# Patient Record
Sex: MALE | Race: WHITE | NOT HISPANIC OR LATINO | Employment: OTHER | URBAN - NONMETROPOLITAN AREA
[De-identification: names, ages, dates, MRNs, and addresses within clinical notes are randomized per-mention and may not be internally consistent; named-entity substitution may affect disease eponyms.]

---

## 2017-01-05 ENCOUNTER — TELEPHONE (OUTPATIENT)
Dept: UROLOGY | Facility: OTHER | Age: 72
End: 2017-01-05

## 2017-01-05 DIAGNOSIS — Z85.51 PERSONAL HISTORY OF MALIGNANT NEOPLASM OF BLADDER: Primary | ICD-10-CM

## 2017-01-05 NOTE — TELEPHONE ENCOUNTER
I spoke to patient and explained that he needs to have a UA and cytology before his appt on Tuesday with Dr Martinez. (The cytology won't be back yet, but we need to make sure that he doesn't have a bladder infection.)  Mil Alvarez LPN

## 2017-01-06 ENCOUNTER — TELEPHONE (OUTPATIENT)
Dept: UROLOGY | Facility: OTHER | Age: 72
End: 2017-01-06

## 2017-01-06 DIAGNOSIS — Z85.51 PERSONAL HISTORY OF MALIGNANT NEOPLASM OF BLADDER: ICD-10-CM

## 2017-01-06 DIAGNOSIS — Z85.51 PERSONAL HISTORY OF MALIGNANT NEOPLASM OF BLADDER: Primary | ICD-10-CM

## 2017-01-06 LAB
ALBUMIN UR-MCNC: NEGATIVE MG/DL
APPEARANCE UR: CLEAR
BILIRUB UR QL STRIP: NEGATIVE
COLOR UR AUTO: YELLOW
GLUCOSE UR STRIP-MCNC: NEGATIVE MG/DL
HGB UR QL STRIP: NEGATIVE
KETONES UR STRIP-MCNC: NEGATIVE MG/DL
LEUKOCYTE ESTERASE UR QL STRIP: NEGATIVE
NITRATE UR QL: NEGATIVE
PH UR STRIP: 6 PH (ref 4.7–8)
SP GR UR STRIP: 1.02 (ref 1–1.03)
URN SPEC COLLECT METH UR: NORMAL
UROBILINOGEN UR STRIP-MCNC: NORMAL MG/DL (ref 0–2)

## 2017-01-06 PROCEDURE — 88108 CYTOPATH CONCENTRATE TECH: CPT | Mod: TC | Performed by: UROLOGY

## 2017-01-06 PROCEDURE — 81003 URINALYSIS AUTO W/O SCOPE: CPT | Performed by: UROLOGY

## 2017-01-09 LAB — COPATH REPORT: NORMAL

## 2017-01-27 ENCOUNTER — TELEPHONE (OUTPATIENT)
Dept: UROLOGY | Facility: OTHER | Age: 72
End: 2017-01-27

## 2017-01-27 DIAGNOSIS — C67.0 MALIGNANT NEOPLASM OF TRIGONE OF URINARY BLADDER (H): Primary | ICD-10-CM

## 2017-01-27 NOTE — TELEPHONE ENCOUNTER
Patient notified that Dr Martinez wants to do a cysto with biopsy in the OR on Feb 14. I also explained that he will need a pre op appt with his PCP which he will schedule, and that I will send him a surgery packet.  Mil Alvarez LPN

## 2017-02-01 ENCOUNTER — OFFICE VISIT (OUTPATIENT)
Dept: FAMILY MEDICINE | Facility: OTHER | Age: 72
End: 2017-02-01
Attending: FAMILY MEDICINE
Payer: COMMERCIAL

## 2017-02-01 VITALS
HEIGHT: 67 IN | DIASTOLIC BLOOD PRESSURE: 74 MMHG | TEMPERATURE: 97.1 F | RESPIRATION RATE: 14 BRPM | HEART RATE: 63 BPM | OXYGEN SATURATION: 95 % | BODY MASS INDEX: 33.9 KG/M2 | WEIGHT: 216 LBS | SYSTOLIC BLOOD PRESSURE: 132 MMHG

## 2017-02-01 DIAGNOSIS — Z01.818 PREOP GENERAL PHYSICAL EXAM: Primary | ICD-10-CM

## 2017-02-01 LAB
BASOPHILS # BLD AUTO: 0.1 10E9/L (ref 0–0.2)
BASOPHILS NFR BLD AUTO: 0.7 %
DIFFERENTIAL METHOD BLD: NORMAL
EOSINOPHIL # BLD AUTO: 0.1 10E9/L (ref 0–0.7)
EOSINOPHIL NFR BLD AUTO: 2.1 %
ERYTHROCYTE [DISTWIDTH] IN BLOOD BY AUTOMATED COUNT: 13.7 % (ref 10–15)
HCT VFR BLD AUTO: 43.4 % (ref 40–53)
HGB BLD-MCNC: 15.2 G/DL (ref 13.3–17.7)
IMM GRANULOCYTES # BLD: 0 10E9/L (ref 0–0.4)
IMM GRANULOCYTES NFR BLD: 0.3 %
LYMPHOCYTES # BLD AUTO: 2.2 10E9/L (ref 0.8–5.3)
LYMPHOCYTES NFR BLD AUTO: 32.5 %
MCH RBC QN AUTO: 32.5 PG (ref 26.5–33)
MCHC RBC AUTO-ENTMCNC: 35 G/DL (ref 31.5–36.5)
MCV RBC AUTO: 93 FL (ref 78–100)
MONOCYTES # BLD AUTO: 0.8 10E9/L (ref 0–1.3)
MONOCYTES NFR BLD AUTO: 12 %
NEUTROPHILS # BLD AUTO: 3.6 10E9/L (ref 1.6–8.3)
NEUTROPHILS NFR BLD AUTO: 52.4 %
NRBC # BLD AUTO: 0 10*3/UL
NRBC BLD AUTO-RTO: 0 /100
PLATELET # BLD AUTO: 218 10E9/L (ref 150–450)
RBC # BLD AUTO: 4.68 10E12/L (ref 4.4–5.9)
WBC # BLD AUTO: 6.8 10E9/L (ref 4–11)

## 2017-02-01 PROCEDURE — 85025 COMPLETE CBC W/AUTO DIFF WBC: CPT | Performed by: FAMILY MEDICINE

## 2017-02-01 PROCEDURE — 99214 OFFICE O/P EST MOD 30 MIN: CPT | Performed by: FAMILY MEDICINE

## 2017-02-01 PROCEDURE — 99213 OFFICE O/P EST LOW 20 MIN: CPT

## 2017-02-01 PROCEDURE — 36415 COLL VENOUS BLD VENIPUNCTURE: CPT | Performed by: FAMILY MEDICINE

## 2017-02-01 ASSESSMENT — PAIN SCALES - GENERAL: PAINLEVEL: NO PAIN (0)

## 2017-02-01 NOTE — MR AVS SNAPSHOT
After Visit Summary   2/1/2017    Colin Baxter    MRN: [id number]           Patient Information     Date Of Birth          1945        Visit Information        Provider Department      2/1/2017 9:20 AM Luis Tran MD [facility]        Today's Diagnoses     Preop general physical exam    -  1       Care Instructions      Before Your Surgery      Call your surgeon if there is any change in your health. This includes signs of a cold or flu (such as a sore throat, runny nose, cough, rash or fever).    Do not smoke, drink alcohol or take over the counter medicine (unless your surgeon or primary care doctor tells you to) for the 24 hours before and after surgery.    If you take prescribed drugs: Follow your doctor s orders about which medicines to take and which to stop until after surgery.    Eating and drinking prior to surgery: follow the instructions from your surgeon    Take a shower or bath the night before surgery. Use the soap your surgeon gave you to gently clean your skin. If you do not have soap from your surgeon, use your regular soap. Do not shave or scrub the surgery site.  Wear clean pajamas and have clean sheets on your bed.         Follow-ups after your visit        Your next 10 appointments already scheduled     Feb 14, 2017   Procedure with Randy Martinez MD   [facility] ([address] [id number] [phone]              Who to contact     If you have questions or need follow up information about today's clinic visit or your schedule please contact [facility] directly at [phone].  Normal or non-critical lab and imaging results will be communicated to you by MyChart, letter or phone within 4 business days after the clinic has received the results. If you do not hear from us within 7 days, please contact the clinic through MyChart or phone. If you have a critical or abnormal lab  "result, we will notify you by phone as soon as possible.  Submit refill requests through [employer] or call your pharmacy and they will forward the refill request to us. Please allow 3 business days for your refill to be completed.          Additional Information About Your Visit        amiandohart Information     [employer] lets you send messages to your doctor, view your test results, renew your prescriptions, schedule appointments and more. To sign up, go to [email or web address] . Click on \"Log in\" on the left side of the screen, which will take you to the Welcome page. Then click on \"Sign up Now\" on the right side of the page.     You will be asked to enter the access code listed below, as well as some personal information. Please follow the directions to create your username and password.     Your access code is: [email or web address]  Expires: 5/3/2017 12:00 PM     Your access code will  in 90 days. If you need help or a new code, please call your [place] clinic or [phone].        Care EveryWhere ID     This is your Care EveryWhere ID. This could be used by other organizations to access your [place] medical records  [id number]        Your Vitals Were     Pulse Temperature Respirations Height BMI (Body Mass Index) Pulse Oximetry    63 97.1  F (36.2  C) 14 5' 6.5\" (1.689 m) 34.35 kg/m2 95%       Blood Pressure from Last 3 Encounters:   17 132/74   16 108/62   16 127/74    Weight from Last 3 Encounters:   17 216 lb (97.977 kg)   16 218 lb (98.884 kg)   16 218 lb 11.2 oz (99.202 kg)              We Performed the Following     CBC with platelets differential        Primary Care Provider Office Phone # Fax #    Luis Tran [phone]       [facility] [address] [place] [address]        Thank you!     Thank you for choosing [facility]  for your care. Our goal is always to provide you with excellent care. Hearing back from our patients " is one way we can continue to improve our services. Please take a few minutes to complete the written survey that you may receive in the mail after your visit with us. Thank you!             Your Updated Medication List - Protect others around you: Learn how to safely use, store and throw away your medicines at www.disposemymeds.org.          This list is accurate as of: 2/1/17 11:59 PM.  Always use your most recent med list.                   Brand Name Dispense Instructions for use    IBUPROFEN PO      Take 200 mg by mouth every 6 hours as needed for moderate pain       * UNABLE TO FIND      1,000 mg by INTRAVESICAL route MEDICATION NAME: Gemzar       * UNABLE TO FIND      40 mg by INTRAVESICAL route MEDICATION NAME: Mitomycin       XALATAN 0.005 % ophthalmic solution   Generic drug:  latanoprost      Place 1 drop into both eyes At Bedtime       * Notice:  This list has 2 medication(s) that are the same as other medications prescribed for you. Read the directions carefully, and ask your doctor or other care provider to review them with you.

## 2017-02-01 NOTE — NURSING NOTE
"Chief Complaint   Patient presents with     Pre-Op Exam       Initial /74 mmHg  Pulse 63  Temp(Src) 97.1  F (36.2  C)  Resp 14  Ht 5' 6.5\" (1.689 m)  Wt 216 lb (97.977 kg)  BMI 34.35 kg/m2  SpO2 95% Estimated body mass index is 34.35 kg/(m^2) as calculated from the following:    Height as of this encounter: 5' 6.5\" (1.689 m).    Weight as of this encounter: 216 lb (97.977 kg).  BP completed using cuff size: jaqui Mcnulty      "

## 2017-02-01 NOTE — PROGRESS NOTES
[facility] [place]  [address]  [place] [address]  [phone]  Dept: [phone]    PRE-OP EVALUATION:  Today's date: 2017    Colin Baxter (: 1945) presents for pre-operative evaluation assessment as requested by Dr. Martinez.  He requires evaluation and anesthesia risk assessment prior to undergoing surgery/procedure for treatment of bladder cancer .  Proposed procedure: bladder biopsy    Date of Surgery/ Procedure: 17  Time of Surgery/ Procedure: unknown  Hospital/Surgical Facility: [facility]  Primary Physician: Luis Tran  Type of Anesthesia Anticipated: to be determined    Patient has a Health Care Directive or Living Will:  NO    1. NO - Do you have a history of heart attack, stroke, stent, bypass or surgery on an artery in the head, neck, heart or legs?  2. NO - Do you ever have any pain or discomfort in your chest?  3. NO - Do you have a history of  Heart Failure?  4. NO - Are you troubled by shortness of breath when: walking on the level, up a slight hill or at night?  5. NO - Do you currently have a cold, bronchitis or other respiratory infection?  6. NO - Do you have a cough, shortness of breath or wheezing?  7. NO - Do you sometimes get pains in the calves of your legs when you walk?  8. NO - Do you or anyone in your family have previous history of blood clots?  9. NO - Do you or does anyone in your family have a serious bleeding problem such as prolonged bleeding following surgeries or cuts?  10. NO - Have you ever had problems with anemia or been told to take iron pills?  11. NO - Have you had any abnormal blood loss such as black, tarry or bloody stools, or abnormal vaginal bleeding?  12. NO - Have you ever had a blood transfusion?  13. NO - Have you or any of your relatives ever had problems with anesthesia?  14. YES - DO YOU HAVE SLEEP APNEA, EXCESSIVE SNORING OR DAYTIME DROWSINESS? snore  15. NO - Do you have any prosthetic heart valves?  16. NO - Do you have  prosthetic joints?  17. NO - Is there any chance that you may be pregnant?      HPI:                                                      Brief HPI related to upcoming procedure: Colin has a history of bladder cancer and has had previous resection.  He was seen by Urology where it was felt the patient would benefit from follow up cystoscopy and biopsy.  I was asked to see him for preoperative medical clearance.        See problem list for active medical problems.  Problems all longstanding and stable, except as noted/documented.  See ROS for pertinent symptoms related to these conditions.                                                                                                  .    MEDICAL HISTORY:                                                      Patient Active Problem List    Diagnosis Date Noted     ACP (advance care planning) 05/04/2016     Priority: Medium     Advance Care Planning 5/4/2016: ACP Review of Chart / Resources Provided:  Reviewed chart for advance care plan.  Colin Baxter has been provided information and resources to begin or update their advance care plan.  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 No pertinent past medical history.  Past Surgical History   Procedure Laterality Date     Appendectomy  1958     Cystoscopy, transurethral resection (tur) tumor bladder, combined N/A 9/8/2015     Procedure: COMBINED CYSTOSCOPY, TRANSURETHRAL RESECTION (TUR) TUMOR BLADDER;  Surgeon: Randy Martinez MD;  Location: [facility]     Cystoscopy, biopsy bladder, combined N/A 9/8/2015     Procedure: COMBINED CYSTOSCOPY, BIOPSY BLADDER;  Surgeon: Randy Martinez MD;  Location[facility]     Cystoscopy, retrogrades, insert stent  "ureter(s), combined Left 9/8/2015     Procedure: COMBINED CYSTOSCOPY, RETROGRADES, INSERT STENT URETER(S);  Surgeon: Randy Martinez MD;  Location[facility]     Cystoscopy, transurethral resection (tur) tumor bladder, combined N/A 1/12/2016     Procedure: COMBINED CYSTOSCOPY, TRANSURETHRAL RESECTION (TUR) TUMOR BLADDER;  Surgeon: Randy Martinez MD;  Location: [facility]     Colonoscopy  2-     Cystoscopy, transurethral resection (tur) tumor bladder, combined N/A 9/13/2016     Procedure: COMBINED CYSTOSCOPY, TRANSURETHRAL RESECTION (TUR) TUMOR BLADDER;  Surgeon: Randy Martinez MD;  Location: [facility]     Current Outpatient Prescriptions   Medication Sig Dispense Refill     IBUPROFEN PO Take 200 mg by mouth every 6 hours as needed for moderate pain       latanoprost (XALATAN) 0.005 % ophthalmic solution Place 1 drop into both eyes At Bedtime       UNABLE TO FIND 1,000 mg by INTRAVESICAL route MEDICATION NAME: Gemzar       UNABLE TO FIND 40 mg by INTRAVESICAL route MEDICATION NAME: Mitomycin       OTC products: None, except as noted above    No Known Allergies   Latex Allergy: NO    Social History   Substance Use Topics     Smoking status: Former Smoker     Quit date: 01/06/1992     Smokeless tobacco: Never Used     Alcohol Use: Yes     History   Drug Use No       REVIEW OF SYSTEMS:                                                    Constitutional, neuro, ENT, endocrine, pulmonary, cardiac, gastrointestinal, genitourinary, musculoskeletal, integument and psychiatric systems are negative, except as otherwise noted.    EXAM:                                                    /74 mmHg  Pulse 63  Temp(Src) 97.1  F (36.2  C)  Resp 14  Ht 5' 6.5\" (1.689 m)  Wt 216 lb (97.977 kg)  BMI 34.35 kg/m2  SpO2 95%    GENERAL APPEARANCE: healthy, alert and no distress     EYES: EOMI, - PERRL     HENT: ear canals and TM's normal and nose and mouth without ulcers or lesions     NECK: no " adenopathy, no asymmetry, masses, or scars and thyroid normal to palpation     RESP: lungs clear to auscultation - no rales, rhonchi or wheezes     CV: regular rates and rhythm, normal S1 S2, no S3 or S4 and no murmur, click or rub -     ABDOMEN:  soft, nontender, no HSM or masses and bowel sounds normal     MS: extremities normal- no gross deformities noted, no evidence of inflammation in joints, FROM in all extremities.     SKIN: no suspicious lesions or rashes     NEURO: Normal strength and tone, sensory exam grossly normal, mentation intact and speech normal     PSYCH: mentation appears normal. and affect normal/bright     LYMPHATICS: No axillary, cervical, inguinal, or supraclavicular nodes    DIAGNOSTICS:                                                      Labs Resulted Today:   Results for orders placed or performed in visit on 02/01/17   CBC with platelets differential   Result Value Ref Range    WBC 6.8 4.0 - 11.0 10e9/L    RBC Count 4.68 4.4 - 5.9 10e12/L    Hemoglobin 15.2 13.3 - 17.7 g/dL    Hematocrit 43.4 40.0 - 53.0 %    MCV 93 78 - 100 fl    MCH 32.5 26.5 - 33.0 pg    MCHC 35.0 31.5 - 36.5 g/dL    RDW 13.7 10.0 - 15.0 %    Platelet Count 218 150 - 450 10e9/L    Diff Method Automated Method     % Neutrophils 52.4 %    % Lymphocytes 32.5 %    % Monocytes 12.0 %    % Eosinophils 2.1 %    % Basophils 0.7 %    % Immature Granulocytes 0.3 %    Nucleated RBCs 0 0 /100    Absolute Neutrophil 3.6 1.6 - 8.3 10e9/L    Absolute Lymphocytes 2.2 0.8 - 5.3 10e9/L    Absolute Monocytes 0.8 0.0 - 1.3 10e9/L    Absolute Eosinophils 0.1 0.0 - 0.7 10e9/L    Absolute Basophils 0.1 0.0 - 0.2 10e9/L    Abs Immature Granulocytes 0.0 0 - 0.4 10e9/L    Absolute Nucleated RBC 0.0        Recent Labs   Lab Test  09/11/15   0846  09/02/15   1040  07/07/15   0848   HGB  15.0  15.3  15.8   PLT  227  222  236   NA   --    --   138   POTASSIUM   --    --   4.0   CR   --    --   0.97        IMPRESSION:                                                     Reason for surgery/procedure: Bladder cancer    The proposed surgical procedure is considered INTERMEDIATE risk.    REVISED CARDIAC RISK INDEX  The patient has the following serious cardiovascular risks for perioperative complications such as (MI, PE, VFib and 3  AV Block):  No serious cardiac risks  INTERPRETATION: 0 risks: Class I (very low risk - 0.4% complication rate)    The patient has the following additional risks for perioperative complications:  No identified additional risks    No diagnosis found.    RECOMMENDATIONS:                                                        APPROVAL GIVEN to proceed with proposed procedure, without further diagnostic evaluation       Signed Electronically by: Luis Tran MD    Copy of this evaluation report is provided to requesting physician.    [facility] Preop Guidelines

## 2017-02-08 ENCOUNTER — TELEPHONE (OUTPATIENT)
Dept: UROLOGY | Facility: OTHER | Age: 72
End: 2017-02-08

## 2017-02-08 NOTE — TELEPHONE ENCOUNTER
I explained to patient that he does not need to give a urine sample before his surgery next week.  Mli Alvarez LPN

## 2017-02-08 NOTE — TELEPHONE ENCOUNTER
Colin has surgery scheduled 02/14 with Dr. Martinez.  Colin is asking if he should come in this week and provide a urine sample.  He would like you to call him back.

## 2017-02-08 NOTE — H&P (VIEW-ONLY)
[facility] [place]  [address]  [place] [address]  [phone]  Dept: [phone]    PRE-OP EVALUATION:  Today's date: 2017    Colin Baxter (: 1945) presents for pre-operative evaluation assessment as requested by Dr. Martinez.  He requires evaluation and anesthesia risk assessment prior to undergoing surgery/procedure for treatment of bladder cancer .  Proposed procedure: bladder biopsy    Date of Surgery/ Procedure: 17  Time of Surgery/ Procedure: unknown  Hospital/Surgical Facility: [facility]  Primary Physician: Luis Tran  Type of Anesthesia Anticipated: to be determined    Patient has a Health Care Directive or Living Will:  NO    1. NO - Do you have a history of heart attack, stroke, stent, bypass or surgery on an artery in the head, neck, heart or legs?  2. NO - Do you ever have any pain or discomfort in your chest?  3. NO - Do you have a history of  Heart Failure?  4. NO - Are you troubled by shortness of breath when: walking on the level, up a slight hill or at night?  5. NO - Do you currently have a cold, bronchitis or other respiratory infection?  6. NO - Do you have a cough, shortness of breath or wheezing?  7. NO - Do you sometimes get pains in the calves of your legs when you walk?  8. NO - Do you or anyone in your family have previous history of blood clots?  9. NO - Do you or does anyone in your family have a serious bleeding problem such as prolonged bleeding following surgeries or cuts?  10. NO - Have you ever had problems with anemia or been told to take iron pills?  11. NO - Have you had any abnormal blood loss such as black, tarry or bloody stools, or abnormal vaginal bleeding?  12. NO - Have you ever had a blood transfusion?  13. NO - Have you or any of your relatives ever had problems with anesthesia?  14. YES - DO YOU HAVE SLEEP APNEA, EXCESSIVE SNORING OR DAYTIME DROWSINESS? snore  15. NO - Do you have any prosthetic heart valves?  16. NO - Do you have  prosthetic joints?  17. NO - Is there any chance that you may be pregnant?      HPI:                                                      Brief HPI related to upcoming procedure: Colin has a history of bladder cancer and has had previous resection.  He was seen by Urology where it was felt the patient would benefit from follow up cystoscopy and biopsy.  I was asked to see him for preoperative medical clearance.        See problem list for active medical problems.  Problems all longstanding and stable, except as noted/documented.  See ROS for pertinent symptoms related to these conditions.                                                                                                  .    MEDICAL HISTORY:                                                      Patient Active Problem List    Diagnosis Date Noted     ACP (advance care planning) 05/04/2016     Priority: Medium     Advance Care Planning 5/4/2016: ACP Review of Chart / Resources Provided:  Reviewed chart for advance care plan.  Colin Baxter has been provided information and resources to begin or update their advance care plan.  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 No pertinent past medical history.  Past Surgical History   Procedure Laterality Date     Appendectomy  1958     Cystoscopy, transurethral resection (tur) tumor bladder, combined N/A 9/8/2015     Procedure: COMBINED CYSTOSCOPY, TRANSURETHRAL RESECTION (TUR) TUMOR BLADDER;  Surgeon: Randy Martinez MD;  Location[facility]     Cystoscopy, biopsy bladder, combined N/A 9/8/2015     Procedure: COMBINED CYSTOSCOPY, BIOPSY BLADDER;  Surgeon: Randy Martinez MD;  [facility]     Cystoscopy, retrogrades, insert stent  "ureter(s), combined Left 9/8/2015     Procedure: COMBINED CYSTOSCOPY, RETROGRADES, INSERT STENT URETER(S);  Surgeon: Randy Martinez MD;  Location: [facility]     Cystoscopy, transurethral resection (tur) tumor bladder, combined N/A 1/12/2016     Procedure: COMBINED CYSTOSCOPY, TRANSURETHRAL RESECTION (TUR) TUMOR BLADDER;  Surgeon: Randy Martinez MD;  Location: [facility]     Colonoscopy  2-     Cystoscopy, transurethral resection (tur) tumor bladder, combined N/A 9/13/2016     Procedure: COMBINED CYSTOSCOPY, TRANSURETHRAL RESECTION (TUR) TUMOR BLADDER;  Surgeon: Randy Martinez MD;  Location: [facility]     Current Outpatient Prescriptions   Medication Sig Dispense Refill     IBUPROFEN PO Take 200 mg by mouth every 6 hours as needed for moderate pain       latanoprost (XALATAN) 0.005 % ophthalmic solution Place 1 drop into both eyes At Bedtime       UNABLE TO FIND 1,000 mg by INTRAVESICAL route MEDICATION NAME: Gemzar       UNABLE TO FIND 40 mg by INTRAVESICAL route MEDICATION NAME: Mitomycin       OTC products: None, except as noted above    No Known Allergies   Latex Allergy: NO    Social History   Substance Use Topics     Smoking status: Former Smoker     Quit date: 01/06/1992     Smokeless tobacco: Never Used     Alcohol Use: Yes     History   Drug Use No       REVIEW OF SYSTEMS:                                                    Constitutional, neuro, ENT, endocrine, pulmonary, cardiac, gastrointestinal, genitourinary, musculoskeletal, integument and psychiatric systems are negative, except as otherwise noted.    EXAM:                                                    /74 mmHg  Pulse 63  Temp(Src) 97.1  F (36.2  C)  Resp 14  Ht 5' 6.5\" (1.689 m)  Wt 216 lb (97.977 kg)  BMI 34.35 kg/m2  SpO2 95%    GENERAL APPEARANCE: healthy, alert and no distress     EYES: EOMI, - PERRL     HENT: ear canals and TM's normal and nose and mouth without ulcers or lesions     NECK: no " adenopathy, no asymmetry, masses, or scars and thyroid normal to palpation     RESP: lungs clear to auscultation - no rales, rhonchi or wheezes     CV: regular rates and rhythm, normal S1 S2, no S3 or S4 and no murmur, click or rub -     ABDOMEN:  soft, nontender, no HSM or masses and bowel sounds normal     MS: extremities normal- no gross deformities noted, no evidence of inflammation in joints, FROM in all extremities.     SKIN: no suspicious lesions or rashes     NEURO: Normal strength and tone, sensory exam grossly normal, mentation intact and speech normal     PSYCH: mentation appears normal. and affect normal/bright     LYMPHATICS: No axillary, cervical, inguinal, or supraclavicular nodes    DIAGNOSTICS:                                                      Labs Resulted Today:   Results for orders placed or performed in visit on 02/01/17   CBC with platelets differential   Result Value Ref Range    WBC 6.8 4.0 - 11.0 10e9/L    RBC Count 4.68 4.4 - 5.9 10e12/L    Hemoglobin 15.2 13.3 - 17.7 g/dL    Hematocrit 43.4 40.0 - 53.0 %    MCV 93 78 - 100 fl    MCH 32.5 26.5 - 33.0 pg    MCHC 35.0 31.5 - 36.5 g/dL    RDW 13.7 10.0 - 15.0 %    Platelet Count 218 150 - 450 10e9/L    Diff Method Automated Method     % Neutrophils 52.4 %    % Lymphocytes 32.5 %    % Monocytes 12.0 %    % Eosinophils 2.1 %    % Basophils 0.7 %    % Immature Granulocytes 0.3 %    Nucleated RBCs 0 0 /100    Absolute Neutrophil 3.6 1.6 - 8.3 10e9/L    Absolute Lymphocytes 2.2 0.8 - 5.3 10e9/L    Absolute Monocytes 0.8 0.0 - 1.3 10e9/L    Absolute Eosinophils 0.1 0.0 - 0.7 10e9/L    Absolute Basophils 0.1 0.0 - 0.2 10e9/L    Abs Immature Granulocytes 0.0 0 - 0.4 10e9/L    Absolute Nucleated RBC 0.0        Recent Labs   Lab Test  09/11/15   0846  09/02/15   1040  07/07/15   0848   HGB  15.0  15.3  15.8   PLT  227  222  236   NA   --    --   138   POTASSIUM   --    --   4.0   CR   --    --   0.97        IMPRESSION:                                                     Reason for surgery/procedure: Bladder cancer    The proposed surgical procedure is considered INTERMEDIATE risk.    REVISED CARDIAC RISK INDEX  The patient has the following serious cardiovascular risks for perioperative complications such as (MI, PE, VFib and 3  AV Block):  No serious cardiac risks  INTERPRETATION: 0 risks: Class I (very low risk - 0.4% complication rate)    The patient has the following additional risks for perioperative complications:  No identified additional risks    No diagnosis found.    RECOMMENDATIONS:                                                        APPROVAL GIVEN to proceed with proposed procedure, without further diagnostic evaluation       Signed Electronically by: Luis Tran MD    Copy of this evaluation report is provided to requesting physician.    [facility] Preop Guidelines

## 2017-02-14 ENCOUNTER — HOSPITAL ENCOUNTER (OUTPATIENT)
Facility: HOSPITAL | Age: 72
Discharge: HOME | End: 2017-02-14
Attending: UROLOGY | Admitting: UROLOGY
Payer: COMMERCIAL

## 2017-02-14 ENCOUNTER — ANESTHESIA (OUTPATIENT)
Dept: SURGERY | Facility: HOSPITAL | Age: 72
End: 2017-02-14
Payer: COMMERCIAL

## 2017-02-14 ENCOUNTER — ANESTHESIA EVENT (OUTPATIENT)
Dept: SURGERY | Facility: HOSPITAL | Age: 72
End: 2017-02-14
Payer: COMMERCIAL

## 2017-02-14 ENCOUNTER — SURGERY (OUTPATIENT)
Age: 72
End: 2017-02-14

## 2017-02-14 VITALS
DIASTOLIC BLOOD PRESSURE: 83 MMHG | OXYGEN SATURATION: 95 % | SYSTOLIC BLOOD PRESSURE: 131 MMHG | TEMPERATURE: 96.9 F | BODY MASS INDEX: 34.82 KG/M2 | RESPIRATION RATE: 16 BRPM | WEIGHT: 219 LBS

## 2017-02-14 PROCEDURE — 71000027 ZZH RECOVERY PHASE 2 EACH 15 MINS: Performed by: UROLOGY

## 2017-02-14 PROCEDURE — 25000125 ZZHC RX 250: Performed by: NURSE ANESTHETIST, CERTIFIED REGISTERED

## 2017-02-14 PROCEDURE — 25000128 H RX IP 250 OP 636: Performed by: UROLOGY

## 2017-02-14 PROCEDURE — 25800025 ZZH RX 258: Performed by: ANESTHESIOLOGY

## 2017-02-14 PROCEDURE — 36000050 ZZH SURGERY LEVEL 2 1ST 30 MIN: Performed by: UROLOGY

## 2017-02-14 PROCEDURE — 37000009 ZZH ANESTHESIA TECHNICAL FEE, EACH ADDTL 15 MIN: Performed by: UROLOGY

## 2017-02-14 PROCEDURE — 52234 CYSTOSCOPY AND TREATMENT: CPT | Performed by: ANESTHESIOLOGY

## 2017-02-14 PROCEDURE — 25000566 ZZH SEVOFLURANE, EA 15 MIN: Performed by: ANESTHESIOLOGY

## 2017-02-14 PROCEDURE — 25000128 H RX IP 250 OP 636: Performed by: NURSE ANESTHETIST, CERTIFIED REGISTERED

## 2017-02-14 PROCEDURE — 88305 TISSUE EXAM BY PATHOLOGIST: CPT | Mod: TC | Performed by: UROLOGY

## 2017-02-14 PROCEDURE — 37000008 ZZH ANESTHESIA TECHNICAL FEE, 1ST 30 MIN: Performed by: UROLOGY

## 2017-02-14 PROCEDURE — 52234 CYSTOSCOPY AND TREATMENT: CPT | Performed by: NURSE ANESTHETIST, CERTIFIED REGISTERED

## 2017-02-14 PROCEDURE — 71000014 ZZH RECOVERY PHASE 1 LEVEL 2 FIRST HR: Performed by: UROLOGY

## 2017-02-14 PROCEDURE — 36000052 ZZH SURGERY LEVEL 2 EA 15 ADDTL MIN: Performed by: UROLOGY

## 2017-02-14 PROCEDURE — 40000306 ZZH STATISTIC PRE PROC ASSESS II: Performed by: UROLOGY

## 2017-02-14 RX ORDER — NALOXONE HYDROCHLORIDE 0.4 MG/ML
.1-.4 INJECTION, SOLUTION INTRAMUSCULAR; INTRAVENOUS; SUBCUTANEOUS
Status: DISCONTINUED | OUTPATIENT
Start: 2017-02-14 | End: 2017-02-14 | Stop reason: HOSPADM

## 2017-02-14 RX ORDER — LABETALOL HYDROCHLORIDE 5 MG/ML
10 INJECTION, SOLUTION INTRAVENOUS
Status: DISCONTINUED | OUTPATIENT
Start: 2017-02-14 | End: 2017-02-14 | Stop reason: HOSPADM

## 2017-02-14 RX ORDER — CEFAZOLIN SODIUM 2 G/100ML
2 INJECTION, SOLUTION INTRAVENOUS
Status: COMPLETED | OUTPATIENT
Start: 2017-02-14 | End: 2017-02-14

## 2017-02-14 RX ORDER — PROPOFOL 10 MG/ML
INJECTION, EMULSION INTRAVENOUS PRN
Status: DISCONTINUED | OUTPATIENT
Start: 2017-02-14 | End: 2017-02-14

## 2017-02-14 RX ORDER — MEPERIDINE HYDROCHLORIDE 25 MG/ML
12.5 INJECTION INTRAMUSCULAR; INTRAVENOUS; SUBCUTANEOUS
Status: DISCONTINUED | OUTPATIENT
Start: 2017-02-14 | End: 2017-02-14 | Stop reason: HOSPADM

## 2017-02-14 RX ORDER — DEXAMETHASONE SODIUM PHOSPHATE 10 MG/ML
INJECTION, SOLUTION INTRAMUSCULAR; INTRAVENOUS PRN
Status: DISCONTINUED | OUTPATIENT
Start: 2017-02-14 | End: 2017-02-14

## 2017-02-14 RX ORDER — LIDOCAINE HYDROCHLORIDE 20 MG/ML
INJECTION, SOLUTION INFILTRATION; PERINEURAL PRN
Status: DISCONTINUED | OUTPATIENT
Start: 2017-02-14 | End: 2017-02-14

## 2017-02-14 RX ORDER — PROMETHAZINE HYDROCHLORIDE 25 MG/ML
12.5 INJECTION, SOLUTION INTRAMUSCULAR; INTRAVENOUS
Status: DISCONTINUED | OUTPATIENT
Start: 2017-02-14 | End: 2017-02-14 | Stop reason: HOSPADM

## 2017-02-14 RX ORDER — SODIUM CHLORIDE, SODIUM LACTATE, POTASSIUM CHLORIDE, CALCIUM CHLORIDE 600; 310; 30; 20 MG/100ML; MG/100ML; MG/100ML; MG/100ML
INJECTION, SOLUTION INTRAVENOUS CONTINUOUS
Status: DISCONTINUED | OUTPATIENT
Start: 2017-02-14 | End: 2017-02-14 | Stop reason: HOSPADM

## 2017-02-14 RX ORDER — DEXAMETHASONE SODIUM PHOSPHATE 4 MG/ML
4 INJECTION, SOLUTION INTRA-ARTICULAR; INTRALESIONAL; INTRAMUSCULAR; INTRAVENOUS; SOFT TISSUE EVERY 10 MIN PRN
Status: DISCONTINUED | OUTPATIENT
Start: 2017-02-14 | End: 2017-02-14 | Stop reason: HOSPADM

## 2017-02-14 RX ORDER — FENTANYL CITRATE 50 UG/ML
25-50 INJECTION, SOLUTION INTRAMUSCULAR; INTRAVENOUS
Status: DISCONTINUED | OUTPATIENT
Start: 2017-02-14 | End: 2017-02-14 | Stop reason: HOSPADM

## 2017-02-14 RX ORDER — HYDROMORPHONE HYDROCHLORIDE 1 MG/ML
.3-.5 INJECTION, SOLUTION INTRAMUSCULAR; INTRAVENOUS; SUBCUTANEOUS EVERY 10 MIN PRN
Status: DISCONTINUED | OUTPATIENT
Start: 2017-02-14 | End: 2017-02-14 | Stop reason: HOSPADM

## 2017-02-14 RX ORDER — HYDRALAZINE HYDROCHLORIDE 20 MG/ML
2.5-5 INJECTION INTRAMUSCULAR; INTRAVENOUS EVERY 10 MIN PRN
Status: DISCONTINUED | OUTPATIENT
Start: 2017-02-14 | End: 2017-02-14 | Stop reason: HOSPADM

## 2017-02-14 RX ORDER — ALBUTEROL SULFATE 0.83 MG/ML
2.5 SOLUTION RESPIRATORY (INHALATION) EVERY 4 HOURS PRN
Status: DISCONTINUED | OUTPATIENT
Start: 2017-02-14 | End: 2017-02-14 | Stop reason: HOSPADM

## 2017-02-14 RX ORDER — ONDANSETRON 2 MG/ML
INJECTION INTRAMUSCULAR; INTRAVENOUS PRN
Status: DISCONTINUED | OUTPATIENT
Start: 2017-02-14 | End: 2017-02-14

## 2017-02-14 RX ORDER — ONDANSETRON 4 MG/1
4 TABLET, ORALLY DISINTEGRATING ORAL EVERY 30 MIN PRN
Status: DISCONTINUED | OUTPATIENT
Start: 2017-02-14 | End: 2017-02-14 | Stop reason: HOSPADM

## 2017-02-14 RX ORDER — FENTANYL CITRATE 50 UG/ML
INJECTION, SOLUTION INTRAMUSCULAR; INTRAVENOUS PRN
Status: DISCONTINUED | OUTPATIENT
Start: 2017-02-14 | End: 2017-02-14

## 2017-02-14 RX ORDER — ONDANSETRON 2 MG/ML
4 INJECTION INTRAMUSCULAR; INTRAVENOUS EVERY 30 MIN PRN
Status: DISCONTINUED | OUTPATIENT
Start: 2017-02-14 | End: 2017-02-14 | Stop reason: HOSPADM

## 2017-02-14 RX ADMIN — LIDOCAINE HYDROCHLORIDE 40 MG: 20 INJECTION, SOLUTION INFILTRATION; PERINEURAL at 09:10

## 2017-02-14 RX ADMIN — DEXAMETHASONE SODIUM PHOSPHATE 6 MG: 10 INJECTION, SOLUTION INTRAMUSCULAR; INTRAVENOUS at 09:15

## 2017-02-14 RX ADMIN — MIDAZOLAM HYDROCHLORIDE 2 MG: 1 INJECTION, SOLUTION INTRAMUSCULAR; INTRAVENOUS at 09:02

## 2017-02-14 RX ADMIN — FENTANYL CITRATE 25 MCG: 50 INJECTION, SOLUTION INTRAMUSCULAR; INTRAVENOUS at 09:02

## 2017-02-14 RX ADMIN — CEFAZOLIN SODIUM 2 G: 2 INJECTION, SOLUTION INTRAVENOUS at 09:16

## 2017-02-14 RX ADMIN — SODIUM CHLORIDE, POTASSIUM CHLORIDE, SODIUM LACTATE AND CALCIUM CHLORIDE: 600; 310; 30; 20 INJECTION, SOLUTION INTRAVENOUS at 08:41

## 2017-02-14 RX ADMIN — PROPOFOL 200 MG: 10 INJECTION, EMULSION INTRAVENOUS at 09:10

## 2017-02-14 RX ADMIN — FENTANYL CITRATE 25 MCG: 50 INJECTION, SOLUTION INTRAMUSCULAR; INTRAVENOUS at 09:14

## 2017-02-14 RX ADMIN — ONDANSETRON 4 MG: 2 INJECTION INTRAMUSCULAR; INTRAVENOUS at 09:15

## 2017-02-14 ASSESSMENT — LIFESTYLE VARIABLES: TOBACCO_USE: 1

## 2017-02-14 NOTE — ANESTHESIA PREPROCEDURE EVALUATION
Anesthesia Evaluation     . Pt has had prior anesthetic.     No history of anesthetic complications     ROS/MED HX    ENT/Pulmonary:     (+)sleep apnea (Undiagnosed but suspected), tobacco use, Past use Quit 1992 packs/day  doesn't use CPAP , . .    Neurologic:     (+)other neuro Glaucoma    Cardiovascular:  - neg cardiovascular ROS   (+) ----. : . . . :. . Previous cardiac testing date:results:date: results:ECG reviewed date:9/22/2015 results:SB@51 w/ 1st degree AVB, ?Inferior Infarct date: results:          METS/Exercise Tolerance:     Hematologic:  - neg hematologic  ROS       Musculoskeletal:  - neg musculoskeletal ROS       GI/Hepatic:  - neg GI/hepatic ROS       Renal/Genitourinary:     (+) Other Renal/ Genitourinary, Bladder CA s/p Multiple Cystos      Endo:     (+) type II DM Normal glucose range: Diet Controlled  Obesity, .      Psychiatric:  - neg psychiatric ROS       Infectious Disease:  - neg infectious disease ROS       Malignancy:   (+) Malignancy History of Other  Other CA Bladder CA Active status post         Other:    - neg other ROS           Physical Exam  Normal systems: cardiovascular and pulmonary    Airway   Mallampati: III  TM distance: >3 FB  Neck ROM: full    Dental   (+) implants    Cardiovascular   Rhythm and rate: regular and normal      Pulmonary    breath sounds clear to auscultation                    Anesthesia Plan      History & Physical Review  History and physical reviewed and following examination; no interval change.    ASA Status:  3 .        Plan for General and LMA with Intravenous and Propofol induction. Maintenance will be Balanced.    PONV prophylaxis:  Ondansetron (or other 5HT-3) and Dexamethasone or Solumedrol  Patient states prefers GA/LMA technique over MAC; All 3 previous procedures have required conversion to GA/LMA secondary to stimulation        Postoperative Care  Postoperative pain management:  IV analgesics and Oral pain medications.       Consents  Anesthetic plan, risks, benefits and alternatives discussed with:  Patient..                          .

## 2017-02-14 NOTE — DISCHARGE INSTRUCTIONS
Post-Anesthesia Patient Instructions    IMMEDIATELY FOLLOWING SURGERY:  Do not drive or operate machinery for the first twenty four hours after surgery.  Do not make any important decisions for twenty four hours after surgery or while taking narcotic pain medications or sedatives.  If you develop intractable nausea and vomiting or a severe headache please notify your doctor immediately.    FOLLOW-UP:  Please make an appointment with your surgeon as instructed. You do not need to follow up with anesthesia unless specifically instructed to do so.    WOUND CARE INSTRUCTIONS (if applicable):  Keep a dry clean dressing on the anesthesia/puncture wound site if there is drainage.  Once the wound has quit draining you may leave it open to air.  Generally you should leave the bandage intact for twenty four hours unless there is drainage.  If the epidural site drains for more than 36-48 hours please call the anesthesia department.    QUESTIONS?:  Please feel free to call your physician or the hospital  if you have any questions, and they will be happy to assist you.

## 2017-02-14 NOTE — OP NOTE
PATIENT NAME:Colin Baxter  MEDICAL RECORD NUMBER: [id number]  SURGEON: Randy Martinez  ASSISTANT: none  PREOPERATIVE DIAGNOSIS: Bladder cancer, positive cytology  POSTOPERATIVE DIAGNOSIS: Same  PROCEDURE PERFORMED: Cystoscopy, biopsy and fulgaration  ANESTHESIA: General.   COMPLICATIONS: None.   OPERATIVE FINDINGS: no clear tumors, several flat reddened areas  SPECIMEN: bladder base, midline, left, right, midline dome and prostatic urethra  EBL (mL): min  INDICATIONS FOR PROCEDURE: Colin Baxter is a 72 year old male with a history of a bladder tumor.  The various alternatives were explained and patient agreed to a transurethral resection of bladder tumor  DETAILS OF PROCEDURE: The risks and benefits of the procedure were explained in detail to patient and informed consent was obtained. The patient was brought to the operating room and placed supine on the operating table where he underwent general anesthesia. he was then positioned in dorsal lithotomy position.  The patient was prepped and draped in standard sterile fashion.   After an appropriate timeout, I introduced the 22-Czech rigid cystoscope through the urethra and into the bladder and performed a complete cystoscopy. The no tumor was identified. There were some red spots on the posterior bladder midline and right side, but otherwise the left base and midline dome and prostatic urethra were random.  We then obtained vigorous hemostasis.  The bladder was drained and then transported to PACU in stable condition.    Randy Martinez MD  Department of Urology [facility]

## 2017-02-14 NOTE — ANESTHESIA POSTPROCEDURE EVALUATION
Patient: Colin Baxter    Procedure(s):  CYSTOSCOPY WITH BLADDER BIOPSY AND FULGERATION - Wound Class: II-Clean Contaminated    Diagnosis:ATYPICAL UROTHEILIAL CELLS  Diagnosis Additional Information: No value filed.    Anesthesia Type:  General, LMA    Note:  Anesthesia Post Evaluation    Patient location during evaluation: PACU, Phase 2 and Bedside  Patient participation: Able to fully participate in evaluation  Level of consciousness: awake and alert  Pain management: adequate  Airway patency: patent  Cardiovascular status: acceptable  Respiratory status: acceptable  Hydration status: stable  PONV: none     Anesthetic complications: None          Last vitals:  Vitals:    02/14/17 1030 02/14/17 1035 02/14/17 1045   BP: 127/80 (!) 170/42 131/83   Resp: 12 16 16   Temp:      SpO2: (!) 81% 94% 95%         Electronically Signed By: Bobo Ruiz MD  February 14, 2017  12:55 PM

## 2017-02-14 NOTE — ANESTHESIA CARE TRANSFER NOTE
Patient: Colin Baxter    Procedure(s):  CYSTOSCOPY WITH BLADDER BIOPSY AND FULGERATION - Wound Class: II-Clean Contaminated    Diagnosis: ATYPICAL UROTHEILIAL CELLS  Diagnosis Additional Information: No value filed.    Anesthesia Type:   General, LMA     Note:  Airway :Nasal Cannula  Patient transferred to:PACU        Vitals: (Last set prior to Anesthesia Care Transfer)    CRNA VITALS  2/14/2017 0923 - 2/14/2017 0957      2/14/2017             Resp Rate (set): 8                Electronically Signed By: DANILO Holguin CRNA  February 14, 2017  9:57 AM

## 2017-02-14 NOTE — IP AVS SNAPSHOT
HI Preop/Phase II    [address] [address]    Phone:  [phone]                                       After Visit Summary   2/14/2017    Colin Baxter    MRN: [id number]           After Visit Summary Signature Page     I have received my discharge instructions, and my questions have been answered. I have discussed any challenges I see with this plan with the nurse or doctor.    ..........................................................................................................................................  Patient/Patient Representative Signature      ..........................................................................................................................................  Patient Representative Print Name and Relationship to Patient    ..................................................               ................................................  Date                                            Time    ..........................................................................................................................................  Reviewed by Signature/Title    ...................................................              ..............................................  Date                                                            Time

## 2017-02-14 NOTE — OR NURSING
Patient and responsible adult given discharge instructions with no questions regarding instructions. David score 20. Pain level 0/10.  Discharged from unit via walking. Patient discharged to home.

## 2017-02-14 NOTE — IP AVS SNAPSHOT
MRN:[id number]                      After Visit Summary   2/14/2017    Colin Baxter    MRN: [id number]           Thank you!     Thank you for choosing [facility] for your care. Our goal is always to provide you with excellent care. Hearing back from our patients is one way we can continue to improve our services. Please take a few minutes to complete the written survey that you may receive in the mail after you visit with us. Thank you!        Patient Information     Date Of Birth          1945        About your hospital stay     You were admitted on:  February 14, 2017 You last received care in the:  [facility] Preop/Phase II    You were discharged on:  February 14, 2017       Who to Call     For medical emergencies, please call 911.  For non-urgent questions about your medical care, please call your primary care provider or clinic, [phone]  For questions related to your surgery, please call your surgery clinic        Attending Provider     Provider Specialty    Weight, Randy Floyd MD Urology       Primary Care Provider Office Phone # Fax #    Luis Tran [phone]       [facility] [address] [facility] [address]        After Care Instructions     Diet Instructions       Resume pre procedure diet            Encourage fluids       Encourage fluids at home to keep urine clear to light pink                  Further instructions from your care team           Post-Anesthesia Patient Instructions    IMMEDIATELY FOLLOWING SURGERY:  Do not drive or operate machinery for the first twenty four hours after surgery.  Do not make any important decisions for twenty four hours after surgery or while taking narcotic pain medications or sedatives.  If you develop intractable nausea and vomiting or a severe headache please notify your doctor immediately.    FOLLOW-UP:  Please make an appointment with your surgeon as instructed. You do not need to follow up with anesthesia  "unless specifically instructed to do so.    WOUND CARE INSTRUCTIONS (if applicable):  Keep a dry clean dressing on the anesthesia/puncture wound site if there is drainage.  Once the wound has quit draining you may leave it open to air.  Generally you should leave the bandage intact for twenty four hours unless there is drainage.  If the epidural site drains for more than 36-48 hours please call the anesthesia department.    QUESTIONS?:  Please feel free to call your physician or the hospital  if you have any questions, and they will be happy to assist you.       Pending Results     No orders found from 2017 to 2/15/2017.            Admission Information     Date & Time Provider Department Dept. Phone    2017 Weight, Randy Floyd MD [facility] Preop/Phase -[phone]      Your Vitals Were     Blood Pressure Temperature Respirations Weight Pulse Oximetry BMI (Body Mass Index)    127/80 96.9  F (36.1  C) (Oral) 12 99.3 kg (219 lb) 81% 34.82 kg/m2      [employer] Information     [employer] lets you send messages to your doctor, view your test results, renew your prescriptions, schedule appointments and more. To sign up, go to [email or web address] . Click on \"Log in\" on the left side of the screen, which will take you to the Welcome page. Then click on \"Sign up Now\" on the right side of the page.     You will be asked to enter the access code listed below, as well as some personal information. Please follow the directions to create your username and password.     Your access code is: [email or web address]  Expires: 5/3/2017 12:00 PM     Your access code will  in 90 days. If you need help or a new code, please call your [facility] clinic or [phone].        Care EveryWhere ID     This is your Care EveryWhere ID. This could be used by other organizations to access your [facility] medical records  [id number]           Review of your medicines      CONTINUE these medicines which have NOT CHANGED        Dose / " Directions    IBUPROFEN PO        Dose:  200 mg   Take 200 mg by mouth every 6 hours as needed for moderate pain   Refills:  0       * UNABLE TO FIND        Dose:  1000 mg   1,000 mg by INTRAVESICAL route MEDICATION NAME: Gemzar   Refills:  0       * UNABLE TO FIND        Dose:  40 mg   40 mg by INTRAVESICAL route MEDICATION NAME: Mitomycin   Refills:  0       XALATAN 0.005 % ophthalmic solution   Used for:  Routine general medical examination at a health care facility   Generic drug:  latanoprost        Dose:  1 drop   Place 1 drop into both eyes At Bedtime   Refills:  0       * Notice:  This list has 2 medication(s) that are the same as other medications prescribed for you. Read the directions carefully, and ask your doctor or other care provider to review them with you.             Protect others around you: Learn how to safely use, store and throw away your medicines at www.disposemymeds.org.             Medication List: This is a list of all your medications and when to take them. Check marks below indicate your daily home schedule. Keep this list as a reference.      Medications           Morning Afternoon Evening Bedtime As Needed    IBUPROFEN PO   Take 200 mg by mouth every 6 hours as needed for moderate pain                                * UNABLE TO FIND   1,000 mg by INTRAVESICAL route MEDICATION NAME: Gemzar                                * UNABLE TO FIND   40 mg by INTRAVESICAL route MEDICATION NAME: Mitomycin                                XALATAN 0.005 % ophthalmic solution   Place 1 drop into both eyes At Bedtime   Generic drug:  latanoprost                                * Notice:  This list has 2 medication(s) that are the same as other medications prescribed for you. Read the directions carefully, and ask your doctor or other care provider to review them with you.              More Information        Cystoscopy  Cystoscopy is a procedure that lets your doctor look directly inside your urethra and  bladder. It can be used to:    Help diagnose a problem with your urethra, bladder, or kidneys.    Take a sample (biopsy) of bladder or urethral tissue.    Treat certain problems (such as removing kidney stones).    Place a stent to bypass an obstruction.    Take special X-rays of the kidneys.  Based on the findings, your doctor may recommend other tests or treatments.  What is a cystoscope?  A cystoscope is a telescope-like instrument that contains lenses and fiberoptics (small glass wires that make bright light). The cystoscope may be straight and rigid, or flexible to bend around curves in the urethra. The doctor may look directly into the cystoscope, or project the image onto a monitor.  Getting ready    Ask your doctor if you should stop taking any medications prior to the procedure.    Ask whether you should avoid eating or drinking anything after midnight before the procedure.    Follow any other instructions your doctor gives you.  Tell your doctor before the exam if you:    Take any medications, such as aspirin or blood thinners    Have allergies to any medications    Are pregnant   The procedure  Cystoscopy is done in the doctor s office or hospital. The doctor and a nurse are present during the procedure. It takes only a few minutes, longer if a biopsy, X-ray, or treatment needs to be done.  During the procedure:    You lie on an exam table on your back, knees bent and legs apart. You are covered with a drape.    Your urethra and the area around it are washed. Anesthetic jelly may be applied to numb the urethra. Other pain medication is usually not needed. In some cases, you may be offered a mild sedative to help you relax. If a more extensive procedure is to be done, such as a biopsy or kidney stone removal, general anesthesia may be needed.    The cystoscope is inserted. A sterile fluid is put into the bladder to expand it. You may feel pressure from this fluid.    When the procedure is done, the  cystoscope is removed.  After the procedure  If you had a sedative, general anesthesia, or spinal anesthesia, you must have someone drive you home. Once you re home:    Drink plenty of fluids.    You may have burning or light bleeding when you urinate--this is normal.    Medications may be prescribed to ease any discomfort or prevent infection. Take these as directed.    Call your doctor if you have heavy bleeding or blood clots, burning that lasts more than a day, a fever over 100 F  (38  C), or trouble urinating.    8364-0816 [employer]. [address] [place] [id number]. All rights reserved. This information is not intended as a substitute for professional medical care. Always follow your healthcare professional's instructions.

## 2017-02-15 LAB — COPATH REPORT: NORMAL

## 2017-02-20 DIAGNOSIS — C67.0 MALIGNANT NEOPLASM OF TRIGONE OF URINARY BLADDER (H): Primary | ICD-10-CM

## 2017-02-21 NOTE — NURSING NOTE
I have placed (and pended) an order for UA with reflex to culture; as well as discontinued the previous treatment plan for mitomycin and gemcitabine.  These orders need to be reviewed for completeness and  signed by Dr. Martinez as they are chemotherapy orders. Lian Johnson

## 2017-02-27 ENCOUNTER — INFUSION THERAPY VISIT (OUTPATIENT)
Dept: INFUSION THERAPY | Facility: OTHER | Age: 72
End: 2017-02-27
Attending: UROLOGY
Payer: COMMERCIAL

## 2017-02-27 VITALS
HEIGHT: 67 IN | RESPIRATION RATE: 16 BRPM | HEART RATE: 52 BPM | BODY MASS INDEX: 34.5 KG/M2 | OXYGEN SATURATION: 93 % | WEIGHT: 219.8 LBS | SYSTOLIC BLOOD PRESSURE: 130 MMHG | TEMPERATURE: 97 F | DIASTOLIC BLOOD PRESSURE: 77 MMHG

## 2017-02-27 DIAGNOSIS — C67.0 MALIGNANT NEOPLASM OF TRIGONE OF URINARY BLADDER (H): Primary | ICD-10-CM

## 2017-02-27 LAB
ALBUMIN UR-MCNC: NEGATIVE MG/DL
APPEARANCE UR: CLEAR
BACTERIA #/AREA URNS HPF: ABNORMAL /HPF
BILIRUB UR QL STRIP: NEGATIVE
COLOR UR AUTO: YELLOW
GLUCOSE UR STRIP-MCNC: NEGATIVE MG/DL
HGB UR QL STRIP: ABNORMAL
KETONES UR STRIP-MCNC: NEGATIVE MG/DL
LEUKOCYTE ESTERASE UR QL STRIP: NEGATIVE
MUCOUS THREADS #/AREA URNS LPF: PRESENT /LPF
NITRATE UR QL: NEGATIVE
PH UR STRIP: 5 PH (ref 4.7–8)
RBC #/AREA URNS AUTO: 13 /HPF (ref 0–2)
SP GR UR STRIP: 1.02 (ref 1–1.03)
URN SPEC COLLECT METH UR: ABNORMAL
UROBILINOGEN UR STRIP-MCNC: NORMAL MG/DL (ref 0–2)
WBC #/AREA URNS AUTO: 5 /HPF (ref 0–2)

## 2017-02-27 PROCEDURE — 25000125 ZZHC RX 250: Performed by: UROLOGY

## 2017-02-27 PROCEDURE — 25000128 H RX IP 250 OP 636: Performed by: UROLOGY

## 2017-02-27 PROCEDURE — 27210995 ZZH RX 272: Performed by: UROLOGY

## 2017-02-27 PROCEDURE — 51720 TREATMENT OF BLADDER LESION: CPT

## 2017-02-27 PROCEDURE — 81001 URINALYSIS AUTO W/SCOPE: CPT | Performed by: UROLOGY

## 2017-02-27 RX ADMIN — MITOMYCIN 40 MG: 40 INJECTION, POWDER, LYOPHILIZED, FOR SOLUTION INTRAVENOUS at 11:29

## 2017-02-27 RX ADMIN — GEMCITABINE 1000 MG: 38 INJECTION, SOLUTION INTRAVENOUS at 10:03

## 2017-02-27 RX ADMIN — LIDOCAINE HYDROCHLORIDE 10 ML: 20 JELLY TOPICAL at 09:43

## 2017-02-27 NOTE — PROGRESS NOTES
72 year old male patient here for bladder instillation of Gemzar/Mitomycin.  UA negative for nitrates, negative for leukocytes.  Denies any fever or chills.  Denies any pain with urination.  Results reviewed, labs met treatment parameters.  Proceed with treatment.  12 Latvian catheter placed, sterile technique.  Allowed bladder to empty 150ccs of urine returned.  1003: Gemzar 1,000mg instillation via catheter, tolerated well, catheter clamped.  Patient turned every 15 minutes per protocol, tolerated well.  Patient did have some leakage around catheter site, immediately cleansed per protocol by this nurse.  1118: catheter unclamped and allowed to drain, 100ccs of return.  1125: Bladder irrigated with 60cc of sterile water, allowed to drain, 75ccs of return.  VS WNL.  Patient offers no complaints.  1129: Mitomycin 40mg instilled via catheter, catheter clamped.  Patient turned every 15 minutes per protocol, tolerated well.  Offers no complaints, denies any pain or discomfort.  1235: Catheter unclamped and allowed to drain, 75ccs of return.  1245: Bladder irrigated with 60ccs of sterile water, allowed to drain, 125ccs of return.  VS WNL.  Encouraged to drink plenty of fluids for next couple of days.  Discharged in care of self.  Patient will return in 7 days for next appointment.  Gela Loza RN

## 2017-02-27 NOTE — PATIENT INSTRUCTIONS
Discharge Instructions for Infusion Therapy/Chemotherapy Department:     Your doctor has prescribed the following medication(s) Gemzar and Mitomycin to treat your bladder cancer.     All treatments have potential side effects, but they don't all happen to everyone. If you have any questions, problems, or concerns, we want you to call us. You can reach the Infusion Nurses at [phone] Monday - Friday 8:00am to 4:30pm or the Health Unit Coordinator at [phone] Monday - Friday 8:00am to 5:00pm.      *For 6 hours after your treatment, sit when you urinate. Place 1-2 cups of bleach in the toilet after you have urinated. Let stand for 15-20 minutes. Repeat this process each time you urinate for six (6) ours after the procedure.  *Wash your hands thoroughly after going to the bathroom  *Drink 2-3 liters of non caffeinated, non alcoholic beverages following your instillation to flush the bladder out.      After hours, evenings, weekends, and holidays please call Urgent Care [phone] or toll free [phone] or go to your nearest Emergency Department.        Possible side effects include:  *Painful or difficult urination, urgency  *Urinary frequency  *Blood in the urine  *Flu like symptoms.     YOU NEED TO CALL US OR GO TO YOUR NEAREST URGENT CARE/EMERGENCY DEPARTMENT IF ANY OF THE FOLLOWING OCCUR:     *You have a fever of 103 F or higher within 24 hours or higher. If you have a fever of 101 or higher after 48 hours.  * Shortness of breath  *Confusion.  *Extreme fatigue (Unable to perform self care activities)  *Fever, chills, malaise, flu-like symptoms, increased fatigue or an increase in urinary symptoms such as burning or pain on urination are NOT uncommon. However, if these increase in severity or  Last more than 48 hours let your doctor know.      ANY questions or problems, PLEASE do not hesitate to call us!!

## 2017-02-27 NOTE — MR AVS SNAPSHOT
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     All treatments have potential side effects, but they don't all happen to everyone. If you have any questions, problems, or concerns, we want you to call us. You can reach the Infusion Nurses at [phone] Monday - Friday 8:00am to 4:30pm or the Health Unit Coordinator at [phone] Monday - Friday 8:00am to 5:00pm.      *For 6 hours after your treatment, sit when you urinate. Place 1-2 cups of bleach in the toilet after you have urinated. Let stand for 15-20 minutes. Repeat this process each time you urinate for six (6) ours after the procedure.  *Wash your hands thoroughly after going to the bathroom  *Drink 2-3 liters of non caffeinated, non alcoholic beverages following your instillation to flush the bladder out.      After hours, evenings, weekends, and holidays please call Urgent Care [phone] or toll free [phone] or go to your nearest Emergency Department.        Possible side effects include:  *Painful or difficult urination, urgency  *Urinary frequency  *Blood in the urine  *Flu like symptoms.     YOU NEED TO CALL US OR GO TO YOUR NEAREST URGENT CARE/EMERGENCY DEPARTMENT IF ANY OF THE FOLLOWING OCCUR:     *You have a fever of 103 F or higher within 24 hours or higher. If you have a fever of 101 or higher after 48 hours.  * Shortness of breath  *Confusion.  *Extreme fatigue (Unable to perform self care activities)  *Fever, chills,  "malaise, flu-like symptoms, increased fatigue or an increase in urinary symptoms such as burning or pain on urination are NOT uncommon. However, if these increase in severity or  Last more than 48 hours let your doctor know.      ANY questions or problems, PLEASE do not hesitate to call us!!        Follow-ups after your visit        Your next 10 appointments already scheduled     Mar 06, 2017  8:30 AM CST   Level 5 with HC INF RM 3312   [facility] [place] [facility]    [address] [place] [address]   [phone]            Mar 13, 2017  8:30 AM CDT   Level 5 with HC INF RM 3306   [facility] ([facility] [place] [facility])    [address]  [place] [address]   [phone]              Who to contact     If you have questions or need follow up information about today's clinic visit or your schedule please contact [facility] directly at [phone].  Normal or non-critical lab and imaging results will be communicated to you by [employer], letter or phone within 4 business days after the clinic has received the results. If you do not hear from us within 7 days, please contact the clinic through [employer] or phone. If you have a critical or abnormal lab result, we will notify you by phone as soon as possible.  Submit refill requests through [employer] or call your pharmacy and they will forward the refill request to us. Please allow 3 business days for your refill to be completed.          Additional Information About Your Visit        [employer] Information     [employer] lets you send messages to your doctor, view your test results, renew your prescriptions, schedule appointments and more. To sign up, go to [email or web address] [place] . Click on \"Log in\" on the left side of the screen, which will take you to the Welcome page. Then click on \"Sign up Now\" on the right side of the page.     You will be asked to enter the access code listed below, as well as some personal information. Please " "follow the directions to create your username and password.     Your access code is: [email or web address]  Expires: 5/3/2017 12:00 PM     Your access code will  in 90 days. If you need help or a new code, please call your [place] clinic or [phone].        Care EveryWhere ID     This is your Care EveryWhere ID. This could be used by other organizations to access your [place] medical records  [id number]        Your Vitals Were     Pulse Temperature Respirations Height Pulse Oximetry BMI (Body Mass Index)    63 97  F (36.1  C) (Oral) 16 1.702 m (5' 7\") 93% 34.43 kg/m2       Blood Pressure from Last 3 Encounters:   17 131/78   17 131/83   17 132/74    Weight from Last 3 Encounters:   17 99.7 kg (219 lb 12.8 oz)   17 99.3 kg (219 lb)   17 98 kg (216 lb)              We Performed the Following     UA with Microscopic reflex to Culture        Primary Care Provider Office Phone # Fax #    Luis Tran [phone]       [facility] [address] [place], [place] [address] [address]        Thank you!     Thank you for choosing [facility]  for your care. Our goal is always to provide you with excellent care. Hearing back from our patients is one way we can continue to improve our services. Please take a few minutes to complete the written survey that you may receive in the mail after your visit with us. Thank you!             Your Updated Medication List - Protect others around you: Learn how to safely use, store and throw away your medicines at www.disposemymeds.org.          This list is accurate as of: 17 12:35 PM.  Always use your most recent med list.                   Brand Name Dispense Instructions for use    IBUPROFEN PO      Take 200 mg by mouth every 6 hours as needed for moderate pain       * UNABLE TO FIND      1,000 mg by INTRAVESICAL route MEDICATION NAME: Gemzar       * UNABLE TO FIND      40 mg by INTRAVESICAL route MEDICATION NAME: " Mitomycin       XALATAN 0.005 % ophthalmic solution   Generic drug:  latanoprost      Place 1 drop into both eyes At Bedtime       * Notice:  This list has 2 medication(s) that are the same as other medications prescribed for you. Read the directions carefully, and ask your doctor or other care provider to review them with you.

## 2017-03-06 ENCOUNTER — INFUSION THERAPY VISIT (OUTPATIENT)
Dept: INFUSION THERAPY | Facility: OTHER | Age: 72
End: 2017-03-06
Attending: UROLOGY
Payer: COMMERCIAL

## 2017-03-06 VITALS
TEMPERATURE: 98.2 F | SYSTOLIC BLOOD PRESSURE: 129 MMHG | HEIGHT: 67 IN | BODY MASS INDEX: 34.91 KG/M2 | HEART RATE: 55 BPM | OXYGEN SATURATION: 93 % | WEIGHT: 222.4 LBS | RESPIRATION RATE: 16 BRPM | DIASTOLIC BLOOD PRESSURE: 76 MMHG

## 2017-03-06 DIAGNOSIS — C67.0 MALIGNANT NEOPLASM OF TRIGONE OF URINARY BLADDER (H): Primary | ICD-10-CM

## 2017-03-06 LAB
ALBUMIN UR-MCNC: 10 MG/DL
APPEARANCE UR: CLEAR
BACTERIA #/AREA URNS HPF: ABNORMAL /HPF
BILIRUB UR QL STRIP: NEGATIVE
COLOR UR AUTO: YELLOW
GLUCOSE UR STRIP-MCNC: NEGATIVE MG/DL
HGB UR QL STRIP: ABNORMAL
KETONES UR STRIP-MCNC: NEGATIVE MG/DL
LEUKOCYTE ESTERASE UR QL STRIP: NEGATIVE
MUCOUS THREADS #/AREA URNS LPF: PRESENT /LPF
NITRATE UR QL: NEGATIVE
PH UR STRIP: 5.5 PH (ref 4.7–8)
RBC #/AREA URNS AUTO: 13 /HPF (ref 0–2)
SP GR UR STRIP: 1.02 (ref 1–1.03)
URN SPEC COLLECT METH UR: ABNORMAL
UROBILINOGEN UR STRIP-MCNC: NORMAL MG/DL (ref 0–2)
WBC #/AREA URNS AUTO: 3 /HPF (ref 0–2)

## 2017-03-06 PROCEDURE — 51702 INSERT TEMP BLADDER CATH: CPT

## 2017-03-06 PROCEDURE — 25000125 ZZHC RX 250: Performed by: UROLOGY

## 2017-03-06 PROCEDURE — 51720 TREATMENT OF BLADDER LESION: CPT

## 2017-03-06 PROCEDURE — 27210995 ZZH RX 272: Performed by: UROLOGY

## 2017-03-06 PROCEDURE — 25000128 H RX IP 250 OP 636: Performed by: UROLOGY

## 2017-03-06 PROCEDURE — 81001 URINALYSIS AUTO W/SCOPE: CPT | Performed by: UROLOGY

## 2017-03-06 RX ADMIN — LIDOCAINE HYDROCHLORIDE 10 ML: 20 JELLY TOPICAL at 09:05

## 2017-03-06 RX ADMIN — MITOMYCIN 40 MG: 40 INJECTION, POWDER, LYOPHILIZED, FOR SOLUTION INTRAVENOUS at 10:58

## 2017-03-06 RX ADMIN — GEMCITABINE 1000 MG: 38 INJECTION, SOLUTION INTRAVENOUS at 09:18

## 2017-03-06 NOTE — PATIENT INSTRUCTIONS
Discharge Instructions for Infusion Therapy/Chemotherapy Department:    Your doctor has prescribed the following medication(s) Gemzar/Mitomycin to treat your bladder cancer.    All treatments have potential side effects, but they don't all happen to everyone.  If you have any questions, problems, or concerns, we want you to call us.  You can reach the Infusion Nurses at [phone] Monday - Friday 8:00am to 4:30pm or the Health Unit Coordinator at [phone] Monday - Friday 8:00am to 5:00pm.      After hours, evenings, weekends, and holidays please call Urgent Care [phone] or toll free [phone] or go to your nearest Emergency Department.    IV, PICC line or Port access site care or injection site care:   Please report signs of infection or infiltration;  *Redness     *Swelling/puffiness  *Pain/tenderness   *Fever 100.4 F or higher  *Drainage         Possible side effects include:  *Diarrhea     *Allergic Reaction  *Constipation    *Drop in blood counts  *Depression/Anxiey   *Nausea/Vomiting  *Weakness/Fatigue   *Cold intolerance  *Skin changes/Rash   *Stomatitis (mouth sores)  *Loss of appetite/Taste changes *Weight gain/Swelling (edema)  *Hand-Foot syndrome   *Hypertension (high blood pressure)  *Abdominal pain    *Peripheral Neuropathy (numbness/tingling in hands/feet)    YOU NEED TO CALL US OR GO TO YOUR NEAREST URGENT CARE/EMERGENCY DEPARTMENT IF ANY OF THE FOLLOWING OCCUR:     *You have a fever of 100.4 F or higher.      *You are experiencing uncontrolled vomiting while taking your  anti-nausea medications.      *You are having watery diarrhea (4-6 loose stools in a 24 hour  period).      *You are experiencing a severe rash or skin changes.      *You have mouth sores or a sore throat.      *You have severe constipation (no BM for 3 days).      ANY questions or problems, PLEASE do not hesitate to call us!!

## 2017-03-06 NOTE — MR AVS SNAPSHOT
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    All treatments have potential side effects, but they don't all happen to everyone.  If you have any questions, problems, or concerns, we want you to call us.  You can reach the Infusion Nurses at [phone] Monday - Friday 8:00am to 4:30pm or the Health Unit Coordinator at [phone] Monday - Friday 8:00am to 5:00pm.      After hours, evenings, weekends, and holidays please call Urgent Care [phone] or toll free [phone] or go to your nearest Emergency Department.    IV, PICC line or Port access site care or injection site care:   Please report signs of infection or infiltration;  *Redness     *Swelling/puffiness  *Pain/tenderness   *Fever 100.4 F or higher  *Drainage         Possible side effects include:  *Diarrhea     *Allergic Reaction  *Constipation    *Drop in blood counts  *Depression/Anxiey   *Nausea/Vomiting  *Weakness/Fatigue   *Cold intolerance  *Skin changes/Rash   *Stomatitis (mouth sores)  *Loss of appetite/Taste changes *Weight gain/Swelling (edema)  *Hand-Foot syndrome   *Hypertension (high blood pressure)  *Abdominal pain    *Peripheral Neuropathy (numbness/tingling in hands/feet)    YOU NEED TO CALL US OR GO TO YOUR NEAREST URGENT CARE/EMERGENCY DEPARTMENT IF ANY OF THE FOLLOWING OCCUR:     *You have a fever of 100.4 F or higher.      *You are experiencing uncontrolled vomiting while taking your  anti-nausea medications.      *You are  "having watery diarrhea (4-6 loose stools in a 24 hour  period).      *You are experiencing a severe rash or skin changes.      *You have mouth sores or a sore throat.      *You have severe constipation (no BM for 3 days).      ANY questions or problems, PLEASE do not hesitate to call us!!        Follow-ups after your visit        Your next 10 appointments already scheduled     Mar 13, 2017  8:30 AM CDT   Level 5 with HC INF RM 3306   [facility] [place] ([facility] [place] [facility])    [address]  [place] [address]   [phone]              Who to contact     If you have questions or need follow up information about today's clinic visit or your schedule please contact [facility] directly at [phone].  Normal or non-critical lab and imaging results will be communicated to you by MyChart, letter or phone within 4 business days after the clinic has received the results. If you do not hear from us within 7 days, please contact the clinic through MyChart or phone. If you have a critical or abnormal lab result, we will notify you by phone as soon as possible.  Submit refill requests through [employer] or call your pharmacy and they will forward the refill request to us. Please allow 3 business days for your refill to be completed.          Additional Information About Your Visit        [employer] Information     [employer] lets you send messages to your doctor, view your test results, renew your prescriptions, schedule appointments and more. To sign up, go to [email or web address] . Click on \"Log in\" on the left side of the screen, which will take you to the Welcome page. Then click on \"Sign up Now\" on the right side of the page.     You will be asked to enter the access code listed below, as well as some personal information. Please follow the directions to create your username and password.     Your access code is: [email or web address]  Expires: 5/3/2017 12:00 PM     Your access code will  in 90 days. If " "you need help or a new code, please call your [place] clinic or [phone].        Care EveryWhere ID     This is your Care EveryWhere ID. This could be used by other organizations to access your [place] medical records  [id number]        Your Vitals Were     Pulse Temperature Respirations Height Pulse Oximetry BMI (Body Mass Index)    61 98.2  F (36.8  C) (Oral) 16 1.702 m (5' 7.01\") 93% 34.82 kg/m2       Blood Pressure from Last 3 Encounters:   03/06/17 150/87   02/27/17 130/77   02/14/17 131/83    Weight from Last 3 Encounters:   03/06/17 100.9 kg (222 lb 6.4 oz)   02/27/17 99.7 kg (219 lb 12.8 oz)   02/14/17 99.3 kg (219 lb)              We Performed the Following     UA with Microscopic reflex to Culture        Primary Care Provider Office Phone # Fax #    Luis Tran [phone]       [facility] [address]  [facility] [address]        Thank you!     Thank you for choosing [facility]  for your care. Our goal is always to provide you with excellent care. Hearing back from our patients is one way we can continue to improve our services. Please take a few minutes to complete the written survey that you may receive in the mail after your visit with us. Thank you!             Your Updated Medication List - Protect others around you: Learn how to safely use, store and throw away your medicines at www.disposemymeds.org.          This list is accurate as of: 3/6/17 12:11 PM.  Always use your most recent med list.                   Brand Name Dispense Instructions for use    IBUPROFEN PO      Take 200 mg by mouth every 6 hours as needed for moderate pain       * UNABLE TO FIND      1,000 mg by INTRAVESICAL route MEDICATION NAME: Gemzar       * UNABLE TO FIND      40 mg by INTRAVESICAL route MEDICATION NAME: Mitomycin       XALATAN 0.005 % ophthalmic solution   Generic drug:  latanoprost      Place 1 drop into both eyes At Bedtime       * Notice:  This list has 2 " medication(s) that are the same as other medications prescribed for you. Read the directions carefully, and ask your doctor or other care provider to review them with you.

## 2017-03-10 DIAGNOSIS — Z85.51 PERSONAL HISTORY OF MALIGNANT NEOPLASM OF BLADDER: Primary | ICD-10-CM

## 2017-03-13 ENCOUNTER — INFUSION THERAPY VISIT (OUTPATIENT)
Dept: INFUSION THERAPY | Facility: OTHER | Age: 72
End: 2017-03-13
Attending: UROLOGY
Payer: COMMERCIAL

## 2017-03-13 VITALS
TEMPERATURE: 97.8 F | HEART RATE: 75 BPM | WEIGHT: 219.7 LBS | SYSTOLIC BLOOD PRESSURE: 138 MMHG | BODY MASS INDEX: 34.48 KG/M2 | DIASTOLIC BLOOD PRESSURE: 72 MMHG | HEIGHT: 67 IN

## 2017-03-13 DIAGNOSIS — C67.0 MALIGNANT NEOPLASM OF TRIGONE OF URINARY BLADDER (H): Primary | ICD-10-CM

## 2017-03-13 LAB
ALBUMIN UR-MCNC: NEGATIVE MG/DL
APPEARANCE UR: CLEAR
BACTERIA #/AREA URNS HPF: ABNORMAL /HPF
BILIRUB UR QL STRIP: NEGATIVE
COLOR UR AUTO: YELLOW
GLUCOSE UR STRIP-MCNC: NEGATIVE MG/DL
HGB UR QL STRIP: NEGATIVE
KETONES UR STRIP-MCNC: NEGATIVE MG/DL
LEUKOCYTE ESTERASE UR QL STRIP: NEGATIVE
MUCOUS THREADS #/AREA URNS LPF: PRESENT /LPF
NITRATE UR QL: NEGATIVE
PH UR STRIP: 5 PH (ref 4.7–8)
RBC #/AREA URNS AUTO: 1 /HPF (ref 0–2)
SP GR UR STRIP: 1.02 (ref 1–1.03)
URN SPEC COLLECT METH UR: ABNORMAL
UROBILINOGEN UR STRIP-MCNC: NORMAL MG/DL (ref 0–2)
WBC #/AREA URNS AUTO: 1 /HPF (ref 0–2)

## 2017-03-13 PROCEDURE — 25000125 ZZHC RX 250: Performed by: UROLOGY

## 2017-03-13 PROCEDURE — 25000128 H RX IP 250 OP 636: Performed by: UROLOGY

## 2017-03-13 PROCEDURE — 81001 URINALYSIS AUTO W/SCOPE: CPT | Performed by: UROLOGY

## 2017-03-13 PROCEDURE — 51720 TREATMENT OF BLADDER LESION: CPT

## 2017-03-13 PROCEDURE — 51702 INSERT TEMP BLADDER CATH: CPT

## 2017-03-13 PROCEDURE — 27210995 ZZH RX 272: Performed by: UROLOGY

## 2017-03-13 RX ADMIN — LIDOCAINE HYDROCHLORIDE 10 ML: 20 JELLY TOPICAL at 09:45

## 2017-03-13 RX ADMIN — GEMCITABINE 1000 MG: 38 INJECTION, SOLUTION INTRAVENOUS at 09:59

## 2017-03-13 RX ADMIN — MITOMYCIN 40 MG: 40 INJECTION, POWDER, LYOPHILIZED, FOR SOLUTION INTRAVENOUS at 11:10

## 2017-03-13 ASSESSMENT — PAIN SCALES - GENERAL: PAINLEVEL: NO PAIN (0)

## 2017-03-13 NOTE — PATIENT INSTRUCTIONS
Discharge Instructions for Infusion Therapy/Chemotherapy Department:    Your doctor has prescribed the following medication(s) gemzar and mitomycin to treat your bladder cancer.    All treatments have potential side effects, but they don't all happen to everyone.  If you have any questions, problems, or concerns, we want you to call us.  You can reach the Infusion Nurses at [phone] Monday - Friday 8:00am to 4:30pm or the Health Unit Coordinator at [phone] Monday - Friday 8:00am to 5:00pm.      *For 6 hours after your treatment, sit when you urinate. Flush the toilet twice after you urinate.  Wash your hands thoroughly after going to the bathroom  *Drink 2-3 liters of non caffeinated, non alcoholic beverages following your instillation to flush the bladder out.      After hours, evenings, weekends, and holidays please call Urgent Care [phone] or toll free [phone] or go to your nearest Emergency Department.      Possible side effects include:  *Painful or difficult urination, urgency  *Urinary frequency  *Blood in the urine  *Flu like symptoms.    YOU NEED TO CALL US OR GO TO YOUR NEAREST URGENT CARE/EMERGENCY DEPARTMENT IF ANY OF THE FOLLOWING OCCUR:     *You have a fever of 103 F or higher within 24 hours or higher.  If you have a fever of 101 or higher after 48 hours.   * Shortness of breath   *Confusion.   *Extreme fatigue (Unable to perform self care activities)   *Fever, chills, malaise, flu-like symptoms, increased fatigue or an increase in urinary symptoms such as burning or pain on urination are NOT uncommon.  However, if these increase in severity or      Last more than 48 hours let your doctor know.      ANY questions or problems, PLEASE do not hesitate to call us!!

## 2017-03-13 NOTE — MR AVS SNAPSHOT
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    All treatments have potential side effects, but they don't all happen to everyone.  If you have any questions, problems, or concerns, we want you to call us.  You can reach the Infusion Nurses at [phone] Monday - Friday 8:00am to 4:30pm or the Health Unit Coordinator at [phone] Monday - Friday 8:00am to 5:00pm.      *For 6 hours after your treatment, sit when you urinate. Flush the toilet twice after you urinate.  Wash your hands thoroughly after going to the bathroom  *Drink 2-3 liters of non caffeinated, non alcoholic beverages following your instillation to flush the bladder out.      After hours, evenings, weekends, and holidays please call Urgent Care [phone] or toll free [phone] or go to your nearest Emergency Department.      Possible side effects include:  *Painful or difficult urination, urgency  *Urinary frequency  *Blood in the urine  *Flu like symptoms.    YOU NEED TO CALL US OR GO TO YOUR NEAREST URGENT CARE/EMERGENCY DEPARTMENT IF ANY OF THE FOLLOWING OCCUR:     *You have a fever of 103 F or higher within 24 hours or higher.  If you have a fever of 101 or higher after 48 hours.   * Shortness of breath   *Confusion.   *Extreme fatigue (Unable to perform self care activities)   *Fever, chills, malaise, flu-like symptoms, increased fatigue or an increase in urinary symptoms such as burning or pain on urination are NOT uncommon.  " However, if these increase in severity or      Last more than 48 hours let your doctor know.      ANY questions or problems, PLEASE do not hesitate to call us!!          Follow-ups after your visit        Your next 10 appointments already scheduled     May 09, 2017  1:30 PM CDT   (Arrive by 1:15 PM)   CYSTO with Randy Martinez MD   [facility] ([facility]    [place] [address]   [phone]              Who to contact     If you have questions or need follow up information about today's clinic visit or your schedule please contact [facility] directly at [phone].  Normal or non-critical lab and imaging results will be communicated to you by MyChart, letter or phone within 4 business days after the clinic has received the results. If you do not hear from us within 7 days, please contact the clinic through MyChart or phone. If you have a critical or abnormal lab result, we will notify you by phone as soon as possible.  Submit refill requests through [employer] or call your pharmacy and they will forward the refill request to us. Please allow 3 business days for your refill to be completed.          Additional Information About Your Visit        [employer] Information     [employer] lets you send messages to your doctor, view your test results, renew your prescriptions, schedule appointments and more. To sign up, go to [email or web address] . Click on \"Log in\" on the left side of the screen, which will take you to the Welcome page. Then click on \"Sign up Now\" on the right side of the page.     You will be asked to enter the access code listed below, as well as some personal information. Please follow the directions to create your username and password.     Your access code is: [email or web address]  Expires: 5/3/2017  1:00 PM     Your access code will  in 90 days. If you need help or a new code, please call your [facility] or [phone].        Care " EveryWhere ID     This is your Care EveryWhere ID. This could be used by other organizations to access your [place] medical records  [id number]         Blood Pressure from Last 3 Encounters:   03/06/17 129/76   02/27/17 130/77   02/14/17 131/83    Weight from Last 3 Encounters:   03/06/17 100.9 kg (222 lb 6.4 oz)   02/27/17 99.7 kg (219 lb 12.8 oz)   02/14/17 99.3 kg (219 lb)              We Performed the Following     UA with Microscopic reflex to Culture        Primary Care Provider Office Phone # Fax #    Luis Tran [phone]       [facility] [address]  [place] [address]        Thank you!     Thank you for choosing [facility]  for your care. Our goal is always to provide you with excellent care. Hearing back from our patients is one way we can continue to improve our services. Please take a few minutes to complete the written survey that you may receive in the mail after your visit with us. Thank you!             Your Updated Medication List - Protect others around you: Learn how to safely use, store and throw away your medicines at www.disposemymeds.org.          This list is accurate as of: 3/13/17 12:08 PM.  Always use your most recent med list.                   Brand Name Dispense Instructions for use    IBUPROFEN PO      Take 200 mg by mouth every 6 hours as needed for moderate pain       * UNABLE TO FIND      1,000 mg by INTRAVESICAL route MEDICATION NAME: Gemzar       * UNABLE TO FIND      40 mg by INTRAVESICAL route MEDICATION NAME: Mitomycin       XALATAN 0.005 % ophthalmic solution   Generic drug:  latanoprost      Place 1 drop into both eyes At Bedtime       * Notice:  This list has 2 medication(s) that are the same as other medications prescribed for you. Read the directions carefully, and ask your doctor or other care provider to review them with you.

## 2017-03-13 NOTE — PROGRESS NOTES
"72 year old male patient here for bladder instillation of gemzar and mitomycin.  UA negative for nitrates, negative for leukocytes.  Denies any fever or chills.  Denies any pain with urination.  Results reviewed, labs met treatment parameters.  Proceed with treatment.  18  German catheter placed, sterile technique.  Allowed bladder to empty 100 ccs of urine returned.  0959: Gemzar instillation via catheter, tolerated well, catheter clamped.  Patient turned every 15 minutes per protocol,  After 1/2 hour dwell time, pt c/o \"feeling like I have to pee really bad, and I'm sweating.  Repositioned from left side onto right side, brannon repositioned, still c/o discomfort.  Brannon unclamped and allowed to drain approx 20 cc's clear fluid.  Pt states \"I'ts better now\".  Tolerated remainder of instillation.  Did state he felt some \"pressure.\"  1100:   catheter unclamped and allowed to drain, 200: ccs of return.  1105 Bladder irrigated with 60cc of sterile water, allowed to drain, 150 ccs of return.  .  Patient offers no complaints.  1110:  Mitomycin instilled via catheter, catheter clamped.  Patient turned every 15 minutes per protocol, tolerated well.  Offers no complaints, denies any pain or discomfort.  1230: Catheter unclamped and allowed to drain, 100 ccs of return.  1240:Bladder irrigated with 60ccs of sterile water, allowed to drain, 100 ccs of return.  Brannon removed. Noted small clot of blood on bed pad, and 3 drops of blood on floor where patient had been cleaning self up by the sink.  Water obtained for patient and patient remained in unit until he was able to void. Able to void with scant amount pink color in toilet.   Encouraged to drink plenty of fluids for next couple of days.  Discharged in care of self.  Patient will follow up with Dr. Martinez as planned.  Last ordered treatment administered today.   Lian Johnson RN  "

## 2017-03-13 NOTE — LETTER
March 15, 2017       TO: Colin Baxter  [address] [address]       DearMr.Sahil,    We are writing to inform you of your test results.    Your test results fall within the expected range(s) or remain unchanged from previous results.  Please continue with current treatment plan.    Resulted Orders   UA with Microscopic reflex to Culture   Result Value Ref Range    Color Urine Yellow     Appearance Urine Clear     Glucose Urine Negative NEG mg/dL    Bilirubin Urine Negative NEG    Ketones Urine Negative NEG mg/dL    Specific Gravity Urine 1.020 1.003 - 1.035    Blood Urine Negative NEG    pH Urine 5.0 4.7 - 8.0 pH    Protein Albumin Urine Negative NEG mg/dL    Urobilinogen mg/dL Normal 0.0 - 2.0 mg/dL    Nitrite Urine Negative NEG    Leukocyte Esterase Urine Negative NEG    Source Midstream Urine     WBC Urine 1 0 - 2 /HPF    RBC Urine 1 0 - 2 /HPF    Bacteria Urine None (A) NEG /HPF    Mucous Urine Present (A) NEG /LPF       Thank you for choosing [facility] for your care. Please follow up as previously planned.  Please call with any questions or concerns.    Thank you,  Laney Neumann, RN Care Coordinator for  Dr. Randy Martinez

## 2017-05-01 ENCOUNTER — TELEPHONE (OUTPATIENT)
Dept: UROLOGY | Facility: OTHER | Age: 72
End: 2017-05-01

## 2017-05-01 DIAGNOSIS — Z85.51 PERSONAL HISTORY OF MALIGNANT NEOPLASM OF BLADDER: Primary | ICD-10-CM

## 2017-05-01 NOTE — TELEPHONE ENCOUNTER
Left message for pt reminding him of his appt on 5/9/17 with Dr. Martinez and to come in 1 week prior for a UA and urine cytology.  ILEANA DICK

## 2017-05-02 DIAGNOSIS — Z85.51 PERSONAL HISTORY OF MALIGNANT NEOPLASM OF BLADDER: ICD-10-CM

## 2017-05-02 PROCEDURE — 81003 URINALYSIS AUTO W/O SCOPE: CPT | Mod: ZL | Performed by: UROLOGY

## 2017-05-09 ENCOUNTER — OFFICE VISIT (OUTPATIENT)
Dept: UROLOGY | Facility: OTHER | Age: 72
End: 2017-05-09
Attending: UROLOGY
Payer: COMMERCIAL

## 2017-05-09 VITALS
DIASTOLIC BLOOD PRESSURE: 84 MMHG | OXYGEN SATURATION: 93 % | HEART RATE: 64 BPM | SYSTOLIC BLOOD PRESSURE: 134 MMHG | TEMPERATURE: 97.5 F | HEIGHT: 68 IN | WEIGHT: 218 LBS | BODY MASS INDEX: 33.04 KG/M2

## 2017-05-09 DIAGNOSIS — C67.8 MALIGNANT NEOPLASM OF OVERLAPPING SITES OF BLADDER (H): Primary | ICD-10-CM

## 2017-05-09 PROCEDURE — 52000 CYSTOURETHROSCOPY: CPT | Performed by: UROLOGY

## 2017-05-09 PROCEDURE — 99213 OFFICE O/P EST LOW 20 MIN: CPT

## 2017-05-09 ASSESSMENT — PAIN SCALES - GENERAL: PAINLEVEL: NO PAIN (0)

## 2017-05-09 NOTE — PROGRESS NOTES
72 year old male with a history of urothelial carcinoma here for follow up    Date of Initial Diagnosis: 9/8/15  Stage of Initial Diagnosis: T1 and CIS  Grade at Initial Diagnosis: High  Site of First Diagnosis: bladder, prostatic urethra  Any Recurrence? 2016 Ta, 2016 CIS  Intravesical Treatments: TURBT(Trans Urethral Resection of Bladder Tumor), got BCG X 6 -, BCG X 6 , [place]-Cis X 3  and repeated 3/2017  Date of Last Cysto: 10/16  Date of Last Upper Tract Imagin2015    CYSTOSCOPY PROCEDURE:  After sterile preparation and draping of the patient,  a 16-Djiboutian flexible cystoscope was introduced via the urethra.  It was passed without difficulty into the bladder.  The urethra was open without evidence of stricture.  The ureteral orifices were orthotopic.  The bladder mucosa was evaluated using both antegrade and retroflex views and this showed no evidence of any lesions or trabeculation.  There were no stones.        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  Furthermore, bladder removal at this point has a high, >90% success rate in controlling the cancer.  However, bladder removal is a huge operation with 60-70% experiencing one or more complications.      Since he appeared to have a response to the intravesical chemo and would like to hold on to his bladder, we will try one more round of intraveiscal chemo.  This would be gemcitabine and mitomycin c, but will deliver this after 3 months with his repeat cysto    May be eligible for the instilladrin study if he has a recurrence.    He and his wife are agreeable to this.    If we don't observe complete eradication of  the tumor, I would recommend cystoprostatectomy    I spent over 25 minutes with the patient.  Over half this time was spent on counseling.    Due for repeat imaging in 3 months, will get CT Urogram        Randy Martinez MD  Department of Urology Staff  [facility]

## 2017-05-09 NOTE — LETTER
May 22, 2017       TO: Colin Baxter  [address] [place] [address]       DearMr.Sahil,    We are writing to inform you of your test results.    Your test results fall within the expected range(s) or remain unchanged from previous results.  Please continue with current treatment plan.    Resulted Orders   CT Abdomen Pelvis w/o & w Contrast    Narrative    CT UROGRAM    HISTORY:  Malignant neoplasm of the urinary bladder.    COMPARISON:  Today's study is compared to a prior examination which is  dated July 29, 2015.    FINDINGS:  Lung bases are clear.  The liver, gallbladder, spleen,  pancreas, and adrenal glands are normal.  A small hiatal hernia is  similar in appearance.  The stomach is unremarkable.  The abdominal  aorta and inferior vena cava are normal.  There is slight dilatation  of the mid aspect of the left ureter.  The ureters are normal in  contour.  The urinary bladder is only partially distended and  thickening along the posterior and left dorsolateral aspect of the  bladder is not readily apparent.  Lack of distention somewhat limits  evaluation.    A moderate volume of stool is seen within the rectum.  A number of  diverticula are again seen within the sigmoid colon.  Large and small  bowel are nondilated.  The appendix is not readily identified.  Retroperitoneal and inguinal lymph nodes, as well as mesenteric nodes  are normal.  Bony structures again demonstrate degenerative changes  within the lumbar spine.    IMPRESSION:  1.  LACK OF DISTENTION OF THE URINARY BLADDER SOMEWHAT LIMITS  EVALUATION.  THICKENING OF THE MUCOSA ALONG THE POSTERIOR AND LEFT  DORSOLATERAL ASPECT OF THE BLADDER IS NOT READILY SEEN ON THE CURRENT  EXAMINATION.    2.  NO EVIDENCE OF LYMPHADENOPATHY OR EVIDENCE TO SUGGEST RENAL  CALCULUS OR RENAL LESION.    3.  MILD DILATATION OF THE MID ASPECT OF THE LEFT URETER, LIKELY  UNCHANGED FROM THE PRIOR EXAMINATION.            SIGNATURE PAGE ONLY  Exam Date: May 12, 2017  08:56:00 AM  Author: SHYANNE CHONG  This report is final and signed         Thank you for choosing [facility] for your care. Please follow up as previously planned.  Please call with any questions or concerns.    Thank you,  Laney Neumann RN Care Coordinator for  Dr. Randy Martinez

## 2017-05-09 NOTE — MR AVS SNAPSHOT
"              After Visit Summary   2017    Colin Baxter    MRN: [id number]           Patient Information     Date Of Birth          1945        Visit Information        Provider Department      2017 1:30 PM Weight, Randy Floyd MD [facility]        Today's Diagnoses     Malignant neoplasm of overlapping sites of bladder (H)    -  1       Follow-ups after your visit        Who to contact     If you have questions or need follow up information about today's clinic visit or your schedule please contact [facility] directly at [phone].  Normal or non-critical lab and imaging results will be communicated to you by Spring Met[employer]shart, letter or phone within 4 business days after the clinic has received the results. If you do not hear from us within 7 days, please contact the clinic through [employer] or phone. If you have a critical or abnormal lab result, we will notify you by phone as soon as possible.  Submit refill requests through Tactiga or call your pharmacy and they will forward the refill request to us. Please allow 3 business days for your refill to be completed.          Additional Information About Your Visit        MyChart Information     Tactiga lets you send messages to your doctor, view your test results, renew your prescriptions, schedule appointments and more. To sign up, go to [email or web address] . Click on \"Log in\" on the left side of the screen, which will take you to the Welcome page. Then click on \"Sign up Now\" on the right side of the page.     You will be asked to enter the access code listed below, as well as some personal information. Please follow the directions to create your username and password.     Your access code is: [email or web address]  Expires: 2017  2:20 PM     Your access code will  in 90 days. If you need help or a new code, please call your [facility] or [phone].        Care EveryWhere ID     This is your Care EveryWhere ID. " "This could be used by other organizations to access your [place] medical records  [id number]        Your Vitals Were     Pulse Temperature Height Pulse Oximetry BMI (Body Mass Index)       64 97.5  F (36.4  C) (Tympanic) 1.727 m (5' 8\") 93% 33.15 kg/m2        Blood Pressure from Last 3 Encounters:   05/09/17 134/84   03/13/17 138/72   03/06/17 129/76    Weight from Last 3 Encounters:   05/09/17 98.9 kg (218 lb)   03/13/17 99.7 kg (219 lb 11.2 oz)   03/06/17 100.9 kg (222 lb 6.4 oz)              We Performed the Following     CT Abdomen Pelvis w/o & w Contrast     Cystoscopy (26[address])        Primary Care Provider Office Phone # Fax #    Luis Tran [phone]       [facility] [address]  [place] [address]        Thank you!     Thank you for choosing [facility]  for your care. Our goal is always to provide you with excellent care. Hearing back from our patients is one way we can continue to improve our services. Please take a few minutes to complete the written survey that you may receive in the mail after your visit with us. Thank you!             Your Updated Medication List - Protect others around you: Learn how to safely use, store and throw away your medicines at www.disposemymeds.org.          This list is accurate as of: 5/9/17  2:20 PM.  Always use your most recent med list.                   Brand Name Dispense Instructions for use    IBUPROFEN PO      Take 200 mg by mouth every 6 hours as needed for moderate pain Reported on 5/9/2017       * UNABLE TO FIND      1,000 mg by INTRAVESICAL route Reported on 5/9/2017       * UNABLE TO FIND      40 mg by INTRAVESICAL route Reported on 5/9/2017       XALATAN 0.005 % ophthalmic solution   Generic drug:  latanoprost      Place 1 drop into both eyes At Bedtime       * Notice:  This list has 2 medication(s) that are the same as other medications prescribed for you. Read the directions carefully, and ask your doctor or " other care provider to review them with you.

## 2017-05-09 NOTE — NURSING NOTE
"Chief Complaint   Patient presents with     Procedure     Cystoscope       Initial /84 (BP Location: Right arm, Cuff Size: Adult Regular)  Pulse 64  Temp 97.5  F (36.4  C) (Tympanic)  Ht 1.727 m (5' 8\")  Wt 98.9 kg (218 lb)  SpO2 93%  BMI 33.15 kg/m2 Estimated body mass index is 33.15 kg/(m^2) as calculated from the following:    Height as of this encounter: 1.727 m (5' 8\").    Weight as of this encounter: 98.9 kg (218 lb).  Medication Reconciliation: complete   Gretta Guerrero LPN      "

## 2017-05-10 DIAGNOSIS — Z01.812 BLOOD TESTS PRIOR TO TREATMENT OR PROCEDURE: Primary | ICD-10-CM

## 2017-05-12 ENCOUNTER — HOSPITAL ENCOUNTER (OUTPATIENT)
Dept: CT IMAGING | Facility: HOSPITAL | Age: 72
Discharge: HOME | End: 2017-05-12
Attending: UROLOGY | Admitting: UROLOGY
Payer: COMMERCIAL

## 2017-05-12 DIAGNOSIS — Z85.51 PERSONAL HISTORY OF MALIGNANT NEOPLASM OF BLADDER: ICD-10-CM

## 2017-05-12 DIAGNOSIS — C67.0 MALIGNANT NEOPLASM OF TRIGONE OF URINARY BLADDER (H): ICD-10-CM

## 2017-05-12 DIAGNOSIS — Z01.812 BLOOD TESTS PRIOR TO TREATMENT OR PROCEDURE: ICD-10-CM

## 2017-05-12 LAB
CREAT SERPL-MCNC: 1.02 MG/DL (ref 0.66–1.25)
GFR SERPL CREATININE-BSD FRML MDRD: 72 ML/MIN/1.7M2

## 2017-05-12 PROCEDURE — 82565 ASSAY OF CREATININE: CPT | Mod: ZL | Performed by: FAMILY MEDICINE

## 2017-05-12 PROCEDURE — 36415 COLL VENOUS BLD VENIPUNCTURE: CPT | Mod: ZL | Performed by: FAMILY MEDICINE

## 2017-05-12 PROCEDURE — 74178 CT ABD&PLV WO CNTR FLWD CNTR: CPT | Mod: TC

## 2017-05-12 PROCEDURE — 88108 CYTOPATH CONCENTRATE TECH: CPT | Mod: TC | Performed by: UROLOGY

## 2017-05-12 RX ORDER — IOPAMIDOL 612 MG/ML
100 INJECTION, SOLUTION INTRAVASCULAR ONCE
Status: COMPLETED | OUTPATIENT
Start: 2017-05-12 | End: 2017-05-12

## 2017-05-12 RX ORDER — IOPAMIDOL 612 MG/ML
50 INJECTION, SOLUTION INTRAVASCULAR ONCE
Status: COMPLETED | OUTPATIENT
Start: 2017-05-12 | End: 2017-05-12

## 2017-05-12 RX ADMIN — IOPAMIDOL 100 ML: 612 INJECTION, SOLUTION INTRAVASCULAR at 08:46

## 2017-05-12 RX ADMIN — IOPAMIDOL 50 ML: 612 INJECTION, SOLUTION INTRAVASCULAR at 08:46

## 2017-05-15 LAB — COPATH REPORT: NORMAL

## 2017-05-26 ENCOUNTER — TELEPHONE (OUTPATIENT)
Dept: UROLOGY | Facility: OTHER | Age: 72
End: 2017-05-26

## 2017-05-26 NOTE — TELEPHONE ENCOUNTER
The patient has been notified of this information and all questions answered.  Gretta Guerrero LPN

## 2017-08-03 DIAGNOSIS — C67.0 MALIGNANT NEOPLASM OF TRIGONE OF URINARY BLADDER (H): Primary | ICD-10-CM

## 2017-08-04 DIAGNOSIS — C67.0 MALIGNANT NEOPLASM OF TRIGONE OF URINARY BLADDER (H): ICD-10-CM

## 2017-08-04 LAB
ALBUMIN UR-MCNC: 10 MG/DL
APPEARANCE UR: CLEAR
BACTERIA #/AREA URNS HPF: ABNORMAL /HPF
BILIRUB UR QL STRIP: NEGATIVE
COLOR UR AUTO: YELLOW
GLUCOSE UR STRIP-MCNC: NEGATIVE MG/DL
HGB UR QL STRIP: NEGATIVE
KETONES UR STRIP-MCNC: 10 MG/DL
LEUKOCYTE ESTERASE UR QL STRIP: NEGATIVE
MUCOUS THREADS #/AREA URNS LPF: PRESENT /LPF
NITRATE UR QL: NEGATIVE
PH UR STRIP: 5 PH (ref 4.7–8)
RBC #/AREA URNS AUTO: 2 /HPF (ref 0–2)
SP GR UR STRIP: 1.02 (ref 1–1.03)
URN SPEC COLLECT METH UR: ABNORMAL
UROBILINOGEN UR STRIP-MCNC: NORMAL MG/DL (ref 0–2)
WBC #/AREA URNS AUTO: 1 /HPF (ref 0–2)

## 2017-08-04 PROCEDURE — 81001 URINALYSIS AUTO W/SCOPE: CPT | Mod: ZL | Performed by: UROLOGY

## 2017-08-08 ENCOUNTER — OFFICE VISIT (OUTPATIENT)
Dept: UROLOGY | Facility: OTHER | Age: 72
End: 2017-08-08
Attending: UROLOGY
Payer: COMMERCIAL

## 2017-08-08 VITALS
HEIGHT: 67 IN | SYSTOLIC BLOOD PRESSURE: 140 MMHG | HEART RATE: 68 BPM | DIASTOLIC BLOOD PRESSURE: 70 MMHG | BODY MASS INDEX: 34.21 KG/M2 | WEIGHT: 218 LBS | TEMPERATURE: 97.4 F

## 2017-08-08 DIAGNOSIS — C67.8 MALIGNANT NEOPLASM OF OVERLAPPING SITES OF BLADDER (H): Primary | ICD-10-CM

## 2017-08-08 PROCEDURE — 52000 CYSTOURETHROSCOPY: CPT | Performed by: UROLOGY

## 2017-08-08 PROCEDURE — 99213 OFFICE O/P EST LOW 20 MIN: CPT

## 2017-08-08 ASSESSMENT — PAIN SCALES - GENERAL: PAINLEVEL: NO PAIN (0)

## 2017-08-08 NOTE — MR AVS SNAPSHOT
"              After Visit Summary   8/8/2017    Colin Baxter    MRN: [id number]           Patient Information     Date Of Birth          1945        Visit Information        Provider Department      8/8/2017 2:15 PM Weight, Randy Floyd MD [facility]        Today's Diagnoses     Malignant neoplasm of overlapping sites of bladder (H)    -  1      Care Instructions      AFTER YOUR CYSTOSCOPY        You have just completed a cystoscopy, or \"cysto\", which allowed your physician to learn more about your bladder (or to remove a stent placed after surgery). We suggest that you continue to avoid caffeine, fruit juice, and alcohol for the next 24 hours, however, you are encouraged to return to your normal activities.         A few things that are considered normal after your cystoscopy:     * Small amount of bleeding (or spotting) that clears within the next 24 hours     * Slight burning sensation with urination     * Sensation to of needing to avoid more frequently     * The feeling of \"air\" in your urine     * Mild discomfort that is relieved with Tylenol        Please contact our office promptly if you:     * Develop a fever above 101 degrees     * Are unable to urinate     * Develop bright red blood that does not stop     * Severe pain or swelling         Please contact our office with any concerns or questions @[facility].          Follow-ups after your visit        Follow-up notes from your care team     Return in 12 months (on 8/8/2018).      Who to contact     If you have questions or need follow up information about today's clinic visit or your schedule please contact [facility] directly at [phone].  Normal or non-critical lab and imaging results will be communicated to you by MyChart, letter or phone within 4 business days after the clinic has received the results. If you do not hear from us within 7 days, please contact the clinic through MyChart or phone. If you have a " "critical or abnormal lab result, we will notify you by phone as soon as possible.  Submit refill requests through Milk Mantra or call your pharmacy and they will forward the refill request to us. Please allow 3 business days for your refill to be completed.          Additional Information About Your Visit        StreetSparkhart Information     Milk Mantra lets you send messages to your doctor, view your test results, renew your prescriptions, schedule appointments and more. To sign up, go to [email or web address] . Click on \"Log in\" on the left side of the screen, which will take you to the Welcome page. Then click on \"Sign up Now\" on the right side of the page.     You will be asked to enter the access code listed below, as well as some personal information. Please follow the directions to create your username and password.     Your access code is: [email or web address]  Expires: 2017  3:43 PM     Your access code will  in 90 days. If you need help or a new code, please call your [place] clinic or [phone].        Care EveryWhere ID     This is your Care EveryWhere ID. This could be used by other organizations to access your [place] medical records  [id number]        Your Vitals Were     Pulse Temperature Height BMI (Body Mass Index)          68 97.4  F (36.3  C) (Tympanic) 1.702 m (5' 7\") 34.14 kg/m2         Blood Pressure from Last 3 Encounters:   17 140/70   17 134/84   17 138/72    Weight from Last 3 Encounters:   17 98.9 kg (218 lb)   17 98.9 kg (218 lb)   17 99.7 kg (219 lb 11.2 oz)              We Performed the Following     CYSTOURETHROSCOPY        Primary Care Provider Office Phone # Fax #    Luis Tran [phone]       [facility] [address] [address]        Equal Access to Services     SARAY GROVES AH: Andrei Montano, cabrera ireland, ralph maynard la'aan ah. So [facility] " [phone].    ATENCIÓN: Si moise crystal, tiene a berger disposición servicios gratuitos de asistencia lingüística. Kee al [phone].    We comply with applicable federal civil rights laws and [place] laws. We do not discriminate on the basis of race, color, national origin, age, disability sex, sexual orientation or gender identity.            Thank you!     Thank you [facility]  [facility]. Our goal is always to provide you with excellent care. Hearing back from our patients is one way we can continue to improve our services. Please take a few minutes to complete the written survey that you may receive in the mail after your visit with us. Thank you!             Your Updated Medication List - Protect others around you: Learn how to safely use, store and throw away your medicines at www.disposemymeds.org.          This list is accurate as of: 8/8/17 11:59 PM.  Always use your most recent med list.                   Brand Name Dispense Instructions for use Diagnosis    IBUPROFEN PO      Take 200 mg by mouth every 6 hours as needed for moderate pain Reported on 5/9/2017        XALATAN 0.005 % ophthalmic solution   Generic drug:  latanoprost      Place 1 drop into both eyes At Bedtime    Routine general medical examination at a health care facility

## 2017-08-08 NOTE — NURSING NOTE
"Chief Complaint   Patient presents with     Cystoscopy       Initial /70 (BP Location: Right arm, Patient Position: Chair, Cuff Size: Adult Regular)  Pulse 68  Temp 97.4  F (36.3  C) (Tympanic)  Ht 1.702 m (5' 7\")  Wt 98.9 kg (218 lb)  BMI 34.14 kg/m2 Estimated body mass index is 34.14 kg/(m^2) as calculated from the following:    Height as of this encounter: 1.702 m (5' 7\").    Weight as of this encounter: 98.9 kg (218 lb).  Medication Reconciliation: complete   ILEANA DICK      "

## 2017-08-08 NOTE — PROGRESS NOTES
72 year old male with a history of urothelial carcinoma here for follow up    Date of Initial Diagnosis: 9/8/15  Stage of Initial Diagnosis: T1 and CIS  Grade at Initial Diagnosis: High  Site of First Diagnosis: bladder, prostatic urethra  Any Recurrence? 2016 Ta, 2016 CIS  Intravesical Treatments: TURBT(Trans Urethral Resection of Bladder Tumor), got BCG X 6 -, BCG X 6 , [place]-Cis X 3  and repeated 3/2017  Date of Last Cysto:   Date of Last Upper Tract Imagin2017    CYSTOSCOPY PROCEDURE:  After sterile preparation and draping of the patient,  a 16-Surinamese flexible cystoscope was introduced via the urethra.  It was passed without difficulty into the bladder.  The urethra was open without evidence of stricture.  The ureteral orifices were orthotopic.  The bladder mucosa was evaluated using both antegrade and retroflex views and this showed no evidence of any lesions or trabeculation.  There were no stones.        A/P history of T1 bladder cancer with Ta recurrence, and CIS recurrence after BCG, but currently with HARSH    Doesn't appear to have any cancer today    Will plan some maintenance Mytomycin-C and Gemcitabine in 3 months and repeat cysto in 6 months.    UA negative    If we don't observe complete eradication of the tumor, I would recommend cystoprostatectomy    Randy Martinez MD  Department of Urology Staff  [facility]

## 2017-10-10 DIAGNOSIS — C67.0 MALIGNANT NEOPLASM OF TRIGONE OF URINARY BLADDER (H): Primary | ICD-10-CM

## 2017-10-16 ENCOUNTER — ALLIED HEALTH/NURSE VISIT (OUTPATIENT)
Dept: FAMILY MEDICINE | Facility: OTHER | Age: 72
End: 2017-10-16
Attending: FAMILY MEDICINE
Payer: COMMERCIAL

## 2017-10-16 DIAGNOSIS — Z23 NEED FOR PROPHYLACTIC VACCINATION AND INOCULATION AGAINST INFLUENZA: Primary | ICD-10-CM

## 2017-10-16 PROCEDURE — 90662 IIV NO PRSV INCREASED AG IM: CPT

## 2017-10-16 PROCEDURE — 90471 IMMUNIZATION ADMIN: CPT

## 2017-10-16 NOTE — PROGRESS NOTES
Injectable Influenza Immunization Documentation    1.  Is the person to be vaccinated sick today?   No    2. Does the person to be vaccinated have an allergy to a component   of the vaccine?   No    3. Has the person to be vaccinated ever had a serious reaction   to influenza vaccine in the past?   No    4. Has the person to be vaccinated ever had Guillain-Barré syndrome?   No    Form completed by SHERRY MCCONNELL

## 2017-10-16 NOTE — MR AVS SNAPSHOT
"              After Visit Summary   10/16/2017    Colin Baxter    MRN: [id number]           Patient Information     Date Of Birth          1945        Visit Information        Provider Department      10/16/2017 10:50 AM HC FLU SHOT CLINIC [facility] [place]        Today's Diagnoses     Need for prophylactic vaccination and inoculation against influenza    -  1       Follow-ups after your visit        Your next 10 appointments already scheduled     Feb 13, 2018  1:00 PM CST   (Arrive by 12:45 PM)   CYSTO with Randy Martinez MD   [facility] [place] ([facility] [place] )    [address]  [place] [address]   [phone]              Who to contact     If you have questions or need follow up information about today's clinic visit or your schedule please contact [facility] directly at [phone].  Normal or non-critical lab and imaging results will be communicated to you by MyChart, letter or phone within 4 business days after the clinic has received the results. If you do not hear from us within 7 days, please contact the clinic through MyChart or phone. If you have a critical or abnormal lab result, we will notify you by phone as soon as possible.  Submit refill requests through [employer] or call your pharmacy and they will forward the refill request to us. Please allow 3 business days for your refill to be completed.          Additional Information About Your Visit        MyChart Information     [employer] lets you send messages to your doctor, view your test results, renew your prescriptions, schedule appointments and more. To sign up, go to [email or web address] . Click on \"Log in\" on the left side of the screen, which will take you to the Welcome page. Then click on \"Sign up Now\" on the right side of the page.     You will be asked to enter the access code listed below, as well as some personal information. Please follow the directions to create your " username and password.     Your access code is: [email or web address]  Expires: 2017  3:43 PM     Your access code will  in 90 days. If you need help or a new code, please call your [place] clinic or [phone].        Care EveryWhere ID     This is your Care EveryWhere ID. This could be used by other organizations to access your [place] medical records  [id number]         Blood Pressure from Last 3 Encounters:   17 140/70   17 134/84   17 138/72    Weight from Last 3 Encounters:   17 218 lb (98.9 kg)   17 218 lb (98.9 kg)   17 219 lb 11.2 oz (99.7 kg)              We Performed the Following     HC FLU VACCINE, INCREASED ANTIGEN, PRESV FREE     Vaccine Administration, Initial [84726]        Primary Care Provider Office Phone # Fax #    Luis Tran [phone]       [facility] [address] [facility] [address]        Equal Access to Services     [facility]: Hadii aad ku hadasho Soomaali, waaxda luqadaha, qaybta kaalmada adeegyayessica, ralph bahena . So [facility] [phone].    ATENCIÓN: Si habla español, tiene a berger disposición servicios gratuitos de asistencia lingüística. Kee al [phone].    We comply with applicable federal civil rights laws and [place] laws. We do not discriminate on the basis of race, color, national origin, age, disability, sex, sexual orientation, or gender identity.            Thank you!     Thank you for choosing [facility]  for your care. Our goal is always to provide you with excellent care. Hearing back from our patients is one way we can continue to improve our services. Please take a few minutes to complete the written survey that you may receive in the mail after your visit with us. Thank you!             Your Updated Medication List - Protect others around you: Learn how to safely use, store and throw away your medicines at www.disposemymeds.org.          This list is  accurate as of: 10/16/17 10:55 AM.  Always use your most recent med list.                   Brand Name Dispense Instructions for use Diagnosis    IBUPROFEN PO      Take 200 mg by mouth every 6 hours as needed for moderate pain Reported on 5/9/2017        XALATAN 0.005 % ophthalmic solution   Generic drug:  latanoprost      Place 1 drop into both eyes At Bedtime    Routine general medical examination at a health care facility

## 2017-12-19 ENCOUNTER — TELEPHONE (OUTPATIENT)
Dept: UROLOGY | Facility: OTHER | Age: 72
End: 2017-12-19

## 2017-12-19 NOTE — TELEPHONE ENCOUNTER
Patient is looking for orders for maintenance Mytomycin-C and Gemcitabine. Can these orders be placed please? Patient was due in November.    Gretta Guerrero LPN

## 2017-12-21 ENCOUNTER — TELEPHONE (OUTPATIENT)
Dept: UROLOGY | Facility: OTHER | Age: 72
End: 2017-12-21

## 2017-12-21 NOTE — TELEPHONE ENCOUNTER
Laney- I called and spoke with Araceli two days ago, texted Dr Martinez and messaged him with high importance.  I don't see that Colin's Gemcitabine and Mitomycin have been ordered.  He is overdue.  Can you please see that this is done?  Thanks, Rick

## 2018-01-02 ENCOUNTER — TELEPHONE (OUTPATIENT)
Dept: UROLOGY | Facility: OTHER | Age: 73
End: 2018-01-02

## 2018-01-02 DIAGNOSIS — C67.0 MALIGNANT NEOPLASM OF TRIGONE OF URINARY BLADDER (H): Primary | ICD-10-CM

## 2018-01-02 RX ORDER — SODIUM BICARBONATE 650 MG/1
TABLET ORAL
Qty: 12 TABLET | Refills: 0 | Status: SHIPPED | OUTPATIENT
Start: 2018-01-02 | End: 2018-02-02

## 2018-01-02 NOTE — TELEPHONE ENCOUNTER
I will call the patient and see what pharmacy he wants the bicarb to go to.  I will cancel the gemzar out of the take home meds, it is now also in the plan where it belongs.  We will then get him scheduled.

## 2018-01-02 NOTE — TELEPHONE ENCOUNTER
Heide, please schedule a level 5 mitomycin/gemzar bladder instillation weekly for 3 weeks at 0830  per the following guidelines:    We will schedule bladder instillations on Wednesday mornings when at all possible.  If Wed. doesn't work for the patient, Monday will be the next preferred day.  If that does not work, then Tuesday if the chemo schedule allows; otherwise Friday.

## 2018-01-02 NOTE — TELEPHONE ENCOUNTER
There is a therapy plan inRick, but it doesn't look correct to me.  Dr. Martinez has the gemzar in as a take home med, and the mitomycin in for weekly X 3 weeks.  Per his note, it looks like he wants him to get gemzar and mitomycin weekly for 3 weeks.  The way the plan is in wont work.  I have routed this message to him as well. Lian Johnson RN

## 2018-01-02 NOTE — TELEPHONE ENCOUNTER
I called the U, they tell me that the sodium bicarb is the take home med, which is correct.  The Elbert and Reilly are below.  I have a nurse at the  that you can call if you are still having problems and get me out of the middle of this.  (but speaking of the bicarb, if he has to take this prior, do we need to get him a prescription?)  Rick

## 2018-01-02 NOTE — TELEPHONE ENCOUNTER
Cherise Avina called to inquire if patient can get set up for infusion.  She stated she got a message from the [facility] of  about orders signed in patient's chart under the [place] tab, and she was unsure as to why she couldn't see these orders. [facility] checked this tab and orders are present.  Routed to infusion pool for review.

## 2018-01-12 ENCOUNTER — INFUSION THERAPY VISIT (OUTPATIENT)
Dept: INFUSION THERAPY | Facility: OTHER | Age: 73
End: 2018-01-12
Attending: INTERNAL MEDICINE
Payer: COMMERCIAL

## 2018-01-12 VITALS
HEIGHT: 67 IN | RESPIRATION RATE: 18 BRPM | TEMPERATURE: 98 F | WEIGHT: 222.5 LBS | HEART RATE: 66 BPM | SYSTOLIC BLOOD PRESSURE: 141 MMHG | DIASTOLIC BLOOD PRESSURE: 76 MMHG | BODY MASS INDEX: 34.92 KG/M2 | OXYGEN SATURATION: 98 %

## 2018-01-12 DIAGNOSIS — C67.0 MALIGNANT NEOPLASM OF TRIGONE OF URINARY BLADDER (H): Primary | ICD-10-CM

## 2018-01-12 LAB
ALBUMIN UR-MCNC: 10 MG/DL
APPEARANCE UR: CLEAR
BACTERIA #/AREA URNS HPF: ABNORMAL /HPF
BILIRUB UR QL STRIP: NEGATIVE
COLOR UR AUTO: YELLOW
GLUCOSE UR STRIP-MCNC: NEGATIVE MG/DL
HGB UR QL STRIP: NEGATIVE
HYALINE CASTS #/AREA URNS LPF: 1 /LPF
KETONES UR STRIP-MCNC: NEGATIVE MG/DL
LEUKOCYTE ESTERASE UR QL STRIP: NEGATIVE
MUCOUS THREADS #/AREA URNS LPF: PRESENT /LPF
NITRATE UR QL: NEGATIVE
PH UR STRIP: 5.5 PH (ref 4.7–8)
RBC #/AREA URNS AUTO: 1 /HPF (ref 0–2)
SOURCE: ABNORMAL
SP GR UR STRIP: 1.02 (ref 1–1.03)
UROBILINOGEN UR STRIP-MCNC: NORMAL MG/DL (ref 0–2)
WBC #/AREA URNS AUTO: <1 /HPF (ref 0–2)

## 2018-01-12 PROCEDURE — 27210995 ZZH RX 272: Performed by: UROLOGY

## 2018-01-12 PROCEDURE — 81001 URINALYSIS AUTO W/SCOPE: CPT | Performed by: UROLOGY

## 2018-01-12 PROCEDURE — 25000128 H RX IP 250 OP 636: Performed by: UROLOGY

## 2018-01-12 PROCEDURE — 51702 INSERT TEMP BLADDER CATH: CPT

## 2018-01-12 PROCEDURE — 25000125 ZZHC RX 250: Performed by: UROLOGY

## 2018-01-12 PROCEDURE — 51720 TREATMENT OF BLADDER LESION: CPT

## 2018-01-12 RX ADMIN — SODIUM CHLORIDE 1000 MG: 9 INJECTION, SOLUTION INTRAVENOUS at 09:55

## 2018-01-12 RX ADMIN — WATER 40 MG: 1 INJECTION INTRAMUSCULAR; INTRAVENOUS; SUBCUTANEOUS at 11:18

## 2018-01-12 RX ADMIN — LIDOCAINE HYDROCHLORIDE 10 ML: 20 JELLY TOPICAL at 09:06

## 2018-01-12 NOTE — MR AVS SNAPSHOT
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 After urinating, add 2 cups of bleach to the toilet bowl and let set for 15 minutes before flushing. Wash your hands before and after using the restroom.  WE will see you back as scheduled.          Follow-ups after your visit        Your next 10 appointments already scheduled     Jan 19, 2018  8:30 AM CST   Level 5 with HC INF RM 3306   [facility] ([facility] [place] )    [address]  [place] [address]   [phone]            Jan 26, 2018  8:30 AM CST   Level 5 with HC INF RM 3312   [facility] ([facility] [place] )    [address]  [place] [address]   [phone]            Feb 13, 2018  1:00 PM CST   (Arrive by 12:45 PM)   CYSTO with Randy Martinez MD   [facility] ([facility] [place] )    [address]  [place] [address]   [phone]              Future tests that were ordered for you today     Open Standing Orders        Priority Remaining Interval Expires Ordered    UA with Microscopic reflex to Culture STAT 2/3 weekly 1/12/2019 1/12/2018            Who to contact     If you have questions or need follow up information about today's clinic visit or your schedule please contact [facility] directly at [phone].  Normal or non-critical lab and imaging results will be communicated to you by MyChart, letter or phone within 4 business days after the clinic has received the results.  "If you do not hear from us within 7 days, please contact the clinic through [employer] or phone. If you have a critical or abnormal lab result, we will notify you by phone as soon as possible.  Submit refill requests through [employer] or call your pharmacy and they will forward the refill request to us. Please allow 3 business days for your refill to be completed.          Additional Information About Your Visit        [employer] Information     [employer] lets you send messages to your doctor, view your test results, renew your prescriptions, schedule appointments and more. To sign up, go to [email or web address] . Click on \"Log in\" on the left side of the screen, which will take you to the Welcome page. Then click on \"Sign up Now\" on the right side of the page.     You will be asked to enter the access code listed below, as well as some personal information. Please follow the directions to create your username and password.     Your access code is: [email or web address]  Expires: 2018 12:59 PM     Your access code will  in 90 days. If you need help or a new code, please call your [place] clinic or [phone].        Care EveryWhere ID     This is your Care EveryWhere ID. This could be used by other organizations to access your [place] medical records  [id number]        Your Vitals Were     Pulse Temperature Respirations Height Pulse Oximetry BMI (Body Mass Index)    66 98  F (36.7  C) (Oral) 18 1.702 m (5' 7.01\") 98% 34.84 kg/m2       Blood Pressure from Last 3 Encounters:   18 141/76   17 140/70   17 134/84    Weight from Last 3 Encounters:   18 100.9 kg (222 lb 8 oz)   17 98.9 kg (218 lb)   17 98.9 kg (218 lb)               Primary Care Provider Office Phone # Fax #    Luis Tran [phone]       [facility] [address] [place] [address]        Equal Access to Services     SARAY GROVES AH: cabrera Villegas qaybta " ralph shresthagracie bahena ah. [facility] [phone].    ATENCIÓN: Si moise crystal, tiene a berger disposición servicios gratuitos de asistencia lingüística. Kee al [phone].    We comply with applicable federal civil rights laws and [place] laws. We do not discriminate on the basis of race, color, national origin, age, disability, sex, sexual orientation, or gender identity.            Thank you!     Thank you for choosing [facility]  for your care. Our goal is always to provide you with excellent care. Hearing back from our patients is one way we can continue to improve our services. Please take a few minutes to complete the written survey that you may receive in the mail after your visit with us. Thank you!             Your Updated Medication List - Protect others around you: Learn how to safely use, store and throw away your medicines at www.disposemymeds.org.          This list is accurate as of: 1/12/18 12:59 PM.  Always use your most recent med list.                   Brand Name Dispense Instructions for use Diagnosis    IBUPROFEN PO      Take 200 mg by mouth every 6 hours as needed for moderate pain Reported on 5/9/2017        sodium bicarbonate 650 MG tablet     12 tablet    Take 650 mg (1 tablet) the evening prior to each treatment and 1 tablet the morning of each treatment.    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

## 2018-01-12 NOTE — PATIENT INSTRUCTIONS
Drink at least  8 8 oz glasses of fluids the next 48 hours  For the first 6 hours after each treatment, sit down on the toilet to urinate. After urinating, add 2 cups of bleach to the toilet bowl and let set for 15 minutes before flushing. Wash your hands before and after using the restroom.  WE will see you back as scheduled.

## 2018-01-12 NOTE — PROGRESS NOTES
"72 year olf male patient here for bladder instillation of gemcitabine and mytomycin.  UA negative for nitrates, negative for leukocytes.  Denies any fever or chills.  Denies any pain with urination.  Results reviewed, labs met treatment parameters. Patient states he had problem holding the chemo for the complete hour and is concerned about this.  Discussed use of aromatherapy to aid in relaxing  smooth muscle.  Patient states he is willing to try this.    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      Initial session: YES  Subsequent session: NO    Assessment & Reassessment (30 -  60 minutes post treatment)    Other symptoms: Relaxation of bladder muscle.      Post Session Observation: Other States it may have helped    Intervention    Integrative Therapy(ies) Provided   List all that apply. Specify aromatherapy product used: Inhalation: Peppermint    Qualitative Responses    Patient comments/response: \"When I first sniffed it, it felt good.  Like drinking a glass of cold orange juice when your really thirstay  Concurrent medications given for symptoms being addressed: None   Time Spent: 10 minutes    Proceed with treatment. 0900  Lidocaine instilled in penis.    12  french catheter placed, sterile technique.  Allowed bladder to empty 175 ccs of urine returned.    0955 Gemzar instillation via catheter, tolerated well, catheter clamped.  Patient turned every 15 minutes X 50 minutes when patient put on call light and stated \"I dont think I can hold it any longer.  Santo unclamped and allowed to drain approximately 30 cc's.  Patient stated relief.  Re clamped to complete 1 hour per protocol, tolerated well.  Offered no complaints.  1100:   catheter unclamped and allowed to drain, 425 ccs of return.  1110 Bladder irrigated with 60cc of sterile water, allowed to drain, 100 ccs of return.  " Patient offers no complaints.  1118: Mitomycin instilled via catheter, catheter clamped.  Patient turned every 15 minutes per protocol, tolerated well.  Offers no complaints, denies any pain or discomfort.  1230 Catheter unclamped and allowed to drain, 225 ccs of return  1300:  Bladder irrigated with 60ccs of sterile water, allowed to drain, 100 ccs of return.  Encouraged to drink plenty of fluids for next couple of days.  Discharged in care ofself.  Patient will return next week for next appointment.

## 2018-01-19 ENCOUNTER — INFUSION THERAPY VISIT (OUTPATIENT)
Dept: INFUSION THERAPY | Facility: OTHER | Age: 73
End: 2018-01-19
Attending: INTERNAL MEDICINE
Payer: COMMERCIAL

## 2018-01-19 VITALS
TEMPERATURE: 97.5 F | HEIGHT: 67 IN | OXYGEN SATURATION: 94 % | SYSTOLIC BLOOD PRESSURE: 104 MMHG | WEIGHT: 225.7 LBS | RESPIRATION RATE: 20 BRPM | HEART RATE: 66 BPM | DIASTOLIC BLOOD PRESSURE: 56 MMHG | BODY MASS INDEX: 35.43 KG/M2

## 2018-01-19 DIAGNOSIS — C67.0 MALIGNANT NEOPLASM OF TRIGONE OF URINARY BLADDER (H): Primary | ICD-10-CM

## 2018-01-19 LAB
ALBUMIN UR-MCNC: 10 MG/DL
APPEARANCE UR: CLEAR
BACTERIA #/AREA URNS HPF: ABNORMAL /HPF
BILIRUB UR QL STRIP: NEGATIVE
COLOR UR AUTO: YELLOW
GLUCOSE UR STRIP-MCNC: NEGATIVE MG/DL
HGB UR QL STRIP: NEGATIVE
KETONES UR STRIP-MCNC: NEGATIVE MG/DL
LEUKOCYTE ESTERASE UR QL STRIP: NEGATIVE
MUCOUS THREADS #/AREA URNS LPF: PRESENT /LPF
NITRATE UR QL: NEGATIVE
PH UR STRIP: 6.5 PH (ref 4.7–8)
RBC #/AREA URNS AUTO: 2 /HPF (ref 0–2)
SOURCE: ABNORMAL
SP GR UR STRIP: 1.02 (ref 1–1.03)
UROBILINOGEN UR STRIP-MCNC: NORMAL MG/DL (ref 0–2)
WBC #/AREA URNS AUTO: 1 /HPF (ref 0–2)

## 2018-01-19 PROCEDURE — 25000128 H RX IP 250 OP 636: Performed by: UROLOGY

## 2018-01-19 PROCEDURE — 51720 TREATMENT OF BLADDER LESION: CPT

## 2018-01-19 PROCEDURE — 27210995 ZZH RX 272: Performed by: UROLOGY

## 2018-01-19 PROCEDURE — 25000125 ZZHC RX 250: Performed by: UROLOGY

## 2018-01-19 PROCEDURE — 81001 URINALYSIS AUTO W/SCOPE: CPT | Mod: ZL | Performed by: UROLOGY

## 2018-01-19 RX ADMIN — LIDOCAINE HYDROCHLORIDE 10 ML: 20 JELLY TOPICAL at 09:02

## 2018-01-19 RX ADMIN — WATER 40 MG: 1 INJECTION INTRAMUSCULAR; INTRAVENOUS; SUBCUTANEOUS at 11:20

## 2018-01-19 RX ADMIN — SODIUM CHLORIDE 1000 MG: 9 INJECTION, SOLUTION INTRAMUSCULAR; INTRAVENOUS; SUBCUTANEOUS at 09:44

## 2018-01-19 NOTE — MR AVS SNAPSHOT
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 After urinating, add 2 cups of bleach to the toilet bowl and let set for 15 minutes before flushing. Wash your hands before and after using the restroom.  Drink at least 8 8oz glasses of water daily for the next 2 days.           Follow-ups after your visit        Your next 10 appointments already scheduled     Jan 26, 2018  8:30 AM CST   Level 5 with HC INF RM 3312   [facility] ([facility] [place] )    [address]  [place] [address]   [phone]            Feb 13, 2018  1:00 PM CST   (Arrive by 12:45 PM)   CYSTO with Randy Martinez MD   [facility] [place] ([facility] [place] )    [address]  [place] [address]   [phone]              Who to contact     If you have questions or need follow up information about today's clinic visit or your schedule please contact [facility] directly at [phone].  Normal or non-critical lab and imaging results will be communicated to you by MyChart, letter or phone within 4 business days after the clinic has received the results. If you do not hear from us within 7 days, please contact the clinic through MyChart or phone. If you have a critical or abnormal lab result, we will notify you by phone as soon as possible.  Submit refill requests through [employer] or call your pharmacy and they will forward the refill request to us. Please allow 3 business days for your refill to be completed.          Additional Information About Your Visit       "  [employer] Information     [employer] lets you send messages to your doctor, view your test results, renew your prescriptions, schedule appointments and more. To sign up, go to [email or web address] . Click on \"Log in\" on the left side of the screen, which will take you to the Welcome page. Then click on \"Sign up Now\" on the right side of the page.     You will be asked to enter the access code listed below, as well as some personal information. Please follow the directions to create your username and password.     Your access code is: [email or web address]  Expires: 2018 12:59 PM     Your access code will  in 90 days. If you need help or a new code, please call your [place] clinic or [phone].        Care EveryWhere ID     This is your [facility] EveryWhere ID. This could be used by other organizations to access your [place] medical records  [id number]        Your Vitals Were     Pulse Temperature Respirations Height Pulse Oximetry BMI (Body Mass Index)    66 97.5  F (36.4  C) (Oral) 20 1.702 m (5' 7.01\") 94% 35.34 kg/m2       Blood Pressure from Last 3 Encounters:   18 104/56   18 141/76   17 140/70    Weight from Last 3 Encounters:   18 102.4 kg (225 lb 11.2 oz)   18 100.9 kg (222 lb 8 oz)   17 98.9 kg (218 lb)              We Performed the Following     UA with Microscopic reflex to Culture        Primary Care Provider Office Phone # Fax #    Luis Tran [phone]       [facility] [address]  [place] [address]        Equal Access to Services     SARAY GROVES AH: Andrei Montano, cabrera ireland, gianluca artis, ralph zacarias. So [facility] [phone].    ATENCIÓN: Si habla español, tiene a berger disposición servicios gratuitos de asistencia lingüística. Kee al [phone].    We comply with applicable federal civil rights laws and [place] laws. We do not discriminate on the basis of race, " color, national origin, age, disability, sex, sexual orientation, or gender identity.            Thank you!     Thank you for choosing [facility]  for your care. Our goal is always to provide you with excellent care. Hearing back from our patients is one way we can continue to improve our services. Please take a few minutes to complete the written survey that you may receive in the mail after your visit with us. Thank you!             Your Updated Medication List - Protect others around you: Learn how to safely use, store and throw away your medicines at www.disposemymeds.org.          This list is accurate as of: 1/19/18 12:54 PM.  Always use your most recent med list.                   Brand Name Dispense Instructions for use Diagnosis    IBUPROFEN PO      Take 200 mg by mouth every 6 hours as needed for moderate pain Reported on 5/9/2017        sodium bicarbonate 650 MG tablet     12 tablet    Take 650 mg (1 tablet) the evening prior to each treatment and 1 tablet the morning of each treatment.    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

## 2018-01-19 NOTE — PATIENT INSTRUCTIONS
For the first 6 hours after each treatment, sit down on the toilet to urinate. After urinating, add 2 cups of bleach to the toilet bowl and let set for 15 minutes before flushing. Wash your hands before and after using the restroom.  Drink at least 8 8oz glasses of water daily for the next 2 days.

## 2018-01-19 NOTE — PROGRESS NOTES
73 year old male Patient here for bladder instillation of mitomycin and gemcitabine.  UA negative for nitrates, negative for leukocytes.  Denies any fever or chills.  Denies any pain with urination.  Results reviewed, labs met treatment parameters.  Proceed with treatment.   Patient turned every 15 minutes per protocol, at the 50 minute zander, patient stated he could not hold urine any longer.  Santo unclamped and allowed to drain approximately 50 cc's of urine, and reclamped when patient had relief.  Complete hour finieshed. .  1059:   catheter unclamped and allowed to drain, 250 ccs of return.  1110:  Bladder irrigated with 60cc of sterile water, allowed to drain, 200 ccs of return.  VS stable.  Patient offers no complaints.  Mitomycin instilled via catheter, catheter clamped.  Patient turned every 15 minutes per protocol, tolerated well.  Offers no complaints, denies any pain or discomfort.  1235: Catheter unclamped and allowed to drain, 200 ccs of return.  1245:  Bladder irrigated with 60ccs of sterile water, allowed to drain 100 cc's.  Encouraged to drink plenty of fluids for next couple of days.  Discharged in care of self.  Patient will return in one week for next appointment.

## 2018-01-19 NOTE — PROGRESS NOTES
0925: 12 Thai catheter placed, sterile technique.  Allowed bladder to empty 100ccs of urine returned.    0944: Gemzar 1,000mg doseverified with with second RN, Cheryl Posadas.  Instilled via catheter, tolerated well, catheter clamped.

## 2018-01-26 ENCOUNTER — APPOINTMENT (OUTPATIENT)
Dept: INFUSION THERAPY | Facility: OTHER | Age: 73
End: 2018-01-26
Attending: INTERNAL MEDICINE
Payer: COMMERCIAL

## 2018-01-26 ENCOUNTER — TELEPHONE (OUTPATIENT)
Dept: FAMILY MEDICINE | Facility: OTHER | Age: 73
End: 2018-01-26

## 2018-01-26 ENCOUNTER — TELEPHONE (OUTPATIENT)
Dept: INFUSION THERAPY | Facility: OTHER | Age: 73
End: 2018-01-26

## 2018-01-26 NOTE — TELEPHONE ENCOUNTER
Please schedule patient for date/time: Patient should be seen. Please schedule for next week. If symptoms worsen please see ER/UC    Have patient go to ER/Urgent Care Center. Urgent Care hours are 9:30 am to 8 pm, open 7 days a week. Yes.    Provider will call patient.No.    Other:

## 2018-01-26 NOTE — TELEPHONE ENCOUNTER
9:46 AM    Reason for Call: Phone Call    Description: Pt called and stated he started not feeling well about 6 days ago. It started off with body aches and now he is feeling better but has some congestion left over. Pt wondering if he should be seen or not? Please call him back at [phone]    Was an appointment offered for this call? No, didn't want to make an appt unless he really needed to.  If yes : Appointment type              Date    Preferred method for responding to this message: Telephone Call  What is your phone number ?    If we cannot reach you directly, may we leave a detailed response at the number you provided? Yes    Can this message wait until your PCP/provider returns, if available today? Not applicable    Mya Richards

## 2018-01-26 NOTE — TELEPHONE ENCOUNTER
Patient present in department for 3/3 bladder instillation of gemcitabine and mitomycin.  States he has not been feeling well for the past 3-4 days.  C/O nausea, vomitting and fatigue and states he would rather reschedule bladder instillation for next week.  Rescheduled for February 3rd.

## 2018-01-29 ENCOUNTER — OFFICE VISIT (OUTPATIENT)
Dept: FAMILY MEDICINE | Facility: OTHER | Age: 73
End: 2018-01-29
Attending: FAMILY MEDICINE
Payer: COMMERCIAL

## 2018-01-29 VITALS
HEIGHT: 67 IN | SYSTOLIC BLOOD PRESSURE: 122 MMHG | OXYGEN SATURATION: 97 % | TEMPERATURE: 97.2 F | DIASTOLIC BLOOD PRESSURE: 68 MMHG | BODY MASS INDEX: 34.21 KG/M2 | HEART RATE: 69 BPM | WEIGHT: 218 LBS

## 2018-01-29 DIAGNOSIS — J20.9 ACUTE BRONCHITIS, UNSPECIFIED ORGANISM: Primary | ICD-10-CM

## 2018-01-29 PROCEDURE — 99213 OFFICE O/P EST LOW 20 MIN: CPT | Performed by: FAMILY MEDICINE

## 2018-01-29 PROCEDURE — G0463 HOSPITAL OUTPT CLINIC VISIT: HCPCS

## 2018-01-29 RX ORDER — AZITHROMYCIN 250 MG/1
TABLET, FILM COATED ORAL
Qty: 6 TABLET | Refills: 0 | Status: SHIPPED | OUTPATIENT
Start: 2018-01-29 | End: 2018-02-02

## 2018-01-29 ASSESSMENT — ANXIETY QUESTIONNAIRES
2. NOT BEING ABLE TO STOP OR CONTROL WORRYING: NOT AT ALL
1. FEELING NERVOUS, ANXIOUS, OR ON EDGE: NOT AT ALL
GAD7 TOTAL SCORE: 3
IF YOU CHECKED OFF ANY PROBLEMS ON THIS QUESTIONNAIRE, HOW DIFFICULT HAVE THESE PROBLEMS MADE IT FOR YOU TO DO YOUR WORK, TAKE CARE OF THINGS AT HOME, OR GET ALONG WITH OTHER PEOPLE: SOMEWHAT DIFFICULT
7. FEELING AFRAID AS IF SOMETHING AWFUL MIGHT HAPPEN: NOT AT ALL
6. BECOMING EASILY ANNOYED OR IRRITABLE: SEVERAL DAYS
5. BEING SO RESTLESS THAT IT IS HARD TO SIT STILL: SEVERAL DAYS
3. WORRYING TOO MUCH ABOUT DIFFERENT THINGS: NOT AT ALL

## 2018-01-29 ASSESSMENT — PATIENT HEALTH QUESTIONNAIRE - PHQ9: 5. POOR APPETITE OR OVEREATING: SEVERAL DAYS

## 2018-01-29 ASSESSMENT — PAIN SCALES - GENERAL: PAINLEVEL: NO PAIN (0)

## 2018-01-29 NOTE — PROGRESS NOTES
SUBJECTIVE:  Colin Baxter, 73 year old, male is seen with the following medical problems.    URI.  Colin has had previous upper respiratory congestion, myalgia, and fatigue.  Present over the last week.  His cough has now become productive.      Denies fever, shaking, chills, nausea, vomiting, or diarrhea.  No household contacts are ill.    Current Outpatient Prescriptions   Medication Sig Dispense Refill     sodium bicarbonate 650 MG tablet Take 650 mg (1 tablet) the evening prior to each treatment and 1 tablet the morning of each treatment. 12 tablet 0     IBUPROFEN PO Take 200 mg by mouth every 6 hours as needed for moderate pain Reported on 5/9/2017       latanoprost (XALATAN) 0.005 % ophthalmic solution Place 1 drop into both eyes At Bedtime        No Known Allergies    History reviewed. No pertinent past medical history.  Past Surgical History:   Procedure Laterality Date     APPENDECTOMY  1958     COLONOSCOPY  2-     CYSTOSCOPY, BIOPSY BLADDER, COMBINED N/A 9/8/2015    Procedure: COMBINED CYSTOSCOPY, BIOPSY BLADDER;  Surgeon: Randy Martinez MD;  [facility]: [facility]     CYSTOSCOPY, BIOPSY BLADDER, COMBINED N/A 2/14/2017    Procedure: COMBINED CYSTOSCOPY, BIOPSY BLADDER;  Surgeon: Randy Martinez MD;  Location[facility]     CYSTOSCOPY, RETROGRADES, INSERT STENT URETER(S), COMBINED Left 9/8/2015    Procedure: COMBINED CYSTOSCOPY, RETROGRADES, INSERT STENT URETER(S);  Surgeon: Randy Martinez MD;  Location[facility]     CYSTOSCOPY, TRANSURETHRAL RESECTION (TUR) TUMOR BLADDER, COMBINED N/A 9/8/2015    Procedure: COMBINED CYSTOSCOPY, TRANSURETHRAL RESECTION (TUR) TUMOR BLADDER;  Surgeon: Randy Martinez MD;  [facility]     CYSTOSCOPY, TRANSURETHRAL RESECTION (TUR) TUMOR BLADDER, COMBINED N/A 1/12/2016    Procedure: COMBINED CYSTOSCOPY, TRANSURETHRAL RESECTION (TUR) TUMOR BLADDER;  Surgeon: Randy Martinez MD;  [facility]     CYSTOSCOPY, TRANSURETHRAL  "RESECTION (TUR) TUMOR BLADDER, COMBINED N/A 9/13/2016    Procedure: COMBINED CYSTOSCOPY, TRANSURETHRAL RESECTION (TUR) TUMOR BLADDER;  Surgeon: Randy Martinez MD;  Location: [facility]     Family History   Problem Relation Age of Onset     DIABETES Mother      Parkinsonism Brother      Social History     Social History     Marital status:      Spouse name: N/A     Number of children: N/A     Years of education: N/A     Occupational History     Not on file.     Social History Main Topics     Smoking status: Former Smoker     Quit date: 1/6/1992     Smokeless tobacco: Never Used     Alcohol use Yes     Drug use: No     Sexual activity: Not Currently     Other Topics Concern     Parent/Sibling W/ Cabg, Mi Or Angioplasty Before 65f 55m? No     Social History Narrative         Review Of Systems  Constitutional, HEENT, cardiovascular, pulmonary, gi and gu systems are negative, except as otherwise noted.    OBJECTIVE:  /68  Pulse 69  Temp 97.2  F (36.2  C) (Tympanic)  Ht 5' 7.01\" (1.702 m)  Wt 218 lb (98.9 kg)  SpO2 97%  BMI 34.13 kg/m2      Exam:  Physical Exam   Constitutional: He is oriented to person, place, and time. He appears well-developed and well-nourished. No distress.   HENT:   Head: Normocephalic.   Right Ear: Hearing, tympanic membrane, external ear and ear canal normal.   Left Ear: Hearing, tympanic membrane, external ear and ear canal normal.   Nose: Nose normal. Right sinus exhibits no maxillary sinus tenderness and no frontal sinus tenderness. Left sinus exhibits no maxillary sinus tenderness and no frontal sinus tenderness.   Mouth/Throat: Uvula is midline and oropharynx is clear and moist. No oropharyngeal exudate or posterior oropharyngeal erythema.   Eyes: Conjunctivae are normal.   Neck: Neck supple.   Pulmonary/Chest: Effort normal and breath sounds normal.   Lymphadenopathy:     He has no cervical adenopathy.   Neurological: He is alert and oriented to person, place, and " time.   Skin: Skin is warm and dry.   Psychiatric: He has a normal mood and affect.     Other exam not repeated    Labs:  Infusion Therapy Visit on 01/19/2018   Component Date Value Ref Range Status     Color Urine 01/19/2018 Yellow   Final     Appearance Urine 01/19/2018 Clear   Final     Glucose Urine 01/19/2018 Negative  NEG^Negative mg/dL Final     Bilirubin Urine 01/19/2018 Negative  NEG^Negative Final     Ketones Urine 01/19/2018 Negative  NEG^Negative mg/dL Final     Specific Gravity Urine 01/19/2018 1.019  1.003 - 1.035 Final     Blood Urine 01/19/2018 Negative  NEG^Negative Final     pH Urine 01/19/2018 6.5  4.7 - 8.0 pH Final     Protein Albumin Urine 01/19/2018 10* NEG^Negative mg/dL Final     Urobilinogen mg/dL 01/19/2018 Normal  0.0 - 2.0 mg/dL Final     Nitrite Urine 01/19/2018 Negative  NEG^Negative Final     Leukocyte Esterase Urine 01/19/2018 Negative  NEG^Negative Final     Source 01/19/2018 Midstream Urine   Final     WBC Urine 01/19/2018 1  0 - 2 /HPF Final     RBC Urine 01/19/2018 2  0 - 2 /HPF Final     Bacteria Urine 01/19/2018 None* NEG^Negative /HPF Final     Mucous Urine 01/19/2018 Present* NEG^Negative /LPF Final       ASSESSMENT/PLAN:  Acute bronchitis, unspecified organism  Azithromycin (Zithromax) as directed.  Continue over the counter cough suppressants and expectorants.  Follow up with persistent symptoms  - azithromycin (ZITHROMAX) 250 MG tablet; Two tablets first day, then one tablet daily for four days.            Luis Tran MD

## 2018-01-29 NOTE — NURSING NOTE
"Chief Complaint   Patient presents with     Other     Not feeling well last week- feeling better        Initial /68  Pulse 69  Temp 97.2  F (36.2  C) (Tympanic)  Ht 5' 7.01\" (1.702 m)  Wt 218 lb (98.9 kg)  SpO2 97%  BMI 34.13 kg/m2 Estimated body mass index is 34.13 kg/(m^2) as calculated from the following:    Height as of this encounter: 5' 7.01\" (1.702 m).    Weight as of this encounter: 218 lb (98.9 kg).  Medication Reconciliation: complete   ELSA LARRY LPN  "

## 2018-01-29 NOTE — MR AVS SNAPSHOT
After Visit Summary   1/29/2018    Colin Baxter    MRN: [id number]           Patient Information     Date Of Birth          1945        Visit Information        Provider Department      1/29/2018 10:20 AM Luis Tran MD [facility]        Today's Diagnoses     Acute bronchitis, unspecified organism    -  1      Care Instructions    Take azithromycin (Zithromax) as directed          Follow-ups after your visit        Follow-up notes from your care team     Return if symptoms worsen or fail to improve.      Your next 10 appointments already scheduled     Feb 02, 2018  8:00 AM CST   Level 5 with HC INF RM 3306   [facility] [place] [facility]    [address] [place] [address]   [phone]            Feb 13, 2018  1:00 PM CST   (Arrive by 12:45 PM)   CYSTO with Randy Martinez MD   [facility] ([facility] [place] )    [address]  [place] [address]   [phone]              Who to contact     If you have questions or need follow up information about today's clinic visit or your schedule please contact [facility] directly at [phone].  Normal or non-critical lab and imaging results will be communicated to you by MyChart, letter or phone within 4 business days after the clinic has received the results. If you do not hear from us within 7 days, please contact the clinic through MyChart or phone. If you have a critical or abnormal lab result, we will notify you by phone as soon as possible.  Submit refill requests through NutshellMail or call your pharmacy and they will forward the refill request to us. Please allow 3 business days for your refill to be completed.          Additional Information About Your Visit        MyChart Information     NutshellMail lets you send messages to your doctor, view your test results, renew your prescriptions, schedule appointments and more. To sign up, go to  [email or web address] . Click on \"Log in\" on the left side of the screen, which will take you to the Welcome page. Then click on \"Sign up Now\" on the right side of the page.     You will be asked to enter the access code listed below, as well as some personal information. Please follow the directions to create your username and password.     Your access code is: [email or web address]  Expires: 2018 12:59 PM     Your access code will  in 90 days. If you need help or a new code, please call your [place] clinic or [phone].        Care EveryWhere ID     This is your Care EveryWhere ID. This could be used by other organizations to access your [place] medical records  [id number]        Your Vitals Were     Pulse Temperature Height Pulse Oximetry BMI (Body Mass Index)       69 97.2  F (36.2  C) (Tympanic) 5' 7.01\" (1.702 m) 97% 34.13 kg/m2        Blood Pressure from Last 3 Encounters:   18 122/68   18 104/56   18 141/76    Weight from Last 3 Encounters:   18 218 lb (98.9 kg)   18 225 lb 11.2 oz (102.4 kg)   18 222 lb 8 oz (100.9 kg)              Today, you had the following     No orders found for display         Today's Medication Changes          These changes are accurate as of 18 11:59 PM.  If you have any questions, ask your nurse or doctor.               Start taking these medicines.        Dose/Directions    azithromycin 250 MG tablet   Commonly known as:  ZITHROMAX   Used for:  Acute bronchitis, unspecified organism   Started by:  Luis Tran MD        Two tablets first day, then one tablet daily for four days.   Quantity:  6 tablet   Refills:  0            Where to get your medicines      These medications were sent to [id number] - [place] - [address]  [address] [place] [address]     Phone:  [phone]     azithromycin 250 MG tablet                Primary Care Provider Office Phone # Fax #    Luis Tran MD " [phone]       [facility] [address]  [place] [address]        Equal Access to Services     SARAY GROVES : Hadii aad ku hadjorge Montano, wabrandonda luqmarily, orvilleta kalashawnda chandra, ralph jallohmekhi rell. So [facility] [phone].    ATENCIÓN: Si habla español, tiene a berger disposición servicios gratuitos de asistencia lingüística. Llame al [phone].    We comply with applicable federal civil rights laws and [place] laws. We do not discriminate on the basis of race, color, national origin, age, disability, sex, sexual orientation, or gender identity.            Thank you!     Thank you for choosing [facility]  for your care. Our goal is always to provide you with excellent care. Hearing back from our patients is one way we can continue to improve our services. Please take a few minutes to complete the written survey that you may receive in the mail after your visit with us. Thank you!             Your Updated Medication List - Protect others around you: Learn how to safely use, store and throw away your medicines at www.disposemymeds.org.          This list is accurate as of 1/29/18 11:59 PM.  Always use your most recent med list.                   Brand Name Dispense Instructions for use Diagnosis    azithromycin 250 MG tablet    ZITHROMAX    6 tablet    Two tablets first day, then one tablet daily for four days.    Acute bronchitis, unspecified organism       IBUPROFEN PO      Take 200 mg by mouth every 6 hours as needed for moderate pain Reported on 5/9/2017        sodium bicarbonate 650 MG tablet     12 tablet    Take 650 mg (1 tablet) the evening prior to each treatment and 1 tablet the morning of each treatment.    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

## 2018-01-30 ASSESSMENT — PATIENT HEALTH QUESTIONNAIRE - PHQ9: SUM OF ALL RESPONSES TO PHQ QUESTIONS 1-9: 5

## 2018-01-30 ASSESSMENT — ANXIETY QUESTIONNAIRES: GAD7 TOTAL SCORE: 3

## 2018-02-02 ENCOUNTER — INFUSION THERAPY VISIT (OUTPATIENT)
Dept: INFUSION THERAPY | Facility: OTHER | Age: 73
End: 2018-02-02
Attending: FAMILY MEDICINE
Payer: COMMERCIAL

## 2018-02-02 VITALS
SYSTOLIC BLOOD PRESSURE: 121 MMHG | HEART RATE: 68 BPM | OXYGEN SATURATION: 94 % | RESPIRATION RATE: 68 BRPM | TEMPERATURE: 98 F | HEIGHT: 67 IN | BODY MASS INDEX: 35.02 KG/M2 | WEIGHT: 223.1 LBS | DIASTOLIC BLOOD PRESSURE: 76 MMHG

## 2018-02-02 DIAGNOSIS — C67.0 MALIGNANT NEOPLASM OF TRIGONE OF URINARY BLADDER (H): Primary | ICD-10-CM

## 2018-02-02 LAB
ALBUMIN UR-MCNC: 10 MG/DL
APPEARANCE UR: CLEAR
BACTERIA #/AREA URNS HPF: ABNORMAL /HPF
BILIRUB UR QL STRIP: NEGATIVE
COLOR UR AUTO: YELLOW
GLUCOSE UR STRIP-MCNC: NEGATIVE MG/DL
HGB UR QL STRIP: NEGATIVE
KETONES UR STRIP-MCNC: NEGATIVE MG/DL
LEUKOCYTE ESTERASE UR QL STRIP: NEGATIVE
MUCOUS THREADS #/AREA URNS LPF: PRESENT /LPF
NITRATE UR QL: NEGATIVE
PH UR STRIP: 5.5 PH (ref 4.7–8)
RBC #/AREA URNS AUTO: <1 /HPF (ref 0–2)
SOURCE: ABNORMAL
SP GR UR STRIP: 1.02 (ref 1–1.03)
UROBILINOGEN UR STRIP-MCNC: NORMAL MG/DL (ref 0–2)
WBC #/AREA URNS AUTO: 1 /HPF (ref 0–2)

## 2018-02-02 PROCEDURE — 81001 URINALYSIS AUTO W/SCOPE: CPT | Mod: ZL | Performed by: UROLOGY

## 2018-02-02 PROCEDURE — 25000125 ZZHC RX 250: Performed by: UROLOGY

## 2018-02-02 PROCEDURE — 25000128 H RX IP 250 OP 636: Performed by: UROLOGY

## 2018-02-02 PROCEDURE — 51702 INSERT TEMP BLADDER CATH: CPT

## 2018-02-02 PROCEDURE — 27210995 ZZH RX 272: Performed by: UROLOGY

## 2018-02-02 PROCEDURE — 51720 TREATMENT OF BLADDER LESION: CPT

## 2018-02-02 RX ADMIN — SODIUM CHLORIDE 1000 MG: 9 INJECTION, SOLUTION INTRAMUSCULAR; INTRAVENOUS; SUBCUTANEOUS at 10:07

## 2018-02-02 RX ADMIN — WATER 40 MG: 1 INJECTION INTRAMUSCULAR; INTRAVENOUS; SUBCUTANEOUS at 11:34

## 2018-02-02 RX ADMIN — LIDOCAINE HYDROCHLORIDE 10 ML: 20 JELLY TOPICAL at 08:42

## 2018-02-02 NOTE — PROGRESS NOTES
"73 year old male patient here for bladder instillation of gemcitabine and mytomycin.  UA negative for nitrates, negative for leukocytes.  Denies any fever or chills.  Denies any pain with urination.  Results reviewed, labs met treatment parameters.  Proceed with treatment. 0855  12 Kinyarwanda catheter placed, sterile technique.  Allowed bladder to empty 300 ccs of urine returned.  1007: Gemzar instillation via catheter, tolerated well, catheter clamped.  Patient turned every 15 minutes per protocol, 10 minutes before hour was up, pt states \"I cant hold this anymore.  Tubing unclamped and allowed to drain approximately 30 cc's until stated relief.    1115: catheter unclamped and allowed to drain, 325 ccs of return.  1115:  Bladder irrigated with 60cc of sterile water, allowed to drain, 75 ccs of return.  Patient offers no complaints. 1134:  Mitomycin instilled via catheter, catheter clamped.  Patient turned every 15 minutes per protocol, tolerated well.  Offers no complaints, denies any pain or discomfort.  1301:  Catheter unclamped and allowed to drain, 100 ccs of return.  1315:Bladder irrigated with 60ccs of sterile water, allowed to drain, 100 ccs of return.  VS stable.  Encouraged to drink plenty of fluids for next couple of days.  Discharged in care of self.   "

## 2018-02-02 NOTE — LETTER
February 20, 2018       TO: Colin Baxter  [address] [place] [address]       DearMr.Sahil,    We are writing to inform you of your test results.    Your test results fall within the expected range(s) or remain unchanged from previous results.  Please continue with current treatment plan.    Resulted Orders   UA with Microscopic reflex to Culture   Result Value Ref Range    Color Urine Yellow     Appearance Urine Clear     Glucose Urine Negative NEG^Negative mg/dL    Bilirubin Urine Negative NEG^Negative    Ketones Urine Negative NEG^Negative mg/dL    Specific Gravity Urine 1.018 1.003 - 1.035    Blood Urine Negative NEG^Negative    pH Urine 5.5 4.7 - 8.0 pH    Protein Albumin Urine 10 (A) NEG^Negative mg/dL    Urobilinogen mg/dL Normal 0.0 - 2.0 mg/dL    Nitrite Urine Negative NEG^Negative    Leukocyte Esterase Urine Negative NEG^Negative    Source Midstream Urine     WBC Urine 1 0 - 2 /HPF    RBC Urine <1 0 - 2 /HPF    Bacteria Urine Few (A) NEG^Negative /HPF    Mucous Urine Present (A) NEG^Negative /LPF       Thank you for choosing [facility] for your care. Please follow up as previously planned.  Please call with any questions or concerns.    Thank you,  Laney Neumann, RN Care Coordinator for  Dr. Randy Martinez

## 2018-02-02 NOTE — PATIENT INSTRUCTIONS
For the first 6 hours after each treatment, sit down on the toilet to urinate. After urinating, add 2 cups of bleach to the toilet bowl and let set for 15 minutes before flushing. Wash your hands before and after using the restroom.  Drink water or other fluids as directed after treatment with this medicine.

## 2018-02-08 ENCOUNTER — TELEPHONE (OUTPATIENT)
Dept: UROLOGY | Facility: OTHER | Age: 73
End: 2018-02-08

## 2018-02-08 DIAGNOSIS — C67.9 BLADDER CANCER (H): Primary | ICD-10-CM

## 2018-02-08 DIAGNOSIS — C67.9 BLADDER CANCER (H): ICD-10-CM

## 2018-02-08 LAB
ALBUMIN UR-MCNC: 10 MG/DL
APPEARANCE UR: CLEAR
BACTERIA #/AREA URNS HPF: ABNORMAL /HPF
BILIRUB UR QL STRIP: NEGATIVE
COLOR UR AUTO: YELLOW
GLUCOSE UR STRIP-MCNC: NEGATIVE MG/DL
HGB UR QL STRIP: NEGATIVE
KETONES UR STRIP-MCNC: NEGATIVE MG/DL
LEUKOCYTE ESTERASE UR QL STRIP: NEGATIVE
MUCOUS THREADS #/AREA URNS LPF: PRESENT /LPF
NITRATE UR QL: NEGATIVE
PH UR STRIP: 5 PH (ref 4.7–8)
RBC #/AREA URNS AUTO: 1 /HPF (ref 0–2)
SOURCE: ABNORMAL
SP GR UR STRIP: 1.02 (ref 1–1.03)
UROBILINOGEN UR STRIP-MCNC: NORMAL MG/DL (ref 0–2)
WBC #/AREA URNS AUTO: 1 /HPF (ref 0–2)

## 2018-02-08 PROCEDURE — 81001 URINALYSIS AUTO W/SCOPE: CPT | Mod: ZL | Performed by: UROLOGY

## 2018-02-08 NOTE — TELEPHONE ENCOUNTER
Patient called stating that Dr Martinez usually has him obtain UA prior to his appointments. Patient next appointment on 2/13/18 @ 12:45. Orders placed for UA/UC. Patient states he will be in today or tomorrow.

## 2018-02-13 ENCOUNTER — OFFICE VISIT (OUTPATIENT)
Dept: UROLOGY | Facility: OTHER | Age: 73
End: 2018-02-13
Attending: UROLOGY
Payer: COMMERCIAL

## 2018-02-13 VITALS
RESPIRATION RATE: 18 BRPM | TEMPERATURE: 97.2 F | WEIGHT: 218 LBS | SYSTOLIC BLOOD PRESSURE: 124 MMHG | HEIGHT: 67 IN | HEART RATE: 68 BPM | BODY MASS INDEX: 34.21 KG/M2 | DIASTOLIC BLOOD PRESSURE: 78 MMHG

## 2018-02-13 DIAGNOSIS — C67.0 MALIGNANT NEOPLASM OF TRIGONE OF URINARY BLADDER (H): Primary | ICD-10-CM

## 2018-02-13 DIAGNOSIS — C67.8 MALIGNANT NEOPLASM OF OVERLAPPING SITES OF BLADDER (H): ICD-10-CM

## 2018-02-13 PROCEDURE — 52000 CYSTOURETHROSCOPY: CPT | Performed by: UROLOGY

## 2018-02-13 ASSESSMENT — PAIN SCALES - GENERAL: PAINLEVEL: NO PAIN (0)

## 2018-02-13 NOTE — PROGRESS NOTES
73 year old male with a history of urothelial carcinoma here for follow up    Date of Initial Diagnosis: 9/8/15  Stage of Initial Diagnosis: T1 and CIS  Grade at Initial Diagnosis: High  Site of First Diagnosis: bladder, prostatic urethra  Any Recurrence? 2016 Ta, 2016 CIS  Intravesical Treatments: TURBT(Trans Urethral Resection of Bladder Tumor), got BCG X 6 -, BCG X 6 , [place]-Cis X 3  and repeated 3/2017, Chatham-Cis X3 2018  Date of Last Cysto:   Date of Last [place] Imagin2017    CYSTOSCOPY PROCEDURE:  After sterile preparation and draping of the patient,  a 16-Tamazight flexible cystoscope was introduced via the urethra.  It was passed without difficulty into the bladder.  The urethra was open without evidence of stricture.  The ureteral orifices were orthotopic.  The bladder mucosa was evaluated using both antegrade and retroflex views and this showed no evidence of any lesions or trabeculation.  There were some reddened areas on the back/base of the bladder.  There were no stones.        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    UA negative    If we don't observe complete eradication of the tumor, I would recommend cystoprostatectomy    Randy Martinez MD  Department of Urology Staff  [facility]

## 2018-02-13 NOTE — NURSING NOTE
"Chief Complaint   Patient presents with     Cystoscopy     6 month follow up        Initial /78  Pulse 68  Temp 97.2  F (36.2  C) (Tympanic)  Resp 18  Ht 1.702 m (5' 7\")  Wt 98.9 kg (218 lb)  BMI 34.14 kg/m2 Estimated body mass index is 34.14 kg/(m^2) as calculated from the following:    Height as of this encounter: 1.702 m (5' 7\").    Weight as of this encounter: 98.9 kg (218 lb).  Medication Reconciliation: complete     Edith Ramirez LPN  "

## 2018-02-13 NOTE — MR AVS SNAPSHOT
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 We suggest that you continue to avoid caffeine, fruit juice, and alcohol for the next 24 hours, however, you are encouraged to return to your normal activities.         A few things that are considered normal after your cystoscopy:     * Small amount of bleeding (or spotting) that clears within the next 24 hours     * Slight burning sensation with urination     * Sensation to of needing to avoid more frequently     * The feeling of \"air\" in your urine     * Mild discomfort that is relieved with Tylenol        Please contact our office promptly if you:     * Develop a fever above 101 degrees     * Are unable to urinate     * Develop bright red blood that does not stop     * Severe pain or swelling         Please contact our office with any concerns or questions @[facility].          Follow-ups after your visit        Follow-up notes from your care team     Return in 6 months (on 8/13/2018).      Future tests that were ordered for you today     Open Future Orders        Priority Expected Expires Ordered    FISH BLADDER CANCER Routine  2/13/2019 2/13/2018            Who to contact     If you have questions or need follow up information about today's clinic visit or your schedule please contact [facility] directly at [phone].  Normal or non-critical lab and imaging results " "will be communicated to you by MyChart, letter or phone within 4 business days after the clinic has received the results. If you do not hear from us within 7 days, please contact the clinic through Oxat[employer] or phone. If you have a critical or abnormal lab result, we will notify you by phone as soon as possible.  Submit refill requests through Oxatis or call your pharmacy and they will forward the refill request to us. Please allow 3 business days for your refill to be completed.          Additional Information About Your Visit        Oxatis Information     Oxatis lets you send messages to your doctor, view your test results, renew your prescriptions, schedule appointments and more. To sign up, go to [email or web address] . Click on \"Log in\" on the left side of the screen, which will take you to the Welcome page. Then click on \"Sign up Now\" on the right side of the page.     You will be asked to enter the access code listed below, as well as some personal information. Please follow the directions to create your username and password.     Your access code is: [email or web address]  Expires: 2018 12:59 PM     Your access code will  in 90 days. If you need help or a new code, please call your [place] clinic or [phone].        Care EveryWhere ID     This is your Care EveryWhere ID. This could be used by other organizations to access your [place] medical records  [id number]        Your Vitals Were     Pulse Temperature Respirations Height BMI (Body Mass Index)       68 97.2  F (36.2  C) (Tympanic) 18 1.702 m (5' 7\") 34.14 kg/m2        Blood Pressure from Last 3 Encounters:   18 124/78   18 121/76   18 122/68    Weight from Last 3 Encounters:   18 98.9 kg (218 lb)   18 101.2 kg (223 lb 1.6 oz)   18 98.9 kg (218 lb)              We Performed the Following     CYSTOURETHROSCOPY        Primary Care Provider Office Phone # Fax #    Luis Tran [phone] " [phone]       [facility] [facility] [address]        Equal Access to Services     SARAY GROVES : Hadii aad ku hadulisesheather Thelmaali, wabrandonda luericbarbieha, orvilleta kalashawnda joshuazeeshan, ralph leonel eboniemekhi lewisgracie sakinajenifferjeanne zacarias. So [facility] [phone].    ATENCIÓN: Si habla español, tiene a berger disposición servicios gratuitos de asistencia lingüística. Llame al [phone].    We comply with applicable federal civil rights laws and [place] laws. We do not discriminate on the basis of race, color, national origin, age, disability, sex, sexual orientation, or gender identity.            Thank you!     Thank you for choosing [facility]  for your care. Our goal is always to provide you with excellent care. Hearing back from our patients is one way we can continue to improve our services. Please take a few minutes to complete the written survey that you may receive in the mail after your visit with us. Thank you!             Your Updated Medication List - Protect others around you: Learn how to safely use, store and throw away your medicines at www.disposemymeds.org.          This list is accurate as of 2/13/18  5:29 PM.  Always use your most recent med list.                   Brand Name Dispense Instructions for use Diagnosis    IBUPROFEN PO      Take 200 mg by mouth every 6 hours as needed for moderate pain Reported on 5/9/2017        XALATAN 0.005 % ophthalmic solution   Generic drug:  latanoprost      Place 1 drop into both eyes At Bedtime    Routine general medical examination at a health care facility

## 2018-02-14 DIAGNOSIS — C67.8 MALIGNANT NEOPLASM OF OVERLAPPING SITES OF BLADDER (H): ICD-10-CM

## 2018-02-14 PROCEDURE — 99000 SPECIMEN HANDLING OFFICE-LAB: CPT | Performed by: UROLOGY

## 2018-02-14 PROCEDURE — 88120 CYTP URNE 3-5 PROBES EA SPEC: CPT | Mod: TC | Performed by: UROLOGY

## 2018-02-25 ENCOUNTER — DOCUMENTATION ONLY (OUTPATIENT)
Dept: FAMILY MEDICINE | Facility: OTHER | Age: 73
End: 2018-02-25

## 2018-02-26 LAB — COPATH REPORT: NORMAL

## 2018-03-08 ENCOUNTER — TRANSFERRED RECORDS (OUTPATIENT)
Dept: HEALTH INFORMATION MANAGEMENT | Facility: HOSPITAL | Age: 73
End: 2018-03-08

## 2018-05-08 DIAGNOSIS — C67.9 BLADDER CANCER (H): Primary | ICD-10-CM

## 2018-05-10 DIAGNOSIS — C67.9 BLADDER CANCER (H): ICD-10-CM

## 2018-05-10 PROCEDURE — 88108 CYTOPATH CONCENTRATE TECH: CPT | Mod: TC | Performed by: UROLOGY

## 2018-05-14 LAB — COPATH REPORT: NORMAL

## 2018-05-15 DIAGNOSIS — C67.0 MALIGNANT NEOPLASM OF TRIGONE OF URINARY BLADDER (H): Primary | ICD-10-CM

## 2018-05-15 RX ORDER — SODIUM BICARBONATE 650 MG/1
TABLET ORAL
Qty: 12 TABLET | Refills: 0 | Status: SHIPPED | OUTPATIENT
Start: 2018-05-15 | End: 2018-09-26

## 2018-05-16 DIAGNOSIS — C67.0 MALIGNANT NEOPLASM OF TRIGONE OF URINARY BLADDER (H): Primary | ICD-10-CM

## 2018-05-23 ENCOUNTER — TELEPHONE (OUTPATIENT)
Dept: INFUSION THERAPY | Facility: OTHER | Age: 73
End: 2018-05-23

## 2018-05-23 ENCOUNTER — INFUSION THERAPY VISIT (OUTPATIENT)
Dept: INFUSION THERAPY | Facility: OTHER | Age: 73
End: 2018-05-23
Attending: FAMILY MEDICINE
Payer: COMMERCIAL

## 2018-05-23 VITALS
WEIGHT: 222.2 LBS | BODY MASS INDEX: 34.88 KG/M2 | RESPIRATION RATE: 16 BRPM | HEART RATE: 62 BPM | SYSTOLIC BLOOD PRESSURE: 129 MMHG | OXYGEN SATURATION: 95 % | DIASTOLIC BLOOD PRESSURE: 77 MMHG | TEMPERATURE: 97.4 F | HEIGHT: 67 IN

## 2018-05-23 DIAGNOSIS — C67.0 MALIGNANT NEOPLASM OF TRIGONE OF URINARY BLADDER (H): Primary | ICD-10-CM

## 2018-05-23 LAB
ALBUMIN UR-MCNC: NEGATIVE MG/DL
APPEARANCE UR: CLEAR
BILIRUB UR QL STRIP: NEGATIVE
COLOR UR AUTO: YELLOW
GLUCOSE UR STRIP-MCNC: 300 MG/DL
HGB UR QL STRIP: NEGATIVE
KETONES UR STRIP-MCNC: NEGATIVE MG/DL
LEUKOCYTE ESTERASE UR QL STRIP: NEGATIVE
NITRATE UR QL: NEGATIVE
PH UR STRIP: 5.5 PH (ref 4.7–8)
SOURCE: ABNORMAL
SP GR UR STRIP: 1.02 (ref 1–1.03)
UROBILINOGEN UR STRIP-MCNC: NORMAL MG/DL (ref 0–2)

## 2018-05-23 PROCEDURE — 25000128 H RX IP 250 OP 636: Performed by: UROLOGY

## 2018-05-23 PROCEDURE — 81003 URINALYSIS AUTO W/O SCOPE: CPT | Mod: ZL | Performed by: UROLOGY

## 2018-05-23 PROCEDURE — 51720 TREATMENT OF BLADDER LESION: CPT

## 2018-05-23 PROCEDURE — 25000125 ZZHC RX 250: Performed by: UROLOGY

## 2018-05-23 PROCEDURE — 27210995 ZZH RX 272: Performed by: UROLOGY

## 2018-05-23 RX ORDER — BRIMONIDINE TARTRATE 1 MG/ML
1 SOLUTION/ DROPS OPHTHALMIC 2 TIMES DAILY
COMMUNITY
End: 2018-05-23

## 2018-05-23 RX ORDER — BRIMONIDINE TARTRATE 1.5 MG/ML
1 SOLUTION/ DROPS OPHTHALMIC 2 TIMES DAILY
Refills: 12 | COMMUNITY
Start: 2018-05-11

## 2018-05-23 RX ADMIN — MITOMYCIN 20 MG: 5 INJECTION, POWDER, LYOPHILIZED, FOR SOLUTION INTRAVENOUS at 10:56

## 2018-05-23 RX ADMIN — GEMCITABINE 1000 MG: 38 INJECTION, SOLUTION INTRAVENOUS at 09:30

## 2018-05-23 RX ADMIN — LIDOCAINE HYDROCHLORIDE 10 ML: 20 JELLY TOPICAL at 08:54

## 2018-05-23 RX ADMIN — WATER 60 ML: 100 INJECTION, SOLUTION INTRAVENOUS at 10:47

## 2018-05-23 RX ADMIN — WATER 60 ML: 100 INJECTION, SOLUTION INTRAVENOUS at 12:05

## 2018-05-23 NOTE — TELEPHONE ENCOUNTER
Message      Thank you, we will get him taken care of.     ----- Message -----        From: Laney Neumann RN        Sent: 5/15/2018   1:55 PM          To: Lian Johnson RN          Yes, he needs 6 treatments of the gem/shonda.  He had a recurrence that's why.  He can start whenever you guys are ready for him. I placed the orders, Dr. Martinez just needs to sign them.  Thank you!     ----- Message -----        From: Lian Johnson RN        Sent: 5/15/2018  12:21 PM          To: Laney Neumann RN, Heide Davison!  So being this is induction he will need weekly X 6 weeks, correct?   And when should we start.  Looks like his last treatment of mitomycin and gemcitabine ended the first week in February.       He isnt getting mitomycin and gemcitabine as previously?            Heide, please schedule for level 5  bladder instillation.  Weekly X 6 weeks.  Lab at 0800, treatment at 0830.  Laney, my number is [phone] and the direct number to the infusion area is [phone].  The 1951 no longer exists.  Thank you     ----- Message -----        From: Laney Neumann RN        Sent: 5/15/2018  11:19 AM          To: JIM cSott,     This patient needs to be set up for induction of shonda/gent.  Can you help schedule him and I will place the orders?           Thank you,     Laney     ----- Message -----        From: Laney Neumann RN        Sent: 5/15/2018          To: Laney Neumann RN          That would be Oncology the number is [phone].  Lian Johnson is the contact name.           Monitor for cytology/FISH     ----- Message -----        From: Laney Neumann RN        Sent: 5/3/2018  12:00 PM          To: Laney Neumann RN          Patient needs induction of gem/shonda again d/t recurrence, positive FISH and some redness seen.  Waiting for Dee to get back to me to schedule.  Need to call patient back.

## 2018-05-23 NOTE — PROGRESS NOTES
"73 year old male patient here for bladder instillation of gemcitabine / mitomycin.  UA negative for nitrates, negative for leukocytes.  Denies any fever or chills.  Denies any pain with urination.  Results reviewed, labs met treatment parameters.  Patient reports he did not take sodium bicarbonate as ordered. Spoke with Chika and verbal instructions received \" OK to proceed with today's treatment.\"   12 Mozambican catheter placed, sterile technique.  Allowed bladder to empty 100ccs of urine returned.    0930: Gemzar 1000mg instillation via catheter, tolerated well, catheter clamped.  Patient turned every 15 minutes per protocol, tolerated well.  Offered no complaints.    1043: catheter unclamped and allowed to drain, 200ccs of return.  1047:Bladder irrigated with 60cc of sterile water, allowed to drain, 100ccs of return.  VS WNL.  Patient offers no complaints.    1056: Mitomycin instilled via catheter, catheter clamped.  Patient turned every 15 minutes per protocol, tolerated well.  Offers no complaints, denies any pain or discomfort.    1203: Catheter unclamped and allowed to drain, 50ccs of return.  1205: Bladder irrigated with 60ccs of sterile water, allowed to drain, 50 ccs of return.  VS WNL.  Encouraged to drink plenty of fluids for next couple of days.  Discharged in care ofself.  Patient will return one week for next appointment.  Gela Loza RN    "

## 2018-05-23 NOTE — MR AVS SNAPSHOT
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    All treatments have potential side effects, but they don't all happen to everyone.  If you have any questions, problems, or concerns, we want you to call us.  You can reach the Infusion Nurses at [phone] Monday - Friday 8:00am to 4:30pm or the Health Unit Coordinator at [phone] Monday - Friday 8:00am to 5:00pm.      *For 6 hours after your treatment, sit when you urinate.  Place 1-2 cups of bleach in the toilet after you have urinated.  Let stand for 15-20 minutes.  Repeat this process each time you urinate for six (6) ours after the procedure.  *Wash your hands thoroughly after going to the bathroom  *Drink 2-3 liters of non caffeinated, non alcoholic beverages following your instillation to flush the bladder out.      After hours, evenings, weekends, and holidays please call Urgent Care [phone] or toll free [phone] or go to your nearest Emergency Department.      Possible side effects include:  *Painful or difficult urination, urgency  *Urinary frequency  *Blood in the urine  *Flu like symptoms.    YOU NEED TO CALL US OR GO TO YOUR NEAREST URGENT CARE/EMERGENCY DEPARTMENT IF ANY OF THE FOLLOWING OCCUR:     *You have a fever of 103 F or higher within 24 hours or higher.  If you have a fever of 101 or higher after 48 hours.   * Shortness of breath   *Confusion.   *Extreme fatigue (Unable to perform self care activities)   *Fever,  chills, malaise, flu-like symptoms, increased fatigue or an increase in urinary symptoms such as burning or pain on urination are NOT uncommon.  However, if these increase in severity or      Last more than 48 hours let your doctor know.      ANY questions or problems, PLEASE do not hesitate to call us!!          Follow-ups after your visit        Your next 10 appointments already scheduled     May 30, 2018  8:00 AM CDT   Level 5 with HC INF RM 3312   [facility] [place] ([facility] [place] )    [address]  [place] [address]   [phone]            Jun 06, 2018  8:00 AM CDT   Level 5 with HC INF RM 3312   [facility] [place] ([facility] [place] )    [address]  [place] [address]   [phone]            Jun 13, 2018  8:00 AM CDT   Level 5 with HC INF RM 3312   [facility] [place] ([facility] [place] )    [address]  [place] [address]   [phone]            Jun 20, 2018  8:00 AM CDT   Level 5 with HC INF RM 3312   [facility] [place] ([facility] [place] )    [address]  [place] [address]   [phone]            Jun 27, 2018  8:00 AM CDT   Level 5 with HC INF RM 3312   [facility] [place] ([facility] [place] )    [address]  [place] [address]   [phone]              Who to contact     If you have questions or need follow up information about today's clinic visit or your schedule please contact [facility] directly at [phone].  Normal or non-critical lab and imaging results will be communicated to you by MyChart, letter or phone within 4 business days after the clinic has received the results. If you do not hear from us within 7 days, please contact the clinic through MyChart or phone. If you have a critical or abnormal lab result, we will notify you by phone as soon as possible.  Submit refill requests through [employer] or call your pharmacy and they will forward the  "refill request to us. Please allow 3 business days for your refill to be completed.          Additional Information About Your Visit        Care EveryWhere ID     This is your Care EveryWhere ID. This could be used by other organizations to access your [place] medical records  [id number]        Your Vitals Were     Pulse Temperature Respirations Height Pulse Oximetry BMI (Body Mass Index)    59 97.4  F (36.3  C) (Tympanic) 16 1.702 m (5' 7\") 95% 34.8 kg/m2       Blood Pressure from Last 3 Encounters:   05/23/18 133/76   02/13/18 124/78   02/02/18 121/76    Weight from Last 3 Encounters:   05/23/18 100.8 kg (222 lb 3.2 oz)   02/13/18 98.9 kg (218 lb)   02/02/18 101.2 kg (223 lb 1.6 oz)              We Performed the Following     UA without Microscopic        Primary Care Provider Office Phone # Fax #    Luis Tran [phone]       [facility] [address]        Equal Access to Services     [facility]: Hadii oskar knutson hadasho Sodona, waaxda luqadaha, qaybta kaalmayessica artis, ralph bahena . So [facility] [phone].    ATENCIÓN: Si habla español, tiene a berger disposición servicios gratuitos de asistencia lingüística. [facility] [phone].    We comply with applicable federal civil rights laws and [place] laws. We do not discriminate on the basis of race, color, national origin, age, disability, sex, sexual orientation, or gender identity.            Thank you!     Thank you for choosing [facility]  for your care. Our goal is always to provide you with excellent care. Hearing back from our patients is one way we can continue to improve our services. Please take a few minutes to complete the written survey that you may receive in the mail after your visit with us. Thank you!             Your Updated Medication List - Protect others around you: Learn how to safely use, store and throw away your medicines at www.disposemymeds.org.        "   This list is accurate as of 5/23/18 11:54 AM.  Always use your most recent med list.                   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

## 2018-05-23 NOTE — PATIENT INSTRUCTIONS
Discharge Instructions for Infusion Therapy/Chemotherapy Department:    Your doctor has prescribed the following medication(s) Gemcitabine / Mitomycin to treat your bladder cancer.    All treatments have potential side effects, but they don't all happen to everyone.  If you have any questions, problems, or concerns, we want you to call us.  You can reach the Infusion Nurses at [phone] Monday - Friday 8:00am to 4:30pm or the Health Unit Coordinator at [phone] Monday - Friday 8:00am to 5:00pm.      *For 6 hours after your treatment, sit when you urinate.  Place 1-2 cups of bleach in the toilet after you have urinated.  Let stand for 15-20 minutes.  Repeat this process each time you urinate for six (6) ours after the procedure.  *Wash your hands thoroughly after going to the bathroom  *Drink 2-3 liters of non caffeinated, non alcoholic beverages following your instillation to flush the bladder out.      After hours, evenings, weekends, and holidays please call Urgent Care [phone] or toll free [phone] or go to your nearest Emergency Department.      Possible side effects include:  *Painful or difficult urination, urgency  *Urinary frequency  *Blood in the urine  *Flu like symptoms.    YOU NEED TO CALL US OR GO TO YOUR NEAREST URGENT CARE/EMERGENCY DEPARTMENT IF ANY OF THE FOLLOWING OCCUR:     *You have a fever of 103 F or higher within 24 hours or higher.  If you have a fever of 101 or higher after 48 hours.   * Shortness of breath   *Confusion.   *Extreme fatigue (Unable to perform self care activities)   *Fever, chills, malaise, flu-like symptoms, increased fatigue or an increase in urinary symptoms such as burning or pain on urination are NOT uncommon.  However, if these increase in severity or      Last more than 48 hours let your doctor know.      ANY questions or problems, PLEASE do not hesitate to call us!!

## 2018-05-30 ENCOUNTER — INFUSION THERAPY VISIT (OUTPATIENT)
Dept: INFUSION THERAPY | Facility: OTHER | Age: 73
End: 2018-05-30
Attending: FAMILY MEDICINE
Payer: COMMERCIAL

## 2018-05-30 VITALS
TEMPERATURE: 97.4 F | HEIGHT: 67 IN | HEART RATE: 56 BPM | BODY MASS INDEX: 35.06 KG/M2 | OXYGEN SATURATION: 95 % | DIASTOLIC BLOOD PRESSURE: 77 MMHG | SYSTOLIC BLOOD PRESSURE: 129 MMHG | WEIGHT: 223.4 LBS | RESPIRATION RATE: 16 BRPM

## 2018-05-30 DIAGNOSIS — C67.0 MALIGNANT NEOPLASM OF TRIGONE OF URINARY BLADDER (H): Primary | ICD-10-CM

## 2018-05-30 LAB
ALBUMIN UR-MCNC: NEGATIVE MG/DL
APPEARANCE UR: CLEAR
BILIRUB UR QL STRIP: NEGATIVE
COLOR UR AUTO: YELLOW
GLUCOSE UR STRIP-MCNC: NEGATIVE MG/DL
HGB UR QL STRIP: NEGATIVE
KETONES UR STRIP-MCNC: NEGATIVE MG/DL
LEUKOCYTE ESTERASE UR QL STRIP: NEGATIVE
NITRATE UR QL: NEGATIVE
PH UR STRIP: 5.5 PH (ref 4.7–8)
SOURCE: NORMAL
SP GR UR STRIP: 1.02 (ref 1–1.03)
UROBILINOGEN UR STRIP-MCNC: NORMAL MG/DL (ref 0–2)

## 2018-05-30 PROCEDURE — 51720 TREATMENT OF BLADDER LESION: CPT

## 2018-05-30 PROCEDURE — 25000128 H RX IP 250 OP 636: Performed by: UROLOGY

## 2018-05-30 PROCEDURE — 81003 URINALYSIS AUTO W/O SCOPE: CPT | Mod: ZL | Performed by: UROLOGY

## 2018-05-30 PROCEDURE — 27210995 ZZH RX 272: Performed by: UROLOGY

## 2018-05-30 PROCEDURE — 25000125 ZZHC RX 250: Performed by: UROLOGY

## 2018-05-30 RX ADMIN — WATER 60 ML: 100 INJECTION, SOLUTION INTRAVENOUS at 11:41

## 2018-05-30 RX ADMIN — LIDOCAINE HYDROCHLORIDE 10 ML: 20 JELLY TOPICAL at 08:41

## 2018-05-30 RX ADMIN — WATER 60 ML: 100 INJECTION, SOLUTION INTRAVENOUS at 10:23

## 2018-05-30 RX ADMIN — GEMCITABINE 1000 MG: 38 INJECTION, SOLUTION INTRAVENOUS at 09:10

## 2018-05-30 RX ADMIN — WATER 20 MG: 1 INJECTION INTRAMUSCULAR; INTRAVENOUS; SUBCUTANEOUS at 10:32

## 2018-05-30 NOTE — PATIENT INSTRUCTIONS
Discharge Instructions for Infusion Therapy/Chemotherapy Department:    Your doctor has prescribed the following medication(s) Gemcitabine / Mitomycin to treat your bladder cancer.    All treatments have potential side effects, but they don't all happen to everyone.  If you have any questions, problems, or concerns, we want you to call us.  You can reach the Infusion Nurses at [phone] Monday - Friday 8:00am to 4:30pm or the Health Unit Coordinator at [phone] Monday - Friday 8:00am to 5:00pm.      *For 6 hours after your treatment, sit when you urinate.  Place 1-2 cups of bleach in the toilet after you have urinated.  Let stand for 15-20 minutes.  Repeat this process each time you urinate for six (6) ours after the procedure.  *Wash your hands thoroughly after going to the bathroom  *Drink 2-3 liters of non caffeinated, non alcoholic beverages following your instillation to flush the bladder out.      After hours, evenings, weekends, and holidays please call Urgent Care [phone] or toll free [phone] or go to your nearest Emergency Department.      Possible side effects include:  *Painful or difficult urination, urgency  *Urinary frequency  *Blood in the urine  *Flu like symptoms.    YOU NEED TO CALL US OR GO TO YOUR NEAREST URGENT CARE/EMERGENCY DEPARTMENT IF ANY OF THE FOLLOWING OCCUR:     *You have a fever of 103 F or higher within 24 hours or higher.  If you have a fever of 101 or higher after 48 hours.   * Shortness of breath   *Confusion.   *Extreme fatigue (Unable to perform self care activities)   *Fever, chills, malaise, flu-like symptoms, increased fatigue or an increase in urinary symptoms such as burning or pain on urination are NOT uncommon.  However, if these increase in severity or      Last more than 48 hours let your doctor know.      ANY questions or problems, PLEASE do not hesitate to call us!!

## 2018-05-30 NOTE — PROGRESS NOTES
73 year old male patient here for bladder instillation of gemcitabine / mitomycin.  UA negative for nitrates, negative for leukocytes.  Denies any fever or chills.  Denies any pain with urination.  Results reviewed, labs met treatment parameters.  Proceed with treatment.  12 Croatian catheter placed, sterile technique.  Allowed bladder to empty 50ccs of urine returned.    0910: Gemzar instillation via catheter, tolerated well, catheter clamped.  Patient turned every 15 minutes per protocol, unable to keep catheter clamped for the final 15 minutes d/t bladder discomfort.  1017: catheter unclamped and allowed to drain, 350ccs of return.  1023: Bladder irrigated with 60cc of sterile water, allowed to drain, 100ccs of return.  VS WNL.  Patient offers no complaints.    1032: Mitomycin instilled via catheter, catheter clamped.  Patient turned every 15 minutes per protocol, tolerated well.  Offers no complaints, denies any pain or discomfort.    1139: Catheter unclamped and allowed to drain, 100ccs of return.  1141: Bladder irrigated with 60ccs of sterile water, allowed to drain, 100ccs of return.  VS WNL.    Encouraged to drink plenty of fluids for next couple of days.  Discharged in care ofself.  Patient will return in one week for next appointment.  Gela Loza RN

## 2018-05-30 NOTE — MR AVS SNAPSHOT
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    All treatments have potential side effects, but they don't all happen to everyone.  If you have any questions, problems, or concerns, we want you to call us.  You can reach the Infusion Nurses at [phone] Monday - Friday 8:00am to 4:30pm or the Health Unit Coordinator at [phone] Monday - Friday 8:00am to 5:00pm.      *For 6 hours after your treatment, sit when you urinate.  Place 1-2 cups of bleach in the toilet after you have urinated.  Let stand for 15-20 minutes.  Repeat this process each time you urinate for six (6) ours after the procedure.  *Wash your hands thoroughly after going to the bathroom  *Drink 2-3 liters of non caffeinated, non alcoholic beverages following your instillation to flush the bladder out.      After hours, evenings, weekends, and holidays please call Urgent Care [phone] or toll free [phone] or go to your nearest Emergency Department.      Possible side effects include:  *Painful or difficult urination, urgency  *Urinary frequency  *Blood in the urine  *Flu like symptoms.    YOU NEED TO CALL US OR GO TO YOUR NEAREST URGENT CARE/EMERGENCY DEPARTMENT IF ANY OF THE FOLLOWING OCCUR:     *You have a fever of 103 F or higher within 24 hours or higher.  If you have a fever of 101 or higher after 48 hours.   * Shortness of breath   *Confusion.   *Extreme fatigue (Unable to perform self care activities)   *Fever,  chills, malaise, flu-like symptoms, increased fatigue or an increase in urinary symptoms such as burning or pain on urination are NOT uncommon.  However, if these increase in severity or      Last more than 48 hours let your doctor know.      ANY questions or problems, PLEASE do not hesitate to call us!!            Follow-ups after your visit        Your next 10 appointments already scheduled     Jun 06, 2018  8:00 AM CDT   Level 5 with HC INF RM 3312   [facility] [place] ([facility] [place] [facility]    [address]  [place] [address]   [phone]            Jun 13, 2018  8:00 AM CDT   Level 5 with HC INF RM 3312   [facility] [place] ([facility] [place] [facility]    [address]  [place] [address]   [phone]            Jun 20, 2018  8:00 AM CDT   Level 5 with HC INF RM 3312   [facility] [place] ([facility] [place] [facility]    [address]  [place] [address]   [phone]            Jun 27, 2018  8:00 AM CDT   Level 5 with HC INF RM 3312   [facility] [place] ([facility] [place] [facility]    [address]  [place] [address]   [phone]              Who to contact     If you have questions or need follow up information about today's clinic visit or your schedule please contact [facility] directly at [phone].  Normal or non-critical lab and imaging results will be communicated to you by MyChart, letter or phone within 4 business days after the clinic has received the results. If you do not hear from us within 7 days, please contact the clinic through MyChart or phone. If you have a critical or abnormal lab result, we will notify you by phone as soon as possible.  Submit refill requests through [employer] or call your pharmacy and they will forward the refill request to us. Please allow 3 business days for your refill to be completed.          Additional Information About Your Visit        Care EveryWhere ID     This is your Care  "EveryWhere ID. This could be used by other organizations to access your [place] medical records  [id number]        Your Vitals Were     Pulse Temperature Respirations Height Pulse Oximetry BMI (Body Mass Index)    58 97.4  F (36.3  C) (Oral) 16 1.702 m (5' 7\") 95% 34.99 kg/m2       Blood Pressure from Last 3 Encounters:   05/30/18 117/65   05/23/18 129/77   02/13/18 124/78    Weight from Last 3 Encounters:   05/30/18 101.3 kg (223 lb 6.4 oz)   05/23/18 100.8 kg (222 lb 3.2 oz)   02/13/18 98.9 kg (218 lb)              We Performed the Following     UA without Microscopic        Primary Care Provider Office Phone # Fax #    Luis Tran [phone]       [address] [address]        Equal Access to Services     [facility] : Hadii oskar browno Sodona, waaxda luqadaha, qaybta kaalmada adegracieyada, ralph bahena . So [facility] [phone].    ATENCIÓN: Si habla español, tiene a berger disposición servicios gratuitos de asistencia lingüística. Kee al [phone].    We comply with applicable federal civil rights laws and [place] laws. We do not discriminate on the basis of race, color, national origin, age, disability, sex, sexual orientation, or gender identity.            Thank you!     Thank you for choosing [facility]  for your care. Our goal is always to provide you with excellent care. Hearing back from our patients is one way we can continue to improve our services. Please take a few minutes to complete the written survey that you may receive in the mail after your visit with us. Thank you!             Your Updated Medication List - Protect others around you: Learn how to safely use, store and throw away your medicines at www.disposemymeds.org.          This list is accurate as of 5/30/18 11:38 AM.  Always use your most recent med list.                   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

## 2018-06-06 ENCOUNTER — INFUSION THERAPY VISIT (OUTPATIENT)
Dept: INFUSION THERAPY | Facility: OTHER | Age: 73
End: 2018-06-06
Attending: FAMILY MEDICINE
Payer: COMMERCIAL

## 2018-06-06 VITALS
SYSTOLIC BLOOD PRESSURE: 141 MMHG | DIASTOLIC BLOOD PRESSURE: 78 MMHG | WEIGHT: 225.4 LBS | TEMPERATURE: 97.9 F | RESPIRATION RATE: 18 BRPM | OXYGEN SATURATION: 94 % | HEIGHT: 67 IN | HEART RATE: 60 BPM | BODY MASS INDEX: 35.38 KG/M2

## 2018-06-06 DIAGNOSIS — C67.0 MALIGNANT NEOPLASM OF TRIGONE OF URINARY BLADDER (H): Primary | ICD-10-CM

## 2018-06-06 LAB
ALBUMIN UR-MCNC: NEGATIVE MG/DL
APPEARANCE UR: CLEAR
BILIRUB UR QL STRIP: NEGATIVE
COLOR UR AUTO: YELLOW
GLUCOSE UR STRIP-MCNC: NEGATIVE MG/DL
HGB UR QL STRIP: NEGATIVE
KETONES UR STRIP-MCNC: NEGATIVE MG/DL
LEUKOCYTE ESTERASE UR QL STRIP: NEGATIVE
NITRATE UR QL: NEGATIVE
PH UR STRIP: 6 PH (ref 4.7–8)
SOURCE: NORMAL
SP GR UR STRIP: 1.02 (ref 1–1.03)
UROBILINOGEN UR STRIP-MCNC: NORMAL MG/DL (ref 0–2)

## 2018-06-06 PROCEDURE — 25000125 ZZHC RX 250: Performed by: UROLOGY

## 2018-06-06 PROCEDURE — 27210995 ZZH RX 272: Performed by: UROLOGY

## 2018-06-06 PROCEDURE — 81003 URINALYSIS AUTO W/O SCOPE: CPT | Mod: ZL | Performed by: UROLOGY

## 2018-06-06 PROCEDURE — 51720 TREATMENT OF BLADDER LESION: CPT

## 2018-06-06 PROCEDURE — 25000128 H RX IP 250 OP 636: Performed by: UROLOGY

## 2018-06-06 RX ADMIN — GEMCITABINE 1000 MG: 38 INJECTION, SOLUTION INTRAVENOUS at 09:41

## 2018-06-06 RX ADMIN — WATER 20 MG: 1 INJECTION INTRAMUSCULAR; INTRAVENOUS; SUBCUTANEOUS at 10:59

## 2018-06-06 RX ADMIN — WATER 60 ML: 100 INJECTION, SOLUTION INTRAVENOUS at 10:51

## 2018-06-06 RX ADMIN — LIDOCAINE HYDROCHLORIDE 10 ML: 20 JELLY TOPICAL at 08:44

## 2018-06-06 RX ADMIN — WATER 60 ML: 100 INJECTION, SOLUTION INTRAVENOUS at 12:11

## 2018-06-06 NOTE — PROGRESS NOTES
73 year old male patient here for bladder instillation of mitomycin / gemcitabine.  UA negative for nitrates, negative for leukocytes.  Denies any fever or chills.  Denies any pain with urination.  Results reviewed, labs met treatment parameters.  Proceed with treatment.    0905: 12 Palauan catheter placed, sterile technique.  Allowed bladder to empty 200ccs of urine returned.    0941: Gemzar 1000mg instillation via catheter, tolerated well, catheter clamped.  Patient turned every 15 minutes per protocol, tolerated well.  Offered no complaints.    1040: catheter unclamped and allowed to drain, pt unable to hold for entire hour, 100ccs of return.    1051:Bladder irrigated with 60cc of sterile water, allowed to drain, 50ccs of return.  VS WNL.  Patient offers no complaints.    1059: Mitomycin 20mg instilled via catheter, catheter clamped.  Patient turned every 15 minutes per protocol, tolerated well.  Offers no complaints, denies any pain or discomfort.    1210: Catheter unclamped and allowed to drain, 100ccs of return.  1211: Bladder irrigated with 60ccs of sterile water, allowed to drain, 75ccs of return.  VS WNL.  Encouraged to drink plenty of fluids for next couple of days.  Discharged in care ofself.  Patient will return one week for next appointment.  Gela Loza RN

## 2018-06-06 NOTE — MR AVS SNAPSHOT
After Visit Summary   6/6/2018    Colin Baxter    MRN: [id number]           Patient Information     Date Of Birth          1945        Visit Information        Provider Department      6/6/2018 8:00 AM  INF RM 3312 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    All treatments have potential side effects, but they don't all happen to everyone.  If you have any questions, problems, or concerns, we want you to call us.  You can reach the Infusion Nurses at [phone] Monday - Friday 8:00am to 4:30pm or the Health Unit Coordinator at [phone] Monday - Friday 8:00am to 5:00pm.      *For 6 hours after your treatment, sit when you urinate.  Place 1-2 cups of bleach in the toilet after you have urinated.  Let stand for 15-20 minutes.  Repeat this process each time you urinate for six (6) ours after the procedure.  *Wash your hands thoroughly after going to the bathroom  *Drink 2-3 liters of non caffeinated, non alcoholic beverages following your instillation to flush the bladder out.      After hours, evenings, weekends, and holidays please call Urgent Care [phone] or toll free [phone] or go to your nearest Emergency Department.      Possible side effects include:  *Painful or difficult urination, urgency  *Urinary frequency  *Blood in the urine  *Flu like symptoms.    YOU NEED TO CALL US OR GO TO YOUR NEAREST URGENT CARE/EMERGENCY DEPARTMENT IF ANY OF THE FOLLOWING OCCUR:     *You have a fever of 103 F or higher within 24 hours or higher.  If you have a fever of 101 or higher after 48 hours.   * Shortness of breath   *Confusion.   *Extreme fatigue (Unable to perform self care activities)   *Fever, chills,  malaise, flu-like symptoms, increased fatigue or an increase in urinary symptoms such as burning or pain on urination are NOT uncommon.  However, if these increase in severity or      Last more than 48 hours let your doctor know.      ANY questions or problems, PLEASE do not hesitate to call us!!          Follow-ups after your visit        Your next 10 appointments already scheduled     Jun 13, 2018  8:00 AM CDT   Level 5 with HC INF RM 3312   [facility] [place] ([facility] [place] )    [address]  [place] [address]   [phone]            Jun 20, 2018  8:00 AM CDT   Level 5 with HC INF RM 3312   [facility] [place] ([facility] [place] )    [address]  [place] [address]   [phone]            Jun 27, 2018  8:00 AM CDT   Level 5 with HC INF RM 3312   [facility] [place] ([facility] [place] )    [address]  [place] [address]   [phone]              Who to contact     If you have questions or need follow up information about today's clinic visit or your schedule please contact [facility] directly at [phone].  Normal or non-critical lab and imaging results will be communicated to you by MyChart, letter or phone within 4 business days after the clinic has received the results. If you do not hear from us within 7 days, please contact the clinic through MyChart or phone. If you have a critical or abnormal lab result, we will notify you by phone as soon as possible.  Submit refill requests through [employer] or call your pharmacy and they will forward the refill request to us. Please allow 3 business days for your refill to be completed.          Additional Information About Your Visit        Care EveryWhere ID     This is your Care EveryWhere ID. This could be used by other organizations to access your [place] medical records  [id number]        Your Vitals Were     Pulse Respirations Height Pulse Oximetry BMI (Body Mass  "Index)       60 18 1.702 m (5' 7\") 94% 35.3 kg/m2        Blood Pressure from Last 3 Encounters:   06/06/18 141/78   05/30/18 129/77   05/23/18 129/77    Weight from Last 3 Encounters:   06/06/18 102.2 kg (225 lb 6.4 oz)   05/30/18 101.3 kg (223 lb 6.4 oz)   05/23/18 100.8 kg (222 lb 3.2 oz)              We Performed the Following     UA without Microscopic        Primary Care Provider Office Phone # Fax #    Luis Tran [phone] [address] [facility] [address]        Equal Access to Services     SARAY GROVES : Andrei Montano, cabrera ireland, gianluca artis, ralph zacarias. So [facility] [phone].    ATENCIÓN: Si habla español, tiene a berger disposición servicios gratuitos de asistencia lingüística. Llame al [phone].    We comply with applicable federal civil rights laws and [place] laws. We do not discriminate on the basis of race, color, national origin, age, disability, sex, sexual orientation, or gender identity.            Thank you!     Thank you for choosing [facility]  for your care. Our goal is always to provide you with excellent care. Hearing back from our patients is one way we can continue to improve our services. Please take a few minutes to complete the written survey that you may receive in the mail after your visit with us. Thank you!             Your Updated Medication List - Protect others around you: Learn how to safely use, store and throw away your medicines at www.disposemymeds.org.          This list is accurate as of 6/6/18 12:23 PM.  Always use your most recent med list.                   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

## 2018-06-06 NOTE — PATIENT INSTRUCTIONS
Discharge Instructions for Infusion Therapy/Chemotherapy Department:    Your doctor has prescribed the following medication(s) gemcitabine / mitomycin to treat your bladder cancer.    All treatments have potential side effects, but they don't all happen to everyone.  If you have any questions, problems, or concerns, we want you to call us.  You can reach the Infusion Nurses at [phone] Monday - Friday 8:00am to 4:30pm or the Health Unit Coordinator at [phone] Monday - Friday 8:00am to 5:00pm.      *For 6 hours after your treatment, sit when you urinate.  Place 1-2 cups of bleach in the toilet after you have urinated.  Let stand for 15-20 minutes.  Repeat this process each time you urinate for six (6) ours after the procedure.  *Wash your hands thoroughly after going to the bathroom  *Drink 2-3 liters of non caffeinated, non alcoholic beverages following your instillation to flush the bladder out.      After hours, evenings, weekends, and holidays please call Urgent Care [phone] or toll free [phone] or go to your nearest Emergency Department.      Possible side effects include:  *Painful or difficult urination, urgency  *Urinary frequency  *Blood in the urine  *Flu like symptoms.    YOU NEED TO CALL US OR GO TO YOUR NEAREST URGENT CARE/EMERGENCY DEPARTMENT IF ANY OF THE FOLLOWING OCCUR:     *You have a fever of 103 F or higher within 24 hours or higher.  If you have a fever of 101 or higher after 48 hours.   * Shortness of breath   *Confusion.   *Extreme fatigue (Unable to perform self care activities)   *Fever, chills, malaise, flu-like symptoms, increased fatigue or an increase in urinary symptoms such as burning or pain on urination are NOT uncommon.  However, if these increase in severity or      Last more than 48 hours let your doctor know.      ANY questions or problems, PLEASE do not hesitate to call us!!

## 2018-06-13 ENCOUNTER — INFUSION THERAPY VISIT (OUTPATIENT)
Dept: INFUSION THERAPY | Facility: OTHER | Age: 73
End: 2018-06-13
Attending: FAMILY MEDICINE
Payer: COMMERCIAL

## 2018-06-13 VITALS
HEART RATE: 55 BPM | RESPIRATION RATE: 18 BRPM | WEIGHT: 221.3 LBS | OXYGEN SATURATION: 94 % | DIASTOLIC BLOOD PRESSURE: 79 MMHG | TEMPERATURE: 96.8 F | HEIGHT: 67 IN | SYSTOLIC BLOOD PRESSURE: 134 MMHG | BODY MASS INDEX: 34.73 KG/M2

## 2018-06-13 DIAGNOSIS — C67.0 MALIGNANT NEOPLASM OF TRIGONE OF URINARY BLADDER (H): Primary | ICD-10-CM

## 2018-06-13 LAB
ALBUMIN UR-MCNC: 10 MG/DL
APPEARANCE UR: CLEAR
BILIRUB UR QL STRIP: NEGATIVE
COLOR UR AUTO: YELLOW
GLUCOSE UR STRIP-MCNC: NEGATIVE MG/DL
HGB UR QL STRIP: NEGATIVE
KETONES UR STRIP-MCNC: NEGATIVE MG/DL
LEUKOCYTE ESTERASE UR QL STRIP: NEGATIVE
NITRATE UR QL: NEGATIVE
PH UR STRIP: 5.5 PH (ref 4.7–8)
SOURCE: ABNORMAL
SP GR UR STRIP: 1.02 (ref 1–1.03)
UROBILINOGEN UR STRIP-MCNC: 2 MG/DL (ref 0–2)

## 2018-06-13 PROCEDURE — 51720 TREATMENT OF BLADDER LESION: CPT

## 2018-06-13 PROCEDURE — 27210995 ZZH RX 272: Performed by: UROLOGY

## 2018-06-13 PROCEDURE — 25000128 H RX IP 250 OP 636: Performed by: UROLOGY

## 2018-06-13 PROCEDURE — 25000125 ZZHC RX 250: Performed by: UROLOGY

## 2018-06-13 PROCEDURE — 81003 URINALYSIS AUTO W/O SCOPE: CPT | Mod: ZL | Performed by: UROLOGY

## 2018-06-13 RX ADMIN — LIDOCAINE HYDROCHLORIDE 10 ML: 20 JELLY TOPICAL at 08:43

## 2018-06-13 RX ADMIN — WATER 20 MG: 1 INJECTION INTRAMUSCULAR; INTRAVENOUS; SUBCUTANEOUS at 10:35

## 2018-06-13 RX ADMIN — GEMCITABINE 1000 MG: 38 INJECTION, SOLUTION INTRAVENOUS at 09:15

## 2018-06-13 RX ADMIN — WATER 60 ML: 100 INJECTION, SOLUTION INTRAVENOUS at 10:27

## 2018-06-13 RX ADMIN — WATER 60 ML: 100 INJECTION, SOLUTION INTRAVENOUS at 11:45

## 2018-06-13 NOTE — PATIENT INSTRUCTIONS
Discharge Instructions for Infusion Therapy/Chemotherapy Department:    Your doctor has prescribed the following medication(s) Gemcitabine / mitomycin to treat your bladder cancer.    All treatments have potential side effects, but they don't all happen to everyone.  If you have any questions, problems, or concerns, we want you to call us.  You can reach the Infusion Nurses at [phone] Monday - Friday 8:00am to 4:30pm or the Health Unit Coordinator at [phone] Monday - Friday 8:00am to 5:00pm.      *For 6 hours after your treatment, sit when you urinate.  Place 1-2 cups of bleach in the toilet after you have urinated.  Let stand for 15-20 minutes.  Repeat this process each time you urinate for six (6) ours after the procedure.  *Wash your hands thoroughly after going to the bathroom  *Drink 2-3 liters of non caffeinated, non alcoholic beverages following your instillation to flush the bladder out.      After hours, evenings, weekends, and holidays please call Urgent Care [phone] or toll free [phone] or go to your nearest Emergency Department.      Possible side effects include:  *Painful or difficult urination, urgency  *Urinary frequency  *Blood in the urine  *Flu like symptoms.    YOU NEED TO CALL US OR GO TO YOUR NEAREST URGENT CARE/EMERGENCY DEPARTMENT IF ANY OF THE FOLLOWING OCCUR:     *You have a fever of 103 F or higher within 24 hours or higher.  If you have a fever of 101 or higher after 48 hours.   * Shortness of breath   *Confusion.   *Extreme fatigue (Unable to perform self care activities)   *Fever, chills, malaise, flu-like symptoms, increased fatigue or an increase in urinary symptoms such as burning or pain on urination are NOT uncommon.  However, if these increase in severity or      Last more than 48 hours let your doctor know.      ANY questions or problems, PLEASE do not hesitate to call us!!

## 2018-06-13 NOTE — PROGRESS NOTES
73 year old male patient here for bladder instillation of gemcitabine / mitomycin.  UA negative for nitrates, negative for leukocytes.  Denies any fever or chills.  Denies any pain with urination.  Results reviewed, labs met treatment parameters.  Proceed with treatment.    0900: 12 Ivorian catheter placed, sterile technique.  Allowed bladder to empty 30ccs of urine returned.    0915: Gemzar 1000mg verified by second RN, Gela Banks, instillation via catheter, tolerated well, catheter clamped.  Patient turned every 15 minutes per protocol, tolerated well.  Offered no complaints.    1025: catheter unclamped and allowed to drain, 170ccs of return.  1027: Bladder irrigated with 60cc of sterile water, allowed to drain, 60ccs of return.  VS WNL.  Patient offers no complaints.    1035: Mitomycin 20mg verified with second RN, Nasreen Rhodes, instilled via catheter, catheter clamped.  Patient turned every 15 minutes per protocol, tolerated well.  Offers no complaints, denies any pain or discomfort.    1145: Catheter unclamped and allowed to drain, 100ccs of return.  1145: Bladder irrigated with 60ccs of sterile water, allowed to drain, 100ccs of return.  VS WNL.    Encouraged to drink plenty of fluids for next couple of days.  Discharged in care ofself.  Patient will return one week for next appointment.  Gela Loza RN

## 2018-06-13 NOTE — MR AVS SNAPSHOT
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    All treatments have potential side effects, but they don't all happen to everyone.  If you have any questions, problems, or concerns, we want you to call us.  You can reach the Infusion Nurses at [phone] Monday - Friday 8:00am to 4:30pm or the Health Unit Coordinator at [phone] Monday - Friday 8:00am to 5:00pm.      *For 6 hours after your treatment, sit when you urinate.  Place 1-2 cups of bleach in the toilet after you have urinated.  Let stand for 15-20 minutes.  Repeat this process each time you urinate for six (6) ours after the procedure.  *Wash your hands thoroughly after going to the bathroom  *Drink 2-3 liters of non caffeinated, non alcoholic beverages following your instillation to flush the bladder out.      After hours, evenings, weekends, and holidays please call Urgent Care [phone] or toll free [phone] or go to your nearest Emergency Department.      Possible side effects include:  *Painful or difficult urination, urgency  *Urinary frequency  *Blood in the urine  *Flu like symptoms.    YOU NEED TO CALL US OR GO TO YOUR NEAREST URGENT CARE/EMERGENCY DEPARTMENT IF ANY OF THE FOLLOWING OCCUR:     *You have a fever of 103 F or higher within 24 hours or higher.  If you have a fever of 101 or higher after 48 hours.   * Shortness of breath   *Confusion.   *Extreme fatigue (Unable to perform self care activities)   *Fever,  "chills, malaise, flu-like symptoms, increased fatigue or an increase in urinary symptoms such as burning or pain on urination are NOT uncommon.  However, if these increase in severity or      Last more than 48 hours let your doctor know.      ANY questions or problems, PLEASE do not hesitate to call us!!          Follow-ups after your visit        Your next 10 appointments already scheduled     Jun 20, 2018  8:00 AM CDT   Level 5 with HC INF RM 3312   [facility] [place] ([facility] [place] [facility]    [address]  [place] [address]   [phone]            Jun 27, 2018  8:00 AM CDT   Level 5 with HC INF RM 3312   [facility] [place] ([facility] [place] )    [address]  [place] [address]   [phone]              Who to contact     If you have questions or need follow up information about today's clinic visit or your schedule please contact [facility] directly at [phone].  Normal or non-critical lab and imaging results will be communicated to you by MyChart, letter or phone within 4 business days after the clinic has received the results. If you do not hear from us within 7 days, please contact the clinic through MyChart or phone. If you have a critical or abnormal lab result, we will notify you by phone as soon as possible.  Submit refill requests through [employer] or call your pharmacy and they will forward the refill request to us. Please allow 3 business days for your refill to be completed.          Additional Information About Your Visit        Care EveryWhere ID     This is your Care EveryWhere ID. This could be used by other organizations to access your [place] medical records  [id number]        Your Vitals Were     Pulse Temperature Respirations Height Pulse Oximetry BMI (Body Mass Index)    53 96.8  F (36  C) (Tympanic) 18 1.702 m (5' 7\") 94% 34.66 kg/m2       Blood Pressure from Last 3 Encounters:   06/13/18 122/76   06/06/18 141/78 "   05/30/18 129/77    Weight from Last 3 Encounters:   06/13/18 100.4 kg (221 lb 4.8 oz)   06/06/18 102.2 kg (225 lb 6.4 oz)   05/30/18 101.3 kg (223 lb 6.4 oz)              We Performed the Following     UA without Microscopic        Primary Care Provider Office Phone # Fax #    Luis Tran [phone] [address] [id number]        Equal Access to Services     SARAY GROVES : Hadii oskar ku hadasho Soomaali, waaxda luqadaha, qaybta kaalmada adeegyada, ralph zacarias. So [facility] [phone].    ATENCIÓN: Si habla español, tiene a berger disposición servicios gratuitos de asistencia lingüística. [facility] [phone].    We comply with applicable federal civil rights laws and [place] laws. We do not discriminate on the basis of race, color, national origin, age, disability, sex, sexual orientation, or gender identity.            Thank you!     Thank you for choosing [facility]  for your care. Our goal is always to provide you with excellent care. Hearing back from our patients is one way we can continue to improve our services. Please take a few minutes to complete the written survey that you may receive in the mail after your visit with us. Thank you!             Your Updated Medication List - Protect others around you: Learn how to safely use, store and throw away your medicines at www.disposemymeds.org.          This list is accurate as of 6/13/18 11:30 AM.  Always use your most recent med list.                   Brand Name Dispense Instructions for use Diagnosis    brimonidine 0.15 % ophthalmic solution    ALPHAGAN-P     INSTILL 1 DROP INTO RIGHT EYE TWICE DAILY        IBUPROFEN PO      Take 200 mg by mouth every 6 hours as needed for moderate pain Reported on 5/9/2017        sodium bicarbonate 650 MG tablet     12 tablet    Take 1 tablet (650 mg) by mouth the evening prior to treatment and 1 tablet the morning of treatment    Malignant  neoplasm of trigone of urinary bladder (H)       XALATAN 0.005 % ophthalmic solution   Generic drug:  latanoprost      Place 1 drop into both eyes At Bedtime    Routine general medical examination at a health care facility

## 2018-06-20 ENCOUNTER — INFUSION THERAPY VISIT (OUTPATIENT)
Dept: INFUSION THERAPY | Facility: OTHER | Age: 73
End: 2018-06-20
Attending: FAMILY MEDICINE
Payer: COMMERCIAL

## 2018-06-20 VITALS
BODY MASS INDEX: 34.81 KG/M2 | WEIGHT: 221.8 LBS | RESPIRATION RATE: 16 BRPM | DIASTOLIC BLOOD PRESSURE: 79 MMHG | HEIGHT: 67 IN | HEART RATE: 53 BPM | OXYGEN SATURATION: 94 % | SYSTOLIC BLOOD PRESSURE: 136 MMHG | TEMPERATURE: 95.8 F

## 2018-06-20 DIAGNOSIS — C67.0 MALIGNANT NEOPLASM OF TRIGONE OF URINARY BLADDER (H): Primary | ICD-10-CM

## 2018-06-20 LAB
ALBUMIN UR-MCNC: NEGATIVE MG/DL
APPEARANCE UR: CLEAR
BILIRUB UR QL STRIP: NEGATIVE
COLOR UR AUTO: NORMAL
GLUCOSE UR STRIP-MCNC: NEGATIVE MG/DL
HGB UR QL STRIP: NEGATIVE
KETONES UR STRIP-MCNC: NEGATIVE MG/DL
LEUKOCYTE ESTERASE UR QL STRIP: NEGATIVE
NITRATE UR QL: NEGATIVE
PH UR STRIP: 6.5 PH (ref 4.7–8)
SOURCE: NORMAL
SP GR UR STRIP: 1.01 (ref 1–1.03)
UROBILINOGEN UR STRIP-MCNC: NORMAL MG/DL (ref 0–2)

## 2018-06-20 PROCEDURE — 27210995 ZZH RX 272: Performed by: UROLOGY

## 2018-06-20 PROCEDURE — 25000125 ZZHC RX 250: Performed by: UROLOGY

## 2018-06-20 PROCEDURE — 51720 TREATMENT OF BLADDER LESION: CPT

## 2018-06-20 PROCEDURE — 25000128 H RX IP 250 OP 636: Performed by: UROLOGY

## 2018-06-20 PROCEDURE — 81003 URINALYSIS AUTO W/O SCOPE: CPT | Mod: ZL | Performed by: UROLOGY

## 2018-06-20 RX ADMIN — WATER 60 ML: 100 INJECTION, SOLUTION INTRAVENOUS at 11:36

## 2018-06-20 RX ADMIN — LIDOCAINE HYDROCHLORIDE 10 ML: 20 JELLY TOPICAL at 08:40

## 2018-06-20 RX ADMIN — WATER 60 ML: 100 INJECTION, SOLUTION INTRAVENOUS at 10:16

## 2018-06-20 RX ADMIN — GEMCITABINE 1000 MG: 38 INJECTION, SOLUTION INTRAVENOUS at 09:09

## 2018-06-20 RX ADMIN — WATER 20 MG: 1 INJECTION INTRAMUSCULAR; INTRAVENOUS; SUBCUTANEOUS at 10:26

## 2018-06-20 NOTE — PATIENT INSTRUCTIONS
Discharge Instructions for Infusion Therapy/Chemotherapy Department:    Your doctor has prescribed the following medication(s) gemcitabine / mitomycin to treat your bladder cancer.    All treatments have potential side effects, but they don't all happen to everyone.  If you have any questions, problems, or concerns, we want you to call us.  You can reach the Infusion Nurses at [phone] Monday - Friday 8:00am to 4:30pm or the Health Unit Coordinator at [phone] Monday - Friday 8:00am to 5:00pm.      *For 6 hours after your treatment, sit when you urinate.  Place 1-2 cups of bleach in the toilet after you have urinated.  Let stand for 15-20 minutes.  Repeat this process each time you urinate for six (6) ours after the procedure.  *Wash your hands thoroughly after going to the bathroom  *Drink 2-3 liters of non caffeinated, non alcoholic beverages following your instillation to flush the bladder out.      After hours, evenings, weekends, and holidays please call Urgent Care [phone] or toll free [phone] or go to your nearest Emergency Department.      Possible side effects include:  *Painful or difficult urination, urgency  *Urinary frequency  *Blood in the urine  *Flu like symptoms.    YOU NEED TO CALL US OR GO TO YOUR NEAREST URGENT CARE/EMERGENCY DEPARTMENT IF ANY OF THE FOLLOWING OCCUR:     *You have a fever of 103 F or higher within 24 hours or higher.  If you have a fever of 101 or higher after 48 hours.   * Shortness of breath   *Confusion.   *Extreme fatigue (Unable to perform self care activities)   *Fever, chills, malaise, flu-like symptoms, increased fatigue or an increase in urinary symptoms such as burning or pain on urination are NOT uncommon.  However, if these increase in severity or      Last more than 48 hours let your doctor know.      ANY questions or problems, PLEASE do not hesitate to call us!!

## 2018-06-20 NOTE — MR AVS SNAPSHOT
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    All treatments have potential side effects, but they don't all happen to everyone.  If you have any questions, problems, or concerns, we want you to call us.  You can reach the Infusion Nurses at [phone] Monday - Friday 8:00am to 4:30pm or the Health Unit Coordinator at [phone] Monday - Friday 8:00am to 5:00pm.      *For 6 hours after your treatment, sit when you urinate.  Place 1-2 cups of bleach in the toilet after you have urinated.  Let stand for 15-20 minutes.  Repeat this process each time you urinate for six (6) ours after the procedure.  *Wash your hands thoroughly after going to the bathroom  *Drink 2-3 liters of non caffeinated, non alcoholic beverages following your instillation to flush the bladder out.      After hours, evenings, weekends, and holidays please call Urgent Care [phone] or toll free [phone] or go to your nearest Emergency Department.      Possible side effects include:  *Painful or difficult urination, urgency  *Urinary frequency  *Blood in the urine  *Flu like symptoms.    YOU NEED TO CALL US OR GO TO YOUR NEAREST URGENT CARE/EMERGENCY DEPARTMENT IF ANY OF THE FOLLOWING OCCUR:     *You have a fever of 103 F or higher within 24 hours or higher.  If you have a fever of 101 or higher after 48 hours.   * Shortness of breath   *Confusion.   *Extreme fatigue (Unable to perform self care activities)   *Fever,  "chills, malaise, flu-like symptoms, increased fatigue or an increase in urinary symptoms such as burning or pain on urination are NOT uncommon.  However, if these increase in severity or      Last more than 48 hours let your doctor know.      ANY questions or problems, PLEASE do not hesitate to call us!!          Follow-ups after your visit        Your next 10 appointments already scheduled     Jun 27, 2018  8:00 AM CDT   Level 5 with HC INF RM 3312   [facility] [place] ([facility] [place] )    [address]  [place] [address]   [phone]              Who to contact     If you have questions or need follow up information about today's clinic visit or your schedule please contact [facility] directly at [phone].  Normal or non-critical lab and imaging results will be communicated to you by MyChart, letter or phone within 4 business days after the clinic has received the results. If you do not hear from us within 7 days, please contact the clinic through MyChart or phone. If you have a critical or abnormal lab result, we will notify you by phone as soon as possible.  Submit refill requests through [employer] or call your pharmacy and they will forward the refill request to us. Please allow 3 business days for your refill to be completed.          Additional Information About Your Visit        Care EveryWhere ID     This is your Care EveryWhere ID. This could be used by other organizations to access your [facility] medical records  [id number]        Your Vitals Were     Pulse Temperature Respirations Height Pulse Oximetry BMI (Body Mass Index)    55 95.8  F (35.4  C) (Tympanic) 16 1.702 m (5' 7\") 94% 34.74 kg/m2       Blood Pressure from Last 3 Encounters:   06/20/18 128/79   06/13/18 134/79   06/06/18 141/78    Weight from Last 3 Encounters:   06/20/18 100.6 kg (221 lb 12.8 oz)   06/13/18 100.4 kg (221 lb 4.8 oz)   06/06/18 102.2 kg (225 lb 6.4 oz)              We Performed the " Following     UA without Microscopic        Primary Care Provider Office Phone # Fax #    Luis Tran [phone] [address] [facility] [address]        Equal Access to Services     SARAY GROVES : Hadii oskar knutson richard Montano, wabrandonda luqadaha, qaybta kaalmada chandra, ralph garcia joshuagracie morse laNeojanay zacarias. So [facility] [phone].    ATENCIÓN: Si habla español, tiene a berger disposición servicios gratuitos de asistencia lingüística. [facility] [phone].    We comply with applicable federal civil rights laws and [place] laws. We do not discriminate on the basis of race, color, national origin, age, disability, sex, sexual orientation, or gender identity.            Thank you!     Thank you for choosing [facility]  for your care. Our goal is always to provide you with excellent care. Hearing back from our patients is one way we can continue to improve our services. Please take a few minutes to complete the written survey that you may receive in the mail after your visit with us. Thank you!             Your Updated Medication List - Protect others around you: Learn how to safely use, store and throw away your medicines at www.disposemymeds.org.          This list is accurate as of 6/20/18 11:20 AM.  Always use your most recent med list.                   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

## 2018-06-20 NOTE — PROGRESS NOTES
73 year old male patient here for bladder instillation of gemcitabine / mitomycin.  UA negative for nitrates, negative for leukocytes.  Denies any fever or chills.  Denies any pain with urination.  Patient reports he had urgency for a longer period of time with previous treatment, has subsided. Results reviewed, labs met treatment parameters.  Proceed with treatment.    12 German catheter placed, sterile technique.  Allowed bladder to empty 200ccs of urine returned.    0909: Gemcitabine 1000mg verified with second RN, Nasreen Rhodes, instilled via catheter, tolerated well, catheter clamped.  Patient turned every 15 minutes per protocol, patient was unable to tolerate for final 10 minutes of instillation.  1010: catheter unclamped and allowed to drain, 200ccs of return.    1016: Bladder irrigated with 60cc of sterile water, allowed to drain, 75ccs of return.  VS WNL.  Patient offers no complaints.    1026: Mitomycin verified with second RN, Laney Jonas, instilled via catheter, catheter clamped.  Patient turned every 15 minutes per protocol, tolerated well.  Offers no complaints, denies any pain or discomfort.    1135Catheter unclamped and allowed to drain, 50ccs of return.    1136: Bladder irrigated with 60ccs of sterile water, allowed to drain, 75ccs of return.  VS WNL.    Encouraged to drink plenty of fluids for next couple of days.  Discharged in care of self.  Patient will return in one week for next appointment.  Gela Loza RN

## 2018-06-20 NOTE — LETTER
July 10, 2018       TO: Colin Baxter  [address] [place] [address]       DearMr.Sahil,    We are writing to inform you of your test results.    Your test results fall within the expected range(s) or remain unchanged from previous results.  Please continue with current treatment plan.    Resulted Orders   UA without Microscopic   Result Value Ref Range    Color Urine Light Yellow     Appearance Urine Clear     Glucose Urine Negative NEG^Negative mg/dL    Bilirubin Urine Negative NEG^Negative    Ketones Urine Negative NEG^Negative mg/dL    Specific Gravity Urine 1.010 1.003 - 1.035    Blood Urine Negative NEG^Negative    pH Urine 6.5 4.7 - 8.0 pH    Protein Albumin Urine Negative NEG^Negative mg/dL    Urobilinogen mg/dL Normal 0.0 - 2.0 mg/dL    Nitrite Urine Negative NEG^Negative    Leukocyte Esterase Urine Negative NEG^Negative    Source Midstream Urine        Thank you for choosing [facility] for your care. Please follow up as previously planned.  Please call with any questions or concerns.    Thank you,  Laney Neumann, RN Care Coordinator for  Dr. Randy Martinez

## 2018-06-27 ENCOUNTER — INFUSION THERAPY VISIT (OUTPATIENT)
Dept: INFUSION THERAPY | Facility: OTHER | Age: 73
End: 2018-06-27
Attending: FAMILY MEDICINE
Payer: COMMERCIAL

## 2018-06-27 VITALS
TEMPERATURE: 96.3 F | RESPIRATION RATE: 16 BRPM | OXYGEN SATURATION: 95 % | WEIGHT: 222.2 LBS | DIASTOLIC BLOOD PRESSURE: 78 MMHG | HEART RATE: 58 BPM | SYSTOLIC BLOOD PRESSURE: 124 MMHG | BODY MASS INDEX: 34.88 KG/M2 | HEIGHT: 67 IN

## 2018-06-27 DIAGNOSIS — C67.0 MALIGNANT NEOPLASM OF TRIGONE OF URINARY BLADDER (H): Primary | ICD-10-CM

## 2018-06-27 LAB
ALBUMIN UR-MCNC: 10 MG/DL
APPEARANCE UR: CLEAR
BILIRUB UR QL STRIP: NEGATIVE
COLOR UR AUTO: YELLOW
GLUCOSE UR STRIP-MCNC: 30 MG/DL
HGB UR QL STRIP: NEGATIVE
KETONES UR STRIP-MCNC: NEGATIVE MG/DL
LEUKOCYTE ESTERASE UR QL STRIP: NEGATIVE
NITRATE UR QL: NEGATIVE
PH UR STRIP: 5 PH (ref 4.7–8)
SOURCE: ABNORMAL
SP GR UR STRIP: 1.02 (ref 1–1.03)
UROBILINOGEN UR STRIP-MCNC: NORMAL MG/DL (ref 0–2)

## 2018-06-27 PROCEDURE — 88108 CYTOPATH CONCENTRATE TECH: CPT | Mod: TC | Performed by: UROLOGY

## 2018-06-27 PROCEDURE — 81003 URINALYSIS AUTO W/O SCOPE: CPT | Mod: ZL | Performed by: UROLOGY

## 2018-06-27 PROCEDURE — 25000125 ZZHC RX 250: Performed by: UROLOGY

## 2018-06-27 PROCEDURE — 51720 TREATMENT OF BLADDER LESION: CPT

## 2018-06-27 PROCEDURE — 27210995 ZZH RX 272: Performed by: UROLOGY

## 2018-06-27 PROCEDURE — 25000128 H RX IP 250 OP 636: Performed by: UROLOGY

## 2018-06-27 RX ADMIN — LIDOCAINE HYDROCHLORIDE 10 ML: 20 JELLY TOPICAL at 08:41

## 2018-06-27 RX ADMIN — WATER 20 MG: 1 INJECTION INTRAMUSCULAR; INTRAVENOUS; SUBCUTANEOUS at 10:14

## 2018-06-27 RX ADMIN — WATER 60 ML: 100 INJECTION, SOLUTION INTRAVENOUS at 10:04

## 2018-06-27 RX ADMIN — GEMCITABINE 1000 MG: 38 INJECTION, SOLUTION INTRAVENOUS at 09:13

## 2018-06-27 RX ADMIN — WATER 60 ML: 100 INJECTION, SOLUTION INTRAVENOUS at 11:28

## 2018-06-27 NOTE — PROGRESS NOTES
73 year old male patient here for bladder instillation of gemcitabine / mitomycin.  UA negative for nitrates, negative for leukocytes.  Denies any fever or chills.  Denies any pain or blood with urination.  Patient again reports urgency x 5-6 days following bladder instillation that has resolved. Results reviewed, labs met treatment parameters.  Proceed with treatment.    12 Martiniquais catheter placed, sterile technique.  Allowed bladder to empty 100ccs of urine returned.    0913: Gemzar 1000mg verified with second RN, Laney Jonas, instillation via catheter, tolerated well, catheter clamped.  Patient turned every 15 minutes per protocol, but  pt was unable to tolerate full hour d/t bladder spasms.   1000: catheter unclamped and allowed to drain,100ccs of return.    1004: Bladder irrigated with 60cc of sterile water, allowed to drain, 100ccs of return.  VS WNL.  Patient offers no complaints.    1014: Mitomycin 20mg verified by second RN, Gela Banks, instilled via catheter, catheter clamped.  Patient turned every 15 minutes per protocol, tolerated well.  Offers no complaints, denies any pain or discomfort.    1125: Catheter unclamped and allowed to drain, 100ccs of return.    1128: Bladder irrigated with 60ccs of sterile water, allowed to drain, 100ccs of return.  VS WNL.  Encouraged to drink plenty of fluids for next couple of days.  Discharged in care of self.  Gela Loza RN

## 2018-06-27 NOTE — MR AVS SNAPSHOT
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    All treatments have potential side effects, but they don't all happen to everyone.  If you have any questions, problems, or concerns, we want you to call us.  You can reach the Infusion Nurses at [phone] Monday - Friday 8:00am to 4:30pm or the Health Unit Coordinator at [phone] Monday - Friday 8:00am to 5:00pm.      *For 6 hours after your treatment, sit when you urinate.  Place 1-2 cups of bleach in the toilet after you have urinated.  Let stand for 15-20 minutes.  Repeat this process each time you urinate for six (6) ours after the procedure.  *Wash your hands thoroughly after going to the bathroom  *Drink 2-3 liters of non caffeinated, non alcoholic beverages following your instillation to flush the bladder out.      After hours, evenings, weekends, and holidays please call Urgent Care [phone] or toll free [phone] or go to your nearest Emergency Department.      Possible side effects include:  *Painful or difficult urination, urgency  *Urinary frequency  *Blood in the urine  *Flu like symptoms.    YOU NEED TO CALL US OR GO TO YOUR NEAREST URGENT CARE/EMERGENCY DEPARTMENT IF ANY OF THE FOLLOWING OCCUR:     *You have a fever of 103 F or higher within 24 hours or higher.  If you have a fever of 101 or higher after 48 hours.   * Shortness of breath   *Confusion.   *Extreme fatigue (Unable to perform self care activities)   *Fever,  "chills, malaise, flu-like symptoms, increased fatigue or an increase in urinary symptoms such as burning or pain on urination are NOT uncommon.  However, if these increase in severity or      Last more than 48 hours let your doctor know.      ANY questions or problems, PLEASE do not hesitate to call us!!          Follow-ups after your visit        Future tests that were ordered for you today     Open Future Orders        Priority Expected Expires Ordered    FISH BLADDER CANCER Routine 7/2/2018 7/13/2018 6/27/2018            Who to contact     If you have questions or need follow up information about today's clinic visit or your schedule [facility] at [phone].  Normal or non-critical lab and imaging results will be communicated to you by MyChart, letter or phone within 4 business days after the clinic has received the results. If you do not hear from us within 7 days, please contact the clinic through MyChart or phone. If you have a critical or abnormal lab result, we will notify you by phone as soon as possible.  Submit refill requests through [employer] or call your pharmacy and they will forward the refill request to us. Please allow 3 business days for your refill to be completed.          Additional Information About Your Visit        Care EveryWhere ID     This is your Care EveryWhere ID. This could be used by other organizations to access your [facility] medical records  [id number]        Your Vitals Were     Pulse Temperature Respirations Height Pulse Oximetry BMI (Body Mass Index)    58 96.3  F (35.7  C) (Tympanic) 16 1.702 m (5' 7\") 95% 34.8 kg/m2       Blood Pressure from Last 3 Encounters:   06/27/18 124/78   06/20/18 136/79   06/13/18 134/79    Weight from Last 3 Encounters:   06/27/18 100.8 kg (222 lb 3.2 oz)   06/20/18 100.6 kg (221 lb 12.8 oz)   06/13/18 100.4 kg (221 lb 4.8 oz)              We Performed the Following     Cytology Non Gyn     UA without " [facility]        Primary Care Provider Office Phone # Fax #    Luis Tran [phone] [address] [id number]        Equal Access to Services     SARAY GROVES : Hadii aad ku hadjorge Montano, wabrandonda lushane, qaybta katroy artis, ralph garcia joshuagracie morse josef zacarias. So [facility] [phone].    ATENCIÓN: Si habla español, tiene a berger disposición servicios gratuitos de asistencia lingüística. Llame al [phone].    We comply with applicable federal civil rights laws and [place] laws. We do not discriminate on the basis of race, color, national origin, age, disability, sex, sexual orientation, or gender identity.            Thank you!     Thank you for choosing [facility]  for your care. Our goal is always to provide you with excellent care. Hearing back from our patients is one way we can continue to improve our services. Please take a few minutes to complete the written survey that you may receive in the mail after your visit with us. Thank you!             Your Updated Medication List - Protect others around you: Learn how to safely use, store and throw away your medicines at www.disposemymeds.org.          This list is accurate as of 6/27/18 11:43 AM.  Always use your most recent med list.                   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

## 2018-06-27 NOTE — PATIENT INSTRUCTIONS
.Discharge Instructions for Infusion Therapy/Chemotherapy Department:    Your doctor has prescribed the following medication(s) Gemcitabine / Mitomycin to treat your bladder cancer.    All treatments have potential side effects, but they don't all happen to everyone.  If you have any questions, problems, or concerns, we want you to call us.  You can reach the Infusion Nurses at [phone] Monday - Friday 8:00am to 4:30pm or the Health Unit Coordinator at [phone] Monday - Friday 8:00am to 5:00pm.      *For 6 hours after your treatment, sit when you urinate.  Place 1-2 cups of bleach in the toilet after you have urinated.  Let stand for 15-20 minutes.  Repeat this process each time you urinate for six (6) ours after the procedure.  *Wash your hands thoroughly after going to the bathroom  *Drink 2-3 liters of non caffeinated, non alcoholic beverages following your instillation to flush the bladder out.      After hours, evenings, weekends, and holidays please call Urgent Care [phone] or toll free [phone] or go to your nearest Emergency Department.      Possible side effects include:  *Painful or difficult urination, urgency  *Urinary frequency  *Blood in the urine  *Flu like symptoms.    YOU NEED TO CALL US OR GO TO YOUR NEAREST URGENT CARE/EMERGENCY DEPARTMENT IF ANY OF THE FOLLOWING OCCUR:     *You have a fever of 103 F or higher within 24 hours or higher.  If you have a fever of 101 or higher after 48 hours.   * Shortness of breath   *Confusion.   *Extreme fatigue (Unable to perform self care activities)   *Fever, chills, malaise, flu-like symptoms, increased fatigue or an increase in urinary symptoms such as burning or pain on urination are NOT uncommon.  However, if these increase in severity or      Last more than 48 hours let your doctor know.      ANY questions or problems, PLEASE do not hesitate to call us!!

## 2018-07-02 DIAGNOSIS — C67.0 MALIGNANT NEOPLASM OF TRIGONE OF URINARY BLADDER (H): ICD-10-CM

## 2018-07-02 LAB — COPATH REPORT: NORMAL

## 2018-07-02 PROCEDURE — 99000 SPECIMEN HANDLING OFFICE-LAB: CPT | Performed by: UROLOGY

## 2018-07-02 PROCEDURE — 00000103 ZZHCL STATISTIC CYTO-FISH QC 88120: Mod: ZL | Performed by: UROLOGY

## 2018-07-09 LAB — COPATH REPORT: NORMAL

## 2018-07-11 ENCOUNTER — OFFICE VISIT (OUTPATIENT)
Dept: UROLOGY | Facility: OTHER | Age: 73
End: 2018-07-11
Attending: UROLOGY
Payer: COMMERCIAL

## 2018-07-11 VITALS
SYSTOLIC BLOOD PRESSURE: 118 MMHG | BODY MASS INDEX: 34.84 KG/M2 | DIASTOLIC BLOOD PRESSURE: 72 MMHG | WEIGHT: 222 LBS | HEIGHT: 67 IN | HEART RATE: 63 BPM | TEMPERATURE: 97.5 F | RESPIRATION RATE: 20 BRPM | OXYGEN SATURATION: 97 %

## 2018-07-11 DIAGNOSIS — C67.0 MALIGNANT NEOPLASM OF TRIGONE OF URINARY BLADDER (H): Primary | ICD-10-CM

## 2018-07-11 PROCEDURE — 52000 CYSTOURETHROSCOPY: CPT | Performed by: UROLOGY

## 2018-07-11 PROCEDURE — G0463 HOSPITAL OUTPT CLINIC VISIT: HCPCS | Mod: 25

## 2018-07-11 PROCEDURE — 51700 IRRIGATION OF BLADDER: CPT | Performed by: UROLOGY

## 2018-07-11 PROCEDURE — 99213 OFFICE O/P EST LOW 20 MIN: CPT | Mod: 25 | Performed by: UROLOGY

## 2018-07-11 PROCEDURE — 88108 CYTOPATH CONCENTRATE TECH: CPT | Mod: TC | Performed by: UROLOGY

## 2018-07-11 ASSESSMENT — PAIN SCALES - GENERAL: PAINLEVEL: NO PAIN (0)

## 2018-07-11 NOTE — NURSING NOTE
Patient positioned in supine position, perineum area prepped with chlorhexidene Gluconate and patient draped per sterile technique. Per verbal order read back by Ed Helm MD, Urojet 10mL 2% lidocaine jelly to be instilled into urethra.  Urojet- 10ml 2% Lidocaine jelly instilled into the urethra.    Urojet 2%  Lot#: [id number]  Expiration date: 2/20  : [employer]  NDC: [address]-3    [place] Protocol    A. Pre-procedure verification complete Yes  1-relevant information / documentation available, reviewed and properly matched to the patient; 2-consent accurate and complete, 3-equipment and supplies available    B. Site marking complete N/A  Site marked if not in continuous attendance with patient    C. TIME OUT completed Yes  Time Out was conducted just prior to starting procedure to verify the eight required elements: 1-patient identity, 2-consent accurate and complete, 3-position, 4-correct side/site marked (if applicable), 5-procedure, 6-relevant images / results properly labeled and displayed (if applicable), 7-antibiotics / irrigation fluids (if applicable), 8-safety precautions.    After procedure perineum area rinsed. Discharge instructions reviewed with patient. Patient verbalized understanding of discharge instructions and discharged ambulatory.

## 2018-07-11 NOTE — MR AVS SNAPSHOT
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    Activity  No limitations    Bathing or showering  No limitations    Symptoms  You may notice some burning with urination but this usually resolves after 1-2 days.  You may also notice small amounts of blood in your urine.  Please increase water intake for the next few days to help with these symptoms.    Contacts  General Questions: [phone]  Appointments:  [phone]  Emergencies:  [phone]    When to call the clinic  If you develop any of the following symptoms please call the clinic immediately.  If the clinic is closed please be seen at an urgent care clinic or the Emergency Department.  - Burning with urination that worsens after 2 days  - Unable to urinate causing severe pelvic pain  - Fevers of greater than 101 degrees F  - Flank pain that is not responding to pain medication    Follow up  Please follow up as discussed at the appointment.          Follow-ups after your visit        Who to contact     If you have questions or need follow up information about today's clinic visit or your schedule please contact [facility] directly at [phone].  Normal or non-critical lab and imaging results will be communicated to you by MyChart, letter or phone within 4 business days after the clinic has received the results. If you do not hear from us within 7 days, please contact the clinic through MyChart or phone. If you have a critical or abnormal lab result, we will notify you by phone as soon as possible.  Submit refill requests  "through [employer] or call your pharmacy and they will forward the refill request to us. Please allow 3 business days for your refill to be completed.          Additional Information About Your Visit        Care EveryWhere ID     This is your Care EveryWhere ID. This could be used by other organizations to access your [facility] medical records  [id number]        Your Vitals Were     Pulse Temperature Respirations Height Pulse Oximetry BMI (Body Mass Index)    63 97.5  F (36.4  C) (Tympanic) 20 1.702 m (5' 7\") 97% 34.77 kg/m2       Blood Pressure from Last 3 Encounters:   07/11/18 118/72   06/27/18 124/78   06/20/18 136/79    Weight from Last 3 Encounters:   07/11/18 100.7 kg (222 lb)   06/27/18 100.8 kg (222 lb 3.2 oz)   06/20/18 100.6 kg (221 lb 12.8 oz)              We Performed the Following     CYSTOURETHROSCOPY     Cytology non gyn     IRRIGATION BLADDER SIMPLE LAVAGE/INSTILLATION        Primary Care Provider Office Phone # Fax #    Luis Tran [phone]       [facility] [id number]        Equal Access to Services     [facility]: Hadii aad ku hadasho Sodottyali, waaxda luqadaha, qaybta kaalmada adeegyada, ralph bahena . So [facility] [phone].    ATENCIÓN: Si habla español, tiene a berger disposición servicios gratuitos de asistencia lingüística. [facility] [phone].    We comply with applicable federal civil rights laws and [place] laws. We do not discriminate on the basis of race, color, national origin, age, disability, sex, sexual orientation, or gender identity.            Thank you!     Thank you for choosing [facility]  for your care. Our goal is always to provide you with excellent care. Hearing back from our patients is one way we can continue to improve our services. Please take a few minutes to complete the written survey that you may receive in the mail after your visit with us. Thank you!             Your Updated " Medication List - Protect others around you: Learn how to safely use, store and throw away your medicines at www.disposemymeds.org.          This list is accurate as of 7/11/18 12:14 PM.  Always use your most recent med list.                   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

## 2018-07-11 NOTE — PROGRESS NOTES
Preprocedure diagnosis  History of bladder cancer, high risk    Postprocedure diagnosis  History of bladder cancer, high risk    Procedure  Flexible Cystourethroscopy and bladder barbotage    Surgeon  Ed Helm MD    Anesthesia  2% lidocaine jelly intraurethrally    Complications  None    Indications  73 year old male undergoing a flexible cystoscopy for the above mentioned indications.  He underwent induction BCG 2015 and in  was found to have a recurrent tumor.    Date of Initial Diagnosis:  2015  Stage of Initial Diagnosis:  T1 and CIS  Grade at Initial Diagnosis:  High  Site of First Diagnosis:  bladder, prostatic urethra  Any Recurrence?   2016 Ta, 2016 CIS  Intravesical Treatments:  Induction BCG , reinduction BCG , [place]-Cis x 3 2016, 3/2017, 2018  Date of Last [place] Imagin2017    Findings  Cystoscopic findings included a normal anterior urethra.    There was a prominent median lobe.    The lateral lobes were obstructive in appearance.  The bladder appeared to be normal capacity.    There were no tumors, stones or foreign bodies.    The orifices were slit-shaped and in their normal location.  Multiple areas of mild erythema as expected following BCG.    Procedure  The patient was placed in supine position and prepped and draped in sterile fashion with lidocaine jelly per urethra for anesthesia.    I passed a lubricated 14F flexible cystoscope through the penile urethra and into the bladder and the bladder was completely visualized.  The cystoscope was retroflexed and the bladder neck and prostate visualized.      A bladder barbotage was performed and urine collected and sent for cytology.    The cystoscope was slowly withdrawn while visualizing the urethra and the procedure terminated.    The patient tolerated the procedure well.      Imaging  CTU  2017  IMPRESSION:  1.  LACK OF DISTENTION OF THE URINARY BLADDER SOMEWHAT LIMITS  EVALUATION.  THICKENING OF THE MUCOSA ALONG THE  POSTERIOR AND LEFT  DORSOLATERAL ASPECT OF THE BLADDER IS NOT READILY SEEN ON THE CURRENT  EXAMINATION.     2.  NO EVIDENCE OF LYMPHADENOPATHY OR EVIDENCE TO SUGGEST RENAL  CALCULUS OR RENAL LESION.     3.  MILD DILATATION OF THE MID ASPECT OF THE LEFT URETER, LIKELY  UNCHANGED FROM THE PRIOR EXAMINATION.    Plan  Follow up surveillance cystoscopy q6 months with cytology collected at time of cystoscopy          I spent 15 minutes on this patient's visit (exclusive of separately billed services/procedures) and over half of this time was spent in face-to-face counseling regarding his diagnosis, treatment options with emphasis on  risks and benefits of each, prognosis and importance of compliance.

## 2018-07-11 NOTE — NURSING NOTE
"Chief Complaint   Patient presents with     Cystoscopy     6 month follow up.       Initial /72  Pulse 63  Temp 97.5  F (36.4  C) (Tympanic)  Resp 20  Ht 1.702 m (5' 7\")  Wt 100.7 kg (222 lb)  SpO2 97%  BMI 34.77 kg/m2 Estimated body mass index is 34.77 kg/(m^2) as calculated from the following:    Height as of this encounter: 1.702 m (5' 7\").    Weight as of this encounter: 100.7 kg (222 lb).  Medication Reconciliation: complete    Lexi Dove LPN  Review of Systems:    Weight loss:    No     Recent fever/chills:  No   Night sweats:   No  Current skin rash:  No   Recent hair loss:  No  Heat intolerance:  No   Cold intolerance:  No  Chest pain:   No   Palpitations:   No  Shortness of breath:  No   Wheezing:   No  Constipation:    No   Diarrhea:   No   Nausea:   No   Vomiting:   No   Kidney/side pain:  No   Back pain:   No  Frequent headaches:  No   Dizziness:     No  Leg swelling:   No   Calf pain:    No    Parents, brothers or sisters with history of kidney cancer?   No  Parents, brothers or sisters with history of bladder cancer: No    "

## 2018-07-11 NOTE — PATIENT INSTRUCTIONS
Home Care after Cystoscopy  Follow these guidelines for your care after your procedure.    Activity  No limitations    Bathing or showering  No limitations    Symptoms  You may notice some burning with urination but this usually resolves after 1-2 days.  You may also notice small amounts of blood in your urine.  Please increase water intake for the next few days to help with these symptoms.    Contacts  General Questions: [phone]  Appointments:  [phone]  Emergencies:  [phone]    When to call the clinic  If you develop any of the following symptoms please call the clinic immediately.  If the clinic is closed please be seen at an urgent care clinic or the Emergency Department.  - Burning with urination that worsens after 2 days  - Unable to urinate causing severe pelvic pain  - Fevers of greater than 101 degrees F  - Flank pain that is not responding to pain medication    Follow up  Please follow up as discussed at the appointment.

## 2018-07-13 LAB — COPATH REPORT: NORMAL

## 2018-09-25 NOTE — PROGRESS NOTES
[facility]  [address]  [place] [address]  [phone]  Dept: [phone]    PRE-OP EVALUATION:  Today's date: 2018    Colin Baxter (: 1945) presents for pre-operative evaluation assessment as requested by Dr. Mcconnell.  He requires evaluation and anesthesia risk assessment prior to undergoing surgery/procedure for treatment of cataracts .    Proposed Surgery/ Procedure: cataract surgery  Date of Surgery/ Procedure: Right eye 10/09/18 and left eye 10/23/18  Time of Surgery/ Procedure: [facility]/Surgical Facility: [facility]  Fax number for surgical facility:   Primary Physician: Luis Tran  Type of Anesthesia Anticipated: to be determined    Patient has a Health Care Directive or Living Will:  NO    1. NO - Do you have a history of heart attack, stroke, stent, bypass or surgery on an artery in the head, neck, heart or legs?  2. NO - Do you ever have any pain or discomfort in your chest?  3. NO - Do you have a history of  Heart Failure?  4. NO - Are you troubled by shortness of breath when: walking on the level, up a slight hill or at night?  5. NO - Do you currently have a cold, bronchitis or other respiratory infection?  6. NO - Do you have a cough, shortness of breath or wheezing?  7. NO - Do you sometimes get pains in the calves of your legs when you walk?  8. NO - Do you or anyone in your family have previous history of blood clots?  9. NO - Do you or does anyone in your family have a serious bleeding problem such as prolonged bleeding following surgeries or cuts?  10. NO - Have you ever had problems with anemia or been told to take iron pills?  11. NO - Have you had any abnormal blood loss such as black, tarry or bloody stools, or abnormal vaginal bleeding?  12. NO - Have you ever had a blood transfusion?  13. NO - Have you or any of your relatives ever had problems with anesthesia?  14. NO - Do you have sleep apnea, excessive snoring or daytime  drowsiness?  15. NO - Do you have any prosthetic heart valves?  16. NO - Do you have prosthetic joints?  17. NO - Is there any chance that you may be pregnant?      HPI:     HPI related to upcoming procedure: Colin has a history of bilateral cataracts and has had progressive vision.  He was seen by Opthalmology where it was felt the patient would benefit from surgical management.  I was asked to see him for preoperative medical clearance.      See problem list for active medical problems.  Problems all longstanding and stable, except as noted/documented.  See ROS for pertinent symptoms related to these conditions.                                                                                                                                                          .    MEDICAL HISTORY:     Patient Active Problem List    Diagnosis Date Noted     ACP (advance care planning) 05/04/2016     Priority: Medium     Advance Care Planning 5/4/2016: ACP Review of Chart / Resources Provided:  Reviewed chart for advance care plan.  Colin Baxter has been provided information and resources to begin or update their advance care plan.  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  Past Surgical History:   Procedure Laterality Date     APPENDECTOMY  1958     COLONOSCOPY  2-     CYSTOSCOPY, BIOPSY BLADDER, COMBINED N/A 9/8/2015    Procedure: COMBINED CYSTOSCOPY, BIOPSY BLADDER;  Surgeon: [place] Martinez MD;  Location: [facility]     CYSTOSCOPY, BIOPSY BLADDER, COMBINED N/A 2/14/2017    Procedure: COMBINED CYSTOSCOPY, BIOPSY BLADDER;  Surgeon: [place] Martinez MD;  Location: [facility]     CYSTOSCOPY, RETROGRADES, INSERT STENT URETER(S),  COMBINED Left 9/8/2015    Procedure: COMBINED CYSTOSCOPY, RETROGRADES, INSERT STENT URETER(S);  Surgeon: [place] Martinez MD;  Location: [facility]     CYSTOSCOPY, TRANSURETHRAL RESECTION (TUR) TUMOR BLADDER, COMBINED N/A 9/8/2015    Procedure: COMBINED CYSTOSCOPY, TRANSURETHRAL RESECTION (TUR) TUMOR BLADDER;  Surgeon: [place] Martinez MD;  Location: [facility]     CYSTOSCOPY, TRANSURETHRAL RESECTION (TUR) TUMOR BLADDER, COMBINED N/A 1/12/2016    Procedure: COMBINED CYSTOSCOPY, TRANSURETHRAL RESECTION (TUR) TUMOR BLADDER;  Surgeon: [place] Martinez MD;  Location: [facility]     CYSTOSCOPY, TRANSURETHRAL RESECTION (TUR) TUMOR BLADDER, COMBINED N/A 9/13/2016    Procedure: COMBINED CYSTOSCOPY, TRANSURETHRAL RESECTION (TUR) TUMOR BLADDER;  Surgeon: [place] Martinez MD;  Location: [facility]     Current Outpatient Prescriptions   Medication Sig Dispense Refill     brimonidine (ALPHAGAN-P) 0.15 % ophthalmic solution INSTILL 1 DROP INTO RIGHT EYE TWICE DAILY  12     IBUPROFEN PO Take 200 mg by mouth every 6 hours as needed for moderate pain Reported on 5/9/2017       latanoprost (XALATAN) 0.005 % ophthalmic solution Place 1 drop into both eyes At Bedtime       sodium bicarbonate 650 MG tablet Take 1 tablet (650 mg) by mouth the evening prior to treatment and 1 tablet the morning of treatment 12 tablet 0     OTC products: None, except as noted above    No Known Allergies   Latex Allergy: NO    Social History   Substance Use Topics     Smoking status: Former Smoker     Quit date: 1/6/1992     Smokeless tobacco: Never Used     Alcohol use Yes     History   Drug Use No       REVIEW OF SYSTEMS:   Constitutional, neuro, ENT, endocrine, pulmonary, cardiac, gastrointestinal, genitourinary, musculoskeletal, integument and psychiatric systems are negative, except as otherwise noted.    EXAM:   There were no vitals taken for this visit.    GENERAL APPEARANCE: healthy, alert and no distress     EYES: EOMI,  PERRL      HENT: ear canals and TM's normal and nose and mouth without ulcers or lesions     NECK: no adenopathy, no asymmetry, masses, or scars and thyroid normal to palpation     RESP: lungs clear to auscultation - no rales, rhonchi or wheezes     CV: regular rates and rhythm, normal S1 S2, no S3 or S4 and no murmur, click or rub     ABDOMEN:  soft, nontender, no HSM or masses and bowel sounds normal     MS: extremities normal- no gross deformities noted, no evidence of inflammation in joints, FROM in all extremities.     SKIN: no suspicious lesions or rashes     NEURO: Normal strength and tone, sensory exam grossly normal, mentation intact and speech normal     PSYCH: mentation appears normal. and affect normal/bright     LYMPHATICS: No cervical adenopathy    DIAGNOSTICS:     EKG: appears normal, NSR, normal axis, normal intervals, no acute ST/T changes c/w ischemia, no LVH by voltage criteria, unchanged from previous tracings  Labs Resulted Today:   Results for orders placed or performed in visit on 09/26/18   CBC with platelets   Result Value Ref Range    WBC 6.6 4.0 - 11.0 10e9/L    RBC Count 4.70 4.4 - 5.9 10e12/L    Hemoglobin 15.3 13.3 - 17.7 g/dL    Hematocrit 43.8 40.0 - 53.0 %    MCV 93 78 - 100 fl    MCH 32.6 26.5 - 33.0 pg    MCHC 34.9 31.5 - 36.5 g/dL    RDW 12.9 10.0 - 15.0 %    Platelet Count 220 150 - 450 10e9/L       Recent Labs   Lab Test  05/12/17   0740  02/01/17   0945  09/11/15   0846   07/07/15   0848   HGB   --   15.2  15.0   < >  15.8   PLT   --   218  227   < >  236   NA   --    --    --    --   138   POTASSIUM   --    --    --    --   4.0   CR  1.02   --    --    --   0.97    < > = values in this interval not displayed.        IMPRESSION:   Reason for surgery/procedure: Bilateral cataracts    The proposed surgical procedure is considered LOW risk.    REVISED CARDIAC RISK INDEX  The patient has the following serious cardiovascular risks for perioperative complications such as (MI, PE, VFib and 3  AV  Block):  No serious cardiac risks  INTERPRETATION: 0 risks: Class I (very low risk - 0.4% complication rate)    The patient has the following additional risks for perioperative complications:  No identified additional risks      ICD-10-CM    1. Preop general physical exam Z01.818        RECOMMENDATIONS:     APPROVAL GIVEN to proceed with proposed procedure, without further diagnostic evaluation       Signed Electronically by: Luis Tran MD    Copy of this evaluation report is provided to requesting physician.    [facility] Preop Guidelines    Revised Cardiac Risk Index

## 2018-09-26 ENCOUNTER — OFFICE VISIT (OUTPATIENT)
Dept: FAMILY MEDICINE | Facility: OTHER | Age: 73
End: 2018-09-26
Attending: FAMILY MEDICINE
Payer: COMMERCIAL

## 2018-09-26 ENCOUNTER — OFFICE VISIT (OUTPATIENT)
Dept: UROLOGY | Facility: OTHER | Age: 73
End: 2018-09-26
Attending: UROLOGY
Payer: COMMERCIAL

## 2018-09-26 VITALS
HEART RATE: 68 BPM | OXYGEN SATURATION: 97 % | SYSTOLIC BLOOD PRESSURE: 130 MMHG | BODY MASS INDEX: 35.31 KG/M2 | HEIGHT: 67 IN | RESPIRATION RATE: 20 BRPM | WEIGHT: 225 LBS | DIASTOLIC BLOOD PRESSURE: 68 MMHG | TEMPERATURE: 96.8 F

## 2018-09-26 VITALS
TEMPERATURE: 97.7 F | HEART RATE: 78 BPM | DIASTOLIC BLOOD PRESSURE: 70 MMHG | WEIGHT: 223 LBS | SYSTOLIC BLOOD PRESSURE: 120 MMHG | HEIGHT: 67 IN | OXYGEN SATURATION: 96 % | RESPIRATION RATE: 17 BRPM | BODY MASS INDEX: 35 KG/M2

## 2018-09-26 DIAGNOSIS — Z01.818 PREOP GENERAL PHYSICAL EXAM: Primary | ICD-10-CM

## 2018-09-26 DIAGNOSIS — E66.01 MORBID OBESITY (H): ICD-10-CM

## 2018-09-26 DIAGNOSIS — C67.0 MALIGNANT NEOPLASM OF TRIGONE OF URINARY BLADDER (H): Primary | ICD-10-CM

## 2018-09-26 DIAGNOSIS — Z85.51 PERSONAL HISTORY OF MALIGNANT NEOPLASM OF BLADDER: ICD-10-CM

## 2018-09-26 LAB
ERYTHROCYTE [DISTWIDTH] IN BLOOD BY AUTOMATED COUNT: 12.9 % (ref 10–15)
HCT VFR BLD AUTO: 43.8 % (ref 40–53)
HGB BLD-MCNC: 15.3 G/DL (ref 13.3–17.7)
MCH RBC QN AUTO: 32.6 PG (ref 26.5–33)
MCHC RBC AUTO-ENTMCNC: 34.9 G/DL (ref 31.5–36.5)
MCV RBC AUTO: 93 FL (ref 78–100)
PLATELET # BLD AUTO: 220 10E9/L (ref 150–450)
RBC # BLD AUTO: 4.7 10E12/L (ref 4.4–5.9)
WBC # BLD AUTO: 6.6 10E9/L (ref 4–11)

## 2018-09-26 PROCEDURE — G0463 HOSPITAL OUTPT CLINIC VISIT: HCPCS

## 2018-09-26 PROCEDURE — G0463 HOSPITAL OUTPT CLINIC VISIT: HCPCS | Mod: 27

## 2018-09-26 PROCEDURE — 99214 OFFICE O/P EST MOD 30 MIN: CPT | Mod: 25 | Performed by: FAMILY MEDICINE

## 2018-09-26 PROCEDURE — G0463 HOSPITAL OUTPT CLINIC VISIT: HCPCS | Mod: 25

## 2018-09-26 PROCEDURE — 85027 COMPLETE CBC AUTOMATED: CPT | Mod: ZL | Performed by: FAMILY MEDICINE

## 2018-09-26 PROCEDURE — 93005 ELECTROCARDIOGRAM TRACING: CPT

## 2018-09-26 PROCEDURE — 36415 COLL VENOUS BLD VENIPUNCTURE: CPT | Mod: ZL | Performed by: FAMILY MEDICINE

## 2018-09-26 PROCEDURE — G0463 HOSPITAL OUTPT CLINIC VISIT: HCPCS | Mod: 25,27

## 2018-09-26 PROCEDURE — 93010 ELECTROCARDIOGRAM REPORT: CPT | Performed by: INTERNAL MEDICINE

## 2018-09-26 PROCEDURE — 99214 OFFICE O/P EST MOD 30 MIN: CPT | Performed by: UROLOGY

## 2018-09-26 ASSESSMENT — ANXIETY QUESTIONNAIRES
4. TROUBLE RELAXING: NOT AT ALL
GAD7 TOTAL SCORE: 0
7. FEELING AFRAID AS IF SOMETHING AWFUL MIGHT HAPPEN: NOT AT ALL
2. NOT BEING ABLE TO STOP OR CONTROL WORRYING: NOT AT ALL
3. WORRYING TOO MUCH ABOUT DIFFERENT THINGS: NOT AT ALL
6. BECOMING EASILY ANNOYED OR IRRITABLE: NOT AT ALL
5. BEING SO RESTLESS THAT IT IS HARD TO SIT STILL: NOT AT ALL
1. FEELING NERVOUS, ANXIOUS, OR ON EDGE: NOT AT ALL

## 2018-09-26 ASSESSMENT — PAIN SCALES - GENERAL
PAINLEVEL: NO PAIN (0)
PAINLEVEL: NO PAIN (0)

## 2018-09-26 NOTE — PROGRESS NOTES
"Type of Visit  Established    Chief Complaint  High risk bladder cancer    HPI  73 year old male with a history of high risk bladder cancer.  He underwent induction BCG 2015 and in  was found to have a recurrent tumor.  At the last cystoscopy is cytology was suspicious.  He did have some areas of erythema.  For these reasons I recommended further evaluation in the OR with random bladder biopsies as well as targeted biopsies.  He follows up today to discuss the plan.    Date of Initial Diagnosis:  2015  Stage of Initial Diagnosis:  T1 and CIS  Grade at Initial Diagnosis:  High  Site of First Diagnosis:  bladder, prostatic urethra  Any Recurrence?   2016 Ta, 2016 CIS  Intravesical Treatments:  Induction BCG , reinduction BCG , [place]-Cis x 3 2016, 3/2017, 2018  Date of Last [facility] Imagin2017      Review of Systems  I reviewed the ROS with the patient.    Nursing Notes:   Lexi Dove LPN  2018  9:05 AM  Signed  Chief Complaint   Patient presents with     Results     Discuss BX.       Initial /68  Pulse 68  Temp 96.8  F (36  C) (Tympanic)  Resp 20  Ht 1.702 m (5' 7\")  Wt 102.1 kg (225 lb)  SpO2 97%  BMI 35.24 kg/m2 Estimated body mass index is 35.24 kg/(m^2) as calculated from the following:    Height as of this encounter: 1.702 m (5' 7\").    Weight as of this encounter: 102.1 kg (225 lb).  Medication Reconciliation: complete   Review of Systems:    Weight loss:    No     Recent fever/chills:  No   Night sweats:   No  Current skin rash:  No   Recent hair loss:  No  Heat intolerance:  No   Cold intolerance:  No  Chest pain:   No   Palpitations:   No  Shortness of breath:  No   Wheezing:   No  Constipation:    No   Diarrhea:   No   Nausea:   No   Vomiting:   No   Kidney/side pain:  No   Back pain:   No  Frequent headaches:  No   Dizziness:     No  Leg swelling:   No   Calf pain:    No        Lexi Dove LPN      Family History  Family History   Problem Relation Age " "of Onset     Diabetes Mother      Parkinsonism Brother        Physical Exam  Vitals:    09/26/18 0818   BP: 130/68   Pulse: 68   Resp: 20   Temp: 96.8  F (36  C)   TempSrc: Tympanic   SpO2: 97%   Weight: 102.1 kg (225 lb)   Height: 1.702 m (5' 7\")     Constitutional: NAD, WDWN.  Cardiovascular: Regular rate.  Pulmonary/Chest: Respirations are even and non-labored bilaterally.  Abdominal: Soft. No distension, tenderness, masses or guarding. No CVA tenderness.  Extremities: THEA x 4, Warm. No clubbing.  No cyanosis.    Skin: Pink, warm and dry.  No rashes noted.  Genitourinary: nonpalpable bladder    Labs  Cytology - suspicious    Imaging  CTU  5/9/2017  IMPRESSION:  1.  LACK OF DISTENTION OF THE URINARY BLADDER SOMEWHAT LIMITS  EVALUATION.  THICKENING OF THE MUCOSA ALONG THE POSTERIOR AND LEFT  DORSOLATERAL ASPECT OF THE BLADDER IS NOT READILY SEEN ON THE CURRENT  EXAMINATION.     2.  NO EVIDENCE OF LYMPHADENOPATHY OR EVIDENCE TO SUGGEST RENAL  CALCULUS OR RENAL LESION.     3.  MILD DILATATION OF THE MID ASPECT OF THE LEFT URETER, LIKELY  UNCHANGED FROM THE PRIOR EXAMINATION.    Plan  I recommended \" bilateral retrograde pyelograms random bladder biopsy\" in the OR with MAC sedation  Will plan to not leave a catheter following the procedure    "

## 2018-09-26 NOTE — NURSING NOTE
"Chief Complaint   Patient presents with     Results     Discuss BX.       Initial /68  Pulse 68  Temp 96.8  F (36  C) (Tympanic)  Resp 20  Ht 1.702 m (5' 7\")  Wt 102.1 kg (225 lb)  SpO2 97%  BMI 35.24 kg/m2 Estimated body mass index is 35.24 kg/(m^2) as calculated from the following:    Height as of this encounter: 1.702 m (5' 7\").    Weight as of this encounter: 102.1 kg (225 lb).  Medication Reconciliation: complete   Review of Systems:    Weight loss:    No     Recent fever/chills:  No   Night sweats:   No  Current skin rash:  No   Recent hair loss:  No  Heat intolerance:  No   Cold intolerance:  No  Chest pain:   No   Palpitations:   No  Shortness of breath:  No   Wheezing:   No  Constipation:    No   Diarrhea:   No   Nausea:   No   Vomiting:   No   Kidney/side pain:  No   Back pain:   No  Frequent headaches:  No   Dizziness:     No  Leg swelling:   No   Calf pain:    No        Lexi Dove LPN    "

## 2018-09-26 NOTE — MR AVS SNAPSHOT
After Visit Summary   9/26/2018    Colin Baxter    MRN: [id number]           Patient Information     Date Of Birth          1945        Visit Information        Provider Department      9/26/2018 10:20 AM Luis Tran MD [facility]        Today's Diagnoses     Preop general physical exam    -  1      Care Instructions      Before Your Surgery      Call your surgeon if there is any change in your health. This includes signs of a cold or flu (such as a sore throat, runny nose, cough, rash or fever).    Do not smoke, drink alcohol or take over the counter medicine (unless your surgeon or primary care doctor tells you to) for the 24 hours before and after surgery.    If you take prescribed drugs: Follow your doctor s orders about which medicines to take and which to stop until after surgery.    Eating and drinking prior to surgery: follow the instructions from your surgeon    Take a shower or bath the night before surgery. Use the soap your surgeon gave you to gently clean your skin. If you do not have soap from your surgeon, use your regular soap. Do not shave or scrub the surgery site.  Wear clean pajamas and have clean sheets on your bed.           Follow-ups after your visit        Your next 10 appointments already scheduled     Dec 12, 2018  9:45 AM CST   (Arrive by 9:30 AM)   Cystoscopy with Ed Helm MD   [facility] [place] [facility]    [address]  [place] [address]   [phone]              Who to contact     If you have questions or need follow up information about today's clinic visit or your schedule please contact [facility] directly at [phone].  Normal or non-critical lab and imaging results will be communicated to you by MyChart, letter or phone within 4 business days after the clinic has received the results. If you do not hear from us within 7 days, please contact the clinic  "through [facility] or phone. If you have a critical or abnormal lab result, we will notify you by phone as soon as possible.  Submit refill requests through [facility] or call your pharmacy and they will forward the refill request to us. Please allow 3 business days for your refill to be completed.          Additional Information About Your Visit        Care EveryWhere ID     This is your Care EveryWhere ID. This could be used by other organizations to access your [place] medical records  [id number]        Your Vitals Were     Pulse Temperature Respirations Height Pulse Oximetry BMI (Body Mass Index)    78 97.7  F (36.5  C) (Tympanic) 17 5' 7\" (1.702 m) 96% 34.93 kg/m2       Blood Pressure from Last 3 Encounters:   09/26/18 120/70   09/26/18 130/68   07/11/18 118/72    Weight from Last 3 Encounters:   09/26/18 223 lb (101.2 kg)   09/26/18 225 lb (102.1 kg)   07/11/18 222 lb (100.7 kg)              We Performed the Following     CBC with platelets     EKG 12-lead complete w/read - (Clinic Performed)        Primary Care Provider Office Phone # Fax #    Luis Tran [phone] [address] [address]        Equal Access to Services     SARAY GROVES : Hadii oskar knutson hadasho Soomaali, waaxda luqadaha, qaybta kaalmada adeegyada, ralph bahena . So [facility] [phone].    ATENCIÓN: Si habla español, tiene a berger disposición servicios gratuitos de asistencia lingüística. Llame al [phone].    We comply with applicable federal civil rights laws and [place] laws. We do not discriminate on the basis of race, color, national origin, age, disability, sex, sexual orientation, or gender identity.            Thank you!     Thank you for choosing [facility]  for your care. Our goal is always to provide you with excellent care. Hearing back from our patients is one way we can continue to improve our services. Please take a few minutes to " complete the written survey that you may receive in the mail after your visit with us. Thank you!             Your Updated Medication List - Protect others around you: Learn how to safely use, store and throw away your medicines at www.disposemymeds.org.          This list is accurate as of 9/26/18 11:59 PM.  Always use your most recent med list.                   Brand Name Dispense Instructions for use Diagnosis    brimonidine 0.15 % ophthalmic solution    ALPHAGAN-P     INSTILL 1 DROP INTO RIGHT EYE TWICE DAILY        IBUPROFEN PO      Take 200 mg by mouth every 6 hours as needed for moderate pain Reported on 5/9/2017        XALATAN 0.005 % ophthalmic solution   Generic drug:  latanoprost      Place 1 drop into both eyes At Bedtime    Routine general medical examination at a health care facility

## 2018-09-26 NOTE — NURSING NOTE
"Chief Complaint   Patient presents with     Pre-Op Exam       Initial /70 (BP Location: Right arm, Patient Position: Sitting, Cuff Size: Adult Regular)  Pulse 78  Temp 97.7  F (36.5  C) (Tympanic)  Resp 17  Ht 5' 7\" (1.702 m)  Wt 223 lb (101.2 kg)  SpO2 96%  BMI 34.93 kg/m2 Estimated body mass index is 34.93 kg/(m^2) as calculated from the following:    Height as of this encounter: 5' 7\" (1.702 m).    Weight as of this encounter: 223 lb (101.2 kg).  Medication Reconciliation: complete    Mariah Lopez LPN    "

## 2018-09-26 NOTE — MR AVS SNAPSHOT
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)    -  1    Morbid obesity (H)        Personal history of malignant neoplasm of bladder           Follow-ups after your visit        Your next 10 appointments already scheduled     Sep 26, 2018 10:20 AM CDT   (Arrive by 10:05 AM)   [facility]-Op physical with Luis Tran MD   [facility]    [address]  [place] [address]   [phone]            Dec 12, 2018  9:45 AM CST   (Arrive by 9:30 AM)   Cystoscopy with Ed Helm MD   [facility] )    [address]  [place] [address]   [phone]              Who to contact     If you have questions or need follow up information about today's clinic visit or your schedule please contact [facility] directly at [phone].  Normal or non-critical lab and imaging results will be communicated to you by MyChart, letter or phone within 4 business days after the clinic has received the results. If you do not hear from us within 7 days, please contact the clinic through MyChart or phone. If you have a critical or abnormal lab result, we will notify you by phone as soon as possible.  Submit refill requests through [facility] or call your pharmacy and they will forward the refill request to us. Please allow 3 business days for your refill to be completed.          Additional Information About Your Visit        Care EveryWhere ID     This is your Care EveryWhere ID. This could be used by other organizations to access your [facility] medical records  [id number]        Your Vitals Were      "Pulse Temperature Respirations Height Pulse Oximetry BMI (Body Mass Index)    68 96.8  F (36  C) (Tympanic) 20 1.702 m (5' 7\") 97% 35.24 kg/m2       Blood Pressure from Last 3 Encounters:   09/26/18 130/68   07/11/18 118/72   06/27/18 124/78    Weight from Last 3 Encounters:   09/26/18 102.1 kg (225 lb)   07/11/18 100.7 kg (222 lb)   06/27/18 100.8 kg (222 lb 3.2 oz)              Today, you had the following     No orders found for display       Primary Care Provider Office Phone # Fax #    Luis Tran [phone] [address] [address]        Equal Access to Services     SARAY GROVES : Andrei Montano, waiglesia luqadaha, qaybta kaalmayessica artis, ralph bahena . So [facility] [place] [facility] [phone].    ATENCIÓN: Si habla español, tiene a berger disposición servicios gratuitos de asistencia lingüística. Llame al [phone].    We comply with applicable federal civil rights laws and [place] laws. We do not discriminate on the basis of race, color, national origin, age, disability, sex, sexual orientation, or gender identity.            Thank you!     Thank you for choosing [facility]  for your care. Our goal is always to provide you with excellent care. Hearing back from our patients is one way we can continue to improve our services. Please take a few minutes to complete the written survey that you may receive in the mail after your visit with us. Thank you!             Your Updated Medication List - Protect others around you: Learn how to safely use, store and throw away your medicines at www.disposemymeds.org.          This list is accurate as of 9/26/18  9:28 AM.  Always use your most recent med list.                   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

## 2018-09-27 ASSESSMENT — PATIENT HEALTH QUESTIONNAIRE - PHQ9: SUM OF ALL RESPONSES TO PHQ QUESTIONS 1-9: 2

## 2018-09-27 ASSESSMENT — ANXIETY QUESTIONNAIRES: GAD7 TOTAL SCORE: 0

## 2018-09-28 NOTE — H&P (VIEW-ONLY)
[facility]  [address]  [place] [address]  [phone]  Dept: [phone]    PRE-OP EVALUATION:  Today's date: 2018    Colin Baxetr (: 1945) presents for pre-operative evaluation assessment as requested by Dr. Mcconnell.  He requires evaluation and anesthesia risk assessment prior to undergoing surgery/procedure for treatment of cataracts .    Proposed Surgery/ Procedure: cataract surgery  Date of Surgery/ Procedure: Right eye 10/09/18 and left eye 10/23/18  Time of Surgery/ Procedure: [facility]/Surgical Facility: [facility]  Fax number for surgical facility:   Primary Physician: Luis Tran  Type of Anesthesia Anticipated: to be determined    Patient has a Health Care Directive or Living Will:  NO    1. NO - Do you have a history of heart attack, stroke, stent, bypass or surgery on an artery in the head, neck, heart or legs?  2. NO - Do you ever have any pain or discomfort in your chest?  3. NO - Do you have a history of  Heart Failure?  4. NO - Are you troubled by shortness of breath when: walking on the level, up a slight hill or at night?  5. NO - Do you currently have a cold, bronchitis or other respiratory infection?  6. NO - Do you have a cough, shortness of breath or wheezing?  7. NO - Do you sometimes get pains in the calves of your legs when you walk?  8. NO - Do you or anyone in your family have previous history of blood clots?  9. NO - Do you or does anyone in your family have a serious bleeding problem such as prolonged bleeding following surgeries or cuts?  10. NO - Have you ever had problems with anemia or been told to take iron pills?  11. NO - Have you had any abnormal blood loss such as black, tarry or bloody stools, or abnormal vaginal bleeding?  12. NO - Have you ever had a blood transfusion?  13. NO - Have you or any of your relatives ever had problems with anesthesia?  14. NO - Do you have sleep apnea, excessive snoring or daytime  drowsiness?  15. NO - Do you have any prosthetic heart valves?  16. NO - Do you have prosthetic joints?  17. NO - Is there any chance that you may be pregnant?      HPI:     HPI related to upcoming procedure: Colin has a history of bilateral cataracts and has had progressive vision.  He was seen by Opthalmology where it was felt the patient would benefit from surgical management.  I was asked to see him for preoperative medical clearance.      See problem list for active medical problems.  Problems all longstanding and stable, except as noted/documented.  See ROS for pertinent symptoms related to these conditions.                                                                                                                                                          .    MEDICAL HISTORY:     Patient Active Problem List    Diagnosis Date Noted     ACP (advance care planning) 05/04/2016     Priority: Medium     Advance Care Planning 5/4/2016: ACP Review of Chart / Resources Provided:  Reviewed chart for advance care plan.  Colin Baxter has been provided information and resources to begin or update their advance care plan.  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  Past Surgical History:   Procedure Laterality Date     APPENDECTOMY  1958     COLONOSCOPY  2-     CYSTOSCOPY, BIOPSY BLADDER, COMBINED N/A 9/8/2015    Procedure: COMBINED CYSTOSCOPY, BIOPSY BLADDER;  Surgeon: Randy Martinez MD;  Location: [facility]     CYSTOSCOPY, BIOPSY BLADDER, COMBINED N/A 2/14/2017    Procedure: COMBINED CYSTOSCOPY, BIOPSY BLADDER;  Surgeon: Randy Martinez MD;  Location: [facility]     CYSTOSCOPY, RETROGRADES, INSERT STENT URETER(S),  COMBINED Left 9/8/2015    Procedure: COMBINED CYSTOSCOPY, RETROGRADES, INSERT STENT URETER(S);  Surgeon: Randy Martinez MD;  Location: [facility]     CYSTOSCOPY, TRANSURETHRAL RESECTION (TUR) TUMOR BLADDER, COMBINED N/A 9/8/2015    Procedure: COMBINED CYSTOSCOPY, TRANSURETHRAL RESECTION (TUR) TUMOR BLADDER;  Surgeon: Randy Martinez MD;  Location: [facility]     CYSTOSCOPY, TRANSURETHRAL RESECTION (TUR) TUMOR BLADDER, COMBINED N/A 1/12/2016    Procedure: COMBINED CYSTOSCOPY, TRANSURETHRAL RESECTION (TUR) TUMOR BLADDER;  Surgeon: Randy Martinez MD;  Location: [facility]     CYSTOSCOPY, TRANSURETHRAL RESECTION (TUR) TUMOR BLADDER, COMBINED N/A 9/13/2016    Procedure: COMBINED CYSTOSCOPY, TRANSURETHRAL RESECTION (TUR) TUMOR BLADDER;  Surgeon: Randy Martinez MD;  Location: [facility]     Current Outpatient Prescriptions   Medication Sig Dispense Refill     brimonidine (ALPHAGAN-P) 0.15 % ophthalmic solution INSTILL 1 DROP INTO RIGHT EYE TWICE DAILY  12     IBUPROFEN PO Take 200 mg by mouth every 6 hours as needed for moderate pain Reported on 5/9/2017       latanoprost (XALATAN) 0.005 % ophthalmic solution Place 1 drop into both eyes At Bedtime       sodium bicarbonate 650 MG tablet Take 1 tablet (650 mg) by mouth the evening prior to treatment and 1 tablet the morning of treatment 12 tablet 0     OTC products: None, except as noted above    No Known Allergies   Latex Allergy: NO    Social History   Substance Use Topics     Smoking status: Former Smoker     Quit date: 1/6/1992     Smokeless tobacco: Never Used     Alcohol use Yes     History   Drug Use No       REVIEW OF SYSTEMS:   Constitutional, neuro, ENT, endocrine, pulmonary, cardiac, gastrointestinal, genitourinary, musculoskeletal, integument and psychiatric systems are negative, except as otherwise noted.    EXAM:   There were no vitals taken for this visit.    GENERAL APPEARANCE: healthy, alert and no distress     EYES: EOMI,  PERRL      HENT: ear canals and TM's normal and nose and mouth without ulcers or lesions     NECK: no adenopathy, no asymmetry, masses, or scars and thyroid normal to palpation     RESP: lungs clear to auscultation - no rales, rhonchi or wheezes     CV: regular rates and rhythm, normal S1 S2, no S3 or S4 and no murmur, click or rub     ABDOMEN:  soft, nontender, no HSM or masses and bowel sounds normal     MS: extremities normal- no gross deformities noted, no evidence of inflammation in joints, FROM in all extremities.     SKIN: no suspicious lesions or rashes     NEURO: Normal strength and tone, sensory exam grossly normal, mentation intact and speech normal     PSYCH: mentation appears normal. and affect normal/bright     LYMPHATICS: No cervical adenopathy    DIAGNOSTICS:     EKG: appears normal, NSR, normal axis, normal intervals, no acute ST/T changes c/w ischemia, no LVH by voltage criteria, unchanged from previous tracings  Labs Resulted Today:   Results for orders placed or performed in visit on 09/26/18   CBC with platelets   Result Value Ref Range    WBC 6.6 4.0 - 11.0 10e9/L    RBC Count 4.70 4.4 - 5.9 10e12/L    Hemoglobin 15.3 13.3 - 17.7 g/dL    Hematocrit 43.8 40.0 - 53.0 %    MCV 93 78 - 100 fl    MCH 32.6 26.5 - 33.0 pg    MCHC 34.9 31.5 - 36.5 g/dL    RDW 12.9 10.0 - 15.0 %    Platelet Count 220 150 - 450 10e9/L       Recent Labs   Lab Test  05/12/17   0740  02/01/17   0945  09/11/15   0846   07/07/15   0848   HGB   --   15.2  15.0   < >  15.8   PLT   --   218  227   < >  236   NA   --    --    --    --   138   POTASSIUM   --    --    --    --   4.0   CR  1.02   --    --    --   0.97    < > = values in this interval not displayed.        IMPRESSION:   Reason for surgery/procedure: Bilateral cataracts    The proposed surgical procedure is considered LOW risk.    REVISED CARDIAC RISK INDEX  The patient has the following serious cardiovascular risks for perioperative complications such as (MI, PE, VFib and 3  AV  Block):  No serious cardiac risks  INTERPRETATION: 0 risks: Class I (very low risk - 0.4% complication rate)    The patient has the following additional risks for perioperative complications:  No identified additional risks      ICD-10-CM    1. Preop general physical exam Z01.818        RECOMMENDATIONS:     APPROVAL GIVEN to proceed with proposed procedure, without further diagnostic evaluation       Signed Electronically by: Luis Tran MD    Copy of this evaluation report is provided to requesting physician.    [facility] Preop Guidelines    Revised Cardiac Risk Index

## 2018-10-01 ENCOUNTER — TELEPHONE (OUTPATIENT)
Dept: FAMILY MEDICINE | Facility: OTHER | Age: 73
End: 2018-10-01

## 2018-10-08 ENCOUNTER — ANESTHESIA EVENT (OUTPATIENT)
Dept: SURGERY | Facility: HOSPITAL | Age: 73
End: 2018-10-08
Payer: COMMERCIAL

## 2018-10-08 ASSESSMENT — LIFESTYLE VARIABLES: TOBACCO_USE: 1

## 2018-10-08 NOTE — ANESTHESIA PREPROCEDURE EVALUATION
Anesthesia Evaluation     . Pt has had prior anesthetic.            ROS/MED HX    ENT/Pulmonary:     (+)CHARLIE risk factors obese, tobacco use, Past use , . .    Neurologic:  - neg neurologic ROS     Cardiovascular:  - neg cardiovascular ROS   (+) ----. : . . . :. . Previous cardiac testing date:results:date: results:ECG reviewed date:9/26/18 results:SB date: results:          METS/Exercise Tolerance:     Hematologic:  - neg hematologic  ROS       Musculoskeletal:  - neg musculoskeletal ROS       GI/Hepatic:  - neg GI/hepatic ROS       Renal/Genitourinary:     (+) Other Renal/ Genitourinary, history of bladder CA      Endo:     (+) Obesity, .      Psychiatric:  - neg psychiatric ROS       Infectious Disease:  - neg infectious disease ROS       Malignancy:   (+) Malignancy History of Other  Other CA bladder status post         Other:    (+) No chance of pregnancy no H/O Chronic Pain,                   Physical Exam  Normal systems: dental    Airway   Mallampati: III  TM distance: >3 FB  Neck ROM: full    Dental     Cardiovascular   Rhythm and rate: regular and normal      Pulmonary    breath sounds clear to auscultation                    Anesthesia Plan      History & Physical Review  History and physical reviewed and following examination; no interval change.    ASA Status:  3 .    NPO Status:  > 8 hours    Plan for MAC with Other induction. Maintenance will be Other.  Reason for MAC:  Procedure to face, neck, head or breast  PONV prophylaxis:  Ondansetron (or other 5HT-3)  H and P date of 9/26/18      Postoperative Care  Postoperative pain management:  IV analgesics.      Consents  Anesthetic plan, risks, benefits and alternatives discussed with:  Patient..                          .

## 2018-10-09 ENCOUNTER — ANESTHESIA (OUTPATIENT)
Dept: SURGERY | Facility: HOSPITAL | Age: 73
End: 2018-10-09
Payer: COMMERCIAL

## 2018-10-09 ENCOUNTER — SURGERY (OUTPATIENT)
Age: 73
End: 2018-10-09

## 2018-10-09 ENCOUNTER — HOSPITAL ENCOUNTER (OUTPATIENT)
Facility: HOSPITAL | Age: 73
Discharge: HOME | End: 2018-10-09
Attending: OPHTHALMOLOGY | Admitting: OPHTHALMOLOGY
Payer: COMMERCIAL

## 2018-10-09 VITALS
RESPIRATION RATE: 16 BRPM | BODY MASS INDEX: 33.49 KG/M2 | WEIGHT: 221 LBS | SYSTOLIC BLOOD PRESSURE: 128 MMHG | DIASTOLIC BLOOD PRESSURE: 86 MMHG | OXYGEN SATURATION: 93 % | TEMPERATURE: 97.7 F | HEIGHT: 68 IN

## 2018-10-09 PROCEDURE — V2632 POST CHMBR INTRAOCULAR LENS: HCPCS | Performed by: OPHTHALMOLOGY

## 2018-10-09 PROCEDURE — 66984 XCAPSL CTRC RMVL W/O ECP: CPT | Performed by: NURSE ANESTHETIST, CERTIFIED REGISTERED

## 2018-10-09 PROCEDURE — 27210794 ZZH OR GENERAL SUPPLY STERILE: Performed by: OPHTHALMOLOGY

## 2018-10-09 PROCEDURE — 71000027 ZZH RECOVERY PHASE 2 EACH 15 MINS: Performed by: OPHTHALMOLOGY

## 2018-10-09 PROCEDURE — 25000132 ZZH RX MED GY IP 250 OP 250 PS 637: Performed by: OPHTHALMOLOGY

## 2018-10-09 PROCEDURE — 25000128 H RX IP 250 OP 636: Performed by: OPHTHALMOLOGY

## 2018-10-09 PROCEDURE — 99100 ANES PT EXTEME AGE<1 YR&>70: CPT | Performed by: NURSE ANESTHETIST, CERTIFIED REGISTERED

## 2018-10-09 PROCEDURE — 25000125 ZZHC RX 250: Performed by: OPHTHALMOLOGY

## 2018-10-09 PROCEDURE — 36000056 ZZH SURGERY LEVEL 3 1ST 30 MIN: Performed by: OPHTHALMOLOGY

## 2018-10-09 PROCEDURE — 37000008 ZZH ANESTHESIA TECHNICAL FEE, 1ST 30 MIN: Performed by: OPHTHALMOLOGY

## 2018-10-09 PROCEDURE — 66984 XCAPSL CTRC RMVL W/O ECP: CPT | Performed by: ANESTHESIOLOGY

## 2018-10-09 PROCEDURE — 40000306 ZZH STATISTIC PRE PROC ASSESS II: Performed by: OPHTHALMOLOGY

## 2018-10-09 PROCEDURE — C9447 INJ, PHENYLEPHRINE KETOROLAC: HCPCS | Performed by: OPHTHALMOLOGY

## 2018-10-09 DEVICE — LENS-SOFPORT 16.0: Type: IMPLANTABLE DEVICE | Site: EYE | Status: FUNCTIONAL

## 2018-10-09 RX ORDER — MOXIFLOXACIN 5 MG/ML
SOLUTION/ DROPS OPHTHALMIC PRN
Status: DISCONTINUED | OUTPATIENT
Start: 2018-10-09 | End: 2018-10-09 | Stop reason: HOSPADM

## 2018-10-09 RX ORDER — TETRACAINE HYDROCHLORIDE 5 MG/ML
SOLUTION OPHTHALMIC PRN
Status: DISCONTINUED | OUTPATIENT
Start: 2018-10-09 | End: 2018-10-09 | Stop reason: HOSPADM

## 2018-10-09 RX ORDER — LEVOBUNOLOL HYDROCHLORIDE 5 MG/ML
SOLUTION/ DROPS OPHTHALMIC PRN
Status: DISCONTINUED | OUTPATIENT
Start: 2018-10-09 | End: 2018-10-09 | Stop reason: HOSPADM

## 2018-10-09 RX ORDER — PHENYLEPHRINE HYDROCHLORIDE 25 MG/ML
1 SOLUTION/ DROPS OPHTHALMIC
Status: COMPLETED | OUTPATIENT
Start: 2018-10-09 | End: 2018-10-09

## 2018-10-09 RX ORDER — NALOXONE HYDROCHLORIDE 0.4 MG/ML
.1-.4 INJECTION, SOLUTION INTRAMUSCULAR; INTRAVENOUS; SUBCUTANEOUS
Status: DISCONTINUED | OUTPATIENT
Start: 2018-10-09 | End: 2018-10-09 | Stop reason: HOSPADM

## 2018-10-09 RX ORDER — CYCLOPENTOLATE HYDROCHLORIDE 20 MG/ML
1 SOLUTION/ DROPS OPHTHALMIC
Status: COMPLETED | OUTPATIENT
Start: 2018-10-09 | End: 2018-10-09

## 2018-10-09 RX ORDER — ACETAMINOPHEN 325 MG/1
650 TABLET ORAL ONCE
Status: COMPLETED | OUTPATIENT
Start: 2018-10-09 | End: 2018-10-09

## 2018-10-09 RX ORDER — PHENYLEPHRINE HYDROCHLORIDE 100 MG/ML
1 SOLUTION/ DROPS OPHTHALMIC
Status: DISCONTINUED | OUTPATIENT
Start: 2018-10-09 | End: 2018-10-09 | Stop reason: HOSPADM

## 2018-10-09 RX ORDER — ONDANSETRON 4 MG/1
4 TABLET, ORALLY DISINTEGRATING ORAL EVERY 30 MIN PRN
Status: DISCONTINUED | OUTPATIENT
Start: 2018-10-09 | End: 2018-10-09 | Stop reason: HOSPADM

## 2018-10-09 RX ORDER — PILOCARPINE HYDROCHLORIDE 10 MG/ML
SOLUTION/ DROPS OPHTHALMIC PRN
Status: DISCONTINUED | OUTPATIENT
Start: 2018-10-09 | End: 2018-10-09 | Stop reason: HOSPADM

## 2018-10-09 RX ORDER — LIDOCAINE 40 MG/G
CREAM TOPICAL
Status: DISCONTINUED | OUTPATIENT
Start: 2018-10-09 | End: 2018-10-09 | Stop reason: HOSPADM

## 2018-10-09 RX ORDER — PROPARACAINE HYDROCHLORIDE 5 MG/ML
1 SOLUTION/ DROPS OPHTHALMIC ONCE
Status: COMPLETED | OUTPATIENT
Start: 2018-10-09 | End: 2018-10-09

## 2018-10-09 RX ORDER — MEPERIDINE HYDROCHLORIDE 50 MG/ML
12.5 INJECTION INTRAMUSCULAR; INTRAVENOUS; SUBCUTANEOUS
Status: DISCONTINUED | OUTPATIENT
Start: 2018-10-09 | End: 2018-10-09 | Stop reason: HOSPADM

## 2018-10-09 RX ORDER — ONDANSETRON 2 MG/ML
4 INJECTION INTRAMUSCULAR; INTRAVENOUS EVERY 30 MIN PRN
Status: DISCONTINUED | OUTPATIENT
Start: 2018-10-09 | End: 2018-10-09 | Stop reason: HOSPADM

## 2018-10-09 RX ORDER — LIDOCAINE HYDROCHLORIDE 10 MG/ML
INJECTION, SOLUTION EPIDURAL; INFILTRATION; INTRACAUDAL; PERINEURAL PRN
Status: DISCONTINUED | OUTPATIENT
Start: 2018-10-09 | End: 2018-10-09 | Stop reason: HOSPADM

## 2018-10-09 RX ORDER — SODIUM CHLORIDE, SODIUM LACTATE, POTASSIUM CHLORIDE, CALCIUM CHLORIDE 600; 310; 30; 20 MG/100ML; MG/100ML; MG/100ML; MG/100ML
INJECTION, SOLUTION INTRAVENOUS CONTINUOUS
Status: DISCONTINUED | OUTPATIENT
Start: 2018-10-09 | End: 2018-10-09 | Stop reason: HOSPADM

## 2018-10-09 RX ADMIN — MOXIFLOXACIN 0.5 ML: 5 SOLUTION/ DROPS OPHTHALMIC at 12:43

## 2018-10-09 RX ADMIN — CYCLOPENTOLATE HYDROCHLORIDE 1 DROP: 20 SOLUTION/ DROPS OPHTHALMIC at 12:33

## 2018-10-09 RX ADMIN — MOXIFLOXACIN HYDROCHLORIDE 1 DROP: 5 SOLUTION/ DROPS OPHTHALMIC at 13:51

## 2018-10-09 RX ADMIN — Medication 0.8 ML: at 13:52

## 2018-10-09 RX ADMIN — CYCLOPENTOLATE HYDROCHLORIDE 1 DROP: 20 SOLUTION/ DROPS OPHTHALMIC at 12:43

## 2018-10-09 RX ADMIN — LIDOCAINE HYDROCHLORIDE 3 ML: 10 INJECTION, SOLUTION EPIDURAL; INFILTRATION; INTRACAUDAL; PERINEURAL at 13:51

## 2018-10-09 RX ADMIN — PHENYLEPHRINE HYDROCHLORIDE 1 DROP: 25 SOLUTION/ DROPS OPHTHALMIC at 12:43

## 2018-10-09 RX ADMIN — PILOCARPINE HYDROCHLORIDE 1 DROP: 10 SOLUTION/ DROPS OPHTHALMIC at 13:51

## 2018-10-09 RX ADMIN — LEVOBUNOLOL HYDROCHLORIDE 1 DROP: 5 SOLUTION/ DROPS OPHTHALMIC at 13:50

## 2018-10-09 RX ADMIN — TETRACAINE HYDROCHLORIDE 1 DROP: 5 SOLUTION OPHTHALMIC at 13:52

## 2018-10-09 RX ADMIN — PROPARACAINE HYDROCHLORIDE 1 DROP: 5 SOLUTION/ DROPS OPHTHALMIC at 12:33

## 2018-10-09 RX ADMIN — PHENYLEPHRINE HYDROCHLORIDE 1 DROP: 25 SOLUTION/ DROPS OPHTHALMIC at 12:33

## 2018-10-09 RX ADMIN — ACETAMINOPHEN 650 MG: 325 TABLET, FILM COATED ORAL at 12:32

## 2018-10-09 RX ADMIN — PHENYLEPHRINE AND KETOROLAC 250 ML: 10.16; 2.88 INJECTION, SOLUTION, CONCENTRATE INTRAOCULAR at 13:51

## 2018-10-09 NOTE — ANESTHESIA CARE TRANSFER NOTE
Patient: Colin Baxter    Procedure(s):  PHACOEMULSIFICATION CATARACT EXTRACTION POSTERIOR CHAMBER LENS RIGHT - Wound Class: I-Clean    Diagnosis: COMBINED FORMS OF AGE RELATED CATARACT RIGHT  Diagnosis Additional Information: No value filed.    Anesthesia Type:   MAC     Note:  Airway :Room Air  Patient transferred to:Phase II  Handoff Report: Identifed the Patient, Identified the Reponsible Provider, Reviewed the pertinent medical history, Discussed the surgical course, Reviewed Intra-OP anesthesia mangement and issues during anesthesia, Set expectations for post-procedure period and Allowed opportunity for questions and acknowledgement of understanding      Vitals: (Last set prior to Anesthesia Care Transfer)    CRNA VITALS  10/9/2018 1330 - 10/9/2018 1406      10/9/2018             Pulse: 52    Ht Rate: 50    SpO2: 99 %    Resp Rate (set): 8                Electronically Signed By: DANILO Croft CRNA  October 9, 2018  2:06 PM

## 2018-10-09 NOTE — ANESTHESIA POSTPROCEDURE EVALUATION
Patient: Colin Baxter    Procedure(s):  PHACOEMULSIFICATION CATARACT EXTRACTION POSTERIOR CHAMBER LENS RIGHT - Wound Class: I-Clean    Diagnosis:COMBINED FORMS OF AGE RELATED CATARACT RIGHT  Diagnosis Additional Information: No value filed.    Anesthesia Type:  MAC    Note:  Anesthesia Post Evaluation    Patient location during evaluation: Phase 2 and Bedside  Patient participation: Able to fully participate in evaluation  Level of consciousness: awake and alert  Pain management: adequate  Airway patency: patent  Cardiovascular status: acceptable  Respiratory status: acceptable  Hydration status: stable  PONV: none     Anesthetic complications: None          Last vitals:  Vitals:    10/09/18 1415 10/09/18 1420 10/09/18 1425   BP: 142/78 129/83 128/86   Resp: 16  16   Temp:      SpO2: 95% 95% 93%         Electronically Signed By: Bobo Ruiz MD  October 9, 2018  2:46 PM

## 2018-10-09 NOTE — INTERVAL H&P NOTE
History and physical reviewed. Patient examined. No interval change in condition.  Marlo Mcconnell

## 2018-10-09 NOTE — IP AVS SNAPSHOT
[facility]    [address] [address]    Phone:  [phone]                                       After Visit Summary   10/9/2018    Colin Baxter    MRN: [id number]           After Visit Summary Signature Page     I have received my discharge instructions, and my questions have been answered. I have discussed any challenges I see with this plan with the nurse or doctor.    ..........................................................................................................................................  Patient/Patient Representative Signature      ..........................................................................................................................................  Patient Representative Print Name and Relationship to Patient    ..................................................               ................................................  Date                                   Time    ..........................................................................................................................................  Reviewed by Signature/Title    ...................................................              ..............................................  Date                                               Time          22EPIC Rev 08/18

## 2018-10-09 NOTE — IP AVS SNAPSHOT
MRN:[id number]                      After Visit Summary   10/9/2018    Colin Baxter    MRN: [id number]           Thank you!     Thank you for choosing [facility] for your care. Our goal is always to provide you with excellent care. Hearing back from our patients is one way we can continue to improve our services. Please take a few minutes to complete the written survey that you may receive in the mail after you visit with us. Thank you!        Patient Information     Date Of Birth          1945        About your hospital stay     You were admitted on:  October 9, 2018 You last received care in the:  [facility]    You were discharged on:  October 9, 2018        Reason for your hospital stay       Cataract surgery right eye done.                  Who to Call     For medical emergencies, please call 911.  For non-urgent questions about your medical care, please call your primary care provider or clinic, [phone]  For questions related to your surgery, please call your surgery clinic        Attending Provider     Provider Specialty    Marlo Mcconnell MD Ophthalmology       Primary Care Provider Office Phone # Fax #    Luis Tran [phone]      After Care Instructions     Nursing Communication 1       Eyedrops, shield, and activities per post op pamphlet.            Patient care order       Patient has Vigamox (moxifloxacin) and Prednisolone 1% and Ketorolac drops at home.  These should be used:  Vigamox (moxifloxacin) 1 drop operative eye QID for 1 week.  Prednisolone 1% 1 drop operatve eye QID for 1 week then TID for 5 weeks.  Ketorolac 1 drop operative eye QID for 1 week, then TID for 5 weeks or until gone.                  Follow-up Appointments     Follow-up and recommended labs and tests       Follow up tomorrow at the [facility].                  Your next 10 appointments already scheduled     Nov 06, 2018   Procedure with Ed Helm MD   [facility] ([facility])    [address]  [place] [id number] [phone]            Dec 12, 2018  9:45 AM CST   (Arrive by 9:30 AM)   Cystoscopy with Ed Helm MD   [facility] [place] ([facility] [place] )    [address]  [place] [address]   [phone]              Pending Results     No orders found from 10/7/2018 to 10/10/2018.            Admission Information     Date & Time Provider Department Dept. Phone    10/9/2018 Marlo Mcconnell MD [facility] Preop/Phase [phone]      Your Vitals Were     Blood Pressure Temperature Respirations Height Weight Pulse Oximetry    142/91 97.7  F (36.5  C) (Temporal) 16 1.727 m (5' 8\") 100.2 kg (221 lb) 95%    BMI (Body Mass Index)                   33.6 kg/m2           Care EveryWhere ID     This is your Care EveryWhere ID. This could be used by other organizations to access your [facility] medical records  [id number]        Equal Access to Services     SARAY GROVES AH: Hadii oskar knutson hadjorge Sodona, waaxda luqadaha, qaybta kaalmayessica artis, ralph zacarias. [facility] [phone].    ATENCIÓN: Si habla español, tiene a berger disposición servicios gratuitos de asistencia lingüística. Kee al [phone].    We comply with applicable federal civil rights laws and [place] laws. We do not discriminate on the basis of race, color, national origin, age, disability, sex, sexual orientation, or gender identity.               Review of your medicines      CONTINUE these medicines which have NOT CHANGED        Dose / Directions    brimonidine 0.15 % ophthalmic solution   Commonly known as:  ALPHAGAN-P        INSTILL 1 DROP INTO RIGHT EYE TWICE DAILY   Refills:  12       IBUPROFEN PO        Dose:  200 mg   Take 200 mg by mouth every 6 hours as needed for moderate pain Reported on 5/9/2017   Refills:  0       XALATAN 0.005 % ophthalmic solution   Used for:  Routine " general medical examination at a health care facility   Generic drug:  latanoprost        Dose:  1 drop   Place 1 drop into both eyes At Bedtime   Refills:  0                Protect others around you: Learn how to safely use, store and throw away your medicines at www.disposemymeds.org.             Medication List: This is a list of all your medications and when to take them. Check marks below indicate your daily home schedule. Keep this list as a reference.      Medications           Morning Afternoon Evening Bedtime As Needed    brimonidine 0.15 % ophthalmic solution   Commonly known as:  ALPHAGAN-P   INSTILL 1 DROP INTO RIGHT EYE TWICE DAILY                                IBUPROFEN PO   Take 200 mg by mouth every 6 hours as needed for moderate pain Reported on 5/9/2017                                XALATAN 0.005 % ophthalmic solution   Place 1 drop into both eyes At Bedtime   Generic drug:  latanoprost

## 2018-10-09 NOTE — OR NURSING
Patient and responsible adult given discharge instructions with no questions regarding instructions. David score 20. Pain level 0/10.  Discharged from unit via ambulation. Patient discharged to home.

## 2018-10-10 ENCOUNTER — TRANSFERRED RECORDS (OUTPATIENT)
Dept: HEALTH INFORMATION MANAGEMENT | Facility: HOSPITAL | Age: 73
End: 2018-10-10

## 2018-10-11 NOTE — H&P (VIEW-ONLY)
[facility]  [address]  [place] [address]  [phone]  Dept: [phone]    PRE-OP EVALUATION:  Today's date: 2018    Colin Baxter (: 1945) presents for pre-operative evaluation assessment as requested by Dr. Mcconnell.  He requires evaluation and anesthesia risk assessment prior to undergoing surgery/procedure for treatment of cataracts .    Proposed Surgery/ Procedure: cataract surgery  Date of Surgery/ Procedure: Right eye 10/09/18 and left eye 10/23/18  Time of Surgery/ Procedure: [facility]/Surgical Facility: [facility]  Fax number for surgical facility:   Primary Physician: Luis Tran  Type of Anesthesia Anticipated: to be determined    Patient has a Health Care Directive or Living Will:  NO    1. NO - Do you have a history of heart attack, stroke, stent, bypass or surgery on an artery in the head, neck, heart or legs?  2. NO - Do you ever have any pain or discomfort in your chest?  3. NO - Do you have a history of  Heart Failure?  4. NO - Are you troubled by shortness of breath when: walking on the level, up a slight hill or at night?  5. NO - Do you currently have a cold, bronchitis or other respiratory infection?  6. NO - Do you have a cough, shortness of breath or wheezing?  7. NO - Do you sometimes get pains in the calves of your legs when you walk?  8. NO - Do you or anyone in your family have previous history of blood clots?  9. NO - Do you or does anyone in your family have a serious bleeding problem such as prolonged bleeding following surgeries or cuts?  10. NO - Have you ever had problems with anemia or been told to take iron pills?  11. NO - Have you had any abnormal blood loss such as black, tarry or bloody stools, or abnormal vaginal bleeding?  12. NO - Have you ever had a blood transfusion?  13. NO - Have you or any of your relatives ever had problems with anesthesia?  14. NO - Do you have sleep apnea, excessive snoring or daytime  drowsiness?  15. NO - Do you have any prosthetic heart valves?  16. NO - Do you have prosthetic joints?  17. NO - Is there any chance that you may be pregnant?      HPI:     HPI related to upcoming procedure: Colin has a history of bilateral cataracts and has had progressive vision.  He was seen by Opthalmology where it was felt the patient would benefit from surgical management.  I was asked to see him for preoperative medical clearance.      See problem list for active medical problems.  Problems all longstanding and stable, except as noted/documented.  See ROS for pertinent symptoms related to these conditions.                                                                                                                                                          .    MEDICAL HISTORY:     Patient Active Problem List    Diagnosis Date Noted     ACP (advance care planning) 05/04/2016     Priority: Medium     Advance Care Planning 5/4/2016: ACP Review of Chart / Resources Provided:  Reviewed chart for advance care plan.  Colin Baxter has been provided information and resources to begin or update their advance care plan.  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  Past Surgical History:   Procedure Laterality Date     APPENDECTOMY  1958     COLONOSCOPY  2-     CYSTOSCOPY, BIOPSY BLADDER, COMBINED N/A 9/8/2015    Procedure: COMBINED CYSTOSCOPY, BIOPSY BLADDER;  Surgeon: Randy Martinez MD;  Location: [facility] OR     CYSTOSCOPY, BIOPSY BLADDER, COMBINED N/A 2/14/2017    Procedure: COMBINED CYSTOSCOPY, BIOPSY BLADDER;  Surgeon: Randy Martinez MD;  Location: [facility] OR     CYSTOSCOPY, RETROGRADES, INSERT STENT URETER(S),  COMBINED Left 9/8/2015    Procedure: COMBINED CYSTOSCOPY, RETROGRADES, INSERT STENT URETER(S);  Surgeon: Randy Martinez MD;  Location: [facility] OR     CYSTOSCOPY, TRANSURETHRAL RESECTION (TUR) TUMOR BLADDER, COMBINED N/A 9/8/2015    Procedure: COMBINED CYSTOSCOPY, TRANSURETHRAL RESECTION (TUR) TUMOR BLADDER;  Surgeon: Randy Martinez MD;  Location: [facility] OR     CYSTOSCOPY, TRANSURETHRAL RESECTION (TUR) TUMOR BLADDER, COMBINED N/A 1/12/2016    Procedure: COMBINED CYSTOSCOPY, TRANSURETHRAL RESECTION (TUR) TUMOR BLADDER;  Surgeon: Randy Martinez MD;  Location: [facility] OR     CYSTOSCOPY, TRANSURETHRAL RESECTION (TUR) TUMOR BLADDER, COMBINED N/A 9/13/2016    Procedure: COMBINED CYSTOSCOPY, TRANSURETHRAL RESECTION (TUR) TUMOR BLADDER;  Surgeon: Randy Martinez MD;  Location: [facility] OR     Current Outpatient Prescriptions   Medication Sig Dispense Refill     brimonidine (ALPHAGAN-P) 0.15 % ophthalmic solution INSTILL 1 DROP INTO RIGHT EYE TWICE DAILY  12     IBUPROFEN PO Take 200 mg by mouth every 6 hours as needed for moderate pain Reported on 5/9/2017       latanoprost (XALATAN) 0.005 % ophthalmic solution Place 1 drop into both eyes At Bedtime       sodium bicarbonate 650 MG tablet Take 1 tablet (650 mg) by mouth the evening prior to treatment and 1 tablet the morning of treatment 12 tablet 0     OTC products: None, except as noted above    No Known Allergies   Latex Allergy: NO    Social History   Substance Use Topics     Smoking status: Former Smoker     Quit date: 1/6/1992     Smokeless tobacco: Never Used     Alcohol use Yes     History   Drug Use No       REVIEW OF SYSTEMS:   Constitutional, neuro, ENT, endocrine, pulmonary, cardiac, gastrointestinal, genitourinary, musculoskeletal, integument and psychiatric systems are negative, except as otherwise noted.    EXAM:   There were no vitals taken for this visit.    GENERAL APPEARANCE: healthy, alert and no distress     EYES: EOMI,  PERRL      HENT: ear canals and TM's normal and nose and mouth without ulcers or lesions     NECK: no adenopathy, no asymmetry, masses, or scars and thyroid normal to palpation     RESP: lungs clear to auscultation - no rales, rhonchi or wheezes     CV: regular rates and rhythm, normal S1 S2, no S3 or S4 and no murmur, click or rub     ABDOMEN:  soft, nontender, no HSM or masses and bowel sounds normal     MS: extremities normal- no gross deformities noted, no evidence of inflammation in joints, FROM in all extremities.     SKIN: no suspicious lesions or rashes     NEURO: Normal strength and tone, sensory exam grossly normal, mentation intact and speech normal     PSYCH: mentation appears normal. and affect normal/bright     LYMPHATICS: No cervical adenopathy    DIAGNOSTICS:     EKG: appears normal, NSR, normal axis, normal intervals, no acute ST/T changes c/w ischemia, no LVH by voltage criteria, unchanged from previous tracings  Labs Resulted Today:   Results for orders placed or performed in visit on 09/26/18   CBC with platelets   Result Value Ref Range    WBC 6.6 4.0 - 11.0 10e9/L    RBC Count 4.70 4.4 - 5.9 10e12/L    Hemoglobin 15.3 13.3 - 17.7 g/dL    Hematocrit 43.8 40.0 - 53.0 %    MCV 93 78 - 100 fl    MCH 32.6 26.5 - 33.0 pg    MCHC 34.9 31.5 - 36.5 g/dL    RDW 12.9 10.0 - 15.0 %    Platelet Count 220 150 - 450 10e9/L       Recent Labs   Lab Test  05/12/17   0740  02/01/17   0945  09/11/15   0846   07/07/15   0848   HGB   --   15.2  15.0   < >  15.8   PLT   --   218  227   < >  236   NA   --    --    --    --   138   POTASSIUM   --    --    --    --   4.0   CR  1.02   --    --    --   0.97    < > = values in this interval not displayed.        IMPRESSION:   Reason for surgery/procedure: Bilateral cataracts    The proposed surgical procedure is considered LOW risk.    REVISED CARDIAC RISK INDEX  The patient has the following serious cardiovascular risks for perioperative complications such as (MI, PE, VFib and 3  AV  Block):  No serious cardiac risks  INTERPRETATION: 0 risks: Class I (very low risk - 0.4% complication rate)    The patient has the following additional risks for perioperative complications:  No identified additional risks      ICD-10-CM    1. Preop general physical exam Z01.818        RECOMMENDATIONS:     APPROVAL GIVEN to proceed with proposed procedure, without further diagnostic evaluation       Signed Electronically by: Luis Tran MD    Copy of this evaluation report is provided to requesting physician.    [facility] Preop Guidelines    Revised Cardiac Risk Index

## 2018-10-22 ENCOUNTER — ANESTHESIA EVENT (OUTPATIENT)
Dept: SURGERY | Facility: HOSPITAL | Age: 73
End: 2018-10-22
Payer: COMMERCIAL

## 2018-10-22 ASSESSMENT — COPD QUESTIONNAIRES
COPD: 1
CAT_SEVERITY: MILD

## 2018-10-22 ASSESSMENT — LIFESTYLE VARIABLES: TOBACCO_USE: 1

## 2018-10-22 NOTE — ANESTHESIA PREPROCEDURE EVALUATION
Anesthesia Evaluation     . Pt has had prior anesthetic. Type: General    No history of anesthetic complications          ROS/MED HX    ENT/Pulmonary:     (+)tobacco use, Past use 1/2PPD packs/day  mild COPD, , . .    Neurologic:  - neg neurologic ROS     Cardiovascular: Comment: EKG : sinus bradycardia - neg cardiovascular ROS       METS/Exercise Tolerance:     Hematologic:  - neg hematologic  ROS       Musculoskeletal:         GI/Hepatic:  - neg GI/hepatic ROS       Renal/Genitourinary:  - ROS Renal section negative       Endo:     (+) Obesity, .      Psychiatric:  - neg psychiatric ROS       Infectious Disease:  - neg infectious disease ROS       Malignancy:      - no malignancy   Other:    - neg other ROS                 Physical Exam  Normal systems: dental    Airway   Mallampati: II  TM distance: >3 FB  Neck ROM: full    Dental     Cardiovascular   Rhythm and rate: regular and normal      Pulmonary    breath sounds clear to auscultation                    Anesthesia Plan      History & Physical Review  History and physical reviewed and following examination; no interval change.    ASA Status:  3 .    NPO Status:  > 8 hours    Plan for MAC with Intravenous induction. Reason for MAC:  Difficulty with conscious sedation (QS)         Postoperative Care      Consents  Anesthetic plan, risks, benefits and alternatives discussed with:  Patient..                          .

## 2018-10-23 ENCOUNTER — ANESTHESIA (OUTPATIENT)
Dept: SURGERY | Facility: HOSPITAL | Age: 73
End: 2018-10-23
Payer: COMMERCIAL

## 2018-10-23 ENCOUNTER — HOSPITAL ENCOUNTER (OUTPATIENT)
Facility: HOSPITAL | Age: 73
Discharge: HOME | End: 2018-10-23
Attending: OPHTHALMOLOGY | Admitting: OPHTHALMOLOGY
Payer: COMMERCIAL

## 2018-10-23 ENCOUNTER — SURGERY (OUTPATIENT)
Age: 73
End: 2018-10-23

## 2018-10-23 VITALS
HEIGHT: 67 IN | WEIGHT: 225 LBS | DIASTOLIC BLOOD PRESSURE: 79 MMHG | TEMPERATURE: 97 F | SYSTOLIC BLOOD PRESSURE: 138 MMHG | BODY MASS INDEX: 35.31 KG/M2 | OXYGEN SATURATION: 92 % | RESPIRATION RATE: 16 BRPM

## 2018-10-23 PROCEDURE — V2632 POST CHMBR INTRAOCULAR LENS: HCPCS | Performed by: OPHTHALMOLOGY

## 2018-10-23 PROCEDURE — 37000008 ZZH ANESTHESIA TECHNICAL FEE, 1ST 30 MIN: Performed by: OPHTHALMOLOGY

## 2018-10-23 PROCEDURE — 25000125 ZZHC RX 250: Performed by: OPHTHALMOLOGY

## 2018-10-23 PROCEDURE — 25000132 ZZH RX MED GY IP 250 OP 250 PS 637: Performed by: OPHTHALMOLOGY

## 2018-10-23 PROCEDURE — 71000027 ZZH RECOVERY PHASE 2 EACH 15 MINS: Performed by: OPHTHALMOLOGY

## 2018-10-23 PROCEDURE — 66984 XCAPSL CTRC RMVL W/O ECP: CPT | Performed by: ANESTHESIOLOGY

## 2018-10-23 PROCEDURE — 66984 XCAPSL CTRC RMVL W/O ECP: CPT | Performed by: NURSE ANESTHETIST, CERTIFIED REGISTERED

## 2018-10-23 PROCEDURE — 25000128 H RX IP 250 OP 636: Performed by: OPHTHALMOLOGY

## 2018-10-23 PROCEDURE — C9447 INJ, PHENYLEPHRINE KETOROLAC: HCPCS | Performed by: OPHTHALMOLOGY

## 2018-10-23 PROCEDURE — 27210794 ZZH OR GENERAL SUPPLY STERILE: Performed by: OPHTHALMOLOGY

## 2018-10-23 PROCEDURE — 36000056 ZZH SURGERY LEVEL 3 1ST 30 MIN: Performed by: OPHTHALMOLOGY

## 2018-10-23 PROCEDURE — 40000306 ZZH STATISTIC PRE PROC ASSESS II: Performed by: OPHTHALMOLOGY

## 2018-10-23 PROCEDURE — 99100 ANES PT EXTEME AGE<1 YR&>70: CPT | Performed by: NURSE ANESTHETIST, CERTIFIED REGISTERED

## 2018-10-23 DEVICE — LENS-SOFPORT 17.5: Type: IMPLANTABLE DEVICE | Site: EYE | Status: FUNCTIONAL

## 2018-10-23 RX ORDER — PILOCARPINE HYDROCHLORIDE 10 MG/ML
SOLUTION/ DROPS OPHTHALMIC PRN
Status: DISCONTINUED | OUTPATIENT
Start: 2018-10-23 | End: 2018-10-23 | Stop reason: HOSPADM

## 2018-10-23 RX ORDER — LIDOCAINE HYDROCHLORIDE 10 MG/ML
INJECTION, SOLUTION EPIDURAL; INFILTRATION; INTRACAUDAL; PERINEURAL PRN
Status: DISCONTINUED | OUTPATIENT
Start: 2018-10-23 | End: 2018-10-23 | Stop reason: HOSPADM

## 2018-10-23 RX ORDER — TETRACAINE HYDROCHLORIDE 5 MG/ML
SOLUTION OPHTHALMIC PRN
Status: DISCONTINUED | OUTPATIENT
Start: 2018-10-23 | End: 2018-10-23 | Stop reason: HOSPADM

## 2018-10-23 RX ORDER — PHENYLEPHRINE HYDROCHLORIDE 25 MG/ML
1 SOLUTION/ DROPS OPHTHALMIC
Status: COMPLETED | OUTPATIENT
Start: 2018-10-23 | End: 2018-10-23

## 2018-10-23 RX ORDER — ACETAMINOPHEN 325 MG/1
650 TABLET ORAL ONCE
Status: COMPLETED | OUTPATIENT
Start: 2018-10-23 | End: 2018-10-23

## 2018-10-23 RX ORDER — ONDANSETRON 2 MG/ML
4 INJECTION INTRAMUSCULAR; INTRAVENOUS EVERY 30 MIN PRN
Status: DISCONTINUED | OUTPATIENT
Start: 2018-10-23 | End: 2018-10-23 | Stop reason: HOSPADM

## 2018-10-23 RX ORDER — NALOXONE HYDROCHLORIDE 0.4 MG/ML
.1-.4 INJECTION, SOLUTION INTRAMUSCULAR; INTRAVENOUS; SUBCUTANEOUS
Status: DISCONTINUED | OUTPATIENT
Start: 2018-10-23 | End: 2018-10-23 | Stop reason: HOSPADM

## 2018-10-23 RX ORDER — SODIUM CHLORIDE, SODIUM LACTATE, POTASSIUM CHLORIDE, CALCIUM CHLORIDE 600; 310; 30; 20 MG/100ML; MG/100ML; MG/100ML; MG/100ML
INJECTION, SOLUTION INTRAVENOUS CONTINUOUS
Status: DISCONTINUED | OUTPATIENT
Start: 2018-10-23 | End: 2018-10-23 | Stop reason: HOSPADM

## 2018-10-23 RX ORDER — PROPARACAINE HYDROCHLORIDE 5 MG/ML
1 SOLUTION/ DROPS OPHTHALMIC ONCE
Status: COMPLETED | OUTPATIENT
Start: 2018-10-23 | End: 2018-10-23

## 2018-10-23 RX ORDER — MEPERIDINE HYDROCHLORIDE 50 MG/ML
12.5 INJECTION INTRAMUSCULAR; INTRAVENOUS; SUBCUTANEOUS
Status: DISCONTINUED | OUTPATIENT
Start: 2018-10-23 | End: 2018-10-23 | Stop reason: HOSPADM

## 2018-10-23 RX ORDER — PHENYLEPHRINE HYDROCHLORIDE 100 MG/ML
1 SOLUTION/ DROPS OPHTHALMIC
Status: DISCONTINUED | OUTPATIENT
Start: 2018-10-23 | End: 2018-10-23 | Stop reason: HOSPADM

## 2018-10-23 RX ORDER — CYCLOPENTOLATE HYDROCHLORIDE 20 MG/ML
1 SOLUTION/ DROPS OPHTHALMIC
Status: COMPLETED | OUTPATIENT
Start: 2018-10-23 | End: 2018-10-23

## 2018-10-23 RX ORDER — ONDANSETRON 4 MG/1
4 TABLET, ORALLY DISINTEGRATING ORAL EVERY 30 MIN PRN
Status: DISCONTINUED | OUTPATIENT
Start: 2018-10-23 | End: 2018-10-23 | Stop reason: HOSPADM

## 2018-10-23 RX ORDER — MOXIFLOXACIN 5 MG/ML
SOLUTION/ DROPS OPHTHALMIC PRN
Status: DISCONTINUED | OUTPATIENT
Start: 2018-10-23 | End: 2018-10-23 | Stop reason: HOSPADM

## 2018-10-23 RX ADMIN — PROPARACAINE HYDROCHLORIDE 1 DROP: 5 SOLUTION/ DROPS OPHTHALMIC at 12:43

## 2018-10-23 RX ADMIN — CYCLOPENTOLATE HYDROCHLORIDE 1 DROP: 20 SOLUTION/ DROPS OPHTHALMIC at 12:52

## 2018-10-23 RX ADMIN — PHENYLEPHRINE HYDROCHLORIDE 1 DROP: 25 SOLUTION/ DROPS OPHTHALMIC at 12:43

## 2018-10-23 RX ADMIN — PHENYLEPHRINE AND KETOROLAC 300 ML: 10.16; 2.88 INJECTION, SOLUTION, CONCENTRATE INTRAOCULAR at 14:06

## 2018-10-23 RX ADMIN — Medication 0.8 ML: at 14:06

## 2018-10-23 RX ADMIN — MOXIFLOXACIN HYDROCHLORIDE 1 DROP: 5 SOLUTION/ DROPS OPHTHALMIC at 14:06

## 2018-10-23 RX ADMIN — PILOCARPINE HYDROCHLORIDE 1 DROP: 10 SOLUTION/ DROPS OPHTHALMIC at 14:06

## 2018-10-23 RX ADMIN — LIDOCAINE HYDROCHLORIDE 1 ML: 10 INJECTION, SOLUTION EPIDURAL; INFILTRATION; INTRACAUDAL; PERINEURAL at 14:06

## 2018-10-23 RX ADMIN — MOXIFLOXACIN 0.5 ML: 5 SOLUTION/ DROPS OPHTHALMIC at 12:52

## 2018-10-23 RX ADMIN — CYCLOPENTOLATE HYDROCHLORIDE 1 DROP: 20 SOLUTION/ DROPS OPHTHALMIC at 12:43

## 2018-10-23 RX ADMIN — TETRACAINE HYDROCHLORIDE 2 DROP: 5 SOLUTION OPHTHALMIC at 14:07

## 2018-10-23 RX ADMIN — PHENYLEPHRINE HYDROCHLORIDE 1 DROP: 25 SOLUTION/ DROPS OPHTHALMIC at 12:52

## 2018-10-23 RX ADMIN — ACETAMINOPHEN 650 MG: 325 TABLET, FILM COATED ORAL at 12:43

## 2018-10-23 NOTE — OR NURSING
Patient and responsible adult given discharge instructions with no questions regarding instructions. David score 20. Pain level 0/10.  Discharged from unit via walking. Patient discharged to home with ex wife erik.

## 2018-10-23 NOTE — ANESTHESIA CARE TRANSFER NOTE
Patient: Colin Baxter    Procedure(s):  PHACOEMULSIFICATION CATARACT EXTRACTION POSTERIOR CHAMBER LENS LEFT    Diagnosis: COMBINED FORMS OF AGE RELATED CATARACT LEFT EYE  Diagnosis Additional Information: No value filed.    Anesthesia Type:   MAC     Note:  Airway :Nasal Cannula  Patient transferred to:Phase II  Handoff Report: Identifed the Patient, Identified the Reponsible Provider, Reviewed the pertinent medical history, Discussed the surgical course, Reviewed Intra-OP anesthesia mangement and issues during anesthesia, Set expectations for post-procedure period and Allowed opportunity for questions and acknowledgement of understanding      Vitals: (Last set prior to Anesthesia Care Transfer)    CRNA VITALS  10/23/2018 1348 - 10/23/2018 1426      10/23/2018             Pulse: 54    Ht Rate: 53    SpO2: 100 %    Resp Rate (set): 8                Electronically Signed By: DANILO Campos CRNA  October 23, 2018  2:26 PM

## 2018-10-23 NOTE — IP AVS SNAPSHOT
[facility] Preop/Phase II    [address] [address]    Phone:  [phone]                                       After Visit Summary   10/23/2018    Colin Baxter    MRN: [id number]           After Visit Summary Signature Page     I have received my discharge instructions, and my questions have been answered. I have discussed any challenges I see with this plan with the nurse or doctor.    ..........................................................................................................................................  Patient/Patient Representative Signature      ..........................................................................................................................................  Patient Representative Print Name and Relationship to Patient    ..................................................               ................................................  Date                                   Time    ..........................................................................................................................................  Reviewed by Signature/Title    ...................................................              ..............................................  Date                                               Time          22EPIC Rev 08/18

## 2018-10-23 NOTE — ANESTHESIA POSTPROCEDURE EVALUATION
Patient: Colin Baxter    Procedure(s):  PHACOEMULSIFICATION CATARACT EXTRACTION POSTERIOR CHAMBER LENS LEFT    Diagnosis:COMBINED FORMS OF AGE RELATED CATARACT LEFT EYE  Diagnosis Additional Information: No value filed.    Anesthesia Type:  MAC    Note:  Anesthesia Post Evaluation    Patient location during evaluation: PACU  Patient participation: Able to fully participate in evaluation  Level of consciousness: awake and alert  Pain management: adequate  Airway patency: patent  Cardiovascular status: acceptable  Respiratory status: acceptable  Hydration status: acceptable  PONV: none             Last vitals:  Vitals:    10/23/18 1244 10/23/18 1250 10/23/18 1425   BP:  133/93 123/90   Resp:  16    Temp:  97  F (36.1  C)    SpO2: 96% 96% 92%         Electronically Signed By: Christopher Dover MD  October 23, 2018  2:28 PM

## 2018-10-23 NOTE — OP NOTE
[facility]  Ophthalmology Full Operative Note    Pre-operative diagnosis: COMBINED FORMS OF AGE RELATED CATARACT LEFT EYE   Post-operative diagnosis Same   Procedure: Procedure(s):  PHACOEMULSIFICATION CATARACT EXTRACTION POSTERIOR CHAMBER LENS LEFT   Surgeon: Marlo Mcconnell MD   Assistants(s):    Anesthesia: Combined MAC with Topical    Estimated blood loss: None    Total IV fluids: (See anesthesia record)   Specimens: None   Implants: 17.5 LI61AO   Findings:    Complications: None   Condition: Stable   Comments:       PROCEDURAL ANESTHESIA:     Topical/MAC.  Lidocaine 2% jelly topically and Lidocaine 1% preservative-free intracamerally.     PROCEDURE:  The patient was brought to the Operating Room and prepped and draped in a sterile manner.  A wire lid speculum was placed.  A paracentesis was created and 1% Lidocaine was instilled in the anterior chamber.  The anterior chamber was then filled with Amvisc viscoelastic.  A clear cornea temporal wound was created using a 2.8 mm keratome.  A cystotome was used to initiate a flap in the anterior capsule and a Utrata forceps was used to create a continuous tear capsulorhexis.  Hydrodissection was performed.  The phacoemulsification tip was inserted into the eye and the nucleus and epinucleus were removed using a divide and conquer technique.  The residual cortex was removed using the I/A handpiece.  The anterior chamber was then refilled with viscoelastic and the wound was enlarged with the keratome.  The intraocular lens, 17.5 diopter, Model LI61AO, was placed into the injector and injected into the capsular bag. It was checked to make sure that it was central and stable.  Residual viscoelastic was removed using the I/A.  The anterior chamber was refilled with BSS.  The wounds were hydrated with BSS and were noted to be watertight with no suture necessary.  Topical pilocarpine 1% and Vigamox was applied.  A hard shield was placed.     The patient  tolerated the procedure well and was sent to the Recovery Room in satisfactory condition.

## 2018-10-23 NOTE — IP AVS SNAPSHOT
MRN:[id number]                      After Visit Summary   10/23/2018    Colin Baxter    MRN: [id number]           Thank you!     Thank you for choosing [facility] for your care. Our goal is always to provide you with excellent care. Hearing back from our patients is one way we can continue to improve our services. Please take a few minutes to complete the written survey that you may receive in the mail after you visit with us. Thank you!        Patient Information     Date Of Birth          1945        About your hospital stay     You were admitted on:  October 23, 2018 You last received care in the:  [facility]/Phase II    You were discharged on:  October 23, 2018       Who to Call     For medical emergencies, please call 911.  For non-urgent questions about your medical care, please call your primary care provider or clinic, [phone]  For questions related to your surgery, please call your surgery clinic        Attending Provider     Provider Specialty    Marlo Mcconnell MD Ophthalmology       Primary Care Provider Office Phone # Fax #    Luis Tran [phone]      Your next 10 appointments already scheduled     Oct 23, 2018   Procedure with Marlo Mcconnell MD   [facility] ([facility] )    [address] [id number] [phone]            Nov 13, 2018   Procedure with Ed Helm MD   [facility] ([facility])    [address]  [facility] [id number] [phone]            Dec 12, 2018  9:45 AM CST   (Arrive by 9:30 AM)   Cystoscopy with Ed Helm MD   [facility] ([facility] )    [address] [facility] [address]   [phone]              Further instructions from your care team           Post-Anesthesia Patient Instructions    IMMEDIATELY FOLLOWING SURGERY:  Do not drive or operate machinery for the first twenty  "four hours after surgery.  Do not make any important decisions for twenty four hours after surgery or while taking narcotic pain medications or sedatives.  If you develop intractable nausea and vomiting or a severe headache please notify your doctor immediately.    FOLLOW-UP:  Please make an appointment with your surgeon as instructed. You do not need to follow up with anesthesia unless specifically instructed to do so.    WOUND CARE INSTRUCTIONS (if applicable):  Keep a dry clean dressing on the anesthesia/puncture wound site if there is drainage.  Once the wound has quit draining you may leave it open to air.  Generally you should leave the bandage intact for twenty four hours unless there is drainage.  If the epidural site drains for more than 36-48 hours please call the anesthesia department.    QUESTIONS?:  Please feel free to call your physician or the hospital  if you have any questions, and they will be happy to assist you.       Pending Results     No orders found from 10/21/2018 to 10/24/2018.            Admission Information     Date & Time Provider Department Dept. Phone    10/23/2018 Marlo Mcconnell MD [facility] Preop/Phase [phone]      Your Vitals Were     Blood Pressure Temperature Respirations Height Weight Pulse Oximetry    123/90 97  F (36.1  C) (Oral) 16 1.702 m (5' 7\") 102.1 kg (225 lb) 92%    BMI (Body Mass Index)                   35.24 kg/m2           Care EveryWhere ID     This is your Care EveryWhere ID. This could be used by other organizations to access your [facility] medical records  [id number]        Equal Access to Services     SARAY GROVES AH: Hadii oskar browno Sodona, waaxda luqadaha, qaybta kaalmada adeegyada, ralph zacarias. So [facility] [phone].    ATENCIÓN: Si habla español, tiene a berger disposición servicios gratuitos de asistencia lingüística. Kee al [phone].    We comply with applicable federal civil rights laws and [place] laws. " We do not discriminate on the basis of race, color, national origin, age, disability, sex, sexual orientation, or gender identity.               Review of your medicines      UNREVIEWED medicines. Ask your doctor about these medicines        Dose / Directions    brimonidine 0.15 % ophthalmic solution   Commonly known as:  ALPHAGAN-P        INSTILL 1 DROP INTO RIGHT EYE TWICE DAILY   Refills:  12       IBUPROFEN PO        Dose:  200 mg   Take 200 mg by mouth every 6 hours as needed for moderate pain Reported on 5/9/2017   Refills:  0       XALATAN 0.005 % ophthalmic solution   Used for:  Routine general medical examination at a health care facility   Generic drug:  latanoprost        Dose:  1 drop   Place 1 drop into both eyes At Bedtime   Refills:  0                Protect others around you: Learn how to safely use, store and throw away your medicines at www.disposemymeds.org.             Medication List: This is a list of all your medications and when to take them. Check marks below indicate your daily home schedule. Keep this list as a reference.      Medications           Morning Afternoon Evening Bedtime As Needed    brimonidine 0.15 % ophthalmic solution   Commonly known as:  ALPHAGAN-P   INSTILL 1 DROP INTO RIGHT EYE TWICE DAILY                                IBUPROFEN PO   Take 200 mg by mouth every 6 hours as needed for moderate pain Reported on 5/9/2017                                XALATAN 0.005 % ophthalmic solution   Place 1 drop into both eyes At Bedtime   Generic drug:  latanoprost

## 2018-11-08 RX ORDER — KETOROLAC TROMETHAMINE 5 MG/ML
SOLUTION OPHTHALMIC
Refills: 1 | COMMUNITY
Start: 2018-10-10 | End: 2019-03-13

## 2018-11-08 RX ORDER — PREDNISOLONE ACETATE 10 MG/ML
SUSPENSION/ DROPS OPHTHALMIC
Refills: 1 | COMMUNITY
Start: 2018-10-10 | End: 2019-03-13

## 2018-11-12 ENCOUNTER — ANESTHESIA EVENT (OUTPATIENT)
Dept: SURGERY | Facility: OTHER | Age: 73
End: 2018-11-12
Payer: COMMERCIAL

## 2018-11-12 RX ORDER — FENTANYL CITRATE 50 UG/ML
25-50 INJECTION, SOLUTION INTRAMUSCULAR; INTRAVENOUS
Status: CANCELLED | OUTPATIENT
Start: 2018-11-12

## 2018-11-12 RX ORDER — SODIUM CHLORIDE, SODIUM LACTATE, POTASSIUM CHLORIDE, CALCIUM CHLORIDE 600; 310; 30; 20 MG/100ML; MG/100ML; MG/100ML; MG/100ML
INJECTION, SOLUTION INTRAVENOUS CONTINUOUS
Status: CANCELLED | OUTPATIENT
Start: 2018-11-12

## 2018-11-12 RX ORDER — LIDOCAINE 40 MG/G
CREAM TOPICAL
Status: CANCELLED | OUTPATIENT
Start: 2018-11-12

## 2018-11-12 RX ORDER — SODIUM CHLORIDE 9 MG/ML
INJECTION, SOLUTION INTRAVENOUS CONTINUOUS
Status: CANCELLED | OUTPATIENT
Start: 2018-11-12

## 2018-11-12 RX ORDER — ONDANSETRON 2 MG/ML
4 INJECTION INTRAMUSCULAR; INTRAVENOUS EVERY 30 MIN PRN
Status: CANCELLED | OUTPATIENT
Start: 2018-11-12

## 2018-11-12 RX ORDER — MEPERIDINE HYDROCHLORIDE 50 MG/ML
12.5 INJECTION INTRAMUSCULAR; INTRAVENOUS; SUBCUTANEOUS
Status: CANCELLED | OUTPATIENT
Start: 2018-11-12

## 2018-11-12 RX ORDER — ONDANSETRON 4 MG/1
4 TABLET, ORALLY DISINTEGRATING ORAL EVERY 30 MIN PRN
Status: CANCELLED | OUTPATIENT
Start: 2018-11-12

## 2018-11-12 RX ORDER — NALOXONE HYDROCHLORIDE 0.4 MG/ML
.1-.4 INJECTION, SOLUTION INTRAMUSCULAR; INTRAVENOUS; SUBCUTANEOUS
Status: CANCELLED | OUTPATIENT
Start: 2018-11-12 | End: 2018-11-13

## 2018-11-13 ENCOUNTER — ANESTHESIA (OUTPATIENT)
Dept: SURGERY | Facility: OTHER | Age: 73
End: 2018-11-13
Payer: COMMERCIAL

## 2018-11-13 ENCOUNTER — HOSPITAL ENCOUNTER (OUTPATIENT)
Dept: GENERAL RADIOLOGY | Facility: OTHER | Age: 73
End: 2018-11-13
Attending: UROLOGY | Admitting: UROLOGY
Payer: COMMERCIAL

## 2018-11-13 ENCOUNTER — HOSPITAL ENCOUNTER (OUTPATIENT)
Facility: OTHER | Age: 73
Discharge: HOME | End: 2018-11-13
Attending: UROLOGY | Admitting: UROLOGY
Payer: COMMERCIAL

## 2018-11-13 ENCOUNTER — SURGERY (OUTPATIENT)
Age: 73
End: 2018-11-13

## 2018-11-13 VITALS
HEART RATE: 69 BPM | SYSTOLIC BLOOD PRESSURE: 126 MMHG | WEIGHT: 225 LBS | BODY MASS INDEX: 35.24 KG/M2 | DIASTOLIC BLOOD PRESSURE: 65 MMHG | TEMPERATURE: 96.6 F | OXYGEN SATURATION: 95 % | RESPIRATION RATE: 16 BRPM

## 2018-11-13 DIAGNOSIS — C67.9: ICD-10-CM

## 2018-11-13 PROCEDURE — 37000008 ZZH ANESTHESIA TECHNICAL FEE, 1ST 30 MIN: Performed by: UROLOGY

## 2018-11-13 PROCEDURE — 25000128 H RX IP 250 OP 636: Performed by: NURSE ANESTHETIST, CERTIFIED REGISTERED

## 2018-11-13 PROCEDURE — 37000009 ZZH ANESTHESIA TECHNICAL FEE, EACH ADDTL 15 MIN: Performed by: UROLOGY

## 2018-11-13 PROCEDURE — 27210794 ZZH OR GENERAL SUPPLY STERILE: Performed by: UROLOGY

## 2018-11-13 PROCEDURE — 71000027 ZZH RECOVERY PHASE 2 EACH 15 MINS: Performed by: UROLOGY

## 2018-11-13 PROCEDURE — 52204 CYSTOSCOPY W/BIOPSY(S): CPT | Performed by: NURSE ANESTHETIST, CERTIFIED REGISTERED

## 2018-11-13 PROCEDURE — 52005 CYSTO W/URTRL CATHJ: CPT | Mod: 50 | Performed by: UROLOGY

## 2018-11-13 PROCEDURE — 74420 UROGRAPHY RTRGR +-KUB: CPT | Mod: 26 | Performed by: UROLOGY

## 2018-11-13 PROCEDURE — 52204 CYSTOSCOPY W/BIOPSY(S): CPT | Performed by: UROLOGY

## 2018-11-13 PROCEDURE — 25000128 H RX IP 250 OP 636: Performed by: UROLOGY

## 2018-11-13 PROCEDURE — 76499 UNLISTED DX RADIOGRAPHIC PX: CPT

## 2018-11-13 PROCEDURE — 25800025 ZZH RX 258: Performed by: UROLOGY

## 2018-11-13 PROCEDURE — 74420 UROGRAPHY RTRGR +-KUB: CPT

## 2018-11-13 PROCEDURE — 25500064 ZZH RX 255 OP 636: Performed by: UROLOGY

## 2018-11-13 PROCEDURE — 40000306 ZZH STATISTIC PRE PROC ASSESS II: Performed by: UROLOGY

## 2018-11-13 PROCEDURE — 25000125 ZZHC RX 250: Performed by: UROLOGY

## 2018-11-13 PROCEDURE — 25000125 ZZHC RX 250: Performed by: NURSE ANESTHETIST, CERTIFIED REGISTERED

## 2018-11-13 PROCEDURE — C1758 CATHETER, URETERAL: HCPCS | Performed by: UROLOGY

## 2018-11-13 PROCEDURE — 36000050 ZZH SURGERY LEVEL 2 1ST 30 MIN: Performed by: UROLOGY

## 2018-11-13 PROCEDURE — 99100 ANES PT EXTEME AGE<1 YR&>70: CPT | Performed by: NURSE ANESTHETIST, CERTIFIED REGISTERED

## 2018-11-13 PROCEDURE — 88305 TISSUE EXAM BY PATHOLOGIST: CPT

## 2018-11-13 PROCEDURE — 36000052 ZZH SURGERY LEVEL 2 EA 15 ADDTL MIN: Performed by: UROLOGY

## 2018-11-13 RX ORDER — FENTANYL CITRATE 50 UG/ML
25-50 INJECTION, SOLUTION INTRAMUSCULAR; INTRAVENOUS
Status: DISCONTINUED | OUTPATIENT
Start: 2018-11-13 | End: 2018-11-13 | Stop reason: HOSPADM

## 2018-11-13 RX ORDER — NALOXONE HYDROCHLORIDE 0.4 MG/ML
.1-.4 INJECTION, SOLUTION INTRAMUSCULAR; INTRAVENOUS; SUBCUTANEOUS
Status: DISCONTINUED | OUTPATIENT
Start: 2018-11-13 | End: 2018-11-13 | Stop reason: HOSPADM

## 2018-11-13 RX ORDER — LIDOCAINE HYDROCHLORIDE 20 MG/ML
INJECTION, SOLUTION INFILTRATION; PERINEURAL PRN
Status: DISCONTINUED | OUTPATIENT
Start: 2018-11-13 | End: 2018-11-13

## 2018-11-13 RX ORDER — CEFTRIAXONE SODIUM 1 G/50ML
1 INJECTION, SOLUTION INTRAVENOUS
Status: COMPLETED | OUTPATIENT
Start: 2018-11-13 | End: 2018-11-13

## 2018-11-13 RX ORDER — ONDANSETRON 2 MG/ML
4 INJECTION INTRAMUSCULAR; INTRAVENOUS EVERY 30 MIN PRN
Status: DISCONTINUED | OUTPATIENT
Start: 2018-11-13 | End: 2018-11-13 | Stop reason: HOSPADM

## 2018-11-13 RX ORDER — MEPERIDINE HYDROCHLORIDE 50 MG/ML
12.5 INJECTION INTRAMUSCULAR; INTRAVENOUS; SUBCUTANEOUS
Status: DISCONTINUED | OUTPATIENT
Start: 2018-11-13 | End: 2018-11-13 | Stop reason: HOSPADM

## 2018-11-13 RX ORDER — PROPOFOL 10 MG/ML
INJECTION, EMULSION INTRAVENOUS CONTINUOUS PRN
Status: DISCONTINUED | OUTPATIENT
Start: 2018-11-13 | End: 2018-11-13

## 2018-11-13 RX ORDER — OXYCODONE AND ACETAMINOPHEN 5; 325 MG/1; MG/1
1-2 TABLET ORAL EVERY 4 HOURS PRN
Status: DISCONTINUED | OUTPATIENT
Start: 2018-11-13 | End: 2018-11-13 | Stop reason: HOSPADM

## 2018-11-13 RX ORDER — PROPOFOL 10 MG/ML
INJECTION, EMULSION INTRAVENOUS PRN
Status: DISCONTINUED | OUTPATIENT
Start: 2018-11-13 | End: 2018-11-13

## 2018-11-13 RX ORDER — LIDOCAINE 40 MG/G
CREAM TOPICAL
Status: DISCONTINUED | OUTPATIENT
Start: 2018-11-13 | End: 2018-11-13 | Stop reason: HOSPADM

## 2018-11-13 RX ORDER — ONDANSETRON 4 MG/1
4 TABLET, ORALLY DISINTEGRATING ORAL EVERY 30 MIN PRN
Status: DISCONTINUED | OUTPATIENT
Start: 2018-11-13 | End: 2018-11-13 | Stop reason: HOSPADM

## 2018-11-13 RX ORDER — SODIUM CHLORIDE 9 MG/ML
INJECTION, SOLUTION INTRAVENOUS CONTINUOUS
Status: DISCONTINUED | OUTPATIENT
Start: 2018-11-13 | End: 2018-11-13 | Stop reason: HOSPADM

## 2018-11-13 RX ADMIN — CEFTRIAXONE SODIUM 1 G: 1 INJECTION, SOLUTION INTRAVENOUS at 08:09

## 2018-11-13 RX ADMIN — WATER 1750 ML: 100 IRRIGANT IRRIGATION at 08:45

## 2018-11-13 RX ADMIN — SODIUM CHLORIDE: 900 INJECTION, SOLUTION INTRAVENOUS at 08:05

## 2018-11-13 RX ADMIN — PROPOFOL 100 MCG/KG/MIN: 10 INJECTION, EMULSION INTRAVENOUS at 08:09

## 2018-11-13 RX ADMIN — PROPOFOL 20 MG: 10 INJECTION, EMULSION INTRAVENOUS at 08:29

## 2018-11-13 RX ADMIN — LIDOCAINE HYDROCHLORIDE 10 ML: 20 JELLY TOPICAL at 08:15

## 2018-11-13 RX ADMIN — PROPOFOL 30 MG: 10 INJECTION, EMULSION INTRAVENOUS at 08:36

## 2018-11-13 RX ADMIN — PROPOFOL 80 MG: 10 INJECTION, EMULSION INTRAVENOUS at 08:09

## 2018-11-13 RX ADMIN — SODIUM CHLORIDE: 900 INJECTION, SOLUTION INTRAVENOUS at 07:24

## 2018-11-13 RX ADMIN — PROPOFOL 30 MG: 10 INJECTION, EMULSION INTRAVENOUS at 08:21

## 2018-11-13 RX ADMIN — LIDOCAINE HYDROCHLORIDE 60 MG: 20 INJECTION, SOLUTION INFILTRATION; PERINEURAL at 08:09

## 2018-11-13 RX ADMIN — IOHEXOL 8 ML: 300 INJECTION, SOLUTION INTRAVENOUS at 08:25

## 2018-11-13 ASSESSMENT — LIFESTYLE VARIABLES: TOBACCO_USE: 1

## 2018-11-13 NOTE — OP NOTE
Preoperative diagnosis  Bladder tumor    Postoperative diagnosis  Bladder tumor    Procedure performed  Random bladder biopsy x 3  Cystoscopy with bilateral  retrograde pyelogram  Interpretation of retrograde, bilateral    Surgeon  Ed Helm MD    Surgeon(s)/Proceduralist(s) and Assistants (if any)  Surgeon(s):  Ed Helm MD  Circulator: Milly Thomas RN; Azar Morrow RN  Scrub Person: Gilma Garcia  First Assistant: Laney Jacobo RN    Specimen(s)  Yes- Bladder tumor    (EBL) Estimated blood loss (ml)  10    Anesthesia  General    Complications  None    Findings  No bladder tumors, small capacity bladder.  A few areas of mild erythema of the trigone.  Ureters were j-hooking significantly.  Previous resection was over the ureters.    Left retrograde pyelogram revealed a dilated mid-ureter (this is chronic) otherwise non-dilated system with normal caliber upper ureter and renal pelvis with no filling defects.    No filling defects with the renal pelvis or calyces.    Right retrograde pyelogram revealed a delicate system with normal caliber ureter with no filling defects.    No filling defects with the renal pelvis or calyces.    Indications  73 year old male agreed to undergo the above named procedure after discussion of the alternatives, risks and benefits.    The patient has a history of high risk bladder cancer and has had mixed cytology results.  Informed consent was obtained.      Procedure  The patient was taken to the operating room and placed supine on the operating table.  Pre-operative antibiotics were administered and bilateral lower extremity SCDs were placed.    After induction of general anesthesia the patient was positioned in dorsal lithotomy.    A time-out was performed and the patient was prepped and draped in a sterile fashion.      Serial dilation of the meatus was performed to 30 Ugandan in order to allow the scope to safely be passed through the urethra.    A 22 Ugandan rigid  cystoscope was introduced into the bladder under direct vision and cystoscopy was performed.    A Sensor wire was passed through the right  ureteral orifice and a 5 St Helenian catheter was passed over the wire and the wire was removed.    A retrograde pyelogram was performed by slowly injecting Omnipaque contrast, 5 mL, through the 5 St Helenian catheter.    The entire collecting system was fluoroscopically visualized with findings described above.      The 5 St Helenian catheter was then passed through the left ureteral orifice and the same procedure was performed as described above.    The entire collecting system was fluoroscopically visualized with findings described above.      A 22 St Helenian scope was introduced into the bladder.   Using a cold cup biopsy forceps, the bladder was sampled, focusing on the trigone (previous site of resection.  Hemostasis was assured with Bugbee cautery.    The patient tolerated the procedure well, was awakened and transferred to the recovery room in stable condition.    Plan  Follow up in clinic in 3 months for cystoscopy and cytology

## 2018-11-13 NOTE — DISCHARGE INSTRUCTIONS
[facility]   Adult Discharge Orders & Instructions     For 24 hours after surgery    1. Get plenty of rest.  A responsible adult must stay with you for at least 24 hours after you leave the hospital.   2. Do not drive or use heavy equipment.  If you have weakness or tingling, don't drive or use heavy equipment until this feeling goes away.  3. Do not drink alcohol.  4. Avoid strenuous or risky activities.  Ask for help when climbing stairs.   5. You may feel lightheaded.  IF so, sit for a few minutes before standing.  Have someone help you get up.   6. If you have nausea (feel sick to your stomach): Drink only clear liquids such as apple juice, ginger ale, broth or 7-Up.  Rest may also help.  Be sure to drink enough fluids.  Move to a regular diet as you feel able.  7. You may have a slight fever. Call the doctor if your fever is over 101 F (38.3 C) (taken under the tongue) or lasts longer than 24 hours.  8. You may have a dry mouth, a sore throat, muscle aches or trouble sleeping.  These should go away after 24 hours.  9. Do not make important or legal decisions.   Call your doctor for any of the followin.  Signs of infection (fever, growing tenderness at the surgery site, a large amount of drainage or bleeding, severe pain, foul-smelling drainage, redness, swelling).    2. It has been over 8 to 10 hours since surgery and you are still not able to urinate (pass water).    3.  Headache for over 24 hours.    4.  Numbness, tingling or weakness the day after surgery (if you had spinal anesthesia).  To contact a doctor, call    [phone]___________________________

## 2018-11-13 NOTE — ANESTHESIA POSTPROCEDURE EVALUATION
Patient: Colin Baxter    Procedure(s):  Bilateral Retrograde Pyelagram, Bladder Biopsy  COMBINED CYSTOSCOPY, BIOPSY BLADDER    Diagnosis:need for bladder biopsy  Diagnosis Additional Information: No value filed.    Anesthesia Type:  MAC    Note:  Anesthesia Post Evaluation    Patient location during evaluation: Phase 2  Patient participation: Able to fully participate in evaluation  Level of consciousness: awake and alert  Pain management: adequate  Airway patency: patent  Cardiovascular status: acceptable  Respiratory status: acceptable  Hydration status: acceptable  PONV: none             Last vitals:  Vitals:    11/13/18 0709 11/13/18 0852   BP: (!) 147/100 119/78   Pulse: 69    Resp: 16 16   Temp: 97.8  F (36.6  C) 96.6  F (35.9  C)   SpO2: 96% 94%         Electronically Signed By: DANILO MEI CRNA  November 13, 2018  9:09 AM

## 2018-11-13 NOTE — OR NURSING
Patient has been discharged to home at 1010 via ambulatory accompanied by wife    Written discharge instructions were provided to patient.  Prescriptions were N/A.      Patient and adult caring for them verbalize understanding of discharge instructions including no driving until tomorrow and no longer taking narcotic pain medications - no operating mechanical equipment and no making any important decisions.They understand reason for discharge, and necessary follow-up appointments.      Keysha Phan RN

## 2018-11-13 NOTE — IP AVS SNAPSHOT
[facility]    [address]    [place] [address]    Phone:  [phone]    Fax:  [phone]                                       After Visit Summary   11/13/2018    Colin Baxter    MRN: [id number]           After Visit Summary Signature Page     I have received my discharge instructions, and my questions have been answered. I have discussed any challenges I see with this plan with the nurse or doctor.    ..........................................................................................................................................  Patient/Patient Representative Signature      ..........................................................................................................................................  Patient Representative Print Name and Relationship to Patient    ..................................................               ................................................  Date                                   Time    ..........................................................................................................................................  Reviewed by Signature/Title    ...................................................              ..............................................  Date                                               Time          22EPIC Rev 08/18

## 2018-11-13 NOTE — ANESTHESIA CARE TRANSFER NOTE
Patient: Colin Baxter    Procedure(s):  Bilateral Retrograde Pyelagram, Bladder Biopsy  COMBINED CYSTOSCOPY, BIOPSY BLADDER    Diagnosis: need for bladder biopsy  Diagnosis Additional Information: No value filed.    Anesthesia Type:   MAC     Note:  Airway :Room Air  Patient transferred to:Phase II  Handoff Report: Identifed the Patient, Identified the Reponsible Provider, Reviewed the pertinent medical history, Discussed the surgical course, Reviewed Intra-OP anesthesia mangement and issues during anesthesia, Set expectations for post-procedure period and Allowed opportunity for questions and acknowledgement of understanding      Vitals: (Last set prior to Anesthesia Care Transfer)    CRNA VITALS  11/13/2018 0821 - 11/13/2018 0908      11/13/2018             Pulse: 75    Ht Rate: 75    SpO2: 92 %    Resp Rate (set): 10                Electronically Signed By: DANILO MEI CRNA  November 13, 2018  9:08 AM

## 2018-11-13 NOTE — IP AVS SNAPSHOT
MRN:[id number]                      After Visit Summary   11/13/2018    Colin Baxter    MRN: [id number]           Thank you!     Thank you for choosing [facility] for your care. Our goal is always to provide you with excellent care. Hearing back from our patients is one way we can continue to improve our services. Please take a few minutes to complete the written survey that you may receive in the mail after you visit with us. Thank you!        Patient Information     Date Of Birth          1945        About your hospital stay     You were admitted on:  November 13, 2018 You last received care in the:  [facility]    You were discharged on:  November 13, 2018       Who to Call     For medical emergencies, please call 911.  For non-urgent questions about your medical care, please call your primary care provider or clinic, [phone]  For questions related to your surgery, please call your surgery clinic        Attending Provider     Provider Specialty    Ed Hlem MD Urology       Primary Care Provider Office Phone # Fax #    Luis Tran [phone]      After Care Instructions     Diet Instructions       Resume pre procedure diet            Discharge Instructions       Follow up appointment in about 3 months for cystoscopy and cytology ([place])            Encourage fluids       Encourage fluids at home to keep urine clear to light pink            No Alcohol       for 24 hours post procedure            No driving or operating machinery        until the day after procedure            Nursing Communication 1       Patient must void prior to discharge                  Your next 10 appointments already scheduled     Dec 12, 2018  9:45 AM CST   (Arrive by 9:30 AM)   Cystoscopy with Ed Helm MD   [facility] [place] ([facility] [place] )    [address]  [place] [address]   [phone]              Further  instructions from your care team       [facility]   Adult Discharge Orders & Instructions     For 24 hours after surgery    1. Get plenty of rest.  A responsible adult must stay with you for at least 24 hours after you leave the hospital.   2. Do not drive or use heavy equipment.  If you have weakness or tingling, don't drive or use heavy equipment until this feeling goes away.  3. Do not drink alcohol.  4. Avoid strenuous or risky activities.  Ask for help when climbing stairs.   5. You may feel lightheaded.  IF so, sit for a few minutes before standing.  Have someone help you get up.   6. If you have nausea (feel sick to your stomach): Drink only clear liquids such as apple juice, ginger ale, broth or 7-Up.  Rest may also help.  Be sure to drink enough fluids.  Move to a regular diet as you feel able.  7. You may have a slight fever. Call the doctor if your fever is over 101 F (38.3 C) (taken under the tongue) or lasts longer than 24 hours.  8. You may have a dry mouth, a sore throat, muscle aches or trouble sleeping.  These should go away after 24 hours.  9. Do not make important or legal decisions.   Call your doctor for any of the followin.  Signs of infection (fever, growing tenderness at the surgery site, a large amount of drainage or bleeding, severe pain, foul-smelling drainage, redness, swelling).    2. It has been over 8 to 10 hours since surgery and you are still not able to urinate (pass water).    3.  Headache for over 24 hours.    4.  Numbness, tingling or weakness the day after surgery (if you had spinal anesthesia).  To contact a doctor, call    [phone]___________________________    Pending Results     Date and Time Order Name Status Description    2018 0836 Surgical pathology exam In process     2018 0724 XR Surgery ESSENCE L/T 5 Min Fluoro In process             Admission Information     Date & Time Provider Department Dept. Phone    2018 Ed Helm MD  [facility] [phone]      Your Vitals Were     Blood Pressure Pulse Temperature Respirations Weight Pulse Oximetry    128/90 69 96.6  F (35.9  C) 16 102.1 kg (225 lb) 95%    BMI (Body Mass Index)                   35.24 kg/m2           Care EveryWhere ID     This is your Care EveryWhere ID. This could be used by other organizations to access your [facility] medical records  [id number]        Equal Access to Services     SARAY GROVES : Hadii oskar ku hadasho Soomaali, waaxda luqadaha, qaybta kaalmada adeegyada, ralph presleyjenifferjeanne zacarias. So [facility] [place] [facility] [phone].    ATENCIÓN: Si moise crystal, tiene a berger disposición servicios gratuitos de asistencia lingüística. Kee al [phone].    We comply with applicable federal civil rights laws and [place] laws. We do not discriminate on the basis of race, color, national origin, age, disability, sex, sexual orientation, or gender identity.               Review of your medicines      UNREVIEWED medicines. Ask your doctor about these medicines        Dose / Directions    brimonidine 0.15 % ophthalmic solution   Commonly known as:  ALPHAGAN-P        INSTILL 1 DROP INTO RIGHT EYE TWICE DAILY   Refills:  12       IBUPROFEN PO        Dose:  200 mg   Take 200 mg by mouth every 6 hours as needed for moderate pain Reported on 5/9/2017   Refills:  0       ketorolac 0.5 % ophthalmic solution   Commonly known as:  ACULAR        INSTILL 1 DROP INTO LEFT EYE FOUR TIMES FOR 3 DAYS BEFORE SURGERY, FOR 1 WEEK AFTER, THEN 3 TIMES DAILY FOR 5 WEEKS   Refills:  1       prednisoLONE acetate 1 % ophthalmic susp   Commonly known as:  PRED FORTE        INSTILL 1 DROP INTO LEFT EYE FOUR TIMES A DAY FOR 1 WEEK, THEN 3 TIMES DAILY FOR 5 WEEKS, BEGIN AFTER SURGERY   Refills:  1       XALATAN 0.005 % ophthalmic solution   Used for:  Routine general medical examination at a health care facility   Generic drug:  latanoprost        Dose:  1 drop   Place 1 drop into  both eyes At Bedtime   Refills:  0                Protect others around you: Learn how to safely use, store and throw away your medicines at www.disposemymeds.org.             Medication List: This is a list of all your medications and when to take them. Check marks below indicate your daily home schedule. Keep this list as a reference.      Medications           Morning Afternoon Evening Bedtime As Needed    brimonidine 0.15 % ophthalmic solution   Commonly known as:  ALPHAGAN-P   INSTILL 1 DROP INTO RIGHT EYE TWICE DAILY                                IBUPROFEN PO   Take 200 mg by mouth every 6 hours as needed for moderate pain Reported on 5/9/2017                                ketorolac 0.5 % ophthalmic solution   Commonly known as:  ACULAR   INSTILL 1 DROP INTO LEFT EYE FOUR TIMES FOR 3 DAYS BEFORE SURGERY, FOR 1 WEEK AFTER, THEN 3 TIMES DAILY FOR 5 WEEKS                                prednisoLONE acetate 1 % ophthalmic susp   Commonly known as:  PRED FORTE   INSTILL 1 DROP INTO LEFT EYE FOUR TIMES A DAY FOR 1 WEEK, THEN 3 TIMES DAILY FOR 5 WEEKS, BEGIN AFTER SURGERY                                XALATAN 0.005 % ophthalmic solution   Place 1 drop into both eyes At Bedtime   Generic drug:  latanoprost

## 2018-11-13 NOTE — ANESTHESIA PREPROCEDURE EVALUATION
Anesthesia Evaluation     . Pt has had prior anesthetic. Type: General and MAC           ROS/MED HX    ENT/Pulmonary:     (+)tobacco use, Past use , . .    Neurologic:  - neg neurologic ROS     Cardiovascular:  - neg cardiovascular ROS       METS/Exercise Tolerance:     Hematologic:  - neg hematologic  ROS       Musculoskeletal:  - neg musculoskeletal ROS       GI/Hepatic:  - neg GI/hepatic ROS       Renal/Genitourinary:  - ROS Renal section negative       Endo: Comment: Diet controlled    (+) Obesity, .      Psychiatric:  - neg psychiatric ROS       Infectious Disease:  - neg infectious disease ROS       Malignancy:   (+) Malignancy History of Other  Other CA Bladder status post         Other:    - neg other ROS                 Physical Exam  Normal systems: dental    Airway   Mallampati: II  TM distance: >3 FB  Neck ROM: full    Dental     Cardiovascular   Rhythm and rate: regular and normal      Pulmonary    breath sounds clear to auscultation                    Anesthesia Plan      History & Physical Review      ASA Status:  2 .    NPO Status:  > 8 hours    Plan for MAC (agrees to GA with LMA if needed.  Has hx of failed MAC) with Propofol induction. Maintenance will be Balanced.    PONV prophylaxis:  Ondansetron (or other 5HT-3) and Dexamethasone or Solumedrol       Postoperative Care  Postoperative pain management:  IV analgesics, Oral pain medications and Multi-modal analgesia.      Consents  Anesthetic plan, risks, benefits and alternatives discussed with:  Patient and Spouse..                          .

## 2018-11-13 NOTE — ANESTHESIA POSTPROCEDURE EVALUATION
Patient: Colin Baxter    Procedure(s):  Bilateral Retrograde Pyelagram, Bladder Biopsy  COMBINED CYSTOSCOPY, BIOPSY BLADDER    Diagnosis:need for bladder biopsy  Diagnosis Additional Information: No value filed.    Anesthesia Type:  MAC    Note:  Anesthesia Post Evaluation    Patient location during evaluation: Phase 2  Patient participation: Able to fully participate in evaluation  Level of consciousness: awake and alert  Pain management: adequate  Airway patency: patent  Cardiovascular status: acceptable  Respiratory status: acceptable  Hydration status: acceptable  PONV: none     Anesthetic complications: None          Last vitals:  Vitals:    11/13/18 0915 11/13/18 0930 11/13/18 0945   BP: 130/78 128/90 126/65   Pulse:      Resp:      Temp:      SpO2: 95%           Electronically Signed By: DANILO Peralta CRNA  November 13, 2018  12:57 PM

## 2018-11-13 NOTE — H&P
Type of Note  Preop    HPI  The patient is a 73 year old male who plans to undergo surgery.    Past Medical History  He  has no past medical history on file.  Patient Active Problem List   Diagnosis     Comprehensive Medical Examination     Glaucoma     Prediabetes     ACP (advance care planning)     Obesity (BMI 35.0-39.9) with comorbidity (H)     History of high risk bladder cancer       Past Surgical History  He  has a past surgical history that includes appendectomy (1958); Cystoscopy, transurethral resection (TUR) tumor bladder, combined (N/A, 9/8/2015); Cystoscopy, biopsy bladder, combined (N/A, 9/8/2015); Cystoscopy, retrogrades, insert stent ureter(s), combined (Left, 9/8/2015); Cystoscopy, transurethral resection (TUR) tumor bladder, combined (N/A, 1/12/2016); colonoscopy (2-); Cystoscopy, transurethral resection (TUR) tumor bladder, combined (N/A, 9/13/2016); Cystoscopy, biopsy bladder, combined (N/A, 2/14/2017); Phacoemulsification with standard intraocular lens implant (Right, 10/9/2018); and Phacoemulsification with standard intraocular lens implant (Left, 10/23/2018).    Medications    Current Facility-Administered Medications:      cefTRIAXone in d5w (ROCEPHIN) intermittent infusion 1 g, 1 g, Intravenous, Pre-Op/Pre-procedure x 1 dose, Ed Helm MD     lidocaine (LMX4) cream, , Topical, Q1H PRN, Omar Cleveland APRN CRNA     lidocaine 1 % 1 mL, 1 mL, Other, Q1H PRN, Omar Cleveland APRN CRNA     sodium chloride (PF) 0.9% PF flush 3 mL, 3 mL, Intracatheter, Q1H PRN, Omar Cleveland APRN CRNA     sodium chloride (PF) 0.9% PF flush 3 mL, 3 mL, Intracatheter, Q8H, Omar Cleveland APRN CRNA     sodium chloride 0.9% infusion, , Intravenous, Continuous, Omar Cleveland APRN CRNA, Last Rate: 10 mL/hr at 11/13/18 0724    Allergies  Allergies   Allergen Reactions     No Clinical Screening - See Comments Other (See Comments)     Beta blocker in glaucoma gtt.s caused low pulse and passing  out        Social History  He  reports that he quit smoking about 26 years ago. He has never used smokeless tobacco. He reports that he drinks alcohol. He reports that he does not use illicit drugs.  No drug abuse.    Family History  Family History   Problem Relation Age of Onset     Diabetes Mother      Parkinsonism Brother        Review of Systems  I personally reviewed the ROS with the patient.    Recent fever/chills: No  Night sweats: No   Current skin rash: No   Recent hair loss: No   Heat intolerance: No   Cold intolerance: No   Chest pain: No   Palpitations: No   Shortness of breath: No  Wheezing: No   Constipation: No   Diarrhea: No   Nausea: No    Vomiting: No   Kidney/side pain: No   Back pain: No  Frequent headaches: No  Dizziness: No   Leg swelling: No   Calf pain: No      Physical Exam  Vitals:    11/13/18 0709   BP: (!) 147/100   Pulse: 69   Resp: 16   Temp: 97.8  F (36.6  C)   TempSrc: Temporal   SpO2: 96%   Weight: 102.1 kg (225 lb)     Constitutional: NAD, WDWN.   Head: NCAT  Eyes: Conjunctivae normal  Cardiovascular: Regular rate and rhythm  Pulmonary/Chest: Clear to auscultation bilaterally  Abdominal: Soft. No distension, tenderness, masses or guarding.  Neurological: A + O x 3.  Cranial Nerves II-XII grossly intact.  Extremities: THEA x 4, Warm. No clubbing.  No cyanosis.    Skin: Pink, warm and dry.  No rashes noted.  Psychiatric:  Normal mood and affect    Assessment & Plan  The patient is a 73 year old male who plans to undergo surgery.

## 2019-02-18 DIAGNOSIS — Z85.51 PERSONAL HISTORY OF MALIGNANT NEOPLASM OF BLADDER: Primary | ICD-10-CM

## 2019-02-27 ENCOUNTER — OFFICE VISIT (OUTPATIENT)
Dept: UROLOGY | Facility: OTHER | Age: 74
End: 2019-02-27
Attending: UROLOGY
Payer: COMMERCIAL

## 2019-02-27 VITALS
HEART RATE: 69 BPM | OXYGEN SATURATION: 97 % | RESPIRATION RATE: 16 BRPM | SYSTOLIC BLOOD PRESSURE: 128 MMHG | HEIGHT: 67 IN | TEMPERATURE: 96.8 F | BODY MASS INDEX: 34.84 KG/M2 | DIASTOLIC BLOOD PRESSURE: 72 MMHG | WEIGHT: 222 LBS

## 2019-02-27 DIAGNOSIS — Z85.51 HISTORY OF BLADDER CANCER: Primary | ICD-10-CM

## 2019-02-27 DIAGNOSIS — Z85.51 PERSONAL HISTORY OF MALIGNANT NEOPLASM OF BLADDER: ICD-10-CM

## 2019-02-27 DIAGNOSIS — Z85.51 HISTORY OF BLADDER CANCER: ICD-10-CM

## 2019-02-27 PROCEDURE — G0463 HOSPITAL OUTPT CLINIC VISIT: HCPCS | Mod: 25

## 2019-02-27 PROCEDURE — 99212 OFFICE O/P EST SF 10 MIN: CPT | Mod: 25 | Performed by: UROLOGY

## 2019-02-27 PROCEDURE — G0463 HOSPITAL OUTPT CLINIC VISIT: HCPCS

## 2019-02-27 PROCEDURE — 52005 CYSTO W/URTRL CATHJ: CPT | Performed by: UROLOGY

## 2019-02-27 PROCEDURE — 88108 CYTOPATH CONCENTRATE TECH: CPT | Mod: TC | Performed by: UROLOGY

## 2019-02-27 ASSESSMENT — PAIN SCALES - GENERAL: PAINLEVEL: NO PAIN (0)

## 2019-02-27 ASSESSMENT — MIFFLIN-ST. JEOR: SCORE: 1715.62

## 2019-02-27 NOTE — PROGRESS NOTES
Preprocedure diagnosis  History of bladder cancer, high risk    Postprocedure diagnosis  History of bladder cancer, high risk    Procedure  Flexible Cystourethroscopy and bladder barbotage    Surgeon  dE Helm MD    Anesthesia  2% lidocaine jelly intraurethrally    Complications  None    Indications  The patient is undergoing a flexible cystoscopy for the above mentioned indications.  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   2016 Ta, 2016 CIS  Intravesical Treatments:  Induction BCG , reinduction BCG , Patrick-Cis x 3 2016, 3/2017, 2018, 2018  Date of Last UT Imagin2017    Random bladder bx  2018 (negative)    Findings  Cystoscopic findings included a normal anterior urethra.    There was a prominent median lobe.    The lateral lobes were obstructive in appearance.  The bladder appeared to be normal capacity.    There were no tumors, stones or foreign bodies.    The orifices were slit-shaped and in their normal location.  Multiple areas of mild erythema as expected following BCG.    Procedure  The patient was placed in supine position and prepped and draped in sterile fashion with lidocaine jelly per urethra for anesthesia.    I passed a lubricated 14F flexible cystoscope through the penile urethra and into the bladder and the bladder was completely visualized.  The cystoscope was retroflexed and the bladder neck and prostate visualized.      A bladder barbotage was performed and urine collected and sent for cytology.    The cystoscope was slowly withdrawn while visualizing the urethra and the procedure terminated.    The patient tolerated the procedure well.      Imaging  CTU  2017  IMPRESSION:  1.  LACK OF DISTENTION OF THE URINARY BLADDER SOMEWHAT LIMITS  EVALUATION.  THICKENING OF THE MUCOSA ALONG  THE POSTERIOR AND LEFT  DORSOLATERAL ASPECT OF THE BLADDER IS NOT READILY SEEN ON THE CURRENT  EXAMINATION.     2.  NO EVIDENCE OF LYMPHADENOPATHY OR EVIDENCE TO SUGGEST RENAL  CALCULUS OR RENAL LESION.     3.  MILD DILATATION OF THE MID ASPECT OF THE LEFT URETER, LIKELY  UNCHANGED FROM THE PRIOR EXAMINATION.    Plan  Follow up surveillance cystoscopy q6 months with cytology collected at time of cystoscopy  Maintenance gemcitabine/mitomycin (dose listed below) once weekly x 3 treatments now then again in 6 months.   - At that time he will be 3 years out from his last bladder tumor recurrence        mitoMYcin (MUTAMYCIN) 20 mg in sterile water (preservative free) 20 mL CHEMOTHERAPY   [[id number]]   Ordered Dose: 20 mg Route: Bladder Instillation Frequency: ONCE   Administration Dose: 20 mg      Dispense in 60 mL syringe. Instill into the bladder via urinary catheter. Not for IV use. Dwell time 60 minutes (as tolerated).      gemcitabine (GEMZAR) 1,000 mg in sodium chloride 0.9 % 50 mL catheter syringe CHEMOTHERAPY   [[id number]]   Ordered Dose: 1,000 mg Route: INTRAVESICAL Frequency: ONCE   Volume: 50 mL      Dispense in 60 mL syringe. Instill into the bladder via urinary catheter. Not for IV use. Dwell time 60 minutes (as tolerated).            I spent 10 minutes on this patient's visit (exclusive of separately billed services/procedures) and over half of this time was spent in face-to-face counseling regarding his diagnosis, treatment options with emphasis on  risks and benefits of each, prognosis and importance of compliance.

## 2019-02-27 NOTE — NURSING NOTE
Patient positioned in supine position, perineum area prepped with chlorhexidene Gluconate and patient draped per sterile technique. Per verbal order read back by Ed Helm MD, Urojet 10mL 2% lidocaine jelly to be instilled into urethra.  Urojet- 10ml 2% Lidocaine jelly instilled into the urethra.    Urojet 2%  Lot#: [id number]  Expiration date: 7/20  : [employer]  NDC: [address]-7    [place] Protocol    A. Pre-procedure verification complete Yes  1-relevant information / documentation available, reviewed and properly matched to the patient; 2-consent accurate and complete, 3-equipment and supplies available    B. Site marking complete N/A  Site marked if not in continuous attendance with patient    C. TIME OUT completed Yes  Time Out was conducted just prior to starting procedure to verify the eight required elements: 1-patient identity, 2-consent accurate and complete, 3-position, 4-correct side/site marked (if applicable), 5-procedure, 6-relevant images / results properly labeled and displayed (if applicable), 7-antibiotics / irrigation fluids (if applicable), 8-safety precautions.    After procedure perineum area rinsed. Discharge instructions reviewed with patient. Patient verbalized understanding of discharge instructions and discharged ambulatory.  Malka Mcnulty..................2/27/2019  10:14 AM

## 2019-02-27 NOTE — PATIENT INSTRUCTIONS

## 2019-02-27 NOTE — NURSING NOTE
"Chief Complaint   Patient presents with     Cystoscopy     Bladder cancer       Initial /72   Pulse 69   Temp 96.8  F (36  C) (Tympanic)   Resp 16   Ht 1.702 m (5' 7\")   Wt 100.7 kg (222 lb)   SpO2 97%   BMI 34.77 kg/m   Estimated body mass index is 34.77 kg/m  as calculated from the following:    Height as of this encounter: 1.702 m (5' 7\").    Weight as of this encounter: 100.7 kg (222 lb).  Medication Reconciliation: complete   Review of Systems:    Weight loss:    No     Recent fever/chills:  No   Night sweats:   No  Current skin rash:  No   Recent hair loss:  No  Heat intolerance:  No   Cold intolerance:  No  Chest pain:   No   Palpitations:   No  Shortness of breath:  No   Wheezing:   No  Constipation:    No   Diarrhea:   No   Nausea:   No   Vomiting:   No   Kidney/side pain:  No   Back pain:   No  Frequent headaches:  No   Dizziness:     No  Leg swelling:   No   Calf pain:    No        Lexi Dove LPN    "

## 2019-03-01 LAB — COPATH REPORT: NORMAL

## 2019-03-05 ENCOUNTER — TELEPHONE (OUTPATIENT)
Dept: UROLOGY | Facility: OTHER | Age: 74
End: 2019-03-05

## 2019-03-05 DIAGNOSIS — Z85.51 PERSONAL HISTORY OF MALIGNANT NEOPLASM OF BLADDER: ICD-10-CM

## 2019-03-05 DIAGNOSIS — Z85.51 HISTORY OF BLADDER CANCER: ICD-10-CM

## 2019-03-05 RX ORDER — LIDOCAINE HYDROCHLORIDE 20 MG/ML
10 JELLY TOPICAL
Status: CANCELLED | OUTPATIENT
Start: 2019-09-01

## 2019-03-05 RX ORDER — LIDOCAINE HYDROCHLORIDE 20 MG/ML
10 JELLY TOPICAL
Status: CANCELLED | OUTPATIENT
Start: 2019-09-15

## 2019-03-05 RX ORDER — LIDOCAINE HYDROCHLORIDE 20 MG/ML
10 JELLY TOPICAL
Status: CANCELLED | OUTPATIENT
Start: 2019-09-08

## 2019-03-05 NOTE — TELEPHONE ENCOUNTER
Received in basket message from Dr. Helm requesting patient be scheduled for mitomycin and gemzar bladder instillations once a week X 3 weeks and again in 6 months.  Plan placed and sent for signature.  Requested [facility] to schedule.

## 2019-03-07 ENCOUNTER — TELEPHONE (OUTPATIENT)
Dept: UROLOGY | Facility: OTHER | Age: 74
End: 2019-03-07

## 2019-03-07 NOTE — TELEPHONE ENCOUNTER
Spoke with patient.  He has sodium bicarbonate, 6 pills, at home.  Reminded him to take evening prior and morning of upcoming treatment.  Verbalizes understanding.

## 2019-03-13 ENCOUNTER — INFUSION THERAPY VISIT (OUTPATIENT)
Dept: INFUSION THERAPY | Facility: OTHER | Age: 74
End: 2019-03-13
Attending: UROLOGY
Payer: COMMERCIAL

## 2019-03-13 VITALS
OXYGEN SATURATION: 95 % | SYSTOLIC BLOOD PRESSURE: 130 MMHG | TEMPERATURE: 97.4 F | BODY MASS INDEX: 34.47 KG/M2 | WEIGHT: 219.6 LBS | HEART RATE: 70 BPM | HEIGHT: 67 IN | RESPIRATION RATE: 20 BRPM | DIASTOLIC BLOOD PRESSURE: 80 MMHG

## 2019-03-13 DIAGNOSIS — Z85.51 HISTORY OF BLADDER CANCER: Primary | ICD-10-CM

## 2019-03-13 DIAGNOSIS — Z85.51 PERSONAL HISTORY OF MALIGNANT NEOPLASM OF BLADDER: ICD-10-CM

## 2019-03-13 LAB
ALBUMIN UR-MCNC: 10 MG/DL
APPEARANCE UR: CLEAR
BILIRUB UR QL STRIP: NEGATIVE
COLOR UR AUTO: ABNORMAL
GLUCOSE UR STRIP-MCNC: NEGATIVE MG/DL
HGB UR QL STRIP: NEGATIVE
KETONES UR STRIP-MCNC: NEGATIVE MG/DL
LEUKOCYTE ESTERASE UR QL STRIP: NEGATIVE
NITRATE UR QL: NEGATIVE
PH UR STRIP: 5.5 PH (ref 4.7–8)
SOURCE: ABNORMAL
SP GR UR STRIP: 1.02 (ref 1–1.03)
UROBILINOGEN UR STRIP-MCNC: NORMAL MG/DL (ref 0–2)

## 2019-03-13 PROCEDURE — 51720 TREATMENT OF BLADDER LESION: CPT

## 2019-03-13 PROCEDURE — 25000125 ZZHC RX 250: Performed by: UROLOGY

## 2019-03-13 PROCEDURE — 51702 INSERT TEMP BLADDER CATH: CPT

## 2019-03-13 PROCEDURE — 25800030 ZZH RX IP 258 OP 636: Performed by: UROLOGY

## 2019-03-13 PROCEDURE — 81003 URINALYSIS AUTO W/O SCOPE: CPT | Mod: ZL | Performed by: UROLOGY

## 2019-03-13 PROCEDURE — 25000128 H RX IP 250 OP 636: Performed by: UROLOGY

## 2019-03-13 RX ORDER — SODIUM BICARBONATE 650 MG/1
TABLET ORAL
Qty: 12 TABLET | Refills: 0 | Status: CANCELLED | OUTPATIENT
Start: 2019-03-13

## 2019-03-13 RX ORDER — SODIUM BICARBONATE 650 MG/1
TABLET ORAL
Qty: 12 TABLET | Refills: 0 | Status: SHIPPED | OUTPATIENT
Start: 2019-03-13 | End: 2020-02-03

## 2019-03-13 RX ADMIN — MITOMYCIN 20 MG: 20 INJECTION, POWDER, LYOPHILIZED, FOR SOLUTION INTRAVENOUS at 11:28

## 2019-03-13 RX ADMIN — WATER 60 ML: 100 INJECTION, SOLUTION INTRAVENOUS at 11:10

## 2019-03-13 RX ADMIN — GEMCITABINE 1000 MG: 38 INJECTION, SOLUTION INTRAVENOUS at 10:20

## 2019-03-13 RX ADMIN — LIDOCAINE HYDROCHLORIDE 10 ML: 20 JELLY TOPICAL at 09:29

## 2019-03-13 RX ADMIN — WATER 60 ML: 100 INJECTION, SOLUTION INTRAVENOUS at 12:55

## 2019-03-13 ASSESSMENT — PAIN SCALES - GENERAL: PAINLEVEL: NO PAIN (0)

## 2019-03-13 ASSESSMENT — MIFFLIN-ST. JEOR: SCORE: 1704.73

## 2019-03-13 NOTE — PATIENT INSTRUCTIONS
Drink plenty of fluids for the next 6 hours. Do not urinate for 1 hour following bladder istMake sure to flush the toilet 3 times with each void for next 6 hours. Make sure to sit when using the bathroom for the next 6 hrs. If you have any questions or concerns please call [phone] and or f/u with Dr. Dale nurse.

## 2019-03-20 ENCOUNTER — INFUSION THERAPY VISIT (OUTPATIENT)
Dept: INFUSION THERAPY | Facility: OTHER | Age: 74
End: 2019-03-20
Attending: UROLOGY
Payer: COMMERCIAL

## 2019-03-20 VITALS
BODY MASS INDEX: 34.65 KG/M2 | HEIGHT: 67 IN | TEMPERATURE: 97.6 F | RESPIRATION RATE: 16 BRPM | SYSTOLIC BLOOD PRESSURE: 120 MMHG | OXYGEN SATURATION: 94 % | WEIGHT: 220.8 LBS | DIASTOLIC BLOOD PRESSURE: 80 MMHG | HEART RATE: 71 BPM

## 2019-03-20 DIAGNOSIS — Z85.51 HISTORY OF BLADDER CANCER: Primary | ICD-10-CM

## 2019-03-20 DIAGNOSIS — Z85.51 PERSONAL HISTORY OF MALIGNANT NEOPLASM OF BLADDER: ICD-10-CM

## 2019-03-20 LAB
ALBUMIN UR-MCNC: 30 MG/DL
APPEARANCE UR: CLEAR
BILIRUB UR QL STRIP: NEGATIVE
COLOR UR AUTO: YELLOW
GLUCOSE UR STRIP-MCNC: >1000 MG/DL
HGB UR QL STRIP: ABNORMAL
KETONES UR STRIP-MCNC: NEGATIVE MG/DL
LEUKOCYTE ESTERASE UR QL STRIP: NEGATIVE
NITRATE UR QL: NEGATIVE
PH UR STRIP: 5.5 PH (ref 4.7–8)
SOURCE: ABNORMAL
SP GR UR STRIP: 1.02 (ref 1–1.03)
UROBILINOGEN UR STRIP-MCNC: NORMAL MG/DL (ref 0–2)

## 2019-03-20 PROCEDURE — 25000128 H RX IP 250 OP 636: Performed by: UROLOGY

## 2019-03-20 PROCEDURE — 51720 TREATMENT OF BLADDER LESION: CPT

## 2019-03-20 PROCEDURE — 25000125 ZZHC RX 250: Performed by: UROLOGY

## 2019-03-20 PROCEDURE — 81003 URINALYSIS AUTO W/O SCOPE: CPT | Mod: ZL | Performed by: UROLOGY

## 2019-03-20 PROCEDURE — 25800030 ZZH RX IP 258 OP 636: Performed by: UROLOGY

## 2019-03-20 PROCEDURE — 51702 INSERT TEMP BLADDER CATH: CPT

## 2019-03-20 RX ADMIN — LIDOCAINE HYDROCHLORIDE 10 ML: 20 JELLY TOPICAL at 10:04

## 2019-03-20 RX ADMIN — GEMCITABINE HYDROCHLORIDE 1000 MG: 1 INJECTION, SOLUTION INTRAVENOUS at 10:19

## 2019-03-20 RX ADMIN — WATER 60 ML: 100 INJECTION, SOLUTION INTRAVENOUS at 13:00

## 2019-03-20 RX ADMIN — MITOMYCIN 20 MG: 20 INJECTION, POWDER, LYOPHILIZED, FOR SOLUTION INTRAVENOUS at 11:44

## 2019-03-20 RX ADMIN — WATER 60 ML: 100 INJECTION, SOLUTION INTRAVENOUS at 11:35

## 2019-03-20 ASSESSMENT — MIFFLIN-ST. JEOR: SCORE: 1710.29

## 2019-03-20 NOTE — PROGRESS NOTES
73 y/o male Patient here for bladder instillation of gemzar/mitomycin.  UA neg for nitrates, neg for leukocytes.  Denies any fever or chills.  Denies any pain with urination.  Results reviewed, labs met treatment parameters. MD reviewed lab results okay to proceed with treatment.  16 [place] catheter placed, sterile technique.  Allowed bladder to empty 100 ccs of urine returned.   1019 Gemzar instillation via catheter, tolerated well, catheter clamped.  Patient turned every 15 minutes per protocol, tolerated well.  Offered no complaints.  1130 catheter unclamped and allowed to drain, 200 ccs of return.  1135Bladder irrigated with 60cc of sterile water, allowed to drain, 100 ccs of return.  VS stable.  Patient offers no complaints.  1144 Mitomycin 20 mg instilled via catheter, catheter clamped.  Patient turned every 15 minutes per protocol, tolerated well.  Offers no complaints, denies any pain or discomfort.  1255 Catheter unclamped and allowed to drain, 100 ccs of return.  1300 Bladder irrigated with 60ccs of sterile water, allowed to drain, 100 ccs of return.  VS stable.  Encouraged to drink plenty of fluids for next couple of days.  Discharged in care ofself.  Patient will return 3-27-19 for next appointment.

## 2019-03-20 NOTE — PATIENT INSTRUCTIONS
You have received an intravesical administration of mitomycin/gemzar  .  Possible side effects include:  Difficulty urinating, urgency, frequency, feeling very tired, flu-like symptoms, low grade fever/chills, skin rash/eruptions, loss of appetite, nausea, vomiting, urinary incontinence, bladder spasms and or blood in the urine.    Please follow these discharge instructions:     1. Patient instruction: Do not void for 1 to 2 hours post procedure, if possible.  2. Sit to void to avoid urine splashing. Do not void outside.  3. Flush toilet twice after the first void.  4. Wash perineum or glans after voiding to decrease irritation from chemical.  If you have any questions please call your healthcare provider or the infusion nurses at [phone].

## 2019-03-27 ENCOUNTER — INFUSION THERAPY VISIT (OUTPATIENT)
Dept: INFUSION THERAPY | Facility: OTHER | Age: 74
End: 2019-03-27
Attending: UROLOGY
Payer: COMMERCIAL

## 2019-03-27 VITALS
TEMPERATURE: 97.6 F | HEIGHT: 67 IN | RESPIRATION RATE: 18 BRPM | OXYGEN SATURATION: 97 % | WEIGHT: 214 LBS | SYSTOLIC BLOOD PRESSURE: 118 MMHG | BODY MASS INDEX: 33.59 KG/M2 | DIASTOLIC BLOOD PRESSURE: 78 MMHG

## 2019-03-27 DIAGNOSIS — Z85.51 PERSONAL HISTORY OF MALIGNANT NEOPLASM OF BLADDER: ICD-10-CM

## 2019-03-27 DIAGNOSIS — Z85.51 HISTORY OF BLADDER CANCER: Primary | ICD-10-CM

## 2019-03-27 LAB
ALBUMIN UR-MCNC: 10 MG/DL
APPEARANCE UR: CLEAR
BILIRUB UR QL STRIP: NEGATIVE
COLOR UR AUTO: YELLOW
GLUCOSE UR STRIP-MCNC: 30 MG/DL
HGB UR QL STRIP: NEGATIVE
KETONES UR STRIP-MCNC: NEGATIVE MG/DL
LEUKOCYTE ESTERASE UR QL STRIP: NEGATIVE
NITRATE UR QL: NEGATIVE
PH UR STRIP: 5.5 PH (ref 4.7–8)
SOURCE: ABNORMAL
SP GR UR STRIP: 1.02 (ref 1–1.03)
UROBILINOGEN UR STRIP-MCNC: NORMAL MG/DL (ref 0–2)

## 2019-03-27 PROCEDURE — 25000125 ZZHC RX 250: Performed by: UROLOGY

## 2019-03-27 PROCEDURE — 25000128 H RX IP 250 OP 636: Performed by: UROLOGY

## 2019-03-27 PROCEDURE — 81003 URINALYSIS AUTO W/O SCOPE: CPT | Mod: ZL | Performed by: UROLOGY

## 2019-03-27 PROCEDURE — 25800030 ZZH RX IP 258 OP 636: Performed by: UROLOGY

## 2019-03-27 PROCEDURE — 25000132 ZZH RX MED GY IP 250 OP 250 PS 637: Performed by: UROLOGY

## 2019-03-27 PROCEDURE — 51720 TREATMENT OF BLADDER LESION: CPT

## 2019-03-27 RX ADMIN — WHITE PETROLATUM: 1 OINTMENT TOPICAL at 10:26

## 2019-03-27 RX ADMIN — GEMCITABINE HYDROCHLORIDE 1000 MG: 1 INJECTION, SOLUTION INTRAVENOUS at 10:26

## 2019-03-27 RX ADMIN — WATER 60 ML: 100 INJECTION, SOLUTION INTRAVENOUS at 11:32

## 2019-03-27 RX ADMIN — WATER 20 MG: 1 INJECTION INTRAMUSCULAR; INTRAVENOUS; SUBCUTANEOUS at 11:41

## 2019-03-27 RX ADMIN — LIDOCAINE HYDROCHLORIDE 10 ML: 20 JELLY TOPICAL at 10:26

## 2019-03-27 RX ADMIN — WATER 60 ML: 100 INJECTION, SOLUTION INTRAVENOUS at 12:51

## 2019-03-27 ASSESSMENT — MIFFLIN-ST. JEOR: SCORE: 1679.45

## 2019-03-27 NOTE — PROGRESS NOTES
.75 y/o malePatient here for bladder instillation of Gemzar/mitomycin.  UA negative for nitrates, negative for leukocytes.  Denies any fever or chills.  Denies any pain with urination.  Results reviewed, labs met treatment parameters.  Proceed with treatment.  16 [place] catheter placed, sterile technique.  Allowed bladder to empty 75ccs of urine returned.  1026 Gemzar instillation via catheter, tolerated well, catheter clamped.  Patient turned every 15 minutes per protocol, tolerated well.  Offered no complaints. Patient able to complete 45 minutes.  1125 catheter unclamped and allowed to drain, 150 ccs of return.  1132 Bladder irrigated with 60cc of sterile water, allowed to drain, 100 ccs of return.  VS stable.  Patient offers no complaints. 1141 Mitomycin instilled via catheter, catheter clamped.  Patient turned every 15 minutes per protocol, tolerated well.  Offers no complaints, denies any pain or discomfort.  1251 Catheter unclamped and allowed to drain, 100 ccs of return.  1251 Bladder irrigated with 60ccs of sterile water, allowed to drain, 125 ccs of return.  VS stable.  Encouraged to drink plenty of fluids for next couple of days.  Discharged in care of [facility].

## 2019-03-27 NOTE — PATIENT INSTRUCTIONS
You have received an intravesical administration of gemzar/mitomycin.  Possible side effects include:  Difficulty urinating, urgency, frequency, feeling very tired, flu-like symptoms, low grade fever/chills, skin rash/eruptions, loss of appetite, nausea, vomiting, urinary incontinence, bladder spasms and or blood in the urine.    Please follow these discharge instructions:    1. Patient instruction: Do not void for 1 to 2 hours post procedure, if possible.  2. Sit to void to avoid urine splashing. Do not void outside.  3. Flush toilet twice after the first void.  4. Wash perineum or glans after voiding to decrease irritation from chemical.  If you have any questions please call your healthcare provider or the infusion nurses at [phone].

## 2019-05-06 ENCOUNTER — TRANSFERRED RECORDS (OUTPATIENT)
Dept: HEALTH INFORMATION MANAGEMENT | Facility: CLINIC | Age: 74
End: 2019-05-06

## 2019-07-25 NOTE — PROGRESS NOTES
Subjective     Colin Baxter is a 74 year old male who presents to clinic today for the following health issues:    HPI   Pre diabetes      Duration: 4 years    Description (location/character/radiation): Previous elevated blood glucose    Intensity:  mild    Accompanying signs and symptoms: dry mouth, weight loss, frequent urination    History (similar episodes/previous evaluation): None    Precipitating or alleviating factors: None    Therapies tried and outcome: None     Colin has a history of prediabetes.  His most recent fasting blood glucose was 110.  He has had the symptoms described above.      Patient Active Problem List   Diagnosis     Comprehensive Medical Examination     Glaucoma     Prediabetes     ACP (advance care planning)     Obesity (BMI 35.0-39.9) with comorbidity (H)     History of high risk bladder cancer     History of bladder cancer     Past Surgical History:   Procedure Laterality Date     APPENDECTOMY  1958     COLONOSCOPY  2-     CYSTOSCOPY, BIOPSY BLADDER, COMBINED N/A 9/8/2015    Procedure: COMBINED CYSTOSCOPY, BIOPSY BLADDER;  Surgeon: Randy Martinez MD;  Location: [facility]     CYSTOSCOPY, BIOPSY BLADDER, COMBINED N/A 2/14/2017    Procedure: COMBINED CYSTOSCOPY, BIOPSY BLADDER;  Surgeon: Randy Martinez MD;  Location: [facility]     CYSTOSCOPY, BIOPSY BLADDER, COMBINED N/A 11/13/2018    Procedure: COMBINED CYSTOSCOPY, BIOPSY BLADDER;  Surgeon: Ed Helm MD;  Location: [facility]     CYSTOSCOPY, RETROGRADES, COMBINED Bilateral 11/13/2018    Procedure: Bilateral Retrograde Pyelagram, Bladder Biopsy;  Surgeon: Ed Helm MD;  Location: [facility]     CYSTOSCOPY, RETROGRADES, INSERT STENT URETER(S), COMBINED Left 9/8/2015    Procedure: COMBINED CYSTOSCOPY, RETROGRADES, INSERT STENT URETER(S);  Surgeon: Randy Martinez MD;  Location: [facility]     CYSTOSCOPY, TRANSURETHRAL RESECTION (TUR) TUMOR BLADDER, COMBINED N/A 9/8/2015    Procedure: COMBINED CYSTOSCOPY,  TRANSURETHRAL RESECTION (TUR) TUMOR BLADDER;  Surgeon: Randy Martinez MD;  Location: [facility]     CYSTOSCOPY, TRANSURETHRAL RESECTION (TUR) TUMOR BLADDER, COMBINED N/A 2016    Procedure: COMBINED CYSTOSCOPY, TRANSURETHRAL RESECTION (TUR) TUMOR BLADDER;  Surgeon: Randy Martinez MD;  Location: [facility]     CYSTOSCOPY, TRANSURETHRAL RESECTION (TUR) TUMOR BLADDER, COMBINED N/A 2016    Procedure: COMBINED CYSTOSCOPY, TRANSURETHRAL RESECTION (TUR) TUMOR BLADDER;  Surgeon: Randy Martinez MD;  Location: [facility]     PHACOEMULSIFICATION WITH STANDARD INTRAOCULAR LENS IMPLANT Right 10/9/2018    Procedure: PHACOEMULSIFICATION WITH STANDARD INTRAOCULAR LENS IMPLANT;  PHACOEMULSIFICATION CATARACT EXTRACTION POSTERIOR CHAMBER LENS RIGHT;  Surgeon: Marlo Mcconnell MD;  Location: [facility]     PHACOEMULSIFICATION WITH STANDARD INTRAOCULAR LENS IMPLANT Left 10/23/2018    Procedure: PHACOEMULSIFICATION CATARACT EXTRACTION POSTERIOR CHAMBER LENS LEFT;  Surgeon: Marlo Mcconnell MD;  Location: [facility]       Social History     Tobacco Use     Smoking status: Former Smoker     Last attempt to quit: 1992     Years since quittin.5     Smokeless tobacco: Never Used   Substance Use Topics     Alcohol use: Yes     Family History   Problem Relation Age of Onset     Diabetes Mother      Parkinsonism Brother          Current Outpatient Medications   Medication Sig Dispense Refill     brimonidine (ALPHAGAN-P) 0.15 % ophthalmic solution INSTILL 1 DROP INTO RIGHT EYE TWICE DAILY  12     IBUPROFEN PO Take 200 mg by mouth every 6 hours as needed for moderate pain Reported on 2017       latanoprost (XALATAN) 0.005 % ophthalmic solution Place 1 drop into both eyes At Bedtime       sodium bicarbonate 650 MG tablet Take 1 tablet (650 mg) by mouth the evening prior to treatment and 1 tablet the morning of treatment 12 tablet 0     Allergies   Allergen Reactions     No Clinical Screening - See Comments Other (See  Comments)     Beta blocker in glaucoma gtt.s caused low pulse and passing out      Recent Labs   Lab Test 05/12/17  0740 07/07/15  0848   LDL  --  142*   HDL  --  43   TRIG  --  95   ALT  --  20   CR 1.02 0.97   GFRESTIMATED 72 77   GFRESTBLACK 87 >90   GFR Calc     POTASSIUM  --  4.0      BP Readings from Last 3 Encounters:   03/27/19 118/78   03/20/19 120/80   03/13/19 130/80    Wt Readings from Last 3 Encounters:   03/27/19 97.1 kg (214 lb)   03/20/19 100.2 kg (220 lb 12.8 oz)   03/13/19 99.6 kg (219 lb 9.6 oz)         Reviewed and updated as needed this visit by Provider         Review of Systems   ROS COMP: Constitutional, HEENT, cardiovascular, pulmonary, gi and gu systems are negative, except as otherwise noted.      Objective    There were no vitals taken for this visit.  There is no height or weight on file to calculate BMI.  Physical Exam   Constitutional: He is oriented to person, place, and time. He appears well-developed and well-nourished.   Neurological: He is alert and oriented to person, place, and time.   Psychiatric: He has a normal mood and affect.   Nursing note and vitals reviewed.       Diagnostic Test Results:  Labs reviewed in Epic  Results for orders placed or performed in visit on 07/29/19   Hemoglobin A1c   Result Value Ref Range    Hemoglobin A1C 12.6 (H) 0 - 5.6 %   CBC with platelets differential   Result Value Ref Range    WBC 6.7 4.0 - 11.0 10e9/L    RBC Count 4.82 4.4 - 5.9 10e12/L    Hemoglobin 15.5 13.3 - 17.7 g/dL    Hematocrit 44.1 40.0 - 53.0 %    MCV 92 78 - 100 fl    MCH 32.2 26.5 - 33.0 pg    MCHC 35.1 31.5 - 36.5 g/dL    RDW 13.2 10.0 - 15.0 %    Platelet Count 192 150 - 450 10e9/L    Diff Method Automated Method     % Neutrophils 63.6 %    % Lymphocytes 25.1 %    % Monocytes 8.5 %    % Eosinophils 2.1 %    % Basophils 0.6 %    % Immature Granulocytes 0.1 %    Nucleated RBCs 0 0 /100    Absolute Neutrophil 4.3 1.6 - 8.3 10e9/L    Absolute Lymphocytes 1.7 0.8  "- 5.3 10e9/L    Absolute Monocytes 0.6 0.0 - 1.3 10e9/L    Absolute Eosinophils 0.1 0.0 - 0.7 10e9/L    Absolute Basophils 0.0 0.0 - 0.2 10e9/L    Abs Immature Granulocytes 0.0 0 - 0.4 10e9/L    Absolute Nucleated RBC 0.0    Comprehensive metabolic panel   Result Value Ref Range    Sodium 138 133 - 144 mmol/L    Potassium 4.3 3.4 - 5.3 mmol/L    Chloride 105 94 - 109 mmol/L    Carbon Dioxide 23 20 - 32 mmol/L    Anion Gap 10 3 - 14 mmol/L    Glucose 298 (H) 70 - 99 mg/dL    Urea Nitrogen 21 7 - 30 mg/dL    Creatinine 0.72 0.66 - 1.25 mg/dL    GFR Estimate >90 >60 mL/min/[1.73_m2]    GFR Estimate If Black >90 >60 mL/min/[1.73_m2]    Calcium 9.4 8.5 - 10.1 mg/dL    Bilirubin Total 1.0 0.2 - 1.3 mg/dL    Albumin 3.8 3.4 - 5.0 g/dL    Protein Total 8.0 6.8 - 8.8 g/dL    Alkaline Phosphatase 93 40 - 150 U/L    ALT 22 0 - 70 U/L    AST 21 0 - 45 U/L   Estimated Average Glucose   Result Value Ref Range    Estimated Average Glucose 315 mg/dL           Assessment & Plan     1. Type 2 diabetes mellitus without complication, without long-term current use of insulin (H)  New diabetic.  Discussed management options.  Begin metformin twice daily.  [facility]   scheduled for evaluation and recommendations for further management.   - metFORMIN (GLUCOPHAGE) 500 MG tablet; Take 1 tablet (500 mg) by mouth 2 times daily (with meals)  Dispense: 180 tablet; Refill: 1  - DIABETES EDUCATION REFERRAL ([place])    2. Prediabetes  As above  - Hemoglobin A1c  - CBC with platelets differential  - Comprehensive metabolic panel  - Estimated Average Glucose  - Estimated Average Glucose     BMI:   Estimated body mass index is 33.51 kg/m  as calculated from the following:    Height as of 3/27/19: 1.702 m (5' 7.01\").    Weight as of 3/27/19: 97.1 kg (214 lb).   Weight management plan: Discussed healthy diet and exercise guidelines    Greater than 25 minutes spent with patient, of which greater than 50% was spent " reviewing pertinent medical records., counseling regarding diagnosis and management of diabetes mellitus, type 2 , as well as coordination of care.      See Patient Instructions    No follow-ups on file.    Luis Tran MD  [facility]

## 2019-07-29 ENCOUNTER — OFFICE VISIT (OUTPATIENT)
Dept: FAMILY MEDICINE | Facility: OTHER | Age: 74
End: 2019-07-29
Attending: FAMILY MEDICINE
Payer: COMMERCIAL

## 2019-07-29 VITALS
WEIGHT: 184 LBS | HEART RATE: 76 BPM | OXYGEN SATURATION: 97 % | TEMPERATURE: 96 F | DIASTOLIC BLOOD PRESSURE: 78 MMHG | SYSTOLIC BLOOD PRESSURE: 124 MMHG | BODY MASS INDEX: 28.81 KG/M2

## 2019-07-29 DIAGNOSIS — R73.03 PREDIABETES: Primary | ICD-10-CM

## 2019-07-29 DIAGNOSIS — E11.9 TYPE 2 DIABETES MELLITUS WITHOUT COMPLICATION, WITHOUT LONG-TERM CURRENT USE OF INSULIN (H): ICD-10-CM

## 2019-07-29 LAB
ALBUMIN SERPL-MCNC: 3.8 G/DL (ref 3.4–5)
ALP SERPL-CCNC: 93 U/L (ref 40–150)
ALT SERPL W P-5'-P-CCNC: 22 U/L (ref 0–70)
ANION GAP SERPL CALCULATED.3IONS-SCNC: 10 MMOL/L (ref 3–14)
AST SERPL W P-5'-P-CCNC: 21 U/L (ref 0–45)
BASOPHILS # BLD AUTO: 0 10E9/L (ref 0–0.2)
BASOPHILS NFR BLD AUTO: 0.6 %
BILIRUB SERPL-MCNC: 1 MG/DL (ref 0.2–1.3)
BUN SERPL-MCNC: 21 MG/DL (ref 7–30)
CALCIUM SERPL-MCNC: 9.4 MG/DL (ref 8.5–10.1)
CHLORIDE SERPL-SCNC: 105 MMOL/L (ref 94–109)
CO2 SERPL-SCNC: 23 MMOL/L (ref 20–32)
CREAT SERPL-MCNC: 0.72 MG/DL (ref 0.66–1.25)
DIFFERENTIAL METHOD BLD: NORMAL
EOSINOPHIL # BLD AUTO: 0.1 10E9/L (ref 0–0.7)
EOSINOPHIL NFR BLD AUTO: 2.1 %
ERYTHROCYTE [DISTWIDTH] IN BLOOD BY AUTOMATED COUNT: 13.2 % (ref 10–15)
EST. AVERAGE GLUCOSE BLD GHB EST-MCNC: 315 MG/DL
GFR SERPL CREATININE-BSD FRML MDRD: >90 ML/MIN/{1.73_M2}
GLUCOSE SERPL-MCNC: 298 MG/DL (ref 70–99)
HBA1C MFR BLD: 12.6 % (ref 0–5.6)
HCT VFR BLD AUTO: 44.1 % (ref 40–53)
HGB BLD-MCNC: 15.5 G/DL (ref 13.3–17.7)
IMM GRANULOCYTES # BLD: 0 10E9/L (ref 0–0.4)
IMM GRANULOCYTES NFR BLD: 0.1 %
LYMPHOCYTES # BLD AUTO: 1.7 10E9/L (ref 0.8–5.3)
LYMPHOCYTES NFR BLD AUTO: 25.1 %
MCH RBC QN AUTO: 32.2 PG (ref 26.5–33)
MCHC RBC AUTO-ENTMCNC: 35.1 G/DL (ref 31.5–36.5)
MCV RBC AUTO: 92 FL (ref 78–100)
MONOCYTES # BLD AUTO: 0.6 10E9/L (ref 0–1.3)
MONOCYTES NFR BLD AUTO: 8.5 %
NEUTROPHILS # BLD AUTO: 4.3 10E9/L (ref 1.6–8.3)
NEUTROPHILS NFR BLD AUTO: 63.6 %
NRBC # BLD AUTO: 0 10*3/UL
NRBC BLD AUTO-RTO: 0 /100
PLATELET # BLD AUTO: 192 10E9/L (ref 150–450)
POTASSIUM SERPL-SCNC: 4.3 MMOL/L (ref 3.4–5.3)
PROT SERPL-MCNC: 8 G/DL (ref 6.8–8.8)
RBC # BLD AUTO: 4.82 10E12/L (ref 4.4–5.9)
SODIUM SERPL-SCNC: 138 MMOL/L (ref 133–144)
WBC # BLD AUTO: 6.7 10E9/L (ref 4–11)

## 2019-07-29 PROCEDURE — 40000788 ZZHCL STATISTIC ESTIMATED AVERAGE GLUCOSE: Mod: ZL | Performed by: FAMILY MEDICINE

## 2019-07-29 PROCEDURE — 80053 COMPREHEN METABOLIC PANEL: CPT | Mod: ZL | Performed by: FAMILY MEDICINE

## 2019-07-29 PROCEDURE — 85025 COMPLETE CBC W/AUTO DIFF WBC: CPT | Mod: ZL | Performed by: FAMILY MEDICINE

## 2019-07-29 PROCEDURE — 83036 HEMOGLOBIN GLYCOSYLATED A1C: CPT | Mod: ZL | Performed by: FAMILY MEDICINE

## 2019-07-29 PROCEDURE — 36415 COLL VENOUS BLD VENIPUNCTURE: CPT | Mod: ZL | Performed by: FAMILY MEDICINE

## 2019-07-29 PROCEDURE — G0463 HOSPITAL OUTPT CLINIC VISIT: HCPCS

## 2019-07-29 PROCEDURE — G0463 HOSPITAL OUTPT CLINIC VISIT: HCPCS | Mod: 25

## 2019-07-29 PROCEDURE — 99214 OFFICE O/P EST MOD 30 MIN: CPT | Performed by: FAMILY MEDICINE

## 2019-07-29 ASSESSMENT — PAIN SCALES - GENERAL: PAINLEVEL: NO PAIN (0)

## 2019-07-29 ASSESSMENT — PATIENT HEALTH QUESTIONNAIRE - PHQ9: SUM OF ALL RESPONSES TO PHQ QUESTIONS 1-9: 5

## 2019-07-29 NOTE — NURSING NOTE
"Chief Complaint   Patient presents with     Diabetes     prediabetic       Initial /78 (BP Location: Right arm, Patient Position: Sitting, Cuff Size: Adult Regular)   Pulse 76   Temp 96  F (35.6  C) (Tympanic)   Wt 83.5 kg (184 lb)   SpO2 97%   BMI 28.81 kg/m   Estimated body mass index is 28.81 kg/m  as calculated from the following:    Height as of 3/27/19: 1.702 m (5' 7.01\").    Weight as of this encounter: 83.5 kg (184 lb).  Medication Reconciliation: complete  "

## 2019-07-29 NOTE — LETTER
July 30, 2019      Colin Baxter  [address] [place] [address]        Dear ,    We are writing to inform you of your test results.        Resulted Orders   Hemoglobin A1c   Result Value Ref Range    Hemoglobin A1C 12.6 (H) 0 - 5.6 %      Comment:      Normal <5.7% Prediabetes 5.7-6.4%  Diabetes 6.5% or higher - adopted from ADA   consensus guidelines.     CBC with platelets differential   Result Value Ref Range    WBC 6.7 4.0 - 11.0 10e9/L    RBC Count 4.82 4.4 - 5.9 10e12/L    Hemoglobin 15.5 13.3 - 17.7 g/dL    Hematocrit 44.1 40.0 - 53.0 %    MCV 92 78 - 100 fl    MCH 32.2 26.5 - 33.0 pg    MCHC 35.1 31.5 - 36.5 g/dL    RDW 13.2 10.0 - 15.0 %    Platelet Count 192 150 - 450 10e9/L    Diff Method Automated Method     % Neutrophils 63.6 %    % Lymphocytes 25.1 %    % Monocytes 8.5 %    % Eosinophils 2.1 %    % Basophils 0.6 %    % Immature Granulocytes 0.1 %    Nucleated RBCs 0 0 /100    Absolute Neutrophil 4.3 1.6 - 8.3 10e9/L    Absolute Lymphocytes 1.7 0.8 - 5.3 10e9/L    Absolute Monocytes 0.6 0.0 - 1.3 10e9/L    Absolute Eosinophils 0.1 0.0 - 0.7 10e9/L    Absolute Basophils 0.0 0.0 - 0.2 10e9/L    Abs Immature Granulocytes 0.0 0 - 0.4 10e9/L    Absolute Nucleated RBC 0.0    Comprehensive metabolic panel   Result Value Ref Range    Sodium 138 133 - 144 mmol/L    Potassium 4.3 3.4 - 5.3 mmol/L    Chloride 105 94 - 109 mmol/L    Carbon Dioxide 23 20 - 32 mmol/L    Anion Gap 10 3 - 14 mmol/L    Glucose 298 (H) 70 - 99 mg/dL    Urea Nitrogen 21 7 - 30 mg/dL    Creatinine 0.72 0.66 - 1.25 mg/dL    GFR Estimate >90 >60 mL/min/[1.73_m2]      Comment:      Non  GFR Calc  Starting 12/18/2018, serum creatinine based estimated GFR (eGFR) will be   calculated using the Chronic Kidney Disease Epidemiology Collaboration   (CKD-EPI) equation.      GFR Estimate If Black >90 >60 mL/min/[1.73_m2]      Comment:       GFR Calc  Starting 12/18/2018, serum creatinine based estimated GFR  (eGFR) will be   calculated using the Chronic Kidney Disease Epidemiology Collaboration   (CKD-EPI) equation.      Calcium 9.4 8.5 - 10.1 mg/dL    Bilirubin Total 1.0 0.2 - 1.3 mg/dL    Albumin 3.8 3.4 - 5.0 g/dL    Protein Total 8.0 6.8 - 8.8 g/dL    Alkaline Phosphatase 93 40 - 150 U/L    ALT 22 0 - 70 U/L    AST 21 0 - 45 U/L   Estimated Average Glucose   Result Value Ref Range    Estimated Average Glucose 315 mg/dL       If you have any questions or concerns, please call the clinic at the number listed above.       Sincerely,        Luis Tran MD

## 2019-07-30 PROBLEM — E11.9 TYPE 2 DIABETES MELLITUS WITHOUT COMPLICATION, WITHOUT LONG-TERM CURRENT USE OF INSULIN (H): Status: ACTIVE | Noted: 2019-07-30

## 2019-07-30 NOTE — PATIENT INSTRUCTIONS
Begin metformin twice daily.  [facility]ointment with [facility]  scheduled for evaluation and recommendations for further management.

## 2019-08-06 ENCOUNTER — TELEPHONE (OUTPATIENT)
Dept: FAMILY MEDICINE | Facility: OTHER | Age: 74
End: 2019-08-06

## 2019-08-06 NOTE — TELEPHONE ENCOUNTER
Patients wife called stating the patient was newly diagnosed with diabetes and is taking Metformin.  States  the patient is not feeling well, he's weak and feels like he has lost more weight.  A referral for Diabetic Education was ordered but they haven't received a call yet and he doesn't know what to eat.  I spoke with Diabetic Education and they are looking for the referral and will call him for an appointment when they find it.  They state that they don't have a HUC so some referrals are delayed.  Patients wife also stated that the patient used a friends glucose machine and his BG yesterday was in the 400's. Please call and advise, or overbook if necessary.  Thank you.    [phone]    This Patient has requested an appointment for Diabetic f/u    Patient is having the following symptoms weak, losing weight    Patient has been having these symptoms for the following Duration: last few days    Please contact the patient at the following phone number [phone]

## 2019-08-08 ENCOUNTER — HOSPITAL ENCOUNTER (OUTPATIENT)
Dept: EDUCATION SERVICES | Facility: HOSPITAL | Age: 74
Discharge: HOME | End: 2019-08-08
Attending: NURSE PRACTITIONER | Admitting: FAMILY MEDICINE
Payer: COMMERCIAL

## 2019-08-08 VITALS
WEIGHT: 183 LBS | SYSTOLIC BLOOD PRESSURE: 110 MMHG | RESPIRATION RATE: 16 BRPM | HEART RATE: 69 BPM | BODY MASS INDEX: 28.72 KG/M2 | DIASTOLIC BLOOD PRESSURE: 73 MMHG | OXYGEN SATURATION: 97 % | HEIGHT: 67 IN

## 2019-08-08 DIAGNOSIS — E11.9 TYPE 2 DIABETES MELLITUS WITHOUT COMPLICATION, WITHOUT LONG-TERM CURRENT USE OF INSULIN (H): ICD-10-CM

## 2019-08-08 PROCEDURE — G0108 DIAB MANAGE TRN  PER INDIV: HCPCS

## 2019-08-08 RX ORDER — LANCETS 33 GAUGE
1 EACH MISCELLANEOUS 2 TIMES DAILY
Qty: 100 EACH | Refills: 10 | Status: SHIPPED | OUTPATIENT
Start: 2019-08-08 | End: 2023-01-01

## 2019-08-08 ASSESSMENT — ANXIETY QUESTIONNAIRES
7. FEELING AFRAID AS IF SOMETHING AWFUL MIGHT HAPPEN: NOT AT ALL
1. FEELING NERVOUS, ANXIOUS, OR ON EDGE: NOT AT ALL
5. BEING SO RESTLESS THAT IT IS HARD TO SIT STILL: NOT AT ALL
GAD7 TOTAL SCORE: 0
3. WORRYING TOO MUCH ABOUT DIFFERENT THINGS: NOT AT ALL
6. BECOMING EASILY ANNOYED OR IRRITABLE: NOT AT ALL
2. NOT BEING ABLE TO STOP OR CONTROL WORRYING: NOT AT ALL

## 2019-08-08 ASSESSMENT — PATIENT HEALTH QUESTIONNAIRE - PHQ9: 5. POOR APPETITE OR OVEREATING: NOT AT ALL

## 2019-08-08 ASSESSMENT — MIFFLIN-ST. JEOR: SCORE: 1538.71

## 2019-08-08 ASSESSMENT — PAIN SCALES - GENERAL: PAINLEVEL: NO PAIN (0)

## 2019-08-08 NOTE — PATIENT INSTRUCTIONS
Keep food logs    Test every AM before eating/coffee and 2 hours after evening meal    Increase Metformin to 1 tablet (500 mg) for breakfast and 2 tablets (1000 mg) at evening meal    Concerns: [phone]    Return to Diabetes Ed 8/22/19 at 8:45 am'

## 2019-08-08 NOTE — PROGRESS NOTES
"Diabetes Self-Management Education & Support    Diabetes Education Self Management & Training - Session 1    SUBJECTIVE/OBJECTIVE:  Presents for: Initial Assessment for new diagnosis  Accompanied by: Self, Other(ex-wife)  Diabetes education in the past 24mo: No  Focus of Visit: Monitoring, Healthy Eating  Diabetes type: Type 2  Date of diagnosis: 7/29/19  Disease course: Improving  Diabetes management related comments/concerns: What to eat  Transportation concerns: No  Other concerns:: Glasses  Cultural Influences/Ethnic Background:  American      Diabetes Symptoms & Complications  Blurred vision: Yes  Fatigue: Yes  Neuropathy: No  Foot ulcerations: No  Polydipsia: No  Polyphagia: Yes  Polyuria: Yes  Visual change: Yes  Weakness: Yes  Weight loss: Yes  Slow healing wounds: No  Recent Infection(s): No  Symptom course: Improving  Weight trend: Decreasing steadily  Autonomic neuropathy: No  CVA: No  Heart disease: No  Nephropathy: No  Peripheral neuropathy: No  Peripheral Vascular Disease: No  Retinopathy: No    Patient Problem List and Family Medical History reviewed for relevant medical history, current medical status, and diabetes risk factors.    Vitals:  /73   Pulse 69   Resp 16   Ht 1.702 m (5' 7\")   Wt 83 kg (183 lb)   SpO2 97%   BMI 28.66 kg/m    Estimated body mass index is 28.66 kg/m  as calculated from the following:    Height as of this encounter: 1.702 m (5' 7\").    Weight as of this encounter: 83 kg (183 lb).   Last 3 BP:   BP Readings from Last 3 Encounters:   08/08/19 110/73   07/29/19 124/78   03/27/19 118/78       History   Smoking Status     Former Smoker     Quit date: 1/6/1992   Smokeless Tobacco     Never Used       Labs:  Lab Results   Component Value Date    A1C 12.6 07/29/2019     Lab Results   Component Value Date     07/29/2019     Lab Results   Component Value Date     07/07/2015     HDL Cholesterol   Date Value Ref Range Status   07/07/2015 43 >40 mg/dL Final "   ]  GFR Estimate   Date Value Ref Range Status   07/29/2019 >90 >60 mL/min/[1.73_m2] Final     Comment:     Non  GFR Calc  Starting 12/18/2018, serum creatinine based estimated GFR (eGFR) will be   calculated using the Chronic Kidney Disease Epidemiology Collaboration   (CKD-EPI) equation.       GFR Estimate If Black   Date Value Ref Range Status   07/29/2019 >90 >60 mL/min/[1.73_m2] Final     Comment:      GFR Calc  Starting 12/18/2018, serum creatinine based estimated GFR (eGFR) will be   calculated using the Chronic Kidney Disease Epidemiology Collaboration   (CKD-EPI) equation.       Lab Results   Component Value Date    CR 0.72 07/29/2019     No results found for: MICROALBUMIN    Healthy Eating  Healthy Eating Assessed Today: Yes  Patient on a regular basis: Skips meals regularly  Meal planning: Smaller portions, Avoiding sweets  Meals include: Breakfast, Lunch, Dinner, Snacks  Breakfast: 2 eggs, 1/2 English Muffin, coffee with Splenda and cream  Lunch: 1/2 sandwich, fruit, milk  Dinner: 1/2 sandwich with fruit or pork chop with mashed potatoes, chicken kiev with asparagus, 2 hot dogs with slice of bread  Snacks: orange, yogurt  Beverages: Water, Coffee, Milk  Has patient met with a dietitian in the past?: No    Being Active  Being Active Assessed Today: No    Monitoring  Monitoring Assessed Today: No(hasn't started yet)        Taking Medications  Diabetes Medication(s)     Biguanides       metFORMIN (GLUCOPHAGE) 500 MG tablet    Take 1 tablet (500 mg) with breakfast and 2 tablets (1000 mg) with supper          Taking Medication Assessed Today: Yes  Current Treatments: Oral Agent (monotherapy)  Problems taking diabetes medications regularly?: No  Diabetes medication side effects?: No  Treatment Compliance: All of the time    Problem Solving  Problem Solving Assessed Today: Yes  Hypoglycemia Frequency: Never              Reducing Risks  Reducing Risks Assessed Today: No  Has  dilated eye exam at least once a year?: Yes    Healthy Coping  Healthy Coping Assessed Today: Yes  Emotional response to diabetes: Ready to learn  Informal Support system:: Family  Stage of change: PREPARATION (Decided to change - considering how)  Difficulty affording diabetes management supplies?: No  Support resources: None  Patient Activation Measure Survey Score:  SHARITA Score (Last Two) 9/26/2018   SHARITA Raw Score 30   Activation Score 56   SHARITA Level 3       ASSESSMENT:  This is a new diagnosis for Colin. He states that the thirst and frequent urination are easing. Wants to know what to eat.        Patient's most recent   Lab Results   Component Value Date    A1C 12.6 07/29/2019    is not meeting goal of <8.0    INTERVENTION:   Diabetes knowledge and skills assessment:     Patient is knowledgeable in diabetes management concepts related to: Healthy Eating and Being Active    Patient needs further education on the following diabetes management concepts: Healthy Eating, Being Active, Monitoring and Taking Medication    Based on learning assessment above, most appropriate setting for further diabetes education would be: Individual setting.    Education provided today on:  AADE Self-Care Behaviors:  Diabetes Pathophysiology  Healthy Eating: carbohydrate counting, consistency in amount, composition, and timing of food intake, portion control and label reading  Being Active: relationship to blood glucose  Monitoring: purpose, proper technique, log and interpret results, individual blood glucose targets, frequency of monitoring and proper sharps disposal   Colin was able to perform a BG test with minimal promptingTaking Medication: action of prescribed medication, side effects of prescribed medications and when to take medications    Opportunities for ongoing education and support in diabetes-self management were discussed.    Pt verbalized understanding of concepts discussed and recommendations provided today.        Education Materials Provided:  Type 2 Basics Book, My Food Plan, food logs Safe Disposal Options for Needles & Syringes and BG Log Sheet    PLAN:  See Patient Instructions for co-developed, patient-stated behavior change goals.  Keep food logs    Test every AM before eating/coffee and 2 hours after evening meal    Increase Metformin to 1 tablet (500 mg) for breakfast and 2 tablets (1000 mg) at evening meal    Concerns: [phone]    Return to Diabetes Ed 8/22/19 at 8:45 am'  AVS printed and provided to patient today. See Follow-Up section for recommended follow-up.      Time Spent: 75 minutes  Encounter Type: Individual    Any diabetes medication dose changes were made via the CDE Protocol and Collaborative Practice Agreement with the patient's primary care provider. A copy of this encounter was shared with the provider.

## 2019-08-09 ASSESSMENT — ANXIETY QUESTIONNAIRES: GAD7 TOTAL SCORE: 0

## 2019-08-22 ENCOUNTER — HOSPITAL ENCOUNTER (OUTPATIENT)
Dept: EDUCATION SERVICES | Facility: HOSPITAL | Age: 74
Discharge: HOME | End: 2019-08-22
Attending: NURSE PRACTITIONER | Admitting: FAMILY MEDICINE
Payer: COMMERCIAL

## 2019-08-22 VITALS
SYSTOLIC BLOOD PRESSURE: 110 MMHG | HEIGHT: 67 IN | WEIGHT: 187 LBS | OXYGEN SATURATION: 97 % | HEART RATE: 57 BPM | DIASTOLIC BLOOD PRESSURE: 70 MMHG | BODY MASS INDEX: 29.35 KG/M2

## 2019-08-22 DIAGNOSIS — E11.9 TYPE 2 DIABETES MELLITUS WITHOUT COMPLICATION, WITHOUT LONG-TERM CURRENT USE OF INSULIN (H): ICD-10-CM

## 2019-08-22 PROCEDURE — G0108 DIAB MANAGE TRN  PER INDIV: HCPCS

## 2019-08-22 ASSESSMENT — MIFFLIN-ST. JEOR: SCORE: 1556.86

## 2019-08-22 ASSESSMENT — PAIN SCALES - GENERAL: PAINLEVEL: NO PAIN (0)

## 2019-08-22 NOTE — PATIENT INSTRUCTIONS
If you only can test once a day, test in the morning on even days and test 2 hours after supper on odd days    Keep food log a week before next appointment    Increase Metformin to 2 - 500 mg tablets AM and PM    Return on 9/12/19 at 9:45 am    Questions/concerns; [phone] ([place])

## 2019-08-23 NOTE — PROGRESS NOTES
Subjective     Colin Baxter is a 74 year old male who presents to clinic today for the following health issues:    HPI   Diabetes Follow-up      How often are you checking your blood sugar? Two times daily    What time of day are you checking your blood sugars (select all that apply)?  At bedtime and in the morning     Have you had any blood sugars above 200?  No    Have you had any blood sugars below 70?  No    What symptoms do you notice when your blood sugar is low?  None    What concerns do you have today about your diabetes? None     Do you have any of these symptoms? (Select all that apply)  No numbness or tingling in feet.  No redness, sores or blisters on feet.  No complaints of excessive thirst.  No reports of blurry vision.  No significant changes to weight.     Have you had a diabetic eye exam in the last 12 months? No    BP Readings from Last 2 Encounters:   08/27/19 118/60   08/22/19 110/70     Hemoglobin A1C (%)   Date Value   07/29/2019 12.6 (H)     LDL Cholesterol Calculated (mg/dL)   Date Value   07/07/2015 142 (H)       Diabetes Management Resources      How many servings of fruits and vegetables do you eat daily?  2-3    On average, how many sweetened beverages do you drink each day (soda, juice, sweet tea, etc)?   0    How many days per week do you miss taking your medication? 0      Patient Active Problem List   Diagnosis     Comprehensive Medical Examination     Glaucoma     ACP (advance care planning)     Obesity (BMI 35.0-39.9) with comorbidity (H)     History of high risk bladder cancer     History of bladder cancer     Type 2 diabetes mellitus without complication, without long-term current use of insulin (H)     Past Surgical History:   Procedure Laterality Date     APPENDECTOMY  1958     COLONOSCOPY  2-     CYSTOSCOPY, BIOPSY BLADDER, COMBINED N/A 9/8/2015    Procedure: COMBINED CYSTOSCOPY, BIOPSY BLADDER;  Surgeon: Randy Martinez MD;  Location: [facility]     CYSTOSCOPY,  BIOPSY BLADDER, COMBINED N/A 2/14/2017    Procedure: COMBINED CYSTOSCOPY, BIOPSY BLADDER;  Surgeon: Randy Martinez MD;  Location: [facility]     CYSTOSCOPY, BIOPSY BLADDER, COMBINED N/A 11/13/2018    Procedure: COMBINED CYSTOSCOPY, BIOPSY BLADDER;  Surgeon: Ed Helm MD;  Location: [facility]     CYSTOSCOPY, RETROGRADES, COMBINED Bilateral 11/13/2018    Procedure: Bilateral Retrograde Pyelagram, Bladder Biopsy;  Surgeon: Ed Helm MD;  Location: [facility]     CYSTOSCOPY, RETROGRADES, INSERT STENT URETER(S), COMBINED Left 9/8/2015    Procedure: COMBINED CYSTOSCOPY, RETROGRADES, INSERT STENT URETER(S);  Surgeon: Randy Martinez MD;  Location: [facility]     CYSTOSCOPY, TRANSURETHRAL RESECTION (TUR) TUMOR BLADDER, COMBINED N/A 9/8/2015    Procedure: COMBINED CYSTOSCOPY, TRANSURETHRAL RESECTION (TUR) TUMOR BLADDER;  Surgeon: Randy Martinez MD;  Location: [facility]     CYSTOSCOPY, TRANSURETHRAL RESECTION (TUR) TUMOR BLADDER, COMBINED N/A 1/12/2016    Procedure: COMBINED CYSTOSCOPY, TRANSURETHRAL RESECTION (TUR) TUMOR BLADDER;  Surgeon: Randy Martinez MD;  Location: [facility]     CYSTOSCOPY, TRANSURETHRAL RESECTION (TUR) TUMOR BLADDER, COMBINED N/A 9/13/2016    Procedure: COMBINED CYSTOSCOPY, TRANSURETHRAL RESECTION (TUR) TUMOR BLADDER;  Surgeon: Randy Martinez MD;  Location: [facility]     PHACOEMULSIFICATION WITH STANDARD INTRAOCULAR LENS IMPLANT Right 10/9/2018    Procedure: PHACOEMULSIFICATION WITH STANDARD INTRAOCULAR LENS IMPLANT;  PHACOEMULSIFICATION CATARACT EXTRACTION POSTERIOR CHAMBER LENS RIGHT;  Surgeon: Marlo Mcconnell MD;  Location: [facility]     PHACOEMULSIFICATION WITH STANDARD INTRAOCULAR LENS IMPLANT Left 10/23/2018    Procedure: PHACOEMULSIFICATION CATARACT EXTRACTION POSTERIOR CHAMBER LENS LEFT;  Surgeon: Marlo Mcconnell MD;  Location: [facility]       Social History     Tobacco Use     Smoking status: Former Smoker     Last attempt to quit: 1/6/1992     Years since quitting:  27.6     Smokeless tobacco: Never Used   Substance Use Topics     Alcohol use: Yes     Family History   Problem Relation Age of Onset     Diabetes Mother      Parkinsonism Brother          Current Outpatient Medications   Medication Sig Dispense Refill     blood glucose (ONETOUCH VERIO IQ) test strip Use to test blood sugar 2 times daily or as directed. 100 each 10     brimonidine (ALPHAGAN-P) 0.15 % ophthalmic solution INSTILL 1 DROP INTO RIGHT EYE TWICE DAILY  12     IBUPROFEN PO Take 200 mg by mouth every 6 hours as needed for moderate pain Reported on 5/9/2017       latanoprost (XALATAN) 0.005 % ophthalmic solution Place 1 drop into both eyes At Bedtime       metFORMIN (GLUCOPHAGE) 500 MG tablet Take 2 tablets (1000 mg) with breakfast and 2 tablets (1000 mg) with supper 360 tablet 1     ONETOUCH DELICA LANCETS 33G MISC 1 each 2 times daily 100 each 10     sodium bicarbonate 650 MG tablet Take 1 tablet (650 mg) by mouth the evening prior to treatment and 1 tablet the morning of treatment 12 tablet 0     Allergies   Allergen Reactions     No Clinical Screening - See Comments Other (See Comments)     Beta blocker in glaucoma gtt.s caused low pulse and passing out      Recent Labs   Lab Test 07/29/19  0836 05/12/17  0740 07/07/15  0848   A1C 12.6*  --   --    LDL  --   --  142*   HDL  --   --  43   TRIG  --   --  95   ALT 22  --  20   CR 0.72 1.02 0.97   GFRESTIMATED >90 72 77   GFRESTBLACK >90 87 >90   GFR Calc     POTASSIUM 4.3  --  4.0      BP Readings from Last 3 Encounters:   08/27/19 118/60   08/22/19 110/70   08/08/19 110/73    Wt Readings from Last 3 Encounters:   08/27/19 79.4 kg (175 lb)   08/22/19 84.8 kg (187 lb)   08/08/19 83 kg (183 lb)         Reviewed and updated as needed this visit by Provider         Review of Systems   ROS COMP: Constitutional, HEENT, cardiovascular, pulmonary, gi and gu systems are negative, except as otherwise noted.      Objective    /60 (BP Location:  Left arm, Patient Position: Chair, Cuff Size: Adult Regular)   Pulse 63   Temp 97.3  F (36.3  C) (Tympanic)   Wt 79.4 kg (175 lb)   SpO2 94%   BMI 27.41 kg/m    Body mass index is 27.41 kg/m .  Physical Exam   Constitutional: He is oriented to person, place, and time. He appears well-developed and well-nourished.   Neurological: He is alert and oriented to person, place, and time.   Psychiatric: He has a normal mood and affect.   Nursing note and vitals reviewed.     Other exam not completed    Diagnostic Test Results:  Labs reviewed in Epic  Results for orders placed or performed in visit on 07/29/19   Hemoglobin A1c   Result Value Ref Range    Hemoglobin A1C 12.6 (H) 0 - 5.6 %   CBC with platelets differential   Result Value Ref Range    WBC 6.7 4.0 - 11.0 10e9/L    RBC Count 4.82 4.4 - 5.9 10e12/L    Hemoglobin 15.5 13.3 - 17.7 g/dL    Hematocrit 44.1 40.0 - 53.0 %    MCV 92 78 - 100 fl    MCH 32.2 26.5 - 33.0 pg    MCHC 35.1 31.5 - 36.5 g/dL    RDW 13.2 10.0 - 15.0 %    Platelet Count 192 150 - 450 10e9/L    Diff Method Automated Method     % Neutrophils 63.6 %    % Lymphocytes 25.1 %    % Monocytes 8.5 %    % Eosinophils 2.1 %    % Basophils 0.6 %    % Immature Granulocytes 0.1 %    Nucleated RBCs 0 0 /100    Absolute Neutrophil 4.3 1.6 - 8.3 10e9/L    Absolute Lymphocytes 1.7 0.8 - 5.3 10e9/L    Absolute Monocytes 0.6 0.0 - 1.3 10e9/L    Absolute Eosinophils 0.1 0.0 - 0.7 10e9/L    Absolute Basophils 0.0 0.0 - 0.2 10e9/L    Abs Immature Granulocytes 0.0 0 - 0.4 10e9/L    Absolute Nucleated RBC 0.0    Comprehensive metabolic panel   Result Value Ref Range    Sodium 138 133 - 144 mmol/L    Potassium 4.3 3.4 - 5.3 mmol/L    Chloride 105 94 - 109 mmol/L    Carbon Dioxide 23 20 - 32 mmol/L    Anion Gap 10 3 - 14 mmol/L    Glucose 298 (H) 70 - 99 mg/dL    Urea Nitrogen 21 7 - 30 mg/dL    Creatinine 0.72 0.66 - 1.25 mg/dL    GFR Estimate >90 >60 mL/min/[1.73_m2]    GFR Estimate If Black >90 >60 mL/min/[1.73_m2]  "   Calcium 9.4 8.5 - 10.1 mg/dL    Bilirubin Total 1.0 0.2 - 1.3 mg/dL    Albumin 3.8 3.4 - 5.0 g/dL    Protein Total 8.0 6.8 - 8.8 g/dL    Alkaline Phosphatase 93 40 - 150 U/L    ALT 22 0 - 70 U/L    AST 21 0 - 45 U/L   Estimated Average Glucose   Result Value Ref Range    Estimated Average Glucose 315 mg/dL           Assessment & Plan     1. Type 2 diabetes mellitus without complication, without long-term current use of insulin (H)  Fasting labs are reviewed.  Goal of hemoglobin A1C of less than 7 discussed.  Instructed in the importance of diet, exercise, and good glycemic control in prevention of secondary complications.    Continue same medication regimen. Repeat fasting glucose and hemoglobin A1C in 3 months.    - Lipid Profile; Future  - TSH with free T4 reflex; Future  - Albumin Random Urine Quantitative with Creat Ratio; Future  - lisinopril (PRINIVIL/ZESTRIL) 10 MG tablet; Take 1 tablet (10 mg) by mouth daily  Dispense: 90 tablet; Refill: 1  - atorvastatin (LIPITOR) 40 MG tablet; Take 1 tablet (40 mg) by mouth daily  Dispense: 90 tablet; Refill: 1     BMI:   Estimated body mass index is 29.29 kg/m  as calculated from the following:    Height as of 8/22/19: 1.702 m (5' 7\").    Weight as of 8/22/19: 84.8 kg (187 lb).   Weight management plan: Discussed healthy diet and exercise guidelines        See Patient Instructions    No follow-ups on file.    Luis Tran MD  [facility] - [place]      "

## 2019-08-27 ENCOUNTER — OFFICE VISIT (OUTPATIENT)
Dept: FAMILY MEDICINE | Facility: OTHER | Age: 74
End: 2019-08-27
Attending: FAMILY MEDICINE
Payer: COMMERCIAL

## 2019-08-27 VITALS
SYSTOLIC BLOOD PRESSURE: 118 MMHG | BODY MASS INDEX: 27.41 KG/M2 | HEART RATE: 63 BPM | DIASTOLIC BLOOD PRESSURE: 60 MMHG | OXYGEN SATURATION: 94 % | WEIGHT: 175 LBS | TEMPERATURE: 97.3 F

## 2019-08-27 DIAGNOSIS — E11.9 TYPE 2 DIABETES MELLITUS WITHOUT COMPLICATION, WITHOUT LONG-TERM CURRENT USE OF INSULIN (H): Primary | ICD-10-CM

## 2019-08-27 PROCEDURE — 99213 OFFICE O/P EST LOW 20 MIN: CPT | Performed by: FAMILY MEDICINE

## 2019-08-27 PROCEDURE — G0463 HOSPITAL OUTPT CLINIC VISIT: HCPCS

## 2019-08-27 RX ORDER — ATORVASTATIN CALCIUM 40 MG/1
40 TABLET, FILM COATED ORAL DAILY
Qty: 90 TABLET | Refills: 1 | Status: SHIPPED | OUTPATIENT
Start: 2019-08-27 | End: 2020-02-13

## 2019-08-27 RX ORDER — LISINOPRIL 10 MG/1
10 TABLET ORAL DAILY
Qty: 90 TABLET | Refills: 1 | Status: SHIPPED | OUTPATIENT
Start: 2019-08-27 | End: 2020-02-13

## 2019-08-27 ASSESSMENT — PAIN SCALES - GENERAL: PAINLEVEL: NO PAIN (0)

## 2019-08-27 NOTE — PROGRESS NOTES
"Diabetes Self-Management Education & Support    Diabetes Education Self Management & Training - Session 2    SUBJECTIVE/OBJECTIVE:  Presents for: Follow-up  Accompanied by: Self, Other(ex-wife)  Diabetes education in the past 24mo: Yes  Focus of Visit: Monitoring, Healthy Eating  Diabetes type: Type 2  Date of diagnosis: 7/29/19  Disease course: Improving  Diabetes management related comments/concerns: Barely eating any carbs  Transportation concerns: No  Other concerns:: Glasses  Cultural Influences/Ethnic Background:  American      Diabetes Symptoms & Complications  Blurred vision: Yes  Fatigue: Yes  Neuropathy: No  Foot ulcerations: No  Polydipsia: No  Polyphagia: No  Polyuria: No  Visual change: No  Weakness: No  Weight loss: No  Slow healing wounds: No  Recent Infection(s): No  Symptom course: Improving  Weight trend: Stable  Autonomic neuropathy: No  CVA: No  Heart disease: No  Nephropathy: No  Peripheral neuropathy: No  Peripheral Vascular Disease: No  Retinopathy: No    Patient Problem List and Family Medical History reviewed for relevant medical history, current medical status, and diabetes risk factors.    Vitals:  /70 (BP Location: Right arm, Patient Position: Chair, Cuff Size: Adult Regular)   Pulse 57   Ht 1.702 m (5' 7\")   Wt 84.8 kg (187 lb)   SpO2 97%   BMI 29.29 kg/m    Estimated body mass index is 29.29 kg/m  as calculated from the following:    Height as of this encounter: 1.702 m (5' 7\").    Weight as of this encounter: 84.8 kg (187 lb).   Last 3 BP:   BP Readings from Last 3 Encounters:   08/27/19 118/60   08/22/19 110/70   08/08/19 110/73       History   Smoking Status     Former Smoker     Quit date: 1/6/1992   Smokeless Tobacco     Never Used       Labs:  Lab Results   Component Value Date    A1C 12.6 07/29/2019     Lab Results   Component Value Date     07/29/2019     Lab Results   Component Value Date     07/07/2015     HDL Cholesterol   Date Value Ref Range Status "   2015 43 >40 mg/dL Final   ]  GFR Estimate   Date Value Ref Range Status   2019 >90 >60 mL/min/[1.73_m2] Final     Comment:     Non  GFR Calc  Starting 2018, serum creatinine based estimated GFR (eGFR) will be   calculated using the Chronic Kidney Disease Epidemiology Collaboration   (CKD-EPI) equation.       GFR Estimate If Black   Date Value Ref Range Status   2019 >90 >60 mL/min/[1.73_m2] Final     Comment:      GFR Calc  Starting 2018, serum creatinine based estimated GFR (eGFR) will be   calculated using the Chronic Kidney Disease Epidemiology Collaboration   (CKD-EPI) equation.       Lab Results   Component Value Date    CR 0.72 2019     No results found for: MICROALBUMIN    Healthy Eating  Healthy Eating Assessed Today: Yes  Meal planning: Smaller portions, Avoiding sweets  Meals include: Breakfast, Lunch, Dinner, Snacks  Breakfast: 2 eggs, 1/2 English Muffin, coffee with Splenda and cream or one egg, one cutler, coffeef  Lunch: 1/2 sandwich, fruit, milk or 1.5 cups chili with corn bread or beef, cheese, milk, coffee  Dinner: 1/2 sandwich with fruit or pork chop with mashed potatoes, chicken kiev with asparagus, hamburger margarita with slice of bread/green beans/cheese  Snacks: orange, yogurt, 1/2 plain donut  Beverages: Water, Coffee, Milk  Has patient met with a dietitian in the past?: No    Being Active  Being Active Assessed Today: No  Exercise:: Currently not exercising  Barrier to exercise: None    Monitoring  Monitoring Assessed Today: Yes  Did patient bring glucose meter to appointment? : Yes  Home Glucose (Sugar) Monitorin-2 times per day  Blood glucose trend: Decreasing steadily  Low Glucose Range (mg/dL): 140-180  High Glucose Range (mg/dL): >200        Taking Medications  Diabetes Medication(s)     Biguanides       metFORMIN (GLUCOPHAGE) 500 MG tablet    Take 2 tablets (1000 mg) with breakfast and 2 tablets (1000 mg) with supper           Taking Medication Assessed Today: Yes  Current Treatments: Oral Agent (monotherapy)  Problems taking diabetes medications regularly?: No  Diabetes medication side effects?: No  Treatment Compliance: All of the time    Problem Solving  Problem Solving Assessed Today: Yes  Hypoglycemia Frequency: Never  Patient carries a carbohydrate source: No  Medical alert: No  Severe weather/disaster plan for diabetes management?: No  DKA prevention plan?: No              Reducing Risks  Reducing Risks Assessed Today: No  Has dilated eye exam at least once a year?: Yes    Healthy Coping  Healthy Coping Assessed Today: Yes  Emotional response to diabetes: Ready to learn  Informal Support system:: Family  Stage of change: ACTION (Actively working towards change)  Difficulty affording diabetes management supplies?: No  Support resources: None  Patient Activation Measure Survey Score:  SHARITA Score (Last Two) 2018   SHARITA Raw Score 30   Activation Score 56   SHARITA Level 3       ASSESSMENT:  Colin states that he feels like he has more energy, less running to the bathroom.    Readings range as follows: fastin-256 and post-supper: 153-346.    Colin is concerned about his readings. I discussed with him that I can see that they are trending down and we will increase his Metformin. He is also eating less carbs than he needs to. Reviewed that he can have more and what that would look like    His ex-wife is supportive and concerned about him. She is worried that he has lost weight. Did discuss where his BMI falls.        Patient's most recent   Lab Results   Component Value Date    A1C 12.6 2019    is not meeting goal of <8.0    INTERVENTION:   Diabetes knowledge and skills assessment:     Patient is knowledgeable in diabetes management concepts related to: Healthy Eating, Monitoring and Taking Medication    Patient needs further education on the following diabetes management concepts: Being Active and Problem  Solving    Based on learning assessment above, most appropriate setting for further diabetes education would be: Individual setting.    Education provided today on:  AADE Self-Care Behaviors:  Diabetes Pathophysiology  Healthy Eating: carbohydrate counting, consistency in amount, composition, and timing of food intake and label reading  Being Active: relationship to blood glucose, describe appropriate activity program and precautions to take  Monitoring: log and interpret results, individual blood glucose targets and frequency of monitoring  Taking Medication: action of prescribed medication, side effects of prescribed medications and when to take medications  Problem Solving: high blood glucose - causes, signs/symptoms, treatment and prevention, low blood glucose - causes, signs/symptoms, treatment and prevention, carrying a carbohydrate source at all times, when to call health care provider and sick day arrangements    Opportunities for ongoing education and support in diabetes-self management were discussed.    Pt verbalized understanding of concepts discussed and recommendations provided today.       Education Materials Provided:  Hypoglycemia and hyperglycemia Signs and Symptoms    PLAN:  See Patient Instructions for co-developed, patient-stated behavior change goals.  If you only can test once a day, test in the morning on even days and test 2 hours after supper on odd days    Keep food log a week before next appointment    Increase Metformin to 2 - 500 mg tablets AM and PM    Return on 9/12/19 at 9:45 am    Questions/concerns; [phone] ([place])  AVS printed and provided to patient today. See Follow-Up section for recommended follow-up.      Time Spent: 45 minutes  Encounter Type: Individual    Any diabetes medication dose changes were made via the CDE Protocol and Collaborative Practice Agreement with the patient's primary care provider. A copy of this encounter was shared with the provider.

## 2019-08-27 NOTE — NURSING NOTE
"Chief Complaint   Patient presents with     Diabetes       Initial /60 (BP Location: Left arm, Patient Position: Chair, Cuff Size: Adult Regular)   Pulse 63   Temp 97.3  F (36.3  C) (Tympanic)   Wt 79.4 kg (175 lb)   SpO2 94%   BMI 27.41 kg/m   Estimated body mass index is 27.41 kg/m  as calculated from the following:    Height as of 8/22/19: 1.702 m (5' 7\").    Weight as of this encounter: 79.4 kg (175 lb).  Medication Reconciliation: complete     Donal Martinez LPN    "

## 2019-09-09 DIAGNOSIS — E11.9 TYPE 2 DIABETES MELLITUS WITHOUT COMPLICATION, WITHOUT LONG-TERM CURRENT USE OF INSULIN (H): ICD-10-CM

## 2019-09-09 LAB
CHOLEST SERPL-MCNC: 146 MG/DL
CREAT UR-MCNC: 204 MG/DL
HDLC SERPL-MCNC: 47 MG/DL
LDLC SERPL CALC-MCNC: 84 MG/DL
MICROALBUMIN UR-MCNC: 58 MG/L
MICROALBUMIN/CREAT UR: 28.43 MG/G CR (ref 0–17)
NONHDLC SERPL-MCNC: 99 MG/DL
TRIGL SERPL-MCNC: 75 MG/DL
TSH SERPL DL<=0.005 MIU/L-ACNC: 1.4 MU/L (ref 0.4–4)

## 2019-09-09 PROCEDURE — 82043 UR ALBUMIN QUANTITATIVE: CPT | Mod: ZL | Performed by: FAMILY MEDICINE

## 2019-09-09 PROCEDURE — 80061 LIPID PANEL: CPT | Mod: ZL | Performed by: FAMILY MEDICINE

## 2019-09-09 PROCEDURE — 36415 COLL VENOUS BLD VENIPUNCTURE: CPT | Mod: ZL | Performed by: FAMILY MEDICINE

## 2019-09-09 PROCEDURE — 84443 ASSAY THYROID STIM HORMONE: CPT | Mod: ZL | Performed by: FAMILY MEDICINE

## 2019-09-11 ENCOUNTER — OFFICE VISIT (OUTPATIENT)
Dept: UROLOGY | Facility: OTHER | Age: 74
End: 2019-09-11
Attending: UROLOGY
Payer: COMMERCIAL

## 2019-09-11 VITALS
DIASTOLIC BLOOD PRESSURE: 68 MMHG | HEART RATE: 80 BPM | RESPIRATION RATE: 16 BRPM | OXYGEN SATURATION: 94 % | WEIGHT: 175 LBS | TEMPERATURE: 97.1 F | BODY MASS INDEX: 27.41 KG/M2 | SYSTOLIC BLOOD PRESSURE: 112 MMHG

## 2019-09-11 DIAGNOSIS — Z85.51 PERSONAL HISTORY OF MALIGNANT NEOPLASM OF BLADDER: Primary | ICD-10-CM

## 2019-09-11 PROCEDURE — 52005 CYSTO W/URTRL CATHJ: CPT | Mod: 52 | Performed by: UROLOGY

## 2019-09-11 PROCEDURE — 52000 CYSTOURETHROSCOPY: CPT | Performed by: UROLOGY

## 2019-09-11 PROCEDURE — G0463 HOSPITAL OUTPT CLINIC VISIT: HCPCS

## 2019-09-11 PROCEDURE — 88108 CYTOPATH CONCENTRATE TECH: CPT | Mod: TC | Performed by: UROLOGY

## 2019-09-11 ASSESSMENT — PAIN SCALES - GENERAL: PAINLEVEL: NO PAIN (0)

## 2019-09-11 NOTE — PATIENT INSTRUCTIONS

## 2019-09-11 NOTE — PROGRESS NOTES
Preprocedure diagnosis  History of bladder cancer, high risk    Postprocedure diagnosis  History of bladder cancer, high risk    Procedure  Flexible Cystourethroscopy and bladder barbotage    Surgeon  Ed Helm MD    Anesthesia  2% lidocaine jelly intraurethrally    Complications  None    Indications  The patient is undergoing a flexible cystoscopy for the above mentioned indications.  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    There was a prominent median lobe.    The lateral lobes were obstructive in appearance.  The bladder appeared to be normal capacity.    There were no tumors, stones or foreign bodies.    The orifices were slit-shaped and in their normal location.  Multiple areas of mild erythema as expected following BCG.    Procedure  The patient was placed in supine position and prepped and draped in sterile fashion with lidocaine jelly per urethra for anesthesia.    I passed a lubricated 14F flexible cystoscope through the penile urethra and into the bladder and the bladder was completely visualized.  The cystoscope was retroflexed and the bladder neck and prostate visualized.      A bladder barbotage was performed and urine collected and sent for cytology.    The cystoscope was slowly withdrawn while visualizing the urethra and the procedure terminated.    The patient tolerated the procedure well.      Imaging  CTU  2017  IMPRESSION:  1.  LACK OF DISTENTION OF THE URINARY BLADDER SOMEWHAT LIMITS  EVALUATION.  THICKENING OF THE MUCOSA ALONG  THE POSTERIOR AND LEFT  DORSOLATERAL ASPECT OF THE BLADDER IS NOT READILY SEEN ON THE CURRENT  EXAMINATION.     2.  NO EVIDENCE OF LYMPHADENOPATHY OR EVIDENCE TO SUGGEST RENAL  CALCULUS OR RENAL LESION.     3.  MILD DILATATION OF THE MID ASPECT OF THE LEFT URETER, LIKELY  UNCHANGED FROM THE PRIOR EXAMINATION.    Plan  Follow up surveillance cystoscopy q6 months with cytology  He is now 3 months from the last recurrence and has completed intravesical treatments.

## 2019-09-12 ENCOUNTER — HOSPITAL ENCOUNTER (OUTPATIENT)
Dept: EDUCATION SERVICES | Facility: HOSPITAL | Age: 74
Discharge: HOME | End: 2019-09-12
Attending: NURSE PRACTITIONER | Admitting: FAMILY MEDICINE
Payer: COMMERCIAL

## 2019-09-12 VITALS
BODY MASS INDEX: 28.88 KG/M2 | HEART RATE: 61 BPM | OXYGEN SATURATION: 97 % | DIASTOLIC BLOOD PRESSURE: 62 MMHG | HEIGHT: 67 IN | WEIGHT: 184 LBS | RESPIRATION RATE: 20 BRPM | SYSTOLIC BLOOD PRESSURE: 106 MMHG

## 2019-09-12 DIAGNOSIS — E11.65 TYPE 2 DIABETES MELLITUS WITH HYPERGLYCEMIA, WITHOUT LONG-TERM CURRENT USE OF INSULIN (H): Primary | ICD-10-CM

## 2019-09-12 LAB — COPATH REPORT: NORMAL

## 2019-09-12 PROCEDURE — G0108 DIAB MANAGE TRN  PER INDIV: HCPCS

## 2019-09-12 ASSESSMENT — MIFFLIN-ST. JEOR: SCORE: 1543.25

## 2019-09-12 ASSESSMENT — PAIN SCALES - GENERAL: PAINLEVEL: NO PAIN (0)

## 2019-09-12 NOTE — PROGRESS NOTES
"Diabetes Self-Management Education & Support    Diabetes Education Self Management & Training - Session 4    SUBJECTIVE/OBJECTIVE:  Presents for: Follow-up  Accompanied by: Self, Other(ex-wife)  Diabetes education in the past 24mo: Yes  Focus of Visit: Monitoring, Healthy Eating  Diabetes type: Type 2  Date of diagnosis: 7/29/19  Disease course: Improving  Diabetes management related comments/concerns: eating a few more carbs  Transportation concerns: No  Other concerns:: Glasses  Cultural Influences/Ethnic Background:  American      Diabetes Symptoms & Complications  Blurred vision: Yes  Fatigue: Yes  Neuropathy: No  Foot ulcerations: No  Polydipsia: No  Polyphagia: No  Polyuria: No  Visual change: No  Weakness: No  Weight loss: No  Slow healing wounds: No  Recent Infection(s): No  Symptom course: Improving  Weight trend: Stable  Autonomic neuropathy: No  CVA: No  Heart disease: No  Nephropathy: No  Peripheral neuropathy: No  Peripheral Vascular Disease: No  Retinopathy: No    Patient Problem List and Family Medical History reviewed for relevant medical history, current medical status, and diabetes risk factors.    Vitals:  /62 (BP Location: Right arm, Patient Position: Chair, Cuff Size: Adult Regular)   Pulse 61   Resp 20   Ht 1.702 m (5' 7\")   Wt 83.5 kg (184 lb)   SpO2 97%   BMI 28.82 kg/m    Estimated body mass index is 28.82 kg/m  as calculated from the following:    Height as of this encounter: 1.702 m (5' 7\").    Weight as of this encounter: 83.5 kg (184 lb).   Last 3 BP:   BP Readings from Last 3 Encounters:   09/12/19 106/62   09/11/19 112/68   08/27/19 118/60       History   Smoking Status     Former Smoker     Quit date: 1/6/1992   Smokeless Tobacco     Never Used       Labs:  Lab Results   Component Value Date    A1C 12.6 07/29/2019     Lab Results   Component Value Date     07/29/2019     Lab Results   Component Value Date    LDL 84 09/09/2019     HDL Cholesterol   Date Value Ref " Range Status   2019 47 >39 mg/dL Final   ]  GFR Estimate   Date Value Ref Range Status   2019 >90 >60 mL/min/[1.73_m2] Final     Comment:     Non  GFR Calc  Starting 2018, serum creatinine based estimated GFR (eGFR) will be   calculated using the Chronic Kidney Disease Epidemiology Collaboration   (CKD-EPI) equation.       GFR Estimate If Black   Date Value Ref Range Status   2019 >90 >60 mL/min/[1.73_m2] Final     Comment:      GFR Calc  Starting 2018, serum creatinine based estimated GFR (eGFR) will be   calculated using the Chronic Kidney Disease Epidemiology Collaboration   (CKD-EPI) equation.       Lab Results   Component Value Date    CR 0.72 2019     No results found for: MICROALBUMIN    Healthy Eating  Healthy Eating Assessed Today: Yes  Meal planning: Smaller portions, Avoiding sweets  Meals include: Breakfast, Lunch, Dinner, Snacks  Breakfast: 2 eggs, 1/2 English Muffin, coffee with Splenda and cream or one egg, one cutler, coffeef  Lunch: 1/2 sandwich, fruit, milk or 1.5 cups chili with corn bread or beef, cheese, milk, coffee  Dinner: 1/2 sandwich with fruit or pork chop with mashed potatoes, chicken kiev with asparagus, hamburger margarita with slice of bread/green beans/cheese  Snacks: orange, yogurt, 1/2 plain donut  Beverages: Water, Coffee, Milk  Has patient met with a dietitian in the past?: No    Being Active  Being Active Assessed Today: No  Exercise:: Currently not exercising  Barrier to exercise: None    Monitoring  Monitoring Assessed Today: Yes  Did patient bring glucose meter to appointment? : Yes  Home Glucose (Sugar) Monitorin-2 times per day  Blood glucose trend: Decreasing steadily  Low Glucose Range (mg/dL): 140-180  High Glucose Range (mg/dL): >200        Taking Medications  Diabetes Medication(s)     Biguanides       metFORMIN (GLUCOPHAGE) 500 MG tablet    Take 2 tablets (1000 mg) with breakfast and 2 tablets (1000 mg)  with supper          Taking Medication Assessed Today: Yes  Current Treatments: Oral Agent (monotherapy)  Problems taking diabetes medications regularly?: No  Diabetes medication side effects?: No  Treatment Compliance: All of the time    Problem Solving  Problem Solving Assessed Today: Yes  Hypoglycemia Frequency: Never  Patient carries a carbohydrate source: No  Medical alert: No  Severe weather/disaster plan for diabetes management?: No  DKA prevention plan?: No              Reducing Risks  Reducing Risks Assessed Today: No  Has dilated eye exam at least once a year?: Yes    Healthy Coping  Healthy Coping Assessed Today: Yes  Emotional response to diabetes: Ready to learn  Informal Support system:: Family  Stage of change: ACTION (Actively working towards change)  Difficulty affording diabetes management supplies?: No  Support resources: None  Patient Activation Measure Survey Score:  SHARITA Score (Last Two) 2018   SHARITA Raw Score 30   Activation Score 56   SHARITA Level 3       ASSESSMENT:  Readings range as follows: fastin-127 and post-upper: .    Colin is doing well with diabetes management. Is tolerating Metformin    Patient's most recent   Lab Results   Component Value Date    A1C 12.6 2019    is not meeting goal of <8.0    INTERVENTION:   Diabetes knowledge and skills assessment:     Patient is knowledgeable in diabetes management concepts related to: Healthy Eating, Monitoring and Taking Medication    Patient needs further education on the following diabetes management concepts: Reducing Risks    Based on learning assessment above, most appropriate setting for further diabetes education would be: Individual setting.    Education provided today on:  AADE Self-Care Behaviors:  Monitoring: log and interpret results, individual blood glucose targets and frequency of monitoring  Taking Medication: action of prescribed medication, side effects of prescribed medications and when to take  medications  Reducing Risks: major complications of diabetes, prevention, early diagnostic measures and treatment of complications, A1C - goals, relating to blood glucose levels, how often to check, lipids levels and goals and blood pressure and goals  Healthy Coping: benefits of making appropriate lifestyle changes    Opportunities for ongoing education and support in diabetes-self management were discussed.    Pt verbalized understanding of concepts discussed and recommendations provided today.       Education Materials Provided:  No new materials provided today    PLAN:  See Patient Instructions for co-developed, patient-stated behavior change goals.  AVS printed and provided to patient today. See Follow-Up section for recommended follow-up.      Time Spent: 30 minutes  Encounter Type: Individual    Any diabetes medication dose changes were made via the CDE Protocol and Collaborative Practice Agreement with the patient's primary care provider. A copy of this encounter was shared with the provider.

## 2019-10-24 ENCOUNTER — IMMUNIZATION (OUTPATIENT)
Dept: FAMILY MEDICINE | Facility: OTHER | Age: 74
End: 2019-10-24
Attending: FAMILY MEDICINE
Payer: COMMERCIAL

## 2019-10-24 DIAGNOSIS — Z23 NEED FOR PROPHYLACTIC VACCINATION AND INOCULATION AGAINST INFLUENZA: Primary | ICD-10-CM

## 2019-10-24 PROCEDURE — 90662 IIV NO PRSV INCREASED AG IM: CPT

## 2019-10-24 PROCEDURE — G0008 ADMIN INFLUENZA VIRUS VAC: HCPCS

## 2019-10-31 ENCOUNTER — HOSPITAL ENCOUNTER (OUTPATIENT)
Dept: EDUCATION SERVICES | Facility: HOSPITAL | Age: 74
Discharge: HOME | End: 2019-10-31
Attending: NURSE PRACTITIONER | Admitting: FAMILY MEDICINE
Payer: COMMERCIAL

## 2019-10-31 VITALS
HEIGHT: 67 IN | WEIGHT: 179 LBS | SYSTOLIC BLOOD PRESSURE: 110 MMHG | BODY MASS INDEX: 28.09 KG/M2 | OXYGEN SATURATION: 98 % | HEART RATE: 68 BPM | DIASTOLIC BLOOD PRESSURE: 70 MMHG | RESPIRATION RATE: 16 BRPM

## 2019-10-31 DIAGNOSIS — E11.9 TYPE 2 DIABETES MELLITUS WITHOUT COMPLICATION, WITHOUT LONG-TERM CURRENT USE OF INSULIN (H): Primary | ICD-10-CM

## 2019-10-31 LAB — HBA1C MFR BLD: 6.2 % (ref 0–5.7)

## 2019-10-31 PROCEDURE — 83036 HEMOGLOBIN GLYCOSYLATED A1C: CPT | Performed by: DIETITIAN, REGISTERED

## 2019-10-31 PROCEDURE — 36416 COLLJ CAPILLARY BLOOD SPEC: CPT | Performed by: DIETITIAN, REGISTERED

## 2019-10-31 PROCEDURE — G0108 DIAB MANAGE TRN  PER INDIV: HCPCS | Performed by: DIETITIAN, REGISTERED

## 2019-10-31 ASSESSMENT — PAIN SCALES - GENERAL: PAINLEVEL: NO PAIN (0)

## 2019-10-31 ASSESSMENT — MIFFLIN-ST. JEOR: SCORE: 1520.57

## 2019-10-31 NOTE — LETTER
"    10/31/2019        RE: Colin Baxter  [address] [place] [address]        Diabetes Self-Management Education & Support    Diabetes Education Self Management & Training    SUBJECTIVE/OBJECTIVE:  Presents for: Follow-up Session 4  Accompanied by: Self, Other(ex-wife)  Diabetes education in the past 24mo: Yes  Focus of Visit: Monitoring, Assistance w/ making life changes  Diabetes type: Type 2  Date of diagnosis: 7/29/19  Disease course: Improving  How confident are you filling out medical forms by yourself:: Quite a bit  Diabetes management related comments/concerns: No concerns.   Transportation concerns: No  Other concerns:: Glasses  Cultural Influences/Ethnic Background:  American    Diabetes Symptoms & Complications  Blurred vision: No  Fatigue: No  Neuropathy: No  Foot ulcerations: No  Polydipsia: No  Polyphagia: No  Polyuria: No  Visual change: No  Weakness: No  Weight loss: Yes(Weight is down 44# x 1 year.  Pt states he lost weight prior to dx.  Weight is down 4# since initial visit to diabetes center in July. )  Slow healing wounds: No  Recent Infection(s): No  Symptom course: Improving  Weight trend: Decreasing steadily  Autonomic neuropathy: No  CVA: No  Heart disease: No  Nephropathy: No  Peripheral neuropathy: No  Peripheral Vascular Disease: No  Retinopathy: No    Patient Problem List and Family Medical History reviewed for relevant medical history, current medical status, and diabetes risk factors.    Vitals:  /70   Pulse 68   Resp 16   Ht 1.702 m (5' 7\")   Wt 81.2 kg (179 lb)   SpO2 98%   BMI 28.04 kg/m     Estimated body mass index is 28.04 kg/m  as calculated from the following:    Height as of this encounter: 1.702 m (5' 7\").    Weight as of this encounter: 81.2 kg (179 lb).   Last 3 BP:   BP Readings from Last 3 Encounters:   10/31/19 110/70   09/12/19 106/62   09/11/19 112/68       History   Smoking Status     Former Smoker     Quit date: 1/6/1992   Smokeless Tobacco     " Never Used       Labs:  Lab Results   Component Value Date    A1C 6.2 10/31/2019     Lab Results   Component Value Date     2019     Lab Results   Component Value Date    LDL 84 2019     HDL Cholesterol   Date Value Ref Range Status   2019 47 >39 mg/dL Final   ]  GFR Estimate   Date Value Ref Range Status   2019 >90 >60 mL/min/[1.73_m2] Final     Comment:     Non  GFR Calc  Starting 2018, serum creatinine based estimated GFR (eGFR) will be   calculated using the Chronic Kidney Disease Epidemiology Collaboration   (CKD-EPI) equation.       GFR Estimate If Black   Date Value Ref Range Status   2019 >90 >60 mL/min/[1.73_m2] Final     Comment:      GFR Calc  Starting 2018, serum creatinine based estimated GFR (eGFR) will be   calculated using the Chronic Kidney Disease Epidemiology Collaboration   (CKD-EPI) equation.       Lab Results   Component Value Date    CR 0.72 2019     No results found for: MICROALBUMIN    Healthy Eating  Healthy Eating Assessed Today: Yes  Cultural/Gnosticist diet restrictions?: No  Meal planning: Smaller portions, Avoiding sweets  Meals include: Breakfast, Lunch, Dinner, Snacks  Breakfast: 1 toast or 1 english muffin, 1-2 eggs, breakfast meat - coffee/cream/splenda   Lunch: 1/2 sandwich, fruit, milk or 1.5 cups chili with corn bread or beef, cheese, milk, coffee  Dinner: meat, veggies, 1 bread - sometimes salad - occasional starch  Snacks: grapes, leftover meat, 1/2 sandwich   Beverages: Water, Coffee, Milk  Has patient met with a dietitian in the past?: No    Being Active  Being Active Assessed Today: Yes  Exercise:: Currently not exercising  Barrier to exercise: None    Monitoring  Monitoring Assessed Today: Yes  Did patient bring glucose meter to appointment? : Yes  Blood Glucose Meter: One Touch  Home Glucose (Sugar) Monitorin-2 times per day  Blood glucose trend: No change  Aepoubw-  2 hours  post iaeumh-  Two week average is 104.     Taking Medications  Taking Medication Assessed Today: Yes  Current Treatments: Oral Agent (monotherapy), Diet(Metformin 1000 mg bid.  )  Problems taking diabetes medications regularly?: No  Diabetes medication side effects?: No  Treatment Compliance: All of the time    Problem Solving  Problem Solving Assessed Today: Yes  Hypoglycemia Frequency: Never  Patient carries a carbohydrate source: No  Medical alert: No  Severe weather/disaster plan for diabetes management?: No  DKA prevention plan?: No    Reducing Risks  Reducing Risks Assessed Today: No  Has dilated eye exam at least once a year?: Yes  Sees dentist every 6 months?: No    Healthy Coping  Healthy Coping Assessed Today: Yes  Emotional response to diabetes: Ready to learn  Informal Support system:: Family  Stage of change: ACTION (Actively working towards change)  Difficulty affording diabetes management supplies?: No  Support resources: None  Patient Activation Measure Survey Score:  SHARITA Score (Last Two) 9/26/2018   SHARITA Raw Score 30   Activation Score 56   SHARITA Level 3     ASSESSMENT:  A1c today was 6.2% which is down from 12.6% at diagnosis in July.  Pt has been able to control glucose levels with Metformin and diet changes.  Note that pt's weight has decreased 44# in the past year.  He notes majority of weight loss prior to dx (down 4# since initial visit to diabetes center in July).      Patient's most recent   Lab Results   Component Value Date    A1C 6.2 10/31/2019    is meeting goal of <7.0    INTERVENTION:   Diabetes knowledge and skills assessment:     Patient is knowledgeable in diabetes management concepts related to: Healthy Eating, Being Active, Monitoring, Taking Medication, Problem Solving, Reducing Risks and Healthy Coping    Patient needs further education on the following diabetes management concepts: Pt is doing well.  No concerns today.     Based on learning assessment above, most appropriate  setting for further diabetes education would be: Individual setting.    Education provided today on:  AADE Self-Care Behaviors:  Monitoring: individual blood glucose targets, frequency of monitoring.  Pt will decrease testing to 1x/day.   Reducing Risks: major complications of diabetes and A1C - goals, relating to blood glucose levels, how often to check    Opportunities for ongoing education and support in diabetes-self management were discussed.    Pt verbalized understanding of concepts discussed and recommendations provided today.       Education Materials Provided:  No new materials today.     PLAN:  Continue efforts to follow low carbohydrate meal plan.    Try to get some exercise in winter months.  Test glucose 1x/day - alternate fasting and 2 hours post supper.   Foot exam is due.   A1c should be checked again in 3-6 months.   See Patient Instructions for co-developed, patient-stated behavior change goals.  AVS printed and provided to patient today.   Follow up in diabetes center annually or sooner if indicated.    Time Spent: 30 minutes  Encounter Type: Individual    Any diabetes medication dose changes were made via the CDE Protocol and Collaborative Practice Agreement with the patient's referring provider. A copy of this encounter was shared with the provider.        Sincerely,        Tawanna Kirk RD

## 2019-10-31 NOTE — PROGRESS NOTES
"Diabetes Self-Management Education & Support    Diabetes Education Self Management & Training    SUBJECTIVE/OBJECTIVE:  Presents for: Follow-up Session 4  Accompanied by: Self, Other(ex-wife)  Diabetes education in the past 24mo: Yes  Focus of Visit: Monitoring, Assistance w/ making life changes  Diabetes type: Type 2  Date of diagnosis: 7/29/19  Disease course: Improving  How confident are you filling out medical forms by yourself:: Quite a bit  Diabetes management related comments/concerns: No concerns.   Transportation concerns: No  Other concerns:: Glasses  Cultural Influences/Ethnic Background:  American    Diabetes Symptoms & Complications  Blurred vision: No  Fatigue: No  Neuropathy: No  Foot ulcerations: No  Polydipsia: No  Polyphagia: No  Polyuria: No  Visual change: No  Weakness: No  Weight loss: Yes(Weight is down 44# x 1 year.  Pt states he lost weight prior to dx.  Weight is down 4# since initial visit to diabetes center in July. )  Slow healing wounds: No  Recent Infection(s): No  Symptom course: Improving  Weight trend: Decreasing steadily  Autonomic neuropathy: No  CVA: No  Heart disease: No  Nephropathy: No  Peripheral neuropathy: No  Peripheral Vascular Disease: No  Retinopathy: No    Patient Problem List and Family Medical History reviewed for relevant medical history, current medical status, and diabetes risk factors.    Vitals:  /70   Pulse 68   Resp 16   Ht 1.702 m (5' 7\")   Wt 81.2 kg (179 lb)   SpO2 98%   BMI 28.04 kg/m    Estimated body mass index is 28.04 kg/m  as calculated from the following:    Height as of this encounter: 1.702 m (5' 7\").    Weight as of this encounter: 81.2 kg (179 lb).   Last 3 BP:   BP Readings from Last 3 Encounters:   10/31/19 110/70   09/12/19 106/62   09/11/19 112/68       History   Smoking Status     Former Smoker     Quit date: 1/6/1992   Smokeless Tobacco     Never Used       Labs:  Lab Results   Component Value Date    A1C 6.2 10/31/2019     Lab " Results   Component Value Date     2019     Lab Results   Component Value Date    LDL 84 2019     HDL Cholesterol   Date Value Ref Range Status   2019 47 >39 mg/dL Final   ]  GFR Estimate   Date Value Ref Range Status   2019 >90 >60 mL/min/[1.73_m2] Final     Comment:     Non  GFR Calc  Starting 2018, serum creatinine based estimated GFR (eGFR) will be   calculated using the Chronic Kidney Disease Epidemiology Collaboration   (CKD-EPI) equation.       GFR Estimate If Black   Date Value Ref Range Status   2019 >90 >60 mL/min/[1.73_m2] Final     Comment:      GFR Calc  Starting 2018, serum creatinine based estimated GFR (eGFR) will be   calculated using the Chronic Kidney Disease Epidemiology Collaboration   (CKD-EPI) equation.       Lab Results   Component Value Date    CR 0.72 2019     No results found for: MICROALBUMIN    Healthy Eating  Healthy Eating Assessed Today: Yes  Cultural/Advent diet restrictions?: No  Meal planning: Smaller portions, Avoiding sweets  Meals include: Breakfast, Lunch, Dinner, Snacks  Breakfast: 1 toast or 1 english muffin, 1-2 eggs, breakfast meat - coffee/cream/splenda   Lunch: 1/2 sandwich, fruit, milk or 1.5 cups chili with corn bread or beef, cheese, milk, coffee  Dinner: meat, veggies, 1 bread - sometimes salad - occasional starch  Snacks: grapes, leftover meat, 1/2 sandwich   Beverages: Water, Coffee, Milk  Has patient met with a dietitian in the past?: No    Being Active  Being Active Assessed Today: Yes  Exercise:: Currently not exercising  Barrier to exercise: None    Monitoring  Monitoring Assessed Today: Yes  Did patient bring glucose meter to appointment? : Yes  Blood Glucose Meter: One Touch  Home Glucose (Sugar) Monitorin-2 times per day  Blood glucose trend: No change  Zmkkhen-  2 hours post dfyldg-  Two week average is 104.     Taking Medications  Taking Medication  Assessed Today: Yes  Current Treatments: Oral Agent (monotherapy), Diet(Metformin 1000 mg bid.  )  Problems taking diabetes medications regularly?: No  Diabetes medication side effects?: No  Treatment Compliance: All of the time    Problem Solving  Problem Solving Assessed Today: Yes  Hypoglycemia Frequency: Never  Patient carries a carbohydrate source: No  Medical alert: No  Severe weather/disaster plan for diabetes management?: No  DKA prevention plan?: No    Reducing Risks  Reducing Risks Assessed Today: No  Has dilated eye exam at least once a year?: Yes  Sees dentist every 6 months?: No    Healthy Coping  Healthy Coping Assessed Today: Yes  Emotional response to diabetes: Ready to learn  Informal Support system:: Family  Stage of change: ACTION (Actively working towards change)  Difficulty affording diabetes management supplies?: No  Support resources: None  Patient Activation Measure Survey Score:  SHARITA Score (Last Two) 9/26/2018   SHARITA Raw Score 30   Activation Score 56   SHARITA Level 3     ASSESSMENT:  A1c today was 6.2% which is down from 12.6% at diagnosis in July.  Pt has been able to control glucose levels with Metformin and diet changes.  Note that pt's weight has decreased 44# in the past year.  He notes majority of weight loss prior to dx (down 4# since initial visit to diabetes center in July).      Patient's most recent   Lab Results   Component Value Date    A1C 6.2 10/31/2019    is meeting goal of <7.0    INTERVENTION:   Diabetes knowledge and skills assessment:     Patient is knowledgeable in diabetes management concepts related to: Healthy Eating, Being Active, Monitoring, Taking Medication, Problem Solving, Reducing Risks and Healthy Coping    Patient needs further education on the following diabetes management concepts: Pt is doing well.  No concerns today.     Based on learning assessment above, most appropriate setting for further diabetes education would be: Individual setting.    Education  provided today on:  AADE Self-Care Behaviors:  Monitoring: individual blood glucose targets, frequency of monitoring.  Pt will decrease testing to 1x/day.   Reducing Risks: major complications of diabetes and A1C - goals, relating to blood glucose levels, how often to check    Opportunities for ongoing education and support in diabetes-self management were discussed.    Pt verbalized understanding of concepts discussed and recommendations provided today.       Education Materials Provided:  No new materials today.     PLAN:  Continue efforts to follow low carbohydrate meal plan.    Try to get some exercise in winter months.  Test glucose 1x/day - alternate fasting and 2 hours post supper.   Foot exam is due.   A1c should be checked again in 3-6 months.   See Patient Instructions for co-developed, patient-stated behavior change goals.  AVS printed and provided to patient today.   Follow up in diabetes center annually or sooner if indicated.    Time Spent: 30 minutes  Encounter Type: Individual    Any diabetes medication dose changes were made via the CDE Protocol and Collaborative Practice Agreement with the patient's referring provider. A copy of this encounter was shared with the provider.

## 2019-10-31 NOTE — LETTER
November 1, 2019      Colin Baxter  [address] [address]        Dear ,    We are writing to inform you of your test results.    Your test results fall within the expected range(s) or remain unchanged from previous results.  Please continue with current treatment plan.    Resulted Orders   Hemoglobin A1c   Result Value Ref Range    Hemoglobin A1C 6.2 (A) <5.7 %       If you have any questions or concerns, please call the clinic at the number listed above.       Sincerely,        Luis Tran MD

## 2019-10-31 NOTE — PATIENT INSTRUCTIONS
-Keep following low carbohydrate meal plan.   -Try to figure out a way to get some exercise in the winter.   -Test your glucose 1x/day - alterate fasting and 2 hours after supper.  -Target levels are fasting , 2 hours after supper less than 180.  -A1c today was 6.2% which is down from 12.6% in July.  Great job!  Goal is to keep A1c less than 7.0%.  You should have it checked again in three months.  -Follow up annually or sooner if needed.   -Call with concerns - [phone] Rhett Hutson or Tawanna

## 2019-11-01 ENCOUNTER — ALLIED HEALTH/NURSE VISIT (OUTPATIENT)
Dept: ALLERGY | Facility: OTHER | Age: 74
End: 2019-11-01
Attending: FAMILY MEDICINE
Payer: COMMERCIAL

## 2019-11-01 DIAGNOSIS — Z23 ENCOUNTER FOR IMMUNIZATION: Primary | ICD-10-CM

## 2019-11-01 PROCEDURE — 90472 IMMUNIZATION ADMIN EACH ADD: CPT | Mod: GY

## 2019-11-01 PROCEDURE — G0009 ADMIN PNEUMOCOCCAL VACCINE: HCPCS

## 2019-11-01 PROCEDURE — 90714 TD VACC NO PRESV 7 YRS+ IM: CPT

## 2019-11-01 PROCEDURE — 90732 PPSV23 VACC 2 YRS+ SUBQ/IM: CPT

## 2019-11-01 NOTE — PROGRESS NOTES
Clinic Administered Medication Documentation    MEDICATION LIST:   Injectable Medication Documentation    Patient was given td and pneumovax 23. Prior to medication administration, verified patients identity using patient s name and date of birth. Please see MAR and medication order for additional information. Patient instructed to report any adverse reaction to staff immediately .      Was entire vial of medication used? Yes for both   Vial/Syringe: Single dose vial  Expiration Date:  02/28/2021 (td) and 12/22/2020 (pneumovax 23)  Was this medication supplied by the patient? No     Priya Alexander LPN

## 2020-01-17 ENCOUNTER — TRANSFERRED RECORDS (OUTPATIENT)
Dept: HEALTH INFORMATION MANAGEMENT | Facility: CLINIC | Age: 75
End: 2020-01-17

## 2020-01-17 LAB — RETINOPATHY: NEGATIVE

## 2020-02-03 ENCOUNTER — OFFICE VISIT (OUTPATIENT)
Dept: UROLOGY | Facility: OTHER | Age: 75
End: 2020-02-03
Attending: UROLOGY
Payer: COMMERCIAL

## 2020-02-03 VITALS — BODY MASS INDEX: 27.31 KG/M2 | WEIGHT: 174.4 LBS | RESPIRATION RATE: 16 BRPM | HEART RATE: 80 BPM

## 2020-02-03 DIAGNOSIS — Z85.51 HISTORY OF BLADDER CANCER: Primary | ICD-10-CM

## 2020-02-03 PROCEDURE — 52000 CYSTOURETHROSCOPY: CPT | Performed by: UROLOGY

## 2020-02-03 PROCEDURE — G0463 HOSPITAL OUTPT CLINIC VISIT: HCPCS | Mod: 25

## 2020-02-03 ASSESSMENT — PAIN SCALES - GENERAL: PAINLEVEL: NO PAIN (0)

## 2020-02-03 NOTE — PATIENT INSTRUCTIONS

## 2020-02-03 NOTE — PROGRESS NOTES
Preprocedure diagnosis  History of bladder cancer, high risk    Postprocedure diagnosis  History of bladder cancer, high risk    Procedure  Flexible Cystourethroscopy and bladder barbotage    Surgeon  Ed Helm MD    Anesthesia  2% lidocaine jelly intraurethrally    Complications  None    Indications  The patient is undergoing a flexible cystoscopy for the above mentioned indications.  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    There was a prominent median lobe.    The lateral lobes were obstructive in appearance.  The bladder appeared to be normal capacity.    There were no tumors, stones or foreign bodies.    The orifices were slit-shaped and in their normal location.  Multiple areas of mild erythema as expected following BCG.    Procedure  The patient was placed in supine position and prepped and draped in sterile fashion with lidocaine jelly per urethra for anesthesia.    I passed a lubricated 14F flexible cystoscope through the penile urethra and into the bladder and the bladder was completely visualized.  The cystoscope was retroflexed and the bladder neck and prostate visualized.      The cystoscope was slowly withdrawn while visualizing the urethra and the procedure terminated.    The patient tolerated the procedure well.      Imaging  CTU  2017  IMPRESSION:  1.  LACK OF DISTENTION OF THE URINARY BLADDER SOMEWHAT LIMITS  EVALUATION.  THICKENING OF THE MUCOSA ALONG THE POSTERIOR AND LEFT  DORSOLATERAL ASPECT OF THE BLADDER IS NOT READILY SEEN ON  THE CURRENT  EXAMINATION.     2.  NO EVIDENCE OF LYMPHADENOPATHY OR EVIDENCE TO SUGGEST RENAL  CALCULUS OR RENAL LESION.     3.  MILD DILATATION OF THE MID ASPECT OF THE LEFT URETER, LIKELY  UNCHANGED FROM THE PRIOR EXAMINATION.    Plan  Follow up surveillance cystoscopy in 6 months until 2/2021 then once annually

## 2020-02-03 NOTE — NURSING NOTE
Patient positioned in supine position, perineum area prepped with chlorhexidene Gluconate and patient draped per sterile technique. Per verbal order read back by Ed Helm MD, Urojet 10mL 2% lidocaine jelly to be instilled into urethra.  Urojet- 10ml 2% Lidocaine jelly instilled into the urethra.    Urojet 2%  Lot#: [id number]  Expiration date: 9/21  : [employer]  NDC: [address]-7    [facility] Protocol    A. Pre-procedure verification complete Yes  1-relevant information / documentation available, reviewed and properly matched to the patient; 2-consent accurate and complete, 3-equipment and supplies available    B. Site marking complete N/A  Site marked if not in continuous attendance with patient    C. TIME OUT completed Yes  Time Out was conducted just prior to starting procedure to verify the eight required elements: 1-patient identity, 2-consent accurate and complete, 3-position, 4-correct side/site marked (if applicable), 5-procedure, 6-relevant images / results properly labeled and displayed (if applicable), 7-antibiotics / irrigation fluids (if applicable), 8-safety precautions.    After procedure perineum area rinsed. Discharge instructions reviewed with patient. Patient verbalized understanding of discharge instructions and discharged ambulatory.  Malka Mcnulty RN..................2/3/2020  10:34 AM

## 2020-02-12 DIAGNOSIS — E11.9 TYPE 2 DIABETES MELLITUS WITHOUT COMPLICATION, WITHOUT LONG-TERM CURRENT USE OF INSULIN (H): ICD-10-CM

## 2020-02-13 RX ORDER — ATORVASTATIN CALCIUM 40 MG/1
40 TABLET, FILM COATED ORAL DAILY
Qty: 90 TABLET | Refills: 0 | Status: SHIPPED | OUTPATIENT
Start: 2020-02-13 | End: 2020-05-15

## 2020-02-13 RX ORDER — LISINOPRIL 10 MG/1
10 TABLET ORAL DAILY
Qty: 90 TABLET | Refills: 0 | Status: SHIPPED | OUTPATIENT
Start: 2020-02-13 | End: 2020-05-15

## 2020-03-10 NOTE — PROGRESS NOTES
"  SUBJECTIVE:   Colin Baxter is a 75 year old male who presents for Preventive Visit.  Are you in the first 12 months of your Medicare Part B coverage?  No    Physical Health:    In general, how would you rate your overall physical health? good    Outside of work, how many days during the week do you exercise? 2-3 days/week    Outside of work, approximately how many minutes a day do you exercise?15-30 minutes    If you drink alcohol do you typically have >3 drinks per day or >7 drinks per week? No    Do you usually eat at least 4 servings of fruit and vegetables a day, include whole grains & fiber and avoid regularly eating high fat or \"junk\" foods? NO    Do you have any problems taking medications regularly?  No    Do you have any side effects from medications? none    Needs assistance for the following daily activities: no assistance needed    Which of the following safety concerns are present in your home?  none identified     Hearing impairment: No    In the past 6 months, have you been bothered by leaking of urine? no    Mental Health:    In general, how would you rate your overall mental or emotional health? good  PHQ-2 Score:      Do you feel safe in your environment? Yes    Have you ever done Advance Care Planning? (For example, a Health Directive, POLST, or a discussion with a medical provider or your loved ones about your wishes): No, advance care planning information given to patient to review.  Patient declined advance care planning discussion at this time.    Additional concerns to address?  YES, a few times has had blood in urine    Fall risk:  Fallen 2 or more times in the past year?: No  Any fall with injury in the past year?: No  Cognitive Screenin) Repeat 3 items (Leader, Season, Table)      2) Clock draw:   NORMAL  3) 3 item recall:   Recalls 3 objects  Results: 3 items recalled: COGNITIVE IMPAIRMENT LESS LIKELY    Mini-CogTM Copyright S Marisol. Licensed by the author for use in [facility]; reprinted with permission (soob@.[facility]). All rights reserved.      Do you have sleep apnea, excessive snoring or daytime drowsiness?: no      Reviewed and updated as needed this visit by clinical staff  Tobacco  Allergies  Meds  Problems  Med Hx  Surg Hx  Fam Hx         Reviewed and updated as needed this visit by Provider        Social History     Tobacco Use     Smoking status: Former Smoker     Last attempt to quit: 1992     Years since quittin.2     Smokeless tobacco: Never Used   Substance Use Topics     Alcohol use: Yes     Comment: rarely                           Current providers sharing in care for this patient include:     Patient Care Team:  Luis Tran MD as PCP - General (Family Practice)  Luis Tran MD as Assigned PCP  Rowena Phan LPN as PARVINN  Caryl Griffin RN as Diabetes Educator (Diabetes Education)  Tawanna Kirk RD as Diabetes Educator (Diabetes Education)    The following health maintenance items are reviewed in Epic and correct as of today:  Health Maintenance   Topic Date Due     DIABETIC FOOT EXAM  1945     HEPATITIS C SCREENING  1945     ZOSTER IMMUNIZATION (2 of 3) 2016     MEDICARE ANNUAL WELLNESS VISIT  2016     PHQ-2  2020     PHQ-9  2020     A1C  2020     BMP  2020     LIPID  2020     MICROALBUMIN  2020     FALL RISK ASSESSMENT  10/31/2020     EYE EXAM  2021     COLORECTAL CANCER SCREENING  2021     ADVANCE CARE PLANNING  2021     DTAP/TDAP/TD IMMUNIZATION (2 - Td) 2029     INFLUENZA VACCINE  Completed     PNEUMOCOCCAL IMMUNIZATION 65+ LOW/MEDIUM RISK  Completed     AORTIC ANEURYSM SCREENING (SYSTEM ASSIGNED)  Completed     IPV IMMUNIZATION  Aged Out     MENINGITIS IMMUNIZATION  Aged Out     BP Readings from Last 3 Encounters:   20 120/77   10/31/19 110/70   19 106/62    Wt Readings from Last 3 Encounters:   20 78 kg (172 lb)   20  79.1 kg (174 lb 6.4 oz)   10/31/19 81.2 kg (179 lb)                  Patient Active Problem List   Diagnosis     Comprehensive Medical Examination     Glaucoma     ACP (advance care planning)     Obesity (BMI 35.0-39.9) with comorbidity (H)     History of high risk bladder cancer     History of bladder cancer     Type 2 diabetes mellitus without complication, without long-term current use of insulin (H)     Past Surgical History:   Procedure Laterality Date     APPENDECTOMY  1958     COLONOSCOPY  2-     CYSTOSCOPY, BIOPSY BLADDER, COMBINED N/A 9/8/2015    Procedure: COMBINED CYSTOSCOPY, BIOPSY BLADDER;  Surgeon: Randy Martinez MD;  [facility]     CYSTOSCOPY, BIOPSY BLADDER, COMBINED N/A 2/14/2017    Procedure: COMBINED CYSTOSCOPY, BIOPSY BLADDER;  Surgeon: Randy Martinez MD;  [facility]     CYSTOSCOPY, BIOPSY BLADDER, COMBINED N/A 11/13/2018    Procedure: COMBINED CYSTOSCOPY, BIOPSY BLADDER;  Surgeon: Ed Helm MD;  Location: [facility]     CYSTOSCOPY, RETROGRADES, COMBINED Bilateral 11/13/2018    Procedure: Bilateral Retrograde Pyelagram, Bladder Biopsy;  Surgeon: Ed Helm MD;  [facility]     CYSTOSCOPY, RETROGRADES, INSERT STENT URETER(S), COMBINED Left 9/8/2015    Procedure: COMBINED CYSTOSCOPY, RETROGRADES, INSERT STENT URETER(S);  Surgeon: Randy Martinez MD;  [facility]     CYSTOSCOPY, TRANSURETHRAL RESECTION (TUR) TUMOR BLADDER, COMBINED N/A 9/8/2015    Procedure: COMBINED CYSTOSCOPY, TRANSURETHRAL RESECTION (TUR) TUMOR BLADDER;  Surgeon: Randy Martinez MD;  [facility]     CYSTOSCOPY, TRANSURETHRAL RESECTION (TUR) TUMOR BLADDER, COMBINED N/A 1/12/2016    Procedure: COMBINED CYSTOSCOPY, TRANSURETHRAL RESECTION (TUR) TUMOR BLADDER;  Surgeon: Randy Martinez MD;  [facility]     CYSTOSCOPY, TRANSURETHRAL RESECTION (TUR) TUMOR BLADDER, COMBINED N/A 9/13/2016    Procedure: COMBINED CYSTOSCOPY, TRANSURETHRAL RESECTION  (TUR) TUMOR BLADDER;  Surgeon: Randy Martinez MD;  [facility]     PHACOEMULSIFICATION WITH STANDARD INTRAOCULAR LENS IMPLANT Right 10/9/2018    Procedure: PHACOEMULSIFICATION WITH STANDARD INTRAOCULAR LENS IMPLANT;  PHACOEMULSIFICATION CATARACT EXTRACTION POSTERIOR CHAMBER LENS RIGHT;  Surgeon: Marlo Mcconnell MD;  [facility]     PHACOEMULSIFICATION WITH STANDARD INTRAOCULAR LENS IMPLANT Left 10/23/2018    Procedure: PHACOEMULSIFICATION CATARACT EXTRACTION POSTERIOR CHAMBER LENS LEFT;  Surgeon: Marlo Mcconnell MD;  [facility]       Social History     Tobacco Use     Smoking status: Former Smoker     Last attempt to quit: 1992     Years since quittin.2     Smokeless tobacco: Never Used   Substance Use Topics     Alcohol use: Yes     Comment: rarely     Family History   Problem Relation Age of Onset     Diabetes Mother      Parkinsonism Brother          Current Outpatient Medications   Medication Sig Dispense Refill     aspirin 81 MG EC tablet Take 81 mg by mouth daily       atorvastatin (LIPITOR) 40 MG tablet TAKE 1 TABLET (40 MG) BY MOUTH DAILY 90 tablet 0     blood glucose (ONETOUCH VERIO IQ) test strip Use to test blood sugar 2 times daily or as directed. 100 each 10     brimonidine (ALPHAGAN-P) 0.15 % ophthalmic solution INSTILL 1 DROP INTO RIGHT EYE TWICE DAILY  12     IBUPROFEN PO Take 200 mg by mouth every 6 hours as needed for moderate pain Reported on 2017       latanoprost (XALATAN) 0.005 % ophthalmic solution Place 1 drop into both eyes At Bedtime       lisinopril (PRINIVIL/ZESTRIL) 10 MG tablet TAKE 1 TABLET (10 MG) BY MOUTH DAILY 90 tablet 0     metFORMIN (GLUCOPHAGE) 500 MG tablet Take 2 tablets (1000 mg) with breakfast and 2 tablets (1000 mg) with supper 360 tablet 1     ONETOUCH DELICA LANCETS 33G MISC 1 each 2 times daily 100 each 10     Allergies   Allergen Reactions     No Clinical Screening - See Comments Other (See Comments)     Beta blocker in glaucoma  "gtt.s caused low pulse and passing out      Recent Labs   Lab Test 10/31/19 09/09/19  1144 07/29/19  0836 05/12/17  0740 07/07/15  0848   A1C 6.2*  --  12.6*  --   --    LDL  --  84  --   --  142*   HDL  --  47  --   --  43   TRIG  --  75  --   --  95   ALT  --   --  22  --  20   CR  --   --  0.72 1.02 0.97   GFRESTIMATED  --   --  >90 72 77   GFRESTBLACK  --   --  >90 87 >90   GFR Calc     POTASSIUM  --   --  4.3  --  4.0   TSH  --  1.40  --   --   --        ROS:  Constitutional, HEENT, cardiovascular, pulmonary, gi and gu systems are negative, except as otherwise noted.    OBJECTIVE:   /77 (BP Location: Left arm, Patient Position: Sitting, Cuff Size: Adult Regular)   Pulse 74   Temp 96.7  F (35.9  C) (Tympanic)   Resp 17   Ht 1.702 m (5' 7\")   Wt 78 kg (172 lb)   SpO2 98%   BMI 26.94 kg/m   Estimated body mass index is 26.94 kg/m  as calculated from the following:    Height as of this encounter: 1.702 m (5' 7\").    Weight as of this encounter: 78 kg (172 lb).  EXAM:   GENERAL: healthy, alert and no distress  EYES: Eyes grossly normal to inspection, PERRL and conjunctivae and sclerae normal  HENT: ear canals and TM's normal, nose and mouth without ulcers or lesions  NECK: no adenopathy, no asymmetry, masses, or scars and thyroid normal to palpation  RESP: lungs clear to auscultation - no rales, rhonchi or wheezes  CV: regular rate and rhythm, normal S1 S2, no S3 or S4, no murmur, click or rub, no peripheral edema and peripheral pulses strong  ABDOMEN: soft, nontender, no hepatosplenomegaly, no masses and bowel sounds normal  MS: no gross musculoskeletal defects noted, no edema  SKIN: no suspicious lesions or rashes  NEURO: Normal strength and tone, mentation intact and speech normal  PSYCH: mentation appears normal, affect normal/bright    Diagnostic Test Results:  Labs reviewed in Epic    ASSESSMENT / PLAN:   1. Routine general medical examination at a health care facility  Appropriate " "screening labs are drawn.  Most recent colonoscopy and immunizations are reviewed.  Risk factors, aspirin use, screening recommendations, and follow up discussed.  Routine exam in 1 year.      2. Type 2 diabetes mellitus without complication, without long-term current use of insulin (H)  Fasting labs are reviewed.  Goal of hemoglobin A1C of less than 7 discussed.  Instructed in the importance of diet, exercise, and good glycemic control in prevention of secondary complications.    Continue same medication regimen. Repeat fasting glucose and hemoglobin A1C in 6 months.   - Hemoglobin A1c  - C FOOT EXAM  NO CHARGE    3. History of high risk bladder cancer  Continue follow up with Urology      COUNSELING:  Reviewed preventive health counseling, as reflected in patient instructions  Special attention given to:       Regular exercise       Healthy diet/nutrition    Estimated body mass index is 26.94 kg/m  as calculated from the following:    Height as of this encounter: 1.702 m (5' 7\").    Weight as of this encounter: 78 kg (172 lb).         reports that he quit smoking about 28 years ago. He has never used smokeless tobacco.      Appropriate preventive services were discussed with this patient, including applicable screening as appropriate for cardiovascular disease, diabetes, osteopenia/osteoporosis, and glaucoma.  As appropriate for age/gender, discussed screening for colorectal cancer, prostate cancer, breast cancer, and cervical cancer. Checklist reviewing preventive services available has been given to the patient.    Reviewed patients plan of care and provided an AVS. The Basic Care Plan (routine screening as documented in Health Maintenance) for Colin meets the Care Plan requirement. This Care Plan has been established and reviewed with the Patient.    Counseling Resources:  ATP IV Guidelines  Pooled Cohorts Equation Calculator  Breast Cancer Risk Calculator  FRAX Risk Assessment  ICSI Preventive " Guidelines  Dietary Guidelines for Americans, 2010  USDA's MyPlate  ASA Prophylaxis  Lung CA Screening    Luis Tran MD  [facility]

## 2020-03-12 DIAGNOSIS — E11.9 TYPE 2 DIABETES MELLITUS WITHOUT COMPLICATION, WITHOUT LONG-TERM CURRENT USE OF INSULIN (H): ICD-10-CM

## 2020-03-13 ENCOUNTER — OFFICE VISIT (OUTPATIENT)
Dept: FAMILY MEDICINE | Facility: OTHER | Age: 75
End: 2020-03-13
Attending: FAMILY MEDICINE
Payer: COMMERCIAL

## 2020-03-13 VITALS
SYSTOLIC BLOOD PRESSURE: 120 MMHG | HEART RATE: 74 BPM | OXYGEN SATURATION: 98 % | WEIGHT: 172 LBS | DIASTOLIC BLOOD PRESSURE: 77 MMHG | BODY MASS INDEX: 27 KG/M2 | RESPIRATION RATE: 17 BRPM | TEMPERATURE: 96.7 F | HEIGHT: 67 IN

## 2020-03-13 DIAGNOSIS — E11.9 TYPE 2 DIABETES MELLITUS WITHOUT COMPLICATION, WITHOUT LONG-TERM CURRENT USE OF INSULIN (H): ICD-10-CM

## 2020-03-13 DIAGNOSIS — Z00.00 ROUTINE GENERAL MEDICAL EXAMINATION AT A HEALTH CARE FACILITY: Primary | ICD-10-CM

## 2020-03-13 DIAGNOSIS — Z85.51 PERSONAL HISTORY OF MALIGNANT NEOPLASM OF BLADDER: ICD-10-CM

## 2020-03-13 LAB
ALBUMIN UR-MCNC: NEGATIVE MG/DL
APPEARANCE UR: CLEAR
BACTERIA #/AREA URNS HPF: ABNORMAL /HPF
BILIRUB UR QL STRIP: NEGATIVE
COLOR UR AUTO: ABNORMAL
EST. AVERAGE GLUCOSE BLD GHB EST-MCNC: 117 MG/DL
GLUCOSE UR STRIP-MCNC: NEGATIVE MG/DL
HBA1C MFR BLD: 5.7 % (ref 0–5.6)
HGB UR QL STRIP: ABNORMAL
KETONES UR STRIP-MCNC: NEGATIVE MG/DL
LEUKOCYTE ESTERASE UR QL STRIP: NEGATIVE
MUCOUS THREADS #/AREA URNS LPF: PRESENT /LPF
NITRATE UR QL: NEGATIVE
PH UR STRIP: 5 PH (ref 4.7–8)
RBC #/AREA URNS AUTO: >182 /HPF (ref 0–2)
SOURCE: ABNORMAL
SP GR UR STRIP: 1.02 (ref 1–1.03)
UROBILINOGEN UR STRIP-MCNC: NORMAL MG/DL (ref 0–2)
WBC #/AREA URNS AUTO: 6 /HPF (ref 0–5)

## 2020-03-13 PROCEDURE — G0438 PPPS, INITIAL VISIT: HCPCS | Performed by: FAMILY MEDICINE

## 2020-03-13 PROCEDURE — 83036 HEMOGLOBIN GLYCOSYLATED A1C: CPT | Mod: ZL | Performed by: FAMILY MEDICINE

## 2020-03-13 PROCEDURE — 40000788 ZZHCL STATISTIC ESTIMATED AVERAGE GLUCOSE: Mod: ZL | Performed by: FAMILY MEDICINE

## 2020-03-13 PROCEDURE — 81001 URINALYSIS AUTO W/SCOPE: CPT | Mod: ZL | Performed by: FAMILY MEDICINE

## 2020-03-13 PROCEDURE — 36415 COLL VENOUS BLD VENIPUNCTURE: CPT | Mod: ZL | Performed by: FAMILY MEDICINE

## 2020-03-13 PROCEDURE — G0463 HOSPITAL OUTPT CLINIC VISIT: HCPCS

## 2020-03-13 RX ORDER — ASPIRIN 81 MG/1
81 TABLET ORAL DAILY
COMMUNITY
End: 2021-01-18

## 2020-03-13 ASSESSMENT — PAIN SCALES - GENERAL: PAINLEVEL: NO PAIN (0)

## 2020-03-13 ASSESSMENT — PATIENT HEALTH QUESTIONNAIRE - PHQ9: SUM OF ALL RESPONSES TO PHQ QUESTIONS 1-9: 3

## 2020-03-13 ASSESSMENT — MIFFLIN-ST. JEOR: SCORE: 1483.82

## 2020-03-13 NOTE — NURSING NOTE
"Chief Complaint   Patient presents with     Physical       Initial /77 (BP Location: Left arm, Patient Position: Sitting, Cuff Size: Adult Regular)   Pulse 74   Temp 96.7  F (35.9  C) (Tympanic)   Resp 17   Ht 1.702 m (5' 7\")   Wt 78 kg (172 lb)   SpO2 98%   BMI 26.94 kg/m   Estimated body mass index is 26.94 kg/m  as calculated from the following:    Height as of this encounter: 1.702 m (5' 7\").    Weight as of this encounter: 78 kg (172 lb).  Medication Reconciliation: complete  Mariah Lopez LPN  "

## 2020-03-13 NOTE — TELEPHONE ENCOUNTER
METFORMIN      Last Written Prescription Date:  8-  Last Fill Quantity: 360,   # refills: 1  Last Office Visit: 3-  Future Office visit:       Routing refill request to provider for review/approval because:

## 2020-05-15 DIAGNOSIS — E11.9 TYPE 2 DIABETES MELLITUS WITHOUT COMPLICATION, WITHOUT LONG-TERM CURRENT USE OF INSULIN (H): ICD-10-CM

## 2020-05-15 RX ORDER — ATORVASTATIN CALCIUM 40 MG/1
40 TABLET, FILM COATED ORAL DAILY
Qty: 90 TABLET | Refills: 0 | Status: SHIPPED | OUTPATIENT
Start: 2020-05-15 | End: 2020-08-13

## 2020-05-15 RX ORDER — LISINOPRIL 10 MG/1
10 TABLET ORAL DAILY
Qty: 90 TABLET | Refills: 0 | Status: SHIPPED | OUTPATIENT
Start: 2020-05-15 | End: 2020-08-13

## 2020-05-15 NOTE — TELEPHONE ENCOUNTER
lisinopril (PRINIVIL/ZESTRIL) 10 MG tablet       Last Written Prescription Date:  2/13/20  Last Fill Quantity: 90,   # refills: 0  Last Office Visit: 3/13/20  Future Office visit:       Routing refill request to provider for review/approval because:

## 2020-08-03 ENCOUNTER — OFFICE VISIT (OUTPATIENT)
Dept: UROLOGY | Facility: OTHER | Age: 75
End: 2020-08-03
Attending: UROLOGY
Payer: COMMERCIAL

## 2020-08-03 VITALS — BODY MASS INDEX: 26.47 KG/M2 | WEIGHT: 169 LBS | HEART RATE: 72 BPM

## 2020-08-03 DIAGNOSIS — Z85.51 HISTORY OF BLADDER CANCER: Primary | ICD-10-CM

## 2020-08-03 PROCEDURE — 52000 CYSTOURETHROSCOPY: CPT | Performed by: UROLOGY

## 2020-08-03 PROCEDURE — G0463 HOSPITAL OUTPT CLINIC VISIT: HCPCS | Mod: 25

## 2020-08-03 ASSESSMENT — PAIN SCALES - GENERAL: PAINLEVEL: NO PAIN (0)

## 2020-08-03 NOTE — NURSING NOTE
Patient positioned in supine position, perineum area prepped with chlorhexidene Gluconate and patient draped per sterile technique. Per verbal order read back by Ed Helm MD, Urojet 10mL 2% lidocaine jelly to be instilled into urethra.  Urojet- 10ml 2% Lidocaine jelly instilled into the urethra.    Urojet 2%  Lot#: [id number]  Expiration date: 2/22  : [employer]  NDC: [address]-2    [place] Protocol    A. Pre-procedure verification complete Yes  1-relevant information / documentation available, reviewed and properly matched to the patient; 2-consent accurate and complete, 3-equipment and supplies available    B. Site marking complete N/A  Site marked if not in continuous attendance with patient    C. TIME OUT completed Yes  Time Out was conducted just prior to starting procedure to verify the eight required elements: 1-patient identity, 2-consent accurate and complete, 3-position, 4-correct side/site marked (if applicable), 5-procedure, 6-relevant images / results properly labeled and displayed (if applicable), 7-antibiotics / irrigation fluids (if applicable), 8-safety precautions.    After procedure perineum area rinsed. Discharge instructions reviewed with patient. Patient verbalized understanding of discharge instructions and discharged ambulatory.  Caro Malik LPN..................8/3/2020  11:00 AM

## 2020-08-03 NOTE — PROGRESS NOTES
Preprocedure diagnosis  History of bladder cancer, high risk    Postprocedure diagnosis  History of bladder cancer, high risk    Procedure  Flexible Cystourethroscopy    Surgeon  Ed Helm MD    Anesthesia  2% lidocaine jelly intraurethrally    Complications  None    Indications  The patient is undergoing a flexible cystoscopy for the above mentioned indications.  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   2016 Ta, 2016 CIS  Intravesical Treatments:  Induction BCG , reinduction BCG , Dixon-Cis x 3 2016, 3/2017, 2018, 2018  Date of Last UT Imagin2017  Random bladder bx  2018 (negative)    Findings  Cystoscopic findings included a normal anterior urethra.    There was a prominent median lobe.    The lateral lobes were obstructive in appearance.  The bladder appeared to be normal capacity.    There were no tumors, stones or foreign bodies.    The orifices were slit-shaped and in their normal location.  Multiple areas of mild erythema as expected following BCG.    Procedure  The patient was placed in supine position and prepped and draped in sterile fashion with lidocaine jelly per urethra for anesthesia.    I passed a lubricated 14F flexible cystoscope through the penile urethra and into the bladder and the bladder was completely visualized.  The cystoscope was retroflexed and the bladder neck and prostate visualized.      The cystoscope was slowly withdrawn while visualizing the urethra and the procedure terminated.    The patient tolerated the procedure well.      Imaging  CTU  2017  IMPRESSION:  1.  LACK OF DISTENTION OF THE URINARY BLADDER SOMEWHAT LIMITS  EVALUATION.  THICKENING OF THE MUCOSA ALONG THE POSTERIOR AND LEFT  DORSOLATERAL ASPECT OF THE BLADDER IS NOT READILY SEEN ON THE  CURRENT  EXAMINATION.     2.  NO EVIDENCE OF LYMPHADENOPATHY OR EVIDENCE TO SUGGEST RENAL  CALCULUS OR RENAL LESION.     3.  MILD DILATATION OF THE MID ASPECT OF THE LEFT URETER, LIKELY  UNCHANGED FROM THE PRIOR EXAMINATION.    Plan  Follow up surveillance cystoscopy q6 months until 2/2021 then once annually

## 2020-08-03 NOTE — PATIENT INSTRUCTIONS

## 2020-08-12 DIAGNOSIS — E11.9 TYPE 2 DIABETES MELLITUS WITHOUT COMPLICATION, WITHOUT LONG-TERM CURRENT USE OF INSULIN (H): ICD-10-CM

## 2020-08-12 NOTE — TELEPHONE ENCOUNTER
lipitor      Last Written Prescription Date:  5/15/20  Last Fill Quantity: 90,   # refills: 0  Last Office Visit: 3/13/20  Future Office visit:         lisinopril      Last Written Prescription Date:  5/15/20  Last Fill Quantity: 90,   # refills: 0  Last Office Visit: 3/13/20  Future Office visit:

## 2020-08-13 RX ORDER — ATORVASTATIN CALCIUM 40 MG/1
40 TABLET, FILM COATED ORAL DAILY
Qty: 90 TABLET | Refills: 0 | Status: SHIPPED | OUTPATIENT
Start: 2020-08-13 | End: 2020-11-17

## 2020-08-13 RX ORDER — LISINOPRIL 10 MG/1
10 TABLET ORAL DAILY
Qty: 90 TABLET | Refills: 0 | Status: SHIPPED | OUTPATIENT
Start: 2020-08-13 | End: 2020-11-17

## 2020-09-04 DIAGNOSIS — E11.9 TYPE 2 DIABETES MELLITUS WITHOUT COMPLICATION, WITHOUT LONG-TERM CURRENT USE OF INSULIN (H): ICD-10-CM

## 2020-09-08 NOTE — TELEPHONE ENCOUNTER
Metformin 500 mg      Last Written Prescription Date:  3/13/20  Last Fill Quantity: 360,   # refills: 1  Last Office Visit: 3/13/20  Future Office visit:       Routing refill request to provider for review/approval because:

## 2020-09-14 ENCOUNTER — TELEPHONE (OUTPATIENT)
Dept: FAMILY MEDICINE | Facility: OTHER | Age: 75
End: 2020-09-14

## 2020-09-14 DIAGNOSIS — R31.9 BLOOD IN URINE: ICD-10-CM

## 2020-09-14 DIAGNOSIS — R39.15 URINARY URGENCY: Primary | ICD-10-CM

## 2020-09-14 NOTE — TELEPHONE ENCOUNTER
Patient states had an appointment with Urologist last month regarding his bladder and previous history with bladder cancer. Urologist stated all looked well. Patient is now having urgency, frequency and blood in urine. Patient would like to know if he should have a UA done or come in to see you? Please advise.

## 2020-09-15 DIAGNOSIS — R82.90 ABNORMAL URINE FINDINGS: Primary | ICD-10-CM

## 2020-09-15 DIAGNOSIS — R39.15 URINARY URGENCY: ICD-10-CM

## 2020-09-15 DIAGNOSIS — R31.9 BLOOD IN URINE: ICD-10-CM

## 2020-09-15 LAB
ALBUMIN UR-MCNC: 30 MG/DL
APPEARANCE UR: CLEAR
BACTERIA #/AREA URNS HPF: ABNORMAL /HPF
BILIRUB UR QL STRIP: NEGATIVE
COLOR UR AUTO: YELLOW
GLUCOSE UR STRIP-MCNC: NEGATIVE MG/DL
HGB UR QL STRIP: ABNORMAL
KETONES UR STRIP-MCNC: 5 MG/DL
LEUKOCYTE ESTERASE UR QL STRIP: ABNORMAL
MUCOUS THREADS #/AREA URNS LPF: PRESENT /LPF
NITRATE UR QL: NEGATIVE
PH UR STRIP: 5.5 PH (ref 4.7–8)
RBC #/AREA URNS AUTO: 167 /HPF (ref 0–2)
SOURCE: ABNORMAL
SP GR UR STRIP: 1.03 (ref 1–1.03)
UROBILINOGEN UR STRIP-MCNC: 2 MG/DL (ref 0–2)
WBC #/AREA URNS AUTO: 52 /HPF (ref 0–5)

## 2020-09-15 PROCEDURE — 87086 URINE CULTURE/COLONY COUNT: CPT | Mod: ZL | Performed by: FAMILY MEDICINE

## 2020-09-15 PROCEDURE — 81001 URINALYSIS AUTO W/SCOPE: CPT | Mod: ZL | Performed by: FAMILY MEDICINE

## 2020-09-17 LAB
BACTERIA SPEC CULT: NO GROWTH
SPECIMEN SOURCE: NORMAL

## 2020-09-24 ENCOUNTER — TELEPHONE (OUTPATIENT)
Dept: FAMILY MEDICINE | Facility: OTHER | Age: 75
End: 2020-09-24

## 2020-09-29 ENCOUNTER — TELEPHONE (OUTPATIENT)
Dept: FAMILY MEDICINE | Facility: OTHER | Age: 75
End: 2020-09-29

## 2020-09-29 DIAGNOSIS — Z12.5 SCREENING FOR PROSTATE CANCER: ICD-10-CM

## 2020-09-29 DIAGNOSIS — E11.9 TYPE 2 DIABETES MELLITUS WITHOUT COMPLICATION, WITHOUT LONG-TERM CURRENT USE OF INSULIN (H): Primary | ICD-10-CM

## 2020-10-02 DIAGNOSIS — E11.9 TYPE 2 DIABETES MELLITUS WITHOUT COMPLICATION, WITHOUT LONG-TERM CURRENT USE OF INSULIN (H): ICD-10-CM

## 2020-10-02 DIAGNOSIS — Z12.5 SCREENING FOR PROSTATE CANCER: ICD-10-CM

## 2020-10-02 LAB
ALBUMIN SERPL-MCNC: 3.9 G/DL (ref 3.4–5)
ALP SERPL-CCNC: 58 U/L (ref 40–150)
ALT SERPL W P-5'-P-CCNC: 14 U/L (ref 0–70)
ANION GAP SERPL CALCULATED.3IONS-SCNC: 5 MMOL/L (ref 3–14)
AST SERPL W P-5'-P-CCNC: 18 U/L (ref 0–45)
BASOPHILS # BLD AUTO: 0.1 10E9/L (ref 0–0.2)
BASOPHILS NFR BLD AUTO: 0.8 %
BILIRUB SERPL-MCNC: 0.9 MG/DL (ref 0.2–1.3)
BUN SERPL-MCNC: 27 MG/DL (ref 7–30)
CALCIUM SERPL-MCNC: 8.7 MG/DL (ref 8.5–10.1)
CHLORIDE SERPL-SCNC: 104 MMOL/L (ref 94–109)
CHOLEST SERPL-MCNC: 145 MG/DL
CO2 SERPL-SCNC: 26 MMOL/L (ref 20–32)
CREAT SERPL-MCNC: 0.82 MG/DL (ref 0.66–1.25)
CREAT UR-MCNC: 33 MG/DL
DIFFERENTIAL METHOD BLD: ABNORMAL
EOSINOPHIL # BLD AUTO: 0.4 10E9/L (ref 0–0.7)
EOSINOPHIL NFR BLD AUTO: 7.1 %
ERYTHROCYTE [DISTWIDTH] IN BLOOD BY AUTOMATED COUNT: 13.6 % (ref 10–15)
EST. AVERAGE GLUCOSE BLD GHB EST-MCNC: 117 MG/DL
GFR SERPL CREATININE-BSD FRML MDRD: 86 ML/MIN/{1.73_M2}
GLUCOSE SERPL-MCNC: 103 MG/DL (ref 70–99)
HBA1C MFR BLD: 5.7 % (ref 0–5.6)
HCT VFR BLD AUTO: 38.1 % (ref 40–53)
HDLC SERPL-MCNC: 59 MG/DL
HGB BLD-MCNC: 12.9 G/DL (ref 13.3–17.7)
IMM GRANULOCYTES # BLD: 0 10E9/L (ref 0–0.4)
IMM GRANULOCYTES NFR BLD: 0 %
LDLC SERPL CALC-MCNC: 75 MG/DL
LYMPHOCYTES # BLD AUTO: 1.9 10E9/L (ref 0.8–5.3)
LYMPHOCYTES NFR BLD AUTO: 30.5 %
MCH RBC QN AUTO: 32 PG (ref 26.5–33)
MCHC RBC AUTO-ENTMCNC: 33.9 G/DL (ref 31.5–36.5)
MCV RBC AUTO: 95 FL (ref 78–100)
MICROALBUMIN UR-MCNC: 38 MG/L
MICROALBUMIN/CREAT UR: 115.36 MG/G CR (ref 0–17)
MONOCYTES # BLD AUTO: 0.7 10E9/L (ref 0–1.3)
MONOCYTES NFR BLD AUTO: 11 %
NEUTROPHILS # BLD AUTO: 3.1 10E9/L (ref 1.6–8.3)
NEUTROPHILS NFR BLD AUTO: 50.6 %
NONHDLC SERPL-MCNC: 86 MG/DL
NRBC # BLD AUTO: 0 10*3/UL
NRBC BLD AUTO-RTO: 0 /100
PLATELET # BLD AUTO: 268 10E9/L (ref 150–450)
POTASSIUM SERPL-SCNC: 3.7 MMOL/L (ref 3.4–5.3)
PROT SERPL-MCNC: 7.6 G/DL (ref 6.8–8.8)
PSA SERPL-ACNC: 0.11 UG/L (ref 0–4)
RBC # BLD AUTO: 4.03 10E12/L (ref 4.4–5.9)
SODIUM SERPL-SCNC: 135 MMOL/L (ref 133–144)
T4 FREE SERPL-MCNC: 0.99 NG/DL (ref 0.76–1.46)
TRIGL SERPL-MCNC: 54 MG/DL
TSH SERPL DL<=0.005 MIU/L-ACNC: 1.41 MU/L (ref 0.4–4)
WBC # BLD AUTO: 6.2 10E9/L (ref 4–11)

## 2020-10-02 PROCEDURE — 84439 ASSAY OF FREE THYROXINE: CPT | Mod: ZL | Performed by: FAMILY MEDICINE

## 2020-10-02 PROCEDURE — 36415 COLL VENOUS BLD VENIPUNCTURE: CPT | Mod: ZL | Performed by: FAMILY MEDICINE

## 2020-10-02 PROCEDURE — G0103 PSA SCREENING: HCPCS | Mod: ZL | Performed by: FAMILY MEDICINE

## 2020-10-02 PROCEDURE — 84443 ASSAY THYROID STIM HORMONE: CPT | Mod: ZL | Performed by: FAMILY MEDICINE

## 2020-10-02 PROCEDURE — 80053 COMPREHEN METABOLIC PANEL: CPT | Mod: ZL | Performed by: FAMILY MEDICINE

## 2020-10-02 PROCEDURE — 85025 COMPLETE CBC W/AUTO DIFF WBC: CPT | Mod: ZL | Performed by: FAMILY MEDICINE

## 2020-10-02 PROCEDURE — 83036 HEMOGLOBIN GLYCOSYLATED A1C: CPT | Mod: ZL | Performed by: FAMILY MEDICINE

## 2020-10-02 PROCEDURE — 80061 LIPID PANEL: CPT | Mod: ZL | Performed by: FAMILY MEDICINE

## 2020-10-02 PROCEDURE — 999N001182 HC STATISTIC ESTIMATED AVERAGE GLUCOSE: Mod: ZL | Performed by: FAMILY MEDICINE

## 2020-10-02 PROCEDURE — 82043 UR ALBUMIN QUANTITATIVE: CPT | Mod: ZL | Performed by: FAMILY MEDICINE

## 2020-10-06 ENCOUNTER — OFFICE VISIT (OUTPATIENT)
Dept: FAMILY MEDICINE | Facility: OTHER | Age: 75
End: 2020-10-06
Attending: FAMILY MEDICINE
Payer: COMMERCIAL

## 2020-10-06 VITALS
TEMPERATURE: 97.5 F | OXYGEN SATURATION: 98 % | HEIGHT: 67 IN | BODY MASS INDEX: 25.11 KG/M2 | DIASTOLIC BLOOD PRESSURE: 72 MMHG | HEART RATE: 66 BPM | WEIGHT: 160 LBS | SYSTOLIC BLOOD PRESSURE: 116 MMHG

## 2020-10-06 DIAGNOSIS — R31.21 ASYMPTOMATIC MICROSCOPIC HEMATURIA: Primary | ICD-10-CM

## 2020-10-06 DIAGNOSIS — Z23 ENCOUNTER FOR IMMUNIZATION: ICD-10-CM

## 2020-10-06 DIAGNOSIS — G47.62 NOCTURNAL LEG CRAMPS: ICD-10-CM

## 2020-10-06 PROCEDURE — 99213 OFFICE O/P EST LOW 20 MIN: CPT | Performed by: FAMILY MEDICINE

## 2020-10-06 PROCEDURE — G0008 ADMIN INFLUENZA VIRUS VAC: HCPCS

## 2020-10-06 PROCEDURE — G0463 HOSPITAL OUTPT CLINIC VISIT: HCPCS | Mod: 25

## 2020-10-06 PROCEDURE — G0008 ADMIN INFLUENZA VIRUS VAC: HCPCS | Performed by: FAMILY MEDICINE

## 2020-10-06 ASSESSMENT — ANXIETY QUESTIONNAIRES
GAD7 TOTAL SCORE: 0
3. WORRYING TOO MUCH ABOUT DIFFERENT THINGS: NOT AT ALL
2. NOT BEING ABLE TO STOP OR CONTROL WORRYING: NOT AT ALL
4. TROUBLE RELAXING: NOT AT ALL
6. BECOMING EASILY ANNOYED OR IRRITABLE: NOT AT ALL
5. BEING SO RESTLESS THAT IT IS HARD TO SIT STILL: NOT AT ALL
7. FEELING AFRAID AS IF SOMETHING AWFUL MIGHT HAPPEN: NOT AT ALL
1. FEELING NERVOUS, ANXIOUS, OR ON EDGE: NOT AT ALL

## 2020-10-06 ASSESSMENT — PAIN SCALES - GENERAL: PAINLEVEL: NO PAIN (0)

## 2020-10-06 ASSESSMENT — PATIENT HEALTH QUESTIONNAIRE - PHQ9: SUM OF ALL RESPONSES TO PHQ QUESTIONS 1-9: 0

## 2020-10-06 ASSESSMENT — MIFFLIN-ST. JEOR: SCORE: 1429.39

## 2020-10-06 NOTE — PROGRESS NOTES
SUBJECTIVE:   Colin Baxter is a 75 year old male who presents to clinic today for the following health issues:    Genitourinary symptoms      Duration: 3 weeks    Description:  frequency, hematuria and Urgency    Intensity:  moderate    Accompanying signs and symptoms (fever/discharge/nausea/vomiting/back or abdominal pain):  None    History (frequent UTI's/kidney stones/prostate problems): bladder cancer  Sexually active: YES    Precipitating or alleviating factors: None    Therapies tried and outcome: none   Outcome:     Leg cramps  Colin has developed nocturnal leg cramps over the last several months.  He has diabetes but no other risk factors.  Denies fever, weight loss, gait disturbance, numbness, tingling, paresthesias, joint swelling, joint warmth, or joint redness.      Problem list and histories reviewed & adjusted, as indicated.  Additional history: as documented    Patient Active Problem List   Diagnosis     Comprehensive Medical Examination     Glaucoma     ACP (advance care planning)     Obesity (BMI 35.0-39.9) with comorbidity (H)     History of high risk bladder cancer     History of bladder cancer     Type 2 diabetes mellitus without complication, without long-term current use of insulin (H)     Past Surgical History:   Procedure Laterality Date     APPENDECTOMY  1958     COLONOSCOPY  2-     CYSTOSCOPY, BIOPSY BLADDER, COMBINED N/A 9/8/2015    Procedure: COMBINED CYSTOSCOPY, BIOPSY BLADDER;  Surgeon: Randy Martinez MD;  [facility]     CYSTOSCOPY, BIOPSY BLADDER, COMBINED N/A 2/14/2017    Procedure: COMBINED CYSTOSCOPY, BIOPSY BLADDER;  Surgeon: Randy Martinez MD;  [facility]     CYSTOSCOPY, BIOPSY BLADDER, COMBINED N/A 11/13/2018    Procedure: COMBINED CYSTOSCOPY, BIOPSY BLADDER;  Surgeon: Ed Helm MD;  [facility]     CYSTOSCOPY, RETROGRADES, COMBINED Bilateral 11/13/2018    Procedure: Bilateral Retrograde Pyelagram, Bladder Biopsy;  Surgeon: Volodymyr  Ed BROWN MD;  Location: [facility]     CYSTOSCOPY, RETROGRADES, INSERT STENT URETER(S), COMBINED Left 2015    Procedure: COMBINED CYSTOSCOPY, RETROGRADES, INSERT STENT URETER(S);  Surgeon: Randy Martinez MD;  [facility]     CYSTOSCOPY, TRANSURETHRAL RESECTION (TUR) TUMOR BLADDER, COMBINED N/A 2015    Procedure: COMBINED CYSTOSCOPY, TRANSURETHRAL RESECTION (TUR) TUMOR BLADDER;  Surgeon: Randy Martinez MD;  [facility]     CYSTOSCOPY, TRANSURETHRAL RESECTION (TUR) TUMOR BLADDER, COMBINED N/A 2016    Procedure: COMBINED CYSTOSCOPY, TRANSURETHRAL RESECTION (TUR) TUMOR BLADDER;  Surgeon: Randy Martinez MD;  [facility]     CYSTOSCOPY, TRANSURETHRAL RESECTION (TUR) TUMOR BLADDER, COMBINED N/A 2016    Procedure: COMBINED CYSTOSCOPY, TRANSURETHRAL RESECTION (TUR) TUMOR BLADDER;  Surgeon: Randy Martinez MD;  [facility]     PHACOEMULSIFICATION WITH STANDARD INTRAOCULAR LENS IMPLANT Right 10/9/2018    Procedure: PHACOEMULSIFICATION WITH STANDARD INTRAOCULAR LENS IMPLANT;  PHACOEMULSIFICATION CATARACT EXTRACTION POSTERIOR CHAMBER LENS RIGHT;  Surgeon: Marlo Mcconnell MD;  [facility]     PHACOEMULSIFICATION WITH STANDARD INTRAOCULAR LENS IMPLANT Left 10/23/2018    Procedure: PHACOEMULSIFICATION CATARACT EXTRACTION POSTERIOR CHAMBER LENS LEFT;  Surgeon: Marlo Mcconnell MD;  [facility]       Social History     Tobacco Use     Smoking status: Former Smoker     Quit date: 1992     Years since quittin.7     Smokeless tobacco: Never Used   Substance Use Topics     Alcohol use: Yes     Comment: rarely     Family History   Problem Relation Age of Onset     Diabetes Mother      Parkinsonism Brother          Current Outpatient Medications   Medication Sig Dispense Refill     aspirin 81 MG EC tablet Take 81 mg by mouth daily       atorvastatin (LIPITOR) 40 MG tablet TAKE 1 TABLET (40 MG) BY MOUTH DAILY 90 tablet 0     blood glucose (ONETOUCH  VERIO IQ) test strip Use to test blood sugar 2 times daily or as directed. 100 each 10     brimonidine (ALPHAGAN-P) 0.15 % ophthalmic solution INSTILL 1 DROP INTO RIGHT EYE TWICE DAILY  12     IBUPROFEN PO Take 200 mg by mouth every 6 hours as needed for moderate pain Reported on 5/9/2017       latanoprost (XALATAN) 0.005 % ophthalmic solution Place 1 drop into both eyes At Bedtime       lisinopril (ZESTRIL) 10 MG tablet TAKE 1 TABLET (10 MG) BY MOUTH DAILY 90 tablet 0     metFORMIN (GLUCOPHAGE) 500 MG tablet TAKE 2 TABLETS (1000MG) BY MOUTH TWICE DAILY (WITH BREAKFAST AND WITH SUPPER) 360 tablet 1     ONETOUCH DELICA LANCETS 33G MISC 1 each 2 times daily 100 each 10     Allergies   Allergen Reactions     No Clinical Screening - See Comments Other (See Comments)     Beta blocker in glaucoma gtt.s caused low pulse and passing out      Recent Labs   Lab Test 10/02/20  0923 03/13/20  0947 10/31/19 09/09/19  1144 07/29/19  0836 07/07/15  0848 07/07/15  0848   A1C 5.7* 5.7* 6.2*  --  12.6*   < >  --    LDL 75  --   --  84  --   --  142*   HDL 59  --   --  47  --   --  43   TRIG 54  --   --  75  --   --  95   ALT 14  --   --   --  22  --  20   CR 0.82  --   --   --  0.72   < > 0.97   GFRESTIMATED 86  --   --   --  >90   < > 77   GFRESTBLACK 99  --   --   --  >90   < > >90  African American GFR Calc     POTASSIUM 3.7  --   --   --  4.3  --  4.0   TSH 1.41  --   --  1.40  --   --   --     < > = values in this interval not displayed.      BP Readings from Last 3 Encounters:   10/06/20 116/72   03/13/20 120/77   10/31/19 110/70    Wt Readings from Last 3 Encounters:   10/06/20 72.6 kg (160 lb)   08/03/20 76.7 kg (169 lb)   03/13/20 78 kg (172 lb)                    Reviewed and updated as needed this visit by clinical staff  Tobacco  Allergies  Meds            Reviewed and updated as needed this visit by Provider                 ROS:  Constitutional, HEENT, cardiovascular, pulmonary, gi and gu systems are negative, except  "as otherwise noted.    OBJECTIVE:     /72 (BP Location: Right arm, Patient Position: Sitting, Cuff Size: Adult Regular)   Pulse 66   Temp 97.5  F (36.4  C) (Tympanic)   Ht 1.702 m (5' 7\")   Wt 72.6 kg (160 lb)   SpO2 98%   BMI 25.06 kg/m    Body mass index is 25.06 kg/m .  Physical Exam   Constitutional: He is oriented to person, place, and time. He appears well-developed and well-nourished. No distress.   Neurological: He is alert and oriented to person, place, and time.   Psychiatric: He has a normal mood and affect.       Other exam not repeated.  Diagnostic Test Results:  none     ASSESSMENT/PLAN:     Asymptomatic microscopic hematuria  He does have frequency and urgency.  His most recent cystoscopy was normal.  CT scheduled. Follow up as arranged.  - CT Abdomen Pelvis w/o Contrast; Future    Nocturnal leg cramps  Discussed potential etiologies.  Vitamin E 400 units twice daily.            Luis Tran MD  [facility] [place]    "

## 2020-10-06 NOTE — NURSING NOTE
"Chief Complaint   Patient presents with     Kidney Problem       Initial /72 (BP Location: Right arm, Patient Position: Sitting, Cuff Size: Adult Regular)   Pulse 66   Temp 97.5  F (36.4  C) (Tympanic)   Ht 1.702 m (5' 7\")   Wt 72.6 kg (160 lb)   SpO2 98%   BMI 25.06 kg/m   Estimated body mass index is 25.06 kg/m  as calculated from the following:    Height as of this encounter: 1.702 m (5' 7\").    Weight as of this encounter: 72.6 kg (160 lb).  Medication Reconciliation: complete  Caro Mullen LPN  "

## 2020-10-07 ASSESSMENT — ANXIETY QUESTIONNAIRES: GAD7 TOTAL SCORE: 0

## 2020-10-09 ENCOUNTER — HOSPITAL ENCOUNTER (OUTPATIENT)
Dept: CT IMAGING | Facility: HOSPITAL | Age: 75
Discharge: HOME | End: 2020-10-09
Attending: FAMILY MEDICINE | Admitting: FAMILY MEDICINE
Payer: COMMERCIAL

## 2020-10-09 DIAGNOSIS — R31.21 ASYMPTOMATIC MICROSCOPIC HEMATURIA: ICD-10-CM

## 2020-10-09 PROCEDURE — 74176 CT ABD & PELVIS W/O CONTRAST: CPT

## 2020-10-27 ENCOUNTER — TELEPHONE (OUTPATIENT)
Dept: FAMILY MEDICINE | Facility: OTHER | Age: 75
End: 2020-10-27

## 2020-10-27 DIAGNOSIS — E11.9 TYPE 2 DIABETES MELLITUS WITHOUT COMPLICATION, WITHOUT LONG-TERM CURRENT USE OF INSULIN (H): ICD-10-CM

## 2020-10-27 DIAGNOSIS — E11.9 TYPE 2 DIABETES MELLITUS WITHOUT COMPLICATION, WITHOUT LONG-TERM CURRENT USE OF INSULIN (H): Primary | ICD-10-CM

## 2020-10-27 RX ORDER — BLOOD SUGAR DIAGNOSTIC
STRIP MISCELLANEOUS
Qty: 100 EACH | Refills: 10 | Status: SHIPPED | OUTPATIENT
Start: 2020-10-27 | End: 2021-11-24

## 2020-10-27 NOTE — TELEPHONE ENCOUNTER
Glucose test strips      Last Written Prescription Date:  08/08/2019  Last Fill Quantity: 100,   # refills: 10  Last Office Visit: 10/06/2020  Future Office visit:

## 2020-10-28 DIAGNOSIS — R39.15 URINARY URGENCY: Primary | ICD-10-CM

## 2020-10-28 NOTE — TELEPHONE ENCOUNTER
Patient calling and states he is still having the frequency and urgency. Denies any other symptoms. He would like to know if there is anything he can do for this or medication he is needing to take.   Patient is aware that provider is not in this afternoon and may not hear back until he returns.    Patient's phone number is [phone]

## 2020-10-29 RX ORDER — CIPROFLOXACIN 250 MG/1
250 TABLET, FILM COATED ORAL 2 TIMES DAILY
Qty: 14 TABLET | Refills: 0 | Status: SHIPPED | OUTPATIENT
Start: 2020-10-29 | End: 2023-01-01

## 2020-10-29 NOTE — TELEPHONE ENCOUNTER
Luis Tran MD  You 2 hours ago (5:09 AM)     Empiric ciprofloxacin 250 bid for 7 days    Message text

## 2020-11-10 ENCOUNTER — HOSPITAL ENCOUNTER (OUTPATIENT)
Dept: EDUCATION SERVICES | Facility: HOSPITAL | Age: 75
Discharge: HOME | End: 2020-11-10
Attending: NURSE PRACTITIONER | Admitting: FAMILY MEDICINE
Payer: COMMERCIAL

## 2020-11-10 VITALS
BODY MASS INDEX: 26.9 KG/M2 | RESPIRATION RATE: 16 BRPM | DIASTOLIC BLOOD PRESSURE: 76 MMHG | OXYGEN SATURATION: 98 % | HEART RATE: 77 BPM | WEIGHT: 171.4 LBS | SYSTOLIC BLOOD PRESSURE: 104 MMHG | HEIGHT: 67 IN

## 2020-11-10 DIAGNOSIS — E11.9 TYPE 2 DIABETES MELLITUS WITHOUT COMPLICATION, WITHOUT LONG-TERM CURRENT USE OF INSULIN (H): Primary | ICD-10-CM

## 2020-11-10 PROCEDURE — G0108 DIAB MANAGE TRN  PER INDIV: HCPCS

## 2020-11-10 RX ORDER — METFORMIN HCL 500 MG
1000 TABLET, EXTENDED RELEASE 24 HR ORAL 2 TIMES DAILY WITH MEALS
Qty: 360 TABLET | Refills: 2 | Status: SHIPPED | OUTPATIENT
Start: 2020-11-10 | End: 2021-08-30

## 2020-11-10 ASSESSMENT — MIFFLIN-ST. JEOR: SCORE: 1481.1

## 2020-11-10 ASSESSMENT — PAIN SCALES - GENERAL: PAINLEVEL: NO PAIN (0)

## 2020-11-10 NOTE — PROGRESS NOTES
"Diabetes Self-Management Education & Support    Presents for: Follow-up    SUBJECTIVE/OBJECTIVE:  Presents for: Follow-up  Accompanied by: Self, Other(ex-wife)  Diabetes education in the past 24mo: Yes  Focus of Visit: Monitoring, Assistance w/ making life changes  Diabetes type: Type 2  Date of diagnosis: 7/29/19  Disease course: Improving  How confident are you filling out medical forms by yourself:: Quite a bit  Diabetes management related comments/concerns: No concerns.   Transportation concerns: No  Difficulty affording diabetes medication?: No  Difficulty affording diabetes testing supplies?: No  Other concerns:: Glasses  Cultural Influences/Ethnic Background:  American      Diabetes Symptoms & Complications:  Fatigue: No  Neuropathy: No  Polydipsia: No  Polyphagia: No  Polyuria: No  Visual change: No  Slow healing wounds: No  Symptom course: Improving  Autonomic neuropathy: No  CVA: No  Heart disease: No  Nephropathy: No  Peripheral neuropathy: No  Foot ulcerations: No  Peripheral Vascular Disease: No  Retinopathy: No    Patient Problem List and Family Medical History reviewed for relevant medical history, current medical status, and diabetes risk factors.    Vitals:  /76   Pulse 77   Resp 16   Ht 1.702 m (5' 7\")   Wt 77.7 kg (171 lb 6.4 oz)   SpO2 98%   BMI 26.85 kg/m    Estimated body mass index is 26.85 kg/m  as calculated from the following:    Height as of this encounter: 1.702 m (5' 7\").    Weight as of this encounter: 77.7 kg (171 lb 6.4 oz).   Last 3 BP:   BP Readings from Last 3 Encounters:   11/10/20 104/76   10/06/20 116/72   03/13/20 120/77       History   Smoking Status     Former Smoker     Quit date: 1/6/1992   Smokeless Tobacco     Never Used       Labs:  Lab Results   Component Value Date    A1C 5.7 10/02/2020     Lab Results   Component Value Date     10/02/2020     Lab Results   Component Value Date    LDL 75 10/02/2020     HDL Cholesterol   Date Value Ref Range Status "   10/02/2020 59 >39 mg/dL Final   ]  GFR Estimate   Date Value Ref Range Status   10/02/2020 86 >60 mL/min/[1.73_m2] Corrected     Comment:     Starting 12/18/2018, serum creatinine based estimated GFR (eGFR) will be   calculated using the Chronic Kidney Disease Epidemiology Collaboration   (CKD-EPI) equation.  CORRECTED ON 10/02 AT 1852: PREVIOUSLY REPORTED AS 87 Non  GFR   Calc Starting 12/18/2018, serum creatinine based estimated GFR (eGFR) will be   calculated using the Chronic Kidney Disease Epidemiology Collaboration (CKD   EPI) equation.       GFR Estimate If Black   Date Value Ref Range Status   10/02/2020 99 >60 mL/min/[1.73_m2] Corrected     Comment:     Starting 12/18/2018, serum creatinine based estimated GFR (eGFR) will be   calculated using the Chronic Kidney Disease Epidemiology Collaboration   (CKD-EPI) equation.  CORRECTED ON 10/02 AT 1852: PREVIOUSLY REPORTED AS >90  GFR   Calc Starting 12/18/2018, serum creatinine based estimated GFR (eGFR) will be   calculated using the Chronic Kidney Disease Epidemiology Collaboration (CKD   EPI) equation.       Lab Results   Component Value Date    CR 0.82 10/02/2020     No results found for: MICROALBUMIN    Healthy Eating:  Healthy Eating Assessed Today: Yes  Cultural/Moravian diet restrictions?: No  Meal planning/habits: Smaller portions, Avoiding sweets  Breakfast: 1 toast or 1 english muffin, 1-2 eggs, breakfast meat - coffee/cream/splenda or corn flakes  Lunch: 1/2 sandwich, fruit, milk or 1.5 cups chili with corn bread or beef, cheese, milk, coffee - sometimes skips  Dinner: meat, veggies, 1 bread - sometimes salad - occasional starch (1/2 potato)  Snacks: grapes, leftover meat, 1/2 sandwich - Dove ice cream or 1/2 donut  Beverages: Water, Coffee, Milk  Has patient met with a dietitian in the past?: Yes    Being Active:  Being Active Assessed Today: Yes  Exercise:: Currently not exercising(yardwork)  Barrier to exercise:  None    Monitoring:  Monitoring Assessed Today: Yes  Did patient bring glucose meter to appointment? : Yes  Blood Glucose Meter: One Touch  Blood glucose trend: No change        Taking Medications:  Diabetes Medication(s)     Biguanides       metFORMIN (GLUCOPHAGE-XR) 500 MG 24 hr tablet    Take 2 tablets (1,000 mg) by mouth 2 times daily (with meals)          Taking Medication Assessed Today: Yes  Problems taking diabetes medications regularly?: No  Diabetes medication side effects?: No    Problem Solving:  Problem Solving Assessed Today: Yes  Hypoglycemia Frequency: Never  Patient carries a carbohydrate source: No  Medical ID: No  Does patient have DKA prevention plan?: No  Does patient have severe weather/disaster plan for diabetes management?: No              Reducing Risks:  Reducing Risks Assessed Today: No  Has dilated eye exam at least once a year?: Yes  Sees dentist every 6 months?: No  Feet checked by healthcare provider in the last year?: Yes    Healthy Coping:  Healthy Coping Assessed Today: Yes  Emotional response to diabetes: Ready to learn  Informal Support system:: Family  Stage of change: ACTION (Actively working towards change)  Support resources: None  Patient Activation Measure Survey Score:  SHARITA Score (Last Two) 9/26/2018   SHARITA Raw Score 30   Activation Score 56   SHARITA Level 3       Diabetes knowledge and skills assessment:   Patient is knowledgeable in diabetes management concepts related to: Healthy Eating, Being Active, Monitoring and Taking Medication    Patient needs further education on the following diabetes management concepts: Monitoring, Taking Medication, Problem Solving and Reducing Risks    Based on learning assessment above, most appropriate setting for further diabetes education would be: Individual setting.      INTERVENTIONS:    Education provided today on:  AADE Self-Care Behaviors:  Diabetes Pathophysiology  Healthy Eating: consistency in amount, composition, and timing of food  intake and portion control  Being Active: relationship to blood glucose and precautions to take  Monitoring: log and interpret results, individual blood glucose targets and frequency of monitoring  Taking Medication: action of prescribed medication, side effects of prescribed medications and when to take medications  Problem Solving: when to call health care provider  Reducing Risks: prevention, early diagnostic measures and treatment of complications, foot care, annual eye exam, aspirin therapy, A1C - goals, relating to blood glucose levels, how often to check, lipids levels and goals and blood pressure and goals    Opportunities for ongoing education and support in diabetes-self management were discussed.    Pt verbalized understanding of concepts discussed and recommendations provided today.       Education Materials Provided:  Goals for Your Diabetes Care      ASSESSMENT:  Readings range as follows: fastin and post-supper:.    Patient states that he is having some diarrhea now and again. We discussed either changing to extended release Metformin or decreasing his Dose. Patient would like to try XR if possible.    Doing well with diabetes management.        Patient's most recent   Lab Results   Component Value Date    A1C 5.7 10/02/2020    is meeting goal of <7.0    PLAN  See Patient Instructions for co-developed, patient-stated behavior change goals.  Will change current Metformin to Extended Release.    Please call with questions/concerns: [phone]    Follow annually or as needed    Declined AVS printed and provided to patient today.      Time Spent: 45 minutes  Encounter Type: Individual    Any diabetes medication dose changes were made via the CDE Protocol and Collaborative Practice Agreement with the patient's primary care provider. A copy of this encounter was shared with the provider.

## 2020-11-10 NOTE — PATIENT INSTRUCTIONS
Will change current Metformin to Extended Release.    Please call with questions/concerns: [phone]    Follow annually or as needed

## 2020-11-14 DIAGNOSIS — E11.9 TYPE 2 DIABETES MELLITUS WITHOUT COMPLICATION, WITHOUT LONG-TERM CURRENT USE OF INSULIN (H): ICD-10-CM

## 2020-11-17 RX ORDER — LISINOPRIL 10 MG/1
10 TABLET ORAL DAILY
Qty: 90 TABLET | Refills: 0 | Status: SHIPPED | OUTPATIENT
Start: 2020-11-17 | End: 2021-02-06

## 2020-11-17 RX ORDER — ATORVASTATIN CALCIUM 40 MG/1
40 TABLET, FILM COATED ORAL DAILY
Qty: 90 TABLET | Refills: 0 | Status: SHIPPED | OUTPATIENT
Start: 2020-11-17 | End: 2021-02-06

## 2020-11-17 NOTE — TELEPHONE ENCOUNTER
Lisinopril       Last Written Prescription Date:  8-13-20  Last Fill Quantity: 90,   # refills: 0  Last Office Visit: 10-6-20  Future Office visit:       Lipitor      Last Written Prescription Date:  8-13-20  Last Fill Quantity: 90,   # refills: 0  Last Office Visit: 10-6-20  Future Office visit:

## 2020-12-07 ENCOUNTER — TELEPHONE (OUTPATIENT)
Dept: FAMILY MEDICINE | Facility: OTHER | Age: 75
End: 2020-12-07

## 2020-12-07 DIAGNOSIS — R31.9 BLOOD IN URINE: Primary | ICD-10-CM

## 2020-12-07 NOTE — TELEPHONE ENCOUNTER
.9:18 AM    Reason for Call: OVERBOOK    Patient is having the following symptoms: Blood in urine and frequency for on/off for 3 weeks to a month.    The patient is requesting an appointment for blood in urine and frequency with Dr Tran.    Was an appointment offered for this call? Yes  If yes : Appointment type              Date Wednesday 12/16/2020 at 11:00am    Preferred method for responding to this message: Telephone Call  What is your phone number ? [phone]    If we cannot reach you directly, may we leave a detailed response at the number you provided? Yes    Can this message wait until your PCP/provider returns, if unavailable today? Yes              Yadira Díaz

## 2020-12-07 NOTE — TELEPHONE ENCOUNTER
FYI I scheduled patient for appointment Friday and placed orders for UA for patient to come have done.

## 2020-12-08 DIAGNOSIS — R31.9 BLOOD IN URINE: ICD-10-CM

## 2020-12-08 LAB
ALBUMIN UR-MCNC: NEGATIVE MG/DL
APPEARANCE UR: CLEAR
BACTERIA #/AREA URNS HPF: ABNORMAL /HPF
BILIRUB UR QL STRIP: NEGATIVE
COLOR UR AUTO: ABNORMAL
GLUCOSE UR STRIP-MCNC: NEGATIVE MG/DL
HGB UR QL STRIP: ABNORMAL
KETONES UR STRIP-MCNC: NEGATIVE MG/DL
LEUKOCYTE ESTERASE UR QL STRIP: NEGATIVE
MUCOUS THREADS #/AREA URNS LPF: PRESENT /LPF
NITRATE UR QL: NEGATIVE
PH UR STRIP: 5 PH (ref 4.7–8)
RBC #/AREA URNS AUTO: 15 /HPF (ref 0–2)
SOURCE: ABNORMAL
SP GR UR STRIP: 1.01 (ref 1–1.03)
SQUAMOUS #/AREA URNS AUTO: 0 /HPF (ref 0–1)
UROBILINOGEN UR STRIP-MCNC: NORMAL MG/DL (ref 0–2)
WBC #/AREA URNS AUTO: 6 /HPF (ref 0–5)

## 2020-12-08 PROCEDURE — 81001 URINALYSIS AUTO W/SCOPE: CPT | Mod: ZL | Performed by: FAMILY MEDICINE

## 2020-12-09 DIAGNOSIS — Z85.51 HISTORY OF BLADDER CANCER: Primary | ICD-10-CM

## 2020-12-09 RX ORDER — NITROFURANTOIN 25; 75 MG/1; MG/1
100 CAPSULE ORAL 2 TIMES DAILY
Qty: 14 CAPSULE | Refills: 0 | Status: SHIPPED | OUTPATIENT
Start: 2020-12-09 | End: 2021-01-14

## 2020-12-11 ENCOUNTER — OFFICE VISIT (OUTPATIENT)
Dept: FAMILY MEDICINE | Facility: OTHER | Age: 75
End: 2020-12-11
Attending: FAMILY MEDICINE
Payer: COMMERCIAL

## 2020-12-11 VITALS
BODY MASS INDEX: 27.15 KG/M2 | OXYGEN SATURATION: 98 % | DIASTOLIC BLOOD PRESSURE: 60 MMHG | HEIGHT: 67 IN | HEART RATE: 74 BPM | WEIGHT: 173 LBS | TEMPERATURE: 97 F | RESPIRATION RATE: 20 BRPM | SYSTOLIC BLOOD PRESSURE: 110 MMHG

## 2020-12-11 DIAGNOSIS — N40.1 BENIGN PROSTATIC HYPERPLASIA WITH INCOMPLETE BLADDER EMPTYING: Primary | ICD-10-CM

## 2020-12-11 DIAGNOSIS — E11.65 TYPE 2 DIABETES MELLITUS WITH HYPERGLYCEMIA, WITHOUT LONG-TERM CURRENT USE OF INSULIN (H): ICD-10-CM

## 2020-12-11 DIAGNOSIS — R39.14 BENIGN PROSTATIC HYPERPLASIA WITH INCOMPLETE BLADDER EMPTYING: Primary | ICD-10-CM

## 2020-12-11 DIAGNOSIS — C67.3 MALIGNANT NEOPLASM OF ANTERIOR WALL OF URINARY BLADDER (H): ICD-10-CM

## 2020-12-11 PROCEDURE — 99214 OFFICE O/P EST MOD 30 MIN: CPT | Performed by: FAMILY MEDICINE

## 2020-12-11 RX ORDER — TAMSULOSIN HYDROCHLORIDE 0.4 MG/1
0.4 CAPSULE ORAL DAILY
Qty: 90 CAPSULE | Refills: 1 | Status: SHIPPED | OUTPATIENT
Start: 2020-12-11 | End: 2022-03-11

## 2020-12-11 ASSESSMENT — MIFFLIN-ST. JEOR: SCORE: 1488.35

## 2020-12-11 NOTE — PROGRESS NOTES
"Subjective     Colin Baxter is a 75 year old male who presents to clinic today for the following health issues:    HPI         Genitourinary - Male  Onset/Duration: over 1 month on and off  Description:   Dysuria (painful urination): YES}  Hematuria (blood in urine): YES  Frequency: YES  Waking at night to urinate: YES  Hesitancy (delay in urine): no  Retention (unable to empty): no  Decrease in urinary flow: no  Incontinence: no  Progression of Symptoms:  intermittent  Accompanying Signs & Symptoms:  Fever: no  Back/Flank pain: no  Urethral discharge: no  Testicle lumps/masses/pain: no  Nausea and/or vomiting: no  Abdominal pain: no  History:   History of frequent UTI s: no  History of kidney stones: no  History of hernias: no  Personal or Family history of Prostate problems: no  Sexually active: no  Precipitating or alleviating factors: None  Therapies tried and outcome: course of antibiotics - Macrobid, currently taking    Colin is seen in follow up of hematuria and urinary frequency and urgency.  He recently had urinalysis which showed  which showed bacteriuria, pyuria, and microhematuria.    Review of Systems   Constitutional, HEENT, cardiovascular, pulmonary, gi and gu systems are negative, except as otherwise noted.      Objective    /60 (BP Location: Right arm, Patient Position: Sitting, Cuff Size: Adult Regular)   Pulse 74   Temp 97  F (36.1  C) (Tympanic)   Resp 20   Ht 1.702 m (5' 7\")   Wt 78.5 kg (173 lb)   SpO2 98%   BMI 27.10 kg/m    Body mass index is 27.1 kg/m .  Physical Exam  Vitals signs and nursing note reviewed.   Constitutional:       Appearance: He is well-developed.   Neurological:      Mental Status: He is alert and oriented to person, place, and time.   Psychiatric:         Mood and Affect: Mood normal.         Behavior: Behavior normal.         Thought Content: Thought content normal.        Other exam not repeated    Orders Only on 12/08/2020   Component Date Value Ref Range " "Status     Color Urine 12/08/2020 Straw   Final     Appearance Urine 12/08/2020 Clear   Final     Glucose Urine 12/08/2020 Negative  NEG^Negative mg/dL Final     Bilirubin Urine 12/08/2020 Negative  NEG^Negative Final     Ketones Urine 12/08/2020 Negative  NEG^Negative mg/dL Final     Specific Gravity Urine 12/08/2020 1.007  1.003 - 1.035 Final     Blood Urine 12/08/2020 Large* NEG^Negative Final     pH Urine 12/08/2020 5.0  4.7 - 8.0 pH Final     Protein Albumin Urine 12/08/2020 Negative  NEG^Negative mg/dL Final     Urobilinogen mg/dL 12/08/2020 Normal  0.0 - 2.0 mg/dL Final     Nitrite Urine 12/08/2020 Negative  NEG^Negative Final     Leukocyte Esterase Urine 12/08/2020 Negative  NEG^Negative Final     Source 12/08/2020 Midstream Urine   Final     RBC Urine 12/08/2020 15* 0 - 2 /HPF Final     WBC Urine 12/08/2020 6* 0 - 5 /HPF Final     Bacteria Urine 12/08/2020 Few* NEG^Negative /HPF Final     Squamous Epithelial /HPF Urine 12/08/2020 0  0 - 1 /HPF Final     Mucous Urine 12/08/2020 Present* NEG^Negative /LPF Final           Assessment & Plan     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 His Cystoscopy has been normal    Type 2 diabetes mellitus with hyperglycemia, without long-term current use of insulin (H)  Continue follow up    Benign prostatic hyperplasia with incomplete bladder emptying  Suspect BPH.  He will finish macrobid.  Start Flomax as written. Appointment with Urology scheduled for evaluation and recommendations for further management.   - UROLOGY ADULT REFERRAL; Future  - tamsulosin (FLOMAX) 0.4 MG capsule; Take 1 capsule (0.4 mg) by mouth daily     BMI:   Estimated body mass index is 27.1 kg/m  as calculated from the following:    Height as of this encounter: 1.702 m (5' 7\").    Weight as of this encounter: 78.5 kg (173 lb).            See Patient Instructions    No follow-ups on file.    Luis Tran MD  [facility] - " [place]

## 2020-12-11 NOTE — NURSING NOTE
"Chief Complaint   Patient presents with     Urinary Problem       Initial /60 (BP Location: Right arm, Patient Position: Sitting, Cuff Size: Adult Regular)   Pulse 74   Temp 97  F (36.1  C) (Tympanic)   Resp 20   Ht 1.702 m (5' 7\")   Wt 78.5 kg (173 lb)   SpO2 98%   BMI 27.10 kg/m   Estimated body mass index is 27.1 kg/m  as calculated from the following:    Height as of this encounter: 1.702 m (5' 7\").    Weight as of this encounter: 78.5 kg (173 lb).  Medication Reconciliation: complete  Mariah Lopez LPN  "

## 2021-01-05 ENCOUNTER — OFFICE VISIT (OUTPATIENT)
Dept: UROLOGY | Facility: OTHER | Age: 76
End: 2021-01-05
Attending: FAMILY MEDICINE
Payer: COMMERCIAL

## 2021-01-05 ENCOUNTER — TELEPHONE (OUTPATIENT)
Dept: UROLOGY | Facility: OTHER | Age: 76
End: 2021-01-05

## 2021-01-05 VITALS
OXYGEN SATURATION: 95 % | BODY MASS INDEX: 27.41 KG/M2 | WEIGHT: 175 LBS | SYSTOLIC BLOOD PRESSURE: 116 MMHG | HEART RATE: 80 BPM | TEMPERATURE: 98.4 F | DIASTOLIC BLOOD PRESSURE: 70 MMHG

## 2021-01-05 DIAGNOSIS — Z85.51 HISTORY OF BLADDER CANCER: Primary | ICD-10-CM

## 2021-01-05 DIAGNOSIS — R39.14 BENIGN PROSTATIC HYPERPLASIA WITH INCOMPLETE BLADDER EMPTYING: ICD-10-CM

## 2021-01-05 DIAGNOSIS — N40.1 BENIGN PROSTATIC HYPERPLASIA WITH INCOMPLETE BLADDER EMPTYING: ICD-10-CM

## 2021-01-05 LAB
ALBUMIN UR-MCNC: 100 MG/DL
APPEARANCE UR: ABNORMAL
BACTERIA #/AREA URNS HPF: ABNORMAL /HPF
BILIRUB UR QL STRIP: NEGATIVE
COLOR UR AUTO: ABNORMAL
GLUCOSE UR STRIP-MCNC: NEGATIVE MG/DL
HGB UR QL STRIP: ABNORMAL
KETONES UR STRIP-MCNC: NEGATIVE MG/DL
LEUKOCYTE ESTERASE UR QL STRIP: NEGATIVE
MUCOUS THREADS #/AREA URNS LPF: PRESENT /LPF
NITRATE UR QL: NEGATIVE
PH UR STRIP: 5.5 PH (ref 4.7–8)
RBC #/AREA URNS AUTO: >182 /HPF (ref 0–2)
SOURCE: ABNORMAL
SP GR UR STRIP: 1.02 (ref 1–1.03)
SQUAMOUS #/AREA URNS AUTO: 0 /HPF (ref 0–1)
TRANS CELLS #/AREA URNS HPF: 3 /HPF (ref 0–1)
UROBILINOGEN UR STRIP-MCNC: NORMAL MG/DL (ref 0–2)
WBC #/AREA URNS AUTO: 72 /HPF (ref 0–5)

## 2021-01-05 PROCEDURE — G0463 HOSPITAL OUTPT CLINIC VISIT: HCPCS | Mod: 25

## 2021-01-05 PROCEDURE — 81001 URINALYSIS AUTO W/SCOPE: CPT | Mod: ZL | Performed by: UROLOGY

## 2021-01-05 PROCEDURE — 99213 OFFICE O/P EST LOW 20 MIN: CPT | Mod: 25 | Performed by: UROLOGY

## 2021-01-05 PROCEDURE — G0463 HOSPITAL OUTPT CLINIC VISIT: HCPCS

## 2021-01-05 PROCEDURE — 88112 CYTOPATH CELL ENHANCE TECH: CPT | Mod: TC | Performed by: UROLOGY

## 2021-01-05 PROCEDURE — 52000 CYSTOURETHROSCOPY: CPT | Performed by: UROLOGY

## 2021-01-05 PROCEDURE — 88120 CYTP URNE 3-5 PROBES EA SPEC: CPT | Mod: TC | Performed by: UROLOGY

## 2021-01-05 PROCEDURE — 52000 CYSTOURETHROSCOPY: CPT

## 2021-01-05 ASSESSMENT — ENCOUNTER SYMPTOMS
UNEXPECTED WEIGHT CHANGE: 1
FLANK PAIN: 1

## 2021-01-05 ASSESSMENT — PAIN SCALES - GENERAL: PAINLEVEL: MILD PAIN (2)

## 2021-01-05 NOTE — LETTER
January 13, 2021      Colin Batxer  [address] [place] [address]        Dear ,    We are writing to inform you of your test results.    {results letter list:[id number]}    Resulted Orders   UA reflex to Microscopic   Result Value Ref Range    Color Urine Brown     Appearance Urine Turbid     Glucose Urine Negative NEG^Negative mg/dL    Bilirubin Urine Negative NEG^Negative    Ketones Urine Negative NEG^Negative mg/dL    Specific Gravity Urine 1.024 1.003 - 1.035    Blood Urine Large (A) NEG^Negative    pH Urine 5.5 4.7 - 8.0 pH    Protein Albumin Urine 100 (A) NEG^Negative mg/dL    Urobilinogen mg/dL Normal 0.0 - 2.0 mg/dL    Nitrite Urine Negative NEG^Negative    Leukocyte Esterase Urine Negative NEG^Negative    Source Midstream Urine     RBC Urine >182 (H) 0 - 2 /HPF    WBC Urine 72 (H) 0 - 5 /HPF    Bacteria Urine Few (A) NEG^Negative /HPF    Squamous Epithelial /HPF Urine 0 0 - 1 /HPF    Transitional Epi 3 (H) 0 - 1 /HPF    Mucous Urine Present (A) NEG^Negative /LPF   Cytology non gyn   Result Value Ref Range    Copath Report       Patient Name: COLIN BAXTER  MR#: [id number]  Specimen #:   Collected: 1/5/2021  Received: 1/7/2021  Reported: 1/11/2021 11:38  Ordering Phy(s): KAROL CHEN    For improved result formatting, select 'View Enhanced Report Format' under   Linked Documents section.    SPECIMEN/STAIN PROCESS:  Urine, catheterized       Pap-Cyto x 1    ----------------------------------------------------------------    CYTOLOGIC INTERPRETATION:     Urine, catheterized:   Atypical urothelial cells  Specimen Adequacy: Satisfactory for evaluation.    Electronically signed out by:    Reyes Beasley M.D.    CLINICAL HISTORY:  Bladder cancer    ,    GROSS:  Urine, catheterized:  Received 40 ml of dark keven, cloudy fluid,   processed as 1 Pap stained Autocyte..    CPT Codes:  A: 55550-THWNACS    COLLECTION SITE:  Client:  [facility]  Location:  [facility])    The  technical component of this testing was completed at the [facility], with the  professional component pe rformed at the [facility], [address] [place] [id number]  ([phone])           If you have any questions or concerns, please call the clinic at the number listed above.       Sincerely,      Shonda Morrell MD

## 2021-01-05 NOTE — PROGRESS NOTES
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  Colin underwent a TURBT in 2015 for gross hematuria and bladder tumors.  It came back high-grade T1/CIS.  He had 2 courses of BCG and then possibly a course of a maintenance BCG.  He then underwent another resection in September 2016 which showed carcinoma in situ and then underwent Gemzar/mitomycin starting in October 2016.  It sounds like since that time he had not had any recurrences.  He saw Dr. Helm in August.  Dr. Helm noted some erythematous lesions within the bladder but thought it was probably due to BCG however he has not had BCG for quite a number of years. I was looking through the labs and  I did see that a urine cytology was done in SeptemberAnd his cytology came back suspicious for high-grade urothelial carcinoma.  He has had persistent gross hematuria a on and off for some time now.  He is having dysuria as well as increased frequency.  Stream is okay.  He is here for further evaluation.  Colin had an unenhanced CT scan in October did not show any stones but a limited study because of the lack of contrast.    Allergies:    Allergies   Allergen Reactions     No Clinical Screening - See Comments Other (See Comments)     Beta blocker in glaucoma gtt.s caused low pulse and passing out         Medications:           aspirin 81 MG EC tablet       atorvastatin (LIPITOR) 40 MG tablet       blood glucose (ONETOUCH VERIO IQ) test strip       brimonidine (ALPHAGAN-P) 0.15 % ophthalmic solution       IBUPROFEN PO       latanoprost (XALATAN) 0.005 % ophthalmic solution       lisinopril (ZESTRIL) 10 MG tablet       metFORMIN (GLUCOPHAGE-XR) 500 MG 24 hr tablet       ONETOUCH DELICA LANCETS 33G MISC       tamsulosin (FLOMAX) 0.4 MG capsule       nitroFURantoin  macrocrystal-monohydrate (MACROBID) 100 MG capsule        Problem List:      Patient Active Problem List    Diagnosis Date Noted     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) 12/11/2020     Priority: Medium     Type 2 diabetes mellitus with hyperglycemia, without long-term current use of insulin (H) 12/11/2020     Priority: Medium     Type 2 diabetes mellitus without complication, without long-term current use of insulin (H) 07/30/2019     Priority: Medium     History of bladder cancer 02/27/2019     Priority: Medium     Obesity (BMI 35.0-39.9) with comorbidity (H) 09/26/2018     Priority: Medium     ACP (advance care planning) 05/04/2016     Priority: Medium     Advance Care Planning 5/4/2016: ACP Review of Chart / Resources Provided:  Reviewed chart for advance care plan.  Colin Baxter has been provided information and resources to begin or update their advance care plan.  Added by Inés Malloy             History of high risk bladder cancer 09/01/2015     Priority: Medium     Comprehensive Medical Examination 07/06/2015     Priority: Medium     Glaucoma 07/06/2015     Priority: Medium        Past Medical History:      No past medical history on file.    Past Surgical History:      Past Surgical History:   Procedure Laterality Date     APPENDECTOMY  1958     COLONOSCOPY  2-     CYSTOSCOPY, BIOPSY BLADDER, COMBINED N/A 9/8/2015    Procedure: COMBINED CYSTOSCOPY, BIOPSY BLADDER;  Surgeon: Randy Martinez MD;  Location: [facility]     CYSTOSCOPY, BIOPSY BLADDER, COMBINED N/A 2/14/2017    Procedure: COMBINED CYSTOSCOPY, BIOPSY BLADDER;  Surgeon: Randy Martinez MD;  [facility]     CYSTOSCOPY, BIOPSY BLADDER, COMBINED N/A 11/13/2018    Procedure: COMBINED CYSTOSCOPY, BIOPSY BLADDER;  Surgeon: Ed Helm MD;  Location: [facility]     CYSTOSCOPY, RETROGRADES, COMBINED Bilateral 11/13/2018    Procedure: Bilateral Retrograde Pyelagram, Bladder Biopsy;  Surgeon: Ed Helm  MD KEVIN;  Location: [facility]     CYSTOSCOPY, RETROGRADES, INSERT STENT URETER(S), COMBINED Left 2015    Procedure: COMBINED CYSTOSCOPY, RETROGRADES, INSERT STENT URETER(S);  Surgeon: Randy Martinez MD;  [facility]     CYSTOSCOPY, TRANSURETHRAL RESECTION (TUR) TUMOR BLADDER, COMBINED N/A 2015    Procedure: COMBINED CYSTOSCOPY, TRANSURETHRAL RESECTION (TUR) TUMOR BLADDER;  Surgeon: Randy Martinez MD;  [facility]     CYSTOSCOPY, TRANSURETHRAL RESECTION (TUR) TUMOR BLADDER, COMBINED N/A 2016    Procedure: COMBINED CYSTOSCOPY, TRANSURETHRAL RESECTION (TUR) TUMOR BLADDER;  Surgeon: Randy Martinez MD;  [facility]     CYSTOSCOPY, TRANSURETHRAL RESECTION (TUR) TUMOR BLADDER, COMBINED N/A 2016    Procedure: COMBINED CYSTOSCOPY, TRANSURETHRAL RESECTION (TUR) TUMOR BLADDER;  Surgeon: Randy Martinez MD;  [facility]     PHACOEMULSIFICATION WITH STANDARD INTRAOCULAR LENS IMPLANT Right 10/9/2018    Procedure: PHACOEMULSIFICATION WITH STANDARD INTRAOCULAR LENS IMPLANT;  PHACOEMULSIFICATION CATARACT EXTRACTION POSTERIOR CHAMBER LENS RIGHT;  Surgeon: Marlo Mcconnell MD;  Location[facility]     PHACOEMULSIFICATION WITH STANDARD INTRAOCULAR LENS IMPLANT Left 10/23/2018    Procedure: PHACOEMULSIFICATION CATARACT EXTRACTION POSTERIOR CHAMBER LENS LEFT;  Surgeon: Marlo Mcconnell MD;  Location[facility]       Family History:      Family History   Problem Relation Age of Onset     Diabetes Mother      Parkinsonism Brother        Social History:    Marital Status:   [4]  Social History     Tobacco Use     Smoking status: Former Smoker     Quit date: 1992     Years since quittin.0     Smokeless tobacco: Never Used   Substance Use Topics     Alcohol use: Yes     Comment: rarely     Drug use: No        Review of Systems   Constitutional: Positive for unexpected weight change.   Genitourinary: Positive for flank pain.   All other systems reviewed and are  negative.        Physical Exam   Vitals:  /70   Pulse 80   Temp 98.4  F (36.9  C) (Tympanic)   Wt 79.4 kg (175 lb)   SpO2 95%   BMI 27.41 kg/m        Physical Exam  Constitutional:       Appearance: Normal appearance. He is normal weight.   Pulmonary:      Effort: Pulmonary effort is normal.   Abdominal:      General: Abdomen is flat. There is no distension.      Palpations: Abdomen is soft. There is no mass.      Tenderness: There is no abdominal tenderness.      Hernia: No hernia is present.   Genitourinary:     Comments: Penis is circumcised.  Meatus glans shaft are normal.  Testicles are both descended and are slightly atrophic without any masses, supratesticular masses or inguinal hernias.  External rectal area is normal normal rectal tone and prostate is very small with no concerning nodularity and there are no rectal masses.  Neurological:      Mental Status: He is alert.       Cystoscopy.  Patient was unable to void and given the negative urine for infection a month ago we elected to proceed and would aspirate prior to the procedure.  He was prepped and draped sterilely risks of bleeding infection discussed.  The flexible cystoscope was inserted into the urethra and the anterior and posterior urethra are normal.  He has a very small prostate.  There is a little erythema right at the bladder neck on the right side.  When I was in the bladder I started to fill the bladder but he was very uncomfortable and on the anterior surface of the bladder there is a lot of inflammation and it is difficult to tell if this is a nodular mass or if it is just inflammatory but it is definitely not normal.  There is an erythematous lesion on the right side of the bladder as well.  There is blood in the urine so it is difficult to get a really good visualization.  I did drain the bladder and refill and again visualization is not optimal but there is definitely some abnormal lesions in the bladder that are  concerning.    Impression: Gross hematuria, recent suspicious cytology, bladder lesions concerning for recurrent bladder cancer    Plan   Plan: Going to proceed with a CT with IV contrast to better stage him.  We will likely need to proceed to the OR on January 18 for cystoscopy and a bladder biopsy/resection of tumor.  I will review all of his options once we get the CT scan back.  I also have a cytology and a FISH pending.  Follow-up will be next week.      No follow-ups on file.    Shonda Morrell MD  [facility]

## 2021-01-05 NOTE — NURSING NOTE
"Chief Complaint   Patient presents with     Consult     NPT Benign Prostatic Hyperplasia w incomplete bladder empty          Initial /70   Pulse 80   Temp 98.4  F (36.9  C) (Tympanic)   Wt 79.4 kg (175 lb)   SpO2 95%   BMI 27.41 kg/m   Estimated body mass index is 27.41 kg/m  as calculated from the following:    Height as of 12/11/20: 1.702 m (5' 7\").    Weight as of this encounter: 79.4 kg (175 lb).  Medication Reconciliation: complete  Inés Hill LPN  "

## 2021-01-05 NOTE — TELEPHONE ENCOUNTER
Patient scheduled for today at 1.   Z Plasty Text: The lesion was extirpated to the level of the fat with a #15 scalpel blade.  Given the location of the defect, shape of the defect and the proximity to free margins a Z-plasty was deemed most appropriate for repair.  Using a sterile surgical marker, the appropriate transposition arms of the Z-plasty were drawn incorporating the defect and placing the expected incisions within the relaxed skin tension lines where possible.    The area thus outlined was incised deep to adipose tissue with a #15 scalpel blade.  The skin margins were undermined to an appropriate distance in all directions utilizing iris scissors.  The opposing transposition arms were then transposed into place in opposite direction and anchored with interrupted buried subcutaneous sutures.

## 2021-01-05 NOTE — LETTER
1/5/2021       RE: Colin Baxter  [id number]   [place] [address]     Dear Colleague,    Thank you for referring your patient, Colin Baxter, to the [facility]. Please see a copy of my visit note below.      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  Colin underwent a TURBT in 2015 for gross hematuria and bladder tumors.  It came back high-grade T1/CIS.  He had 2 courses of BCG and then possibly a course of a maintenance BCG.  He then underwent another resection in September 2016 which showed carcinoma in situ and then underwent Gemzar/mitomycin starting in October 2016.  It sounds like since that time he had not had any recurrences.  He saw Dr. Helm in August.  Dr. Helm noted some erythematous lesions within the bladder but thought it was probably due to BCG however he has not had BCG for quite a number of years. I was looking through the labs and  I did see that a urine cytology was done in SeptemberAnd his cytology came back suspicious for high-grade urothelial carcinoma.  He has had persistent gross hematuria a on and off for some time now.  He is having dysuria as well as increased frequency.  Stream is okay.  He is here for further evaluation.  Colin had an unenhanced CT scan in October did not show any stones but a limited study because of the lack of contrast.    Allergies:    Allergies   Allergen Reactions     No Clinical Screening - See Comments Other (See Comments)     Beta blocker in glaucoma gtt.s caused low pulse and passing out         Medications:           aspirin 81 MG EC tablet       atorvastatin (LIPITOR) 40 MG tablet       blood glucose (ONETOUCH VERIO IQ) test strip       brimonidine (ALPHAGAN-P) 0.15 %  ophthalmic solution       IBUPROFEN PO       latanoprost (XALATAN) 0.005 % ophthalmic solution       lisinopril (ZESTRIL) 10 MG tablet       metFORMIN (GLUCOPHAGE-XR) 500 MG 24 hr tablet       ONETOUCH DELICA LANCETS 33G MISC       tamsulosin (FLOMAX) 0.4 MG capsule       nitroFURantoin macrocrystal-monohydrate (MACROBID) 100 MG capsule        Problem List:      Patient Active Problem List    Diagnosis Date Noted     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) 12/11/2020     Priority: Medium     Type 2 diabetes mellitus with hyperglycemia, without long-term current use of insulin (H) 12/11/2020     Priority: Medium     Type 2 diabetes mellitus without complication, without long-term current use of insulin (H) 07/30/2019     Priority: Medium     History of bladder cancer 02/27/2019     Priority: Medium     Obesity (BMI 35.0-39.9) with comorbidity (H) 09/26/2018     Priority: Medium     ACP (advance care planning) 05/04/2016     Priority: Medium     Advance Care Planning 5/4/2016: ACP Review of Chart / Resources Provided:  Reviewed chart for advance care plan.  Colin Baxter has been provided information and resources to begin or update their advance care plan.  Added by Inés Malloy             History of high risk bladder cancer 09/01/2015     Priority: Medium     Comprehensive Medical Examination 07/06/2015     Priority: Medium     Glaucoma 07/06/2015     Priority: Medium        Past Medical History:      No past medical history on file.    Past Surgical History:      Past Surgical History:   Procedure Laterality Date     APPENDECTOMY  1958     COLONOSCOPY  2-     CYSTOSCOPY, BIOPSY BLADDER, COMBINED N/A 9/8/2015    Procedure: COMBINED CYSTOSCOPY, BIOPSY BLADDER;  Surgeon: Randy Martinez MD;  [facility]     CYSTOSCOPY, BIOPSY BLADDER, COMBINED N/A 2/14/2017    Procedure: COMBINED CYSTOSCOPY, BIOPSY BLADDER;  Surgeon: Randy Martinez MD;  [facility]      CYSTOSCOPY, BIOPSY BLADDER, COMBINED N/A 11/13/2018    Procedure: COMBINED CYSTOSCOPY, BIOPSY BLADDER;  Surgeon: Ed Helm MD;  Location: [facility]     CYSTOSCOPY, RETROGRADES, COMBINED Bilateral 11/13/2018    Procedure: Bilateral Retrograde Pyelagram, Bladder Biopsy;  Surgeon: Ed Helm MD;  Location: [facility]     CYSTOSCOPY, RETROGRADES, INSERT STENT URETER(S), COMBINED Left 9/8/2015    Procedure: COMBINED CYSTOSCOPY, RETROGRADES, INSERT STENT URETER(S);  Surgeon: Randy Martinez MD;  [facility]     CYSTOSCOPY, TRANSURETHRAL RESECTION (TUR) TUMOR BLADDER, COMBINED N/A 9/8/2015    Procedure: COMBINED CYSTOSCOPY, TRANSURETHRAL RESECTION (TUR) TUMOR BLADDER;  Surgeon: Randy Martinez MD;  [facility]     CYSTOSCOPY, TRANSURETHRAL RESECTION (TUR) TUMOR BLADDER, COMBINED N/A 1/12/2016    Procedure: COMBINED CYSTOSCOPY, TRANSURETHRAL RESECTION (TUR) TUMOR BLADDER;  Surgeon: Randy Martinez MD;  [facility]     CYSTOSCOPY, TRANSURETHRAL RESECTION (TUR) TUMOR BLADDER, COMBINED N/A 9/13/2016    Procedure: COMBINED CYSTOSCOPY, TRANSURETHRAL RESECTION (TUR) TUMOR BLADDER;  Surgeon: Randy Martinez MD;  [facility]     PHACOEMULSIFICATION WITH STANDARD INTRAOCULAR LENS IMPLANT Right 10/9/2018    Procedure: PHACOEMULSIFICATION WITH STANDARD INTRAOCULAR LENS IMPLANT;  PHACOEMULSIFICATION CATARACT EXTRACTION POSTERIOR CHAMBER LENS RIGHT;  Surgeon: Marlo Mcconnell MD;  Location: [facility]     PHACOEMULSIFICATION WITH STANDARD INTRAOCULAR LENS IMPLANT Left 10/23/2018    Procedure: PHACOEMULSIFICATION CATARACT EXTRACTION POSTERIOR CHAMBER LENS LEFT;  Surgeon: Marlo Mcconnell MD;  Location: [facility]       Family History:      Family History   Problem Relation Age of Onset     Diabetes Mother      Parkinsonism Brother        Social History:    Marital Status:   [4]  Social History     Tobacco Use     Smoking status: Former Smoker     Quit date: 1/6/1992     Years since  quittin.0     Smokeless tobacco: Never Used   Substance Use Topics     Alcohol use: Yes     Comment: rarely     Drug use: No        Review of Systems   Constitutional: Positive for unexpected weight change.   Genitourinary: Positive for flank pain.   All other systems reviewed and are negative.        Physical Exam   Vitals:  /70   Pulse 80   Temp 98.4  F (36.9  C) (Tympanic)   Wt 79.4 kg (175 lb)   SpO2 95%   BMI 27.41 kg/m        Physical Exam  Constitutional:       Appearance: Normal appearance. He is normal weight.   Pulmonary:      Effort: Pulmonary effort is normal.   Abdominal:      General: Abdomen is flat. There is no distension.      Palpations: Abdomen is soft. There is no mass.      Tenderness: There is no abdominal tenderness.      Hernia: No hernia is present.   Genitourinary:     Comments: Penis is circumcised.  Meatus glans shaft are normal.  Testicles are both descended and are slightly atrophic without any masses, supratesticular masses or inguinal hernias.  External rectal area is normal normal rectal tone and prostate is very small with no concerning nodularity and there are no rectal masses.  Neurological:      Mental Status: He is alert.       Cystoscopy.  Patient was unable to void and given the negative urine for infection a month ago we elected to proceed and would aspirate prior to the procedure.  He was prepped and draped sterilely risks of bleeding infection discussed.  The flexible cystoscope was inserted into the urethra and the anterior and posterior urethra are normal.  He has a very small prostate.  There is a little erythema right at the bladder neck on the right side.  When I was in the bladder I started to fill the bladder but he was very uncomfortable and on the anterior surface of the bladder there is a lot of inflammation and it is difficult to tell if this is a nodular mass or if it is just inflammatory but it is definitely not normal.  There is an erythematous  lesion on the right side of the bladder as well.  There is blood in the urine so it is difficult to get a really good visualization.  I did drain the bladder and refill and again visualization is not optimal but there is definitely some abnormal lesions in the bladder that are concerning.    Impression: Gross hematuria, recent suspicious cytology, bladder lesions concerning for recurrent bladder cancer    Plan   Plan: Going to proceed with a CT with IV contrast to better stage him.  We will likely need to proceed to the OR on January 18 for cystoscopy and a bladder biopsy/resection of tumor.  I will review all of his options once we get the CT scan back.  I also have a cytology and a FISH pending.  Follow-up will be next week.      No follow-ups on file.    Shonda Morrell MD  [facility] [place]              Again, thank you for allowing me to participate in the care of your patient.      Sincerely,    Shonda Morrell MD

## 2021-01-05 NOTE — TELEPHONE ENCOUNTER
Leon called to see if he could get in sooner with Dr Morrell.  He has more blood in his urine.  I did not find any openings for an appointment for a new patient before Colin's appt on Jan 13, 2021. Please call if Colin can get in sooner for an appt. Thank You!

## 2021-01-08 ENCOUNTER — TELEPHONE (OUTPATIENT)
Dept: UROLOGY | Facility: OTHER | Age: 76
End: 2021-01-08

## 2021-01-08 NOTE — TELEPHONE ENCOUNTER
Patient is due for a 6 month cystoscopy with Dr. Helm. He states he will be following up with Dr Morrell since it is closer to home.    Jenny Leonard on 1/8/2021 at 10:47 AM

## 2021-01-11 ENCOUNTER — HOSPITAL ENCOUNTER (OUTPATIENT)
Dept: CT IMAGING | Facility: HOSPITAL | Age: 76
Discharge: HOME | End: 2021-01-11
Attending: UROLOGY | Admitting: UROLOGY
Payer: COMMERCIAL

## 2021-01-11 DIAGNOSIS — Z85.51 HISTORY OF BLADDER CANCER: ICD-10-CM

## 2021-01-11 LAB
COPATH REPORT: NORMAL
CREAT BLD-MCNC: 0.9 MG/DL (ref 0.66–1.25)
GFR SERPL CREATININE-BSD FRML MDRD: 83 ML/MIN/{1.73_M2}

## 2021-01-11 PROCEDURE — 74178 CT ABD&PLV WO CNTR FLWD CNTR: CPT

## 2021-01-11 PROCEDURE — 82565 ASSAY OF CREATININE: CPT

## 2021-01-11 PROCEDURE — 255N000002 HC RX 255 OP 636: Performed by: RADIOLOGY

## 2021-01-11 RX ORDER — IOPAMIDOL 612 MG/ML
100 INJECTION, SOLUTION INTRAVASCULAR ONCE
Status: COMPLETED | OUTPATIENT
Start: 2021-01-11 | End: 2021-01-11

## 2021-01-11 RX ADMIN — IOPAMIDOL 100 ML: 612 INJECTION, SOLUTION INTRAVENOUS at 12:35

## 2021-01-13 ENCOUNTER — PREP FOR PROCEDURE (OUTPATIENT)
Dept: UROLOGY | Facility: OTHER | Age: 76
End: 2021-01-13

## 2021-01-13 ENCOUNTER — VIRTUAL VISIT (OUTPATIENT)
Dept: UROLOGY | Facility: OTHER | Age: 76
End: 2021-01-13
Attending: UROLOGY
Payer: COMMERCIAL

## 2021-01-13 VITALS — BODY MASS INDEX: 26.68 KG/M2 | WEIGHT: 170 LBS | HEIGHT: 67 IN

## 2021-01-13 DIAGNOSIS — Z85.51 PERSONAL HISTORY OF MALIGNANT NEOPLASM OF BLADDER: Primary | ICD-10-CM

## 2021-01-13 DIAGNOSIS — Z01.818 PREOP GENERAL PHYSICAL EXAM: ICD-10-CM

## 2021-01-13 DIAGNOSIS — N28.89 URETERAL MASS: ICD-10-CM

## 2021-01-13 DIAGNOSIS — R31.0 GROSS HEMATURIA: ICD-10-CM

## 2021-01-13 DIAGNOSIS — Z85.51 HISTORY OF BLADDER CANCER: Primary | ICD-10-CM

## 2021-01-13 DIAGNOSIS — C67.8 MALIGNANT NEOPLASM OF OVERLAPPING SITES OF BLADDER (H): Primary | ICD-10-CM

## 2021-01-13 PROCEDURE — 99213 OFFICE O/P EST LOW 20 MIN: CPT | Mod: TEL | Performed by: UROLOGY

## 2021-01-13 RX ORDER — CIPROFLOXACIN 2 MG/ML
200 INJECTION, SOLUTION INTRAVENOUS EVERY 12 HOURS
Status: CANCELLED | OUTPATIENT
Start: 2021-01-18

## 2021-01-13 ASSESSMENT — PAIN SCALES - GENERAL: PAINLEVEL: NO PAIN (0)

## 2021-01-13 ASSESSMENT — MIFFLIN-ST. JEOR: SCORE: 1474.74

## 2021-01-13 NOTE — LETTER
1/13/2021       RE: Colin Baxter  [id number]   [place] [address]     Dear Colleague,    Thank you for referring your patient, Colin Baxter, to the [facility] at [facility]. Please see a copy of my visit note below.      History     Chief Complaint:    Telephone      HPI   Colin Baxter is a 75 year old male whom I called this afternoon regarding results of his recent CT scan and cytology.  I first met Colin January 5.  He had persistent bleeding and cystoscopy revealed some bladder lesions that I felt were concerning for carcinoma in situ.  He has not had any upper tract evaluation recently so I ordered a CT with IV contrast and this shows a mass in the left ureter but no hydronephrosis I also informed him that he had a urine cytology.That came back showing some atypical urothelial cells.     Allergies:    Allergies   Allergen Reactions     No Clinical Screening - See Comments Other (See Comments)     Beta blocker in glaucoma gtt.s caused low pulse and passing out         Medications:           aspirin 81 MG EC tablet       atorvastatin (LIPITOR) 40 MG tablet       blood glucose (ONETOUCH VERIO IQ) test strip       brimonidine (ALPHAGAN-P) 0.15 % ophthalmic solution       IBUPROFEN PO       latanoprost (XALATAN) 0.005 % ophthalmic solution       lisinopril (ZESTRIL) 10 MG tablet       metFORMIN (GLUCOPHAGE-XR) 500 MG 24 hr tablet       nitroFURantoin macrocrystal-monohydrate (MACROBID) 100 MG capsule       ONETOUCH DELICA LANCETS 33G MISC       tamsulosin (FLOMAX) 0.4 MG capsule        Problem List:      Patient Active Problem List    Diagnosis Date Noted     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) 12/11/2020     Priority: Medium     Type 2 diabetes mellitus with hyperglycemia, without long-term current use of insulin (H) 12/11/2020     Priority: Medium     Type 2 diabetes mellitus without complication, without long-term current  use of insulin (H) 07/30/2019     Priority: Medium     History of bladder cancer 02/27/2019     Priority: Medium     Obesity (BMI 35.0-39.9) with comorbidity (H) 09/26/2018     Priority: Medium     ACP (advance care planning) 05/04/2016     Priority: Medium     Advance Care Planning 5/4/2016: ACP Review of Chart / Resources Provided:  Reviewed chart for advance care plan.  Colin Baxter has been provided information and resources to begin or update their advance care plan.  Added by Inés Malloy             History of high risk bladder cancer 09/01/2015     Priority: Medium     Comprehensive Medical Examination 07/06/2015     Priority: Medium     Glaucoma 07/06/2015     Priority: Medium        Past Medical History:      History reviewed. No pertinent past medical history.    Past Surgical History:      Past Surgical History:   Procedure Laterality Date     APPENDECTOMY  1958     COLONOSCOPY  2-     CYSTOSCOPY, BIOPSY BLADDER, COMBINED N/A 9/8/2015    Procedure: COMBINED CYSTOSCOPY, BIOPSY BLADDER;  Surgeon: Randy Martinez MD;  [facility]     CYSTOSCOPY, BIOPSY BLADDER, COMBINED N/A 2/14/2017    Procedure: COMBINED CYSTOSCOPY, BIOPSY BLADDER;  Surgeon: Randy Martinez MD;  [facility]     CYSTOSCOPY, BIOPSY BLADDER, COMBINED N/A 11/13/2018    Procedure: COMBINED CYSTOSCOPY, BIOPSY BLADDER;  Surgeon: Ed Helm MD;  [facility]     CYSTOSCOPY, RETROGRADES, COMBINED Bilateral 11/13/2018    Procedure: Bilateral Retrograde Pyelagram, Bladder Biopsy;  Surgeon: Ed Helm MD;  [facility]     CYSTOSCOPY, RETROGRADES, INSERT STENT URETER(S), COMBINED Left 9/8/2015    Procedure: COMBINED CYSTOSCOPY, RETROGRADES, INSERT STENT URETER(S);  Surgeon: Randy Martinez MD;  [facility]     CYSTOSCOPY, TRANSURETHRAL RESECTION (TUR) TUMOR BLADDER, COMBINED N/A 9/8/2015    Procedure: COMBINED CYSTOSCOPY, TRANSURETHRAL RESECTION (TUR) TUMOR BLADDER;  Surgeon:  "Randy Martinez MD;  [facility]     CYSTOSCOPY, TRANSURETHRAL RESECTION (TUR) TUMOR BLADDER, COMBINED N/A 2016    Procedure: COMBINED CYSTOSCOPY, TRANSURETHRAL RESECTION (TUR) TUMOR BLADDER;  Surgeon: Randy Martinez MD;  [facility]     CYSTOSCOPY, TRANSURETHRAL RESECTION (TUR) TUMOR BLADDER, COMBINED N/A 2016    Procedure: COMBINED CYSTOSCOPY, TRANSURETHRAL RESECTION (TUR) TUMOR BLADDER;  Surgeon: Randy Martinez MD;  [facility]     PHACOEMULSIFICATION WITH STANDARD INTRAOCULAR LENS IMPLANT Right 10/9/2018    Procedure: PHACOEMULSIFICATION WITH STANDARD INTRAOCULAR LENS IMPLANT;  PHACOEMULSIFICATION CATARACT EXTRACTION POSTERIOR CHAMBER LENS RIGHT;  Surgeon: Marlo Mcconnell MD;  [facility]     PHACOEMULSIFICATION WITH STANDARD INTRAOCULAR LENS IMPLANT Left 10/23/2018    Procedure: PHACOEMULSIFICATION CATARACT EXTRACTION POSTERIOR CHAMBER LENS LEFT;  Surgeon: Marlo Mcconnell MD;  [facility]       Family History:      Family History   Problem Relation Age of Onset     Diabetes Mother      Parkinsonism Brother        Social History:    Marital Status:   [4]  Social History     Tobacco Use     Smoking status: Former Smoker     Quit date: 1992     Years since quittin.0     Smokeless tobacco: Never Used   Substance Use Topics     Alcohol use: Yes     Comment: rarely     Drug use: No        Review of Systems      Physical Exam   Vitals:  Ht 1.702 m (5' 7\")   Wt 77.1 kg (170 lb)   BMI 26.63 kg/m        Physical Exam    PROCEDURE: CT UROGRAM WO & W CONTRAST 2021 12:46 PM     HISTORY: h/o bladder cancer; History of bladder cancer     COMPARISONS: None.     Meds/Dose Given: ISOVUE 300  100ML     TECHNIQUE: CT scan of the abdomen and pelvis both with and without IV  contrast. Sagittal and coronal reconstructions were obtained.     FINDINGS: The lung bases are clear. The liver is free of masses or  biliary ductal enlargement. Spleen and " pancreas appears normal. The  adrenal glands are normal.     The right and left kidneys are normal in size. The calyces are  delicate and nondisplaced. No renal masses are seen. The right ureter  appears normal. There is a mass seen within the left ureter beginning  at the L4 level and extending downward to the iliac vessel crossing.  There is 6 significant expansion of the ureter particularly at the  level of the iliac vessel crossing where the ureter appears to be  expanded to almost 2 cm in diameter. The bladder is free of intrinsic  or extrinsic abnormality.     The periaortic lymph nodes are normal in caliber.     No intraperitoneal masses or inflammatory changes are noted. The  rectum appears normal.     Degenerative changes are present in the lumbar spine.                                                                        IMPRESSION: Apparent mass in the left ureter measuring 2 cm in maximal  transverse diameter. Surprisingly there is no associated  hydronephrosis.     No renal masses are noted.     FARSHAD LUTZ MD      PlanPatient Name: CLARISSA COSTA   MR#: [id number]   Specimen #:    Collected: 1/5/2021   Received: 1/7/2021   Reported: 1/11/2021 11:38   Ordering Phy(s): KAROL CHEN     For improved result formatting, select 'View Enhanced Report Format' under    Linked Documents section.     SPECIMEN/STAIN PROCESS:   Urine, catheterized        Pap-Cyto x 1     ----------------------------------------------------------------     CYTOLOGIC INTERPRETATION:      Urine, catheterized:   Atypical urothelial cells   Specimen Adequacy: Satisfactory for evaluation.     Electronically signed out by:     Reyes Beasley M.D.     CLINICAL HISTORY:   Bladder cancer          No follow-ups on file.    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  I also would like to further evaluate this filling defect in the left ureter.  I am concerned that this could be a ureteral tumor, transitional cell carcinoma in the ureter.  I am going to proceed with cystoscopy retrograde urogram's and ureteroscopy on the left side and hopefully a ureteral biopsy.  I did tell Colin that occasionally were not able to get up into the ureter to visualize this area but even if I can get some ureteral washings or an x-ray to confirm this filling defect that would be very helpful.  Risks of bleeding infection perforation of the bladder perforation or injury to the ureter anesthetic complications were discussed he appears understand and agrees to proceed.23 minutes spent on this phone consultation.         Again, thank you for allowing me to participate in the care of your patient.      Sincerely,    Shonda Morrell MD

## 2021-01-13 NOTE — NURSING NOTE
Review of Systems:    Weight loss:    No     Recent fever/chills:  No   Night sweats:   No  Current skin rash:  No   Recent hair loss:  No  Heat intolerance:  No   Cold intolerance:  No  Chest pain:   No   Palpitations:   No  Shortness of breath:  No   Wheezing:   No  Constipation:    No   Diarrhea:   yes   Nausea:   No   Vomiting:   No   Kidney/side pain:  No   Back pain:   No  Frequent headaches:  No   Dizziness:     No  Leg swelling:   No   Calf pain:    No    ILEANA SIDHU LPN

## 2021-01-13 NOTE — PROGRESS NOTES
History     Chief Complaint:    Telephone      HPI   Colin Baxter is a 75 year old male whom I called this afternoon regarding results of his recent CT scan and cytology.  I first met Colin January 5.  He had persistent bleeding and cystoscopy revealed some bladder lesions that I felt were concerning for carcinoma in situ.  He has not had any upper tract evaluation recently so I ordered a CT with IV contrast and this shows a mass in the left ureter but no hydronephrosis I also informed him that he had a urine cytology.That came back showing some atypical urothelial cells.     Allergies:    Allergies   Allergen Reactions     No Clinical Screening - See Comments Other (See Comments)     Beta blocker in glaucoma gtt.s caused low pulse and passing out         Medications:           aspirin 81 MG EC tablet       atorvastatin (LIPITOR) 40 MG tablet       blood glucose (ONETOUCH VERIO IQ) test strip       brimonidine (ALPHAGAN-P) 0.15 % ophthalmic solution       IBUPROFEN PO       latanoprost (XALATAN) 0.005 % ophthalmic solution       lisinopril (ZESTRIL) 10 MG tablet       metFORMIN (GLUCOPHAGE-XR) 500 MG 24 hr tablet       nitroFURantoin macrocrystal-monohydrate (MACROBID) 100 MG capsule       ONETOUCH DELICA LANCETS 33G MISC       tamsulosin (FLOMAX) 0.4 MG capsule        Problem List:      Patient Active Problem List    Diagnosis Date Noted     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) 12/11/2020     Priority: Medium     Type 2 diabetes mellitus with hyperglycemia, without long-term current use of insulin (H) 12/11/2020     Priority: Medium     Type 2 diabetes mellitus without complication, without long-term current use of insulin (H) 07/30/2019     Priority: Medium     History of bladder cancer 02/27/2019     Priority: Medium     Obesity (BMI 35.0-39.9) with comorbidity (H) 09/26/2018     Priority: Medium     ACP (advance care planning) 05/04/2016     Priority: Medium     Advance Care Planning  5/4/2016: ACP Review of Chart / Resources Provided:  Reviewed chart for advance care plan.  Colin Baxter has been provided information and resources to begin or update their advance care plan.  Added by Inés Malloy             History of high risk bladder cancer 09/01/2015     Priority: Medium     Comprehensive Medical Examination 07/06/2015     Priority: Medium     Glaucoma 07/06/2015     Priority: Medium        Past Medical History:      History reviewed. No pertinent past medical history.    Past Surgical History:      Past Surgical History:   Procedure Laterality Date     APPENDECTOMY  1958     COLONOSCOPY  2-     CYSTOSCOPY, BIOPSY BLADDER, COMBINED N/A 9/8/2015    Procedure: COMBINED CYSTOSCOPY, BIOPSY BLADDER;  Surgeon: Randy Martinez MD;  [facility]     CYSTOSCOPY, BIOPSY BLADDER, COMBINED N/A 2/14/2017    Procedure: COMBINED CYSTOSCOPY, BIOPSY BLADDER;  Surgeon: Randy Martinez MD;  [facility]     CYSTOSCOPY, BIOPSY BLADDER, COMBINED N/A 11/13/2018    Procedure: COMBINED CYSTOSCOPY, BIOPSY BLADDER;  Surgeon: Ed Helm MD;  [facility]     CYSTOSCOPY, RETROGRADES, COMBINED Bilateral 11/13/2018    Procedure: Bilateral Retrograde Pyelagram, Bladder Biopsy;  Surgeon: Ed Helm MD;  [facility]     CYSTOSCOPY, RETROGRADES, INSERT STENT URETER(S), COMBINED Left 9/8/2015    Procedure: COMBINED CYSTOSCOPY, RETROGRADES, INSERT STENT URETER(S);  Surgeon: Randy Martinez MD;  [facility]     CYSTOSCOPY, TRANSURETHRAL RESECTION (TUR) TUMOR BLADDER, COMBINED N/A 9/8/2015    Procedure: COMBINED CYSTOSCOPY, TRANSURETHRAL RESECTION (TUR) TUMOR BLADDER;  Surgeon: Randy Martinez MD;  [facility]     CYSTOSCOPY, TRANSURETHRAL RESECTION (TUR) TUMOR BLADDER, COMBINED N/A 1/12/2016    Procedure: COMBINED CYSTOSCOPY, TRANSURETHRAL RESECTION (TUR) TUMOR BLADDER;  Surgeon: Randy Martinez MD;  [facility]     CYSTOSCOPY,  "TRANSURETHRAL RESECTION (TUR) TUMOR BLADDER, COMBINED N/A 2016    Procedure: COMBINED CYSTOSCOPY, TRANSURETHRAL RESECTION (TUR) TUMOR BLADDER;  Surgeon: Randy Martinez MD;  [facility]     PHACOEMULSIFICATION WITH STANDARD INTRAOCULAR LENS IMPLANT Right 10/9/2018    Procedure: PHACOEMULSIFICATION WITH STANDARD INTRAOCULAR LENS IMPLANT;  PHACOEMULSIFICATION CATARACT EXTRACTION POSTERIOR CHAMBER LENS RIGHT;  Surgeon: Marlo Mcconnell MD;  [facility]     PHACOEMULSIFICATION WITH STANDARD INTRAOCULAR LENS IMPLANT Left 10/23/2018    Procedure: PHACOEMULSIFICATION CATARACT EXTRACTION POSTERIOR CHAMBER LENS LEFT;  Surgeon: Marlo Mcconnell MD;  [facility]       Family History:      Family History   Problem Relation Age of Onset     Diabetes Mother      Parkinsonism Brother        Social History:    Marital Status:   [4]  Social History     Tobacco Use     Smoking status: Former Smoker     Quit date: 1992     Years since quittin.0     Smokeless tobacco: Never Used   Substance Use Topics     Alcohol use: Yes     Comment: rarely     Drug use: No        Review of Systems      Physical Exam   Vitals:  Ht 1.702 m (5' 7\")   Wt 77.1 kg (170 lb)   BMI 26.63 kg/m        Physical Exam    PROCEDURE: CT UROGRAM WO & W CONTRAST 2021 12:46 PM     HISTORY: h/o bladder cancer; History of bladder cancer     COMPARISONS: None.     Meds/Dose Given: ISOVUE 300  100ML     TECHNIQUE: CT scan of the abdomen and pelvis both with and without IV  contrast. Sagittal and coronal reconstructions were obtained.     FINDINGS: The lung bases are clear. The liver is free of masses or  biliary ductal enlargement. Spleen and pancreas appears normal. The  adrenal glands are normal.     The right and left kidneys are normal in size. The calyces are  delicate and nondisplaced. No renal masses are seen. The right ureter  appears normal. There is a mass seen within the left ureter beginning  at the L4 level " and extending downward to the iliac vessel crossing.  There is 6 significant expansion of the ureter particularly at the  level of the iliac vessel crossing where the ureter appears to be  expanded to almost 2 cm in diameter. The bladder is free of intrinsic  or extrinsic abnormality.     The periaortic lymph nodes are normal in caliber.     No intraperitoneal masses or inflammatory changes are noted. The  rectum appears normal.     Degenerative changes are present in the lumbar spine.                                                                        IMPRESSION: Apparent mass in the left ureter measuring 2 cm in maximal  transverse diameter. Surprisingly there is no associated  hydronephrosis.     No renal masses are noted.     FARSHAD LUTZ MD      PlanPatient Name: COLIN COSTA   MR#: [id number]   Specimen #:    Collected: 1/5/2021   Received: 1/7/2021   Reported: 1/11/2021 11:38   Ordering Phy(s): KAROL CHEN     For improved result formatting, select 'View Enhanced Report Format' under    Linked Documents section.     SPECIMEN/STAIN PROCESS:   Urine, catheterized        Pap-Cyto x 1     ----------------------------------------------------------------     CYTOLOGIC INTERPRETATION:      Urine, catheterized:   Atypical urothelial cells   Specimen Adequacy: Satisfactory for evaluation.     Electronically signed out by:     Reyes Beasley M.D.     CLINICAL HISTORY:   Bladder cancer          No follow-ups on file.    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  I also would like to further evaluate this filling defect in the left ureter.  I am concerned that  this could be a ureteral tumor, transitional cell carcinoma in the ureter.  I am going to proceed with cystoscopy retrograde urogram's and ureteroscopy on the left side and hopefully a ureteral biopsy.  I did tell Colin that occasionally were not able to get up into the ureter to visualize this area but even if I can get some ureteral washings or an x-ray to confirm this filling defect that would be very helpful.  Risks of bleeding infection perforation of the bladder perforation or injury to the ureter anesthetic complications were discussed he appears understand and agrees to proceed.23 minutes spent on this phone consultation.

## 2021-01-14 ENCOUNTER — OFFICE VISIT (OUTPATIENT)
Dept: FAMILY MEDICINE | Facility: OTHER | Age: 76
End: 2021-01-14
Attending: FAMILY MEDICINE
Payer: COMMERCIAL

## 2021-01-14 ENCOUNTER — ANESTHESIA EVENT (OUTPATIENT)
Dept: SURGERY | Facility: HOSPITAL | Age: 76
End: 2021-01-14
Payer: COMMERCIAL

## 2021-01-14 VITALS
SYSTOLIC BLOOD PRESSURE: 102 MMHG | BODY MASS INDEX: 26.63 KG/M2 | DIASTOLIC BLOOD PRESSURE: 70 MMHG | OXYGEN SATURATION: 96 % | WEIGHT: 170 LBS | HEART RATE: 79 BPM

## 2021-01-14 DIAGNOSIS — E11.9 TYPE 2 DIABETES MELLITUS WITHOUT COMPLICATION, WITHOUT LONG-TERM CURRENT USE OF INSULIN (H): ICD-10-CM

## 2021-01-14 DIAGNOSIS — R31.0 GROSS HEMATURIA: ICD-10-CM

## 2021-01-14 DIAGNOSIS — Z11.59 NEED FOR HEPATITIS C SCREENING TEST: ICD-10-CM

## 2021-01-14 DIAGNOSIS — Z01.818 PREOP GENERAL PHYSICAL EXAM: Primary | ICD-10-CM

## 2021-01-14 DIAGNOSIS — Z85.51 HISTORY OF BLADDER CANCER: ICD-10-CM

## 2021-01-14 LAB
ALBUMIN UR-MCNC: 30 MG/DL
APPEARANCE UR: ABNORMAL
BACTERIA #/AREA URNS HPF: ABNORMAL /HPF
BASOPHILS # BLD AUTO: 0.1 10E9/L (ref 0–0.2)
BASOPHILS NFR BLD AUTO: 0.9 %
BILIRUB UR QL STRIP: NEGATIVE
COLOR UR AUTO: ABNORMAL
DIFFERENTIAL METHOD BLD: ABNORMAL
EOSINOPHIL # BLD AUTO: 0.3 10E9/L (ref 0–0.7)
EOSINOPHIL NFR BLD AUTO: 4.8 %
ERYTHROCYTE [DISTWIDTH] IN BLOOD BY AUTOMATED COUNT: 13.4 % (ref 10–15)
EST. AVERAGE GLUCOSE BLD GHB EST-MCNC: 117 MG/DL
GLUCOSE UR STRIP-MCNC: NEGATIVE MG/DL
HBA1C MFR BLD: 5.7 % (ref 0–5.6)
HCT VFR BLD AUTO: 39.6 % (ref 40–53)
HCV AB SERPL QL IA: NONREACTIVE
HGB BLD-MCNC: 13.5 G/DL (ref 13.3–17.7)
HGB UR QL STRIP: ABNORMAL
IMM GRANULOCYTES # BLD: 0 10E9/L (ref 0–0.4)
IMM GRANULOCYTES NFR BLD: 0.2 %
KETONES UR STRIP-MCNC: NEGATIVE MG/DL
LEUKOCYTE ESTERASE UR QL STRIP: ABNORMAL
LYMPHOCYTES # BLD AUTO: 1.8 10E9/L (ref 0.8–5.3)
LYMPHOCYTES NFR BLD AUTO: 27 %
MCH RBC QN AUTO: 31.8 PG (ref 26.5–33)
MCHC RBC AUTO-ENTMCNC: 34.1 G/DL (ref 31.5–36.5)
MCV RBC AUTO: 93 FL (ref 78–100)
MONOCYTES # BLD AUTO: 0.6 10E9/L (ref 0–1.3)
MONOCYTES NFR BLD AUTO: 9.8 %
MUCOUS THREADS #/AREA URNS LPF: PRESENT /LPF
NEUTROPHILS # BLD AUTO: 3.7 10E9/L (ref 1.6–8.3)
NEUTROPHILS NFR BLD AUTO: 57.3 %
NITRATE UR QL: NEGATIVE
NRBC # BLD AUTO: 0 10*3/UL
NRBC BLD AUTO-RTO: 0 /100
PH UR STRIP: 5.5 PH (ref 4.7–8)
PLATELET # BLD AUTO: 290 10E9/L (ref 150–450)
RBC # BLD AUTO: 4.25 10E12/L (ref 4.4–5.9)
RBC #/AREA URNS AUTO: >182 /HPF (ref 0–2)
RENAL EPI CELLS #/AREA URNS HPF: 1 /HPF (ref 0–1)
SOURCE: ABNORMAL
SP GR UR STRIP: 1.02 (ref 1–1.03)
SQUAMOUS #/AREA URNS AUTO: 0 /HPF (ref 0–1)
UROBILINOGEN UR STRIP-MCNC: NORMAL MG/DL (ref 0–2)
WBC # BLD AUTO: 6.5 10E9/L (ref 4–11)
WBC #/AREA URNS AUTO: 26 /HPF (ref 0–5)

## 2021-01-14 PROCEDURE — G0463 HOSPITAL OUTPT CLINIC VISIT: HCPCS

## 2021-01-14 PROCEDURE — 87086 URINE CULTURE/COLONY COUNT: CPT | Mod: ZL | Performed by: UROLOGY

## 2021-01-14 PROCEDURE — 81001 URINALYSIS AUTO W/SCOPE: CPT | Mod: ZL | Performed by: UROLOGY

## 2021-01-14 PROCEDURE — 85025 COMPLETE CBC W/AUTO DIFF WBC: CPT | Mod: ZL | Performed by: FAMILY MEDICINE

## 2021-01-14 PROCEDURE — 93010 ELECTROCARDIOGRAM REPORT: CPT | Performed by: INTERNAL MEDICINE

## 2021-01-14 PROCEDURE — 83036 HEMOGLOBIN GLYCOSYLATED A1C: CPT | Mod: ZL | Performed by: FAMILY MEDICINE

## 2021-01-14 PROCEDURE — 99214 OFFICE O/P EST MOD 30 MIN: CPT | Performed by: FAMILY MEDICINE

## 2021-01-14 PROCEDURE — 36415 COLL VENOUS BLD VENIPUNCTURE: CPT | Mod: ZL | Performed by: FAMILY MEDICINE

## 2021-01-14 PROCEDURE — 93005 ELECTROCARDIOGRAM TRACING: CPT

## 2021-01-14 PROCEDURE — U0003 INFECTIOUS AGENT DETECTION BY NUCLEIC ACID (DNA OR RNA); SEVERE ACUTE RESPIRATORY SYNDROME CORONAVIRUS 2 (SARS-COV-2) (CORONAVIRUS DISEASE [COVID-19]), AMPLIFIED PROBE TECHNIQUE, MAKING USE OF HIGH THROUGHPUT TECHNOLOGIES AS DESCRIBED BY CMS-2020-01-R: HCPCS | Mod: ZL | Performed by: UROLOGY

## 2021-01-14 PROCEDURE — U0005 INFEC AGEN DETEC AMPLI PROBE: HCPCS | Mod: ZL | Performed by: UROLOGY

## 2021-01-14 PROCEDURE — 86803 HEPATITIS C AB TEST: CPT | Mod: ZL | Performed by: FAMILY MEDICINE

## 2021-01-14 PROCEDURE — 999N001182 HC STATISTIC ESTIMATED AVERAGE GLUCOSE: Mod: ZL | Performed by: FAMILY MEDICINE

## 2021-01-14 RX ORDER — CIPROFLOXACIN 2 MG/ML
400 INJECTION, SOLUTION INTRAVENOUS EVERY 12 HOURS
Status: CANCELLED | OUTPATIENT
Start: 2021-01-18

## 2021-01-14 ASSESSMENT — PAIN SCALES - GENERAL: PAINLEVEL: NO PAIN (0)

## 2021-01-14 ASSESSMENT — LIFESTYLE VARIABLES: TOBACCO_USE: 1

## 2021-01-14 NOTE — ANESTHESIA PREPROCEDURE EVALUATION
Anesthesia Pre-Procedure Evaluation    Patient: Colin Baxter   MRN: [id number] : 1945          Preoperative Diagnosis: Ureteral mass [N28.89]  Personal history of malignant neoplasm of bladder [Z85.51]    Procedure(s):  CYSTOSCOPY, WITH TRANSURETHRAL RESECTION OF BLADDER TUMOR  CYSTOURETEROSCOPY, WITH RETROGRADE PYELOGRAM ureteral biopsy    No past medical history on file.  Past Surgical History:   Procedure Laterality Date     APPENDECTOMY       COLONOSCOPY  2011     CYSTOSCOPY, BIOPSY BLADDER, COMBINED N/A 2015    Procedure: COMBINED CYSTOSCOPY, BIOPSY BLADDER;  Surgeon: Randy Martinez MD;  [facility]     CYSTOSCOPY, BIOPSY BLADDER, COMBINED N/A 2017    Procedure: COMBINED CYSTOSCOPY, BIOPSY BLADDER;  Surgeon: Randy Martinez MD;  [facility]     CYSTOSCOPY, BIOPSY BLADDER, COMBINED N/A 2018    Procedure: COMBINED CYSTOSCOPY, BIOPSY BLADDER;  Surgeon: Ed Helm MD;  [facility]     CYSTOSCOPY, RETROGRADES, COMBINED Bilateral 2018    Procedure: Bilateral Retrograde Pyelagram, Bladder Biopsy;  Surgeon: Ed Helm MD;  [facility]     CYSTOSCOPY, RETROGRADES, INSERT STENT URETER(S), COMBINED Left 2015    Procedure: COMBINED CYSTOSCOPY, RETROGRADES, INSERT STENT URETER(S);  Surgeon: Randy Martinez MD;  [facility]     CYSTOSCOPY, TRANSURETHRAL RESECTION (TUR) TUMOR BLADDER, COMBINED N/A 2015    Procedure: COMBINED CYSTOSCOPY, TRANSURETHRAL RESECTION (TUR) TUMOR BLADDER;  Surgeon: Randy Martinez MD;  [facility]     CYSTOSCOPY, TRANSURETHRAL RESECTION (TUR) TUMOR BLADDER, COMBINED N/A 2016    Procedure: COMBINED CYSTOSCOPY, TRANSURETHRAL RESECTION (TUR) TUMOR BLADDER;  Surgeon: Randy Martinez MD;  [facility]     CYSTOSCOPY, TRANSURETHRAL RESECTION (TUR) TUMOR BLADDER, COMBINED N/A 2016    Procedure: COMBINED CYSTOSCOPY, TRANSURETHRAL RESECTION (TUR) TUMOR BLADDER;   Surgeon: Randy Martinez MD;  [facility]     PHACOEMULSIFICATION WITH STANDARD INTRAOCULAR LENS IMPLANT Right 10/9/2018    Procedure: PHACOEMULSIFICATION WITH STANDARD INTRAOCULAR LENS IMPLANT;  PHACOEMULSIFICATION CATARACT EXTRACTION POSTERIOR CHAMBER LENS RIGHT;  Surgeon: Marlo Mcconnell MD;  [facility]     PHACOEMULSIFICATION WITH STANDARD INTRAOCULAR LENS IMPLANT Left 10/23/2018    Procedure: PHACOEMULSIFICATION CATARACT EXTRACTION POSTERIOR CHAMBER LENS LEFT;  Surgeon: Marlo Mcconnell MD;  [facility]       Anesthesia Evaluation     . Pt has had prior anesthetic. Type: General and MAC.           ROS/MED HX    ENT/Pulmonary:     (+) tobacco use, Past use,     Neurologic:  - neg neurologic ROS     Cardiovascular:     (+) -----Previous cardiac testing   Echo: Date: Results:    Stress Test: Date: Results:    ECG Reviewed: Date: 1/14/21 Results:  Nsr left axis deviation  Cath: Date: Results:        METS/Exercise Tolerance:  >4 METS   Hematologic:         Musculoskeletal:  - neg musculoskeletal ROS       GI/Hepatic:  - neg GI/hepatic ROS       Renal/Genitourinary: Comment: Bladder cancer  Current blood in urine    (+) Other Renal/ Genitourinary, ureteral mass,      Endo:     (+) type II DM, Last HgA1c: 5.7, date: 1/14/21,       Psychiatric:  - neg psychiatric ROS       Infectious Disease:  - neg infectious disease ROS       Malignancy: (+) Malignancy, History of Other.Other CA ureteral mass, hx bladder cancer status post.      Other:    - neg other ROS                      Physical Exam  Normal systems: cardiovascular, pulmonary and dental    Airway   Mallampati: I  TM distance: >3 FB  Neck ROM: full    Dental     Cardiovascular   Rhythm and rate: regular and normal      Pulmonary    breath sounds clear to auscultation            Lab Results   Component Value Date    WBC 6.2 10/02/2020    HGB 12.9 (L) 10/02/2020    HCT 38.1 (L) 10/02/2020     10/02/2020     10/02/2020     "POTASSIUM 3.7 10/02/2020    CHLORIDE 104 10/02/2020    CO2 26 10/02/2020    BUN 27 10/02/2020    CR 0.82 10/02/2020     (H) 10/02/2020    MAHSA 8.7 10/02/2020    ALBUMIN 3.9 10/02/2020    PROTTOTAL 7.6 10/02/2020    ALT 14 10/02/2020    AST 18 10/02/2020    ALKPHOS 58 10/02/2020    BILITOTAL 0.9 10/02/2020    TSH 1.41 10/02/2020    T4 0.99 10/02/2020       Preop Vitals  BP Readings from Last 3 Encounters:   01/05/21 116/70   12/11/20 110/60   11/10/20 104/76    Pulse Readings from Last 3 Encounters:   01/05/21 80   12/11/20 74   11/10/20 77      Resp Readings from Last 3 Encounters:   12/11/20 20   11/10/20 16   03/13/20 17    SpO2 Readings from Last 3 Encounters:   01/05/21 95%   12/11/20 98%   11/10/20 98%      Temp Readings from Last 1 Encounters:   01/05/21 98.4  F (36.9  C) (Tympanic)    Ht Readings from Last 1 Encounters:   01/13/21 1.702 m (5' 7\")      Wt Readings from Last 1 Encounters:   01/13/21 77.1 kg (170 lb)    Estimated body mass index is 26.63 kg/m  as calculated from the following:    Height as of 1/13/21: 1.702 m (5' 7\").    Weight as of 1/13/21: 77.1 kg (170 lb).       Anesthesia Plan      History & Physical Review  History and physical reviewed and following examination; no interval change.    ASA Status:  3.     Plan for General with Intravenous and Propofol induction. Maintenance will be Inhalation.         PONV prophylaxis:  Ondansetron (or other 5HT-3).         Postoperative Care  Postoperative pain management: IV analgesics.     Consents  Anesthetic plan, risks, benefits and alternatives discussed with:  Patient..                 DANILO Campos CRNA  "

## 2021-01-14 NOTE — H&P (VIEW-ONLY)
[facility] [place]  [address] [address]  Phone: [phone]  Primary Provider: Luis Tran  Pre-op Performing Provider: BHAVESH ROBERTS    PREOPERATIVE EVALUATION:  Today's date: 1/14/2021    Colin Baxter is a 75 year old male who presents for a preoperative evaluation.    Surgical Information:  Surgery/Procedure: CYSTOSCOPY, WITH TRANSURETHRAL RESECTION OF BLADDER TUMOR  Surgery Location: [facility]  Surgeon: Dr. Morrell  Surgery Date: 1/18/21  Time of Surgery: TBD  Where patient plans to recover: At home with family  Fax number for surgical facility: Note does not need to be faxed, will be available electronically in Epic.    Type of Anesthesia Anticipated: to be determined    Subjective     HPI related to upcoming procedure: Patient has a history of bladder cancer and has her ureteral mass will undergo surgery for further evaluation      Preop Questions 1/14/2021   1. Have you ever had a heart attack or stroke? No   2. Have you ever had surgery on your heart or blood vessels, such as a stent placement, a coronary artery bypass, or surgery on an artery in your head, neck, heart, or legs? No   3. Do you have chest pain with activity? No   4. Do you have a history of  heart failure? No   5. Do you currently have a cold, bronchitis or symptoms of other infection? No   6. Do you have a cough, shortness of breath, or wheezing? No   7. Do you or anyone in your family have previous history of blood clots? No   8. Do you or does anyone in your family have a serious bleeding problem such as prolonged bleeding following surgeries or cuts? No   9. Have you ever had problems with anemia or been told to take iron pills? No   10. Have you had any abnormal blood loss such as black, tarry or bloody stools? No   11. Have you ever had a blood transfusion? YES - 45 years ago   11a. Have you ever had a transfusion reaction? No   12. Are you willing to have a blood transfusion if it is medically needed  before, during, or after your surgery? Yes   13. Have you or any of your relatives ever had problems with anesthesia? No   14. Do you have sleep apnea, excessive snoring or daytime drowsiness? No   15. Do you have any artifical heart valves or other implanted medical devices like a pacemaker, defibrillator, or continuous glucose monitor? No   16. Do you have artificial joints? No   17. Are you allergic to latex? No     Health Care Directive:  Patient does not have a Health Care Directive or Living Will:     Preoperative Review of :   reviewed - no record of controlled substances prescribed.      Status of Chronic Conditions:  See problem list for active medical problems.  Problems all longstanding and stable, except as noted/documented.  See ROS for pertinent symptoms related to these conditions.      Review of Systems  CONSTITUTIONAL: NEGATIVE for fever, chills, change in weight  INTEGUMENTARY/SKIN: NEGATIVE for worrisome rashes, moles or lesions  EYES: NEGATIVE for vision changes or irritation  ENT/MOUTH: NEGATIVE for ear, mouth and throat problems  RESP: NEGATIVE for significant cough or SOB  BREAST: NEGATIVE for masses, tenderness or discharge  CV: NEGATIVE for chest pain, palpitations or peripheral edema  GI: NEGATIVE for nausea, abdominal pain, heartburn, or change in bowel habits  : NEGATIVE for frequency, dysuria, or hematuria  MUSCULOSKELETAL: NEGATIVE for significant arthralgias or myalgia  NEURO: NEGATIVE for weakness, dizziness or paresthesias  ENDOCRINE: NEGATIVE for temperature intolerance, skin/hair changes  HEME: NEGATIVE for bleeding problems  PSYCHIATRIC: NEGATIVE for changes in mood or affect    Patient Active Problem List    Diagnosis Date Noted     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     Priority: Medium     Added automatically from request for surgery [id number]       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) 12/11/2020     Priority: Medium     Type 2 diabetes mellitus with  hyperglycemia, without long-term current use of insulin (H) 12/11/2020     Priority: Medium     Type 2 diabetes mellitus without complication, without long-term current use of insulin (H) 07/30/2019     Priority: Medium     History of bladder cancer 02/27/2019     Priority: Medium     Obesity (BMI 35.0-39.9) with comorbidity (H) 09/26/2018     Priority: Medium     ACP (advance care planning) 05/04/2016     Priority: Medium     Advance Care Planning 5/4/2016: ACP Review of Chart / Resources Provided:  Reviewed chart for advance care plan.  Colin Baxter has been provided information and resources to begin or update their advance care plan.  Added by Inés Malloy             History of high risk bladder cancer 09/01/2015     Priority: Medium     Comprehensive Medical Examination 07/06/2015     Priority: Medium     Glaucoma 07/06/2015     Priority: Medium      No past medical history on file.  Past Surgical History:   Procedure Laterality Date     APPENDECTOMY  1958     COLONOSCOPY  2-     CYSTOSCOPY, BIOPSY BLADDER, COMBINED N/A 9/8/2015    Procedure: COMBINED CYSTOSCOPY, BIOPSY BLADDER;  Surgeon: Randy Martinez MD;  Location: [facility]     CYSTOSCOPY, BIOPSY BLADDER, COMBINED N/A 2/14/2017    Procedure: COMBINED CYSTOSCOPY, BIOPSY BLADDER;  Surgeon: Randy Martinez MD;  Location: [facility]     CYSTOSCOPY, BIOPSY BLADDER, COMBINED N/A 11/13/2018    Procedure: COMBINED CYSTOSCOPY, BIOPSY BLADDER;  Surgeon: Ed Helm MD;  Location: [facility] OR     CYSTOSCOPY, RETROGRADES, COMBINED Bilateral 11/13/2018    Procedure: Bilateral Retrograde Pyelagram, Bladder Biopsy;  Surgeon: Ed Helm MD;  Location: [facility] OR     CYSTOSCOPY, RETROGRADES, INSERT STENT URETER(S), COMBINED Left 9/8/2015    Procedure: COMBINED CYSTOSCOPY, RETROGRADES, INSERT STENT URETER(S);  Surgeon: Randy Martinez MD;  Location: [facility]     CYSTOSCOPY, TRANSURETHRAL RESECTION (TUR) TUMOR BLADDER, COMBINED N/A 9/8/2015     Procedure: COMBINED CYSTOSCOPY, TRANSURETHRAL RESECTION (TUR) TUMOR BLADDER;  Surgeon: Randy Martinez MD;  Location: [facility]     CYSTOSCOPY, TRANSURETHRAL RESECTION (TUR) TUMOR BLADDER, COMBINED N/A 1/12/2016    Procedure: COMBINED CYSTOSCOPY, TRANSURETHRAL RESECTION (TUR) TUMOR BLADDER;  Surgeon: Randy Martinez MD;  Location: [facility]     CYSTOSCOPY, TRANSURETHRAL RESECTION (TUR) TUMOR BLADDER, COMBINED N/A 9/13/2016    Procedure: COMBINED CYSTOSCOPY, TRANSURETHRAL RESECTION (TUR) TUMOR BLADDER;  Surgeon: Randy Martinez MD;  Location: [facility]     PHACOEMULSIFICATION WITH STANDARD INTRAOCULAR LENS IMPLANT Right 10/9/2018    Procedure: PHACOEMULSIFICATION WITH STANDARD INTRAOCULAR LENS IMPLANT;  PHACOEMULSIFICATION CATARACT EXTRACTION POSTERIOR CHAMBER LENS RIGHT;  Surgeon: Marlo Mcconnell MD;  Location: [facility]     PHACOEMULSIFICATION WITH STANDARD INTRAOCULAR LENS IMPLANT Left 10/23/2018    Procedure: PHACOEMULSIFICATION CATARACT EXTRACTION POSTERIOR CHAMBER LENS LEFT;  Surgeon: Marlo Mcconnell MD;  Location: [facility]     Current Outpatient Medications   Medication Sig Dispense Refill     aspirin 81 MG EC tablet Take 81 mg by mouth daily       atorvastatin (LIPITOR) 40 MG tablet TAKE 1 TABLET (40 MG) BY MOUTH DAILY 90 tablet 0     blood glucose (ONETOUCH VERIO IQ) test strip USE TO TEST BLOOD SUGAR 2 TIMES DAILY OR AS DIRECTED. 100 each 10     brimonidine (ALPHAGAN-P) 0.15 % ophthalmic solution INSTILL 1 DROP INTO RIGHT EYE TWICE DAILY  12     IBUPROFEN PO Take 200 mg by mouth every 6 hours as needed for moderate pain Reported on 5/9/2017       latanoprost (XALATAN) 0.005 % ophthalmic solution Place 1 drop into both eyes At Bedtime       lisinopril (ZESTRIL) 10 MG tablet TAKE 1 TABLET (10 MG) BY MOUTH DAILY 90 tablet 0     metFORMIN (GLUCOPHAGE-XR) 500 MG 24 hr tablet Take 2 tablets (1,000 mg) by mouth 2 times daily (with meals) 360 tablet 2     nitroFURantoin macrocrystal-monohydrate  (MACROBID) 100 MG capsule Take 1 capsule (100 mg) by mouth 2 times daily 14 capsule 0     ONETOUCH DELICA LANCETS 33G MISC 1 each 2 times daily 100 each 10     tamsulosin (FLOMAX) 0.4 MG capsule Take 1 capsule (0.4 mg) by mouth daily 90 capsule 1       Allergies   Allergen Reactions     No Clinical Screening - See Comments Other (See Comments)     Beta blocker in glaucoma gtt.s caused low pulse and passing out         Social History     Tobacco Use     Smoking status: Former Smoker     Quit date: 1992     Years since quittin.0     Smokeless tobacco: Never Used   Substance Use Topics     Alcohol use: Yes     Comment: rarely     Family History   Problem Relation Age of Onset     Diabetes Mother      Parkinsonism Brother      History   Drug Use No         Objective     /70 (Patient Position: Sitting)   Pulse 79   Wt 77.1 kg (170 lb)   SpO2 96%   BMI 26.63 kg/m      Physical Exam    GENERAL APPEARANCE: healthy, alert and no distress     EYES: EOMI,  PERRL     HENT: ear canals and TM's normal and nose and mouth without ulcers or lesions     NECK: no adenopathy, no asymmetry, masses, or scars and thyroid normal to palpation, no bruits in the carotids     RESP: lungs clear to auscultation - no rales, rhonchi or wheezes     CV: regular rates and rhythm, normal S1 S2, no S3 or S4 and no murmur, click or rub     ABDOMEN:  soft, nontender, no HSM or masses and bowel sounds normal     Rectal exam: deferred     GU_male: Deferred     MS: extremities normal- no gross deformities noted, no evidence of inflammation in joints, FROM in all extremities.     SKIN: no suspicious lesions or rashes     NEURO: Normal strength and tone, sensory exam grossly normal, mentation intact and speech normal     PSYCH: mentation appears normal. and affect normal     LYMPHATICS: No cervical adenopathy         Diagnostics:  Recent Results (from the past 24 hour(s))   Hemoglobin A1c    Collection Time: 21 11:13 AM   Result Value  Ref Range    Hemoglobin A1C 5.7 (H) 0 - 5.6 %   CBC with platelets differential    Collection Time: 01/14/21 11:13 AM   Result Value Ref Range    WBC 6.5 4.0 - 11.0 10e9/L    RBC Count 4.25 (L) 4.4 - 5.9 10e12/L    Hemoglobin 13.5 13.3 - 17.7 g/dL    Hematocrit 39.6 (L) 40.0 - 53.0 %    MCV 93 78 - 100 fl    MCH 31.8 26.5 - 33.0 pg    MCHC 34.1 31.5 - 36.5 g/dL    RDW 13.4 10.0 - 15.0 %    Platelet Count 290 150 - 450 10e9/L    Diff Method Automated Method     % Neutrophils 57.3 %    % Lymphocytes 27.0 %    % Monocytes 9.8 %    % Eosinophils 4.8 %    % Basophils 0.9 %    % Immature Granulocytes 0.2 %    Nucleated RBCs 0 0 /100    Absolute Neutrophil 3.7 1.6 - 8.3 10e9/L    Absolute Lymphocytes 1.8 0.8 - 5.3 10e9/L    Absolute Monocytes 0.6 0.0 - 1.3 10e9/L    Absolute Eosinophils 0.3 0.0 - 0.7 10e9/L    Absolute Basophils 0.1 0.0 - 0.2 10e9/L    Abs Immature Granulocytes 0.0 0 - 0.4 10e9/L    Absolute Nucleated RBC 0.0    Estimated Average Glucose    Collection Time: 01/14/21 11:13 AM   Result Value Ref Range    Estimated Average Glucose 117 mg/dL   UA reflex to Microscopic and Culture - [place]    Collection Time: 01/14/21 11:16 AM    Specimen: Midstream Urine   Result Value Ref Range    Color Urine Orange     Appearance Urine Cloudy     Glucose Urine Negative NEG^Negative mg/dL    Bilirubin Urine Negative NEG^Negative    Ketones Urine Negative NEG^Negative mg/dL    Specific Gravity Urine 1.020 1.003 - 1.035    Blood Urine Large (A) NEG^Negative    pH Urine 5.5 4.7 - 8.0 pH    Protein Albumin Urine 30 (A) NEG^Negative mg/dL    Urobilinogen mg/dL Normal 0.0 - 2.0 mg/dL    Nitrite Urine Negative NEG^Negative    Leukocyte Esterase Urine Small (A) NEG^Negative    Source Midstream Urine     RBC Urine >182 (H) 0 - 2 /HPF    WBC Urine 26 (H) 0 - 5 /HPF    Bacteria Urine Few (A) NEG^Negative /HPF    Squamous Epithelial /HPF Urine 0 0 - 1 /HPF    Renal Tub Epi 1 0 - 1 /HPF    Mucous Urine Present (A) NEG^Negative /LPF       EKG: Normal sinus rhythm left axis deviation which has been seen previously.  Slight low voltage no evidence of acute ischemia    Revised Cardiac Risk Index (RCRI):  The patient has the following serious cardiovascular risks for perioperative complications:   - No serious cardiac risks = 0 points     RCRI Interpretation: 0 points: Class I (very low risk - 0.4% complication rate)           Assessment & Plan   The proposed surgical procedure is considered LOW risk.  He is physically active and has no problems shoveling heavy snow and can do 4 METS without difficulty.  He will hold his aspirin for a week prior to the surgery.  He will be n.p.o. after midnight before the procedure.  Covid test was done today.  He will not take his lisinopril as per anesthesia protocol since he does not have heart failure.    Preop general physical exam    - Asymptomatic COVID-19 Virus (Coronavirus) by PCR  - CBC with platelets differential  - EKG 12-lead complete w/read - (Clinic Performed)    Type 2 diabetes mellitus without complication, without long-term current use of insulin (H)    - Hemoglobin A1c  Last A1c was in the 5 range we will check today  Need for hepatitis C screening test    - Hepatitis C Screen Reflex to HCV RNA Quant and Genotype  Just for preventative health was interested in checking hepatitis C  History of bladder cancer    - UA reflex to Microscopic and Culture - [place]    Gross hematuria    - UA reflex to Microscopic and Culture - [place]  - Urine Culture Aerobic Bacterial                RECOMMENDATION:  APPROVAL GIVEN to proceed with proposed procedure, without further diagnostic evaluation.  Labs are stable for surgery.    Signed Electronically by: BHAVESH Galvez MD    Copy of this evaluation report is provided to requesting physician.    Preop [facility] Preop Guidelines    Revised Cardiac Risk Index

## 2021-01-14 NOTE — NURSING NOTE
"Chief Complaint   Patient presents with     Pre-Op Exam       Initial /70 (Patient Position: Sitting)   Pulse 79   Wt 77.1 kg (170 lb)   SpO2 96%   BMI 26.63 kg/m   Estimated body mass index is 26.63 kg/m  as calculated from the following:    Height as of 1/13/21: 1.702 m (5' 7\").    Weight as of this encounter: 77.1 kg (170 lb).  Medication Reconciliation: complete  Ayde Henning LPN  "

## 2021-01-14 NOTE — PROGRESS NOTES
[facility] [place]  [address] [place] [address]  Phone: [phone]  Primary Provider: Luis Tran  Pre-op Performing Provider: BHAVESH ROBERTS    PREOPERATIVE EVALUATION:  Today's date: 1/14/2021    Colin Baxter is a 75 year old male who presents for a preoperative evaluation.    Surgical Information:  Surgery/Procedure: CYSTOSCOPY, WITH TRANSURETHRAL RESECTION OF BLADDER TUMOR  Surgery Location: [facility]  Surgeon: Dr. Morrell  Surgery Date: 1/18/21  Time of Surgery: TBD  Where patient plans to recover: At home with family  Fax number for surgical facility: Note does not need to be faxed, will be available electronically in Epic.    Type of Anesthesia Anticipated: to be determined    Subjective     HPI related to upcoming procedure: Patient has a history of bladder cancer and has her ureteral mass will undergo surgery for further evaluation      Preop Questions 1/14/2021   1. Have you ever had a heart attack or stroke? No   2. Have you ever had surgery on your heart or blood vessels, such as a stent placement, a coronary artery bypass, or surgery on an artery in your head, neck, heart, or legs? No   3. Do you have chest pain with activity? No   4. Do you have a history of  heart failure? No   5. Do you currently have a cold, bronchitis or symptoms of other infection? No   6. Do you have a cough, shortness of breath, or wheezing? No   7. Do you or anyone in your family have previous history of blood clots? No   8. Do you or does anyone in your family have a serious bleeding problem such as prolonged bleeding following surgeries or cuts? No   9. Have you ever had problems with anemia or been told to take iron pills? No   10. Have you had any abnormal blood loss such as black, tarry or bloody stools? No   11. Have you ever had a blood transfusion? YES - 45 years ago   11a. Have you ever had a transfusion reaction? No   12. Are you willing to have a blood transfusion if it is medically needed  before, during, or after your surgery? Yes   13. Have you or any of your relatives ever had problems with anesthesia? No   14. Do you have sleep apnea, excessive snoring or daytime drowsiness? No   15. Do you have any artifical heart valves or other implanted medical devices like a pacemaker, defibrillator, or continuous glucose monitor? No   16. Do you have artificial joints? No   17. Are you allergic to latex? No     Health Care Directive:  Patient does not have a Health Care Directive or Living Will:     Preoperative Review of :   reviewed - no record of controlled substances prescribed.      Status of Chronic Conditions:  See problem list for active medical problems.  Problems all longstanding and stable, except as noted/documented.  See ROS for pertinent symptoms related to these conditions.      Review of Systems  CONSTITUTIONAL: NEGATIVE for fever, chills, change in weight  INTEGUMENTARY/SKIN: NEGATIVE for worrisome rashes, moles or lesions  EYES: NEGATIVE for vision changes or irritation  ENT/MOUTH: NEGATIVE for ear, mouth and throat problems  RESP: NEGATIVE for significant cough or SOB  BREAST: NEGATIVE for masses, tenderness or discharge  CV: NEGATIVE for chest pain, palpitations or peripheral edema  GI: NEGATIVE for nausea, abdominal pain, heartburn, or change in bowel habits  : NEGATIVE for frequency, dysuria, or hematuria  MUSCULOSKELETAL: NEGATIVE for significant arthralgias or myalgia  NEURO: NEGATIVE for weakness, dizziness or paresthesias  ENDOCRINE: NEGATIVE for temperature intolerance, skin/hair changes  HEME: NEGATIVE for bleeding problems  PSYCHIATRIC: NEGATIVE for changes in mood or affect    Patient Active Problem List    Diagnosis Date Noted     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     Priority: Medium     Added automatically from request for surgery [id number]       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) 12/11/2020     Priority: Medium     Type 2 diabetes mellitus with  hyperglycemia, without long-term current use of insulin (H) 12/11/2020     Priority: Medium     Type 2 diabetes mellitus without complication, without long-term current use of insulin (H) 07/30/2019     Priority: Medium     History of bladder cancer 02/27/2019     Priority: Medium     Obesity (BMI 35.0-39.9) with comorbidity (H) 09/26/2018     Priority: Medium     ACP (advance care planning) 05/04/2016     Priority: Medium     Advance Care Planning 5/4/2016: ACP Review of Chart / Resources Provided:  Reviewed chart for advance care plan.  Colin Baxter has been provided information and resources to begin or update their advance care plan.  Added by Inés Malloy             History of high risk bladder cancer 09/01/2015     Priority: Medium     Comprehensive Medical Examination 07/06/2015     Priority: Medium     Glaucoma 07/06/2015     Priority: Medium      No past medical history on file.  Past Surgical History:   Procedure Laterality Date     APPENDECTOMY  1958     COLONOSCOPY  2-     CYSTOSCOPY, BIOPSY BLADDER, COMBINED N/A 9/8/2015    Procedure: COMBINED CYSTOSCOPY, BIOPSY BLADDER;  Surgeon: Randy Martinez MD;  Location: [facility]     CYSTOSCOPY, BIOPSY BLADDER, COMBINED N/A 2/14/2017    Procedure: COMBINED CYSTOSCOPY, BIOPSY BLADDER;  Surgeon: Randy Martinez MD;  Location: [facility]     CYSTOSCOPY, BIOPSY BLADDER, COMBINED N/A 11/13/2018    Procedure: COMBINED CYSTOSCOPY, BIOPSY BLADDER;  Surgeon: Ed Helm MD;  Location: [facility] OR     CYSTOSCOPY, RETROGRADES, COMBINED Bilateral 11/13/2018    Procedure: Bilateral Retrograde Pyelagram, Bladder Biopsy;  Surgeon: Ed Helm MD;  Location: [facility] OR     CYSTOSCOPY, RETROGRADES, INSERT STENT URETER(S), COMBINED Left 9/8/2015    Procedure: COMBINED CYSTOSCOPY, RETROGRADES, INSERT STENT URETER(S);  Surgeon: Randy Martinez MD;  Location: [facility]     CYSTOSCOPY, TRANSURETHRAL RESECTION (TUR) TUMOR BLADDER, COMBINED N/A 9/8/2015     Procedure: COMBINED CYSTOSCOPY, TRANSURETHRAL RESECTION (TUR) TUMOR BLADDER;  Surgeon: Randy Martinez MD;  Location: [facility]     CYSTOSCOPY, TRANSURETHRAL RESECTION (TUR) TUMOR BLADDER, COMBINED N/A 1/12/2016    Procedure: COMBINED CYSTOSCOPY, TRANSURETHRAL RESECTION (TUR) TUMOR BLADDER;  Surgeon: Randy Martinez MD;  Location: [facility]     CYSTOSCOPY, TRANSURETHRAL RESECTION (TUR) TUMOR BLADDER, COMBINED N/A 9/13/2016    Procedure: COMBINED CYSTOSCOPY, TRANSURETHRAL RESECTION (TUR) TUMOR BLADDER;  Surgeon: Randy Martinez MD;  Location: [facility]     PHACOEMULSIFICATION WITH STANDARD INTRAOCULAR LENS IMPLANT Right 10/9/2018    Procedure: PHACOEMULSIFICATION WITH STANDARD INTRAOCULAR LENS IMPLANT;  PHACOEMULSIFICATION CATARACT EXTRACTION POSTERIOR CHAMBER LENS RIGHT;  Surgeon: Marlo Mcconnell MD;  Location: [facility]     PHACOEMULSIFICATION WITH STANDARD INTRAOCULAR LENS IMPLANT Left 10/23/2018    Procedure: PHACOEMULSIFICATION CATARACT EXTRACTION POSTERIOR CHAMBER LENS LEFT;  Surgeon: Marlo Mcconnell MD;  Location: [facility]     Current Outpatient Medications   Medication Sig Dispense Refill     aspirin 81 MG EC tablet Take 81 mg by mouth daily       atorvastatin (LIPITOR) 40 MG tablet TAKE 1 TABLET (40 MG) BY MOUTH DAILY 90 tablet 0     blood glucose (ONETOUCH VERIO IQ) test strip USE TO TEST BLOOD SUGAR 2 TIMES DAILY OR AS DIRECTED. 100 each 10     brimonidine (ALPHAGAN-P) 0.15 % ophthalmic solution INSTILL 1 DROP INTO RIGHT EYE TWICE DAILY  12     IBUPROFEN PO Take 200 mg by mouth every 6 hours as needed for moderate pain Reported on 5/9/2017       latanoprost (XALATAN) 0.005 % ophthalmic solution Place 1 drop into both eyes At Bedtime       lisinopril (ZESTRIL) 10 MG tablet TAKE 1 TABLET (10 MG) BY MOUTH DAILY 90 tablet 0     metFORMIN (GLUCOPHAGE-XR) 500 MG 24 hr tablet Take 2 tablets (1,000 mg) by mouth 2 times daily (with meals) 360 tablet 2     nitroFURantoin macrocrystal-monohydrate  (MACROBID) 100 MG capsule Take 1 capsule (100 mg) by mouth 2 times daily 14 capsule 0     ONETOUCH DELICA LANCETS 33G MISC 1 each 2 times daily 100 each 10     tamsulosin (FLOMAX) 0.4 MG capsule Take 1 capsule (0.4 mg) by mouth daily 90 capsule 1       Allergies   Allergen Reactions     No Clinical Screening - See Comments Other (See Comments)     Beta blocker in glaucoma gtt.s caused low pulse and passing out         Social History     Tobacco Use     Smoking status: Former Smoker     Quit date: 1992     Years since quittin.0     Smokeless tobacco: Never Used   Substance Use Topics     Alcohol use: Yes     Comment: rarely     Family History   Problem Relation Age of Onset     Diabetes Mother      Parkinsonism Brother      History   Drug Use No         Objective     /70 (Patient Position: Sitting)   Pulse 79   Wt 77.1 kg (170 lb)   SpO2 96%   BMI 26.63 kg/m      Physical Exam    GENERAL APPEARANCE: healthy, alert and no distress     EYES: EOMI,  PERRL     HENT: ear canals and TM's normal and nose and mouth without ulcers or lesions     NECK: no adenopathy, no asymmetry, masses, or scars and thyroid normal to palpation, no bruits in the carotids     RESP: lungs clear to auscultation - no rales, rhonchi or wheezes     CV: regular rates and rhythm, normal S1 S2, no S3 or S4 and no murmur, click or rub     ABDOMEN:  soft, nontender, no HSM or masses and bowel sounds normal     Rectal exam: deferred     GU_male: Deferred     MS: extremities normal- no gross deformities noted, no evidence of inflammation in joints, FROM in all extremities.     SKIN: no suspicious lesions or rashes     NEURO: Normal strength and tone, sensory exam grossly normal, mentation intact and speech normal     PSYCH: mentation appears normal. and affect normal     LYMPHATICS: No cervical adenopathy         Diagnostics:  Recent Results (from the past 24 hour(s))   Hemoglobin A1c    Collection Time: 21 11:13 AM   Result Value  Ref Range    Hemoglobin A1C 5.7 (H) 0 - 5.6 %   CBC with platelets differential    Collection Time: 01/14/21 11:13 AM   Result Value Ref Range    WBC 6.5 4.0 - 11.0 10e9/L    RBC Count 4.25 (L) 4.4 - 5.9 10e12/L    Hemoglobin 13.5 13.3 - 17.7 g/dL    Hematocrit 39.6 (L) 40.0 - 53.0 %    MCV 93 78 - 100 fl    MCH 31.8 26.5 - 33.0 pg    MCHC 34.1 31.5 - 36.5 g/dL    RDW 13.4 10.0 - 15.0 %    Platelet Count 290 150 - 450 10e9/L    Diff Method Automated Method     % Neutrophils 57.3 %    % Lymphocytes 27.0 %    % Monocytes 9.8 %    % Eosinophils 4.8 %    % Basophils 0.9 %    % Immature Granulocytes 0.2 %    Nucleated RBCs 0 0 /100    Absolute Neutrophil 3.7 1.6 - 8.3 10e9/L    Absolute Lymphocytes 1.8 0.8 - 5.3 10e9/L    Absolute Monocytes 0.6 0.0 - 1.3 10e9/L    Absolute Eosinophils 0.3 0.0 - 0.7 10e9/L    Absolute Basophils 0.1 0.0 - 0.2 10e9/L    Abs Immature Granulocytes 0.0 0 - 0.4 10e9/L    Absolute Nucleated RBC 0.0    Estimated Average Glucose    Collection Time: 01/14/21 11:13 AM   Result Value Ref Range    Estimated Average Glucose 117 mg/dL   UA reflex to Microscopic and Culture - [place]    Collection Time: 01/14/21 11:16 AM    Specimen: Midstream Urine   Result Value Ref Range    Color Urine Orange     Appearance Urine Cloudy     Glucose Urine Negative NEG^Negative mg/dL    Bilirubin Urine Negative NEG^Negative    Ketones Urine Negative NEG^Negative mg/dL    Specific Gravity Urine 1.020 1.003 - 1.035    Blood Urine Large (A) NEG^Negative    pH Urine 5.5 4.7 - 8.0 pH    Protein Albumin Urine 30 (A) NEG^Negative mg/dL    Urobilinogen mg/dL Normal 0.0 - 2.0 mg/dL    Nitrite Urine Negative NEG^Negative    Leukocyte Esterase Urine Small (A) NEG^Negative    Source Midstream Urine     RBC Urine >182 (H) 0 - 2 /HPF    WBC Urine 26 (H) 0 - 5 /HPF    Bacteria Urine Few (A) NEG^Negative /HPF    Squamous Epithelial /HPF Urine 0 0 - 1 /HPF    Renal Tub Epi 1 0 - 1 /HPF    Mucous Urine Present (A) NEG^Negative /LPF       EKG: Normal sinus rhythm left axis deviation which has been seen previously.  Slight low voltage no evidence of acute ischemia    Revised Cardiac Risk Index (RCRI):  The patient has the following serious cardiovascular risks for perioperative complications:   - No serious cardiac risks = 0 points     RCRI Interpretation: 0 points: Class I (very low risk - 0.4% complication rate)           Assessment & Plan   The proposed surgical procedure is considered LOW risk.  He is physically active and has no problems shoveling heavy snow and can do 4 METS without difficulty.  He will hold his aspirin for a week prior to the surgery.  He will be n.p.o. after midnight before the procedure.  Covid test was done today.  He will not take his lisinopril as per anesthesia protocol since he does not have heart failure.    Preop general physical exam    - Asymptomatic COVID-19 Virus (Coronavirus) by PCR  - CBC with platelets differential  - EKG 12-lead complete w/read - (Clinic Performed)    Type 2 diabetes mellitus without complication, without long-term current use of insulin (H)    - Hemoglobin A1c  Last A1c was in the 5 range we will check today  Need for hepatitis C screening test    - Hepatitis C Screen Reflex to HCV RNA Quant and Genotype  Just for preventative health was interested in checking hepatitis C  History of bladder cancer    - UA reflex to Microscopic and Culture - [place]    Gross hematuria    - UA reflex to Microscopic and Culture - [place]  - Urine Culture Aerobic Bacterial                RECOMMENDATION:  APPROVAL GIVEN to proceed with proposed procedure, without further diagnostic evaluation.  Labs are stable for surgery.    Signed Electronically by: BHAVESH Galvez MD    Copy of this evaluation report is provided to requesting physician.    Preop [facility] Preop Guidelines    Revised Cardiac Risk Index

## 2021-01-14 NOTE — PATIENT INSTRUCTIONS

## 2021-01-15 LAB
LABORATORY COMMENT REPORT: NORMAL
SARS-COV-2 RNA RESP QL NAA+PROBE: NEGATIVE
SARS-COV-2 RNA RESP QL NAA+PROBE: NORMAL
SPECIMEN SOURCE: NORMAL
SPECIMEN SOURCE: NORMAL

## 2021-01-16 LAB
BACTERIA SPEC CULT: NO GROWTH
SPECIMEN SOURCE: NORMAL

## 2021-01-18 ENCOUNTER — APPOINTMENT (OUTPATIENT)
Dept: GENERAL RADIOLOGY | Facility: HOSPITAL | Age: 76
End: 2021-01-18
Attending: UROLOGY
Payer: COMMERCIAL

## 2021-01-18 ENCOUNTER — ANESTHESIA (OUTPATIENT)
Dept: SURGERY | Facility: HOSPITAL | Age: 76
End: 2021-01-18
Payer: COMMERCIAL

## 2021-01-18 ENCOUNTER — HOSPITAL ENCOUNTER (OUTPATIENT)
Facility: HOSPITAL | Age: 76
Discharge: HOME | End: 2021-01-18
Attending: UROLOGY | Admitting: UROLOGY
Payer: COMMERCIAL

## 2021-01-18 VITALS
RESPIRATION RATE: 16 BRPM | DIASTOLIC BLOOD PRESSURE: 80 MMHG | SYSTOLIC BLOOD PRESSURE: 142 MMHG | HEIGHT: 67 IN | TEMPERATURE: 97.8 F | HEART RATE: 75 BPM | OXYGEN SATURATION: 96 % | WEIGHT: 172 LBS | BODY MASS INDEX: 27 KG/M2

## 2021-01-18 DIAGNOSIS — Z85.51 PERSONAL HISTORY OF MALIGNANT NEOPLASM OF BLADDER: ICD-10-CM

## 2021-01-18 DIAGNOSIS — N28.89 URETERAL MASS: ICD-10-CM

## 2021-01-18 PROCEDURE — 258N000003 HC RX IP 258 OP 636: Performed by: NURSE ANESTHETIST, CERTIFIED REGISTERED

## 2021-01-18 PROCEDURE — C1769 GUIDE WIRE: HCPCS | Performed by: UROLOGY

## 2021-01-18 PROCEDURE — 250N000009 HC RX 250: Performed by: UROLOGY

## 2021-01-18 PROCEDURE — 88108 CYTOPATH CONCENTRATE TECH: CPT | Mod: TC | Performed by: UROLOGY

## 2021-01-18 PROCEDURE — 710N000010 HC RECOVERY PHASE 1, LEVEL 2, PER MIN: Performed by: UROLOGY

## 2021-01-18 PROCEDURE — 250N000009 HC RX 250: Performed by: NURSE ANESTHETIST, CERTIFIED REGISTERED

## 2021-01-18 PROCEDURE — C1758 CATHETER, URETERAL: HCPCS | Performed by: UROLOGY

## 2021-01-18 PROCEDURE — 999N000179 XR SURGERY CARM FLUORO LESS THAN 5 MIN W STILLS: Mod: TC

## 2021-01-18 PROCEDURE — 710N000012 HC RECOVERY PHASE 2, PER MINUTE: Performed by: UROLOGY

## 2021-01-18 PROCEDURE — 370N000017 HC ANESTHESIA TECHNICAL FEE, PER MIN: Performed by: UROLOGY

## 2021-01-18 PROCEDURE — 99100 ANES PT EXTEME AGE<1 YR&>70: CPT | Performed by: NURSE ANESTHETIST, CERTIFIED REGISTERED

## 2021-01-18 PROCEDURE — 52354 CYSTOURETERO W/BIOPSY: CPT | Performed by: UROLOGY

## 2021-01-18 PROCEDURE — 250N000011 HC RX IP 250 OP 636: Performed by: UROLOGY

## 2021-01-18 PROCEDURE — 52332 CYSTOSCOPY AND TREATMENT: CPT | Performed by: NURSE ANESTHETIST, CERTIFIED REGISTERED

## 2021-01-18 PROCEDURE — 999N000138 HC STATISTIC PRE-PROCEDURE ASSESSMENT I: Performed by: UROLOGY

## 2021-01-18 PROCEDURE — 250N000013 HC RX MED GY IP 250 OP 250 PS 637: Performed by: UROLOGY

## 2021-01-18 PROCEDURE — 272N000001 HC OR GENERAL SUPPLY STERILE: Performed by: UROLOGY

## 2021-01-18 PROCEDURE — 88305 TISSUE EXAM BY PATHOLOGIST: CPT | Mod: TC | Performed by: UROLOGY

## 2021-01-18 PROCEDURE — 360N000075 HC SURGERY LEVEL 2, PER MIN: Performed by: UROLOGY

## 2021-01-18 PROCEDURE — 250N000011 HC RX IP 250 OP 636: Performed by: NURSE ANESTHETIST, CERTIFIED REGISTERED

## 2021-01-18 RX ORDER — ATROPA BELLADONNA AND OPIUM 16.2; 3 MG/1; MG/1
30 SUPPOSITORY RECTAL ONCE
Status: COMPLETED | OUTPATIENT
Start: 2021-01-18 | End: 2021-01-18

## 2021-01-18 RX ORDER — FENTANYL CITRATE 50 UG/ML
INJECTION, SOLUTION INTRAMUSCULAR; INTRAVENOUS PRN
Status: DISCONTINUED | OUTPATIENT
Start: 2021-01-18 | End: 2021-01-18

## 2021-01-18 RX ORDER — ONDANSETRON 2 MG/ML
4 INJECTION INTRAMUSCULAR; INTRAVENOUS EVERY 30 MIN PRN
Status: DISCONTINUED | OUTPATIENT
Start: 2021-01-18 | End: 2021-01-18 | Stop reason: HOSPADM

## 2021-01-18 RX ORDER — MEPERIDINE HYDROCHLORIDE 25 MG/ML
12.5 INJECTION INTRAMUSCULAR; INTRAVENOUS; SUBCUTANEOUS
Status: DISCONTINUED | OUTPATIENT
Start: 2021-01-18 | End: 2021-01-18 | Stop reason: HOSPADM

## 2021-01-18 RX ORDER — OXYCODONE HYDROCHLORIDE 5 MG/1
5-10 TABLET ORAL EVERY 4 HOURS PRN
Qty: 6 TABLET | Refills: 0 | Status: SHIPPED | OUTPATIENT
Start: 2021-01-18 | End: 2021-06-04

## 2021-01-18 RX ORDER — LIDOCAINE 40 MG/G
CREAM TOPICAL
Status: DISCONTINUED | OUTPATIENT
Start: 2021-01-18 | End: 2021-01-18 | Stop reason: HOSPADM

## 2021-01-18 RX ORDER — HYDROCODONE BITARTRATE AND ACETAMINOPHEN 5; 325 MG/1; MG/1
1 TABLET ORAL ONCE
Status: COMPLETED | OUTPATIENT
Start: 2021-01-18 | End: 2021-01-18

## 2021-01-18 RX ORDER — ALBUTEROL SULFATE 0.83 MG/ML
2.5 SOLUTION RESPIRATORY (INHALATION) EVERY 4 HOURS PRN
Status: DISCONTINUED | OUTPATIENT
Start: 2021-01-18 | End: 2021-01-18 | Stop reason: HOSPADM

## 2021-01-18 RX ORDER — NALOXONE HYDROCHLORIDE 0.4 MG/ML
0.4 INJECTION, SOLUTION INTRAMUSCULAR; INTRAVENOUS; SUBCUTANEOUS
Status: DISCONTINUED | OUTPATIENT
Start: 2021-01-18 | End: 2021-01-18 | Stop reason: HOSPADM

## 2021-01-18 RX ORDER — NALOXONE HYDROCHLORIDE 0.4 MG/ML
0.2 INJECTION, SOLUTION INTRAMUSCULAR; INTRAVENOUS; SUBCUTANEOUS
Status: DISCONTINUED | OUTPATIENT
Start: 2021-01-18 | End: 2021-01-18 | Stop reason: HOSPADM

## 2021-01-18 RX ORDER — CIPROFLOXACIN 2 MG/ML
200 INJECTION, SOLUTION INTRAVENOUS EVERY 12 HOURS
Status: DISCONTINUED | OUTPATIENT
Start: 2021-01-18 | End: 2021-01-18 | Stop reason: HOSPADM

## 2021-01-18 RX ORDER — ONDANSETRON 2 MG/ML
INJECTION INTRAMUSCULAR; INTRAVENOUS PRN
Status: DISCONTINUED | OUTPATIENT
Start: 2021-01-18 | End: 2021-01-18

## 2021-01-18 RX ORDER — DEXAMETHASONE SODIUM PHOSPHATE 10 MG/ML
INJECTION, SOLUTION INTRAMUSCULAR; INTRAVENOUS PRN
Status: DISCONTINUED | OUTPATIENT
Start: 2021-01-18 | End: 2021-01-18

## 2021-01-18 RX ORDER — HYDROMORPHONE HYDROCHLORIDE 1 MG/ML
.3-.5 INJECTION, SOLUTION INTRAMUSCULAR; INTRAVENOUS; SUBCUTANEOUS EVERY 10 MIN PRN
Status: DISCONTINUED | OUTPATIENT
Start: 2021-01-18 | End: 2021-01-18 | Stop reason: HOSPADM

## 2021-01-18 RX ORDER — LIDOCAINE HYDROCHLORIDE 20 MG/ML
INJECTION, SOLUTION INFILTRATION; PERINEURAL PRN
Status: DISCONTINUED | OUTPATIENT
Start: 2021-01-18 | End: 2021-01-18

## 2021-01-18 RX ORDER — PROPOFOL 10 MG/ML
INJECTION, EMULSION INTRAVENOUS PRN
Status: DISCONTINUED | OUTPATIENT
Start: 2021-01-18 | End: 2021-01-18

## 2021-01-18 RX ORDER — SODIUM CHLORIDE, SODIUM LACTATE, POTASSIUM CHLORIDE, CALCIUM CHLORIDE 600; 310; 30; 20 MG/100ML; MG/100ML; MG/100ML; MG/100ML
INJECTION, SOLUTION INTRAVENOUS CONTINUOUS
Status: DISCONTINUED | OUTPATIENT
Start: 2021-01-18 | End: 2021-01-18 | Stop reason: HOSPADM

## 2021-01-18 RX ORDER — LABETALOL 20 MG/4 ML (5 MG/ML) INTRAVENOUS SYRINGE
10
Status: DISCONTINUED | OUTPATIENT
Start: 2021-01-18 | End: 2021-01-18 | Stop reason: HOSPADM

## 2021-01-18 RX ORDER — ONDANSETRON 4 MG/1
4 TABLET, ORALLY DISINTEGRATING ORAL EVERY 30 MIN PRN
Status: DISCONTINUED | OUTPATIENT
Start: 2021-01-18 | End: 2021-01-18 | Stop reason: HOSPADM

## 2021-01-18 RX ORDER — KETAMINE HYDROCHLORIDE 10 MG/ML
INJECTION INTRAMUSCULAR; INTRAVENOUS PRN
Status: DISCONTINUED | OUTPATIENT
Start: 2021-01-18 | End: 2021-01-18

## 2021-01-18 RX ORDER — FENTANYL CITRATE 50 UG/ML
25-50 INJECTION, SOLUTION INTRAMUSCULAR; INTRAVENOUS
Status: DISCONTINUED | OUTPATIENT
Start: 2021-01-18 | End: 2021-01-18 | Stop reason: HOSPADM

## 2021-01-18 RX ORDER — HYDRALAZINE HYDROCHLORIDE 20 MG/ML
2.5-5 INJECTION INTRAMUSCULAR; INTRAVENOUS EVERY 10 MIN PRN
Status: DISCONTINUED | OUTPATIENT
Start: 2021-01-18 | End: 2021-01-18 | Stop reason: HOSPADM

## 2021-01-18 RX ADMIN — FENTANYL CITRATE 50 MCG: 50 INJECTION, SOLUTION INTRAMUSCULAR; INTRAVENOUS at 11:36

## 2021-01-18 RX ADMIN — CIPROFLOXACIN 200 MG: 2 INJECTION, SOLUTION INTRAVENOUS at 09:55

## 2021-01-18 RX ADMIN — HYDROCODONE BITARTRATE AND ACETAMINOPHEN 1 TABLET: 5; 325 TABLET ORAL at 15:12

## 2021-01-18 RX ADMIN — ATROPA BELLADONNA AND OPIUM 1 SUPPOSITORY: 16.2; 3 SUPPOSITORY RECTAL at 13:37

## 2021-01-18 RX ADMIN — FENTANYL CITRATE 50 MCG: 50 INJECTION, SOLUTION INTRAMUSCULAR; INTRAVENOUS at 10:31

## 2021-01-18 RX ADMIN — PROPOFOL 150 MG: 10 INJECTION, EMULSION INTRAVENOUS at 10:03

## 2021-01-18 RX ADMIN — LIDOCAINE HYDROCHLORIDE 40 MG: 20 INJECTION, SOLUTION INFILTRATION; PERINEURAL at 10:03

## 2021-01-18 RX ADMIN — Medication 10 MG: at 09:55

## 2021-01-18 RX ADMIN — SODIUM CHLORIDE, POTASSIUM CHLORIDE, SODIUM LACTATE AND CALCIUM CHLORIDE: 600; 310; 30; 20 INJECTION, SOLUTION INTRAVENOUS at 11:00

## 2021-01-18 RX ADMIN — SODIUM CHLORIDE, POTASSIUM CHLORIDE, SODIUM LACTATE AND CALCIUM CHLORIDE: 600; 310; 30; 20 INJECTION, SOLUTION INTRAVENOUS at 08:50

## 2021-01-18 RX ADMIN — FENTANYL CITRATE 25 MCG: 50 INJECTION, SOLUTION INTRAMUSCULAR; INTRAVENOUS at 11:49

## 2021-01-18 RX ADMIN — FENTANYL CITRATE 50 MCG: 50 INJECTION, SOLUTION INTRAMUSCULAR; INTRAVENOUS at 10:03

## 2021-01-18 RX ADMIN — DEXAMETHASONE SODIUM PHOSPHATE 10 MG: 10 INJECTION, SOLUTION INTRAMUSCULAR; INTRAVENOUS at 10:10

## 2021-01-18 RX ADMIN — FENTANYL CITRATE 25 MCG: 50 INJECTION, SOLUTION INTRAMUSCULAR; INTRAVENOUS at 11:41

## 2021-01-18 RX ADMIN — ONDANSETRON 4 MG: 2 INJECTION INTRAMUSCULAR; INTRAVENOUS at 11:50

## 2021-01-18 RX ADMIN — Medication 10 MG: at 10:03

## 2021-01-18 RX ADMIN — FENTANYL CITRATE 50 MCG: 50 INJECTION, SOLUTION INTRAMUSCULAR; INTRAVENOUS at 09:55

## 2021-01-18 ASSESSMENT — MIFFLIN-ST. JEOR: SCORE: 1478.82

## 2021-01-18 NOTE — OR NURSING
"Dr. esqueda into talk with pt, pt complaining of severe burning and feeling of need to \"pee\"  B& O suppository ordered, pt trying to have bowel movement at the moment.  "

## 2021-01-18 NOTE — ANESTHESIA POSTPROCEDURE EVALUATION
Patient: Colin Baxter    Procedure(s):  CYSTOURETEROSCOPY, WITH RETROGRADE PYELOGRAM with interpretation of fluroscopy, ureteroscopy, ureteral biopsy, bladder biopsy and fulgaration    Diagnosis:Ureteral mass [N28.89]  Personal history of malignant neoplasm of bladder [Z85.51]  Diagnosis Additional Information: No value filed.    Anesthesia Type:  General    Note:  Disposition: Outpatient   Postop Pain Control: Uneventful            Sign Out: Well controlled pain   PONV: No   Neuro/Psych: Uneventful            Sign Out: Acceptable/Baseline neuro status   Airway/Respiratory: Uneventful            Sign Out: Acceptable/Baseline resp. status   CV/Hemodynamics: Uneventful            Sign Out: Acceptable CV status   Other NRE: NONE   DID A NON-ROUTINE EVENT OCCUR?          Last vitals:  Vitals:    01/18/21 1400 01/18/21 1415 01/18/21 1430   BP: 143/99 148/88 142/80   Pulse: 66 63 75   Resp: 16 16 16   Temp:   97.8  F (36.6  C)   SpO2: 99% 99% 96%       Electronically Signed By: DANILO Elizalde CRNA  January 18, 2021  4:13 PM

## 2021-01-18 NOTE — ANESTHESIA CARE TRANSFER NOTE
Patient: Colin Baxter    Procedure(s):  CYSTOURETEROSCOPY, WITH RETROGRADE PYELOGRAM with interpretation of fluroscopy, ureteroscopy, ureteral biopsy, bladder biopsy and fulgaration    Diagnosis: Ureteral mass [N28.89]  Personal history of malignant neoplasm of bladder [Z85.51]  Diagnosis Additional Information: No value filed.    Anesthesia Type:   General     Note:      Level of Consciousness: drowsy  Oxygen Supplementation: nasal cannula  Level of Supplemental Oxygen: 3  Independent Airway: airway patency satisfactory and stable  Dentition: dentition unchanged  Vital Signs Stable: post-procedure vital signs reviewed and stable  Report to RN Given: handoff report given  Patient transferred to: PACU    Handoff Report: Identifed the Patient, Identified the Reponsible Provider, Reviewed the pertinent medical history, Discussed the surgical course, Reviewed Intra-OP anesthesia mangement and issues during anesthesia, Set expectations for post-procedure period and Allowed opportunity for questions and acknowledgement of understanding      Vitals: (Last set prior to Anesthesia Care Transfer)  CRNA VITALS  1/18/2021 1142 - 1/18/2021 1224      1/18/2021             Resp Rate (observed):  (!) 2    Resp Rate (set):  8        Electronically Signed By: DANILO Baker CRNA  January 18, 2021  12:24 PM

## 2021-01-18 NOTE — OP NOTE
Preoperative diagnosis: Left ureteral mass, bladder lesions    Postoperative diagnosis: Same    Procedure: Cystoscopy, retrograde urogram, interpretation of fluoroscopy, left with ureter biopsy, bladder biopsies    Surgeon: MARIA E GOODWIN    Indication: Yanet a 75-year-old male with a history of high-grade T1 carcinoma of the bladder diagnosed in September 2015.  He has had some persistent bleeding and I saw him in my office on January 5.  Cystoscopy revealed bladder lesions and a CT scan was done and demonstrates a filling defect in the left mid ureter.  I have counseled the patient to proceed with further evaluation of this ureteral mass and bladder lesions.  Risks of bleeding infection perforation of the bladder and the ureter anesthetic complications cardiac pulmonary blood clots were discussed he appears to understand and agrees to proceed    After adequate consent was obtained for the patient he was brought to the operative theater after adequate general anesthesia was induced he was placed in the dorsal lithotomy position genitalia were prepped and draped in usual sterile fashion.  A timeout was done to identify the patient and the proper procedure.  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  The patient's been bleeding and so visualization in the bladder was not very good.  I irrigated the bladder out and there were some clots as well as erythema throughout the bladder neck trigone area.  I continued to look throughout the bladder and I did not see any obvious tumors.  I then started looking for the ureteral orifice and I had a significant amount of difficulty finding his ureteral orifice.  He has had several TUR's and it was very challenging I thought I found the right side and I injected some contrast into the bladder and the contrast refluxed  up the right ureter and so I was able to identify where the trigone was.  His ureteral orifices were quite far away from the bladder neck.  Once I was able to find the right ureter and then under fluoroscopy and the cystoscope I just started looking around the opposite area and I was finally able to identify the left ureteral orifice.  I used an open-ended ureteral catheter and I placed it into the left UO and I injected contrast in the ureter and it makes quite a J-hook so it was a challenge to get any kind of a Glidewire up that ureter.  I tried a straight and then angled and even with all of those I had difficulty.  I did not have an angled catheter so I was struggling.  Eventually I got the semirigid ureteroscope and I was able to put that into the ureteral orifice and then I was able to use an angle and get it to go into the ureter and up to where this filling defect is.  The Glidewire folded back on itself.  I was able to navigate the semirigid ureteroscope into the distal ureter and then I injected contrast and under fluoroscopy this demonstrates a filling defect in the mid ureter that is fairly significant.  It looks like it is quite confined to the mid ureter as I did not see any abnormality above it on the retrograde.  I navigated the semirigid ureteroscope over the guidewire until I could see the mass.  Because I was over the guidewire I maintained the glidewire and removed the ureteroscope and then I exchanged my Glidewire for an Amplatz super stiff guidewire. I then did ureteroscopy and went along the Amplatz guidewire up to this mass and I attempted to take several pictures.  I then used a Piranha ureteral biopsy and I took multiple biopsies of this mass.  I actually could see around the mass up to the more proximal ureter although I did not go all the way up into the kidney but it does look like this mass is fairly confined.  This tissue was sent to pathology with specific instructions they are aware  of the small sizes of these biopsies.  Once I felt I had adequate tissue I removed the ureteroscope then I went back into the bladder with the 22 Emirati cystoscope  and I did multiple biopsies of these erythematous lesions  These lesions are primarily located near the bladder neck and toward the trigone.  They are more prominent on the right side than the left.  The tissue is very friable and bleeds easily with the biopsies.  I then went in with a Bugbee cautery and I cauterized all of this area.  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  Again I looked through the bladder and I did not see any other obvious tumors.  The bladder was drained the scope was removed I did send prior some bladder washings for cytology as well.  I put in a 20 three-way irrigating catheter plugged the irrigation port and put it to drainage.  Hopefully he will not have any significant bleeding that we will need to irrigate him.  He was awakened and taken to recovery in stable condition there were no complications.  I did not put up a left ureteral stent, guidewire was removed.

## 2021-01-18 NOTE — OR NURSING
Patient and responsible adult given discharge instructions with no questions regarding instructions. David score 19/20. Pain level 2/10.  Discharged from unit via wheelchair. Patient discharged to home with wife erik.

## 2021-01-19 ENCOUNTER — TELEPHONE (OUTPATIENT)
Dept: UROLOGY | Facility: OTHER | Age: 76
End: 2021-01-19

## 2021-01-19 NOTE — TELEPHONE ENCOUNTER
Pt called to confirm appt with Nurse on 1/21/21 to have catheter removed post surgery.  Pt stated he has a non significant amount of urine leaking around the catheter but the catheter appears to be draining well as he has had to empty the bag 5-6 times since yesterday.  He also states the urine is a light pink in color and asking if this is normal.  I informed him that the leaking is ok as long as the catheter is draining properly and not plugged.  The small amount of blood in is urine is normal after surgery and should become more clear as he heals.  Pt stated he understood and I informed him I would send a message to Dr. Morrell and would let him know if she has anything to add.  ILEANA SIDHU, PARVINN

## 2021-01-20 LAB — COPATH REPORT: NORMAL

## 2021-01-21 ENCOUNTER — HOSPITAL PATHOLOGY (OUTPATIENT)
Dept: OTHER | Facility: CLINIC | Age: 76
End: 2021-01-21

## 2021-01-21 ENCOUNTER — ALLIED HEALTH/NURSE VISIT (OUTPATIENT)
Dept: UROLOGY | Facility: OTHER | Age: 76
End: 2021-01-21
Attending: UROLOGY
Payer: COMMERCIAL

## 2021-01-21 DIAGNOSIS — Z85.51 PERSONAL HISTORY OF MALIGNANT NEOPLASM OF BLADDER: Primary | ICD-10-CM

## 2021-01-21 LAB — COPATH REPORT: NORMAL

## 2021-01-21 NOTE — PROGRESS NOTES
Pt came in post surgery of bladder and ureter biopsy to have his catheter removed.  I withdraw 20 mL of sterile water from catheter balloon and removed catheter.  Pt tolerated well.  I educated pt on retention such as unable to void, abdominal pain and feeling of fullness.  I advised pt that if this were to occur he will need to go to the ED.  I also advised pt to push fluids and stay hydrated along with rest and to not overexert himself.  I also informed pt that it is normal to still have some pink urine and burning with voiding for the next couple days.  Pt verbalized understanding.    I also informed pt that when we get the results back from his pathology Dr. Morrell will be calling him.  ILEANA SIDHU LPN

## 2021-01-22 ENCOUNTER — HOSPITAL ENCOUNTER (EMERGENCY)
Facility: HOSPITAL | Age: 76
Discharge: HOME | End: 2021-01-22
Attending: NURSE PRACTITIONER | Admitting: NURSE PRACTITIONER
Payer: COMMERCIAL

## 2021-01-22 VITALS
TEMPERATURE: 97.6 F | RESPIRATION RATE: 18 BRPM | DIASTOLIC BLOOD PRESSURE: 86 MMHG | OXYGEN SATURATION: 99 % | SYSTOLIC BLOOD PRESSURE: 145 MMHG | HEART RATE: 82 BPM

## 2021-01-22 DIAGNOSIS — R33.9 URINARY RETENTION: ICD-10-CM

## 2021-01-22 LAB
ALBUMIN UR-MCNC: 30 MG/DL
APPEARANCE UR: ABNORMAL
BACTERIA #/AREA URNS HPF: ABNORMAL /HPF
BILIRUB UR QL STRIP: NEGATIVE
COLOR UR AUTO: ABNORMAL
COPATH REPORT: NORMAL
COPATH REPORT: NORMAL
GLUCOSE UR STRIP-MCNC: NEGATIVE MG/DL
HGB UR QL STRIP: ABNORMAL
KETONES UR STRIP-MCNC: NEGATIVE MG/DL
LEUKOCYTE ESTERASE UR QL STRIP: ABNORMAL
MUCOUS THREADS #/AREA URNS LPF: PRESENT /LPF
NITRATE UR QL: NEGATIVE
PH UR STRIP: 6.5 PH (ref 4.7–8)
RBC #/AREA URNS AUTO: >182 /HPF (ref 0–2)
SOURCE: ABNORMAL
SP GR UR STRIP: 1.02 (ref 1–1.03)
SQUAMOUS #/AREA URNS AUTO: 0 /HPF (ref 0–1)
UROBILINOGEN UR STRIP-MCNC: NORMAL MG/DL (ref 0–2)
WBC #/AREA URNS AUTO: 42 /HPF (ref 0–5)

## 2021-01-22 PROCEDURE — G0463 HOSPITAL OUTPT CLINIC VISIT: HCPCS

## 2021-01-22 PROCEDURE — 81001 URINALYSIS AUTO W/SCOPE: CPT | Performed by: NURSE PRACTITIONER

## 2021-01-22 PROCEDURE — 87086 URINE CULTURE/COLONY COUNT: CPT | Performed by: NURSE PRACTITIONER

## 2021-01-22 PROCEDURE — 99213 OFFICE O/P EST LOW 20 MIN: CPT | Performed by: NURSE PRACTITIONER

## 2021-01-22 ASSESSMENT — ENCOUNTER SYMPTOMS
CHILLS: 0
HEMATURIA: 1
VOMITING: 0
ACTIVITY CHANGE: 1
NAUSEA: 0
FEVER: 0

## 2021-01-23 NOTE — ED NOTES
16 Upper sorbian catheter inserted without difficulty with 300cc pink tinged urine results. Pt states that he feels much better.

## 2021-01-23 NOTE — ED TRIAGE NOTES
Patient presents stating he's unable to urinate. States he had a brannon until yesterday and hasn't had a problem until about 2 hours ago. States he gets urges/spasm but unable to urinate.

## 2021-01-23 NOTE — DISCHARGE INSTRUCTIONS
If your urine seems to clear you can remove the catheter as we discussed.  Make sure to clean around the end of the penis where the catheter enters in the morning and at night.  Empty the brannon bag several times a day.   If you have any questions you can call Urgent care and ask to speak to Kandis  [phone]  Return for zena blood in catheter/brannon, fevers, chills, back or pelvic pain.

## 2021-01-23 NOTE — ED TRIAGE NOTES
Pt presents with having the urge to void but is unable to. States that he had a catheter in but they took it out yesterday.

## 2021-01-25 ENCOUNTER — OFFICE VISIT (OUTPATIENT)
Dept: UROLOGY | Facility: OTHER | Age: 76
End: 2021-01-25
Attending: UROLOGY
Payer: COMMERCIAL

## 2021-01-25 VITALS
DIASTOLIC BLOOD PRESSURE: 72 MMHG | HEART RATE: 78 BPM | BODY MASS INDEX: 27 KG/M2 | OXYGEN SATURATION: 96 % | SYSTOLIC BLOOD PRESSURE: 139 MMHG | HEIGHT: 67 IN | WEIGHT: 172 LBS

## 2021-01-25 DIAGNOSIS — R33.9 URINARY RETENTION: Primary | ICD-10-CM

## 2021-01-25 DIAGNOSIS — C66.2 MALIGNANT TUMOR OF LEFT URETER (H): ICD-10-CM

## 2021-01-25 LAB
BACTERIA SPEC CULT: NO GROWTH
SPECIMEN SOURCE: NORMAL

## 2021-01-25 PROCEDURE — 99215 OFFICE O/P EST HI 40 MIN: CPT | Performed by: UROLOGY

## 2021-01-25 PROCEDURE — G0463 HOSPITAL OUTPT CLINIC VISIT: HCPCS | Performed by: COUNSELOR

## 2021-01-25 RX ORDER — TAMSULOSIN HYDROCHLORIDE 0.4 MG/1
0.4 CAPSULE ORAL DAILY
Qty: 30 CAPSULE | Refills: 11 | Status: SHIPPED | OUTPATIENT
Start: 2021-01-25 | End: 2021-04-06

## 2021-01-25 ASSESSMENT — PAIN SCALES - GENERAL: PAINLEVEL: NO PAIN (0)

## 2021-01-25 ASSESSMENT — MIFFLIN-ST. JEOR: SCORE: 1478.82

## 2021-01-25 NOTE — NURSING NOTE
Review of Systems:    Weight loss:    No     Recent fever/chills:  No   Night sweats:   No  Current skin rash:  No   Recent hair loss:  No  Heat intolerance:  No   Cold intolerance:  No  Chest pain:   No   Palpitations:   No  Shortness of breath:  No   Wheezing:   No  Constipation:    No   Diarrhea:   No   Nausea:   No   Vomiting:   No   Kidney/side pain:  No   Back pain:   No  Frequent headaches:  No   Dizziness:     No  Leg swelling:   No   Calf pain:    No    Parents, brothers or sisters with history of kidney cancer?   No  Parents, brothers or sisters with history of bladder cancer: No    Yadira Randall LPN

## 2021-01-25 NOTE — PROGRESS NOTES
History     Chief Complaint:      Results (Pathology)      HPI   Colin Baxter is a 76 year old male who presents for further discussion of his ureteral tumor biopsy and his hematuria and recent urinary retention.I met Colin in early January because of some persistent gross hematuria.  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  I pursued further evaluation with a CT scan which demonstrated a filling defect in the left mid ureter and erythematous lesions throughout the bladder.  I took him to the operating room last week and I did multiple bladder biopsies of the erythema in the trigone area and I did left ureteroscopy biopsies of a mid ureteral mass.  I left a catheter in for a few days and he came in and had that removed last Thursday and then Friday evening he was unable to urinate so ended up going back to the ER and had a catheter replaced.  Cystoscopically he has a small prostate but he does have diabetes.  It does not sound like he went into clot retention.  He has had some persistent hematuria but it does not sound like there was a significant amount of clot.  He comes in today to review his x-rays and his pathology and to discuss further treatment options for the urinary retention.  According to his medication list it looks like he is on tamsulosin but he is not aware that he has been taking it so I did renew that prescription.     Allergies:    Allergies   Allergen Reactions     No Clinical Screening - See Comments Other (See Comments)     Beta blocker in glaucoma gtt.s caused low pulse and passing out         Medications:           atorvastatin (LIPITOR) 40 MG tablet       blood glucose (ONETOUCH VERIO IQ) test strip       brimonidine (ALPHAGAN-P) 0.15 % ophthalmic solution       IBUPROFEN PO       latanoprost (XALATAN) 0.005 % ophthalmic solution       lisinopril (ZESTRIL) 10 MG tablet       metFORMIN (GLUCOPHAGE-XR) 500 MG 24 hr tablet        ONETOUCH DELICA LANCETS 33G MISC       oxyCODONE (ROXICODONE) 5 MG tablet       tamsulosin (FLOMAX) 0.4 MG capsule       tamsulosin (FLOMAX) 0.4 MG capsule        Problem List:      Patient Active Problem List    Diagnosis Date Noted     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     Priority: Medium     Added automatically from request for surgery [id number]       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) 12/11/2020     Priority: Medium     Type 2 diabetes mellitus with hyperglycemia, without long-term current use of insulin (H) 12/11/2020     Priority: Medium     Type 2 diabetes mellitus without complication, without long-term current use of insulin (H) 07/30/2019     Priority: Medium     History of bladder cancer 02/27/2019     Priority: Medium     Obesity (BMI 35.0-39.9) with comorbidity (H) 09/26/2018     Priority: Medium     ACP (advance care planning) 05/04/2016     Priority: Medium     Advance Care Planning 5/4/2016: ACP Review of Chart / Resources Provided:  Reviewed chart for advance care plan.  Colin Baxter has been provided information and resources to begin or update their advance care plan.  Added by Inés Malloy             History of high risk bladder cancer 09/01/2015     Priority: Medium     Comprehensive Medical Examination 07/06/2015     Priority: Medium     Glaucoma 07/06/2015     Priority: Medium        Past Medical History:      No past medical history on file.    Past Surgical History:      Past Surgical History:   Procedure Laterality Date     APPENDECTOMY  1958     COLONOSCOPY  2-     COMBINED CYSTOSCOPY, RETROGRADES, URETEROSCOPY, INSERT STENT Left 1/18/2021    Procedure: CYSTOURETEROSCOPY, WITH RETROGRADE PYELOGRAM with interpretation of fluroscopy, ureteroscopy, ureteral biopsy, bladder biopsy and fulgaration;  Surgeon: Shonda Morrell MD;  [facility]     CYSTOSCOPY, BIOPSY BLADDER, COMBINED N/A 9/8/2015    Procedure: COMBINED CYSTOSCOPY, BIOPSY BLADDER;  Surgeon: Weight,  Randy Floyd MD;  [facility]     CYSTOSCOPY, BIOPSY BLADDER, COMBINED N/A 2/14/2017    Procedure: COMBINED CYSTOSCOPY, BIOPSY BLADDER;  Surgeon: Randy Martinez MD;  [facility]     CYSTOSCOPY, BIOPSY BLADDER, COMBINED N/A 11/13/2018    Procedure: COMBINED CYSTOSCOPY, BIOPSY BLADDER;  Surgeon: Ed Helm MD;  [facility]     CYSTOSCOPY, RETROGRADES, COMBINED Bilateral 11/13/2018    Procedure: Bilateral Retrograde Pyelagram, Bladder Biopsy;  Surgeon: Ed Helm MD;  [facility]     CYSTOSCOPY, RETROGRADES, INSERT STENT URETER(S), COMBINED Left 9/8/2015    Procedure: COMBINED CYSTOSCOPY, RETROGRADES, INSERT STENT URETER(S);  Surgeon: Randy Martinez MD;  [facility]     CYSTOSCOPY, TRANSURETHRAL RESECTION (TUR) TUMOR BLADDER, COMBINED N/A 9/8/2015    Procedure: COMBINED CYSTOSCOPY, TRANSURETHRAL RESECTION (TUR) TUMOR BLADDER;  Surgeon: Randy Martinez MD;  [facility]     CYSTOSCOPY, TRANSURETHRAL RESECTION (TUR) TUMOR BLADDER, COMBINED N/A 1/12/2016    Procedure: COMBINED CYSTOSCOPY, TRANSURETHRAL RESECTION (TUR) TUMOR BLADDER;  Surgeon: Randy Martinez MD;  [facility]     CYSTOSCOPY, TRANSURETHRAL RESECTION (TUR) TUMOR BLADDER, COMBINED N/A 9/13/2016    Procedure: COMBINED CYSTOSCOPY, TRANSURETHRAL RESECTION (TUR) TUMOR BLADDER;  Surgeon: Randy Martinez MD;  [facility]     PHACOEMULSIFICATION WITH STANDARD INTRAOCULAR LENS IMPLANT Right 10/9/2018    Procedure: PHACOEMULSIFICATION WITH STANDARD INTRAOCULAR LENS IMPLANT;  PHACOEMULSIFICATION CATARACT EXTRACTION POSTERIOR CHAMBER LENS RIGHT;  Surgeon: Marlo Mcconnell MD;  [facility]     PHACOEMULSIFICATION WITH STANDARD INTRAOCULAR LENS IMPLANT Left 10/23/2018    Procedure: PHACOEMULSIFICATION CATARACT EXTRACTION POSTERIOR CHAMBER LENS LEFT;  Surgeon: Marlo Mcconnell MD;  [facility]       Family History:      Family History   Problem Relation Age of Onset      "Diabetes Mother      Parkinsonism Brother        Social History:    Marital Status:   [4]  Social History     Tobacco Use     Smoking status: Former Smoker     Quit date: 1992     Years since quittin.0     Smokeless tobacco: Never Used   Substance Use Topics     Alcohol use: Yes     Comment: rarely     Drug use: No        Review of Systems      Physical Exam   Vitals:  /72 (BP Location: Left arm, Patient Position: Sitting, Cuff Size: Adult Regular)   Pulse 78   Ht 1.702 m (5' 7\")   Wt 78 kg (172 lb)   SpO2 96%   BMI 26.94 kg/m        Physical Exam  PROCEDURE: CT UROGRAM WO & W CONTRAST 2021 12:46 PM     HISTORY: h/o bladder cancer; History of bladder cancer     COMPARISONS: None.     Meds/Dose Given: ISOVUE 300  100ML     TECHNIQUE: CT scan of the abdomen and pelvis both with and without IV  contrast. Sagittal and coronal reconstructions were obtained.     FINDINGS: The lung bases are clear. The liver is free of masses or  biliary ductal enlargement. Spleen and pancreas appears normal. The  adrenal glands are normal.     The right and left kidneys are normal in size. The calyces are  delicate and nondisplaced. No renal masses are seen. The right ureter  appears normal. There is a mass seen within the left ureter beginning  at the L4 level and extending downward to the iliac vessel crossing.  There is 6 significant expansion of the ureter particularly at the  level of the iliac vessel crossing where the ureter appears to be  expanded to almost 2 cm in diameter. The bladder is free of intrinsic  or extrinsic abnormality.     The periaortic lymph nodes are normal in caliber.     No intraperitoneal masses or inflammatory changes are noted. The  rectum appears normal.     Degenerative changes are present in the lumbar spine.                                                                        IMPRESSION: Apparent mass in the left ureter measuring 2 cm in maximal  transverse diameter. " Surprisingly there is no associated  hydronephrosis.     No renal masses are noted.     FARSHAD LUTZ MD               Patient Name: CLARISSA COSTA   MR#: [id number]   Specimen #:    Collected: 1/18/2021   Received: 1/18/2021   Reported: 1/22/2021 14:00   Ordering Phy(s): KAROL MORRELL   Additional Phy(s): CYNTHIA HUDDLESTON   Copy To: Cancer Registry     For improved result formatting, select 'View Enhanced Report Format' under    Linked Documents section.     SPECIMEN(S):   A: Bladder biopsy, trigone   B: Ureter biopsy, left mass     FINAL DIAGNOSIS:   A: Bladder, trigone, biopsy   - Benign urothelium with reactive changes, acute and chronic inflammation     B: Left ureter, mass, biopsy   - High-grade urothelial carcinoma, suspicious for lamina propria invasion   (see comment)     COMMENT:   A and B: This case has been seen in consultation with Dr. Cristhian Andrade of    [facility]   Department of pathology.  My diagnosis is the same.  The amount of tissue   on the left ureter biopsy is small   preventing definitive diagnosis of invasion.  Please see Dr. Andrade   separate report, USR [id number].     I have personally reviewed all specimens and/or slides, including the   listed special stains, and used them   with my medical judgement to determine or confirm the final diagnosis.     Electronically signed out by:     Reyes Beasley M.D.         Plan         No follow-ups on file.    Karol Morrell MD  [facility]  Patient Name: CLARISSA COSTA   MR#: [id number]   Specimen #: [id number]   Collected: 1/5/2021   Received: 1/7/2021   Reported: 1/20/2021 15:54   Ordering Phy(s): KAROL MORRELL     For improved result formatting, select 'View Enhanced Report Format' under    Linked Documents section.     SPECIMEN/STAIN PROCESS:   FISH UroVysion        FISH-CEP 3 x 1, FISH-CEP 7 x 1, FISH-CEP 17 x 1, FISH-LCI 9P21 x 1,   UroVysion x 1      ----------------------------------------------------------------     CYTOLOGIC INTERPRETATION:     FISH UroVysion:   Positive UroVysion test   Specimen Adequacy: Satisfactory for evaluation.     Electronically signed out by:     Dixon Snell M.D., [facility]     Impression: Left mid ureteral mass biopsy-proven high-grade urothelial carcinoma suspicious for lamina propria invasion    Plan: I reviewed all of Colin's x-rays and pathology reports today with him and his ex-wife..  When I was doing the ureteroscopy I did not see any tumors distally  and the mass on x-ray seems quite distinct and there do not appear to be any obvious lesions or filling defects proximal to that.  I did not go beyond the tumor with the ureteroscope to view the rest of the proximal ureter and renal pelvis so I did tell Colin that we do not know if there is potentially other lesions more proximal to this mass and that will need to be reviewed at the time of surgical resection.  The bladder biopsies came back negative for carcinoma in situ I did not see any other concerning lesions in the bladder.  Colin was taught intermittent catheterizations today and his catheter was removed and hopefully he is going to be able to void on his own and use the catheter only when needed.  I started him on some Flomax and encouraged him to drink plenty of water to keep his urine clear.  He is not taking his aspirin at this time.  I discussed multiple surgical scenarios with Colin and he of these options may need to be determined at the time of surgical resection.  The CT scan does not show any obvious pelvic lymphadenopathy or abdominal lymphadenopathy at this time.  A referral will be made at this time to Dr. Jorge Mcnulty at the [facility] for further treatment of this ureteral tumor.  50 minutes were spent of which 40 minutes was direct face-to-face patient time and 10 minutes in documentation and chart review.

## 2021-01-25 NOTE — LETTER
1/25/2021      RE: Colin Baxter  [address] [place] [address]         History     Chief Complaint:      Results (Pathology)      HPI   Colin Baxter is a 76 year old male who presents for further discussion of his ureteral tumor biopsy and his hematuria and recent urinary retention.I met Colin in early January because of some persistent gross hematuria.  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  I pursued further evaluation with a CT scan which demonstrated a filling defect in the left mid ureter and erythematous lesions throughout the bladder.  I took him to the operating room last week and I did multiple bladder biopsies of the erythema in the trigone area and I did left ureteroscopy biopsies of a mid ureteral mass.  I left a catheter in for a few days and he came in and had that removed last Thursday and then Friday evening he was unable to urinate so ended up going back to the ER and had a catheter replaced.  Cystoscopically he has a small prostate but he does have diabetes.  It does not sound like he went into clot retention.  He has had some persistent hematuria but it does not sound like there was a significant amount of clot.  He comes in today to review his x-rays and his pathology and to discuss further treatment options for the urinary retention.  According to his medication list it looks like he is on tamsulosin but he is not aware that he has been taking it so I did renew that prescription.     Allergies:    Allergies   Allergen Reactions     No Clinical Screening - See Comments Other (See Comments)     Beta blocker in glaucoma gtt.s caused low pulse and passing out         Medications:           atorvastatin (LIPITOR) 40 MG tablet       blood glucose (ONETOUCH VERIO IQ) test strip       brimonidine (ALPHAGAN-P) 0.15 % ophthalmic solution       IBUPROFEN PO       latanoprost (XALATAN) 0.005 % ophthalmic solution       lisinopril  (ZESTRIL) 10 MG tablet       metFORMIN (GLUCOPHAGE-XR) 500 MG 24 hr tablet       ONETOUCH DELICA LANCETS 33G MISC       oxyCODONE (ROXICODONE) 5 MG tablet       tamsulosin (FLOMAX) 0.4 MG capsule       tamsulosin (FLOMAX) 0.4 MG capsule        Problem List:      Patient Active Problem List    Diagnosis Date Noted     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     Priority: Medium     Added automatically from request for surgery [id number]       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) 12/11/2020     Priority: Medium     Type 2 diabetes mellitus with hyperglycemia, without long-term current use of insulin (H) 12/11/2020     Priority: Medium     Type 2 diabetes mellitus without complication, without long-term current use of insulin (H) 07/30/2019     Priority: Medium     History of bladder cancer 02/27/2019     Priority: Medium     Obesity (BMI 35.0-39.9) with comorbidity (H) 09/26/2018     Priority: Medium     ACP (advance care planning) 05/04/2016     Priority: Medium     Advance Care Planning 5/4/2016: ACP Review of Chart / Resources Provided:  Reviewed chart for advance care plan.  Colin Baxter has been provided information and resources to begin or update their advance care plan.  Added by Inés Malloy             History of high risk bladder cancer 09/01/2015     Priority: Medium     Comprehensive Medical Examination 07/06/2015     Priority: Medium     Glaucoma 07/06/2015     Priority: Medium        Past Medical History:      No past medical history on file.    Past Surgical History:      Past Surgical History:   Procedure Laterality Date     APPENDECTOMY  1958     COLONOSCOPY  2-     COMBINED CYSTOSCOPY, RETROGRADES, URETEROSCOPY, INSERT STENT Left 1/18/2021    Procedure: CYSTOURETEROSCOPY, WITH RETROGRADE PYELOGRAM with interpretation of fluroscopy, ureteroscopy, ureteral biopsy, bladder biopsy and fulgaration;  Surgeon: Shonda Morrell MD;  [facility]     CYSTOSCOPY, BIOPSY BLADDER, COMBINED  N/A 9/8/2015    Procedure: COMBINED CYSTOSCOPY, BIOPSY BLADDER;  Surgeon: Randy Martinez MD;  [facility]     CYSTOSCOPY, BIOPSY BLADDER, COMBINED N/A 2/14/2017    Procedure: COMBINED CYSTOSCOPY, BIOPSY BLADDER;  Surgeon: Randy Martinez MD;  [facility]     CYSTOSCOPY, BIOPSY BLADDER, COMBINED N/A 11/13/2018    Procedure: COMBINED CYSTOSCOPY, BIOPSY BLADDER;  Surgeon: Ed Helm MD;  Location: [facility]     CYSTOSCOPY, RETROGRADES, COMBINED Bilateral 11/13/2018    Procedure: Bilateral Retrograde Pyelagram, Bladder Biopsy;  Surgeon: Ed Helm MD;  [facility]: [facility]     CYSTOSCOPY, RETROGRADES, INSERT STENT URETER(S), COMBINED Left 9/8/2015    Procedure: COMBINED CYSTOSCOPY, RETROGRADES, INSERT STENT URETER(S);  Surgeon: Randy Martinez MD;  [facility]     CYSTOSCOPY, TRANSURETHRAL RESECTION (TUR) TUMOR BLADDER, COMBINED N/A 9/8/2015    Procedure: COMBINED CYSTOSCOPY, TRANSURETHRAL RESECTION (TUR) TUMOR BLADDER;  Surgeon: Randy Martinez MD;  [facility]     CYSTOSCOPY, TRANSURETHRAL RESECTION (TUR) TUMOR BLADDER, COMBINED N/A 1/12/2016    Procedure: COMBINED CYSTOSCOPY, TRANSURETHRAL RESECTION (TUR) TUMOR BLADDER;  Surgeon: Randy Martinez MD;  [facility]     CYSTOSCOPY, TRANSURETHRAL RESECTION (TUR) TUMOR BLADDER, COMBINED N/A 9/13/2016    Procedure: COMBINED CYSTOSCOPY, TRANSURETHRAL RESECTION (TUR) TUMOR BLADDER;  Surgeon: Randy Martinez MD;  [facility]     PHACOEMULSIFICATION WITH STANDARD INTRAOCULAR LENS IMPLANT Right 10/9/2018    Procedure: PHACOEMULSIFICATION WITH STANDARD INTRAOCULAR LENS IMPLANT;  PHACOEMULSIFICATION CATARACT EXTRACTION POSTERIOR CHAMBER LENS RIGHT;  Surgeon: Marlo Mcconnell MD;  [facility]: [facility]     PHACOEMULSIFICATION WITH STANDARD INTRAOCULAR LENS IMPLANT Left 10/23/2018    Procedure: PHACOEMULSIFICATION CATARACT EXTRACTION POSTERIOR CHAMBER LENS LEFT;  Surgeon: Marlo Mcconnell MD;  Location: [facility]  "OR       Family History:      Family History   Problem Relation Age of Onset     Diabetes Mother      Parkinsonism Brother        Social History:    Marital Status:   [4]  Social History     Tobacco Use     Smoking status: Former Smoker     Quit date: 1992     Years since quittin.0     Smokeless tobacco: Never Used   Substance Use Topics     Alcohol use: Yes     Comment: rarely     Drug use: No        Review of Systems      Physical Exam   Vitals:  /72 (BP Location: Left arm, Patient Position: Sitting, Cuff Size: Adult Regular)   Pulse 78   Ht 1.702 m (5' 7\")   Wt 78 kg (172 lb)   SpO2 96%   BMI 26.94 kg/m        Physical Exam  PROCEDURE: CT UROGRAM WO & W CONTRAST 2021 12:46 PM     HISTORY: h/o bladder cancer; History of bladder cancer     COMPARISONS: None.     Meds/Dose Given: ISOVUE 300  100ML     TECHNIQUE: CT scan of the abdomen and pelvis both with and without IV  contrast. Sagittal and coronal reconstructions were obtained.     FINDINGS: The lung bases are clear. The liver is free of masses or  biliary ductal enlargement. Spleen and pancreas appears normal. The  adrenal glands are normal.     The right and left kidneys are normal in size. The calyces are  delicate and nondisplaced. No renal masses are seen. The right ureter  appears normal. There is a mass seen within the left ureter beginning  at the L4 level and extending downward to the iliac vessel crossing.  There is 6 significant expansion of the ureter particularly at the  level of the iliac vessel crossing where the ureter appears to be  expanded to almost 2 cm in diameter. The bladder is free of intrinsic  or extrinsic abnormality.     The periaortic lymph nodes are normal in caliber.     No intraperitoneal masses or inflammatory changes are noted. The  rectum appears normal.     Degenerative changes are present in the lumbar spine.                                                                        IMPRESSION: " Apparent mass in the left ureter measuring 2 cm in maximal  transverse diameter. Surprisingly there is no associated  hydronephrosis.     No renal masses are noted.     FARSHAD LUTZ MD               Patient Name: CLARISSA COSTA   MR#: [id number]   Specimen #:    Collected: 1/18/2021   Received: 1/18/2021   Reported: 1/22/2021 14:00   Ordering Phy(s): KAROL MORRELL   Additional Phy(s): CYNTHIA HUDDLESTON   Copy To: Cancer Registry     For improved result formatting, select 'View Enhanced Report Format' under    Linked Documents section.     SPECIMEN(S):   A: Bladder biopsy, trigone   B: Ureter biopsy, left mass     FINAL DIAGNOSIS:   A: Bladder, trigone, biopsy   - Benign urothelium with reactive changes, acute and chronic inflammation     B: Left ureter, mass, biopsy   - High-grade urothelial carcinoma, suspicious for lamina propria invasion   (see comment)     COMMENT:   A and B: This case has been seen in consultation with Dr. Cristhian Andrade of    [facility]   Department of pathology.  My diagnosis is the same.  The amount of tissue   on the left ureter biopsy is small   preventing definitive diagnosis of invasion.  Please see Dr. Andrade   separate report, USR [id number].     I have personally reviewed all specimens and/or slides, including the   listed special stains, and used them   with my medical judgement to determine or confirm the final diagnosis.     Electronically signed out by:     Reyes Beasley M.D.         Plan         No follow-ups on file.    Karol Morrell MD  [facility]  Patient Name: CLARISSA COSTA   MR#: [id number]   Specimen #: [id number]   Collected: 1/5/2021   Received: 1/7/2021   Reported: 1/20/2021 15:54   Ordering Phy(s): KAROL MORRELL     For improved result formatting, select 'View Enhanced Report Format' under    Linked Documents section.     SPECIMEN/STAIN PROCESS:   FISH UroVysion        FISH-CEP 3 x 1, FISH-CEP 7 x 1, FISH-CEP 17 x 1,  FISH-LCI 9P21 x 1,   UroVysion x 1     ----------------------------------------------------------------     CYTOLOGIC INTERPRETATION:     FISH UroVysion:   Positive UroVysion test   Specimen Adequacy: Satisfactory for evaluation.     Electronically signed out by:     Dixon Snell M.D., [facility]     Impression: Left mid ureteral mass biopsy-proven high-grade urothelial carcinoma suspicious for lamina propria invasion    Plan: I reviewed all of Colin's x-rays and pathology reports today with him and his ex-wife..  When I was doing the ureteroscopy I did not see any tumors distally  and the mass on x-ray seems quite distinct and there do not appear to be any obvious lesions or filling defects proximal to that.  I did not go beyond the tumor with the ureteroscope to view the rest of the proximal ureter and renal pelvis so I did tell Colin that we do not know if there is potentially other lesions more proximal to this mass and that will need to be reviewed at the time of surgical resection.  The bladder biopsies came back negative for carcinoma in situ I did not see any other concerning lesions in the bladder.  Colin was taught intermittent catheterizations today and his catheter was removed and hopefully he is going to be able to void on his own and use the catheter only when needed.  I started him on some Flomax and encouraged him to drink plenty of water to keep his urine clear.  He is not taking his aspirin at this time.  I discussed multiple surgical scenarios with Colin and he of these options may need to be determined at the time of surgical resection.  The CT scan does not show any obvious pelvic lymphadenopathy or abdominal lymphadenopathy at this time.  A referral will be made at this time to Dr. Jorge Mcnulty at the [facility] for further treatment of this ureteral tumor.  50 minutes were spent of which 40 minutes was direct face-to-face patient time and 10 minutes in documentation and chart  review.            Shonda Morrell MD

## 2021-01-27 ENCOUNTER — TELEPHONE (OUTPATIENT)
Dept: UROLOGY | Facility: CLINIC | Age: 76
End: 2021-01-27

## 2021-01-27 NOTE — TELEPHONE ENCOUNTER
Pt called back. 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 We cannot schedule pt with in 5 days. Please review and call pt to schedule.

## 2021-01-27 NOTE — TELEPHONE ENCOUNTER
[facility] Call Center    Phone Message    May a detailed message be left on voicemail: yes     Reason for Call: Other: Pt has a referral for retention.  Please review and follow up.  thank you     Action Taken: Message routed to:  [facility] uro    Travel Screening: Not Applicable

## 2021-01-27 NOTE — TELEPHONE ENCOUNTER
Anthony Davison,      Please review Dr. Morrell's clinic note from 1/25/2021. 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 Please advise.    Thanks, Michael Birch MA

## 2021-02-01 ENCOUNTER — TELEPHONE (OUTPATIENT)
Dept: UROLOGY | Facility: CLINIC | Age: 76
End: 2021-02-01

## 2021-02-01 ENCOUNTER — VIRTUAL VISIT (OUTPATIENT)
Dept: UROLOGY | Facility: CLINIC | Age: 76
End: 2021-02-01
Payer: COMMERCIAL

## 2021-02-01 ENCOUNTER — PREP FOR PROCEDURE (OUTPATIENT)
Dept: UROLOGY | Facility: CLINIC | Age: 76
End: 2021-02-01

## 2021-02-01 DIAGNOSIS — N28.89 URETERAL MASS: Primary | ICD-10-CM

## 2021-02-01 DIAGNOSIS — C66.2 MALIGNANT NEOPLASM OF LEFT URETER (H): ICD-10-CM

## 2021-02-01 PROBLEM — C66.9: Status: ACTIVE | Noted: 2021-01-13

## 2021-02-01 PROCEDURE — 99214 OFFICE O/P EST MOD 30 MIN: CPT | Mod: GT | Performed by: UROLOGY

## 2021-02-01 RX ORDER — ACETAMINOPHEN 325 MG/1
975 TABLET ORAL ONCE
Status: CANCELLED | OUTPATIENT
Start: 2021-02-01 | End: 2021-02-01

## 2021-02-01 RX ORDER — CEFAZOLIN SODIUM 1 G/50ML
1 INJECTION, SOLUTION INTRAVENOUS SEE ADMIN INSTRUCTIONS
Status: CANCELLED | OUTPATIENT
Start: 2021-02-01

## 2021-02-01 RX ORDER — CEFAZOLIN SODIUM 2 G/50ML
2 SOLUTION INTRAVENOUS
Status: CANCELLED | OUTPATIENT
Start: 2021-02-01

## 2021-02-01 RX ORDER — HEPARIN SODIUM 5000 [USP'U]/.5ML
5000 INJECTION, SOLUTION INTRAVENOUS; SUBCUTANEOUS
Status: CANCELLED | OUTPATIENT
Start: 2021-02-01

## 2021-02-01 ASSESSMENT — PAIN SCALES - GENERAL: PAINLEVEL: NO PAIN (0)

## 2021-02-01 NOTE — PROGRESS NOTES
ASSESSMENT AND PLAN  Bladder Cancer   No recurrence in 5 years.  See history below.    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  He has good kidney function now.  On CT UROGRAM from 1/11/2021    Refer to oncology for possible neoadjuvant chemo    PET CT    Schedule left nephroureterectomy.  I will move this back if he has chemo first.    Conditions complicating his urologic diagnoses  -Type 2 DIABETES MELLITUS.  Not on insulin.  This is a risk factor given that nephrectomy plus diabetes mellitus puts the patient at risk for renal failure.  -Obesity.  This is a risk factor as it is associated with increased surgical complication.    We covered surgical risks which include but are not limited to heart attack, stroke, blood clot in the legs or lungs, death, injury to surrounding organs (intestine, liver, spleen, pancreas, lung, muscles, nerves), hernias, loss of sensation around incisions, decreased renal function, and infection.  We discussed the risks of blood transfusion including HIV and hepatitis.  Additional procedures may be necessary in the perioperative period. There are other additional unexpected outcomes which may occur.  __________________________________________________    CHIEF COMPLAINT  It was my pleasure to see Colin Baxter who is a 76 year old male here for left ureteral mass.      HPI  Mr Baxter is referred by Dr Morrell for left ureteral mass and history of bladder cancer.  He has had recent gross hematuria and underwent a recent ureteroscopy and bladder biposy by Dr Morrell.  He is otherwise feeling well and maintains a good activity level.    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  BCG started.    1/2016 Tumor recurrence.  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  [place]/Cis x3 11/2016.    I reviewed the following notes regarding this patients care:    1/18/21 Dr Morrell op note.   Given his multiple TURs, it was difficult  to find ureteral orifices.  Left ureter mass was biopsied and erythema in the bladder was biopsied.    1/11/21 CT Urogram   I reviewed the radiologic images and report from this radiologic exam.  My independent interpretation is:    Left ureteral mass is 2cm in diameter and at least 2 cm long.  No lymph nodes seen.    Pathology  Collected: 1/21/2021   Received: 1/21/2021   Reported: 1/21/2021 16:25   Ordering Phy(s): JAGDEEP DAVISON     For improved result formatting, select 'View Enhanced Report Format' under    Linked Documents section.     TEST(S):   2 Slides, case #     FINAL DIAGNOSIS:   CASE FROM [place], [place] (, OBTAINED 01/18/2021):   A. Bladder, trigone, biopsy:   - Benign urothelium with reactive changes, acute and chronic inflammation     B. Ureter, left mass, biopsy:   - High grade urothelial carcinoma   - Suspicious for lamina propria invasion       PHYSICAL EXAM  There were no vitals filed for this visit.  Wt Readings from Last 3 Encounters:   01/25/21 78 kg (172 lb)   01/18/21 78 kg (172 lb)   01/14/21 77.1 kg (170 lb)     Constitutional: Alert, no acute distress  Psychiatric: Normal mood and affect  Head: Normocephalic.   Neck: Neck is symmetric and without large visually seen mass.  ENT: No perioral edema or erythema.  Respiratory: Breathing is not labored   Skin: no suspicious lesions or rashes on face  Extremities: Upper extremities are moving easily.  Neurologic: Cranial nerves grossly intact.   : Deferred      I reviewed the following laboratory data and went over findings with patient:  Recent Labs   Lab Test 01/14/21  1113 10/02/20  0923 07/29/19  0836 09/26/18  1127   WBC 6.5 6.2 6.7 6.6   HGB 13.5 12.9* 15.5 15.3    268 192 220     Recent Labs   Lab Test 01/11/21  1232 10/02/20  0923 07/29/19  0836 05/12/17  0740 07/07/15  0848   CR  --  0.82 0.72 1.02 0.97   GFRESTIMATED 83 86 >90 72 77   GFRESTBLACK >90 99 >90 87 >90   GFR Calc      GLC  --  103* 298*  --  110*             CC  Patient Care Team:  Luis Tran MD as PCP - General (Family Practice)  Luis Tran MD as Assigned PCP  Rowena Phan LPN as Caryl Tucker RN as Diabetes Educator (Diabetes Education)  Tawanna Kirk RD as Diabetes Educator (Diabetes Education)  Ed Helm MD as Assigned Surgical Provider  SELF, REFERRED    Copy to patient  CLARISSA VYAS ASHVINOSCAR  [address]  [facility] [address]    Video-Visit Details  Video Start Time: 245  Video End Time: 309  Documentation and review time today: 26 min  Originating Location (pt. Location): Home.    Distant Location (provider location):  [facility] UROLOGY AND [facility] FOR PROSTATE AND UROLOGIC CANCERS.    Platform used for Video Visit: [email or web address]  Type of service:  Video Visit

## 2021-02-01 NOTE — LETTER
2/1/2021       RE: Colin Baxter  [address]  [place] [address]     Dear Colleague,    Thank you for referring your patient, Colin Baxter, to the [facility] UROLOGY CL[facility]IC [place] at [facility]. Please see a copy of my visit note below.    Colin is a 76 year old who is being evaluated via a billable telephone visit.      What phone number would you like to be contacted at? [phone]  How would you like to obtain your AVS? Mail a copy  Phone call duration: *** minutes    ASSESSMENT AND PLAN  Bladder Cancer   No recurrence in 5 years.  See history below.    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  He has good kidney function now.  On CT UROGRAM from 1/11/2021    Refer to oncology for possible neoadjuvant chemo    PET CT    Schedule left nephroureterectomy.  I will move this back if he has chemo first.    Conditions complicating his urologic diagnoses  -Type 2 DIABETES MELLITUS.  Not on insulin.  This is a risk factor given that nephrectomy plus diabetes mellitus puts the patient at risk for renal failure.  -Obesity.  This is a risk factor as it is associated with increased surgical complication.    We covered surgical risks which include but are not limited to heart attack, stroke, blood clot in the legs or lungs, death, injury to surrounding organs (intestine, liver, spleen, pancreas, lung, muscles, nerves), hernias, loss of sensation around incisions, decreased renal function, and infection.  We discussed the risks of blood transfusion including HIV and hepatitis.  Additional procedures may be necessary in the perioperative period. There are other additional unexpected outcomes which may occur.  __________________________________________________    CHIEF COMPLAINT  It was my pleasure to see Colin Baxter who is a 76 year old male here for left ureteral mass.      HPI  Mr Baxter is referred by Dr Morrell for left ureteral mass and  history of bladder cancer.  He has had recent gross hematuria and underwent a recent ureteroscopy and bladder biposy by Dr Morrell.  He is otherwise feeling well and maintains a good activity level.    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  BCG started.    1/2016 Tumor recurrence.  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  [place]/Cis x3 11/2016.    I reviewed the following notes regarding this patients care:    1/18/21 Dr Morrell op note.   Given his multiple TURs, it was difficult to find ureteral orifices.  Left ureter mass was biopsied and erythema in the bladder was biopsied.    1/11/21 CT Urogram   I reviewed the radiologic images and report from this radiologic exam.  My independent interpretation is:    Left ureteral mass is 2cm in diameter and at least 2 cm long.  No lymph nodes seen.    Pathology  Collected: 1/21/2021   Received: 1/21/2021   Reported: 1/21/2021 16:25   Ordering Phy(s): JAGDEEP DAVISON     For improved result formatting, select 'View Enhanced Report Format' under    Linked Documents section.     TEST(S):   2 Slides, case #     FINAL DIAGNOSIS:   CASE FROM [place], [place] (, OBTAINED 01/18/2021):   A. Bladder, trigone, biopsy:   - Benign urothelium with reactive changes, acute and chronic inflammation     B. Ureter, left mass, biopsy:   - High grade urothelial carcinoma   - Suspicious for lamina propria invasion       PHYSICAL EXAM  There were no vitals filed for this visit.  Wt Readings from Last 3 Encounters:   01/25/21 78 kg (172 lb)   01/18/21 78 kg (172 lb)   01/14/21 77.1 kg (170 lb)     Constitutional: Alert, no acute distress  Psychiatric: Normal mood and affect  Head: Normocephalic.   Neck: Neck is symmetric and without large visually seen mass.  ENT: No perioral edema or erythema.  Respiratory: Breathing is not labored   Skin: no suspicious lesions or rashes on face  Extremities: Upper extremities are moving  easily.  Neurologic: Cranial nerves grossly intact.   : Deferred      I reviewed the following laboratory data and went over findings with patient:  Recent Labs   Lab Test 01/14/21  1113 10/02/20  0923 07/29/19  0836 09/26/18  1127   WBC 6.5 6.2 6.7 6.6   HGB 13.5 12.9* 15.5 15.3    268 192 220     Recent Labs   Lab Test 01/11/21  1232 10/02/20  0923 07/29/19  0836 05/12/17  0740 07/07/15  0848   CR  --  0.82 0.72 1.02 0.97   GFRESTIMATED 83 86 >90 72 77   GFRESTBLACK >90 99 >90 87 >90   GFR Calc     GLC  --  103* 298*  --  110*             CC  Patient Care Team:  Luis Tran MD as PCP - General (Family Practice)  Luis Tran MD as Assigned PCP  Rowena Phan LPN as Caryl Tucker RN as Diabetes Educator (Diabetes Education)  Tawanna Kirk RD as Diabetes Educator (Diabetes Education)  Ed Helm MD as Assigned Surgical Provider  SELF, REFERRED    Copy to patient  CLARISSA COSTA  [address] [place] [address]    Video-Visit Details  Video Start Time: 245  Video End Time: 309  Documentation and review time today: 26 min  Originating Location (pt. Location): Home.    Distant Location (provider location):  [facility] UROLOGY AND [facility] FOR PROSTATE AND UROLOGIC CANCERS.    Platform used for Video Visit: [email or web address]  Type of service:  Video Visit

## 2021-02-01 NOTE — PROGRESS NOTES
Colin is a 76 year old who is being evaluated via a billable telephone visit.      What phone number would you like to be contacted at? [phone]  How would you like to obtain your AVS? Mail a copy  Phone call duration: *** minutes

## 2021-02-01 NOTE — TELEPHONE ENCOUNTER
[facility] Call Center    Phone Message    May a detailed message be left on voicemail: yes     Reason for Call: Other: Pt just had an appt with Dr Mcnulty. He said the call dropped. He said that Dr Mcnulty was going to transfer him to the chemo department but pt unsure what happened. Please reach out to pt to further discuss care plan. Pt said he wants to have a Oncologist in [place].     Action Taken: Message routed to:  [facility] ([facility] [place] [facility]    Travel Screening: Not Applicable

## 2021-02-02 ENCOUNTER — TELEPHONE (OUTPATIENT)
Dept: ONCOLOGY | Facility: OTHER | Age: 76
End: 2021-02-02

## 2021-02-02 ENCOUNTER — TELEPHONE (OUTPATIENT)
Dept: UROLOGY | Facility: CLINIC | Age: 76
End: 2021-02-02

## 2021-02-02 PROBLEM — N28.89 URETERAL MASS: Status: ACTIVE | Noted: 2021-02-02

## 2021-02-02 NOTE — CONFIDENTIAL NOTE
Patient is scheduled for surgery with Dr. Hamlet Harper with Colin    Date of Surgery: 3/15/21    Location: [place] OR    Informed patient they will need an adult  yes    H&P: Scheduled with PAC 3/8/21    Additional imaging/appointments: informed to get Covid test within 4 days of surgery    Surgery packet: to be mailed by 2/5/21     Additional comments: n/a

## 2021-02-02 NOTE — TELEPHONE ENCOUNTER
Clinic Care Coordination Contact  Care Team Conversations    TC to patient regarding referral. Left VM and requested a return call.  Nasreen Rhodes RN Oncology Care Coordinator

## 2021-02-03 NOTE — TELEPHONE ENCOUNTER
Clinic Care Coordination Contact  Care Team Conversations    Patient is scheduled.  Nasreen Rhodes RN Oncology Care Coordinator

## 2021-02-03 NOTE — TELEPHONE ENCOUNTER
FUTURE VISIT INFORMATION      SURGERY INFORMATION:    Date: 3.15.21    Location: [facility] OR    Surgeon:  Dr. Mcnulty    Anesthesia Type:  combined general with block    Procedure: NEPHROURETERECTOMY, ROBOT-ASSISTED, CYSTOURETEROSCOPY    Consult: 21    RECORDS REQUESTED FROM:       Primary Care Provider: Dr. Tran    Most recent EKG+ Tracin21

## 2021-02-05 ENCOUNTER — TELEPHONE (OUTPATIENT)
Dept: FAMILY MEDICINE | Facility: OTHER | Age: 76
End: 2021-02-05

## 2021-02-05 DIAGNOSIS — E11.9 TYPE 2 DIABETES MELLITUS WITHOUT COMPLICATION, WITHOUT LONG-TERM CURRENT USE OF INSULIN (H): ICD-10-CM

## 2021-02-05 NOTE — TELEPHONE ENCOUNTER
lisinopril      Last Written Prescription Date:  11/17/20  Last Fill Quantity: 90,   # refills: 0  Last Office Visit: 1/14/21  Future Office visit:       lipitor      Last Written Prescription Date:  11/17/20  Last Fill Quantity: 90,   # refills: 0

## 2021-02-05 NOTE — TELEPHONE ENCOUNTER
We have no control over who gets it as the vaccines are given and distributed by MD per guidelines      Per MD above.Pt updated on the above and verbalized understanding.Advised him to have significant other to fill out consent to discuss info.He verbalized understanding.      Cheryl Posadas RN

## 2021-02-05 NOTE — TELEPHONE ENCOUNTER
Spouse calling and pt is [facility] for ureteral cancer and she thinks maybe a kidney would be removed at this surgery.    She is wondering about the Covid  Vaccine for the patient due his major surgery and condition and he has diabetes?    Gave the [place] state information for her as he is 77 yo old.     She is wondering if there is any way he could get it?    Do do not see consent to discuss health information.Updated her she needs to fill out form.She states she is on his health care directive but has not been brought in and she is really his ex wife.She states that can call pt back and she will let him know.     Call patient back with the information [phone]    Any other recommedations?      Cheryl Posadas RN

## 2021-02-06 RX ORDER — ATORVASTATIN CALCIUM 40 MG/1
40 TABLET, FILM COATED ORAL DAILY
Qty: 90 TABLET | Refills: 0 | Status: SHIPPED | OUTPATIENT
Start: 2021-02-06 | End: 2021-05-24

## 2021-02-06 RX ORDER — LISINOPRIL 10 MG/1
10 TABLET ORAL DAILY
Qty: 90 TABLET | Refills: 0 | Status: SHIPPED | OUTPATIENT
Start: 2021-02-06 | End: 2021-05-24

## 2021-02-09 ENCOUNTER — TELEPHONE (OUTPATIENT)
Dept: PET IMAGING | Facility: HOSPITAL | Age: 76
End: 2021-02-09

## 2021-02-10 ENCOUNTER — HOSPITAL ENCOUNTER (OUTPATIENT)
Dept: PET IMAGING | Facility: HOSPITAL | Age: 76
Discharge: HOME | End: 2021-02-10
Attending: UROLOGY | Admitting: UROLOGY
Payer: COMMERCIAL

## 2021-02-10 DIAGNOSIS — C66.2 MALIGNANT NEOPLASM OF LEFT URETER (H): ICD-10-CM

## 2021-02-10 PROCEDURE — 78816 PET IMAGE W/CT FULL BODY: CPT | Mod: PI

## 2021-02-10 PROCEDURE — 343N000001 HC RX 343: Performed by: UROLOGY

## 2021-02-10 PROCEDURE — A9552 F18 FDG: HCPCS | Performed by: UROLOGY

## 2021-02-10 RX ADMIN — FLUDEOXYGLUCOSE F-18 13.21 MCI.: 500 INJECTION, SOLUTION INTRAVENOUS at 07:55

## 2021-02-10 NOTE — LETTER
February 18, 2021      Colin Baxter  [address] [place] [address]        Dear ,    We are writing to inform you of your test results.    Your test results fall within the expected range(s) or remain unchanged from previous results.  Please continue with current treatment plan.    Resulted Orders   PET Oncology Whole Body    Narrative    PROCEDURE: PET ONCOLOGY WHOLE BODY 2/10/2021 9:00 AM    HISTORY: Malignant neoplasm of left ureter (H)    COMPARISONS: None.    TECHNIQUE: 13.21 mCi of fluorodeoxyglucose was injected and a PET CT  scan was performed of the whole body from the vertex of the skull to  the feet.    BLOOD GLUCOSE: 110 mg/dL    FINDINGS: Normal uptake is seen in the brain. In the neck no abnormal  hypermetabolism is noted. The cervical lymph nodes are normal in  caliber. The hilar and mediastinal lymph nodes appear normal. There is  some dependent atelectasis at the lung bases. No abnormal  hypermetabolism is seen in the lungs. Normal uptake is seen in the  liver and spleen. There is no biliary ductal enlargement or calcified  gallstones. Normal pancreatic activity is seen. The adrenal glands  appear normal. There is left-sided hydronephrosis and hydroureter. Due  to the intense activity in the excreted radiopharmaceutical within the  ureter could not be evaluated itself. There is no evidence of tumor  outside of the ureter. The right kidney and ureter has normal  activity. The periaortic and pelvic lymph nodes appear normal. Normal  bladder and rectal activity is noted. No abnormal uptake is seen in  either leg to the foot. Normal skeletal uptake is seen.         Impression    IMPRESSION: No evidence of metastatic disease from the patient's  ureteral cancer    FARSHAD LUTZ MD       Mr. Baxter,   Your PET scan shows no evidence of disease outside of your ureter.  This is very good news.  I see that Dr Bartlett is planning a course of chemotherapy for you.  I think this is a good  plan and I will move your surgery to after this is completed.  Thank you, Jorge Mcnulty.

## 2021-02-11 ENCOUNTER — PATIENT OUTREACH (OUTPATIENT)
Dept: ONCOLOGY | Facility: OTHER | Age: 76
End: 2021-02-11

## 2021-02-11 DIAGNOSIS — C66.2 MALIGNANT NEOPLASM OF LEFT URETER (H): Primary | ICD-10-CM

## 2021-02-11 NOTE — LETTER
[facility]  [address]  [facility] [id number] [phone]      February 11, 2021      Colin Baxter  [address]  [facility] [address]      EMERGENCY CARE PLAN  Presenting Problem Treatment Plan   Questions or concerns during clinic hours    24 hour Nurse line available - Call main clinic line and follow prompts I will call the clinic directly: [phone]    24 Hour Nurse Line [phone]- Follow prompts and press option for nurse advisor  Oncology [facility] [phone]  [facility]  [address] [place] [address]   Patient needs to schedule an appointment  I will call the scheduling team during business hours at [phone]   Same day treatment   I will call the clinic first, then urgent care if needed   Clinic Care Coordinators [facility]  RN Clinic Care Coordinator  [phone]  Oncology Care Coordinator [phone]    [phone]   Crisis Services:  Behavioral or Mental Health [facility]  [phone]   Emergency treatment--Immediately CALL 911

## 2021-02-11 NOTE — PROGRESS NOTES
PRE VISIT MEDICAL ONCOLOGY     REASON FOR APPOINTMENT    Type of Cancer -    Ureter cancer left      SYMPTOMS   Symptom onset - Patient has a history of bladder cancer cytology was done in September and his cytology came back suspicious for high-grade urothelial carcinoma.    Patient reports blood in urine    Medical Provider Seen Initially - Dr. Morrell    PROVIDERS  Referring MD - Dr. Mcnulty  PCP - Dr. Tran  Surgeon - Dr. Javier Mcnulty/Dr. Morrell  Other provider(s) seen for consultation or treatment -      TREATMENT TO-DATE FOR THIS CANCER  Biopsy - 1-18-21   Brief Findings -A. Bladder, trigone, biopsy:   - Benign urothelium with reactive changes, acute and chronic inflammation     B. Ureter, left mass, biopsy:   - High grade urothelial carcinoma   - Suspicious for lamina propria invasion     Surgery - Ureter and bladder biopsy      Chemotherapy -        Oncologist - Dr. Bartlett  Hormonal therapy used - NA  Other treatments for this Cancer (including over-the-counter) -     PRIOR HISTORY OF CANCER  Cancer -Bladder cancer  10-23-15 through 11-27-15 BCG x 6 cycles  2-24-16 through 3-30-16 BCG x 6 cycles  10-31-16 through 12-5-16 Gemzar Mitomycin x 6 instillations  2-27-17 through 2-218 Gemzar/mitomycin x 6 cycles  5-23-18 through 6-27-18 Gemzar/mitomycin x 6 instillations  3-13-19 through 3-27-19 gemzar/mitomycin x 3 instillations.  TURBT in 2015      Radiation - NA  Urologist- Dr. Helm/Dr. Morrell  Hormonal Therapy -NA  RECENT IMAGING STUDIES    (list setting/date)  U/S-Mammogram -   PET -2-10-21   CT -Abdomen pelvis-10-9-20  CT urogram- 1-11-21  Bone Scan (Dexa) - Not in EPIC  MRI - Head / Other Not in epic  ECHO -  Not in EPIC        EKG 1-14-21  O    LABS PERTINENT TO DIAGNOSIS  Labs drawn - (list most recent type/setting/date)      CBC w/ Platelets 1-14-21            Creatinine 1-11-21 0.9   Other - (list)     CENTRAL LINE/PORT   None             HEALTH SCREENING (list most recent dates)    Colonoscopy -  2-9-11  Dental Exam -   T-Dap - 11-1-19  Pneumonia - 11-1-19  Flu Shot - 10-6-20  Shingles - 12-22-20    FAMILY CANCER HISTORY (list relative/type of cancer)      Not to his knowledge                TOBACCO USE HISTORY     Former smoker     OTHER PERTINENT CANCER INFORMATION NOT IDENTIFIED ELSEWHERE      Patient is scheduled for Schedule left nephroureterectomy on 3-15-21, however if neoadjuvant chemotherapy is needed this will be moved back.

## 2021-02-12 ASSESSMENT — ACTIVITIES OF DAILY LIVING (ADL): DEPENDENT_IADLS:: INDEPENDENT

## 2021-02-15 ENCOUNTER — PATIENT OUTREACH (OUTPATIENT)
Dept: INFUSION THERAPY | Facility: OTHER | Age: 76
End: 2021-02-15

## 2021-02-15 ENCOUNTER — ONCOLOGY VISIT (OUTPATIENT)
Dept: ONCOLOGY | Facility: OTHER | Age: 76
End: 2021-02-15
Attending: UROLOGY
Payer: COMMERCIAL

## 2021-02-15 ENCOUNTER — CARE COORDINATION (OUTPATIENT)
Dept: CASE MANAGEMENT | Facility: HOSPITAL | Age: 76
End: 2021-02-15

## 2021-02-15 VITALS
WEIGHT: 174.6 LBS | HEIGHT: 67 IN | DIASTOLIC BLOOD PRESSURE: 80 MMHG | SYSTOLIC BLOOD PRESSURE: 100 MMHG | BODY MASS INDEX: 27.4 KG/M2 | TEMPERATURE: 98.2 F | OXYGEN SATURATION: 99 % | HEART RATE: 87 BPM

## 2021-02-15 DIAGNOSIS — C66.2 MALIGNANT NEOPLASM OF LEFT URETER (H): Primary | ICD-10-CM

## 2021-02-15 DIAGNOSIS — Z53.9 ERRONEOUS ENCOUNTER--DISREGARD: Primary | ICD-10-CM

## 2021-02-15 DIAGNOSIS — C67.3 MALIGNANT NEOPLASM OF ANTERIOR WALL OF URINARY BLADDER (H): ICD-10-CM

## 2021-02-15 LAB
ALBUMIN SERPL-MCNC: 3.8 G/DL (ref 3.4–5)
ALP SERPL-CCNC: 63 U/L (ref 40–150)
ALT SERPL W P-5'-P-CCNC: 18 U/L (ref 0–70)
ANION GAP SERPL CALCULATED.3IONS-SCNC: 5 MMOL/L (ref 3–14)
AST SERPL W P-5'-P-CCNC: 14 U/L (ref 0–45)
BASOPHILS # BLD AUTO: 0.1 10E9/L (ref 0–0.2)
BASOPHILS NFR BLD AUTO: 0.8 %
BILIRUB SERPL-MCNC: 0.5 MG/DL (ref 0.2–1.3)
BUN SERPL-MCNC: 23 MG/DL (ref 7–30)
CALCIUM SERPL-MCNC: 8.8 MG/DL (ref 8.5–10.1)
CHLORIDE SERPL-SCNC: 107 MMOL/L (ref 94–109)
CO2 SERPL-SCNC: 26 MMOL/L (ref 20–32)
CREAT SERPL-MCNC: 0.92 MG/DL (ref 0.66–1.25)
DIFFERENTIAL METHOD BLD: ABNORMAL
EOSINOPHIL # BLD AUTO: 0.4 10E9/L (ref 0–0.7)
EOSINOPHIL NFR BLD AUTO: 5.9 %
ERYTHROCYTE [DISTWIDTH] IN BLOOD BY AUTOMATED COUNT: 14 % (ref 10–15)
GFR SERPL CREATININE-BSD FRML MDRD: 82 ML/MIN/{1.73_M2}
GLUCOSE SERPL-MCNC: 100 MG/DL (ref 70–99)
HCT VFR BLD AUTO: 36 % (ref 40–53)
HGB BLD-MCNC: 12 G/DL (ref 13.3–17.7)
IMM GRANULOCYTES # BLD: 0 10E9/L (ref 0–0.4)
IMM GRANULOCYTES NFR BLD: 0.1 %
LDH SERPL L TO P-CCNC: 173 U/L (ref 85–227)
LYMPHOCYTES # BLD AUTO: 2.2 10E9/L (ref 0.8–5.3)
LYMPHOCYTES NFR BLD AUTO: 29.9 %
MAGNESIUM SERPL-MCNC: 1.8 MG/DL (ref 1.6–2.3)
MCH RBC QN AUTO: 31.6 PG (ref 26.5–33)
MCHC RBC AUTO-ENTMCNC: 33.3 G/DL (ref 31.5–36.5)
MCV RBC AUTO: 95 FL (ref 78–100)
MONOCYTES # BLD AUTO: 0.8 10E9/L (ref 0–1.3)
MONOCYTES NFR BLD AUTO: 10.1 %
NEUTROPHILS # BLD AUTO: 4 10E9/L (ref 1.6–8.3)
NEUTROPHILS NFR BLD AUTO: 53.2 %
NRBC # BLD AUTO: 0 10*3/UL
NRBC BLD AUTO-RTO: 0 /100
PLATELET # BLD AUTO: 278 10E9/L (ref 150–450)
POTASSIUM SERPL-SCNC: 4.4 MMOL/L (ref 3.4–5.3)
PROT SERPL-MCNC: 7.2 G/DL (ref 6.8–8.8)
RBC # BLD AUTO: 3.8 10E12/L (ref 4.4–5.9)
SODIUM SERPL-SCNC: 138 MMOL/L (ref 133–144)
WBC # BLD AUTO: 7.5 10E9/L (ref 4–11)

## 2021-02-15 PROCEDURE — 85025 COMPLETE CBC W/AUTO DIFF WBC: CPT | Mod: ZL | Performed by: INTERNAL MEDICINE

## 2021-02-15 PROCEDURE — 36415 COLL VENOUS BLD VENIPUNCTURE: CPT | Mod: ZL | Performed by: INTERNAL MEDICINE

## 2021-02-15 PROCEDURE — 83615 LACTATE (LD) (LDH) ENZYME: CPT | Mod: ZL | Performed by: INTERNAL MEDICINE

## 2021-02-15 PROCEDURE — 10000001 PR ERRONEOUS ENCOUNTER--DISREGARD

## 2021-02-15 PROCEDURE — G0463 HOSPITAL OUTPT CLINIC VISIT: HCPCS

## 2021-02-15 PROCEDURE — 80053 COMPREHEN METABOLIC PANEL: CPT | Mod: ZL | Performed by: INTERNAL MEDICINE

## 2021-02-15 PROCEDURE — 99205 OFFICE O/P NEW HI 60 MIN: CPT | Performed by: INTERNAL MEDICINE

## 2021-02-15 PROCEDURE — 83735 ASSAY OF MAGNESIUM: CPT | Mod: ZL | Performed by: INTERNAL MEDICINE

## 2021-02-15 ASSESSMENT — MIFFLIN-ST. JEOR: SCORE: 1490.63

## 2021-02-15 ASSESSMENT — PAIN SCALES - GENERAL: PAINLEVEL: NO PAIN (0)

## 2021-02-15 NOTE — PATIENT INSTRUCTIONS
We would like to see you back with second cycle. Please come 30minute(s) prior for lab work.  Your prescription has been sent to:   [id number] - [place] -  [address]  [address] [address]  Phone: [phone] Fax: [phone]  When you are in need of a refill of your medications, please call your pharmacy and they will send us the request. If you have any questions please call [phone]

## 2021-02-15 NOTE — NURSING NOTE
"Chief Complaint   Patient presents with     Consult     urotheliacarcinoma       Initial Pulse 87   Temp 98.2  F (36.8  C) (Tympanic)   Ht 1.702 m (5' 7\")   Wt 79.2 kg (174 lb 9.7 oz)   SpO2 99%   BMI 27.35 kg/m   Estimated body mass index is 27.35 kg/m  as calculated from the following:    Height as of this encounter: 1.702 m (5' 7\").    Weight as of this encounter: 79.2 kg (174 lb 9.7 oz).  Medication Reconciliation: complete  Nasreen Ontiveros RN  "

## 2021-02-15 NOTE — PROGRESS NOTES
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      REQUESTING PHYSICIAN:  Javier Mcnulty MD; Shonda Morrell MD; NAVEED Galvez MD      HISTORY OF PRESENT ILLNESS:  Mr. 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  The patient had presented with hematuria and was seen by Dr. Morrell on 01/5/2021 who performed a cystoscopy, which revealed some bladder lesions, the biopsy of which were negative.  Cytology came back atypical urothelial cells.  She ordered subsequently a CT urogram and it was read as an apparent mass in the left ureter measuring 2 cm in maximal transverse diameter.  There was no hydronephrosis.  The patient underwent cystoureteroscopy with biopsy.  Pathology was read as high-grade urothelial cell carcinoma suspicious for lamina propria invasion.  The patient also had a PET scan which was negative for metastatic disease.  The patient was seen by Dr. Javier Mcnulty at the  and has been scheduled for a robotic-assisted laparoscopic left nephroureterectomy.  The patient is now here for further treatment options and the role of neoadjuvant chemotherapy in this patient.  The patient states he still has hematuria, occasionally gets dysuria with this, but he has had multiple urinalyses which have been negative according to the patient, including urine culture that was negative  in January x 2.  There is no family history of bladder cancer.  He does have a personal history of high-grade bladder cancer T1 treated with BCG, followed by reinduction BCG in 2016 and GemCis intravesically x 3, completed in 05/2018.  He has been an on and off smoker for at least 25 years, currently does  not smoke.  Denies any fevers.  He does get night sweats.  He has had dietary weight loss due to type 2 diabetes mellitus.      PAST MEDICAL HISTORY:  As above, hypertension, type 2 diabetes mellitus, hyperlipidemia, glaucoma, high-grade T1a bladder cancer, obesity.      ALLERGIES:  NO KNOWN DRUG ALLERGIES.      CURRENT MEDICATIONS:   1.  Lipitor 40 mg daily.   2.  Zestril 10 mg daily.   3.  Flomax 0.4 mg daily.   4.  Oxycodone 5-10 mg q.6 hours p.r.n.   5.  Glucophage 1000 mg b.i.d.   6.  Alphagan eyedrops 1 drop into right eye twice daily.      SOCIAL HISTORY:  He has at least a 25-year smoking history.  He is currently not smoking.  Alcohol is negative.  He worked as a  for the [employer].      FAMILY HISTORY:  Noncontributory.      REVIEW OF SYSTEMS:   CENTRAL NERVOUS SYSTEM:  Negative for headaches, change in mental status.   ENT:  Negative for hearing loss.   RESPIRATORY:  Negative for cough, shortness of breath, chest pain.   CARDIAC:  Negative chest pain, palpitations, orthopnea, PND, ankle edema.   GASTROINTESTINAL:  Negative for change in bowel habits, bright red blood per rectum, hematemesis, abdominal pain.   MUSCULOSKELETAL:  Unremarkable.   GENITOURINARY:  Significant for hematuria.   CONSTITUTIONAL:  Negative for fevers.  He some night sweats.  He has had a 20-pound dietary weight loss.   HEMATOLOGIC:  Negative for easy bruisability, gingival bleeding, epistaxis.      PHYSICAL EXAMINATION:   GENERAL:  He is a well-developed, well-nourished elderly white male in no acute distress.  ECOG performance status is 0.   VITAL SIGNS:  Blood pressure 100/80, pulse 87, temperature 98.2.   HEENT:  Atraumatic, normocephalic.  Oropharynx nonerythematous.   NECK:  Supple.   LUNGS:  Clear to auscultation and percussion.   HEART:  Regular rhythm, S1, S2 normal.   ABDOMEN:  Soft, normoactive bowel sounds.  No mass, nontender.   LYMPHATICS:  No cervical, supraclavicular, axillary or inguinal nodes.    EXTREMITIES:  No edema.   NEUROLOGIC:  Nonfocal.      LABORATORY DATA:  Laboratories reveal CBC with white count 7.5, H and H 12.0 and 36.0, platelet count 278.  BUN 23, creatinine 0.92.  LDH is 173.  Magnesium is 1.8.      IMPRESSION:  Newly diagnosed high-grade urothelial carcinoma of the left ureter with a 2 cm mass with invasion of lamina propria.  Based on at least 2 studies, there is benefit to neoadjuvant chemotherapy in the high-grade setting prior to nephroureterectomy and has been shown an improved 5-year disease-free survival and overall survival with the study showing 90% 5-year disease-free survival in patients who received cisplatin and gemcitabine x 4 cycles as opposed to those who do not where 5 yr disease survival rate was 57% .  We will favor therefore to proceed with cisplatin and gemcitabine.  Given his risk of neuropathy as well as risk of infection due to his underlying diabetes and age, we would like to dose reduce the cisplatin by 20%.  Otherwise, the plan is to treat with cisplatin 70 mg/m2 on day 1 and gemcitabine 1000 mg/m2 on day 1 and day 8 of a 21-day cycle.  We will plan on 4 cycles, then proceed with surgery under direction of Dr. Javier Mcnulty      Sixty-five minutes was spent on this patient.  We reviewed the chart, reviewing old records, pathology reports from the , also previous notes from Dr. Helm, Dr. Morrell, Dr. Martinez and Dr. Mcnulty.  Reviewed labs and followup care.  We discussed side effects of chemotherapy with the patient, including risk of nephrotoxicity, neurotoxicity, ototoxicity, risk of nausea, vomiting, cytopenias, risk of infection, alopecia.  The patient is willing to proceed.  We spent time performing the physical and ordering followup labs.         ALLYSSA ARELLANO MD             D: 02/15/2021   T: 02/15/2021   MT: ALEXUS      Name:     CLARISSA COSTA   MRN:      [id number]        Account:      [id number]   :      1945           Visit Date:    02/15/2021      Document: [id number]       cc: Javier Morrell MD

## 2021-02-15 NOTE — PROGRESS NOTES
Assessment completed by Sabra Harris     LOC: alert     Dx: Ureter Cancer  Chronic Disease Management: DM2    Lives with: Alone  Living at:  Home in [place]  Transportation: Patient Drives   Primary PCP: Luis Tran  Insurance:  [employer] Advantage  Support System:  Has daughters and an ex-wife feels this is sufficient  Homecare/PCA: N/A  /[facility] Services:   N/A  : Unknown       Health Care Directive: No, was provided one at clinic visit  Guardian: N/A  POA: N/A    Pharmacy: [facility]  Meds management: No  What does Quality of Life mean to you?  Being outside doing something all the time  Do you feel you have control over your life? Yes  Adequate Resources for needs (housing, utilities, food/med): Yes  Household chores: Independent  Work/community/social activity: Yes    ADLs: Independent  Ambulation:Independent  Falls: No  Nutrition: Good  Sleep: Could use a little more but just wakes up in the early morning  Mental health: N/A  Substance abuse: N/A  Exposure to violence/abuse: N/A  Stressors: Just the cancer      Plan: Nice single male who is very independent and lives alone in [place].  Briefly spoke about the importance of a healthcare directive and reaching out for help when needed.  Patient has this SW contact information and will call with any questions and concerns.

## 2021-02-16 ENCOUNTER — DOCUMENTATION ONLY (OUTPATIENT)
Dept: INFUSION THERAPY | Facility: OTHER | Age: 76
End: 2021-02-16

## 2021-02-17 ENCOUNTER — PATIENT OUTREACH (OUTPATIENT)
Dept: ONCOLOGY | Facility: OTHER | Age: 76
End: 2021-02-17

## 2021-02-17 ENCOUNTER — PATIENT OUTREACH (OUTPATIENT)
Dept: INFUSION THERAPY | Facility: OTHER | Age: 76
End: 2021-02-17
Payer: COMMERCIAL

## 2021-02-17 DIAGNOSIS — Z53.9 ERRONEOUS ENCOUNTER--DISREGARD: Primary | ICD-10-CM

## 2021-02-17 DIAGNOSIS — C66.2 MALIGNANT NEOPLASM OF LEFT URETER (H): Primary | ICD-10-CM

## 2021-02-17 PROCEDURE — 10000001 PR ERRONEOUS ENCOUNTER--DISREGARD

## 2021-02-17 NOTE — RESULT ENCOUNTER NOTE
Mr. Baxter,  Your PET scan shows no evidence of disease outside of your ureter.  This is very good news.  I see that Dr Bartlett is planning a course of chemotherapy for you.  I think this is a good plan and I will move your surgery to after this is completed.  Thank you, Jorge Mcnulty.

## 2021-02-17 NOTE — PROGRESS NOTES
Face to Face Oncology Visit      The list of resources in the New Patient Folder, represent the items given to the patient to assist the patient during their cancer journey.  During the face to face visit, with the Oncology RN Care Coordination, the patient was given an explanation of the resources.        New Patient Folder -  o Social Work versus Medical Care Coordination Information - discussion  o Listing of Area Psychologist/Counselors  o Honoring Choices - Your Rights Information on advanced health care directives   o [facility]   o ACS card  o Palliative Care for Those with Advanced Illness  o [facility] Grid of Services  o [facility]   o Nutrition Eating before, during and after chemotherapy (ACS)  o Aromatherapy   o Understanding Vaccinations and Cancer  o [facility]  o Life After Treatment - The Next Chapter in Your Survivorship Journey (ACS)  o How to Be a Friend to Someone with Cancer (ACS)  o Being a Caregiver (ACS)  o Understanding Clinical Trials  o [facility]      ONCOLOGY DEPARTMENT CONTACT INFORMATION  o [facility] Plan - Discussed and explained the purpose and use of this form       Visit  o Financial, Billing, Transportation or other insurance questions  o [employer] Application - benefit and purpose discussed  o [facility] Application ([facility] only) - benefit and purpose discussed      Spiritual needs      Nutritional Concerns (describe)  o Weight loss / gain / nausea / vomiting / poor appetite  o Nutrition Referral placed       Pharmacy  o Pharmacy review of all medications prior to treatment discussed  o Chemo Education purpose discussed       Physical Health Concerns  o PT / OT / ST Referral placed      Clinical Trials explained and discussed         Education provided on what to expect next for treatment. Explained discussed plan of care, cheomeducation, purpose of take home  medications and dexamethasone.   Rev 4/9/19/MN

## 2021-02-17 NOTE — PROGRESS NOTES
[facility] Chemotherapy Consult    The inpatient pharmacist has been consulted to review the patient's chart for  the following: any significant drug interactions between home medications, new take home medications, pre-medications, PRN medications, chemotherapy agents, and chemotherapy regimen.     Current Outpatient Medications   Medication Sig Dispense Refill     atorvastatin (LIPITOR) 40 MG tablet Take 1 tablet (40 mg) by mouth daily 90 tablet 0     blood glucose (ONETOUCH VERIO IQ) test strip USE TO TEST BLOOD SUGAR 2 TIMES DAILY OR AS DIRECTED. (Patient taking differently: daily USE TO TEST BLOOD SUGAR 2 TIMES DAILY OR AS DIRECTED.) 100 each 10     brimonidine (ALPHAGAN-P) 0.15 % ophthalmic solution INSTILL 1 DROP INTO RIGHT EYE TWICE DAILY  12     IBUPROFEN PO Take 200 mg by mouth every 6 hours as needed for moderate pain Reported on 5/9/2017       latanoprost (XALATAN) 0.005 % ophthalmic solution Place 1 drop into both eyes At Bedtime       lisinopril (ZESTRIL) 10 MG tablet Take 1 tablet (10 mg) by mouth daily 90 tablet 0     metFORMIN (GLUCOPHAGE-XR) 500 MG 24 hr tablet Take 2 tablets (1,000 mg) by mouth 2 times daily (with meals) 360 tablet 2     ONETOUCH DELICA LANCETS 33G MISC 1 each 2 times daily 100 each 10     oxyCODONE (ROXICODONE) 5 MG tablet Take 1-2 tablets (5-10 mg) by mouth every 4 hours as needed for moderate to severe pain (Patient not taking: Reported on 2/15/2021) 6 tablet 0     tamsulosin (FLOMAX) 0.4 MG capsule Take 1 capsule (0.4 mg) by mouth daily 30 capsule 11     tamsulosin (FLOMAX) 0.4 MG capsule Take 1 capsule (0.4 mg) by mouth daily 90 capsule 1       Take home medications: Ativan, Compazine, Decadron    Pre-Treats: Emend/Decadron, Aloxi    PRN medications: Ativan, Pepcid    Chemotherapy agents: gemcitabine, cisplatin    Cancer Being Treated: bladder    The following interactions were found and will require patient education:     Drug-Drug  interactions: Concurrent use of OXYCODONE and ATIVAN may result in increased risk of respiratory and CNS depression.     Concurrent use of OXYCODONE and COMPAZINE may result in increased risk of paralytic ileus and increased risk of respiratory and CNS depression.     Drug-Allergy interactions: None     Drug-Food interactions: Concurrent use of OXYCODONE and GRAPEFRUIT JUICE may result in increased oxycodone plasma concentrations.     Concurrent use of ATORVASTATIN and GRAPEFRUIT JUICE may result in increased bioavailability of atorvastatin resulting in an increased risk of myopathy or rhabdomyolysis.     Drug-Ethanol interactions: Concurrent use of OXYCODONE and ETHANOL may result in an increase in CNS or respiratory depression.     Drug-Tobacco interactions: None    If there are any additional questions or concerns, please contact the inpatient pharmacy ([id number]) for further review.     Jason Powers Pharm D.  February 16, 2021

## 2021-02-19 ENCOUNTER — INFUSION THERAPY VISIT (OUTPATIENT)
Dept: INFUSION THERAPY | Facility: OTHER | Age: 76
End: 2021-02-19
Attending: INTERNAL MEDICINE
Payer: COMMERCIAL

## 2021-02-19 DIAGNOSIS — C66.2 MALIGNANT NEOPLASM OF LEFT URETER (H): Primary | ICD-10-CM

## 2021-02-19 PROCEDURE — G0463 HOSPITAL OUTPT CLINIC VISIT: HCPCS

## 2021-02-19 RX ORDER — LORAZEPAM 0.5 MG/1
0.5 TABLET ORAL EVERY 4 HOURS PRN
Qty: 30 TABLET | Refills: 5 | Status: SHIPPED | OUTPATIENT
Start: 2021-02-21 | End: 2021-06-04

## 2021-02-19 RX ORDER — PROCHLORPERAZINE MALEATE 10 MG
10 TABLET ORAL EVERY 6 HOURS PRN
Qty: 30 TABLET | Refills: 5 | Status: SHIPPED | OUTPATIENT
Start: 2021-02-21 | End: 2021-06-04

## 2021-02-19 RX ORDER — DEXAMETHASONE 4 MG/1
4 TABLET ORAL 2 TIMES DAILY WITH MEALS
Qty: 6 TABLET | Refills: 5 | Status: SHIPPED | OUTPATIENT
Start: 2021-02-21 | End: 2021-07-06

## 2021-02-19 NOTE — PROGRESS NOTES
Review of treatment plan and notes. Dr Bartlett does explain why he dose reduced the Cisplatin by 20%. Plan not signed, sent to Dr Bartlett for sign prior to patients first day.

## 2021-02-19 NOTE — PROGRESS NOTES
"Chemotherapy Education    Patient is 76 years old, here today for chemotherapy education, accompanied by self.  Pt has a cancer diagnosis of high grade urothelial cancer of left ureter.  Their Oncologist is Dr Bartlett, Urologist is Dr Morrell and PCP is Dr Tran.    Reviewed the following with the patient and their support person:    Treatment Goal: Neoadjuvant  Regimen: Cisplatin, Gemzar Day 1 and 8 of 21 day cycle.    Duration: Cycles and rationale for strict adherence, specific supportive medication Compazine, Dexamethasone and Ativan and side effects (including long-term and short-term) and management; skin changes/hand-foot syndrome, anemia, neutropenia, thrombocytopenia, diarrhea/constipation, hair loss syndrome, memory changes/ \"chemobrain\", mouth sores, taste changes, neuropathy, fatigue, infertility, myelosuppression, and risk of extravasation or infiltration.    Infection prevention and monitoring of lab values, what lab tests and what changes of these values meant, along with the possibility of hydration or blood product transfusion, or the need to defer or hold treatment.      General Chemotherapy Information, including ways it is excreted from the body and cleaning and containment of vomitus or other bodily fluid, use of the bathroom, sexual health and intimacy, what to do if needing to miss a treatment, when to call a provider and the need for staff to wear protective equipment.    Oral Chemotherapy: No      Importance of Central line care (port) or IV site care.    Written Information:   \"My Cancer Guidebook\" Binder   \"Getting Ready for Chemotherapy: What to Expect, Before, During, and After  your Treatment\",   Via Oncology medication information sheets,   Information on when to contact the provider,   Various programs offered at [facility], and   [facility] card with contact information given; Oncology Clinic, RN Case  Manager, and the after-hours Nurse Advise Line.  No barriers to learning " identified. Patient and family verbalized understanding of all written and verbal information. All questions answered to patients satisfaction.   General Orientation to the Medical Oncology department, Infusion Services department, Huc/scheduling, bathrooms and usual flow of the treatment day provided as well as introduction to the Infusion nurses.    Other Concerns: Patient biggest concerns are: blood in his urine. Notes sometimes the amount scares him. Reviewed his most recent hemoglobins with him, explained what they reflect and how, when to call us or Dr Morrell with changes, and that we would continue to monitor his HGB levels. Explained how low HGB can affect the body and he doesn't have any symptoms. Explained it isn't very low at this point. He also wondered about how long side effects last after chemo. Explained unclear. Explained some can lift in a few weeks, a few months, some a year, and some may not completely relieve. Patient verbalizes understanding of all, denies any further questions.     Ativan released from plan with other take home meds. Alerted patient that likely will not be filled until Monday, when Dr Bartlett is here to sign. Patient verbalizes understanding, is not treated until Tuesday, will plan to  Monday. Note to [facility] RN CC, with Dr Bartlett along with printed Rx asking for immediate sign Monday am and fax to [facility].     Pt instructed to call with further questions or concerns.  Patient states understanding and is in agreement with this plan.  Given schedule calendar, with dexamethasone dosings added for ease of knowing when to take.  Patient discharged.

## 2021-02-21 DIAGNOSIS — C66.2 MALIGNANT NEOPLASM OF LEFT URETER (H): Primary | ICD-10-CM

## 2021-02-21 RX ORDER — SODIUM CHLORIDE 9 MG/ML
1000 INJECTION, SOLUTION INTRAVENOUS CONTINUOUS PRN
Status: CANCELLED
Start: 2021-02-22

## 2021-02-21 RX ORDER — DIPHENHYDRAMINE HYDROCHLORIDE 50 MG/ML
50 INJECTION INTRAMUSCULAR; INTRAVENOUS
Status: CANCELLED
Start: 2021-03-01

## 2021-02-21 RX ORDER — LORAZEPAM 2 MG/ML
0.5 INJECTION INTRAMUSCULAR EVERY 4 HOURS PRN
Status: CANCELLED
Start: 2021-03-01

## 2021-02-21 RX ORDER — MEPERIDINE HYDROCHLORIDE 25 MG/ML
25 INJECTION INTRAMUSCULAR; INTRAVENOUS; SUBCUTANEOUS EVERY 30 MIN PRN
Status: CANCELLED | OUTPATIENT
Start: 2021-03-01

## 2021-02-21 RX ORDER — ALBUTEROL SULFATE 0.83 MG/ML
2.5 SOLUTION RESPIRATORY (INHALATION)
Status: CANCELLED | OUTPATIENT
Start: 2021-03-01

## 2021-02-21 RX ORDER — ALBUTEROL SULFATE 90 UG/1
1-2 AEROSOL, METERED RESPIRATORY (INHALATION)
Status: CANCELLED
Start: 2021-02-22

## 2021-02-21 RX ORDER — HEPARIN SODIUM,PORCINE 10 UNIT/ML
5 VIAL (ML) INTRAVENOUS
Status: CANCELLED | OUTPATIENT
Start: 2021-02-22

## 2021-02-21 RX ORDER — NALOXONE HYDROCHLORIDE 0.4 MG/ML
.1-.4 INJECTION, SOLUTION INTRAMUSCULAR; INTRAVENOUS; SUBCUTANEOUS
Status: CANCELLED | OUTPATIENT
Start: 2021-02-22

## 2021-02-21 RX ORDER — NALOXONE HYDROCHLORIDE 0.4 MG/ML
.1-.4 INJECTION, SOLUTION INTRAMUSCULAR; INTRAVENOUS; SUBCUTANEOUS
Status: CANCELLED | OUTPATIENT
Start: 2021-03-01

## 2021-02-21 RX ORDER — PALONOSETRON 0.05 MG/ML
0.25 INJECTION, SOLUTION INTRAVENOUS ONCE
Status: CANCELLED
Start: 2021-02-23

## 2021-02-21 RX ORDER — LORAZEPAM 2 MG/ML
0.5 INJECTION INTRAMUSCULAR EVERY 4 HOURS PRN
Status: CANCELLED
Start: 2021-02-22

## 2021-02-21 RX ORDER — DIPHENHYDRAMINE HYDROCHLORIDE 50 MG/ML
50 INJECTION INTRAMUSCULAR; INTRAVENOUS
Status: CANCELLED
Start: 2021-02-22

## 2021-02-21 RX ORDER — EPINEPHRINE 1 MG/ML
0.3 INJECTION, SOLUTION, CONCENTRATE INTRAVENOUS EVERY 5 MIN PRN
Status: CANCELLED | OUTPATIENT
Start: 2021-02-22

## 2021-02-21 RX ORDER — SODIUM CHLORIDE 9 MG/ML
1000 INJECTION, SOLUTION INTRAVENOUS CONTINUOUS PRN
Status: CANCELLED
Start: 2021-03-01

## 2021-02-21 RX ORDER — METHYLPREDNISOLONE SODIUM SUCCINATE 125 MG/2ML
125 INJECTION, POWDER, LYOPHILIZED, FOR SOLUTION INTRAMUSCULAR; INTRAVENOUS
Status: CANCELLED
Start: 2021-02-22

## 2021-02-21 RX ORDER — HEPARIN SODIUM,PORCINE 10 UNIT/ML
5 VIAL (ML) INTRAVENOUS
Status: CANCELLED | OUTPATIENT
Start: 2021-03-01

## 2021-02-21 RX ORDER — ALBUTEROL SULFATE 90 UG/1
1-2 AEROSOL, METERED RESPIRATORY (INHALATION)
Status: CANCELLED
Start: 2021-03-01

## 2021-02-21 RX ORDER — MEPERIDINE HYDROCHLORIDE 25 MG/ML
25 INJECTION INTRAMUSCULAR; INTRAVENOUS; SUBCUTANEOUS EVERY 30 MIN PRN
Status: CANCELLED | OUTPATIENT
Start: 2021-02-22

## 2021-02-21 RX ORDER — ALBUTEROL SULFATE 0.83 MG/ML
2.5 SOLUTION RESPIRATORY (INHALATION)
Status: CANCELLED | OUTPATIENT
Start: 2021-02-22

## 2021-02-21 RX ORDER — METHYLPREDNISOLONE SODIUM SUCCINATE 125 MG/2ML
125 INJECTION, POWDER, LYOPHILIZED, FOR SOLUTION INTRAMUSCULAR; INTRAVENOUS
Status: CANCELLED
Start: 2021-03-01

## 2021-02-21 RX ORDER — EPINEPHRINE 1 MG/ML
0.3 INJECTION, SOLUTION, CONCENTRATE INTRAVENOUS EVERY 5 MIN PRN
Status: CANCELLED | OUTPATIENT
Start: 2021-03-01

## 2021-02-23 ENCOUNTER — TELEPHONE (OUTPATIENT)
Dept: ONCOLOGY | Facility: OTHER | Age: 76
End: 2021-02-23

## 2021-02-23 ENCOUNTER — INFUSION THERAPY VISIT (OUTPATIENT)
Dept: INFUSION THERAPY | Facility: OTHER | Age: 76
End: 2021-02-23
Attending: INTERNAL MEDICINE
Payer: COMMERCIAL

## 2021-02-23 VITALS
RESPIRATION RATE: 18 BRPM | DIASTOLIC BLOOD PRESSURE: 63 MMHG | OXYGEN SATURATION: 98 % | BODY MASS INDEX: 27.65 KG/M2 | HEART RATE: 75 BPM | WEIGHT: 176.15 LBS | SYSTOLIC BLOOD PRESSURE: 121 MMHG | HEIGHT: 67 IN | TEMPERATURE: 98.7 F

## 2021-02-23 DIAGNOSIS — C66.2 MALIGNANT NEOPLASM OF LEFT URETER (H): Primary | ICD-10-CM

## 2021-02-23 LAB
ALBUMIN SERPL-MCNC: 3.6 G/DL (ref 3.4–5)
ALP SERPL-CCNC: 58 U/L (ref 40–150)
ALT SERPL W P-5'-P-CCNC: 15 U/L (ref 0–70)
ANION GAP SERPL CALCULATED.3IONS-SCNC: 8 MMOL/L (ref 3–14)
AST SERPL W P-5'-P-CCNC: 11 U/L (ref 0–45)
BASOPHILS # BLD AUTO: 0.1 10E9/L (ref 0–0.2)
BASOPHILS NFR BLD AUTO: 0.8 %
BILIRUB SERPL-MCNC: 0.6 MG/DL (ref 0.2–1.3)
BUN SERPL-MCNC: 23 MG/DL (ref 7–30)
CALCIUM SERPL-MCNC: 8.8 MG/DL (ref 8.5–10.1)
CHLORIDE SERPL-SCNC: 107 MMOL/L (ref 94–109)
CO2 SERPL-SCNC: 24 MMOL/L (ref 20–32)
CREAT SERPL-MCNC: 0.82 MG/DL (ref 0.66–1.25)
DIFFERENTIAL METHOD BLD: ABNORMAL
EOSINOPHIL # BLD AUTO: 0.3 10E9/L (ref 0–0.7)
EOSINOPHIL NFR BLD AUTO: 5 %
ERYTHROCYTE [DISTWIDTH] IN BLOOD BY AUTOMATED COUNT: 14.4 % (ref 10–15)
GFR SERPL CREATININE-BSD FRML MDRD: 87 ML/MIN/{1.73_M2}
GLUCOSE SERPL-MCNC: 92 MG/DL (ref 70–99)
HCT VFR BLD AUTO: 28.8 % (ref 40–53)
HGB BLD-MCNC: 9.9 G/DL (ref 13.3–17.7)
IMM GRANULOCYTES # BLD: 0 10E9/L (ref 0–0.4)
IMM GRANULOCYTES NFR BLD: 0.3 %
LYMPHOCYTES # BLD AUTO: 1.6 10E9/L (ref 0.8–5.3)
LYMPHOCYTES NFR BLD AUTO: 26.3 %
MAGNESIUM SERPL-MCNC: 1.8 MG/DL (ref 1.6–2.3)
MCH RBC QN AUTO: 31.9 PG (ref 26.5–33)
MCHC RBC AUTO-ENTMCNC: 34.4 G/DL (ref 31.5–36.5)
MCV RBC AUTO: 93 FL (ref 78–100)
MONOCYTES # BLD AUTO: 0.6 10E9/L (ref 0–1.3)
MONOCYTES NFR BLD AUTO: 10.2 %
NEUTROPHILS # BLD AUTO: 3.6 10E9/L (ref 1.6–8.3)
NEUTROPHILS NFR BLD AUTO: 57.4 %
NRBC # BLD AUTO: 0 10*3/UL
NRBC BLD AUTO-RTO: 0 /100
PLATELET # BLD AUTO: 259 10E9/L (ref 150–450)
POTASSIUM SERPL-SCNC: 3.8 MMOL/L (ref 3.4–5.3)
PROT SERPL-MCNC: 6.7 G/DL (ref 6.8–8.8)
RBC # BLD AUTO: 3.1 10E12/L (ref 4.4–5.9)
SODIUM SERPL-SCNC: 139 MMOL/L (ref 133–144)
WBC # BLD AUTO: 6.2 10E9/L (ref 4–11)

## 2021-02-23 PROCEDURE — 96415 CHEMO IV INFUSION ADDL HR: CPT

## 2021-02-23 PROCEDURE — 36415 COLL VENOUS BLD VENIPUNCTURE: CPT | Mod: ZL | Performed by: INTERNAL MEDICINE

## 2021-02-23 PROCEDURE — 96417 CHEMO IV INFUS EACH ADDL SEQ: CPT

## 2021-02-23 PROCEDURE — 250N000011 HC RX IP 250 OP 636: Performed by: INTERNAL MEDICINE

## 2021-02-23 PROCEDURE — 80053 COMPREHEN METABOLIC PANEL: CPT | Mod: ZL | Performed by: INTERNAL MEDICINE

## 2021-02-23 PROCEDURE — 96413 CHEMO IV INFUSION 1 HR: CPT

## 2021-02-23 PROCEDURE — 96368 THER/DIAG CONCURRENT INF: CPT

## 2021-02-23 PROCEDURE — 96375 TX/PRO/DX INJ NEW DRUG ADDON: CPT

## 2021-02-23 PROCEDURE — 85025 COMPLETE CBC W/AUTO DIFF WBC: CPT | Mod: ZL | Performed by: INTERNAL MEDICINE

## 2021-02-23 PROCEDURE — 83735 ASSAY OF MAGNESIUM: CPT | Mod: ZL | Performed by: INTERNAL MEDICINE

## 2021-02-23 PROCEDURE — 96367 TX/PROPH/DG ADDL SEQ IV INF: CPT

## 2021-02-23 PROCEDURE — 258N000003 HC RX IP 258 OP 636: Performed by: INTERNAL MEDICINE

## 2021-02-23 RX ORDER — PALONOSETRON 0.05 MG/ML
0.25 INJECTION, SOLUTION INTRAVENOUS ONCE
Status: COMPLETED | OUTPATIENT
Start: 2021-02-23 | End: 2021-02-23

## 2021-02-23 RX ADMIN — MAGNESIUM SULFATE HEPTAHYDRATE: 500 INJECTION, SOLUTION INTRAMUSCULAR; INTRAVENOUS at 12:03

## 2021-02-23 RX ADMIN — PALONOSETRON 0.25 MG: 0.05 INJECTION, SOLUTION INTRAVENOUS at 10:58

## 2021-02-23 RX ADMIN — CISPLATIN 100 MG: 100 INJECTION, SOLUTION INTRAVENOUS at 12:17

## 2021-02-23 RX ADMIN — SODIUM CHLORIDE 1000 ML: 9 INJECTION, SOLUTION INTRAVENOUS at 10:18

## 2021-02-23 RX ADMIN — GEMCITABINE 2000 MG: 38 INJECTION, SOLUTION INTRAVENOUS at 11:30

## 2021-02-23 RX ADMIN — FOSAPREPITANT: 150 INJECTION, POWDER, LYOPHILIZED, FOR SOLUTION INTRAVENOUS at 10:59

## 2021-02-23 ASSESSMENT — MIFFLIN-ST. JEOR: SCORE: 1497.75

## 2021-02-23 ASSESSMENT — PAIN SCALES - GENERAL: PAINLEVEL: NO PAIN (0)

## 2021-02-23 NOTE — PATIENT INSTRUCTIONS
We will see you back as planned. If you have any questions or concerns, we can be reached Monday through Friday 8am - 430pm at [phone] ([facility] [place]). If you have concerns related to a potential reaction/side effect after hours/weekends/holiday's, please seek emergent medical care.     Patient Education     Anemia During Cancer    Anemia is when numbers of healthy red blood cells (RBCs) in the body drop below a normal level. It can happen with cancer and during cancer treatment for many reasons. Read below to learn more about anemia during cancer and how it s treated.   What is anemia?  RBCs are made in the bone marrow. Normally, blood is made up of about 35% to 50% RBCs. With anemia, blood has less than 35% RBCs.   RBCs carry oxygen around the body. If you have low levels of RBCs, not enough oxygen is sent to your body tissues. This may cause dizziness, weakness, or tiredness. You may have trouble doing daily tasks. You may feel cold and look pale. And you may be short of breath and have a fast heartbeat. But some people with anemia have no symptoms. This can be the case if the anemia has slowly happened over a long time.   Why anemia can happen with cancer  Anemia during cancer can have several causes. These include:    Treatments that damage bone marrow. These include chemotherapy and radiation therapy.    Some types of cancer that cause blood loss. This includes colon cancer.    Cancer that affects normal bone marrow function.  This includes leukemia.    Blood loss during surgery. A lot of blood loss during cancer surgery can cause anemia.    Low levels of some vitamins and minerals.  Not enough B vitamins or iron in your blood can cause anemia.    Kidney disease. This can lead to low levels of erythropoietin (EPO). This is a substance the body needs to make RBCs.    Wasting from cancer. Wasting means nutritional problems and weight loss. This lowers the body s ability to make RBCs.  Testing for anemia  A blood  sample is taken from your arm and then tested. Many different kinds of tests may be done on this blood. Some tests count the numbers of blood cells. Others check for substances the body uses to make RBCs, such as vitamins and iron.   Treating anemia during cancer  Treatment for anemia depends on what's causing it. It also depends on how severe your symptoms are. Your healthcare provider can tell you more about treatment options and their risks and benefits for you. Treatments include:     RBC transfusion. An IV tube is put into a vein in your hand or arm. RBCs from a donor are sent through the tube into your body. For a short time, this increases the number of healthy RBCs in your body. This can reverse anemia very quickly. RBC transfusions are generally safe. But there are some risks. Your healthcare provider will discuss them with you. Be sure you understand these risks. You might need to sign a consent form before getting a blood transfusion.    Erythropoiesis stimulating agent (SOPHIE). This is medicine that causes the body to make more RBCs. An SOPHIE is given as a shot. It may be given along with iron (see below). An SOPHIE takes many weeks or even months to reverse anemia. There are certain risks with SOPHIE treatment. Your healthcare provider will discuss them with you. Be sure you understand these risks.    IV (intravenous) or injection treatments.  A liquid medicine with iron is given by shots or into your blood through an IV line. Several treatments may be given. IV iron might be used if you're given an SOPHIE. IV iron usually takes 1 to 4 weeks to reverse anemia. Some vitamins may also be given as a shot (injection), such as vitamin B-12.    Oral supplements. You may take iron or vitamin B supplements. These come in liquid or pill form to take by mouth. Or you may have an injection. Oral supplements can take weeks or months to reverse anemia. Be sure you know exactly how and when to take them.    Diet changes.  Eating  more foods high in iron and B vitamins may help replace iron or B vitamins if you have a deficiency. Your provider can give you a list of these foods.    Stopping or delaying cancer treatment. Your anemia may improve if you stop or delay chemotherapy or radiation therapy for a while.  Checking your progress  During the course of your treatment, you ll have many more blood tests done. These are to check your blood levels and your response to the treatment.   Risks and possible complications  Each treatment has its own risks. Your healthcare provider will tell you what risks apply to you. These may include:     Fever    Hives or other allergic reactions    Iron overload    High or low blood pressure    Nausea    Liver inflammation    Blood clots    Constipation  [employer] last reviewed this educational content on 11/1/2019 2000-2020 [employer]. [address] [place] [address], [place] [address]. All rights reserved. This information is not intended as a substitute for professional medical care. Always follow your healthcare professional's instructions.           Patient Education     Discharge Instructions for Chemotherapy  Your healthcare provider prescribed a type of medicine therapy for you called chemotherapy. It's also known as chemo. Chemo is used for many different types of illnesses, including cancer. There are many types of chemo. This sheet gives some general information on how you can take care of yourself after your chemo. Talk with your healthcare provider about other details based on your own treatment plan.  Mouth care  You may get mouth sores, even if you follow all of your healthcare provider s instructions. Many people get mouth sores as a side effect of chemo. Here s what you can do to help prevent mouth sores:    Keep your mouth clean. Brush your teeth with a soft-bristle toothbrush after every meal.    Ask if you should use a toothpaste with fluoride. Or you may be told to brush your teeth  with a mixture of 1 teaspoon of salt in 8 ounces of water.     Use an oral swab or special soft toothbrush if your gums bleed during brushing.    Don't use dental floss if it causes your gums to bleed.    Use any mouthwash given to you as directed.    If you can t tolerate regular methods, use salt and baking soda to clean your mouth. Mix 1 teaspoon of salt and 1 teaspoon of baking soda in 1 quart of warm water. Swish and spit.    If you wear dentures, you may be told to wear them only when you eat. Ask your healthcare provider. Clean your dentures twice a day. Soak them in antimicrobial solution when you aren't wearing them. Rinse your mouth after each meal.     Check your mouth and tongue for white patches. This may be a sign of a type of yeast infection called thrush. This is a common side effect of chemo. Tell your healthcare provider if you get these patches. He or she can prescribe medicine to treat them.  Other self-care  Here's what else you can do:    Try to exercise. Exercise keeps you strong and keeps your heart and lungs active. Walking and yoga are good types of exercise.    Keep clean. During chemo, your body can t fight infection very well. Take short baths or showers. Wash your hands before you eat and after going to the bathroom.    Avoid people who are sick with an illness you could catch. This includes people with colds, flu, measles, or chicken pox. This also includes people who have recently had a vaccine for any illness.    Let your healthcare provider know if your throat is sore. You may have an infection that needs treatment.    Be gentle to your skin. Use moisturizing soap. Chemo can make your skin dry. Apply lotion several times a day to help relieve dry skin. Don t take very hot or very cold showers or baths.    Don t be surprised if your chemo causes slight burns to your skin. This can happen most often on the hands and feet. Some medicines used in high doses cause this. Ask for a special  cream to help relieve the burn and protect your skin.  Many people on chemo feel sick and find it hard to eat during treatment. Try these tips:    Eat small meals several times a day to keep your strength up.    Choose bland foods with little taste or smell if you are reacting strongly to food.    Be sure to cook all food fully. This kills bacteria and helps you prevent illness.    Eat foods that are soft. Soft foods are less likely to cause stomach irritation.    Try to eat a variety of foods for a well-balanced diet. Drink plenty of fluids. Eat foods with fiber unless your healthcare provider says not to. Fiber can help to prevent constipation.  When to call your healthcare provider  Call your healthcare provider right away if you have any of the following:    Unexplained bleeding    Trouble concentrating    Ongoing fatigue    Shortness of breath, wheezing, trouble breathing, or bad cough    Rapid, irregular heartbeat, or chest pain    Dizziness, lightheadedness    Constant feeling of being cold    Hives or a cut or rash that swells, turns red, feels hot or painful, or begins to ooze    Burning when you urinate    Fever of 100.4 F (38 C) or higher, or as directed by your healthcare provider  Ross last reviewed this educational content on 5/1/2019 2000-2020 [employer]. [address] [place] [address], [place] [address]. All rights reserved. This information is not intended as a substitute for professional medical care. Always follow your healthcare professional's instructions.

## 2021-02-23 NOTE — TELEPHONE ENCOUNTER
Received a PA from [facility] for Lorazepam 0.5mg tablets. Submitted on CMM. Waiting for a response.

## 2021-02-23 NOTE — PROGRESS NOTES
Patient is 76 years old, here accompanied by self today for infusion of Cisplatin and Gemzar under the orders of Dr. Bartlett.      22 gauge angio cath inserted into left posterior lower forearm.  Immediate blood return noted.  IV secured with sterile, transparent dressing and tape.  Patient tolerated well, denies pain or discomfort at this time.  Flushes easily without resistance, no signs or symptoms of infiltration or infection.  3mL blood wasted and 2 tubes blood removed for ordered labs. Flushed with 3mL normal saline to clear line. Patient denies questions or concerns regarding infusion and/or medication(s) being administered.       Today's lab values: WBC 6.2, ANC 3.6, , HGB 9.9, AST 11, ALT 15, Alkaline Phosphatase 58, Creatinine 0.82.    Fair amount of blood in urine past few days, hemoglobin dropping. Blood in urine is normal for patient and urologist, as well as oncologist, are aware of hx. He does report it may be a bit more the past few days than normal. Clots occasionally. No trouble urinating, does go quite frequently. Spoke with Dr Bartlett, alerted to 2.1 drop in HGB in last 8 days.VORB with ok to proceed with treatment, check CBC as currently ordered next week. Patient updated on when to call or be seen, and symptoms of anemia.     Patient meets parameters for today's infusion.  Denies questions or concerns regarding today's infusion and/or medications being administered.      Independent dose check completed with JIM Marquis.    Patient identified with two identifiers, order verified, and verbal consent for today's infusion obtained from patient.     1130 IV pump verified with Gemzar dose, drug, and rate of administration. Infusion administered per protocol. Patient tolerated infusion well, no signs or symptoms of adverse reaction noted. Patient denies pain nor discomfort.     1217 IV pump verified with Cisplatin dose, drug, and rate of administration. Infusion administered per protocol.  Patient tolerated infusion well, no signs or symptoms of adverse reaction noted. Patient denies pain nor discomfort.     Patient did not receive his Ativan at pharmacy this am, did get other two take home meds. [facility] to [facility], they do have the prescription for it and will fill it now. Patient updated and will stop for it on his way home.     Needle removed, tip intact. Site clean, dry and intact. Covered with a sterile bandage, slight pressure applied for 30 seconds. Pt instructed to leave bandage intact for a minimum of one hour, and to call with questions or concerns. Copy of appointments, discharge instructions (reviewed verbally as well), and after visit summary (AVS) provided to patient. Patient states understanding, discharged.

## 2021-02-24 ENCOUNTER — TELEPHONE (OUTPATIENT)
Dept: INFUSION THERAPY | Facility: OTHER | Age: 76
End: 2021-02-24

## 2021-02-24 NOTE — TELEPHONE ENCOUNTER
Received an APPROVAL from [facility] for Lorazepam 0.5mg tablets. Effective 01/01/2021 to 12/31/2021. Forms scanned to [facility].

## 2021-02-24 NOTE — TELEPHONE ENCOUNTER
Patient called to clarify whether he takes prochlorperazine or lorazepam first. Reminded it is the prochlorperazine first, lorazepam an hour later if prochlorperazine ineffective. Reports he took his dexamethasone, no questions related to that. Denied any further questions or concerns, reports he is feeling well.

## 2021-02-26 DIAGNOSIS — Z11.59 ENCOUNTER FOR SCREENING FOR OTHER VIRAL DISEASES: ICD-10-CM

## 2021-03-02 ENCOUNTER — INFUSION THERAPY VISIT (OUTPATIENT)
Dept: INFUSION THERAPY | Facility: OTHER | Age: 76
End: 2021-03-02
Attending: INTERNAL MEDICINE
Payer: COMMERCIAL

## 2021-03-02 VITALS
BODY MASS INDEX: 26.86 KG/M2 | OXYGEN SATURATION: 92 % | RESPIRATION RATE: 17 BRPM | WEIGHT: 171.52 LBS | SYSTOLIC BLOOD PRESSURE: 113 MMHG | DIASTOLIC BLOOD PRESSURE: 67 MMHG | HEART RATE: 69 BPM

## 2021-03-02 DIAGNOSIS — C67.3 MALIGNANT NEOPLASM OF ANTERIOR WALL OF URINARY BLADDER (H): ICD-10-CM

## 2021-03-02 DIAGNOSIS — C66.2 MALIGNANT NEOPLASM OF LEFT URETER (H): Primary | ICD-10-CM

## 2021-03-02 LAB
ALBUMIN SERPL-MCNC: 3.5 G/DL (ref 3.4–5)
ALP SERPL-CCNC: 55 U/L (ref 40–150)
ALT SERPL W P-5'-P-CCNC: 20 U/L (ref 0–70)
ANION GAP SERPL CALCULATED.3IONS-SCNC: 7 MMOL/L (ref 3–14)
AST SERPL W P-5'-P-CCNC: 12 U/L (ref 0–45)
BASOPHILS # BLD AUTO: 0 10E9/L (ref 0–0.2)
BASOPHILS NFR BLD AUTO: 0.2 %
BILIRUB SERPL-MCNC: 0.3 MG/DL (ref 0.2–1.3)
BUN SERPL-MCNC: 29 MG/DL (ref 7–30)
CALCIUM SERPL-MCNC: 8.6 MG/DL (ref 8.5–10.1)
CHLORIDE SERPL-SCNC: 103 MMOL/L (ref 94–109)
CO2 SERPL-SCNC: 26 MMOL/L (ref 20–32)
CREAT SERPL-MCNC: 1.13 MG/DL (ref 0.66–1.25)
DIFFERENTIAL METHOD BLD: ABNORMAL
EOSINOPHIL # BLD AUTO: 0.3 10E9/L (ref 0–0.7)
EOSINOPHIL NFR BLD AUTO: 5.2 %
ERYTHROCYTE [DISTWIDTH] IN BLOOD BY AUTOMATED COUNT: 13.5 % (ref 10–15)
GFR SERPL CREATININE-BSD FRML MDRD: 64 ML/MIN/{1.73_M2}
GLUCOSE SERPL-MCNC: 123 MG/DL (ref 70–99)
HCT VFR BLD AUTO: 26.8 % (ref 40–53)
HGB BLD-MCNC: 9.3 G/DL (ref 13.3–17.7)
IMM GRANULOCYTES # BLD: 0 10E9/L (ref 0–0.4)
IMM GRANULOCYTES NFR BLD: 0.6 %
LYMPHOCYTES # BLD AUTO: 2.3 10E9/L (ref 0.8–5.3)
LYMPHOCYTES NFR BLD AUTO: 41.5 %
MCH RBC QN AUTO: 32.2 PG (ref 26.5–33)
MCHC RBC AUTO-ENTMCNC: 34.7 G/DL (ref 31.5–36.5)
MCV RBC AUTO: 93 FL (ref 78–100)
MONOCYTES # BLD AUTO: 0.2 10E9/L (ref 0–1.3)
MONOCYTES NFR BLD AUTO: 3.9 %
NEUTROPHILS # BLD AUTO: 2.6 10E9/L (ref 1.6–8.3)
NEUTROPHILS NFR BLD AUTO: 48.6 %
NRBC # BLD AUTO: 0 10*3/UL
NRBC BLD AUTO-RTO: 0 /100
PLATELET # BLD AUTO: 210 10E9/L (ref 150–450)
POTASSIUM SERPL-SCNC: 4.3 MMOL/L (ref 3.4–5.3)
PROT SERPL-MCNC: 6.8 G/DL (ref 6.8–8.8)
RBC # BLD AUTO: 2.89 10E12/L (ref 4.4–5.9)
SODIUM SERPL-SCNC: 136 MMOL/L (ref 133–144)
WBC # BLD AUTO: 5.4 10E9/L (ref 4–11)

## 2021-03-02 PROCEDURE — 85025 COMPLETE CBC W/AUTO DIFF WBC: CPT | Mod: ZL | Performed by: INTERNAL MEDICINE

## 2021-03-02 PROCEDURE — 96413 CHEMO IV INFUSION 1 HR: CPT

## 2021-03-02 PROCEDURE — 96367 TX/PROPH/DG ADDL SEQ IV INF: CPT

## 2021-03-02 PROCEDURE — 258N000003 HC RX IP 258 OP 636: Performed by: INTERNAL MEDICINE

## 2021-03-02 PROCEDURE — 250N000011 HC RX IP 250 OP 636: Performed by: INTERNAL MEDICINE

## 2021-03-02 PROCEDURE — 96415 CHEMO IV INFUSION ADDL HR: CPT

## 2021-03-02 PROCEDURE — 36415 COLL VENOUS BLD VENIPUNCTURE: CPT | Mod: ZL | Performed by: INTERNAL MEDICINE

## 2021-03-02 PROCEDURE — 80053 COMPREHEN METABOLIC PANEL: CPT | Mod: ZL | Performed by: INTERNAL MEDICINE

## 2021-03-02 RX ADMIN — SODIUM CHLORIDE 250 ML: 9 INJECTION, SOLUTION INTRAVENOUS at 14:27

## 2021-03-02 RX ADMIN — GEMCITABINE 2000 MG: 38 INJECTION, SOLUTION INTRAVENOUS at 15:03

## 2021-03-02 RX ADMIN — DEXAMETHASONE SODIUM PHOSPHATE 12 MG: 10 INJECTION INTRAMUSCULAR; INTRAVENOUS at 14:28

## 2021-03-02 ASSESSMENT — PAIN SCALES - GENERAL: PAINLEVEL: NO PAIN (0)

## 2021-03-02 NOTE — PROGRESS NOTES
Patient reported to facility today, stating he was feeling weird, unable to elaborate on what weird meant.  Completed assessment and patient still unable to elaborate.  Did conclude in the end of assessment patient was trying to report fatigue.    Patient looked a little dry, poor venous access compared to last week. Spoke with Dr. Tri MD he did approve a CMP.  Labs drawn no indication patient is dehydrated.  Treatment continued as ordered.

## 2021-03-02 NOTE — PROGRESS NOTES
Patient is 76 years old, here accompanied by self today for infusion of Gemzarunder the orders of Dr. Bartlett.  }     Patient lab values: WNL reviewed by this writer,  patient meets parameters for today's infusion.  Denies questions or concerns regarding today's infusion and/or medications being administered.      Independent dose check completed with Benitez FERNANDEZ.    Patient identified with two identifiers, order verified, and verbal consent for today's infusion obtained from patient.     1503 IV pump verified with gemzar  dose, drug, and rate of administration. Infusion administered per protocol. Patient tolerated infusion well, no signs or symptoms of adverse reaction noted. Patient denies pain nor discomfort.     Needle removed, tip intact. Site clean, dry and intact. Covered with a sterile bandage, slight pressure applied for 30 seconds. Pt instructed to leave bandage intact for a minimum of one hour, and to call with questions or concerns. Copy of appointments, discharge instructions, and after visit summary (AVS) provided to patient. Patient states understanding, discharged.

## 2021-03-02 NOTE — PATIENT INSTRUCTIONS
We will see you back as planned. If you have any questions or concerns, we can be reached Monday through Friday 8am - 430pm at [phone] ([facility] [place]). If you have concerns related to a potential reaction/side effect after hours/weekends/holiday's, please seek emergent medical care.

## 2021-03-03 ENCOUNTER — TELEPHONE (OUTPATIENT)
Dept: EDUCATION SERVICES | Facility: HOSPITAL | Age: 76
End: 2021-03-03

## 2021-03-04 NOTE — TELEPHONE ENCOUNTER
Called Colin. He has been receiving chemo and his BG readings are spiking. He is scheduled to have more chemo. Colin is open to coming in for an appointment with to review his BGs and may consider medication for spikes.    Will follow with Colin on 3/10/21 at 9:00 am

## 2021-03-08 ENCOUNTER — TELEPHONE (OUTPATIENT)
Dept: UROLOGY | Facility: CLINIC | Age: 76
End: 2021-03-08

## 2021-03-08 ENCOUNTER — ANESTHESIA EVENT (OUTPATIENT)
Dept: SURGERY | Facility: CLINIC | Age: 76
End: 2021-03-08

## 2021-03-08 ENCOUNTER — PRE VISIT (OUTPATIENT)
Dept: SURGERY | Facility: CLINIC | Age: 76
End: 2021-03-08

## 2021-03-08 ENCOUNTER — TELEPHONE (OUTPATIENT)
Dept: ONCOLOGY | Facility: OTHER | Age: 76
End: 2021-03-08

## 2021-03-08 ASSESSMENT — LIFESTYLE VARIABLES: TOBACCO_USE: 1

## 2021-03-08 NOTE — PATIENT INSTRUCTIONS
Preparing for Your Surgery      Name:  Colin Baxter   MRN:  [id number]   :  1945   Today's Date:  3/8/2021       Arriving for surgery:  Surgery date:  21  Arrival time:  5:00 am    Restrictions due to COVID 19:  One consistent visitor per patient is allowed  No ill visitors  All visitors must wear face mask     parking is available for anyone with mobility limitations or disabilities.  ([facility]  24 hours/ 7 days a week; [facility]  7 am- 3:30 pm, Mon- Fri)    Please come to:     [facility] [address]  [address] [place]  [address]    - ? parking is available in front of the hospital      -    Please proceed to [address] on the 3rd floor. [phone]?     - ?If you are in need of directions, wheelchair or escort please stop at the Information Desk in the lobby.  Inform the information person that you are here for surgery; a wheelchair and escort to [address] will be provided.?     What can I eat or drink?  -  You may eat and drink normally for up to 8 hours before your surgery. (Until 11:30 pm)  -  You may have clear liquids until 2 hours before surgery. (Until 5:00 am)    Examples of clear liquids:  Water  Clear broth  Juices (apple, white grape, white cranberry  and cider) without pulp  Noncarbonated, powder based beverages  (lemonade and Berto-Aid)  Sodas (Sprite, 7-Up, ginger ale and seltzer)  Coffee or tea (without milk or cream)  Gatorade    -  No Alcohol for at least 24 hours before surgery     Which medicines can I take?    Hold Aspirin for 7 days before surgery.   Hold Multivitamins for 7 days before surgery.  Hold Supplements for 7 days before surgery.  Hold Ibuprofen (Advil, Motrin) for 1 day before surgery--unless otherwise directed by surgeon.  Hold Naproxen (Aleve) for 4 days before surgery.    -  DO NOT take these medications the day of surgery:  Lisinopril (Zestril)  Metformin (Glucophage)    -  PLEASE TAKE these  medications the day of surgery:  Atorvastatin (Lipitor)  Eye drops  Tamsulosin (Flomax)  Acetaminophen (Tylenol)    How do I prepare myself?  - Please take 2 showers before surgery using Scrubcare or Hibiclens soap.    Use this soap only from the neck to your toes.     Leave the soap on your skin for one minute--then rinse thoroughly.      You may use your own shampoo and conditioner; no other hair products.   - Please remove all jewelry and body piercings.  - No lotions, deodorants or fragrance.   - Bring your ID and insurance card.    - All patients are required to have a Covid-19 test within 4 days of surgery/procedure.      -Patients will be contacted by the [facility] scheduling team within 1 week of surgery to make an appointment.      - Patients may call the Scheduling team at [phone] if they have not been scheduled within 4 days of  surgery.      Questions or Concerns:    - For any questions regarding the day of surgery or your hospital stay, please contact the Pre Admission Nursing Office at [phone].       - If you have health changes between today and your surgery please call your surgeon.       For questions after surgery please call your surgeons office.

## 2021-03-08 NOTE — TELEPHONE ENCOUNTER
Clinic Care Coordination Contact  Care Team Conversations    Patient called with questions regarding his plan of care. He reports that he got a call from the surgical nurse regarding his scheduled left nephroureterectomy. He was under the impression that he was going to receive neoadjuvant chemotherapy. Informed him that was the plan and I would reach out to clarify since he is still on the schedule for next week.  Nasreen Rhodes RN Oncology Care Coordinator

## 2021-03-08 NOTE — TELEPHONE ENCOUNTER
Surgery with Dr Mcnulty on 3/15/21 was rescheduled to 6/16/21 at [facility] while patient completes Chemotherapy.

## 2021-03-08 NOTE — ANESTHESIA PREPROCEDURE EVALUATION
Anesthesia Pre-Procedure Evaluation    Patient: Colin Baxter   MRN: [id number] : 1945        Preoperative Diagnosis: * No surgery found *   Procedure :      Past Medical History:   Diagnosis Date     Benign essential hypertension      Cancer (H)     Bladder     Diabetes (H)      Dyslipidemia       Past Surgical History:   Procedure Laterality Date     APPENDECTOMY       COLONOSCOPY  2011     COMBINED CYSTOSCOPY, RETROGRADES, URETEROSCOPY, INSERT STENT Left 2021    Procedure: CYSTOURETEROSCOPY, WITH RETROGRADE PYELOGRAM with interpretation of fluroscopy, ureteroscopy, ureteral biopsy, bladder biopsy and fulgaration;  Surgeon: Shonda Morrell MD;  Location[facility]     CYSTOSCOPY, BIOPSY BLADDER, COMBINED N/A 2015    Procedure: COMBINED CYSTOSCOPY, BIOPSY BLADDER;  Surgeon: Randy Martinez MD;  [facility]     CYSTOSCOPY, BIOPSY BLADDER, COMBINED N/A 2017    Procedure: COMBINED CYSTOSCOPY, BIOPSY BLADDER;  Surgeon: Randy Martinez MD;  [facility]     CYSTOSCOPY, BIOPSY BLADDER, COMBINED N/A 2018    Procedure: COMBINED CYSTOSCOPY, BIOPSY BLADDER;  Surgeon: Ed Helm MD;  Location: [facility]     CYSTOSCOPY, RETROGRADES, COMBINED Bilateral 2018    Procedure: Bilateral Retrograde Pyelagram, Bladder Biopsy;  Surgeon: Ed Helm MD;  Location: [facility]     CYSTOSCOPY, RETROGRADES, INSERT STENT URETER(S), COMBINED Left 2015    Procedure: COMBINED CYSTOSCOPY, RETROGRADES, INSERT STENT URETER(S);  Surgeon: Randy Martinez MD;  [facility]     CYSTOSCOPY, TRANSURETHRAL RESECTION (TUR) TUMOR BLADDER, COMBINED N/A 2015    Procedure: COMBINED CYSTOSCOPY, TRANSURETHRAL RESECTION (TUR) TUMOR BLADDER;  Surgeon: Randy Martinez MD;  [facility]     CYSTOSCOPY, TRANSURETHRAL RESECTION (TUR) TUMOR BLADDER, COMBINED N/A 2016    Procedure: COMBINED CYSTOSCOPY, TRANSURETHRAL RESECTION (TUR) TUMOR BLADDER;  Surgeon: Juan  Randy Floyd MD;  [facility]     CYSTOSCOPY, TRANSURETHRAL RESECTION (TUR) TUMOR BLADDER, COMBINED N/A 2016    Procedure: COMBINED CYSTOSCOPY, TRANSURETHRAL RESECTION (TUR) TUMOR BLADDER;  Surgeon: Randy Martinez MD;  [facility]     PHACOEMULSIFICATION WITH STANDARD INTRAOCULAR LENS IMPLANT Right 10/9/2018    Procedure: PHACOEMULSIFICATION WITH STANDARD INTRAOCULAR LENS IMPLANT;  PHACOEMULSIFICATION CATARACT EXTRACTION POSTERIOR CHAMBER LENS RIGHT;  Surgeon: Marlo Mcconnell MD;  [facility]     PHACOEMULSIFICATION WITH STANDARD INTRAOCULAR LENS IMPLANT Left 10/23/2018    Procedure: PHACOEMULSIFICATION CATARACT EXTRACTION POSTERIOR CHAMBER LENS LEFT;  Surgeon: Marlo Mcconnell MD;  [facility]      Allergies   Allergen Reactions     No Clinical Screening - See Comments Other (See Comments)     Beta blocker in glaucoma gtt.s caused low pulse and passing out       Social History     Tobacco Use     Smoking status: Former Smoker     Quit date: 1992     Years since quittin.1     Smokeless tobacco: Never Used   Substance Use Topics     Alcohol use: Yes     Comment: rarely      Wt Readings from Last 1 Encounters:   21 77.8 kg (171 lb 8.3 oz)        Anesthesia Evaluation   Pt has had prior anesthetic.     No history of anesthetic complications       ROS/MED HX  ENT/Pulmonary:     (+) CHARLIE risk factors, snores loudly, hypertension, tobacco use, Past use,  (-) asthma and COPD   Neurologic:  - neg neurologic ROS     Cardiovascular:     (+) Dyslipidemia hypertension-----    METS/Exercise Tolerance: >4 METS    Hematologic:  - neg hematologic  ROS  (-) history of blood clots and history of blood transfusion   Musculoskeletal:  - neg musculoskeletal ROS     GI/Hepatic:  - neg GI/hepatic ROS  (-) GERD and liver disease   Renal/Genitourinary: Comment: Left ureteral mass      Endo:     (+) type II DM, Last HgA1c: 5.7, date: 21, Not using insulin, - not using insulin pump.   (-) thyroid disease and chronic steroid usage   Psychiatric/Substance Use:  - neg psychiatric ROS     Infectious Disease:  - neg infectious disease ROS     Malignancy:   (+) Malignancy, History of Other.Other CA bladder status post.    Other:  - neg other ROS             OUTSIDE LABS:  CBC:   Lab Results   Component Value Date    WBC 5.4 03/02/2021    WBC 6.2 02/23/2021    HGB 9.3 (L) 03/02/2021    HGB 9.9 (L) 02/23/2021    HCT 26.8 (L) 03/02/2021    HCT 28.8 (L) 02/23/2021     03/02/2021     02/23/2021     BMP:   Lab Results   Component Value Date     03/02/2021     02/23/2021    POTASSIUM 4.3 03/02/2021    POTASSIUM 3.8 02/23/2021    CHLORIDE 103 03/02/2021    CHLORIDE 107 02/23/2021    CO2 26 03/02/2021    CO2 24 02/23/2021    BUN 29 03/02/2021    BUN 23 02/23/2021    CR 1.13 03/02/2021    CR 0.82 02/23/2021     (H) 03/02/2021    GLC 92 02/23/2021     COAGS: No results found for: PTT, INR, FIBR  POC: No results found for: BGM, HCG, HCGS  HEPATIC:   Lab Results   Component Value Date    ALBUMIN 3.5 03/02/2021    PROTTOTAL 6.8 03/02/2021    ALT 20 03/02/2021    AST 12 03/02/2021    ALKPHOS 55 03/02/2021    BILITOTAL 0.3 03/02/2021     OTHER:   Lab Results   Component Value Date    A1C 5.7 (H) 01/14/2021    MAHSA 8.6 03/02/2021    MAG 1.8 02/23/2021    TSH 1.41 10/02/2020    T4 0.99 10/02/2020             PAC Discussion and Assessment    ASA Classification: 3  Case is suitable for: [place]  Anesthetic techniques and relevant risks discussed: GA with regional block for post-op pain control  Invasive monitoring and risk discussed: No    Possibility and Risk of blood transfusion discussed: Yes            PAC Resident/NP Anesthesia Assessment: Colin Baxter is a 76 year old male scheduled for robot assisted nephroureterectomy, and cystoscopy ureteroscopy on 6/16/2021 by Dr. Mcnulty in treatment of left ureteral mass.  PAC referral for risk assessment and optimization for  anesthesia:    Pre-operative considerations:  1.  Cardiac:  Functional status- METS >4. Pt is very active outside taking care of his home and lawn. He mows his own grass.  Intermediate risk surgery with  (RCRI #) risk of major adverse cardiac event.   --hypertension, will hold lisinopril DOS  --dyslipidemia, continue Lipitor  --denies cardiac history.  Denies chest pain, chest pressure, SOB, WARE, palpitations, orthopnea, peripheral edema.    2.  Pulm:  Airway feasible.  CHARLIE risk: intermediate  --remote h/o smoking    3.  GI:  Risk of PONV score = 2.  If > 2, anti-emetic intervention recommended.    4.  Endo:  --NIDDM, will hold Metformin DOS  --A1c 1/14/21 of 5.7    5.  Onc:  --h/o bladder cancer, s/p BCG followed by GemCis chemotherapy x3, completed 5/2018  --recent upfront 4 cycles of chemotherapy with decreased size of left ureteral mass    6.  Other:  --pt to complete CBC, UA/UC, Covid test in [place].    VTE risk: 3%    Patient is optimized and is acceptable candidate for the proposed procedure.  No further diagnostic evaluation is needed.         **For further details of assessment, testing, and physical exam please see H and P completed on same date.          Christine Brannon PA-C, [facility]    Reviewed and Signed by PAC Mid-Level Provider/Resident  Mid-Level Provider/Resident: Christine Brannon  Date: 6/7/21                                 Christine Brannon PA-C

## 2021-03-08 NOTE — TELEPHONE ENCOUNTER
Clinic Care Coordination Contact  Care Team Conversations    Left message with surgery department for clarification.  Nasreen Rhodes, RN Oncology Care Coordinator

## 2021-03-10 ENCOUNTER — HOSPITAL ENCOUNTER (OUTPATIENT)
Dept: EDUCATION SERVICES | Facility: HOSPITAL | Age: 76
Discharge: HOME | End: 2021-03-10
Attending: NURSE PRACTITIONER | Admitting: FAMILY MEDICINE
Payer: COMMERCIAL

## 2021-03-10 VITALS
HEIGHT: 67 IN | SYSTOLIC BLOOD PRESSURE: 102 MMHG | WEIGHT: 173.7 LBS | HEART RATE: 63 BPM | BODY MASS INDEX: 27.26 KG/M2 | DIASTOLIC BLOOD PRESSURE: 60 MMHG | OXYGEN SATURATION: 98 %

## 2021-03-10 DIAGNOSIS — E11.65 TYPE 2 DIABETES MELLITUS WITH HYPERGLYCEMIA, WITHOUT LONG-TERM CURRENT USE OF INSULIN (H): Primary | ICD-10-CM

## 2021-03-10 PROCEDURE — G0108 DIAB MANAGE TRN  PER INDIV: HCPCS

## 2021-03-10 ASSESSMENT — MIFFLIN-ST. JEOR: SCORE: 1486.53

## 2021-03-10 ASSESSMENT — PAIN SCALES - GENERAL: PAINLEVEL: NO PAIN (0)

## 2021-03-10 NOTE — PROGRESS NOTES
"Diabetes Self-Management Education & Support    Presents for: Follow-up    SUBJECTIVE/OBJECTIVE:  Presents for: Follow-up  Accompanied by: Self  Diabetes education in the past 24mo: Yes  Focus of Visit: Monitoring, Problem Solving  Diabetes type: Type 2  Date of diagnosis: 7/29/19  Disease course: Other(elevated BGs with chemo treatments)  How confident are you filling out medical forms by yourself:: Quite a bit  Diabetes management related comments/concerns: BGs elevated with chemo  Transportation concerns: No  Difficulty affording diabetes medication?: No  Difficulty affording diabetes testing supplies?: No  Other concerns:: Glasses  Cultural Influences/Ethnic Background:  American    Diabetes Symptoms & Complications:  Fatigue: No  Neuropathy: No  Polydipsia: No  Polyphagia: No  Polyuria: No  Visual change: No  Slow healing wounds: No  Symptom course: Stable  Weight trend: Stable  Complications assessed today?: Yes  Autonomic neuropathy: No  CVA: No  Heart disease: No  Nephropathy: No  Peripheral neuropathy: No  Foot ulcerations: No  Peripheral Vascular Disease: No  Retinopathy: No    Patient Problem List and Family Medical History reviewed for relevant medical history, current medical status, and diabetes risk factors.    Vitals:  /60 (BP Location: Right arm, Cuff Size: Adult Regular)   Pulse 63   Ht 1.702 m (5' 7\")   Wt 78.8 kg (173 lb 11.2 oz)   SpO2 98%   BMI 27.21 kg/m    Estimated body mass index is 27.21 kg/m  as calculated from the following:    Height as of this encounter: 1.702 m (5' 7\").    Weight as of this encounter: 78.8 kg (173 lb 11.2 oz).   Last 3 BP:   BP Readings from Last 3 Encounters:   03/23/21 124/73   03/16/21 110/63   03/10/21 102/60       History   Smoking Status     Former Smoker     Quit date: 1/6/1992   Smokeless Tobacco     Never Used       Labs:  Lab Results   Component Value Date    A1C 5.7 01/14/2021     Lab Results   Component Value Date     03/16/2021     Lab " Results   Component Value Date    LDL 75 10/02/2020     HDL Cholesterol   Date Value Ref Range Status   10/02/2020 59 >39 mg/dL Final   ]  GFR Estimate   Date Value Ref Range Status   03/16/2021 86 >60 mL/min/[1.73_m2] Final     Comment:     Non  GFR Calc  Starting 12/18/2018, serum creatinine based estimated GFR (eGFR) will be   calculated using the Chronic Kidney Disease Epidemiology Collaboration   (CKD-EPI) equation.       GFR Estimate If Black   Date Value Ref Range Status   03/16/2021 >90 >60 mL/min/[1.73_m2] Final     Comment:      GFR Calc  Starting 12/18/2018, serum creatinine based estimated GFR (eGFR) will be   calculated using the Chronic Kidney Disease Epidemiology Collaboration   (CKD-EPI) equation.       Lab Results   Component Value Date    CR 0.85 03/16/2021     No results found for: MICROALBUMIN    Healthy Eating:  Healthy Eating Assessed Today: Yes  Cultural/Baptism diet restrictions?: No  Meal planning/habits: Smaller portions, Avoiding sweets  Breakfast: 1 toast or 1 english muffin, 1-2 eggs, breakfast meat - coffee/cream/splenda or corn flakes  Lunch: 1/2 sandwich, fruit, milk or 1.5 cups chili with corn bread or beef, cheese, milk, coffee - sometimes skips  Dinner: meat, veggies, 1 bread - sometimes salad - occasional starch (1/2 potato)  Snacks: grapes, leftover meat, 1/2 sandwich - Dove ice cream or 1/2 donut  Beverages: Water, Coffee, Milk  Has patient met with a dietitian in the past?: Yes    Being Active:  Being Active Assessed Today: Yes  Exercise:: Currently not exercising  Barrier to exercise: None    Monitoring:  Monitoring Assessed Today: Yes  Did patient bring glucose meter to appointment? : Yes  Blood Glucose Meter: One Touch  Times checking blood sugar at home (number): 1  Times checking blood sugar at home (per): Day  Blood glucose trend: Increasing    Taking Medications:  Diabetes Medication(s)     Biguanides       metFORMIN (GLUCOPHAGE-XR) 500  MG 24 hr tablet    Take 2 tablets (1,000 mg) by mouth 2 times daily (with meals)          Taking Medication Assessed Today: Yes  Current Treatments: Oral Medication (taken by mouth)  Problems taking diabetes medications regularly?: No  Diabetes medication side effects?: No    Problem Solving:  Problem Solving Assessed Today: Yes  Hypoglycemia Frequency: Never  Patient carries a carbohydrate source: No  Medical ID: No  Does patient have DKA prevention plan?: No  Does patient have severe weather/disaster plan for diabetes management?: No              Reducing Risks:  Reducing Risks Assessed Today: No  Has dilated eye exam at least once a year?: Yes  Sees dentist every 6 months?: No  Feet checked by healthcare provider in the last year?: Yes    Healthy Coping:  Healthy Coping Assessed Today: Yes  Emotional response to diabetes: Ready to learn  Informal Support system:: Family  Stage of change: MAINTENANCE (Working to maintain change, with risk of relapse)  Support resources: None  Patient Activation Measure Survey Score:  SHARITA Score (Last Two) 9/26/2018   SHARITA Raw Score 30   Activation Score 56   SHARITA Level 3       Diabetes knowledge and skills assessment:   Patient is knowledgeable in diabetes management concepts related to: Healthy Eating, Being Active, Monitoring and Taking Medication    Patient needs further education on the following diabetes management concepts: Monitoring and Taking Medication    Based on learning assessment above, most appropriate setting for further diabetes education would be: Individual setting.      INTERVENTIONS:    Education provided today on:  AADE Self-Care Behaviors:  Diabetes Pathophysiology  Monitoring: log and interpret results, individual blood glucose targets and frequency of monitoring  Taking Medication: action of prescribed medication, side effects of prescribed medications and when to take medications    Opportunities for ongoing education and support in diabetes-self management  were discussed.    Pt verbalized understanding of concepts discussed and recommendations provided today.       Education Materials Provided:  No new materials provided today      ASSESSMENT:  Colin is here today because he is seeing increased BG readings with his current chemo treatments.He wants to know what to do about it.    He has been checking post-supper readings at home: 155-266    Recent glucose labs have been elevated slightly since starting treatment:  Glucose   Date Value Ref Range Status   03/16/2021 149 (H) 70 - 99 mg/dL Final   03/02/2021 123 (H) 70 - 99 mg/dL Final   02/23/2021 92 70 - 99 mg/dL Final   02/15/2021 100 (H) 70 - 99 mg/dL Final   10/02/2020 103 (H) 70 - 99 mg/dL Final     Comment:     Fasting specimen     Discussed with Colin that it is the Decadron that he takes for the 3 days post-treatments that does increase his BG on those days. We could start some insulin to work when he is taking the Decadron.    Colin tells me that he has 3 more treatment sessions and would prefer to not start insulin at this time. I did tell him that we can re-look at this in the future.        Patient's most recent   Lab Results   Component Value Date    A1C 5.7 01/14/2021    is meeting goal of <7.0    PLAN  See Patient Instructions for co-developed, patient-stated behavior change goals.  Continue current diabetes plan.    Call with questions/concerns:[phone]  AVS printed and provided to patient today. See Follow-Up section for recommended follow-up.      Time Spent: 30 minutes  Encounter Type: Individual    Any diabetes medication dose changes were made via the CDE Protocol and Collaborative Practice Agreement with the patient's primary care provider. A copy of this encounter was shared with the provider.

## 2021-03-16 ENCOUNTER — INFUSION THERAPY VISIT (OUTPATIENT)
Dept: INFUSION THERAPY | Facility: OTHER | Age: 76
End: 2021-03-16
Attending: INTERNAL MEDICINE
Payer: COMMERCIAL

## 2021-03-16 VITALS
DIASTOLIC BLOOD PRESSURE: 63 MMHG | BODY MASS INDEX: 27.79 KG/M2 | TEMPERATURE: 97 F | OXYGEN SATURATION: 98 % | HEART RATE: 64 BPM | HEIGHT: 67 IN | SYSTOLIC BLOOD PRESSURE: 110 MMHG | WEIGHT: 177.03 LBS | RESPIRATION RATE: 15 BRPM

## 2021-03-16 DIAGNOSIS — C66.2 MALIGNANT NEOPLASM OF LEFT URETER (H): Primary | ICD-10-CM

## 2021-03-16 LAB
ALBUMIN SERPL-MCNC: 3.3 G/DL (ref 3.4–5)
ALP SERPL-CCNC: 58 U/L (ref 40–150)
ALT SERPL W P-5'-P-CCNC: 15 U/L (ref 0–70)
ANION GAP SERPL CALCULATED.3IONS-SCNC: 6 MMOL/L (ref 3–14)
AST SERPL W P-5'-P-CCNC: 10 U/L (ref 0–45)
BASOPHILS # BLD AUTO: 0 10E9/L (ref 0–0.2)
BASOPHILS NFR BLD AUTO: 0.5 %
BILIRUB SERPL-MCNC: 0.3 MG/DL (ref 0.2–1.3)
BUN SERPL-MCNC: 24 MG/DL (ref 7–30)
CALCIUM SERPL-MCNC: 8 MG/DL (ref 8.5–10.1)
CHLORIDE SERPL-SCNC: 109 MMOL/L (ref 94–109)
CO2 SERPL-SCNC: 24 MMOL/L (ref 20–32)
CREAT SERPL-MCNC: 0.85 MG/DL (ref 0.66–1.25)
DIFFERENTIAL METHOD BLD: ABNORMAL
EOSINOPHIL # BLD AUTO: 0.1 10E9/L (ref 0–0.7)
EOSINOPHIL NFR BLD AUTO: 2.2 %
ERYTHROCYTE [DISTWIDTH] IN BLOOD BY AUTOMATED COUNT: 15.9 % (ref 10–15)
GFR SERPL CREATININE-BSD FRML MDRD: 86 ML/MIN/{1.73_M2}
GLUCOSE SERPL-MCNC: 149 MG/DL (ref 70–99)
HCT VFR BLD AUTO: 25.8 % (ref 40–53)
HGB BLD-MCNC: 8.5 G/DL (ref 13.3–17.7)
IMM GRANULOCYTES # BLD: 0 10E9/L (ref 0–0.4)
IMM GRANULOCYTES NFR BLD: 0.7 %
LYMPHOCYTES # BLD AUTO: 1.2 10E9/L (ref 0.8–5.3)
LYMPHOCYTES NFR BLD AUTO: 30.3 %
MAGNESIUM SERPL-MCNC: 1.6 MG/DL (ref 1.6–2.3)
MCH RBC QN AUTO: 32.3 PG (ref 26.5–33)
MCHC RBC AUTO-ENTMCNC: 32.9 G/DL (ref 31.5–36.5)
MCV RBC AUTO: 98 FL (ref 78–100)
MONOCYTES # BLD AUTO: 0.6 10E9/L (ref 0–1.3)
MONOCYTES NFR BLD AUTO: 14.7 %
NEUTROPHILS # BLD AUTO: 2.1 10E9/L (ref 1.6–8.3)
NEUTROPHILS NFR BLD AUTO: 51.6 %
NRBC # BLD AUTO: 0 10*3/UL
NRBC BLD AUTO-RTO: 0 /100
PLATELET # BLD AUTO: 410 10E9/L (ref 150–450)
POTASSIUM SERPL-SCNC: 3.8 MMOL/L (ref 3.4–5.3)
PROT SERPL-MCNC: 6.2 G/DL (ref 6.8–8.8)
RBC # BLD AUTO: 2.63 10E12/L (ref 4.4–5.9)
SODIUM SERPL-SCNC: 139 MMOL/L (ref 133–144)
WBC # BLD AUTO: 4.1 10E9/L (ref 4–11)

## 2021-03-16 PROCEDURE — 80053 COMPREHEN METABOLIC PANEL: CPT | Mod: ZL | Performed by: INTERNAL MEDICINE

## 2021-03-16 PROCEDURE — 250N000011 HC RX IP 250 OP 636: Performed by: NURSE PRACTITIONER

## 2021-03-16 PROCEDURE — 96413 CHEMO IV INFUSION 1 HR: CPT

## 2021-03-16 PROCEDURE — 85025 COMPLETE CBC W/AUTO DIFF WBC: CPT | Mod: ZL | Performed by: INTERNAL MEDICINE

## 2021-03-16 PROCEDURE — 96368 THER/DIAG CONCURRENT INF: CPT

## 2021-03-16 PROCEDURE — 96415 CHEMO IV INFUSION ADDL HR: CPT

## 2021-03-16 PROCEDURE — 250N000011 HC RX IP 250 OP 636: Performed by: INTERNAL MEDICINE

## 2021-03-16 PROCEDURE — 96375 TX/PRO/DX INJ NEW DRUG ADDON: CPT

## 2021-03-16 PROCEDURE — 258N000003 HC RX IP 258 OP 636: Performed by: NURSE PRACTITIONER

## 2021-03-16 PROCEDURE — 258N000003 HC RX IP 258 OP 636: Performed by: INTERNAL MEDICINE

## 2021-03-16 PROCEDURE — 96367 TX/PROPH/DG ADDL SEQ IV INF: CPT

## 2021-03-16 PROCEDURE — 36415 COLL VENOUS BLD VENIPUNCTURE: CPT | Mod: ZL | Performed by: INTERNAL MEDICINE

## 2021-03-16 PROCEDURE — 96417 CHEMO IV INFUS EACH ADDL SEQ: CPT

## 2021-03-16 PROCEDURE — 83735 ASSAY OF MAGNESIUM: CPT | Mod: ZL | Performed by: INTERNAL MEDICINE

## 2021-03-16 RX ORDER — ALBUTEROL SULFATE 0.83 MG/ML
2.5 SOLUTION RESPIRATORY (INHALATION)
Status: CANCELLED | OUTPATIENT
Start: 2021-03-16

## 2021-03-16 RX ORDER — EPINEPHRINE 1 MG/ML
0.3 INJECTION, SOLUTION, CONCENTRATE INTRAVENOUS EVERY 5 MIN PRN
Status: CANCELLED | OUTPATIENT
Start: 2021-03-16

## 2021-03-16 RX ORDER — SODIUM CHLORIDE 9 MG/ML
1000 INJECTION, SOLUTION INTRAVENOUS CONTINUOUS PRN
Status: CANCELLED
Start: 2021-03-16

## 2021-03-16 RX ORDER — PALONOSETRON 0.05 MG/ML
0.25 INJECTION, SOLUTION INTRAVENOUS ONCE
Status: COMPLETED | OUTPATIENT
Start: 2021-03-16 | End: 2021-03-16

## 2021-03-16 RX ORDER — SODIUM CHLORIDE 9 MG/ML
1000 INJECTION, SOLUTION INTRAVENOUS CONTINUOUS PRN
Status: CANCELLED
Start: 2021-03-23

## 2021-03-16 RX ORDER — HEPARIN SODIUM,PORCINE 10 UNIT/ML
5 VIAL (ML) INTRAVENOUS
Status: CANCELLED | OUTPATIENT
Start: 2021-03-16

## 2021-03-16 RX ORDER — ALBUTEROL SULFATE 0.83 MG/ML
2.5 SOLUTION RESPIRATORY (INHALATION)
Status: CANCELLED | OUTPATIENT
Start: 2021-03-23

## 2021-03-16 RX ORDER — MEPERIDINE HYDROCHLORIDE 25 MG/ML
25 INJECTION INTRAMUSCULAR; INTRAVENOUS; SUBCUTANEOUS EVERY 30 MIN PRN
Status: CANCELLED | OUTPATIENT
Start: 2021-03-16

## 2021-03-16 RX ORDER — ALBUTEROL SULFATE 90 UG/1
1-2 AEROSOL, METERED RESPIRATORY (INHALATION)
Status: CANCELLED
Start: 2021-03-16

## 2021-03-16 RX ORDER — LORAZEPAM 2 MG/ML
0.5 INJECTION INTRAMUSCULAR EVERY 4 HOURS PRN
Status: CANCELLED
Start: 2021-03-23

## 2021-03-16 RX ORDER — METHYLPREDNISOLONE SODIUM SUCCINATE 125 MG/2ML
125 INJECTION, POWDER, LYOPHILIZED, FOR SOLUTION INTRAMUSCULAR; INTRAVENOUS
Status: CANCELLED
Start: 2021-03-23

## 2021-03-16 RX ORDER — EPINEPHRINE 1 MG/ML
0.3 INJECTION, SOLUTION, CONCENTRATE INTRAVENOUS EVERY 5 MIN PRN
Status: CANCELLED | OUTPATIENT
Start: 2021-03-23

## 2021-03-16 RX ORDER — METHYLPREDNISOLONE SODIUM SUCCINATE 125 MG/2ML
125 INJECTION, POWDER, LYOPHILIZED, FOR SOLUTION INTRAMUSCULAR; INTRAVENOUS
Status: CANCELLED
Start: 2021-03-16

## 2021-03-16 RX ORDER — DIPHENHYDRAMINE HYDROCHLORIDE 50 MG/ML
50 INJECTION INTRAMUSCULAR; INTRAVENOUS
Status: CANCELLED
Start: 2021-03-23

## 2021-03-16 RX ORDER — NALOXONE HYDROCHLORIDE 0.4 MG/ML
.1-.4 INJECTION, SOLUTION INTRAMUSCULAR; INTRAVENOUS; SUBCUTANEOUS
Status: CANCELLED | OUTPATIENT
Start: 2021-03-16

## 2021-03-16 RX ORDER — LORAZEPAM 2 MG/ML
0.5 INJECTION INTRAMUSCULAR EVERY 4 HOURS PRN
Status: CANCELLED
Start: 2021-03-16

## 2021-03-16 RX ORDER — DIPHENHYDRAMINE HYDROCHLORIDE 50 MG/ML
50 INJECTION INTRAMUSCULAR; INTRAVENOUS
Status: CANCELLED
Start: 2021-03-16

## 2021-03-16 RX ORDER — ALBUTEROL SULFATE 90 UG/1
1-2 AEROSOL, METERED RESPIRATORY (INHALATION)
Status: CANCELLED
Start: 2021-03-23

## 2021-03-16 RX ORDER — MEPERIDINE HYDROCHLORIDE 25 MG/ML
25 INJECTION INTRAMUSCULAR; INTRAVENOUS; SUBCUTANEOUS EVERY 30 MIN PRN
Status: CANCELLED | OUTPATIENT
Start: 2021-03-23

## 2021-03-16 RX ORDER — HEPARIN SODIUM,PORCINE 10 UNIT/ML
5 VIAL (ML) INTRAVENOUS
Status: CANCELLED | OUTPATIENT
Start: 2021-03-23

## 2021-03-16 RX ORDER — NALOXONE HYDROCHLORIDE 0.4 MG/ML
.1-.4 INJECTION, SOLUTION INTRAMUSCULAR; INTRAVENOUS; SUBCUTANEOUS
Status: CANCELLED | OUTPATIENT
Start: 2021-03-23

## 2021-03-16 RX ADMIN — CISPLATIN 100 MG: 100 INJECTION, SOLUTION INTRAVENOUS at 10:48

## 2021-03-16 RX ADMIN — PALONOSETRON 0.25 MG: 0.05 INJECTION, SOLUTION INTRAVENOUS at 08:54

## 2021-03-16 RX ADMIN — MAGNESIUM SULFATE HEPTAHYDRATE: 500 INJECTION, SOLUTION INTRAMUSCULAR; INTRAVENOUS at 10:46

## 2021-03-16 RX ADMIN — GEMCITABINE 2000 MG: 38 INJECTION, SOLUTION INTRAVENOUS at 10:00

## 2021-03-16 RX ADMIN — SODIUM CHLORIDE 1000 ML: 9 INJECTION, SOLUTION INTRAVENOUS at 08:54

## 2021-03-16 RX ADMIN — FOSAPREPITANT: 150 INJECTION, POWDER, LYOPHILIZED, FOR SOLUTION INTRAVENOUS at 09:14

## 2021-03-16 ASSESSMENT — PAIN SCALES - GENERAL: PAINLEVEL: NO PAIN (0)

## 2021-03-16 ASSESSMENT — MIFFLIN-ST. JEOR: SCORE: 1501.63

## 2021-03-16 NOTE — PROGRESS NOTES
Patient is a 76 year old male here accompanied by self today for infusion of Cisplatin/Gemcitabine under the orders of Dr. Bartlett.      Cisplatin and Gemcitabine dose(s) verified with JIM Ortega prior to release of drug.    Labs reviewed and patient meets parameters for today's infusion.  Denies questions or concerns regarding today's infusion and/or medications being administered.      Patient identified with two identifiers, order verified, and verbal consent for today's infusion obtained from patient.    1000: IV pump verified with Gemcitabine dose, drug, and rate of administration. Infusion administered per protocol. Patient tolerated infusion well, no signs or symptoms of adverse reaction noted. Patient denies pain nor discomfort.     1048: IV pump verified with Cisplatin dose, drug, and rate of administration. Infusion administered per protocol. Patient tolerated infusion well, no signs or symptoms of adverse reaction noted. Patient denies pain nor discomfort.     Needle removed, tip intact. Site clean, dry and intact. Covered with a sterile bandage, slight pressure applied for 30 seconds. Pt instructed to leave bandage intact for a minimum of one hour, and to call with questions or concerns. Copy of appointments, discharge instructions, and after visit summary (AVS) provided to patient. Patient states understanding, discharged ambulatory.

## 2021-03-16 NOTE — PATIENT INSTRUCTIONS
We will see you back as planned. If you have any questions or concerns, we can be reached Monday through Friday 8am - 430pm at [phone] ([facility] [place]). If you have concerns related to a potential reaction/side effect after hours/weekends/holiday's, please seek emergent medical care.

## 2021-03-18 ENCOUNTER — TELEPHONE (OUTPATIENT)
Dept: ONCOLOGY | Facility: OTHER | Age: 76
End: 2021-03-18

## 2021-03-18 NOTE — TELEPHONE ENCOUNTER
Clinic Care Coordination Contact  Care Team Conversations    Per jerome Jensen that patient should try to decrease dose first to 4 mg once daily for 3 days after treatment. If need to increase back up to 4 mg twice daily we could refer to diabetic ed. Offered to reach out to PCP patient will first try decreasing the dose.  Nasreen Rhodes RN Oncology Care Coordinator

## 2021-03-18 NOTE — TELEPHONE ENCOUNTER
Clinic Care Coordination Contact  Care Team Conversations    Returned TC to patient. He is wondering if it is absolutely necessary that he take the dexamethasone the 3 days after treatment. He reports that it is messing with his BG stating this morning it was 300. He states that he is not having any nausea or vomiting since he started treatment. Explained that the dex is to prevent nausea and vomiting. Discussed reaching out to PCP or possibly diabetic ed. Informed him that I will talk with provider and return call with instruction.  Nasreen Rhodes RN Oncology Care Coordinator

## 2021-03-23 ENCOUNTER — INFUSION THERAPY VISIT (OUTPATIENT)
Dept: INFUSION THERAPY | Facility: OTHER | Age: 76
End: 2021-03-23
Attending: INTERNAL MEDICINE
Payer: COMMERCIAL

## 2021-03-23 VITALS
OXYGEN SATURATION: 98 % | RESPIRATION RATE: 16 BRPM | SYSTOLIC BLOOD PRESSURE: 124 MMHG | WEIGHT: 173.28 LBS | TEMPERATURE: 97.4 F | HEART RATE: 74 BPM | DIASTOLIC BLOOD PRESSURE: 73 MMHG | BODY MASS INDEX: 27.14 KG/M2

## 2021-03-23 DIAGNOSIS — C66.2 MALIGNANT NEOPLASM OF LEFT URETER (H): Primary | ICD-10-CM

## 2021-03-23 LAB
ANISOCYTOSIS BLD QL SMEAR: SLIGHT
BASOPHILS # BLD AUTO: 0 10E9/L (ref 0–0.2)
BASOPHILS NFR BLD AUTO: 1 %
DIFFERENTIAL METHOD BLD: ABNORMAL
EOSINOPHIL # BLD AUTO: 0 10E9/L (ref 0–0.7)
EOSINOPHIL NFR BLD AUTO: 1 %
ERYTHROCYTE [DISTWIDTH] IN BLOOD BY AUTOMATED COUNT: 15.3 % (ref 10–15)
HCT VFR BLD AUTO: 28.1 % (ref 40–53)
HGB BLD-MCNC: 9.6 G/DL (ref 13.3–17.7)
LYMPHOCYTES # BLD AUTO: 1.3 10E9/L (ref 0.8–5.3)
LYMPHOCYTES NFR BLD AUTO: 34 %
MACROCYTES BLD QL SMEAR: PRESENT
MCH RBC QN AUTO: 33 PG (ref 26.5–33)
MCHC RBC AUTO-ENTMCNC: 34.2 G/DL (ref 31.5–36.5)
MCV RBC AUTO: 97 FL (ref 78–100)
METAMYELOCYTES # BLD: 0.1 10E9/L
METAMYELOCYTES NFR BLD MANUAL: 2 %
MONOCYTES # BLD AUTO: 0.1 10E9/L (ref 0–1.3)
MONOCYTES NFR BLD AUTO: 3 %
MYELOCYTES # BLD: 0.1 10E9/L
MYELOCYTES NFR BLD MANUAL: 3 %
NEUTROPHILS # BLD AUTO: 2 10E9/L (ref 1.6–8.3)
NEUTROPHILS NFR BLD AUTO: 51 %
NEUTS BAND # BLD AUTO: 0.2 10E9/L (ref 0–0.6)
NEUTS BAND NFR BLD MANUAL: 5 %
PLATELET # BLD AUTO: 442 10E9/L (ref 150–450)
PLATELET # BLD EST: ABNORMAL 10*3/UL
RBC # BLD AUTO: 2.91 10E12/L (ref 4.4–5.9)
RBC MORPH BLD: ABNORMAL
WBC # BLD AUTO: 3.9 10E9/L (ref 4–11)

## 2021-03-23 PROCEDURE — 85025 COMPLETE CBC W/AUTO DIFF WBC: CPT | Mod: ZL | Performed by: NURSE PRACTITIONER

## 2021-03-23 PROCEDURE — 96367 TX/PROPH/DG ADDL SEQ IV INF: CPT

## 2021-03-23 PROCEDURE — 250N000011 HC RX IP 250 OP 636: Performed by: NURSE PRACTITIONER

## 2021-03-23 PROCEDURE — 96413 CHEMO IV INFUSION 1 HR: CPT

## 2021-03-23 PROCEDURE — 36415 COLL VENOUS BLD VENIPUNCTURE: CPT | Mod: ZL | Performed by: NURSE PRACTITIONER

## 2021-03-23 PROCEDURE — 258N000003 HC RX IP 258 OP 636: Performed by: NURSE PRACTITIONER

## 2021-03-23 RX ADMIN — GEMCITABINE 2000 MG: 38 INJECTION, SOLUTION INTRAVENOUS at 14:50

## 2021-03-23 RX ADMIN — DEXAMETHASONE SODIUM PHOSPHATE 12 MG: 10 INJECTION INTRAMUSCULAR; INTRAVENOUS at 14:26

## 2021-03-23 RX ADMIN — SODIUM CHLORIDE 250 ML: 9 INJECTION, SOLUTION INTRAVENOUS at 14:25

## 2021-03-23 NOTE — PROGRESS NOTES
Peripheral labs ordered. Butterfly needle inserted into RT ARM.  Immediate blood return noted. ONE lab tubes drawn. Needle removed, covered with sterile guaze and coban. Patient tolerated well, denies pain or discomfort at this time. Patient discharged.

## 2021-03-23 NOTE — PATIENT INSTRUCTIONS
We will see you back as planned. If you have any questions or concerns, we can be reached Monday through Friday 8am - 430pm at [phone] ([facility] [place]). If you have concerns related to a potential reaction/side effect after hours/weekends/holiday's, please seek emergent medical care.

## 2021-03-23 NOTE — PROGRESS NOTES
Patient is a 76 year old here today for infusion of gemzar under the orders of Dr. Bartlett.      24 gauge angio cath inserted into left upper forearm.  Immediate blood return noted.  IV secured with sterile, transparent dressing and tape. Labs drawn per clinic policy/procedure. Patient tolerated well, denies pain or discomfort at this time.  Flushes easily without resistance, no signs or symptoms of infiltration or infection.  Patient denies questions or concerns regarding infusion and/or medication(s) being administered.    Today's lab values: WBC 3.9, ANC 2.0, , HGB 9.6    Gemzar dose verified with Estephania Donnelly RN prior to release of drug.    Patient meets parameters for today's infusion.  Denies questions or concerns regarding today's infusion and/or medications being administered.      Patient identified with two identifiers, order verified, and verbal consent for today's infusion obtained from patient.    1450 IV pump verified with Gemzar dose, drug, and rate of administration. Infusion administered per protocol. Patient tolerated infusion well, no signs or symptoms of adverse reaction noted. Patient denies pain nor discomfort.     Needle removed, tip intact. Site clean, dry and intact. Covered with a sterile bandage, slight pressure applied for 30 seconds. Pt instructed to leave bandage intact for a minimum of one hour, and to call with questions or concerns. Copy of appointments, discharge instructions, and after visit summary (AVS) provided to patient. Patient states understanding, discharged ambulatory.

## 2021-04-05 RX ORDER — PALONOSETRON 0.05 MG/ML
0.25 INJECTION, SOLUTION INTRAVENOUS ONCE
Status: CANCELLED
Start: 2021-04-06

## 2021-04-06 ENCOUNTER — INFUSION THERAPY VISIT (OUTPATIENT)
Dept: INFUSION THERAPY | Facility: OTHER | Age: 76
End: 2021-04-06
Attending: INTERNAL MEDICINE
Payer: COMMERCIAL

## 2021-04-06 ENCOUNTER — ONCOLOGY VISIT (OUTPATIENT)
Dept: ONCOLOGY | Facility: OTHER | Age: 76
End: 2021-04-06
Attending: NURSE PRACTITIONER
Payer: COMMERCIAL

## 2021-04-06 VITALS
TEMPERATURE: 96.7 F | RESPIRATION RATE: 20 BRPM | WEIGHT: 177.03 LBS | BODY MASS INDEX: 27.79 KG/M2 | HEIGHT: 67 IN | SYSTOLIC BLOOD PRESSURE: 121 MMHG | HEART RATE: 64 BPM | DIASTOLIC BLOOD PRESSURE: 71 MMHG | OXYGEN SATURATION: 98 %

## 2021-04-06 VITALS — DIASTOLIC BLOOD PRESSURE: 78 MMHG | SYSTOLIC BLOOD PRESSURE: 113 MMHG | HEART RATE: 64 BPM

## 2021-04-06 DIAGNOSIS — R79.9 ELEVATED BUN: ICD-10-CM

## 2021-04-06 DIAGNOSIS — C66.2 MALIGNANT NEOPLASM OF LEFT URETER (H): Primary | ICD-10-CM

## 2021-04-06 DIAGNOSIS — E83.42 HYPOMAGNESEMIA: ICD-10-CM

## 2021-04-06 DIAGNOSIS — D64.9 ANEMIA, UNSPECIFIED TYPE: ICD-10-CM

## 2021-04-06 LAB
ALBUMIN SERPL-MCNC: 3.7 G/DL (ref 3.4–5)
ALP SERPL-CCNC: 64 U/L (ref 40–150)
ALT SERPL W P-5'-P-CCNC: 17 U/L (ref 0–70)
ANION GAP SERPL CALCULATED.3IONS-SCNC: 6 MMOL/L (ref 3–14)
AST SERPL W P-5'-P-CCNC: 17 U/L (ref 0–45)
BASOPHILS # BLD AUTO: 0 10E9/L (ref 0–0.2)
BASOPHILS NFR BLD AUTO: 0.6 %
BILIRUB SERPL-MCNC: 0.4 MG/DL (ref 0.2–1.3)
BUN SERPL-MCNC: 31 MG/DL (ref 7–30)
CALCIUM SERPL-MCNC: 8.5 MG/DL (ref 8.5–10.1)
CHLORIDE SERPL-SCNC: 108 MMOL/L (ref 94–109)
CO2 SERPL-SCNC: 24 MMOL/L (ref 20–32)
CREAT SERPL-MCNC: 1.09 MG/DL (ref 0.66–1.25)
DIFFERENTIAL METHOD BLD: ABNORMAL
EOSINOPHIL # BLD AUTO: 0.2 10E9/L (ref 0–0.7)
EOSINOPHIL NFR BLD AUTO: 3.3 %
ERYTHROCYTE [DISTWIDTH] IN BLOOD BY AUTOMATED COUNT: 16.3 % (ref 10–15)
FERRITIN SERPL-MCNC: 136 NG/ML (ref 26–388)
FOLATE SERPL-MCNC: 9.6 NG/ML
GFR SERPL CREATININE-BSD FRML MDRD: 66 ML/MIN/{1.73_M2}
GLUCOSE SERPL-MCNC: 99 MG/DL (ref 70–99)
HCT VFR BLD AUTO: 25.7 % (ref 40–53)
HGB BLD-MCNC: 8.7 G/DL (ref 13.3–17.7)
IMM GRANULOCYTES # BLD: 0 10E9/L (ref 0–0.4)
IMM GRANULOCYTES NFR BLD: 0.6 %
IRON SATN MFR SERPL: 31 % (ref 15–46)
IRON SERPL-MCNC: 75 UG/DL (ref 35–180)
LYMPHOCYTES # BLD AUTO: 1.3 10E9/L (ref 0.8–5.3)
LYMPHOCYTES NFR BLD AUTO: 27.4 %
MAGNESIUM SERPL-MCNC: 1.5 MG/DL (ref 1.6–2.3)
MCH RBC QN AUTO: 33.1 PG (ref 26.5–33)
MCHC RBC AUTO-ENTMCNC: 33.9 G/DL (ref 31.5–36.5)
MCV RBC AUTO: 98 FL (ref 78–100)
MONOCYTES # BLD AUTO: 0.9 10E9/L (ref 0–1.3)
MONOCYTES NFR BLD AUTO: 17.9 %
NEUTROPHILS # BLD AUTO: 2.4 10E9/L (ref 1.6–8.3)
NEUTROPHILS NFR BLD AUTO: 50.2 %
NRBC # BLD AUTO: 0 10*3/UL
NRBC BLD AUTO-RTO: 0 /100
PLATELET # BLD AUTO: 190 10E9/L (ref 150–450)
POTASSIUM SERPL-SCNC: 4 MMOL/L (ref 3.4–5.3)
PROT SERPL-MCNC: 6.7 G/DL (ref 6.8–8.8)
RBC # BLD AUTO: 2.63 10E12/L (ref 4.4–5.9)
SODIUM SERPL-SCNC: 138 MMOL/L (ref 133–144)
TIBC SERPL-MCNC: 240 UG/DL (ref 240–430)
VIT B12 SERPL-MCNC: 321 PG/ML (ref 193–986)
WBC # BLD AUTO: 4.9 10E9/L (ref 4–11)

## 2021-04-06 PROCEDURE — 96375 TX/PRO/DX INJ NEW DRUG ADDON: CPT

## 2021-04-06 PROCEDURE — G0463 HOSPITAL OUTPT CLINIC VISIT: HCPCS

## 2021-04-06 PROCEDURE — G0463 HOSPITAL OUTPT CLINIC VISIT: HCPCS | Mod: 25

## 2021-04-06 PROCEDURE — 250N000011 HC RX IP 250 OP 636: Performed by: NURSE PRACTITIONER

## 2021-04-06 PROCEDURE — 36415 COLL VENOUS BLD VENIPUNCTURE: CPT | Mod: ZL | Performed by: NURSE PRACTITIONER

## 2021-04-06 PROCEDURE — 82746 ASSAY OF FOLIC ACID SERUM: CPT | Mod: ZL | Performed by: NURSE PRACTITIONER

## 2021-04-06 PROCEDURE — 96415 CHEMO IV INFUSION ADDL HR: CPT

## 2021-04-06 PROCEDURE — 83540 ASSAY OF IRON: CPT | Mod: ZL | Performed by: NURSE PRACTITIONER

## 2021-04-06 PROCEDURE — 250N000011 HC RX IP 250 OP 636: Performed by: INTERNAL MEDICINE

## 2021-04-06 PROCEDURE — 96374 THER/PROPH/DIAG INJ IV PUSH: CPT

## 2021-04-06 PROCEDURE — 83735 ASSAY OF MAGNESIUM: CPT | Mod: ZL | Performed by: NURSE PRACTITIONER

## 2021-04-06 PROCEDURE — 96413 CHEMO IV INFUSION 1 HR: CPT

## 2021-04-06 PROCEDURE — 83550 IRON BINDING TEST: CPT | Mod: ZL | Performed by: NURSE PRACTITIONER

## 2021-04-06 PROCEDURE — 82728 ASSAY OF FERRITIN: CPT | Mod: ZL | Performed by: NURSE PRACTITIONER

## 2021-04-06 PROCEDURE — 96367 TX/PROPH/DG ADDL SEQ IV INF: CPT

## 2021-04-06 PROCEDURE — 258N000003 HC RX IP 258 OP 636: Performed by: NURSE PRACTITIONER

## 2021-04-06 PROCEDURE — 82607 VITAMIN B-12: CPT | Mod: ZL | Performed by: NURSE PRACTITIONER

## 2021-04-06 PROCEDURE — 96365 THER/PROPH/DIAG IV INF INIT: CPT

## 2021-04-06 PROCEDURE — 258N000003 HC RX IP 258 OP 636: Performed by: INTERNAL MEDICINE

## 2021-04-06 PROCEDURE — 96368 THER/DIAG CONCURRENT INF: CPT

## 2021-04-06 PROCEDURE — 96366 THER/PROPH/DIAG IV INF ADDON: CPT

## 2021-04-06 PROCEDURE — 80053 COMPREHEN METABOLIC PANEL: CPT | Mod: ZL | Performed by: NURSE PRACTITIONER

## 2021-04-06 PROCEDURE — 96417 CHEMO IV INFUS EACH ADDL SEQ: CPT

## 2021-04-06 PROCEDURE — 99215 OFFICE O/P EST HI 40 MIN: CPT | Performed by: NURSE PRACTITIONER

## 2021-04-06 PROCEDURE — 85025 COMPLETE CBC W/AUTO DIFF WBC: CPT | Mod: ZL | Performed by: NURSE PRACTITIONER

## 2021-04-06 RX ORDER — ALBUTEROL SULFATE 0.83 MG/ML
2.5 SOLUTION RESPIRATORY (INHALATION)
Status: CANCELLED | OUTPATIENT
Start: 2021-04-13

## 2021-04-06 RX ORDER — LORAZEPAM 2 MG/ML
0.5 INJECTION INTRAMUSCULAR EVERY 4 HOURS PRN
Status: CANCELLED
Start: 2021-04-13

## 2021-04-06 RX ORDER — HEPARIN SODIUM,PORCINE 10 UNIT/ML
5 VIAL (ML) INTRAVENOUS
Status: CANCELLED | OUTPATIENT
Start: 2021-04-13

## 2021-04-06 RX ORDER — DIPHENHYDRAMINE HYDROCHLORIDE 50 MG/ML
50 INJECTION INTRAMUSCULAR; INTRAVENOUS
Status: CANCELLED
Start: 2021-04-13

## 2021-04-06 RX ORDER — ALBUTEROL SULFATE 90 UG/1
1-2 AEROSOL, METERED RESPIRATORY (INHALATION)
Status: CANCELLED
Start: 2021-04-06

## 2021-04-06 RX ORDER — DIPHENHYDRAMINE HYDROCHLORIDE 50 MG/ML
50 INJECTION INTRAMUSCULAR; INTRAVENOUS
Status: CANCELLED
Start: 2021-04-06

## 2021-04-06 RX ORDER — SODIUM CHLORIDE 9 MG/ML
1000 INJECTION, SOLUTION INTRAVENOUS CONTINUOUS PRN
Status: CANCELLED
Start: 2021-04-06

## 2021-04-06 RX ORDER — MEPERIDINE HYDROCHLORIDE 25 MG/ML
25 INJECTION INTRAMUSCULAR; INTRAVENOUS; SUBCUTANEOUS EVERY 30 MIN PRN
Status: CANCELLED | OUTPATIENT
Start: 2021-04-06

## 2021-04-06 RX ORDER — ALBUTEROL SULFATE 0.83 MG/ML
2.5 SOLUTION RESPIRATORY (INHALATION)
Status: CANCELLED | OUTPATIENT
Start: 2021-04-06

## 2021-04-06 RX ORDER — LORAZEPAM 2 MG/ML
0.5 INJECTION INTRAMUSCULAR EVERY 4 HOURS PRN
Status: CANCELLED
Start: 2021-04-06

## 2021-04-06 RX ORDER — METHYLPREDNISOLONE SODIUM SUCCINATE 125 MG/2ML
125 INJECTION, POWDER, LYOPHILIZED, FOR SOLUTION INTRAMUSCULAR; INTRAVENOUS
Status: CANCELLED
Start: 2021-04-06

## 2021-04-06 RX ORDER — MEPERIDINE HYDROCHLORIDE 25 MG/ML
25 INJECTION INTRAMUSCULAR; INTRAVENOUS; SUBCUTANEOUS EVERY 30 MIN PRN
Status: CANCELLED | OUTPATIENT
Start: 2021-04-13

## 2021-04-06 RX ORDER — ALBUTEROL SULFATE 90 UG/1
1-2 AEROSOL, METERED RESPIRATORY (INHALATION)
Status: CANCELLED
Start: 2021-04-13

## 2021-04-06 RX ORDER — EPINEPHRINE 1 MG/ML
0.3 INJECTION, SOLUTION, CONCENTRATE INTRAVENOUS EVERY 5 MIN PRN
Status: CANCELLED | OUTPATIENT
Start: 2021-04-06

## 2021-04-06 RX ORDER — NALOXONE HYDROCHLORIDE 0.4 MG/ML
.1-.4 INJECTION, SOLUTION INTRAMUSCULAR; INTRAVENOUS; SUBCUTANEOUS
Status: CANCELLED | OUTPATIENT
Start: 2021-04-06

## 2021-04-06 RX ORDER — METHYLPREDNISOLONE SODIUM SUCCINATE 125 MG/2ML
125 INJECTION, POWDER, LYOPHILIZED, FOR SOLUTION INTRAMUSCULAR; INTRAVENOUS
Status: CANCELLED
Start: 2021-04-13

## 2021-04-06 RX ORDER — HEPARIN SODIUM,PORCINE 10 UNIT/ML
5 VIAL (ML) INTRAVENOUS
Status: CANCELLED | OUTPATIENT
Start: 2021-04-06

## 2021-04-06 RX ORDER — PALONOSETRON 0.05 MG/ML
0.25 INJECTION, SOLUTION INTRAVENOUS ONCE
Status: COMPLETED | OUTPATIENT
Start: 2021-04-06 | End: 2021-04-06

## 2021-04-06 RX ORDER — MAGNESIUM OXIDE 400 MG/1
400 TABLET ORAL 2 TIMES DAILY
Qty: 60 TABLET | Refills: 1 | Status: SHIPPED | OUTPATIENT
Start: 2021-04-06 | End: 2021-06-04

## 2021-04-06 RX ORDER — SODIUM CHLORIDE 9 MG/ML
1000 INJECTION, SOLUTION INTRAVENOUS CONTINUOUS PRN
Status: CANCELLED
Start: 2021-04-13

## 2021-04-06 RX ORDER — NALOXONE HYDROCHLORIDE 0.4 MG/ML
.1-.4 INJECTION, SOLUTION INTRAMUSCULAR; INTRAVENOUS; SUBCUTANEOUS
Status: CANCELLED | OUTPATIENT
Start: 2021-04-13

## 2021-04-06 RX ORDER — EPINEPHRINE 1 MG/ML
0.3 INJECTION, SOLUTION, CONCENTRATE INTRAVENOUS EVERY 5 MIN PRN
Status: CANCELLED | OUTPATIENT
Start: 2021-04-13

## 2021-04-06 RX ADMIN — SODIUM CHLORIDE 1000 ML: 9 INJECTION, SOLUTION INTRAVENOUS at 09:06

## 2021-04-06 RX ADMIN — GEMCITABINE 2000 MG: 38 INJECTION, SOLUTION INTRAVENOUS at 10:30

## 2021-04-06 RX ADMIN — CISPLATIN 100 MG: 100 INJECTION, SOLUTION INTRAVENOUS at 11:10

## 2021-04-06 RX ADMIN — MAGNESIUM SULFATE HEPTAHYDRATE: 500 INJECTION, SOLUTION INTRAMUSCULAR; INTRAVENOUS at 11:08

## 2021-04-06 RX ADMIN — PALONOSETRON 0.25 MG: 0.05 INJECTION, SOLUTION INTRAVENOUS at 09:36

## 2021-04-06 RX ADMIN — FOSAPREPITANT: 150 INJECTION, POWDER, LYOPHILIZED, FOR SOLUTION INTRAVENOUS at 09:38

## 2021-04-06 ASSESSMENT — PAIN SCALES - GENERAL: PAINLEVEL: NO PAIN (0)

## 2021-04-06 ASSESSMENT — MIFFLIN-ST. JEOR: SCORE: 1501.63

## 2021-04-06 NOTE — PROGRESS NOTES
22 gauge angio cath inserted into left lower forearm.  Immediate blood return noted.  IV secured with sterile, transparent dressing and tape.  Patient tolerated well, denies pain or discomfort at this time.  Flushes easily without resistance, no signs or symptoms of infiltration or infection.   Patient denies questions or concerns regarding infusion and/or medication(s) being administered.

## 2021-04-06 NOTE — PATIENT INSTRUCTIONS
We will see you back as planned. If you have any questions or concerns, we can be reached Monday through Friday 8am - 430pm at [phone] ([facility]). If you have concerns related to a potential reaction/side effect after hours/weekends/holiday's, please seek emergent medical care.

## 2021-04-06 NOTE — PROGRESS NOTES
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    He presents to the clinic today stating he is doing good.  He does have occasional days where he is more fatigued than others.  Denies any issues with dysuria or blood in his urine and states he does get up a couple times a night to go to urinate.  He did have a little constipation for few days but managed this.  Denies any issue with nausea.  States he is not taking the Dexamethasone as prescribed related to the fact that it increases his blood sugars.  He took only 2 days of the Dex after his last cycle with only half dose each day.  States he did fine with no nausea.    He states his surgery is scheduled for June 16, 2021.    Oncologic History:    2016 patient was diagnosed with high-grade bladder cancer T1; patient was treated with BCG and then again reinduction BCG then alek intravesically x3 in May 2018    1/5/2021 patient was seen by Dr. Morrell for history of hematuria.  He underwent cystoscopy which revealed bladder lesions with biopsy being negative.  Cytology showed atypical urothelial cells.  He underwent CT urogram which showed a mass in the left ureter measuring 2 cm with no hydronephrosis.  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    2/10/2021  he underwent PET scan that was negative for metastatic disease and then seen by Dr. Mcnulty at the [facility] and scheduled for robotic assisted laparoscopic left nephroureterectomy.       2/15/2021 he was seen by Dr. Bartlett with Medical Oncology to discuss neoadjuvant chemotherapy.  Recommendation was for gemcitabine 1000 mg per metered squared day 1 and day 8 and cisplatin 70 mg per metered squared on day 1 of a 21-day cycle x4 cycles followed by surgery.    Current Chemo Regime/TX: Gemcitabine 1000 mg per metered squared on day 1 and day 8 and cisplatin 56 mg per metered squared day 1 every 21 days  Current Cycle: 3  # of completed cycles: 2    Previous treatment:  n/a    Past Medical History:   Diagnosis Date     Benign essential hypertension      Cancer (H)     Bladder     Diabetes (H)      Dyslipidemia        Past Surgical History:   Procedure Laterality Date     APPENDECTOMY  1958     COLONOSCOPY  2-     COMBINED CYSTOSCOPY, RETROGRADES, URETEROSCOPY, INSERT STENT Left 1/18/2021    Procedure: CYSTOURETEROSCOPY, WITH RETROGRADE PYELOGRAM with interpretation of fluroscopy, ureteroscopy, ureteral biopsy, bladder biopsy and fulgaration;  Surgeon: Shonda Morrell MD;  Location: [facility]     CYSTOSCOPY, BIOPSY BLADDER, COMBINED N/A 9/8/2015    Procedure: COMBINED CYSTOSCOPY, BIOPSY BLADDER;  Surgeon: Randy Martinez MD;  Location: [facility]     CYSTOSCOPY, BIOPSY BLADDER, COMBINED N/A 2/14/2017    Procedure: COMBINED CYSTOSCOPY, BIOPSY BLADDER;  Surgeon: Randy Martinez MD;  Location: [facility]     CYSTOSCOPY, BIOPSY BLADDER, COMBINED N/A 11/13/2018    Procedure: COMBINED CYSTOSCOPY, BIOPSY BLADDER;  Surgeon: Ed Helm MD;  Location: [facility]     CYSTOSCOPY, RETROGRADES, COMBINED Bilateral 11/13/2018    Procedure: Bilateral Retrograde Pyelagram, Bladder Biopsy;  Surgeon: Ed Helm MD;  Location: [facility]     CYSTOSCOPY, RETROGRADES, INSERT STENT URETER(S), COMBINED Left 9/8/2015    Procedure: COMBINED CYSTOSCOPY, RETROGRADES, INSERT STENT URETER(S);  Surgeon: Randy Martinez MD;  Location: [facility]     CYSTOSCOPY, TRANSURETHRAL RESECTION (TUR) TUMOR BLADDER, COMBINED  N/A 2015    Procedure: COMBINED CYSTOSCOPY, TRANSURETHRAL RESECTION (TUR) TUMOR BLADDER;  Surgeon: Randy Martinez MD;  Location: [facility]     CYSTOSCOPY, TRANSURETHRAL RESECTION (TUR) TUMOR BLADDER, COMBINED N/A 2016    Procedure: COMBINED CYSTOSCOPY, TRANSURETHRAL RESECTION (TUR) TUMOR BLADDER;  Surgeon: Randy Martinez MD;  Location: [facility]     CYSTOSCOPY, TRANSURETHRAL RESECTION (TUR) TUMOR BLADDER, COMBINED N/A 2016    Procedure: COMBINED CYSTOSCOPY, TRANSURETHRAL RESECTION (TUR) TUMOR BLADDER;  Surgeon: Randy Martinez MD;  Location: [facility]     PHACOEMULSIFICATION WITH STANDARD INTRAOCULAR LENS IMPLANT Right 10/9/2018    Procedure: PHACOEMULSIFICATION WITH STANDARD INTRAOCULAR LENS IMPLANT;  PHACOEMULSIFICATION CATARACT EXTRACTION POSTERIOR CHAMBER LENS RIGHT;  Surgeon: Marlo Mcconnell MD;  Location: [facility]     PHACOEMULSIFICATION WITH STANDARD INTRAOCULAR LENS IMPLANT Left 10/23/2018    Procedure: PHACOEMULSIFICATION CATARACT EXTRACTION POSTERIOR CHAMBER LENS LEFT;  Surgeon: Marlo Mcconnell MD;  Location: [facility]       Family History   Problem Relation Age of Onset     Diabetes Mother      Parkinsonism Brother        Social History     Socioeconomic History     Marital status:      Spouse name: Not on file     Number of children: Not on file     Years of education: Not on file     Highest education level: Not on file   Occupational History     Not on file   Social Needs     Financial resource strain: Not on file     Food insecurity     Worry: Not on file     Inability: Not on file     Transportation needs     Medical: Not on file     Non-medical: Not on file   Tobacco Use     Smoking status: Former Smoker     Quit date: 1992     Years since quittin.2     Smokeless tobacco: Never Used   Substance and Sexual Activity     Alcohol use: Yes     Comment: rarely     Drug use: No     Sexual activity: Not Currently   Lifestyle     Physical activity     Days per  week: Not on file     Minutes per session: Not on file     Stress: Not on file   Relationships     Social connections     Talks on phone: Not on file     Gets together: Not on file     Attends [employer]ism service: Not on file     Active member of club or organization: Not on file     Attends meetings of clubs or organizations: Not on file     Relationship status: Not on file     Intimate partner violence     Fear of current or ex partner: Not on file     Emotionally abused: Not on file     Physically abused: Not on file     Forced sexual activity: Not on file   Other Topics Concern     Parent/sibling w/ CABG, MI or angioplasty before 65F 55M? No   Social History Narrative     Not on file       Current Outpatient Medications   Medication     atorvastatin (LIPITOR) 40 MG tablet     blood glucose (ONETOUCH VERIO IQ) test strip     brimonidine (ALPHAGAN-P) 0.15 % ophthalmic solution     dexamethasone (DECADRON) 4 MG tablet     latanoprost (XALATAN) 0.005 % ophthalmic solution     lisinopril (ZESTRIL) 10 MG tablet     metFORMIN (GLUCOPHAGE-XR) 500 MG 24 hr tablet     ONETOUCH DELICA LANCETS 33G MISC     tamsulosin (FLOMAX) 0.4 MG capsule     IBUPROFEN PO     LORazepam (ATIVAN) 0.5 MG tablet     oxyCODONE (ROXICODONE) 5 MG tablet     prochlorperazine (COMPAZINE) 10 MG tablet     No current facility-administered medications for this visit.         Allergies   Allergen Reactions     No Clinical Screening - See Comments Other (See Comments)     Beta blocker in glaucoma gtt.s caused low pulse and passing out        Review Of Systems:  Constitutional:    denies fever, weight changes, chills, and night sweats.  Eyes:   States he has had issues with his right eye for 6 to 7 years and feels his vision may be a little blurry at times after chemo  Ears/Nose/Throat:   denies ear pain, nose problems, difficulty swallowing  Respiratory:   denies shortness of breath, cough   Skin:   denies rash, lesions; feels he may be itching a little  "more  Cardiovascular:   denies chest pain, palpitations, edema  Gastrointestinal:   denies abdominal pain, bloating, nausea, early satiety  Musculoskeletal:    denies new muscle pain, bone pain  Neurologic:   denies headaches, numbness or tingling  Psychiatric:   denies anxiety, depression  Hematologic/Lymphatic/Immunologic:   denies easy bruising, easy bleeding, lumps or bumps noted  Endocrine:   Denies increased thirst    ECOG Performance Status: 1    Physical Exam:  /71   Pulse 64   Temp 96.7  F (35.9  C) (Tympanic)   Resp 20   Ht 1.702 m (5' 7\")   Wt 80.3 kg (177 lb 0.5 oz)   SpO2 98%   BMI 27.73 kg/m      GENERAL APPEARANCE: Healthy, alert and in no acute distress.  HEENT: Normocephalic, Sclerae anicteric.   NECK:   No asymmetry or masses, no thyromegaly.  LYMPHATICS: No palpable cervical, supraclavicular, axillary, or inguinal nodes   RESP: Lungs clear to auscultation bilaterally, respirations regular and easy  CARDIOVASCULAR: Regular rate and rhythm. Normal S1, S2; no murmur, gallop, or rub.  ABDOMEN: Soft, nontender. Bowel sounds auscultated all 4 quadrants. No palpable organomegaly or masses.  MUSCULOSKELETAL: Extremities without gross deformities noted. No edema of bilateral lower extremities.  NEURO: Alert and oriented x 3.  Gait steady.  PSYCHIATRIC: Mentation and affect appear normal.  Mood appropriate.    Laboratory:  Results for orders placed or performed in visit on 04/06/21   CBC with platelets differential     Status: Abnormal   Result Value Ref Range    WBC 4.9 4.0 - 11.0 10e9/L    RBC Count 2.63 (L) 4.4 - 5.9 10e12/L    Hemoglobin 8.7 (L) 13.3 - 17.7 g/dL    Hematocrit 25.7 (L) 40.0 - 53.0 %    MCV 98 78 - 100 fl    MCH 33.1 (H) 26.5 - 33.0 pg    MCHC 33.9 31.5 - 36.5 g/dL    RDW 16.3 (H) 10.0 - 15.0 %    Platelet Count 190 150 - 450 10e9/L    Diff Method Automated Method     % Neutrophils 50.2 %    % Lymphocytes 27.4 %    % Monocytes 17.9 %    % Eosinophils 3.3 %    % Basophils 0.6 " %    % Immature Granulocytes 0.6 %    Nucleated RBCs 0 0 /100    Absolute Neutrophil 2.4 1.6 - 8.3 10e9/L    Absolute Lymphocytes 1.3 0.8 - 5.3 10e9/L    Absolute Monocytes 0.9 0.0 - 1.3 10e9/L    Absolute Eosinophils 0.2 0.0 - 0.7 10e9/L    Absolute Basophils 0.0 0.0 - 0.2 10e9/L    Abs Immature Granulocytes 0.0 0 - 0.4 10e9/L    Absolute Nucleated RBC 0.0    Comprehensive metabolic panel     Status: Abnormal   Result Value Ref Range    Sodium 138 133 - 144 mmol/L    Potassium 4.0 3.4 - 5.3 mmol/L    Chloride 108 94 - 109 mmol/L    Carbon Dioxide 24 20 - 32 mmol/L    Anion Gap 6 3 - 14 mmol/L    Glucose 99 70 - 99 mg/dL    Urea Nitrogen 31 (H) 7 - 30 mg/dL    Creatinine 1.09 0.66 - 1.25 mg/dL    GFR Estimate 66 >60 mL/min/[1.73_m2]    GFR Estimate If Black 77 >60 mL/min/[1.73_m2]    Calcium 8.5 8.5 - 10.1 mg/dL    Bilirubin Total 0.4 0.2 - 1.3 mg/dL    Albumin 3.7 3.4 - 5.0 g/dL    Protein Total 6.7 (L) 6.8 - 8.8 g/dL    Alkaline Phosphatase 64 40 - 150 U/L    ALT 17 0 - 70 U/L    AST 17 0 - 45 U/L   Magnesium     Status: Abnormal   Result Value Ref Range    Magnesium 1.5 (L) 1.6 - 2.3 mg/dL       Imaging Studies: None completed for today's visit      ASSESSMENT/PLAN:    #1 malignant neoplasm of the left ureter:  High-grade urothelial cell carcinoma of the left ureter diagnosed January 2021.  He is currently receiving neoadjuvant therapy and will receive cycle 3 today.  He will follow-up prior to cycle 4 with labs per treatment plan.    #2 hypomagnesia: We will start magnesium oxide 400 mg twice a day and this will be rechecked per treatment plan.    #3  Elevated BUN: We did discuss the importance of increasing his fluid intake and he verbalized understanding.    #4  Anemia: We will obtain an iron/TIBC and ferritin along with a vitamin B12 and folate.    I encouraged patient to call with any questions or concerns.    40 minutes spent in the patient's encounter today with time spent in review of the chart along with  in chart preparation.  Time was also spent obtaining a review of systems and performing a physical exam along with review of all lab work in detail with the patient.  Time was also spent in coordinating follow-ups, placing additional orders and in chart documentation.      Sabra Jensen, NP  APRN, FNP-BC, AOCNP

## 2021-04-06 NOTE — PATIENT INSTRUCTIONS
We would like to see you back per your schedule.     Your prescription for Magnesium has been sent to: [facility].      When you are in need of a refill, please call your pharmacy and they will send us a request.     If you have any questions please call [phone]    Other instructions:  Increase fluids

## 2021-04-06 NOTE — PROGRESS NOTES
Patient is a 76 year old here accompanied by self today for infusion of cisplatin/gemzar under the orders of Dr. Bartlett.   Today's lab values: WBC 4.9, ANC 2.4, , HGB 8.7, AST 17, ALT 17, Alkaline Phosphatase 64, Creatinine 1.09, Magnesium 1.5     Cisplatin/gemzar dose(s) verified with Estephania Donnelly RN prior to release of drug.    Patient meets parameters for today's infusion.  Denies questions or concerns regarding today's infusion and/or medications being administered.      Patient identified with two identifiers, order verified, and verbal consent for today's infusion obtained from patient.    1030 IV pump verified with gemzar dose, drug, and rate of administration. Infusion administered per protocol. Patient tolerated infusion well, no signs or symptoms of adverse reaction noted. Patient denies pain nor discomfort.     1110 IV pump verified with cisplatin dose, drug, and rate of administration. Infusion administered per protocol. Patient tolerated infusion well, no signs or symptoms of adverse reaction noted. Patient denies pain nor discomfort.     Needle removed, tip intact. Site clean, dry and intact. Covered with a sterile bandage, slight pressure applied for 30 seconds. Pt instructed to leave bandage intact for a minimum of one hour, and to call with questions or concerns. Copy of appointments, discharge instructions, and after visit summary (AVS) provided to patient. Patient states understanding, discharged ambulatory.

## 2021-04-06 NOTE — NURSING NOTE
"Chief Complaint   Patient presents with     RECHECK     Follow up Malignant neoplasm of anterior wall of urinary bladder        Initial /71   Pulse 64   Temp 96.7  F (35.9  C) (Tympanic)   Resp 20   Ht 1.702 m (5' 7\")   Wt 80.3 kg (177 lb 0.5 oz)   SpO2 98%   BMI 27.73 kg/m   Estimated body mass index is 27.73 kg/m  as calculated from the following:    Height as of this encounter: 1.702 m (5' 7\").    Weight as of this encounter: 80.3 kg (177 lb 0.5 oz).  Medication Reconciliation: complete.  Immunizations and advance directives status reviewed. Pain scale =0 , PHQ-2=0.            Verónica Leung LPN    "

## 2021-04-13 ENCOUNTER — INFUSION THERAPY VISIT (OUTPATIENT)
Dept: INFUSION THERAPY | Facility: OTHER | Age: 76
End: 2021-04-13
Attending: INTERNAL MEDICINE
Payer: COMMERCIAL

## 2021-04-13 VITALS
OXYGEN SATURATION: 98 % | HEART RATE: 73 BPM | RESPIRATION RATE: 18 BRPM | DIASTOLIC BLOOD PRESSURE: 72 MMHG | TEMPERATURE: 97.6 F | WEIGHT: 179.01 LBS | SYSTOLIC BLOOD PRESSURE: 112 MMHG | BODY MASS INDEX: 28.04 KG/M2

## 2021-04-13 DIAGNOSIS — C66.2 MALIGNANT NEOPLASM OF LEFT URETER (H): Primary | ICD-10-CM

## 2021-04-13 DIAGNOSIS — Z53.9 ERRONEOUS ENCOUNTER--DISREGARD: ICD-10-CM

## 2021-04-13 LAB
BASOPHILS # BLD AUTO: 0 10E9/L (ref 0–0.2)
BASOPHILS NFR BLD AUTO: 1.6 %
DIFFERENTIAL METHOD BLD: ABNORMAL
EOSINOPHIL # BLD AUTO: 0.1 10E9/L (ref 0–0.7)
EOSINOPHIL NFR BLD AUTO: 4.7 %
ERYTHROCYTE [DISTWIDTH] IN BLOOD BY AUTOMATED COUNT: 15.9 % (ref 10–15)
HCT VFR BLD AUTO: 26.6 % (ref 40–53)
HGB BLD-MCNC: 9 G/DL (ref 13.3–17.7)
IMM GRANULOCYTES # BLD: 0.1 10E9/L (ref 0–0.4)
IMM GRANULOCYTES NFR BLD: 2 %
LYMPHOCYTES # BLD AUTO: 1.4 10E9/L (ref 0.8–5.3)
LYMPHOCYTES NFR BLD AUTO: 54.1 %
MCH RBC QN AUTO: 33.3 PG (ref 26.5–33)
MCHC RBC AUTO-ENTMCNC: 33.8 G/DL (ref 31.5–36.5)
MCV RBC AUTO: 99 FL (ref 78–100)
MONOCYTES # BLD AUTO: 0.4 10E9/L (ref 0–1.3)
MONOCYTES NFR BLD AUTO: 15.3 %
NEUTROPHILS # BLD AUTO: 0.6 10E9/L (ref 1.6–8.3)
NEUTROPHILS NFR BLD AUTO: 22.3 %
NRBC # BLD AUTO: 0 10*3/UL
NRBC BLD AUTO-RTO: 0 /100
PLATELET # BLD AUTO: 216 10E9/L (ref 150–450)
RBC # BLD AUTO: 2.7 10E12/L (ref 4.4–5.9)
WBC # BLD AUTO: 2.6 10E9/L (ref 4–11)

## 2021-04-13 PROCEDURE — 85025 COMPLETE CBC W/AUTO DIFF WBC: CPT | Mod: ZL | Performed by: NURSE PRACTITIONER

## 2021-04-13 PROCEDURE — 36415 COLL VENOUS BLD VENIPUNCTURE: CPT | Mod: ZL | Performed by: NURSE PRACTITIONER

## 2021-04-13 NOTE — PROGRESS NOTES
Patient is a 76 year old here today for infusion of gemzar under the orders of Dr. Bartlett.   22 gauge angio cath inserted into left upper forearm.  Immediate blood return noted.  IV secured with sterile, transparent dressing and tape.  Labs drawn per clinic policy/procedure. Patient tolerated well, denies pain or discomfort at this time.  Flushes easily without resistance, no signs or symptoms of infiltration or infection.   Patient denies questions or concerns regarding infusion and/or medication(s) being administered.    Today's lab values: WBC 2.6, ANC 0.6, , HGB 9.0    Patient does not meet parameters for today's treatment.     Today's labs reviewed with EILOT Jensen NP. VORB from ELIOT Jensen NP to cancel today's treatment and have labs (CBC) drawn a week from today. CBC placed in open orders.    Patient scheduled by RASHID Jaeger for labs (CBC) 4/20/2021 0945 at clinic lab.     Patient educated on neutropenia, signs/symptoms, and when to seek medical care. Educational handout provided for patient on neutropenia. Patient verbalizes understanding.    Needle removed, tip intact. Site clean, dry and intact. Covered with a sterile bandage, slight pressure applied for 30 seconds. Pt instructed to leave bandage intact for a minimum of one hour, and to call with questions or concerns. Copy of appointments, discharge instructions, and after visit summary (AVS) provided to patient. Patient states understanding, discharged ambulatory.   This encounter was opened in error. Please disregard.

## 2021-04-13 NOTE — PATIENT INSTRUCTIONS
Patient Education     Neutropenia  White blood cells (WBCs) help protect the body from infection. Neutrophils are a type of white blood cell. Their main job is to help the body fight bacterial and fungal infections. Neutropenia occurs when there are fewer neutrophils in the blood than normal. It can range from mild to severe. This depends on the number of neutrophils in the blood. Severe neutropenia puts a person at higher risk for having more infections. Bacterial and fungal infections are most common. Your healthcare provider can tell you more about your condition and whether it needs to be treated.     What causes neutropenia?  There are 2 main types of neutropenia: congenital and acquired. Each type has many causes:     Congenital neutropenia. These are the types that are present at birth. They are caused by certain rare genetic conditions, such as Kostmann syndrome. Most often the neutropenia is mild and normal for certain ethnic groups.    Acquired neutropenia. This type is not present at birth. Causes include:  ? Certain medicines, such as antibiotics and chemotherapy medicines  ? Certain autoimmune conditions  ? Certain viral, bacterial, or parasitic infections  ? Too little folate or vitamin B-12 in the diet  ? Underlying bone marrow problem, such as leukemia or myelodysplastic syndrome (MDS)  ? Other causes  How is neutropenia diagnosed?  Your healthcare provider may check for neutropenia if you have frequent infections. Your provider may also check for neutropenia if you re having certain treatments, such as chemotherapy, which is known to cause a lower neutrophil count. Tests will be done to confirm the problem. These may include:     A complete blood cell count (CBC). This test measures the amounts of the different types of cells in your blood. This includes the WBCs. The WBC count can be broken down further to find the number of neutrophils and immature neutrophils (bands) in your blood. This is called  an absolute neutrophil count (ANC).    A blood smear. This test checks for the different types of blood cells in your blood and how they appear. A sample of your blood is spread on a glass slide and viewed under a microscope. A stain is used so the blood cells can be seen.    A bone marrow aspiration and biopsy. This test checks for problems with how your bone marrow makes blood cells. A needle is used to remove a sample of the bone marrow in your hipbone. The sample is then sent to a lab to be tested for problems.  How is neutropenia treated?    If there is a clear cause of neutropenia, it is addressed. For instance, if a medicine is the cause, it may be stopped or changed.    For mild cases, often no treatment is needed.    For moderate to severe cases, treatment is likely needed. This may include:  ? G-CSF (granulocyte-colony stimulating factor). This is a special type of protein. It helps promote the growth and activity of neutrophils. G-CSF is given by injection.  ? Bone marrow transplant. This treatment replaces diseased bone marrow cells with healthy cells from a matched donor. This treatment is done only in specific severe cases.    What is the long-term outcome of neutropenia?  The outcome of neutropenia varies for each person. For some people, neutropenia may resolve after a few weeks or months. For other people, it may be long-lasting. In these cases, ongoing care and treatment is needed. Your healthcare provider will talk to you more about what to expect from your condition.   When to call your healthcare provider  Call your healthcare provider right away if you have any of the following:    Fever of 100.4 F (38 C) or higher. Call 911 or 24-hour urgent care. This is especially important if you have severe neutropenia, which puts you at higher risk for a life-threatening infection.    Cold sweat or chills    Chest pain or trouble breathing    Sore throat    Extreme tiredness or fatigue    Nausea and  "vomiting    Redness, warmth, or drainage from any open cuts or wounds    Pain or burning with urination; frequent urination    Pain, burning, or bleeding in the rectum    Severe constipation or diarrhea    Bloody stool or urine   How can I prevent infections?  With neutropenia, you need to take extra care to protect yourself from infection:     Wash your hands often, especially before eating and after using the bathroom. Use clean, running water and soap. Scrub for 20 seconds, or for as long as it takes to sing the \"Happy Birthday\" song from beginning to end, twice. Or use a hand gel that contains at least 60% alcohol.    Stay away from crowds and close contact with others who may be ill.    Cook meat and eggs all the way through to kill any germs.    Carefully wash raw fruits and vegetables.    Clean items you use often with disinfectant wipes. This includes phones and computer keyboards.    Don't touch your eyes, nose, and mouth, especially if your hands are not clean.    Practice good oral hygiene. Use a soft toothbrush. Also, brush and floss your teeth gently.    Always wipe from front to back after a bowel movement.    Bathe every day and use an unscented lotion to prevent cracked skin.    Keep cuts and scrapes clean and covered until they heal.    Don't share items such as drinks, eating utensils, towels, toothbrushes, razors, clothing, and sports equipment.    Store and handle foods safely to prevent food-borne illness.    Protect yourself against pet waste (urine and stool) by using vinyl gloves when cleaning. Always use gloves when gardening.    Ask your healthcare provider if you need to take antibiotics before and after having any dental or medical procedures.    Ask your healthcare provider if you need to wear a special mask near construction sites or farm areas.  [employer] last reviewed this educational content on 4/1/2020 2000-2021 [employer]. All rights reserved. This information is " not intended as a substitute for professional medical care. Always follow your healthcare professional's instructions.         Thank you for scheduling your appointment with us today. If you have any questions or concerns related to procedure/medication at today's visit, please contact your primary care provider, or the provider who ordered today's procedure/medication. If you have any questions related to scheduling with our unit, or if you are having trouble getting in contact with your ordering provider, we can be reached at [phone].

## 2021-04-20 DIAGNOSIS — C66.2 MALIGNANT NEOPLASM OF LEFT URETER (H): ICD-10-CM

## 2021-04-20 LAB
BASOPHILS # BLD AUTO: 0 10E9/L (ref 0–0.2)
BASOPHILS NFR BLD AUTO: 0.5 %
DIFFERENTIAL METHOD BLD: ABNORMAL
EOSINOPHIL # BLD AUTO: 0.1 10E9/L (ref 0–0.7)
EOSINOPHIL NFR BLD AUTO: 1.7 %
ERYTHROCYTE [DISTWIDTH] IN BLOOD BY AUTOMATED COUNT: 16.2 % (ref 10–15)
HCT VFR BLD AUTO: 28.4 % (ref 40–53)
HGB BLD-MCNC: 9.6 G/DL (ref 13.3–17.7)
IMM GRANULOCYTES # BLD: 0 10E9/L (ref 0–0.4)
IMM GRANULOCYTES NFR BLD: 0.2 %
LYMPHOCYTES # BLD AUTO: 1.4 10E9/L (ref 0.8–5.3)
LYMPHOCYTES NFR BLD AUTO: 23.1 %
MCH RBC QN AUTO: 33 PG (ref 26.5–33)
MCHC RBC AUTO-ENTMCNC: 33.8 G/DL (ref 31.5–36.5)
MCV RBC AUTO: 98 FL (ref 78–100)
MONOCYTES # BLD AUTO: 1.2 10E9/L (ref 0–1.3)
MONOCYTES NFR BLD AUTO: 19.6 %
NEUTROPHILS # BLD AUTO: 3.3 10E9/L (ref 1.6–8.3)
NEUTROPHILS NFR BLD AUTO: 54.9 %
NRBC # BLD AUTO: 0 10*3/UL
NRBC BLD AUTO-RTO: 0 /100
PLATELET # BLD AUTO: 183 10E9/L (ref 150–450)
RBC # BLD AUTO: 2.91 10E12/L (ref 4.4–5.9)
WBC # BLD AUTO: 6 10E9/L (ref 4–11)

## 2021-04-20 PROCEDURE — 36415 COLL VENOUS BLD VENIPUNCTURE: CPT | Mod: ZL | Performed by: NURSE PRACTITIONER

## 2021-04-20 PROCEDURE — 85025 COMPLETE CBC W/AUTO DIFF WBC: CPT | Mod: ZL | Performed by: NURSE PRACTITIONER

## 2021-04-25 RX ORDER — NALOXONE HYDROCHLORIDE 0.4 MG/ML
.1-.4 INJECTION, SOLUTION INTRAMUSCULAR; INTRAVENOUS; SUBCUTANEOUS
Status: CANCELLED | OUTPATIENT
Start: 2021-04-27

## 2021-04-25 RX ORDER — ALBUTEROL SULFATE 90 UG/1
1-2 AEROSOL, METERED RESPIRATORY (INHALATION)
Status: CANCELLED
Start: 2021-05-04

## 2021-04-25 RX ORDER — HEPARIN SODIUM,PORCINE 10 UNIT/ML
5 VIAL (ML) INTRAVENOUS
Status: CANCELLED | OUTPATIENT
Start: 2021-04-27

## 2021-04-25 RX ORDER — DIPHENHYDRAMINE HYDROCHLORIDE 50 MG/ML
50 INJECTION INTRAMUSCULAR; INTRAVENOUS
Status: CANCELLED
Start: 2021-04-27

## 2021-04-25 RX ORDER — METHYLPREDNISOLONE SODIUM SUCCINATE 125 MG/2ML
125 INJECTION, POWDER, LYOPHILIZED, FOR SOLUTION INTRAMUSCULAR; INTRAVENOUS
Status: CANCELLED
Start: 2021-04-27

## 2021-04-25 RX ORDER — SODIUM CHLORIDE 9 MG/ML
1000 INJECTION, SOLUTION INTRAVENOUS CONTINUOUS PRN
Status: CANCELLED
Start: 2021-05-04

## 2021-04-25 RX ORDER — METHYLPREDNISOLONE SODIUM SUCCINATE 125 MG/2ML
125 INJECTION, POWDER, LYOPHILIZED, FOR SOLUTION INTRAMUSCULAR; INTRAVENOUS
Status: CANCELLED
Start: 2021-05-04

## 2021-04-25 RX ORDER — PALONOSETRON 0.05 MG/ML
0.25 INJECTION, SOLUTION INTRAVENOUS ONCE
Status: CANCELLED
Start: 2021-04-27

## 2021-04-25 RX ORDER — DIPHENHYDRAMINE HYDROCHLORIDE 50 MG/ML
50 INJECTION INTRAMUSCULAR; INTRAVENOUS
Status: CANCELLED
Start: 2021-05-04

## 2021-04-25 RX ORDER — ALBUTEROL SULFATE 0.83 MG/ML
2.5 SOLUTION RESPIRATORY (INHALATION)
Status: CANCELLED | OUTPATIENT
Start: 2021-04-27

## 2021-04-25 RX ORDER — HEPARIN SODIUM,PORCINE 10 UNIT/ML
5 VIAL (ML) INTRAVENOUS
Status: CANCELLED | OUTPATIENT
Start: 2021-05-04

## 2021-04-25 RX ORDER — ALBUTEROL SULFATE 0.83 MG/ML
2.5 SOLUTION RESPIRATORY (INHALATION)
Status: CANCELLED | OUTPATIENT
Start: 2021-05-04

## 2021-04-25 RX ORDER — ALBUTEROL SULFATE 90 UG/1
1-2 AEROSOL, METERED RESPIRATORY (INHALATION)
Status: CANCELLED
Start: 2021-04-27

## 2021-04-25 RX ORDER — EPINEPHRINE 1 MG/ML
0.3 INJECTION, SOLUTION, CONCENTRATE INTRAVENOUS EVERY 5 MIN PRN
Status: CANCELLED | OUTPATIENT
Start: 2021-04-27

## 2021-04-25 RX ORDER — NALOXONE HYDROCHLORIDE 0.4 MG/ML
.1-.4 INJECTION, SOLUTION INTRAMUSCULAR; INTRAVENOUS; SUBCUTANEOUS
Status: CANCELLED | OUTPATIENT
Start: 2021-05-04

## 2021-04-25 RX ORDER — MEPERIDINE HYDROCHLORIDE 25 MG/ML
25 INJECTION INTRAMUSCULAR; INTRAVENOUS; SUBCUTANEOUS EVERY 30 MIN PRN
Status: CANCELLED | OUTPATIENT
Start: 2021-05-04

## 2021-04-25 RX ORDER — LORAZEPAM 2 MG/ML
0.5 INJECTION INTRAMUSCULAR EVERY 4 HOURS PRN
Status: CANCELLED
Start: 2021-04-27

## 2021-04-25 RX ORDER — LORAZEPAM 2 MG/ML
0.5 INJECTION INTRAMUSCULAR EVERY 4 HOURS PRN
Status: CANCELLED
Start: 2021-05-04

## 2021-04-25 RX ORDER — MEPERIDINE HYDROCHLORIDE 25 MG/ML
25 INJECTION INTRAMUSCULAR; INTRAVENOUS; SUBCUTANEOUS EVERY 30 MIN PRN
Status: CANCELLED | OUTPATIENT
Start: 2021-04-27

## 2021-04-25 RX ORDER — SODIUM CHLORIDE 9 MG/ML
1000 INJECTION, SOLUTION INTRAVENOUS CONTINUOUS PRN
Status: CANCELLED
Start: 2021-04-27

## 2021-04-25 RX ORDER — EPINEPHRINE 1 MG/ML
0.3 INJECTION, SOLUTION, CONCENTRATE INTRAVENOUS EVERY 5 MIN PRN
Status: CANCELLED | OUTPATIENT
Start: 2021-05-04

## 2021-04-25 NOTE — PROGRESS NOTES
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  Cycle 3-day 8 therapy was canceled related to an ANC of 0.6.  His surgery is scheduled for June 16, 2021.    He presents to the clinic today stating he is doing pretty good.  States he did have a little more difficulty after the last treatment (cycle 3-day 1) and that he had more fatigue and decreased energy, states he was feeling more rundown.  Denies any actual nausea and has not used any of the nausea medications.  He did not take any the dexamethasone after his last cycle.  He continues to be concerned about the elevation of his blood sugars when he takes the Dex.  Does have some mild shortness of breath on exertion at times and denies any cough.  States he is trying to keep up on his fluids but is unsure that he is drinking enough.    Oncologic History:     2016 patient was diagnosed with high-grade bladder cancer T1; patient was treated with BCG and then again reinduction BCG then GemCis intravesically x3 in May 2018     1/5/2021 patient was seen by Dr. Morrell for history of hematuria.  He underwent cystoscopy which revealed bladder lesions with biopsy being negative.  Cytology showed atypical urothelial cells.  He underwent CT urogram which showed a mass in the left ureter measuring 2 cm with no hydronephrosis.  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     2/10/2021  he underwent PET scan that was negative for metastatic disease and then  seen by Dr. Mcnulty at the [facility] and scheduled for robotic assisted laparoscopic left nephroureterectomy.       2/15/2021 he was seen by Dr. Bartlett with Medical Oncology to discuss neoadjuvant chemotherapy.  Recommendation was for gemcitabine 1000 mg per metered squared day 1 and day 8 and cisplatin 70 mg per metered squared on day 1 of a 21-day cycle x4 cycles followed by surgery.     Current Chemo Regime/TX: Gemcitabine 1000 mg per metered squared on day 1 and day 8 and cisplatin 56 mg per metered squared day 1 every 21 days  Current Cycle: 4  # of completed cycles: 3     Previous treatment:  n/a    Past Medical History:   Diagnosis Date     Benign essential hypertension      Cancer (H)     Bladder     Diabetes (H)      Dyslipidemia        Past Surgical History:   Procedure Laterality Date     APPENDECTOMY  1958     COLONOSCOPY  2-     COMBINED CYSTOSCOPY, RETROGRADES, URETEROSCOPY, INSERT STENT Left 1/18/2021    Procedure: CYSTOURETEROSCOPY, WITH RETROGRADE PYELOGRAM with interpretation of fluroscopy, ureteroscopy, ureteral biopsy, bladder biopsy and fulgaration;  Surgeon: Shonda Morrell MD;  Location: [facility]     CYSTOSCOPY, BIOPSY BLADDER, COMBINED N/A 9/8/2015    Procedure: COMBINED CYSTOSCOPY, BIOPSY BLADDER;  Surgeon: Randy Martinez MD;  Location: [facility]     CYSTOSCOPY, BIOPSY BLADDER, COMBINED N/A 2/14/2017    Procedure: COMBINED CYSTOSCOPY, BIOPSY BLADDER;  Surgeon: Randy Martinez MD;  Location: [facility]     CYSTOSCOPY, BIOPSY BLADDER, COMBINED N/A 11/13/2018    Procedure: COMBINED CYSTOSCOPY, BIOPSY BLADDER;  Surgeon: Ed Helm MD;  Location: [facility]     CYSTOSCOPY, RETROGRADES, COMBINED Bilateral 11/13/2018    Procedure: Bilateral Retrograde Pyelagram, Bladder Biopsy;  Surgeon: Ed Helm MD;  Location: [facility]     CYSTOSCOPY, RETROGRADES, INSERT STENT URETER(S), COMBINED Left 9/8/2015    Procedure: COMBINED CYSTOSCOPY, RETROGRADES, INSERT STENT  URETER(S);  Surgeon: Randy Martinez MD;  Location: [facility]     CYSTOSCOPY, TRANSURETHRAL RESECTION (TUR) TUMOR BLADDER, COMBINED N/A 2015    Procedure: COMBINED CYSTOSCOPY, TRANSURETHRAL RESECTION (TUR) TUMOR BLADDER;  Surgeon: Randy Martinez MD;  Location: [facility]     CYSTOSCOPY, TRANSURETHRAL RESECTION (TUR) TUMOR BLADDER, COMBINED N/A 2016    Procedure: COMBINED CYSTOSCOPY, TRANSURETHRAL RESECTION (TUR) TUMOR BLADDER;  Surgeon: Randy Martinez MD;  Location: [facility]     CYSTOSCOPY, TRANSURETHRAL RESECTION (TUR) TUMOR BLADDER, COMBINED N/A 2016    Procedure: COMBINED CYSTOSCOPY, TRANSURETHRAL RESECTION (TUR) TUMOR BLADDER;  Surgeon: Randy Martinez MD;  Location: [facility]     PHACOEMULSIFICATION WITH STANDARD INTRAOCULAR LENS IMPLANT Right 10/9/2018    Procedure: PHACOEMULSIFICATION WITH STANDARD INTRAOCULAR LENS IMPLANT;  PHACOEMULSIFICATION CATARACT EXTRACTION POSTERIOR CHAMBER LENS RIGHT;  Surgeon: Marlo Mcconnell MD;  Location: [facility]     PHACOEMULSIFICATION WITH STANDARD INTRAOCULAR LENS IMPLANT Left 10/23/2018    Procedure: PHACOEMULSIFICATION CATARACT EXTRACTION POSTERIOR CHAMBER LENS LEFT;  Surgeon: Marlo Mcconnell MD;  Location: [facility]       Family History   Problem Relation Age of Onset     Diabetes Mother      Parkinsonism Brother        Social History     Socioeconomic History     Marital status:      Spouse name: Not on file     Number of children: Not on file     Years of education: Not on file     Highest education level: Not on file   Occupational History     Not on file   Social Needs     Financial resource strain: Not on file     Food insecurity     Worry: Not on file     Inability: Not on file     Transportation needs     Medical: Not on file     Non-medical: Not on file   Tobacco Use     Smoking status: Former Smoker     Quit date: 1992     Years since quittin.3     Smokeless tobacco: Never Used   Substance and Sexual Activity      Alcohol use: Yes     Comment: rarely     Drug use: No     Sexual activity: Not Currently   Lifestyle     Physical activity     Days per week: Not on file     Minutes per session: Not on file     Stress: Not on file   Relationships     Social connections     Talks on phone: Not on file     Gets together: Not on file     Attends Jewish service: Not on file     Active member of club or organization: Not on file     Attends meetings of clubs or organizations: Not on file     Relationship status: Not on file     Intimate partner violence     Fear of current or ex partner: Not on file     Emotionally abused: Not on file     Physically abused: Not on file     Forced sexual activity: Not on file   Other Topics Concern     Parent/sibling w/ CABG, MI or angioplasty before 65F 55M? No   Social History Narrative     Not on file       Current Outpatient Medications   Medication     atorvastatin (LIPITOR) 40 MG tablet     blood glucose (ONETOUCH VERIO IQ) test strip     brimonidine (ALPHAGAN-P) 0.15 % ophthalmic solution     latanoprost (XALATAN) 0.005 % ophthalmic solution     lisinopril (ZESTRIL) 10 MG tablet     magnesium oxide (MAG-OX) 400 MG tablet     metFORMIN (GLUCOPHAGE-XR) 500 MG 24 hr tablet     ONETOUCH DELICA LANCETS 33G MISC     tamsulosin (FLOMAX) 0.4 MG capsule     dexamethasone (DECADRON) 4 MG tablet     IBUPROFEN PO     LORazepam (ATIVAN) 0.5 MG tablet     oxyCODONE (ROXICODONE) 5 MG tablet     prochlorperazine (COMPAZINE) 10 MG tablet     No current facility-administered medications for this visit.         Allergies   Allergen Reactions     No Clinical Screening - See Comments Other (See Comments)     Beta blocker in glaucoma gtt.s caused low pulse and passing out        Review Of Systems:  Constitutional:    denies fever, weight changes, chills, and night sweats.  Eyes:    States his vision may be a little blurry at times.  Also has poor vision to the right eye and this is longstanding.  He is also known to  "have glaucoma.  Ears/Nose/Throat:   denies ear pain, nose problems, difficulty swallowing  Respiratory:   See hPI  Skin:   denies rash, lesions  Cardiovascular:   denies chest pain, palpitations, edema  Gastrointestinal:   denies abdominal pain, bloating, nausea, early satiety; no change in bowel habits or blood in stool  Genitourinary:   denies difficulty with urination, blood in urine  Musculoskeletal:    denies new muscle pain, bone pain-states he does get achy and stiff after sitting too long  Neurologic:   denies lightheadedness, headaches, numbness or tingling  Psychiatric:   denies anxiety, depression  Hematologic/Lymphatic/Immunologic:   denies easy bruising, easy bleeding, lumps or bumps noted  Endocrine:   Denies increased thirst    ECOG Performance Status: 1    Physical Exam:  /73   Pulse 75   Temp 97.4  F (36.3  C) (Tympanic)   Resp 20   Ht 1.702 m (5' 7\")   Wt 80.3 kg (177 lb 0.5 oz)   SpO2 99%   BMI 27.73 kg/m      GENERAL APPEARANCE: Healthy, alert and in no acute distress.  HEENT: Normocephalic, Sclerae anicteric.  No oral lesions or thrush  NECK:   No asymmetry or masses, no thyromegaly.  LYMPHATICS: No palpable cervical, supraclavicular, axillary, or inguinal nodes   RESP: Lungs clear to auscultation bilaterally, respirations regular and easy  CARDIOVASCULAR: Regular rate and rhythm. Normal S1, S2; no murmur, gallop, or rub.  ABDOMEN: Soft, nontender. Bowel sounds auscultated all 4 quadrants. No palpable organomegaly or masses.  MUSCULOSKELETAL: Extremities without gross deformities noted. No edema of bilateral lower extremities.  NEURO: Alert and oriented x 3.  Gait steady.  PSYCHIATRIC: Mentation and affect appear normal.  Mood appropriate.    Laboratory:  Results for orders placed or performed in visit on 04/27/21   CBC with platelets differential     Status: Abnormal   Result Value Ref Range    WBC 4.7 4.0 - 11.0 10e9/L    RBC Count 3.09 (L) 4.4 - 5.9 10e12/L    Hemoglobin 10.2 (L) " 13.3 - 17.7 g/dL    Hematocrit 30.1 (L) 40.0 - 53.0 %    MCV 97 78 - 100 fl    MCH 33.0 26.5 - 33.0 pg    MCHC 33.9 31.5 - 36.5 g/dL    RDW 16.1 (H) 10.0 - 15.0 %    Platelet Count 354 150 - 450 10e9/L    Diff Method Automated Method     % Neutrophils 43.6 %    % Lymphocytes 29.8 %    % Monocytes 19.5 %    % Eosinophils 6.1 %    % Basophils 0.6 %    % Immature Granulocytes 0.4 %    Nucleated RBCs 0 0 /100    Absolute Neutrophil 2.1 1.6 - 8.3 10e9/L    Absolute Lymphocytes 1.4 0.8 - 5.3 10e9/L    Absolute Monocytes 0.9 0.0 - 1.3 10e9/L    Absolute Eosinophils 0.3 0.0 - 0.7 10e9/L    Absolute Basophils 0.0 0.0 - 0.2 10e9/L    Abs Immature Granulocytes 0.0 0 - 0.4 10e9/L    Absolute Nucleated RBC 0.0    Comprehensive metabolic panel     Status: Abnormal   Result Value Ref Range    Sodium 138 133 - 144 mmol/L    Potassium 4.1 3.4 - 5.3 mmol/L    Chloride 110 (H) 94 - 109 mmol/L    Carbon Dioxide 20 20 - 32 mmol/L    Anion Gap 8 3 - 14 mmol/L    Glucose 113 (H) 70 - 99 mg/dL    Urea Nitrogen 36 (H) 7 - 30 mg/dL    Creatinine 1.04 0.66 - 1.25 mg/dL    GFR Estimate 70 >60 mL/min/[1.73_m2]    GFR Estimate If Black 81 >60 mL/min/[1.73_m2]    Calcium 8.9 8.5 - 10.1 mg/dL    Bilirubin Total 0.4 0.2 - 1.3 mg/dL    Albumin 4.0 3.4 - 5.0 g/dL    Protein Total 7.3 6.8 - 8.8 g/dL    Alkaline Phosphatase 60 40 - 150 U/L    ALT 15 0 - 70 U/L    AST 13 0 - 45 U/L   Magnesium     Status: None   Result Value Ref Range    Magnesium 1.6 1.6 - 2.3 mg/dL       Imaging Studies:   None completed for today's visit      ASSESSMENT/PLAN:    #1 malignant neoplasm of the left ureter:  High-grade urothelial cell carcinoma of the left ureter diagnosed January 2021.  He is currently receiving neoadjuvant therapy and will initiate cycle 4 today.    Will decrease gemcitabine dose by 20% per Dr. Bartlett related to cancellation of cycle 3-day 8 therapy due ot low ANC.  We will leave cisplatin dose at current dose.  He will follow-up in 3 weeks with a CT  chest and CT urogram along with CBC, CMP, LDH and Magnesium.  Plan for surgery 6/16/2021.     #2 hypomagnesia: Magnesium is within normal limits.  Continue magnesium oxide 400 mg twice a day and this will be rechecked in 3 weeks.     #3  Elevated BUN: We did discuss the importance of increasing his fluid intake and he verbalized understanding.  We will also have him come for a liter of normal saline day 3.     #4  Anemia: Iron/TIBC and ferritin along with B12 and folate within normal limits. B12 is a bit on the low side. We will have him initiate vitamin B12 1000 mcg daily.    I encouraged patient to call with any questions or concerns.    Sabra Jensen, NP  APRN, FNP-BC, AOCNP

## 2021-04-27 ENCOUNTER — INFUSION THERAPY VISIT (OUTPATIENT)
Dept: INFUSION THERAPY | Facility: OTHER | Age: 76
End: 2021-04-27
Attending: INTERNAL MEDICINE
Payer: COMMERCIAL

## 2021-04-27 ENCOUNTER — TELEPHONE (OUTPATIENT)
Dept: ONCOLOGY | Facility: OTHER | Age: 76
End: 2021-04-27

## 2021-04-27 ENCOUNTER — ONCOLOGY VISIT (OUTPATIENT)
Dept: ONCOLOGY | Facility: OTHER | Age: 76
End: 2021-04-27
Attending: NURSE PRACTITIONER
Payer: COMMERCIAL

## 2021-04-27 VITALS — DIASTOLIC BLOOD PRESSURE: 61 MMHG | SYSTOLIC BLOOD PRESSURE: 112 MMHG | HEART RATE: 74 BPM

## 2021-04-27 VITALS
OXYGEN SATURATION: 99 % | HEART RATE: 75 BPM | SYSTOLIC BLOOD PRESSURE: 113 MMHG | BODY MASS INDEX: 27.79 KG/M2 | TEMPERATURE: 97.4 F | HEIGHT: 67 IN | WEIGHT: 177.03 LBS | RESPIRATION RATE: 20 BRPM | DIASTOLIC BLOOD PRESSURE: 73 MMHG

## 2021-04-27 DIAGNOSIS — E83.42 HYPOMAGNESEMIA: ICD-10-CM

## 2021-04-27 DIAGNOSIS — C66.2 MALIGNANT NEOPLASM OF LEFT URETER (H): Primary | ICD-10-CM

## 2021-04-27 DIAGNOSIS — D64.9 ANEMIA, UNSPECIFIED TYPE: ICD-10-CM

## 2021-04-27 DIAGNOSIS — R79.9 ELEVATED BUN: ICD-10-CM

## 2021-04-27 LAB
ALBUMIN SERPL-MCNC: 4 G/DL (ref 3.4–5)
ALP SERPL-CCNC: 60 U/L (ref 40–150)
ALT SERPL W P-5'-P-CCNC: 15 U/L (ref 0–70)
ANION GAP SERPL CALCULATED.3IONS-SCNC: 8 MMOL/L (ref 3–14)
AST SERPL W P-5'-P-CCNC: 13 U/L (ref 0–45)
BASOPHILS # BLD AUTO: 0 10E9/L (ref 0–0.2)
BASOPHILS NFR BLD AUTO: 0.6 %
BILIRUB SERPL-MCNC: 0.4 MG/DL (ref 0.2–1.3)
BUN SERPL-MCNC: 36 MG/DL (ref 7–30)
CALCIUM SERPL-MCNC: 8.9 MG/DL (ref 8.5–10.1)
CHLORIDE SERPL-SCNC: 110 MMOL/L (ref 94–109)
CO2 SERPL-SCNC: 20 MMOL/L (ref 20–32)
CREAT SERPL-MCNC: 1.04 MG/DL (ref 0.66–1.25)
DIFFERENTIAL METHOD BLD: ABNORMAL
EOSINOPHIL # BLD AUTO: 0.3 10E9/L (ref 0–0.7)
EOSINOPHIL NFR BLD AUTO: 6.1 %
ERYTHROCYTE [DISTWIDTH] IN BLOOD BY AUTOMATED COUNT: 16.1 % (ref 10–15)
GFR SERPL CREATININE-BSD FRML MDRD: 70 ML/MIN/{1.73_M2}
GLUCOSE SERPL-MCNC: 113 MG/DL (ref 70–99)
HCT VFR BLD AUTO: 30.1 % (ref 40–53)
HGB BLD-MCNC: 10.2 G/DL (ref 13.3–17.7)
IMM GRANULOCYTES # BLD: 0 10E9/L (ref 0–0.4)
IMM GRANULOCYTES NFR BLD: 0.4 %
LYMPHOCYTES # BLD AUTO: 1.4 10E9/L (ref 0.8–5.3)
LYMPHOCYTES NFR BLD AUTO: 29.8 %
MAGNESIUM SERPL-MCNC: 1.6 MG/DL (ref 1.6–2.3)
MCH RBC QN AUTO: 33 PG (ref 26.5–33)
MCHC RBC AUTO-ENTMCNC: 33.9 G/DL (ref 31.5–36.5)
MCV RBC AUTO: 97 FL (ref 78–100)
MONOCYTES # BLD AUTO: 0.9 10E9/L (ref 0–1.3)
MONOCYTES NFR BLD AUTO: 19.5 %
NEUTROPHILS # BLD AUTO: 2.1 10E9/L (ref 1.6–8.3)
NEUTROPHILS NFR BLD AUTO: 43.6 %
NRBC # BLD AUTO: 0 10*3/UL
NRBC BLD AUTO-RTO: 0 /100
PLATELET # BLD AUTO: 354 10E9/L (ref 150–450)
POTASSIUM SERPL-SCNC: 4.1 MMOL/L (ref 3.4–5.3)
PROT SERPL-MCNC: 7.3 G/DL (ref 6.8–8.8)
RBC # BLD AUTO: 3.09 10E12/L (ref 4.4–5.9)
SODIUM SERPL-SCNC: 138 MMOL/L (ref 133–144)
WBC # BLD AUTO: 4.7 10E9/L (ref 4–11)

## 2021-04-27 PROCEDURE — G0463 HOSPITAL OUTPT CLINIC VISIT: HCPCS

## 2021-04-27 PROCEDURE — 36415 COLL VENOUS BLD VENIPUNCTURE: CPT | Mod: ZL | Performed by: NURSE PRACTITIONER

## 2021-04-27 PROCEDURE — 96415 CHEMO IV INFUSION ADDL HR: CPT

## 2021-04-27 PROCEDURE — 80053 COMPREHEN METABOLIC PANEL: CPT | Mod: ZL | Performed by: NURSE PRACTITIONER

## 2021-04-27 PROCEDURE — 96417 CHEMO IV INFUS EACH ADDL SEQ: CPT

## 2021-04-27 PROCEDURE — 96367 TX/PROPH/DG ADDL SEQ IV INF: CPT

## 2021-04-27 PROCEDURE — 258N000003 HC RX IP 258 OP 636: Performed by: NURSE PRACTITIONER

## 2021-04-27 PROCEDURE — 96374 THER/PROPH/DIAG INJ IV PUSH: CPT

## 2021-04-27 PROCEDURE — 96375 TX/PRO/DX INJ NEW DRUG ADDON: CPT

## 2021-04-27 PROCEDURE — 83735 ASSAY OF MAGNESIUM: CPT | Mod: ZL | Performed by: NURSE PRACTITIONER

## 2021-04-27 PROCEDURE — G0463 HOSPITAL OUTPT CLINIC VISIT: HCPCS | Mod: 25

## 2021-04-27 PROCEDURE — 96368 THER/DIAG CONCURRENT INF: CPT

## 2021-04-27 PROCEDURE — 99214 OFFICE O/P EST MOD 30 MIN: CPT | Performed by: NURSE PRACTITIONER

## 2021-04-27 PROCEDURE — 250N000011 HC RX IP 250 OP 636: Performed by: NURSE PRACTITIONER

## 2021-04-27 PROCEDURE — 85025 COMPLETE CBC W/AUTO DIFF WBC: CPT | Mod: ZL | Performed by: NURSE PRACTITIONER

## 2021-04-27 PROCEDURE — 96365 THER/PROPH/DIAG IV INF INIT: CPT

## 2021-04-27 PROCEDURE — 96413 CHEMO IV INFUSION 1 HR: CPT

## 2021-04-27 RX ORDER — HEPARIN SODIUM (PORCINE) LOCK FLUSH IV SOLN 100 UNIT/ML 100 UNIT/ML
5 SOLUTION INTRAVENOUS
Status: CANCELLED | OUTPATIENT
Start: 2021-04-27

## 2021-04-27 RX ORDER — PALONOSETRON 0.05 MG/ML
0.25 INJECTION, SOLUTION INTRAVENOUS ONCE
Status: COMPLETED | OUTPATIENT
Start: 2021-04-27 | End: 2021-04-27

## 2021-04-27 RX ADMIN — PALONOSETRON 0.25 MG: 0.05 INJECTION, SOLUTION INTRAVENOUS at 09:20

## 2021-04-27 RX ADMIN — SODIUM CHLORIDE 1000 ML: 9 INJECTION, SOLUTION INTRAVENOUS at 09:07

## 2021-04-27 RX ADMIN — MAGNESIUM SULFATE HEPTAHYDRATE: 500 INJECTION, SOLUTION INTRAMUSCULAR; INTRAVENOUS at 11:04

## 2021-04-27 RX ADMIN — GEMCITABINE 1600 MG: 38 INJECTION, SOLUTION INTRAVENOUS at 10:27

## 2021-04-27 RX ADMIN — FOSAPREPITANT: 150 INJECTION, POWDER, LYOPHILIZED, FOR SOLUTION INTRAVENOUS at 09:22

## 2021-04-27 RX ADMIN — CISPLATIN 100 MG: 100 INJECTION, SOLUTION INTRAVENOUS at 11:04

## 2021-04-27 ASSESSMENT — PAIN SCALES - GENERAL: PAINLEVEL: NO PAIN (0)

## 2021-04-27 ASSESSMENT — MIFFLIN-ST. JEOR: SCORE: 1501.63

## 2021-04-27 NOTE — PATIENT INSTRUCTIONS

## 2021-04-27 NOTE — PATIENT INSTRUCTIONS

## 2021-04-27 NOTE — NURSING NOTE
"Chief Complaint   Patient presents with     RECHECK     Follow up Malignant neoplasm of anterior wall of urinary bladder        Initial /73   Pulse 75   Temp 97.4  F (36.3  C) (Tympanic)   Resp 20   Ht 1.702 m (5' 7\")   Wt 80.3 kg (177 lb 0.5 oz)   SpO2 99%   BMI 27.73 kg/m   Estimated body mass index is 27.73 kg/m  as calculated from the following:    Height as of this encounter: 1.702 m (5' 7\").    Weight as of this encounter: 80.3 kg (177 lb 0.5 oz).  Medication Reconciliation: complete.  Immunizations and advance directives status reviewed. Pain scale =0 , PHQ-2=0.            Verónica Leung LPN    "

## 2021-04-27 NOTE — PATIENT INSTRUCTIONS
We would like to see you back per your schedule and in 3 weeks for an appointment with Dr. Bartlett. Please come 2 to 3 days prior for lab work and imaging.       When you are in need of a refill, please call your pharmacy and they will send us a request.     If you have any questions please call [phone]    Other instructions:  none

## 2021-04-27 NOTE — TELEPHONE ENCOUNTER
Please call and let him know that his vitamin B12 when it was checked was within normal limits but on the low end of normal. We had called him and asked him to initiate 1000 mcg of B12 daily but I do not see that it is on his med list. Please call him to be sure that he is doing this.

## 2021-04-27 NOTE — PROGRESS NOTES
Patient is a 76 year old here today for infusion of cisplatin/gemzar under the orders of Dr. Bartlett.   PABLO Jensen NP to place patient on the schedule Thursday 4/29/2021 for 1L of NS to run over 1 hour. Therapy plan placed for IVF.  Today's lab values: WBC 4.7, ANC 2.1, , HGB 10.2, AST 13, ALT 15, Alkaline Phosphatase 60, Creatinine 1.04, Magnesium 1.6     Cisplatin/Gemzar dose(s) verified with ASAEL Prado RN prior to release of drug.      Patient meets parameters for today's infusion.  Denies questions or concerns regarding today's infusion and/or medications being administered.      Patient identified with two identifiers, order verified, and verbal consent for today's infusion obtained from patient.    1027 IV pump verified with Gemzar dose, drug, and rate of administration. Infusion administered per protocol. Patient tolerated infusion well, no signs or symptoms of adverse reaction noted. Patient denies pain nor discomfort.     1104 IV pump verified with Cisplatin dose, drug, and rate of administration. Infusion administered per protocol. Patient tolerated infusion well, no signs or symptoms of adverse reaction noted. Patient denies pain nor discomfort.     Needle removed, tip intact. Site clean, dry and intact. Covered with a sterile bandage, slight pressure applied for 30 seconds. Pt instructed to leave bandage intact for a minimum of one hour, and to call with questions or concerns. Copy of appointments, discharge instructions, and after visit summary (AVS) provided to patient. Patient states understanding, discharged ambulatory.

## 2021-04-28 RX ORDER — LANOLIN ALCOHOL/MO/W.PET/CERES
1000 CREAM (GRAM) TOPICAL DAILY
COMMUNITY
End: 2021-06-04

## 2021-04-29 ENCOUNTER — INFUSION THERAPY VISIT (OUTPATIENT)
Dept: INFUSION THERAPY | Facility: OTHER | Age: 76
End: 2021-04-29
Attending: INTERNAL MEDICINE
Payer: COMMERCIAL

## 2021-04-29 DIAGNOSIS — Z85.51 PERSONAL HISTORY OF MALIGNANT NEOPLASM OF BLADDER: ICD-10-CM

## 2021-04-29 DIAGNOSIS — C67.3 MALIGNANT NEOPLASM OF ANTERIOR WALL OF URINARY BLADDER (H): ICD-10-CM

## 2021-04-29 DIAGNOSIS — N28.89 URETERAL MASS: Primary | ICD-10-CM

## 2021-04-29 PROCEDURE — 96360 HYDRATION IV INFUSION INIT: CPT

## 2021-04-29 PROCEDURE — 96365 THER/PROPH/DIAG IV INF INIT: CPT

## 2021-04-29 PROCEDURE — 258N000003 HC RX IP 258 OP 636: Performed by: NURSE PRACTITIONER

## 2021-04-29 RX ADMIN — SODIUM CHLORIDE 1000 ML: 9 INJECTION, SOLUTION INTRAVENOUS at 09:25

## 2021-04-29 NOTE — PROGRESS NOTES
Patient is a 76 year old male here accompanied by self today for infusion of IVF per order of Dr Bartlett.  Patient identified with two identifiers, order verified, and verbal consent for today's infusion obtained from patient.      IV pump verified with dose, drug, and rate of administration.  Infusion administered per protocol.  Patient tolerated infusion well, no signs or symptoms of adverse reaction noted.  Patient denies pain nor discomfort.     IV removed, catheter intact.  Site clean, dry and intact.  No signs or symptoms of infiltration or infection.  Covered with a sterile bandage, slight pressure applied for 30 seconds.  Pt instructed to leave bandage intact for a minimum of one hour, and to call with questions or concerns.  Copy of appointments, discharge instructions, and after visit summary (AVS) provided to patient.  Patient states understanding, discharged.

## 2021-04-29 NOTE — PROGRESS NOTES
24 gauge angio cath inserted into RT ARM.  Immediate blood return noted.  IV secured with sterile, transparent dressing and tape.  Patient tolerated well, denies pain or discomfort at this time.  Flushes easily without resistance, no signs or symptoms of infiltration or infection. Flushed with 3mL normal saline to clear line. Patient denies questions or concerns regarding infusion and/or medication(s) being administered.

## 2021-05-04 ENCOUNTER — INFUSION THERAPY VISIT (OUTPATIENT)
Dept: INFUSION THERAPY | Facility: OTHER | Age: 76
End: 2021-05-04
Attending: INTERNAL MEDICINE
Payer: COMMERCIAL

## 2021-05-04 ENCOUNTER — TELEPHONE (OUTPATIENT)
Dept: INFUSION THERAPY | Facility: OTHER | Age: 76
End: 2021-05-04

## 2021-05-04 DIAGNOSIS — C66.2 MALIGNANT NEOPLASM OF LEFT URETER (H): Primary | ICD-10-CM

## 2021-05-04 LAB
BASOPHILS # BLD AUTO: 0 10E9/L (ref 0–0.2)
BASOPHILS NFR BLD AUTO: 1.6 %
DIFFERENTIAL METHOD BLD: ABNORMAL
EOSINOPHIL # BLD AUTO: 0.1 10E9/L (ref 0–0.7)
EOSINOPHIL NFR BLD AUTO: 2.8 %
ERYTHROCYTE [DISTWIDTH] IN BLOOD BY AUTOMATED COUNT: 15.7 % (ref 10–15)
HCT VFR BLD AUTO: 24.2 % (ref 40–53)
HGB BLD-MCNC: 8.1 G/DL (ref 13.3–17.7)
IMM GRANULOCYTES # BLD: 0 10E9/L (ref 0–0.4)
IMM GRANULOCYTES NFR BLD: 1.6 %
LYMPHOCYTES # BLD AUTO: 1.3 10E9/L (ref 0.8–5.3)
LYMPHOCYTES NFR BLD AUTO: 52.4 %
MCH RBC QN AUTO: 33.1 PG (ref 26.5–33)
MCHC RBC AUTO-ENTMCNC: 33.5 G/DL (ref 31.5–36.5)
MCV RBC AUTO: 99 FL (ref 78–100)
MONOCYTES # BLD AUTO: 0.4 10E9/L (ref 0–1.3)
MONOCYTES NFR BLD AUTO: 15 %
NEUTROPHILS # BLD AUTO: 0.7 10E9/L (ref 1.6–8.3)
NEUTROPHILS NFR BLD AUTO: 26.6 %
NRBC # BLD AUTO: 0 10*3/UL
NRBC BLD AUTO-RTO: 0 /100
PLATELET # BLD AUTO: 167 10E9/L (ref 150–450)
RBC # BLD AUTO: 2.45 10E12/L (ref 4.4–5.9)
WBC # BLD AUTO: 2.5 10E9/L (ref 4–11)

## 2021-05-04 PROCEDURE — 85025 COMPLETE CBC W/AUTO DIFF WBC: CPT | Mod: ZL | Performed by: NURSE PRACTITIONER

## 2021-05-04 PROCEDURE — G0463 HOSPITAL OUTPT CLINIC VISIT: HCPCS | Mod: 25

## 2021-05-04 PROCEDURE — 36415 COLL VENOUS BLD VENIPUNCTURE: CPT | Mod: ZL | Performed by: NURSE PRACTITIONER

## 2021-05-04 NOTE — PROGRESS NOTES
Patient is 76 years old, here accompanied by self today for infusion of Gemzar under the orders of Dr. Bartlett.      Today's lab values: WBC 2.5, ANC 0.7, , HGB 8.1.    Patient does not meet parameters for today's infusion. ANC is too low. Unable to locate Dr Bartlett. Spoke with Jennifer Jensen NP who agreed that there should be no treatment today, per ordered parameters. However, she was not sure if Dr Bartlett would want to defer or cancel, because patient has no further chemo treatments scheduled prior to his upcoming surgery 6-16-21. Telephone encounter routed to Dr Bartlett asking for direction. Alerted patient to all, and explained we would call him once we have a plan for how to proceed. Education on neutropenic precautions provided, verbalizes adequate understanding.        Needle removed, tip intact. Site clean, dry and intact. Covered with a sterile bandage, slight pressure applied for 30 seconds. Pt instructed to leave bandage intact for a minimum of one hour, and to call with questions or concerns. Patient states understanding, discharged.

## 2021-05-04 NOTE — TELEPHONE ENCOUNTER
Patient here today for Gemzar, Cycle 4 Day 8. ANC 0.7. Does not meet parameters. I spoke with Jennifer Jensen and got the ok to send him home with no treatment. However, she wanted me to run it by you as far as what to do with today, defer or cancel. His Cycle 3 day 8 was cancelled due to a ANC 0.6. Gemzar dose was decreased by 20% for his Cycle 4 day 1 (last week), however ANC is still too low. He is neoadjuvant treatment, with surgery planned for 6-16-21. He has no further treatments scheduled prior to surgery. Do you want to cancel? Or defer and try to get another treatment in before surgery? Please advise.

## 2021-05-04 NOTE — PROGRESS NOTES
22 gauge angio cath inserted into right lower forearm. Immediate blood return noted. IV secured with sterile transparent dressing and tape. Patient tolerated well, denies pain or discomfort at this time. Flushes easily without resistance, no signs or symptoms of infiltration or infection. 3mL's of blood wasted and one tube of blood removed for ordered labs. Flushed with 3mL's of normal saline to clear the line. Patient denies questions or concerns regarding infusion and/or medication(s) being administered.

## 2021-05-05 NOTE — TELEPHONE ENCOUNTER
Return note from Dr Bartlett to please defer 1 week, so patient would be due to return next week Tuesday 5-11-21. Note to RASHID Jaeger to please call patient and get on schedule.

## 2021-05-05 NOTE — TELEPHONE ENCOUNTER
Note from Dr Bartlett stating he would like to defer first before cancelling. Return note asking to clarify if he wishes to defer a week?

## 2021-05-10 DIAGNOSIS — C66.2 MALIGNANT NEOPLASM OF LEFT URETER (H): Primary | ICD-10-CM

## 2021-05-10 RX ORDER — SODIUM CHLORIDE 9 MG/ML
1000 INJECTION, SOLUTION INTRAVENOUS CONTINUOUS PRN
Status: CANCELLED
Start: 2021-05-11

## 2021-05-10 RX ORDER — DIPHENHYDRAMINE HYDROCHLORIDE 50 MG/ML
50 INJECTION INTRAMUSCULAR; INTRAVENOUS
Status: CANCELLED
Start: 2021-05-11

## 2021-05-10 RX ORDER — LORAZEPAM 2 MG/ML
0.5 INJECTION INTRAMUSCULAR EVERY 4 HOURS PRN
Status: CANCELLED
Start: 2021-05-11

## 2021-05-10 RX ORDER — EPINEPHRINE 1 MG/ML
0.3 INJECTION, SOLUTION, CONCENTRATE INTRAVENOUS EVERY 5 MIN PRN
Status: CANCELLED | OUTPATIENT
Start: 2021-05-11

## 2021-05-10 RX ORDER — ALBUTEROL SULFATE 0.83 MG/ML
2.5 SOLUTION RESPIRATORY (INHALATION)
Status: CANCELLED | OUTPATIENT
Start: 2021-05-11

## 2021-05-10 RX ORDER — MEPERIDINE HYDROCHLORIDE 25 MG/ML
25 INJECTION INTRAMUSCULAR; INTRAVENOUS; SUBCUTANEOUS EVERY 30 MIN PRN
Status: CANCELLED | OUTPATIENT
Start: 2021-05-11

## 2021-05-10 RX ORDER — NALOXONE HYDROCHLORIDE 0.4 MG/ML
.1-.4 INJECTION, SOLUTION INTRAMUSCULAR; INTRAVENOUS; SUBCUTANEOUS
Status: CANCELLED | OUTPATIENT
Start: 2021-05-11

## 2021-05-10 RX ORDER — METHYLPREDNISOLONE SODIUM SUCCINATE 125 MG/2ML
125 INJECTION, POWDER, LYOPHILIZED, FOR SOLUTION INTRAMUSCULAR; INTRAVENOUS
Status: CANCELLED
Start: 2021-05-11

## 2021-05-10 RX ORDER — HEPARIN SODIUM,PORCINE 10 UNIT/ML
5 VIAL (ML) INTRAVENOUS
Status: CANCELLED | OUTPATIENT
Start: 2021-05-11

## 2021-05-10 RX ORDER — ALBUTEROL SULFATE 90 UG/1
1-2 AEROSOL, METERED RESPIRATORY (INHALATION)
Status: CANCELLED
Start: 2021-05-11

## 2021-05-11 ENCOUNTER — INFUSION THERAPY VISIT (OUTPATIENT)
Dept: INFUSION THERAPY | Facility: OTHER | Age: 76
End: 2021-05-11
Attending: INTERNAL MEDICINE
Payer: COMMERCIAL

## 2021-05-11 VITALS
HEART RATE: 76 BPM | TEMPERATURE: 97.8 F | RESPIRATION RATE: 18 BRPM | BODY MASS INDEX: 28.41 KG/M2 | OXYGEN SATURATION: 98 % | HEIGHT: 67 IN | WEIGHT: 181 LBS | SYSTOLIC BLOOD PRESSURE: 110 MMHG | DIASTOLIC BLOOD PRESSURE: 68 MMHG

## 2021-05-11 DIAGNOSIS — C66.2 MALIGNANT NEOPLASM OF LEFT URETER (H): Primary | ICD-10-CM

## 2021-05-11 LAB
BASOPHILS # BLD AUTO: 0 10E9/L (ref 0–0.2)
BASOPHILS NFR BLD AUTO: 0.3 %
DIFFERENTIAL METHOD BLD: ABNORMAL
EOSINOPHIL # BLD AUTO: 0.1 10E9/L (ref 0–0.7)
EOSINOPHIL NFR BLD AUTO: 1.6 %
ERYTHROCYTE [DISTWIDTH] IN BLOOD BY AUTOMATED COUNT: 16 % (ref 10–15)
HCT VFR BLD AUTO: 28.2 % (ref 40–53)
HGB BLD-MCNC: 9.4 G/DL (ref 13.3–17.7)
IMM GRANULOCYTES # BLD: 0 10E9/L (ref 0–0.4)
IMM GRANULOCYTES NFR BLD: 0.3 %
LYMPHOCYTES # BLD AUTO: 1.3 10E9/L (ref 0.8–5.3)
LYMPHOCYTES NFR BLD AUTO: 20.4 %
MCH RBC QN AUTO: 33.2 PG (ref 26.5–33)
MCHC RBC AUTO-ENTMCNC: 33.3 G/DL (ref 31.5–36.5)
MCV RBC AUTO: 100 FL (ref 78–100)
MONOCYTES # BLD AUTO: 1 10E9/L (ref 0–1.3)
MONOCYTES NFR BLD AUTO: 15.3 %
NEUTROPHILS # BLD AUTO: 3.9 10E9/L (ref 1.6–8.3)
NEUTROPHILS NFR BLD AUTO: 62.1 %
NRBC # BLD AUTO: 0 10*3/UL
NRBC BLD AUTO-RTO: 0 /100
PLATELET # BLD AUTO: 146 10E9/L (ref 150–450)
RBC # BLD AUTO: 2.83 10E12/L (ref 4.4–5.9)
WBC # BLD AUTO: 6.3 10E9/L (ref 4–11)

## 2021-05-11 PROCEDURE — 96413 CHEMO IV INFUSION 1 HR: CPT

## 2021-05-11 PROCEDURE — 96367 TX/PROPH/DG ADDL SEQ IV INF: CPT

## 2021-05-11 PROCEDURE — 36415 COLL VENOUS BLD VENIPUNCTURE: CPT | Mod: ZL | Performed by: INTERNAL MEDICINE

## 2021-05-11 PROCEDURE — 85025 COMPLETE CBC W/AUTO DIFF WBC: CPT | Mod: ZL | Performed by: INTERNAL MEDICINE

## 2021-05-11 PROCEDURE — 250N000011 HC RX IP 250 OP 636: Performed by: INTERNAL MEDICINE

## 2021-05-11 PROCEDURE — 258N000003 HC RX IP 258 OP 636: Performed by: INTERNAL MEDICINE

## 2021-05-11 RX ADMIN — DEXAMETHASONE SODIUM PHOSPHATE 12 MG: 10 INJECTION INTRAMUSCULAR; INTRAVENOUS at 13:25

## 2021-05-11 RX ADMIN — SODIUM CHLORIDE 250 ML: 9 INJECTION, SOLUTION INTRAVENOUS at 13:25

## 2021-05-11 RX ADMIN — GEMCITABINE 1600 MG: 38 INJECTION, SOLUTION INTRAVENOUS at 14:04

## 2021-05-11 ASSESSMENT — MIFFLIN-ST. JEOR: SCORE: 1519.75

## 2021-05-11 ASSESSMENT — PAIN SCALES - GENERAL: PAINLEVEL: NO PAIN (0)

## 2021-05-11 NOTE — PROGRESS NOTES
Patient is 76 years old, here accompanied by self today for infusion of Gemzar under the orders of Dr. Bartlett.      22 gauge angio cath inserted into right mid anterior forearm.  Immediate blood return noted.  IV secured with sterile, transparent dressing and tape.  Patient tolerated well, denies pain or discomfort at this time.  Flushes easily without resistance, no signs or symptoms of infiltration or infection. Patient denies questions or concerns regarding infusion and/or medication(s) being administered.       Today's lab values: WBC 6.3, ANC 3.9, , HGB 9.4.     Patient meets parameters for today's infusion.  Denies questions or concerns regarding today's infusion and/or medications being administered.      Independent dose check completed with JIM Marquis.    Patient identified with two identifiers, order verified, and verbal consent for today's infusion obtained from patient.     1404 IV pump verified with Gemzar dose, drug, and rate of administration. Infusion administered per protocol. Patient tolerated infusion well, no signs or symptoms of adverse reaction noted. Patient denies pain nor discomfort.     Needle removed, tip intact. Site clean, dry and intact. Covered with a sterile bandage, slight pressure applied for 30 seconds. Pt instructed to leave bandage intact for a minimum of one hour, and to call with questions or concerns. Copy of appointments, discharge instructions, and after visit summary (AVS) provided to patient. Patient states understanding, discharged.

## 2021-05-11 NOTE — PATIENT INSTRUCTIONS
We will see you back as planned. If you have any questions or concerns, we can be reached Monday through Friday 8am - 430pm at [phone] ([facility] – [place], [place]). If you have concerns related to a potential reaction/side effect after hours/weekends/holiday's, please seek emergent medical care.   Patient Education     Discharge Instructions for Chemotherapy  Your healthcare provider prescribed a type of medicine therapy for you called chemotherapy. It's also known as chemo. Chemo is used for many different types of illnesses, including cancer. There are many types of chemo. This sheet gives some general information on how you can take care of yourself after your chemo. Talk with your healthcare provider about other details based on your own treatment plan.  Mouth care  You may get mouth sores, even if you follow all of your healthcare provider s instructions. Many people get mouth sores as a side effect of chemo. Here s what you can do to help prevent mouth sores:    Keep your mouth clean. Brush your teeth with a soft-bristle toothbrush after every meal.    Ask if you should use a toothpaste with fluoride. Or you may be told to brush your teeth with a mixture of 1 teaspoon of salt in 8 ounces of water.     Use an oral swab or special soft toothbrush if your gums bleed during brushing.    Don't use dental floss if it causes your gums to bleed.    Use any mouthwash given to you as directed.    If you can t tolerate regular methods, use salt and baking soda to clean your mouth. Mix 1 teaspoon of salt and 1 teaspoon of baking soda in 1 quart of warm water. Swish and spit.    If you wear dentures, you may be told to wear them only when you eat. Ask your healthcare provider. Clean your dentures twice a day. Soak them in antimicrobial solution when you aren't wearing them. Rinse your mouth after each meal.     Check your mouth and tongue for white patches. This may be a sign of a type of yeast infection called thrush. This is a common side  effect of chemo. Tell your healthcare provider if you get these patches. He or she can prescribe medicine to treat them.  Other self-care  Here's what else you can do:    Try to exercise. Exercise keeps you strong and keeps your heart and lungs active. Walking and yoga are good types of exercise.    Keep clean. During chemo, your body can t fight infection very well. Take short baths or showers. Wash your hands before you eat and after going to the bathroom.    Avoid people who are sick with an illness you could catch. This includes people with colds, flu, measles, or chicken pox. This also includes people who have recently had a vaccine for any illness.    Let your healthcare provider know if your throat is sore. You may have an infection that needs treatment.    Be gentle to your skin. Use moisturizing soap. Chemo can make your skin dry. Apply lotion several times a day to help relieve dry skin. Don t take very hot or very cold showers or baths.    Don t be surprised if your chemo causes slight burns to your skin. This can happen most often on the hands and feet. Some medicines used in high doses cause this. Ask for a special cream to help relieve the burn and protect your skin.  Many people on chemo feel sick and find it hard to eat during treatment. Try these tips:    Eat small meals several times a day to keep your strength up.    Choose bland foods with little taste or smell if you are reacting strongly to food.    Be sure to cook all food fully. This kills bacteria and helps you prevent illness.    Eat foods that are soft. Soft foods are less likely to cause stomach irritation.    Try to eat a variety of foods for a well-balanced diet. Drink plenty of fluids. Eat foods with fiber unless your healthcare provider says not to. Fiber can help to prevent constipation.  When to call your healthcare provider  Call your healthcare provider right away if you have any of the following:    Unexplained  bleeding    Trouble concentrating    Ongoing fatigue    Shortness of breath, wheezing, trouble breathing, or bad cough    Rapid, irregular heartbeat, or chest pain    Dizziness, lightheadedness    Constant feeling of being cold    Hives or a cut or rash that swells, turns red, feels hot or painful, or begins to ooze    Burning when you urinate    Fever of 100.4 F (38 C) or higher, or as directed by your healthcare provider  Ross last reviewed this educational content on 5/1/2019 2000-2021 [employer]. All rights reserved. This information is not intended as a substitute for professional medical care. Always follow your healthcare professional's instructions.

## 2021-05-14 ENCOUNTER — HOSPITAL ENCOUNTER (OUTPATIENT)
Dept: CT IMAGING | Facility: HOSPITAL | Age: 76
End: 2021-05-14
Attending: NURSE PRACTITIONER
Payer: COMMERCIAL

## 2021-05-14 DIAGNOSIS — C66.2 MALIGNANT NEOPLASM OF LEFT URETER (H): ICD-10-CM

## 2021-05-14 LAB
ALBUMIN SERPL-MCNC: 3.6 G/DL (ref 3.4–5)
ALP SERPL-CCNC: 56 U/L (ref 40–150)
ALT SERPL W P-5'-P-CCNC: 12 U/L (ref 0–70)
ANION GAP SERPL CALCULATED.3IONS-SCNC: 4 MMOL/L (ref 3–14)
AST SERPL W P-5'-P-CCNC: 10 U/L (ref 0–45)
BASOPHILS # BLD AUTO: 0 10E9/L (ref 0–0.2)
BASOPHILS NFR BLD AUTO: 0.7 %
BILIRUB SERPL-MCNC: 0.6 MG/DL (ref 0.2–1.3)
BUN SERPL-MCNC: 37 MG/DL (ref 7–30)
CALCIUM SERPL-MCNC: 8.5 MG/DL (ref 8.5–10.1)
CHLORIDE SERPL-SCNC: 107 MMOL/L (ref 94–109)
CO2 SERPL-SCNC: 24 MMOL/L (ref 20–32)
CREAT SERPL-MCNC: 1.14 MG/DL (ref 0.66–1.25)
DIFFERENTIAL METHOD BLD: ABNORMAL
EOSINOPHIL # BLD AUTO: 0.2 10E9/L (ref 0–0.7)
EOSINOPHIL NFR BLD AUTO: 3.8 %
ERYTHROCYTE [DISTWIDTH] IN BLOOD BY AUTOMATED COUNT: 15.6 % (ref 10–15)
GFR SERPL CREATININE-BSD FRML MDRD: 63 ML/MIN/{1.73_M2}
GLUCOSE SERPL-MCNC: 107 MG/DL (ref 70–99)
HCT VFR BLD AUTO: 26 % (ref 40–53)
HGB BLD-MCNC: 8.8 G/DL (ref 13.3–17.7)
IMM GRANULOCYTES # BLD: 0 10E9/L (ref 0–0.4)
IMM GRANULOCYTES NFR BLD: 0.2 %
LDH SERPL L TO P-CCNC: 185 U/L (ref 85–227)
LYMPHOCYTES # BLD AUTO: 1.2 10E9/L (ref 0.8–5.3)
LYMPHOCYTES NFR BLD AUTO: 27.6 %
MAGNESIUM SERPL-MCNC: 1.9 MG/DL (ref 1.6–2.3)
MCH RBC QN AUTO: 33.5 PG (ref 26.5–33)
MCHC RBC AUTO-ENTMCNC: 33.8 G/DL (ref 31.5–36.5)
MCV RBC AUTO: 99 FL (ref 78–100)
MONOCYTES # BLD AUTO: 0.5 10E9/L (ref 0–1.3)
MONOCYTES NFR BLD AUTO: 10.1 %
NEUTROPHILS # BLD AUTO: 2.6 10E9/L (ref 1.6–8.3)
NEUTROPHILS NFR BLD AUTO: 57.6 %
NRBC # BLD AUTO: 0 10*3/UL
NRBC BLD AUTO-RTO: 0 /100
PLATELET # BLD AUTO: 173 10E9/L (ref 150–450)
POTASSIUM SERPL-SCNC: 4.8 MMOL/L (ref 3.4–5.3)
PROT SERPL-MCNC: 6.4 G/DL (ref 6.8–8.8)
RBC # BLD AUTO: 2.63 10E12/L (ref 4.4–5.9)
SODIUM SERPL-SCNC: 135 MMOL/L (ref 133–144)
WBC # BLD AUTO: 4.5 10E9/L (ref 4–11)

## 2021-05-14 PROCEDURE — 36415 COLL VENOUS BLD VENIPUNCTURE: CPT | Mod: ZL | Performed by: NURSE PRACTITIONER

## 2021-05-14 PROCEDURE — 85025 COMPLETE CBC W/AUTO DIFF WBC: CPT | Mod: ZL | Performed by: NURSE PRACTITIONER

## 2021-05-14 PROCEDURE — 255N000002 HC RX 255 OP 636: Performed by: RADIOLOGY

## 2021-05-14 PROCEDURE — 83615 LACTATE (LD) (LDH) ENZYME: CPT | Mod: ZL | Performed by: NURSE PRACTITIONER

## 2021-05-14 PROCEDURE — 80053 COMPREHEN METABOLIC PANEL: CPT | Mod: ZL | Performed by: NURSE PRACTITIONER

## 2021-05-14 PROCEDURE — 74178 CT ABD&PLV WO CNTR FLWD CNTR: CPT

## 2021-05-14 PROCEDURE — 71260 CT THORAX DX C+: CPT

## 2021-05-14 PROCEDURE — 83735 ASSAY OF MAGNESIUM: CPT | Mod: ZL | Performed by: NURSE PRACTITIONER

## 2021-05-14 RX ORDER — IOPAMIDOL 612 MG/ML
30 INJECTION, SOLUTION INTRAVASCULAR ONCE
Status: COMPLETED | OUTPATIENT
Start: 2021-05-14 | End: 2021-05-14

## 2021-05-14 RX ORDER — IOPAMIDOL 612 MG/ML
100 INJECTION, SOLUTION INTRAVASCULAR ONCE
Status: COMPLETED | OUTPATIENT
Start: 2021-05-14 | End: 2021-05-14

## 2021-05-14 RX ADMIN — IOPAMIDOL 100 ML: 612 INJECTION, SOLUTION INTRAVENOUS at 07:59

## 2021-05-14 RX ADMIN — IOPAMIDOL 30 ML: 612 INJECTION, SOLUTION INTRAVENOUS at 07:59

## 2021-05-17 ENCOUNTER — ONCOLOGY VISIT (OUTPATIENT)
Dept: ONCOLOGY | Facility: OTHER | Age: 76
End: 2021-05-17
Attending: INTERNAL MEDICINE
Payer: COMMERCIAL

## 2021-05-17 VITALS
WEIGHT: 179.68 LBS | SYSTOLIC BLOOD PRESSURE: 100 MMHG | RESPIRATION RATE: 20 BRPM | BODY MASS INDEX: 28.2 KG/M2 | TEMPERATURE: 97.4 F | HEIGHT: 67 IN | HEART RATE: 93 BPM | OXYGEN SATURATION: 99 % | DIASTOLIC BLOOD PRESSURE: 60 MMHG

## 2021-05-17 DIAGNOSIS — C66.2 MALIGNANT NEOPLASM OF LEFT URETER (H): Primary | ICD-10-CM

## 2021-05-17 DIAGNOSIS — Z85.51 PERSONAL HISTORY OF MALIGNANT NEOPLASM OF BLADDER: ICD-10-CM

## 2021-05-17 PROCEDURE — G0463 HOSPITAL OUTPT CLINIC VISIT: HCPCS

## 2021-05-17 PROCEDURE — 99215 OFFICE O/P EST HI 40 MIN: CPT | Performed by: INTERNAL MEDICINE

## 2021-05-17 PROCEDURE — 99417 PROLNG OP E/M EACH 15 MIN: CPT | Performed by: INTERNAL MEDICINE

## 2021-05-17 ASSESSMENT — MIFFLIN-ST. JEOR: SCORE: 1513.63

## 2021-05-17 ASSESSMENT — PAIN SCALES - GENERAL: PAINLEVEL: NO PAIN (0)

## 2021-05-17 NOTE — PATIENT INSTRUCTIONS
We would like to see you back 3 weeks after your surgery. Please come 30minute(s) prior for lab work.  When you are in need of a refill of your medications, please call your pharmacy and they will send us the request. If you have any questions please call [phone]

## 2021-05-17 NOTE — PROGRESS NOTES
Visit Date: 05/17/2021    HEMATOLOGY/ONCOLOGY CLINIC NOTE    HISTORY OF PRESENT ILLNESS:  Mr. 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  We had seen the patient in consultation at the request of Dr. Javier Mcnulty, Dr. Morrell and Dr. Dexter Galvez on 02/15/2021.  At that time, Ms. 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  The patient had presented with hematuria and was seen by Dr. Morrell on 01/05/2021, who performed cystoscopy, which revealed some bladder lesion.  Biopsy was negative.  Pathology came back atypical urothelial cells.  She ordered subsequently a CT urogram which was read as an apparent mass in the left ureter measuring 2 cm in maximal transverse diameter.  There was no hydronephrosis.  The patient underwent cystoureteroscopy with biopsy.  Pathology was read as high-grade urothelial cell carcinoma, superficial lamina propria invasion.  The patient also had a PET scan, which was negative for metastatic disease.  The patient was seen by Dr. Javier Mcnulty at the  and had been scheduled for robotic-assisted laparoscopic left nephroureterectomy.  The patient was having ongoing hematuria when we saw him and dysuria.  He had multiple urinalyses which had been negative, according to the patient, including urine cultures from January x 2.  There was no family history of bladder cancer.  He did have a personal history of high-grade bladder cancer T1, treated with BCG followed by reinduction BCG in 2016 and GemCis intravascularly x 3, completed in 05/2018.  He had been an on and off smoker for at least 25 years.  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  Prior to surgery, we recommended cisplatin given at a dose of 70 mg/m2 on day 1 and 20% dose reduction of gemcitabine 1000 mg/m2 on day 1 and day 8 of a 21-day cycle x 4 cycles and then go on to surgery.  The patient went on to receive 4 cycles.  Course was complicated by neutropenia requiring delay of chemotherapy.  Otherwise, he did complete 4 cycles and went on to have staging studies.  He underwent a CT urogram on 05/14/2021 and the findings were that there was decrease in size of the left ureteric mass from the PET scan done earlier in February and it had decreased in size from 2.6 to 2.1 cm consistent with positive response.  The patient otherwise says his hematuria has resolved.  He tolerated chemotherapy relatively well.  He had some mild hair loss.  No nausea, vomiting, maybe some fatigue, but otherwise tolerating chemotherapy well with no hearing loss or neuropathy.    PHYSICAL EXAMINATION:    GENERAL:  He is an elderly, well-developed, well-nourished elderly white male in no acute distress.  VITAL SIGNS:  Blood pressure 100/60, pulse 93, respirations 20, temperature 97, respirations 20, temperature 97.4.  HEENT:  Atraumatic, normocephalic.  Oropharynx nonerythematous.  NECK:  Supple.  LUNGS:  Clear to auscultation and percussion.  HEART:  Regular rhythm, S1, S2 normal.  ABDOMEN:  Soft, normoactive bowel sounds.  No mass, nontender.  EXTREMITIES:  No cervical, supraclavicular, axillary or inguinal nodes.  EXTREMITIES:  No edema.  NEUROLOGIC:  Nonfocal.    LABORATORY DATA:  CBC with white count 4.5, H and H 8.8 and 26.0, platelet count 173.  BUN 37, creatinine 1.04.  LDH is normal.    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  Based on these 2 studies, there was a benefit to neoadjuvant chemotherapy with high-grade study prior to nephroureterectomy shown improved 5-year disease-free survival and overall survival rate in these patients.  The patient went on to receive cisplatin and gemcitabine.  Cisplatin was dose reduced by 20%.  He received cisplatin 70 mg/m2 on day 1 and gemcitabine 1000 mg/m2 day 1, day 8 of a 21-day cycle.  He completed 4 cycles.  Course was complicated by neutropenia.  Imaging indicates decrease in size of ureteral mass; therefore, the patient is a candidate for surgery.  We will refer the patient back to Dr. Javier Mcnulty for nephroureterectomy.  He is scheduled to have surgery on 2020.  We will see the patient 2 weeks after surgery and obtain CBC, CMP, LDH.    Eighty minutes was spent on this patient.  Time was spent reviewing physician provider notes, reviewing imaging studies, performing history and physical, documenting history and physical, ordering followup up labs.    Jhony Bartlett MD        D: 2021   T: 2021   MT: BRIANNA/MILINDQA    Name:     CLARISSA COSTA  MRN:      [id number]        Account:    [id number]   :      1945           Visit Date: 2021     Document: [id number]    cc:  MD BHAVESH Cleary MD Sheila K. Gemar, MD

## 2021-05-17 NOTE — NURSING NOTE
"Chief Complaint   Patient presents with     RECHECK     Follow up Malignant neoplasm of anterior wall of urinary bladder        Initial /60   Pulse 93   Temp 97.4  F (36.3  C) (Tympanic)   Resp 20   Ht 1.702 m (5' 7\")   Wt 81.5 kg (179 lb 10.8 oz)   SpO2 99%   BMI 28.14 kg/m   Estimated body mass index is 28.14 kg/m  as calculated from the following:    Height as of this encounter: 1.702 m (5' 7\").    Weight as of this encounter: 81.5 kg (179 lb 10.8 oz).  Medication Reconciliation: complete.  Immunizations and advance directives status reviewed. Pain scale =0 , PHQ-2=0.          Verónica Leung LPN    "

## 2021-05-20 DIAGNOSIS — C67.3 MALIGNANT NEOPLASM OF ANTERIOR WALL OF URINARY BLADDER (H): Primary | ICD-10-CM

## 2021-05-21 DIAGNOSIS — E11.9 TYPE 2 DIABETES MELLITUS WITHOUT COMPLICATION, WITHOUT LONG-TERM CURRENT USE OF INSULIN (H): ICD-10-CM

## 2021-05-24 RX ORDER — ATORVASTATIN CALCIUM 40 MG/1
40 TABLET, FILM COATED ORAL DAILY
Qty: 90 TABLET | Refills: 0 | Status: SHIPPED | OUTPATIENT
Start: 2021-05-24 | End: 2021-08-19

## 2021-05-24 RX ORDER — LISINOPRIL 10 MG/1
10 TABLET ORAL DAILY
Qty: 90 TABLET | Refills: 0 | Status: ON HOLD | OUTPATIENT
Start: 2021-05-24 | End: 2021-06-17

## 2021-05-24 NOTE — TELEPHONE ENCOUNTER
Lipitor      Last Written Prescription Date:  02/06/21  Last Fill Quantity: 90,   # refills: 0  Last Office Visit: 01/14/21  Future Office visit:    Next 5 appointments (look out 90 days)    Jul 06, 2021 10:30 AM  (Arrive by 10:15 AM)  Return Visit with Jhony Bartlett MD  [facility] ([facility] ) [address] [address]  [phone]             Lisinopril      Last Written Prescription Date:  02/06/21  Last Fill Quantity: 90,   # refills: 0  Last Office Visit: 01/14/21  Future Office visit:    Next 5 appointments (look out 90 days)    Jul 06, 2021 10:30 AM  (Arrive by 10:15 AM)  Return Visit with Jhony Bartlett MD  [facility] ([facility] ) [address] [address]  [phone]

## 2021-06-02 ENCOUNTER — TELEPHONE (OUTPATIENT)
Dept: FAMILY MEDICINE | Facility: OTHER | Age: 76
End: 2021-06-02

## 2021-06-02 NOTE — TELEPHONE ENCOUNTER
7:49 AM    Reason for Call: Phone Call    Description:     Call from patient requesting a review on all of his medications. Now that chemotherapy is completed, patient inquiring if he is in need of remaining on all medications.     Patient is questioning magnesium, vit B12, lisinopril, and atorvastatin.     Patient requesting a return call, an appt was offered, patient asking if Dr. Tran can just review his medications and return a call to him.     Please advise.     Patient can be reached at [phone].    Was an appointment offered for this call? Yes  If yes : Appointment type- patient requesting Dr. Tran to review medications and return a call to the patient with an update.               Date    Preferred method for responding to this message: Telephone Call  What is your phone number ?    If we cannot reach you directly, may we leave a detailed response at the number you provided? Yes    Can this message wait until your PCP/provider returns, if available today?    Not applicable- Dr. Tran in today      Perla Landry RN

## 2021-06-04 RX ORDER — ACETAMINOPHEN 500 MG
500-1000 TABLET ORAL EVERY 8 HOURS PRN
Status: ON HOLD | COMMUNITY
End: 2021-06-17

## 2021-06-04 NOTE — PROGRESS NOTES
Preoperative Assessment Center Medication History Note    Medication history completed on June 4, 2021 by this writer. See Epic admission navigator for prior to admission medications. Operating room staff will still need to confirm medications and last dose information on day of surgery.     Medication history interview sources:  patient, payor information.     Changes made to PTA medication list (reason)  Added: tylenol,   Deleted: B12, mag oxide, dexamethasone, prochlorperazine, ibuprofen, oxycodone, lorazepam,  Changed:     Additional medication history information (including reliability of information, actions taken by pharmacist):    -- No recent (within 30 days) course of antibiotics  -- No recent (within 30 days) course of systemic steroids  -- Patient declines being on any other prescription or over-the-counter medications    Prior to Admission medications    Medication Sig Last Dose Taking? Auth Provider   atorvastatin (LIPITOR) 40 MG tablet TAKE 1 TABLET (40 MG) BY MOUTH DAILY Taking Yes Luis Tran MD   brimonidine (ALPHAGAN-P) 0.15 % ophthalmic solution INSTILL 1 DROP INTO RIGHT EYE TWICE DAILY Taking Yes Reported, Patient   latanoprost (XALATAN) 0.005 % ophthalmic solution Place 1 drop into both eyes At Bedtime Taking Yes Reported, Patient   lisinopril (ZESTRIL) 10 MG tablet TAKE 1 TABLET (10 MG) BY MOUTH DAILY Taking Yes Luis Tran MD   metFORMIN (GLUCOPHAGE-XR) 500 MG 24 hr tablet Take 2 tablets (1,000 mg) by mouth 2 times daily (with meals) Taking Yes Maryann Mccord APRN CNP   tamsulosin (FLOMAX) 0.4 MG capsule Take 1 capsule (0.4 mg) by mouth daily Taking Yes Luis Tran MD   acetaminophen (TYLENOL) 500 MG tablet Take 500-1,000 mg by mouth every 8 hours as needed for mild pain Not Taking  Unknown, Entered By History   blood glucose (ONETOUCH VERIO IQ) test strip USE TO TEST BLOOD SUGAR 2 TIMES DAILY OR AS DIRECTED.  Patient taking differently: daily USE TO TEST BLOOD SUGAR 2  TIMES DAILY OR AS DIRECTED.   Luis Tran MD   dexamethasone (DECADRON) 4 MG tablet Take 1 tablet (4 mg) by mouth 2 times daily (with meals) Take for 3 days, starting the day after cisplatin (Day 2).  Patient not taking: Reported on 4/27/2021 Not Taking  Jhony Bartlett MD   [facility] DELICA LANCETS 33G MISC 1 each 2 times daily   Luis Tran MD            Medication history completed by: Vijay Mazariegos [facility]

## 2021-06-07 ENCOUNTER — VIRTUAL VISIT (OUTPATIENT)
Dept: SURGERY | Facility: CLINIC | Age: 76
End: 2021-06-07
Payer: COMMERCIAL

## 2021-06-07 DIAGNOSIS — Z01.818 PREOP EXAMINATION: Primary | ICD-10-CM

## 2021-06-07 PROCEDURE — 99204 OFFICE O/P NEW MOD 45 MIN: CPT | Mod: 95 | Performed by: PHYSICIAN ASSISTANT

## 2021-06-07 ASSESSMENT — COPD QUESTIONNAIRES: COPD: 0

## 2021-06-07 ASSESSMENT — PAIN SCALES - GENERAL: PAINLEVEL: NO PAIN (0)

## 2021-06-07 NOTE — H&P
Pre-Operative H & P     CC:  Preoperative exam to assess for increased cardiopulmonary risk while undergoing surgery and anesthesia.    Date of Encounter: 6/7/2021  Primary Care Physician:  Luis Tran  Associated Diagnosis: left ureteral mass        Video-Visit Details    Type of service:  Video Visit    Patient verbally consented to video service today: YES      Video Call Length: 10 minutes    Originating Location (pt. Location): Home    Distant Location (provider location):  home     Mode of Communication:  Video Conference via D[employer]        [facility]  Colin Baxter is a 76 year old male who presents for pre-operative H & P in preparation for robot assisted nephroureterectomy, and cystoscopy ureteroscopy with Dr. Mcnulty on 6/16/21 at [facility].     This is a 76-year-old male with past medical history significant for hypertension, non-insulin-dependent diabetes, and bladder cancer status post BCG and 3 cycles of gem/cis completed in May 2018.  He was doing well but unfortunately noticed gross hematuria recently.  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  Patient completed 4 cycles of chemotherapy with decreased size of left ureteral mass. His hematuria has resolved. The above procedure is now planned.    History is obtained from the patient and the medical record.    Past Medical History  Past Medical History:   Diagnosis Date     Benign essential hypertension      Cancer (H)     Bladder     Diabetes (H)      Dyslipidemia        Past Surgical History  Past Surgical History:   Procedure Laterality Date     APPENDECTOMY  1958     COLONOSCOPY  2-     COMBINED CYSTOSCOPY, RETROGRADES, URETEROSCOPY, INSERT STENT Left 1/18/2021    Procedure: CYSTOURETEROSCOPY, WITH RETROGRADE PYELOGRAM with interpretation of fluroscopy, ureteroscopy, ureteral  biopsy, bladder biopsy and fulgaration;  Surgeon: Shonda Morrell MD;  [facility]     CYSTOSCOPY, BIOPSY BLADDER, COMBINED N/A 9/8/2015    Procedure: COMBINED CYSTOSCOPY, BIOPSY BLADDER;  Surgeon: Randy Martinez MD;  [facility]     CYSTOSCOPY, BIOPSY BLADDER, COMBINED N/A 2/14/2017    Procedure: COMBINED CYSTOSCOPY, BIOPSY BLADDER;  Surgeon: Randy Martinez MD;  [facility]     CYSTOSCOPY, BIOPSY BLADDER, COMBINED N/A 11/13/2018    Procedure: COMBINED CYSTOSCOPY, BIOPSY BLADDER;  Surgeon: Ed Helm MD;  Location: [facility]     CYSTOSCOPY, RETROGRADES, COMBINED Bilateral 11/13/2018    Procedure: Bilateral Retrograde Pyelagram, Bladder Biopsy;  Surgeon: Ed Helm MD;  Location: [facility]     CYSTOSCOPY, RETROGRADES, INSERT STENT URETER(S), COMBINED Left 9/8/2015    Procedure: COMBINED CYSTOSCOPY, RETROGRADES, INSERT STENT URETER(S);  Surgeon: Randy Martinez MD;  [facility]     CYSTOSCOPY, TRANSURETHRAL RESECTION (TUR) TUMOR BLADDER, COMBINED N/A 9/8/2015    Procedure: COMBINED CYSTOSCOPY, TRANSURETHRAL RESECTION (TUR) TUMOR BLADDER;  Surgeon: Randy Martinez MD;  [facility]     CYSTOSCOPY, TRANSURETHRAL RESECTION (TUR) TUMOR BLADDER, COMBINED N/A 1/12/2016    Procedure: COMBINED CYSTOSCOPY, TRANSURETHRAL RESECTION (TUR) TUMOR BLADDER;  Surgeon: Randy Martinez MD;  [facility]     CYSTOSCOPY, TRANSURETHRAL RESECTION (TUR) TUMOR BLADDER, COMBINED N/A 9/13/2016    Procedure: COMBINED CYSTOSCOPY, TRANSURETHRAL RESECTION (TUR) TUMOR BLADDER;  Surgeon: Randy Martinez MD;  [facility]     PHACOEMULSIFICATION WITH STANDARD INTRAOCULAR LENS IMPLANT Right 10/9/2018    Procedure: PHACOEMULSIFICATION WITH STANDARD INTRAOCULAR LENS IMPLANT;  PHACOEMULSIFICATION CATARACT EXTRACTION POSTERIOR CHAMBER LENS RIGHT;  Surgeon: Marlo Mcconnell MD;  [facility]     PHACOEMULSIFICATION WITH STANDARD INTRAOCULAR LENS IMPLANT Left  10/23/2018    Procedure: PHACOEMULSIFICATION CATARACT EXTRACTION POSTERIOR CHAMBER LENS LEFT;  Surgeon: Marlo Mcconnell MD;  [facility]       Hx of Blood transfusions/reactions: denies     Hx of abnormal bleeding or anti-platelet use: denies    Menstrual history: No LMP for male patient.:     Steroid use in the last year: denies    Personal or FH with difficulty with Anesthesia:  denies    Prior to Admission Medications  Current Outpatient Medications   Medication Sig Dispense Refill     atorvastatin (LIPITOR) 40 MG tablet TAKE 1 TABLET (40 MG) BY MOUTH DAILY 90 tablet 0     brimonidine (ALPHAGAN-P) 0.15 % ophthalmic solution INSTILL 1 DROP INTO RIGHT EYE TWICE DAILY  12     latanoprost (XALATAN) 0.005 % ophthalmic solution Place 1 drop into both eyes At Bedtime       lisinopril (ZESTRIL) 10 MG tablet TAKE 1 TABLET (10 MG) BY MOUTH DAILY 90 tablet 0     metFORMIN (GLUCOPHAGE-XR) 500 MG 24 hr tablet Take 2 tablets (1,000 mg) by mouth 2 times daily (with meals) 360 tablet 2     tamsulosin (FLOMAX) 0.4 MG capsule Take 1 capsule (0.4 mg) by mouth daily 90 capsule 1     acetaminophen (TYLENOL) 500 MG tablet Take 500-1,000 mg by mouth every 8 hours as needed for mild pain       blood glucose (ONETOUCH VERIO IQ) test strip USE TO TEST BLOOD SUGAR 2 TIMES DAILY OR AS DIRECTED. (Patient taking differently: daily USE TO TEST BLOOD SUGAR 2 TIMES DAILY OR AS DIRECTED.) 100 each 10     dexamethasone (DECADRON) 4 MG tablet Take 1 tablet (4 mg) by mouth 2 times daily (with meals) Take for 3 days, starting the day after cisplatin (Day 2). (Patient not taking: Reported on 4/27/2021) 6 tablet 5     ONETOUCH DELICA LANCETS 33G MISC 1 each 2 times daily 100 each 10       Allergies  Allergies   Allergen Reactions     No Clinical Screening - See Comments Other (See Comments)     Beta blocker in glaucoma gtt.s caused low pulse and passing out      Timolol      Beta blocker in glaucoma gtt.s caused low pulse and passing out   -  patient thinks it was timolol but not 100% sure which beta blocker eye drop.        Social History  Social History     Socioeconomic History     Marital status:      Spouse name: Not on file     Number of children: Not on file     Years of education: Not on file     Highest education level: Not on file   Occupational History     Not on file   Social Needs     Financial resource strain: Not on file     Food insecurity     Worry: Not on file     Inability: Not on file     Transportation needs     Medical: Not on file     Non-medical: Not on file   Tobacco Use     Smoking status: Former Smoker     Quit date: 1992     Years since quittin.4     Smokeless tobacco: Never Used   Substance and Sexual Activity     Alcohol use: Yes     Comment: rarely     Drug use: No     Sexual activity: Not Currently   Lifestyle     Physical activity     Days per week: Not on file     Minutes per session: Not on file     Stress: Not on file   Relationships     Social connections     Talks on phone: Not on file     Gets together: Not on file     Attends Zoroastrian service: Not on file     Active member of club or organization: Not on file     Attends meetings of clubs or organizations: Not on file     Relationship status: Not on file     Intimate partner violence     Fear of current or ex partner: Not on file     Emotionally abused: Not on file     Physically abused: Not on file     Forced sexual activity: Not on file   Other Topics Concern     Parent/sibling w/ CABG, MI or angioplasty before 65F 55M? No   Social History Narrative     Not on file       Family History  Family History   Problem Relation Age of Onset     Diabetes Mother      Parkinsonism Brother            Anesthesia Evaluation   Pt has had prior anesthetic.     No history of anesthetic complications       ROS/MED HX  ENT/Pulmonary:     (+) CHARLIE risk factors, snores loudly, hypertension, tobacco use, Past use,  (-) asthma and COPD   Neurologic:  - neg neurologic  ROS     Cardiovascular:     (+) Dyslipidemia hypertension-----    METS/Exercise Tolerance: >4 METS    Hematologic:  - neg hematologic  ROS  (-) history of blood clots and history of blood transfusion   Musculoskeletal:  - neg musculoskeletal ROS     GI/Hepatic:  - neg GI/hepatic ROS  (-) GERD and liver disease   Renal/Genitourinary: Comment: Left ureteral mass      Endo:     (+) type II DM, Last HgA1c: 5.7, date: 1/14/21, Not using insulin, - not using insulin pump.  (-) thyroid disease and chronic steroid usage   Psychiatric/Substance Use:  - neg psychiatric ROS     Infectious Disease:  - neg infectious disease ROS     Malignancy:   (+) Malignancy, History of Other.Other CA bladder status post.    Other:  - neg other ROS          The complete review of systems is negative other than noted in the HPI or here.      Physical Exam    Please refer to the physical examination documented by the anesthesiologist in the anesthesia record on the day of surgery    Constitutional: Awake, alert, cooperative, no apparent distress, and appears stated age.  Respiratory: non labored breathing  Neurologic: Awake, alert, oriented to name, place and time.    Neuropsychiatric: Calm, cooperative. Normal affect.     PRIOR LABS/DIAGNOSTIC STUDIES:  All labs and imaging personally reviewed    Component      Latest Ref Rng & Units 5/14/2021   WBC      4.0 - 11.0 10e9/L 4.5   RBC Count      4.4 - 5.9 10e12/L 2.63 (L)   Hemoglobin      13.3 - 17.7 g/dL 8.8 (L)   Hematocrit      40.0 - 53.0 % 26.0 (L)   MCV      78 - 100 fl 99   MCH      26.5 - 33.0 pg 33.5 (H)   MCHC      31.5 - 36.5 g/dL 33.8   RDW      10.0 - 15.0 % 15.6 (H)   Platelet Count      150 - 450 10e9/L 173   Diff Method       Automated Method   % Neutrophils      % 57.6   % Lymphocytes      % 27.6   % Monocytes      % 10.1   % Eosinophils      % 3.8   % Basophils      % 0.7   % Immature Granulocytes      % 0.2   Nucleated RBCs      0 /100 0   Absolute Neutrophil      1.6 - 8.3  10e9/L 2.6   Absolute Lymphocytes      0.8 - 5.3 10e9/L 1.2   Absolute Monocytes      0.0 - 1.3 10e9/L 0.5   Absolute Eosinophils      0.0 - 0.7 10e9/L 0.2   Absolute Basophils      0.0 - 0.2 10e9/L 0.0   Abs Immature Granulocytes      0 - 0.4 10e9/L 0.0   Absolute Nucleated RBC       0.0     Component      Latest Ref Rng & Units 2021   Sodium      133 - 144 mmol/L 135   Potassium      3.4 - 5.3 mmol/L 4.8   Chloride      94 - 109 mmol/L 107   Carbon Dioxide      20 - 32 mmol/L 24   Anion Gap      3 - 14 mmol/L 4   Glucose      70 - 99 mg/dL 107 (H)   Urea Nitrogen      7 - 30 mg/dL 37 (H)   Creatinine      0.66 - 1.25 mg/dL 1.14   GFR Estimate      >60 mL/min/1.73:m2 63   GFR Estimate If Black      >60 mL/min/1.73:m2 73   Calcium      8.5 - 10.1 mg/dL 8.5   Bilirubin Total      0.2 - 1.3 mg/dL 0.6   Albumin      3.4 - 5.0 g/dL 3.6   Protein Total      6.8 - 8.8 g/dL 6.4 (L)   Alkaline Phosphatase      40 - 150 U/L 56   ALT      0 - 70 U/L 12   AST      0 - 45 U/L 10     Component      Latest Ref Rng & Units 2021   WBC      4.0 - 11.0 10e9/L 6.3   RBC Count      4.4 - 5.9 10e12/L 2.91 (L)   Hemoglobin      13.3 - 17.7 g/dL 9.9 (L)   Hematocrit      40.0 - 53.0 % 28.8 (L)   MCV      78 - 100 fl 99   MCH      26.5 - 33.0 pg 34.0 (H)   MCHC      31.5 - 36.5 g/dL 34.4   RDW      10.0 - 15.0 % 14.6   Platelet Count      150 - 450 10e9/L 263     PROCEDURE: CT UROGRAM WO & W CONTRAST 2021 12:46 PM  IMPRESSION: Apparent mass in the left ureter measuring 2 cm in maximal  transverse diameter. Surprisingly there is no associated  Hydronephrosis.      PROCEDURE: CT UROGRAM WO & W CONTRAST 2021 8:26 AM  IMPRESSION: Decrease in size in the left ureteric mass from 2.6 to 2.1 Cm    PROCEDURE: PET ONCOLOGY WHOLE BODY 2/10/2021 9:00 AM  IMPRESSION: No evidence of metastatic disease from the patient's  ureteral cancer    EK21  SR  Left axis deviation  Low voltage QRS  Possible inferior infarct age  undetermined      Outside records reviewed from: care everywhere    ASSESSMENT and PLAN  Colin Baxter is a 76 year old male scheduled for robot assisted nephroureterectomy, and cystoscopy ureteroscopy on 6/16/2021 by Dr. Mcnulty in treatment of left ureteral mass.  PAC referral for risk assessment and optimization for anesthesia:    Pre-operative considerations:  1.  Cardiac:  Functional status- METS >4. Pt is very active outside taking care of his home and lawn. He mows his own grass.  Intermediate risk surgery with  (RCRI #) risk of major adverse cardiac event.   --hypertension, will hold lisinopril DOS  --dyslipidemia, continue Lipitor  --denies cardiac history.  Denies chest pain, chest pressure, SOB, WARE, palpitations, orthopnea, peripheral edema.    2.  Pulm:  Airway feasible.  CHARLIE risk: intermediate  --remote h/o smoking    3.  GI:  Risk of PONV score = 2.  If > 2, anti-emetic intervention recommended.    4.  Endo:  --NIDDM, will hold Metformin DOS  --A1c 1/14/21 of 5.7    5.  Onc:  --h/o bladder cancer, s/p BCG followed by GemCis chemotherapy x3, completed 5/2018  --recent upfront 4 cycles of chemotherapy with decreased size of left ureteral mass    6.  Other:  --pt to complete CBC, UA/UC, Covid test in [place].  --hemoglobin of 8.8,  5/13/21    VTE risk: 3%    Patient is optimized and is acceptable candidate for the proposed procedure.  No further diagnostic evaluation is needed.       Christine Brannon PA-C  Preoperative Assessment Center  [facility]  Phone: [phone]  Fax: [phone]   Initial

## 2021-06-07 NOTE — PROGRESS NOTES
Colin is a 76 year old who is being evaluated via a billable video visit.      How would you like to obtain your AVS? Mail a copy  If the video visit is dropped, the invitation should be resent by: Text to cell phone: [phone]  Will anyone else be joining your video visit? No      HPI         Review of Systems       Physical Exam

## 2021-06-09 DIAGNOSIS — Z01.818 PREOP EXAMINATION: ICD-10-CM

## 2021-06-09 DIAGNOSIS — R82.90 ABNORMAL URINE FINDINGS: Primary | ICD-10-CM

## 2021-06-09 LAB
ALBUMIN UR-MCNC: 30 MG/DL
APPEARANCE UR: CLEAR
BACTERIA #/AREA URNS HPF: ABNORMAL /HPF
BILIRUB UR QL STRIP: NEGATIVE
COLOR UR AUTO: YELLOW
ERYTHROCYTE [DISTWIDTH] IN BLOOD BY AUTOMATED COUNT: 14.6 % (ref 10–15)
GLUCOSE UR STRIP-MCNC: NEGATIVE MG/DL
HCT VFR BLD AUTO: 28.8 % (ref 40–53)
HGB BLD-MCNC: 9.9 G/DL (ref 13.3–17.7)
HGB UR QL STRIP: ABNORMAL
KETONES UR STRIP-MCNC: NEGATIVE MG/DL
LEUKOCYTE ESTERASE UR QL STRIP: ABNORMAL
MCH RBC QN AUTO: 34 PG (ref 26.5–33)
MCHC RBC AUTO-ENTMCNC: 34.4 G/DL (ref 31.5–36.5)
MCV RBC AUTO: 99 FL (ref 78–100)
MUCOUS THREADS #/AREA URNS LPF: PRESENT /LPF
NITRATE UR QL: NEGATIVE
PH UR STRIP: 5 PH (ref 4.7–8)
PLATELET # BLD AUTO: 263 10E9/L (ref 150–450)
RBC # BLD AUTO: 2.91 10E12/L (ref 4.4–5.9)
RBC #/AREA URNS AUTO: 165 /HPF (ref 0–2)
SOURCE: ABNORMAL
SP GR UR STRIP: 1.02 (ref 1–1.03)
SQUAMOUS #/AREA URNS AUTO: 0 /HPF (ref 0–1)
UROBILINOGEN UR STRIP-MCNC: NORMAL MG/DL (ref 0–2)
WBC # BLD AUTO: 6.3 10E9/L (ref 4–11)
WBC #/AREA URNS AUTO: 21 /HPF (ref 0–5)

## 2021-06-09 PROCEDURE — 36415 COLL VENOUS BLD VENIPUNCTURE: CPT | Mod: ZL | Performed by: PHYSICIAN ASSISTANT

## 2021-06-09 PROCEDURE — 81001 URINALYSIS AUTO W/SCOPE: CPT | Mod: ZL | Performed by: PHYSICIAN ASSISTANT

## 2021-06-09 PROCEDURE — 87086 URINE CULTURE/COLONY COUNT: CPT | Mod: ZL | Performed by: PHYSICIAN ASSISTANT

## 2021-06-09 PROCEDURE — 85027 COMPLETE CBC AUTOMATED: CPT | Mod: ZL | Performed by: PHYSICIAN ASSISTANT

## 2021-06-11 LAB
BACTERIA SPEC CULT: NO GROWTH
SPECIMEN SOURCE: NORMAL

## 2021-06-13 ENCOUNTER — OFFICE VISIT (OUTPATIENT)
Dept: FAMILY MEDICINE | Facility: OTHER | Age: 76
End: 2021-06-13
Attending: FAMILY MEDICINE
Payer: COMMERCIAL

## 2021-06-13 DIAGNOSIS — Z11.59 ENCOUNTER FOR SCREENING FOR OTHER VIRAL DISEASES: ICD-10-CM

## 2021-06-13 LAB
LABORATORY COMMENT REPORT: NORMAL
SARS-COV-2 RNA RESP QL NAA+PROBE: NEGATIVE
SPECIMEN SOURCE: NORMAL

## 2021-06-13 PROCEDURE — U0005 INFEC AGEN DETEC AMPLI PROBE: HCPCS | Mod: ZL | Performed by: UROLOGY

## 2021-06-13 PROCEDURE — U0003 INFECTIOUS AGENT DETECTION BY NUCLEIC ACID (DNA OR RNA); SEVERE ACUTE RESPIRATORY SYNDROME CORONAVIRUS 2 (SARS-COV-2) (CORONAVIRUS DISEASE [COVID-19]), AMPLIFIED PROBE TECHNIQUE, MAKING USE OF HIGH THROUGHPUT TECHNOLOGIES AS DESCRIBED BY CMS-2020-01-R: HCPCS | Mod: ZL | Performed by: UROLOGY

## 2021-06-15 ENCOUNTER — ANESTHESIA EVENT (OUTPATIENT)
Dept: SURGERY | Facility: CLINIC | Age: 76
DRG: 658 | End: 2021-06-15
Payer: COMMERCIAL

## 2021-06-15 ASSESSMENT — LIFESTYLE VARIABLES: TOBACCO_USE: 1

## 2021-06-15 ASSESSMENT — COPD QUESTIONNAIRES: COPD: 0

## 2021-06-15 NOTE — ANESTHESIA PREPROCEDURE EVALUATION
Anesthesia Pre-Procedure Evaluation    Patient: Colin Baxter   MRN: [id number] : 1945        Preoperative Diagnosis: Ureteral mass [N28.89]   Procedure : Procedure(s):  NEPHROURETERECTOMY, ROBOT-ASSISTED  CYSTOURETEROSCOPY     Past Medical History:   Diagnosis Date     Benign essential hypertension      Cancer (H)     Bladder     Diabetes (H)      Dyslipidemia       Past Surgical History:   Procedure Laterality Date     APPENDECTOMY       COLONOSCOPY  2011     COMBINED CYSTOSCOPY, RETROGRADES, URETEROSCOPY, INSERT STENT Left 2021    Procedure: CYSTOURETEROSCOPY, WITH RETROGRADE PYELOGRAM with interpretation of fluroscopy, ureteroscopy, ureteral biopsy, bladder biopsy and fulgaration;  Surgeon: Shonda Morrell MD;  [facility]     CYSTOSCOPY, BIOPSY BLADDER, COMBINED N/A 2015    Procedure: COMBINED CYSTOSCOPY, BIOPSY BLADDER;  Surgeon: Randy Martinez MD;  [facility]     CYSTOSCOPY, BIOPSY BLADDER, COMBINED N/A 2017    Procedure: COMBINED CYSTOSCOPY, BIOPSY BLADDER;  Surgeon: Randy Martinez MD;  [facility]     CYSTOSCOPY, BIOPSY BLADDER, COMBINED N/A 2018    Procedure: COMBINED CYSTOSCOPY, BIOPSY BLADDER;  Surgeon: Ed Helm MD;  [facility]     CYSTOSCOPY, RETROGRADES, COMBINED Bilateral 2018    Procedure: Bilateral Retrograde Pyelagram, Bladder Biopsy;  Surgeon: Ed Helm MD;  [facility]     CYSTOSCOPY, RETROGRADES, INSERT STENT URETER(S), COMBINED Left 2015    Procedure: COMBINED CYSTOSCOPY, RETROGRADES, INSERT STENT URETER(S);  Surgeon: Randy Martinez MD;  [facility]     CYSTOSCOPY, TRANSURETHRAL RESECTION (TUR) TUMOR BLADDER, COMBINED N/A 2015    Procedure: COMBINED CYSTOSCOPY, TRANSURETHRAL RESECTION (TUR) TUMOR BLADDER;  Surgeon: Randy Martinez MD;  [facility]     CYSTOSCOPY, TRANSURETHRAL RESECTION (TUR) TUMOR BLADDER, COMBINED N/A 2016    Procedure: COMBINED  CYSTOSCOPY, TRANSURETHRAL RESECTION (TUR) TUMOR BLADDER;  Surgeon: Randy Martinez MD;  [facility]     CYSTOSCOPY, TRANSURETHRAL RESECTION (TUR) TUMOR BLADDER, COMBINED N/A 2016    Procedure: COMBINED CYSTOSCOPY, TRANSURETHRAL RESECTION (TUR) TUMOR BLADDER;  Surgeon: Randy Martinez MD;  [facility]     PHACOEMULSIFICATION WITH STANDARD INTRAOCULAR LENS IMPLANT Right 10/9/2018    Procedure: PHACOEMULSIFICATION WITH STANDARD INTRAOCULAR LENS IMPLANT;  PHACOEMULSIFICATION CATARACT EXTRACTION POSTERIOR CHAMBER LENS RIGHT;  Surgeon: Marlo Mcconnell MD;  [facility]     PHACOEMULSIFICATION WITH STANDARD INTRAOCULAR LENS IMPLANT Left 10/23/2018    Procedure: PHACOEMULSIFICATION CATARACT EXTRACTION POSTERIOR CHAMBER LENS LEFT;  Surgeon: Marlo Mcconnell MD;  [facility]      Allergies   Allergen Reactions     No Clinical Screening - See Comments Other (See Comments)     Beta blocker in glaucoma gtt.s caused low pulse and passing out      Timolol      Beta blocker in glaucoma gtt.s caused low pulse and passing out   - patient thinks it was timolol but not 100% sure which beta blocker eye drop.       Social History     Tobacco Use     Smoking status: Former Smoker     Quit date: 1992     Years since quittin.4     Smokeless tobacco: Never Used   Substance Use Topics     Alcohol use: Yes     Comment: rarely      Wt Readings from Last 1 Encounters:   21 81.5 kg (179 lb 10.8 oz)        Anesthesia Evaluation   Pt has had prior anesthetic.     No history of anesthetic complications       ROS/MED HX  ENT/Pulmonary:     (+) CHARLIE risk factors, snores loudly, hypertension, tobacco use, Past use,  (-) asthma and COPD   Neurologic:  - neg neurologic ROS     Cardiovascular:     (+) Dyslipidemia hypertension----- (-) murmur and wheezes   METS/Exercise Tolerance: >4 METS    Hematologic:  - neg hematologic  ROS  (-) history of blood clots and history of blood transfusion    Musculoskeletal:  - neg musculoskeletal ROS     GI/Hepatic:  - neg GI/hepatic ROS  (-) GERD and liver disease   Renal/Genitourinary: Comment: Left ureteral mass      Endo:     (+) type II DM, Last HgA1c: 5.7, date: 1/14/21, Not using insulin, - not using insulin pump.  (-) thyroid disease and chronic steroid usage   Psychiatric/Substance Use:  - neg psychiatric ROS     Infectious Disease:  - neg infectious disease ROS     Malignancy:   (+) Malignancy, History of Other.Other CA bladder status post.    Other:  - neg other ROS          Physical Exam    Airway        Mallampati: IV   TM distance: > 3 FB   Neck ROM: full   Mouth opening: > 3 cm    Respiratory Devices and Support         Dental  no notable dental history         Cardiovascular          Rhythm and rate: regular and normal (-) no systolic click and no murmur    Pulmonary   pulmonary exam normal        breath sounds clear to auscultation   (-) no wheezes        OUTSIDE LABS:  CBC:   Lab Results   Component Value Date    WBC 6.3 06/09/2021    WBC 4.5 05/14/2021    HGB 9.9 (L) 06/09/2021    HGB 8.8 (L) 05/14/2021    HCT 28.8 (L) 06/09/2021    HCT 26.0 (L) 05/14/2021     06/09/2021     05/14/2021     BMP:   Lab Results   Component Value Date     05/14/2021     04/27/2021    POTASSIUM 4.8 05/14/2021    POTASSIUM 4.1 04/27/2021    CHLORIDE 107 05/14/2021    CHLORIDE 110 (H) 04/27/2021    CO2 24 05/14/2021    CO2 20 04/27/2021    BUN 37 (H) 05/14/2021    BUN 36 (H) 04/27/2021    CR 1.14 05/14/2021    CR 1.04 04/27/2021     (H) 05/14/2021     (H) 04/27/2021     COAGS: No results found for: PTT, INR, FIBR  POC: No results found for: BGM, HCG, HCGS  HEPATIC:   Lab Results   Component Value Date    ALBUMIN 3.6 05/14/2021    PROTTOTAL 6.4 (L) 05/14/2021    ALT 12 05/14/2021    AST 10 05/14/2021    ALKPHOS 56 05/14/2021    BILITOTAL 0.6 05/14/2021     OTHER:   Lab Results   Component Value Date    A1C 5.7 (H) 01/14/2021    MAHSA  8.5 05/14/2021    MAG 1.9 05/14/2021    TSH 1.41 10/02/2020    T4 0.99 10/02/2020       Anesthesia Plan    ASA Status:  3   NPO Status:  NPO Appropriate    Anesthesia Type: General.     - Airway: ETT   Induction: Intravenous.   Maintenance: Inhalation.   Techniques and Equipment:     - Airway: Video-Laryngoscope     - Lines/Monitors: 2nd IV, Arterial Line     Consents    Anesthesia Plan(s) and associated risks, benefits, and realistic alternatives discussed. Questions answered and patient/representative(s) expressed understanding.     - Discussed with:  Patient      - Extended Intubation/Ventilatory Support Discussed: Yes.      - Patient is DNR/DNI Status: No    Use of blood products discussed: Yes.     - Discussed with: Patient.     Postoperative Care    Pain management: IV analgesics.   PONV prophylaxis: Ondansetron (or other 5HT-3), Dexamethasone or Solumedrol     Comments:    Insulin gtt            Christopher J. Behrens, MD

## 2021-06-16 ENCOUNTER — ANCILLARY PROCEDURE (OUTPATIENT)
Dept: ULTRASOUND IMAGING | Facility: CLINIC | Age: 76
End: 2021-06-16
Attending: STUDENT IN AN ORGANIZED HEALTH CARE EDUCATION/TRAINING PROGRAM
Payer: COMMERCIAL

## 2021-06-16 ENCOUNTER — HOSPITAL ENCOUNTER (INPATIENT)
Facility: CLINIC | Age: 76
LOS: 1 days | Discharge: HOME | DRG: 658 | End: 2021-06-17
Attending: UROLOGY | Admitting: UROLOGY
Payer: COMMERCIAL

## 2021-06-16 ENCOUNTER — ANESTHESIA (OUTPATIENT)
Dept: SURGERY | Facility: CLINIC | Age: 76
DRG: 658 | End: 2021-06-16
Payer: COMMERCIAL

## 2021-06-16 DIAGNOSIS — N28.89 URETERAL MASS: ICD-10-CM

## 2021-06-16 DIAGNOSIS — E11.9 TYPE 2 DIABETES MELLITUS WITHOUT COMPLICATION, WITHOUT LONG-TERM CURRENT USE OF INSULIN (H): ICD-10-CM

## 2021-06-16 LAB
ABO + RH BLD: NORMAL
ABO + RH BLD: NORMAL
ANION GAP SERPL CALCULATED.3IONS-SCNC: 5 MMOL/L (ref 3–14)
BASE DEFICIT BLDA-SCNC: 5.3 MMOL/L
BASE DEFICIT BLDA-SCNC: 6.3 MMOL/L
BASE DEFICIT BLDA-SCNC: 6.3 MMOL/L
BASE DEFICIT BLDA-SCNC: 7 MMOL/L
BLD GP AB SCN SERPL QL: NORMAL
BLD PROD TYP BPU: NORMAL
BLOOD BANK CMNT PATIENT-IMP: NORMAL
BUN SERPL-MCNC: 33 MG/DL (ref 7–30)
CA-I BLD-MCNC: 4.6 MG/DL (ref 4.4–5.2)
CA-I BLD-MCNC: 4.6 MG/DL (ref 4.4–5.2)
CA-I BLD-MCNC: 4.7 MG/DL (ref 4.4–5.2)
CA-I BLD-MCNC: 4.7 MG/DL (ref 4.4–5.2)
CALCIUM SERPL-MCNC: 8.8 MG/DL (ref 8.5–10.1)
CHLORIDE SERPL-SCNC: 109 MMOL/L (ref 94–109)
CO2 SERPL-SCNC: 23 MMOL/L (ref 20–32)
CREAT SERPL-MCNC: 1.33 MG/DL (ref 0.66–1.25)
ERYTHROCYTE [DISTWIDTH] IN BLOOD BY AUTOMATED COUNT: 14.2 % (ref 10–15)
GFR SERPL CREATININE-BSD FRML MDRD: 52 ML/MIN/{1.73_M2}
GLUCOSE BLD-MCNC: 153 MG/DL (ref 70–99)
GLUCOSE BLD-MCNC: 157 MG/DL (ref 70–99)
GLUCOSE BLD-MCNC: 180 MG/DL (ref 70–99)
GLUCOSE BLD-MCNC: 194 MG/DL (ref 70–99)
GLUCOSE BLDC GLUCOMTR-MCNC: 131 MG/DL (ref 70–99)
GLUCOSE BLDC GLUCOMTR-MCNC: 135 MG/DL (ref 70–99)
GLUCOSE BLDC GLUCOMTR-MCNC: 153 MG/DL (ref 70–99)
GLUCOSE BLDC GLUCOMTR-MCNC: 96 MG/DL (ref 70–99)
GLUCOSE SERPL-MCNC: 100 MG/DL (ref 70–99)
HCO3 BLD-SCNC: 19 MMOL/L (ref 21–28)
HCO3 BLD-SCNC: 20 MMOL/L (ref 21–28)
HCO3 BLD-SCNC: 20 MMOL/L (ref 21–28)
HCO3 BLD-SCNC: 21 MMOL/L (ref 21–28)
HCT VFR BLD AUTO: 30.3 % (ref 40–53)
HGB BLD-MCNC: 9.1 G/DL (ref 13.3–17.7)
HGB BLD-MCNC: 9.2 G/DL (ref 13.3–17.7)
HGB BLD-MCNC: 9.2 G/DL (ref 13.3–17.7)
HGB BLD-MCNC: 9.5 G/DL (ref 13.3–17.7)
HGB BLD-MCNC: 9.8 G/DL (ref 13.3–17.7)
LACTATE BLD-SCNC: 0.7 MMOL/L (ref 0.7–2)
LACTATE BLD-SCNC: 0.9 MMOL/L (ref 0.7–2)
LACTATE BLD-SCNC: 1 MMOL/L (ref 0.7–2)
LACTATE BLD-SCNC: 1.3 MMOL/L (ref 0.7–2)
MCH RBC QN AUTO: 32.7 PG (ref 26.5–33)
MCHC RBC AUTO-ENTMCNC: 32.3 G/DL (ref 31.5–36.5)
MCV RBC AUTO: 101 FL (ref 78–100)
NUM BPU REQUESTED: 2
O2/TOTAL GAS SETTING VFR VENT: 40 %
OXYHGB MFR BLD: 96 % (ref 92–100)
OXYHGB MFR BLD: 97 % (ref 92–100)
PCO2 BLD: 38 MM HG (ref 35–45)
PCO2 BLD: 42 MM HG (ref 35–45)
PCO2 BLD: 43 MM HG (ref 35–45)
PCO2 BLD: 43 MM HG (ref 35–45)
PH BLD: 7.28 PH (ref 7.35–7.45)
PH BLD: 7.29 PH (ref 7.35–7.45)
PH BLD: 7.3 PH (ref 7.35–7.45)
PH BLD: 7.31 PH (ref 7.35–7.45)
PLATELET # BLD AUTO: 205 10E9/L (ref 150–450)
PO2 BLD: 105 MM HG (ref 80–105)
PO2 BLD: 114 MM HG (ref 80–105)
PO2 BLD: 123 MM HG (ref 80–105)
PO2 BLD: 123 MM HG (ref 80–105)
POTASSIUM BLD-SCNC: 4.3 MMOL/L (ref 3.4–5.3)
POTASSIUM BLD-SCNC: 4.8 MMOL/L (ref 3.4–5.3)
POTASSIUM BLD-SCNC: 4.8 MMOL/L (ref 3.4–5.3)
POTASSIUM BLD-SCNC: 5.3 MMOL/L (ref 3.4–5.3)
POTASSIUM SERPL-SCNC: 5.1 MMOL/L (ref 3.4–5.3)
RBC # BLD AUTO: 3 10E12/L (ref 4.4–5.9)
SODIUM BLD-SCNC: 136 MMOL/L (ref 133–144)
SODIUM BLD-SCNC: 137 MMOL/L (ref 133–144)
SODIUM SERPL-SCNC: 137 MMOL/L (ref 133–144)
SPECIMEN EXP DATE BLD: NORMAL
WBC # BLD AUTO: 6.2 10E9/L (ref 4–11)

## 2021-06-16 PROCEDURE — 250N000011 HC RX IP 250 OP 636: Performed by: NURSE ANESTHETIST, CERTIFIED REGISTERED

## 2021-06-16 PROCEDURE — 360N000087 HC SURGERY LEVEL 7 W/ FLUORO, PER MIN: Performed by: UROLOGY

## 2021-06-16 PROCEDURE — 250N000009 HC RX 250: Performed by: STUDENT IN AN ORGANIZED HEALTH CARE EDUCATION/TRAINING PROGRAM

## 2021-06-16 PROCEDURE — 250N000013 HC RX MED GY IP 250 OP 250 PS 637: Performed by: UROLOGY

## 2021-06-16 PROCEDURE — 999N000015 HC STATISTIC ARTERIAL MONITORING DAILY

## 2021-06-16 PROCEDURE — 8E0W4CZ ROBOTIC ASSISTED PROCEDURE OF TRUNK REGION, PERCUTANEOUS ENDOSCOPIC APPROACH: ICD-10-PCS | Performed by: UROLOGY

## 2021-06-16 PROCEDURE — 88305 TISSUE EXAM BY PATHOLOGIST: CPT | Mod: TC | Performed by: UROLOGY

## 2021-06-16 PROCEDURE — 85027 COMPLETE CBC AUTOMATED: CPT | Performed by: ANESTHESIOLOGY

## 2021-06-16 PROCEDURE — 83605 ASSAY OF LACTIC ACID: CPT

## 2021-06-16 PROCEDURE — 88305 TISSUE EXAM BY PATHOLOGIST: CPT | Mod: 26 | Performed by: PATHOLOGY

## 2021-06-16 PROCEDURE — 80048 BASIC METABOLIC PNL TOTAL CA: CPT | Performed by: ANESTHESIOLOGY

## 2021-06-16 PROCEDURE — 250N000009 HC RX 250: Performed by: NURSE ANESTHETIST, CERTIFIED REGISTERED

## 2021-06-16 PROCEDURE — 36415 COLL VENOUS BLD VENIPUNCTURE: CPT | Performed by: ANESTHESIOLOGY

## 2021-06-16 PROCEDURE — 120N000002 HC R&B MED SURG/OB UMMC

## 2021-06-16 PROCEDURE — 370N000017 HC ANESTHESIA TECHNICAL FEE, PER MIN: Performed by: UROLOGY

## 2021-06-16 PROCEDURE — 82810 BLOOD GASES O2 SAT ONLY: CPT

## 2021-06-16 PROCEDURE — 88307 TISSUE EXAM BY PATHOLOGIST: CPT | Mod: TC | Performed by: UROLOGY

## 2021-06-16 PROCEDURE — 86901 BLOOD TYPING SEROLOGIC RH(D): CPT | Performed by: UROLOGY

## 2021-06-16 PROCEDURE — 82947 ASSAY GLUCOSE BLOOD QUANT: CPT

## 2021-06-16 PROCEDURE — 999N000157 HC STATISTIC RCP TIME EA 10 MIN

## 2021-06-16 PROCEDURE — 250N000011 HC RX IP 250 OP 636: Performed by: UROLOGY

## 2021-06-16 PROCEDURE — 86901 BLOOD TYPING SEROLOGIC RH(D): CPT | Performed by: ANESTHESIOLOGY

## 2021-06-16 PROCEDURE — 710N000010 HC RECOVERY PHASE 1, LEVEL 2, PER MIN: Performed by: UROLOGY

## 2021-06-16 PROCEDURE — 0TT14ZZ RESECTION OF LEFT KIDNEY, PERCUTANEOUS ENDOSCOPIC APPROACH: ICD-10-PCS | Performed by: UROLOGY

## 2021-06-16 PROCEDURE — 999N001017 HC STATISTIC GLUCOSE BY METER IP

## 2021-06-16 PROCEDURE — 250N000025 HC SEVOFLURANE, PER MIN: Performed by: UROLOGY

## 2021-06-16 PROCEDURE — 250N000011 HC RX IP 250 OP 636: Performed by: STUDENT IN AN ORGANIZED HEALTH CARE EDUCATION/TRAINING PROGRAM

## 2021-06-16 PROCEDURE — 82330 ASSAY OF CALCIUM: CPT

## 2021-06-16 PROCEDURE — 999N000141 HC STATISTIC PRE-PROCEDURE NURSING ASSESSMENT: Performed by: UROLOGY

## 2021-06-16 PROCEDURE — 84132 ASSAY OF SERUM POTASSIUM: CPT

## 2021-06-16 PROCEDURE — 258N000003 HC RX IP 258 OP 636

## 2021-06-16 PROCEDURE — 272N000001 HC OR GENERAL SUPPLY STERILE: Performed by: UROLOGY

## 2021-06-16 PROCEDURE — 86923 COMPATIBILITY TEST ELECTRIC: CPT | Performed by: ANESTHESIOLOGY

## 2021-06-16 PROCEDURE — 250N000009 HC RX 250

## 2021-06-16 PROCEDURE — 86850 RBC ANTIBODY SCREEN: CPT | Performed by: ANESTHESIOLOGY

## 2021-06-16 PROCEDURE — 84295 ASSAY OF SERUM SODIUM: CPT

## 2021-06-16 PROCEDURE — 82803 BLOOD GASES ANY COMBINATION: CPT

## 2021-06-16 PROCEDURE — 250N000011 HC RX IP 250 OP 636

## 2021-06-16 PROCEDURE — 250N000013 HC RX MED GY IP 250 OP 250 PS 637: Performed by: STUDENT IN AN ORGANIZED HEALTH CARE EDUCATION/TRAINING PROGRAM

## 2021-06-16 PROCEDURE — 258N000003 HC RX IP 258 OP 636: Performed by: STUDENT IN AN ORGANIZED HEALTH CARE EDUCATION/TRAINING PROGRAM

## 2021-06-16 PROCEDURE — 86900 BLOOD TYPING SEROLOGIC ABO: CPT | Performed by: ANESTHESIOLOGY

## 2021-06-16 PROCEDURE — 88307 TISSUE EXAM BY PATHOLOGIST: CPT | Mod: 26 | Performed by: PATHOLOGY

## 2021-06-16 PROCEDURE — 0TT74ZZ RESECTION OF LEFT URETER, PERCUTANEOUS ENDOSCOPIC APPROACH: ICD-10-PCS | Performed by: UROLOGY

## 2021-06-16 PROCEDURE — 258N000003 HC RX IP 258 OP 636: Performed by: ANESTHESIOLOGY

## 2021-06-16 PROCEDURE — 258N000003 HC RX IP 258 OP 636: Performed by: NURSE ANESTHETIST, CERTIFIED REGISTERED

## 2021-06-16 PROCEDURE — 07TC4ZZ RESECTION OF PELVIS LYMPHATIC, PERCUTANEOUS ENDOSCOPIC APPROACH: ICD-10-PCS | Performed by: UROLOGY

## 2021-06-16 RX ORDER — ONDANSETRON 4 MG/1
4 TABLET, ORALLY DISINTEGRATING ORAL EVERY 30 MIN PRN
Status: DISCONTINUED | OUTPATIENT
Start: 2021-06-16 | End: 2021-06-16 | Stop reason: HOSPADM

## 2021-06-16 RX ORDER — ACETAMINOPHEN 325 MG/1
975 TABLET ORAL EVERY 6 HOURS
Status: DISCONTINUED | OUTPATIENT
Start: 2021-06-16 | End: 2021-06-17 | Stop reason: HOSPADM

## 2021-06-16 RX ORDER — LABETALOL HYDROCHLORIDE 5 MG/ML
10 INJECTION, SOLUTION INTRAVENOUS
Status: DISCONTINUED | OUTPATIENT
Start: 2021-06-16 | End: 2021-06-16 | Stop reason: HOSPADM

## 2021-06-16 RX ORDER — ATORVASTATIN CALCIUM 40 MG/1
40 TABLET, FILM COATED ORAL EVERY EVENING
Status: DISCONTINUED | OUTPATIENT
Start: 2021-06-16 | End: 2021-06-17 | Stop reason: HOSPADM

## 2021-06-16 RX ORDER — OXYCODONE HYDROCHLORIDE 5 MG/1
5-10 TABLET ORAL
Status: DISCONTINUED | OUTPATIENT
Start: 2021-06-16 | End: 2021-06-17 | Stop reason: HOSPADM

## 2021-06-16 RX ORDER — AMOXICILLIN 250 MG
2 CAPSULE ORAL 2 TIMES DAILY
Status: DISCONTINUED | OUTPATIENT
Start: 2021-06-16 | End: 2021-06-17 | Stop reason: HOSPADM

## 2021-06-16 RX ORDER — HEPARIN SODIUM 5000 [USP'U]/.5ML
5000 INJECTION, SOLUTION INTRAVENOUS; SUBCUTANEOUS EVERY 8 HOURS
Status: DISCONTINUED | OUTPATIENT
Start: 2021-06-16 | End: 2021-06-17 | Stop reason: HOSPADM

## 2021-06-16 RX ORDER — ONDANSETRON 4 MG/1
4 TABLET, ORALLY DISINTEGRATING ORAL EVERY 6 HOURS PRN
Status: DISCONTINUED | OUTPATIENT
Start: 2021-06-16 | End: 2021-06-17 | Stop reason: HOSPADM

## 2021-06-16 RX ORDER — PROPOFOL 10 MG/ML
INJECTION, EMULSION INTRAVENOUS PRN
Status: DISCONTINUED | OUTPATIENT
Start: 2021-06-16 | End: 2021-06-16

## 2021-06-16 RX ORDER — PROCHLORPERAZINE MALEATE 5 MG
5 TABLET ORAL EVERY 6 HOURS PRN
Status: DISCONTINUED | OUTPATIENT
Start: 2021-06-16 | End: 2021-06-17 | Stop reason: HOSPADM

## 2021-06-16 RX ORDER — POLYETHYLENE GLYCOL 3350 17 G/17G
17 POWDER, FOR SOLUTION ORAL DAILY
Status: DISCONTINUED | OUTPATIENT
Start: 2021-06-16 | End: 2021-06-17 | Stop reason: HOSPADM

## 2021-06-16 RX ORDER — BRIMONIDINE TARTRATE 2 MG/ML
1 SOLUTION/ DROPS OPHTHALMIC 2 TIMES DAILY
Status: DISCONTINUED | OUTPATIENT
Start: 2021-06-16 | End: 2021-06-17 | Stop reason: HOSPADM

## 2021-06-16 RX ORDER — DEXAMETHASONE SODIUM PHOSPHATE 10 MG/ML
INJECTION, SOLUTION INTRAMUSCULAR; INTRAVENOUS PRN
Status: DISCONTINUED | OUTPATIENT
Start: 2021-06-16 | End: 2021-06-16

## 2021-06-16 RX ORDER — NALOXONE HYDROCHLORIDE 0.4 MG/ML
0.4 INJECTION, SOLUTION INTRAMUSCULAR; INTRAVENOUS; SUBCUTANEOUS
Status: ACTIVE | OUTPATIENT
Start: 2021-06-16 | End: 2021-06-17

## 2021-06-16 RX ORDER — TAMSULOSIN HYDROCHLORIDE 0.4 MG/1
0.4 CAPSULE ORAL EVERY EVENING
Status: DISCONTINUED | OUTPATIENT
Start: 2021-06-16 | End: 2021-06-17 | Stop reason: HOSPADM

## 2021-06-16 RX ORDER — EPHEDRINE SULFATE 50 MG/ML
INJECTION, SOLUTION INTRAMUSCULAR; INTRAVENOUS; SUBCUTANEOUS PRN
Status: DISCONTINUED | OUTPATIENT
Start: 2021-06-16 | End: 2021-06-16

## 2021-06-16 RX ORDER — SODIUM CHLORIDE, SODIUM LACTATE, POTASSIUM CHLORIDE, CALCIUM CHLORIDE 600; 310; 30; 20 MG/100ML; MG/100ML; MG/100ML; MG/100ML
INJECTION, SOLUTION INTRAVENOUS CONTINUOUS
Status: DISCONTINUED | OUTPATIENT
Start: 2021-06-16 | End: 2021-06-16 | Stop reason: HOSPADM

## 2021-06-16 RX ORDER — NALOXONE HYDROCHLORIDE 0.4 MG/ML
0.4 INJECTION, SOLUTION INTRAMUSCULAR; INTRAVENOUS; SUBCUTANEOUS
Status: DISCONTINUED | OUTPATIENT
Start: 2021-06-16 | End: 2021-06-17 | Stop reason: HOSPADM

## 2021-06-16 RX ORDER — NALOXONE HYDROCHLORIDE 0.4 MG/ML
0.2 INJECTION, SOLUTION INTRAMUSCULAR; INTRAVENOUS; SUBCUTANEOUS
Status: ACTIVE | OUTPATIENT
Start: 2021-06-16 | End: 2021-06-17

## 2021-06-16 RX ORDER — AMOXICILLIN 250 MG
1 CAPSULE ORAL 2 TIMES DAILY
Status: DISCONTINUED | OUTPATIENT
Start: 2021-06-16 | End: 2021-06-17 | Stop reason: HOSPADM

## 2021-06-16 RX ORDER — HEPARIN SODIUM 5000 [USP'U]/.5ML
5000 INJECTION, SOLUTION INTRAVENOUS; SUBCUTANEOUS
Status: DISCONTINUED | OUTPATIENT
Start: 2021-06-16 | End: 2021-06-16 | Stop reason: HOSPADM

## 2021-06-16 RX ORDER — HYDROMORPHONE HYDROCHLORIDE 1 MG/ML
.3-.5 INJECTION, SOLUTION INTRAMUSCULAR; INTRAVENOUS; SUBCUTANEOUS EVERY 5 MIN PRN
Status: DISCONTINUED | OUTPATIENT
Start: 2021-06-16 | End: 2021-06-16 | Stop reason: HOSPADM

## 2021-06-16 RX ORDER — LIDOCAINE HYDROCHLORIDE 20 MG/ML
INJECTION, SOLUTION INFILTRATION; PERINEURAL PRN
Status: DISCONTINUED | OUTPATIENT
Start: 2021-06-16 | End: 2021-06-16

## 2021-06-16 RX ORDER — CEFAZOLIN SODIUM 1 G/3ML
1 INJECTION, POWDER, FOR SOLUTION INTRAMUSCULAR; INTRAVENOUS SEE ADMIN INSTRUCTIONS
Status: DISCONTINUED | OUTPATIENT
Start: 2021-06-16 | End: 2021-06-16 | Stop reason: HOSPADM

## 2021-06-16 RX ORDER — LIDOCAINE 40 MG/G
CREAM TOPICAL
Status: DISCONTINUED | OUTPATIENT
Start: 2021-06-16 | End: 2021-06-16 | Stop reason: HOSPADM

## 2021-06-16 RX ORDER — PROCHLORPERAZINE 25 MG
12.5 SUPPOSITORY, RECTAL RECTAL EVERY 12 HOURS PRN
Status: DISCONTINUED | OUTPATIENT
Start: 2021-06-16 | End: 2021-06-17 | Stop reason: HOSPADM

## 2021-06-16 RX ORDER — NICOTINE POLACRILEX 4 MG
15-30 LOZENGE BUCCAL
Status: DISCONTINUED | OUTPATIENT
Start: 2021-06-16 | End: 2021-06-17 | Stop reason: HOSPADM

## 2021-06-16 RX ORDER — FLUMAZENIL 0.1 MG/ML
0.2 INJECTION, SOLUTION INTRAVENOUS
Status: DISCONTINUED | OUTPATIENT
Start: 2021-06-16 | End: 2021-06-16 | Stop reason: HOSPADM

## 2021-06-16 RX ORDER — SODIUM CHLORIDE, SODIUM LACTATE, POTASSIUM CHLORIDE, CALCIUM CHLORIDE 600; 310; 30; 20 MG/100ML; MG/100ML; MG/100ML; MG/100ML
INJECTION, SOLUTION INTRAVENOUS CONTINUOUS PRN
Status: DISCONTINUED | OUTPATIENT
Start: 2021-06-16 | End: 2021-06-16

## 2021-06-16 RX ORDER — ONDANSETRON 2 MG/ML
4 INJECTION INTRAMUSCULAR; INTRAVENOUS EVERY 6 HOURS PRN
Status: DISCONTINUED | OUTPATIENT
Start: 2021-06-16 | End: 2021-06-17 | Stop reason: HOSPADM

## 2021-06-16 RX ORDER — ONDANSETRON 2 MG/ML
INJECTION INTRAMUSCULAR; INTRAVENOUS PRN
Status: DISCONTINUED | OUTPATIENT
Start: 2021-06-16 | End: 2021-06-16

## 2021-06-16 RX ORDER — NALOXONE HYDROCHLORIDE 0.4 MG/ML
0.2 INJECTION, SOLUTION INTRAMUSCULAR; INTRAVENOUS; SUBCUTANEOUS
Status: DISCONTINUED | OUTPATIENT
Start: 2021-06-16 | End: 2021-06-17 | Stop reason: HOSPADM

## 2021-06-16 RX ORDER — BUPIVACAINE HYDROCHLORIDE 2.5 MG/ML
INJECTION, SOLUTION EPIDURAL; INFILTRATION; INTRACAUDAL PRN
Status: DISCONTINUED | OUTPATIENT
Start: 2021-06-16 | End: 2021-06-16

## 2021-06-16 RX ORDER — HYDROMORPHONE HCL IN WATER/PF 6 MG/30 ML
0.2 PATIENT CONTROLLED ANALGESIA SYRINGE INTRAVENOUS
Status: DISCONTINUED | OUTPATIENT
Start: 2021-06-16 | End: 2021-06-17 | Stop reason: HOSPADM

## 2021-06-16 RX ORDER — FENTANYL CITRATE 50 UG/ML
25-50 INJECTION, SOLUTION INTRAMUSCULAR; INTRAVENOUS
Status: DISCONTINUED | OUTPATIENT
Start: 2021-06-16 | End: 2021-06-16 | Stop reason: HOSPADM

## 2021-06-16 RX ORDER — BISACODYL 5 MG
5 TABLET, DELAYED RELEASE (ENTERIC COATED) ORAL DAILY PRN
Status: DISCONTINUED | OUTPATIENT
Start: 2021-06-16 | End: 2021-06-17 | Stop reason: HOSPADM

## 2021-06-16 RX ORDER — BISACODYL 5 MG
10 TABLET, DELAYED RELEASE (ENTERIC COATED) ORAL DAILY PRN
Status: DISCONTINUED | OUTPATIENT
Start: 2021-06-16 | End: 2021-06-17 | Stop reason: HOSPADM

## 2021-06-16 RX ORDER — SODIUM CHLORIDE 9 MG/ML
INJECTION, SOLUTION INTRAVENOUS CONTINUOUS
Status: DISCONTINUED | OUTPATIENT
Start: 2021-06-16 | End: 2021-06-17 | Stop reason: HOSPADM

## 2021-06-16 RX ORDER — LATANOPROST 50 UG/ML
1 SOLUTION/ DROPS OPHTHALMIC AT BEDTIME
Status: DISCONTINUED | OUTPATIENT
Start: 2021-06-16 | End: 2021-06-17 | Stop reason: HOSPADM

## 2021-06-16 RX ORDER — HYDRALAZINE HYDROCHLORIDE 20 MG/ML
2.5-5 INJECTION INTRAMUSCULAR; INTRAVENOUS EVERY 10 MIN PRN
Status: DISCONTINUED | OUTPATIENT
Start: 2021-06-16 | End: 2021-06-16 | Stop reason: HOSPADM

## 2021-06-16 RX ORDER — ONDANSETRON 2 MG/ML
4 INJECTION INTRAMUSCULAR; INTRAVENOUS EVERY 30 MIN PRN
Status: DISCONTINUED | OUTPATIENT
Start: 2021-06-16 | End: 2021-06-16 | Stop reason: HOSPADM

## 2021-06-16 RX ORDER — OXYCODONE HYDROCHLORIDE 5 MG/1
5 TABLET ORAL EVERY 4 HOURS PRN
Status: DISCONTINUED | OUTPATIENT
Start: 2021-06-16 | End: 2021-06-17 | Stop reason: HOSPADM

## 2021-06-16 RX ORDER — ACETAMINOPHEN 325 MG/1
975 TABLET ORAL ONCE
Status: COMPLETED | OUTPATIENT
Start: 2021-06-16 | End: 2021-06-16

## 2021-06-16 RX ORDER — CEFAZOLIN SODIUM 2 G/100ML
2 INJECTION, SOLUTION INTRAVENOUS
Status: COMPLETED | OUTPATIENT
Start: 2021-06-16 | End: 2021-06-16

## 2021-06-16 RX ORDER — BISACODYL 5 MG
15 TABLET, DELAYED RELEASE (ENTERIC COATED) ORAL DAILY PRN
Status: DISCONTINUED | OUTPATIENT
Start: 2021-06-16 | End: 2021-06-17 | Stop reason: HOSPADM

## 2021-06-16 RX ORDER — DEXTROSE MONOHYDRATE 25 G/50ML
25-50 INJECTION, SOLUTION INTRAVENOUS
Status: DISCONTINUED | OUTPATIENT
Start: 2021-06-16 | End: 2021-06-17 | Stop reason: HOSPADM

## 2021-06-16 RX ORDER — FENTANYL CITRATE 50 UG/ML
INJECTION, SOLUTION INTRAMUSCULAR; INTRAVENOUS PRN
Status: DISCONTINUED | OUTPATIENT
Start: 2021-06-16 | End: 2021-06-16

## 2021-06-16 RX ORDER — ALBUTEROL SULFATE 0.83 MG/ML
2.5 SOLUTION RESPIRATORY (INHALATION) EVERY 4 HOURS PRN
Status: DISCONTINUED | OUTPATIENT
Start: 2021-06-16 | End: 2021-06-16 | Stop reason: HOSPADM

## 2021-06-16 RX ADMIN — ROCURONIUM BROMIDE 30 MG: 10 INJECTION INTRAVENOUS at 09:35

## 2021-06-16 RX ADMIN — SUGAMMADEX 200 MG: 100 INJECTION, SOLUTION INTRAVENOUS at 14:05

## 2021-06-16 RX ADMIN — PHENYLEPHRINE HYDROCHLORIDE 200 MCG: 10 INJECTION INTRAVENOUS at 07:54

## 2021-06-16 RX ADMIN — Medication 2 G: at 08:15

## 2021-06-16 RX ADMIN — ACETAMINOPHEN 975 MG: 325 TABLET ORAL at 17:36

## 2021-06-16 RX ADMIN — Medication 5 MG: at 07:45

## 2021-06-16 RX ADMIN — PHENYLEPHRINE HYDROCHLORIDE 200 MCG: 10 INJECTION INTRAVENOUS at 08:26

## 2021-06-16 RX ADMIN — ROCURONIUM BROMIDE 20 MG: 10 INJECTION INTRAVENOUS at 10:03

## 2021-06-16 RX ADMIN — PHENYLEPHRINE HYDROCHLORIDE 200 MCG: 10 INJECTION INTRAVENOUS at 08:22

## 2021-06-16 RX ADMIN — BRIMONIDINE TARTRATE 1 DROP: 2 SOLUTION OPHTHALMIC at 21:35

## 2021-06-16 RX ADMIN — ROCURONIUM BROMIDE 80 MG: 10 INJECTION INTRAVENOUS at 07:41

## 2021-06-16 RX ADMIN — LATANOPROST 1 DROP: 50 SOLUTION OPHTHALMIC at 21:36

## 2021-06-16 RX ADMIN — Medication 5 MG: at 13:12

## 2021-06-16 RX ADMIN — FENTANYL CITRATE 50 MCG: 50 INJECTION, SOLUTION INTRAMUSCULAR; INTRAVENOUS at 08:46

## 2021-06-16 RX ADMIN — ROCURONIUM BROMIDE 20 MG: 10 INJECTION INTRAVENOUS at 08:46

## 2021-06-16 RX ADMIN — DEXMEDETOMIDINE HYDROCHLORIDE 40 MCG: 100 INJECTION, SOLUTION INTRAVENOUS at 07:52

## 2021-06-16 RX ADMIN — Medication 10 MG: at 09:37

## 2021-06-16 RX ADMIN — SODIUM CHLORIDE, POTASSIUM CHLORIDE, SODIUM LACTATE AND CALCIUM CHLORIDE: 600; 310; 30; 20 INJECTION, SOLUTION INTRAVENOUS at 08:54

## 2021-06-16 RX ADMIN — Medication 10 MG: at 07:54

## 2021-06-16 RX ADMIN — ROCURONIUM BROMIDE 30 MG: 10 INJECTION INTRAVENOUS at 11:20

## 2021-06-16 RX ADMIN — SODIUM CHLORIDE: 9 INJECTION, SOLUTION INTRAVENOUS at 23:42

## 2021-06-16 RX ADMIN — PHENYLEPHRINE HYDROCHLORIDE 100 MCG: 10 INJECTION INTRAVENOUS at 13:12

## 2021-06-16 RX ADMIN — FENTANYL CITRATE 200 MCG: 50 INJECTION, SOLUTION INTRAMUSCULAR; INTRAVENOUS at 07:41

## 2021-06-16 RX ADMIN — LIDOCAINE HYDROCHLORIDE 100 MG: 20 INJECTION, SOLUTION INFILTRATION; PERINEURAL at 07:41

## 2021-06-16 RX ADMIN — HEPARIN SODIUM 5000 UNITS: 5000 INJECTION, SOLUTION INTRAVENOUS; SUBCUTANEOUS at 17:36

## 2021-06-16 RX ADMIN — ACETAMINOPHEN 975 MG: 325 TABLET ORAL at 21:34

## 2021-06-16 RX ADMIN — Medication 1 G: at 12:36

## 2021-06-16 RX ADMIN — HUMAN INSULIN 1 UNITS/HR: 100 INJECTION, SOLUTION SUBCUTANEOUS at 10:10

## 2021-06-16 RX ADMIN — PHENYLEPHRINE HYDROCHLORIDE 100 MCG: 10 INJECTION INTRAVENOUS at 08:32

## 2021-06-16 RX ADMIN — DOCUSATE SODIUM 50 MG AND SENNOSIDES 8.6 MG 2 TABLET: 8.6; 5 TABLET, FILM COATED ORAL at 21:35

## 2021-06-16 RX ADMIN — PHENYLEPHRINE HYDROCHLORIDE 100 MCG: 10 INJECTION INTRAVENOUS at 09:39

## 2021-06-16 RX ADMIN — Medication 1 G: at 12:24

## 2021-06-16 RX ADMIN — SODIUM CHLORIDE, POTASSIUM CHLORIDE, SODIUM LACTATE AND CALCIUM CHLORIDE: 600; 310; 30; 20 INJECTION, SOLUTION INTRAVENOUS at 07:00

## 2021-06-16 RX ADMIN — HYDROMORPHONE HYDROCHLORIDE 0.5 MG: 1 INJECTION, SOLUTION INTRAMUSCULAR; INTRAVENOUS; SUBCUTANEOUS at 13:54

## 2021-06-16 RX ADMIN — BUPIVACAINE HYDROCHLORIDE 60 ML: 2.5 INJECTION, SOLUTION EPIDURAL; INFILTRATION; INTRACAUDAL; PERINEURAL at 07:52

## 2021-06-16 RX ADMIN — POLYETHYLENE GLYCOL 3350 17 G: 17 POWDER, FOR SOLUTION ORAL at 17:36

## 2021-06-16 RX ADMIN — TAMSULOSIN HYDROCHLORIDE 0.4 MG: 0.4 CAPSULE ORAL at 21:40

## 2021-06-16 RX ADMIN — ROCURONIUM BROMIDE 10 MG: 10 INJECTION INTRAVENOUS at 12:53

## 2021-06-16 RX ADMIN — PHENYLEPHRINE HYDROCHLORIDE 200 MCG: 10 INJECTION INTRAVENOUS at 07:45

## 2021-06-16 RX ADMIN — SODIUM CHLORIDE, POTASSIUM CHLORIDE, SODIUM LACTATE AND CALCIUM CHLORIDE: 600; 310; 30; 20 INJECTION, SOLUTION INTRAVENOUS at 13:55

## 2021-06-16 RX ADMIN — ROCURONIUM BROMIDE 10 MG: 10 INJECTION INTRAVENOUS at 12:28

## 2021-06-16 RX ADMIN — SODIUM CHLORIDE, POTASSIUM CHLORIDE, SODIUM LACTATE AND CALCIUM CHLORIDE: 600; 310; 30; 20 INJECTION, SOLUTION INTRAVENOUS at 07:36

## 2021-06-16 RX ADMIN — ATORVASTATIN CALCIUM 40 MG: 40 TABLET, FILM COATED ORAL at 21:34

## 2021-06-16 RX ADMIN — ACETAMINOPHEN 975 MG: 325 TABLET, FILM COATED ORAL at 06:59

## 2021-06-16 RX ADMIN — DEXAMETHASONE SODIUM PHOSPHATE 2 MG: 10 INJECTION, SOLUTION INTRAMUSCULAR; INTRAVENOUS at 07:52

## 2021-06-16 RX ADMIN — SODIUM CHLORIDE: 9 INJECTION, SOLUTION INTRAVENOUS at 14:50

## 2021-06-16 RX ADMIN — MIDAZOLAM 2 MG: 1 INJECTION INTRAMUSCULAR; INTRAVENOUS at 07:25

## 2021-06-16 RX ADMIN — PROPOFOL 180 MG: 10 INJECTION, EMULSION INTRAVENOUS at 07:41

## 2021-06-16 RX ADMIN — Medication 5 MG: at 08:26

## 2021-06-16 RX ADMIN — ONDANSETRON 4 MG: 2 INJECTION INTRAMUSCULAR; INTRAVENOUS at 13:42

## 2021-06-16 RX ADMIN — PHENYLEPHRINE HYDROCHLORIDE 100 MCG: 10 INJECTION INTRAVENOUS at 11:13

## 2021-06-16 ASSESSMENT — MIFFLIN-ST. JEOR: SCORE: 1532.5

## 2021-06-16 ASSESSMENT — ACTIVITIES OF DAILY LIVING (ADL)
ADLS_ACUITY_SCORE: 15
ADLS_ACUITY_SCORE: 15

## 2021-06-16 NOTE — ANESTHESIA POSTPROCEDURE EVALUATION
Patient: Colin Baxter    Procedure(s):  NEPHROURETERECTOMY, ROBOT-ASSISTED, lymph node dissection  CYSTOSCOPY    Diagnosis:Ureteral mass [N28.89]  Diagnosis Additional Information: No value filed.    Anesthesia Type:  General    Note:  Disposition: Admission   Postop Pain Control: Uneventful            Sign Out: Well controlled pain   PONV: No   Neuro/Psych: Uneventful            Sign Out: Acceptable/Baseline neuro status   Airway/Respiratory: Uneventful            Sign Out: Acceptable/Baseline resp. status   CV/Hemodynamics: Uneventful            Sign Out: Acceptable CV status   Other NRE: NONE   DID A NON-ROUTINE EVENT OCCUR? No           Last vitals:  Vitals:    06/16/21 1415 06/16/21 1430 06/16/21 1445   BP: 126/73 124/75 137/76   Pulse: 73 78 71   Resp: 13 12 14   Temp: 35.9  C (96.7  F)  36.2  C (97.1  F)   SpO2: 96% 96% 97%       Last vitals prior to Anesthesia Care Transfer:  CRNA VITALS  6/16/2021 1341 - 6/16/2021 1441      6/16/2021             EKG:  Sinus rhythm          Electronically Signed By: Christopher J. Behrens, MD  June 16, 2021  2:55 PM

## 2021-06-16 NOTE — BRIEF OP NOTE
[facility]    Brief Operative Note    Pre-operative diagnosis: Ureteral mass [N28.89]  Post-operative diagnosis Same as pre-operative diagnosis    Procedure: Procedure(s):  NEPHROURETERECTOMY, ROBOT-ASSISTED  CYSTOURETEROSCOPY  Surgeon: Surgeon(s) and Role:  Panel 1:     * Javier Mcnulty MD - Primary     * Sona Hood MD - Resident - Assisting  Panel 2:     * Javier Mcnulty MD - Primary  Anesthesia: Combined General with Block   Estimated blood loss: Less than 100 ml  Drains: [place]  Specimens:   ID Type Source Tests Collected by Time Destination   A : Left common iliac lymph node Tissue Lymph Node SURGICAL PATHOLOGY EXAM Javier Mcnulty MD 6/16/2021  1:22 PM      Findings:  See op note  Complications: None.  Implants: * No implants in log *    Sona Hood MD

## 2021-06-16 NOTE — LETTER
July 12, 2021      Colin Baxter  [address] [place] [address]        Dear ,    We are writing to inform you of your test results.    Test results indicate you may require additional follow up, see comment below.    Resulted Orders   Blood component   Result Value Ref Range    Unit Number [id number]     Blood Component Type Red Blood Cells Leukocyte Reduced     Division Number 00     Status of Unit No longer available 06/20/2021 0300     Blood Product Code [id number]     Unit Status RET    Blood component   Result Value Ref Range    Unit Number [id number]     Blood Component Type Red Blood Cells Leukocyte Reduced     Division Number 00     Status of Unit No longer available 06/20/2021 0300     Blood Product Code [id number]     Unit Status RET    Surgical pathology exam   Result Value Ref Range    Copath Report       Patient Name: COLIN BAXTER  MR#: [id number]  Specimen #: [id number]  Collected: 6/16/2021  Received: 6/16/2021  Reported: 6/21/2021 18:44  Ordering Phy(s): FABIENNE LOZADA    For improved result formatting, select 'View Enhanced Report Format' under   Linked Documents section.    +++Original Report Follows Addendum+++    TO ADDENDUM  Status: Signed Out  Date Ordered:6/23/2021  Date Reported:6/23/2021 08:55  Signed Out By: Cody Dunham    INTERPRETATION:  Benign kidney with arterionephrosclerosis    ORIGINAL REPORT:    SPECIMEN(S):  A: Lymph node, left common iliac  B: Left kidney and left ureter    FINAL DIAGNOSIS:  A. Lymph node, left common iliac:  - Two benign lymph nodes    B. 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  - Carcinoma invades periureteric adventitial tissue  - Margins are free of tumor       - Periureteric margin is <0.1 cm from tumo r       - Bladder cuff margin is 0.2 cm from carcinoma in-situ  - See synoptic report    Report  "Name: Renal Pelvis, Ureter Resection       Status: Submitted Checklist Inst: 1      Last Updated By: Cristhian Andrade M.D., Bharti, 06/21/2021  18:41:52  Part(s) Involved:  B: Left kidney and left ureter    Synoptic Report:    SPECIMEN    Procedure:        - Nephroureterectomy    Specimen Laterality:        - Left    TUMOR    Tumor Site:        - Ureter    Histologic Type:        - Urothelial carcinoma, invasive    Histologic Grade:        - High-grade    Tumor Size: 4.2 Centimeters (cm)    Tumor Extension:        - Tumor invades beyond muscularis into periureteral fat or   peripelvic fat        or the renal parenchyma    Lymphovascular Invasion:        - Not identified    Tumor Configuration:        - Solid / nodule    MARGINS    Margins:        - Uninvolved by invasive carcinoma and carcinoma in situ /   noninvasive        urothelial carcinoma    LYMPH NODES    Number of Lymph Nodes  Involved:        - 0    Number of Lymph Nodes Examined:        2    PATHOLOGIC STAGE CLASSIFICATION (PTNM, AJCC 8TH EDITION)    TNM Descriptors:        - m (multiple)    Primary Tumor (pT):        - pT3    Regional Lymph Nodes (pN):        - pN0    ADDITIONAL FINDINGS    Pathologic Findings in Ipsilateral Nonneoplastic Renal Tissue:        Please see addendum report    CAP eCC February 2020 Annual Release    I have personally reviewed all specimens and/or slides, including the   listed special stains, and used them  with my medical judgement to determine or confirm the final diagnosis.    Electronically signed out by:    Cristhian Andrade M.D., Bharti    GROSS:  A:  The specimen is received in formalin with proper patient   identification, labeled \"left common iliac lymph  node\".  The specimen consists of a 2.8 x 2.0 x 0.7 cm aggregate of   tan-yellow, lobulated adipose tissue. A  lymph node search reveals 2 lymph nodes, 0.5 and 0.6 cm in greatest   dimension. The specimen is entire ly  submitted    Summary of Sections:  A1 - " "2 intact lymph nodes  A2 - remaining soft tissue    B:  The specimen is received fresh with proper patient identification,   labeled \"left kidney and left ureter\".  The specimen consists of a 411.7 g, 16.2 x 14.5 x 4.1 cm left nephrectomy   with attached ureter (23.5 cm in  length by 0.6-2.0 cm in diameter). The ureter contains a bladder cuff   margin (inked yellow). Gerota's fascia  is inked blue and the remainder of the perinephric soft tissue is inked   black. The kidney is bivalved to  reveal 2 distinct tan-yellow, nodular areas within the ureter. The first   area measures 4.2 x 2.5 x 0.7 cm, 4.5  cm from the bladder cuff margin and 13.1 cm from the renal pelvis. The   second area measures 1.1 x 0.8 x 0.2 cm  and is 11.3 cm from the bladder cuff margin, 2.9 cm from the first area,   and 8.6 cm from the renal pelvis. The  urothelium between the 2 areas is mildly nodular, with the nodules   measuring up to 0.2 cm in greatest  dimension (the  entire area measures 8.9 cm in length). Sectioning reveals   that the larger lesion invades less  than 0.1 cm into the muscularis propria, 0.1 cm from the circumferential   margin. The second, smaller area  invades through the muscularis propria and abuts the circumferential   margin. The remainder of the urothelium  is tan and unremarkable. The renal pelvis contains tan-white, smooth   urothelium. No masses are identified. The  kidney parenchyma is tan with a distinct cortical medullary junction and a   cortical thickness of 0.5 cm.  Gross photographs are taken. Representative sections are submitted    Summary of Sections:  B1 - bladder cuff margin, permanent particular sections  B2 - vein and artery margins, en face  B3-B5 - representative cross sections of larger ureteral lesion with   possible muscularis propria invasion  B6 - entire smaller ureteral lesion  B7 - cross sections of ureter between the two lesions  B8 - representative sections of ureter between margin and " first lesio n  B9 - representative sections of ureter between renal pelvis and second   lesion  B10- renal pelvis  B11-B12 - representative sections of kidney, upper and lower pole (Gerotas   fascia included in B11)    MICROSCOPIC:  Microscopic examination was performed.    The technical component of this testing was completed at the [facility], with the professional component performed   at the [facility], [address] [place] [phone] ([phone])    CPT Codes:  A: 32252-KQ8  B: 55868-ZL3    COLLECTION SITE:  Client: [facility]  Location: UUOR (B)           Mr. Baxter,   Here is your pathology report.  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   We will plan for follow-up on this cancer.  I hope the recovery from your surgery is going well.  Thank you, Jorge Mcnulty.

## 2021-06-16 NOTE — PHARMACY-ADMISSION MEDICATION HISTORY
Admission Medication History Completed by Pharmacy    See [facility] Admission Navigator for allergy information, preferred outpatient pharmacy, prior to admission medications and immunization status.     Medication History Sources:     Pre-op pharmacist med hx, pre-op RN assessment.       Prior to Admission medications    Medication Sig Last Dose Taking? Auth Provider   atorvastatin (LIPITOR) 40 MG tablet TAKE 1 TABLET (40 MG) BY MOUTH DAILY 6/15/2021 at 1700 Yes Luis Tran MD   brimonidine (ALPHAGAN-P) 0.15 % ophthalmic solution INSTILL 1 DROP INTO RIGHT EYE TWICE DAILY 6/16/2021 at 0400 Yes Reported, Patient   latanoprost (XALATAN) 0.005 % ophthalmic solution Place 1 drop into both eyes At Bedtime 6/16/2021 at 0400 Yes Reported, Patient   lisinopril (ZESTRIL) 10 MG tablet TAKE 1 TABLET (10 MG) BY MOUTH DAILY 6/15/2021 at Unknown time Yes Luis Tran MD   metFORMIN (GLUCOPHAGE-XR) 500 MG 24 hr tablet Take 2 tablets (1,000 mg) by mouth 2 times daily (with meals) 6/15/2021 at 1700 Yes Maryann Mccord APRN CNP   tamsulosin (FLOMAX) 0.4 MG capsule Take 1 capsule (0.4 mg) by mouth daily 6/15/2021 at Unknown time Yes Luis Tran MD   acetaminophen (TYLENOL) 500 MG tablet Take 500-1,000 mg by mouth every 8 hours as needed for mild pain Unknown at Unknown time  Unknown, Entered By History   blood glucose (ONETOUCH VERIO IQ) test strip USE TO TEST BLOOD SUGAR 2 TIMES DAILY OR AS DIRECTED.  Patient taking differently: daily USE TO TEST BLOOD SUGAR 2 TIMES DAILY OR AS DIRECTED. Unknown at Unknown time  Luis Tran MD   dexamethasone (DECADRON) 4 MG tablet Take 1 tablet (4 mg) by mouth 2 times daily (with meals) Take for 3 days, starting the day after cisplatin (Day 2).  Patient not taking: Reported on 4/27/2021 More than a month at Unknown time  Jhony Bartlett MD   ONETOUCH DELICA LANCETS 33G MISC 1 each 2 times daily Unknown at Unknown time  Luis Tran MD       Date completed:  06/16/21    Medication history completed by: Theresa Loco [facility]

## 2021-06-16 NOTE — OP NOTE
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   POSTOPERATIVE DIAGNOSIS: Same    PROCEDURES PERFORMED:   1. Cystourethroscopy  2. Left robot assisted nephroureterectomy  3. Left pelvic lymph node dissection     STAFF SURGEON: Dr. Javier Mcnulty MD present for entire case.   ASSISTANT: Reyes Romano MD. Paradise Hood MD  ANESTHESIA: GET  ESTIMATED BLOOD LOSS: 100 mL.   IV FLUIDS: See dictated anesthesia record  COMPLICATIONS: None.   SPECIMEN: Left kidney and ureter, left pelvic lymph nodes    DRAINS/TUBES: 18-Fr brannon catheter    SIGNIFICANT FINDINGS: Single artery and vein. Bladder closure was water tight.    BRIEF OPERATIVE INDICATIONS: Colin Baxter is a 76 year old male with a history of non-muscle invasive bladder cancer and a mass in his left ureter. He underwent ureteroscopic biopsy which revealed high grade upper tract urothelial cell carcinoma. He received neoadjuvant chemotherapy and presents for nephroureterectomy. After discussion with patient about the risks, benefits, and alternatives of surgery he elected to proceed.     DESCRIPTION OF PROCEDURE:  Informed consent was obtained and the patient was brought to the operating room where general anesthesia was induced. he was given appropriate preoperative antibiotics within 1 hour of incision. he  was then positioned in the modified flank position with Left side up where all  pressure points were carefully padded and secured. he was then prepped and draped in the usual sterile fashion. We then performed a timeout, verifying the correct patient's site and procedure to be performed.    We first performed a diagnostic cystoscopy. A 20-Fr rigid cystoscope was inserted into a well lubricated urethra. The anterior urethra was unremarkable. The prostate was status-post TURP. Cystoscopy showed no tumors, stones, or diverticula. The cystoscope was removed and an 18-Fr three way brannon catheter  was placed. The patient was started on CBI to be continued throughout the robotic portion of the procedure.    We then inserted the Veress needle into the abdomen at the umbilicus. After confirmatory drop test, the abdomen was insufflated with low opening pressures. We then proceeded to place three 8 mm non-dilating robotic ports to triangulate the  Left kidney. The 12mm assistant port was placed cephalad to the umbilicus between the robot ports. We docked the robot.    We began by first mobilizing the colon from that the lateral aspect of the abdominal wall and reflecting it medially. Gerota's fascia was then opened and the lower pole of the kidney mobilized. The gonadal vein were identified and divided. The ureter was also identified and clipped with a Hem-o-lock. We then traced these back to the hilum. We were able to identify one renal vein. The vein were carefully dissected and freed from surrounding strctures. We were also able to visualize one renal artery.    We divided the renal artery with a 45 mm Endo-LIONEL stapler. A second load was used for the vein. We then continued to march up towards the upper pole where the remaining vessels were divided in a similar fashion. The adrenal was seen and spared during the dissection.  At this point we began to mobilize the kidney posteriorly and free of all lateral attachments until it was free circumferentially and still attached to the ureter.    We then repositioned the robot to target the perivesical space to dissect out the distal Left ureter. An additional 8mm robotic port was placed at midline below the umbilicus and the arms repositioned to dissect out the ureter. We dissected the ureter free as it traversed over the iliac vessels, then down to the perivesical space. We dissected through the intramural ureter and sharply divided the ureter with a cuff of bladder mucosa. The cystotomy was closed in two layers using 3-0 V-lock suture. The bladder was filled with  120cc normal saline and there was no leak. We then performed a limited pelvic lymph node dissection by removing the nodes adjacent to the common iliac and internal iliac vessels. These were sent for pathology.    The entire kidney and ureter specimen was then placed into a 15mm endocatch bag. The robot was undocked. The specimen was extracted through an extension of the infraumbilical midline port. The incisions were irrigated. The extraction incision was closed with inturrupted 0-Vicryl figure of eight sutures. The skin incisions were all closed with 4-0 Monocryl. The wounds were dressed with surgical glue. The patient was then awakened and taken to the PACU in stable condition.     Reyes Romano MD  Urology Resident

## 2021-06-16 NOTE — ANESTHESIA PROCEDURE NOTES
TAP Procedure Note  Pre-Procedure   Staff -        Anesthesiologist:  Ignacio Rodriguez MD       Resident/Fellow: Rodney Vásquez MD       Performed By: resident       Location: pre-op       Pre-Anesthestic Checklist: patient identified, IV checked, site marked, risks and benefits discussed, informed consent, monitors and equipment checked, pre-op evaluation, at physician/surgeon's request and post-op pain management  Timeout:       Correct Patient: Yes        Correct Procedure: Yes        Correct Site: Yes        Correct Position: Yes        Correct Laterality: Yes        Site Marked: Yes  Procedure Documentation  Procedure: TAP       Diagnosis: POST OPERATIVE PAIN       Laterality: bilateral (right subcostal, left subcostal and classic )       Patient Position: supine       Patient Prep/Sterile Barriers: sterile gloves, mask       Skin prep: Chloraprep       Needle Type: short bevel       Needle Gauge: 21.        Needle Length (millimeters): 110        Ultrasound guided       1. Ultrasound was used to identify targeted nerve, plexus, vascular marker, or fascial plane and place a needle adjacent to it in real-time.       2. Ultrasound was used to visualize the spread of anesthetic in close proximity to the above referenced structure.       3. A permanent image is entered into the patient's record.    Assessment/Narrative         The placement was negative for: blood aspirated, painful injection and site bleeding       Paresthesias: No.     Bolus given via needle..        Secured via.        Insertion/Infusion Method: Single Shot       Complications: none       Injection made incrementally with aspirations every 5 mL.

## 2021-06-16 NOTE — ANESTHESIA CARE TRANSFER NOTE
Patient: Colin Baxter    Procedure(s):  NEPHROURETERECTOMY, ROBOT-ASSISTED, lymph node dissection  CYSTOSCOPY    Diagnosis: Ureteral mass [N28.89]  Diagnosis Additional Information: No value filed.    Anesthesia Type:   General     Note:    Oropharynx: oropharynx clear of all foreign objects and spontaneously breathing  Level of Consciousness: awake and drowsy  Oxygen Supplementation: nasal cannula  Level of Supplemental Oxygen (L/min / FiO2): 4  Independent Airway: airway patency satisfactory and stable  Dentition: dentition unchanged  Vital Signs Stable: post-procedure vital signs reviewed and stable  Report to RN Given: handoff report given  Patient transferred to: PACU  Comments: Awake, tolerated anesthesia well    Handoff Report: Identifed the Patient, Identified the Reponsible Provider, Reviewed the pertinent medical history, Discussed the surgical course, Reviewed Intra-OP anesthesia mangement and issues during anesthesia, Set expectations for post-procedure period and Allowed opportunity for questions and acknowledgement of understanding      Vitals: (Last set prior to Anesthesia Care Transfer)  CRNA VITALS  6/16/2021 1341 - 6/16/2021 1420      6/16/2021             EKG:  Sinus rhythm        Electronically Signed By: DANILO Díaz CRNA  June 16, 2021  2:20 PM

## 2021-06-16 NOTE — LETTER
Transition Communication Hand-off for Care Transitions to Next Level of Care Provider    Name: Colin Baxter  : 1945  MRN #: [id number]  Primary Care Provider: Luis Tran  Primary Clinic: [address] [place] [id number]  Reason for Hospitalization:  Ureteral mass [N28.89]  Admit Date/Time: 2021  4:41 AM  Discharge Date:21  Payor Source: Payor: [employer]ET[employer] / Plan: [employer]ET[employer] MEDICARE ADVANTAGE / Product Type: HMO /     Reason for Communication Hand-off Referral: Multiple providers/specialties    Discharge Plan: Patient will discharge home independent, no needs     Concern for non-adherence with plan of care: N     Discharge Needs Assessment:  Needs      Most Recent Value   Equipment Currently Used at Home  none   # of Referrals Placed by [facility]  External Care Coordination   PAS Number  -- [N/A]        Follow-up specialty is recommended: Yes    Follow-up plan:    Future Appointments   Date Time Provider Department Center   2021  9:45 AM HC LAB [facility]   2021 10:30 AM Jhony Bartlett MD HCON[facility]   2021  1:00 PM Javier Mcnulty MD UROP [facility]       Key Recommendations:  Will follow up with Urology in [place] for brannon removal      Girish Gallegos RN Care Coordinator

## 2021-06-16 NOTE — ANESTHESIA PROCEDURE NOTES
Arterial Line Procedure Note  Pre-Procedure   Staff -        Anesthesiologist:  Behrens, Christopher J, MD       Performed By: anesthesiologist       Location: OR       Pre-Anesthestic Checklist: patient identified, IV checked, site marked, risks and benefits discussed, informed consent, monitors and equipment checked, pre-op evaluation, at physician/surgeon's request and post-op pain management  Timeout:       Correct Patient: Yes        Correct Procedure: Yes        Correct Site: Yes        Correct Position: Yes   Procedure   Procedure: arterial line       Laterality: left       Insertion Site: radial.  Sterile Prep        All elements of maximal sterile barrier technique not followed for medical reasons       Skin prep: Chloraprep  Insertion/Injection        Technique: Seldinger Technique        Catheter Type/Size: 20 gauge, 12 cm  Narrative         Secured by: suture       Tegaderm dressing used.       Complications: None apparent,      Arterial waveform: Yes   Comments:  All elements of maximal sterile barrier technique followed excluding gown.

## 2021-06-16 NOTE — OR NURSING
MD Mcnulty paged this AM at 0984 and 5921: Patient was not completely aware about his kidney being removed today, thought it was maybe just the tumor. Please come verify consent.       Dr. Mcnulty at bedside, all questions answered and patient marked. Ok to proceed. Block will be completed in the OR

## 2021-06-17 VITALS
DIASTOLIC BLOOD PRESSURE: 72 MMHG | HEART RATE: 66 BPM | WEIGHT: 180.34 LBS | BODY MASS INDEX: 27.33 KG/M2 | HEIGHT: 68 IN | RESPIRATION RATE: 18 BRPM | OXYGEN SATURATION: 95 % | TEMPERATURE: 97.4 F | SYSTOLIC BLOOD PRESSURE: 116 MMHG

## 2021-06-17 DIAGNOSIS — C66.2 MALIGNANT NEOPLASM OF LEFT URETER (H): Primary | ICD-10-CM

## 2021-06-17 LAB
ANION GAP SERPL CALCULATED.3IONS-SCNC: 6 MMOL/L (ref 3–14)
ANION GAP SERPL CALCULATED.3IONS-SCNC: 7 MMOL/L (ref 3–14)
BUN SERPL-MCNC: 35 MG/DL (ref 7–30)
BUN SERPL-MCNC: 37 MG/DL (ref 7–30)
CALCIUM SERPL-MCNC: 7.9 MG/DL (ref 8.5–10.1)
CALCIUM SERPL-MCNC: 8.2 MG/DL (ref 8.5–10.1)
CHLORIDE SERPL-SCNC: 110 MMOL/L (ref 94–109)
CHLORIDE SERPL-SCNC: 110 MMOL/L (ref 94–109)
CO2 SERPL-SCNC: 21 MMOL/L (ref 20–32)
CO2 SERPL-SCNC: 22 MMOL/L (ref 20–32)
CREAT SERPL-MCNC: 1.92 MG/DL (ref 0.66–1.25)
CREAT SERPL-MCNC: 2.03 MG/DL (ref 0.66–1.25)
ERYTHROCYTE [DISTWIDTH] IN BLOOD BY AUTOMATED COUNT: 14.2 % (ref 10–15)
GFR SERPL CREATININE-BSD FRML MDRD: 31 ML/MIN/{1.73_M2}
GFR SERPL CREATININE-BSD FRML MDRD: 33 ML/MIN/{1.73_M2}
GLUCOSE BLDC GLUCOMTR-MCNC: 102 MG/DL (ref 70–99)
GLUCOSE BLDC GLUCOMTR-MCNC: 98 MG/DL (ref 70–99)
GLUCOSE SERPL-MCNC: 122 MG/DL (ref 70–99)
GLUCOSE SERPL-MCNC: 131 MG/DL (ref 70–99)
HCT VFR BLD AUTO: 26.1 % (ref 40–53)
HGB BLD-MCNC: 8.5 G/DL (ref 13.3–17.7)
MCH RBC QN AUTO: 33.3 PG (ref 26.5–33)
MCHC RBC AUTO-ENTMCNC: 32.6 G/DL (ref 31.5–36.5)
MCV RBC AUTO: 102 FL (ref 78–100)
PLATELET # BLD AUTO: 167 10E9/L (ref 150–450)
POTASSIUM SERPL-SCNC: 4.3 MMOL/L (ref 3.4–5.3)
POTASSIUM SERPL-SCNC: 5 MMOL/L (ref 3.4–5.3)
RBC # BLD AUTO: 2.55 10E12/L (ref 4.4–5.9)
SODIUM SERPL-SCNC: 138 MMOL/L (ref 133–144)
SODIUM SERPL-SCNC: 139 MMOL/L (ref 133–144)
WBC # BLD AUTO: 10.7 10E9/L (ref 4–11)

## 2021-06-17 PROCEDURE — 80048 BASIC METABOLIC PNL TOTAL CA: CPT | Performed by: PHYSICIAN ASSISTANT

## 2021-06-17 PROCEDURE — 999N001017 HC STATISTIC GLUCOSE BY METER IP

## 2021-06-17 PROCEDURE — 36415 COLL VENOUS BLD VENIPUNCTURE: CPT | Performed by: PHYSICIAN ASSISTANT

## 2021-06-17 PROCEDURE — 250N000013 HC RX MED GY IP 250 OP 250 PS 637: Performed by: STUDENT IN AN ORGANIZED HEALTH CARE EDUCATION/TRAINING PROGRAM

## 2021-06-17 PROCEDURE — 250N000011 HC RX IP 250 OP 636: Performed by: STUDENT IN AN ORGANIZED HEALTH CARE EDUCATION/TRAINING PROGRAM

## 2021-06-17 PROCEDURE — 85027 COMPLETE CBC AUTOMATED: CPT | Performed by: STUDENT IN AN ORGANIZED HEALTH CARE EDUCATION/TRAINING PROGRAM

## 2021-06-17 PROCEDURE — 258N000003 HC RX IP 258 OP 636: Performed by: STUDENT IN AN ORGANIZED HEALTH CARE EDUCATION/TRAINING PROGRAM

## 2021-06-17 PROCEDURE — 80048 BASIC METABOLIC PNL TOTAL CA: CPT | Performed by: STUDENT IN AN ORGANIZED HEALTH CARE EDUCATION/TRAINING PROGRAM

## 2021-06-17 PROCEDURE — 36415 COLL VENOUS BLD VENIPUNCTURE: CPT | Performed by: STUDENT IN AN ORGANIZED HEALTH CARE EDUCATION/TRAINING PROGRAM

## 2021-06-17 RX ORDER — CIPROFLOXACIN 500 MG/1
500 TABLET, FILM COATED ORAL ONCE
Qty: 1 TABLET | Refills: 0 | Status: SHIPPED | OUTPATIENT
Start: 2021-06-17 | End: 2023-01-01

## 2021-06-17 RX ORDER — ACETAMINOPHEN 325 MG/1
650 TABLET ORAL EVERY 4 HOURS PRN
Qty: 100 TABLET | Refills: 0 | Status: SHIPPED | OUTPATIENT
Start: 2021-06-17 | End: 2022-01-01

## 2021-06-17 RX ORDER — LISINOPRIL 10 MG/1
10 TABLET ORAL DAILY
Qty: 90 TABLET | Refills: 0 | COMMUNITY
Start: 2021-06-17 | End: 2021-08-19

## 2021-06-17 RX ORDER — AMOXICILLIN 250 MG
1-2 CAPSULE ORAL 2 TIMES DAILY
Qty: 60 TABLET | Refills: 0 | Status: SHIPPED | OUTPATIENT
Start: 2021-06-17 | End: 2021-07-06

## 2021-06-17 RX ADMIN — ACETAMINOPHEN 975 MG: 325 TABLET ORAL at 10:30

## 2021-06-17 RX ADMIN — BRIMONIDINE TARTRATE 1 DROP: 2 SOLUTION OPHTHALMIC at 08:00

## 2021-06-17 RX ADMIN — POLYETHYLENE GLYCOL 3350 17 G: 17 POWDER, FOR SOLUTION ORAL at 08:00

## 2021-06-17 RX ADMIN — HEPARIN SODIUM 5000 UNITS: 5000 INJECTION, SOLUTION INTRAVENOUS; SUBCUTANEOUS at 08:00

## 2021-06-17 RX ADMIN — SODIUM CHLORIDE: 9 INJECTION, SOLUTION INTRAVENOUS at 14:33

## 2021-06-17 RX ADMIN — HEPARIN SODIUM 5000 UNITS: 5000 INJECTION, SOLUTION INTRAVENOUS; SUBCUTANEOUS at 16:20

## 2021-06-17 RX ADMIN — HEPARIN SODIUM 5000 UNITS: 5000 INJECTION, SOLUTION INTRAVENOUS; SUBCUTANEOUS at 00:16

## 2021-06-17 RX ADMIN — ACETAMINOPHEN 975 MG: 325 TABLET ORAL at 16:20

## 2021-06-17 RX ADMIN — DOCUSATE SODIUM 50 MG AND SENNOSIDES 8.6 MG 2 TABLET: 8.6; 5 TABLET, FILM COATED ORAL at 08:00

## 2021-06-17 ASSESSMENT — ACTIVITIES OF DAILY LIVING (ADL)
DEPENDENT_IADLS:: INDEPENDENT
ADLS_ACUITY_SCORE: 17
ADLS_ACUITY_SCORE: 17
ADLS_ACUITY_SCORE: 15
ADLS_ACUITY_SCORE: 17
ADLS_ACUITY_SCORE: 15

## 2021-06-17 NOTE — CONSULTS
Care Management Initial Consult    General Information  Assessment completed with: Patient,    Type of CM/SW Visit: Initial Assessment    Primary Care Provider verified and updated as needed: Yes   Readmission within the last 30 days: no previous admission in last 30 days   Reason for Consult: discharge planning  Advance Care Planning: Advance Care Planning Reviewed: present on chart       Communication Assessment  Patient's communication style: spoken language (English or Bilingual)    Hearing Difficulty or Deaf: no   Wear Glasses or Blind: yes    Cognitive  Cognitive/Neuro/Behavioral: WDL                      Living Environment:   People in home: alone     Current living Arrangements: house      Able to return to prior arrangements: yes    Family/Social Support:  Care provided by: self  Provides care for: no one  Marital Status:   Children, Other (specify)(ex-wife)          Description of Support System: Supportive, Involved      Current Resources:   Patient receiving home care services: No  Community Resources: None  Equipment currently used at home: none  Supplies currently used at home: None    Employment/Financial:  Employment Status: retired     Financial Concerns: No concerns identified     Lifestyle & Psychosocial Needs:  Socioeconomic History     Marital status:      Spouse name: Not on file     Number of children: Not on file     Years of education: Not on file     Highest education level: Not on file     Tobacco Use     Smoking status: Former Smoker     Quit date: 1992     Years since quittin.4     Smokeless tobacco: Never Used   Substance and Sexual Activity     Alcohol use: Yes     Comment: rarely     Drug use: No     Sexual activity: Not Currently     Functional Status:  Prior to admission patient needed assistance:   Dependent ADLs: Independent  Dependent IADLs: Independent    Mental Health Status:  Mental Health Status: No Current Concerns       Chemical Dependency  Status:  Chemical Dependency Status: No Current Concerns           Values/Beliefs:  Spiritual, Cultural Beliefs, Holiness Practices, Values that affect care: no          Care Management Discharge Note    Discharge Date: 06/17/21  Expected Time of Departure: TBD    Discharge Disposition:  Home. He will stay locally with his daughter for a couple of days and then with another daughter in [place] prior to going home    Discharge Services: None    Discharge DME: None    Discharge Transportation: family or friend will provide(Daughter)    Private pay costs discussed: Not applicable    PAS Confirmation Code: (N/A)  Patient/family educated on Medicare website which has current facility and service quality ratings: (N/A)    Education Provided on the Discharge Plan:    Persons Notified of Discharge Plans: Patient, daughter  Patient/Family in Agreement with the Plan: yes    Handoff Referral Completed: Yes    Additional Information:  Per Urology team, patient will likely discharge today. This RNCC met with patient and his daughter at bedside to discuss home situation and potential discharge needs. Patient reports that he lives alone in [place]. He has 2 daughters and his ex-wife who are supportive. He plans to stay locally ([place])for a few days with one daughter and then a few days with another daughter in [place]. He will discharge with Brannon in place. He reports he has done this before and feels comfortable with caring for it. He reports that he will f/u with his urologist in [place] in about a week and will have brannon removed. He denies need for HC and is comfortable going home. He reports that they are waiting labs and then will decide if he can discharge. He is hoping to go.  Plan for discharge likely today pending labs.    Girish Gallegos, JIM Care Coordinator [phone]

## 2021-06-17 NOTE — PLAN OF CARE
Admitted/transferred from:   2 RN   skin assessment completed by Hector Huber RN and Darvin VYAS  Skin assessment finding: none except four lap site derma bond intact.  Interventions/actions: up ambulated no skin Issues     Will continue to monitor.

## 2021-06-17 NOTE — PLAN OF CARE
Vitals:    06/16/21 1620 06/16/21 2307 06/17/21 0743 06/17/21 1136   BP: 138/73 123/70 105/60 104/63   BP Location:  Left arm Right arm Right arm   Pulse: 71 74 67 65   Resp:  16 18 18   Temp:  96.2  F (35.7  C) 98.3  F (36.8  C) 97.1  F (36.2  C)   TempSrc:  Oral Oral Oral   SpO2:  98% 97% 95%   Weight:       Height:         Pt up with sba in [place], x 1. Tolerating regular diet. Active bowel sounds, no BM yet. Large urine out from brannon cath. Lap sites x 3 and lower midline site are dermabond, RYLAN, no drainage. Plan on discharge home this afternoon with family.

## 2021-06-17 NOTE — PROGRESS NOTES
"Urology  Progress Note  06/17/2021    - No acute events overnight  - Has not yet been up to walk  - Didn't sleep well last night due to frequent interruptions.   - Pain is well controlled  - Tolerated a clear liquid diet without nausea    Exam  /70 (BP Location: Left arm)   Pulse 74   Temp 96.2  F (35.7  C) (Oral)   Resp 16   Ht 1.727 m (5' 8\")   Wt 81.8 kg (180 lb 5.4 oz)   SpO2 98%   BMI 27.42 kg/m    No acute distress  Unlabored breathing  Abdomen soft, nontender, nondistended. Lap incisions c/d/i with dermabond in place, midline extraction site c/d/i.   Santo with clear/yellow urine in tubing    I/O's (last 24/since midnight):  /600    Labs  AM labs pending    Assessment/Plan  76 year old y/o male POD#1 s/p Left NephU for Ureteral cancer, doing well.     Neuro: Tylenol, prn oxy and dilaudid for pain control  CV: HDS, no issues. PTA statin.   Pulm: incentive spirometry while awake  FEN/GI: ADAT-regular, MIVF @ 100/hr, senna/docusate  Endo: Low sliding scale insulin  : Santo catheter to remain in place on discharge with cystogram prior to removal. PTA flomax.   Heme/ID: No issues, follow AM labs  Activity: Ad maya  PPx: SCDs, SQH  Dispo: 7B, home today vs. Tomorrow.     Seen and examined with the chief resident. Will discuss with Dr. Mcnulty.    Sona Hood MD  PGY-3 Urology  Pager [phone]     Contacting the Urology Team     Please use the following job codes to reach the Urology Team. Note that you must use an in house phone and that job codes cannot receive text pages.     On weekdays, dial [phone] (or star-star-star [phone] on the new [employer] telephones) then [phone] to reach the Adult Urology resident or PA on call    On weekdays, dial [phone] (or star-star-star [phone] on the new [employer] telephones) then [phone] to reach the Pediatric Urology resident    On weeknights and weekends, dial [phone] (or star-star-star [phone] on the new [employer] telephones) then [phone] to reach the Urology resident on call (for both Adult and " Pediatrics)

## 2021-06-17 NOTE — PLAN OF CARE
Assumed care of Patient from [id number]. VSS. Patient reports no pain or nausea. Patient reports passing flatus no BM overnight.  Pt brannon patent and intact with adequate output. Incision  c/d/I and approximated, RYLAN. Up in room with standby assist. Pt able to self reposition in bed. Will continue plan of care.

## 2021-06-17 NOTE — DISCHARGE SUMMARY
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 Cystourethroscopy  2. Left robot assisted nephroureterectomy  3. Left pelvic lymph node dissection   Dr. Javeir Mcnulty (Urology)            Medications Prior to Admission:     Medications Prior to Admission   Medication Sig Dispense Refill Last Dose     atorvastatin (LIPITOR) 40 MG tablet TAKE 1 TABLET (40 MG) BY MOUTH DAILY 90 tablet 0 6/15/2021 at 1700     brimonidine (ALPHAGAN-P) 0.15 % ophthalmic solution INSTILL 1 DROP INTO RIGHT EYE TWICE DAILY  12 2021 at 0400     latanoprost (XALATAN) 0.005 % ophthalmic solution Place 1 drop into both eyes At Bedtime   2021 at 0400     metFORMIN (GLUCOPHAGE-XR) 500 MG 24 hr tablet Take 2 tablets (1,000 mg) by mouth 2 times daily (with meals) 360 tablet 2 6/15/2021 at 1700     tamsulosin (FLOMAX) 0.4 MG capsule Take 1 capsule (0.4 mg) by mouth daily 90 capsule 1 6/15/2021 at Unknown time     blood glucose (ONETOUCH VERIO IQ) test strip USE TO TEST BLOOD SUGAR 2 TIMES DAILY OR AS DIRECTED. (Patient taking differently: daily USE TO TEST BLOOD  SUGAR 2 TIMES DAILY OR AS DIRECTED.) 100 each 10 Unknown at Unknown time     dexamethasone (DECADRON) 4 MG tablet Take 1 tablet (4 mg) by mouth 2 times daily (with meals) Take for 3 days, starting the day after cisplatin (Day 2). (Patient not taking: Reported on 4/27/2021) 6 tablet 5 More than a month at Unknown time     ONETOUCH DELICA LANCETS 33G MISC 1 each 2 times daily 100 each 10 Unknown at Unknown time     [DISCONTINUED] acetaminophen (TYLENOL) 500 MG tablet Take 500-1,000 mg by mouth every 8 hours as needed for mild pain   Unknown at Unknown time     [DISCONTINUED] lisinopril (ZESTRIL) 10 MG tablet TAKE 1 TABLET (10 MG) BY MOUTH DAILY 90 tablet 0 6/15/2021 at Unknown time             Discharge Medications:     Current Discharge Medication List      START taking these medications    Details   senna-docusate (SENOKOT-S/PERICOLACE) 8.6-50 MG tablet Take 1-2 tablets by mouth 2 times daily To prevent constipation  Qty: 60 tablet, Refills: 0    Associated Diagnoses: Ureteral mass         CONTINUE these medications which have CHANGED    Details   acetaminophen (TYLENOL) 325 MG tablet Take 2 tablets (650 mg) by mouth every 4 hours as needed for pain  Qty: 100 tablet, Refills: 0    Associated Diagnoses: Ureteral mass      lisinopril (ZESTRIL) 10 MG tablet Take 1 tablet (10 mg) by mouth daily (HOLD MEDICATION until follow up with primary care provider in 3-5 days)  Qty: 90 tablet, Refills: 0    Associated Diagnoses: Type 2 diabetes mellitus without complication, without long-term current use of insulin (H)         CONTINUE these medications which have NOT CHANGED    Details   atorvastatin (LIPITOR) 40 MG tablet TAKE 1 TABLET (40 MG) BY MOUTH DAILY  Qty: 90 tablet, Refills: 0    Associated Diagnoses: Type 2 diabetes mellitus without complication, without long-term current use of insulin (H)      brimonidine (ALPHAGAN-P) 0.15 % ophthalmic solution INSTILL 1 DROP INTO RIGHT EYE TWICE DAILY  Refills: 12      latanoprost  (XALATAN) 0.005 % ophthalmic solution Place 1 drop into both eyes At Bedtime    Associated Diagnoses: Routine general medical examination at a health care facility      metFORMIN (GLUCOPHAGE-XR) 500 MG 24 hr tablet Take 2 tablets (1,000 mg) by mouth 2 times daily (with meals)  Qty: 360 tablet, Refills: 2    Comments: Patient will call to fill. Will finish current prescription  Associated Diagnoses: Type 2 diabetes mellitus without complication, without long-term current use of insulin (H)      tamsulosin (FLOMAX) 0.4 MG capsule Take 1 capsule (0.4 mg) by mouth daily  Qty: 90 capsule, Refills: 1    Associated Diagnoses: Benign prostatic hyperplasia with incomplete bladder emptying      blood glucose (ONETOUCH VERIO IQ) test strip USE TO TEST BLOOD SUGAR 2 TIMES DAILY OR AS DIRECTED.  Qty: 100 each, Refills: 10    Associated Diagnoses: Type 2 diabetes mellitus without complication, without long-term current use of insulin (H)      dexamethasone (DECADRON) 4 MG tablet Take 1 tablet (4 mg) by mouth 2 times daily (with meals) Take for 3 days, starting the day after cisplatin (Day 2).  Qty: 6 tablet, Refills: 5    Associated Diagnoses: Malignant neoplasm of left ureter (H)      ONETOUCH DELICA LANCETS 33G MISC 1 each 2 times daily  Qty: 100 each, Refills: 10    Associated Diagnoses: Type 2 diabetes mellitus without complication, without long-term current use of insulin (H)                   Consultations:   Consultation during this admission received:   None          Brief History of Illness:   Reason for admission requiring a surgical or invasive procedure:   Ureteral mass [N28.89]   The patient underwent the following procedure(s):   See above   There were no immediate complications during this procedure.    Please refer to the full operative summary for details.           Hospital Course:   The patient's hospital course was unremarkable.  Colin Baxter recovered as anticipated and experienced no post-operative  complications.     On POD #1 he was ambulating without assitance, tolerating the discharge diet, had pain controlled with PO medications to go home with, and was requiring no IV medications or fluids. He was discharged to home with his Santo catheter.  He was given appropriate contact information, follow-up and instructions as seen below in the discharge paperwork.         Discharge Labs:     Lab Results   Component Value Date    PSA 0.11 10/02/2020    PSA 0.10 07/07/2015     Recent Labs   Lab 06/17/21  0604 06/16/21  1314 06/16/21  0632 06/16/21  0632   WBC 10.7  --   --  6.2   HGB 8.5* 9.1*   < > 9.8*     --   --  205    < > = values in this interval not displayed.     Recent Labs   Lab 06/17/21  0604 06/16/21  1314 06/16/21  0632 06/16/21  0632    137   < > 137   POTASSIUM 5.0 4.3   < > 5.1   CHLORIDE 110*  --   --  109   CO2 21  --   --  23   BUN 35*  --   --  33*   CR 1.92*  --   --  1.33*   * 153*   < > 100*   MAHSA 8.2*  --   --  8.8    < > = values in this interval not displayed.     No lab results found in last 7 days.    Invalid input(s): URINEBLOOD  Results for orders placed or performed in visit on 06/09/21   Urine Culture Aerobic Bacterial    Specimen: Midstream Urine   Result Value Ref Range    Specimen Description Midstream Urine     Culture Micro No growth             Discharge Instructions and Follow-Up:   Diet:  - Regular/ home diet    Activity:   - No strenuous exercise for 6 weeks.   - No lifting, pushing, pulling more than 10 pounds for 6 weeks. Take care when pushing with your arms to stand up.  - Do not strain your belly area.  When you bend, sit up or twice, you could strain the area around your incision.    - Do not strain with bowel movements.    - Do not drive until you can press the brake pedal quickly and fully without pain.   - Do not operate a motor vehicle while taking narcotic pain medications.     Medications:   1) PAIN: Oxycodone is a narcotic medication that has  been prescribed for pain.  Narcotics will cause sleepiness and constipation and can become addictive, therefore it is best to stop or reduce them as soon as you can.  Any left over narcotics should be disposed of with an Authorized  for unneeded medications.  Contact your [facility] or [facility] s household trash and recycling service to learn about medication disposal options and guidelines for your area.  If you decide to store this medication at home it should be kept in a locked cabinet to prevent access to children or visitors. Never drive, operate machinery or drink alcoholic beverages while you are taking narcotic pain medications.  To reduce your narcotic use, take Tylenol (acetaminophen 625mg)as directed.  This has been prescribed for you.  Do not take more than 4,000mg of Tylenol (acetaminophen/ APAP) from all sources in any 24 hour period since this can cause liver damage.      2) CONSTIPATION: Pericolace (senna/docusate sodium) can be taken twice daily for prevention of constipation since surgery, pain medications and bladder spasm medications can all make you constipated.  Please reduce or stop pericolace if you develop loose stools. Other over the counter solutions such as prune juice, miralax, fiber products, senna, and dulcolax can also be used. If you are taking the pericolace but still have not had a bowel movement in 3 days, start over-the-counter Milk of Magnesia taken twice daily until you have a good result.  Call the office with any concerns.     3) BLOOD PRESSURE MEDICATIONS:  - Your lisinopril was held (not given) while you were in the hospital because your blood pressure was low and to allow your kidney to recover from surgery.  When you get home continue to HOLD (not take) your lisinopril until you can follow up with your primary care provider in 3-5 days for a check up and to check your kidney function    4) ANTIBIOTICS:  - Take a single Ciprofloxacin tablet just prior to  your catheter removal appointment with Petros Singh in about 1 week  Incisions:   - Daily showering is important, and you may get incisions wet starting 48 hours after surgery.  - Do not scrub incisions or soak in a bath, pool, hot tub, etc. until incisions have fully healed, tubes have been removed, and authorized by your surgeon, typically 4 weeks.   - The wound dressing should be removed 48 hours after surgery.   - Your incisions were closed with dissolvable suture that does not need to be removed. The purple skin glue on your incisions will flake off with time and should not be scrubbed.  Also avoid applying lotions or ointments to these areas.  - If steri-strips were used you may wash over them normally, as you would wash your remaining skin.  Steri-strips should fall off on their own within 5-10 days.   - It is preferable for the incision to be left uncovered, but you may cover with gauze if needed for comfort or to protect clothing from drainage.     Tubes / drains:  - You are going home with a Santo catheter which will remain in place until your follow-up appointment. This catheter will not generally restrict your activities, but you should be careful if you are driving such that it does not become a distraction.    - Your nurse or  will provide written catheter care instructions for you to take home.  - Protect the catheter and treat it like an extension of your body - keep it secured to your leg and do not let it get caught, snagged, or tugged. The catheter tubing should remain tension-free and without kinks. In order to drain best, the tubing and bag should always be lower than your body.  You may notice a little blood, small amount of white discharge, or caking at the catheter insertion site. This is normal but it can irritate the skin so it is best to wash this off in the daily shower. You are encouraged to wash the catheter tubing to keep it clean, and the urine drainage bag also can be  "gotten wet.   - Apply bacitracin or vaseline 2-3 times daily to the tip of the penis/catheter insertion point to keep the area lubricated and more comfortable.      Follow-Up:   - Schedule an appointment to be seen by your primary care provider within 7-10 days of discharge for a postoperative checkup.   - Follow up with Dr. Morrell in about 1 week for catheter removal.  Take your Ciprofloxacin antibiotic tablet right before this appointment.    - Follow up with Dr. Mcnulty as scheduled to discuss your pathology results and for a postop checkup.   - Call or return sooner than your regularly scheduled visit if you develop any of the following:  Fever (greater than 101.3F), uncontrolled pain, uncontrolled nausea or vomiting, concerns about bowel function, as well as increased redness, swelling, drainage from your wound or any concerns about urinating or urinary catheter drainage.      Here are some phone numbers where you can reach us:  - Monday through Friday, 8am to 4:30pm - as long as it is not a holiday, IT IS BEST to call the Urology Clinic Triage Line at: [phone] with any non-emergent urology concerns.    - If it is a night, weekend, or holiday call the [facility] number at [phone] and ask the  to page the \"urology resident on call\".  Typically, the on-call provider should return your call within 30 minutes.  Please page the on-call provider again if you haven't been contacted as expected.  Rarely, the on-call provider will be unable to promptly return a call due to a hospital emergency.  If you have paged twice and are still not contacted, ask the hospital  to page the \"urology CHIEF-RESIDENT on call\".  - FOR EMERGENCIES, always call 911 or go to the Emergency Department         Discharge Disposition:     Discharged to home      Attestation: I have reviewed today's vital signs, notes, medications, labs and imaging.    Gian Salgado PA-C  Urology Physician Assistant  Personal " Pager: [phone]    Please call Job Code:   [phone] to reach the Urology resident or PA on call - Weekdays  [phone] to reach the Urology resident or PA on call - Weeknights and weekends      June 17, 2021

## 2021-06-17 NOTE — PROGRESS NOTES
"SPIRITUAL HEALTH SERVICES  [facility] Oncall  ON-CALL VISIT     REFERRAL SOURCE: Follow-up to previous day's 3C request     Pt was in bed and stated \"I'm hurting anymore. The Doctors said it went really well, and I'm going home home today.\"     Introduced spiritual health services, affirmed the positive results of pt surgery and provided prayer.     PLAN: No further follow-up at this time.     Rev. Miguelina Hartmann MDiv, [facility]  Staff    Pager [phone]  * [facility] remains available 24/7 for emergent requests/referrals, either by having the switchboard page the on-call  or by entering an ASAP/STAT consult in Epic (this will also page the on-call ).*      "

## 2021-06-17 NOTE — PLAN OF CARE
Education was given to patient and daughter on care of his brannon at home, discharge summary education was done, paper work signed after instruction was given and it was put in patients chart. Patients assessments were WNL. He was also instructed to collect his medication from the pharmacy.

## 2021-06-18 ENCOUNTER — PATIENT OUTREACH (OUTPATIENT)
Dept: CARE COORDINATION | Facility: CLINIC | Age: 76
End: 2021-06-18

## 2021-06-18 DIAGNOSIS — Z71.89 OTHER SPECIFIED COUNSELING: ICD-10-CM

## 2021-06-18 NOTE — PROGRESS NOTES
[facility]: Post-Discharge Note  SITUATION                                                      Admission:    Admission Date: 06/16/21   Reason for Admission: (Ureteral mass)  Discharge:   Discharge Date: 06/17/21  Discharge Diagnosis: (Ureteral mass)  Discharge Plan: (Follow up with Dr. Morrell )    BACKGROUND                                                    The patient's hospital course was unremarkable.  Colin Baxter recovered as anticipated and experienced no post-operative complications.    On POD #1 he was ambulating without assitance, tolerating the discharge diet, had pain controlled with PO medications to go home with, and was requiring no IV medications or fluids. He was discharged to home with his Santo catheter.  He was given appropriate contact information, follow-up and instructions as seen below in the discharge paperwork      ASSESSMENT      Discharge Assessment  Patient reports symptoms are: Improved  Does the patient have all of their medications?: Yes  Does patient know what their new medications are for?: Yes  Does patient have a follow-up appointment scheduled?: Yes  Does patient have any other questions or concerns?: No(staying with Daughter )    Post-op  Did the patient have surgery or a procedure: Yes  Incision: healing  Drainage: No  Bleeding: none  Fever: No  Chills: No  Redness: No  Warmth: No  Swelling: No  Incision site pain: No  Eating & Drinking: eating and drinking without complaints/concerns  PO Intake: regular diet        PLAN                                                      Outpatient Plan: Diet:  - Regular/ home diet     Activity:   - No strenuous exercise for 6 weeks.   - No lifting, pushing, pulling more than 10 pounds for 6 weeks. Take care when pushing with your arms to stand up.  - Do not strain your belly area.  When you bend, sit up or twice, you could strain the area around your incision.    - Do not strain with bowel movements.    - Do not drive until you can  press the brake pedal quickly and fully without pain.   - Do not operate a motor vehicle while taking narcotic pain medications.      Medications:   1) PAIN: Oxycodone is a narcotic medication that has been prescribed for pain.  Narcotics will cause sleepiness and constipation and can become addictive, therefore it is best to stop or reduce them as soon as you can.  Any left over narcotics should be disposed of with an Authorized  for unneeded medications.  Contact your [facility] s or [facility] s household trash and recycling service to learn about medication disposal options and guidelines for your area.  If you decide to store this medication at home it should be kept in a locked cabinet to prevent access to children or visitors. Never drive, operate machinery or drink alcoholic beverages while you are taking narcotic pain medications.  To reduce your narcotic use, take Tylenol (acetaminophen 625mg)as directed.  This has been prescribed for you.  Do not take more than 4,000mg of Tylenol (acetaminophen/ APAP) from all sources in any 24 hour period since this can cause liver damage.       2) CONSTIPATION: Pericolace (senna/docusate sodium) can be taken twice daily for prevention of constipation since surgery, pain medications and bladder spasm medications can all make you constipated.  Please reduce or stop pericolace if you develop loose stools. Other over the counter solutions such as prune juice, miralax, fiber products, senna, and dulcolax can also be used. If you are taking the pericolace but still have not had a bowel movement in 3 days, start over-the-counter Milk of Magnesia taken twice daily until you have a good result.  Call the office with any concerns.      3) BLOOD PRESSURE MEDICATIONS:  - Your lisinopril was held (not given) while you were in the hospital because your blood pressure was low and to allow your kidney to recover from surgery.  When you get home continue to HOLD (not take)  your lisinopril until you can follow up with your primary care provider in 3-5 days for a check up and to check your kidney function     4) ANTIBIOTICS:  - Take a single Ciprofloxacin tablet just prior to your catheter removal appointment with Petros Singh in about 1 week  Incisions:   - Daily showering is important, and you may get incisions wet starting 48 hours after surgery.  - Do not scrub incisions or soak in a bath, pool, hot tub, etc. until incisions have fully healed, tubes have been removed, and authorized by your surgeon, typically 4 weeks.   - The wound dressing should be removed 48 hours after surgery.   - Your incisions were closed with dissolvable suture that does not need to be removed. The purple skin glue on your incisions will flake off with time and should not be scrubbed.  Also avoid applying lotions or ointments to these areas.  - If steri-strips were used you may wash over them normally, as you would wash your remaining skin.  Steri-strips should fall off on their own within 5-10 days.   - It is preferable for the incision to be left uncovered, but you may cover with gauze if needed for comfort or to protect clothing from drainage.      Tubes / drains:  - You are going home with a Santo catheter which will remain in place until your follow-up appointment. This catheter will not generally restrict your activities, but you should be careful if you are driving such that it does not become a distraction.    - Your nurse or  will provide written catheter care instructions for you to take home.  - Protect the catheter and treat it like an extension of your body - keep it secured to your leg and do not let it get caught, snagged, or tugged. The catheter tubing should remain tension-free and without kinks. In order to drain best, the tubing and bag should always be lower than your body.  You may notice a little blood, small amount of white discharge, or caking at the catheter insertion site.  This is normal but it can irritate the skin so it is best to wash this off in the daily shower. You are encouraged to wash the catheter tubing to keep it clean, and the urine drainage bag also can be gotten wet.   - Apply bacitracin or vaseline 2-3 times daily to the tip of the penis/catheter insertion point to keep the area lubricated and more comfortable.       Follow-Up:   - Schedule an appointment to be seen by your primary care provider within 7-10 days of discharge for a postoperative checkup.   - Follow up with Dr. Morrell in about 1 week for catheter removal.  Take your Ciprofloxacin antibiotic tablet right before this appointment.    - Follow up with Dr. Mcnulty as scheduled to discuss your pathology results and for a postop checkup.   - Call or return sooner than your regularly scheduled visit if you develop any of the following:  Fever (greater than 101.3F), uncontrolled pain, uncontrolled nausea or vomiting, concerns about bowel function, as well as increased redness, swelling, drainage from your wound or any concerns about urinating or urinary catheter drainage.         Future Appointments   Date Time Provider Department [place]   6/25/2021 11:00 AM [id number] HIXRA [facility]   6/25/2021 12:00 PM Shonda Morrell MD HCURO [facility]   7/6/2021  9:45 AM HC LAB HCLAB [facility]   7/6/2021 10:30 AM Jhony Bartlett MD HCONC [facility]   7/16/2021  1:00 PM Jaiver Mcnulty MD [facility]           Shani Caldwell MA

## 2021-06-20 LAB
BLD PROD TYP BPU: NORMAL
BLD PROD TYP BPU: NORMAL
BLD UNIT ID BPU: 0
BLD UNIT ID BPU: 0
BLOOD PRODUCT CODE: NORMAL
BLOOD PRODUCT CODE: NORMAL
BPU ID: NORMAL
BPU ID: NORMAL
TRANSFUSION STATUS PATIENT QL: NORMAL

## 2021-06-23 LAB — COPATH REPORT: NORMAL

## 2021-06-23 NOTE — RESULT ENCOUNTER NOTE
Mr. Baxter,  Here is your pathology report.  Your lymph nodes were negative.   It did show cancer in the ureter.  We can discuss this further at your upcoming appointment.  Thank you, Jorge Mcnulty.

## 2021-06-25 ENCOUNTER — OFFICE VISIT (OUTPATIENT)
Dept: UROLOGY | Facility: OTHER | Age: 76
End: 2021-06-25
Attending: UROLOGY
Payer: COMMERCIAL

## 2021-06-25 ENCOUNTER — HOSPITAL ENCOUNTER (OUTPATIENT)
Dept: GENERAL RADIOLOGY | Facility: HOSPITAL | Age: 76
End: 2021-06-25
Attending: UROLOGY
Payer: COMMERCIAL

## 2021-06-25 VITALS
OXYGEN SATURATION: 97 % | DIASTOLIC BLOOD PRESSURE: 74 MMHG | TEMPERATURE: 96.9 F | HEART RATE: 73 BPM | BODY MASS INDEX: 27.06 KG/M2 | WEIGHT: 178 LBS | SYSTOLIC BLOOD PRESSURE: 115 MMHG

## 2021-06-25 DIAGNOSIS — C66.2 MALIGNANT NEOPLASM OF LEFT URETER (H): ICD-10-CM

## 2021-06-25 DIAGNOSIS — C66.2 MALIGNANT NEOPLASM OF LEFT URETER (H): Primary | ICD-10-CM

## 2021-06-25 PROCEDURE — G0463 HOSPITAL OUTPT CLINIC VISIT: HCPCS

## 2021-06-25 PROCEDURE — 255N000002 HC RX 255 OP 636: Performed by: UROLOGY

## 2021-06-25 PROCEDURE — G0463 HOSPITAL OUTPT CLINIC VISIT: HCPCS | Mod: 25

## 2021-06-25 PROCEDURE — 51600 INJECTION FOR BLADDER X-RAY: CPT

## 2021-06-25 PROCEDURE — 99213 OFFICE O/P EST LOW 20 MIN: CPT | Performed by: UROLOGY

## 2021-06-25 RX ORDER — IOPAMIDOL 510 MG/ML
100 INJECTION, SOLUTION INTRAVASCULAR ONCE
Status: DISCONTINUED | OUTPATIENT
Start: 2021-06-25 | End: 2021-06-25 | Stop reason: CLARIF

## 2021-06-25 RX ORDER — IOPAMIDOL 510 MG/ML
100 INJECTION, SOLUTION INTRAVASCULAR ONCE
Status: COMPLETED | OUTPATIENT
Start: 2021-06-25 | End: 2021-06-25

## 2021-06-25 RX ADMIN — IOPAMIDOL 100 ML: 510 INJECTION, SOLUTION INTRAVASCULAR at 11:36

## 2021-06-25 ASSESSMENT — PAIN SCALES - GENERAL: PAINLEVEL: NO PAIN (0)

## 2021-06-25 NOTE — NURSING NOTE
Review of Systems:    Weight loss:    No     Recent fever/chills:  No   Night sweats:   No  Current skin rash:  No   Recent hair loss:  Yes  Heat intolerance:  No   Cold intolerance:  No  Chest pain:   No   Palpitations:   No  Shortness of breath:  No   Wheezing:   No  Constipation:    No   Diarrhea:   No   Nausea:   No   Vomiting:   No   Kidney/side pain:  No   Back pain:   No  Frequent headaches:  No   Dizziness:     No  Leg swelling:   No   Calf pain:    No    Parents, brothers or sisters with history of kidney cancer?   No  Parents, brothers or sisters with history of bladder cancer: No

## 2021-06-25 NOTE — NURSING NOTE
"Chief Complaint   Patient presents with     RECHECK     cath removal/ no voiding trial/ cystogram first        Initial /74 (BP Location: Left arm, Patient Position: Sitting, Cuff Size: Adult Regular)   Pulse 73   Temp 96.9  F (36.1  C) (Tympanic)   Wt 80.7 kg (178 lb)   SpO2 97%   BMI 27.06 kg/m   Estimated body mass index is 27.06 kg/m  as calculated from the following:    Height as of 6/16/21: 1.727 m (5' 8\").    Weight as of this encounter: 80.7 kg (178 lb).  Medication Reconciliation: complete  Nargis Daily LPN    "

## 2021-06-25 NOTE — PROGRESS NOTES
History     Chief Complaint:  RECHECK (cath removal/ no voiding trial/ cystogram first )      HPI   Colin Baxter is a 76 year old male who presents for follow-up after a left nephro ureterectomy for multifocal invasive high-grade urothelial carcinoma.  Pathological stage was PT3N0.  Colin did well with the procedure he said he really did not have much postoperative pain.  He underwent a cystogram today to rule out extravasation and is here to get his catheter out.    Allergies:    Allergies   Allergen Reactions     No Clinical Screening - See Comments Other (See Comments)     Beta blocker in glaucoma gtt.s caused low pulse and passing out      Timolol      Beta blocker in glaucoma gtt.s caused low pulse and passing out   - patient thinks it was timolol but not 100% sure which beta blocker eye drop.         Medications:      acetaminophen (TYLENOL) 325 MG tablet  atorvastatin (LIPITOR) 40 MG tablet  blood glucose (ONETOUCH VERIO IQ) test strip  brimonidine (ALPHAGAN-P) 0.15 % ophthalmic solution  dexamethasone (DECADRON) 4 MG tablet  latanoprost (XALATAN) 0.005 % ophthalmic solution  lisinopril (ZESTRIL) 10 MG tablet  metFORMIN (GLUCOPHAGE-XR) 500 MG 24 hr tablet  ONETOUCH DELICA LANCETS 33G MISC  senna-docusate (SENOKOT-S/PERICOLACE) 8.6-50 MG tablet  tamsulosin (FLOMAX) 0.4 MG capsule        Problem List:      Patient Active Problem List    Diagnosis Date Noted     Ureteral mass 02/02/2021     Priority: Medium     Added automatically from request for surgery [id number]       Ureter cancer (H) 01/13/2021     Priority: Medium     Added automatically from request for surgery [id number]       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) 12/11/2020     Priority: Medium     Type 2 diabetes mellitus with hyperglycemia, without long-term current use of insulin (H) 12/11/2020     Priority: Medium     Type 2 diabetes mellitus without complication, without long-term current use of insulin (H) 07/30/2019      Priority: Medium     History of bladder cancer 02/27/2019     Priority: Medium     Obesity (BMI 35.0-39.9) with comorbidity (H) 09/26/2018     Priority: Medium     ACP (advance care planning) 05/04/2016     Priority: Medium     Advance Care Planning 5/4/2016: ACP Review of Chart / Resources Provided:  Reviewed chart for advance care plan.  Colin Baxter has been provided information and resources to begin or update their advance care plan.  Added by Inés Malloy             History of high risk bladder cancer 09/01/2015     Priority: Medium     Comprehensive Medical Examination 07/06/2015     Priority: Medium     Glaucoma 07/06/2015     Priority: Medium        Past Medical History:      Past Medical History:   Diagnosis Date     Benign essential hypertension      Cancer (H)      Diabetes (H)      Dyslipidemia        Past Surgical History:      Past Surgical History:   Procedure Laterality Date     APPENDECTOMY  1958     COLONOSCOPY  2-     COMBINED CYSTOSCOPY, RETROGRADES, URETEROSCOPY, INSERT STENT Left 1/18/2021    Procedure: CYSTOURETEROSCOPY, WITH RETROGRADE PYELOGRAM with interpretation of fluroscopy, ureteroscopy, ureteral biopsy, bladder biopsy and fulgaration;  Surgeon: Shonda Morrell MD;  Location: [facility]     CYSTOSCOPY N/A 6/16/2021    Procedure: CYSTOSCOPY;  Surgeon: Javier Mcnulty MD;  Location[facility]     CYSTOSCOPY, BIOPSY BLADDER, COMBINED N/A 9/8/2015    Procedure: COMBINED CYSTOSCOPY, BIOPSY BLADDER;  Surgeon: Randy Martinez MD;  Location: [facility]     CYSTOSCOPY, BIOPSY BLADDER, COMBINED N/A 2/14/2017    Procedure: COMBINED CYSTOSCOPY, BIOPSY BLADDER;  Surgeon: Randy Martinez MD;  Location: [facility]     CYSTOSCOPY, BIOPSY BLADDER, COMBINED N/A 11/13/2018    Procedure: COMBINED CYSTOSCOPY, BIOPSY BLADDER;  Surgeon: Ed Helm MD;  Location: [facility] OR     CYSTOSCOPY, RETROGRADES, COMBINED Bilateral 11/13/2018    Procedure: Bilateral Retrograde Pyelagram, Bladder  Biopsy;  Surgeon: Ed Helm MD;  Location: [facility] OR     CYSTOSCOPY, RETROGRADES, INSERT STENT URETER(S), COMBINED Left 2015    Procedure: COMBINED CYSTOSCOPY, RETROGRADES, INSERT STENT URETER(S);  Surgeon: Randy Martinez MD;  Location: [facility]     CYSTOSCOPY, TRANSURETHRAL RESECTION (TUR) TUMOR BLADDER, COMBINED N/A 2015    Procedure: COMBINED CYSTOSCOPY, TRANSURETHRAL RESECTION (TUR) TUMOR BLADDER;  Surgeon: Randy Martinez MD;  Location: [facility]     CYSTOSCOPY, TRANSURETHRAL RESECTION (TUR) TUMOR BLADDER, COMBINED N/A 2016    Procedure: COMBINED CYSTOSCOPY, TRANSURETHRAL RESECTION (TUR) TUMOR BLADDER;  Surgeon: Randy Martinez MD;  Location: [facility]     CYSTOSCOPY, TRANSURETHRAL RESECTION (TUR) TUMOR BLADDER, COMBINED N/A 2016    Procedure: COMBINED CYSTOSCOPY, TRANSURETHRAL RESECTION (TUR) TUMOR BLADDER;  Surgeon: Randy Martinez MD;  Location: [facility]     DAVINCI NEPHROURETERECTOMY Left 2021    Procedure: NEPHROURETERECTOMY, ROBOT-ASSISTED, lymph node dissection;  Surgeon: Javier Mcnulty MD;  [facility]     PHACOEMULSIFICATION WITH STANDARD INTRAOCULAR LENS IMPLANT Right 10/9/2018    Procedure: PHACOEMULSIFICATION WITH STANDARD INTRAOCULAR LENS IMPLANT;  PHACOEMULSIFICATION CATARACT EXTRACTION POSTERIOR CHAMBER LENS RIGHT;  Surgeon: Marlo Mcconnell MD;  Location: [facility]     PHACOEMULSIFICATION WITH STANDARD INTRAOCULAR LENS IMPLANT Left 10/23/2018    Procedure: PHACOEMULSIFICATION CATARACT EXTRACTION POSTERIOR CHAMBER LENS LEFT;  Surgeon: Marlo Mcconnell MD;  Location: [facility]       Family History:      Family History   Problem Relation Age of Onset     Diabetes Mother      Parkinsonism Brother        Social History:    Marital Status:   [4]  Social History     Tobacco Use     Smoking status: Former Smoker     Quit date: 1992     Years since quittin.4     Smokeless tobacco: Never Used   Substance Use Topics     Alcohol use: Yes      Comment: rarely     Drug use: No        Review of Systems   All other systems reviewed and are negative.        Physical Exam   Vitals:  /74 (BP Location: Left arm, Patient Position: Sitting, Cuff Size: Adult Regular)   Pulse 73   Temp 96.9  F (36.1  C) (Tympanic)   Wt 80.7 kg (178 lb)   SpO2 97%   BMI 27.06 kg/m        Physical Exam  Constitutional:       Appearance: Normal appearance. He is normal weight.   Abdominal:      General: Abdomen is flat. There is no distension.      Palpations: Abdomen is soft. There is no mass.      Tenderness: There is no abdominal tenderness.      Hernia: No hernia is present.          Comments: Multiple laparoscopic incisions and lower abdominal incisions all clean dry and intact no evidence of infection.   Neurological:      Mental Status: He is alert.       Results for orders placed or performed during the hospital encounter of 06/25/21   XR Cystogram     Status: None    Narrative    XR CYSTOGRAM, 6/25/2021 11:33 AM    History: Male, age 76 years; Malignant neoplasm of left ureter (H)    Comparison: None.    Technique: The pelvis was assessed fluoroscopically while instilling  200 mL of radiodense contrast into the urinary bladder .    FINDINGS: Right-sided vesicoureteral reflux to the L3 level. No  evidence of apparent urinary bladder leak.      Impression    IMPRESSION:   No evidence of bladder leak.    Right-sided vesicoureteral reflux to the proximal/mid ureter.    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  He is scheduled in October for surveillance cystoscopy.  he has follow-up appointments with Dr. Mcnulty and Dr. Clements later this month.  Can contact us if he has any difficulty urinating today.      No follow-ups on file.    Shonda Morrell MD  [facility]  [facility] - [place]

## 2021-07-05 ENCOUNTER — PRE VISIT (OUTPATIENT)
Dept: UROLOGY | Facility: CLINIC | Age: 76
End: 2021-07-05

## 2021-07-05 NOTE — TELEPHONE ENCOUNTER
Reason for visit: Follow up     Relevant information: Malignant neoplasm of left ureter    Records/imaging/labs/orders: Labs in Epic    Pt called: No    At Rooming: Virtual

## 2021-07-06 ENCOUNTER — ONCOLOGY VISIT (OUTPATIENT)
Dept: ONCOLOGY | Facility: OTHER | Age: 76
End: 2021-07-06
Attending: INTERNAL MEDICINE
Payer: COMMERCIAL

## 2021-07-06 ENCOUNTER — HOSPITAL ENCOUNTER (OUTPATIENT)
Dept: CT IMAGING | Facility: HOSPITAL | Age: 76
End: 2021-07-06
Attending: INTERNAL MEDICINE
Payer: COMMERCIAL

## 2021-07-06 DIAGNOSIS — N28.9 DECREASED RENAL FUNCTION: Primary | ICD-10-CM

## 2021-07-06 DIAGNOSIS — N28.9 DECREASED RENAL FUNCTION: ICD-10-CM

## 2021-07-06 DIAGNOSIS — C67.3 MALIGNANT NEOPLASM OF ANTERIOR WALL OF URINARY BLADDER (H): ICD-10-CM

## 2021-07-06 DIAGNOSIS — C66.2 MALIGNANT NEOPLASM OF LEFT URETER (H): ICD-10-CM

## 2021-07-06 DIAGNOSIS — C68.9 UROTHELIAL CARCINOMA (H): ICD-10-CM

## 2021-07-06 LAB
ALBUMIN SERPL-MCNC: 4 G/DL (ref 3.4–5)
ALP SERPL-CCNC: 64 U/L (ref 40–150)
ALT SERPL W P-5'-P-CCNC: 14 U/L (ref 0–70)
ANION GAP SERPL CALCULATED.3IONS-SCNC: 5 MMOL/L (ref 3–14)
AST SERPL W P-5'-P-CCNC: 12 U/L (ref 0–45)
BASOPHILS # BLD AUTO: 0.1 10E9/L (ref 0–0.2)
BASOPHILS NFR BLD AUTO: 0.9 %
BILIRUB SERPL-MCNC: 0.6 MG/DL (ref 0.2–1.3)
BUN SERPL-MCNC: 43 MG/DL (ref 7–30)
CALCIUM SERPL-MCNC: 9.1 MG/DL (ref 8.5–10.1)
CHLORIDE SERPL-SCNC: 110 MMOL/L (ref 94–109)
CO2 SERPL-SCNC: 22 MMOL/L (ref 20–32)
CREAT SERPL-MCNC: 2.33 MG/DL (ref 0.66–1.25)
DIFFERENTIAL METHOD BLD: ABNORMAL
EOSINOPHIL # BLD AUTO: 0.4 10E9/L (ref 0–0.7)
EOSINOPHIL NFR BLD AUTO: 6.3 %
ERYTHROCYTE [DISTWIDTH] IN BLOOD BY AUTOMATED COUNT: 12.3 % (ref 10–15)
GFR SERPL CREATININE-BSD FRML MDRD: 26 ML/MIN/{1.73_M2}
GLUCOSE SERPL-MCNC: 99 MG/DL (ref 70–99)
HCT VFR BLD AUTO: 29.7 % (ref 40–53)
HGB BLD-MCNC: 10 G/DL (ref 13.3–17.7)
IMM GRANULOCYTES # BLD: 0 10E9/L (ref 0–0.4)
IMM GRANULOCYTES NFR BLD: 0.3 %
LDH SERPL L TO P-CCNC: 190 U/L (ref 85–227)
LYMPHOCYTES # BLD AUTO: 1.6 10E9/L (ref 0.8–5.3)
LYMPHOCYTES NFR BLD AUTO: 24.8 %
MCH RBC QN AUTO: 33.7 PG (ref 26.5–33)
MCHC RBC AUTO-ENTMCNC: 33.7 G/DL (ref 31.5–36.5)
MCV RBC AUTO: 100 FL (ref 78–100)
MONOCYTES # BLD AUTO: 0.6 10E9/L (ref 0–1.3)
MONOCYTES NFR BLD AUTO: 9.6 %
NEUTROPHILS # BLD AUTO: 3.8 10E9/L (ref 1.6–8.3)
NEUTROPHILS NFR BLD AUTO: 58.1 %
NRBC # BLD AUTO: 0 10*3/UL
NRBC BLD AUTO-RTO: 0 /100
PLATELET # BLD AUTO: 253 10E9/L (ref 150–450)
POTASSIUM SERPL-SCNC: 5.2 MMOL/L (ref 3.4–5.3)
PROT SERPL-MCNC: 7.4 G/DL (ref 6.8–8.8)
RBC # BLD AUTO: 2.97 10E12/L (ref 4.4–5.9)
SODIUM SERPL-SCNC: 137 MMOL/L (ref 133–144)
WBC # BLD AUTO: 6.5 10E9/L (ref 4–11)

## 2021-07-06 PROCEDURE — 36415 COLL VENOUS BLD VENIPUNCTURE: CPT | Mod: ZL | Performed by: INTERNAL MEDICINE

## 2021-07-06 PROCEDURE — 85025 COMPLETE CBC W/AUTO DIFF WBC: CPT | Mod: ZL | Performed by: INTERNAL MEDICINE

## 2021-07-06 PROCEDURE — G0463 HOSPITAL OUTPT CLINIC VISIT: HCPCS

## 2021-07-06 PROCEDURE — 80053 COMPREHEN METABOLIC PANEL: CPT | Mod: ZL | Performed by: INTERNAL MEDICINE

## 2021-07-06 PROCEDURE — 99215 OFFICE O/P EST HI 40 MIN: CPT | Performed by: INTERNAL MEDICINE

## 2021-07-06 PROCEDURE — G0463 HOSPITAL OUTPT CLINIC VISIT: HCPCS | Mod: 25

## 2021-07-06 PROCEDURE — 74150 CT ABDOMEN W/O CONTRAST: CPT

## 2021-07-06 PROCEDURE — 83615 LACTATE (LD) (LDH) ENZYME: CPT | Mod: ZL | Performed by: INTERNAL MEDICINE

## 2021-07-06 NOTE — PROGRESS NOTES
Visit Date: 07/06/2021    HISTORY OF PRESENT ILLNESS:  Mr. 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  We had seen the patient in consultation at the request of Dr. Javier Mcnulty and Dr. Morrell, Dr. Everett Galvez on 02/15/2021.  At that time, Mr. 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  The patient had presented with hematuria.  He was seen by Dr. Morrell on 01/05/2021 who performed a cystoscopy, which revealed a bladder lesion.  Biopsy was negative.  Pathology came back atypical urothelial cells.  He ordered and subsequently a CT urogram, which was read as an apparent mass in the left ureter measuring 2 cm in maximal transverse diameter.  There was no hydronephrosis.  The patient underwent cystourethroscopy with biopsy.  Pathology was read as high-grade urothelial cell carcinoma, superficial lamina propria invasion.  The patient also had a PET scan, which was negative for metastatic disease.  The patient was seen by Dr. Javier Mcnulty at the  and had been scheduled for robotic-assisted laparoscopic left nephroureterectomy.  The patient was having ongoing hematuria when we saw the patient as well as dysuria.  He had multiple urinalyses which had been negative according to the patient including urine cultures from January x2.  There is no family history of bladder cancer.  He did have a personal history of high-grade bladder cancer T1 treated with BCG followed by reinduction BCG in 2016 with GemCis given intravesically x3, completed in 05/2018.  He had been on and off smoker for at least 25 years.  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  We recommended cisplatin given at a dose of 70 mg/m2 on day 1 with a 20% dose reduction and gemcitabine given at dose of 1000 mg/m2 day 1, day 8 of a 21-day cycle x4 cycles then go onto surgery.  The patient went on to receive 4 cycles.  Course was complicated by neutropenia requiring delay of chemotherapy.  Otherwise, he did complete 4 cycles, then went on to have staging studies, he underwent CT urogram 05/14/2021 and the findings were there was decrease in size of left ureteric mass with PET scan done earlier in February and decrease in size of 2.6 cm, 2.1 cm consistent with positive response.  His hematuria had resolved.  He had tolerated chemotherapy relatively well.  He had some mild hair loss, no nausea or vomiting.  The patient subsequently went on to undergo a nephroureterectomy on the left, performed by Dr. Javier Mcnulty on 06/16/2020.  Pathology was read as multifocal invasive high-grade urothelial cell carcinoma of the ureter, the largest tumor being 4.2 cm.  The carcinoma invaded periureteric adventitial tissue.  Margins were free of tumor, periaortic margin was less than 0.1 cm, tumor bladder cuff margin was 0.2 cm carcinoma in situ.  The patient, therefore, was staged pathologic T3 N0, or Y pathologic T3, Y pathologic N0.  The patient is now here for further followup.  He says he is doing relatively well, recovered from surgery.  Denies any hematuria, abdominal pain, chest pain, fevers, night sweats, weight loss.    PHYSICAL EXAMINATION:    GENERAL:  He is an elderly white male in no acute distress.  VITAL SIGNS:  Reveal temperature 97.6, pulse 60, blood pressure 122/64.  HEENT:  Atraumatic, normocephalic.  Oropharynx nonerythematous.  NECK:  Supple.  LUNGS:  Clear to auscultation and  percussion.  HEART:  Regular rhythm.  S1, S2 normal.  ABDOMEN:  Soft.  There is 2 robotic surgical scars in the mid abdomen and left abdomen,  well healed, nontender, no masses.  LYMPHATICS:  No cervical, supraclavicular, axillary or inguinal nodes.  EXTREMITIES:  No edema.  NEUROLOGIC:  Nonfocal.    LABORATORY DATA:  Reveal CBC with white count 6.5, H and H 10.0 and 29.7, platelet count 253.  BUN 43, creatinine 2.33.  LDH is 190.    ASSESSMENT AND PLAN:    1.  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  We discussed the case with Dr. Guthrie who felt there was a benefit to neoadjuvant chemotherapy with high-grade urothelial cell carcinoma prior nephroureterectomy, which had shown improved 5-year disease-free survival and overall survival in these patients.  The patient went on to receive cisplatin and gemcitabine.  Cisplatin was dose reduced by 20%.  Dose was cisplatin 75 mg/m2 on day 1, gemcitabine 1000 mg/m2 on day 1, day 8 of a 21-day cycle, completed 4 cycles.  Course was complicated by neutropenia.  Imaging revealed decrease in size ureteral mass.  The patient went on to nephroureterectomy was found to have a pathologic T3 N0 high-grade urothelial cell carcinoma.  There is no role for further adjuvant therapy in this patient as no studies indicate that there would be a benefit.  The patient has already received neoadjuvant chemotherapy.  Therefore, we would recommend surveillance.  2.  Elevated creatinine.  Creatinine has jumped from 1.3-2.33.  We elected to obtain a CT abdomen and pelvis without contrast to rule out obstruction.  Otherwise, if negative, the patient will need to see a nephrologist, Dr. Arvizu to evaluate his renal insufficiency.  Otherwise, we will see the patient in 2 months.  Obtain PET scan, CBC, CMP, LDH.  Total of 98 minutes was spent on this patient.  Time was spent reviewing imaging with Dr. Houser, reviewing  previous pathology reports, including recent pathology report, surgical operative notes by Dr. Jorge Mcnulty, performing history and physical, documenting history and physical, ordering stat CT scans and PET scans and follow up labs.    Jhony Bartlett MD        D: 2021   T: 2021   MT: [id number]    Name:     CLARISSA COSTA  MRN:      [id number]        Account:    [id number]   :      1945           Visit Date: 2021     Document: [id number]    cc:  MD Shonda Cleary MD R Jon Rudberg, MD

## 2021-07-06 NOTE — PATIENT INSTRUCTIONS
We would like to see you back in 2 months after your PET scan and labs.      A referral was sent to nephrology because of your declining kidney function. They will call you to set up an appointment. If you do not hear from them by end of the week please contact [phone]    When you are in need of a refill of your medications, please call your pharmacy and they will send us the request. If you have any questions please call [phone]

## 2021-07-06 NOTE — NURSING NOTE
"Chief Complaint   Patient presents with     RECHECK     surgical follow up/ureter cancer       Initial There were no vitals taken for this visit. Estimated body mass index is 27.06 kg/m  as calculated from the following:    Height as of 6/16/21: 1.727 m (5' 8\").    Weight as of 6/25/21: 80.7 kg (178 lb).  Medication Reconciliation: complete  Nasreen Ontiveros RN  "

## 2021-07-08 ASSESSMENT — PATIENT HEALTH QUESTIONNAIRE - PHQ9: SUM OF ALL RESPONSES TO PHQ QUESTIONS 1-9: 2

## 2021-07-09 NOTE — RESULT ENCOUNTER NOTE
Mr. Baxter,  Here is your pathology report.  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   We will plan for follow-up on this cancer.  I hope the recovery from your surgery is going well.  Thank you, Jorge Mcnulty.

## 2021-07-16 ENCOUNTER — VIRTUAL VISIT (OUTPATIENT)
Dept: UROLOGY | Facility: CLINIC | Age: 76
End: 2021-07-16
Payer: COMMERCIAL

## 2021-07-16 DIAGNOSIS — C66.2 MALIGNANT NEOPLASM OF LEFT URETER (H): ICD-10-CM

## 2021-07-16 DIAGNOSIS — C67.3 MALIGNANT NEOPLASM OF ANTERIOR WALL OF URINARY BLADDER (H): Primary | ICD-10-CM

## 2021-07-16 PROCEDURE — 99024 POSTOP FOLLOW-UP VISIT: CPT | Mod: GT | Performed by: UROLOGY

## 2021-07-16 NOTE — PROGRESS NOTES
ASSESSMENT AND PLAN  Bladder Cancer   No recurrence in 5 years.  See history below.    Plan for follow-up with Dr Morrell    Left ureteral mass.  HG invasive.    6/16/21 left nephroureterectomy.  Plan for follow-up with Dr Morrell and Jeff.    AS NEEDED follow-up with me.  __________________________________________________    CHIEF COMPLAINT  It was my pleasure to see Colin Baxter who is a 76 year old male here for left ureteral mass.      HPI  He has done well since his nephroureterectomy.  His brannon is out and he is voiding well.    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  BCG started.    1/2016 Tumor recurrence.  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  [place]/Cis x3 11/2016.    I reviewed the following notes regarding this patients care:    1/18/21 Dr Morrell op note.   Given his multiple TURs, it was difficult to find ureteral orifices.  Left ureter mass was biopsied and erythema in the bladder was biopsied.      Pathology  Collected: 6/16/2021   A. Lymph node, left common iliac:   - Two benign lymph nodes     B. 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   - Carcinoma invades periureteric adventitial tissue   - Margins are free of tumor        - Periureteric margin is <0.1 cm from tumor        - Bladder cuff margin is 0.2 cm from carcinoma in-situ   - See synoptic report       I reviewed the following laboratory data and went over findings with patient:  Recent Labs   Lab Test 01/14/21  1113 10/02/20  0923 07/29/19  0836 09/26/18  1127   WBC 6.5 6.2 6.7 6.6   HGB 13.5 12.9* 15.5 15.3    268 192 220     Recent Labs   Lab Test 01/11/21  1232 10/02/20  0923 07/29/19  0836 05/12/17  0740 07/07/15  0848   CR  --  0.82 0.72 1.02 0.97   GFRESTIMATED 83 86 >90 72 77   GFRESTBLACK >90 99 >90 87 >90   GFR Calc      GLC  --  103* 298*  --  110*       This visit was done via phone at the patient's request.  Time spent on phone call: 5 min  Time spent on pre-visit work, post visit work, and documentation outside of phone call time on day of visit: 0 min  Total time: 5 min        CC  Patient Care Team:  Luis Tran MD as PCP - General (Family Practice)  Luis Tran MD as Assigned PCP  Rowena Phan LPN as Caryl Tucker RN as Diabetes Educator (Diabetes Education)  Tawanna Kirk RD as Diabetes Educator (Diabetes Education)  Luis Tran MD as Referring Physician (Family Medicine)  Javier Mcnulty MD as MD (Urology)  Laney Neumann, RN as Registered Nurse (Urology)  Nasreen Rhodes, RN as Specialty Care Coordinator (Hematology & Oncology)  Jhony Bartlett MD as Assigned Cancer Care Provider  Shonda Morrell MD as Assigned Surgical Provider  SELF, REFERRED    Copy to patient  CLARISSA VYAS JULISSA  [address] [place] [address]    Video-Visit Details  Video Start Time: 245  Video End Time: 309  Documentation and review time today: 26 min  Originating Location (pt. Location): Home.    Distant Location (provider location):  [facility] UROLOGY AND [facility] FOR PROSTATE AND UROLOGIC CANCERS.    Platform used for Video Visit: [email or web address]  Type of service:  Video Visit

## 2021-07-16 NOTE — PROGRESS NOTES
"Colin is a 76 year old who is being evaluated via a billable video visit.      How would you like to obtain your AVS? Mail a copy  If the video visit is dropped, the invitation should be resent by: Text to cell phone: [phone]  Will anyone else be joining your video visit? No  {If patient encounters technical issues they should call [phone] :[id number]}    Video Start Time: {video visit start/end time for provider to select:152948}  Video-Visit Details    Type of service:  Video Visit    Video End Time:{video visit start/end time for provider to select:152948}    Originating Location (pt. Location): {video visit patient location:455525::\"Home\"}    Distant Location (provider location):  [facility] UROLOGY CLINIC [place]     Platform used for Video Visit: {Virtual Visit Platforms:852122::\"[employer]\"}    "

## 2021-07-16 NOTE — LETTER
7/16/2021       RE: Colin Baxter  [address] [place] [address]     Dear Colleague,    Thank you for referring your patient, Colin Baxter, to the [facility] UROLOGY CLINIC [place] at [facility]. Please see a copy of my visit note below.    ASSESSMENT AND PLAN  Bladder Cancer   No recurrence in 5 years.  See history below.    Plan for follow-up with Dr Morrell    Left ureteral mass.  HG invasive.    6/16/21 left nephroureterectomy.  Plan for follow-up with Dr Morrell and Jeff.    AS NEEDED follow-up with me.  __________________________________________________    CHIEF COMPLAINT  It was my pleasure to see Colin Baxter who is a 76 year old male here for left ureteral mass.      HPI  He has done well since his nephroureterectomy.  His brannon is out and he is voiding well.    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  BCG started.    1/2016 Tumor recurrence.  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  [place]/Cis x3 11/2016.    I reviewed the following notes regarding this patients care:    1/18/21 Dr Morrell op note.   Given his multiple TURs, it was difficult to find ureteral orifices.  Left ureter mass was biopsied and erythema in the bladder was biopsied.      Pathology  Collected: 6/16/2021   A. Lymph node, left common iliac:   - Two benign lymph nodes     B. 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   - Carcinoma invades periureteric adventitial tissue   - Margins are free of tumor        - Periureteric margin is <0.1 cm from tumor        - Bladder cuff margin is 0.2 cm from carcinoma in-situ   - See synoptic report       I reviewed the following laboratory data and went over findings with patient:  Recent Labs   Lab Test 01/14/21  1113 10/02/20  0923 07/29/19  0836  09/26/18  1127   WBC 6.5 6.2 6.7 6.6   HGB 13.5 12.9* 15.5 15.3    268 192 220     Recent Labs   Lab Test 01/11/21  1232 10/02/20  0923 07/29/19  0836 05/12/17  0740 07/07/15  0848   CR  --  0.82 0.72 1.02 0.97   GFRESTIMATED 83 86 >90 72 77   GFRESTBLACK >90 99 >90 87 >90   GFR Calc     GLC  --  103* 298*  --  110*       This visit was done via phone at the patient's request.  Time spent on phone call: 5 min  Time spent on pre-visit work, post visit work, and documentation outside of phone call time on day of visit: 0 min  Total time: 5 min        CC  Patient Care Team:  Luis Tran MD as PCP - General (Family Practice)  Luis Tran MD as Assigned PCP  Rowena Phan LPN as Caryl Tucker RN as Diabetes Educator (Diabetes Education)  Tawanna Kirk RD as Diabetes Educator (Diabetes Education)  Luis Tran MD as Referring Physician (Family Medicine)  Javier Mcnluty MD as MD (Urology)  Laney Neumann, RN as Registered Nurse (Urology)  Nasreen Rhodes, RN as Specialty Care Coordinator (Hematology & Oncology)  Jhony Bartlett MD as Assigned Cancer Care Provider  Shonda Morrell MD as Assigned Surgical Provider  SELF, REFERRED    Copy to patient  CLARISSA VYAS JULISSA  [address] [place] [address]    Video-Visit Details  Video Start Time: 245  Video End Time: 309  Documentation and review time today: 26 min  Originating Location (pt. Location): Home.    Distant Location (provider location):  [facility] UROLOGY AND [facility] FOR PROSTATE AND UROLOGIC CANCERS.    Platform used for Video Visit: [employer]  Type of service:  Video Visit

## 2021-08-16 ENCOUNTER — TELEPHONE (OUTPATIENT)
Dept: EDUCATION SERVICES | Facility: HOSPITAL | Age: 76
End: 2021-08-16

## 2021-08-16 NOTE — TELEPHONE ENCOUNTER
Reviewed recent labs and noted 7/6/21 Creatinine of 2.33 and GFR of 26. Called Colin and told him to stop his Metformin and scheduled him for a visit on 8/19/21 at 11:00 am.

## 2021-08-16 NOTE — TELEPHONE ENCOUNTER
Pt calls he is taking Metformin XR 50mg 2 tabs bid, he is having diarrhea. He would like to know if he can decrease to 1 tab bid and see if this helps with the diarrhea.

## 2021-08-18 DIAGNOSIS — E11.65 TYPE 2 DIABETES MELLITUS WITH HYPERGLYCEMIA, WITHOUT LONG-TERM CURRENT USE OF INSULIN (H): Primary | ICD-10-CM

## 2021-08-19 ENCOUNTER — LAB (OUTPATIENT)
Dept: LAB | Facility: OTHER | Age: 76
End: 2021-08-19
Attending: NURSE PRACTITIONER
Payer: COMMERCIAL

## 2021-08-19 ENCOUNTER — HOSPITAL ENCOUNTER (OUTPATIENT)
Dept: EDUCATION SERVICES | Facility: HOSPITAL | Age: 76
End: 2021-08-19
Attending: NURSE PRACTITIONER
Payer: COMMERCIAL

## 2021-08-19 VITALS
HEART RATE: 60 BPM | TEMPERATURE: 97.4 F | WEIGHT: 170 LBS | DIASTOLIC BLOOD PRESSURE: 60 MMHG | HEIGHT: 67 IN | BODY MASS INDEX: 26.68 KG/M2 | SYSTOLIC BLOOD PRESSURE: 94 MMHG | OXYGEN SATURATION: 98 %

## 2021-08-19 DIAGNOSIS — E11.65 TYPE 2 DIABETES MELLITUS WITH HYPERGLYCEMIA, WITHOUT LONG-TERM CURRENT USE OF INSULIN (H): ICD-10-CM

## 2021-08-19 DIAGNOSIS — E11.9 TYPE 2 DIABETES MELLITUS WITHOUT COMPLICATION, WITHOUT LONG-TERM CURRENT USE OF INSULIN (H): ICD-10-CM

## 2021-08-19 DIAGNOSIS — E11.65 TYPE 2 DIABETES MELLITUS WITH HYPERGLYCEMIA, WITHOUT LONG-TERM CURRENT USE OF INSULIN (H): Primary | ICD-10-CM

## 2021-08-19 LAB
ANION GAP SERPL CALCULATED.3IONS-SCNC: 6 MMOL/L (ref 3–14)
BUN SERPL-MCNC: 47 MG/DL (ref 7–30)
CALCIUM SERPL-MCNC: 8.5 MG/DL (ref 8.5–10.1)
CHLORIDE BLD-SCNC: 111 MMOL/L (ref 94–109)
CO2 SERPL-SCNC: 22 MMOL/L (ref 20–32)
CREAT SERPL-MCNC: 2.52 MG/DL (ref 0.66–1.25)
EST. AVERAGE GLUCOSE BLD GHB EST-MCNC: 120 MG/DL
GFR SERPL CREATININE-BSD FRML MDRD: 24 ML/MIN/1.73M2
GLUCOSE BLD-MCNC: 85 MG/DL (ref 70–99)
HBA1C MFR BLD: 5.8 % (ref 0–5.6)
POTASSIUM BLD-SCNC: 5.1 MMOL/L (ref 3.4–5.3)
SODIUM SERPL-SCNC: 139 MMOL/L (ref 133–144)

## 2021-08-19 PROCEDURE — G0108 DIAB MANAGE TRN  PER INDIV: HCPCS

## 2021-08-19 PROCEDURE — 83036 HEMOGLOBIN GLYCOSYLATED A1C: CPT | Mod: ZL

## 2021-08-19 PROCEDURE — 80048 BASIC METABOLIC PNL TOTAL CA: CPT | Mod: ZL

## 2021-08-19 PROCEDURE — 36415 COLL VENOUS BLD VENIPUNCTURE: CPT | Mod: ZL

## 2021-08-19 RX ORDER — ATORVASTATIN CALCIUM 40 MG/1
40 TABLET, FILM COATED ORAL DAILY
Qty: 90 TABLET | Refills: 0 | Status: SHIPPED | OUTPATIENT
Start: 2021-08-19 | End: 2021-12-01

## 2021-08-19 RX ORDER — LISINOPRIL 10 MG/1
10 TABLET ORAL DAILY
Qty: 90 TABLET | Refills: 0 | Status: SHIPPED | OUTPATIENT
Start: 2021-08-19 | End: 2021-10-22

## 2021-08-19 ASSESSMENT — PAIN SCALES - GENERAL: PAINLEVEL: NO PAIN (0)

## 2021-08-19 ASSESSMENT — MIFFLIN-ST. JEOR: SCORE: 1469.74

## 2021-08-19 NOTE — TELEPHONE ENCOUNTER
Lisinopril 10 mg      Last Written Prescription Date:  6/17/2021  Last Fill Quantity: 90,   # refills: 0  Last Office Visit:   Future Office visit:    Next 5 appointments (look out 90 days)    Sep 13, 2021  9:00 AM  (Arrive by 8:45 AM)  Return Visit with Jhony Bartlett MD  [facility] ([facility] ) [address] [address]  [phone]   Oct 13, 2021  9:30 AM  (Arrive by 9:15 AM)  Return Visit with Shonda Morrell MD  [facility] ([facility] ) [address] [address]  [phone]

## 2021-08-19 NOTE — PROGRESS NOTES
"Diabetes Self-Management Education & Support    Presents for: Follow-up    SUBJECTIVE/OBJECTIVE:  Presents for: Follow-up  Accompanied by: Self  Diabetes education in the past 24mo: Yes  Focus of Visit: Monitoring, Problem Solving  Diabetes type: Type 2  Date of diagnosis: 7/29/19  Disease course: Other (elevated BGs with chemo treatments)  How confident are you filling out medical forms by yourself:: Quite a bit  Diabetes management related comments/concerns: Metfromin stopped due to recent Creatinine and GFR  Transportation concerns: No  Difficulty affording diabetes medication?: No  Difficulty affording diabetes testing supplies?: No  Other concerns:: Glasses  Cultural Influences/Ethnic Background:  American      Diabetes Symptoms & Complications:  Fatigue: No  Neuropathy: No  Polydipsia: No  Polyphagia: No  Polyuria: No  Visual change: No  Slow healing wounds: No  Symptom course: Stable  Weight trend: Stable  Complications assessed today?: Yes  Autonomic neuropathy: No  CVA: No  Heart disease: No  Nephropathy: No  Peripheral neuropathy: No  Foot ulcerations: No  Peripheral Vascular Disease: No  Retinopathy: No    Patient Problem List and Family Medical History reviewed for relevant medical history, current medical status, and diabetes risk factors.    Vitals:  BP 94/60   Pulse 60   Temp 97.4  F (36.3  C) (Tympanic)   Ht 1.702 m (5' 7\")   Wt 77.1 kg (170 lb)   SpO2 98%   BMI 26.63 kg/m    Estimated body mass index is 26.63 kg/m  as calculated from the following:    Height as of this encounter: 1.702 m (5' 7\").    Weight as of this encounter: 77.1 kg (170 lb).   Last 3 BP:   BP Readings from Last 3 Encounters:   08/19/21 94/60   06/25/21 115/74   06/17/21 116/72       History   Smoking Status     Former Smoker     Quit date: 1/6/1992   Smokeless Tobacco     Never Used       Labs:  Lab Results   Component Value Date    A1C 5.8 08/19/2021    A1C 5.7 01/14/2021     Lab Results   Component Value Date    GLC 85 " 08/19/2021    GLC 99 07/06/2021     Lab Results   Component Value Date    LDL 75 10/02/2020     HDL Cholesterol   Date Value Ref Range Status   10/02/2020 59 >39 mg/dL Final   ]  GFR Estimate   Date Value Ref Range Status   08/19/2021 24 (L) >60 mL/min/1.73m2 Final     Comment:     As of July 11, 2021, eGFR is calculated by the CKD-EPI creatinine equation, without race adjustment. eGFR can be influenced by muscle mass, exercise, and diet. The reported eGFR is an estimation only and is only applicable if the renal function is stable.   07/06/2021 26 (L) >60 mL/min/[1.73_m2] Final     Comment:     Non  GFR Calc  Starting 12/18/2018, serum creatinine based estimated GFR (eGFR) will be   calculated using the Chronic Kidney Disease Epidemiology Collaboration   (CKD-EPI) equation.       GFR Estimate If Black   Date Value Ref Range Status   07/06/2021 31 (L) >60 mL/min/[1.73_m2] Final     Comment:      GFR Calc  Starting 12/18/2018, serum creatinine based estimated GFR (eGFR) will be   calculated using the Chronic Kidney Disease Epidemiology Collaboration   (CKD-EPI) equation.       Lab Results   Component Value Date    CR 2.52 08/19/2021    CR 2.33 07/06/2021     No results found for: MICROALBUMIN    Healthy Eating:  Healthy Eating Assessed Today: Yes  Cultural/Mu-ism diet restrictions?: No  Meal planning/habits: Smaller portions, Avoiding sweets  Breakfast: 1 toast or 1 english muffin, 1-2 eggs, breakfast meat - coffee/cream/splenda or corn flakes  Lunch: 1/2 sandwich, fruit, milk or 1.5 cups chili with corn bread or beef, cheese, milk, coffee - sometimes skips  Dinner: meat, veggies, 1 bread - sometimes salad - occasional starch (1/2 potato)  Snacks: grapes, leftover meat, 1/2 sandwich - Dove ice cream or 1/2 donut  Beverages: Water, Coffee, Milk  Has patient met with a dietitian in the past?: Yes    Being Active:  Being Active Assessed Today: Yes  Exercise:: Currently not  exercising  Barrier to exercise: None    Monitoring:  Monitoring Assessed Today: Yes  Did patient bring glucose meter to appointment? : Yes  Blood Glucose Meter: One Touch  Times checking blood sugar at home (number): 1  Times checking blood sugar at home (per): Day  Blood glucose trend: No change        Taking Medications:  none  Taking Medication Assessed Today: Yes  Current Treatments: Diet  Problems taking diabetes medications regularly?: No  Diabetes medication side effects?: No    Problem Solving:  Problem Solving Assessed Today: Yes  Hypoglycemia Frequency: Never  Patient carries a carbohydrate source: No  Medical ID: No  Does patient have DKA prevention plan?: No  Does patient have severe weather/disaster plan for diabetes management?: No    Reducing Risks:  Reducing Risks Assessed Today: No  Has dilated eye exam at least once a year?: Yes  Sees dentist every 6 months?: No  Feet checked by healthcare provider in the last year?: No (Due)    Healthy Coping:  Healthy Coping Assessed Today: Yes  Emotional response to diabetes: Ready to learn, Concern for health and well-being  Informal Support system:: Family  Stage of change: MAINTENANCE (Working to maintain change, with risk of relapse)  Support resources: None  Patient Activation Measure Survey Score:  SHARITA Score (Last Two) 9/26/2018   SHARITA Raw Score 30   Activation Score 56   SHARITA Level 3       Diabetes knowledge and skills assessment:   Patient is knowledgeable in diabetes management concepts related to: Healthy Eating, Monitoring and Taking Medication    Patient needs further education on the following diabetes management concepts: Monitoring and Taking Medication    Based on learning assessment above, most appropriate setting for further diabetes education would be: Individual setting.      INTERVENTIONS:    Education provided today on:  AADE Self-Care Behaviors:  Healthy Eating: consistency in amount, composition, and timing of food intake  Monitoring: log  and interpret results, individual blood glucose targets and frequency of monitoring. Will monitor BGs to see if we need to start an alternative medication  Taking Medication: why we stopped Metformin    Opportunities for ongoing education and support in diabetes-self management were discussed.    Pt verbalized understanding of concepts discussed and recommendations provided today.       Education Materials Provided:  No new materials provided today      ASSESSMENT:  Readings range as follows: fastin-106 and post-supper: .    Discussed with Colin why we stopped his Metformin and reviewed target BG readings.        Patient's most recent   Lab Results   Component Value Date    A1C 5.8 2021    A1C 5.7 2021    is meeting goal of <7.0    PLAN  See Patient Instructions for co-developed, patient-stated behavior change goals.call for BG readings on 21  I will   AVS printed and provided to patient today. See Follow-Up section for recommended follow-up.      Time Spent: 30 minutes  Encounter Type: Individual    Any diabetes medication dose changes were made via the CDE Protocol and Collaborative Practice Agreement with the patient's primary care provider. A copy of this encounter was shared with the provider.

## 2021-08-19 NOTE — TELEPHONE ENCOUNTER
Lipitor 40 mg      Last Written Prescription Date:  5/24/21  Last Fill Quantity: 90,   # refills: 0  Last Office Visit:   Future Office visit:    Next 5 appointments (look out 90 days)    Sep 13, 2021  9:00 AM  (Arrive by 8:45 AM)  Return Visit with Jhony Bartlett MD  [facility] ([facility] ) [address] [address]  [phone]   Oct 13, 2021  9:30 AM  (Arrive by 9:15 AM)  Return Visit with Shonda Morrell MD  [facility] ([facility] ) [address] [address]  [phone]

## 2021-08-27 ENCOUNTER — TELEPHONE (OUTPATIENT)
Dept: EDUCATION SERVICES | Facility: HOSPITAL | Age: 76
End: 2021-08-27

## 2021-08-27 NOTE — TELEPHONE ENCOUNTER
The pt calls in BG reading's:    on 08/27/21 BG was 108 in the am   on 08/26/21 BG was  102 in the am and 134 after dinner  on 08/25/21 BG was  100 in the am and 143 after dinner  on 08/24/21 BG was  106 in the am and 201 after dinner  on 08/23/21 BG was  110 in the am and 243 after dinner (ate a pasty)  on 08/22/21 BG was  106 in the am and 110 after dinner  on 08/21/21 BG was  101 in the am and 155 after dinner      Rowena Phan LPN

## 2021-08-30 NOTE — TELEPHONE ENCOUNTER
Called Colin. Discussed with him that BGs have increased with decrease in Metformin, but they are still in target.    Will follow by phone the week of 9/13/21

## 2021-09-02 ENCOUNTER — LAB (OUTPATIENT)
Dept: LAB | Facility: OTHER | Age: 76
End: 2021-09-02
Payer: COMMERCIAL

## 2021-09-02 ENCOUNTER — TRANSFERRED RECORDS (OUTPATIENT)
Dept: HEALTH INFORMATION MANAGEMENT | Facility: CLINIC | Age: 76
End: 2021-09-02

## 2021-09-02 DIAGNOSIS — N18.4 CHRONIC KIDNEY DISEASE, STAGE IV (SEVERE) (H): Primary | ICD-10-CM

## 2021-09-02 DIAGNOSIS — E11.9 TYPE 2 DIABETES MELLITUS WITHOUT COMPLICATION, WITHOUT LONG-TERM CURRENT USE OF INSULIN (H): Primary | ICD-10-CM

## 2021-09-02 LAB
ALBUMIN SERPL-MCNC: 4 G/DL (ref 3.4–5)
ALBUMIN UR-MCNC: 20 MG/DL
ANION GAP SERPL CALCULATED.3IONS-SCNC: 3 MMOL/L (ref 3–14)
APPEARANCE UR: CLEAR
BILIRUB UR QL STRIP: NEGATIVE
BUN SERPL-MCNC: 44 MG/DL (ref 7–30)
CALCIUM SERPL-MCNC: 8.7 MG/DL (ref 8.5–10.1)
CHLORIDE BLD-SCNC: 110 MMOL/L (ref 94–109)
CO2 SERPL-SCNC: 24 MMOL/L (ref 20–32)
COLOR UR AUTO: ABNORMAL
CREAT SERPL-MCNC: 2.49 MG/DL (ref 0.66–1.25)
CREAT UR-MCNC: 100 MG/DL
ERYTHROCYTE [DISTWIDTH] IN BLOOD BY AUTOMATED COUNT: 12.4 % (ref 10–15)
GFR SERPL CREATININE-BSD FRML MDRD: 24 ML/MIN/1.73M2
GLUCOSE BLD-MCNC: 96 MG/DL (ref 70–99)
GLUCOSE UR STRIP-MCNC: NEGATIVE MG/DL
HCT VFR BLD AUTO: 30.2 % (ref 40–53)
HGB BLD-MCNC: 10.2 G/DL (ref 13.3–17.7)
HGB UR QL STRIP: ABNORMAL
KETONES UR STRIP-MCNC: NEGATIVE MG/DL
LEUKOCYTE ESTERASE UR QL STRIP: NEGATIVE
MCH RBC QN AUTO: 32.3 PG (ref 26.5–33)
MCHC RBC AUTO-ENTMCNC: 33.8 G/DL (ref 31.5–36.5)
MCV RBC AUTO: 96 FL (ref 78–100)
MUCOUS THREADS #/AREA URNS LPF: PRESENT /LPF
NITRATE UR QL: NEGATIVE
PH UR STRIP: 5 [PH] (ref 4.7–8)
PHOSPHATE SERPL-MCNC: 3.4 MG/DL (ref 2.5–4.5)
PLATELET # BLD AUTO: 207 10E3/UL (ref 150–450)
POTASSIUM BLD-SCNC: 5.1 MMOL/L (ref 3.4–5.3)
PROT UR-MCNC: 0.34 G/L
PROT/CREAT 24H UR: 0.34 G/G CR (ref 0–0.2)
RBC # BLD AUTO: 3.16 10E6/UL (ref 4.4–5.9)
RBC URINE: 35 /HPF
SODIUM SERPL-SCNC: 137 MMOL/L (ref 133–144)
SP GR UR STRIP: 1.02 (ref 1–1.03)
UROBILINOGEN UR STRIP-MCNC: NORMAL MG/DL
WBC # BLD AUTO: 5.4 10E3/UL (ref 4–11)
WBC URINE: 9 /HPF

## 2021-09-02 PROCEDURE — 82306 VITAMIN D 25 HYDROXY: CPT | Mod: ZL

## 2021-09-02 PROCEDURE — 81001 URINALYSIS AUTO W/SCOPE: CPT | Mod: ZL

## 2021-09-02 PROCEDURE — 36415 COLL VENOUS BLD VENIPUNCTURE: CPT | Mod: ZL

## 2021-09-02 PROCEDURE — 87086 URINE CULTURE/COLONY COUNT: CPT | Mod: ZL

## 2021-09-02 PROCEDURE — 83970 ASSAY OF PARATHORMONE: CPT | Mod: ZL

## 2021-09-02 PROCEDURE — 84156 ASSAY OF PROTEIN URINE: CPT | Mod: ZL

## 2021-09-02 PROCEDURE — 82040 ASSAY OF SERUM ALBUMIN: CPT | Mod: ZL

## 2021-09-02 PROCEDURE — 85027 COMPLETE CBC AUTOMATED: CPT | Mod: ZL

## 2021-09-02 NOTE — TELEPHONE ENCOUNTER
metFORMIN (GLUCOPHAGE-XR) 500 MG 24 hr tablet  Last Written Prescription Date:  -  Last Fill Quantity: -,  # refills: -   Last office visit: 8/19/21  Future Office Visit:   Next 5 appointments (look out 90 days)    Sep 13, 2021  9:00 AM  (Arrive by 8:45 AM)  Return Visit with Jhony Bartlett MD  [facility] ([facility] ) [address] [address]  [phone]   Oct 13, 2021  9:30 AM  (Arrive by 9:15 AM)  Return Visit with Shonda Morrell MD  [facility] ([facility] ) [address] [address]  [phone]           Routing refill request to provider for review/approval because:  Drug not active on patient's medication list     Discontinued by Caryl on 8/30/21 for reason- med rec cleanup. Please advise. Thank you!

## 2021-09-03 LAB — PTH-INTACT SERPL-MCNC: 104 PG/ML (ref 18–80)

## 2021-09-04 LAB — BACTERIA UR CULT: NO GROWTH

## 2021-09-07 ENCOUNTER — TELEPHONE (OUTPATIENT)
Dept: PET IMAGING | Facility: HOSPITAL | Age: 76
End: 2021-09-07

## 2021-09-07 LAB — DEPRECATED CALCIDIOL+CALCIFEROL SERPL-MC: 29 UG/L (ref 20–75)

## 2021-09-07 RX ORDER — METFORMIN HCL 500 MG
1000 TABLET, EXTENDED RELEASE 24 HR ORAL 2 TIMES DAILY WITH MEALS
Qty: 360 TABLET | Refills: 1 | OUTPATIENT
Start: 2021-09-07

## 2021-09-07 NOTE — TELEPHONE ENCOUNTER
Medication has been discontinued since 8/16/21.     Refill cancelled.     Maryann Mccord APRN FNP-BC  Diabetes and Wound Care

## 2021-09-08 ENCOUNTER — HOSPITAL ENCOUNTER (OUTPATIENT)
Dept: PET IMAGING | Facility: HOSPITAL | Age: 76
Discharge: HOME | End: 2021-09-08
Attending: INTERNAL MEDICINE | Admitting: INTERNAL MEDICINE
Payer: COMMERCIAL

## 2021-09-08 DIAGNOSIS — C66.2 MALIGNANT NEOPLASM OF LEFT URETER (H): ICD-10-CM

## 2021-09-08 DIAGNOSIS — C68.9 UROTHELIAL CARCINOMA (H): ICD-10-CM

## 2021-09-08 DIAGNOSIS — N28.9 DECREASED RENAL FUNCTION: ICD-10-CM

## 2021-09-08 PROCEDURE — 78815 PET IMAGE W/CT SKULL-THIGH: CPT | Mod: PS

## 2021-09-08 PROCEDURE — 343N000001 HC RX 343: Performed by: INTERNAL MEDICINE

## 2021-09-08 PROCEDURE — A9552 F18 FDG: HCPCS | Performed by: INTERNAL MEDICINE

## 2021-09-08 RX ADMIN — FLUDEOXYGLUCOSE F-18 12.11 MCI.: 500 INJECTION, SOLUTION INTRAVENOUS at 09:45

## 2021-09-11 PROBLEM — C67.3 MALIGNANT NEOPLASM OF ANTERIOR WALL OF URINARY BLADDER (H): Status: ACTIVE | Noted: 2020-12-11

## 2021-09-13 ENCOUNTER — LAB (OUTPATIENT)
Dept: LAB | Facility: OTHER | Age: 76
End: 2021-09-13
Attending: INTERNAL MEDICINE
Payer: COMMERCIAL

## 2021-09-13 ENCOUNTER — ONCOLOGY VISIT (OUTPATIENT)
Dept: ONCOLOGY | Facility: OTHER | Age: 76
End: 2021-09-13
Attending: INTERNAL MEDICINE
Payer: COMMERCIAL

## 2021-09-13 VITALS
OXYGEN SATURATION: 100 % | DIASTOLIC BLOOD PRESSURE: 80 MMHG | TEMPERATURE: 97.1 F | RESPIRATION RATE: 20 BRPM | SYSTOLIC BLOOD PRESSURE: 120 MMHG | HEIGHT: 67 IN | WEIGHT: 178.35 LBS | HEART RATE: 65 BPM | BODY MASS INDEX: 27.99 KG/M2

## 2021-09-13 DIAGNOSIS — N28.9 DECREASED RENAL FUNCTION: ICD-10-CM

## 2021-09-13 DIAGNOSIS — C68.9 UROTHELIAL CARCINOMA (H): ICD-10-CM

## 2021-09-13 LAB
ALBUMIN SERPL-MCNC: 4 G/DL (ref 3.4–5)
ALP SERPL-CCNC: 77 U/L (ref 40–150)
ALT SERPL W P-5'-P-CCNC: 15 U/L (ref 0–70)
ANION GAP SERPL CALCULATED.3IONS-SCNC: 5 MMOL/L (ref 3–14)
AST SERPL W P-5'-P-CCNC: 13 U/L (ref 0–45)
BASOPHILS # BLD AUTO: 0 10E3/UL (ref 0–0.2)
BASOPHILS NFR BLD AUTO: 1 %
BILIRUB SERPL-MCNC: 0.5 MG/DL (ref 0.2–1.3)
BUN SERPL-MCNC: 39 MG/DL (ref 7–30)
CALCIUM SERPL-MCNC: 8.6 MG/DL (ref 8.5–10.1)
CHLORIDE BLD-SCNC: 110 MMOL/L (ref 94–109)
CO2 SERPL-SCNC: 24 MMOL/L (ref 20–32)
CREAT SERPL-MCNC: 2.36 MG/DL (ref 0.66–1.25)
EOSINOPHIL # BLD AUTO: 0.3 10E3/UL (ref 0–0.7)
EOSINOPHIL NFR BLD AUTO: 6 %
ERYTHROCYTE [DISTWIDTH] IN BLOOD BY AUTOMATED COUNT: 12.6 % (ref 10–15)
GFR SERPL CREATININE-BSD FRML MDRD: 26 ML/MIN/1.73M2
GLUCOSE BLD-MCNC: 105 MG/DL (ref 70–99)
HCT VFR BLD AUTO: 30.9 % (ref 40–53)
HGB BLD-MCNC: 10.4 G/DL (ref 13.3–17.7)
IMM GRANULOCYTES # BLD: 0 10E3/UL
IMM GRANULOCYTES NFR BLD: 0 %
LDH SERPL L TO P-CCNC: 200 U/L (ref 85–227)
LYMPHOCYTES # BLD AUTO: 1.3 10E3/UL (ref 0.8–5.3)
LYMPHOCYTES NFR BLD AUTO: 32 %
MCH RBC QN AUTO: 32.1 PG (ref 26.5–33)
MCHC RBC AUTO-ENTMCNC: 33.7 G/DL (ref 31.5–36.5)
MCV RBC AUTO: 95 FL (ref 78–100)
MONOCYTES # BLD AUTO: 0.6 10E3/UL (ref 0–1.3)
MONOCYTES NFR BLD AUTO: 15 %
NEUTROPHILS # BLD AUTO: 1.9 10E3/UL (ref 1.6–8.3)
NEUTROPHILS NFR BLD AUTO: 46 %
NRBC # BLD AUTO: 0 10E3/UL
NRBC BLD AUTO-RTO: 0 /100
PLATELET # BLD AUTO: 202 10E3/UL (ref 150–450)
POTASSIUM BLD-SCNC: 4.2 MMOL/L (ref 3.4–5.3)
PROT SERPL-MCNC: 7.6 G/DL (ref 6.8–8.8)
RBC # BLD AUTO: 3.24 10E6/UL (ref 4.4–5.9)
SODIUM SERPL-SCNC: 139 MMOL/L (ref 133–144)
WBC # BLD AUTO: 4.2 10E3/UL (ref 4–11)

## 2021-09-13 PROCEDURE — 85025 COMPLETE CBC W/AUTO DIFF WBC: CPT | Mod: ZL | Performed by: INTERNAL MEDICINE

## 2021-09-13 PROCEDURE — G0463 HOSPITAL OUTPT CLINIC VISIT: HCPCS

## 2021-09-13 PROCEDURE — 83615 LACTATE (LD) (LDH) ENZYME: CPT | Mod: ZL | Performed by: INTERNAL MEDICINE

## 2021-09-13 PROCEDURE — 36415 COLL VENOUS BLD VENIPUNCTURE: CPT | Mod: ZL | Performed by: INTERNAL MEDICINE

## 2021-09-13 PROCEDURE — 99214 OFFICE O/P EST MOD 30 MIN: CPT | Performed by: INTERNAL MEDICINE

## 2021-09-13 PROCEDURE — 82040 ASSAY OF SERUM ALBUMIN: CPT | Mod: ZL | Performed by: INTERNAL MEDICINE

## 2021-09-13 ASSESSMENT — MIFFLIN-ST. JEOR: SCORE: 1507.63

## 2021-09-13 ASSESSMENT — PAIN SCALES - GENERAL: PAINLEVEL: NO PAIN (0)

## 2021-09-13 NOTE — NURSING NOTE
"Oncology Rooming Note    September 13, 2021 9:37 AM   Colin Baxter is a 76 year old male who presents for:    Chief Complaint   Patient presents with     Oncology Clinic Visit     Follow up Malignant neoplasm of anterior wall of urinary bladder      Initial Vitals: /80   Pulse 65   Temp 97.1  F (36.2  C) (Tympanic)   Resp 20   Ht 1.702 m (5' 7\")   Wt 80.9 kg (178 lb 5.6 oz)   SpO2 100%   BMI 27.93 kg/m   Estimated body mass index is 27.93 kg/m  as calculated from the following:    Height as of this encounter: 1.702 m (5' 7\").    Weight as of this encounter: 80.9 kg (178 lb 5.6 oz). Body surface area is 1.96 meters squared.  No Pain (0) Comment: Data Unavailable   No LMP for male patient.  Allergies reviewed: Yes  Medications reviewed: Yes    Medications: Medication refills not needed today.  Pharmacy name entered into [facility]: [id number] - [place], [place] - [address]            " Done

## 2021-09-13 NOTE — PROGRESS NOTES
Visit Date: 09/13/2021    HISTORY OF PRESENT ILLNESS:  Mr. 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  We had seen the patient in consultation at the request of Dr. Javier Mcnulty and Dr. Shonda Morrell, Dr. Everett Galvez on 02/15/2021.  At that time, Mr. 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  The patient had presented with hematuria.  He was seen by Dr. Morrell on 01/05/2021 who performed a cystoscopy, which revealed a bladder lesion.  Biopsy was negative.  Pathology came back atypical urothelial cells.  She ordered a CT urogram, which was read as an apparent mass in the left ureter measuring 2 cm in maximal transverse diameter.  There was no hydronephrosis.  The patient underwent cystourethroscopy with biopsy.  Pathology was read as high-grade urothelial cell carcinoma, superficial lamina propria invasion.  The patient also had a PET scan, which was negative for metastatic disease.  The patient was seen by Dr. Javier Mcnulty at the  and had been scheduled for robotic-assisted laparoscopic left nephroureterectomy.  The patient was having ongoing hematuria when we saw the patient as well as dysuria.  He had multiple urinalyses, which had been negative according to the patient including urine cultures from January x2.  There was no family history of bladder cancer.  He did have a personal history of high-grade bladder cancer treated with BCG followed by reinduction BCG in 2016 with GemCis given intravesically x3 completed this in 05/2018.  He had been on and off smoker for at least 25 years.  When we saw the patient, we felt given the fact that the patient had high-grade urothelial cell carcinoma of the left ureter of at least 2 cm mass that  neoadjuvant chemotherapy would be indicated with cisplatin and gemcitabine 4 cycles, which had shown improved 5-year disease-free survival as well as overall survival rate improvement prior to surgery.  We recommended cisplatin given at a dose 70 mg/m2 on day 1 with 20% dose reduction and gemcitabine given at dose 1000 mg/m2 on day 1, day 8 of a 21-day cycle x4 cycles, then go on to surgery.  The patient went on to receive 4 cycles.  Course was complicated by neutropenia requiring delay chemotherapy.  Otherwise, he did complete 4 cycles, then went on to have staging studies, he underwent CT urogram 05/14/2021 and the findings were that there was decrease in size left ureteric mass with PET scan done earlier in February and decrease in size of mass from 2.6 cm to 2.1 cm consistent with positive response.  His hematuria resolved, tolerating chemotherapy relatively well.  The patient subsequently went on to undergo a nephroureterectomy on the left performed by Dr. Javier Mcnulty on 06/16/2020.  Pathology was read as multifocal invasive high-grade urothelial cell carcinoma of the ureter, the largest tumor being 4.2 cm.  The carcinoma invaded periureteric adventitial tissue.  Margins were free of tumor periaortic margin was less than 0.1 cm tumor, bladder cuff margin was 0.2 cm.  The patient, therefore, was staged pathologic yT3 N0.  When we saw the patient last, we felt that we should obtain a PET scan to assess complete response.  PET scan was obtained on 09/08/2020 and the findings were there was no evidence of recurrent residual disease, postoperative changes consistent with left nephroureterectomy lymph node dissection.  We also noted that his creatinine had elevated, CT abdomen and pelvis revealed normal-appearing right kidney.  We referred the patient to Dr. Arvizu due to his rising creatinine.  According to the patient he did not perform any further workup, did see Dr. Morrell as well, Urology, she did remove  the catheter,  recommended surveillance cystoscopy in October.  The patient otherwise is doing well.  He is gaining weight.  No shortness of breath, chest pain, abdominal pain, urinary symptoms.    PHYSICAL EXAMINATION:    GENERAL:  He is an elderly white male in no acute distress.  VITAL SIGNS:  Reveal blood pressure 120/80, pulse 65, respirations 20, temperature 97.1.  HEENT:  Atraumatic, normocephalic.  Oropharynx nonerythematous.  NECK:  Supple.  LUNGS:  Clear to auscultation and percussion.  HEART:  Regular rhythm.  S1, S2 normal.  ABDOMEN:  Soft, normoactive bowel sounds, nondistended, nontender.  LYMPHATICS:  No cervical, supraclavicular, axillary or inguinal nodes.  EXTREMITIES:  No edema.  NEUROLOGIC:  Nonfocal.    LABORATORY DATA:  Reveal CBC -- white count 4.2, H and H 10.4 and 30.9, platelet count is 202.  BUN 39, creatinine 2.36.  LDH is 200.    IMPRESSION:    1.  High-grade urothelial cell carcinoma of the left ureter with 2 cm mass invading the lamina propria.  The patient went on to neoadjuvant chemotherapy with cisplatin and gemcitabine.  Cisplatin dose reduced by 20%.  Dose given was cisplatin 75 mg/m2 on day 1, gemcitabine 1000 mg on day 1 and day 8 of a 21-day cycle.  He completed 4 cycles.  Course was complicated by neutropenia.  Imaging revealed decrease in size ureteral mass.  The patient went on to nephroureterectomy was found to have a pathologic T3 N0, high-grade urothelial cell carcinoma.  PET scan was negative.  The patient will follow-up with Dr. Morrell for repeat cystoscopy in the near future.  We will obtain a CT chest, abdomen and pelvis in 3 months.  2.  Elevated creatinine.  The patient has been monitored by nephrology.    TIME SPENT:  Eighty minutes were spent on this patient.  Time was spent reviewing imaging results, pathology reports, performing history and physical, reviewing other physician provider notes, documenting history and physical and ordering followup scans.    Jhony  ALL Bartlett MD        D: 2021   T: 2021   MT: [id number]    Name:     CLARISSA COSTA  MRN:      [id number]        Account:    [id number]   :      1945           Visit Date: 2021     Document: [id number]    cc:  MD Javier King MD Jeffrey Copeman, M.D.

## 2021-09-13 NOTE — PATIENT INSTRUCTIONS
We would like to see you back in 3 months. Please come 1week(s) prior for lab work.  You have been scheduled for a CT Scan a week prior.     When you are in need of a refill of your medications, please call your pharmacy and they will send us the request. If you have any questions please call [phone]

## 2021-09-20 ENCOUNTER — PATIENT OUTREACH (OUTPATIENT)
Dept: ONCOLOGY | Facility: OTHER | Age: 76
End: 2021-09-20

## 2021-09-20 DIAGNOSIS — C67.3 MALIGNANT NEOPLASM OF ANTERIOR WALL OF URINARY BLADDER (H): Primary | ICD-10-CM

## 2021-10-06 DIAGNOSIS — C67.3 MALIGNANT NEOPLASM OF ANTERIOR WALL OF URINARY BLADDER (H): Primary | ICD-10-CM

## 2021-10-06 NOTE — NURSING NOTE
Scope #[id number]   Will forward to Dr. Miranda.  Rx and pharmacy are loaded.  Please see note below.

## 2021-10-07 ENCOUNTER — MEDICAL CORRESPONDENCE (OUTPATIENT)
Dept: HEALTH INFORMATION MANAGEMENT | Facility: CLINIC | Age: 76
End: 2021-10-07

## 2021-10-07 DIAGNOSIS — N18.4 CKD (CHRONIC KIDNEY DISEASE) STAGE 4, GFR 15-29 ML/MIN (H): Primary | ICD-10-CM

## 2021-10-12 ENCOUNTER — ONCOLOGY VISIT (OUTPATIENT)
Dept: ONCOLOGY | Facility: OTHER | Age: 76
End: 2021-10-12
Attending: NURSE PRACTITIONER
Payer: COMMERCIAL

## 2021-10-12 VITALS
RESPIRATION RATE: 20 BRPM | TEMPERATURE: 97.5 F | HEIGHT: 67 IN | BODY MASS INDEX: 28.17 KG/M2 | WEIGHT: 179.45 LBS | SYSTOLIC BLOOD PRESSURE: 124 MMHG | HEART RATE: 63 BPM | OXYGEN SATURATION: 99 % | DIASTOLIC BLOOD PRESSURE: 80 MMHG

## 2021-10-12 DIAGNOSIS — C68.9 UROTHELIAL CARCINOMA (H): ICD-10-CM

## 2021-10-12 DIAGNOSIS — C66.2 MALIGNANT NEOPLASM OF LEFT URETER (H): Primary | ICD-10-CM

## 2021-10-12 PROBLEM — C67.3 MALIGNANT NEOPLASM OF ANTERIOR WALL OF URINARY BLADDER (H): Status: RESOLVED | Noted: 2020-12-11 | Resolved: 2021-10-12

## 2021-10-12 PROCEDURE — 99417 PROLNG OP E/M EACH 15 MIN: CPT | Performed by: NURSE PRACTITIONER

## 2021-10-12 PROCEDURE — G0463 HOSPITAL OUTPT CLINIC VISIT: HCPCS

## 2021-10-12 PROCEDURE — 99215 OFFICE O/P EST HI 40 MIN: CPT | Performed by: NURSE PRACTITIONER

## 2021-10-12 ASSESSMENT — MIFFLIN-ST. JEOR: SCORE: 1512.63

## 2021-10-12 ASSESSMENT — PAIN SCALES - GENERAL: PAINLEVEL: NO PAIN (0)

## 2021-10-12 NOTE — NURSING NOTE
"Oncology Rooming Note    October 12, 2021 10:37 AM   Colin Baxter is a 76 year old male who presents for:    Chief Complaint   Patient presents with     Oncology Clinic Visit     Survivorship     Initial Vitals: /80   Pulse 63   Temp 97.5  F (36.4  C) (Tympanic)   Resp 20   Ht 1.702 m (5' 7\")   Wt 81.4 kg (179 lb 7.3 oz)   SpO2 99%   BMI 28.11 kg/m   Estimated body mass index is 28.11 kg/m  as calculated from the following:    Height as of this encounter: 1.702 m (5' 7\").    Weight as of this encounter: 81.4 kg (179 lb 7.3 oz). Body surface area is 1.96 meters squared.  No Pain (0) Comment: Data Unavailable   No LMP for male patient.  Allergies reviewed: Yes  Medications reviewed: Yes    Medications: Medication refills not needed today.  Pharmacy name entered into [employer]: [id number] - [place] - [phone]            "

## 2021-10-12 NOTE — PATIENT INSTRUCTIONS
We will send you a new copy of your careplan in the mail.    We would like to see you back as planned in December.    If you have any questions please call [phone]    Other instructions:  Please make an appointment for a General Physical with Dr. Tran.

## 2021-10-12 NOTE — PROGRESS NOTES
Treatment Completion/Survivorship Visit:  October 11, 2021    Reason for Visit:  No chief complaint on file.     Nursing Note and documentation reviewed: yes    HPI:  This is a 76-year-old male patient who presents to the oncology clinic today for a treatment completion visit to review her survivorship care plan.  He completed neoadjuvant chemotherapy on 5/11/2021 for high-grade urothelial cell carcinoma of the left ureter diagnosed January 2021.    He underwent nephroureterectomy 6/16/2021.    Oncologic History:     2016 patient was diagnosed with high-grade bladder cancer T1; patient was treated with BCG and then again reinduction BCG then GemCis intravesically x3 in May 2018     1/5/2021 patient was seen by Dr. Morrell for history of hematuria.  He underwent cystoscopy which revealed bladder lesions with biopsy being negative.  Cytology showed atypical urothelial cells.  He underwent CT urogram which showed a mass in the left ureter measuring 2 cm with no hydronephrosis.  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  2/10/2021  he underwent PET scan that was negative for metastatic disease and then seen by Dr. Mcnulty at the [facility] and scheduled for robotic assisted laparoscopic left nephroureterectomy.    2/15/2021 he was seen by Dr. Bartlett with Medical Oncology to discuss neoadjuvant chemotherapy.  Recommendation was for gemcitabine 1000 mg per metered squared day 1 and day 8 and cisplatin 70 mg per metered squared on day 1 of a 21-day cycle x4 cycles followed by surgery.  5/11/2021 completion of neoadjuvant chemotherapy  6/16/2021 he underwent Cystourethroscopy and Left robot assisted nephroureterectomy with Left pelvic lymph node dissection by Dr. Jorge Mcnulty and Dr. Reyes Romano; pathology showed a multifocal high-grade urothelial cell carcinoma, staged pT3N0     Current Chemo  Regime/TX: n/a  Current Cycle:  n/a  # of completed cycles: n/a     Previous treatment:  Gemcitabine 1000 mg per metered squared on day 1 and day 8 and cisplatin 56 mg per metered squared day 1 every 21 days    Past Medical History:   Diagnosis Date     Benign essential hypertension      Cancer (H)     Bladder     Diabetes (H)      Dyslipidemia        Past Surgical History:   Procedure Laterality Date     APPENDECTOMY  1958     COLONOSCOPY  2-     COMBINED CYSTOSCOPY, RETROGRADES, URETEROSCOPY, INSERT STENT Left 1/18/2021    Procedure: CYSTOURETEROSCOPY, WITH RETROGRADE PYELOGRAM with interpretation of fluroscopy, ureteroscopy, ureteral biopsy, bladder biopsy and fulgaration;  Surgeon: Shonda Morrell MD;  Location: [facility]     CYSTOSCOPY N/A 6/16/2021    Procedure: CYSTOSCOPY;  Surgeon: Javier Mcnulty MD;  Location: [facility]     CYSTOSCOPY, BIOPSY BLADDER, COMBINED N/A 9/8/2015    Procedure: COMBINED CYSTOSCOPY, BIOPSY BLADDER;  Surgeon: Randy Martinez MD;  Location: [facility]     CYSTOSCOPY, BIOPSY BLADDER, COMBINED N/A 2/14/2017    Procedure: COMBINED CYSTOSCOPY, BIOPSY BLADDER;  Surgeon: Randy Martinez MD;  Location: [facility]     CYSTOSCOPY, BIOPSY BLADDER, COMBINED N/A 11/13/2018    Procedure: COMBINED CYSTOSCOPY, BIOPSY BLADDER;  Surgeon: Ed Helm MD;  Location: [facility]     CYSTOSCOPY, RETROGRADES, COMBINED Bilateral 11/13/2018    Procedure: Bilateral Retrograde Pyelagram, Bladder Biopsy;  Surgeon: Ed Helm MD;  Location: [facility]     CYSTOSCOPY, RETROGRADES, INSERT STENT URETER(S), COMBINED Left 9/8/2015    Procedure: COMBINED CYSTOSCOPY, RETROGRADES, INSERT STENT URETER(S);  Surgeon: Randy Martinez MD;  Location: [facility]     CYSTOSCOPY, TRANSURETHRAL RESECTION (TUR) TUMOR BLADDER, COMBINED N/A 9/8/2015    Procedure: COMBINED CYSTOSCOPY, TRANSURETHRAL RESECTION (TUR) TUMOR BLADDER;  Surgeon: Randy Martinez MD;  Location: [facility]     CYSTOSCOPY, TRANSURETHRAL  RESECTION (TUR) TUMOR BLADDER, COMBINED N/A 2016    Procedure: COMBINED CYSTOSCOPY, TRANSURETHRAL RESECTION (TUR) TUMOR BLADDER;  Surgeon: Randy Martinez MD;  Location: [facility]     CYSTOSCOPY, TRANSURETHRAL RESECTION (TUR) TUMOR BLADDER, COMBINED N/A 2016    Procedure: COMBINED CYSTOSCOPY, TRANSURETHRAL RESECTION (TUR) TUMOR BLADDER;  Surgeon: Randy Martinez MD;  Location: [facility]     DAVINCI NEPHROURETERECTOMY Left 2021    Procedure: NEPHROURETERECTOMY, ROBOT-ASSISTED, lymph node dissection;  Surgeon: Javier Mcnulty MD;  Location: [facility]     PHACOEMULSIFICATION WITH STANDARD INTRAOCULAR LENS IMPLANT Right 10/9/2018    Procedure: PHACOEMULSIFICATION WITH STANDARD INTRAOCULAR LENS IMPLANT;  PHACOEMULSIFICATION CATARACT EXTRACTION POSTERIOR CHAMBER LENS RIGHT;  Surgeon: Marlo Mcconnell MD;  Location: [facility]     PHACOEMULSIFICATION WITH STANDARD INTRAOCULAR LENS IMPLANT Left 10/23/2018    Procedure: PHACOEMULSIFICATION CATARACT EXTRACTION POSTERIOR CHAMBER LENS LEFT;  Surgeon: Marlo Mcconnell MD;  Location: [facility]       Family History   Problem Relation Age of Onset     Diabetes Mother      Parkinsonism Brother        Social History     Socioeconomic History     Marital status:      Spouse name: Not on file     Number of children: Not on file     Years of education: Not on file     Highest education level: Not on file   Occupational History     Not on file   Tobacco Use     Smoking status: Former Smoker     Quit date: 1992     Years since quittin.7     Smokeless tobacco: Never Used   Substance and Sexual Activity     Alcohol use: Yes     Comment: rarely     Drug use: No     Sexual activity: Not Currently   Other Topics Concern     Parent/sibling w/ CABG, MI or angioplasty before 65F 55M? No   Social History Narrative     Not on file     Social Determinants of Health     Financial Resource Strain:      Difficulty of Paying Living Expenses:    Food Insecurity:       Worried About Running Out of Food in the Last Year:      Ran Out of Food in the Last Year:    Transportation Needs:      Lack of Transportation (Medical):      Lack of Transportation (Non-Medical):    Physical Activity:      Days of Exercise per Week:      Minutes of Exercise per Session:    Stress:      Feeling of Stress :    Social Connections:      Frequency of Communication with Friends and Family:      Frequency of Social Gatherings with Friends and Family:      Attends [employer] Services:      Active Member of Clubs or Organizations:      Attends Club or Organization Meetings:      Marital Status:    Intimate Partner Violence:      Fear of Current or Ex-Partner:      Emotionally Abused:      Physically Abused:      Sexually Abused:        Current Outpatient Medications   Medication     acetaminophen (TYLENOL) 325 MG tablet     atorvastatin (LIPITOR) 40 MG tablet     blood glucose (ONETOUCH VERIO IQ) test strip     brimonidine (ALPHAGAN-P) 0.15 % ophthalmic solution     latanoprost (XALATAN) 0.005 % ophthalmic solution     lisinopril (ZESTRIL) 10 MG tablet     ONETOUCH DELICA LANCETS 33G MISC     tamsulosin (FLOMAX) 0.4 MG capsule     No current facility-administered medications for this visit.        Allergies   Allergen Reactions     No Clinical Screening - See Comments Other (See Comments)     Beta blocker in glaucoma gtt.s caused low pulse and passing out      Timolol      Beta blocker in glaucoma gtt.s caused low pulse and passing out   - patient thinks it was timolol but not 100% sure which beta blocker eye drop.          Survivorship assessment:     Cardiac toxicity:   1.  Do have shortness of breath or chest pain after physical activity or exercise?  no  2.  Do have shortness of breath when lying flat, wake up at night needing to get air, or have persistent leg swelling?  no  Anxiety and depression:(In the past 2 weeks)  3.  Have you been bothered more than half the days with little interest or pleasure  in doing things?  no  4.  Have you been bothered more than half the days by feeling down, depressed, or hopeless?  no  5.   Has stress, worry, or being nervous, tense, or irritable interfered with your life?  no  Cognitive function:  6.  Do have difficulties with multitasking or paying attention?  no  7.  Do have difficulties with remembering things?  At times but no change since chemo  8.  Does your thinking seem slow?  At times-word finding can be difficult  Fatigue:  9.  Do you feel persistent fatigue despite a good night sleep?  Doesn't sleep well/up at night 2-3 times at night  10.  Does fatigue interfere with your usual activities?  no  11.  How would you rate your fatigue on a scale of 0-10 over the past week?  0          Have you attended Cancer Rehab?  no          Discussed?  Yes/No? yes  Lymphedema:  12.  Since your cancer treatment, have you had any swelling, fatigue, heaviness, or fullness on the same side as your treatment that has not gone away? no  Hormone related symptoms:  13.  Have you been bothered by hot flashes/night sweats? no  14.  Heavy you been bothered by other hormone related symptoms (example vaginal dryness, incontinence)?  n/a  Pain:  15.  Are you having any pain?  no  16.  How would you rate your pain on a scale of 0-10 over the past month?  n/a  Sexual function:  17.  Do you have any concerns regarding her sexual function, sexual activity, sexual relationships or sex life?  no  18.  Are these concerns causing you distress?  n/a  Sleep disorder:  19.  Are you having problems falling asleep or staying asleep or waking up too early?  Trouble falling asleep after up to urinate at night  20.  Are you experiencing excessive sleepiness such as falling asleep in inappropriate places or sleeping more during a 24-hour period than previously?  no  21.  Have you ever been told you snore frequently or that you stop breathing during sleep?  maybe  Healthy lifestyle:  22.  Do you engage in regular  physical activity or exercise, such as brisk walking jogging bicycling swimming?  No-but very active         A. how often?  n/a         B. Do you have physical limitations to exercise?  no  23.  Excluding white potatoes do eat at least 2-1/2 cups of fruits and/or vegetables each day? No-does eat fruits and vegetables daily         Discussed importance of Healthy Diet with fruits and vegetables and portions? yes  24.  Do you have concerns about your weight? no  25.  Do take vitamins or other supplements?  no  Immunizations and infections:   26.  Have you received your flu vaccine this flu season?   Yes/COVID booster also  27.  Are you up-to-date on your vaccines?  Yes-thinks so  PCP Follow up:  26.  When was your last visit with your PCP?  1 year ago         Last lipoid profile?  10/2020         Colonoscopy UTD?  No-was recommended in 2016         Mammogram UTD?  n/a         GYN exam UTD?  n/a      ASSESSMENT/PLAN:    #1 History of malignant neoplasm of the left ureter: History of high-grade urothelial cell carcinoma of the left ureter diagnosed January 2021.  He completed neoadjuvant chemotherapy then underwent a nephroureterectomy and is doing well.  He will follow-up in December as planned.    Patient's Survivorship careplan was reviewed in it's entirety.   A copy of patient's Survivorship Care Plan was given to patient and a copy will be sent to his primary care provider.     90 minutes spent in the patient's encounter today with time spent in chart review along with chart preparation and review of his survivorship care plan.  Time was also spent with the patient in counseling and discussion of his cancer diagnoses and the previous treatment he received for this along with the plan for further follow up.  Time was also spent in discussion of long term side effects and the possibility of late side effects.  We also spent time in discussion of healthy habits and decreasing cancer risks and prevention /screening  recommendations.    Please see SCP.      Sabra Jensen, NP  APRN, FNP-BC, AOCNP

## 2021-10-12 NOTE — LETTER
10/12/2021          Dear Dr. Tran :    Enclosed is a copy of the survivorship care plan (SCP) that has been completed on your patient Colin Baxter.  This is being sent for your review and has been included in your patient's medical record.    The SCP is to be used as a tool to facilitate your patient's care after their cancer treatment.  It includes diagnoses, treatment and potential physical and or psychosocial issues that may affect your patient after therapy has been completed.  Some issues may not develop until years after therapy.    Thank you in advance for taking the time to review your patient's survivorship care plan and for allowing our oncology team to participate in your patient's care.  We are happy to help facilitate any future care needs and look forward to continuing the patient's follow up in relation to their cancer diagnosis.    Feel free to contact our oncology team with any questions, suggestions or concerns.      Sincerely,        DANILO Melchor, FNP-BC, AOCNP  Medical Oncology  Contact:  [phone]    Oncology Coordinator contact:  Estephania Donnelly RN  [phone]

## 2021-10-13 ENCOUNTER — PREP FOR PROCEDURE (OUTPATIENT)
Dept: UROLOGY | Facility: OTHER | Age: 76
End: 2021-10-13

## 2021-10-13 ENCOUNTER — LAB (OUTPATIENT)
Dept: LAB | Facility: OTHER | Age: 76
End: 2021-10-13
Payer: COMMERCIAL

## 2021-10-13 ENCOUNTER — OFFICE VISIT (OUTPATIENT)
Dept: UROLOGY | Facility: OTHER | Age: 76
End: 2021-10-13
Attending: UROLOGY
Payer: COMMERCIAL

## 2021-10-13 VITALS
HEART RATE: 64 BPM | HEIGHT: 67 IN | TEMPERATURE: 97.1 F | WEIGHT: 175 LBS | SYSTOLIC BLOOD PRESSURE: 158 MMHG | BODY MASS INDEX: 27.47 KG/M2 | OXYGEN SATURATION: 98 % | DIASTOLIC BLOOD PRESSURE: 74 MMHG

## 2021-10-13 DIAGNOSIS — C66.2 MALIGNANT NEOPLASM OF LEFT URETER (H): Primary | ICD-10-CM

## 2021-10-13 DIAGNOSIS — N18.4 CKD (CHRONIC KIDNEY DISEASE) STAGE 4, GFR 15-29 ML/MIN (H): ICD-10-CM

## 2021-10-13 DIAGNOSIS — N32.9 LESION OF BLADDER: Primary | ICD-10-CM

## 2021-10-13 DIAGNOSIS — C67.3 MALIGNANT NEOPLASM OF ANTERIOR WALL OF URINARY BLADDER (H): ICD-10-CM

## 2021-10-13 DIAGNOSIS — C66.2 MALIGNANT NEOPLASM OF LEFT URETER (H): ICD-10-CM

## 2021-10-13 DIAGNOSIS — C68.9 UROTHELIAL CARCINOMA (H): ICD-10-CM

## 2021-10-13 LAB
ALBUMIN SERPL-MCNC: 3.9 G/DL (ref 3.4–5)
ALBUMIN UR-MCNC: 20 MG/DL
ALP SERPL-CCNC: 86 U/L (ref 40–150)
ALT SERPL W P-5'-P-CCNC: 12 U/L (ref 0–70)
ANION GAP SERPL CALCULATED.3IONS-SCNC: 4 MMOL/L (ref 3–14)
APPEARANCE UR: CLEAR
AST SERPL W P-5'-P-CCNC: 11 U/L (ref 0–45)
BASOPHILS # BLD AUTO: 0.1 10E3/UL (ref 0–0.2)
BASOPHILS NFR BLD AUTO: 1 %
BILIRUB SERPL-MCNC: 0.7 MG/DL (ref 0.2–1.3)
BILIRUB UR QL STRIP: NEGATIVE
BUN SERPL-MCNC: 36 MG/DL (ref 7–30)
CALCIUM SERPL-MCNC: 8.6 MG/DL (ref 8.5–10.1)
CHLORIDE BLD-SCNC: 110 MMOL/L (ref 94–109)
CO2 SERPL-SCNC: 23 MMOL/L (ref 20–32)
COLOR UR AUTO: ABNORMAL
CREAT SERPL-MCNC: 2.42 MG/DL (ref 0.66–1.25)
EOSINOPHIL # BLD AUTO: 0.2 10E3/UL (ref 0–0.7)
EOSINOPHIL NFR BLD AUTO: 4 %
ERYTHROCYTE [DISTWIDTH] IN BLOOD BY AUTOMATED COUNT: 13.2 % (ref 10–15)
GFR SERPL CREATININE-BSD FRML MDRD: 25 ML/MIN/1.73M2
GLUCOSE BLD-MCNC: 171 MG/DL (ref 70–99)
GLUCOSE UR STRIP-MCNC: NEGATIVE MG/DL
HCT VFR BLD AUTO: 33.3 % (ref 40–53)
HGB BLD-MCNC: 11.1 G/DL (ref 13.3–17.7)
HGB UR QL STRIP: ABNORMAL
IMM GRANULOCYTES # BLD: 0 10E3/UL
IMM GRANULOCYTES NFR BLD: 0 %
KETONES UR STRIP-MCNC: NEGATIVE MG/DL
LDH SERPL L TO P-CCNC: 187 U/L (ref 85–227)
LEUKOCYTE ESTERASE UR QL STRIP: NEGATIVE
LYMPHOCYTES # BLD AUTO: 1.4 10E3/UL (ref 0.8–5.3)
LYMPHOCYTES NFR BLD AUTO: 27 %
MCH RBC QN AUTO: 31.4 PG (ref 26.5–33)
MCHC RBC AUTO-ENTMCNC: 33.3 G/DL (ref 31.5–36.5)
MCV RBC AUTO: 94 FL (ref 78–100)
MONOCYTES # BLD AUTO: 0.5 10E3/UL (ref 0–1.3)
MONOCYTES NFR BLD AUTO: 10 %
MUCOUS THREADS #/AREA URNS LPF: PRESENT /LPF
NEUTROPHILS # BLD AUTO: 3 10E3/UL (ref 1.6–8.3)
NEUTROPHILS NFR BLD AUTO: 58 %
NITRATE UR QL: NEGATIVE
NRBC # BLD AUTO: 0 10E3/UL
NRBC BLD AUTO-RTO: 0 /100
PH UR STRIP: 5.5 [PH] (ref 4.7–8)
PHOSPHATE SERPL-MCNC: 2.6 MG/DL (ref 2.5–4.5)
PLATELET # BLD AUTO: 202 10E3/UL (ref 150–450)
POTASSIUM BLD-SCNC: 4 MMOL/L (ref 3.4–5.3)
PROT SERPL-MCNC: 7.5 G/DL (ref 6.8–8.8)
RBC # BLD AUTO: 3.53 10E6/UL (ref 4.4–5.9)
RBC URINE: 82 /HPF
SODIUM SERPL-SCNC: 137 MMOL/L (ref 133–144)
SP GR UR STRIP: 1.02 (ref 1–1.03)
SQUAMOUS EPITHELIAL: 0 /HPF
UROBILINOGEN UR STRIP-MCNC: NORMAL MG/DL
WBC # BLD AUTO: 5.3 10E3/UL (ref 4–11)
WBC URINE: 5 /HPF

## 2021-10-13 PROCEDURE — 83615 LACTATE (LD) (LDH) ENZYME: CPT | Mod: ZL

## 2021-10-13 PROCEDURE — 52000 CYSTOURETHROSCOPY: CPT

## 2021-10-13 PROCEDURE — 85025 COMPLETE CBC W/AUTO DIFF WBC: CPT | Mod: ZL

## 2021-10-13 PROCEDURE — 88112 CYTOPATH CELL ENHANCE TECH: CPT | Mod: TC

## 2021-10-13 PROCEDURE — 82040 ASSAY OF SERUM ALBUMIN: CPT | Mod: ZL

## 2021-10-13 PROCEDURE — 84100 ASSAY OF PHOSPHORUS: CPT | Mod: ZL

## 2021-10-13 PROCEDURE — 81001 URINALYSIS AUTO W/SCOPE: CPT | Mod: ZL

## 2021-10-13 PROCEDURE — 99213 OFFICE O/P EST LOW 20 MIN: CPT | Mod: 25 | Performed by: UROLOGY

## 2021-10-13 PROCEDURE — 82247 BILIRUBIN TOTAL: CPT | Mod: ZL

## 2021-10-13 PROCEDURE — G0463 HOSPITAL OUTPT CLINIC VISIT: HCPCS

## 2021-10-13 PROCEDURE — 36415 COLL VENOUS BLD VENIPUNCTURE: CPT | Mod: ZL

## 2021-10-13 PROCEDURE — G0463 HOSPITAL OUTPT CLINIC VISIT: HCPCS | Mod: 25

## 2021-10-13 PROCEDURE — 52000 CYSTOURETHROSCOPY: CPT | Performed by: UROLOGY

## 2021-10-13 RX ORDER — CIPROFLOXACIN 2 MG/ML
200 INJECTION, SOLUTION INTRAVENOUS EVERY 12 HOURS
Status: CANCELLED | OUTPATIENT
Start: 2021-11-05

## 2021-10-13 ASSESSMENT — MIFFLIN-ST. JEOR: SCORE: 1492.42

## 2021-10-13 ASSESSMENT — PAIN SCALES - GENERAL: PAINLEVEL: NO PAIN (0)

## 2021-10-13 NOTE — PROGRESS NOTES
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  Colin underwent a left nephro ureterectomy in June 2021.  My last visit with him was for catheter removal and he presents today for his first surveillance cystoscopy.  Urine cytology, FISH are pending.    Allergies:    Allergies   Allergen Reactions     No Clinical Screening - See Comments Other (See Comments)     Beta blocker in glaucoma gtt.s caused low pulse and passing out      Timolol      Beta blocker in glaucoma gtt.s caused low pulse and passing out   - patient thinks it was timolol but not 100% sure which beta blocker eye drop.         Medications:      acetaminophen (TYLENOL) 325 MG tablet  atorvastatin (LIPITOR) 40 MG tablet  blood glucose (ONETOUCH VERIO IQ) test strip  brimonidine (ALPHAGAN-P) 0.15 % ophthalmic solution  latanoprost (XALATAN) 0.005 % ophthalmic solution  lisinopril (ZESTRIL) 10 MG tablet  ONETOUCH DELICA LANCETS 33G MISC  tamsulosin (FLOMAX) 0.4 MG capsule        Problem List:      Patient Active Problem List    Diagnosis Date Noted     Ureteral mass 02/02/2021     Priority: Medium     Added automatically from request for surgery [id number]       Ureter cancer (H) 01/13/2021     Priority: Medium     Added automatically from request for surgery [id number]       Type 2 diabetes mellitus with hyperglycemia, without long-term current use of insulin (H) 12/11/2020     Priority: Medium     Type 2 diabetes mellitus without complication, without long-term current use of insulin (H) 07/30/2019     Priority: Medium     History of bladder cancer 02/27/2019     Priority: Medium     Obesity (BMI 35.0-39.9) with comorbidity (H) 09/26/2018     Priority: Medium     ACP (advance care planning) 05/04/2016     Priority: Medium     Advance Care Planning  5/4/2016: ACP Review of Chart / Resources Provided:  Reviewed chart for advance care plan.  Colin Baxter has been provided information and resources to begin or update their advance care plan.  Added by Inés Malloy             History of high risk bladder cancer 09/01/2015     Priority: Medium     Comprehensive Medical Examination 07/06/2015     Priority: Medium     Glaucoma 07/06/2015     Priority: Medium        Past Medical History:      Past Medical History:   Diagnosis Date     Benign essential hypertension      Cancer (H)      Diabetes (H)      Dyslipidemia      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) 12/11/2020       Past Surgical History:      Past Surgical History:   Procedure Laterality Date     APPENDECTOMY  1958     COLONOSCOPY  2-     COMBINED CYSTOSCOPY, RETROGRADES, URETEROSCOPY, INSERT STENT Left 1/18/2021    Procedure: CYSTOURETEROSCOPY, WITH RETROGRADE PYELOGRAM with interpretation of fluroscopy, ureteroscopy, ureteral biopsy, bladder biopsy and fulgaration;  Surgeon: Shonda Morrell MD;  Location: [facility]     CYSTOSCOPY N/A 6/16/2021    Procedure: CYSTOSCOPY;  Surgeon: Javier Mcnulty MD;  Location: [facility]     CYSTOSCOPY, BIOPSY BLADDER, COMBINED N/A 9/8/2015    Procedure: COMBINED CYSTOSCOPY, BIOPSY BLADDER;  Surgeon: Randy Martinez MD;  Location: [facility]     CYSTOSCOPY, BIOPSY BLADDER, COMBINED N/A 2/14/2017    Procedure: COMBINED CYSTOSCOPY, BIOPSY BLADDER;  Surgeon: Randy Martinez MD;  Location: [facility]     CYSTOSCOPY, BIOPSY BLADDER, COMBINED N/A 11/13/2018    Procedure: COMBINED CYSTOSCOPY, BIOPSY BLADDER;  Surgeon: Ed Helm MD;  Location: [facility]     CYSTOSCOPY, RETROGRADES, COMBINED Bilateral 11/13/2018    Procedure: Bilateral Retrograde Pyelagram, Bladder Biopsy;  Surgeon: Ed Helm MD;  Location: [facility]     CYSTOSCOPY, RETROGRADES, INSERT STENT URETER(S), COMBINED Left 9/8/2015    Procedure: COMBINED CYSTOSCOPY, RETROGRADES,  INSERT STENT URETER(S);  Surgeon: Randy Martinez MD;  Location: [facility]     CYSTOSCOPY, TRANSURETHRAL RESECTION (TUR) TUMOR BLADDER, COMBINED N/A 9/8/2015    Procedure: COMBINED CYSTOSCOPY, TRANSURETHRAL RESECTION (TUR) TUMOR BLADDER;  Surgeon: Randy Martinez MD;  Location: [facility]     CYSTOSCOPY, TRANSURETHRAL RESECTION (TUR) TUMOR BLADDER, COMBINED N/A 1/12/2016    Procedure: COMBINED CYSTOSCOPY, TRANSURETHRAL RESECTION (TUR) TUMOR BLADDER;  Surgeon: Randy Martinez MD;  Location: [facility]     CYSTOSCOPY, TRANSURETHRAL RESECTION (TUR) TUMOR BLADDER, COMBINED N/A 9/13/2016    Procedure: COMBINED CYSTOSCOPY, TRANSURETHRAL RESECTION (TUR) TUMOR BLADDER;  Surgeon: Randy Martinez MD;  Location: [facility]     DAVINCI NEPHROURETERECTOMY Left 6/16/2021    Procedure: NEPHROURETERECTOMY, ROBOT-ASSISTED, lymph node dissection;  Surgeon: Javier Mcnulty MD;  Location: [facility]     PHACOEMULSIFICATION WITH STANDARD INTRAOCULAR LENS IMPLANT Right 10/9/2018    Procedure: PHACOEMULSIFICATION WITH STANDARD INTRAOCULAR LENS IMPLANT;  PHACOEMULSIFICATION CATARACT EXTRACTION POSTERIOR CHAMBER LENS RIGHT;  Surgeon: Marlo Mcconnell MD;  Location: [facility]     PHACOEMULSIFICATION WITH STANDARD INTRAOCULAR LENS IMPLANT Left 10/23/2018    Procedure: PHACOEMULSIFICATION CATARACT EXTRACTION POSTERIOR CHAMBER LENS LEFT;  Surgeon: Marlo Mcconnell MD;  Location: [facility]       Family History:      Family History   Problem Relation Age of Onset     Diabetes Mother      Parkinsonism Brother      Coronary Artery Disease No family hx of      Hypertension No family hx of      Hyperlipidemia No family hx of      Cerebrovascular Disease No family hx of      Breast Cancer No family hx of      Colon Cancer No family hx of      Prostate Cancer No family hx of      Anesthesia Reaction No family hx of      Asthma No family hx of      Thyroid Disease No family hx of      Genetic Disorder No family hx of        Social  "History:    Marital Status:   [4]  Social History     Tobacco Use     Smoking status: Former Smoker     Quit date: 1992     Years since quittin.7     Smokeless tobacco: Never Used   Substance Use Topics     Alcohol use: Yes     Comment: rarely     Drug use: No        Review of Systems   All other systems reviewed and are negative.        Physical Exam   Vitals:  BP (!) 158/74 (BP Location: Left arm, Patient Position: Chair, Cuff Size: Adult Large)   Pulse 64   Temp 97.1  F (36.2  C) (Tympanic)   Ht 1.702 m (5' 7\")   Wt 79.4 kg (175 lb)   SpO2 98%   BMI 27.41 kg/m        Physical Exam  Constitutional:       Appearance: Normal appearance. He is normal weight.   Pulmonary:      Effort: Pulmonary effort is normal.   Abdominal:      General: Abdomen is flat.      Palpations: Abdomen is soft. There is no shifting dullness, hepatomegaly or mass.          Comments: Multiple surgical scars without any evidence of hernia.  Abdomen is otherwise and soft and nontender.   Genitourinary:     Comments: Penis is circumcised, meatus glans shaft of the penis is normal.  Is descended without any obvious masses supratesticular masses.  Neurological:      Mental Status: He is alert.       Results for orders placed or performed in visit on 10/13/21   CBC with Platelets & Differential     Status: Abnormal    Narrative    The following orders were created for panel order CBC with Platelets & Differential.  Procedure                               Abnormality         Status                     ---------                               -----------         ------                     CBC with platelets and d...[[id number]]  Abnormal            Final result                 Please view results for these tests on the individual orders.   Comprehensive metabolic panel     Status: Abnormal   Result Value Ref Range    Sodium 137 133 - 144 mmol/L    Potassium 4.0 3.4 - 5.3 mmol/L    Chloride 110 (H) 94 - 109 mmol/L    Carbon " Dioxide (CO2) 23 20 - 32 mmol/L    Anion Gap 4 3 - 14 mmol/L    Urea Nitrogen 36 (H) 7 - 30 mg/dL    Creatinine 2.42 (H) 0.66 - 1.25 mg/dL    Calcium 8.6 8.5 - 10.1 mg/dL    Glucose 171 (H) 70 - 99 mg/dL    Alkaline Phosphatase 86 40 - 150 U/L    AST 11 0 - 45 U/L    ALT 12 0 - 70 U/L    Protein Total 7.5 6.8 - 8.8 g/dL    Albumin 3.9 3.4 - 5.0 g/dL    Bilirubin Total 0.7 0.2 - 1.3 mg/dL    GFR Estimate 25 (L) >60 mL/min/1.73m2   Lactate Dehydrogenase     Status: Normal   Result Value Ref Range    Lactate Dehydrogenase 187 85 - 227 U/L   CBC with platelets and differential     Status: Abnormal   Result Value Ref Range    WBC Count 5.3 4.0 - 11.0 10e3/uL    RBC Count 3.53 (L) 4.40 - 5.90 10e6/uL    Hemoglobin 11.1 (L) 13.3 - 17.7 g/dL    Hematocrit 33.3 (L) 40.0 - 53.0 %    MCV 94 78 - 100 fL    MCH 31.4 26.5 - 33.0 pg    MCHC 33.3 31.5 - 36.5 g/dL    RDW 13.2 10.0 - 15.0 %    Platelet Count 202 150 - 450 10e3/uL    % Neutrophils 58 %    % Lymphocytes 27 %    % Monocytes 10 %    % Eosinophils 4 %    % Basophils 1 %    % Immature Granulocytes 0 %    NRBCs per 100 WBC 0 <1 /100    Absolute Neutrophils 3.0 1.6 - 8.3 10e3/uL    Absolute Lymphocytes 1.4 0.8 - 5.3 10e3/uL    Absolute Monocytes 0.5 0.0 - 1.3 10e3/uL    Absolute Eosinophils 0.2 0.0 - 0.7 10e3/uL    Absolute Basophils 0.1 0.0 - 0.2 10e3/uL    Absolute Immature Granulocytes 0.0 <=0.0 10e3/uL    Absolute NRBCs 0.0 10e3/uL   Phosphorus     Status: Normal   Result Value Ref Range    Phosphorus 2.6 2.5 - 4.5 mg/dL   UA reflex to Microscopic and Culture     Status: Abnormal    Specimen: Urine, Midstream   Result Value Ref Range    Color Urine Light Yellow Colorless, Straw, Light Yellow, Yellow    Appearance Urine Clear Clear    Glucose Urine Negative Negative mg/dL    Bilirubin Urine Negative Negative    Ketones Urine Negative Negative mg/dL    Specific Gravity Urine 1.019 1.003 - 1.035    Blood Urine Moderate (A) Negative    pH Urine 5.5 4.7 - 8.0    Protein  Albumin Urine 20  (A) Negative mg/dL    Urobilinogen Urine Normal Normal, 2.0 mg/dL    Nitrite Urine Negative Negative    Leukocyte Esterase Urine Negative Negative    Mucus Urine Present (A) None Seen /LPF    RBC Urine 82 (H) <=2 /HPF    WBC Urine 5 <=5 /HPF    Squamous Epithelials Urine 0 <=1 /HPF    Narrative    Urine Culture not indicated     Cystoscopy: Risks of bleeding and infection were discussed.  Patient's urinalysis was reviewed and is negative for infection but does show considerable hematuria.  Flexible cystoscope was inserted into the urethra at the bulbar urethra there is a stricture and I was able to pop through it with the scope but it was somewhat snug.  Once I get into the bladder the left ureteral orifice is absent because of the nephro ureterectomy.  There are erythematous lesions throughout the bladder.  One in the left lateral wall multiple on the posterior wall one on the right lateral wall these look concerning for carcinoma in situ.  I do not see any obvious papillary tumors.  When you retroflexed the scope the bladder neck is normal.    Impression: Multiple bladder lesions concerning for carcinoma in situ  Plan   Plan: I talked with Colin and I am concerned that these lesions could be carcinoma in situ.  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  My recommendation at this time is to proceed with cystoscopy and bladder biopsy and fulguration of these lesions.  I also will do a right retrograde urogram to look at the right ureter.  His renal function is poor at this point and I do not want him subjected to IV contrast so to get a better look at his urothelium on the right we will do a retrograde urogram.  I will do this before biopsy these lesions.  Risks of bleeding, infection, perforation of the bladder, anesthetic risk were discussed he appears to understand and agrees to proceed.  We will get this scheduled as soon as we can.  25 minutes was spent with the patient in counseling,  discussing upcoming procedure with risk and benefits documentation and coordination of care.      No follow-ups on file.    Shonda Morrell MD  [facility]

## 2021-10-13 NOTE — NURSING NOTE
"Chief Complaint   Patient presents with     Cystoscopy     Cystoscopy-history of bladder cancer       Initial BP (!) 158/74 (BP Location: Left arm, Patient Position: Chair, Cuff Size: Adult Large)   Pulse 64   Temp 97.1  F (36.2  C) (Tympanic)   Ht 1.702 m (5' 7\")   Wt 79.4 kg (175 lb)   SpO2 98%   BMI 27.41 kg/m   Estimated body mass index is 27.41 kg/m  as calculated from the following:    Height as of this encounter: 1.702 m (5' 7\").    Weight as of this encounter: 79.4 kg (175 lb).  Medication Reconciliation: complete  KIRK RUBIN LPN    "

## 2021-10-13 NOTE — NURSING NOTE
Review of Systems:    Weight loss:    No     Recent fever/chills:  No   Night sweats:   No  Current skin rash:  No   Recent hair loss:  No  Heat intolerance:  No   Cold intolerance:  No  Chest pain:   No   Palpitations:   No  Shortness of breath:  No   Wheezing:   No  Constipation:    No   Diarrhea:   No   Nausea:   No   Vomiting:   No   Kidney/side pain:  No   Back pain:   No  Frequent headaches:  No   Dizziness:     No  Leg swelling:   No   Calf pain:    No    Parents, brothers or sisters with history of kidney cancer?   No  Parents, brothers or sisters with history of bladder cancer: No

## 2021-10-13 NOTE — LETTER
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  Colin underwent a left nephro ureterectomy in June 2021.  My last visit with him was for catheter removal and he presents today for his first surveillance cystoscopy.  Urine cytology, FISH are pending.    Allergies:    Allergies   Allergen Reactions     No Clinical Screening - See Comments Other (See Comments)     Beta blocker in glaucoma gtt.s caused low pulse and passing out      Timolol      Beta blocker in glaucoma gtt.s caused low pulse and passing out   - patient thinks it was timolol but not 100% sure which beta blocker eye drop.         Medications:      acetaminophen (TYLENOL) 325 MG tablet  atorvastatin (LIPITOR) 40 MG tablet  blood glucose (ONETOUCH VERIO IQ) test strip  brimonidine (ALPHAGAN-P) 0.15 % ophthalmic solution  latanoprost (XALATAN) 0.005 % ophthalmic solution  lisinopril (ZESTRIL) 10 MG tablet  ONETOUCH DELICA LANCETS 33G MISC  tamsulosin (FLOMAX) 0.4 MG capsule        Problem List:      Patient Active Problem List    Diagnosis Date Noted     Ureteral mass 02/02/2021     Priority: Medium     Added automatically from request for surgery [id number]       Ureter cancer (H) 01/13/2021     Priority: Medium     Added automatically from request for surgery [id number]       Type 2 diabetes mellitus with hyperglycemia, without long-term current use of insulin (H) 12/11/2020     Priority: Medium     Type 2 diabetes mellitus without complication, without long-term current use of insulin (H) 07/30/2019     Priority: Medium     History of bladder cancer 02/27/2019     Priority: Medium     Obesity (BMI 35.0-39.9) with comorbidity (H) 09/26/2018     Priority: Medium     ACP (advance  care planning) 05/04/2016     Priority: Medium     Advance Care Planning 5/4/2016: ACP Review of Chart / Resources Provided:  Reviewed chart for advance care plan.  Colin Baxter has been provided information and resources to begin or update their advance care plan.  Added by Inés Malloy             History of high risk bladder cancer 09/01/2015     Priority: Medium     Comprehensive Medical Examination 07/06/2015     Priority: Medium     Glaucoma 07/06/2015     Priority: Medium        Past Medical History:      Past Medical History:   Diagnosis Date     Benign essential hypertension      Cancer (H)      Diabetes (H)      Dyslipidemia      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) 12/11/2020       Past Surgical History:      Past Surgical History:   Procedure Laterality Date     APPENDECTOMY  1958     COLONOSCOPY  2-     COMBINED CYSTOSCOPY, RETROGRADES, URETEROSCOPY, INSERT STENT Left 1/18/2021    Procedure: CYSTOURETEROSCOPY, WITH RETROGRADE PYELOGRAM with interpretation of fluroscopy, ureteroscopy, ureteral biopsy, bladder biopsy and fulgaration;  Surgeon: Shonda Morrell MD;  Location: [facility]     CYSTOSCOPY N/A 6/16/2021    Procedure: CYSTOSCOPY;  Surgeon: Javier Mcnulty MD;  Location: [facility]     CYSTOSCOPY, BIOPSY BLADDER, COMBINED N/A 9/8/2015    Procedure: COMBINED CYSTOSCOPY, BIOPSY BLADDER;  Surgeon: Randy Martinez MD;  Location: [facility]     CYSTOSCOPY, BIOPSY BLADDER, COMBINED N/A 2/14/2017    Procedure: COMBINED CYSTOSCOPY, BIOPSY BLADDER;  Surgeon: Randy Martinez MD;  Location: [facility]     CYSTOSCOPY, BIOPSY BLADDER, COMBINED N/A 11/13/2018    Procedure: COMBINED CYSTOSCOPY, BIOPSY BLADDER;  Surgeon: Ed Helm MD;  Location: [facility]     CYSTOSCOPY, RETROGRADES, COMBINED Bilateral 11/13/2018    Procedure: Bilateral Retrograde Pyelagram, Bladder Biopsy;  Surgeon: Ed Helm MD;  Location: [facility]     CYSTOSCOPY, RETROGRADES, INSERT STENT URETER(S),  COMBINED Left 9/8/2015    Procedure: COMBINED CYSTOSCOPY, RETROGRADES, INSERT STENT URETER(S);  Surgeon: Randy Martinez MD;  Location: [facility]     CYSTOSCOPY, TRANSURETHRAL RESECTION (TUR) TUMOR BLADDER, COMBINED N/A 9/8/2015    Procedure: COMBINED CYSTOSCOPY, TRANSURETHRAL RESECTION (TUR) TUMOR BLADDER;  Surgeon: Randy Martinez MD;  [facility]     CYSTOSCOPY, TRANSURETHRAL RESECTION (TUR) TUMOR BLADDER, COMBINED N/A 1/12/2016    Procedure: COMBINED CYSTOSCOPY, TRANSURETHRAL RESECTION (TUR) TUMOR BLADDER;  Surgeon: Randy Martinez MD;  [facility]     CYSTOSCOPY, TRANSURETHRAL RESECTION (TUR) TUMOR BLADDER, COMBINED N/A 9/13/2016    Procedure: COMBINED CYSTOSCOPY, TRANSURETHRAL RESECTION (TUR) TUMOR BLADDER;  Surgeon: Randy Martinez MD;  Location[facility]     DAVINCI NEPHROURETERECTOMY Left 6/16/2021    Procedure: NEPHROURETERECTOMY, ROBOT-ASSISTED, lymph node dissection;  Surgeon: Javier Mcnulty MD;  Location: [facility]     PHACOEMULSIFICATION WITH STANDARD INTRAOCULAR LENS IMPLANT Right 10/9/2018    Procedure: PHACOEMULSIFICATION WITH STANDARD INTRAOCULAR LENS IMPLANT;  PHACOEMULSIFICATION CATARACT EXTRACTION POSTERIOR CHAMBER LENS RIGHT;  Surgeon: Marlo Mcconnell MD;  Location: [facility]     PHACOEMULSIFICATION WITH STANDARD INTRAOCULAR LENS IMPLANT Left 10/23/2018    Procedure: PHACOEMULSIFICATION CATARACT EXTRACTION POSTERIOR CHAMBER LENS LEFT;  Surgeon: Marlo Mcconnell MD;  Location: [facility]       Family History:      Family History   Problem Relation Age of Onset     Diabetes Mother      Parkinsonism Brother      Coronary Artery Disease No family hx of      Hypertension No family hx of      Hyperlipidemia No family hx of      Cerebrovascular Disease No family hx of      Breast Cancer No family hx of      Colon Cancer No family hx of      Prostate Cancer No family hx of      Anesthesia Reaction No family hx of      Asthma No family hx of      Thyroid Disease No  "family hx of      Genetic Disorder No family hx of        Social History:    Marital Status:   [4]  Social History     Tobacco Use     Smoking status: Former Smoker     Quit date: 1992     Years since quittin.7     Smokeless tobacco: Never Used   Substance Use Topics     Alcohol use: Yes     Comment: rarely     Drug use: No        Review of Systems   All other systems reviewed and are negative.        Physical Exam   Vitals:  BP (!) 158/74 (BP Location: Left arm, Patient Position: Chair, Cuff Size: Adult Large)   Pulse 64   Temp 97.1  F (36.2  C) (Tympanic)   Ht 1.702 m (5' 7\")   Wt 79.4 kg (175 lb)   SpO2 98%   BMI 27.41 kg/m        Physical Exam  Constitutional:       Appearance: Normal appearance. He is normal weight.   Pulmonary:      Effort: Pulmonary effort is normal.   Abdominal:      General: Abdomen is flat.      Palpations: Abdomen is soft. There is no shifting dullness, hepatomegaly or mass.          Comments: Multiple surgical scars without any evidence of hernia.  Abdomen is otherwise and soft and nontender.   Genitourinary:     Comments: Penis is circumcised, meatus glans shaft of the penis is normal.  Is descended without any obvious masses supratesticular masses.  Neurological:      Mental Status: He is alert.       Results for orders placed or performed in visit on 10/13/21   CBC with Platelets & Differential     Status: Abnormal    Narrative    The following orders were created for panel order CBC with Platelets & Differential.  Procedure                               Abnormality         Status                     ---------                               -----------         ------                     CBC with platelets and d...[[id number]]  Abnormal            Final result                 Please view results for these tests on the individual orders.   Comprehensive metabolic panel     Status: Abnormal   Result Value Ref Range    Sodium 137 133 - 144 mmol/L    Potassium 4.0 3.4 " - 5.3 mmol/L    Chloride 110 (H) 94 - 109 mmol/L    Carbon Dioxide (CO2) 23 20 - 32 mmol/L    Anion Gap 4 3 - 14 mmol/L    Urea Nitrogen 36 (H) 7 - 30 mg/dL    Creatinine 2.42 (H) 0.66 - 1.25 mg/dL    Calcium 8.6 8.5 - 10.1 mg/dL    Glucose 171 (H) 70 - 99 mg/dL    Alkaline Phosphatase 86 40 - 150 U/L    AST 11 0 - 45 U/L    ALT 12 0 - 70 U/L    Protein Total 7.5 6.8 - 8.8 g/dL    Albumin 3.9 3.4 - 5.0 g/dL    Bilirubin Total 0.7 0.2 - 1.3 mg/dL    GFR Estimate 25 (L) >60 mL/min/1.73m2   Lactate Dehydrogenase     Status: Normal   Result Value Ref Range    Lactate Dehydrogenase 187 85 - 227 U/L   CBC with platelets and differential     Status: Abnormal   Result Value Ref Range    WBC Count 5.3 4.0 - 11.0 10e3/uL    RBC Count 3.53 (L) 4.40 - 5.90 10e6/uL    Hemoglobin 11.1 (L) 13.3 - 17.7 g/dL    Hematocrit 33.3 (L) 40.0 - 53.0 %    MCV 94 78 - 100 fL    MCH 31.4 26.5 - 33.0 pg    MCHC 33.3 31.5 - 36.5 g/dL    RDW 13.2 10.0 - 15.0 %    Platelet Count 202 150 - 450 10e3/uL    % Neutrophils 58 %    % Lymphocytes 27 %    % Monocytes 10 %    % Eosinophils 4 %    % Basophils 1 %    % Immature Granulocytes 0 %    NRBCs per 100 WBC 0 <1 /100    Absolute Neutrophils 3.0 1.6 - 8.3 10e3/uL    Absolute Lymphocytes 1.4 0.8 - 5.3 10e3/uL    Absolute Monocytes 0.5 0.0 - 1.3 10e3/uL    Absolute Eosinophils 0.2 0.0 - 0.7 10e3/uL    Absolute Basophils 0.1 0.0 - 0.2 10e3/uL    Absolute Immature Granulocytes 0.0 <=0.0 10e3/uL    Absolute NRBCs 0.0 10e3/uL   Phosphorus     Status: Normal   Result Value Ref Range    Phosphorus 2.6 2.5 - 4.5 mg/dL   UA reflex to Microscopic and Culture     Status: Abnormal    Specimen: Urine, Midstream   Result Value Ref Range    Color Urine Light Yellow Colorless, Straw, Light Yellow, Yellow    Appearance Urine Clear Clear    Glucose Urine Negative Negative mg/dL    Bilirubin Urine Negative Negative    Ketones Urine Negative Negative mg/dL    Specific Gravity Urine 1.019 1.003 - 1.035    Blood Urine  Moderate (A) Negative    pH Urine 5.5 4.7 - 8.0    Protein Albumin Urine 20  (A) Negative mg/dL    Urobilinogen Urine Normal Normal, 2.0 mg/dL    Nitrite Urine Negative Negative    Leukocyte Esterase Urine Negative Negative    Mucus Urine Present (A) None Seen /LPF    RBC Urine 82 (H) <=2 /HPF    WBC Urine 5 <=5 /HPF    Squamous Epithelials Urine 0 <=1 /HPF    Narrative    Urine Culture not indicated     Cystoscopy: Risks of bleeding and infection were discussed.  Patient's urinalysis was reviewed and is negative for infection but does show considerable hematuria.  Flexible cystoscope was inserted into the urethra at the bulbar urethra there is a stricture and I was able to pop through it with the scope but it was somewhat snug.  Once I get into the bladder the left ureteral orifice is absent because of the nephro ureterectomy.  There are erythematous lesions throughout the bladder.  One in the left lateral wall multiple on the posterior wall one on the right lateral wall these look concerning for carcinoma in situ.  I do not see any obvious papillary tumors.  When you retroflexed the scope the bladder neck is normal.    Impression: Multiple bladder lesions concerning for carcinoma in situ  Plan   Plan: I talked with Colin and I am concerned that these lesions could be carcinoma in situ.  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  My recommendation at this time is to proceed with cystoscopy and bladder biopsy and fulguration of these lesions.  I also will do a right retrograde urogram to look at the right ureter.  His renal function is poor at this point and I do not want him subjected to IV contrast so to get a better look at his urothelium on the right we will do a retrograde urogram.  I will do this before biopsy these lesions.  Risks of bleeding, infection, perforation of the bladder, anesthetic risk were discussed he appears to understand and agrees to proceed.  We will get this scheduled as soon as we can.  25  minutes was spent with the patient in counseling, discussing upcoming procedure with risk and benefits documentation and coordination of care.      No follow-ups on file.    Shonda Morrell MD  [facility]              Shonda Morrell MD

## 2021-10-14 LAB
PATH REPORT.COMMENTS IMP SPEC: ABNORMAL
PATH REPORT.COMMENTS IMP SPEC: YES
PATH REPORT.COMMENTS IMP SPEC: YES
PATH REPORT.FINAL DX SPEC: ABNORMAL
PATH REPORT.GROSS SPEC: ABNORMAL
PATH REPORT.MICROSCOPIC SPEC OTHER STN: ABNORMAL
PATH REPORT.RELEVANT HX SPEC: ABNORMAL

## 2021-10-14 PROCEDURE — 88112 CYTOPATH CELL ENHANCE TECH: CPT | Mod: 26 | Performed by: PATHOLOGY

## 2021-10-18 ENCOUNTER — ONCOLOGY VISIT (OUTPATIENT)
Dept: ONCOLOGY | Facility: OTHER | Age: 76
End: 2021-10-18
Attending: INTERNAL MEDICINE
Payer: COMMERCIAL

## 2021-10-18 VITALS
TEMPERATURE: 97.9 F | HEART RATE: 67 BPM | WEIGHT: 179.68 LBS | HEIGHT: 67 IN | BODY MASS INDEX: 28.2 KG/M2 | OXYGEN SATURATION: 99 % | SYSTOLIC BLOOD PRESSURE: 110 MMHG | RESPIRATION RATE: 20 BRPM | DIASTOLIC BLOOD PRESSURE: 70 MMHG

## 2021-10-18 DIAGNOSIS — C66.2 MALIGNANT NEOPLASM OF LEFT URETER (H): Primary | ICD-10-CM

## 2021-10-18 PROCEDURE — 99215 OFFICE O/P EST HI 40 MIN: CPT | Performed by: INTERNAL MEDICINE

## 2021-10-18 PROCEDURE — G0463 HOSPITAL OUTPT CLINIC VISIT: HCPCS

## 2021-10-18 RX ORDER — PROCHLORPERAZINE MALEATE 10 MG
10 TABLET ORAL EVERY 6 HOURS PRN
Qty: 30 TABLET | Refills: 2 | Status: SHIPPED | OUTPATIENT
Start: 2021-10-24 | End: 2021-10-22

## 2021-10-18 ASSESSMENT — MIFFLIN-ST. JEOR: SCORE: 1513.63

## 2021-10-18 ASSESSMENT — PAIN SCALES - GENERAL: PAINLEVEL: NO PAIN (0)

## 2021-10-18 NOTE — NURSING NOTE
"Oncology Rooming Note    October 18, 2021 9:57 AM   Colin Baxter is a 76 year old male who presents for:    Chief Complaint   Patient presents with     Oncology Clinic Visit     Follow up Ureter cancer      Initial Vitals: /70   Pulse 67   Temp 97.9  F (36.6  C) (Tympanic)   Resp 20   Ht 1.702 m (5' 7\")   Wt 81.5 kg (179 lb 10.8 oz)   SpO2 99%   BMI 28.14 kg/m   Estimated body mass index is 28.14 kg/m  as calculated from the following:    Height as of this encounter: 1.702 m (5' 7\").    Weight as of this encounter: 81.5 kg (179 lb 10.8 oz). Body surface area is 1.96 meters squared.  No Pain (0) Comment: Data Unavailable   No LMP for male patient.  Allergies reviewed: Yes  Medications reviewed: Yes    Medications: Medication refills not needed today.  Pharmacy name entered into [employer]: [id number] - [id number] - [id number]            "

## 2021-10-18 NOTE — PROGRESS NOTES
Visit Date: 10/18/2021    HISTORY OF PRESENT ILLNESS:  Mr. 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  We had seen the patient in consultation at the request of Dr. Javier Mcnulty and Dr. Shonda Morrell, Dr. Everett Galvez on 02/15/2021.  At that time, Mr. 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  The patient had presented with hematuria, was seen by Dr. Morrell 01/05/2021 who performed a cystoscopy, which revealed a bladder lesion.  Biopsy was negative.  Pathology came back atypical urothelial cells.  She ordered a CT urogram, which was read as an apparent mass in the left ureter measuring 2 cm in maximal transverse diameter.  There was no hydronephrosis.  The patient underwent cystourethroscopy with biopsy.  Pathology was read as high-grade urothelial cell carcinoma, superficial lamina propria invasion.  The patient also had a PET scan, which was negative for metastatic disease.  The patient was seen by Dr. Javier Mcnulty at the , who is scheduled for robotic-assisted laparoscopic left nephroureterectomy.  The patient was having ongoing hematuria, when we saw the patient, as well as dysuria.  He had multiple urinalyses, which have been negative according to the patient including urine cultures from January x2.  There was no family history of bladder cancer.  He did have a personal history of high-grade bladder cancer treated with BCG followed by reinduction BCG in 2016 with GemCis given intravesically x3, completed in 05/2018.  He had been an on and off smoker for at least 25 years. 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  We recommended cisplatin given at a dose 70 mg/m2 on day 1 with 20% dose reduction and gemcitabine given at 1000 mg/m2 on day 1, day 8 of a 21-day cycle x4 cycles, then go on to surgery.  The patient went on to receive 4 cycles.  Course was complicated by neutropenia requiring delay of chemotherapy.  Otherwise, he did complete 4 cycles, then went on to have staging studies, he underwent CT urogram 05/14/2021 and the findings were that there was decrease in size of left uretic mass.  PET scan done earlier in February with decreasing size of mass 2.6 cm to 2.1 cm consistent with positive response.  His hematuria resolved, tolerating chemotherapy relatively well.  The patient subsequently went on to undergo a nephroureterectomy on the left performed by Dr. Javier Mcnulty on 06/16/2020.  Pathology was read as multifocal invasive high-grade urothelial cell carcinoma of the ureter, the largest tumor being 4.2 cm carcinoma invaded periureteric adventitial tissue.  Margins were free of tumor periaortic margin was less than 0.1 cm bladder cuff was 0.2 cm.  The patient was staged pathologic yT3 N0.  The patient felt that we should obtain a PET scan to assess complete response.  PET scan was obtained on 12/08/2020 and the findings were there was no evidence of recurrent residual disease.  Postoperative changes were consistent with left nephroureterectomy lymph node dissection.  We also noted creatinine was elevated.  CT abdomen and pelvis revealed normal-appearing right kidney.  Referred the patient to Dr. Arvizu due to his rising creatinine.  According to the patient did not perform any further workup, he did see Dr. Morrell as well to remove his catheter.  Her plan was to proceed with surveillance cystoscopy in October.  When we saw the patient the  plan was to monitor ,proceed with surveillance.  Since then, adjuvant nivolumab therapy has been approved for invasive urothelial carcinoma based on the [place] Journal of Medicine article by MAXIMO Suazo in which the patient did receive nivolumab after surgical resection of invasive urothelial carcinoma with a T2 or greater lesion postoperatively did benefit from adjuvant nivolumab therapy received 480 mg q.  28 days x1 year.  There was improvement in median survival free from recurrence of 22.9 months versus 13.7 months in the patients given placebo percent presence of patients who are alive and free of disease according to 17.9% with nivolumab group at 7.2% with placebo group.  The conclusion was that the patient with risk muscle invasive urothelial carcinoma had undergone radical surgery disease-free survival along with adjuvant nivolumab and then will proceed with the intention to treat population regardless of PD-L1 expression.  The patient is now here to discuss adjuvant nivolumab.  Interim cystoscopy recently and there were some abnormal lesions noted by Dr. Morrell, cytology was suspicious for high-grade urothelial carcinoma.  Nonetheless, the patient is scheduled to have cystoscopy with biopsy of these lesions on 11/05/2021.  He would like to initiate durvalumab afterwards.  Otherwise, the patient denies any fevers, night sweats, weight loss, hematuria, abdominal pain.  Overall, he is doing well.    PHYSICAL EXAMINATION:    GENERAL:  He is an elderly white male in no acute distress.  VITAL SIGNS:  Reveal blood pressure 110/70, pulse 67, respirations 20, temperature 97.9.  HEENT:  Atraumatic, normocephalic.  Oropharynx nonerythematous.  NECK:  Supple.  LUNGS:  Clear to auscultation and percussion.  HEART:  Regular rhythm.  S1, S2 normal.  ABDOMEN:  Soft, normoactive bowel sounds, nondistended.  LYMPHATICS: No cervical, supraclavicular, axillary or inguinal nodes.  EXTREMITIES:  No edema.  NEUROLOGIC:   Nonfocal.    LABORATORY DATA:  Reveal CBC -- white count 5.3, H and H of 11.1 and 33.3, platelet count is 202.  BUN 36, creatinine 2.42.  LDH is 187.    IMPRESSION:    1.  High-grade urothelial cell carcinoma of the left ureter with 2 cm mass invading the lamina propria.  Patient went on to neoadjuvant chemotherapy with cisplatin and gemcitabine.  Cisplatin dose reduced by 20%, cisplatin was given at a dose 70 mg/m2 on day 1, gemcitabine 1000 mg/m2 on day 1, day 8 of a 21-day cycle, completed 4 cycles, then went on to nephroureterectomy was found to have a pathologic T3 N0 high-grade urothelial cell carcinoma.  PET scan was negative.  The patient would be a candidate for adjuvant nivolumab based on the recent FDA approval for patients with high-grade muscle invasive urothelial cell carcinoma, which has shown improved disease-free survival in these patients.  We therefore recommended nivolumab 480 mg IV every 28 days, x12 doses for 1 year.  2.  History of  T1 bladder cancer as per Dr. Morrell.  The patient will have a biopsy, nivolumab may help these lesions as well.  Nonetheless, we will defer to Dr. Morrell in terms of further treatment of this disease.  Once biopsy is obtained, we will start nivolumab on 2021.    Ninety minutes were spent on this patient.  Time was spent reviewing the literature on treatment of muscle invasive bladder cancer in the adjuvant setting.  We reviewed the literature including [place] Journal of Medicine article performing a history and physical, ordering the nivolumab therapy ordered followup surveillance.  The patient was still likely need a CT chest, abdomen and pelvis in 3 months.    Jhony Bartlett MD        D: 10/18/2021   T: 10/18/2021   MT: [id number]    Name:     CLARISSA COSTA  MRN:      [id number]        Account:    [id number]   :      1945           Visit Date: 10/18/2021     Document: [id number]    cc:  MD Javier King MD    Luis Tran M.D.

## 2021-10-20 DIAGNOSIS — C66.2 MALIGNANT NEOPLASM OF LEFT URETER (H): Primary | ICD-10-CM

## 2021-10-20 RX ORDER — LORAZEPAM 2 MG/ML
0.5 INJECTION INTRAMUSCULAR EVERY 4 HOURS PRN
Status: CANCELLED | OUTPATIENT
Start: 2021-11-10

## 2021-10-20 RX ORDER — METHYLPREDNISOLONE SODIUM SUCCINATE 125 MG/2ML
125 INJECTION, POWDER, LYOPHILIZED, FOR SOLUTION INTRAMUSCULAR; INTRAVENOUS
Status: CANCELLED
Start: 2021-11-10

## 2021-10-20 RX ORDER — HEPARIN SODIUM (PORCINE) LOCK FLUSH IV SOLN 100 UNIT/ML 100 UNIT/ML
5 SOLUTION INTRAVENOUS
Status: CANCELLED | OUTPATIENT
Start: 2021-11-10

## 2021-10-20 RX ORDER — EPINEPHRINE 1 MG/ML
0.3 INJECTION, SOLUTION, CONCENTRATE INTRAVENOUS EVERY 5 MIN PRN
Status: CANCELLED | OUTPATIENT
Start: 2021-11-10

## 2021-10-20 RX ORDER — MEPERIDINE HYDROCHLORIDE 25 MG/ML
25 INJECTION INTRAMUSCULAR; INTRAVENOUS; SUBCUTANEOUS EVERY 30 MIN PRN
Status: CANCELLED | OUTPATIENT
Start: 2021-11-10

## 2021-10-20 RX ORDER — ALBUTEROL SULFATE 90 UG/1
1-2 AEROSOL, METERED RESPIRATORY (INHALATION)
Status: CANCELLED
Start: 2021-11-10

## 2021-10-20 RX ORDER — NALOXONE HYDROCHLORIDE 0.4 MG/ML
0.2 INJECTION, SOLUTION INTRAMUSCULAR; INTRAVENOUS; SUBCUTANEOUS
Status: CANCELLED | OUTPATIENT
Start: 2021-11-10

## 2021-10-20 RX ORDER — DIPHENHYDRAMINE HYDROCHLORIDE 50 MG/ML
50 INJECTION INTRAMUSCULAR; INTRAVENOUS
Status: CANCELLED
Start: 2021-11-10

## 2021-10-20 RX ORDER — HEPARIN SODIUM,PORCINE 10 UNIT/ML
5 VIAL (ML) INTRAVENOUS
Status: CANCELLED | OUTPATIENT
Start: 2021-11-10

## 2021-10-20 RX ORDER — ALBUTEROL SULFATE 0.83 MG/ML
2.5 SOLUTION RESPIRATORY (INHALATION)
Status: CANCELLED | OUTPATIENT
Start: 2021-11-10

## 2021-10-20 NOTE — PATIENT INSTRUCTIONS

## 2021-10-20 NOTE — H&P (VIEW-ONLY)
[facility] [place]  [address] [facility] [address]  Phone: [phone]  Primary Provider: Luis Tran  Pre-op Performing Provider: MAGALY HERNANDEZ      PREOPERATIVE EVALUATION:  Today's date: 10/22/2021    Colin Baxter is a 76 year old male who presents for a preoperative evaluation.    Surgical Information:  Surgery/Procedure: CYSTOSCOPY, WITH RETROGRADE PYELOGRAM, CYSTOSCOPY, WITH BLADDER BIOPSY  [place] [facility] [place]geon: Dr. Morrell  Surgery Date: 11/05/2021  Time of Surgery: 09:00 am  Where patient plans to recover: At home alone  Fax number for surgical facility: Note does not need to be faxed, will be available electronically in Epic.    Type of Anesthesia Anticipated: Combined Mac with Topical    Assessment & Plan     The proposed surgical procedure is considered INTERMEDIATE risk.    Preop general physical exam  No concerning exam or EKG findings. 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 Cr today 2.67     Risks and Recommendations:  The patient has the following additional risks and recommendations for perioperative complications:   - No identified additional risk factors other than previously addressed    Medication Instructions:  Patient is to take all scheduled medications on the day of surgery    RECOMMENDATION:  Colin is optimized to proceed with proposed procedure, without further diagnostic  evaluation.    Subjective     HPI related to upcoming procedure: Colin has a history of bladder cancer and left ureteral cancer. He underwent cystoscopy and multiple lesions were noted and concern for carcinoma in situ. He will be undergoing the above procedure.      Preop Questions 10/22/2021   1. Have you ever had a heart attack or stroke? No   2. Have you ever had surgery on your heart or blood vessels, such as a stent placement, a coronary artery bypass, or surgery on an artery in your head, neck, heart, or legs? No   3. Do you have chest pain with activity? No   4. Do you have a history of  heart failure? No   5. Do you currently have a cold, bronchitis or symptoms of other infection? No   6. Do you have a cough, shortness of breath, or wheezing? No   7. Do you or anyone in your family have previous history of blood clots? No   8. Do you or does anyone in your family have a serious bleeding problem such as prolonged bleeding following surgeries or cuts? No   9. Have you ever had problems with anemia or been told to take iron pills? No   10. Have you had any abnormal blood loss such as black, tarry or bloody stools? No   11. Have you ever had a blood transfusion? No   11a. Have you ever had a transfusion reaction? -   12. Are you willing to have a blood transfusion if it is medically needed before, during, or after your surgery? Yes   13. Have you or any of your relatives ever had problems with anesthesia? No   14. Do you have sleep apnea, excessive snoring or daytime drowsiness? No   15. Do you have any artifical heart valves or other implanted medical devices like a pacemaker, defibrillator, or continuous glucose monitor? No   16. Do you have artificial joints? No   17. Are you allergic to latex? No     Health Care Directive:  Patient has a Health Care Directive on file      Preoperative Review of :   reviewed - controlled substances reflected in medication list.      Status of Chronic  Conditions:  DIABETES - Patient has a longstanding history of DiabetesType Type II . Patient is being treated with none and denies significant side effects. Control has been good. Complicating factors include but are not limited to: hyperlipidemia.   A1c (8/19/2021): 5.8  No medications, previously on metformin, stopped in August    HYPERLIPIDEMIA - Patient has a long history of significant Hyperlipidemia requiring medication for treatment with recent good control. Patient reports no problems or side effects with the medication.   LDL (10/2/2020): 75  - atorvastatin 40mg     HYPERTENSION - Patient has longstanding history of HTN , currently denies any symptoms referable to elevated blood pressure. Specifically denies chest pain, palpitations, dyspnea, orthopnea, PND or peripheral edema. Blood pressure readings have been in normal range. Current medication regimen is as listed below. Patient denies any side effects of medication.   - lisinopril 10mg, stopped by another provider    # CKD: stable    # Cardiovascular: able to walk up a flight of stairs w/o cardiac or pulmonary issues    # Pulmonary: former smoker, quit 1992    # Bleeding/Clotting risk: no personal or family h/o bleeding or clotting issues    # Previous surgical history: no issues with anesthesia      Review of Systems   Constitutional: Negative for chills and fever.   HENT: Negative for congestion.    Respiratory: Negative for shortness of breath.    Cardiovascular: Negative for chest pain and palpitations.   Gastrointestinal: Negative for abdominal pain.   Genitourinary: Positive for difficulty urinating.        Nocturia    Psychiatric/Behavioral: Positive for sleep disturbance (d/t nocutria ).         Patient Active Problem List    Diagnosis Date Noted     CKD (chronic kidney disease) stage 4, GFR 15-29 ml/min (H) 10/21/2021     Priority: Medium     Ureteral mass 02/02/2021     Priority: Medium     Added automatically from request for surgery  [id number]       Ureter cancer (H) 01/13/2021     Priority: Medium     Added automatically from request for surgery [id number]       Type 2 diabetes mellitus with hyperglycemia, without long-term current use of insulin (H) 12/11/2020     Priority: Medium     Type 2 diabetes mellitus without complication, without long-term current use of insulin (H) 07/30/2019     Priority: Medium     History of bladder cancer 02/27/2019     Priority: Medium     Obesity (BMI 35.0-39.9) with comorbidity (H) 09/26/2018     Priority: Medium     ACP (advance care planning) 05/04/2016     Priority: Medium     Advance Care Planning 5/4/2016: ACP Review of Chart / Resources Provided:  Reviewed chart for advance care plan.  Colin Baxter has been provided information and resources to begin or update their advance care plan.  Added by Inés Malloy             History of high risk bladder cancer 09/01/2015     Priority: Medium     Comprehensive Medical Examination 07/06/2015     Priority: Medium     Glaucoma 07/06/2015     Priority: Medium     Hyperlipidemia 08/13/2008     Priority: Medium     Formatting of this note might be different from the original.  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) 12/11/2020     Past Surgical History:   Procedure Laterality Date     APPENDECTOMY  1958     COLONOSCOPY  2-     COMBINED CYSTOSCOPY, RETROGRADES, URETEROSCOPY, INSERT STENT Left 1/18/2021    Procedure: CYSTOURETEROSCOPY, WITH RETROGRADE PYELOGRAM with interpretation of fluroscopy, ureteroscopy, ureteral biopsy, bladder biopsy and fulgaration;  Surgeon: Shonda Morrell MD;  Location: [facility]     CYSTOSCOPY N/A 6/16/2021    Procedure: CYSTOSCOPY;  Surgeon: Javier Mcnulty MD;  Location: [facility] OR     CYSTOSCOPY, BIOPSY BLADDER, COMBINED N/A 9/8/2015    Procedure:  COMBINED CYSTOSCOPY, BIOPSY BLADDER;  Surgeon: Randy Martinez MD;  Location: [facility]     CYSTOSCOPY, BIOPSY BLADDER, COMBINED N/A 2/14/2017    Procedure: COMBINED CYSTOSCOPY, BIOPSY BLADDER;  Surgeon: Randy Martinez MD;  Location: [facility]     CYSTOSCOPY, BIOPSY BLADDER, COMBINED N/A 11/13/2018    Procedure: COMBINED CYSTOSCOPY, BIOPSY BLADDER;  Surgeon: Ed Helm MD;  Location: [facility]     CYSTOSCOPY, RETROGRADES, COMBINED Bilateral 11/13/2018    Procedure: Bilateral Retrograde Pyelagram, Bladder Biopsy;  Surgeon: Ed Helm MD;  Location: [facility] OR     CYSTOSCOPY, RETROGRADES, INSERT STENT URETER(S), COMBINED Left 9/8/2015    Procedure: COMBINED CYSTOSCOPY, RETROGRADES, INSERT STENT URETER(S);  Surgeon: Randy Martinez MD;  Location: [facility]     CYSTOSCOPY, TRANSURETHRAL RESECTION (TUR) TUMOR BLADDER, COMBINED N/A 9/8/2015    Procedure: COMBINED CYSTOSCOPY, TRANSURETHRAL RESECTION (TUR) TUMOR BLADDER;  Surgeon: Randy Martinez MD;  Location: [facility]     CYSTOSCOPY, TRANSURETHRAL RESECTION (TUR) TUMOR BLADDER, COMBINED N/A 1/12/2016    Procedure: COMBINED CYSTOSCOPY, TRANSURETHRAL RESECTION (TUR) TUMOR BLADDER;  Surgeon: Randy Martinez MD;  Location: [facility]     CYSTOSCOPY, TRANSURETHRAL RESECTION (TUR) TUMOR BLADDER, COMBINED N/A 9/13/2016    Procedure: COMBINED CYSTOSCOPY, TRANSURETHRAL RESECTION (TUR) TUMOR BLADDER;  Surgeon: Randy Martinez MD;  Location: [facility]     DAVINCI NEPHROURETERECTOMY Left 6/16/2021    Procedure: NEPHROURETERECTOMY, ROBOT-ASSISTED, lymph node dissection;  Surgeon: Javier Mcnulty MD;  Location: [facility]     PHACOEMULSIFICATION WITH STANDARD INTRAOCULAR LENS IMPLANT Right 10/9/2018    Procedure: PHACOEMULSIFICATION WITH STANDARD INTRAOCULAR LENS IMPLANT;  PHACOEMULSIFICATION CATARACT EXTRACTION POSTERIOR CHAMBER LENS RIGHT;  Surgeon: Marlo Mcconnell MD;  Location: [facility]     PHACOEMULSIFICATION WITH STANDARD INTRAOCULAR LENS  IMPLANT Left 10/23/2018    Procedure: PHACOEMULSIFICATION CATARACT EXTRACTION POSTERIOR CHAMBER LENS LEFT;  Surgeon: Marlo Mcconnell MD;  Location: [facility]     Current Outpatient Medications   Medication Sig Dispense Refill     atorvastatin (LIPITOR) 40 MG tablet Take 1 tablet (40 mg) by mouth daily 90 tablet 0     blood glucose (ONETOUCH VERIO IQ) test strip USE TO TEST BLOOD SUGAR 2 TIMES DAILY OR AS DIRECTED. (Patient taking differently: daily USE TO TEST BLOOD SUGAR 2 TIMES DAILY OR AS DIRECTED.) 100 each 10     brimonidine (ALPHAGAN-P) 0.15 % ophthalmic solution INSTILL 1 DROP INTO RIGHT EYE TWICE DAILY  12     latanoprost (XALATAN) 0.005 % ophthalmic solution Place 1 drop into both eyes At Bedtime       ONETOUCH DELICA LANCETS 33G MISC 1 each 2 times daily 100 each 10     tamsulosin (FLOMAX) 0.4 MG capsule Take 1 capsule (0.4 mg) by mouth daily 90 capsule 1     acetaminophen (TYLENOL) 325 MG tablet Take 2 tablets (650 mg) by mouth every 4 hours as needed for pain (Patient not taking: Reported on 10/18/2021) 100 tablet 0     lisinopril (ZESTRIL) 10 MG tablet Take 1 tablet (10 mg) by mouth daily (HOLD MEDICATION until follow up with primary care provider in 3-5 days) (Patient not taking: Reported on 10/18/2021) 90 tablet 0     [START ON 10/24/2021] prochlorperazine (COMPAZINE) 10 MG tablet Take 1 tablet (10 mg) by mouth every 6 hours as needed (Nausea/Vomiting) (Patient not taking: Reported on 10/22/2021) 30 tablet 2       Allergies   Allergen Reactions     No Clinical Screening - See Comments Other (See Comments)     Beta blocker in glaucoma gtt.s caused low pulse and passing out      Timolol      Beta blocker in glaucoma gtt.s caused low pulse and passing out   - patient thinks it was timolol but not 100% sure which beta blocker eye drop.         Social History     Tobacco Use     Smoking status: Former Smoker     Quit date: 1992     Years since quittin.8     Smokeless tobacco: Never Used   Substance  Use Topics     Alcohol use: Yes     Comment: rarely     Family History   Problem Relation Age of Onset     Diabetes Mother      Parkinsonism Brother      Coronary Artery Disease No family hx of      Hypertension No family hx of      Hyperlipidemia No family hx of      Cerebrovascular Disease No family hx of      Breast Cancer No family hx of      Colon Cancer No family hx of      Prostate Cancer No family hx of      Anesthesia Reaction No family hx of      Asthma No family hx of      Thyroid Disease No family hx of      Genetic Disorder No family hx of      History   Drug Use No         Objective     /78 (BP Location: Right arm, Patient Position: Chair, Cuff Size: Adult Regular)   Pulse 57   Temp 97.6  F (36.4  C) (Tympanic)   Resp 18   Wt 81.3 kg (179 lb 3.2 oz)   SpO2 100%   BMI 28.07 kg/m      Physical Exam  Constitutional:       General: He is not in acute distress.     Appearance: He is well-developed.   HENT:      Head: Normocephalic and atraumatic.      Right Ear: Tympanic membrane normal.      Left Ear: Tympanic membrane normal.      Mouth/Throat:      Mouth: Mucous membranes are moist.      Pharynx: No oropharyngeal exudate.   Eyes:      Extraocular Movements: Extraocular movements intact.      Conjunctiva/sclera: Conjunctivae normal.      Pupils: Pupils are equal, round, and reactive to light.   Neck:      Thyroid: No thyromegaly.   Cardiovascular:      Rate and Rhythm: Normal rate and regular rhythm.      Pulses: Normal pulses.      Heart sounds: No murmur heard.     Pulmonary:      Effort: Pulmonary effort is normal. No respiratory distress.      Breath sounds: No wheezing or rales.   Abdominal:      General: Bowel sounds are normal. There is no distension.      Palpations: Abdomen is soft.      Tenderness: There is no abdominal tenderness. There is no guarding.   Musculoskeletal:         General: Normal range of motion.      Cervical back: Normal range of motion and neck supple.    Lymphadenopathy:      Cervical: No cervical adenopathy.   Skin:     General: Skin is warm and dry.   Neurological:      Mental Status: He is alert.      Deep Tendon Reflexes: Reflexes normal.   Psychiatric:         Mood and Affect: Mood normal.           Recent Labs   Lab Test 10/13/21  0847 09/13/21  0909 09/02/21  1217 08/19/21  1058 02/15/21  1310 01/14/21  1113   HGB 11.1* 10.4*   < >  --    < > 13.5    202   < >  --    < > 290    139   < > 139   < >  --    POTASSIUM 4.0 4.2   < > 5.1   < >  --    CR 2.42* 2.36*   < > 2.52*   < >  --    A1C  --   --   --  5.8*  --  5.7*    < > = values in this interval not displayed.        Diagnostics:  Recent Results (from the past 24 hour(s))   Basic metabolic panel    Collection Time: 10/22/21  7:50 AM   Result Value Ref Range    Sodium 138 133 - 144 mmol/L    Potassium 4.2 3.4 - 5.3 mmol/L    Chloride 110 (H) 94 - 109 mmol/L    Carbon Dioxide (CO2) 24 20 - 32 mmol/L    Anion Gap 4 3 - 14 mmol/L    Urea Nitrogen 38 (H) 7 - 30 mg/dL    Creatinine 2.67 (H) 0.66 - 1.25 mg/dL    Calcium 8.8 8.5 - 10.1 mg/dL    Glucose 115 (H) 70 - 99 mg/dL    GFR Estimate 23 (L) >60 mL/min/1.73m2   CBC with platelets    Collection Time: 10/22/21  7:50 AM   Result Value Ref Range    WBC Count 5.5 4.0 - 11.0 10e3/uL    RBC Count 3.63 (L) 4.40 - 5.90 10e6/uL    Hemoglobin 11.4 (L) 13.3 - 17.7 g/dL    Hematocrit 34.4 (L) 40.0 - 53.0 %    MCV 95 78 - 100 fL    MCH 31.4 26.5 - 33.0 pg    MCHC 33.1 31.5 - 36.5 g/dL    RDW 13.2 10.0 - 15.0 %    Platelet Count 207 150 - 450 10e3/uL      EKG (10/22/2021): sinus bradycardia, normal axis, normal intervals, no acute ST/T changes c/w ischemia, no LVH by voltage criteria, unchanged from previous tracings (1/14/2021)    Revised Cardiac Risk Index (RCRI):  The patient has the following serious cardiovascular risks for perioperative complications:   - No serious cardiac risks = 0 points     RCRI Interpretation: 0 points: Class I (very low risk -  0.4% complication rate)      Signed Electronically by: Libia Wallace MD  Copy of this evaluation report is provided to requesting physician.

## 2021-10-20 NOTE — PROGRESS NOTES
[facility] - [place]  [address] [address]  Phone: [phone]  Primary Provider: Luis Tran  Pre-op Performing Provider: MAGALY HERNANDEZ      PREOPERATIVE EVALUATION:  Today's date: 10/22/2021    Colin Baxter is a 76 year old male who presents for a preoperative evaluation.    Surgical Information:  Surgery/Procedure: CYSTOSCOPY, WITH RETROGRADE PYELOGRAM, CYSTOSCOPY, WITH BLADDER BIOPSY  [place] [facility] [place]: Dr. Morrell  Surgery Date: 11/05/2021  Time of Surgery: 09:00 am  Where patient plans to recover: At home alone  Fax number for surgical facility: Note does not need to be faxed, will be available electronically in Epic.    Type of Anesthesia Anticipated: Combined Mac with Topical    Assessment & Plan     The proposed surgical procedure is considered INTERMEDIATE risk.    Preop general physical exam  No concerning exam or EKG findings. 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 Cr today 2.67     Risks and Recommendations:  The patient has the following additional risks and recommendations for perioperative complications:   - No identified additional risk factors other than previously addressed    Medication Instructions:  Patient is to take all scheduled medications on the day of surgery    RECOMMENDATION:  Colin is optimized to proceed with proposed procedure, without further diagnostic  evaluation.    Subjective     HPI related to upcoming procedure: Colin has a history of bladder cancer and left ureteral cancer. He underwent cystoscopy and multiple lesions were noted and concern for carcinoma in situ. He will be undergoing the above procedure.      Preop Questions 10/22/2021   1. Have you ever had a heart attack or stroke? No   2. Have you ever had surgery on your heart or blood vessels, such as a stent placement, a coronary artery bypass, or surgery on an artery in your head, neck, heart, or legs? No   3. Do you have chest pain with activity? No   4. Do you have a history of  heart failure? No   5. Do you currently have a cold, bronchitis or symptoms of other infection? No   6. Do you have a cough, shortness of breath, or wheezing? No   7. Do you or anyone in your family have previous history of blood clots? No   8. Do you or does anyone in your family have a serious bleeding problem such as prolonged bleeding following surgeries or cuts? No   9. Have you ever had problems with anemia or been told to take iron pills? No   10. Have you had any abnormal blood loss such as black, tarry or bloody stools? No   11. Have you ever had a blood transfusion? No   11a. Have you ever had a transfusion reaction? -   12. Are you willing to have a blood transfusion if it is medically needed before, during, or after your surgery? Yes   13. Have you or any of your relatives ever had problems with anesthesia? No   14. Do you have sleep apnea, excessive snoring or daytime drowsiness? No   15. Do you have any artifical heart valves or other implanted medical devices like a pacemaker, defibrillator, or continuous glucose monitor? No   16. Do you have artificial joints? No   17. Are you allergic to latex? No     Health Care Directive:  Patient has a Health Care Directive on file      Preoperative Review of :   reviewed - controlled substances reflected in medication list.      Status of Chronic  Conditions:  DIABETES - Patient has a longstanding history of DiabetesType Type II . Patient is being treated with none and denies significant side effects. Control has been good. Complicating factors include but are not limited to: hyperlipidemia.   A1c (8/19/2021): 5.8  No medications, previously on metformin, stopped in August    HYPERLIPIDEMIA - Patient has a long history of significant Hyperlipidemia requiring medication for treatment with recent good control. Patient reports no problems or side effects with the medication.   LDL (10/2/2020): 75  - atorvastatin 40mg     HYPERTENSION - Patient has longstanding history of HTN , currently denies any symptoms referable to elevated blood pressure. Specifically denies chest pain, palpitations, dyspnea, orthopnea, PND or peripheral edema. Blood pressure readings have been in normal range. Current medication regimen is as listed below. Patient denies any side effects of medication.   - lisinopril 10mg, stopped by another provider    # CKD: stable    # Cardiovascular: able to walk up a flight of stairs w/o cardiac or pulmonary issues    # Pulmonary: former smoker, quit 1992    # Bleeding/Clotting risk: no personal or family h/o bleeding or clotting issues    # Previous surgical history: no issues with anesthesia      Review of Systems   Constitutional: Negative for chills and fever.   HENT: Negative for congestion.    Respiratory: Negative for shortness of breath.    Cardiovascular: Negative for chest pain and palpitations.   Gastrointestinal: Negative for abdominal pain.   Genitourinary: Positive for difficulty urinating.        Nocturia    Psychiatric/Behavioral: Positive for sleep disturbance (d/t nocutria ).         Patient Active Problem List    Diagnosis Date Noted     CKD (chronic kidney disease) stage 4, GFR 15-29 ml/min (H) 10/21/2021     Priority: Medium     Ureteral mass 02/02/2021     Priority: Medium     Added automatically from request for surgery  [id number]       Ureter cancer (H) 01/13/2021     Priority: Medium     Added automatically from request for surgery [id number]       Type 2 diabetes mellitus with hyperglycemia, without long-term current use of insulin (H) 12/11/2020     Priority: Medium     Type 2 diabetes mellitus without complication, without long-term current use of insulin (H) 07/30/2019     Priority: Medium     History of bladder cancer 02/27/2019     Priority: Medium     Obesity (BMI 35.0-39.9) with comorbidity (H) 09/26/2018     Priority: Medium     ACP (advance care planning) 05/04/2016     Priority: Medium     Advance Care Planning 5/4/2016: ACP Review of Chart / Resources Provided:  Reviewed chart for advance care plan.  Colin Baxter has been provided information and resources to begin or update their advance care plan.  Added by Inés Malloy             History of high risk bladder cancer 09/01/2015     Priority: Medium     Comprehensive Medical Examination 07/06/2015     Priority: Medium     Glaucoma 07/06/2015     Priority: Medium     Hyperlipidemia 08/13/2008     Priority: Medium     Formatting of this note might be different from the original.  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) 12/11/2020     Past Surgical History:   Procedure Laterality Date     APPENDECTOMY  1958     COLONOSCOPY  2-     COMBINED CYSTOSCOPY, RETROGRADES, URETEROSCOPY, INSERT STENT Left 1/18/2021    Procedure: CYSTOURETEROSCOPY, WITH RETROGRADE PYELOGRAM with interpretation of fluroscopy, ureteroscopy, ureteral biopsy, bladder biopsy and fulgaration;  Surgeon: Shonda Morrell MD;  Location: [facility]     CYSTOSCOPY N/A 6/16/2021    Procedure: CYSTOSCOPY;  Surgeon: Javier Mcnulty MD;  Location: [facility]     CYSTOSCOPY, BIOPSY BLADDER, COMBINED N/A 9/8/2015    Procedure:  COMBINED CYSTOSCOPY, BIOPSY BLADDER;  Surgeon: Randy Martinez MD;  Location: [facility]     CYSTOSCOPY, BIOPSY BLADDER, COMBINED N/A 2/14/2017    Procedure: COMBINED CYSTOSCOPY, BIOPSY BLADDER;  Surgeon: Randy Martinez MD;  Location: [facility]     CYSTOSCOPY, BIOPSY BLADDER, COMBINED N/A 11/13/2018    Procedure: COMBINED CYSTOSCOPY, BIOPSY BLADDER;  Surgeon: Ed Helm MD;  Location: [facility]     CYSTOSCOPY, RETROGRADES, COMBINED Bilateral 11/13/2018    Procedure: Bilateral Retrograde Pyelagram, Bladder Biopsy;  Surgeon: Ed Helm MD;  Location: [facility]     CYSTOSCOPY, RETROGRADES, INSERT STENT URETER(S), COMBINED Left 9/8/2015    Procedure: COMBINED CYSTOSCOPY, RETROGRADES, INSERT STENT URETER(S);  Surgeon: Randy Martinez MD;  Location: [facility]     CYSTOSCOPY, TRANSURETHRAL RESECTION (TUR) TUMOR BLADDER, COMBINED N/A 9/8/2015    Procedure: COMBINED CYSTOSCOPY, TRANSURETHRAL RESECTION (TUR) TUMOR BLADDER;  Surgeon: Randy Martinez MD;  Location: [facility]     CYSTOSCOPY, TRANSURETHRAL RESECTION (TUR) TUMOR BLADDER, COMBINED N/A 1/12/2016    Procedure: COMBINED CYSTOSCOPY, TRANSURETHRAL RESECTION (TUR) TUMOR BLADDER;  Surgeon: Randy Martinez MD;  Location: [facility]     CYSTOSCOPY, TRANSURETHRAL RESECTION (TUR) TUMOR BLADDER, COMBINED N/A 9/13/2016    Procedure: COMBINED CYSTOSCOPY, TRANSURETHRAL RESECTION (TUR) TUMOR BLADDER;  Surgeon: Randy Martinez MD;  Location: [facility]     DAVINCI NEPHROURETERECTOMY Left 6/16/2021    Procedure: NEPHROURETERECTOMY, ROBOT-ASSISTED, lymph node dissection;  Surgeon: Javier Mcnulty MD;  Location: [facility]     PHACOEMULSIFICATION WITH STANDARD INTRAOCULAR LENS IMPLANT Right 10/9/2018    Procedure: PHACOEMULSIFICATION WITH STANDARD INTRAOCULAR LENS IMPLANT;  PHACOEMULSIFICATION CATARACT EXTRACTION POSTERIOR CHAMBER LENS RIGHT;  Surgeon: Marlo Mcconnell MD;  Location: [facility]     PHACOEMULSIFICATION WITH STANDARD INTRAOCULAR LENS  IMPLANT Left 10/23/2018    Procedure: PHACOEMULSIFICATION CATARACT EXTRACTION POSTERIOR CHAMBER LENS LEFT;  Surgeon: Marlo Mcconnell MD;  Location: [facility]     Current Outpatient Medications   Medication Sig Dispense Refill     atorvastatin (LIPITOR) 40 MG tablet Take 1 tablet (40 mg) by mouth daily 90 tablet 0     blood glucose (ONETOUCH VERIO IQ) test strip USE TO TEST BLOOD SUGAR 2 TIMES DAILY OR AS DIRECTED. (Patient taking differently: daily USE TO TEST BLOOD SUGAR 2 TIMES DAILY OR AS DIRECTED.) 100 each 10     brimonidine (ALPHAGAN-P) 0.15 % ophthalmic solution INSTILL 1 DROP INTO RIGHT EYE TWICE DAILY  12     latanoprost (XALATAN) 0.005 % ophthalmic solution Place 1 drop into both eyes At Bedtime       ONETOUCH DELICA LANCETS 33G MISC 1 each 2 times daily 100 each 10     tamsulosin (FLOMAX) 0.4 MG capsule Take 1 capsule (0.4 mg) by mouth daily 90 capsule 1     acetaminophen (TYLENOL) 325 MG tablet Take 2 tablets (650 mg) by mouth every 4 hours as needed for pain (Patient not taking: Reported on 10/18/2021) 100 tablet 0     lisinopril (ZESTRIL) 10 MG tablet Take 1 tablet (10 mg) by mouth daily (HOLD MEDICATION until follow up with primary care provider in 3-5 days) (Patient not taking: Reported on 10/18/2021) 90 tablet 0     [START ON 10/24/2021] prochlorperazine (COMPAZINE) 10 MG tablet Take 1 tablet (10 mg) by mouth every 6 hours as needed (Nausea/Vomiting) (Patient not taking: Reported on 10/22/2021) 30 tablet 2       Allergies   Allergen Reactions     No Clinical Screening - See Comments Other (See Comments)     Beta blocker in glaucoma gtt.s caused low pulse and passing out      Timolol      Beta blocker in glaucoma gtt.s caused low pulse and passing out   - patient thinks it was timolol but not 100% sure which beta blocker eye drop.         Social History     Tobacco Use     Smoking status: Former Smoker     Quit date: 1992     Years since quittin.8     Smokeless tobacco: Never Used   Substance  Use Topics     Alcohol use: Yes     Comment: rarely     Family History   Problem Relation Age of Onset     Diabetes Mother      Parkinsonism Brother      Coronary Artery Disease No family hx of      Hypertension No family hx of      Hyperlipidemia No family hx of      Cerebrovascular Disease No family hx of      Breast Cancer No family hx of      Colon Cancer No family hx of      Prostate Cancer No family hx of      Anesthesia Reaction No family hx of      Asthma No family hx of      Thyroid Disease No family hx of      Genetic Disorder No family hx of      History   Drug Use No         Objective     /78 (BP Location: Right arm, Patient Position: Chair, Cuff Size: Adult Regular)   Pulse 57   Temp 97.6  F (36.4  C) (Tympanic)   Resp 18   Wt 81.3 kg (179 lb 3.2 oz)   SpO2 100%   BMI 28.07 kg/m      Physical Exam  Constitutional:       General: He is not in acute distress.     Appearance: He is well-developed.   HENT:      Head: Normocephalic and atraumatic.      Right Ear: Tympanic membrane normal.      Left Ear: Tympanic membrane normal.      Mouth/Throat:      Mouth: Mucous membranes are moist.      Pharynx: No oropharyngeal exudate.   Eyes:      Extraocular Movements: Extraocular movements intact.      Conjunctiva/sclera: Conjunctivae normal.      Pupils: Pupils are equal, round, and reactive to light.   Neck:      Thyroid: No thyromegaly.   Cardiovascular:      Rate and Rhythm: Normal rate and regular rhythm.      Pulses: Normal pulses.      Heart sounds: No murmur heard.     Pulmonary:      Effort: Pulmonary effort is normal. No respiratory distress.      Breath sounds: No wheezing or rales.   Abdominal:      General: Bowel sounds are normal. There is no distension.      Palpations: Abdomen is soft.      Tenderness: There is no abdominal tenderness. There is no guarding.   Musculoskeletal:         General: Normal range of motion.      Cervical back: Normal range of motion and neck supple.    Lymphadenopathy:      Cervical: No cervical adenopathy.   Skin:     General: Skin is warm and dry.   Neurological:      Mental Status: He is alert.      Deep Tendon Reflexes: Reflexes normal.   Psychiatric:         Mood and Affect: Mood normal.           Recent Labs   Lab Test 10/13/21  0847 09/13/21  0909 09/02/21  1217 08/19/21  1058 02/15/21  1310 01/14/21  1113   HGB 11.1* 10.4*   < >  --    < > 13.5    202   < >  --    < > 290    139   < > 139   < >  --    POTASSIUM 4.0 4.2   < > 5.1   < >  --    CR 2.42* 2.36*   < > 2.52*   < >  --    A1C  --   --   --  5.8*  --  5.7*    < > = values in this interval not displayed.        Diagnostics:  Recent Results (from the past 24 hour(s))   Basic metabolic panel    Collection Time: 10/22/21  7:50 AM   Result Value Ref Range    Sodium 138 133 - 144 mmol/L    Potassium 4.2 3.4 - 5.3 mmol/L    Chloride 110 (H) 94 - 109 mmol/L    Carbon Dioxide (CO2) 24 20 - 32 mmol/L    Anion Gap 4 3 - 14 mmol/L    Urea Nitrogen 38 (H) 7 - 30 mg/dL    Creatinine 2.67 (H) 0.66 - 1.25 mg/dL    Calcium 8.8 8.5 - 10.1 mg/dL    Glucose 115 (H) 70 - 99 mg/dL    GFR Estimate 23 (L) >60 mL/min/1.73m2   CBC with platelets    Collection Time: 10/22/21  7:50 AM   Result Value Ref Range    WBC Count 5.5 4.0 - 11.0 10e3/uL    RBC Count 3.63 (L) 4.40 - 5.90 10e6/uL    Hemoglobin 11.4 (L) 13.3 - 17.7 g/dL    Hematocrit 34.4 (L) 40.0 - 53.0 %    MCV 95 78 - 100 fL    MCH 31.4 26.5 - 33.0 pg    MCHC 33.1 31.5 - 36.5 g/dL    RDW 13.2 10.0 - 15.0 %    Platelet Count 207 150 - 450 10e3/uL      EKG (10/22/2021): sinus bradycardia, normal axis, normal intervals, no acute ST/T changes c/w ischemia, no LVH by voltage criteria, unchanged from previous tracings (1/14/2021)    Revised Cardiac Risk Index (RCRI):  The patient has the following serious cardiovascular risks for perioperative complications:   - No serious cardiac risks = 0 points     RCRI Interpretation: 0 points: Class I (very low risk -  0.4% complication rate)      Signed Electronically by: Libia Wallace MD  Copy of this evaluation report is provided to requesting physician.

## 2021-10-21 ENCOUNTER — TRANSFERRED RECORDS (OUTPATIENT)
Dept: HEALTH INFORMATION MANAGEMENT | Facility: CLINIC | Age: 76
End: 2021-10-21

## 2021-10-21 PROBLEM — N18.4 CKD (CHRONIC KIDNEY DISEASE) STAGE 4, GFR 15-29 ML/MIN (H): Status: ACTIVE | Noted: 2021-10-21

## 2021-10-22 ENCOUNTER — APPOINTMENT (OUTPATIENT)
Dept: GENERAL RADIOLOGY | Facility: OTHER | Age: 76
End: 2021-10-22
Attending: FAMILY MEDICINE
Payer: COMMERCIAL

## 2021-10-22 ENCOUNTER — OFFICE VISIT (OUTPATIENT)
Dept: FAMILY MEDICINE | Facility: OTHER | Age: 76
End: 2021-10-22
Attending: FAMILY MEDICINE
Payer: COMMERCIAL

## 2021-10-22 ENCOUNTER — APPOINTMENT (OUTPATIENT)
Dept: LAB | Facility: OTHER | Age: 76
End: 2021-10-22
Payer: COMMERCIAL

## 2021-10-22 VITALS
WEIGHT: 179.2 LBS | TEMPERATURE: 97.6 F | RESPIRATION RATE: 18 BRPM | OXYGEN SATURATION: 100 % | DIASTOLIC BLOOD PRESSURE: 78 MMHG | HEART RATE: 57 BPM | BODY MASS INDEX: 28.07 KG/M2 | SYSTOLIC BLOOD PRESSURE: 123 MMHG

## 2021-10-22 DIAGNOSIS — C66.2 MALIGNANT NEOPLASM OF LEFT URETER (H): ICD-10-CM

## 2021-10-22 DIAGNOSIS — N18.4 CKD (CHRONIC KIDNEY DISEASE) STAGE 4, GFR 15-29 ML/MIN (H): ICD-10-CM

## 2021-10-22 DIAGNOSIS — E78.5 HYPERLIPIDEMIA, UNSPECIFIED HYPERLIPIDEMIA TYPE: ICD-10-CM

## 2021-10-22 DIAGNOSIS — C67.0 MALIGNANT NEOPLASM OF TRIGONE OF URINARY BLADDER (H): ICD-10-CM

## 2021-10-22 DIAGNOSIS — C67.3 MALIGNANT NEOPLASM OF ANTERIOR WALL OF URINARY BLADDER (H): ICD-10-CM

## 2021-10-22 DIAGNOSIS — Z01.818 PREOP GENERAL PHYSICAL EXAM: Primary | ICD-10-CM

## 2021-10-22 DIAGNOSIS — E11.65 TYPE 2 DIABETES MELLITUS WITH HYPERGLYCEMIA, WITHOUT LONG-TERM CURRENT USE OF INSULIN (H): ICD-10-CM

## 2021-10-22 LAB
ANION GAP SERPL CALCULATED.3IONS-SCNC: 4 MMOL/L (ref 3–14)
BUN SERPL-MCNC: 38 MG/DL (ref 7–30)
CALCIUM SERPL-MCNC: 8.8 MG/DL (ref 8.5–10.1)
CHLORIDE BLD-SCNC: 110 MMOL/L (ref 94–109)
CO2 SERPL-SCNC: 24 MMOL/L (ref 20–32)
CREAT SERPL-MCNC: 2.67 MG/DL (ref 0.66–1.25)
ERYTHROCYTE [DISTWIDTH] IN BLOOD BY AUTOMATED COUNT: 13.2 % (ref 10–15)
GFR SERPL CREATININE-BSD FRML MDRD: 23 ML/MIN/1.73M2
GLUCOSE BLD-MCNC: 115 MG/DL (ref 70–99)
HCT VFR BLD AUTO: 34.4 % (ref 40–53)
HGB BLD-MCNC: 11.4 G/DL (ref 13.3–17.7)
MCH RBC QN AUTO: 31.4 PG (ref 26.5–33)
MCHC RBC AUTO-ENTMCNC: 33.1 G/DL (ref 31.5–36.5)
MCV RBC AUTO: 95 FL (ref 78–100)
PLATELET # BLD AUTO: 207 10E3/UL (ref 150–450)
POTASSIUM BLD-SCNC: 4.2 MMOL/L (ref 3.4–5.3)
RBC # BLD AUTO: 3.63 10E6/UL (ref 4.4–5.9)
SODIUM SERPL-SCNC: 138 MMOL/L (ref 133–144)
WBC # BLD AUTO: 5.5 10E3/UL (ref 4–11)

## 2021-10-22 PROCEDURE — 93005 ELECTROCARDIOGRAM TRACING: CPT

## 2021-10-22 PROCEDURE — 93010 ELECTROCARDIOGRAM REPORT: CPT | Mod: 77 | Performed by: INTERNAL MEDICINE

## 2021-10-22 PROCEDURE — 85041 AUTOMATED RBC COUNT: CPT | Mod: ZL | Performed by: FAMILY MEDICINE

## 2021-10-22 PROCEDURE — 80048 BASIC METABOLIC PNL TOTAL CA: CPT | Mod: ZL | Performed by: FAMILY MEDICINE

## 2021-10-22 PROCEDURE — 99214 OFFICE O/P EST MOD 30 MIN: CPT | Performed by: FAMILY MEDICINE

## 2021-10-22 PROCEDURE — 36415 COLL VENOUS BLD VENIPUNCTURE: CPT | Mod: ZL | Performed by: FAMILY MEDICINE

## 2021-10-22 PROCEDURE — G0463 HOSPITAL OUTPT CLINIC VISIT: HCPCS

## 2021-10-22 PROCEDURE — 52000 CYSTOURETHROSCOPY: CPT

## 2021-10-22 ASSESSMENT — PAIN SCALES - GENERAL: PAINLEVEL: NO PAIN (0)

## 2021-10-22 ASSESSMENT — ENCOUNTER SYMPTOMS
FEVER: 0
SLEEP DISTURBANCE: 1
CHILLS: 0
ABDOMINAL PAIN: 0
SHORTNESS OF BREATH: 0
DIFFICULTY URINATING: 1
PALPITATIONS: 0

## 2021-10-22 NOTE — LETTER
October 23, 2021      Colin Baxter  [address] [place] [address]        Dear ,    We are writing to inform you of your test results.    Your test results fall within the expected range(s) or remain unchanged from previous results.  Please continue with current treatment plan.    Resulted Orders   Basic metabolic panel   Result Value Ref Range    Sodium 138 133 - 144 mmol/L    Potassium 4.2 3.4 - 5.3 mmol/L    Chloride 110 (H) 94 - 109 mmol/L    Carbon Dioxide (CO2) 24 20 - 32 mmol/L    Anion Gap 4 3 - 14 mmol/L    Urea Nitrogen 38 (H) 7 - 30 mg/dL    Creatinine 2.67 (H) 0.66 - 1.25 mg/dL    Calcium 8.8 8.5 - 10.1 mg/dL    Glucose 115 (H) 70 - 99 mg/dL    GFR Estimate 23 (L) >60 mL/min/1.73m2      Comment:      As of July 11, 2021, eGFR is calculated by the CKD-EPI creatinine equation, without race adjustment. eGFR can be influenced by muscle mass, exercise, and diet. The reported eGFR is an estimation only and is only applicable if the renal function is stable.   CBC with platelets   Result Value Ref Range    WBC Count 5.5 4.0 - 11.0 10e3/uL    RBC Count 3.63 (L) 4.40 - 5.90 10e6/uL    Hemoglobin 11.4 (L) 13.3 - 17.7 g/dL    Hematocrit 34.4 (L) 40.0 - 53.0 %    MCV 95 78 - 100 fL    MCH 31.4 26.5 - 33.0 pg    MCHC 33.1 31.5 - 36.5 g/dL    RDW 13.2 10.0 - 15.0 %    Platelet Count 207 150 - 450 10e3/uL       If you have any questions or concerns, please call the clinic at the number listed above.       Sincerely,      Libia Wallace MD

## 2021-10-22 NOTE — NURSING NOTE
"Chief Complaint   Patient presents with     Pre-Op Exam       Initial /78 (BP Location: Right arm, Patient Position: Chair, Cuff Size: Adult Regular)   Pulse 57   Temp 97.6  F (36.4  C) (Tympanic)   Resp 18   Wt 81.3 kg (179 lb 3.2 oz)   SpO2 100%   BMI 28.07 kg/m   Estimated body mass index is 28.07 kg/m  as calculated from the following:    Height as of 10/18/21: 1.702 m (5' 7\").    Weight as of this encounter: 81.3 kg (179 lb 3.2 oz).  Medication Reconciliation: complete  Coreen Castro LPN  "

## 2021-10-28 ENCOUNTER — ANESTHESIA EVENT (OUTPATIENT)
Dept: SURGERY | Facility: HOSPITAL | Age: 76
End: 2021-10-28
Payer: COMMERCIAL

## 2021-10-28 ASSESSMENT — LIFESTYLE VARIABLES: TOBACCO_USE: 1

## 2021-10-28 NOTE — ANESTHESIA PREPROCEDURE EVALUATION
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      Past Surgical History:   Procedure Laterality Date     APPENDECTOMY       COLONOSCOPY  2011     COMBINED CYSTOSCOPY, RETROGRADES, URETEROSCOPY, INSERT STENT Left 2021    Procedure: CYSTOURETEROSCOPY, WITH RETROGRADE PYELOGRAM with interpretation of fluroscopy, ureteroscopy, ureteral biopsy, bladder biopsy and fulgaration;  Surgeon: Shonda Morrell MD;  Location: [facility]     CYSTOSCOPY N/A 2021    Procedure: CYSTOSCOPY;  Surgeon: Javier Mcnulty MD;  Location: [facility]     CYSTOSCOPY, BIOPSY BLADDER, COMBINED N/A 2015    Procedure: COMBINED CYSTOSCOPY, BIOPSY BLADDER;  Surgeon: Randy Martinez MD;  Location: [facility]     CYSTOSCOPY, BIOPSY BLADDER, COMBINED N/A 2017    Procedure: COMBINED CYSTOSCOPY, BIOPSY BLADDER;  Surgeon: Randy Martinez MD;  Location: [facility]     CYSTOSCOPY, BIOPSY BLADDER, COMBINED N/A 2018    Procedure: COMBINED CYSTOSCOPY, BIOPSY BLADDER;  Surgeon: Ed Helm MD;  Location: [facility]     CYSTOSCOPY, RETROGRADES, COMBINED Bilateral 2018    Procedure: Bilateral Retrograde Pyelagram, Bladder Biopsy;  Surgeon: Ed Helm MD;  Location: [facility]     CYSTOSCOPY, RETROGRADES, INSERT STENT URETER(S), COMBINED Left 2015    Procedure: COMBINED CYSTOSCOPY, RETROGRADES, INSERT STENT URETER(S);  Surgeon: Randy Martinez MD;  Location[facility]     CYSTOSCOPY, TRANSURETHRAL RESECTION (TUR) TUMOR BLADDER, COMBINED N/A 2015    Procedure: COMBINED CYSTOSCOPY,  TRANSURETHRAL RESECTION (TUR) TUMOR BLADDER;  Surgeon: Randy Martinez MD;  [facility]     CYSTOSCOPY, TRANSURETHRAL RESECTION (TUR) TUMOR BLADDER, COMBINED N/A 2016    Procedure: COMBINED CYSTOSCOPY, TRANSURETHRAL RESECTION (TUR) TUMOR BLADDER;  Surgeon: Randy Martinez MD;  [facility]     CYSTOSCOPY, TRANSURETHRAL RESECTION (TUR) TUMOR BLADDER, COMBINED N/A 2016    Procedure: COMBINED CYSTOSCOPY, TRANSURETHRAL RESECTION (TUR) TUMOR BLADDER;  Surgeon: Randy Martinez MD;  Location[facility]     DAVINCI NEPHROURETERECTOMY Left 2021    Procedure: NEPHROURETERECTOMY, ROBOT-ASSISTED, lymph node dissection;  Surgeon: Javier Mcnulty MD;  [facility]     PHACOEMULSIFICATION WITH STANDARD INTRAOCULAR LENS IMPLANT Right 10/9/2018    Procedure: PHACOEMULSIFICATION WITH STANDARD INTRAOCULAR LENS IMPLANT;  PHACOEMULSIFICATION CATARACT EXTRACTION POSTERIOR CHAMBER LENS RIGHT;  Surgeon: Marlo Mcconnell MD;  Location: [facility]     PHACOEMULSIFICATION WITH STANDARD INTRAOCULAR LENS IMPLANT Left 10/23/2018    Procedure: PHACOEMULSIFICATION CATARACT EXTRACTION POSTERIOR CHAMBER LENS LEFT;  Surgeon: Marlo Mcconnell MD;  Location[facility]      Allergies   Allergen Reactions     No Clinical Screening - See Comments Other (See Comments)     Beta blocker in glaucoma gtt.s caused low pulse and passing out      Timolol      Beta blocker in glaucoma gtt.s caused low pulse and passing out   - patient thinks it was timolol but not 100% sure which beta blocker eye drop.       Social History     Tobacco Use     Smoking status: Former Smoker     Quit date: 1992     Years since quittin.8     Smokeless tobacco: Never Used   Substance Use Topics     Alcohol use: Yes     Comment: rarely      Wt Readings from Last 1 Encounters:   10/22/21 81.3 kg (179 lb 3.2 oz)        Anesthesia Evaluation   Pt has had prior anesthetic. Type: General and MAC.    No history of anesthetic  complications       ROS/MED HX  ENT/Pulmonary:     (+) tobacco use, Past use,     Neurologic:  - neg neurologic ROS     Cardiovascular:     (+) Dyslipidemia hypertension-----Previous cardiac testing   Echo: Date: Results:    Stress Test: Date: Results:    ECG Reviewed: Date: 10/22/21 Results:  SB @51  Left axis deviation  Possible anterior infarct, age undetermined  Cath: Date: Results:      METS/Exercise Tolerance: >4 METS    Hematologic:  - neg hematologic  ROS     Musculoskeletal:  - neg musculoskeletal ROS     GI/Hepatic:       Renal/Genitourinary:     (+) renal disease (stage 4), type: CRI,     Endo:     (+) type II DM (metformin discontinued), Last HgA1c: 5.8, date: 8/19/2021, Not using insulin, Normal glucose range: 130,     Psychiatric/Substance Use:  - neg psychiatric ROS     Infectious Disease:  - neg infectious disease ROS     Malignancy:   (+) Malignancy, History of Other.Other CA bladder tumor Remission status post Chemo and Surgery.    Other:  - neg other ROS          Physical Exam    Airway        Mallampati: II   TM distance: > 3 FB   Neck ROM: full   Mouth opening: > 3 cm    Respiratory Devices and Support         Dental       (+) missing      Cardiovascular   cardiovascular exam normal       Rhythm and rate: regular and normal     Pulmonary   pulmonary exam normal        breath sounds clear to auscultation           OUTSIDE LABS:  CBC:   Lab Results   Component Value Date    WBC 5.5 10/22/2021    WBC 5.3 10/13/2021    HGB 11.4 (L) 10/22/2021    HGB 11.1 (L) 10/13/2021    HCT 34.4 (L) 10/22/2021    HCT 33.3 (L) 10/13/2021     10/22/2021     10/13/2021     BMP:   Lab Results   Component Value Date     10/22/2021     10/13/2021    POTASSIUM 4.2 10/22/2021    POTASSIUM 4.0 10/13/2021    CHLORIDE 110 (H) 10/22/2021    CHLORIDE 110 (H) 10/13/2021    CO2 24 10/22/2021    CO2 23 10/13/2021    BUN 38 (H) 10/22/2021    BUN 36 (H) 10/13/2021    CR 2.67 (H) 10/22/2021    CR 2.42 (H)  10/13/2021     (H) 10/22/2021     (H) 10/13/2021     COAGS: No results found for: PTT, INR, FIBR  POC:   Lab Results   Component Value Date    BGM 98 06/17/2021     HEPATIC:   Lab Results   Component Value Date    ALBUMIN 3.9 10/13/2021    PROTTOTAL 7.5 10/13/2021    ALT 12 10/13/2021    AST 11 10/13/2021    ALKPHOS 86 10/13/2021    BILITOTAL 0.7 10/13/2021     OTHER:   Lab Results   Component Value Date    PH 7.29 (L) 06/16/2021    LACT 1.3 06/16/2021    A1C 5.8 (H) 08/19/2021    MAHSA 8.8 10/22/2021    PHOS 2.6 10/13/2021    MAG 1.9 05/14/2021    TSH 1.41 10/02/2020    T4 0.99 10/02/2020       Anesthesia Plan    ASA Status:  3   NPO Status:  NPO Appropriate    Anesthesia Type: MAC.     - Reason for MAC: straight local not clinically adequate, immobility needed, chronic cardiopulmonary disease   Induction: Intravenous, Propofol.   Maintenance: Balanced.        Consents    Anesthesia Plan(s) and associated risks, benefits, and realistic alternatives discussed. Questions answered and patient/representative(s) expressed understanding.     - Discussed with:  Patient      - Extended Intubation/Ventilatory Support Discussed: No.      - Patient is DNR/DNI Status: No    Use of blood products discussed: No .     Postoperative Care    Pain management: IV analgesics.   PONV prophylaxis: Ondansetron (or other 5HT-3)     Comments:    H&P 10/22            DANILO Jones CRNA

## 2021-10-29 ENCOUNTER — TELEPHONE (OUTPATIENT)
Dept: ONCOLOGY | Facility: OTHER | Age: 76
End: 2021-10-29

## 2021-10-29 NOTE — TELEPHONE ENCOUNTER
Called patient to inform him that Jennifer Jensen NP is not in the office today, as well as Dr. Bartlett.     Patient wondering what stage cancer he has.    Based on Dr Bartlett's note, pathologic T3 N0, high-grade urothelial cell carcinoma, and the AJCC staging form, patient would be a stage 3.    Patient states he will discuss further with Dr Morrell.

## 2021-11-01 ENCOUNTER — OFFICE VISIT (OUTPATIENT)
Dept: FAMILY MEDICINE | Facility: OTHER | Age: 76
End: 2021-11-01
Attending: UROLOGY
Payer: COMMERCIAL

## 2021-11-01 ENCOUNTER — LAB (OUTPATIENT)
Dept: LAB | Facility: OTHER | Age: 76
End: 2021-11-01
Attending: UROLOGY
Payer: COMMERCIAL

## 2021-11-01 DIAGNOSIS — C66.2 MALIGNANT NEOPLASM OF LEFT URETER (H): ICD-10-CM

## 2021-11-01 DIAGNOSIS — Z01.818 PRE-OP TESTING: ICD-10-CM

## 2021-11-01 LAB
ALBUMIN UR-MCNC: 20 MG/DL
APPEARANCE UR: CLEAR
BILIRUB UR QL STRIP: NEGATIVE
COLOR UR AUTO: ABNORMAL
GLUCOSE UR STRIP-MCNC: NEGATIVE MG/DL
HGB UR QL STRIP: ABNORMAL
HYALINE CASTS: 2 /LPF
KETONES UR STRIP-MCNC: NEGATIVE MG/DL
LEUKOCYTE ESTERASE UR QL STRIP: NEGATIVE
MUCOUS THREADS #/AREA URNS LPF: PRESENT /LPF
NITRATE UR QL: NEGATIVE
PH UR STRIP: 5 [PH] (ref 4.7–8)
RBC URINE: 23 /HPF
SP GR UR STRIP: 1.02 (ref 1–1.03)
SQUAMOUS EPITHELIAL: 0 /HPF
UROBILINOGEN UR STRIP-MCNC: NORMAL MG/DL
WBC URINE: 5 /HPF

## 2021-11-01 PROCEDURE — 88112 CYTOPATH CELL ENHANCE TECH: CPT | Mod: TC | Performed by: UROLOGY

## 2021-11-01 PROCEDURE — 81001 URINALYSIS AUTO W/SCOPE: CPT | Mod: ZL

## 2021-11-01 PROCEDURE — U0003 INFECTIOUS AGENT DETECTION BY NUCLEIC ACID (DNA OR RNA); SEVERE ACUTE RESPIRATORY SYNDROME CORONAVIRUS 2 (SARS-COV-2) (CORONAVIRUS DISEASE [COVID-19]), AMPLIFIED PROBE TECHNIQUE, MAKING USE OF HIGH THROUGHPUT TECHNOLOGIES AS DESCRIBED BY CMS-2020-01-R: HCPCS | Mod: ZL

## 2021-11-02 LAB
PATH REPORT.COMMENTS IMP SPEC: ABNORMAL
PATH REPORT.COMMENTS IMP SPEC: YES
PATH REPORT.COMMENTS IMP SPEC: YES
PATH REPORT.FINAL DX SPEC: ABNORMAL
PATH REPORT.GROSS SPEC: ABNORMAL
PATH REPORT.RELEVANT HX SPEC: ABNORMAL
SARS-COV-2 RNA RESP QL NAA+PROBE: NEGATIVE

## 2021-11-03 ENCOUNTER — DOCUMENTATION ONLY (OUTPATIENT)
Dept: ONCOLOGY | Facility: OTHER | Age: 76
End: 2021-11-03

## 2021-11-03 NOTE — PROGRESS NOTES
[facility] Chemotherapy Consult    The inpatient pharmacist has been consulted to review the patient's chart for  the following: any significant drug interactions between home medications, new take home medications, pre-medications, PRN medications, chemotherapy agents, and chemotherapy regimen.     Current Outpatient Medications   Medication Sig     acetaminophen (TYLENOL) 325 MG tablet Take 2 tablets (650 mg) by mouth every 4 hours as needed for pain (Patient not taking: Reported on 10/18/2021)     atorvastatin (LIPITOR) 40 MG tablet Take 1 tablet (40 mg) by mouth daily     blood glucose (ONETOUCH VERIO IQ) test strip USE TO TEST BLOOD SUGAR 2 TIMES DAILY OR AS DIRECTED. (Patient taking differently: daily USE TO TEST BLOOD SUGAR 2 TIMES DAILY OR AS DIRECTED.)     brimonidine (ALPHAGAN-P) 0.15 % ophthalmic solution INSTILL 1 DROP INTO RIGHT EYE TWICE DAILY     latanoprost (XALATAN) 0.005 % ophthalmic solution Place 1 drop into both eyes At Bedtime     ONETOUCH DELICA LANCETS 33G MISC 1 each 2 times daily     tamsulosin (FLOMAX) 0.4 MG capsule Take 1 capsule (0.4 mg) by mouth daily     No current facility-administered medications for this visit.       Take home medications:       Pre-Treats: None    PRN medications:       Chemotherapy agents:       Cancer Being Treated: Urothelial cell carcinoma    The following interactions were found and will require patient education:     Drug-Drug interactions: None    Drug-Allergy interactions: None     Drug-Food interactions:   1. Atorvastatin + Grapefruit juice: may result in increased bioavailability of atorvastatin resulting in an increased risk of myopathy or rhabdomyolysis.     Drug-Ethanol interactions:   1. Acetaminophen: may result in an increased risk of hepatotoxicity.   2. Lorazepam: may result in increased sedation.  3. Prochlorperazine: may result in increased CNS depression and increased risk of extrapyramidal  reactions.    Drug-Tobacco interactions: N/A    Other:   1. Take-home medication prochlorperazine was discontinued with reason of therapy completed, but this treatment plan has not started yet. Please discuss this medication with patient and add back to medication list, if appropriate.      If there are any additional questions or concerns, please contact the inpatient pharmacy ([phone]) for further review.     Sandra Motley RPH  November 3, 2021

## 2021-11-05 ENCOUNTER — APPOINTMENT (OUTPATIENT)
Dept: GENERAL RADIOLOGY | Facility: HOSPITAL | Age: 76
End: 2021-11-05
Attending: UROLOGY
Payer: COMMERCIAL

## 2021-11-05 ENCOUNTER — ANESTHESIA (OUTPATIENT)
Dept: SURGERY | Facility: HOSPITAL | Age: 76
End: 2021-11-05
Payer: COMMERCIAL

## 2021-11-05 ENCOUNTER — HOSPITAL ENCOUNTER (OUTPATIENT)
Facility: HOSPITAL | Age: 76
Discharge: HOME | End: 2021-11-05
Attending: UROLOGY | Admitting: UROLOGY
Payer: COMMERCIAL

## 2021-11-05 VITALS
SYSTOLIC BLOOD PRESSURE: 149 MMHG | OXYGEN SATURATION: 100 % | HEIGHT: 67 IN | BODY MASS INDEX: 27.75 KG/M2 | TEMPERATURE: 96.8 F | WEIGHT: 176.8 LBS | DIASTOLIC BLOOD PRESSURE: 89 MMHG | RESPIRATION RATE: 16 BRPM | HEART RATE: 56 BPM

## 2021-11-05 DIAGNOSIS — N32.9 LESION OF BLADDER: ICD-10-CM

## 2021-11-05 LAB
ANION GAP SERPL CALCULATED.3IONS-SCNC: 5 MMOL/L (ref 3–14)
BUN SERPL-MCNC: 41 MG/DL (ref 7–30)
CALCIUM SERPL-MCNC: 9.1 MG/DL (ref 8.5–10.1)
CHLORIDE BLD-SCNC: 106 MMOL/L (ref 94–109)
CO2 SERPL-SCNC: 24 MMOL/L (ref 20–32)
CREAT SERPL-MCNC: 2.22 MG/DL (ref 0.66–1.25)
GFR SERPL CREATININE-BSD FRML MDRD: 28 ML/MIN/1.73M2
GLUCOSE BLD-MCNC: 95 MG/DL (ref 70–99)
POTASSIUM BLD-SCNC: 4 MMOL/L (ref 3.4–5.3)
SODIUM SERPL-SCNC: 135 MMOL/L (ref 133–144)

## 2021-11-05 PROCEDURE — 52204 CYSTOSCOPY W/BIOPSY(S): CPT | Performed by: UROLOGY

## 2021-11-05 PROCEDURE — 258N000003 HC RX IP 258 OP 636: Performed by: NURSE ANESTHETIST, CERTIFIED REGISTERED

## 2021-11-05 PROCEDURE — C1758 CATHETER, URETERAL: HCPCS | Performed by: UROLOGY

## 2021-11-05 PROCEDURE — 710N000012 HC RECOVERY PHASE 2, PER MINUTE: Performed by: UROLOGY

## 2021-11-05 PROCEDURE — 370N000017 HC ANESTHESIA TECHNICAL FEE, PER MIN: Performed by: UROLOGY

## 2021-11-05 PROCEDURE — 74420 UROGRAPHY RTRGR +-KUB: CPT | Mod: 26 | Performed by: UROLOGY

## 2021-11-05 PROCEDURE — 250N000009 HC RX 250: Performed by: UROLOGY

## 2021-11-05 PROCEDURE — 999N000141 HC STATISTIC PRE-PROCEDURE NURSING ASSESSMENT: Performed by: UROLOGY

## 2021-11-05 PROCEDURE — 80048 BASIC METABOLIC PNL TOTAL CA: CPT | Performed by: NURSE ANESTHETIST, CERTIFIED REGISTERED

## 2021-11-05 PROCEDURE — 250N000009 HC RX 250: Performed by: NURSE ANESTHETIST, CERTIFIED REGISTERED

## 2021-11-05 PROCEDURE — 52204 CYSTOSCOPY W/BIOPSY(S): CPT | Performed by: NURSE ANESTHETIST, CERTIFIED REGISTERED

## 2021-11-05 PROCEDURE — 360N000076 HC SURGERY LEVEL 3, PER MIN: Performed by: UROLOGY

## 2021-11-05 PROCEDURE — 250N000011 HC RX IP 250 OP 636: Performed by: NURSE ANESTHETIST, CERTIFIED REGISTERED

## 2021-11-05 PROCEDURE — 272N000001 HC OR GENERAL SUPPLY STERILE: Performed by: UROLOGY

## 2021-11-05 PROCEDURE — 36415 COLL VENOUS BLD VENIPUNCTURE: CPT | Performed by: NURSE ANESTHETIST, CERTIFIED REGISTERED

## 2021-11-05 PROCEDURE — 88305 TISSUE EXAM BY PATHOLOGIST: CPT | Mod: TC | Performed by: UROLOGY

## 2021-11-05 PROCEDURE — 999N000179 XR SURGERY CARM FLUORO LESS THAN 5 MIN W STILLS: Mod: TC

## 2021-11-05 PROCEDURE — 99100 ANES PT EXTEME AGE<1 YR&>70: CPT | Performed by: NURSE ANESTHETIST, CERTIFIED REGISTERED

## 2021-11-05 PROCEDURE — 250N000011 HC RX IP 250 OP 636: Performed by: UROLOGY

## 2021-11-05 RX ORDER — NALOXONE HYDROCHLORIDE 0.4 MG/ML
0.2 INJECTION, SOLUTION INTRAMUSCULAR; INTRAVENOUS; SUBCUTANEOUS
Status: DISCONTINUED | OUTPATIENT
Start: 2021-11-05 | End: 2021-11-05 | Stop reason: HOSPADM

## 2021-11-05 RX ORDER — NALOXONE HYDROCHLORIDE 0.4 MG/ML
0.4 INJECTION, SOLUTION INTRAMUSCULAR; INTRAVENOUS; SUBCUTANEOUS
Status: DISCONTINUED | OUTPATIENT
Start: 2021-11-05 | End: 2021-11-05 | Stop reason: HOSPADM

## 2021-11-05 RX ORDER — ONDANSETRON 2 MG/ML
4 INJECTION INTRAMUSCULAR; INTRAVENOUS EVERY 30 MIN PRN
Status: DISCONTINUED | OUTPATIENT
Start: 2021-11-05 | End: 2021-11-05 | Stop reason: HOSPADM

## 2021-11-05 RX ORDER — FENTANYL CITRATE 50 UG/ML
25 INJECTION, SOLUTION INTRAMUSCULAR; INTRAVENOUS
Status: DISCONTINUED | OUTPATIENT
Start: 2021-11-05 | End: 2021-11-05 | Stop reason: HOSPADM

## 2021-11-05 RX ORDER — FENTANYL CITRATE 50 UG/ML
INJECTION, SOLUTION INTRAMUSCULAR; INTRAVENOUS PRN
Status: DISCONTINUED | OUTPATIENT
Start: 2021-11-05 | End: 2021-11-05

## 2021-11-05 RX ORDER — HYDROCODONE BITARTRATE AND ACETAMINOPHEN 5; 325 MG/1; MG/1
1 TABLET ORAL ONCE
Status: DISCONTINUED | OUTPATIENT
Start: 2021-11-05 | End: 2021-11-05 | Stop reason: HOSPADM

## 2021-11-05 RX ORDER — CIPROFLOXACIN 2 MG/ML
200 INJECTION, SOLUTION INTRAVENOUS EVERY 12 HOURS
Status: DISCONTINUED | OUTPATIENT
Start: 2021-11-05 | End: 2021-11-05 | Stop reason: HOSPADM

## 2021-11-05 RX ORDER — LIDOCAINE HYDROCHLORIDE 20 MG/ML
JELLY TOPICAL
Status: DISCONTINUED
Start: 2021-11-05 | End: 2021-11-05 | Stop reason: HOSPADM

## 2021-11-05 RX ORDER — LIDOCAINE 40 MG/G
CREAM TOPICAL
Status: DISCONTINUED | OUTPATIENT
Start: 2021-11-05 | End: 2021-11-05 | Stop reason: HOSPADM

## 2021-11-05 RX ORDER — PROPOFOL 10 MG/ML
INJECTION, EMULSION INTRAVENOUS PRN
Status: DISCONTINUED | OUTPATIENT
Start: 2021-11-05 | End: 2021-11-05

## 2021-11-05 RX ORDER — ONDANSETRON 4 MG/1
4 TABLET, ORALLY DISINTEGRATING ORAL EVERY 30 MIN PRN
Status: DISCONTINUED | OUTPATIENT
Start: 2021-11-05 | End: 2021-11-05 | Stop reason: HOSPADM

## 2021-11-05 RX ORDER — LABETALOL 20 MG/4 ML (5 MG/ML) INTRAVENOUS SYRINGE
10
Status: DISCONTINUED | OUTPATIENT
Start: 2021-11-05 | End: 2021-11-05 | Stop reason: HOSPADM

## 2021-11-05 RX ORDER — SODIUM CHLORIDE, SODIUM LACTATE, POTASSIUM CHLORIDE, CALCIUM CHLORIDE 600; 310; 30; 20 MG/100ML; MG/100ML; MG/100ML; MG/100ML
INJECTION, SOLUTION INTRAVENOUS CONTINUOUS
Status: DISCONTINUED | OUTPATIENT
Start: 2021-11-05 | End: 2021-11-05 | Stop reason: HOSPADM

## 2021-11-05 RX ORDER — LIDOCAINE HYDROCHLORIDE 20 MG/ML
INJECTION, SOLUTION INFILTRATION; PERINEURAL PRN
Status: DISCONTINUED | OUTPATIENT
Start: 2021-11-05 | End: 2021-11-05

## 2021-11-05 RX ORDER — LIDOCAINE HYDROCHLORIDE 20 MG/ML
JELLY TOPICAL PRN
Status: DISCONTINUED | OUTPATIENT
Start: 2021-11-05 | End: 2021-11-05 | Stop reason: HOSPADM

## 2021-11-05 RX ORDER — HYDRALAZINE HYDROCHLORIDE 20 MG/ML
2.5-5 INJECTION INTRAMUSCULAR; INTRAVENOUS EVERY 10 MIN PRN
Status: DISCONTINUED | OUTPATIENT
Start: 2021-11-05 | End: 2021-11-05 | Stop reason: HOSPADM

## 2021-11-05 RX ORDER — FLUMAZENIL 0.1 MG/ML
INJECTION, SOLUTION INTRAVENOUS PRN
Status: DISCONTINUED | OUTPATIENT
Start: 2021-11-05 | End: 2021-11-05

## 2021-11-05 RX ADMIN — PROPOFOL 100 MG: 10 INJECTION, EMULSION INTRAVENOUS at 14:04

## 2021-11-05 RX ADMIN — CIPROFLOXACIN 200 MG: 2 INJECTION, SOLUTION INTRAVENOUS at 13:12

## 2021-11-05 RX ADMIN — PROPOFOL 20 MG: 10 INJECTION, EMULSION INTRAVENOUS at 14:18

## 2021-11-05 RX ADMIN — MIDAZOLAM 2 MG: 1 INJECTION INTRAMUSCULAR; INTRAVENOUS at 13:54

## 2021-11-05 RX ADMIN — SODIUM CHLORIDE, POTASSIUM CHLORIDE, SODIUM LACTATE AND CALCIUM CHLORIDE: 600; 310; 30; 20 INJECTION, SOLUTION INTRAVENOUS at 12:38

## 2021-11-05 RX ADMIN — LIDOCAINE HYDROCHLORIDE 40 MG: 20 INJECTION, SOLUTION INFILTRATION; PERINEURAL at 14:02

## 2021-11-05 RX ADMIN — PROPOFOL 20 MG: 10 INJECTION, EMULSION INTRAVENOUS at 14:12

## 2021-11-05 RX ADMIN — FLUMAZENIL 0.5 MG: 0.1 INJECTION, SOLUTION INTRAVENOUS at 14:26

## 2021-11-05 RX ADMIN — PROPOFOL 40 MG: 10 INJECTION, EMULSION INTRAVENOUS at 14:19

## 2021-11-05 RX ADMIN — FENTANYL CITRATE 100 MCG: 50 INJECTION, SOLUTION INTRAMUSCULAR; INTRAVENOUS at 13:50

## 2021-11-05 RX ADMIN — MIDAZOLAM 2 MG: 1 INJECTION INTRAMUSCULAR; INTRAVENOUS at 13:50

## 2021-11-05 ASSESSMENT — MIFFLIN-ST. JEOR: SCORE: 1500.59

## 2021-11-05 NOTE — OR NURSING
Patient and responsible adult given discharge instructions with no questions regarding instructions. Up to the BR, able to urinate without difficulty. David score 20. Pain level 0/10.  Discharged from unit via ambulation. Patient discharged to home with friend.

## 2021-11-05 NOTE — ANESTHESIA CARE TRANSFER NOTE
Patient: Colin Baxter    Procedure: Procedure(s):  CYSTOSCOPY, WITH RETROGRADE PYELOGRAM  CYSTOSCOPY, WITH BLADDER BIOPSY       Diagnosis: Lesion of bladder [N32.9]  Diagnosis Additional Information: No value filed.    Anesthesia Type:   MAC     Note:    Oropharynx: oropharynx clear of all foreign objects  Level of Consciousness: drowsy  Oxygen Supplementation: nasal cannula  Level of Supplemental Oxygen (L/min / FiO2): 2  Independent Airway: airway patency satisfactory and stable  Dentition: dentition unchanged  Vital Signs Stable: post-procedure vital signs reviewed and stable  Report to RN Given: handoff report given  Patient transferred to: Phase II    Handoff Report: Identifed the Patient, Identified the Reponsible Provider, Reviewed the pertinent medical history, Discussed the surgical course, Reviewed Intra-OP anesthesia mangement and issues during anesthesia, Set expectations for post-procedure period and Allowed opportunity for questions and acknowledgement of understanding      Vitals:  Vitals Value Taken Time   BP     Temp     Pulse     Resp     SpO2         Electronically Signed By: DANILO Santos CRNA  November 5, 2021  2:28 PM

## 2021-11-05 NOTE — ANESTHESIA POSTPROCEDURE EVALUATION
Patient: Colin Baxter    Procedure: Procedure(s):  CYSTOSCOPY, WITH RETROGRADE PYELOGRAM  CYSTOSCOPY, WITH BLADDER BIOPSY       Diagnosis:Lesion of bladder [N32.9]  Diagnosis Additional Information: No value filed.    Anesthesia Type:  MAC    Note:  Disposition: Outpatient   Postop Pain Control: Uneventful            Sign Out: Well controlled pain   PONV: No   Neuro/Psych: Uneventful            Sign Out: Acceptable/Baseline neuro status   Airway/Respiratory: Uneventful            Sign Out: Acceptable/Baseline resp. status   CV/Hemodynamics: Uneventful            Sign Out: Acceptable CV status; No obvious hypovolemia; No obvious fluid overload   Other NRE: NONE   DID A NON-ROUTINE EVENT OCCUR? No           Last vitals:  Vitals Value Taken Time   /78 11/05/21 1439   Temp 97.2  F (36.2  C) 11/05/21 1433   Pulse 69 11/05/21 1439   Resp 16 11/05/21 1433   SpO2 98 % 11/05/21 1442   Vitals shown include unvalidated device data.    Electronically Signed By: DANILO Campos CRNA  November 5, 2021  2:44 PM

## 2021-11-05 NOTE — OP NOTE
Preoperative diagnosis: Bladder lesions history of transitional cell carcinoma of the bladder    Postoperative diagnosis: Same    Procedure: Cystoscopy, retrograde urogram, interpretation of fluoroscopy, multiple bladder biopsies and fulguration    Surgeon: MARIA E GOODWIN    Indications for procedure: Colin is a 76-year-old male who has a history of transitional cell carcinoma of the bladder.  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  On his first surveillance cystoscopy I found erythematous lesions on the posterior and right lateral wall of the bladder.  These were concerning for carcinoma in situ.  His cytology did come back showing atypical cells and he has persistent hematuria so we have elected to proceed with further evaluation.  Because of his solitary kidney and renal azotemia I elected to look at the collecting system of the right ureter to make sure there were no concerning lesions there and then proceed with bladder biopsies.  Risks of bleeding infection and perforation anesthetic risks were discussed he appears to understand agrees to proceed.    After adequate consent was obtained for the patient he was brought to the operative theater after adequate IV sedation was induced he was placed in the dorsolithotomy position genitalia were prepped and draped in the usual sterile fashion.  A 22 Tajik cystoscopy sheath with a 12 degree lens was inserted into the bladder.  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  The left ureteral orifice is absent because of the left nephro ureterectomy.  The right ureteral orifice is easily visualized.  There is erythema on the right lateral wall and also erythematous lesions on the posterior wall.  I went ahead and placed an open-ended ureteral catheter just into the right ureteral orifice and a retrograde was done and I did not see any concerning filling  defects of the ureter or collecting system.  I then went in with a bladder biopsy and biopsied 3 lesions in the posterior wall of the bladder and I cauterized those with the Bugbee and I went to the right lateral wall and did a biopsy of that as well and cauterized that.  We had good hemostasis once these were completed.  Bladder was drained he was awakened and taken recovery in stable condition no complications.

## 2021-11-08 ENCOUNTER — TELEPHONE (OUTPATIENT)
Dept: UROLOGY | Facility: OTHER | Age: 76
End: 2021-11-08
Payer: COMMERCIAL

## 2021-11-08 NOTE — TELEPHONE ENCOUNTER
11/8/21    Patient called stating he would like to speak with the Urology nurse regarding treatment he is receiving with Dr. Morrell. Patient did not wish to disclose more information. Please call back    -Laney Garrett [facility] [place]  Ext. [phone]

## 2021-11-09 ENCOUNTER — TELEPHONE (OUTPATIENT)
Dept: UROLOGY | Facility: OTHER | Age: 76
End: 2021-11-09
Payer: COMMERCIAL

## 2021-11-09 NOTE — TELEPHONE ENCOUNTER
Pt called stating Dr. Aguilar put orders in for pt to start immunotherapy tomorrow.  Pt states he just had his biopsy last Friday, no results left and thought Dr. Morrell mentioned that the immunotherapy could have done some damage to his kidney.  Pt is wondering if he should go ahead with the therapy?  Or cancel until he gets his biopsy results?  Please advise.  ILEANA SIDHU LPN

## 2021-11-09 NOTE — TELEPHONE ENCOUNTER
Colin had called in regarding a question of upcoming immunotherapy installation.  Tomorrow he supposed to start immunotherapy infusions and was wondering if he should hold off.  We did a bladder biopsy on him last week and we are still waiting for the results.  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  I do not see any problem with him maintaining his current status with oncology and then we will make adjustments once we get his pathology report back.

## 2021-11-10 ENCOUNTER — INFUSION THERAPY VISIT (OUTPATIENT)
Dept: INFUSION THERAPY | Facility: OTHER | Age: 76
End: 2021-11-10
Attending: INTERNAL MEDICINE
Payer: COMMERCIAL

## 2021-11-10 VITALS
WEIGHT: 177.69 LBS | DIASTOLIC BLOOD PRESSURE: 81 MMHG | SYSTOLIC BLOOD PRESSURE: 131 MMHG | BODY MASS INDEX: 27.83 KG/M2 | HEART RATE: 56 BPM

## 2021-11-10 DIAGNOSIS — C66.2 MALIGNANT NEOPLASM OF LEFT URETER (H): Primary | ICD-10-CM

## 2021-11-10 LAB
ALBUMIN SERPL-MCNC: 3.7 G/DL (ref 3.4–5)
ALP SERPL-CCNC: 87 U/L (ref 40–150)
ALT SERPL W P-5'-P-CCNC: 11 U/L (ref 0–70)
ANION GAP SERPL CALCULATED.3IONS-SCNC: 4 MMOL/L (ref 3–14)
AST SERPL W P-5'-P-CCNC: 13 U/L (ref 0–45)
BASOPHILS # BLD AUTO: 0 10E3/UL (ref 0–0.2)
BASOPHILS NFR BLD AUTO: 1 %
BILIRUB SERPL-MCNC: 0.6 MG/DL (ref 0.2–1.3)
BUN SERPL-MCNC: 44 MG/DL (ref 7–30)
CALCIUM SERPL-MCNC: 8.5 MG/DL (ref 8.5–10.1)
CHLORIDE BLD-SCNC: 109 MMOL/L (ref 94–109)
CO2 SERPL-SCNC: 25 MMOL/L (ref 20–32)
CREAT SERPL-MCNC: 2.3 MG/DL (ref 0.66–1.25)
EOSINOPHIL # BLD AUTO: 0.1 10E3/UL (ref 0–0.7)
EOSINOPHIL NFR BLD AUTO: 3 %
ERYTHROCYTE [DISTWIDTH] IN BLOOD BY AUTOMATED COUNT: 12.9 % (ref 10–15)
GFR SERPL CREATININE-BSD FRML MDRD: 27 ML/MIN/1.73M2
GLUCOSE BLD-MCNC: 148 MG/DL (ref 70–99)
HCT VFR BLD AUTO: 32.5 % (ref 40–53)
HGB BLD-MCNC: 10.9 G/DL (ref 13.3–17.7)
IMM GRANULOCYTES # BLD: 0 10E3/UL
IMM GRANULOCYTES NFR BLD: 0 %
LYMPHOCYTES # BLD AUTO: 1.2 10E3/UL (ref 0.8–5.3)
LYMPHOCYTES NFR BLD AUTO: 26 %
MCH RBC QN AUTO: 31.5 PG (ref 26.5–33)
MCHC RBC AUTO-ENTMCNC: 33.5 G/DL (ref 31.5–36.5)
MCV RBC AUTO: 94 FL (ref 78–100)
MONOCYTES # BLD AUTO: 0.5 10E3/UL (ref 0–1.3)
MONOCYTES NFR BLD AUTO: 10 %
NEUTROPHILS # BLD AUTO: 2.8 10E3/UL (ref 1.6–8.3)
NEUTROPHILS NFR BLD AUTO: 60 %
NRBC # BLD AUTO: 0 10E3/UL
NRBC BLD AUTO-RTO: 0 /100
PLATELET # BLD AUTO: 171 10E3/UL (ref 150–450)
POTASSIUM BLD-SCNC: 4.4 MMOL/L (ref 3.4–5.3)
PROT SERPL-MCNC: 7.5 G/DL (ref 6.8–8.8)
RBC # BLD AUTO: 3.46 10E6/UL (ref 4.4–5.9)
SODIUM SERPL-SCNC: 138 MMOL/L (ref 133–144)
TSH SERPL DL<=0.005 MIU/L-ACNC: 2.19 MU/L (ref 0.4–4)
WBC # BLD AUTO: 4.7 10E3/UL (ref 4–11)

## 2021-11-10 PROCEDURE — 84443 ASSAY THYROID STIM HORMONE: CPT | Mod: ZL | Performed by: INTERNAL MEDICINE

## 2021-11-10 PROCEDURE — 36415 COLL VENOUS BLD VENIPUNCTURE: CPT | Mod: ZL

## 2021-11-10 PROCEDURE — 250N000011 HC RX IP 250 OP 636: Performed by: INTERNAL MEDICINE

## 2021-11-10 PROCEDURE — 80053 COMPREHEN METABOLIC PANEL: CPT | Mod: ZL | Performed by: INTERNAL MEDICINE

## 2021-11-10 PROCEDURE — 96413 CHEMO IV INFUSION 1 HR: CPT

## 2021-11-10 PROCEDURE — 258N000003 HC RX IP 258 OP 636: Performed by: INTERNAL MEDICINE

## 2021-11-10 PROCEDURE — 85025 COMPLETE CBC W/AUTO DIFF WBC: CPT | Mod: ZL

## 2021-11-10 RX ADMIN — SODIUM CHLORIDE 480 MG: 9 INJECTION, SOLUTION INTRAVENOUS at 11:39

## 2021-11-10 RX ADMIN — SODIUM CHLORIDE 250 ML: 9 INJECTION, SOLUTION INTRAVENOUS at 11:45

## 2021-11-10 NOTE — PROGRESS NOTES
Patient is 76 years old, here accompanied by self today for infusion of Nivolumab under the orders of Dr. Bartlett.     Patient lab values:  See Provider notes, Patient creatinine elevated, Patient trends exceed treatment parameters.  Patient   Patient meets parameters for today's infusion.  Denies questions or concerns regarding today's infusion and/or medications being administered.      Independent dose check completed with JIM Marquis.    Patient identified with two identifiers, order verified, and verbal consent for today's infusion obtained from patient.     1145   IV pump verified with nivolumab dose, drug, and rate of administration. Infusion administered per protocol. Patient tolerated infusion well, no signs or symptoms of adverse reaction noted. Patient denies pain nor discomfort.     Needle removed, tip intact. Site clean, dry and intact. Covered with a sterile bandage, slight pressure applied for 30 seconds. Pt instructed to leave bandage intact for a minimum of one hour, and to call with questions or concerns. Copy of appointments, discharge instructions, and after visit summary (AVS) provided to patient. Patient states understanding, discharged.

## 2021-11-10 NOTE — PROGRESS NOTES
Per Dr. Osborne increase patient parameters from serum creatinine 1.5 the upper limit to 2 times the upper limit

## 2021-11-11 LAB
PATH REPORT.COMMENTS IMP SPEC: NORMAL
PATH REPORT.FINAL DX SPEC: NORMAL
PATH REPORT.GROSS SPEC: NORMAL
PATH REPORT.MICROSCOPIC SPEC OTHER STN: NORMAL
PATH REPORT.RELEVANT HX SPEC: NORMAL
PHOTO IMAGE: NORMAL

## 2021-11-11 PROCEDURE — 88305 TISSUE EXAM BY PATHOLOGIST: CPT | Mod: 26 | Performed by: PATHOLOGY

## 2021-11-18 ENCOUNTER — OFFICE VISIT (OUTPATIENT)
Dept: UROLOGY | Facility: OTHER | Age: 76
End: 2021-11-18
Attending: UROLOGY
Payer: COMMERCIAL

## 2021-11-18 VITALS
BODY MASS INDEX: 26.68 KG/M2 | WEIGHT: 170 LBS | HEART RATE: 58 BPM | TEMPERATURE: 97.1 F | DIASTOLIC BLOOD PRESSURE: 72 MMHG | SYSTOLIC BLOOD PRESSURE: 136 MMHG | HEIGHT: 67 IN | OXYGEN SATURATION: 99 %

## 2021-11-18 DIAGNOSIS — D09.0 CIS (CARCINOMA IN SITU OF BLADDER): Primary | ICD-10-CM

## 2021-11-18 PROCEDURE — G0463 HOSPITAL OUTPT CLINIC VISIT: HCPCS

## 2021-11-18 PROCEDURE — 99214 OFFICE O/P EST MOD 30 MIN: CPT | Performed by: UROLOGY

## 2021-11-18 ASSESSMENT — PAIN SCALES - GENERAL: PAINLEVEL: NO PAIN (0)

## 2021-11-18 ASSESSMENT — MIFFLIN-ST. JEOR: SCORE: 1469.74

## 2021-11-18 NOTE — PROGRESS NOTES
History     Chief Complaint:      RECHECK (Follow up-bladder biopsy)      HPI   Colin Baxter is a 76 year old male who presents for follow-up of his bladder biopsy.  Colin has a history of a recent left nephro ureterectomy for a stage T3 high-grade transitional cell carcinoma.  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  He did tell me he had a little trouble holding the BCG when he was getting it.  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  Did well with his procedure and did not have any problems and has no bleeding.    Allergies:    Allergies   Allergen Reactions     No Clinical Screening - See Comments Other (See Comments)     Beta blocker in glaucoma gtt.s caused low pulse and passing out      Timolol      Beta blocker in glaucoma gtt.s caused low pulse and passing out   - patient thinks it was timolol but not 100% sure which beta blocker eye drop.         Medications:      acetaminophen (TYLENOL) 325 MG tablet  atorvastatin (LIPITOR) 40 MG tablet  blood glucose (ONETOUCH VERIO IQ) test strip  brimonidine (ALPHAGAN-P) 0.15 % ophthalmic solution  latanoprost (XALATAN) 0.005 % ophthalmic solution  ONETOUCH DELICA LANCETS 33G MISC  tamsulosin (FLOMAX) 0.4 MG capsule        Problem List:      Patient Active Problem List    Diagnosis Date Noted     CKD (chronic kidney disease) stage 4, GFR 15-29 ml/min (H) 10/21/2021     Priority: Medium     Ureteral mass 02/02/2021     Priority: Medium     Added automatically from request for surgery [id number]       Ureter cancer (H) 01/13/2021     Priority: Medium     Added automatically from request for surgery [id number]       Type 2 diabetes mellitus with hyperglycemia, without long-term current use of insulin (H) 12/11/2020     Priority: Medium     Type 2 diabetes mellitus without complication, without long-term current use of insulin (H) 07/30/2019     Priority: Medium     History of bladder cancer  02/27/2019     Priority: Medium     Obesity (BMI 35.0-39.9) with comorbidity (H) 09/26/2018     Priority: Medium     ACP (advance care planning) 05/04/2016     Priority: Medium     Advance Care Planning 5/4/2016: ACP Review of Chart / Resources Provided:  Reviewed chart for advance care plan.  Colin Baxter has been provided information and resources to begin or update their advance care plan.  Added by Inés Malloy             History of high risk bladder cancer 09/01/2015     Priority: Medium     Comprehensive Medical Examination 07/06/2015     Priority: Medium     Glaucoma 07/06/2015     Priority: Medium     Hyperlipidemia 08/13/2008     Priority: Medium     Formatting of this note might be different from the original.  IMO Update 10/11          Past Medical History:      Past Medical History:   Diagnosis Date     Benign essential hypertension      Cancer (H)      Diabetes (H)      Dyslipidemia      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) 12/11/2020       Past Surgical History:      Past Surgical History:   Procedure Laterality Date     APPENDECTOMY  1958     COLONOSCOPY  2-     COMBINED CYSTOSCOPY, RETROGRADES, URETEROSCOPY, INSERT STENT Left 1/18/2021    Procedure: CYSTOURETEROSCOPY, WITH RETROGRADE PYELOGRAM with interpretation of fluroscopy, ureteroscopy, ureteral biopsy, bladder biopsy and fulgaration;  Surgeon: Shonda Morrell MD;  [facility]     CYSTOSCOPY N/A 6/16/2021    Procedure: CYSTOSCOPY;  Surgeon: Javier Mcnulty MD;  [facility]     CYSTOSCOPY, BIOPSY BLADDER, COMBINED N/A 9/8/2015    Procedure: COMBINED CYSTOSCOPY, BIOPSY BLADDER;  Surgeon: Randy Martinez MD;  [facility]     CYSTOSCOPY, BIOPSY BLADDER, COMBINED N/A 2/14/2017    Procedure: COMBINED CYSTOSCOPY, BIOPSY BLADDER;  Surgeon: Randy Martinez MD;  [facility]     CYSTOSCOPY, BIOPSY BLADDER, COMBINED N/A 11/13/2018    Procedure: COMBINED CYSTOSCOPY, BIOPSY  BLADDER;  Surgeon: Ed Helm MD;  [facility]     CYSTOSCOPY, BIOPSY BLADDER, COMBINED N/A 11/5/2021    Procedure: CYSTOSCOPY, WITH BLADDER BIOPSY;  Surgeon: Shonad Morrell MD;  [facility]     CYSTOSCOPY, RETROGRADES, COMBINED Bilateral 11/13/2018    Procedure: Bilateral Retrograde Pyelagram, Bladder Biopsy;  Surgeon: Ed Helm MD;  [facility]     CYSTOSCOPY, RETROGRADES, COMBINED Right 11/5/2021    Procedure: CYSTOSCOPY, WITH RETROGRADE PYELOGRAM;  Surgeon: Shonda Morrell MD;  [facility]     CYSTOSCOPY, RETROGRADES, INSERT STENT URETER(S), COMBINED Left 9/8/2015    Procedure: COMBINED CYSTOSCOPY, RETROGRADES, INSERT STENT URETER(S);  Surgeon: Randy Martinez MD;  [facility]     CYSTOSCOPY, TRANSURETHRAL RESECTION (TUR) TUMOR BLADDER, COMBINED N/A 9/8/2015    Procedure: COMBINED CYSTOSCOPY, TRANSURETHRAL RESECTION (TUR) TUMOR BLADDER;  Surgeon: Randy Martinez MD;  [facility]     CYSTOSCOPY, TRANSURETHRAL RESECTION (TUR) TUMOR BLADDER, COMBINED N/A 1/12/2016    Procedure: COMBINED CYSTOSCOPY, TRANSURETHRAL RESECTION (TUR) TUMOR BLADDER;  Surgeon: Randy Martinez MD;  [facility]     CYSTOSCOPY, TRANSURETHRAL RESECTION (TUR) TUMOR BLADDER, COMBINED N/A 9/13/2016    Procedure: COMBINED CYSTOSCOPY, TRANSURETHRAL RESECTION (TUR) TUMOR BLADDER;  Surgeon: Randy Martinez MD;  [facility]     DAVINCI NEPHROURETERECTOMY Left 6/16/2021    Procedure: NEPHROURETERECTOMY, ROBOT-ASSISTED, lymph node dissection;  Surgeon: Javier Mcnulty MD;  [facility]     PHACOEMULSIFICATION WITH STANDARD INTRAOCULAR LENS IMPLANT Right 10/9/2018    Procedure: PHACOEMULSIFICATION WITH STANDARD INTRAOCULAR LENS IMPLANT;  PHACOEMULSIFICATION CATARACT EXTRACTION POSTERIOR CHAMBER LENS RIGHT;  Surgeon: Marlo Mcconnell MD;  [facility]     PHACOEMULSIFICATION WITH STANDARD INTRAOCULAR LENS IMPLANT Left 10/23/2018    Procedure:  "PHACOEMULSIFICATION CATARACT EXTRACTION POSTERIOR CHAMBER LENS LEFT;  Surgeon: Marlo Mcconnell MD;  [facility]       Family History:      Family History   Problem Relation Age of Onset     Diabetes Mother      Parkinsonism Brother      Coronary Artery Disease No family hx of      Hypertension No family hx of      Hyperlipidemia No family hx of      Cerebrovascular Disease No family hx of      Breast Cancer No family hx of      Colon Cancer No family hx of      Prostate Cancer No family hx of      Anesthesia Reaction No family hx of      Asthma No family hx of      Thyroid Disease No family hx of      Genetic Disorder No family hx of        Social History:    Marital Status:   [4]  Social History     Tobacco Use     Smoking status: Former Smoker     Quit date: 1992     Years since quittin.8     Smokeless tobacco: Never Used   Substance Use Topics     Alcohol use: Yes     Comment: rarely     Drug use: No        Review of Systems   All other systems reviewed and are negative.        Physical Exam   Vitals:  /72 (BP Location: Left arm, Patient Position: Chair, Cuff Size: Adult Large)   Pulse 58   Temp 97.1  F (36.2  C) (Tympanic)   Ht 1.702 m (5' 7\")   Wt 77.1 kg (170 lb)   SpO2 99%   BMI 26.63 kg/m        Physical Exam    Surgical Pathology Report                         Case: [id number]                                   Authorizing Provider:  Shonda Morrell MD        Collected:           2021 02:14 PM           Ordering Location:     [facility]     Received:            2021 10:10 AM           Pathologist:           Karly Martin                                                                Specimens:   A) - Other, urinary bladder biopsy, posterior wall                                                   B) - Urinary Bladder, Lateral Wall, Right                                                  Final Diagnosis   A.  Urinary bladder, posterior wall, " biopsy:  -Fragments of urothelial mucosa involved by urothelial carcinoma in-situ.  -See comment.     B.  Urinary bladder, right lateral wall, biopsy:  -Minute detached strip of atypical cells, insufficient for diagnosis.  -Denuded urothelial mucosa with acute and chronic inflammation and granulation tissue formation.   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  We could try another installation of BCG.  Its been 5 years since he has had BCG  his other option would be some intravesical gemcitabine.  We also discussed that some would recommend cystectomy in this situation.  I think I would lean toward trialing some installations since this was a small focus on the posterior wall of carcinoma in situ.  I will reach out to Dr. Clements from oncology as he is also getting an infusion of nivolumab to see whether this is something he can continue to have or if we need to hold this.  We will also see if he has any other suggestions at this time for treatment of recurrent carcinoma in situ.  30 Minutes spent in counseling, coordination of care, documentation.      No follow-ups on file.    Shonda Morrell MD  [facility] [place] [facility]

## 2021-11-18 NOTE — LETTER
November 18, 2021      Colin Baxter  [address]  [place] [address]        Dear ,    We are writing to inform you of your test results.        No results found from the In Basket message.    If you have any questions or concerns, please call the clinic at the number listed above.       Sincerely,        Shonda Morrell MD

## 2021-11-18 NOTE — LETTER
11/18/2021      RE: Colin Baxter  [address] [place] [address]         History     Chief Complaint:      RECHECK (Follow up-bladder biopsy)      HPI   Colin Baxter is a 76 year old male who presents for follow-up of his bladder biopsy.  Colin has a history of a recent left nephro ureterectomy for a stage T3 high-grade transitional cell carcinoma.  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  He did tell me he had a little trouble holding the BCG when he was getting it.  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  Did well with his procedure and did not have any problems and has no bleeding.    Allergies:    Allergies   Allergen Reactions     No Clinical Screening - See Comments Other (See Comments)     Beta blocker in glaucoma gtt.s caused low pulse and passing out      Timolol      Beta blocker in glaucoma gtt.s caused low pulse and passing out   - patient thinks it was timolol but not 100% sure which beta blocker eye drop.         Medications:      acetaminophen (TYLENOL) 325 MG tablet  atorvastatin (LIPITOR) 40 MG tablet  blood glucose (ONETOUCH VERIO IQ) test strip  brimonidine (ALPHAGAN-P) 0.15 % ophthalmic solution  latanoprost (XALATAN) 0.005 % ophthalmic solution  ONETOUCH DELICA LANCETS 33G MISC  tamsulosin (FLOMAX) 0.4 MG capsule        Problem List:      Patient Active Problem List    Diagnosis Date Noted     CKD (chronic kidney disease) stage 4, GFR 15-29 ml/min (H) 10/21/2021     Priority: Medium     Ureteral mass 02/02/2021     Priority: Medium     Added automatically from request for surgery [id number]       Ureter cancer (H) 01/13/2021     Priority: Medium     Added automatically from request for surgery [id number]       Type 2 diabetes mellitus with hyperglycemia, without long-term current use of insulin (H) 12/11/2020     Priority: Medium     Type 2 diabetes mellitus without complication, without long-term current  use of insulin (H) 07/30/2019     Priority: Medium     History of bladder cancer 02/27/2019     Priority: Medium     Obesity (BMI 35.0-39.9) with comorbidity (H) 09/26/2018     Priority: Medium     ACP (advance care planning) 05/04/2016     Priority: Medium     Advance Care Planning 5/4/2016: ACP Review of Chart / Resources Provided:  Reviewed chart for advance care plan.  Colin Baxter has been provided information and resources to begin or update their advance care plan.  Added by Inés Malloy             History of high risk bladder cancer 09/01/2015     Priority: Medium     Comprehensive Medical Examination 07/06/2015     Priority: Medium     Glaucoma 07/06/2015     Priority: Medium     Hyperlipidemia 08/13/2008     Priority: Medium     Formatting of this note might be different from the original.  IMO Update 10/11          Past Medical History:      Past Medical History:   Diagnosis Date     Benign essential hypertension      Cancer (H)      Diabetes (H)      Dyslipidemia      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) 12/11/2020       Past Surgical History:      Past Surgical History:   Procedure Laterality Date     APPENDECTOMY  1958     COLONOSCOPY  2-     COMBINED CYSTOSCOPY, RETROGRADES, URETEROSCOPY, INSERT STENT Left 1/18/2021    Procedure: CYSTOURETEROSCOPY, WITH RETROGRADE PYELOGRAM with interpretation of fluroscopy, ureteroscopy, ureteral biopsy, bladder biopsy and fulgaration;  Surgeon: Shonda Morrell MD;  [facility]     CYSTOSCOPY N/A 6/16/2021    Procedure: CYSTOSCOPY;  Surgeon: Javier Mcnulty MD;  [facility]     CYSTOSCOPY, BIOPSY BLADDER, COMBINED N/A 9/8/2015    Procedure: COMBINED CYSTOSCOPY, BIOPSY BLADDER;  Surgeon: Randy Martinez MD;  [facility]     CYSTOSCOPY, BIOPSY BLADDER, COMBINED N/A 2/14/2017    Procedure: COMBINED CYSTOSCOPY, BIOPSY BLADDER;  Surgeon: Randy Martinez MD;  [facility]     CYSTOSCOPY, BIOPSY  BLADDER, COMBINED N/A 11/13/2018    Procedure: COMBINED CYSTOSCOPY, BIOPSY BLADDER;  Surgeon: Ed Helm MD;  [facility]     CYSTOSCOPY, BIOPSY BLADDER, COMBINED N/A 11/5/2021    Procedure: CYSTOSCOPY, WITH BLADDER BIOPSY;  Surgeon: Shonda Morrell MD;  [facility]     CYSTOSCOPY, RETROGRADES, COMBINED Bilateral 11/13/2018    Procedure: Bilateral Retrograde Pyelagram, Bladder Biopsy;  Surgeon: Ed Helm MD;  [facility]     CYSTOSCOPY, RETROGRADES, COMBINED Right 11/5/2021    Procedure: CYSTOSCOPY, WITH RETROGRADE PYELOGRAM;  Surgeon: Shonda Morrell MD;  [facility]     CYSTOSCOPY, RETROGRADES, INSERT STENT URETER(S), COMBINED Left 9/8/2015    Procedure: COMBINED CYSTOSCOPY, RETROGRADES, INSERT STENT URETER(S);  Surgeon: Randy Martinez MD;  [facility]     CYSTOSCOPY, TRANSURETHRAL RESECTION (TUR) TUMOR BLADDER, COMBINED N/A 9/8/2015    Procedure: COMBINED CYSTOSCOPY, TRANSURETHRAL RESECTION (TUR) TUMOR BLADDER;  Surgeon: Randy Martinez MD;  [facility]     CYSTOSCOPY, TRANSURETHRAL RESECTION (TUR) TUMOR BLADDER, COMBINED N/A 1/12/2016    Procedure: COMBINED CYSTOSCOPY, TRANSURETHRAL RESECTION (TUR) TUMOR BLADDER;  Surgeon: Randy Martinez MD;  [facility]     CYSTOSCOPY, TRANSURETHRAL RESECTION (TUR) TUMOR BLADDER, COMBINED N/A 9/13/2016    Procedure: COMBINED CYSTOSCOPY, TRANSURETHRAL RESECTION (TUR) TUMOR BLADDER;  Surgeon: Randy Martinez MD;  [facility]     DAVINCI NEPHROURETERECTOMY Left 6/16/2021    Procedure: NEPHROURETERECTOMY, ROBOT-ASSISTED, lymph node dissection;  Surgeon: Javier Mcnulty MD;  [facility]     PHACOEMULSIFICATION WITH STANDARD INTRAOCULAR LENS IMPLANT Right 10/9/2018    Procedure: PHACOEMULSIFICATION WITH STANDARD INTRAOCULAR LENS IMPLANT;  PHACOEMULSIFICATION CATARACT EXTRACTION POSTERIOR CHAMBER LENS RIGHT;  Surgeon: Marlo Mcconnell MD;  [facility]     PHACOEMULSIFICATION WITH  "STANDARD INTRAOCULAR LENS IMPLANT Left 10/23/2018    Procedure: PHACOEMULSIFICATION CATARACT EXTRACTION POSTERIOR CHAMBER LENS LEFT;  Surgeon: Marlo Mcconnell MD;  [facility]       Family History:      Family History   Problem Relation Age of Onset     Diabetes Mother      Parkinsonism Brother      Coronary Artery Disease No family hx of      Hypertension No family hx of      Hyperlipidemia No family hx of      Cerebrovascular Disease No family hx of      Breast Cancer No family hx of      Colon Cancer No family hx of      Prostate Cancer No family hx of      Anesthesia Reaction No family hx of      Asthma No family hx of      Thyroid Disease No family hx of      Genetic Disorder No family hx of        Social History:    Marital Status:   [4]  Social History     Tobacco Use     Smoking status: Former Smoker     Quit date: 1992     Years since quittin.8     Smokeless tobacco: Never Used   Substance Use Topics     Alcohol use: Yes     Comment: rarely     Drug use: No        Review of Systems   All other systems reviewed and are negative.        Physical Exam   Vitals:  /72 (BP Location: Left arm, Patient Position: Chair, Cuff Size: Adult Large)   Pulse 58   Temp 97.1  F (36.2  C) (Tympanic)   Ht 1.702 m (5' 7\")   Wt 77.1 kg (170 lb)   SpO2 99%   BMI 26.63 kg/m        Physical Exam    Surgical Pathology Report                         Case: [id number]                                   Authorizing Provider:  Shonda Morrell MD        Collected:           2021 02:14 PM           Ordering Location:     [facility]     Received:            2021 10:10 AM           Pathologist:           Karly Martin                                                                Specimens:   A) - Other, urinary bladder biopsy, posterior wall                                                   B) - Urinary Bladder, Lateral Wall, Right                                                "   Final Diagnosis   A.  Urinary bladder, posterior wall, biopsy:  -Fragments of urothelial mucosa involved by urothelial carcinoma in-situ.  -See comment.     B.  Urinary bladder, right lateral wall, biopsy:  -Minute detached strip of atypical cells, insufficient for diagnosis.  -Denuded urothelial mucosa with acute and chronic inflammation and granulation tissue formation.   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  We could try another installation of BCG.  Its been 5 years since he has had BCG  his other option would be some intravesical gemcitabine.  We also discussed that some would recommend cystectomy in this situation.  I think I would lean toward trialing some installations since this was a small focus on the posterior wall of carcinoma in situ.  I will reach out to Dr. Clements from oncology as he is also getting an infusion of nivolumab to see whether this is something he can continue to have or if we need to hold this.  We will also see if he has any other suggestions at this time for treatment of recurrent carcinoma in situ.  30 Minutes spent in counseling, coordination of care, documentation.      No follow-ups on file.    Shonda Morrell MD  [facility] - [place] Morrell MD

## 2021-11-18 NOTE — NURSING NOTE
"Chief Complaint   Patient presents with     RECHECK     Follow up-bladder biopsy       Initial /72 (BP Location: Left arm, Patient Position: Chair, Cuff Size: Adult Large)   Pulse 58   Temp 97.1  F (36.2  C) (Tympanic)   Ht 1.702 m (5' 7\")   Wt 77.1 kg (170 lb)   SpO2 99%   BMI 26.63 kg/m   Estimated body mass index is 26.63 kg/m  as calculated from the following:    Height as of this encounter: 1.702 m (5' 7\").    Weight as of this encounter: 77.1 kg (170 lb).  Medication Reconciliation: complete  KIRK RUBIN LPN      Review of Systems:    Weight loss:    No     Recent fever/chills:  No   Night sweats:   No  Current skin rash:  No   Recent hair loss:  No  Heat intolerance:  No   Cold intolerance:  No  Chest pain:   No   Palpitations:   No  Shortness of breath:  No   Wheezing:   No  Constipation:    No   Diarrhea:   No   Nausea:   No   Vomiting:   No   Kidney/side pain:  No   Back pain:   No  Frequent headaches:  No   Dizziness:     No  Leg swelling:   No   Calf pain:    No    Parents, brothers or sisters with history of kidney cancer?   No  Parents, brothers or sisters with history of bladder cancer: No    "

## 2021-11-19 DIAGNOSIS — D09.0 CARCINOMA IN SITU OF BLADDER: ICD-10-CM

## 2021-11-20 DIAGNOSIS — D09.0 CARCINOMA IN SITU OF BLADDER: Primary | ICD-10-CM

## 2021-11-20 RX ORDER — LIDOCAINE HYDROCHLORIDE 20 MG/ML
JELLY TOPICAL ONCE
Status: CANCELLED
Start: 2021-12-28 | End: 2021-12-13

## 2021-11-20 RX ORDER — LIDOCAINE HYDROCHLORIDE 20 MG/ML
JELLY TOPICAL ONCE
Status: CANCELLED
Start: 2022-01-12 | End: 2021-12-20

## 2021-11-20 RX ORDER — LIDOCAINE HYDROCHLORIDE 20 MG/ML
JELLY TOPICAL ONCE
Status: CANCELLED
Start: 2021-12-21 | End: 2021-12-06

## 2021-11-20 RX ORDER — LIDOCAINE HYDROCHLORIDE 20 MG/ML
JELLY TOPICAL ONCE
Status: CANCELLED
Start: 2021-11-22 | End: 2021-11-22

## 2021-11-20 RX ORDER — LIDOCAINE HYDROCHLORIDE 20 MG/ML
JELLY TOPICAL ONCE
Status: CANCELLED
Start: 2021-12-14 | End: 2021-11-29

## 2021-11-20 RX ORDER — LIDOCAINE HYDROCHLORIDE 20 MG/ML
JELLY TOPICAL ONCE
Status: CANCELLED
Start: 2022-01-19 | End: 2021-12-27

## 2021-11-22 ENCOUNTER — TELEPHONE (OUTPATIENT)
Dept: EDUCATION SERVICES | Facility: OTHER | Age: 76
End: 2021-11-22
Payer: COMMERCIAL

## 2021-11-22 NOTE — TELEPHONE ENCOUNTER
Called Colin. He has had some elevated BG readings into the 300s after his therapy treatment on 11/10/21. BGs had been pretty good until then.    Colin is not sure if those treatments will continue as there has been some discussion of starting bladder infusion treaments.    Discussed with Colin that we may need to add some insulin for these higher Bg readings.    Will see Colin in clinic on 11/24/21 at 11:00 am.  
How about I call him and ask him to come in to see me review BGs and check A1C?  
Patient is calling with concerns regarding his glucose readings as they are getting higher. Please call patient back, thank you.  
Patient c/o sore throat and difficulty breathing while laying flat x 1 month. 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

## 2021-11-24 ENCOUNTER — TELEPHONE (OUTPATIENT)
Dept: FAMILY MEDICINE | Facility: OTHER | Age: 76
End: 2021-11-24
Payer: COMMERCIAL

## 2021-11-24 ENCOUNTER — HOSPITAL ENCOUNTER (OUTPATIENT)
Dept: EDUCATION SERVICES | Facility: HOSPITAL | Age: 76
Discharge: HOME | End: 2021-11-24
Attending: NURSE PRACTITIONER | Admitting: NURSE PRACTITIONER
Payer: COMMERCIAL

## 2021-11-24 VITALS
HEART RATE: 81 BPM | BODY MASS INDEX: 26.54 KG/M2 | RESPIRATION RATE: 16 BRPM | SYSTOLIC BLOOD PRESSURE: 155 MMHG | HEIGHT: 67 IN | WEIGHT: 169.1 LBS | OXYGEN SATURATION: 97 % | DIASTOLIC BLOOD PRESSURE: 83 MMHG

## 2021-11-24 DIAGNOSIS — E11.65 TYPE 2 DIABETES MELLITUS WITH HYPERGLYCEMIA, WITHOUT LONG-TERM CURRENT USE OF INSULIN (H): Primary | ICD-10-CM

## 2021-11-24 DIAGNOSIS — E11.9 TYPE 2 DIABETES MELLITUS WITHOUT COMPLICATION, WITHOUT LONG-TERM CURRENT USE OF INSULIN (H): ICD-10-CM

## 2021-11-24 LAB — HBA1C MFR BLD: 7.1 % (ref 0–5.7)

## 2021-11-24 PROCEDURE — G0108 DIAB MANAGE TRN  PER INDIV: HCPCS

## 2021-11-24 PROCEDURE — 83036 HEMOGLOBIN GLYCOSYLATED A1C: CPT | Performed by: NURSE PRACTITIONER

## 2021-11-24 RX ORDER — INSULIN DEGLUDEC 100 U/ML
INJECTION, SOLUTION SUBCUTANEOUS
Qty: 15 ML | Refills: 3 | Status: SHIPPED | OUTPATIENT
Start: 2021-11-24 | End: 2021-12-13

## 2021-11-24 RX ORDER — BLOOD SUGAR DIAGNOSTIC
STRIP MISCELLANEOUS
Qty: 100 STRIP | Refills: 11 | Status: SHIPPED | OUTPATIENT
Start: 2021-11-24 | End: 2022-03-24

## 2021-11-24 ASSESSMENT — ANXIETY QUESTIONNAIRES
5. BEING SO RESTLESS THAT IT IS HARD TO SIT STILL: NOT AT ALL
1. FEELING NERVOUS, ANXIOUS, OR ON EDGE: NOT AT ALL
GAD7 TOTAL SCORE: 2
3. WORRYING TOO MUCH ABOUT DIFFERENT THINGS: NOT AT ALL
4. TROUBLE RELAXING: NOT AT ALL
IF YOU CHECKED OFF ANY PROBLEMS ON THIS QUESTIONNAIRE, HOW DIFFICULT HAVE THESE PROBLEMS MADE IT FOR YOU TO DO YOUR WORK, TAKE CARE OF THINGS AT HOME, OR GET ALONG WITH OTHER PEOPLE: NOT DIFFICULT AT ALL
2. NOT BEING ABLE TO STOP OR CONTROL WORRYING: NOT AT ALL
7. FEELING AFRAID AS IF SOMETHING AWFUL MIGHT HAPPEN: SEVERAL DAYS
6. BECOMING EASILY ANNOYED OR IRRITABLE: SEVERAL DAYS

## 2021-11-24 ASSESSMENT — PAIN SCALES - GENERAL: PAINLEVEL: NO PAIN (0)

## 2021-11-24 ASSESSMENT — MIFFLIN-ST. JEOR: SCORE: 1461.69

## 2021-11-24 NOTE — PATIENT INSTRUCTIONS
Inject 10 units Tresiba daily    Check your BG every AM before food/coffee and 2 hours after evening meal    Call with questions: [phone]    I will call Friday, 11/26, AM and Monday, 11/29, to see what you BG readings are.

## 2021-11-24 NOTE — PROGRESS NOTES
"Diabetes Self-Management Education & Support    Presents for: Follow-up    SUBJECTIVE/OBJECTIVE:  Presents for: Follow-up  Accompanied by: Self  Diabetes education in the past 24mo: Yes  Focus of Visit: Monitoring,Taking Medication  Diabetes type: Type 2  Date of diagnosis: 7/29/19  Disease course: Getting harder to manage (elevated BGs with chemo treatments)  How confident are you filling out medical forms by yourself:: Quite a bit  Diabetes management related comments/concerns: BGs increasing  Transportation concerns: No  Difficulty affording diabetes medication?: No  Difficulty affording diabetes testing supplies?: No  Other concerns:: Glasses  Cultural Influences/Ethnic Background:  American      Diabetes Symptoms & Complications:  Fatigue: Yes  Neuropathy: No  Polydipsia: No  Polyphagia: No  Polyuria: No  Visual change: No  Slow healing wounds: No  Symptom course: Stable  Weight trend: Stable  Complications assessed today?: Yes  Autonomic neuropathy: No  CVA: No  Heart disease: No  Nephropathy: Yes  Peripheral neuropathy: No  Foot ulcerations: No  Peripheral Vascular Disease: No  Retinopathy: No    Patient Problem List and Family Medical History reviewed for relevant medical history, current medical status, and diabetes risk factors.    Vitals:  /89   Pulse 81   Resp 16   Ht 1.695 m (5' 6.75\")   Wt 76.7 kg (169 lb 1.6 oz)   SpO2 97%   BMI 26.68 kg/m    Estimated body mass index is 26.68 kg/m  as calculated from the following:    Height as of this encounter: 1.695 m (5' 6.75\").    Weight as of this encounter: 76.7 kg (169 lb 1.6 oz).   Last 3 BP:   BP Readings from Last 3 Encounters:   11/24/21 158/89   11/18/21 136/72   11/10/21 131/81       History   Smoking Status     Former Smoker     Quit date: 1/6/1992   Smokeless Tobacco     Never Used       Labs:  Lab Results   Component Value Date    A1C 5.8 08/19/2021    A1C 5.7 01/14/2021     Lab Results   Component Value Date     11/10/2021    GLC " 99 07/06/2021     Lab Results   Component Value Date    LDL 75 10/02/2020     HDL Cholesterol   Date Value Ref Range Status   10/02/2020 59 >39 mg/dL Final   ]  GFR Estimate   Date Value Ref Range Status   11/10/2021 27 (L) >60 mL/min/1.73m2 Final     Comment:     As of July 11, 2021, eGFR is calculated by the CKD-EPI creatinine equation, without race adjustment. eGFR can be influenced by muscle mass, exercise, and diet. The reported eGFR is an estimation only and is only applicable if the renal function is stable.   07/06/2021 26 (L) >60 mL/min/[1.73_m2] Final     Comment:     Non  GFR Calc  Starting 12/18/2018, serum creatinine based estimated GFR (eGFR) will be   calculated using the Chronic Kidney Disease Epidemiology Collaboration   (CKD-EPI) equation.       GFR Estimate If Black   Date Value Ref Range Status   07/06/2021 31 (L) >60 mL/min/[1.73_m2] Final     Comment:      GFR Calc  Starting 12/18/2018, serum creatinine based estimated GFR (eGFR) will be   calculated using the Chronic Kidney Disease Epidemiology Collaboration   (CKD-EPI) equation.       Lab Results   Component Value Date    CR 2.30 11/10/2021    CR 2.33 07/06/2021     No results found for: MICROALBUMIN    Healthy Eating:  Healthy Eating Assessed Today: Yes  Cultural/Jehovah's witness diet restrictions?: No  Meal planning/habits: Smaller portions,Avoiding sweets  Meals include: Breakfast,Lunch,Dinner  Breakfast: 1 toast or 1 english muffin, 1-2 eggs, breakfast meat - coffee/cream/splenda or corn flakes  Lunch: 1/2 sandwich, fruit, milk or 1.5 cups chili with corn bread or beef, cheese, milk, coffee - sometimes skips  Dinner: meat, veggies, 1 bread - sometimes salad - occasional starch (1/2 potato)  Snacks: grapes, leftover meat, 1/2 sandwich - Dove ice cream or 1/2 donut  Beverages: Water,Coffee,Milk  Has patient met with a dietitian in the past?: Yes    Being Active:  Being Active Assessed Today: Yes  Exercise::  Currently not exercising  Barrier to exercise: None    Monitoring:  Monitoring Assessed Today: Yes  Did patient bring glucose meter to appointment? : Yes  Blood Glucose Meter: One Touch  Times checking blood sugar at home (number): 1  Times checking blood sugar at home (per): Day  Blood glucose trend: Increasing        Taking Medications:  Diabetes Medication(s)     Insulin       insulin degludec (TRESIBA FLEXTOUCH) 100 UNIT/ML pen    Inject 10 units daily, may titrate up to 40 units daily          Taking Medication Assessed Today: Yes  Current Treatments: Diet  Problems taking diabetes medications regularly?: No  Diabetes medication side effects?: No    Problem Solving:  Problem Solving Assessed Today: Yes  Hypoglycemia Frequency: Never  Patient carries a carbohydrate source: No  Medical ID: No  Does patient have DKA prevention plan?: No  Does patient have severe weather/disaster plan for diabetes management?: No              Reducing Risks:  Reducing Risks Assessed Today: No  Has dilated eye exam at least once a year?: No  Sees dentist every 6 months?: No  Feet checked by healthcare provider in the last year?: No (Due)    Healthy Coping:  Healthy Coping Assessed Today: Yes  Emotional response to diabetes: Ready to learn,Concern for health and well-being  Informal Support system:: Family  Stage of change: MAINTENANCE (Working to maintain change, with risk of relapse)  Support resources: None  Patient Activation Measure Survey Score:  SHARITA Score (Last Two) 9/26/2018   SHARITA Raw Score 30   Activation Score 56   SHARITA Level 3       Diabetes knowledge and skills assessment:   Patient is knowledgeable in diabetes management concepts related to: Healthy Eating, Monitoring and Reducing Risks    Patient needs further education on the following diabetes management concepts: Monitoring and Taking Medication    Based on learning assessment above, most appropriate setting for further diabetes education would be: Individual  setting.      INTERVENTIONS:    Education provided today on:  AADE Self-Care Behaviors:  Monitoring: log and interpret results, individual blood glucose targets and frequency of monitoring  Taking Medication: action of prescribed medication, drawing up, administering and storing injectable diabetes medications, proper site selection and rotation for injections, side effects of prescribed medications and when to take medications    Opportunities for ongoing education and support in diabetes-self management were discussed.    Pt verbalized understanding of concepts discussed and recommendations provided today.       Education Materials Provided:  My Insulin Plan      ASSESSMENT:  Readings range as follows: fastin and post-supper: 174, 200, 377-443  Last week: post-supper: 122, 142-199, 201    Colin was able to perform a return demonstration using a demo pen and injection pad. He asked many pertinent questions and was confident in using the demo pen.    Notified Dr. Tran and Maryann Mccord, NP, regarding elevated BG readings and discussed treatment plan.    Colin is waiting to hear from Dr. Morrell if he is going to have a change in his cancer treatments. Encouraged him to contact Dr. Morrell's nurse.    Patient's most recent   21: A1C 7.1%  Lab Results   Component Value Date    A1C 5.8 2021    A1C 5.7 2021        PLAN  See Patient Instructions for co-developed, patient-stated behavior change goals.  Inject 10 units Tresiba daily    Check your BG every AM before food/coffee and 2 hours after evening meal    Call with questions: [phone]    I will call Friday, , AM and Monday, , to see what you BG readings are.  AVS printed and provided to patient today. See Follow-Up section for recommended follow-up.      Time Spent: 40 minutes  Encounter Type: Individual    Any diabetes medication dose changes were made via the CDE Protocol and Collaborative Practice Agreement with the patient's  primary care provider. A copy of this encounter was shared with the provider.

## 2021-11-25 ASSESSMENT — ANXIETY QUESTIONNAIRES: GAD7 TOTAL SCORE: 2

## 2021-11-26 ENCOUNTER — TELEPHONE (OUTPATIENT)
Dept: EDUCATION SERVICES | Facility: HOSPITAL | Age: 76
End: 2021-11-26
Payer: COMMERCIAL

## 2021-11-26 NOTE — TELEPHONE ENCOUNTER
Called Colin to see how he is doing after the Tresiba 10 units daily start on 21. BG: fastin, 335 and post-supper: 449, 581 (Thanksgiving).    Increased Tresiba to 15 units daily.    Will follow by phone on 21

## 2021-11-27 DIAGNOSIS — E11.9 TYPE 2 DIABETES MELLITUS WITHOUT COMPLICATION, WITHOUT LONG-TERM CURRENT USE OF INSULIN (H): ICD-10-CM

## 2021-11-30 NOTE — TELEPHONE ENCOUNTER
LIPITOR      Last Written Prescription Date:  8-19-21  Last Fill Quantity: 90,   # refills: 0  Last Office Visit: 10-22-21  Future Office visit:    Next 5 appointments (look out 90 days)    Dec 08, 2021  1:30 PM  (Arrive by 1:15 PM)  Return Visit with Sabra Jensen NP  [facility] ([facility] ) [address] [address]  [phone]   Jan 05, 2022  1:30 PM  (Arrive by 1:15 PM)  Return Visit with Sabra Jensen NP  [facility] ([facility] ) [address] [address]  [phone]           Routing refill request to provider for review/approval because:

## 2021-12-01 RX ORDER — ATORVASTATIN CALCIUM 40 MG/1
40 TABLET, FILM COATED ORAL DAILY
Qty: 90 TABLET | Refills: 0 | Status: SHIPPED | OUTPATIENT
Start: 2021-12-01 | End: 2022-03-09

## 2021-12-02 ENCOUNTER — TELEPHONE (OUTPATIENT)
Dept: EDUCATION SERVICES | Facility: HOSPITAL | Age: 76
End: 2021-12-02
Payer: COMMERCIAL

## 2021-12-03 ENCOUNTER — INFUSION THERAPY VISIT (OUTPATIENT)
Dept: INFUSION THERAPY | Facility: OTHER | Age: 76
End: 2021-12-03
Attending: UROLOGY
Payer: COMMERCIAL

## 2021-12-03 VITALS
HEART RATE: 78 BPM | OXYGEN SATURATION: 100 % | RESPIRATION RATE: 16 BRPM | HEIGHT: 67 IN | DIASTOLIC BLOOD PRESSURE: 81 MMHG | WEIGHT: 172.62 LBS | SYSTOLIC BLOOD PRESSURE: 152 MMHG | TEMPERATURE: 96.8 F | BODY MASS INDEX: 27.09 KG/M2

## 2021-12-03 DIAGNOSIS — Z91.199 FAILURE TO ATTEND APPOINTMENT: ICD-10-CM

## 2021-12-03 DIAGNOSIS — D09.0 CARCINOMA IN SITU OF BLADDER: Primary | ICD-10-CM

## 2021-12-03 LAB
ALBUMIN UR-MCNC: 20 MG/DL
APPEARANCE UR: CLEAR
BILIRUB UR QL STRIP: NEGATIVE
COLOR UR AUTO: ABNORMAL
GLUCOSE UR STRIP-MCNC: >1000 MG/DL
HGB UR QL STRIP: ABNORMAL
KETONES UR STRIP-MCNC: NEGATIVE MG/DL
LEUKOCYTE ESTERASE UR QL STRIP: NEGATIVE
MUCOUS THREADS #/AREA URNS LPF: PRESENT /LPF
NITRATE UR QL: NEGATIVE
PH UR STRIP: 5 [PH] (ref 4.7–8)
RBC URINE: 66 /HPF
SP GR UR STRIP: 1.02 (ref 1–1.03)
SQUAMOUS EPITHELIAL: 0 /HPF
UROBILINOGEN UR STRIP-MCNC: NORMAL MG/DL
WBC URINE: 16 /HPF

## 2021-12-03 PROCEDURE — 81001 URINALYSIS AUTO W/SCOPE: CPT | Mod: ZL

## 2021-12-03 PROCEDURE — 87086 URINE CULTURE/COLONY COUNT: CPT | Mod: ZL

## 2021-12-03 ASSESSMENT — MIFFLIN-ST. JEOR: SCORE: 1477.37

## 2021-12-03 NOTE — PROGRESS NOTES
Patient here for bladder instillation of BCG.      Component      Latest Ref Rng & Units 12/3/2021   Color Urine      Colorless, Straw, Light Yellow, Yellow Light Yellow   Appearance Urine      Clear Clear   Glucose Urine      Negative mg/dL >1000 (A)   Bilirubin Urine      Negative Negative   Ketones Urine      Negative mg/dL Negative   Specific Gravity Urine      1.003 - 1.035 1.023   Blood Urine      Negative Large (A)   pH Urine      4.7 - 8.0 5.0   Protein Albumin Urine      Negative mg/dL 20 (A)   Urobilinogen mg/dL      Normal, 2.0 mg/dL Normal   Nitrite Urine      Negative Negative   Leukocyte Esterase Urine      Negative Negative   Mucus Urine      None Seen /LPF Present (A)   RBC Urine      <=2 /HPF 66 (H)   WBC Urine      <=5 /HPF 16 (H)   Squamous Epithelial /HPF Urine      <=1 /HPF 0       UA reported to Dr Morrell.  UA reflexed to culture. VORB no BCG today, wait for culture results, if negative culture reschedule patient for early next week for BCG.    Patient updated on plan of care, verbalizes understanding.  This encounter was opened in error. Please disregard.

## 2021-12-03 NOTE — PATIENT INSTRUCTIONS
Discharge Instructions for Infusion Therapy/Chemotherapy Department:    Your doctor has prescribed the following medication(s) BCG (Bacilus Calmette-Carey) to treat your bladder cancer.    All treatments have potential side effects, but they don't all happen to everyone.  If you have any questions, problems, or concerns, we want you to call us.  You can reach the Infusion Health Unit Coordinator at [phone] Monday - Friday 8:00am to 5:00pm.      *For 6 hours after your treatment, sit when you urinate.  Place 1-2 cups of bleach in the toilet after you have urinated.  Let stand for 15-20 minutes.  Repeat this process each time you urinate for six (6) hours after the procedure.  *Wash your hands thoroughly after going to the bathroom  *Drink 2-3 liters of non caffeinated, non alcoholic beverages following your instillation to flush the bladder out.      After hours, evenings, weekends, and holidays please call Urgent Care [phone] or toll free [phone] or go to your nearest Emergency Department.    When To Contact Your Doctor or Health Care Provider:  Contact your healthcare provider immediately, day or night, if you should experience any of the following symptoms:    Shortness of breath     Confusion     Dizziness or lightheadedness  The following symptoms require medical attention, but are not an emergency. Contact your health care provider within 24 hours of noticing any of the following:    Fever of 39.5 degrees C (103 degrees F) or higher within 24 hours.     Fever of 38.5 degrees C (101 degrees F) or higher after 48 hours.     Blood in the urine.     Extreme fatigue (unable to perform self-care activities).     Fever, chills, malaise, flu-like symptoms, increased fatigue or an increase in urinary symptoms (such as burning or pain on urination) are not uncommon. However, if these increase in severity, or last more than 48 hours let your doctor know.  Always inform your health care provider if  you experience any unusual symptoms.      Before your next treatment, limit your fluid intake for 4 hours before your treatment.  Avoid caffeine containing products for 4-6 hours prior to the treatment and 2 hours afterwards.      ANY questions or problems, PLEASE do not hesitate to call us!!

## 2021-12-05 LAB — BACTERIA UR CULT: NORMAL

## 2021-12-05 RX ORDER — LIDOCAINE HYDROCHLORIDE 20 MG/ML
JELLY TOPICAL ONCE
Status: CANCELLED
Start: 2021-12-07 | End: 2021-12-07

## 2021-12-07 ENCOUNTER — TELEPHONE (OUTPATIENT)
Dept: INFUSION THERAPY | Facility: OTHER | Age: 76
End: 2021-12-07
Payer: COMMERCIAL

## 2021-12-07 NOTE — PROGRESS NOTES
TC to patient to inform that we will check a UA prior to next instillation. Informed on the results of the culture. Confirmed appointment time.

## 2021-12-07 NOTE — TELEPHONE ENCOUNTER
Patient left message inquiring about whether he needed to do a new culture for his bladder infusions, and when.  Routed to Infusion pool for review, as no notes received by this writer as to what patient needs to do.

## 2021-12-08 ENCOUNTER — ONCOLOGY VISIT (OUTPATIENT)
Dept: ONCOLOGY | Facility: OTHER | Age: 76
End: 2021-12-08
Attending: NURSE PRACTITIONER
Payer: COMMERCIAL

## 2021-12-08 ENCOUNTER — INFUSION THERAPY VISIT (OUTPATIENT)
Dept: INFUSION THERAPY | Facility: OTHER | Age: 76
End: 2021-12-08
Attending: INTERNAL MEDICINE
Payer: COMMERCIAL

## 2021-12-08 VITALS — HEART RATE: 65 BPM | SYSTOLIC BLOOD PRESSURE: 150 MMHG | DIASTOLIC BLOOD PRESSURE: 80 MMHG

## 2021-12-08 VITALS
BODY MASS INDEX: 27.44 KG/M2 | SYSTOLIC BLOOD PRESSURE: 135 MMHG | WEIGHT: 174.82 LBS | TEMPERATURE: 96.2 F | RESPIRATION RATE: 20 BRPM | HEIGHT: 67 IN | DIASTOLIC BLOOD PRESSURE: 81 MMHG | HEART RATE: 68 BPM | OXYGEN SATURATION: 99 %

## 2021-12-08 DIAGNOSIS — C66.2 MALIGNANT NEOPLASM OF LEFT URETER (H): Primary | ICD-10-CM

## 2021-12-08 DIAGNOSIS — E11.65 TYPE 2 DIABETES MELLITUS WITH HYPERGLYCEMIA, WITHOUT LONG-TERM CURRENT USE OF INSULIN (H): ICD-10-CM

## 2021-12-08 DIAGNOSIS — N18.4 CKD (CHRONIC KIDNEY DISEASE) STAGE 4, GFR 15-29 ML/MIN (H): ICD-10-CM

## 2021-12-08 DIAGNOSIS — C67.3 MALIGNANT NEOPLASM OF ANTERIOR WALL OF URINARY BLADDER (H): ICD-10-CM

## 2021-12-08 LAB
ALBUMIN SERPL-MCNC: 3.8 G/DL (ref 3.4–5)
ALP SERPL-CCNC: 98 U/L (ref 40–150)
ALT SERPL W P-5'-P-CCNC: 14 U/L (ref 0–70)
ANION GAP SERPL CALCULATED.3IONS-SCNC: 4 MMOL/L (ref 3–14)
AST SERPL W P-5'-P-CCNC: 13 U/L (ref 0–45)
BASOPHILS # BLD AUTO: 0.1 10E3/UL (ref 0–0.2)
BASOPHILS NFR BLD AUTO: 1 %
BILIRUB SERPL-MCNC: 0.4 MG/DL (ref 0.2–1.3)
BUN SERPL-MCNC: 43 MG/DL (ref 7–30)
CALCIUM SERPL-MCNC: 9.1 MG/DL (ref 8.5–10.1)
CHLORIDE BLD-SCNC: 107 MMOL/L (ref 94–109)
CO2 SERPL-SCNC: 26 MMOL/L (ref 20–32)
CREAT SERPL-MCNC: 2.33 MG/DL (ref 0.66–1.25)
EOSINOPHIL # BLD AUTO: 0.2 10E3/UL (ref 0–0.7)
EOSINOPHIL NFR BLD AUTO: 4 %
ERYTHROCYTE [DISTWIDTH] IN BLOOD BY AUTOMATED COUNT: 12.8 % (ref 10–15)
GFR SERPL CREATININE-BSD FRML MDRD: 27 ML/MIN/1.73M2
GLUCOSE BLD-MCNC: 156 MG/DL (ref 70–99)
HCT VFR BLD AUTO: 34.2 % (ref 40–53)
HGB BLD-MCNC: 11.3 G/DL (ref 13.3–17.7)
IMM GRANULOCYTES # BLD: 0 10E3/UL
IMM GRANULOCYTES NFR BLD: 1 %
LYMPHOCYTES # BLD AUTO: 1.8 10E3/UL (ref 0.8–5.3)
LYMPHOCYTES NFR BLD AUTO: 27 %
MCH RBC QN AUTO: 30.6 PG (ref 26.5–33)
MCHC RBC AUTO-ENTMCNC: 33 G/DL (ref 31.5–36.5)
MCV RBC AUTO: 93 FL (ref 78–100)
MONOCYTES # BLD AUTO: 0.7 10E3/UL (ref 0–1.3)
MONOCYTES NFR BLD AUTO: 10 %
NEUTROPHILS # BLD AUTO: 3.9 10E3/UL (ref 1.6–8.3)
NEUTROPHILS NFR BLD AUTO: 57 %
NRBC # BLD AUTO: 0 10E3/UL
NRBC BLD AUTO-RTO: 0 /100
PLATELET # BLD AUTO: 215 10E3/UL (ref 150–450)
POTASSIUM BLD-SCNC: 4.2 MMOL/L (ref 3.4–5.3)
PROT SERPL-MCNC: 8.2 G/DL (ref 6.8–8.8)
RBC # BLD AUTO: 3.69 10E6/UL (ref 4.4–5.9)
SODIUM SERPL-SCNC: 137 MMOL/L (ref 133–144)
TSH SERPL DL<=0.005 MIU/L-ACNC: 1.3 MU/L (ref 0.4–4)
WBC # BLD AUTO: 6.6 10E3/UL (ref 4–11)

## 2021-12-08 PROCEDURE — G0463 HOSPITAL OUTPT CLINIC VISIT: HCPCS

## 2021-12-08 PROCEDURE — 250N000011 HC RX IP 250 OP 636: Performed by: NURSE PRACTITIONER

## 2021-12-08 PROCEDURE — 258N000003 HC RX IP 258 OP 636: Performed by: NURSE PRACTITIONER

## 2021-12-08 PROCEDURE — 84443 ASSAY THYROID STIM HORMONE: CPT | Mod: ZL | Performed by: NURSE PRACTITIONER

## 2021-12-08 PROCEDURE — 82040 ASSAY OF SERUM ALBUMIN: CPT | Mod: ZL | Performed by: NURSE PRACTITIONER

## 2021-12-08 PROCEDURE — 36415 COLL VENOUS BLD VENIPUNCTURE: CPT | Mod: ZL | Performed by: NURSE PRACTITIONER

## 2021-12-08 PROCEDURE — 96413 CHEMO IV INFUSION 1 HR: CPT

## 2021-12-08 PROCEDURE — 99215 OFFICE O/P EST HI 40 MIN: CPT | Performed by: NURSE PRACTITIONER

## 2021-12-08 PROCEDURE — 85025 COMPLETE CBC W/AUTO DIFF WBC: CPT | Mod: ZL

## 2021-12-08 PROCEDURE — G0463 HOSPITAL OUTPT CLINIC VISIT: HCPCS | Mod: 25

## 2021-12-08 RX ORDER — MEPERIDINE HYDROCHLORIDE 25 MG/ML
25 INJECTION INTRAMUSCULAR; INTRAVENOUS; SUBCUTANEOUS EVERY 30 MIN PRN
Status: CANCELLED | OUTPATIENT
Start: 2021-12-08

## 2021-12-08 RX ORDER — NALOXONE HYDROCHLORIDE 0.4 MG/ML
0.2 INJECTION, SOLUTION INTRAMUSCULAR; INTRAVENOUS; SUBCUTANEOUS
Status: CANCELLED | OUTPATIENT
Start: 2021-12-08

## 2021-12-08 RX ORDER — EPINEPHRINE 1 MG/ML
0.3 INJECTION, SOLUTION, CONCENTRATE INTRAVENOUS EVERY 5 MIN PRN
Status: CANCELLED | OUTPATIENT
Start: 2021-12-08

## 2021-12-08 RX ORDER — LORAZEPAM 2 MG/ML
0.5 INJECTION INTRAMUSCULAR EVERY 4 HOURS PRN
Status: CANCELLED | OUTPATIENT
Start: 2021-12-08

## 2021-12-08 RX ORDER — HEPARIN SODIUM,PORCINE 10 UNIT/ML
5 VIAL (ML) INTRAVENOUS
Status: CANCELLED | OUTPATIENT
Start: 2021-12-08

## 2021-12-08 RX ORDER — DIPHENHYDRAMINE HYDROCHLORIDE 50 MG/ML
50 INJECTION INTRAMUSCULAR; INTRAVENOUS
Status: CANCELLED
Start: 2021-12-08

## 2021-12-08 RX ORDER — ALBUTEROL SULFATE 90 UG/1
1-2 AEROSOL, METERED RESPIRATORY (INHALATION)
Status: CANCELLED
Start: 2021-12-08

## 2021-12-08 RX ORDER — ALBUTEROL SULFATE 0.83 MG/ML
2.5 SOLUTION RESPIRATORY (INHALATION)
Status: CANCELLED | OUTPATIENT
Start: 2021-12-08

## 2021-12-08 RX ORDER — METHYLPREDNISOLONE SODIUM SUCCINATE 125 MG/2ML
125 INJECTION, POWDER, LYOPHILIZED, FOR SOLUTION INTRAMUSCULAR; INTRAVENOUS
Status: CANCELLED
Start: 2021-12-08

## 2021-12-08 RX ORDER — HEPARIN SODIUM (PORCINE) LOCK FLUSH IV SOLN 100 UNIT/ML 100 UNIT/ML
5 SOLUTION INTRAVENOUS
Status: CANCELLED | OUTPATIENT
Start: 2021-12-08

## 2021-12-08 RX ADMIN — SODIUM CHLORIDE 480 MG: 9 INJECTION, SOLUTION INTRAVENOUS at 14:50

## 2021-12-08 RX ADMIN — SODIUM CHLORIDE 250 ML: 9 INJECTION, SOLUTION INTRAVENOUS at 14:48

## 2021-12-08 ASSESSMENT — PAIN SCALES - GENERAL: PAINLEVEL: NO PAIN (0)

## 2021-12-08 ASSESSMENT — MIFFLIN-ST. JEOR: SCORE: 1491.63

## 2021-12-08 NOTE — PROGRESS NOTES
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  He completed neoadjuvant chemotherapy on 5/11/2021, then underwent nephroureterectomy 6/16/2021.      He presents to the clinic today telling me that his blood sugars were extremely elevated after his first cycle of therapy.  Patient does have a history of type 2 diabetes and states he was on Metformin but this was discontinued due to his blood sugars being under good control.  He continues to check his blood sugars twice a day and states when he checked his sugars after therapy (he is unsure how long after his first treatment) his fasting blood sugars would be in the 400-500 range.  He was started initially on 10 units and now is up to 24 units in the a.m.  He states his blood sugars are now running anywhere from 140-160.  He also feels he bruises a bit easier since initiating therapy.      States he may have had a little bit of fatigue after his first treatment but is feeling better now.  He is going to be initiating bladder instillations again later this week for recurrence of his bladder cancer.    Oncologic History:     2016 patient was diagnosed with high-grade bladder cancer T1; patient was treated with BCG and then again reinduction BCG then GemCis intravesically x3 in May 2018     1/5/2021 patient was seen by Dr. Morrell for history of hematuria.  He underwent cystoscopy which revealed bladder lesions with biopsy being negative.  Cytology showed atypical urothelial cells.  He underwent CT urogram which showed a mass in the left ureter measuring 2 cm with  no hydronephrosis.  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  2/10/2021  he underwent PET scan that was negative for metastatic disease and then seen by Dr. Mcnulty at the [facility] and scheduled for robotic assisted laparoscopic left nephroureterectomy.    2/15/2021 he was seen by Dr. Bartlett with Medical Oncology to discuss neoadjuvant chemotherapy.  Recommendation was for gemcitabine 1000 mg per metered squared day 1 and day 8 and cisplatin 70 mg per metered squared on day 1 of a 21-day cycle x4 cycles followed by surgery.  5/11/2021 completion of neoadjuvant chemotherapy  6/16/2021 he underwent Cystourethroscopy and Left robot assisted nephroureterectomy with Left pelvic lymph node dissection by Dr. Jorge Mcnulty and Dr. Reyes Romano; pathology showed a multifocal high-grade urothelial cell carcinoma, staged pT3N0  11/10/2021 initiation of nivolumab     Current Chemo Regime/TX: n/a  Current Cycle:  n/a  # of completed cycles: n/a     Previous treatment:  Gemcitabine 1000 mg per metered squared on day 1 and day 8 and cisplatin 56 mg per metered squared day 1 every 21 days    Past Medical History:   Diagnosis Date     Benign essential hypertension      Cancer (H)     Bladder     Carcinoma in situ of bladder 11/19/2021     Diabetes (H)      Dyslipidemia      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) 12/11/2020       Past Surgical History:   Procedure Laterality Date     APPENDECTOMY  1958     COLONOSCOPY  2-     COMBINED CYSTOSCOPY, RETROGRADES, URETEROSCOPY, INSERT STENT Left 1/18/2021    Procedure: CYSTOURETEROSCOPY, WITH RETROGRADE PYELOGRAM with interpretation of fluroscopy, ureteroscopy, ureteral biopsy, bladder biopsy and fulgaration;  Surgeon: Shonda Morrell MD;  Location: [facility]     CYSTOSCOPY N/A 6/16/2021    Procedure: CYSTOSCOPY;  Surgeon: Javier Mcnulty  MD Jorge;  [facility]: [facility]     CYSTOSCOPY, BIOPSY BLADDER, COMBINED N/A 9/8/2015    Procedure: COMBINED CYSTOSCOPY, BIOPSY BLADDER;  Surgeon: Randy Martinez MD;  Location: [facility]     CYSTOSCOPY, BIOPSY BLADDER, COMBINED N/A 2/14/2017    Procedure: COMBINED CYSTOSCOPY, BIOPSY BLADDER;  Surgeon: Randy Martinez MD;  Location: [facility]     CYSTOSCOPY, BIOPSY BLADDER, COMBINED N/A 11/13/2018    Procedure: COMBINED CYSTOSCOPY, BIOPSY BLADDER;  Surgeon: Ed Helm MD;  Location: [facility]     CYSTOSCOPY, BIOPSY BLADDER, COMBINED N/A 11/5/2021    Procedure: CYSTOSCOPY, WITH BLADDER BIOPSY;  Surgeon: Shonda Morrell MD;  Location: [facility]     CYSTOSCOPY, RETROGRADES, COMBINED Bilateral 11/13/2018    Procedure: Bilateral Retrograde Pyelagram, Bladder Biopsy;  Surgeon: Ed Helm MD;  Location: [facility]     CYSTOSCOPY, RETROGRADES, COMBINED Right 11/5/2021    Procedure: CYSTOSCOPY, WITH RETROGRADE PYELOGRAM;  Surgeon: Shonda Morrell MD;  Location: [facility]     CYSTOSCOPY, RETROGRADES, INSERT STENT URETER(S), COMBINED Left 9/8/2015    Procedure: COMBINED CYSTOSCOPY, RETROGRADES, INSERT STENT URETER(S);  Surgeon: Randy Martinez MD;  Location[facility]     CYSTOSCOPY, TRANSURETHRAL RESECTION (TUR) TUMOR BLADDER, COMBINED N/A 9/8/2015    Procedure: COMBINED CYSTOSCOPY, TRANSURETHRAL RESECTION (TUR) TUMOR BLADDER;  Surgeon: Randy Martinez MD;  [facility]     CYSTOSCOPY, TRANSURETHRAL RESECTION (TUR) TUMOR BLADDER, COMBINED N/A 1/12/2016    Procedure: COMBINED CYSTOSCOPY, TRANSURETHRAL RESECTION (TUR) TUMOR BLADDER;  Surgeon: Randy Martinez MD;  [facility]     CYSTOSCOPY, TRANSURETHRAL RESECTION (TUR) TUMOR BLADDER, COMBINED N/A 9/13/2016    Procedure: COMBINED CYSTOSCOPY, TRANSURETHRAL RESECTION (TUR) TUMOR BLADDER;  Surgeon: Randy Martinez MD;  Location: [facility]     DAVINCI NEPHROURETERECTOMY Left 6/16/2021    Procedure: NEPHROURETERECTOMY, ROBOT-ASSISTED, lymph  node dissection;  Surgeon: Javier Mcnulty MD;  [facility]     PHACOEMULSIFICATION WITH STANDARD INTRAOCULAR LENS IMPLANT Right 10/9/2018    Procedure: PHACOEMULSIFICATION WITH STANDARD INTRAOCULAR LENS IMPLANT;  PHACOEMULSIFICATION CATARACT EXTRACTION POSTERIOR CHAMBER LENS RIGHT;  Surgeon: Marlo Mcconnell MD;  Location: [facility]     PHACOEMULSIFICATION WITH STANDARD INTRAOCULAR LENS IMPLANT Left 10/23/2018    Procedure: PHACOEMULSIFICATION CATARACT EXTRACTION POSTERIOR CHAMBER LENS LEFT;  Surgeon: Marlo Mcconnell MD;  Location: [facility]       Family History   Problem Relation Age of Onset     Diabetes Mother      Parkinsonism Brother      Coronary Artery Disease No family hx of      Hypertension No family hx of      Hyperlipidemia No family hx of      Cerebrovascular Disease No family hx of      Breast Cancer No family hx of      Colon Cancer No family hx of      Prostate Cancer No family hx of      Anesthesia Reaction No family hx of      Asthma No family hx of      Thyroid Disease No family hx of      Genetic Disorder No family hx of        Social History     Socioeconomic History     Marital status:      Spouse name: Not on file     Number of children: Not on file     Years of education: Not on file     Highest education level: Not on file   Occupational History     Not on file   Tobacco Use     Smoking status: Former Smoker     Quit date: 1992     Years since quittin.9     Smokeless tobacco: Never Used   Substance and Sexual Activity     Alcohol use: Yes     Comment: rarely     Drug use: No     Sexual activity: Not Currently   Other Topics Concern     Parent/sibling w/ CABG, MI or angioplasty before 65F 55M? No   Social History Narrative     Not on file     Social Determinants of Health     Financial Resource Strain: Not on file   Food Insecurity: Not on file   Transportation Needs: Not on file   Physical Activity: Not on file   Stress: Not on file   Social Connections: Not on file    Intimate Partner Violence: Not on file   Housing Stability: Not on file       Current Outpatient Medications   Medication     acetaminophen (TYLENOL) 325 MG tablet     atorvastatin (LIPITOR) 40 MG tablet     blood glucose (ONETOUCH VERIO IQ) test strip     brimonidine (ALPHAGAN-P) 0.15 % ophthalmic solution     insulin degludec (TRESIBA FLEXTOUCH) 100 UNIT/ML pen     insulin pen needle (32G X 4 MM) 32G X 4 MM miscellaneous     latanoprost (XALATAN) 0.005 % ophthalmic solution     ONETOUCH DELICA LANCETS 33G MISC     tamsulosin (FLOMAX) 0.4 MG capsule     No current facility-administered medications for this visit.        Allergies   Allergen Reactions     No Clinical Screening - See Comments Other (See Comments)     Beta blocker in glaucoma gtt.s caused low pulse and passing out      Timolol      Beta blocker in glaucoma gtt.s caused low pulse and passing out   - patient thinks it was timolol but not 100% sure which beta blocker eye drop.        Review Of Systems:  Constitutional:    denies fever, weight changes, chills, and night sweats.  Eyes:    He has ongoing issues with his right eye being blurry and states this is essentially unchanged and has been ongoing for years, he has not followed up with an eye doctor in over a year  Ears/Nose/Throat:   denies ear pain, nose problems, difficulty swallowing  Respiratory:   denies shortness of breath, cough   Skin:   denies rash, lesions  Cardiovascular:   denies chest pain, palpitations, edema  Gastrointestinal:   denies abdominal pain, bloating, nausea, early satiety; no change in bowel habits or blood in stool  Genitourinary:   denies difficulty with urination, blood in urine  Musculoskeletal:    denies new muscle pain, bone pain  Neurologic:   denies lightheadedness, headaches, numbness or tingling  Hematologic/Lymphatic/Immunologic:   denies easy bleeding, lumps or bumps noted  Endocrine:   Denies increased thirst      ECOG Performance Status: 0    Physical  "Exam:  /81   Pulse 68   Temp (!) 96.2  F (35.7  C) (Tympanic)   Resp 20   Ht 1.702 m (5' 7\")   Wt 79.3 kg (174 lb 13.2 oz)   SpO2 99%   BMI 27.38 kg/m      GENERAL APPEARANCE: Healthy, alert and in no acute distress.  HEENT: Normocephalic, Sclerae of the right eye is erythematous  NECK:   No asymmetry or masses, no thyromegaly.  LYMPHATICS: No palpable cervical, supraclavicular, axillary, or inguinal nodes   RESP: Lungs clear to auscultation bilaterally, respirations regular and easy  CARDIOVASCULAR: Regular rate and rhythm. Normal S1, S2; no murmur, gallop, or rub.  ABDOMEN: Soft, nontender. Bowel sounds auscultated all 4 quadrants. No palpable organomegaly or masses.  MUSCULOSKELETAL: Extremities without gross deformities noted.   NEURO: Alert and oriented x 3.  Gait steady.  PSYCHIATRIC: Mentation and affect appear normal.  Mood appropriate.    Laboratory:  Results for orders placed or performed in visit on 12/08/21   Comprehensive metabolic panel     Status: Abnormal   Result Value Ref Range    Sodium 137 133 - 144 mmol/L    Potassium 4.2 3.4 - 5.3 mmol/L    Chloride 107 94 - 109 mmol/L    Carbon Dioxide (CO2) 26 20 - 32 mmol/L    Anion Gap 4 3 - 14 mmol/L    Urea Nitrogen 43 (H) 7 - 30 mg/dL    Creatinine 2.33 (H) 0.66 - 1.25 mg/dL    Calcium 9.1 8.5 - 10.1 mg/dL    Glucose 156 (H) 70 - 99 mg/dL    Alkaline Phosphatase 98 40 - 150 U/L    AST 13 0 - 45 U/L    ALT 14 0 - 70 U/L    Protein Total 8.2 6.8 - 8.8 g/dL    Albumin 3.8 3.4 - 5.0 g/dL    Bilirubin Total 0.4 0.2 - 1.3 mg/dL    GFR Estimate 27 (L) >60 mL/min/1.73m2   TSH with free T4 reflex     Status: Normal   Result Value Ref Range    TSH 1.30 0.40 - 4.00 mU/L   CBC with platelets and differential     Status: Abnormal   Result Value Ref Range    WBC Count 6.6 4.0 - 11.0 10e3/uL    RBC Count 3.69 (L) 4.40 - 5.90 10e6/uL    Hemoglobin 11.3 (L) 13.3 - 17.7 g/dL    Hematocrit 34.2 (L) 40.0 - 53.0 %    MCV 93 78 - 100 fL    MCH 30.6 26.5 - 33.0 pg "    MCHC 33.0 31.5 - 36.5 g/dL    RDW 12.8 10.0 - 15.0 %    Platelet Count 215 150 - 450 10e3/uL    % Neutrophils 57 %    % Lymphocytes 27 %    % Monocytes 10 %    % Eosinophils 4 %    % Basophils 1 %    % Immature Granulocytes 1 %    NRBCs per 100 WBC 0 <1 /100    Absolute Neutrophils 3.9 1.6 - 8.3 10e3/uL    Absolute Lymphocytes 1.8 0.8 - 5.3 10e3/uL    Absolute Monocytes 0.7 0.0 - 1.3 10e3/uL    Absolute Eosinophils 0.2 0.0 - 0.7 10e3/uL    Absolute Basophils 0.1 0.0 - 0.2 10e3/uL    Absolute Immature Granulocytes 0.0 <=0.4 10e3/uL    Absolute NRBCs 0.0 10e3/uL   CBC with Platelets & Differential     Status: Abnormal    Narrative    The following orders were created for panel order CBC with Platelets & Differential.  Procedure                               Abnormality         Status                     ---------                               -----------         ------                     CBC with platelets and d...[[id number]]  Abnormal            Final result                 Please view results for these tests on the individual orders.       Imaging Studies: None completed for today's visit      ASSESSMENT/PLAN:    1. 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  He completed neoadjuvant chemotherapy then underwent a nephroureterectomy and is now undergoing adjuvant therapy with nivolumab with plan to complete 1 year of therapy.  He will initiate cycle 2 today and follow-up prior to cycle 3 with labs per treatment plan.    2.  Type 2 diabetes mellitus: Patient previously with type 2 diabetes.  Hyperglycemia likely due to immunotherapy. He is currently being managed with Tresiba through the diabetic clinic.  We will continue to monitor.    3.  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  He'll continue to follow with nephrology.    4. bladder cancer: Currently following through urology and will initiate  BCG at the end of this week.    I encouraged patient to call with any questions or concerns.    60 minutes spent in the patient's encounter today with time spent in review of the chart along with in chart preparation.  Time was also spent in review of his treatment plan and and signing of his treatment plan.  Time was also spent obtaining a review of systems and performing a physical exam along with review of all lab work with the patient.  We also spent time in discussion of his current treatment and the likelihood of the immunotherapy causing elevation of his blood sugars.  We spent time in discussion of potential other immune mediated side effects from his current therapy.  Time was spent in discussion of plan for follow-up and in chart documentation.    Sabra Jensen, NP  APRN, FNP-BC, AOCNP

## 2021-12-08 NOTE — NURSING NOTE
"Oncology Rooming Note    December 8, 2021 1:14 PM   Colin Baxter is a 76 year old male who presents for:    Chief Complaint   Patient presents with     Oncology Clinic Visit     Follow up Carcinoma in situ of bladder     Initial Vitals: /81   Pulse 68   Temp (!) 96.2  F (35.7  C) (Tympanic)   Resp 20   Ht 1.702 m (5' 7\")   Wt 79.3 kg (174 lb 13.2 oz)   SpO2 99%   BMI 27.38 kg/m   Estimated body mass index is 27.38 kg/m  as calculated from the following:    Height as of this encounter: 1.702 m (5' 7\").    Weight as of this encounter: 79.3 kg (174 lb 13.2 oz). Body surface area is 1.94 meters squared.  No Pain (0) Comment: Data Unavailable   No LMP for male patient.  Allergies reviewed: Yes  Medications reviewed: Yes    Medications: Medication refills not needed today.  Pharmacy name entered into [facility]: [id number] - [place], [place] - [address]            "

## 2021-12-08 NOTE — PROGRESS NOTES
24 gauge angio cath inserted into LT ARM.  Immediate blood return noted.  IV secured with sterile, transparent dressing and tape.  Patient tolerated well, denies pain or discomfort at this time.  Flushes easily without resistance, no signs or symptoms of infiltration or infection.  3 ml blood wasted and TWO tubes blood removed for ordered labs. Flushed with 3mL normal saline to clear line. Patient denies questions or concerns regarding infusion and/or medication(s) being administered.

## 2021-12-08 NOTE — PATIENT INSTRUCTIONS

## 2021-12-08 NOTE — PROGRESS NOTES
Patient is a 76 year old here accompanied by self today for infusion of nivolumab under the orders of Dr. Bartlett.     Component      Latest Ref Rng & Units 12/8/2021   WBC      4.0 - 11.0 10e3/uL 6.6   RBC Count      4.40 - 5.90 10e6/uL 3.69 (L)   Hemoglobin      13.3 - 17.7 g/dL 11.3 (L)   Hematocrit      40.0 - 53.0 % 34.2 (L)   MCV      78 - 100 fL 93   MCH      26.5 - 33.0 pg 30.6   MCHC      31.5 - 36.5 g/dL 33.0   RDW      10.0 - 15.0 % 12.8   Platelet Count      150 - 450 10e3/uL 215   % Neutrophils      % 57   % Lymphocytes      % 27   % Monocytes      % 10   % Eosinophils      % 4   % Basophils      % 1   % Immature Granulocytes      % 1   NRBCs per 100 WBC      <1 /100 0   Absolute Neutrophils      1.6 - 8.3 10e3/uL 3.9   Absolute Lymphocytes      0.8 - 5.3 10e3/uL 1.8   Absolute Monocytes      0.0 - 1.3 10e3/uL 0.7   Absolute Eosinophils      0.0 - 0.7 10e3/uL 0.2   Absolute Basophils      0.0 - 0.2 10e3/uL 0.1   Absolute Immature Granulocytes      <=0.4 10e3/uL 0.0   Absolute NRBCs      10e3/uL 0.0   Sodium      133 - 144 mmol/L 137   Potassium      3.4 - 5.3 mmol/L 4.2   Chloride      94 - 109 mmol/L 107   Carbon Dioxide      20 - 32 mmol/L 26   Anion Gap      3 - 14 mmol/L 4   Urea Nitrogen      7 - 30 mg/dL 43 (H)   Creatinine      0.66 - 1.25 mg/dL 2.33 (H)   Calcium      8.5 - 10.1 mg/dL 9.1   Glucose      70 - 99 mg/dL 156 (H)   Alkaline Phosphatase      40 - 150 U/L 98   AST      0 - 45 U/L 13   ALT      0 - 70 U/L 14   Protein Total      6.8 - 8.8 g/dL 8.2   Albumin      3.4 - 5.0 g/dL 3.8   Bilirubin Total      0.2 - 1.3 mg/dL 0.4   GFR Estimate      >60 mL/min/1.73m2 27 (L)   TSH      0.40 - 4.00 mU/L 1.30       Nivolumab dose verified with ASAEL Jonas RN prior to release of drug.     Patient meets parameters for today's infusion.  Denies questions or concerns regarding today's infusion and/or medications being administered.      Patient identified with two identifiers, order verified, and verbal  consent for today's infusion obtained from patient.    1450 IV pump verified with Nivolumab dose, drug, and rate of administration. Infusion administered per protocol. Patient tolerated infusion well, no signs or symptoms of adverse reaction noted. Patient denies pain nor discomfort.     Needle removed, tip intact. Site clean, dry and intact. Covered with a sterile bandage, slight pressure applied for 30 seconds. Pt instructed to leave bandage intact for a minimum of one hour, and to call with questions or concerns. Patient states understanding, discharged ambulatory.

## 2021-12-08 NOTE — PATIENT INSTRUCTIONS
We would like to see you back per your schedule.    When you are in need of a refill, please call your pharmacy and they will send us a request.     If you have any questions please call [phone]    Other instructions:  none

## 2021-12-09 DIAGNOSIS — Q60.0 SOLITARY KIDNEY, CONGENITAL: Primary | ICD-10-CM

## 2021-12-10 ENCOUNTER — INFUSION THERAPY VISIT (OUTPATIENT)
Dept: INFUSION THERAPY | Facility: OTHER | Age: 76
End: 2021-12-10
Attending: UROLOGY
Payer: COMMERCIAL

## 2021-12-10 VITALS
WEIGHT: 174.16 LBS | DIASTOLIC BLOOD PRESSURE: 78 MMHG | TEMPERATURE: 97.3 F | SYSTOLIC BLOOD PRESSURE: 132 MMHG | HEART RATE: 67 BPM | BODY MASS INDEX: 27.28 KG/M2 | OXYGEN SATURATION: 97 %

## 2021-12-10 DIAGNOSIS — D09.0 CARCINOMA IN SITU OF BLADDER: Primary | ICD-10-CM

## 2021-12-10 LAB
ALBUMIN UR-MCNC: 10 MG/DL
APPEARANCE UR: CLEAR
BILIRUB UR QL STRIP: NEGATIVE
COLOR UR AUTO: ABNORMAL
GLUCOSE UR STRIP-MCNC: >1000 MG/DL
HGB UR QL STRIP: ABNORMAL
KETONES UR STRIP-MCNC: NEGATIVE MG/DL
LEUKOCYTE ESTERASE UR QL STRIP: NEGATIVE
MUCOUS THREADS #/AREA URNS LPF: PRESENT /LPF
NITRATE UR QL: NEGATIVE
PH UR STRIP: 5 [PH] (ref 4.7–8)
RBC URINE: 47 /HPF
SP GR UR STRIP: 1.02 (ref 1–1.03)
SQUAMOUS EPITHELIAL: 0 /HPF
UROBILINOGEN UR STRIP-MCNC: NORMAL MG/DL
WBC URINE: 4 /HPF

## 2021-12-10 PROCEDURE — 51720 TREATMENT OF BLADDER LESION: CPT

## 2021-12-10 PROCEDURE — 51702 INSERT TEMP BLADDER CATH: CPT

## 2021-12-10 PROCEDURE — 81001 URINALYSIS AUTO W/SCOPE: CPT | Mod: ZL

## 2021-12-10 PROCEDURE — 250N000011 HC RX IP 250 OP 636: Performed by: UROLOGY

## 2021-12-10 PROCEDURE — 250N000009 HC RX 250: Performed by: UROLOGY

## 2021-12-10 RX ORDER — LIDOCAINE HYDROCHLORIDE 20 MG/ML
JELLY TOPICAL ONCE
Status: COMPLETED | OUTPATIENT
Start: 2021-12-10 | End: 2021-12-10

## 2021-12-10 RX ADMIN — LIDOCAINE HYDROCHLORIDE: 20 JELLY TOPICAL at 09:30

## 2021-12-10 RX ADMIN — BACILLUS CALMETTE-GUERIN 50 MG: 50 POWDER, FOR SUSPENSION INTRAVESICAL at 09:46

## 2021-12-10 ASSESSMENT — PAIN SCALES - GENERAL: PAINLEVEL: MODERATE PAIN (4)

## 2021-12-10 NOTE — PROGRESS NOTES
76 year old Patient here for bladder instillation of BCG.  UA negative for nitrates, negative  for leukocytes.  Denies any fever or chills.  Denies any pain with urination.  Results reviewed, labs met treatment parameters.  Proceed with treatment.  14 [place]gu[place]an catheter placed, sterile technique.  Allowed bladder to empty 50ccs of urine returned.  BCG  instillation via catheter, tolerated well, Catheter removed.  Patient opted to return home to turn every 15 minutes.  Encouraged to drink plenty of fluids for next couple of days.  Discharged in care of self.  Patient will return 1 week  for next appointment.

## 2021-12-10 NOTE — PROGRESS NOTES
Call to Dr. Morrell today regarding Urine analysis results and she would like to proceed with treatment.

## 2021-12-10 NOTE — PROGRESS NOTES
16 Malay catheter placed sterile technique.  Allowed bladder to empty a few drops of urine returned.

## 2021-12-13 ENCOUNTER — TELEPHONE (OUTPATIENT)
Dept: EDUCATION SERVICES | Facility: HOSPITAL | Age: 76
End: 2021-12-13
Payer: COMMERCIAL

## 2021-12-13 DIAGNOSIS — E11.65 TYPE 2 DIABETES MELLITUS WITH HYPERGLYCEMIA, WITHOUT LONG-TERM CURRENT USE OF INSULIN (H): ICD-10-CM

## 2021-12-13 RX ORDER — INSULIN DEGLUDEC 100 U/ML
INJECTION, SOLUTION SUBCUTANEOUS
Qty: 15 ML | Refills: 3 | Status: SHIPPED | OUTPATIENT
Start: 2021-12-13 | End: 2021-12-31

## 2021-12-13 NOTE — TELEPHONE ENCOUNTER
Called Colin. States he is on his way to  an antibiotic as he believes that he has a tooth infection. He has an appointment with his dentist a week from today. States his BG was doing fairly well at the beginning of last week with BG in 140s to 150s, but mid-week his BG began to increase into the 200s and yesterday and this AM BG has been in the 400s.    Increased Tresiba to 28 units daily.    He did receive his cancer treatment on 12/10/21. States BG started increase before that.    Will follow again with Colin again on 12/15/21 by phone.

## 2021-12-16 ENCOUNTER — INFUSION THERAPY VISIT (OUTPATIENT)
Dept: INFUSION THERAPY | Facility: OTHER | Age: 76
End: 2021-12-16
Attending: UROLOGY
Payer: COMMERCIAL

## 2021-12-16 VITALS
SYSTOLIC BLOOD PRESSURE: 121 MMHG | HEART RATE: 76 BPM | RESPIRATION RATE: 18 BRPM | BODY MASS INDEX: 26.71 KG/M2 | OXYGEN SATURATION: 99 % | HEIGHT: 67 IN | DIASTOLIC BLOOD PRESSURE: 74 MMHG | TEMPERATURE: 96.8 F | WEIGHT: 170.19 LBS

## 2021-12-16 DIAGNOSIS — D09.0 CARCINOMA IN SITU OF BLADDER: Primary | ICD-10-CM

## 2021-12-16 DIAGNOSIS — E11.9 TYPE 2 DIABETES MELLITUS WITHOUT COMPLICATION, WITHOUT LONG-TERM CURRENT USE OF INSULIN (H): ICD-10-CM

## 2021-12-16 LAB
ALBUMIN UR-MCNC: 100 MG/DL
APPEARANCE UR: ABNORMAL
BACTERIA #/AREA URNS HPF: ABNORMAL /HPF
BILIRUB UR QL STRIP: NEGATIVE
COLOR UR AUTO: YELLOW
GLUCOSE UR STRIP-MCNC: 200 MG/DL
HGB UR QL STRIP: ABNORMAL
KETONES UR STRIP-MCNC: NEGATIVE MG/DL
LEUKOCYTE ESTERASE UR QL STRIP: ABNORMAL
MUCOUS THREADS #/AREA URNS LPF: PRESENT /LPF
NITRATE UR QL: NEGATIVE
PH UR STRIP: 5.5 [PH] (ref 4.7–8)
RBC URINE: 101 /HPF
RENAL TUB EPI: 1 /HPF
SP GR UR STRIP: 1.02 (ref 1–1.03)
SQUAMOUS EPITHELIAL: 0 /HPF
UROBILINOGEN UR STRIP-MCNC: NORMAL MG/DL
WBC URINE: 53 /HPF

## 2021-12-16 PROCEDURE — 51720 TREATMENT OF BLADDER LESION: CPT

## 2021-12-16 PROCEDURE — 81001 URINALYSIS AUTO W/SCOPE: CPT | Mod: ZL

## 2021-12-16 PROCEDURE — 87086 URINE CULTURE/COLONY COUNT: CPT | Mod: ZL,GZ

## 2021-12-16 PROCEDURE — 250N000011 HC RX IP 250 OP 636: Performed by: UROLOGY

## 2021-12-16 PROCEDURE — 250N000009 HC RX 250: Performed by: UROLOGY

## 2021-12-16 PROCEDURE — 51702 INSERT TEMP BLADDER CATH: CPT

## 2021-12-16 RX ORDER — LIDOCAINE HYDROCHLORIDE 20 MG/ML
JELLY TOPICAL ONCE
Status: COMPLETED | OUTPATIENT
Start: 2021-12-16 | End: 2021-12-16

## 2021-12-16 RX ADMIN — BACILLUS CALMETTE-GUERIN 50 MG: 50 POWDER, FOR SUSPENSION INTRAVESICAL at 09:30

## 2021-12-16 RX ADMIN — LIDOCAINE HYDROCHLORIDE: 20 JELLY TOPICAL at 09:20

## 2021-12-16 ASSESSMENT — MIFFLIN-ST. JEOR: SCORE: 1470.75

## 2021-12-16 NOTE — PATIENT INSTRUCTIONS
Thank you for scheduling your appointment with us today. If you have any questions or concerns related to procedure/medication at today's visit, please contact your primary care provider, or the provider who ordered today's procedure/medication. If you have any questions related to scheduling with our unit, or if you are having trouble getting in contact with your ordering provider, we can be reached at [phone].     Discharge Instructions for Infusion Therapy/Chemotherapy Department:    Your doctor has prescribed the following medication(s) BCG (Bacilus Calmette-Carey) to treat your bladder cancer.    All treatments have potential side effects, but they don't all happen to everyone.  If you have any questions, problems, or concerns, we want you to call us.  You can reach the Infusion Health Unit Coordinator at [phone] Monday - Friday 8:00am to 5:00pm.      *For 6 hours after your treatment, sit when you urinate.  Place 1-2 cups of bleach in the toilet after you have urinated.  Let stand for 15-20 minutes.  Repeat this process each time you urinate for six (6) hours after the procedure.  *Wash your hands thoroughly after going to the bathroom  *Drink 2-3 liters of non caffeinated, non alcoholic beverages following your instillation to flush the bladder out.      After hours, evenings, weekends, and holidays please call Urgent Care [phone] or toll free [phone] or go to your nearest Emergency Department.    When To Contact Your Doctor or Health Care Provider:  Contact your healthcare provider immediately, day or night, if you should experience any of the following symptoms:    Shortness of breath     Confusion     Dizziness or lightheadedness  The following symptoms require medical attention, but are not an emergency. Contact your health care provider within 24 hours of noticing any of the following:    Fever of 39.5 degrees C (103 degrees F) or higher within 24 hours.     Fever of 38.5  degrees C (101 degrees F) or higher after 48 hours.     Blood in the urine.     Extreme fatigue (unable to perform self-care activities).     Fever, chills, malaise, flu-like symptoms, increased fatigue or an increase in urinary symptoms (such as burning or pain on urination) are not uncommon. However, if these increase in severity, or last more than 48 hours let your doctor know.  Always inform your health care provider if you experience any unusual symptoms.      Before your next treatment, limit your fluid intake for 4 hours before your treatment.  Avoid caffeine containing products for 4-6 hours prior to the treatment and 2 hours afterwards.      ANY questions or problems, PLEASE do not hesitate to call us!!

## 2021-12-16 NOTE — PROGRESS NOTES
Patient here for bladder instillation of BCG.    UA negative for nitrates, no visible hematuria.    Labs reviewed with PRINCESS Perla to proceed with BCG today.  Denies any fever or chills.    Denies any pain with urination.    Results reviewed, labs met treatment parameters.  Proceed with treatment.    14 Greek catheter placed, sterile technique, by LINDA Leung LPN  Allowed bladder to empty 50ccs of urine returned.    0942 BCG instillation via catheter, tolerated well, catheter removed.    Patient opted to return home to turn every 15 minutes for 2 hours.  Encouraged to drink plenty of fluids for the next couple of days.  Discharged in care of self.  Patient will return in 1 week for next BCG appt.

## 2021-12-17 RX ORDER — LISINOPRIL 10 MG/1
10 TABLET ORAL DAILY
Qty: 90 TABLET | Refills: 0 | Status: SHIPPED | OUTPATIENT
Start: 2021-12-17 | End: 2022-02-02

## 2021-12-17 NOTE — TELEPHONE ENCOUNTER
Lisinopril      Last Written Prescription Date:  0  Last Fill Quantity: 0,   # refills: 0  Last Office Visit: 10/22/21  Future Office visit:    Next 5 appointments (look out 90 days)    Jan 05, 2022  1:30 PM  (Arrive by 1:15 PM)  Return Visit with Sabra Jensen NP  [facility] ([facility] ) [address] [place] [id number]  [phone]           Routing refill request to provider for review/approval because:  Drug not active on patient's medication list

## 2021-12-18 LAB — BACTERIA UR CULT: NO GROWTH

## 2021-12-23 ENCOUNTER — INFUSION THERAPY VISIT (OUTPATIENT)
Dept: INFUSION THERAPY | Facility: OTHER | Age: 76
End: 2021-12-23
Attending: INTERNAL MEDICINE
Payer: COMMERCIAL

## 2021-12-23 VITALS — TEMPERATURE: 96.8 F

## 2021-12-23 DIAGNOSIS — D09.0 CARCINOMA IN SITU OF BLADDER: Primary | ICD-10-CM

## 2021-12-23 LAB
ALBUMIN UR-MCNC: 50 MG/DL
APPEARANCE UR: CLEAR
BACTERIA #/AREA URNS HPF: ABNORMAL /HPF
BILIRUB UR QL STRIP: NEGATIVE
COLOR UR AUTO: ABNORMAL
GLUCOSE UR STRIP-MCNC: >1000 MG/DL
HGB UR QL STRIP: ABNORMAL
KETONES UR STRIP-MCNC: NEGATIVE MG/DL
LEUKOCYTE ESTERASE UR QL STRIP: NEGATIVE
MUCOUS THREADS #/AREA URNS LPF: PRESENT /LPF
NITRATE UR QL: NEGATIVE
PH UR STRIP: 5.5 [PH] (ref 4.7–8)
RBC URINE: 54 /HPF
SP GR UR STRIP: 1.02 (ref 1–1.03)
SQUAMOUS EPITHELIAL: 0 /HPF
UROBILINOGEN UR STRIP-MCNC: NORMAL MG/DL
WBC URINE: 23 /HPF

## 2021-12-23 PROCEDURE — 87086 URINE CULTURE/COLONY COUNT: CPT | Mod: ZL,GZ

## 2021-12-23 PROCEDURE — 81001 URINALYSIS AUTO W/SCOPE: CPT | Mod: ZL

## 2021-12-23 PROCEDURE — 250N000009 HC RX 250: Performed by: UROLOGY

## 2021-12-23 PROCEDURE — 51720 TREATMENT OF BLADDER LESION: CPT

## 2021-12-23 PROCEDURE — 250N000011 HC RX IP 250 OP 636: Performed by: UROLOGY

## 2021-12-23 RX ORDER — LIDOCAINE HYDROCHLORIDE 20 MG/ML
JELLY TOPICAL ONCE
Status: COMPLETED | OUTPATIENT
Start: 2021-12-23 | End: 2021-12-23

## 2021-12-23 RX ADMIN — BACILLUS CALMETTE-GUERIN 50 MG: 50 POWDER, FOR SUSPENSION INTRAVESICAL at 14:06

## 2021-12-23 RX ADMIN — LIDOCAINE HYDROCHLORIDE: 20 JELLY TOPICAL at 13:56

## 2021-12-23 NOTE — PROGRESS NOTES
Patient here for bladder instillation of BCG.  UA negative for nitrates, Negative for leukocytes.  Denies any fever or chills.  Denies any pain with urination.  Results reviewed, labs met treatment parameters. BCG instillation via catheter, tolerated well, catheter removed.  Patient instructed to return immediately home and turn every 15 minutes per protocol, tolerated well.  Offered no complaints.  VS stable Encouraged to drink plenty of fluids for next couple of days.  Discharged in care of self.  Patient will return one  for next appointment.

## 2021-12-25 LAB — BACTERIA UR CULT: NO GROWTH

## 2021-12-29 ENCOUNTER — INFUSION THERAPY VISIT (OUTPATIENT)
Dept: INFUSION THERAPY | Facility: OTHER | Age: 76
End: 2021-12-29
Attending: INTERNAL MEDICINE
Payer: COMMERCIAL

## 2021-12-29 VITALS
RESPIRATION RATE: 18 BRPM | BODY MASS INDEX: 27.68 KG/M2 | SYSTOLIC BLOOD PRESSURE: 133 MMHG | DIASTOLIC BLOOD PRESSURE: 72 MMHG | HEIGHT: 67 IN | HEART RATE: 78 BPM | WEIGHT: 176.37 LBS | TEMPERATURE: 97.1 F | OXYGEN SATURATION: 98 %

## 2021-12-29 DIAGNOSIS — D09.0 CARCINOMA IN SITU OF BLADDER: Primary | ICD-10-CM

## 2021-12-29 DIAGNOSIS — E11.65 TYPE 2 DIABETES MELLITUS WITH HYPERGLYCEMIA, WITHOUT LONG-TERM CURRENT USE OF INSULIN (H): ICD-10-CM

## 2021-12-29 DIAGNOSIS — E11.9 TYPE 2 DIABETES MELLITUS WITHOUT COMPLICATION, WITHOUT LONG-TERM CURRENT USE OF INSULIN (H): ICD-10-CM

## 2021-12-29 LAB
ALBUMIN UR-MCNC: 30 MG/DL
APPEARANCE UR: CLEAR
BILIRUB UR QL STRIP: NEGATIVE
COLOR UR AUTO: ABNORMAL
GLUCOSE BLDC GLUCOMTR-MCNC: 49 MG/DL (ref 70–99)
GLUCOSE UR STRIP-MCNC: 30 MG/DL
HGB UR QL STRIP: ABNORMAL
KETONES UR STRIP-MCNC: NEGATIVE MG/DL
LEUKOCYTE ESTERASE UR QL STRIP: NEGATIVE
MUCOUS THREADS #/AREA URNS LPF: PRESENT /LPF
NITRATE UR QL: NEGATIVE
PH UR STRIP: 5.5 [PH] (ref 4.7–8)
RBC URINE: 14 /HPF
SP GR UR STRIP: 1.02 (ref 1–1.03)
SQUAMOUS EPITHELIAL: 0 /HPF
UROBILINOGEN UR STRIP-MCNC: NORMAL MG/DL
WBC URINE: 6 /HPF

## 2021-12-29 PROCEDURE — 81001 URINALYSIS AUTO W/SCOPE: CPT | Mod: ZL

## 2021-12-29 PROCEDURE — 82962 GLUCOSE BLOOD TEST: CPT | Mod: ZL

## 2021-12-29 ASSESSMENT — MIFFLIN-ST. JEOR: SCORE: 1498.75

## 2021-12-29 NOTE — PROGRESS NOTES
Patient reports to facility today for BCG, patient reported possible low blood glucose.  Patient shaky, sweaty.  Patient given 8 ounces of Orange juice and 3 pkts of grahmn crackers.  TC to cardiac rehab to see if they could take a blood glucose via finger prick, they said they could with an order from provider.  Spoke to Arabella Castillo NP and she agreed to sign order, patient blood glucose 49 and trending downward.  Reported to provider who stated patient should be given substance and stabilized.  Will hold patient BCG this week and have him return next week.  Informed patient and explained that since he is alert and able to eat and drink having food through substance is best option as giving glucose will raise blood sugars two quickly and will possibly do more harm.  Patient agrees to have lunch and stabilize on unit.  Lunch ordered .  Patient discharged after eating lunch and verbalizing feeling stable.

## 2021-12-31 ENCOUNTER — TELEPHONE (OUTPATIENT)
Dept: EDUCATION SERVICES | Facility: HOSPITAL | Age: 76
End: 2021-12-31
Payer: COMMERCIAL

## 2021-12-31 DIAGNOSIS — E11.65 TYPE 2 DIABETES MELLITUS WITH HYPERGLYCEMIA, WITHOUT LONG-TERM CURRENT USE OF INSULIN (H): ICD-10-CM

## 2021-12-31 RX ORDER — INSULIN DEGLUDEC 100 U/ML
INJECTION, SOLUTION SUBCUTANEOUS
Qty: 15 ML | Refills: 3 | Status: SHIPPED | OUTPATIENT
Start: 2021-12-31 | End: 2022-01-01

## 2021-12-31 NOTE — TELEPHONE ENCOUNTER
Called Colin. He has now been taking Tresiba 20 units daily since Monday and 24 units since 12/21 as we have been decreasing his Tresiba over the last 2 weeks due to decreasing FBG as he took the antibiotics and finished them.    Most recent BG readings:              AM       Post-supper  Monday 78 146  Tuesday 78 89  Wednesday 127 224  Thursday 156    Colin reports having a low BG when he reported yesterday for his treatment. He had been busy throughout the morning removing snow, had no lunch then went to his appointment.    Will follow by phone next week.

## 2022-01-01 ENCOUNTER — TELEPHONE (OUTPATIENT)
Dept: EDUCATION SERVICES | Facility: OTHER | Age: 77
End: 2022-01-01

## 2022-01-01 ENCOUNTER — TELEPHONE (OUTPATIENT)
Dept: EDUCATION SERVICES | Facility: HOSPITAL | Age: 77
End: 2022-01-01

## 2022-01-01 ENCOUNTER — HOSPITAL ENCOUNTER (OUTPATIENT)
Dept: EDUCATION SERVICES | Facility: HOSPITAL | Age: 77
Discharge: HOME | End: 2022-08-26
Attending: NURSE PRACTITIONER
Payer: COMMERCIAL

## 2022-01-01 ENCOUNTER — VIRTUAL VISIT (OUTPATIENT)
Dept: PHARMACY | Facility: PHYSICIAN GROUP | Age: 77
End: 2022-01-01
Payer: COMMERCIAL

## 2022-01-01 ENCOUNTER — OFFICE VISIT (OUTPATIENT)
Dept: FAMILY MEDICINE | Facility: OTHER | Age: 77
End: 2022-01-01
Attending: FAMILY MEDICINE
Payer: COMMERCIAL

## 2022-01-01 ENCOUNTER — LAB (OUTPATIENT)
Dept: LAB | Facility: OTHER | Age: 77
End: 2022-01-01
Attending: FAMILY MEDICINE
Payer: COMMERCIAL

## 2022-01-01 ENCOUNTER — INFUSION THERAPY VISIT (OUTPATIENT)
Dept: INFUSION THERAPY | Facility: OTHER | Age: 77
End: 2022-01-01
Attending: UROLOGY
Payer: COMMERCIAL

## 2022-01-01 ENCOUNTER — APPOINTMENT (OUTPATIENT)
Dept: OCCUPATIONAL THERAPY | Facility: HOSPITAL | Age: 77
DRG: 394 | End: 2022-01-01
Payer: COMMERCIAL

## 2022-01-01 ENCOUNTER — TELEPHONE (OUTPATIENT)
Dept: FAMILY MEDICINE | Facility: OTHER | Age: 77
End: 2022-01-01

## 2022-01-01 ENCOUNTER — APPOINTMENT (OUTPATIENT)
Dept: PHYSICAL THERAPY | Facility: HOSPITAL | Age: 77
DRG: 394 | End: 2022-01-01
Payer: COMMERCIAL

## 2022-01-01 ENCOUNTER — NURSE TRIAGE (OUTPATIENT)
Dept: FAMILY MEDICINE | Facility: OTHER | Age: 77
End: 2022-01-01

## 2022-01-01 ENCOUNTER — HOSPITAL ENCOUNTER (OUTPATIENT)
Dept: CT IMAGING | Facility: HOSPITAL | Age: 77
Discharge: HOME | End: 2022-10-11
Attending: FAMILY MEDICINE
Payer: COMMERCIAL

## 2022-01-01 ENCOUNTER — TELEPHONE (OUTPATIENT)
Dept: CARE COORDINATION | Facility: OTHER | Age: 77
End: 2022-01-01

## 2022-01-01 ENCOUNTER — ANESTHESIA EVENT (OUTPATIENT)
Dept: SURGERY | Facility: HOSPITAL | Age: 77
End: 2022-01-01
Payer: COMMERCIAL

## 2022-01-01 ENCOUNTER — LAB (OUTPATIENT)
Dept: LAB | Facility: OTHER | Age: 77
End: 2022-01-01
Payer: COMMERCIAL

## 2022-01-01 ENCOUNTER — APPOINTMENT (OUTPATIENT)
Dept: OCCUPATIONAL THERAPY | Facility: HOSPITAL | Age: 77
DRG: 394 | End: 2022-01-01
Attending: FAMILY MEDICINE
Payer: COMMERCIAL

## 2022-01-01 ENCOUNTER — APPOINTMENT (OUTPATIENT)
Dept: PHYSICAL THERAPY | Facility: HOSPITAL | Age: 77
DRG: 394 | End: 2022-01-01
Attending: FAMILY MEDICINE
Payer: COMMERCIAL

## 2022-01-01 ENCOUNTER — PATIENT OUTREACH (OUTPATIENT)
Dept: EDUCATION SERVICES | Facility: HOSPITAL | Age: 77
End: 2022-01-01

## 2022-01-01 ENCOUNTER — HOSPITAL ENCOUNTER (EMERGENCY)
Facility: HOSPITAL | Age: 77
Discharge: HOME | End: 2022-08-29
Attending: EMERGENCY MEDICINE | Admitting: EMERGENCY MEDICINE
Payer: COMMERCIAL

## 2022-01-01 ENCOUNTER — HOSPITAL ENCOUNTER (OUTPATIENT)
Dept: EDUCATION SERVICES | Facility: HOSPITAL | Age: 77
Discharge: HOME | End: 2022-11-17
Attending: NURSE PRACTITIONER | Admitting: NURSE PRACTITIONER
Payer: COMMERCIAL

## 2022-01-01 ENCOUNTER — ANESTHESIA EVENT (OUTPATIENT)
Dept: SURGERY | Facility: HOSPITAL | Age: 77
DRG: 394 | End: 2022-01-01
Payer: COMMERCIAL

## 2022-01-01 ENCOUNTER — APPOINTMENT (OUTPATIENT)
Dept: CT IMAGING | Facility: HOSPITAL | Age: 77
DRG: 394 | End: 2022-01-01
Attending: EMERGENCY MEDICINE
Payer: COMMERCIAL

## 2022-01-01 ENCOUNTER — TELEPHONE (OUTPATIENT)
Dept: INFUSION THERAPY | Facility: OTHER | Age: 77
End: 2022-01-01

## 2022-01-01 ENCOUNTER — OFFICE VISIT (OUTPATIENT)
Dept: FAMILY MEDICINE | Facility: CLINIC | Age: 77
End: 2022-01-01
Payer: COMMERCIAL

## 2022-01-01 ENCOUNTER — PATIENT OUTREACH (OUTPATIENT)
Dept: ONCOLOGY | Facility: OTHER | Age: 77
End: 2022-01-01

## 2022-01-01 ENCOUNTER — APPOINTMENT (OUTPATIENT)
Dept: LAB | Facility: OTHER | Age: 77
End: 2022-01-01
Attending: FAMILY MEDICINE
Payer: COMMERCIAL

## 2022-01-01 ENCOUNTER — LAB (OUTPATIENT)
Dept: INFUSION THERAPY | Facility: OTHER | Age: 77
End: 2022-01-01
Attending: INTERNAL MEDICINE
Payer: COMMERCIAL

## 2022-01-01 ENCOUNTER — HOSPITAL ENCOUNTER (OUTPATIENT)
Facility: HOSPITAL | Age: 77
Discharge: HOME | End: 2022-09-30
Attending: INTERNAL MEDICINE | Admitting: INTERNAL MEDICINE
Payer: COMMERCIAL

## 2022-01-01 ENCOUNTER — TELEPHONE (OUTPATIENT)
Dept: ONCOLOGY | Facility: OTHER | Age: 77
End: 2022-01-01

## 2022-01-01 ENCOUNTER — ONCOLOGY VISIT (OUTPATIENT)
Dept: ONCOLOGY | Facility: OTHER | Age: 77
End: 2022-01-01
Attending: NURSE PRACTITIONER
Payer: COMMERCIAL

## 2022-01-01 ENCOUNTER — ANESTHESIA (OUTPATIENT)
Dept: SURGERY | Facility: HOSPITAL | Age: 77
End: 2022-01-01
Payer: COMMERCIAL

## 2022-01-01 ENCOUNTER — APPOINTMENT (OUTPATIENT)
Dept: MRI IMAGING | Facility: HOSPITAL | Age: 77
DRG: 394 | End: 2022-01-01
Attending: INTERNAL MEDICINE
Payer: COMMERCIAL

## 2022-01-01 ENCOUNTER — HOSPITAL ENCOUNTER (INPATIENT)
Facility: HOSPITAL | Age: 77
LOS: 7 days | Discharge: HOME | DRG: 394 | End: 2022-09-19
Attending: EMERGENCY MEDICINE | Admitting: INTERNAL MEDICINE
Payer: COMMERCIAL

## 2022-01-01 ENCOUNTER — ALLIED HEALTH/NURSE VISIT (OUTPATIENT)
Dept: EDUCATION SERVICES | Facility: OTHER | Age: 77
End: 2022-01-01
Attending: NURSE PRACTITIONER
Payer: COMMERCIAL

## 2022-01-01 ENCOUNTER — ANESTHESIA (OUTPATIENT)
Dept: SURGERY | Facility: HOSPITAL | Age: 77
DRG: 394 | End: 2022-01-01
Payer: COMMERCIAL

## 2022-01-01 ENCOUNTER — ANCILLARY PROCEDURE (OUTPATIENT)
Dept: GENERAL RADIOLOGY | Facility: OTHER | Age: 77
End: 2022-01-01
Attending: FAMILY MEDICINE
Payer: COMMERCIAL

## 2022-01-01 VITALS
RESPIRATION RATE: 16 BRPM | WEIGHT: 168.4 LBS | DIASTOLIC BLOOD PRESSURE: 71 MMHG | BODY MASS INDEX: 26.43 KG/M2 | HEART RATE: 69 BPM | SYSTOLIC BLOOD PRESSURE: 109 MMHG | HEIGHT: 67 IN | OXYGEN SATURATION: 97 %

## 2022-01-01 VITALS
DIASTOLIC BLOOD PRESSURE: 80 MMHG | TEMPERATURE: 96.5 F | RESPIRATION RATE: 20 BRPM | SYSTOLIC BLOOD PRESSURE: 120 MMHG | BODY MASS INDEX: 27.27 KG/M2 | HEART RATE: 61 BPM | WEIGHT: 173.72 LBS | HEIGHT: 67 IN | OXYGEN SATURATION: 98 %

## 2022-01-01 VITALS
BODY MASS INDEX: 25.84 KG/M2 | OXYGEN SATURATION: 91 % | SYSTOLIC BLOOD PRESSURE: 112 MMHG | RESPIRATION RATE: 24 BRPM | TEMPERATURE: 99.4 F | DIASTOLIC BLOOD PRESSURE: 70 MMHG | WEIGHT: 165 LBS | HEART RATE: 73 BPM

## 2022-01-01 VITALS
WEIGHT: 176.3 LBS | TEMPERATURE: 98 F | DIASTOLIC BLOOD PRESSURE: 62 MMHG | SYSTOLIC BLOOD PRESSURE: 99 MMHG | OXYGEN SATURATION: 97 % | HEART RATE: 66 BPM | RESPIRATION RATE: 20 BRPM | BODY MASS INDEX: 28.33 KG/M2 | HEIGHT: 66 IN

## 2022-01-01 VITALS
TEMPERATURE: 97.8 F | SYSTOLIC BLOOD PRESSURE: 115 MMHG | RESPIRATION RATE: 17 BRPM | HEART RATE: 67 BPM | BODY MASS INDEX: 26.93 KG/M2 | WEIGHT: 170.64 LBS | OXYGEN SATURATION: 99 % | DIASTOLIC BLOOD PRESSURE: 72 MMHG

## 2022-01-01 VITALS
HEART RATE: 72 BPM | TEMPERATURE: 97.1 F | BODY MASS INDEX: 26.96 KG/M2 | OXYGEN SATURATION: 99 % | SYSTOLIC BLOOD PRESSURE: 122 MMHG | DIASTOLIC BLOOD PRESSURE: 80 MMHG | RESPIRATION RATE: 18 BRPM | WEIGHT: 172.13 LBS

## 2022-01-01 VITALS
WEIGHT: 173.72 LBS | RESPIRATION RATE: 20 BRPM | HEART RATE: 61 BPM | DIASTOLIC BLOOD PRESSURE: 80 MMHG | BODY MASS INDEX: 27.27 KG/M2 | SYSTOLIC BLOOD PRESSURE: 120 MMHG | HEIGHT: 67 IN | TEMPERATURE: 96.5 F | OXYGEN SATURATION: 98 %

## 2022-01-01 VITALS
TEMPERATURE: 98 F | SYSTOLIC BLOOD PRESSURE: 110 MMHG | WEIGHT: 163 LBS | DIASTOLIC BLOOD PRESSURE: 60 MMHG | HEART RATE: 100 BPM | BODY MASS INDEX: 25.53 KG/M2 | OXYGEN SATURATION: 93 %

## 2022-01-01 VITALS
HEIGHT: 65 IN | OXYGEN SATURATION: 98 % | WEIGHT: 191.8 LBS | BODY MASS INDEX: 31.96 KG/M2 | HEART RATE: 67 BPM | RESPIRATION RATE: 20 BRPM | DIASTOLIC BLOOD PRESSURE: 68 MMHG | SYSTOLIC BLOOD PRESSURE: 120 MMHG | TEMPERATURE: 97.3 F

## 2022-01-01 VITALS
TEMPERATURE: 102.1 F | DIASTOLIC BLOOD PRESSURE: 70 MMHG | HEART RATE: 93 BPM | RESPIRATION RATE: 26 BRPM | SYSTOLIC BLOOD PRESSURE: 112 MMHG | OXYGEN SATURATION: 94 % | WEIGHT: 165.5 LBS | BODY MASS INDEX: 25.92 KG/M2

## 2022-01-01 VITALS
HEIGHT: 67 IN | OXYGEN SATURATION: 98 % | BODY MASS INDEX: 27.81 KG/M2 | RESPIRATION RATE: 20 BRPM | SYSTOLIC BLOOD PRESSURE: 139 MMHG | TEMPERATURE: 97.6 F | HEART RATE: 46 BPM | WEIGHT: 177.19 LBS | DIASTOLIC BLOOD PRESSURE: 69 MMHG

## 2022-01-01 VITALS
DIASTOLIC BLOOD PRESSURE: 77 MMHG | HEART RATE: 67 BPM | OXYGEN SATURATION: 99 % | SYSTOLIC BLOOD PRESSURE: 123 MMHG | TEMPERATURE: 97.9 F | RESPIRATION RATE: 16 BRPM

## 2022-01-01 VITALS
DIASTOLIC BLOOD PRESSURE: 80 MMHG | SYSTOLIC BLOOD PRESSURE: 120 MMHG | WEIGHT: 172.13 LBS | HEART RATE: 72 BPM | HEIGHT: 67 IN | BODY MASS INDEX: 27.02 KG/M2 | OXYGEN SATURATION: 99 %

## 2022-01-01 VITALS
SYSTOLIC BLOOD PRESSURE: 131 MMHG | OXYGEN SATURATION: 99 % | HEART RATE: 75 BPM | TEMPERATURE: 97.8 F | DIASTOLIC BLOOD PRESSURE: 68 MMHG | RESPIRATION RATE: 18 BRPM

## 2022-01-01 DIAGNOSIS — Z90.5 SOLITARY KIDNEY, ACQUIRED: Primary | ICD-10-CM

## 2022-01-01 DIAGNOSIS — R06.2 WHEEZING: ICD-10-CM

## 2022-01-01 DIAGNOSIS — E11.65 TYPE 2 DIABETES MELLITUS WITH HYPERGLYCEMIA, WITH LONG-TERM CURRENT USE OF INSULIN (H): ICD-10-CM

## 2022-01-01 DIAGNOSIS — C66.2 MALIGNANT NEOPLASM OF LEFT URETER (H): ICD-10-CM

## 2022-01-01 DIAGNOSIS — R19.7 DIARRHEA, UNSPECIFIED TYPE: ICD-10-CM

## 2022-01-01 DIAGNOSIS — E11.65 TYPE 2 DIABETES MELLITUS WITH HYPERGLYCEMIA, WITHOUT LONG-TERM CURRENT USE OF INSULIN (H): ICD-10-CM

## 2022-01-01 DIAGNOSIS — E78.5 HYPERLIPIDEMIA, UNSPECIFIED HYPERLIPIDEMIA TYPE: ICD-10-CM

## 2022-01-01 DIAGNOSIS — R25.2 LEG CRAMP: ICD-10-CM

## 2022-01-01 DIAGNOSIS — R50.9 FEVER, UNSPECIFIED FEVER CAUSE: ICD-10-CM

## 2022-01-01 DIAGNOSIS — R05.1 ACUTE COUGH: ICD-10-CM

## 2022-01-01 DIAGNOSIS — R25.2 LEG CRAMPS: ICD-10-CM

## 2022-01-01 DIAGNOSIS — R63.8 DECREASED ORAL INTAKE: ICD-10-CM

## 2022-01-01 DIAGNOSIS — Z20.822 COVID-19 RULED OUT: ICD-10-CM

## 2022-01-01 DIAGNOSIS — R50.9 FUO (FEVER OF UNKNOWN ORIGIN): ICD-10-CM

## 2022-01-01 DIAGNOSIS — N18.4 CKD (CHRONIC KIDNEY DISEASE) STAGE 4, GFR 15-29 ML/MIN (H): ICD-10-CM

## 2022-01-01 DIAGNOSIS — K52.9 COLITIS: ICD-10-CM

## 2022-01-01 DIAGNOSIS — R19.7 DIARRHEA, UNSPECIFIED TYPE: Primary | ICD-10-CM

## 2022-01-01 DIAGNOSIS — K63.5 POLYP OF COLON, UNSPECIFIED PART OF COLON, UNSPECIFIED TYPE: Primary | ICD-10-CM

## 2022-01-01 DIAGNOSIS — Z79.4 TYPE 2 DIABETES MELLITUS WITH HYPERGLYCEMIA, WITH LONG-TERM CURRENT USE OF INSULIN (H): Primary | ICD-10-CM

## 2022-01-01 DIAGNOSIS — J43.2 CENTRILOBULAR EMPHYSEMA (H): ICD-10-CM

## 2022-01-01 DIAGNOSIS — K86.9 PANCREATIC LESION: ICD-10-CM

## 2022-01-01 DIAGNOSIS — R21 RASH: ICD-10-CM

## 2022-01-01 DIAGNOSIS — C68.9 UROTHELIAL CANCER (H): ICD-10-CM

## 2022-01-01 DIAGNOSIS — N18.4 CRF (CHRONIC RENAL FAILURE), STAGE 4 (SEVERE) (H): ICD-10-CM

## 2022-01-01 DIAGNOSIS — D09.0 CARCINOMA IN SITU OF BLADDER: Primary | ICD-10-CM

## 2022-01-01 DIAGNOSIS — E11.9 TYPE 2 DIABETES MELLITUS WITHOUT COMPLICATION, WITH LONG-TERM CURRENT USE OF INSULIN (H): ICD-10-CM

## 2022-01-01 DIAGNOSIS — Z79.4 TYPE 2 DIABETES MELLITUS WITH HYPERGLYCEMIA, WITH LONG-TERM CURRENT USE OF INSULIN (H): ICD-10-CM

## 2022-01-01 DIAGNOSIS — N18.4 CRF (CHRONIC RENAL FAILURE), STAGE 4 (SEVERE) (H): Primary | ICD-10-CM

## 2022-01-01 DIAGNOSIS — J18.9 ATYPICAL PNEUMONIA: ICD-10-CM

## 2022-01-01 DIAGNOSIS — T50.905A ADVERSE EFFECT OF DRUG, INITIAL ENCOUNTER: ICD-10-CM

## 2022-01-01 DIAGNOSIS — H40.1190 PRIMARY OPEN ANGLE GLAUCOMA, UNSPECIFIED GLAUCOMA STAGE, UNSPECIFIED LATERALITY: ICD-10-CM

## 2022-01-01 DIAGNOSIS — E03.9 ACQUIRED HYPOTHYROIDISM: ICD-10-CM

## 2022-01-01 DIAGNOSIS — R25.2 CRAMP OF LIMB: ICD-10-CM

## 2022-01-01 DIAGNOSIS — E86.0 DEHYDRATION: ICD-10-CM

## 2022-01-01 DIAGNOSIS — E11.65 TYPE 2 DIABETES MELLITUS WITH HYPERGLYCEMIA, WITH LONG-TERM CURRENT USE OF INSULIN (H): Primary | ICD-10-CM

## 2022-01-01 DIAGNOSIS — C67.9 MALIGNANT NEOPLASM OF URINARY BLADDER, UNSPECIFIED SITE (H): ICD-10-CM

## 2022-01-01 DIAGNOSIS — R50.9 FUO (FEVER OF UNKNOWN ORIGIN): Primary | ICD-10-CM

## 2022-01-01 DIAGNOSIS — G47.00 INSOMNIA, UNSPECIFIED TYPE: ICD-10-CM

## 2022-01-01 DIAGNOSIS — F19.982 DRUG-INDUCED INSOMNIA (H): ICD-10-CM

## 2022-01-01 DIAGNOSIS — R52 GENERALIZED PAIN: ICD-10-CM

## 2022-01-01 DIAGNOSIS — N18.4 ANEMIA DUE TO STAGE 4 CHRONIC KIDNEY DISEASE (H): ICD-10-CM

## 2022-01-01 DIAGNOSIS — K86.89 PANCREATIC MASS: ICD-10-CM

## 2022-01-01 DIAGNOSIS — R11.0 NAUSEA: ICD-10-CM

## 2022-01-01 DIAGNOSIS — E03.2 HYPOTHYROIDISM DUE TO MEDICATION: ICD-10-CM

## 2022-01-01 DIAGNOSIS — C66.2 MALIGNANT NEOPLASM OF LEFT URETER (H): Primary | ICD-10-CM

## 2022-01-01 DIAGNOSIS — G47.01 INSOMNIA DUE TO MEDICAL CONDITION: ICD-10-CM

## 2022-01-01 DIAGNOSIS — Z71.85 IMMUNIZATION COUNSELING: ICD-10-CM

## 2022-01-01 DIAGNOSIS — E03.9 ACQUIRED HYPOTHYROIDISM: Primary | ICD-10-CM

## 2022-01-01 DIAGNOSIS — D09.0 CARCINOMA IN SITU OF BLADDER: ICD-10-CM

## 2022-01-01 DIAGNOSIS — J18.9 ATYPICAL PNEUMONIA: Primary | ICD-10-CM

## 2022-01-01 DIAGNOSIS — R53.83 FATIGUE, UNSPECIFIED TYPE: ICD-10-CM

## 2022-01-01 DIAGNOSIS — R11.2 NAUSEA AND VOMITING, INTRACTABILITY OF VOMITING NOT SPECIFIED, UNSPECIFIED VOMITING TYPE: ICD-10-CM

## 2022-01-01 DIAGNOSIS — Z78.9 TAKES DIETARY SUPPLEMENTS: ICD-10-CM

## 2022-01-01 DIAGNOSIS — Z53.9 ERRONEOUS ENCOUNTER--DISREGARD: Primary | ICD-10-CM

## 2022-01-01 DIAGNOSIS — C67.3 MALIGNANT NEOPLASM OF ANTERIOR WALL OF URINARY BLADDER (H): ICD-10-CM

## 2022-01-01 DIAGNOSIS — E83.51 HYPOCALCEMIA: ICD-10-CM

## 2022-01-01 DIAGNOSIS — R31.29 MICROSCOPIC HEMATURIA: ICD-10-CM

## 2022-01-01 DIAGNOSIS — Z01.818 PREOP GENERAL PHYSICAL EXAM: Primary | ICD-10-CM

## 2022-01-01 DIAGNOSIS — E87.1 HYPONATREMIA: ICD-10-CM

## 2022-01-01 DIAGNOSIS — Z79.4 TYPE 2 DIABETES MELLITUS WITHOUT COMPLICATION, WITH LONG-TERM CURRENT USE OF INSULIN (H): ICD-10-CM

## 2022-01-01 DIAGNOSIS — D63.1 ANEMIA DUE TO STAGE 4 CHRONIC KIDNEY DISEASE (H): ICD-10-CM

## 2022-01-01 DIAGNOSIS — Z20.822 COVID-19 RULED OUT: Primary | ICD-10-CM

## 2022-01-01 DIAGNOSIS — K21.9 GASTROESOPHAGEAL REFLUX DISEASE, UNSPECIFIED WHETHER ESOPHAGITIS PRESENT: ICD-10-CM

## 2022-01-01 DIAGNOSIS — R52 PAIN: ICD-10-CM

## 2022-01-01 DIAGNOSIS — Z90.5 SOLITARY KIDNEY, ACQUIRED: ICD-10-CM

## 2022-01-01 LAB
A PHAGOCYTOPH DNA BLD QL NAA+PROBE: NOT DETECTED
ALBUMIN SERPL-MCNC: 1.6 G/DL (ref 3.5–5)
ALBUMIN SERPL-MCNC: 2 G/DL (ref 3.5–5)
ALBUMIN SERPL-MCNC: 2.2 G/DL (ref 3.5–5)
ALBUMIN SERPL-MCNC: 2.5 G/DL (ref 3.4–5)
ALBUMIN SERPL-MCNC: 2.7 G/DL (ref 3.4–5)
ALBUMIN SERPL-MCNC: 2.7 G/DL (ref 3.4–5)
ALBUMIN SERPL-MCNC: 2.8 G/DL (ref 3.4–5)
ALBUMIN SERPL-MCNC: 3.3 G/DL (ref 3.4–5)
ALBUMIN UR-MCNC: 100 MG/DL
ALBUMIN UR-MCNC: 20 MG/DL
ALBUMIN UR-MCNC: 20 MG/DL
ALBUMIN UR-MCNC: 50 MG/DL
ALP SERPL-CCNC: 50 U/L (ref 40–150)
ALP SERPL-CCNC: 59 U/L (ref 45–120)
ALP SERPL-CCNC: 66 U/L (ref 40–150)
ALP SERPL-CCNC: 66 U/L (ref 45–120)
ALP SERPL-CCNC: 67 U/L (ref 40–150)
ALP SERPL-CCNC: 69 U/L (ref 45–120)
ALP SERPL-CCNC: 78 U/L (ref 40–150)
ALT SERPL W P-5'-P-CCNC: 14 U/L (ref 0–70)
ALT SERPL W P-5'-P-CCNC: 21 U/L (ref 0–70)
ALT SERPL W P-5'-P-CCNC: <9 U/L (ref 0–45)
ALT SERPL W P-5'-P-CCNC: ABNORMAL U/L
ANION GAP SERPL CALCULATED.3IONS-SCNC: 10 MMOL/L (ref 3–14)
ANION GAP SERPL CALCULATED.3IONS-SCNC: 10 MMOL/L (ref 5–18)
ANION GAP SERPL CALCULATED.3IONS-SCNC: 11 MMOL/L (ref 5–18)
ANION GAP SERPL CALCULATED.3IONS-SCNC: 12 MMOL/L (ref 5–18)
ANION GAP SERPL CALCULATED.3IONS-SCNC: 3 MMOL/L (ref 5–18)
ANION GAP SERPL CALCULATED.3IONS-SCNC: 4 MMOL/L (ref 3–14)
ANION GAP SERPL CALCULATED.3IONS-SCNC: 4 MMOL/L (ref 3–14)
ANION GAP SERPL CALCULATED.3IONS-SCNC: 4 MMOL/L (ref 5–18)
ANION GAP SERPL CALCULATED.3IONS-SCNC: 5 MMOL/L (ref 3–14)
ANION GAP SERPL CALCULATED.3IONS-SCNC: 5 MMOL/L (ref 5–18)
ANION GAP SERPL CALCULATED.3IONS-SCNC: 5 MMOL/L (ref 5–18)
ANION GAP SERPL CALCULATED.3IONS-SCNC: 7 MMOL/L (ref 5–18)
ANION GAP SERPL CALCULATED.3IONS-SCNC: 8 MMOL/L (ref 3–14)
ANION GAP SERPL CALCULATED.3IONS-SCNC: 8 MMOL/L (ref 5–18)
ANION GAP SERPL CALCULATED.3IONS-SCNC: 9 MMOL/L (ref 3–14)
APPEARANCE UR: ABNORMAL
APPEARANCE UR: CLEAR
AST SERPL W P-5'-P-CCNC: 13 U/L (ref 0–45)
AST SERPL W P-5'-P-CCNC: 15 U/L (ref 0–40)
AST SERPL W P-5'-P-CCNC: 15 U/L (ref 0–40)
AST SERPL W P-5'-P-CCNC: 21 U/L (ref 0–40)
AST SERPL W P-5'-P-CCNC: 24 U/L (ref 0–45)
AST SERPL W P-5'-P-CCNC: 35 U/L (ref 0–45)
AST SERPL W P-5'-P-CCNC: 39 U/L (ref 0–45)
AST SERPL W P-5'-P-CCNC: ABNORMAL U/L
ATRIAL RATE - MUSE: 77 BPM
B BURGDOR IGG+IGM SER QL: 0.03
B MICROTI DNA BLD QL NAA+PROBE: NOT DETECTED
BABESIA DNA BLD QL NAA+PROBE: NOT DETECTED
BACTERIA #/AREA URNS HPF: ABNORMAL /HPF
BACTERIA BLD CULT: NO GROWTH
BACTERIA BLD CULT: NO GROWTH
BASOPHILS # BLD AUTO: 0 10E3/UL (ref 0–0.2)
BASOPHILS # BLD AUTO: 0.1 10E3/UL (ref 0–0.2)
BASOPHILS NFR BLD AUTO: 0 %
BASOPHILS NFR BLD AUTO: 1 %
BASOPHILS NFR BLD AUTO: 1 %
BILIRUB DIRECT SERPL-MCNC: 0.2 MG/DL
BILIRUB DIRECT SERPL-MCNC: 0.2 MG/DL
BILIRUB DIRECT SERPL-MCNC: 0.2 MG/DL (ref 0–0.2)
BILIRUB SERPL-MCNC: 0.4 MG/DL (ref 0–1)
BILIRUB SERPL-MCNC: 0.4 MG/DL (ref 0–1)
BILIRUB SERPL-MCNC: 0.5 MG/DL (ref 0.2–1.3)
BILIRUB SERPL-MCNC: 0.6 MG/DL (ref 0.2–1.3)
BILIRUB SERPL-MCNC: 0.6 MG/DL (ref 0–1)
BILIRUB SERPL-MCNC: 0.7 MG/DL (ref 0.2–1.3)
BILIRUB SERPL-MCNC: 0.9 MG/DL (ref 0.2–1.3)
BILIRUB UR QL STRIP: NEGATIVE
BUN SERPL-MCNC: 18 MG/DL (ref 8–28)
BUN SERPL-MCNC: 20 MG/DL (ref 8–28)
BUN SERPL-MCNC: 22 MG/DL (ref 8–28)
BUN SERPL-MCNC: 24 MG/DL (ref 7–30)
BUN SERPL-MCNC: 24 MG/DL (ref 8–28)
BUN SERPL-MCNC: 24 MG/DL (ref 8–28)
BUN SERPL-MCNC: 25 MG/DL (ref 7–30)
BUN SERPL-MCNC: 25 MG/DL (ref 8–28)
BUN SERPL-MCNC: 26 MG/DL (ref 8–28)
BUN SERPL-MCNC: 28 MG/DL (ref 7–30)
BUN SERPL-MCNC: 29 MG/DL (ref 7–30)
BUN SERPL-MCNC: 29 MG/DL (ref 8–28)
BUN SERPL-MCNC: 29 MG/DL (ref 8–28)
BUN SERPL-MCNC: 30 MG/DL (ref 7–30)
C COLI+JEJUNI+LARI FUSA STL QL NAA+PROBE: NOT DETECTED
C DIFF TOX B STL QL: NEGATIVE
CALCIUM SERPL-MCNC: 6.8 MG/DL (ref 8.5–10.5)
CALCIUM SERPL-MCNC: 6.9 MG/DL (ref 8.5–10.5)
CALCIUM SERPL-MCNC: 6.9 MG/DL (ref 8.5–10.5)
CALCIUM SERPL-MCNC: 7 MG/DL (ref 8.5–10.5)
CALCIUM SERPL-MCNC: 7.1 MG/DL (ref 8.5–10.1)
CALCIUM SERPL-MCNC: 7.2 MG/DL (ref 8.5–10.5)
CALCIUM SERPL-MCNC: 7.3 MG/DL (ref 8.5–10.5)
CALCIUM SERPL-MCNC: 7.5 MG/DL (ref 8.5–10.5)
CALCIUM SERPL-MCNC: 7.8 MG/DL (ref 8.5–10.1)
CALCIUM SERPL-MCNC: 8 MG/DL (ref 8.5–10.1)
CALCIUM SERPL-MCNC: 8.2 MG/DL (ref 8.5–10.1)
CALCIUM SERPL-MCNC: 8.3 MG/DL (ref 8.5–10.1)
CHLORIDE BLD-SCNC: 101 MMOL/L (ref 94–109)
CHLORIDE BLD-SCNC: 102 MMOL/L (ref 94–109)
CHLORIDE BLD-SCNC: 105 MMOL/L (ref 94–109)
CHLORIDE BLD-SCNC: 106 MMOL/L (ref 94–109)
CHLORIDE BLD-SCNC: 106 MMOL/L (ref 98–107)
CHLORIDE BLD-SCNC: 108 MMOL/L (ref 94–109)
CHLORIDE BLD-SCNC: 109 MMOL/L (ref 98–107)
CHLORIDE BLD-SCNC: 110 MMOL/L (ref 98–107)
CHLORIDE BLD-SCNC: 113 MMOL/L (ref 94–109)
CHLORIDE BLD-SCNC: 113 MMOL/L (ref 98–107)
CHLORIDE BLD-SCNC: 116 MMOL/L (ref 98–107)
CHLORIDE BLD-SCNC: 116 MMOL/L (ref 98–107)
CHLORIDE BLD-SCNC: 117 MMOL/L (ref 98–107)
CO2 SERPL-SCNC: 10 MMOL/L (ref 22–31)
CO2 SERPL-SCNC: 12 MMOL/L (ref 22–31)
CO2 SERPL-SCNC: 15 MMOL/L (ref 22–31)
CO2 SERPL-SCNC: 16 MMOL/L (ref 22–31)
CO2 SERPL-SCNC: 19 MMOL/L (ref 20–32)
CO2 SERPL-SCNC: 21 MMOL/L (ref 20–32)
CO2 SERPL-SCNC: 23 MMOL/L (ref 20–32)
CO2 SERPL-SCNC: 23 MMOL/L (ref 20–32)
CO2 SERPL-SCNC: 25 MMOL/L (ref 20–32)
CO2 SERPL-SCNC: 29 MMOL/L (ref 20–32)
COLONOSCOPY: NORMAL
COLOR UR AUTO: ABNORMAL
COLOR UR AUTO: ABNORMAL
COLOR UR AUTO: YELLOW
COLOR UR AUTO: YELLOW
CREAT SERPL-MCNC: 1.48 MG/DL (ref 0.66–1.25)
CREAT SERPL-MCNC: 1.62 MG/DL (ref 0.66–1.25)
CREAT SERPL-MCNC: 1.77 MG/DL (ref 0.7–1.3)
CREAT SERPL-MCNC: 1.81 MG/DL (ref 0.7–1.3)
CREAT SERPL-MCNC: 1.81 MG/DL (ref 0.7–1.3)
CREAT SERPL-MCNC: 1.87 MG/DL (ref 0.66–1.25)
CREAT SERPL-MCNC: 1.89 MG/DL (ref 0.66–1.25)
CREAT SERPL-MCNC: 1.92 MG/DL (ref 0.7–1.3)
CREAT SERPL-MCNC: 1.95 MG/DL (ref 0.7–1.3)
CREAT SERPL-MCNC: 1.97 MG/DL (ref 0.66–1.25)
CREAT SERPL-MCNC: 1.98 MG/DL (ref 0.7–1.3)
CREAT SERPL-MCNC: 2.07 MG/DL (ref 0.7–1.3)
CREAT SERPL-MCNC: 2.18 MG/DL (ref 0.7–1.3)
CREAT SERPL-MCNC: 2.25 MG/DL (ref 0.7–1.3)
CREAT UR-MCNC: 78 MG/DL
CRP SERPL-MCNC: 116 MG/L (ref 0–8)
CRP SERPL-MCNC: 121 MG/L (ref 0–8)
CRP SERPL-MCNC: 6.2 MG/L (ref 0–8)
CRP SERPL-MCNC: 98.66 MG/L
DEPRECATED CALCIDIOL+CALCIFEROL SERPL-MC: 27 UG/L (ref 20–75)
DIASTOLIC BLOOD PRESSURE - MUSE: 64 MMHG
E CHAFFEENSIS DNA BLD QL NAA+PROBE: NOT DETECTED
E EWINGII DNA SPEC QL NAA+PROBE: NOT DETECTED
EC STX1 GENE STL QL NAA+PROBE: NOT DETECTED
EC STX2 GENE STL QL NAA+PROBE: NOT DETECTED
EHRLICHIA DNA SPEC QL NAA+PROBE: NOT DETECTED
EOSINOPHIL # BLD AUTO: 0 10E3/UL (ref 0–0.7)
EOSINOPHIL # BLD AUTO: 0.1 10E3/UL (ref 0–0.7)
EOSINOPHIL NFR BLD AUTO: 0 %
EOSINOPHIL NFR BLD AUTO: 1 %
EOSINOPHIL NFR BLD AUTO: 2 %
EOSINOPHIL NFR BLD AUTO: 2 %
ERYTHROCYTE [DISTWIDTH] IN BLOOD BY AUTOMATED COUNT: 13.8 % (ref 10–15)
ERYTHROCYTE [DISTWIDTH] IN BLOOD BY AUTOMATED COUNT: 13.8 % (ref 10–15)
ERYTHROCYTE [DISTWIDTH] IN BLOOD BY AUTOMATED COUNT: 13.9 % (ref 10–15)
ERYTHROCYTE [DISTWIDTH] IN BLOOD BY AUTOMATED COUNT: 14 % (ref 10–15)
ERYTHROCYTE [DISTWIDTH] IN BLOOD BY AUTOMATED COUNT: 14.1 % (ref 10–15)
ERYTHROCYTE [DISTWIDTH] IN BLOOD BY AUTOMATED COUNT: 14.3 % (ref 10–15)
ERYTHROCYTE [DISTWIDTH] IN BLOOD BY AUTOMATED COUNT: 14.4 % (ref 10–15)
ERYTHROCYTE [DISTWIDTH] IN BLOOD BY AUTOMATED COUNT: 14.5 % (ref 10–15)
ERYTHROCYTE [DISTWIDTH] IN BLOOD BY AUTOMATED COUNT: 14.6 % (ref 10–15)
ERYTHROCYTE [DISTWIDTH] IN BLOOD BY AUTOMATED COUNT: 14.7 % (ref 10–15)
ERYTHROCYTE [DISTWIDTH] IN BLOOD BY AUTOMATED COUNT: 15.9 % (ref 10–15)
EST. AVERAGE GLUCOSE BLD GHB EST-MCNC: 200 MG/DL
FERRITIN SERPL-MCNC: 201 NG/ML (ref 26–388)
FLUAV RNA SPEC QL NAA+PROBE: NEGATIVE
FLUBV RNA RESP QL NAA+PROBE: NEGATIVE
GFR SERPL CREATININE-BSD FRML MDRD: 30 ML/MIN/1.73M2
GFR SERPL CREATININE-BSD FRML MDRD: 31 ML/MIN/1.73M2
GFR SERPL CREATININE-BSD FRML MDRD: 33 ML/MIN/1.73M2
GFR SERPL CREATININE-BSD FRML MDRD: 35 ML/MIN/1.73M2
GFR SERPL CREATININE-BSD FRML MDRD: 36 ML/MIN/1.73M2
GFR SERPL CREATININE-BSD FRML MDRD: 37 ML/MIN/1.73M2
GFR SERPL CREATININE-BSD FRML MDRD: 37 ML/MIN/1.73M2
GFR SERPL CREATININE-BSD FRML MDRD: 39 ML/MIN/1.73M2
GFR SERPL CREATININE-BSD FRML MDRD: 39 ML/MIN/1.73M2
GFR SERPL CREATININE-BSD FRML MDRD: 40 ML/MIN/1.73M2
GFR SERPL CREATININE-BSD FRML MDRD: 44 ML/MIN/1.73M2
GFR SERPL CREATININE-BSD FRML MDRD: 49 ML/MIN/1.73M2
GLUCOSE BLD-MCNC: 110 MG/DL (ref 70–99)
GLUCOSE BLD-MCNC: 117 MG/DL (ref 70–99)
GLUCOSE BLD-MCNC: 131 MG/DL (ref 70–99)
GLUCOSE BLD-MCNC: 178 MG/DL (ref 70–125)
GLUCOSE BLD-MCNC: 178 MG/DL (ref 70–99)
GLUCOSE BLD-MCNC: 184 MG/DL (ref 70–125)
GLUCOSE BLD-MCNC: 225 MG/DL (ref 70–99)
GLUCOSE BLD-MCNC: 230 MG/DL (ref 70–125)
GLUCOSE BLD-MCNC: 251 MG/DL (ref 70–125)
GLUCOSE BLD-MCNC: 254 MG/DL (ref 70–125)
GLUCOSE BLD-MCNC: 360 MG/DL (ref 70–125)
GLUCOSE BLD-MCNC: 52 MG/DL (ref 70–125)
GLUCOSE BLD-MCNC: 67 MG/DL (ref 70–125)
GLUCOSE BLD-MCNC: 77 MG/DL (ref 70–125)
GLUCOSE BLDC GLUCOMTR-MCNC: 106 MG/DL (ref 70–99)
GLUCOSE BLDC GLUCOMTR-MCNC: 107 MG/DL (ref 70–99)
GLUCOSE BLDC GLUCOMTR-MCNC: 107 MG/DL (ref 70–99)
GLUCOSE BLDC GLUCOMTR-MCNC: 119 MG/DL (ref 70–99)
GLUCOSE BLDC GLUCOMTR-MCNC: 122 MG/DL (ref 70–99)
GLUCOSE BLDC GLUCOMTR-MCNC: 124 MG/DL (ref 70–99)
GLUCOSE BLDC GLUCOMTR-MCNC: 133 MG/DL (ref 70–99)
GLUCOSE BLDC GLUCOMTR-MCNC: 138 MG/DL (ref 70–99)
GLUCOSE BLDC GLUCOMTR-MCNC: 148 MG/DL (ref 70–99)
GLUCOSE BLDC GLUCOMTR-MCNC: 148 MG/DL (ref 70–99)
GLUCOSE BLDC GLUCOMTR-MCNC: 155 MG/DL (ref 70–99)
GLUCOSE BLDC GLUCOMTR-MCNC: 161 MG/DL (ref 70–99)
GLUCOSE BLDC GLUCOMTR-MCNC: 174 MG/DL (ref 70–99)
GLUCOSE BLDC GLUCOMTR-MCNC: 176 MG/DL (ref 70–99)
GLUCOSE BLDC GLUCOMTR-MCNC: 192 MG/DL (ref 70–99)
GLUCOSE BLDC GLUCOMTR-MCNC: 204 MG/DL (ref 70–99)
GLUCOSE BLDC GLUCOMTR-MCNC: 217 MG/DL (ref 70–99)
GLUCOSE BLDC GLUCOMTR-MCNC: 220 MG/DL (ref 70–99)
GLUCOSE BLDC GLUCOMTR-MCNC: 220 MG/DL (ref 70–99)
GLUCOSE BLDC GLUCOMTR-MCNC: 235 MG/DL (ref 70–99)
GLUCOSE BLDC GLUCOMTR-MCNC: 242 MG/DL (ref 70–99)
GLUCOSE BLDC GLUCOMTR-MCNC: 250 MG/DL (ref 70–99)
GLUCOSE BLDC GLUCOMTR-MCNC: 251 MG/DL (ref 70–99)
GLUCOSE BLDC GLUCOMTR-MCNC: 254 MG/DL (ref 70–99)
GLUCOSE BLDC GLUCOMTR-MCNC: 259 MG/DL (ref 70–99)
GLUCOSE BLDC GLUCOMTR-MCNC: 259 MG/DL (ref 70–99)
GLUCOSE BLDC GLUCOMTR-MCNC: 278 MG/DL (ref 70–99)
GLUCOSE BLDC GLUCOMTR-MCNC: 309 MG/DL (ref 70–99)
GLUCOSE BLDC GLUCOMTR-MCNC: 324 MG/DL (ref 70–99)
GLUCOSE BLDC GLUCOMTR-MCNC: 324 MG/DL (ref 70–99)
GLUCOSE BLDC GLUCOMTR-MCNC: 332 MG/DL (ref 70–99)
GLUCOSE BLDC GLUCOMTR-MCNC: 355 MG/DL (ref 70–99)
GLUCOSE BLDC GLUCOMTR-MCNC: 404 MG/DL (ref 70–99)
GLUCOSE BLDC GLUCOMTR-MCNC: 422 MG/DL (ref 70–99)
GLUCOSE BLDC GLUCOMTR-MCNC: 51 MG/DL (ref 70–99)
GLUCOSE BLDC GLUCOMTR-MCNC: 57 MG/DL (ref 70–99)
GLUCOSE BLDC GLUCOMTR-MCNC: 60 MG/DL (ref 70–99)
GLUCOSE BLDC GLUCOMTR-MCNC: 62 MG/DL (ref 70–99)
GLUCOSE BLDC GLUCOMTR-MCNC: 62 MG/DL (ref 70–99)
GLUCOSE BLDC GLUCOMTR-MCNC: 68 MG/DL (ref 70–99)
GLUCOSE BLDC GLUCOMTR-MCNC: 72 MG/DL (ref 70–99)
GLUCOSE BLDC GLUCOMTR-MCNC: 74 MG/DL (ref 70–99)
GLUCOSE BLDC GLUCOMTR-MCNC: 82 MG/DL (ref 70–99)
GLUCOSE UR STRIP-MCNC: 200 MG/DL
GLUCOSE UR STRIP-MCNC: 300 MG/DL
GLUCOSE UR STRIP-MCNC: NEGATIVE MG/DL
GLUCOSE UR STRIP-MCNC: NEGATIVE MG/DL
GROUP A STREP BY PCR: NOT DETECTED
HBA1C MFR BLD: 8.6 % (ref 0–5.6)
HCT VFR BLD AUTO: 31.5 % (ref 40–53)
HCT VFR BLD AUTO: 32.8 % (ref 40–53)
HCT VFR BLD AUTO: 33.8 % (ref 40–53)
HCT VFR BLD AUTO: 34 % (ref 40–53)
HCT VFR BLD AUTO: 34.9 % (ref 40–53)
HCT VFR BLD AUTO: 35.7 % (ref 40–53)
HCT VFR BLD AUTO: 36.3 % (ref 40–53)
HCT VFR BLD AUTO: 37.7 % (ref 40–53)
HCT VFR BLD AUTO: 38.4 % (ref 40–53)
HCT VFR BLD AUTO: 39 % (ref 40–53)
HCT VFR BLD AUTO: 39.3 % (ref 40–53)
HCT VFR BLD AUTO: 40.7 % (ref 40–53)
HCT VFR BLD AUTO: 41.2 % (ref 40–53)
HCT VFR BLD AUTO: 42.3 % (ref 40–53)
HCT VFR BLD AUTO: 43.8 % (ref 40–53)
HGB BLD-MCNC: 11.2 G/DL (ref 13.3–17.7)
HGB BLD-MCNC: 11.3 G/DL (ref 13.3–17.7)
HGB BLD-MCNC: 11.5 G/DL (ref 13.3–17.7)
HGB BLD-MCNC: 11.6 G/DL (ref 13.3–17.7)
HGB BLD-MCNC: 11.8 G/DL (ref 13.3–17.7)
HGB BLD-MCNC: 11.9 G/DL (ref 13.3–17.7)
HGB BLD-MCNC: 12 G/DL (ref 13.3–17.7)
HGB BLD-MCNC: 12.4 G/DL (ref 13.3–17.7)
HGB BLD-MCNC: 12.8 G/DL (ref 13.3–17.7)
HGB BLD-MCNC: 13.2 G/DL (ref 13.3–17.7)
HGB BLD-MCNC: 13.4 G/DL (ref 13.3–17.7)
HGB BLD-MCNC: 13.6 G/DL (ref 13.3–17.7)
HGB BLD-MCNC: 13.8 G/DL (ref 13.3–17.7)
HGB BLD-MCNC: 13.9 G/DL (ref 13.3–17.7)
HGB BLD-MCNC: 14.1 G/DL (ref 13.3–17.7)
HGB BLD-MCNC: 15.3 G/DL (ref 13.3–17.7)
HGB UR QL STRIP: ABNORMAL
HGB UR QL STRIP: NEGATIVE
HOLD SPECIMEN: NORMAL
IMM GRANULOCYTES # BLD: 0 10E3/UL
IMM GRANULOCYTES # BLD: 0.1 10E3/UL
IMM GRANULOCYTES # BLD: 0.1 10E3/UL
IMM GRANULOCYTES NFR BLD: 0 %
IMM GRANULOCYTES NFR BLD: 1 %
INTERPRETATION ECG - MUSE: NORMAL
IRON SATN MFR SERPL: 50 % (ref 15–46)
IRON SERPL-MCNC: 105 UG/DL (ref 35–180)
KETONES UR STRIP-MCNC: ABNORMAL MG/DL
KETONES UR STRIP-MCNC: NEGATIVE MG/DL
LACTATE SERPL-SCNC: 1.1 MMOL/L (ref 0.7–2)
LACTATE SERPL-SCNC: 1.6 MMOL/L (ref 0.7–2)
LEUKOCYTE ESTERASE UR QL STRIP: NEGATIVE
LIPASE SERPL-CCNC: 1312 U/L (ref 73–393)
LIPASE SERPL-CCNC: 133 U/L (ref 0–52)
LIPASE SERPL-CCNC: 529 U/L (ref 13–60)
LIPASE SERPL-CCNC: 863 U/L (ref 73–393)
LYMPHOCYTES # BLD AUTO: 0.8 10E3/UL (ref 0.8–5.3)
LYMPHOCYTES # BLD AUTO: 0.8 10E3/UL (ref 0.8–5.3)
LYMPHOCYTES # BLD AUTO: 1.1 10E3/UL (ref 0.8–5.3)
LYMPHOCYTES # BLD AUTO: 1.2 10E3/UL (ref 0.8–5.3)
LYMPHOCYTES # BLD AUTO: 1.5 10E3/UL (ref 0.8–5.3)
LYMPHOCYTES # BLD AUTO: 1.8 10E3/UL (ref 0.8–5.3)
LYMPHOCYTES NFR BLD AUTO: 12 %
LYMPHOCYTES NFR BLD AUTO: 13 %
LYMPHOCYTES NFR BLD AUTO: 14 %
LYMPHOCYTES NFR BLD AUTO: 24 %
LYMPHOCYTES NFR BLD AUTO: 25 %
LYMPHOCYTES NFR BLD AUTO: 9 %
MAGNESIUM SERPL-MCNC: 1.6 MG/DL (ref 1.8–2.6)
MAGNESIUM SERPL-MCNC: 1.7 MG/DL (ref 1.6–2.3)
MAGNESIUM SERPL-MCNC: 1.8 MG/DL (ref 1.6–2.3)
MAGNESIUM SERPL-MCNC: 1.8 MG/DL (ref 1.8–2.6)
MAGNESIUM SERPL-MCNC: 2.2 MG/DL (ref 1.8–2.6)
MCH RBC QN AUTO: 31.9 PG (ref 26.5–33)
MCH RBC QN AUTO: 32.1 PG (ref 26.5–33)
MCH RBC QN AUTO: 32.3 PG (ref 26.5–33)
MCH RBC QN AUTO: 32.4 PG (ref 26.5–33)
MCH RBC QN AUTO: 32.5 PG (ref 26.5–33)
MCH RBC QN AUTO: 32.5 PG (ref 26.5–33)
MCH RBC QN AUTO: 32.6 PG (ref 26.5–33)
MCH RBC QN AUTO: 32.8 PG (ref 26.5–33)
MCH RBC QN AUTO: 32.9 PG (ref 26.5–33)
MCH RBC QN AUTO: 33 PG (ref 26.5–33)
MCHC RBC AUTO-ENTMCNC: 33.3 G/DL (ref 31.5–36.5)
MCHC RBC AUTO-ENTMCNC: 33.6 G/DL (ref 31.5–36.5)
MCHC RBC AUTO-ENTMCNC: 33.7 G/DL (ref 31.5–36.5)
MCHC RBC AUTO-ENTMCNC: 33.8 G/DL (ref 31.5–36.5)
MCHC RBC AUTO-ENTMCNC: 33.9 G/DL (ref 31.5–36.5)
MCHC RBC AUTO-ENTMCNC: 34 G/DL (ref 31.5–36.5)
MCHC RBC AUTO-ENTMCNC: 34.1 G/DL (ref 31.5–36.5)
MCHC RBC AUTO-ENTMCNC: 34.2 G/DL (ref 31.5–36.5)
MCHC RBC AUTO-ENTMCNC: 34.3 G/DL (ref 31.5–36.5)
MCHC RBC AUTO-ENTMCNC: 34.4 G/DL (ref 31.5–36.5)
MCHC RBC AUTO-ENTMCNC: 34.4 G/DL (ref 31.5–36.5)
MCHC RBC AUTO-ENTMCNC: 34.5 G/DL (ref 31.5–36.5)
MCHC RBC AUTO-ENTMCNC: 34.6 G/DL (ref 31.5–36.5)
MCHC RBC AUTO-ENTMCNC: 34.9 G/DL (ref 31.5–36.5)
MCHC RBC AUTO-ENTMCNC: 35.6 G/DL (ref 31.5–36.5)
MCV RBC AUTO: 93 FL (ref 78–100)
MCV RBC AUTO: 94 FL (ref 78–100)
MCV RBC AUTO: 95 FL (ref 78–100)
MCV RBC AUTO: 96 FL (ref 78–100)
MCV RBC AUTO: 97 FL (ref 78–100)
MCV RBC AUTO: 97 FL (ref 78–100)
MONOCYTES # BLD AUTO: 0.5 10E3/UL (ref 0–1.3)
MONOCYTES # BLD AUTO: 0.5 10E3/UL (ref 0–1.3)
MONOCYTES # BLD AUTO: 0.7 10E3/UL (ref 0–1.3)
MONOCYTES # BLD AUTO: 0.8 10E3/UL (ref 0–1.3)
MONOCYTES # BLD AUTO: 1.3 10E3/UL (ref 0–1.3)
MONOCYTES # BLD AUTO: 1.8 10E3/UL (ref 0–1.3)
MONOCYTES NFR BLD AUTO: 11 %
MONOCYTES NFR BLD AUTO: 12 %
MONOCYTES NFR BLD AUTO: 13 %
MONOCYTES NFR BLD AUTO: 15 %
MONOCYTES NFR BLD AUTO: 8 %
MONOCYTES NFR BLD AUTO: 9 %
MUCOUS THREADS #/AREA URNS LPF: PRESENT /LPF
MUCOUS THREADS #/AREA URNS LPF: PRESENT /LPF
NEUTROPHILS # BLD AUTO: 10.9 10E3/UL (ref 1.6–8.3)
NEUTROPHILS # BLD AUTO: 3.9 10E3/UL (ref 1.6–8.3)
NEUTROPHILS # BLD AUTO: 4.3 10E3/UL (ref 1.6–8.3)
NEUTROPHILS # BLD AUTO: 4.5 10E3/UL (ref 1.6–8.3)
NEUTROPHILS # BLD AUTO: 5 10E3/UL (ref 1.6–8.3)
NEUTROPHILS # BLD AUTO: 5.8 10E3/UL (ref 1.6–8.3)
NEUTROPHILS NFR BLD AUTO: 61 %
NEUTROPHILS NFR BLD AUTO: 62 %
NEUTROPHILS NFR BLD AUTO: 68 %
NEUTROPHILS NFR BLD AUTO: 75 %
NEUTROPHILS NFR BLD AUTO: 77 %
NEUTROPHILS NFR BLD AUTO: 78 %
NITRATE UR QL: NEGATIVE
NOROV GI+II ORF1-ORF2 JNC STL QL NAA+PR: NOT DETECTED
NRBC # BLD AUTO: 0 10E3/UL
NRBC BLD AUTO-RTO: 0 /100
O+P STL MICRO: NEGATIVE
OSMOLALITY SERPL: 281 MMOL/KG (ref 280–301)
P AXIS - MUSE: 66 DEGREES
PATH REPORT.COMMENTS IMP SPEC: NORMAL
PATH REPORT.FINAL DX SPEC: NORMAL
PATH REPORT.GROSS SPEC: NORMAL
PATH REPORT.MICROSCOPIC SPEC OTHER STN: NORMAL
PATH REPORT.RELEVANT HX SPEC: NORMAL
PH UR STRIP: 5 [PH] (ref 4.7–8)
PH UR STRIP: 5 [PH] (ref 4.7–8)
PH UR STRIP: 5.5 [PH] (ref 4.7–8)
PH UR STRIP: 6 [PH] (ref 4.7–8)
PHOSPHATE SERPL-MCNC: 2.4 MG/DL (ref 2.5–4.5)
PHOSPHATE SERPL-MCNC: 2.7 MG/DL (ref 2.5–4.5)
PHOTO IMAGE: NORMAL
PLATELET # BLD AUTO: 184 10E3/UL (ref 150–450)
PLATELET # BLD AUTO: 233 10E3/UL (ref 150–450)
PLATELET # BLD AUTO: 269 10E3/UL (ref 150–450)
PLATELET # BLD AUTO: 277 10E3/UL (ref 150–450)
PLATELET # BLD AUTO: 333 10E3/UL (ref 150–450)
PLATELET # BLD AUTO: 357 10E3/UL (ref 150–450)
PLATELET # BLD AUTO: 369 10E3/UL (ref 150–450)
PLATELET # BLD AUTO: 370 10E3/UL (ref 150–450)
PLATELET # BLD AUTO: 370 10E3/UL (ref 150–450)
PLATELET # BLD AUTO: 376 10E3/UL (ref 150–450)
PLATELET # BLD AUTO: 378 10E3/UL (ref 150–450)
PLATELET # BLD AUTO: 403 10E3/UL (ref 150–450)
PLATELET # BLD AUTO: 404 10E3/UL (ref 150–450)
PLATELET # BLD AUTO: 408 10E3/UL (ref 150–450)
PLATELET # BLD AUTO: 419 10E3/UL (ref 150–450)
POTASSIUM BLD-SCNC: 3 MMOL/L (ref 3.5–5)
POTASSIUM BLD-SCNC: 3.2 MMOL/L (ref 3.5–5)
POTASSIUM BLD-SCNC: 3.3 MMOL/L (ref 3.4–5.3)
POTASSIUM BLD-SCNC: 3.3 MMOL/L (ref 3.5–5)
POTASSIUM BLD-SCNC: 3.4 MMOL/L (ref 3.5–5)
POTASSIUM BLD-SCNC: 3.4 MMOL/L (ref 3.5–5)
POTASSIUM BLD-SCNC: 3.5 MMOL/L (ref 3.4–5.3)
POTASSIUM BLD-SCNC: 3.5 MMOL/L (ref 3.5–5)
POTASSIUM BLD-SCNC: 3.6 MMOL/L (ref 3.5–5)
POTASSIUM BLD-SCNC: 3.7 MMOL/L (ref 3.5–5)
POTASSIUM BLD-SCNC: 3.8 MMOL/L (ref 3.4–5.3)
POTASSIUM BLD-SCNC: 3.9 MMOL/L (ref 3.4–5.3)
POTASSIUM BLD-SCNC: 4.1 MMOL/L (ref 3.4–5.3)
POTASSIUM BLD-SCNC: 4.1 MMOL/L (ref 3.5–5)
POTASSIUM BLD-SCNC: 4.2 MMOL/L (ref 3.5–5)
POTASSIUM BLD-SCNC: 4.3 MMOL/L (ref 3.5–5)
POTASSIUM BLD-SCNC: 4.8 MMOL/L (ref 3.5–5)
POTASSIUM BLD-SCNC: ABNORMAL MMOL/L
PR INTERVAL - MUSE: 188 MS
PROCALCITONIN SERPL IA-MCNC: 0.41 NG/ML
PROCALCITONIN SERPL-MCNC: 0.61 NG/ML
PROT SERPL-MCNC: 3.8 G/DL (ref 6–8)
PROT SERPL-MCNC: 4.2 G/DL (ref 6–8)
PROT SERPL-MCNC: 4.8 G/DL (ref 6–8)
PROT SERPL-MCNC: 5.9 G/DL (ref 6.8–8.8)
PROT SERPL-MCNC: 6.2 G/DL (ref 6.8–8.8)
PROT SERPL-MCNC: 6.6 G/DL (ref 6.8–8.8)
PROT SERPL-MCNC: 6.7 G/DL (ref 6.8–8.8)
PROT UR-MCNC: 0.98 G/L
PROT/CREAT 24H UR: 1.26 G/G CR (ref 0–0.2)
PTH-INTACT SERPL-MCNC: 100 PG/ML (ref 15–65)
QRS DURATION - MUSE: 116 MS
QT - MUSE: 432 MS
QTC - MUSE: 488 MS
R AXIS - MUSE: -47 DEGREES
RADIOLOGIST FLAGS: ABNORMAL
RBC # BLD AUTO: 3.4 10E6/UL (ref 4.4–5.9)
RBC # BLD AUTO: 3.49 10E6/UL (ref 4.4–5.9)
RBC # BLD AUTO: 3.56 10E6/UL (ref 4.4–5.9)
RBC # BLD AUTO: 3.58 10E6/UL (ref 4.4–5.9)
RBC # BLD AUTO: 3.68 10E6/UL (ref 4.4–5.9)
RBC # BLD AUTO: 3.76 10E6/UL (ref 4.4–5.9)
RBC # BLD AUTO: 3.81 10E6/UL (ref 4.4–5.9)
RBC # BLD AUTO: 3.96 10E6/UL (ref 4.4–5.9)
RBC # BLD AUTO: 4.05 10E6/UL (ref 4.4–5.9)
RBC # BLD AUTO: 4.11 10E6/UL (ref 4.4–5.9)
RBC # BLD AUTO: 4.15 10E6/UL (ref 4.4–5.9)
RBC # BLD AUTO: 4.25 10E6/UL (ref 4.4–5.9)
RBC # BLD AUTO: 4.26 10E6/UL (ref 4.4–5.9)
RBC # BLD AUTO: 4.37 10E6/UL (ref 4.4–5.9)
RBC # BLD AUTO: 4.63 10E6/UL (ref 4.4–5.9)
RBC URINE: 0 /HPF
RBC URINE: <1 /HPF
RSV RNA SPEC NAA+PROBE: NEGATIVE
RVA NSP5 STL QL NAA+PROBE: NOT DETECTED
SALMONELLA SP RPOD STL QL NAA+PROBE: NOT DETECTED
SARS-COV-2 RNA RESP QL NAA+PROBE: NEGATIVE
SARS-COV-2 RNA RESP QL NAA+PROBE: NEGATIVE
SHIGELLA SP+EIEC IPAH STL QL NAA+PROBE: NOT DETECTED
SODIUM SERPL-SCNC: 125 MMOL/L (ref 136–145)
SODIUM SERPL-SCNC: 132 MMOL/L (ref 133–144)
SODIUM SERPL-SCNC: 132 MMOL/L (ref 136–145)
SODIUM SERPL-SCNC: 133 MMOL/L (ref 133–144)
SODIUM SERPL-SCNC: 133 MMOL/L (ref 136–145)
SODIUM SERPL-SCNC: 135 MMOL/L (ref 133–144)
SODIUM SERPL-SCNC: 135 MMOL/L (ref 136–145)
SODIUM SERPL-SCNC: 136 MMOL/L (ref 136–145)
SODIUM SERPL-SCNC: 137 MMOL/L (ref 133–144)
SODIUM SERPL-SCNC: 138 MMOL/L (ref 133–144)
SODIUM SERPL-SCNC: 140 MMOL/L (ref 133–144)
SP GR UR STRIP: 1.02 (ref 1–1.03)
SP GR UR STRIP: 1.03 (ref 1–1.03)
SQUAMOUS EPITHELIAL: 0 /HPF
SQUAMOUS EPITHELIAL: 0 /HPF
SYSTOLIC BLOOD PRESSURE - MUSE: 119 MMHG
T AXIS - MUSE: 77 DEGREES
T4 FREE SERPL-MCNC: 0.28 NG/DL (ref 0.9–1.7)
T4 FREE SERPL-MCNC: 1.07 NG/DL (ref 0.9–1.7)
T4 FREE SERPL-MCNC: 1.19 NG/DL (ref 0.76–1.46)
T4 FREE SERPL-MCNC: 1.39 NG/DL (ref 0.76–1.46)
TIBC SERPL-MCNC: 212 UG/DL (ref 240–430)
TRANSFERRIN SERPL-MCNC: 166 MG/DL (ref 200–360)
TROPONIN I SERPL-MCNC: 0.02 NG/ML (ref 0–0.29)
TSH SERPL DL<=0.005 MIU/L-ACNC: 0.17 UIU/ML (ref 0.3–4.2)
TSH SERPL DL<=0.005 MIU/L-ACNC: 10.39 MU/L (ref 0.4–4)
TSH SERPL DL<=0.005 MIU/L-ACNC: 12.93 MU/L (ref 0.4–4)
TSH SERPL DL<=0.005 MIU/L-ACNC: 46.22 UIU/ML (ref 0.3–5)
UPPER EUS: NORMAL
UROBILINOGEN UR STRIP-MCNC: NORMAL MG/DL
V CHOL+PARA RFBL+TRKH+TNAA STL QL NAA+PR: NOT DETECTED
VENTRICULAR RATE- MUSE: 77 BPM
VIT B12 SERPL-MCNC: 564 PG/ML (ref 232–1245)
WBC # BLD AUTO: 13.9 10E3/UL (ref 4–11)
WBC # BLD AUTO: 13.9 10E3/UL (ref 4–11)
WBC # BLD AUTO: 14.1 10E3/UL (ref 4–11)
WBC # BLD AUTO: 14.6 10E3/UL (ref 4–11)
WBC # BLD AUTO: 14.9 10E3/UL (ref 4–11)
WBC # BLD AUTO: 15.8 10E3/UL (ref 4–11)
WBC # BLD AUTO: 16 10E3/UL (ref 4–11)
WBC # BLD AUTO: 22.8 10E3/UL (ref 4–11)
WBC # BLD AUTO: 23.1 10E3/UL (ref 4–11)
WBC # BLD AUTO: 6 10E3/UL (ref 4–11)
WBC # BLD AUTO: 6 10E3/UL (ref 4–11)
WBC # BLD AUTO: 6.2 10E3/UL (ref 4–11)
WBC # BLD AUTO: 6.4 10E3/UL (ref 4–11)
WBC # BLD AUTO: 7 10E3/UL (ref 4–11)
WBC # BLD AUTO: 8.4 10E3/UL (ref 4–11)
WBC URINE: 2 /HPF
WBC URINE: <1 /HPF
Y ENTERO RECN STL QL NAA+PROBE: NOT DETECTED

## 2022-01-01 PROCEDURE — 84132 ASSAY OF SERUM POTASSIUM: CPT | Performed by: FAMILY MEDICINE

## 2022-01-01 PROCEDURE — 82374 ASSAY BLOOD CARBON DIOXIDE: CPT | Mod: ZL,91 | Performed by: NURSE PRACTITIONER

## 2022-01-01 PROCEDURE — 87798 DETECT AGENT NOS DNA AMP: CPT | Mod: ZL | Performed by: FAMILY MEDICINE

## 2022-01-01 PROCEDURE — 80053 COMPREHEN METABOLIC PANEL: CPT | Performed by: EMERGENCY MEDICINE

## 2022-01-01 PROCEDURE — 85027 COMPLETE CBC AUTOMATED: CPT | Performed by: FAMILY MEDICINE

## 2022-01-01 PROCEDURE — 99215 OFFICE O/P EST HI 40 MIN: CPT | Performed by: FAMILY MEDICINE

## 2022-01-01 PROCEDURE — 258N000003 HC RX IP 258 OP 636: Performed by: INTERNAL MEDICINE

## 2022-01-01 PROCEDURE — 83735 ASSAY OF MAGNESIUM: CPT | Mod: ZL

## 2022-01-01 PROCEDURE — 36415 COLL VENOUS BLD VENIPUNCTURE: CPT | Performed by: INTERNAL MEDICINE

## 2022-01-01 PROCEDURE — G0378 HOSPITAL OBSERVATION PER HR: HCPCS

## 2022-01-01 PROCEDURE — 99213 OFFICE O/P EST LOW 20 MIN: CPT | Performed by: FAMILY MEDICINE

## 2022-01-01 PROCEDURE — 250N000012 HC RX MED GY IP 250 OP 636 PS 637: Performed by: INTERNAL MEDICINE

## 2022-01-01 PROCEDURE — 99605 MTMS BY PHARM NP 15 MIN: CPT

## 2022-01-01 PROCEDURE — 97535 SELF CARE MNGMENT TRAINING: CPT | Mod: GO

## 2022-01-01 PROCEDURE — 83735 ASSAY OF MAGNESIUM: CPT | Performed by: FAMILY MEDICINE

## 2022-01-01 PROCEDURE — 250N000011 HC RX IP 250 OP 636: Performed by: INTERNAL MEDICINE

## 2022-01-01 PROCEDURE — 83605 ASSAY OF LACTIC ACID: CPT | Mod: ZL | Performed by: FAMILY MEDICINE

## 2022-01-01 PROCEDURE — 258N000003 HC RX IP 258 OP 636: Performed by: NURSE ANESTHETIST, CERTIFIED REGISTERED

## 2022-01-01 PROCEDURE — 84443 ASSAY THYROID STIM HORMONE: CPT | Performed by: HOSPITALIST

## 2022-01-01 PROCEDURE — 97110 THERAPEUTIC EXERCISES: CPT | Mod: GO

## 2022-01-01 PROCEDURE — 82310 ASSAY OF CALCIUM: CPT | Performed by: INTERNAL MEDICINE

## 2022-01-01 PROCEDURE — 84156 ASSAY OF PROTEIN URINE: CPT | Mod: ZL

## 2022-01-01 PROCEDURE — 80069 RENAL FUNCTION PANEL: CPT | Mod: ZL

## 2022-01-01 PROCEDURE — 36415 COLL VENOUS BLD VENIPUNCTURE: CPT | Mod: ZL | Performed by: FAMILY MEDICINE

## 2022-01-01 PROCEDURE — 36415 COLL VENOUS BLD VENIPUNCTURE: CPT | Performed by: HOSPITALIST

## 2022-01-01 PROCEDURE — 82374 ASSAY BLOOD CARBON DIOXIDE: CPT | Mod: ZL | Performed by: NURSE PRACTITIONER

## 2022-01-01 PROCEDURE — 250N000011 HC RX IP 250 OP 636: Performed by: FAMILY MEDICINE

## 2022-01-01 PROCEDURE — 250N000012 HC RX MED GY IP 250 OP 636 PS 637: Performed by: FAMILY MEDICINE

## 2022-01-01 PROCEDURE — 258N000003 HC RX IP 258 OP 636: Performed by: EMERGENCY MEDICINE

## 2022-01-01 PROCEDURE — 0DBF8ZX EXCISION OF RIGHT LARGE INTESTINE, VIA NATURAL OR ARTIFICIAL OPENING ENDOSCOPIC, DIAGNOSTIC: ICD-10-PCS | Performed by: INTERNAL MEDICINE

## 2022-01-01 PROCEDURE — 51720 TREATMENT OF BLADDER LESION: CPT

## 2022-01-01 PROCEDURE — 82947 ASSAY GLUCOSE BLOOD QUANT: CPT | Mod: ZL | Performed by: NURSE PRACTITIONER

## 2022-01-01 PROCEDURE — 81001 URINALYSIS AUTO W/SCOPE: CPT | Mod: ZL

## 2022-01-01 PROCEDURE — 120N000001 HC R&B MED SURG/OB

## 2022-01-01 PROCEDURE — 85027 COMPLETE CBC AUTOMATED: CPT | Mod: ZL | Performed by: FAMILY MEDICINE

## 2022-01-01 PROCEDURE — 80048 BASIC METABOLIC PNL TOTAL CA: CPT | Performed by: FAMILY MEDICINE

## 2022-01-01 PROCEDURE — 96361 HYDRATE IV INFUSION ADD-ON: CPT

## 2022-01-01 PROCEDURE — 84484 ASSAY OF TROPONIN QUANT: CPT | Performed by: EMERGENCY MEDICINE

## 2022-01-01 PROCEDURE — 99232 SBSQ HOSP IP/OBS MODERATE 35: CPT | Performed by: INTERNAL MEDICINE

## 2022-01-01 PROCEDURE — 250N000013 HC RX MED GY IP 250 OP 250 PS 637: Performed by: HOSPITALIST

## 2022-01-01 PROCEDURE — 250N000011 HC RX IP 250 OP 636: Performed by: EMERGENCY MEDICINE

## 2022-01-01 PROCEDURE — 99239 HOSP IP/OBS DSCHRG MGMT >30: CPT | Performed by: INTERNAL MEDICINE

## 2022-01-01 PROCEDURE — 86140 C-REACTIVE PROTEIN: CPT | Mod: ZL | Performed by: FAMILY MEDICINE

## 2022-01-01 PROCEDURE — G0108 DIAB MANAGE TRN  PER INDIV: HCPCS

## 2022-01-01 PROCEDURE — 99232 SBSQ HOSP IP/OBS MODERATE 35: CPT | Performed by: FAMILY MEDICINE

## 2022-01-01 PROCEDURE — 96372 THER/PROPH/DIAG INJ SC/IM: CPT

## 2022-01-01 PROCEDURE — 99215 OFFICE O/P EST HI 40 MIN: CPT | Mod: CS | Performed by: FAMILY MEDICINE

## 2022-01-01 PROCEDURE — 36415 COLL VENOUS BLD VENIPUNCTURE: CPT | Performed by: FAMILY MEDICINE

## 2022-01-01 PROCEDURE — 74177 CT ABD & PELVIS W/CONTRAST: CPT

## 2022-01-01 PROCEDURE — 250N000011 HC RX IP 250 OP 636: Performed by: RADIOLOGY

## 2022-01-01 PROCEDURE — 86618 LYME DISEASE ANTIBODY: CPT | Mod: ZL | Performed by: FAMILY MEDICINE

## 2022-01-01 PROCEDURE — 85025 COMPLETE CBC W/AUTO DIFF WBC: CPT | Mod: ZL

## 2022-01-01 PROCEDURE — 82947 ASSAY GLUCOSE BLOOD QUANT: CPT | Performed by: INTERNAL MEDICINE

## 2022-01-01 PROCEDURE — 999N000141 HC STATISTIC PRE-PROCEDURE NURSING ASSESSMENT: Performed by: INTERNAL MEDICINE

## 2022-01-01 PROCEDURE — 250N000013 HC RX MED GY IP 250 OP 250 PS 637: Performed by: INTERNAL MEDICINE

## 2022-01-01 PROCEDURE — 84466 ASSAY OF TRANSFERRIN: CPT | Mod: ZL

## 2022-01-01 PROCEDURE — 99607 MTMS BY PHARM ADDL 15 MIN: CPT

## 2022-01-01 PROCEDURE — 83735 ASSAY OF MAGNESIUM: CPT | Performed by: INTERNAL MEDICINE

## 2022-01-01 PROCEDURE — 0DBB8ZX EXCISION OF ILEUM, VIA NATURAL OR ARTIFICIAL OPENING ENDOSCOPIC, DIAGNOSTIC: ICD-10-PCS | Performed by: INTERNAL MEDICINE

## 2022-01-01 PROCEDURE — 85027 COMPLETE CBC AUTOMATED: CPT | Performed by: INTERNAL MEDICINE

## 2022-01-01 PROCEDURE — 99223 1ST HOSP IP/OBS HIGH 75: CPT | Mod: AI | Performed by: INTERNAL MEDICINE

## 2022-01-01 PROCEDURE — 51702 INSERT TEMP BLADDER CATH: CPT

## 2022-01-01 PROCEDURE — 84443 ASSAY THYROID STIM HORMONE: CPT | Mod: ZL | Performed by: FAMILY MEDICINE

## 2022-01-01 PROCEDURE — 250N000011 HC RX IP 250 OP 636: Performed by: UROLOGY

## 2022-01-01 PROCEDURE — 99284 EMERGENCY DEPT VISIT MOD MDM: CPT | Performed by: EMERGENCY MEDICINE

## 2022-01-01 PROCEDURE — 97116 GAIT TRAINING THERAPY: CPT | Mod: GP

## 2022-01-01 PROCEDURE — 82962 GLUCOSE BLOOD TEST: CPT

## 2022-01-01 PROCEDURE — 87637 SARSCOV2&INF A&B&RSV AMP PRB: CPT | Mod: ZL | Performed by: FAMILY MEDICINE

## 2022-01-01 PROCEDURE — 250N000011 HC RX IP 250 OP 636: Performed by: NURSE ANESTHETIST, CERTIFIED REGISTERED

## 2022-01-01 PROCEDURE — G0463 HOSPITAL OUTPT CLINIC VISIT: HCPCS

## 2022-01-01 PROCEDURE — 88305 TISSUE EXAM BY PATHOLOGIST: CPT | Mod: TC | Performed by: INTERNAL MEDICINE

## 2022-01-01 PROCEDURE — 82310 ASSAY OF CALCIUM: CPT | Performed by: HOSPITALIST

## 2022-01-01 PROCEDURE — 360N000076 HC SURGERY LEVEL 3, PER MIN: Performed by: INTERNAL MEDICINE

## 2022-01-01 PROCEDURE — 99417 PROLNG OP E/M EACH 15 MIN: CPT | Performed by: FAMILY MEDICINE

## 2022-01-01 PROCEDURE — 97110 THERAPEUTIC EXERCISES: CPT | Mod: GP

## 2022-01-01 PROCEDURE — 87040 BLOOD CULTURE FOR BACTERIA: CPT | Mod: ZL | Performed by: FAMILY MEDICINE

## 2022-01-01 PROCEDURE — 86140 C-REACTIVE PROTEIN: CPT | Performed by: EMERGENCY MEDICINE

## 2022-01-01 PROCEDURE — 96374 THER/PROPH/DIAG INJ IV PUSH: CPT

## 2022-01-01 PROCEDURE — 71046 X-RAY EXAM CHEST 2 VIEWS: CPT | Mod: TC

## 2022-01-01 PROCEDURE — 87651 STREP A DNA AMP PROBE: CPT | Mod: ZL | Performed by: FAMILY MEDICINE

## 2022-01-01 PROCEDURE — 87040 BLOOD CULTURE FOR BACTERIA: CPT | Mod: ZL

## 2022-01-01 PROCEDURE — 99221 1ST HOSP IP/OBS SF/LOW 40: CPT | Performed by: INTERNAL MEDICINE

## 2022-01-01 PROCEDURE — 81003 URINALYSIS AUTO W/O SCOPE: CPT | Mod: ZL | Performed by: UROLOGY

## 2022-01-01 PROCEDURE — 87493 C DIFF AMPLIFIED PROBE: CPT | Performed by: EMERGENCY MEDICINE

## 2022-01-01 PROCEDURE — 370N000017 HC ANESTHESIA TECHNICAL FEE, PER MIN: Performed by: INTERNAL MEDICINE

## 2022-01-01 PROCEDURE — 87209 SMEAR COMPLEX STAIN: CPT | Performed by: EMERGENCY MEDICINE

## 2022-01-01 PROCEDURE — 250N000013 HC RX MED GY IP 250 OP 250 PS 637: Performed by: FAMILY MEDICINE

## 2022-01-01 PROCEDURE — 83690 ASSAY OF LIPASE: CPT | Mod: ZL | Performed by: FAMILY MEDICINE

## 2022-01-01 PROCEDURE — 85025 COMPLETE CBC W/AUTO DIFF WBC: CPT | Performed by: EMERGENCY MEDICINE

## 2022-01-01 PROCEDURE — 36415 COLL VENOUS BLD VENIPUNCTURE: CPT | Mod: ZL

## 2022-01-01 PROCEDURE — 36415 COLL VENOUS BLD VENIPUNCTURE: CPT | Mod: ZL | Performed by: NURSE PRACTITIONER

## 2022-01-01 PROCEDURE — 258N000003 HC RX IP 258 OP 636: Performed by: HOSPITALIST

## 2022-01-01 PROCEDURE — 99215 OFFICE O/P EST HI 40 MIN: CPT | Performed by: NURSE PRACTITIONER

## 2022-01-01 PROCEDURE — 85018 HEMOGLOBIN: CPT | Mod: ZL

## 2022-01-01 PROCEDURE — 84450 TRANSFERASE (AST) (SGOT): CPT | Mod: ZL | Performed by: NURSE PRACTITIONER

## 2022-01-01 PROCEDURE — 360N000075 HC SURGERY LEVEL 2, PER MIN: Performed by: INTERNAL MEDICINE

## 2022-01-01 PROCEDURE — 272N000001 HC OR GENERAL SUPPLY STERILE: Performed by: INTERNAL MEDICINE

## 2022-01-01 PROCEDURE — 82248 BILIRUBIN DIRECT: CPT | Performed by: EMERGENCY MEDICINE

## 2022-01-01 PROCEDURE — 80053 COMPREHEN METABOLIC PANEL: CPT | Performed by: HOSPITALIST

## 2022-01-01 PROCEDURE — 83550 IRON BINDING TEST: CPT | Mod: ZL

## 2022-01-01 PROCEDURE — 88305 TISSUE EXAM BY PATHOLOGIST: CPT | Mod: 26 | Performed by: PATHOLOGY

## 2022-01-01 PROCEDURE — 84439 ASSAY OF FREE THYROXINE: CPT | Mod: ZL | Performed by: FAMILY MEDICINE

## 2022-01-01 PROCEDURE — 82310 ASSAY OF CALCIUM: CPT | Performed by: FAMILY MEDICINE

## 2022-01-01 PROCEDURE — 80048 BASIC METABOLIC PNL TOTAL CA: CPT | Performed by: INTERNAL MEDICINE

## 2022-01-01 PROCEDURE — 97165 OT EVAL LOW COMPLEX 30 MIN: CPT | Mod: GO

## 2022-01-01 PROCEDURE — 36415 COLL VENOUS BLD VENIPUNCTURE: CPT | Performed by: EMERGENCY MEDICINE

## 2022-01-01 PROCEDURE — 96372 THER/PROPH/DIAG INJ SC/IM: CPT | Performed by: INTERNAL MEDICINE

## 2022-01-01 PROCEDURE — 87506 IADNA-DNA/RNA PROBE TQ 6-11: CPT | Performed by: EMERGENCY MEDICINE

## 2022-01-01 PROCEDURE — 0DBG8ZX EXCISION OF LEFT LARGE INTESTINE, VIA NATURAL OR ARTIFICIAL OPENING ENDOSCOPIC, DIAGNOSTIC: ICD-10-PCS | Performed by: INTERNAL MEDICINE

## 2022-01-01 PROCEDURE — 85004 AUTOMATED DIFF WBC COUNT: CPT | Mod: ZL | Performed by: FAMILY MEDICINE

## 2022-01-01 PROCEDURE — 83605 ASSAY OF LACTIC ACID: CPT | Performed by: FAMILY MEDICINE

## 2022-01-01 PROCEDURE — 250N000013 HC RX MED GY IP 250 OP 250 PS 637: Performed by: PHYSICIAN ASSISTANT

## 2022-01-01 PROCEDURE — 84443 ASSAY THYROID STIM HORMONE: CPT | Mod: ZL | Performed by: NURSE PRACTITIONER

## 2022-01-01 PROCEDURE — 80053 COMPREHEN METABOLIC PANEL: CPT | Mod: ZL | Performed by: FAMILY MEDICINE

## 2022-01-01 PROCEDURE — 99233 SBSQ HOSP IP/OBS HIGH 50: CPT | Performed by: FAMILY MEDICINE

## 2022-01-01 PROCEDURE — 83735 ASSAY OF MAGNESIUM: CPT | Performed by: EMERGENCY MEDICINE

## 2022-01-01 PROCEDURE — 99233 SBSQ HOSP IP/OBS HIGH 50: CPT | Performed by: HOSPITALIST

## 2022-01-01 PROCEDURE — 83970 ASSAY OF PARATHORMONE: CPT | Mod: ZL

## 2022-01-01 PROCEDURE — 87798 DETECT AGENT NOS DNA AMP: CPT | Mod: ZL,91 | Performed by: FAMILY MEDICINE

## 2022-01-01 PROCEDURE — 710N000012 HC RECOVERY PHASE 2, PER MINUTE: Performed by: INTERNAL MEDICINE

## 2022-01-01 PROCEDURE — 93005 ELECTROCARDIOGRAM TRACING: CPT | Performed by: EMERGENCY MEDICINE

## 2022-01-01 PROCEDURE — 84145 PROCALCITONIN (PCT): CPT | Mod: ZL | Performed by: FAMILY MEDICINE

## 2022-01-01 PROCEDURE — 250N000009 HC RX 250: Performed by: INTERNAL MEDICINE

## 2022-01-01 PROCEDURE — 97530 THERAPEUTIC ACTIVITIES: CPT | Mod: GO

## 2022-01-01 PROCEDURE — 84439 ASSAY OF FREE THYROXINE: CPT | Performed by: HOSPITALIST

## 2022-01-01 PROCEDURE — 84443 ASSAY THYROID STIM HORMONE: CPT | Mod: ZL

## 2022-01-01 PROCEDURE — 258N000001 HC RX 258: Performed by: INTERNAL MEDICINE

## 2022-01-01 PROCEDURE — G0463 HOSPITAL OUTPT CLINIC VISIT: HCPCS | Mod: 25

## 2022-01-01 PROCEDURE — 82728 ASSAY OF FERRITIN: CPT | Mod: ZL

## 2022-01-01 PROCEDURE — 81003 URINALYSIS AUTO W/O SCOPE: CPT | Mod: ZL | Performed by: FAMILY MEDICINE

## 2022-01-01 PROCEDURE — 51720 TREATMENT OF BLADDER LESION: CPT | Performed by: INTERNAL MEDICINE

## 2022-01-01 PROCEDURE — 258N000003 HC RX IP 258 OP 636: Performed by: ANESTHESIOLOGY

## 2022-01-01 PROCEDURE — 83690 ASSAY OF LIPASE: CPT | Performed by: EMERGENCY MEDICINE

## 2022-01-01 PROCEDURE — 99284 EMERGENCY DEPT VISIT MOD MDM: CPT | Mod: 25

## 2022-01-01 PROCEDURE — 82607 VITAMIN B-12: CPT | Mod: ZL | Performed by: FAMILY MEDICINE

## 2022-01-01 PROCEDURE — 82306 VITAMIN D 25 HYDROXY: CPT | Mod: ZL

## 2022-01-01 PROCEDURE — 99285 EMERGENCY DEPT VISIT HI MDM: CPT | Mod: 25

## 2022-01-01 PROCEDURE — 258N000003 HC RX IP 258 OP 636: Performed by: FAMILY MEDICINE

## 2022-01-01 PROCEDURE — 84439 ASSAY OF FREE THYROXINE: CPT | Mod: ZL | Performed by: NURSE PRACTITIONER

## 2022-01-01 PROCEDURE — 85025 COMPLETE CBC W/AUTO DIFF WBC: CPT | Mod: ZL | Performed by: FAMILY MEDICINE

## 2022-01-01 PROCEDURE — 82040 ASSAY OF SERUM ALBUMIN: CPT | Mod: ZL | Performed by: FAMILY MEDICINE

## 2022-01-01 PROCEDURE — A9585 GADOBUTROL INJECTION: HCPCS | Performed by: HOSPITALIST

## 2022-01-01 PROCEDURE — 97162 PT EVAL MOD COMPLEX 30 MIN: CPT | Mod: GP

## 2022-01-01 PROCEDURE — 83036 HEMOGLOBIN GLYCOSYLATED A1C: CPT | Mod: ZL | Performed by: FAMILY MEDICINE

## 2022-01-01 PROCEDURE — 250N000009 HC RX 250: Performed by: UROLOGY

## 2022-01-01 PROCEDURE — 255N000002 HC RX 255 OP 636: Performed by: HOSPITALIST

## 2022-01-01 PROCEDURE — 84100 ASSAY OF PHOSPHORUS: CPT | Mod: ZL

## 2022-01-01 PROCEDURE — 96360 HYDRATION IV INFUSION INIT: CPT | Mod: 59

## 2022-01-01 PROCEDURE — C9803 HOPD COVID-19 SPEC COLLECT: HCPCS

## 2022-01-01 PROCEDURE — 85027 COMPLETE CBC AUTOMATED: CPT | Performed by: HOSPITALIST

## 2022-01-01 PROCEDURE — U0003 INFECTIOUS AGENT DETECTION BY NUCLEIC ACID (DNA OR RNA); SEVERE ACUTE RESPIRATORY SYNDROME CORONAVIRUS 2 (SARS-COV-2) (CORONAVIRUS DISEASE [COVID-19]), AMPLIFIED PROBE TECHNIQUE, MAKING USE OF HIGH THROUGHPUT TECHNOLOGIES AS DESCRIBED BY CMS-2020-01-R: HCPCS | Performed by: EMERGENCY MEDICINE

## 2022-01-01 PROCEDURE — 82248 BILIRUBIN DIRECT: CPT | Performed by: HOSPITALIST

## 2022-01-01 PROCEDURE — 83930 ASSAY OF BLOOD OSMOLALITY: CPT | Mod: ZL | Performed by: FAMILY MEDICINE

## 2022-01-01 PROCEDURE — 82435 ASSAY OF BLOOD CHLORIDE: CPT | Mod: ZL,59 | Performed by: NURSE PRACTITIONER

## 2022-01-01 PROCEDURE — 74183 MRI ABD W/O CNTR FLWD CNTR: CPT

## 2022-01-01 PROCEDURE — 84439 ASSAY OF FREE THYROXINE: CPT | Mod: ZL

## 2022-01-01 PROCEDURE — 99214 OFFICE O/P EST MOD 30 MIN: CPT | Performed by: FAMILY MEDICINE

## 2022-01-01 RX ORDER — ONDANSETRON 4 MG/1
4 TABLET, ORALLY DISINTEGRATING ORAL EVERY 6 HOURS PRN
Status: DISCONTINUED | OUTPATIENT
Start: 2022-01-01 | End: 2022-01-01

## 2022-01-01 RX ORDER — PREDNISONE 20 MG/1
80 TABLET ORAL DAILY
Status: DISCONTINUED | OUTPATIENT
Start: 2022-01-01 | End: 2022-01-01

## 2022-01-01 RX ORDER — LEVOTHYROXINE SODIUM 150 UG/1
TABLET ORAL
Qty: 90 TABLET | Refills: 0 | Status: SHIPPED | OUTPATIENT
Start: 2022-01-01 | End: 2022-01-01 | Stop reason: DRUGHIGH

## 2022-01-01 RX ORDER — ALBUTEROL SULFATE 0.83 MG/ML
2.5 SOLUTION RESPIRATORY (INHALATION)
Status: CANCELLED | OUTPATIENT
Start: 2022-01-01

## 2022-01-01 RX ORDER — SODIUM CHLORIDE, SODIUM LACTATE, POTASSIUM CHLORIDE, CALCIUM CHLORIDE 600; 310; 30; 20 MG/100ML; MG/100ML; MG/100ML; MG/100ML
INJECTION, SOLUTION INTRAVENOUS CONTINUOUS
Status: DISCONTINUED | OUTPATIENT
Start: 2022-01-01 | End: 2022-01-01 | Stop reason: HOSPADM

## 2022-01-01 RX ORDER — ONDANSETRON 2 MG/ML
4 INJECTION INTRAMUSCULAR; INTRAVENOUS EVERY 30 MIN PRN
Status: DISCONTINUED | OUTPATIENT
Start: 2022-01-01 | End: 2022-01-01 | Stop reason: HOSPADM

## 2022-01-01 RX ORDER — ALBUTEROL SULFATE 90 UG/1
1-2 AEROSOL, METERED RESPIRATORY (INHALATION)
Status: CANCELLED
Start: 2022-01-01

## 2022-01-01 RX ORDER — NICOTINE POLACRILEX 4 MG
15-30 LOZENGE BUCCAL
Status: DISCONTINUED | OUTPATIENT
Start: 2022-01-01 | End: 2022-01-01 | Stop reason: HOSPADM

## 2022-01-01 RX ORDER — ONDANSETRON 2 MG/ML
4 INJECTION INTRAMUSCULAR; INTRAVENOUS ONCE
Status: COMPLETED | OUTPATIENT
Start: 2022-01-01 | End: 2022-01-01

## 2022-01-01 RX ORDER — ONDANSETRON 2 MG/ML
4 INJECTION INTRAMUSCULAR; INTRAVENOUS EVERY 4 HOURS PRN
Status: DISCONTINUED | OUTPATIENT
Start: 2022-01-01 | End: 2022-01-01 | Stop reason: HOSPADM

## 2022-01-01 RX ORDER — POTASSIUM CHLORIDE 1500 MG/1
40 TABLET, EXTENDED RELEASE ORAL ONCE
Status: COMPLETED | OUTPATIENT
Start: 2022-01-01 | End: 2022-01-01

## 2022-01-01 RX ORDER — POTASSIUM CHLORIDE 7.45 MG/ML
10 INJECTION INTRAVENOUS
Status: COMPLETED | OUTPATIENT
Start: 2022-01-01 | End: 2022-01-01

## 2022-01-01 RX ORDER — LIDOCAINE 40 MG/G
CREAM TOPICAL
Status: DISCONTINUED | OUTPATIENT
Start: 2022-01-01 | End: 2022-01-01 | Stop reason: HOSPADM

## 2022-01-01 RX ORDER — FENTANYL CITRATE 50 UG/ML
25 INJECTION, SOLUTION INTRAMUSCULAR; INTRAVENOUS EVERY 5 MIN PRN
Status: DISCONTINUED | OUTPATIENT
Start: 2022-01-01 | End: 2022-01-01 | Stop reason: HOSPADM

## 2022-01-01 RX ORDER — AZITHROMYCIN 250 MG/1
TABLET, FILM COATED ORAL
Qty: 6 TABLET | Refills: 0 | Status: SHIPPED | OUTPATIENT
Start: 2022-01-01 | End: 2023-01-01

## 2022-01-01 RX ORDER — EPINEPHRINE 1 MG/ML
0.3 INJECTION, SOLUTION, CONCENTRATE INTRAVENOUS EVERY 5 MIN PRN
Status: CANCELLED | OUTPATIENT
Start: 2022-01-01

## 2022-01-01 RX ORDER — NALOXONE HYDROCHLORIDE 0.4 MG/ML
0.2 INJECTION, SOLUTION INTRAMUSCULAR; INTRAVENOUS; SUBCUTANEOUS
Status: DISCONTINUED | OUTPATIENT
Start: 2022-01-01 | End: 2022-01-01 | Stop reason: HOSPADM

## 2022-01-01 RX ORDER — LORAZEPAM 0.5 MG/1
0.5 TABLET ORAL
Status: DISCONTINUED | OUTPATIENT
Start: 2022-01-01 | End: 2022-01-01 | Stop reason: HOSPADM

## 2022-01-01 RX ORDER — IOPAMIDOL 755 MG/ML
75 INJECTION, SOLUTION INTRAVASCULAR ONCE
Status: COMPLETED | OUTPATIENT
Start: 2022-01-01 | End: 2022-01-01

## 2022-01-01 RX ORDER — ONDANSETRON 2 MG/ML
4 INJECTION INTRAMUSCULAR; INTRAVENOUS
Status: DISCONTINUED | OUTPATIENT
Start: 2022-01-01 | End: 2022-01-01 | Stop reason: HOSPADM

## 2022-01-01 RX ORDER — FENTANYL CITRATE 50 UG/ML
50 INJECTION, SOLUTION INTRAMUSCULAR; INTRAVENOUS EVERY 5 MIN PRN
Status: CANCELLED | OUTPATIENT
Start: 2022-01-01

## 2022-01-01 RX ORDER — INSULIN ASPART 100 [IU]/ML
INJECTION, SOLUTION INTRAVENOUS; SUBCUTANEOUS
Qty: 15 ML | Refills: 3 | Status: ON HOLD
Start: 2022-01-01 | End: 2022-01-01

## 2022-01-01 RX ORDER — DEXTROSE MONOHYDRATE 25 G/50ML
25-50 INJECTION, SOLUTION INTRAVENOUS
Status: DISCONTINUED | OUTPATIENT
Start: 2022-01-01 | End: 2022-01-01 | Stop reason: HOSPADM

## 2022-01-01 RX ORDER — CALCIUM CARBONATE 500 MG/1
500 TABLET, CHEWABLE ORAL EVERY 4 HOURS PRN
Status: DISCONTINUED | OUTPATIENT
Start: 2022-01-01 | End: 2022-01-01 | Stop reason: HOSPADM

## 2022-01-01 RX ORDER — ONDANSETRON 4 MG/1
4 TABLET, ORALLY DISINTEGRATING ORAL EVERY 30 MIN PRN
Status: DISCONTINUED | OUTPATIENT
Start: 2022-01-01 | End: 2022-01-01 | Stop reason: HOSPADM

## 2022-01-01 RX ORDER — INSULIN DEGLUDEC 100 U/ML
INJECTION, SOLUTION SUBCUTANEOUS
Qty: 15 ML | Refills: 3
Start: 2022-01-01 | End: 2023-01-01

## 2022-01-01 RX ORDER — PROPOFOL 10 MG/ML
INJECTION, EMULSION INTRAVENOUS PRN
Status: DISCONTINUED | OUTPATIENT
Start: 2022-01-01 | End: 2022-01-01

## 2022-01-01 RX ORDER — NALOXONE HYDROCHLORIDE 0.4 MG/ML
0.4 INJECTION, SOLUTION INTRAMUSCULAR; INTRAVENOUS; SUBCUTANEOUS
Status: DISCONTINUED | OUTPATIENT
Start: 2022-01-01 | End: 2022-01-01 | Stop reason: HOSPADM

## 2022-01-01 RX ORDER — SODIUM CHLORIDE, SODIUM LACTATE, POTASSIUM CHLORIDE, CALCIUM CHLORIDE 600; 310; 30; 20 MG/100ML; MG/100ML; MG/100ML; MG/100ML
INJECTION, SOLUTION INTRAVENOUS CONTINUOUS PRN
Status: DISCONTINUED | OUTPATIENT
Start: 2022-01-01 | End: 2022-01-01

## 2022-01-01 RX ORDER — ATORVASTATIN CALCIUM 40 MG/1
40 TABLET, FILM COATED ORAL DAILY
Status: DISCONTINUED | OUTPATIENT
Start: 2022-01-01 | End: 2022-01-01 | Stop reason: HOSPADM

## 2022-01-01 RX ORDER — PROCHLORPERAZINE MALEATE 5 MG
5 TABLET ORAL EVERY 6 HOURS PRN
Status: DISCONTINUED | OUTPATIENT
Start: 2022-01-01 | End: 2022-01-01 | Stop reason: HOSPADM

## 2022-01-01 RX ORDER — MEPERIDINE HYDROCHLORIDE 25 MG/ML
12.5 INJECTION INTRAMUSCULAR; INTRAVENOUS; SUBCUTANEOUS
Status: DISCONTINUED | OUTPATIENT
Start: 2022-01-01 | End: 2022-01-01 | Stop reason: HOSPADM

## 2022-01-01 RX ORDER — SODIUM CHLORIDE, SODIUM LACTATE, POTASSIUM CHLORIDE, CALCIUM CHLORIDE 600; 310; 30; 20 MG/100ML; MG/100ML; MG/100ML; MG/100ML
INJECTION, SOLUTION INTRAVENOUS CONTINUOUS
Status: DISCONTINUED | OUTPATIENT
Start: 2022-01-01 | End: 2022-01-01

## 2022-01-01 RX ORDER — PROPOFOL 10 MG/ML
INJECTION, EMULSION INTRAVENOUS CONTINUOUS PRN
Status: DISCONTINUED | OUTPATIENT
Start: 2022-01-01 | End: 2022-01-01

## 2022-01-01 RX ORDER — ONDANSETRON 2 MG/ML
4 INJECTION INTRAMUSCULAR; INTRAVENOUS EVERY 6 HOURS PRN
Status: DISCONTINUED | OUTPATIENT
Start: 2022-01-01 | End: 2022-01-01 | Stop reason: HOSPADM

## 2022-01-01 RX ORDER — HEPARIN SODIUM 5000 [USP'U]/.5ML
5000 INJECTION, SOLUTION INTRAVENOUS; SUBCUTANEOUS EVERY 12 HOURS
Status: DISCONTINUED | OUTPATIENT
Start: 2022-01-01 | End: 2022-01-01 | Stop reason: HOSPADM

## 2022-01-01 RX ORDER — ONDANSETRON 2 MG/ML
4 INJECTION INTRAMUSCULAR; INTRAVENOUS EVERY 6 HOURS PRN
Status: DISCONTINUED | OUTPATIENT
Start: 2022-01-01 | End: 2022-01-01

## 2022-01-01 RX ORDER — HEPARIN SODIUM,PORCINE 10 UNIT/ML
5 VIAL (ML) INTRAVENOUS
Status: CANCELLED | OUTPATIENT
Start: 2022-01-01

## 2022-01-01 RX ORDER — LIDOCAINE HYDROCHLORIDE 20 MG/ML
10 JELLY TOPICAL
Status: CANCELLED | OUTPATIENT
Start: 2022-01-01

## 2022-01-01 RX ORDER — SODIUM CHLORIDE 9 MG/ML
INJECTION, SOLUTION INTRAVENOUS CONTINUOUS
Status: DISCONTINUED | OUTPATIENT
Start: 2022-01-01 | End: 2022-01-01

## 2022-01-01 RX ORDER — PANTOPRAZOLE SODIUM 40 MG/1
40 TABLET, DELAYED RELEASE ORAL DAILY
Status: DISCONTINUED | OUTPATIENT
Start: 2022-01-01 | End: 2022-01-01 | Stop reason: HOSPADM

## 2022-01-01 RX ORDER — INHALER, ASSIST DEVICES
SPACER (EA) MISCELLANEOUS
Qty: 1 EACH | Refills: 0 | Status: SHIPPED | OUTPATIENT
Start: 2022-01-01 | End: 2023-01-01

## 2022-01-01 RX ORDER — ALBUTEROL SULFATE 90 UG/1
2 AEROSOL, METERED RESPIRATORY (INHALATION) EVERY 6 HOURS
Qty: 18 G | Refills: 1 | Status: SHIPPED | OUTPATIENT
Start: 2022-01-01 | End: 2022-01-01

## 2022-01-01 RX ORDER — PREDNISONE 20 MG/1
TABLET ORAL
Qty: 30 TABLET | Refills: 0 | Status: SHIPPED | OUTPATIENT
Start: 2022-01-01 | End: 2022-01-01

## 2022-01-01 RX ORDER — MAGNESIUM SULFATE 4 G/50ML
4 INJECTION INTRAVENOUS ONCE
Status: COMPLETED | OUTPATIENT
Start: 2022-01-01 | End: 2022-01-01

## 2022-01-01 RX ORDER — MEPERIDINE HYDROCHLORIDE 25 MG/ML
25 INJECTION INTRAMUSCULAR; INTRAVENOUS; SUBCUTANEOUS EVERY 30 MIN PRN
Status: CANCELLED | OUTPATIENT
Start: 2022-01-01

## 2022-01-01 RX ORDER — ACETAMINOPHEN 325 MG/1
650 TABLET ORAL EVERY 4 HOURS PRN
Refills: 0
Start: 2022-01-01 | End: 2023-01-01

## 2022-01-01 RX ORDER — ACETAMINOPHEN 650 MG/1
650 SUPPOSITORY RECTAL EVERY 6 HOURS PRN
Status: DISCONTINUED | OUTPATIENT
Start: 2022-01-01 | End: 2022-01-01 | Stop reason: HOSPADM

## 2022-01-01 RX ORDER — ACETAMINOPHEN 325 MG/1
650 TABLET ORAL EVERY 6 HOURS PRN
Status: DISCONTINUED | OUTPATIENT
Start: 2022-01-01 | End: 2022-01-01 | Stop reason: HOSPADM

## 2022-01-01 RX ORDER — LEVOTHYROXINE SODIUM 25 UG/1
50 TABLET ORAL ONCE
Status: COMPLETED | OUTPATIENT
Start: 2022-01-01 | End: 2022-01-01

## 2022-01-01 RX ORDER — ONDANSETRON 4 MG/1
4 TABLET, ORALLY DISINTEGRATING ORAL EVERY 30 MIN PRN
Status: CANCELLED | OUTPATIENT
Start: 2022-01-01

## 2022-01-01 RX ORDER — LEVOTHYROXINE SODIUM 100 UG/1
100 TABLET ORAL DAILY
Status: DISCONTINUED | OUTPATIENT
Start: 2022-01-01 | End: 2022-01-01

## 2022-01-01 RX ORDER — POLYETHYLENE GLYCOL 3350 17 G/17G
238 POWDER, FOR SOLUTION ORAL ONCE
Status: COMPLETED | OUTPATIENT
Start: 2022-01-01 | End: 2022-01-01

## 2022-01-01 RX ORDER — LEVOTHYROXINE SODIUM 137 UG/1
137 TABLET ORAL DAILY
Qty: 90 TABLET | Refills: 1 | Status: SHIPPED | OUTPATIENT
Start: 2022-01-01 | End: 2023-01-01 | Stop reason: DRUGHIGH

## 2022-01-01 RX ORDER — GADOBUTROL 604.72 MG/ML
7 INJECTION INTRAVENOUS ONCE
Status: COMPLETED | OUTPATIENT
Start: 2022-01-01 | End: 2022-01-01

## 2022-01-01 RX ORDER — MAGNESIUM CARB/ALUMINUM HYDROX 105-160MG
296 TABLET,CHEWABLE ORAL ONCE
Status: DISCONTINUED | OUTPATIENT
Start: 2022-01-01 | End: 2022-01-01 | Stop reason: CLARIF

## 2022-01-01 RX ORDER — IOPAMIDOL 755 MG/ML
17 INJECTION, SOLUTION INTRAVASCULAR ONCE
Status: COMPLETED | OUTPATIENT
Start: 2022-01-01 | End: 2022-01-01

## 2022-01-01 RX ORDER — SODIUM CHLORIDE, SODIUM LACTATE, POTASSIUM CHLORIDE, CALCIUM CHLORIDE 600; 310; 30; 20 MG/100ML; MG/100ML; MG/100ML; MG/100ML
INJECTION, SOLUTION INTRAVENOUS CONTINUOUS
Status: CANCELLED | OUTPATIENT
Start: 2022-01-01

## 2022-01-01 RX ORDER — SACCHAROMYCES BOULARDII 250 MG
250 CAPSULE ORAL 2 TIMES DAILY
Qty: 28 CAPSULE | Refills: 0 | Status: SHIPPED | OUTPATIENT
Start: 2022-01-01 | End: 2023-01-01

## 2022-01-01 RX ORDER — LATANOPROST 50 UG/ML
1 SOLUTION/ DROPS OPHTHALMIC AT BEDTIME
Status: DISCONTINUED | OUTPATIENT
Start: 2022-01-01 | End: 2022-01-01 | Stop reason: HOSPADM

## 2022-01-01 RX ORDER — ONDANSETRON 4 MG/1
4 TABLET, ORALLY DISINTEGRATING ORAL EVERY 6 HOURS PRN
Qty: 20 TABLET | Refills: 0 | Status: SHIPPED | OUTPATIENT
Start: 2022-01-01 | End: 2023-01-01

## 2022-01-01 RX ORDER — LORAZEPAM 0.5 MG/1
0.5 TABLET ORAL
Qty: 20 TABLET | Refills: 0 | Status: SHIPPED | OUTPATIENT
Start: 2022-01-01 | End: 2023-01-01

## 2022-01-01 RX ORDER — OXYCODONE HYDROCHLORIDE 5 MG/1
5-10 TABLET ORAL EVERY 4 HOURS PRN
Status: DISCONTINUED | OUTPATIENT
Start: 2022-01-01 | End: 2022-01-01 | Stop reason: HOSPADM

## 2022-01-01 RX ORDER — PANTOPRAZOLE SODIUM 20 MG/1
20 TABLET, DELAYED RELEASE ORAL DAILY
Status: DISCONTINUED | OUTPATIENT
Start: 2022-01-01 | End: 2022-01-01

## 2022-01-01 RX ORDER — FENTANYL CITRATE 50 UG/ML
25 INJECTION, SOLUTION INTRAMUSCULAR; INTRAVENOUS
Status: DISCONTINUED | OUTPATIENT
Start: 2022-01-01 | End: 2022-01-01 | Stop reason: HOSPADM

## 2022-01-01 RX ORDER — LIDOCAINE HYDROCHLORIDE 20 MG/ML
10 JELLY TOPICAL
Status: DISCONTINUED | OUTPATIENT
Start: 2022-01-01 | End: 2022-01-01 | Stop reason: HOSPADM

## 2022-01-01 RX ORDER — ONDANSETRON 4 MG/1
4 TABLET, ORALLY DISINTEGRATING ORAL EVERY 6 HOURS PRN
Status: DISCONTINUED | OUTPATIENT
Start: 2022-01-01 | End: 2022-01-01 | Stop reason: HOSPADM

## 2022-01-01 RX ORDER — ALBUTEROL SULFATE 90 UG/1
2 AEROSOL, METERED RESPIRATORY (INHALATION) EVERY 6 HOURS PRN
Qty: 18 G | Refills: 1 | Status: SHIPPED | OUTPATIENT
Start: 2022-01-01 | End: 2023-01-01

## 2022-01-01 RX ORDER — PREDNISONE 20 MG/1
60 TABLET ORAL DAILY
Status: DISCONTINUED | OUTPATIENT
Start: 2022-01-01 | End: 2022-01-01 | Stop reason: HOSPADM

## 2022-01-01 RX ORDER — OXYCODONE HYDROCHLORIDE 5 MG/1
5 TABLET ORAL EVERY 4 HOURS PRN
Status: DISCONTINUED | OUTPATIENT
Start: 2022-01-01 | End: 2022-01-01 | Stop reason: HOSPADM

## 2022-01-01 RX ORDER — METHYLPREDNISOLONE SODIUM SUCCINATE 125 MG/2ML
125 INJECTION, POWDER, LYOPHILIZED, FOR SOLUTION INTRAMUSCULAR; INTRAVENOUS
Status: CANCELLED
Start: 2022-01-01

## 2022-01-01 RX ORDER — FLUMAZENIL 0.1 MG/ML
0.2 INJECTION, SOLUTION INTRAVENOUS
Status: DISCONTINUED | OUTPATIENT
Start: 2022-01-01 | End: 2022-01-01 | Stop reason: HOSPADM

## 2022-01-01 RX ORDER — HEPARIN SODIUM (PORCINE) LOCK FLUSH IV SOLN 100 UNIT/ML 100 UNIT/ML
5 SOLUTION INTRAVENOUS
Status: CANCELLED | OUTPATIENT
Start: 2022-01-01

## 2022-01-01 RX ORDER — BRIMONIDINE TARTRATE 1.5 MG/ML
1 SOLUTION/ DROPS OPHTHALMIC 2 TIMES DAILY
Status: DISCONTINUED | OUTPATIENT
Start: 2022-01-01 | End: 2022-01-01 | Stop reason: HOSPADM

## 2022-01-01 RX ORDER — LEVOTHYROXINE SODIUM 150 UG/1
150 TABLET ORAL DAILY
Qty: 30 TABLET | Refills: 1 | Status: SHIPPED | OUTPATIENT
Start: 2022-01-01 | End: 2022-01-01

## 2022-01-01 RX ORDER — MEPERIDINE HYDROCHLORIDE 25 MG/ML
12.5 INJECTION INTRAMUSCULAR; INTRAVENOUS; SUBCUTANEOUS EVERY 5 MIN PRN
Status: CANCELLED | OUTPATIENT
Start: 2022-01-01

## 2022-01-01 RX ORDER — IPRATROPIUM BROMIDE AND ALBUTEROL SULFATE 2.5; .5 MG/3ML; MG/3ML
1 SOLUTION RESPIRATORY (INHALATION) EVERY 6 HOURS PRN
Qty: 90 ML | Refills: 0 | Status: SHIPPED | OUTPATIENT
Start: 2022-01-01 | End: 2023-01-01

## 2022-01-01 RX ORDER — LORAZEPAM 2 MG/ML
0.5 INJECTION INTRAMUSCULAR EVERY 4 HOURS PRN
Status: CANCELLED | OUTPATIENT
Start: 2022-01-01

## 2022-01-01 RX ORDER — SODIUM CHLORIDE 9 MG/ML
INJECTION, SOLUTION INTRAVENOUS ONCE
Status: COMPLETED | OUTPATIENT
Start: 2022-01-01 | End: 2022-01-01

## 2022-01-01 RX ORDER — LANOLIN ALCOHOL/MO/W.PET/CERES
6 CREAM (GRAM) TOPICAL
Status: DISCONTINUED | OUTPATIENT
Start: 2022-01-01 | End: 2022-01-01 | Stop reason: HOSPADM

## 2022-01-01 RX ORDER — ONDANSETRON 4 MG/1
4 TABLET, ORALLY DISINTEGRATING ORAL EVERY 4 HOURS PRN
Status: DISCONTINUED | OUTPATIENT
Start: 2022-01-01 | End: 2022-01-01 | Stop reason: HOSPADM

## 2022-01-01 RX ORDER — IPRATROPIUM BROMIDE AND ALBUTEROL SULFATE 2.5; .5 MG/3ML; MG/3ML
1 SOLUTION RESPIRATORY (INHALATION) EVERY 6 HOURS PRN
Qty: 90 ML | Refills: 0 | Status: CANCELLED | OUTPATIENT
Start: 2022-01-01

## 2022-01-01 RX ORDER — NALOXONE HYDROCHLORIDE 0.4 MG/ML
0.2 INJECTION, SOLUTION INTRAMUSCULAR; INTRAVENOUS; SUBCUTANEOUS
Status: CANCELLED | OUTPATIENT
Start: 2022-01-01

## 2022-01-01 RX ORDER — HYDROMORPHONE HYDROCHLORIDE 1 MG/ML
0.4 INJECTION, SOLUTION INTRAMUSCULAR; INTRAVENOUS; SUBCUTANEOUS EVERY 5 MIN PRN
Status: CANCELLED | OUTPATIENT
Start: 2022-01-01

## 2022-01-01 RX ORDER — BISACODYL 5 MG
10 TABLET, DELAYED RELEASE (ENTERIC COATED) ORAL ONCE
Status: COMPLETED | OUTPATIENT
Start: 2022-01-01 | End: 2022-01-01

## 2022-01-01 RX ORDER — ONDANSETRON 2 MG/ML
4 INJECTION INTRAMUSCULAR; INTRAVENOUS EVERY 30 MIN PRN
Status: CANCELLED | OUTPATIENT
Start: 2022-01-01

## 2022-01-01 RX ORDER — DIPHENHYDRAMINE HYDROCHLORIDE 50 MG/ML
50 INJECTION INTRAMUSCULAR; INTRAVENOUS
Status: CANCELLED
Start: 2022-01-01

## 2022-01-01 RX ADMIN — SODIUM CHLORIDE: 9 INJECTION, SOLUTION INTRAVENOUS at 18:15

## 2022-01-01 RX ADMIN — POTASSIUM CHLORIDE 10 MEQ: 10 INJECTION, SOLUTION INTRAVENOUS at 12:23

## 2022-01-01 RX ADMIN — MAGNESIUM SULFATE HEPTAHYDRATE 4 G: 80 INJECTION, SOLUTION INTRAVENOUS at 11:45

## 2022-01-01 RX ADMIN — LATANOPROST 1 DROP: 50 SOLUTION OPHTHALMIC at 21:27

## 2022-01-01 RX ADMIN — LATANOPROST 1 DROP: 50 SOLUTION OPHTHALMIC at 21:10

## 2022-01-01 RX ADMIN — INSULIN ASPART 2 UNITS: 100 INJECTION, SOLUTION INTRAVENOUS; SUBCUTANEOUS at 09:09

## 2022-01-01 RX ADMIN — POTASSIUM CHLORIDE 10 MEQ: 10 INJECTION, SOLUTION INTRAVENOUS at 14:30

## 2022-01-01 RX ADMIN — ATORVASTATIN CALCIUM 40 MG: 40 TABLET, FILM COATED ORAL at 08:18

## 2022-01-01 RX ADMIN — LEVOTHYROXINE SODIUM 150 MCG: 0.03 TABLET ORAL at 06:58

## 2022-01-01 RX ADMIN — HEPARIN SODIUM 5000 UNITS: 10000 INJECTION, SOLUTION INTRAVENOUS; SUBCUTANEOUS at 13:47

## 2022-01-01 RX ADMIN — SODIUM CHLORIDE: 9 INJECTION, SOLUTION INTRAVENOUS at 20:19

## 2022-01-01 RX ADMIN — HEPARIN SODIUM 5000 UNITS: 10000 INJECTION, SOLUTION INTRAVENOUS; SUBCUTANEOUS at 15:43

## 2022-01-01 RX ADMIN — LEVOTHYROXINE SODIUM 150 MCG: 0.03 TABLET ORAL at 06:08

## 2022-01-01 RX ADMIN — BRIMONIDINE TARTRATE 1 DROP: 1.5 SOLUTION OPHTHALMIC at 09:25

## 2022-01-01 RX ADMIN — PANTOPRAZOLE SODIUM 40 MG: 40 TABLET, DELAYED RELEASE ORAL at 09:24

## 2022-01-01 RX ADMIN — SODIUM CHLORIDE: 9 INJECTION, SOLUTION INTRAVENOUS at 18:49

## 2022-01-01 RX ADMIN — SODIUM CHLORIDE, POTASSIUM CHLORIDE, SODIUM LACTATE AND CALCIUM CHLORIDE: 600; 310; 30; 20 INJECTION, SOLUTION INTRAVENOUS at 16:50

## 2022-01-01 RX ADMIN — DEXTROSE MONOHYDRATE 25 ML: 25 INJECTION, SOLUTION INTRAVENOUS at 11:48

## 2022-01-01 RX ADMIN — SODIUM CHLORIDE: 9 INJECTION, SOLUTION INTRAVENOUS at 02:06

## 2022-01-01 RX ADMIN — BRIMONIDINE TARTRATE 1 DROP: 1.5 SOLUTION OPHTHALMIC at 21:33

## 2022-01-01 RX ADMIN — BISACODYL 10 MG: 5 TABLET, COATED ORAL at 21:22

## 2022-01-01 RX ADMIN — HEPARIN SODIUM 5000 UNITS: 10000 INJECTION, SOLUTION INTRAVENOUS; SUBCUTANEOUS at 13:50

## 2022-01-01 RX ADMIN — SODIUM CHLORIDE, POTASSIUM CHLORIDE, SODIUM LACTATE AND CALCIUM CHLORIDE: 600; 310; 30; 20 INJECTION, SOLUTION INTRAVENOUS at 12:21

## 2022-01-01 RX ADMIN — SODIUM CHLORIDE: 9 INJECTION, SOLUTION INTRAVENOUS at 07:56

## 2022-01-01 RX ADMIN — ONDANSETRON 4 MG: 2 INJECTION INTRAMUSCULAR; INTRAVENOUS at 02:39

## 2022-01-01 RX ADMIN — BRIMONIDINE TARTRATE 1 DROP: 1.5 SOLUTION OPHTHALMIC at 20:06

## 2022-01-01 RX ADMIN — Medication 15 G: at 07:37

## 2022-01-01 RX ADMIN — LIDOCAINE HYDROCHLORIDE 10 ML: 20 JELLY TOPICAL at 10:27

## 2022-01-01 RX ADMIN — LEVOTHYROXINE SODIUM 100 MCG: 100 TABLET ORAL at 05:53

## 2022-01-01 RX ADMIN — POTASSIUM CHLORIDE 40 MEQ: 1500 TABLET, EXTENDED RELEASE ORAL at 20:20

## 2022-01-01 RX ADMIN — LATANOPROST 1 DROP: 50 SOLUTION OPHTHALMIC at 21:31

## 2022-01-01 RX ADMIN — PROCHLORPERAZINE EDISYLATE 5 MG: 5 INJECTION INTRAMUSCULAR; INTRAVENOUS at 19:47

## 2022-01-01 RX ADMIN — POTASSIUM CHLORIDE 10 MEQ: 10 INJECTION, SOLUTION INTRAVENOUS at 21:31

## 2022-01-01 RX ADMIN — LATANOPROST 1 DROP: 50 SOLUTION OPHTHALMIC at 21:26

## 2022-01-01 RX ADMIN — INSULIN DEGLUDEC INJECTION 5 UNITS: 100 INJECTION, SOLUTION SUBCUTANEOUS at 22:33

## 2022-01-01 RX ADMIN — SODIUM CHLORIDE, POTASSIUM CHLORIDE, SODIUM LACTATE AND CALCIUM CHLORIDE: 600; 310; 30; 20 INJECTION, SOLUTION INTRAVENOUS at 11:39

## 2022-01-01 RX ADMIN — BRIMONIDINE TARTRATE 1 DROP: 1.5 SOLUTION OPHTHALMIC at 10:01

## 2022-01-01 RX ADMIN — HEPARIN SODIUM 5000 UNITS: 10000 INJECTION, SOLUTION INTRAVENOUS; SUBCUTANEOUS at 15:02

## 2022-01-01 RX ADMIN — BACILLUS CALMETTE-GUERIN 50 MG: 50 POWDER, FOR SUSPENSION INTRAVESICAL at 10:18

## 2022-01-01 RX ADMIN — SODIUM CHLORIDE 1000 ML: 9 INJECTION, SOLUTION INTRAVENOUS at 10:38

## 2022-01-01 RX ADMIN — PREDNISONE 80 MG: 20 TABLET ORAL at 08:18

## 2022-01-01 RX ADMIN — ONDANSETRON 4 MG: 2 INJECTION INTRAMUSCULAR; INTRAVENOUS at 16:59

## 2022-01-01 RX ADMIN — PREDNISONE 60 MG: 20 TABLET ORAL at 07:56

## 2022-01-01 RX ADMIN — BRIMONIDINE TARTRATE 1 DROP: 1.5 SOLUTION OPHTHALMIC at 07:40

## 2022-01-01 RX ADMIN — POTASSIUM CHLORIDE 10 MEQ: 10 INJECTION, SOLUTION INTRAVENOUS at 11:00

## 2022-01-01 RX ADMIN — SODIUM CHLORIDE: 9 INJECTION, SOLUTION INTRAVENOUS at 07:47

## 2022-01-01 RX ADMIN — LORAZEPAM 0.5 MG: 0.5 TABLET ORAL at 21:38

## 2022-01-01 RX ADMIN — SODIUM CHLORIDE, POTASSIUM CHLORIDE, SODIUM LACTATE AND CALCIUM CHLORIDE: 600; 310; 30; 20 INJECTION, SOLUTION INTRAVENOUS at 15:23

## 2022-01-01 RX ADMIN — BRIMONIDINE TARTRATE 1 DROP: 1.5 SOLUTION OPHTHALMIC at 09:04

## 2022-01-01 RX ADMIN — ONDANSETRON 4 MG: 2 INJECTION INTRAMUSCULAR; INTRAVENOUS at 10:36

## 2022-01-01 RX ADMIN — SODIUM CHLORIDE 250 ML: 9 INJECTION, SOLUTION INTRAVENOUS at 19:19

## 2022-01-01 RX ADMIN — SODIUM CHLORIDE: 9 INJECTION, SOLUTION INTRAVENOUS at 22:37

## 2022-01-01 RX ADMIN — ATORVASTATIN CALCIUM 40 MG: 40 TABLET, FILM COATED ORAL at 09:29

## 2022-01-01 RX ADMIN — HEPARIN SODIUM 5000 UNITS: 10000 INJECTION, SOLUTION INTRAVENOUS; SUBCUTANEOUS at 01:31

## 2022-01-01 RX ADMIN — BRIMONIDINE TARTRATE 1 DROP: 1.5 SOLUTION OPHTHALMIC at 09:24

## 2022-01-01 RX ADMIN — SODIUM CHLORIDE: 9 INJECTION, SOLUTION INTRAVENOUS at 05:53

## 2022-01-01 RX ADMIN — Medication 6 MG: at 00:42

## 2022-01-01 RX ADMIN — PROPOFOL 200 MCG/KG/MIN: 10 INJECTION, EMULSION INTRAVENOUS at 12:40

## 2022-01-01 RX ADMIN — DEXTROSE MONOHYDRATE 25 ML: 25 INJECTION, SOLUTION INTRAVENOUS at 14:46

## 2022-01-01 RX ADMIN — PANTOPRAZOLE SODIUM 40 MG: 40 TABLET, DELAYED RELEASE ORAL at 08:18

## 2022-01-01 RX ADMIN — SODIUM CHLORIDE, POTASSIUM CHLORIDE, SODIUM LACTATE AND CALCIUM CHLORIDE 100 ML/HR: 600; 310; 30; 20 INJECTION, SOLUTION INTRAVENOUS at 04:49

## 2022-01-01 RX ADMIN — HEPARIN SODIUM 5000 UNITS: 10000 INJECTION, SOLUTION INTRAVENOUS; SUBCUTANEOUS at 02:04

## 2022-01-01 RX ADMIN — ATORVASTATIN CALCIUM 40 MG: 40 TABLET, FILM COATED ORAL at 09:26

## 2022-01-01 RX ADMIN — PREDNISONE 80 MG: 20 TABLET ORAL at 10:51

## 2022-01-01 RX ADMIN — BRIMONIDINE TARTRATE 1 DROP: 1.5 SOLUTION OPHTHALMIC at 20:16

## 2022-01-01 RX ADMIN — PANTOPRAZOLE SODIUM 40 MG: 40 TABLET, DELAYED RELEASE ORAL at 09:25

## 2022-01-01 RX ADMIN — BRIMONIDINE TARTRATE 1 DROP: 1.5 SOLUTION OPHTHALMIC at 08:06

## 2022-01-01 RX ADMIN — INSULIN ASPART 1 UNITS: 100 INJECTION, SOLUTION INTRAVENOUS; SUBCUTANEOUS at 12:01

## 2022-01-01 RX ADMIN — PHENYLEPHRINE HYDROCHLORIDE 100 MCG: 10 INJECTION INTRAVENOUS at 15:30

## 2022-01-01 RX ADMIN — SODIUM CHLORIDE: 9 INJECTION, SOLUTION INTRAVENOUS at 02:51

## 2022-01-01 RX ADMIN — LATANOPROST 1 DROP: 50 SOLUTION OPHTHALMIC at 21:28

## 2022-01-01 RX ADMIN — INSULIN ASPART 1 UNITS: 100 INJECTION, SOLUTION INTRAVENOUS; SUBCUTANEOUS at 17:54

## 2022-01-01 RX ADMIN — HEPARIN SODIUM 5000 UNITS: 10000 INJECTION, SOLUTION INTRAVENOUS; SUBCUTANEOUS at 01:49

## 2022-01-01 RX ADMIN — IOPAMIDOL 17 ML: 755 INJECTION, SOLUTION INTRAVENOUS at 11:49

## 2022-01-01 RX ADMIN — PROPOFOL 100 MCG/KG/MIN: 10 INJECTION, EMULSION INTRAVENOUS at 15:25

## 2022-01-01 RX ADMIN — SODIUM CHLORIDE 1000 ML: 9 INJECTION, SOLUTION INTRAVENOUS at 17:23

## 2022-01-01 RX ADMIN — ONDANSETRON 4 MG: 2 INJECTION INTRAMUSCULAR; INTRAVENOUS at 06:34

## 2022-01-01 RX ADMIN — HEPARIN SODIUM 5000 UNITS: 10000 INJECTION, SOLUTION INTRAVENOUS; SUBCUTANEOUS at 01:21

## 2022-01-01 RX ADMIN — POTASSIUM CHLORIDE 10 MEQ: 10 INJECTION, SOLUTION INTRAVENOUS at 16:35

## 2022-01-01 RX ADMIN — SODIUM CHLORIDE: 9 INJECTION, SOLUTION INTRAVENOUS at 10:36

## 2022-01-01 RX ADMIN — BRIMONIDINE TARTRATE 1 DROP: 1.5 SOLUTION OPHTHALMIC at 21:00

## 2022-01-01 RX ADMIN — BRIMONIDINE TARTRATE 1 DROP: 1.5 SOLUTION OPHTHALMIC at 19:47

## 2022-01-01 RX ADMIN — ATORVASTATIN CALCIUM 40 MG: 40 TABLET, FILM COATED ORAL at 09:25

## 2022-01-01 RX ADMIN — BRIMONIDINE TARTRATE 1 DROP: 1.5 SOLUTION OPHTHALMIC at 20:20

## 2022-01-01 RX ADMIN — LEVOTHYROXINE SODIUM 150 MCG: 0.03 TABLET ORAL at 05:46

## 2022-01-01 RX ADMIN — IOPAMIDOL 75 ML: 755 INJECTION, SOLUTION INTRAVENOUS at 11:49

## 2022-01-01 RX ADMIN — GADOBUTROL 7 ML: 604.72 INJECTION INTRAVENOUS at 15:05

## 2022-01-01 RX ADMIN — PANTOPRAZOLE SODIUM 20 MG: 20 TABLET, DELAYED RELEASE ORAL at 07:40

## 2022-01-01 RX ADMIN — PREDNISONE 80 MG: 20 TABLET ORAL at 08:27

## 2022-01-01 RX ADMIN — ATORVASTATIN CALCIUM 40 MG: 40 TABLET, FILM COATED ORAL at 08:27

## 2022-01-01 RX ADMIN — PANTOPRAZOLE SODIUM 40 MG: 40 TABLET, DELAYED RELEASE ORAL at 07:57

## 2022-01-01 RX ADMIN — LEVOTHYROXINE SODIUM 150 MCG: 0.03 TABLET ORAL at 06:26

## 2022-01-01 RX ADMIN — LEVOTHYROXINE SODIUM 150 MCG: 0.03 TABLET ORAL at 05:43

## 2022-01-01 RX ADMIN — IOPAMIDOL 75 ML: 755 INJECTION, SOLUTION INTRAVENOUS at 17:56

## 2022-01-01 RX ADMIN — BACILLUS CALMETTE-GUERIN 50 MG: 50 POWDER, FOR SUSPENSION INTRAVESICAL at 10:47

## 2022-01-01 RX ADMIN — SODIUM CHLORIDE, POTASSIUM CHLORIDE, SODIUM LACTATE AND CALCIUM CHLORIDE: 600; 310; 30; 20 INJECTION, SOLUTION INTRAVENOUS at 01:31

## 2022-01-01 RX ADMIN — PANTOPRAZOLE SODIUM 40 MG: 40 TABLET, DELAYED RELEASE ORAL at 10:00

## 2022-01-01 RX ADMIN — LEVOTHYROXINE SODIUM 100 MCG: 100 TABLET ORAL at 06:12

## 2022-01-01 RX ADMIN — PHENYLEPHRINE HYDROCHLORIDE 50 MCG: 10 INJECTION INTRAVENOUS at 15:28

## 2022-01-01 RX ADMIN — SODIUM CHLORIDE: 9 INJECTION, SOLUTION INTRAVENOUS at 17:14

## 2022-01-01 RX ADMIN — Medication 15 G: at 12:23

## 2022-01-01 RX ADMIN — ATORVASTATIN CALCIUM 40 MG: 40 TABLET, FILM COATED ORAL at 07:39

## 2022-01-01 RX ADMIN — PROPOFOL 30 MG: 10 INJECTION, EMULSION INTRAVENOUS at 12:41

## 2022-01-01 RX ADMIN — POTASSIUM CHLORIDE 40 MEQ: 1500 TABLET, EXTENDED RELEASE ORAL at 03:42

## 2022-01-01 RX ADMIN — BRIMONIDINE TARTRATE 1 DROP: 1.5 SOLUTION OPHTHALMIC at 09:27

## 2022-01-01 RX ADMIN — BRIMONIDINE TARTRATE 1 DROP: 1.5 SOLUTION OPHTHALMIC at 21:25

## 2022-01-01 RX ADMIN — LEVOTHYROXINE SODIUM 150 MCG: 0.03 TABLET ORAL at 05:47

## 2022-01-01 RX ADMIN — PANTOPRAZOLE SODIUM 40 MG: 40 TABLET, DELAYED RELEASE ORAL at 09:29

## 2022-01-01 RX ADMIN — POLYETHYLENE GLYCOL 3350 238 G: 17 POWDER, FOR SOLUTION ORAL at 21:23

## 2022-01-01 RX ADMIN — BRIMONIDINE TARTRATE 1 DROP: 1.5 SOLUTION OPHTHALMIC at 21:27

## 2022-01-01 RX ADMIN — LATANOPROST 1 DROP: 50 SOLUTION OPHTHALMIC at 22:26

## 2022-01-01 RX ADMIN — POTASSIUM CHLORIDE 40 MEQ: 1500 TABLET, EXTENDED RELEASE ORAL at 13:05

## 2022-01-01 RX ADMIN — LEVOTHYROXINE SODIUM 150 MCG: 0.03 TABLET ORAL at 05:50

## 2022-01-01 RX ADMIN — LATANOPROST 1 DROP: 50 SOLUTION OPHTHALMIC at 21:38

## 2022-01-01 RX ADMIN — PANTOPRAZOLE SODIUM 40 MG: 40 TABLET, DELAYED RELEASE ORAL at 08:27

## 2022-01-01 RX ADMIN — POTASSIUM CHLORIDE 10 MEQ: 10 INJECTION, SOLUTION INTRAVENOUS at 19:25

## 2022-01-01 RX ADMIN — ATORVASTATIN CALCIUM 40 MG: 40 TABLET, FILM COATED ORAL at 10:00

## 2022-01-01 RX ADMIN — BRIMONIDINE TARTRATE 1 DROP: 1.5 SOLUTION OPHTHALMIC at 08:28

## 2022-01-01 RX ADMIN — BRIMONIDINE TARTRATE 1 DROP: 1.5 SOLUTION OPHTHALMIC at 08:24

## 2022-01-01 RX ADMIN — PROPOFOL 20 MG: 10 INJECTION, EMULSION INTRAVENOUS at 15:41

## 2022-01-01 RX ADMIN — SODIUM CHLORIDE: 9 INJECTION, SOLUTION INTRAVENOUS at 11:25

## 2022-01-01 RX ADMIN — HEPARIN SODIUM 5000 UNITS: 10000 INJECTION, SOLUTION INTRAVENOUS; SUBCUTANEOUS at 01:17

## 2022-01-01 RX ADMIN — ATORVASTATIN CALCIUM 40 MG: 40 TABLET, FILM COATED ORAL at 07:57

## 2022-01-01 RX ADMIN — PROPOFOL 20 MG: 10 INJECTION, EMULSION INTRAVENOUS at 15:25

## 2022-01-01 RX ADMIN — LATANOPROST 1 DROP: 50 SOLUTION OPHTHALMIC at 22:39

## 2022-01-01 RX ADMIN — ATORVASTATIN CALCIUM 40 MG: 40 TABLET, FILM COATED ORAL at 09:24

## 2022-01-01 RX ADMIN — SODIUM CHLORIDE: 9 INJECTION, SOLUTION INTRAVENOUS at 17:55

## 2022-01-01 RX ADMIN — LEVOTHYROXINE SODIUM 50 MCG: 0.03 TABLET ORAL at 09:25

## 2022-01-01 RX ADMIN — DEXTROSE MONOHYDRATE 25 ML: 25 INJECTION, SOLUTION INTRAVENOUS at 06:26

## 2022-01-01 ASSESSMENT — ACTIVITIES OF DAILY LIVING (ADL)
DIFFICULTY_COMMUNICATING: NO
HEARING_DIFFICULTY_OR_DEAF: NO
ADLS_ACUITY_SCORE: 21
ADLS_ACUITY_SCORE: 22
ADLS_ACUITY_SCORE: 33
ADLS_ACUITY_SCORE: 33
ADLS_ACUITY_SCORE: 37
ADLS_ACUITY_SCORE: 20
ADLS_ACUITY_SCORE: 21
ADLS_ACUITY_SCORE: 22
ADLS_ACUITY_SCORE: 21
ADLS_ACUITY_SCORE: 33
TOILETING_ISSUES: NO
ADLS_ACUITY_SCORE: 33
ADLS_ACUITY_SCORE: 33
ADLS_ACUITY_SCORE: 21
ADLS_ACUITY_SCORE: 33
ADLS_ACUITY_SCORE: 22
ADLS_ACUITY_SCORE: 21
ADLS_ACUITY_SCORE: 33
ADLS_ACUITY_SCORE: 33
WEAR_GLASSES_OR_BLIND: YES
ADLS_ACUITY_SCORE: 33
ADLS_ACUITY_SCORE: 21
ADLS_ACUITY_SCORE: 22
ADLS_ACUITY_SCORE: 33
ADLS_ACUITY_SCORE: 33
ADLS_ACUITY_SCORE: 32
ADLS_ACUITY_SCORE: 22
ADLS_ACUITY_SCORE: 33
ADLS_ACUITY_SCORE: 22
ADLS_ACUITY_SCORE: 33
ADLS_ACUITY_SCORE: 32
ADLS_ACUITY_SCORE: 22
ADLS_ACUITY_SCORE: 33
ADLS_ACUITY_SCORE: 33
DIFFICULTY_EATING/SWALLOWING: NO
ADLS_ACUITY_SCORE: 21
ADLS_ACUITY_SCORE: 22
ADLS_ACUITY_SCORE: 21
ADLS_ACUITY_SCORE: 37
ADLS_ACUITY_SCORE: 33
ADLS_ACUITY_SCORE: 33
ADLS_ACUITY_SCORE: 37
ADLS_ACUITY_SCORE: 33
ADLS_ACUITY_SCORE: 21
ADLS_ACUITY_SCORE: 37
ADLS_ACUITY_SCORE: 33
ADLS_ACUITY_SCORE: 22
ADLS_ACUITY_SCORE: 22
ADLS_ACUITY_SCORE: 21
ADLS_ACUITY_SCORE: 35
ADLS_ACUITY_SCORE: 21
ADLS_ACUITY_SCORE: 21
ADLS_ACUITY_SCORE: 33
DOING_ERRANDS_INDEPENDENTLY_DIFFICULTY: NO
ADLS_ACUITY_SCORE: 33
ADLS_ACUITY_SCORE: 21
ADLS_ACUITY_SCORE: 22
ADLS_ACUITY_SCORE: 33
ADLS_ACUITY_SCORE: 22
ADLS_ACUITY_SCORE: 33
ADLS_ACUITY_SCORE: 21
ADLS_ACUITY_SCORE: 33
ADLS_ACUITY_SCORE: 33
ADLS_ACUITY_SCORE: 22
ADLS_ACUITY_SCORE: 33
ADLS_ACUITY_SCORE: 22
CONCENTRATING,_REMEMBERING_OR_MAKING_DECISIONS_DIFFICULTY: NO
WALKING_OR_CLIMBING_STAIRS_DIFFICULTY: NO
ADLS_ACUITY_SCORE: 22
CHANGE_IN_FUNCTIONAL_STATUS_SINCE_ONSET_OF_CURRENT_ILLNESS/INJURY: NO
ADLS_ACUITY_SCORE: 35
ADLS_ACUITY_SCORE: 33
ADLS_ACUITY_SCORE: 37
ADLS_ACUITY_SCORE: 33
ADLS_ACUITY_SCORE: 22
ADLS_ACUITY_SCORE: 37
ADLS_ACUITY_SCORE: 22
ADLS_ACUITY_SCORE: 22
ADLS_ACUITY_SCORE: 33
ADLS_ACUITY_SCORE: 22
ADLS_ACUITY_SCORE: 37
DEPENDENT_IADLS:: INDEPENDENT
ADLS_ACUITY_SCORE: 33
ADLS_ACUITY_SCORE: 21
ADLS_ACUITY_SCORE: 35
DRESSING/BATHING_DIFFICULTY: NO
ADLS_ACUITY_SCORE: 33
ADLS_ACUITY_SCORE: 37
ADLS_ACUITY_SCORE: 21
ADLS_ACUITY_SCORE: 33
VISION_MANAGEMENT: GLASSES
ADLS_ACUITY_SCORE: 35
ADLS_ACUITY_SCORE: 22
ADLS_ACUITY_SCORE: 33
ADLS_ACUITY_SCORE: 22
ADLS_ACUITY_SCORE: 33
ADLS_ACUITY_SCORE: 33
FALL_HISTORY_WITHIN_LAST_SIX_MONTHS: NO
ADLS_ACUITY_SCORE: 21
ADLS_ACUITY_SCORE: 22
ADLS_ACUITY_SCORE: 21
ADLS_ACUITY_SCORE: 35
ADLS_ACUITY_SCORE: 22
ADLS_ACUITY_SCORE: 37
ADLS_ACUITY_SCORE: 22
ADLS_ACUITY_SCORE: 33
ADLS_ACUITY_SCORE: 22

## 2022-01-01 ASSESSMENT — ENCOUNTER SYMPTOMS
DIARRHEA: 1
BACK PAIN: 0
HEADACHES: 1
DIFFICULTY URINATING: 0
ABDOMINAL PAIN: 0
COUGH: 1
ABDOMINAL DISTENTION: 0
SORE THROAT: 0
WHEEZING: 1
SLEEP DISTURBANCE: 1
VOMITING: 0
DIFFICULTY URINATING: 0
VOMITING: 0
COUGH: 1
PALPITATIONS: 0
SORE THROAT: 1
WHEEZING: 0
NAUSEA: 1
APPETITE CHANGE: 1
CONSTIPATION: 0
HEMATOCHEZIA: 0
TROUBLE SWALLOWING: 0
FEVER: 0
FEVER: 0
DIARRHEA: 0
ABDOMINAL PAIN: 0
FATIGUE: 1
ABDOMINAL PAIN: 0
COUGH: 1
COUGH: 0
CHILLS: 1
CHILLS: 1
SINUS PAIN: 0
FEVER: 0
NAUSEA: 1
SINUS PRESSURE: 0
WEAKNESS: 1
NAUSEA: 1
PALPITATIONS: 0
WHEEZING: 1
DIARRHEA: 1
FATIGUE: 1
SHORTNESS OF BREATH: 1
SORE THROAT: 1
VOMITING: 0
FEVER: 1
VOMITING: 0
SLEEP DISTURBANCE: 1
NAUSEA: 0
WHEEZING: 1
SINUS PRESSURE: 0
DYSURIA: 0
DIARRHEA: 0
NAUSEA: 1
SHORTNESS OF BREATH: 0
FATIGUE: 1
CHILLS: 0
FATIGUE: 1
HEADACHES: 1
NECK PAIN: 0
APPETITE CHANGE: 1
SHORTNESS OF BREATH: 0
SLEEP DISTURBANCE: 1
SINUS PAIN: 0
ABDOMINAL PAIN: 0

## 2022-01-01 ASSESSMENT — LIFESTYLE VARIABLES: TOBACCO_USE: 1

## 2022-01-01 ASSESSMENT — PAIN SCALES - GENERAL
PAINLEVEL: NO PAIN (0)

## 2022-01-01 ASSESSMENT — PATIENT HEALTH QUESTIONNAIRE - PHQ9
10. IF YOU CHECKED OFF ANY PROBLEMS, HOW DIFFICULT HAVE THESE PROBLEMS MADE IT FOR YOU TO DO YOUR WORK, TAKE CARE OF THINGS AT HOME, OR GET ALONG WITH OTHER PEOPLE: SOMEWHAT DIFFICULT
SUM OF ALL RESPONSES TO PHQ QUESTIONS 1-9: 1
SUM OF ALL RESPONSES TO PHQ QUESTIONS 1-9: 1

## 2022-01-03 RX ORDER — LIDOCAINE HYDROCHLORIDE 20 MG/ML
JELLY TOPICAL ONCE
Status: CANCELLED
Start: 2022-01-05 | End: 2022-01-05

## 2022-01-03 NOTE — PROGRESS NOTES
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 He is due for C3 Nivolumab.    Nursing Note and documentation reviewed: Yes.    Interval History:   Colin is doing reasonably well. He did have a recent tooth infection. Was placed on abx per his dentist and is no longer having issues. No other signs of infection. No fever, chills, chest pain, cough, or SOB. No bleeding concerns. No nausea or vomiting. Endorses a good appetite and stable weight. Denies diarrhea or constipation. Energy levels are very good. Doing all his own shoveling and snow blowing. Denies skin rashes but does endorse a generalized pruritis. Continues to follow with [facility]. Also following with Caryl Griffin for management of his DMII. His sugars have been fluctuating a bit more recently.     Oncologic History:  2016 Diagnosed with high-grade bladder cancer T1; patient was treated with BCG and then again reinduction BCG then GemCis intravesically x3 in May 2018  1/5/2021 Seen by Dr. Morrell for history of hematuria.  He underwent cystoscopy which revealed bladder lesions with biopsy being negative.  Cytology showed atypical urothelial cells. He underwent CT urogram which showed a mass in the left ureter measuring 2 cm with no hydronephrosis.  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  2/10/2021 Underwent PET scan that was negative for metastatic disease and then seen by Dr. Mcnulty at the [facility] and scheduled for robotic assisted laparoscopic left nephroureterectomy.    2/15/2021 Seen by Dr. Bartlett with Medical Oncology to discuss neoadjuvant chemotherapy.  Recommendation was for gemcitabine 1000 mg/m2 day 1 and day 8 and cisplatin 70 mg per/m2 on  day 1 of a 21-day cycle x4 cycles followed by surgery.  5/11/2021 Completion of neoadjuvant chemotherapy  6/16/2021 Underwent Cystourethroscopy and Left robot assisted nephroureterectomy with Left pelvic lymph node dissection by Dr. Jorge Mcnulty and Dr. Reyes Romano; pathology showed a multifocal high-grade urothelial cell carcinoma, staged pT3N0  11/10/2021 Initiation of adjuvant nivolumab     Current Chemo Regime/TX: Nivolumab 480 mg monthly  Current Cycle: 3  # of completed cycles: 2     Previous treatment:  Gemcitabine 1000 mg/m2 on day 1 and day 8 and cisplatin 56 mg/m2 day 1 every 21 days    Past Medical History:   Diagnosis Date     Benign essential hypertension      Cancer (H)     Bladder     Carcinoma in situ of bladder 11/19/2021     Diabetes (H)      Dyslipidemia      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) 12/11/2020       Past Surgical History:   Procedure Laterality Date     APPENDECTOMY  1958     COLONOSCOPY  2-     COMBINED CYSTOSCOPY, RETROGRADES, URETEROSCOPY, INSERT STENT Left 1/18/2021    Procedure: CYSTOURETEROSCOPY, WITH RETROGRADE PYELOGRAM with interpretation of fluroscopy, ureteroscopy, ureteral biopsy, bladder biopsy and fulgaration;  Surgeon: Shonda Morrell MD;  Location: [facility]     CYSTOSCOPY N/A 6/16/2021    Procedure: CYSTOSCOPY;  Surgeon: Javier Mcnulty MD;  [facility]     CYSTOSCOPY, BIOPSY BLADDER, COMBINED N/A 9/8/2015    Procedure: COMBINED CYSTOSCOPY, BIOPSY BLADDER;  Surgeon: Randy Martinez MD;  Location: [facility]     CYSTOSCOPY, BIOPSY BLADDER, COMBINED N/A 2/14/2017    Procedure: COMBINED CYSTOSCOPY, BIOPSY BLADDER;  Surgeon: Rnady Martinez MD;  Location: [facility]     CYSTOSCOPY, BIOPSY BLADDER, COMBINED N/A 11/13/2018    Procedure: COMBINED CYSTOSCOPY, BIOPSY BLADDER;  Surgeon: Ed Helm MD;  Location: [facility] OR     CYSTOSCOPY, BIOPSY BLADDER, COMBINED N/A 11/5/2021    Procedure: CYSTOSCOPY, WITH BLADDER BIOPSY;   Surgeon: Shonda Morrell MD;  Location: [facility]     CYSTOSCOPY, RETROGRADES, COMBINED Bilateral 11/13/2018    Procedure: Bilateral Retrograde Pyelagram, Bladder Biopsy;  Surgeon: Ed Helm MD;  Location: [facility]     CYSTOSCOPY, RETROGRADES, COMBINED Right 11/5/2021    Procedure: CYSTOSCOPY, WITH RETROGRADE PYELOGRAM;  Surgeon: Shonda Morrell MD;  Location: [facility]     CYSTOSCOPY, RETROGRADES, INSERT STENT URETER(S), COMBINED Left 9/8/2015    Procedure: COMBINED CYSTOSCOPY, RETROGRADES, INSERT STENT URETER(S);  Surgeon: Randy Martinez MD;  [facility]: [facility]     CYSTOSCOPY, TRANSURETHRAL RESECTION (TUR) TUMOR BLADDER, COMBINED N/A 9/8/2015    Procedure: COMBINED CYSTOSCOPY, TRANSURETHRAL RESECTION (TUR) TUMOR BLADDER;  Surgeon: Randy Martinez MD;  [facility]     CYSTOSCOPY, TRANSURETHRAL RESECTION (TUR) TUMOR BLADDER, COMBINED N/A 1/12/2016    Procedure: COMBINED CYSTOSCOPY, TRANSURETHRAL RESECTION (TUR) TUMOR BLADDER;  Surgeon: Randy Martinez MD;  [facility]     CYSTOSCOPY, TRANSURETHRAL RESECTION (TUR) TUMOR BLADDER, COMBINED N/A 9/13/2016    Procedure: COMBINED CYSTOSCOPY, TRANSURETHRAL RESECTION (TUR) TUMOR BLADDER;  Surgeon: Randy Martinez MD;  [facility]: [facility]     DAVINCI NEPHROURETERECTOMY Left 6/16/2021    Procedure: NEPHROURETERECTOMY, ROBOT-ASSISTED, lymph node dissection;  Surgeon: Javier Mcnulty MD;  [facility]     PHACOEMULSIFICATION WITH STANDARD INTRAOCULAR LENS IMPLANT Right 10/9/2018    Procedure: PHACOEMULSIFICATION WITH STANDARD INTRAOCULAR LENS IMPLANT;  PHACOEMULSIFICATION CATARACT EXTRACTION POSTERIOR CHAMBER LENS RIGHT;  Surgeon: Marlo Mcconnell MD;  Location: [facility]     PHACOEMULSIFICATION WITH STANDARD INTRAOCULAR LENS IMPLANT Left 10/23/2018    Procedure: PHACOEMULSIFICATION CATARACT EXTRACTION POSTERIOR CHAMBER LENS LEFT;  Surgeon: Marlo Mcconnell MD;  Location: [facility]       Family History   Problem Relation Age of Onset      Diabetes Mother      Parkinsonism Brother      Coronary Artery Disease No family hx of      Hypertension No family hx of      Hyperlipidemia No family hx of      Cerebrovascular Disease No family hx of      Breast Cancer No family hx of      Colon Cancer No family hx of      Prostate Cancer No family hx of      Anesthesia Reaction No family hx of      Asthma No family hx of      Thyroid Disease No family hx of      Genetic Disorder No family hx of        Social History     Socioeconomic History     Marital status:      Spouse name: Not on file     Number of children: Not on file     Years of education: Not on file     Highest education level: Not on file   Occupational History     Not on file   Tobacco Use     Smoking status: Former Smoker     Quit date: 1992     Years since quittin.0     Smokeless tobacco: Never Used   Substance and Sexual Activity     Alcohol use: Yes     Comment: rarely     Drug use: No     Sexual activity: Not Currently   Other Topics Concern     Parent/sibling w/ CABG, MI or angioplasty before 65F 55M? No   Social History Narrative     Not on file     Social Determinants of Health     Financial Resource Strain: Not on file   Food Insecurity: Not on file   Transportation Needs: Not on file   Physical Activity: Not on file   Stress: Not on file   Social Connections: Not on file   Intimate Partner Violence: Not on file   Housing Stability: Not on file       Current Outpatient Medications   Medication     atorvastatin (LIPITOR) 40 MG tablet     blood glucose (ONETOUCH VERIO IQ) test strip     brimonidine (ALPHAGAN-P) 0.15 % ophthalmic solution     insulin degludec (TRESIBA FLEXTOUCH) 100 UNIT/ML pen     insulin pen needle (32G X 4 MM) 32G X 4 MM miscellaneous     latanoprost (XALATAN) 0.005 % ophthalmic solution     ONETOUCH DELICA LANCETS 33G MISC     tamsulosin (FLOMAX) 0.4 MG capsule     acetaminophen (TYLENOL) 325 MG tablet     lisinopril (ZESTRIL) 10 MG tablet     No current  "facility-administered medications for this visit.        Allergies   Allergen Reactions     No Clinical Screening - See Comments Other (See Comments)     Beta blocker in glaucoma gtt.s caused low pulse and passing out      Timolol      Beta blocker in glaucoma gtt.s caused low pulse and passing out   - patient thinks it was timolol but not 100% sure which beta blocker eye drop.        Review Of Systems:  A complete review of systems is negative except for the above mentioned items in the interval history.     ECOG Performance Status: 0    Physical Exam:  BP (!) 148/83   Pulse 70   Temp 96.8  F (36  C) (Tympanic)   Resp 20   Ht 1.702 m (5' 7\")   Wt 80.5 kg (177 lb 7.5 oz)   SpO2 97%   BMI 27.80 kg/m    GENERAL APPEARANCE: Healthy, alert and in no acute distress.  HEENT: Eyes appear normal without scleral icterus. Extraocular movements intact. Mouth appears normal without lesions, ulcers, or thrush.   NECK:   Supple with normal range of motion. No asymmetry or masses.  LYMPHATICS: No palpable cervical, supraclavicular adenopathy.  RESP: Lungs clear to auscultation bilaterally, respirations regular and easy.  CARDIOVASCULAR: Regular rate and rhythm. Normal S1, S2; no murmur, gallop, or rub.  ABDOMEN: Soft, non-tender, non-distended. Bowel sounds auscultated all 4 quadrants. No palpable organomegaly or masses.  MUSCULOSKELETAL: Extremities without gross deformities noted. No edema of bilateral lower extremities.  SKIN: No suspicious lesions or rashes.  NEURO: Alert and oriented x 3.  Gait steady.  PSYCHIATRIC: Mentation and affect appear normal.  Mood appropriate.    Laboratory:  Results for orders placed or performed in visit on 01/05/22   Comprehensive metabolic panel     Status: Abnormal   Result Value Ref Range    Sodium 136 133 - 144 mmol/L    Potassium 4.2 3.4 - 5.3 mmol/L    Chloride 107 94 - 109 mmol/L    Carbon Dioxide (CO2) 22 20 - 32 mmol/L    Anion Gap 7 3 - 14 mmol/L    Urea Nitrogen 32 (H) 7 - 30 " mg/dL    Creatinine 2.30 (H) 0.66 - 1.25 mg/dL    Calcium 8.4 (L) 8.5 - 10.1 mg/dL    Glucose 334 (H) 70 - 99 mg/dL    Alkaline Phosphatase 93 40 - 150 U/L    AST 17 0 - 45 U/L    ALT 13 0 - 70 U/L    Protein Total 7.4 6.8 - 8.8 g/dL    Albumin 3.8 3.4 - 5.0 g/dL    Bilirubin Total 0.6 0.2 - 1.3 mg/dL    GFR Estimate 29 (L) >60 mL/min/1.73m2   TSH with free T4 reflex     Status: Normal   Result Value Ref Range    TSH 1.48 0.40 - 4.00 mU/L   CBC with platelets and differential     Status: Abnormal   Result Value Ref Range    WBC Count 6.2 4.0 - 11.0 10e3/uL    RBC Count 3.52 (L) 4.40 - 5.90 10e6/uL    Hemoglobin 10.8 (L) 13.3 - 17.7 g/dL    Hematocrit 32.3 (L) 40.0 - 53.0 %    MCV 92 78 - 100 fL    MCH 30.7 26.5 - 33.0 pg    MCHC 33.4 31.5 - 36.5 g/dL    RDW 13.7 10.0 - 15.0 %    Platelet Count 181 150 - 450 10e3/uL    % Neutrophils 72 %    % Lymphocytes 18 %    % Monocytes 8 %    % Eosinophils 1 %    % Basophils 1 %    % Immature Granulocytes 0 %    NRBCs per 100 WBC 0 <1 /100    Absolute Neutrophils 4.5 1.6 - 8.3 10e3/uL    Absolute Lymphocytes 1.1 0.8 - 5.3 10e3/uL    Absolute Monocytes 0.5 0.0 - 1.3 10e3/uL    Absolute Eosinophils 0.1 0.0 - 0.7 10e3/uL    Absolute Basophils 0.1 0.0 - 0.2 10e3/uL    Absolute Immature Granulocytes 0.0 <=0.4 10e3/uL    Absolute NRBCs 0.0 10e3/uL   CBC with Platelets & Differential     Status: Abnormal    Narrative    The following orders were created for panel order CBC with Platelets & Differential.  Procedure                               Abnormality         Status                     ---------                               -----------         ------                     CBC with platelets and d...[[id number]]  Abnormal            Final result                 Please view results for these tests on the individual orders.       Imaging Studies: None at this visit.     ASSESSMENT/PLAN:  1. History of malignant neoplasm of the left ureter: History of high-grade urothelial cell carcinoma  of the left ureter diagnosed January 2021.  He completed neoadjuvant chemotherapy then underwent a nephroureterectomy and is now undergoing adjuvant therapy with nivolumab with plan to complete 1 year of therapy. He commenced therapy 11/10/21. He will initiate cycle 3 today and follow-up prior to cycle 4 with labs per treatment plan.     2. DMII: Patient previously with DMII.  Hyperglycemia likely due to immunotherapy. He is currently being managed with Tresiba through the diabetic clinic.  We will continue to monitor.     3. 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  He'll continue to follow with nephrology.     4. Bladder cancer: Currently following through urology and in the midst of BCG treatments.     Patient in agreement with plan and verbalizes understanding. Agrees to call with any questions or concerns.    60 minutes spent in the patient's encounter today with time spent in review of patient's chart along with chart preparation and review of the treatment plan and signing of treatment plan.  Time was also spent with the patient in obtaining a review of systems and performing a physical exam along with detailed review of all test results. Time was also spent in discussing plan for future follow-up and relating instructions for follow-up and in placing future orders.    DANILO Cage [facility]  Medical Oncology

## 2022-01-05 ENCOUNTER — ONCOLOGY VISIT (OUTPATIENT)
Dept: ONCOLOGY | Facility: OTHER | Age: 77
End: 2022-01-05
Attending: NURSE PRACTITIONER
Payer: COMMERCIAL

## 2022-01-05 ENCOUNTER — LAB (OUTPATIENT)
Dept: INFUSION THERAPY | Facility: OTHER | Age: 77
End: 2022-01-05
Attending: INTERNAL MEDICINE
Payer: COMMERCIAL

## 2022-01-05 VITALS
TEMPERATURE: 96.8 F | HEIGHT: 67 IN | RESPIRATION RATE: 20 BRPM | DIASTOLIC BLOOD PRESSURE: 83 MMHG | BODY MASS INDEX: 27.85 KG/M2 | WEIGHT: 177.47 LBS | SYSTOLIC BLOOD PRESSURE: 148 MMHG | HEART RATE: 70 BPM | OXYGEN SATURATION: 97 %

## 2022-01-05 VITALS — DIASTOLIC BLOOD PRESSURE: 77 MMHG | HEART RATE: 73 BPM | SYSTOLIC BLOOD PRESSURE: 128 MMHG

## 2022-01-05 DIAGNOSIS — C66.2 MALIGNANT NEOPLASM OF LEFT URETER (H): Primary | ICD-10-CM

## 2022-01-05 DIAGNOSIS — N18.4 CKD (CHRONIC KIDNEY DISEASE) STAGE 4, GFR 15-29 ML/MIN (H): ICD-10-CM

## 2022-01-05 DIAGNOSIS — C67.9 MALIGNANT NEOPLASM OF URINARY BLADDER, UNSPECIFIED SITE (H): ICD-10-CM

## 2022-01-05 DIAGNOSIS — E11.9 TYPE 2 DIABETES MELLITUS WITHOUT COMPLICATION, WITHOUT LONG-TERM CURRENT USE OF INSULIN (H): ICD-10-CM

## 2022-01-05 LAB
ALBUMIN SERPL-MCNC: 3.8 G/DL (ref 3.4–5)
ALP SERPL-CCNC: 93 U/L (ref 40–150)
ALT SERPL W P-5'-P-CCNC: 13 U/L (ref 0–70)
ANION GAP SERPL CALCULATED.3IONS-SCNC: 7 MMOL/L (ref 3–14)
AST SERPL W P-5'-P-CCNC: 17 U/L (ref 0–45)
BASOPHILS # BLD AUTO: 0.1 10E3/UL (ref 0–0.2)
BASOPHILS NFR BLD AUTO: 1 %
BILIRUB SERPL-MCNC: 0.6 MG/DL (ref 0.2–1.3)
BUN SERPL-MCNC: 32 MG/DL (ref 7–30)
CALCIUM SERPL-MCNC: 8.4 MG/DL (ref 8.5–10.1)
CHLORIDE BLD-SCNC: 107 MMOL/L (ref 94–109)
CO2 SERPL-SCNC: 22 MMOL/L (ref 20–32)
CREAT SERPL-MCNC: 2.3 MG/DL (ref 0.66–1.25)
EOSINOPHIL # BLD AUTO: 0.1 10E3/UL (ref 0–0.7)
EOSINOPHIL NFR BLD AUTO: 1 %
ERYTHROCYTE [DISTWIDTH] IN BLOOD BY AUTOMATED COUNT: 13.7 % (ref 10–15)
GFR SERPL CREATININE-BSD FRML MDRD: 29 ML/MIN/1.73M2
GLUCOSE BLD-MCNC: 334 MG/DL (ref 70–99)
HCT VFR BLD AUTO: 32.3 % (ref 40–53)
HGB BLD-MCNC: 10.8 G/DL (ref 13.3–17.7)
IMM GRANULOCYTES # BLD: 0 10E3/UL
IMM GRANULOCYTES NFR BLD: 0 %
LYMPHOCYTES # BLD AUTO: 1.1 10E3/UL (ref 0.8–5.3)
LYMPHOCYTES NFR BLD AUTO: 18 %
MCH RBC QN AUTO: 30.7 PG (ref 26.5–33)
MCHC RBC AUTO-ENTMCNC: 33.4 G/DL (ref 31.5–36.5)
MCV RBC AUTO: 92 FL (ref 78–100)
MONOCYTES # BLD AUTO: 0.5 10E3/UL (ref 0–1.3)
MONOCYTES NFR BLD AUTO: 8 %
NEUTROPHILS # BLD AUTO: 4.5 10E3/UL (ref 1.6–8.3)
NEUTROPHILS NFR BLD AUTO: 72 %
NRBC # BLD AUTO: 0 10E3/UL
NRBC BLD AUTO-RTO: 0 /100
PLATELET # BLD AUTO: 181 10E3/UL (ref 150–450)
POTASSIUM BLD-SCNC: 4.2 MMOL/L (ref 3.4–5.3)
PROT SERPL-MCNC: 7.4 G/DL (ref 6.8–8.8)
RBC # BLD AUTO: 3.52 10E6/UL (ref 4.4–5.9)
SODIUM SERPL-SCNC: 136 MMOL/L (ref 133–144)
TSH SERPL DL<=0.005 MIU/L-ACNC: 1.48 MU/L (ref 0.4–4)
WBC # BLD AUTO: 6.2 10E3/UL (ref 4–11)

## 2022-01-05 PROCEDURE — G0463 HOSPITAL OUTPT CLINIC VISIT: HCPCS

## 2022-01-05 PROCEDURE — 84443 ASSAY THYROID STIM HORMONE: CPT | Mod: ZL | Performed by: NURSE PRACTITIONER

## 2022-01-05 PROCEDURE — 250N000011 HC RX IP 250 OP 636: Performed by: NURSE PRACTITIONER

## 2022-01-05 PROCEDURE — 258N000003 HC RX IP 258 OP 636: Performed by: NURSE PRACTITIONER

## 2022-01-05 PROCEDURE — 36415 COLL VENOUS BLD VENIPUNCTURE: CPT | Mod: ZL

## 2022-01-05 PROCEDURE — 96413 CHEMO IV INFUSION 1 HR: CPT

## 2022-01-05 PROCEDURE — 80053 COMPREHEN METABOLIC PANEL: CPT | Mod: ZL | Performed by: NURSE PRACTITIONER

## 2022-01-05 PROCEDURE — 99215 OFFICE O/P EST HI 40 MIN: CPT | Performed by: NURSE PRACTITIONER

## 2022-01-05 PROCEDURE — G0463 HOSPITAL OUTPT CLINIC VISIT: HCPCS | Mod: 25

## 2022-01-05 PROCEDURE — 85004 AUTOMATED DIFF WBC COUNT: CPT | Mod: ZL

## 2022-01-05 RX ORDER — METHYLPREDNISOLONE SODIUM SUCCINATE 125 MG/2ML
125 INJECTION, POWDER, LYOPHILIZED, FOR SOLUTION INTRAMUSCULAR; INTRAVENOUS
Status: CANCELLED
Start: 2022-01-05

## 2022-01-05 RX ORDER — EPINEPHRINE 1 MG/ML
0.3 INJECTION, SOLUTION, CONCENTRATE INTRAVENOUS EVERY 5 MIN PRN
Status: CANCELLED | OUTPATIENT
Start: 2022-01-05

## 2022-01-05 RX ORDER — MEPERIDINE HYDROCHLORIDE 25 MG/ML
25 INJECTION INTRAMUSCULAR; INTRAVENOUS; SUBCUTANEOUS EVERY 30 MIN PRN
Status: CANCELLED | OUTPATIENT
Start: 2022-01-05

## 2022-01-05 RX ORDER — HEPARIN SODIUM (PORCINE) LOCK FLUSH IV SOLN 100 UNIT/ML 100 UNIT/ML
5 SOLUTION INTRAVENOUS
Status: CANCELLED | OUTPATIENT
Start: 2022-01-05

## 2022-01-05 RX ORDER — HEPARIN SODIUM,PORCINE 10 UNIT/ML
5 VIAL (ML) INTRAVENOUS
Status: CANCELLED | OUTPATIENT
Start: 2022-01-05

## 2022-01-05 RX ORDER — LORAZEPAM 2 MG/ML
0.5 INJECTION INTRAMUSCULAR EVERY 4 HOURS PRN
Status: CANCELLED | OUTPATIENT
Start: 2022-01-05

## 2022-01-05 RX ORDER — ALBUTEROL SULFATE 0.83 MG/ML
2.5 SOLUTION RESPIRATORY (INHALATION)
Status: CANCELLED | OUTPATIENT
Start: 2022-01-05

## 2022-01-05 RX ORDER — DIPHENHYDRAMINE HYDROCHLORIDE 50 MG/ML
50 INJECTION INTRAMUSCULAR; INTRAVENOUS
Status: CANCELLED
Start: 2022-01-05

## 2022-01-05 RX ORDER — ALBUTEROL SULFATE 90 UG/1
1-2 AEROSOL, METERED RESPIRATORY (INHALATION)
Status: CANCELLED
Start: 2022-01-05

## 2022-01-05 RX ORDER — NALOXONE HYDROCHLORIDE 0.4 MG/ML
0.2 INJECTION, SOLUTION INTRAMUSCULAR; INTRAVENOUS; SUBCUTANEOUS
Status: CANCELLED | OUTPATIENT
Start: 2022-01-05

## 2022-01-05 RX ADMIN — SODIUM CHLORIDE 250 ML: 9 INJECTION, SOLUTION INTRAVENOUS at 13:34

## 2022-01-05 RX ADMIN — SODIUM CHLORIDE 480 MG: 9 INJECTION, SOLUTION INTRAVENOUS at 13:36

## 2022-01-05 ASSESSMENT — PAIN SCALES - GENERAL: PAINLEVEL: NO PAIN (0)

## 2022-01-05 ASSESSMENT — MIFFLIN-ST. JEOR: SCORE: 1503.63

## 2022-01-05 NOTE — NURSING NOTE
"Oncology Rooming Note    January 5, 2022 12:30 PM   Colin Baxter is a 76 year old male who presents for:    Chief Complaint   Patient presents with     Oncology Clinic Visit     Follow up Carcinoma in situ of bladder     Initial Vitals: BP (!) 148/83   Pulse 70   Temp 96.8  F (36  C) (Tympanic)   Resp 20   Ht 1.702 m (5' 7\")   Wt 80.5 kg (177 lb 7.5 oz)   SpO2 97%   BMI 27.80 kg/m   Estimated body mass index is 27.8 kg/m  as calculated from the following:    Height as of this encounter: 1.702 m (5' 7\").    Weight as of this encounter: 80.5 kg (177 lb 7.5 oz). Body surface area is 1.95 meters squared.  No Pain (0) Comment: Data Unavailable   No LMP for male patient.  Allergies reviewed: Yes  Medications reviewed: Yes    Medications: Medication refills not needed today.  Pharmacy name entered into [employer]: [id number] - [id number] - [address]            "

## 2022-01-05 NOTE — PATIENT INSTRUCTIONS

## 2022-01-05 NOTE — PATIENT INSTRUCTIONS

## 2022-01-05 NOTE — PROGRESS NOTES
24 gauge angio cath inserted into right arm. Immediate blood return noted. IV secured with sterile, transparent dressing and tape. Patient tolerated well, denies pain or discomfort at this time. Flushes easily without resistance, no signs or symptoms of infiltration or infection . 3mL's blood wasted and 2 tubes blood removed for ordered labs. Flushed with 3mL's normal saline to clear the line.

## 2022-01-05 NOTE — PATIENT INSTRUCTIONS
Thank you for allowing me to be a part of your care today.    We would like to see you back in 4 weeks, with labs completed prior.    If you have any questions please call [phone]    Other instructions:      None

## 2022-01-05 NOTE — PROGRESS NOTES
Patient is a 76 year old here today for infusion of Nivolumab under the orders of Dr. Bartlett.      Component      Latest Ref Rng & Units 1/5/2022   WBC      4.0 - 11.0 10e3/uL 6.2   RBC Count      4.40 - 5.90 10e6/uL 3.52 (L)   Hemoglobin      13.3 - 17.7 g/dL 10.8 (L)   Hematocrit      40.0 - 53.0 % 32.3 (L)   MCV      78 - 100 fL 92   MCH      26.5 - 33.0 pg 30.7   MCHC      31.5 - 36.5 g/dL 33.4   RDW      10.0 - 15.0 % 13.7   Platelet Count      150 - 450 10e3/uL 181   % Neutrophils      % 72   % Lymphocytes      % 18   % Monocytes      % 8   % Eosinophils      % 1   % Basophils      % 1   % Immature Granulocytes      % 0   NRBCs per 100 WBC      <1 /100 0   Absolute Neutrophils      1.6 - 8.3 10e3/uL 4.5   Absolute Lymphocytes      0.8 - 5.3 10e3/uL 1.1   Absolute Monocytes      0.0 - 1.3 10e3/uL 0.5   Absolute Eosinophils      0.0 - 0.7 10e3/uL 0.1   Absolute Basophils      0.0 - 0.2 10e3/uL 0.1   Absolute Immature Granulocytes      <=0.4 10e3/uL 0.0   Absolute NRBCs      10e3/uL 0.0   Sodium      133 - 144 mmol/L 136   Potassium      3.4 - 5.3 mmol/L 4.2   Chloride      94 - 109 mmol/L 107   Carbon Dioxide      20 - 32 mmol/L 22   Anion Gap      3 - 14 mmol/L 7   Urea Nitrogen      7 - 30 mg/dL 32 (H)   Creatinine      0.66 - 1.25 mg/dL 2.30 (H)   Calcium      8.5 - 10.1 mg/dL 8.4 (L)   Glucose      70 - 99 mg/dL 334 (H)   Alkaline Phosphatase      40 - 150 U/L 93   AST      0 - 45 U/L 17   ALT      0 - 70 U/L 13   Protein Total      6.8 - 8.8 g/dL 7.4   Albumin      3.4 - 5.0 g/dL 3.8   Bilirubin Total      0.2 - 1.3 mg/dL 0.6   GFR Estimate      >60 mL/min/1.73m2 29 (L)   TSH      0.40 - 4.00 mU/L 1.48       Nivolumab dose(s) verified with Shannon VYAS RN prior to release of drug.    Patient meets parameters for today's infusion.  Denies questions or concerns regarding today's infusion and/or medications being administered.      Patient identified with two identifiers, order verified, and verbal consent for  today's infusion obtained from patient.    1336 IV pump verified with Nivolumab dose, drug, and rate of administration. Infusion administered per protocol. Patient tolerated infusion well, no signs or symptoms of adverse reaction noted. Patient denies pain nor discomfort.     Needle removed, tip intact. Site clean, dry and intact. Covered with a sterile bandage, slight pressure applied for 30 seconds. Pt instructed to leave bandage intact for a minimum of one hour, and to call with questions or concerns.  Patient states understanding, discharged ambulatory.

## 2022-01-06 ENCOUNTER — INFUSION THERAPY VISIT (OUTPATIENT)
Dept: INFUSION THERAPY | Facility: OTHER | Age: 77
End: 2022-01-06
Attending: INTERNAL MEDICINE
Payer: COMMERCIAL

## 2022-01-06 VITALS
TEMPERATURE: 97.3 F | WEIGHT: 177.91 LBS | OXYGEN SATURATION: 93 % | SYSTOLIC BLOOD PRESSURE: 137 MMHG | HEART RATE: 61 BPM | BODY MASS INDEX: 27.86 KG/M2 | DIASTOLIC BLOOD PRESSURE: 67 MMHG | RESPIRATION RATE: 17 BRPM

## 2022-01-06 DIAGNOSIS — D09.0 CARCINOMA IN SITU OF BLADDER: Primary | ICD-10-CM

## 2022-01-06 LAB
ALBUMIN UR-MCNC: 30 MG/DL
APPEARANCE UR: CLEAR
BILIRUB UR QL STRIP: NEGATIVE
COLOR UR AUTO: ABNORMAL
GLUCOSE UR STRIP-MCNC: >1000 MG/DL
HGB UR QL STRIP: NEGATIVE
KETONES UR STRIP-MCNC: NEGATIVE MG/DL
LEUKOCYTE ESTERASE UR QL STRIP: NEGATIVE
NITRATE UR QL: NEGATIVE
PH UR STRIP: 5.5 [PH] (ref 4.7–8)
RBC URINE: <1 /HPF
SP GR UR STRIP: 1.02 (ref 1–1.03)
SQUAMOUS EPITHELIAL: 0 /HPF
UROBILINOGEN UR STRIP-MCNC: NORMAL MG/DL
WBC URINE: 3 /HPF

## 2022-01-06 PROCEDURE — 250N000011 HC RX IP 250 OP 636: Performed by: UROLOGY

## 2022-01-06 PROCEDURE — 51720 TREATMENT OF BLADDER LESION: CPT | Performed by: UROLOGY

## 2022-01-06 PROCEDURE — 51720 TREATMENT OF BLADDER LESION: CPT

## 2022-01-06 PROCEDURE — 81001 URINALYSIS AUTO W/SCOPE: CPT | Mod: ZL

## 2022-01-06 PROCEDURE — A4338 INDWELLING CATHETER LATEX: HCPCS | Performed by: UROLOGY

## 2022-01-06 RX ADMIN — BACILLUS CALMETTE-GUERIN 50 MG: 50 POWDER, FOR SUSPENSION INTRAVESICAL at 09:38

## 2022-01-06 ASSESSMENT — PAIN SCALES - GENERAL: PAINLEVEL: NO PAIN (0)

## 2022-01-06 NOTE — PROGRESS NOTES
6 Khmer catheter placed sterile technique.  Allowed bladder to empty a scant amount of urine returned.

## 2022-01-06 NOTE — PROGRESS NOTES
Patient here for bladder instillation of BCG.  UA negative  for nitrates, negative for leukocytes.  Denies any fever or chills.  Denies any pain with urination.  Results reviewed, labs met treatment parameters.   0938 BCG instillation via catheter, tolerated well, catheter removed.  Patient discharged home.  Offered no complaints.  VS stable Encouraged to drink plenty of fluids for next couple of days.  Discharged in care of self.  Patient will return one week for next appointment.

## 2022-01-07 ENCOUNTER — TELEPHONE (OUTPATIENT)
Dept: EDUCATION SERVICES | Facility: HOSPITAL | Age: 77
End: 2022-01-07
Payer: COMMERCIAL

## 2022-01-07 NOTE — TELEPHONE ENCOUNTER
Colin is in clinic today to see Dr. Arvizu. I spoke with him about his recent BG readings. They range as follows: fastin, 71, 104, 155, 209 and post-supper: 129, 215-326.    Will continue current Tresiba dose of 20 units daily and follow by phone again on 21    Colin will be following with Dr. Wallace.

## 2022-01-13 ENCOUNTER — INFUSION THERAPY VISIT (OUTPATIENT)
Dept: INFUSION THERAPY | Facility: OTHER | Age: 77
End: 2022-01-13
Attending: INTERNAL MEDICINE
Payer: COMMERCIAL

## 2022-01-13 VITALS
OXYGEN SATURATION: 92 % | BODY MASS INDEX: 27.07 KG/M2 | HEART RATE: 64 BPM | DIASTOLIC BLOOD PRESSURE: 75 MMHG | TEMPERATURE: 96 F | RESPIRATION RATE: 17 BRPM | WEIGHT: 172.84 LBS | SYSTOLIC BLOOD PRESSURE: 135 MMHG

## 2022-01-13 DIAGNOSIS — D09.0 CARCINOMA IN SITU OF BLADDER: Primary | ICD-10-CM

## 2022-01-13 LAB
ALBUMIN UR-MCNC: 30 MG/DL
APPEARANCE UR: CLEAR
BILIRUB UR QL STRIP: NEGATIVE
COLOR UR AUTO: ABNORMAL
GLUCOSE UR STRIP-MCNC: >1000 MG/DL
HGB UR QL STRIP: ABNORMAL
KETONES UR STRIP-MCNC: NEGATIVE MG/DL
LEUKOCYTE ESTERASE UR QL STRIP: NEGATIVE
MUCOUS THREADS #/AREA URNS LPF: PRESENT /LPF
NITRATE UR QL: NEGATIVE
PH UR STRIP: 5.5 [PH] (ref 4.7–8)
RBC URINE: 9 /HPF
SP GR UR STRIP: 1.03 (ref 1–1.03)
SQUAMOUS EPITHELIAL: 0 /HPF
UROBILINOGEN UR STRIP-MCNC: NORMAL MG/DL
WBC URINE: 17 /HPF

## 2022-01-13 PROCEDURE — 51720 TREATMENT OF BLADDER LESION: CPT

## 2022-01-13 PROCEDURE — 81001 URINALYSIS AUTO W/SCOPE: CPT | Mod: ZL

## 2022-01-13 PROCEDURE — 87086 URINE CULTURE/COLONY COUNT: CPT | Mod: ZL

## 2022-01-13 PROCEDURE — 51720 TREATMENT OF BLADDER LESION: CPT | Performed by: INTERNAL MEDICINE

## 2022-01-13 PROCEDURE — 51702 INSERT TEMP BLADDER CATH: CPT

## 2022-01-13 PROCEDURE — 250N000009 HC RX 250: Performed by: UROLOGY

## 2022-01-13 PROCEDURE — 250N000011 HC RX IP 250 OP 636: Performed by: UROLOGY

## 2022-01-13 RX ORDER — LIDOCAINE HYDROCHLORIDE 20 MG/ML
JELLY TOPICAL ONCE
Status: COMPLETED | OUTPATIENT
Start: 2022-01-13 | End: 2022-01-13

## 2022-01-13 RX ADMIN — BACILLUS CALMETTE-GUERIN 50 MG: 50 POWDER, FOR SUSPENSION INTRAVESICAL at 09:51

## 2022-01-13 RX ADMIN — LIDOCAINE HYDROCHLORIDE: 20 JELLY TOPICAL at 09:41

## 2022-01-13 ASSESSMENT — PAIN SCALES - GENERAL: PAINLEVEL: NO PAIN (0)

## 2022-01-13 NOTE — PROGRESS NOTES
Patient here for bladder instillation of BCG.  UA negative for nitrates, negative  for leukocytes.  Denies any fever or chills.  Denies any pain with urination.  Results reviewed, labs met treatment parameters.  Proceed with treatment.  14 Montenegrin catheter placed, sterile technique.  Allowed bladder to empty 100 ccs of urine returned.  0951 BCG instillation via catheter, tolerated well, catheter removed.  Patient instructed to return immediately home and turn every 15 minutes per protocol, tolerated well.  Offered no complaints.  VS stable.  Encouraged to drink plenty of fluids for next couple of days.  Discharged in care of self.  Patient will return in one week for next appointment.

## 2022-01-13 NOTE — PROGRESS NOTES
Per Dr. Petros ro to treat patient.  Patient sample will likely always go to culture.  Patient okay to be treated unless urine sample states moderate to large bacteria in urine.

## 2022-01-14 ENCOUNTER — TELEPHONE (OUTPATIENT)
Dept: EDUCATION SERVICES | Facility: HOSPITAL | Age: 77
End: 2022-01-14
Payer: COMMERCIAL

## 2022-01-14 NOTE — TELEPHONE ENCOUNTER
Call Colin for BG readings. He is taking Tresiba 20 units daily.    Readings as follows:    1/8    1/9 AM 69    1/10 AM 90  ( 2 beers, meatloaf green beans and 1/2 baked potato)  1/11     States that he is wondering if the Tresiba or his other treatments are causing itching that he has every day. I did ask if he wanted to change his insulin. He said not yet as he has an upcoming treatment.    Will follow in one week.

## 2022-01-15 LAB — BACTERIA UR CULT: NO GROWTH

## 2022-01-17 NOTE — PROGRESS NOTES
Shannon FERNANDEZ noted patient has one more BCG in plan but isn't scheduled this week, 1-17-22. He was held x2 but his last appointment must not have been added to the end of prior schedule. Note to RASHID Jaeger to add patient to schedule, with a call to ensure he knows he needs to come this week for his last one.

## 2022-01-20 ENCOUNTER — INFUSION THERAPY VISIT (OUTPATIENT)
Dept: INFUSION THERAPY | Facility: OTHER | Age: 77
End: 2022-01-20
Attending: UROLOGY
Payer: COMMERCIAL

## 2022-01-20 VITALS
BODY MASS INDEX: 27.4 KG/M2 | TEMPERATURE: 97.3 F | WEIGHT: 174.6 LBS | HEART RATE: 64 BPM | DIASTOLIC BLOOD PRESSURE: 75 MMHG | HEIGHT: 67 IN | SYSTOLIC BLOOD PRESSURE: 133 MMHG | RESPIRATION RATE: 18 BRPM

## 2022-01-20 DIAGNOSIS — D09.0 CARCINOMA IN SITU OF BLADDER: Primary | ICD-10-CM

## 2022-01-20 LAB
ALBUMIN UR-MCNC: 50 MG/DL
APPEARANCE UR: CLEAR
BILIRUB UR QL STRIP: NEGATIVE
COLOR UR AUTO: ABNORMAL
GLUCOSE UR STRIP-MCNC: >1000 MG/DL
HGB UR QL STRIP: ABNORMAL
HYALINE CASTS: 2 /LPF
KETONES UR STRIP-MCNC: NEGATIVE MG/DL
LEUKOCYTE ESTERASE UR QL STRIP: ABNORMAL
MUCOUS THREADS #/AREA URNS LPF: PRESENT /LPF
NITRATE UR QL: NEGATIVE
PH UR STRIP: 5.5 [PH] (ref 4.7–8)
RBC URINE: 5 /HPF
SP GR UR STRIP: 1.02 (ref 1–1.03)
SQUAMOUS EPITHELIAL: 0 /HPF
UROBILINOGEN UR STRIP-MCNC: NORMAL MG/DL
WBC URINE: 17 /HPF

## 2022-01-20 PROCEDURE — 51702 INSERT TEMP BLADDER CATH: CPT

## 2022-01-20 PROCEDURE — 87086 URINE CULTURE/COLONY COUNT: CPT | Mod: ZL

## 2022-01-20 PROCEDURE — 250N000009 HC RX 250: Performed by: UROLOGY

## 2022-01-20 PROCEDURE — 250N000011 HC RX IP 250 OP 636: Performed by: UROLOGY

## 2022-01-20 PROCEDURE — 81001 URINALYSIS AUTO W/SCOPE: CPT | Mod: ZL

## 2022-01-20 PROCEDURE — A4310 INSERT TRAY W/O BAG/CATH: HCPCS | Performed by: UROLOGY

## 2022-01-20 PROCEDURE — 51720 TREATMENT OF BLADDER LESION: CPT | Performed by: UROLOGY

## 2022-01-20 PROCEDURE — 51720 TREATMENT OF BLADDER LESION: CPT

## 2022-01-20 RX ORDER — LIDOCAINE HYDROCHLORIDE 20 MG/ML
JELLY TOPICAL ONCE
Status: COMPLETED | OUTPATIENT
Start: 2022-01-20 | End: 2022-01-20

## 2022-01-20 RX ADMIN — BACILLUS CALMETTE-GUERIN 50 MG: 50 POWDER, FOR SUSPENSION INTRAVESICAL at 13:38

## 2022-01-20 RX ADMIN — LIDOCAINE HYDROCHLORIDE: 20 JELLY TOPICAL at 13:19

## 2022-01-20 ASSESSMENT — MIFFLIN-ST. JEOR: SCORE: 1485.75

## 2022-01-20 NOTE — PROGRESS NOTES
14 Vietnamese catheter placed sterile technique.  Allowed bladder to empty a scant amount of urine.

## 2022-01-20 NOTE — PROGRESS NOTES
Patient is a 77 year old here for bladder instillation of BCG.   UA negative for nitrates, no visible hematuria.    Denies any fever or chills.   Denies any pain with urination.    Results reviewed, labs met treatment parameters.  Proceed with treatment.    14 Swedish catheter placed by DIANE May LPN.  Allowed bladder to drain.  1338 BCG instillation via catheter, tolerated well.  Catheter removed.  Patient opted to return home and to turn every 15 minutes for 2 hours.   Encouraged to drink plenty of fluids for the next couple of days.  Discharge in care of self.

## 2022-01-20 NOTE — PATIENT INSTRUCTIONS
Thank you for scheduling your appointment with us today. If you have any questions or concerns related to procedure/medication at today's visit, please contact your primary care provider, or the provider who ordered today's procedure/medication. If you have any questions related to scheduling with our unit, or if you are having trouble getting in contact with your ordering provider, we can be reached at [phone].   Discharge Instructions for Infusion Therapy/Chemotherapy Department:    Your doctor has prescribed the following medication(s) BCG (Bacilus Calmette-Carey) to treat your bladder cancer.    All treatments have potential side effects, but they don't all happen to everyone.  If you have any questions, problems, or concerns, we want you to call us.  You can reach the Infusion Health Unit Coordinator at [phone] Monday - Friday 8:00am to 5:00pm.      *For 6 hours after your treatment, sit when you urinate.  Place 1-2 cups of bleach in the toilet after you have urinated.  Let stand for 15-20 minutes.  Repeat this process each time you urinate for six (6) hours after the procedure.  *Wash your hands thoroughly after going to the bathroom  *Drink 2-3 liters of non caffeinated, non alcoholic beverages following your instillation to flush the bladder out.      After hours, evenings, weekends, and holidays please call Urgent Care [phone] or toll free [phone] or go to your nearest Emergency Department.    When To Contact Your Doctor or Health Care Provider:  Contact your healthcare provider immediately, day or night, if you should experience any of the following symptoms:    Shortness of breath     Confusion     Dizziness or lightheadedness  The following symptoms require medical attention, but are not an emergency. Contact your health care provider within 24 hours of noticing any of the following:    Fever of 39.5 degrees C (103 degrees F) or higher within 24 hours.     Fever of 38.5 degrees  C (101 degrees F) or higher after 48 hours.     Blood in the urine.     Extreme fatigue (unable to perform self-care activities).     Fever, chills, malaise, flu-like symptoms, increased fatigue or an increase in urinary symptoms (such as burning or pain on urination) are not uncommon. However, if these increase in severity, or last more than 48 hours let your doctor know.  Always inform your health care provider if you experience any unusual symptoms.      Before your next treatment, limit your fluid intake for 4 hours before your treatment.  Avoid caffeine containing products for 4-6 hours prior to the treatment and 2 hours afterwards.      ANY questions or problems, PLEASE do not hesitate to call us!!

## 2022-01-21 ENCOUNTER — TELEPHONE (OUTPATIENT)
Dept: EDUCATION SERVICES | Facility: HOSPITAL | Age: 77
End: 2022-01-21
Payer: COMMERCIAL

## 2022-01-21 NOTE — TELEPHONE ENCOUNTER
Called Colin. He has been taking Tresiba 22 units daily.    Readings for the last week:  Fastin, 72,  and post-supper: 157, 164, 205, 240, 396 (larry).    Asked Colin to try going to Tresiba 20 units daily due to the 66 and 72 in am.    Will follow in a week.

## 2022-01-22 LAB — BACTERIA UR CULT: NO GROWTH

## 2022-02-02 ENCOUNTER — ONCOLOGY VISIT (OUTPATIENT)
Dept: ONCOLOGY | Facility: OTHER | Age: 77
End: 2022-02-02
Attending: NURSE PRACTITIONER
Payer: COMMERCIAL

## 2022-02-02 ENCOUNTER — LAB (OUTPATIENT)
Dept: INFUSION THERAPY | Facility: OTHER | Age: 77
End: 2022-02-02
Attending: INTERNAL MEDICINE
Payer: COMMERCIAL

## 2022-02-02 VITALS
HEART RATE: 66 BPM | BODY MASS INDEX: 27.13 KG/M2 | RESPIRATION RATE: 20 BRPM | SYSTOLIC BLOOD PRESSURE: 104 MMHG | WEIGHT: 172.84 LBS | TEMPERATURE: 96.9 F | HEIGHT: 67 IN | OXYGEN SATURATION: 96 % | DIASTOLIC BLOOD PRESSURE: 60 MMHG

## 2022-02-02 VITALS — HEART RATE: 55 BPM | DIASTOLIC BLOOD PRESSURE: 60 MMHG | OXYGEN SATURATION: 99 % | SYSTOLIC BLOOD PRESSURE: 120 MMHG

## 2022-02-02 DIAGNOSIS — D64.9 ANEMIA, UNSPECIFIED TYPE: ICD-10-CM

## 2022-02-02 DIAGNOSIS — C67.3 MALIGNANT NEOPLASM OF ANTERIOR WALL OF URINARY BLADDER (H): Primary | ICD-10-CM

## 2022-02-02 DIAGNOSIS — R25.2 LEG CRAMPS: ICD-10-CM

## 2022-02-02 DIAGNOSIS — L29.9 ITCHING: ICD-10-CM

## 2022-02-02 DIAGNOSIS — N18.4 CKD (CHRONIC KIDNEY DISEASE) STAGE 4, GFR 15-29 ML/MIN (H): ICD-10-CM

## 2022-02-02 DIAGNOSIS — C66.2 MALIGNANT NEOPLASM OF LEFT URETER (H): Primary | ICD-10-CM

## 2022-02-02 DIAGNOSIS — E11.9 TYPE 2 DIABETES MELLITUS WITHOUT COMPLICATION, WITHOUT LONG-TERM CURRENT USE OF INSULIN (H): ICD-10-CM

## 2022-02-02 DIAGNOSIS — C66.2 MALIGNANT NEOPLASM OF LEFT URETER (H): ICD-10-CM

## 2022-02-02 DIAGNOSIS — C66.9: Primary | ICD-10-CM

## 2022-02-02 LAB
ALBUMIN SERPL-MCNC: 3.6 G/DL (ref 3.4–5)
ALP SERPL-CCNC: 86 U/L (ref 40–150)
ALT SERPL W P-5'-P-CCNC: 12 U/L (ref 0–70)
ANION GAP SERPL CALCULATED.3IONS-SCNC: 6 MMOL/L (ref 3–14)
AST SERPL W P-5'-P-CCNC: 13 U/L (ref 0–45)
BASOPHILS # BLD AUTO: 0.1 10E3/UL (ref 0–0.2)
BASOPHILS NFR BLD AUTO: 1 %
BILIRUB SERPL-MCNC: 0.6 MG/DL (ref 0.2–1.3)
BUN SERPL-MCNC: 35 MG/DL (ref 7–30)
CALCIUM SERPL-MCNC: 8.4 MG/DL (ref 8.5–10.1)
CHLORIDE BLD-SCNC: 108 MMOL/L (ref 94–109)
CO2 SERPL-SCNC: 22 MMOL/L (ref 20–32)
CREAT SERPL-MCNC: 2.34 MG/DL (ref 0.66–1.25)
EOSINOPHIL # BLD AUTO: 0.2 10E3/UL (ref 0–0.7)
EOSINOPHIL NFR BLD AUTO: 4 %
ERYTHROCYTE [DISTWIDTH] IN BLOOD BY AUTOMATED COUNT: 13.9 % (ref 10–15)
FERRITIN SERPL-MCNC: 130 NG/ML (ref 26–388)
GFR SERPL CREATININE-BSD FRML MDRD: 28 ML/MIN/1.73M2
GLUCOSE BLD-MCNC: 274 MG/DL (ref 70–99)
HCT VFR BLD AUTO: 32.1 % (ref 40–53)
HGB BLD-MCNC: 10.9 G/DL (ref 13.3–17.7)
IMM GRANULOCYTES # BLD: 0 10E3/UL
IMM GRANULOCYTES NFR BLD: 0 %
IRON SATN MFR SERPL: 36 % (ref 15–46)
IRON SERPL-MCNC: 74 UG/DL (ref 35–180)
LYMPHOCYTES # BLD AUTO: 1.3 10E3/UL (ref 0.8–5.3)
LYMPHOCYTES NFR BLD AUTO: 26 %
MAGNESIUM SERPL-MCNC: 1.9 MG/DL (ref 1.6–2.3)
MCH RBC QN AUTO: 31.2 PG (ref 26.5–33)
MCHC RBC AUTO-ENTMCNC: 34 G/DL (ref 31.5–36.5)
MCV RBC AUTO: 92 FL (ref 78–100)
MONOCYTES # BLD AUTO: 0.5 10E3/UL (ref 0–1.3)
MONOCYTES NFR BLD AUTO: 10 %
NEUTROPHILS # BLD AUTO: 2.9 10E3/UL (ref 1.6–8.3)
NEUTROPHILS NFR BLD AUTO: 59 %
NRBC # BLD AUTO: 0 10E3/UL
NRBC BLD AUTO-RTO: 0 /100
PLATELET # BLD AUTO: 178 10E3/UL (ref 150–450)
POTASSIUM BLD-SCNC: 4 MMOL/L (ref 3.4–5.3)
PROT SERPL-MCNC: 6.7 G/DL (ref 6.8–8.8)
RBC # BLD AUTO: 3.49 10E6/UL (ref 4.4–5.9)
SODIUM SERPL-SCNC: 136 MMOL/L (ref 133–144)
TIBC SERPL-MCNC: 206 UG/DL (ref 240–430)
TSH SERPL DL<=0.005 MIU/L-ACNC: 1.69 MU/L (ref 0.4–4)
WBC # BLD AUTO: 4.9 10E3/UL (ref 4–11)

## 2022-02-02 PROCEDURE — 82728 ASSAY OF FERRITIN: CPT | Mod: ZL

## 2022-02-02 PROCEDURE — 99215 OFFICE O/P EST HI 40 MIN: CPT | Performed by: NURSE PRACTITIONER

## 2022-02-02 PROCEDURE — G0463 HOSPITAL OUTPT CLINIC VISIT: HCPCS | Mod: 25

## 2022-02-02 PROCEDURE — 250N000011 HC RX IP 250 OP 636: Performed by: NURSE PRACTITIONER

## 2022-02-02 PROCEDURE — 258N000003 HC RX IP 258 OP 636: Performed by: NURSE PRACTITIONER

## 2022-02-02 PROCEDURE — 85025 COMPLETE CBC W/AUTO DIFF WBC: CPT | Mod: ZL

## 2022-02-02 PROCEDURE — 83735 ASSAY OF MAGNESIUM: CPT | Mod: ZL

## 2022-02-02 PROCEDURE — 80053 COMPREHEN METABOLIC PANEL: CPT | Mod: ZL | Performed by: INTERNAL MEDICINE

## 2022-02-02 PROCEDURE — 96413 CHEMO IV INFUSION 1 HR: CPT

## 2022-02-02 PROCEDURE — 84443 ASSAY THYROID STIM HORMONE: CPT | Mod: ZL | Performed by: INTERNAL MEDICINE

## 2022-02-02 PROCEDURE — G0463 HOSPITAL OUTPT CLINIC VISIT: HCPCS

## 2022-02-02 PROCEDURE — 36415 COLL VENOUS BLD VENIPUNCTURE: CPT | Mod: ZL | Performed by: INTERNAL MEDICINE

## 2022-02-02 PROCEDURE — 83550 IRON BINDING TEST: CPT | Mod: ZL

## 2022-02-02 RX ORDER — CHOLECALCIFEROL (VITAMIN D3) 25 MCG
25 TABLET ORAL DAILY
COMMUNITY
Start: 2022-01-07

## 2022-02-02 RX ORDER — METHYLPREDNISOLONE SODIUM SUCCINATE 125 MG/2ML
125 INJECTION, POWDER, LYOPHILIZED, FOR SOLUTION INTRAMUSCULAR; INTRAVENOUS
Status: CANCELLED
Start: 2022-02-02

## 2022-02-02 RX ORDER — HEPARIN SODIUM,PORCINE 10 UNIT/ML
5 VIAL (ML) INTRAVENOUS
Status: CANCELLED | OUTPATIENT
Start: 2022-02-02

## 2022-02-02 RX ORDER — NALOXONE HYDROCHLORIDE 0.4 MG/ML
0.2 INJECTION, SOLUTION INTRAMUSCULAR; INTRAVENOUS; SUBCUTANEOUS
Status: CANCELLED | OUTPATIENT
Start: 2022-02-02

## 2022-02-02 RX ORDER — EPINEPHRINE 1 MG/ML
0.3 INJECTION, SOLUTION, CONCENTRATE INTRAVENOUS EVERY 5 MIN PRN
Status: CANCELLED | OUTPATIENT
Start: 2022-02-02

## 2022-02-02 RX ORDER — HEPARIN SODIUM (PORCINE) LOCK FLUSH IV SOLN 100 UNIT/ML 100 UNIT/ML
5 SOLUTION INTRAVENOUS
Status: CANCELLED | OUTPATIENT
Start: 2022-02-02

## 2022-02-02 RX ORDER — ALBUTEROL SULFATE 90 UG/1
1-2 AEROSOL, METERED RESPIRATORY (INHALATION)
Status: CANCELLED
Start: 2022-02-02

## 2022-02-02 RX ORDER — MEPERIDINE HYDROCHLORIDE 25 MG/ML
25 INJECTION INTRAMUSCULAR; INTRAVENOUS; SUBCUTANEOUS EVERY 30 MIN PRN
Status: CANCELLED | OUTPATIENT
Start: 2022-02-02

## 2022-02-02 RX ORDER — BACILLUS COAGULANS 1B CELL
1 CAPSULE ORAL DAILY
COMMUNITY
Start: 2022-01-07

## 2022-02-02 RX ORDER — LORAZEPAM 2 MG/ML
0.5 INJECTION INTRAMUSCULAR EVERY 4 HOURS PRN
Status: CANCELLED | OUTPATIENT
Start: 2022-02-02

## 2022-02-02 RX ORDER — DIPHENHYDRAMINE HYDROCHLORIDE 50 MG/ML
50 INJECTION INTRAMUSCULAR; INTRAVENOUS
Status: CANCELLED
Start: 2022-02-02

## 2022-02-02 RX ORDER — ALBUTEROL SULFATE 0.83 MG/ML
2.5 SOLUTION RESPIRATORY (INHALATION)
Status: CANCELLED | OUTPATIENT
Start: 2022-02-02

## 2022-02-02 RX ADMIN — SODIUM CHLORIDE 480 MG: 9 INJECTION, SOLUTION INTRAVENOUS at 09:36

## 2022-02-02 RX ADMIN — SODIUM CHLORIDE 250 ML: 9 INJECTION, SOLUTION INTRAVENOUS at 09:35

## 2022-02-02 ASSESSMENT — PAIN SCALES - GENERAL: PAINLEVEL: NO PAIN (0)

## 2022-02-02 ASSESSMENT — PATIENT HEALTH QUESTIONNAIRE - PHQ9: SUM OF ALL RESPONSES TO PHQ QUESTIONS 1-9: 4

## 2022-02-02 ASSESSMENT — MIFFLIN-ST. JEOR: SCORE: 1477.63

## 2022-02-02 NOTE — PROGRESS NOTES
Patient is 77 years old, here accompanied by self today for infusion of Nivolumab under the orders of Dr. Bartlett.     Home meds, allergies, vitals, fall risk and abuse screen done at [facility] appointment earlier today. Reviewed by this nurse prior to treating. Jennifer Jensen NP brought patient back to unit, verbalizing ok to treat this date with current status/labs.     Component      Latest Ref Rng & Units 2/2/2022   WBC      4.0 - 11.0 10e3/uL 4.9   RBC Count      4.40 - 5.90 10e6/uL 3.49 (L)   Hemoglobin      13.3 - 17.7 g/dL 10.9 (L)   Hematocrit      40.0 - 53.0 % 32.1 (L)   MCV      78 - 100 fL 92   MCH      26.5 - 33.0 pg 31.2   MCHC      31.5 - 36.5 g/dL 34.0   RDW      10.0 - 15.0 % 13.9   Platelet Count      150 - 450 10e3/uL 178   % Neutrophils      % 59   % Lymphocytes      % 26   % Monocytes      % 10   % Eosinophils      % 4   % Basophils      % 1   % Immature Granulocytes      % 0   NRBCs per 100 WBC      <1 /100 0   Absolute Neutrophils      1.6 - 8.3 10e3/uL 2.9   Absolute Lymphocytes      0.8 - 5.3 10e3/uL 1.3   Absolute Monocytes      0.0 - 1.3 10e3/uL 0.5   Absolute Eosinophils      0.0 - 0.7 10e3/uL 0.2   Absolute Basophils      0.0 - 0.2 10e3/uL 0.1   Absolute Immature Granulocytes      <=0.4 10e3/uL 0.0   Absolute NRBCs      10e3/uL 0.0   Sodium      133 - 144 mmol/L 136   Potassium      3.4 - 5.3 mmol/L 4.0   Chloride      94 - 109 mmol/L 108   Carbon Dioxide      20 - 32 mmol/L 22   Anion Gap      3 - 14 mmol/L 6   Urea Nitrogen      7 - 30 mg/dL 35 (H)   Creatinine      0.66 - 1.25 mg/dL 2.34 (H)   Calcium      8.5 - 10.1 mg/dL 8.4 (L)   Glucose      70 - 99 mg/dL 274 (H)   Alkaline Phosphatase      40 - 150 U/L 86   AST      0 - 45 U/L 13   ALT      0 - 70 U/L 12   Protein Total      6.8 - 8.8 g/dL 6.7 (L)   Albumin      3.4 - 5.0 g/dL 3.6   Bilirubin Total      0.2 - 1.3 mg/dL 0.6   GFR Estimate      >60 mL/min/1.73m2 28 (L)   Iron      35 - 180 ug/dL 74   Iron Binding Cap      240 - 430  ug/dL 206 (L)   Iron Saturation Index      15 - 46 % 36   TSH      0.40 - 4.00 mU/L 1.69   Magnesium      1.6 - 2.3 mg/dL 1.9   Ferritin      26 - 388 ng/mL 130       Patient meets parameters for today's infusion.  Denies questions or concerns regarding today's infusion and/or medications being administered.      Independent dose check completed with JIM Ortega.    Patient identified with two identifiers, order verified, and verbal consent for today's infusion obtained from patient.     0936 IV pump verified with Nivolumab dose, drug, and rate of administration. Infusion administered per protocol. Patient tolerated infusion well, no signs or symptoms of adverse reaction noted. Patient denies pain nor discomfort.     Needle removed, tip intact. Site clean, dry and intact. Covered with a sterile bandage, slight pressure applied for 30 seconds. Pt instructed to leave bandage intact for a minimum of one hour, and to call with questions or concerns. Patient states understanding, discharged.

## 2022-02-02 NOTE — PROGRESS NOTES
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  He completed neoadjuvant chemotherapy on 5/11/2021, then underwent nephroureterectomy 6/16/2021.      He presents to the clinic today stating he is doing okay.  His main concern is in regards to elevation of his blood sugars.  He does follow with diabetic education and they do call him about once a week to discuss blood sugar levels and they are adjusting his insulin.  He does check his blood sugar fasting in the morning and again in the evening about 2 hours after eating.  Fasting blood sugars in the morning are running anywhere from 140-230 in p.m. blood sugars are running anywhere from 180-400.    He complains of some itching at times denying any actual rash.  He has some mild fatigue.  He is experiencing cramps to his lower extremities mainly in the evening and states this is not new.  He does also complain of dry mouth since initiating this therapy.  He denies any current urinary issues other than ongoing issues with frequency.  He denies any blood in his urine.      He just completed BCG bladder instillations again for recurrence of bladder cancer and had been following with Dr. Morrell.  He would like to see Dr. Schmitz with [facility] urology from here forward due to Dr. Morrell relocating and is wondering about a referral.    He will be establishing with a new PCP on 2/22/2022, Dr. Wallace.  He saw Dr. Arvizu at the beginning of January in follow-up of his chronic kidney disease.  He was initiated on 1 iron tablet daily and vitamin D daily.  He will be seeing  him again in 3 months.    Oncologic History:     2016 patient was diagnosed with high-grade bladder cancer T1; patient was treated with BCG and then again reinduction BCG then GemCis intravesically x3 in May 2018     1/5/2021 patient was seen by Dr. Morrell for history of hematuria.  He underwent cystoscopy which revealed bladder lesions with biopsy being negative.  Cytology showed atypical urothelial cells.  He underwent CT urogram which showed a mass in the left ureter measuring 2 cm with no hydronephrosis.  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  2/10/2021  he underwent PET scan that was negative for metastatic disease and then seen by Dr. Mcnulty at the [facility] and scheduled for robotic assisted laparoscopic left nephroureterectomy.    2/15/2021 he was seen by Dr. Bartlett with Medical Oncology to discuss neoadjuvant chemotherapy.  Recommendation was for gemcitabine 1000 mg per metered squared day 1 and day 8 and cisplatin 70 mg per metered squared on day 1 of a 21-day cycle x4 cycles followed by surgery.  5/11/2021 completion of neoadjuvant chemotherapy  6/16/2021 he underwent Cystourethroscopy and Left robot assisted nephroureterectomy with Left pelvic lymph node dissection by Dr. Jorge Mcnulty and Dr. Reyes Romano; pathology showed a multifocal high-grade urothelial cell carcinoma, staged pT3N0  11/10/2021 initiation of nivolumab     Current Chemo Regime/TX: n/a  Current Cycle:  n/a  # of completed cycles: n/a     Previous treatment:  Gemcitabine 1000 mg per metered squared on day 1 and day 8 and cisplatin 56 mg per metered squared day 1 every 21 days    Past Medical History:   Diagnosis Date     Benign essential hypertension      Cancer (H)     Bladder     Carcinoma in situ of bladder 11/19/2021     Diabetes (H)      Dyslipidemia      Malignant neoplasm of anterior wall of urinary  bladder (H) 12/11/2020       Past Surgical History:   Procedure Laterality Date     APPENDECTOMY  1958     COLONOSCOPY  2-     COMBINED CYSTOSCOPY, RETROGRADES, URETEROSCOPY, INSERT STENT Left 1/18/2021    Procedure: CYSTOURETEROSCOPY, WITH RETROGRADE PYELOGRAM with interpretation of fluroscopy, ureteroscopy, ureteral biopsy, bladder biopsy and fulgaration;  Surgeon: Shonda Morrell MD;  Location: [facility]     CYSTOSCOPY N/A 6/16/2021    Procedure: CYSTOSCOPY;  Surgeon: Javier Mcnulty MD;  [facility]     CYSTOSCOPY, BIOPSY BLADDER, COMBINED N/A 9/8/2015    Procedure: COMBINED CYSTOSCOPY, BIOPSY BLADDER;  Surgeon: Randy Martinez MD;  Location: [facility]     CYSTOSCOPY, BIOPSY BLADDER, COMBINED N/A 2/14/2017    Procedure: COMBINED CYSTOSCOPY, BIOPSY BLADDER;  Surgeon: Randy Martinez MD;  Location: [facility]     CYSTOSCOPY, BIOPSY BLADDER, COMBINED N/A 11/13/2018    Procedure: COMBINED CYSTOSCOPY, BIOPSY BLADDER;  Surgeon: Ed Helm MD;  Location: [facility] OR     CYSTOSCOPY, BIOPSY BLADDER, COMBINED N/A 11/5/2021    Procedure: CYSTOSCOPY, WITH BLADDER BIOPSY;  Surgeon: Shonda Morrell MD;  Location: [facility]     CYSTOSCOPY, RETROGRADES, COMBINED Bilateral 11/13/2018    Procedure: Bilateral Retrograde Pyelagram, Bladder Biopsy;  Surgeon: Ed Helm MD;  Location: [facility] OR     CYSTOSCOPY, RETROGRADES, COMBINED Right 11/5/2021    Procedure: CYSTOSCOPY, WITH RETROGRADE PYELOGRAM;  Surgeon: Shonda Morrell MD;  Location: [facility]     CYSTOSCOPY, RETROGRADES, INSERT STENT URETER(S), COMBINED Left 9/8/2015    Procedure: COMBINED CYSTOSCOPY, RETROGRADES, INSERT STENT URETER(S);  Surgeon: Randy Martinez MD;  Location: [facility]     CYSTOSCOPY, TRANSURETHRAL RESECTION (TUR) TUMOR BLADDER, COMBINED N/A 9/8/2015    Procedure: COMBINED CYSTOSCOPY, TRANSURETHRAL RESECTION (TUR) TUMOR BLADDER;  Surgeon: Randy Martinez MD;  Location: [facility]     CYSTOSCOPY, TRANSURETHRAL RESECTION (TUR) TUMOR  BLADDER, COMBINED N/A 2016    Procedure: COMBINED CYSTOSCOPY, TRANSURETHRAL RESECTION (TUR) TUMOR BLADDER;  Surgeon: Randy Martinez MD;  Location: [facility]     CYSTOSCOPY, TRANSURETHRAL RESECTION (TUR) TUMOR BLADDER, COMBINED N/A 2016    Procedure: COMBINED CYSTOSCOPY, TRANSURETHRAL RESECTION (TUR) TUMOR BLADDER;  Surgeon: Randy Martinez MD;  Location: [facility]     DAVINCI NEPHROURETERECTOMY Left 2021    Procedure: NEPHROURETERECTOMY, ROBOT-ASSISTED, lymph node dissection;  Surgeon: Javier Mcnulty MD;  [facility]     PHACOEMULSIFICATION WITH STANDARD INTRAOCULAR LENS IMPLANT Right 10/9/2018    Procedure: PHACOEMULSIFICATION WITH STANDARD INTRAOCULAR LENS IMPLANT;  PHACOEMULSIFICATION CATARACT EXTRACTION POSTERIOR CHAMBER LENS RIGHT;  Surgeon: Marlo Mcconnell MD;  Location: [facility]     PHACOEMULSIFICATION WITH STANDARD INTRAOCULAR LENS IMPLANT Left 10/23/2018    Procedure: PHACOEMULSIFICATION CATARACT EXTRACTION POSTERIOR CHAMBER LENS LEFT;  Surgeon: Marlo Mcconnell MD;  Location: [facility]       Family History   Problem Relation Age of Onset     Diabetes Mother      Parkinsonism Brother      Coronary Artery Disease No family hx of      Hypertension No family hx of      Hyperlipidemia No family hx of      Cerebrovascular Disease No family hx of      Breast Cancer No family hx of      Colon Cancer No family hx of      Prostate Cancer No family hx of      Anesthesia Reaction No family hx of      Asthma No family hx of      Thyroid Disease No family hx of      Genetic Disorder No family hx of        Social History     Socioeconomic History     Marital status:      Spouse name: Not on file     Number of children: Not on file     Years of education: Not on file     Highest education level: Not on file   Occupational History     Not on file   Tobacco Use     Smoking status: Former Smoker     Quit date: 1992     Years since quittin.0     Smokeless tobacco: Never Used    Substance and Sexual Activity     Alcohol use: Yes     Comment: rarely     Drug use: No     Sexual activity: Not Currently   Other Topics Concern     Parent/sibling w/ CABG, MI or angioplasty before 65F 55M? No   Social History Narrative     Not on file     Social Determinants of Health     Financial Resource Strain: Not on file   Food Insecurity: Not on file   Transportation Needs: Not on file   Physical Activity: Not on file   Stress: Not on file   Social Connections: Not on file   Intimate Partner Violence: Not on file   Housing Stability: Not on file       Current Outpatient Medications   Medication     atorvastatin (LIPITOR) 40 MG tablet     blood glucose (ONETOUCH VERIO IQ) test strip     brimonidine (ALPHAGAN-P) 0.15 % ophthalmic solution     Cholecalciferol (VITAMIN D3) 25 MCG TABS     insulin degludec (TRESIBA FLEXTOUCH) 100 UNIT/ML pen     insulin pen needle (32G X 4 MM) 32G X 4 MM miscellaneous     latanoprost (XALATAN) 0.005 % ophthalmic solution     ONETOUCH DELICA LANCETS 33G MISC     tamsulosin (FLOMAX) 0.4 MG capsule     VITRON-C  MG TABS tablet     acetaminophen (TYLENOL) 325 MG tablet     No current facility-administered medications for this visit.        Allergies   Allergen Reactions     No Clinical Screening - See Comments Other (See Comments)     Beta blocker in glaucoma gtt.s caused low pulse and passing out      Timolol      Beta blocker in glaucoma gtt.s caused low pulse and passing out   - patient thinks it was timolol but not 100% sure which beta blocker eye drop.        Review Of Systems:  Constitutional:    denies fever, weight changes, chills, and night sweats.  Eyes:    denies blurred or double vision  Ears/Nose/Throat:   denies ear pain, nose problems, difficulty swallowing  Respiratory:   denies shortness of breath, cough  Skin:   denies rash, lesions  Cardiovascular:   denies chest pain, palpitations, edema  Gastrointestinal:   denies abdominal pain, bloating, nausea,  "vomiting, early satiety; no change in bowel habits or blood in stool  Genitourinary:   denies difficulty with urination, blood in urine  Musculoskeletal:    denies new muscle pain, bone pain  Neurologic:   denies lightheadedness, headaches, numbness or tingling  Psychiatric:   denies anxiety, depression  Hematologic/Lymphatic/Immunologic:   denies easy bruising, easy bleeding, lumps or bumps noted  Endocrine:   Has dry mouth    ECOG Performance Status: 1    Physical Exam:  /60   Pulse 66   Temp 96.9  F (36.1  C) (Tympanic)   Resp 20   Ht 1.702 m (5' 7\")   Wt 78.4 kg (172 lb 13.5 oz)   SpO2 96%   BMI 27.07 kg/m      GENERAL APPEARANCE: Healthy, alert and in no acute distress.  HEENT: Normocephalic, Sclerae anicteric.  No oral lesions or thrush  NECK:   No asymmetry or masses, no thyromegaly.  LYMPHATICS: No palpable cervical, supraclavicular, axillary, or inguinal nodes   RESP: Lungs clear to auscultation bilaterally, respirations regular and easy  CARDIOVASCULAR: Regular rate and rhythm. Normal S1, S2; no murmur, gallop, or rub.  ABDOMEN: Soft, nontender. Bowel sounds auscultated all 4 quadrants. No palpable organomegaly or masses.  MUSCULOSKELETAL: Extremities without gross deformities noted. No edema of bilateral lower extremities.  NEURO: Alert and oriented x 3.  Gait steady.  PSYCHIATRIC: Mentation and affect appear normal.  Mood appropriate.    Laboratory:  Results for orders placed or performed in visit on 02/02/22   Comprehensive metabolic panel     Status: Abnormal   Result Value Ref Range    Sodium 136 133 - 144 mmol/L    Potassium 4.0 3.4 - 5.3 mmol/L    Chloride 108 94 - 109 mmol/L    Carbon Dioxide (CO2) 22 20 - 32 mmol/L    Anion Gap 6 3 - 14 mmol/L    Urea Nitrogen 35 (H) 7 - 30 mg/dL    Creatinine 2.34 (H) 0.66 - 1.25 mg/dL    Calcium 8.4 (L) 8.5 - 10.1 mg/dL    Glucose 274 (H) 70 - 99 mg/dL    Alkaline Phosphatase 86 40 - 150 U/L    AST 13 0 - 45 U/L    ALT 12 0 - 70 U/L    Protein Total " 6.7 (L) 6.8 - 8.8 g/dL    Albumin 3.6 3.4 - 5.0 g/dL    Bilirubin Total 0.6 0.2 - 1.3 mg/dL    GFR Estimate 28 (L) >60 mL/min/1.73m2   TSH with free T4 reflex     Status: Normal   Result Value Ref Range    TSH 1.69 0.40 - 4.00 mU/L   CBC with platelets and differential     Status: Abnormal   Result Value Ref Range    WBC Count 4.9 4.0 - 11.0 10e3/uL    RBC Count 3.49 (L) 4.40 - 5.90 10e6/uL    Hemoglobin 10.9 (L) 13.3 - 17.7 g/dL    Hematocrit 32.1 (L) 40.0 - 53.0 %    MCV 92 78 - 100 fL    MCH 31.2 26.5 - 33.0 pg    MCHC 34.0 31.5 - 36.5 g/dL    RDW 13.9 10.0 - 15.0 %    Platelet Count 178 150 - 450 10e3/uL    % Neutrophils 59 %    % Lymphocytes 26 %    % Monocytes 10 %    % Eosinophils 4 %    % Basophils 1 %    % Immature Granulocytes 0 %    NRBCs per 100 WBC 0 <1 /100    Absolute Neutrophils 2.9 1.6 - 8.3 10e3/uL    Absolute Lymphocytes 1.3 0.8 - 5.3 10e3/uL    Absolute Monocytes 0.5 0.0 - 1.3 10e3/uL    Absolute Eosinophils 0.2 0.0 - 0.7 10e3/uL    Absolute Basophils 0.1 0.0 - 0.2 10e3/uL    Absolute Immature Granulocytes 0.0 <=0.4 10e3/uL    Absolute NRBCs 0.0 10e3/uL   Magnesium     Status: Normal   Result Value Ref Range    Magnesium 1.9 1.6 - 2.3 mg/dL   Ferritin     Status: Normal   Result Value Ref Range    Ferritin 130 26 - 388 ng/mL   Iron & Iron Binding Capacity     Status: Abnormal   Result Value Ref Range    Iron 74 35 - 180 ug/dL    Iron Binding Capacity 206 (L) 240 - 430 ug/dL    Iron Sat Index 36 15 - 46 %   CBC with Platelets & Differential     Status: Abnormal    Narrative    The following orders were created for panel order CBC with Platelets & Differential.  Procedure                               Abnormality         Status                     ---------                               -----------         ------                     CBC with platelets and d...[[id number]]  Abnormal            Final result                 Please view results for these tests on the individual orders.       Imaging  Studies: None completed for today's visit      ASSESSMENT/PLAN:    1. 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  He completed neoadjuvant chemotherapy then underwent a nephroureterectomy and is now undergoing adjuvant therapy with nivolumab with plan to complete 1 year of therapy.  He will initiate cycle 4 today and follow-up prior to cycle 5 with labs per treatment plan.     2.  Type 2 diabetes mellitus: Patient previously with type 2 diabetes.  Hyperglycemia likely due to immunotherapy with likely conversion to type I which can happen while on immunotherapy. He is currently being managed with Tresiba through the diabetic clinic.  We will continue to monitor.     3.  Stage IV chronic kidney disease:  He'll continue to follow with nephrology.     4. bladder cancer: I will send a referral through to Dr. Schmitz with [facility] urology.    5.  itching: I recommended diphenhydramine 25 mg OTC as needed and patient was informed that this can cause drowsiness.  I also informed him he could use a longer acting antihistamine if needed such as cetirizine or loratadine.    6. anemia: Placed on 1 iron tablet daily by the nephrologist.  Iron/TIBC and ferritin were drawn today.    7.  Leg cramps: Magnesium level within normal limits.  I suggested a low sugar electrolyte solution in the evening.     I encouraged patient to call with any questions or concerns.    55 minutes spent in the patient's encounter today with time spent in review of the chart along with in chart preparation.  Time was spent in review of treatment plan and signing her treatment plan.  Time was also spent obtaining a review of systems and performing a physical exam along with review of all lab work with the patient in detail.  Time was also spent in discussing side effects and management.  Time was spent in discussing current situation with his diabetes.  Time  was spent in discussing follow-up for his bladder cancer and informed him that a referral would be sent.  Time was spent in placing orders for additional lab work and a referral and in chart documentation.    Sabra Jensen NP  APRN, FNP-BC, AOCNP

## 2022-02-02 NOTE — NURSING NOTE
"Oncology Rooming Note    February 2, 2022 8:49 AM   Colin Baxter is a 77 year old male who presents for:    Chief Complaint   Patient presents with     Oncology Clinic Visit     Follow up Carcinoma in situ of bladder     Initial Vitals: /60   Pulse 66   Temp 96.9  F (36.1  C) (Tympanic)   Resp 20   Ht 1.702 m (5' 7\")   Wt 78.4 kg (172 lb 13.5 oz)   SpO2 96%   BMI 27.07 kg/m   Estimated body mass index is 27.07 kg/m  as calculated from the following:    Height as of this encounter: 1.702 m (5' 7\").    Weight as of this encounter: 78.4 kg (172 lb 13.5 oz). Body surface area is 1.93 meters squared.  No Pain (0) Comment: Data Unavailable   No LMP for male patient.  Allergies reviewed: Yes  Medications reviewed: Yes    Medications: Medication refills not needed today.  Pharmacy name entered into [facility]: [id number] - [id number] - [phone]            "

## 2022-02-02 NOTE — PROGRESS NOTES
24 gauge angio cath inserted into right hand.  Immediate blood return noted.  IV secured with sterile, transparent dressing and tape.  Patient tolerated well, denies pain or discomfort at this time.  Flushes easily without resistance, no signs or symptoms of infiltration or infection.  3mL blood wasted and 2 tubes blood removed for ordered labs. Flushed with 3mL normal saline to clear line. Patient denies questions or concerns regarding infusion and/or medication(s) being administered.

## 2022-02-02 NOTE — PATIENT INSTRUCTIONS
We would like to see you back per your schedule.     We will send a referral to Dr. Davies in urology at [facility]. Their office should contact you for an appointment.    When you are in need of a refill, please call your pharmacy and they will send us a request.     If you have any questions please call [phone]    Other instructions:      You can try diphenhydramine 25 mg for itching. This may make you sleepy. You could also try a longer acting antihistamine such as cetirizine or loratadine. These are both over-the-counter medications.

## 2022-02-10 ENCOUNTER — TELEPHONE (OUTPATIENT)
Dept: ONCOLOGY | Facility: OTHER | Age: 77
End: 2022-02-10
Payer: COMMERCIAL

## 2022-02-10 NOTE — TELEPHONE ENCOUNTER
TC from patient wondering about a referral to [facility].  Upon checking, this writer sees that referral was sent by another clinic department (should not have been).  Not all info was included, and was sent to incorrect fax number.  Writer relays to patient that I would call to [facility] and check on info, and see if he could get a call for an appointment.  All needed referral info reprinted and faxed to correct number, called to contact [facility] to ensure all was correct and confirm receipt.  They will reach out to the patient.

## 2022-02-21 ENCOUNTER — TELEPHONE (OUTPATIENT)
Dept: EDUCATION SERVICES | Facility: HOSPITAL | Age: 77
End: 2022-02-21
Payer: COMMERCIAL

## 2022-02-21 NOTE — TELEPHONE ENCOUNTER
Called Colin. He states that he had a time where BGs were running higher. He has an appointment tomorrow with Dr. Wallace. I asked him to bring his meter to download and we can adjust his insulin. I told him that I asked Dr. Wallace to do an A1C too.    If he can't make it in tomorrow. I will call him back and adjust his insulin.

## 2022-02-21 NOTE — PROGRESS NOTES
Assessment & Plan     Type 2 diabetes mellitus without complication, without long-term current use of insulin (H)  A1c has increased to 10.3  - Hemoglobin A1c  - Adult Eye Referral; Future  - c/w tresiba 20u in the morning  - add supper prandial insulin  - CGM study    Hyperlipidemia, unspecified hyperlipidemia type  LDL (2/22/2022): 108  - Lipid Profile (Chol, Trig, HDL, LDL calc)  - c/w atorvastatin 40mg    CKD (chronic kidney disease) stage 4, GFR 15-29 ml/min (H)  Follows with Dr. Arvizu with next visit in April   - Albumin Random Urine Quantitative with Creat Ratio    Special screening for malignant neoplasms, colon  - Adult General Surg Referral    Carcinoma of bladder / ureter cancer  - follows with Colon oncology, currently on round 5 of 5 of immunotherapy  - Establishing with [facility] urology in April       See Patient Instructions    Return in about 3 months (around 5/22/2022) for Lab Work, Diabetic Visit.    Libia Wallace MD  [facility] - ROBINA Shaw is a 77 year old who presents for the following health issues     HPI     Establish care: Followed with Dr. Tran with last visit       Diabetes Type 1 (converted during immotherapy) Follow-up    How often are you checking your blood sugar? Two times daily  Blood sugar testing frequency justification:  Uncontrolled diabetes  What time of day are you checking your blood sugars (select all that apply)?  Before meals and At bedtime  Have you had any blood sugars above 200?  Yes   Have you had any blood sugars below 70?  No    What symptoms do you notice when your blood sugar is low?  None    What concerns do you have today about your diabetes? None and Blood sugar is often over 200     Do you have any of these symptoms? (Select all that apply)  No numbness or tingling in feet.  No redness, sores or blisters on feet.  No complaints of excessive thirst.  No reports of blurry vision.  No significant changes to  weight.    Have you had a diabetic eye exam in the last 12 months? No    - Last A1c (11/24/201): 7.1  - BG: liable  - Diet: watches diet   - Exercise:  - Tresiba 20u    - ASA (previously stopped), ACE(not on), statin (atorvastatin)  - last eye exam (1/17/2020): negative      Hyperlipidemia Follow-Up      Are you regularly taking any medication or supplement to lower your cholesterol?   Yes- lipitor     Are you having muscle aches or other side effects that you think could be caused by your cholesterol lowering medication?  Yes- muscle leg cramps at night at times    - many years, happens couple of times per week     Hypertension Follow-up      Do you check your blood pressure regularly outside of the clinic? No     Are you following a low salt diet? No    Are your blood pressures ever more than 140 on the top number (systolic) OR more   than 90 on the bottom number (diastolic), for example 140/90? 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 urology, Dr. Schmitz for bladder cancer. Establishing in April   - currently on cycle 4 of 5.  5th round to start March for immunotherapy   - issues with urinating at night    # CKD: h/o left nephrectomy   - follows with Dr. Arvizu with last visit 1/6/2022  - started on iron / vitamin d  - follow up in 3 months       ** due for colonosopy - order placed to general sx    # Social  - lives alone  - has three daughters, with one in [place]    Review of Systems   Constitutional: Negative for chills and fever.   HENT: Negative  "for congestion.    Eyes: Positive for visual disturbance (right eye, chronic for 3 years).   Respiratory: Negative for shortness of breath and wheezing.    Cardiovascular: Negative for chest pain and palpitations.   Gastrointestinal: Negative for abdominal pain.   Genitourinary: Positive for difficulty urinating (at night).   Skin:        Itching d/t immunotherapy    Psychiatric/Behavioral: Negative for mood changes.            Objective    /70 (BP Location: Left arm, Patient Position: Chair, Cuff Size: Adult Large)   Pulse 70   Temp (!) 96.2  F (35.7  C) (Tympanic)   Resp 18   Ht 1.664 m (5' 5.5\")   Wt 78.9 kg (174 lb)   SpO2 99%   BMI 28.51 kg/m    Body mass index is 28.51 kg/m .  Physical Exam  Constitutional:       General: He is not in acute distress.     Appearance: He is not ill-appearing.   Cardiovascular:      Rate and Rhythm: Normal rate and regular rhythm.      Pulses: Normal pulses.      Heart sounds: No murmur heard.  Pulmonary:      Effort: Pulmonary effort is normal. No respiratory distress.      Breath sounds: No wheezing or rales.   Musculoskeletal:      Right lower leg: No edema.      Left lower leg: No edema.   Neurological:      Mental Status: He is alert.   Psychiatric:         Mood and Affect: Mood normal.          Results for orders placed or performed in visit on 02/22/22   Extra Tube     Status: None ()    Narrative    The following orders were created for panel order Extra Tube.  Procedure                               Abnormality         Status                     ---------                               -----------         ------                     Extra Green Top (Lithium...[[id number]]                                                 Extra Green Top (Lithium...[[id number]]                                                 Extra Purple Top Tube[[id number]]                                                       Extra Urine Collection[[id number]]                                        "                 Please view results for these tests on the individual orders.   Results for orders placed or performed in visit on 02/22/22   Hemoglobin A1c     Status: Abnormal   Result Value Ref Range    Estimated Average Glucose 249 mg/dL    Hemoglobin A1C 10.3 (H) 0.0 - 5.6 %   Lipid Profile (Chol, Trig, HDL, LDL calc)     Status: Abnormal   Result Value Ref Range    Cholesterol 193 <200 mg/dL    Triglycerides 92 <150 mg/dL    Direct Measure HDL 67 >=40 mg/dL    LDL Cholesterol Calculated 108 (H) <=100 mg/dL    Non HDL Cholesterol 126 <130 mg/dL    Patient Fasting > 8hrs? Unknown     Narrative    Cholesterol  Desirable:  <200 mg/dL    Triglycerides  Normal:  Less than 150 mg/dL  Borderline High:  150-199 mg/dL  High:  200-499 mg/dL  Very High:  Greater than or equal to 500 mg/dL    Direct Measure HDL  Female:  Greater than or equal to 50 mg/dL   Male:  Greater than or equal to 40 mg/dL    LDL Cholesterol  Desirable:  <100mg/dL  Above Desirable:  100-129 mg/dL   Borderline High:  130-159 mg/dL   High:  160-189 mg/dL   Very High:  >= 190 mg/dL    Non HDL Cholesterol  Desirable:  130 mg/dL  Above Desirable:  130-159 mg/dL  Borderline High:  160-189 mg/dL  High:  190-219 mg/dL  Very High:  Greater than or equal to 220 mg/dL

## 2022-02-22 ENCOUNTER — LAB (OUTPATIENT)
Dept: LAB | Facility: OTHER | Age: 77
End: 2022-02-22
Attending: FAMILY MEDICINE
Payer: COMMERCIAL

## 2022-02-22 ENCOUNTER — OFFICE VISIT (OUTPATIENT)
Dept: FAMILY MEDICINE | Facility: OTHER | Age: 77
End: 2022-02-22
Attending: FAMILY MEDICINE
Payer: COMMERCIAL

## 2022-02-22 ENCOUNTER — ALLIED HEALTH/NURSE VISIT (OUTPATIENT)
Dept: EDUCATION SERVICES | Facility: OTHER | Age: 77
End: 2022-02-22
Attending: FAMILY MEDICINE
Payer: COMMERCIAL

## 2022-02-22 VITALS
RESPIRATION RATE: 18 BRPM | BODY MASS INDEX: 27.97 KG/M2 | OXYGEN SATURATION: 99 % | DIASTOLIC BLOOD PRESSURE: 70 MMHG | WEIGHT: 174 LBS | SYSTOLIC BLOOD PRESSURE: 126 MMHG | HEIGHT: 66 IN | TEMPERATURE: 96.2 F | HEART RATE: 70 BPM

## 2022-02-22 DIAGNOSIS — E78.5 HYPERLIPIDEMIA, UNSPECIFIED HYPERLIPIDEMIA TYPE: ICD-10-CM

## 2022-02-22 DIAGNOSIS — E11.65 TYPE 2 DIABETES MELLITUS WITH HYPERGLYCEMIA, WITHOUT LONG-TERM CURRENT USE OF INSULIN (H): Primary | ICD-10-CM

## 2022-02-22 DIAGNOSIS — Z12.11 SPECIAL SCREENING FOR MALIGNANT NEOPLASMS, COLON: ICD-10-CM

## 2022-02-22 DIAGNOSIS — C66.2 MALIGNANT NEOPLASM OF LEFT URETER (H): ICD-10-CM

## 2022-02-22 DIAGNOSIS — N18.4 CKD (CHRONIC KIDNEY DISEASE) STAGE 4, GFR 15-29 ML/MIN (H): ICD-10-CM

## 2022-02-22 DIAGNOSIS — D09.0 CARCINOMA IN SITU OF BLADDER: ICD-10-CM

## 2022-02-22 DIAGNOSIS — E11.9 TYPE 2 DIABETES MELLITUS WITHOUT COMPLICATION, WITHOUT LONG-TERM CURRENT USE OF INSULIN (H): Primary | ICD-10-CM

## 2022-02-22 LAB
CHOLEST SERPL-MCNC: 193 MG/DL
CREAT UR-MCNC: 53 MG/DL
EST. AVERAGE GLUCOSE BLD GHB EST-MCNC: 249 MG/DL
FASTING STATUS PATIENT QL REPORTED: ABNORMAL
HBA1C MFR BLD: 10.3 % (ref 0–5.6)
HDLC SERPL-MCNC: 67 MG/DL
LDLC SERPL CALC-MCNC: 108 MG/DL
MICROALBUMIN UR-MCNC: 75 MG/L
MICROALBUMIN/CREAT UR: 141.51 MG/G CR (ref 0–17)
NONHDLC SERPL-MCNC: 126 MG/DL
TRIGL SERPL-MCNC: 92 MG/DL

## 2022-02-22 PROCEDURE — 95250 CONT GLUC MNTR PHYS/QHP EQP: CPT

## 2022-02-22 PROCEDURE — 83036 HEMOGLOBIN GLYCOSYLATED A1C: CPT | Mod: ZL | Performed by: FAMILY MEDICINE

## 2022-02-22 PROCEDURE — 80061 LIPID PANEL: CPT | Mod: ZL | Performed by: FAMILY MEDICINE

## 2022-02-22 PROCEDURE — G0463 HOSPITAL OUTPT CLINIC VISIT: HCPCS

## 2022-02-22 PROCEDURE — 82043 UR ALBUMIN QUANTITATIVE: CPT | Mod: ZL | Performed by: FAMILY MEDICINE

## 2022-02-22 PROCEDURE — 99214 OFFICE O/P EST MOD 30 MIN: CPT | Performed by: FAMILY MEDICINE

## 2022-02-22 PROCEDURE — 36415 COLL VENOUS BLD VENIPUNCTURE: CPT | Mod: ZL | Performed by: FAMILY MEDICINE

## 2022-02-22 RX ORDER — INSULIN ASPART 100 [IU]/ML
INJECTION, SOLUTION INTRAVENOUS; SUBCUTANEOUS
Qty: 15 ML | Refills: 3 | Status: SHIPPED | OUTPATIENT
Start: 2022-02-22 | End: 2022-01-01

## 2022-02-22 ASSESSMENT — ENCOUNTER SYMPTOMS
PALPITATIONS: 0
FEVER: 0
ABDOMINAL PAIN: 0
SHORTNESS OF BREATH: 0
WHEEZING: 0
CHILLS: 0
DIFFICULTY URINATING: 1

## 2022-02-22 ASSESSMENT — PAIN SCALES - GENERAL: PAINLEVEL: NO PAIN (0)

## 2022-02-22 NOTE — PATIENT INSTRUCTIONS
Encourage you to follow up with Dr. Wells's office    We placed an order to general surgery for colonoscopy in the next few months

## 2022-02-22 NOTE — NURSING NOTE
"Chief Complaint   Patient presents with     Establish Care       Initial /70 (BP Location: Left arm, Patient Position: Chair, Cuff Size: Adult Large)   Pulse 70   Temp (!) 96.2  F (35.7  C) (Tympanic)   Resp 18   Ht 1.664 m (5' 5.5\")   Wt 78.9 kg (174 lb)   SpO2 99%   BMI 28.51 kg/m   Estimated body mass index is 28.51 kg/m  as calculated from the following:    Height as of this encounter: 1.664 m (5' 5.5\").    Weight as of this encounter: 78.9 kg (174 lb).  Medication Reconciliation: complete  Chula Mcdaniel LPN    "

## 2022-02-22 NOTE — PATIENT INSTRUCTIONS
Start mealtime insulin before evening meal: inject 4 units of NovoLOG (blue and orange pen) before supper daily    Follow Continuous Glucose Monitoring Study (CGM) instructions    Return to clinic on Thursday, 3/10/22, at 1:00 pm for CGM evaluation

## 2022-02-22 NOTE — TELEPHONE ENCOUNTER
Met with Colin while he was seeing Dr. Wallace today. He is taking Tresiba 20 units daily. BG readings as follows: fastin-231 and post-supper: 95, 159-272, 416, 433.    A1C today: 10.3    Discussed with Dr. Wallace. Will start mealtime insulin at supper and do a professional CGM study.

## 2022-02-22 NOTE — PROGRESS NOTES
Pt came in for a BG meter download today. This was completed and given to Caryl Griffin and Dr Wallace.    Rowena Phan

## 2022-02-22 NOTE — PROGRESS NOTES
Met with Colin cedeno when he was with Dr. Wallace to review BG readings and do a CGM study.    We are doing a glucose sensor insertion for a CGM study. Sensor agreement signed.    Sensor attached to left arm. No redness, drainage or bleeding noted. Patient instructed on testing schedule, how to complete the patient log, restrictions during wear and to remove if having an x-ray, MRI or CT.    Freestyle Tiffany Sensor:  Lot#:[id number]  Expiration Date: 5/31/22  Sensor S/N: [id number]    Patient's identity was verified by using patient's name and date of birth prior to insertion.    Sensor started:   2 minute re-check completed:     Written instructions and patient log given to patient.    Caryl Griffin RN, [facility]

## 2022-02-25 ENCOUNTER — TELEPHONE (OUTPATIENT)
Dept: EDUCATION SERVICES | Facility: HOSPITAL | Age: 77
End: 2022-02-25
Payer: COMMERCIAL

## 2022-02-25 NOTE — TELEPHONE ENCOUNTER
Called Colin. He is taking Tresiba 20 units daily and start NovoLOG 4 units prior to evening meal on 2/22.    BG readings:   Fasting Post-supper  2/22   181  2/23 77  63  2/24 69  92  2/25 88    Decreased Tresiba to 16 units daily and NovoLOG before supper to 2 units.    Will follow by phone on 2/28/22

## 2022-02-28 ENCOUNTER — TELEPHONE (OUTPATIENT)
Dept: EDUCATION SERVICES | Facility: HOSPITAL | Age: 77
End: 2022-02-28
Payer: COMMERCIAL

## 2022-02-28 DIAGNOSIS — N18.9 CHRONIC KIDNEY DISEASE-MINERAL AND BONE DISORDER: ICD-10-CM

## 2022-02-28 DIAGNOSIS — M89.9 CHRONIC KIDNEY DISEASE-MINERAL AND BONE DISORDER: ICD-10-CM

## 2022-02-28 DIAGNOSIS — D63.1 ANEMIA DUE TO STAGE 4 CHRONIC KIDNEY DISEASE (H): ICD-10-CM

## 2022-02-28 DIAGNOSIS — N18.4 ANEMIA DUE TO STAGE 4 CHRONIC KIDNEY DISEASE (H): ICD-10-CM

## 2022-02-28 DIAGNOSIS — E83.9 CHRONIC KIDNEY DISEASE-MINERAL AND BONE DISORDER: ICD-10-CM

## 2022-02-28 DIAGNOSIS — N18.4 CKD (CHRONIC KIDNEY DISEASE) STAGE 4, GFR 15-29 ML/MIN (H): Primary | ICD-10-CM

## 2022-02-28 NOTE — TELEPHONE ENCOUNTER
Decreased Tresiba to 16 units daily and NovoLOG to 2 units before supper.     Readings over the weekend as follows: fastin-232 and post-supper: 304, 309.    So, we increased Tresiba to 18 units daily and NovoLOG to 3 units before supper.    Will follow again on 3/3

## 2022-03-02 ENCOUNTER — LAB (OUTPATIENT)
Dept: INFUSION THERAPY | Facility: OTHER | Age: 77
End: 2022-03-02
Attending: INTERNAL MEDICINE
Payer: COMMERCIAL

## 2022-03-02 ENCOUNTER — ONCOLOGY VISIT (OUTPATIENT)
Dept: ONCOLOGY | Facility: OTHER | Age: 77
End: 2022-03-02
Attending: NURSE PRACTITIONER
Payer: COMMERCIAL

## 2022-03-02 ENCOUNTER — TELEPHONE (OUTPATIENT)
Dept: EDUCATION SERVICES | Facility: HOSPITAL | Age: 77
End: 2022-03-02

## 2022-03-02 VITALS — SYSTOLIC BLOOD PRESSURE: 116 MMHG | DIASTOLIC BLOOD PRESSURE: 64 MMHG | HEART RATE: 58 BPM

## 2022-03-02 VITALS
BODY MASS INDEX: 27.3 KG/M2 | WEIGHT: 173.94 LBS | SYSTOLIC BLOOD PRESSURE: 120 MMHG | TEMPERATURE: 96.6 F | DIASTOLIC BLOOD PRESSURE: 64 MMHG | RESPIRATION RATE: 20 BRPM | OXYGEN SATURATION: 98 % | HEIGHT: 67 IN | HEART RATE: 69 BPM

## 2022-03-02 DIAGNOSIS — E11.65 TYPE 2 DIABETES MELLITUS WITH HYPERGLYCEMIA, WITHOUT LONG-TERM CURRENT USE OF INSULIN (H): ICD-10-CM

## 2022-03-02 DIAGNOSIS — C67.3 MALIGNANT NEOPLASM OF ANTERIOR WALL OF URINARY BLADDER (H): ICD-10-CM

## 2022-03-02 DIAGNOSIS — C66.2 MALIGNANT NEOPLASM OF LEFT URETER (H): Primary | ICD-10-CM

## 2022-03-02 DIAGNOSIS — N18.4 CKD (CHRONIC KIDNEY DISEASE) STAGE 4, GFR 15-29 ML/MIN (H): ICD-10-CM

## 2022-03-02 DIAGNOSIS — L29.9 ITCHING: ICD-10-CM

## 2022-03-02 LAB
ALBUMIN SERPL-MCNC: 3.7 G/DL (ref 3.4–5)
ALP SERPL-CCNC: 90 U/L (ref 40–150)
ALT SERPL W P-5'-P-CCNC: 14 U/L (ref 0–70)
ANION GAP SERPL CALCULATED.3IONS-SCNC: 7 MMOL/L (ref 3–14)
AST SERPL W P-5'-P-CCNC: 11 U/L (ref 0–45)
BASOPHILS # BLD AUTO: 0.1 10E3/UL (ref 0–0.2)
BASOPHILS NFR BLD AUTO: 1 %
BILIRUB SERPL-MCNC: 0.6 MG/DL (ref 0.2–1.3)
BUN SERPL-MCNC: 37 MG/DL (ref 7–30)
CALCIUM SERPL-MCNC: 8.7 MG/DL (ref 8.5–10.1)
CHLORIDE BLD-SCNC: 103 MMOL/L (ref 94–109)
CO2 SERPL-SCNC: 22 MMOL/L (ref 20–32)
CREAT SERPL-MCNC: 2.12 MG/DL (ref 0.66–1.25)
EOSINOPHIL # BLD AUTO: 0.1 10E3/UL (ref 0–0.7)
EOSINOPHIL NFR BLD AUTO: 3 %
ERYTHROCYTE [DISTWIDTH] IN BLOOD BY AUTOMATED COUNT: 13.9 % (ref 10–15)
GFR SERPL CREATININE-BSD FRML MDRD: 32 ML/MIN/1.73M2
GLUCOSE BLD-MCNC: 444 MG/DL (ref 70–99)
HCT VFR BLD AUTO: 33.6 % (ref 40–53)
HGB BLD-MCNC: 11.3 G/DL (ref 13.3–17.7)
IMM GRANULOCYTES # BLD: 0 10E3/UL
IMM GRANULOCYTES NFR BLD: 0 %
LYMPHOCYTES # BLD AUTO: 1 10E3/UL (ref 0.8–5.3)
LYMPHOCYTES NFR BLD AUTO: 23 %
MCH RBC QN AUTO: 31 PG (ref 26.5–33)
MCHC RBC AUTO-ENTMCNC: 33.6 G/DL (ref 31.5–36.5)
MCV RBC AUTO: 92 FL (ref 78–100)
MONOCYTES # BLD AUTO: 0.5 10E3/UL (ref 0–1.3)
MONOCYTES NFR BLD AUTO: 12 %
NEUTROPHILS # BLD AUTO: 2.6 10E3/UL (ref 1.6–8.3)
NEUTROPHILS NFR BLD AUTO: 61 %
NRBC # BLD AUTO: 0 10E3/UL
NRBC BLD AUTO-RTO: 0 /100
PLATELET # BLD AUTO: 139 10E3/UL (ref 150–450)
POTASSIUM BLD-SCNC: 5 MMOL/L (ref 3.4–5.3)
PROT SERPL-MCNC: 7.2 G/DL (ref 6.8–8.8)
RBC # BLD AUTO: 3.64 10E6/UL (ref 4.4–5.9)
SODIUM SERPL-SCNC: 132 MMOL/L (ref 133–144)
T4 FREE SERPL-MCNC: 0.82 NG/DL (ref 0.76–1.46)
TSH SERPL DL<=0.005 MIU/L-ACNC: 5.26 MU/L (ref 0.4–4)
WBC # BLD AUTO: 4.3 10E3/UL (ref 4–11)

## 2022-03-02 PROCEDURE — 82040 ASSAY OF SERUM ALBUMIN: CPT | Mod: ZL | Performed by: NURSE PRACTITIONER

## 2022-03-02 PROCEDURE — 99214 OFFICE O/P EST MOD 30 MIN: CPT | Performed by: NURSE PRACTITIONER

## 2022-03-02 PROCEDURE — 258N000003 HC RX IP 258 OP 636: Performed by: NURSE PRACTITIONER

## 2022-03-02 PROCEDURE — 84439 ASSAY OF FREE THYROXINE: CPT | Mod: ZL | Performed by: NURSE PRACTITIONER

## 2022-03-02 PROCEDURE — 85025 COMPLETE CBC W/AUTO DIFF WBC: CPT | Mod: ZL

## 2022-03-02 PROCEDURE — 80053 COMPREHEN METABOLIC PANEL: CPT | Mod: ZL | Performed by: NURSE PRACTITIONER

## 2022-03-02 PROCEDURE — 84443 ASSAY THYROID STIM HORMONE: CPT | Mod: ZL | Performed by: NURSE PRACTITIONER

## 2022-03-02 PROCEDURE — 96415 CHEMO IV INFUSION ADDL HR: CPT

## 2022-03-02 PROCEDURE — 36415 COLL VENOUS BLD VENIPUNCTURE: CPT | Mod: ZL

## 2022-03-02 PROCEDURE — G0463 HOSPITAL OUTPT CLINIC VISIT: HCPCS

## 2022-03-02 PROCEDURE — 96413 CHEMO IV INFUSION 1 HR: CPT

## 2022-03-02 PROCEDURE — 250N000011 HC RX IP 250 OP 636: Performed by: NURSE PRACTITIONER

## 2022-03-02 PROCEDURE — G0463 HOSPITAL OUTPT CLINIC VISIT: HCPCS | Mod: 25

## 2022-03-02 RX ORDER — MEPERIDINE HYDROCHLORIDE 25 MG/ML
25 INJECTION INTRAMUSCULAR; INTRAVENOUS; SUBCUTANEOUS EVERY 30 MIN PRN
Status: CANCELLED | OUTPATIENT
Start: 2022-03-02

## 2022-03-02 RX ORDER — ALBUTEROL SULFATE 0.83 MG/ML
2.5 SOLUTION RESPIRATORY (INHALATION)
Status: CANCELLED | OUTPATIENT
Start: 2022-03-02

## 2022-03-02 RX ORDER — LORAZEPAM 2 MG/ML
0.5 INJECTION INTRAMUSCULAR EVERY 4 HOURS PRN
Status: CANCELLED | OUTPATIENT
Start: 2022-03-02

## 2022-03-02 RX ORDER — EPINEPHRINE 1 MG/ML
0.3 INJECTION, SOLUTION, CONCENTRATE INTRAVENOUS EVERY 5 MIN PRN
Status: CANCELLED | OUTPATIENT
Start: 2022-03-02

## 2022-03-02 RX ORDER — ALBUTEROL SULFATE 90 UG/1
1-2 AEROSOL, METERED RESPIRATORY (INHALATION)
Status: CANCELLED
Start: 2022-03-02

## 2022-03-02 RX ORDER — HEPARIN SODIUM (PORCINE) LOCK FLUSH IV SOLN 100 UNIT/ML 100 UNIT/ML
5 SOLUTION INTRAVENOUS
Status: CANCELLED | OUTPATIENT
Start: 2022-03-02

## 2022-03-02 RX ORDER — NALOXONE HYDROCHLORIDE 0.4 MG/ML
0.2 INJECTION, SOLUTION INTRAMUSCULAR; INTRAVENOUS; SUBCUTANEOUS
Status: CANCELLED | OUTPATIENT
Start: 2022-03-02

## 2022-03-02 RX ORDER — HEPARIN SODIUM,PORCINE 10 UNIT/ML
5 VIAL (ML) INTRAVENOUS
Status: CANCELLED | OUTPATIENT
Start: 2022-03-02

## 2022-03-02 RX ORDER — DIPHENHYDRAMINE HYDROCHLORIDE 50 MG/ML
50 INJECTION INTRAMUSCULAR; INTRAVENOUS
Status: CANCELLED
Start: 2022-03-02

## 2022-03-02 RX ORDER — METHYLPREDNISOLONE SODIUM SUCCINATE 125 MG/2ML
125 INJECTION, POWDER, LYOPHILIZED, FOR SOLUTION INTRAMUSCULAR; INTRAVENOUS
Status: CANCELLED
Start: 2022-03-02

## 2022-03-02 RX ADMIN — SODIUM CHLORIDE 250 ML: 9 INJECTION, SOLUTION INTRAVENOUS at 11:52

## 2022-03-02 RX ADMIN — SODIUM CHLORIDE 480 MG: 9 INJECTION, SOLUTION INTRAVENOUS at 11:53

## 2022-03-02 ASSESSMENT — PAIN SCALES - GENERAL: PAINLEVEL: NO PAIN (0)

## 2022-03-02 NOTE — PROGRESS NOTES
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  He completed neoadjuvant chemotherapy on 5/11/2021, then underwent nephroureterectomy 6/16/2021.       He presents to the clinic today stating he is doing well and offers no new complaints.  He continues to be concerned about his elevated blood sugar.  He is working with diabetic education in regards to insulin adjustment.  He does continue to have itching on occasion and denies any actual rash.  He does question using Benadryl or other antihistamine.  He continues to not sleep well stating he is up at least 2 times throughout the night to urinate and has difficulty falling back to sleep.  He denies increased fatigue however and feels his energy level is pretty good.  He denies any pain.    He saw Dr. Arvizu at the beginning of January in follow-up of his chronic kidney disease.  He was initiated on 1 iron tablet daily and vitamin D daily.      He has an appointment with Dr. Davies with [facility] urology in April.    Oncologic History:     2016 patient was diagnosed with high-grade bladder cancer T1; patient was treated with BCG and then again reinduction BCG then GemCis intravesically x3 in May 2018     1/5/2021 patient was seen by Dr. Morrell for history of hematuria.  He underwent cystoscopy which revealed bladder lesions with biopsy being negative.  Cytology showed atypical urothelial cells.  He underwent CT urogram which showed a mass in the left ureter measuring 2 cm with no hydronephrosis.  He then underwent cystoureteroscopy with biopsy with pathology  showing high-grade urothelial cell carcinoma suspicious for lamina propria invasion.  2/10/2021  he underwent PET scan that was negative for metastatic disease and then seen by Dr. Mcnulty at the [facility] and scheduled for robotic assisted laparoscopic left nephroureterectomy.    2/15/2021 he was seen by Dr. Bartlett with Medical Oncology to discuss neoadjuvant chemotherapy.  Recommendation was for gemcitabine 1000 mg per metered squared day 1 and day 8 and cisplatin 70 mg per metered squared on day 1 of a 21-day cycle x4 cycles followed by surgery.  5/11/2021 completion of neoadjuvant chemotherapy  6/16/2021 he underwent Cystourethroscopy and Left robot assisted nephroureterectomy with Left pelvic lymph node dissection by Dr. Jorge Mcnulty and Dr. Reyes Romano; pathology showed a multifocal high-grade urothelial cell carcinoma, staged pT3N0  11/10/2021 initiation of nivolumab     Current Chemo Regime/TX: Nivolumab 480 mg every 28 days  Current Cycle:  5  # of completed cycles: 6     Previous treatment:  Gemcitabine 1000 mg per metered squared on day 1 and day 8 and cisplatin 56 mg per metered squared day 1 every 21 days    Past Medical History:   Diagnosis Date     Benign essential hypertension      Cancer (H)     Bladder     Carcinoma in situ of bladder 11/19/2021     Diabetes (H)      Dyslipidemia      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) 12/11/2020       Past Surgical History:   Procedure Laterality Date     APPENDECTOMY  1958     COLONOSCOPY  2-     COMBINED CYSTOSCOPY, RETROGRADES, URETEROSCOPY, INSERT STENT Left 1/18/2021    Procedure: CYSTOURETEROSCOPY, WITH RETROGRADE PYELOGRAM with interpretation of fluroscopy, ureteroscopy, ureteral biopsy, bladder biopsy and fulgaration;  Surgeon: Shonda Morrell MD;  Location: [facility]     CYSTOSCOPY N/A 6/16/2021    Procedure: CYSTOSCOPY;  Surgeon: Javier Mcnulty MD;  [facility]     CYSTOSCOPY, BIOPSY BLADDER,  COMBINED N/A 9/8/2015    Procedure: COMBINED CYSTOSCOPY, BIOPSY BLADDER;  Surgeon: Randy Martinez MD;  Location: [facility]     CYSTOSCOPY, BIOPSY BLADDER, COMBINED N/A 2/14/2017    Procedure: COMBINED CYSTOSCOPY, BIOPSY BLADDER;  Surgeon: Randy Martinez MD;  Location: [facility]     CYSTOSCOPY, BIOPSY BLADDER, COMBINED N/A 11/13/2018    Procedure: COMBINED CYSTOSCOPY, BIOPSY BLADDER;  Surgeon: Ed Helm MD;  Location: [facility] OR     CYSTOSCOPY, BIOPSY BLADDER, COMBINED N/A 11/5/2021    Procedure: CYSTOSCOPY, WITH BLADDER BIOPSY;  Surgeon: Shonda Morrell MD;  Location: [facility]     CYSTOSCOPY, RETROGRADES, COMBINED Bilateral 11/13/2018    Procedure: Bilateral Retrograde Pyelagram, Bladder Biopsy;  Surgeon: Ed Helm MD;  Location: [facility] OR     CYSTOSCOPY, RETROGRADES, COMBINED Right 11/5/2021    Procedure: CYSTOSCOPY, WITH RETROGRADE PYELOGRAM;  Surgeon: Shonda Morrell MD;  Location: [facility]     CYSTOSCOPY, RETROGRADES, INSERT STENT URETER(S), COMBINED Left 9/8/2015    Procedure: COMBINED CYSTOSCOPY, RETROGRADES, INSERT STENT URETER(S);  Surgeon: Randy Martinez MD;  Location: [facility]     CYSTOSCOPY, TRANSURETHRAL RESECTION (TUR) TUMOR BLADDER, COMBINED N/A 9/8/2015    Procedure: COMBINED CYSTOSCOPY, TRANSURETHRAL RESECTION (TUR) TUMOR BLADDER;  Surgeon: Randy Martinez MD;  [facility]     CYSTOSCOPY, TRANSURETHRAL RESECTION (TUR) TUMOR BLADDER, COMBINED N/A 1/12/2016    Procedure: COMBINED CYSTOSCOPY, TRANSURETHRAL RESECTION (TUR) TUMOR BLADDER;  Surgeon: Randy Martniez MD;  Location: [facility]     CYSTOSCOPY, TRANSURETHRAL RESECTION (TUR) TUMOR BLADDER, COMBINED N/A 9/13/2016    Procedure: COMBINED CYSTOSCOPY, TRANSURETHRAL RESECTION (TUR) TUMOR BLADDER;  Surgeon: Randy Martinez MD;  [facility]: [facility]     DAVINCI NEPHROURETERECTOMY Left 6/16/2021    Procedure: NEPHROURETERECTOMY, ROBOT-ASSISTED, lymph node dissection;  Surgeon: Javier Mcnulty MD;   [facility]     PHACOEMULSIFICATION WITH STANDARD INTRAOCULAR LENS IMPLANT Right 10/9/2018    Procedure: PHACOEMULSIFICATION WITH STANDARD INTRAOCULAR LENS IMPLANT;  PHACOEMULSIFICATION CATARACT EXTRACTION POSTERIOR CHAMBER LENS RIGHT;  Surgeon: Marlo Mcconnell MD;  Location: [facility]     PHACOEMULSIFICATION WITH STANDARD INTRAOCULAR LENS IMPLANT Left 10/23/2018    Procedure: PHACOEMULSIFICATION CATARACT EXTRACTION POSTERIOR CHAMBER LENS LEFT;  Surgeon: Marlo Mcconnell MD;  Location: [facility]       Family History   Problem Relation Age of Onset     Diabetes Mother      Parkinsonism Brother      Coronary Artery Disease No family hx of      Hypertension No family hx of      Hyperlipidemia No family hx of      Cerebrovascular Disease No family hx of      Breast Cancer No family hx of      Colon Cancer No family hx of      Prostate Cancer No family hx of      Anesthesia Reaction No family hx of      Asthma No family hx of      Thyroid Disease No family hx of      Genetic Disorder No family hx of        Social History     Socioeconomic History     Marital status:      Spouse name: Not on file     Number of children: Not on file     Years of education: Not on file     Highest education level: Not on file   Occupational History     Not on file   Tobacco Use     Smoking status: Former Smoker     Quit date: 1992     Years since quittin.1     Smokeless tobacco: Never Used   Vaping Use     Vaping Use: Never used   Substance and Sexual Activity     Alcohol use: Yes     Comment: rarely     Drug use: No     Sexual activity: Not Currently   Other Topics Concern     Parent/sibling w/ CABG, MI or angioplasty before 65F 55M? No   Social History Narrative     Not on file     Social Determinants of Health     Financial Resource Strain: Not on file   Food Insecurity: Not on file   Transportation Needs: Not on file   Physical Activity: Not on file   Stress: Not on file   Social Connections: Not on file   Intimate Partner  Violence: Not on file   Housing Stability: Not on file       Current Outpatient Medications   Medication     atorvastatin (LIPITOR) 40 MG tablet     blood glucose (ONETOUCH VERIO IQ) test strip     brimonidine (ALPHAGAN-P) 0.15 % ophthalmic solution     Cholecalciferol (VITAMIN D3) 25 MCG TABS     insulin aspart (NOVOLOG FLEXPEN) 100 UNIT/ML pen     insulin degludec (TRESIBA FLEXTOUCH) 100 UNIT/ML pen     insulin pen needle (32G X 4 MM) 32G X 4 MM miscellaneous     latanoprost (XALATAN) 0.005 % ophthalmic solution     ONETOUCH DELICA LANCETS 33G MISC     tamsulosin (FLOMAX) 0.4 MG capsule     VITRON-C  MG TABS tablet     acetaminophen (TYLENOL) 325 MG tablet     No current facility-administered medications for this visit.        Allergies   Allergen Reactions     No Clinical Screening - See Comments Other (See Comments)     Beta blocker in glaucoma gtt.s caused low pulse and passing out      Timolol      Beta blocker in glaucoma gtt.s caused low pulse and passing out   - patient thinks it was timolol but not 100% sure which beta blocker eye drop.        Review Of Systems:  Constitutional:    denies fever, weight changes, chills, and night sweats.  Eyes:    denies blurred or double vision  Ears/Nose/Throat:   denies ear pain, nose problems, difficulty swallowing  Respiratory:   denies shortness of breath, cough  Skin: See HPI  Cardiovascular:   denies chest pain, palpitations, edema  Gastrointestinal:   denies abdominal pain, bloating, nausea, early satiety; no change in bowel habits or blood in stool  Genitourinary:   denies difficulty with urination, blood in urine  Musculoskeletal:    denies new muscle pain, bone pain  Neurologic:   denies lightheadedness, headaches, numbness or tingling  Psychiatric:   denies anxiety, depression  Hematologic/Lymphatic/Immunologic:   denies easy bruising, easy bleeding, lumps or bumps noted  Endocrine:   Does complain of dry mouth admits to not drinking much for  "fluids    ECOG Performance Status: 0    Physical Exam:  /64   Pulse 69   Temp (!) 96.6  F (35.9  C) (Tympanic)   Resp 20   Ht 1.702 m (5' 7\")   Wt 78.9 kg (173 lb 15.1 oz)   SpO2 98%   BMI 27.24 kg/m      GENERAL APPEARANCE: Healthy, alert and in no acute distress.  HEENT: Normocephalic, Sclerae anicteric.   RESP: respirations regular and easy  MUSCULOSKELETAL: Extremities without gross deformities noted.  NEURO: Alert and oriented x 3.    PSYCHIATRIC: Mentation and affect appear normal.  Mood appropriate.    Laboratory:  Results for orders placed or performed in visit on 03/02/22   Comprehensive metabolic panel     Status: Abnormal   Result Value Ref Range    Sodium 132 (L) 133 - 144 mmol/L    Potassium 5.0 3.4 - 5.3 mmol/L    Chloride 103 94 - 109 mmol/L    Carbon Dioxide (CO2) 22 20 - 32 mmol/L    Anion Gap 7 3 - 14 mmol/L    Urea Nitrogen 37 (H) 7 - 30 mg/dL    Creatinine 2.12 (H) 0.66 - 1.25 mg/dL    Calcium 8.7 8.5 - 10.1 mg/dL    Glucose 444 (H) 70 - 99 mg/dL    Alkaline Phosphatase 90 40 - 150 U/L    AST 11 0 - 45 U/L    ALT 14 0 - 70 U/L    Protein Total 7.2 6.8 - 8.8 g/dL    Albumin 3.7 3.4 - 5.0 g/dL    Bilirubin Total 0.6 0.2 - 1.3 mg/dL    GFR Estimate 32 (L) >60 mL/min/1.73m2   TSH with free T4 reflex     Status: Abnormal   Result Value Ref Range    TSH 5.26 (H) 0.40 - 4.00 mU/L   CBC with platelets and differential     Status: Abnormal   Result Value Ref Range    WBC Count 4.3 4.0 - 11.0 10e3/uL    RBC Count 3.64 (L) 4.40 - 5.90 10e6/uL    Hemoglobin 11.3 (L) 13.3 - 17.7 g/dL    Hematocrit 33.6 (L) 40.0 - 53.0 %    MCV 92 78 - 100 fL    MCH 31.0 26.5 - 33.0 pg    MCHC 33.6 31.5 - 36.5 g/dL    RDW 13.9 10.0 - 15.0 %    Platelet Count 139 (L) 150 - 450 10e3/uL    % Neutrophils 61 %    % Lymphocytes 23 %    % Monocytes 12 %    % Eosinophils 3 %    % Basophils 1 %    % Immature Granulocytes 0 %    NRBCs per 100 WBC 0 <1 /100    Absolute Neutrophils 2.6 1.6 - 8.3 10e3/uL    Absolute " Lymphocytes 1.0 0.8 - 5.3 10e3/uL    Absolute Monocytes 0.5 0.0 - 1.3 10e3/uL    Absolute Eosinophils 0.1 0.0 - 0.7 10e3/uL    Absolute Basophils 0.1 0.0 - 0.2 10e3/uL    Absolute Immature Granulocytes 0.0 <=0.4 10e3/uL    Absolute NRBCs 0.0 10e3/uL   T4 free     Status: Normal   Result Value Ref Range    Free T4 0.82 0.76 - 1.46 ng/dL   CBC with Platelets & Differential     Status: Abnormal    Narrative    The following orders were created for panel order CBC with Platelets & Differential.  Procedure                               Abnormality         Status                     ---------                               -----------         ------                     CBC with platelets and d...[[id number]]  Abnormal            Final result                 Please view results for these tests on the individual orders.       Imaging Studies: None completed for today's visit      ASSESSMENT/PLAN:    1. 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  He completed neoadjuvant chemotherapy then underwent a nephroureterectomy and is now undergoing adjuvant therapy with nivolumab with plan to complete 1 year of therapy.  He will initiate cycle 5 today and follow-up prior to cycle 6 with labs per treatment plan.  Imaging will be completed to Dr. Davies in April.     2.  Type 2 diabetes mellitus Patient previously with type 2 diabetes.  Hyperglycemia likely due to immunotherapy with likely conversion to type I which can happen while on immunotherapy. He is currently being managed through the diabetic clinic.  Telephone call to the diabetic education center to discuss elevated blood sugar was one of their providers.  Question sliding scale management.     3.  Stage IV chronic kidney disease:  He'll continue to follow with nephrology.     4.  bladder cancer:  He'll be seeing Dr. Schmitz with [facility] urology in April.  Plan at that point is  likely a cystoscopy and CT urogram.     5.  Itching: Again I recommended diphenhydramine 25 mg OTC as needed or OTC Zyrtec or Claritin.    I encouraged patient to call with any questions or concerns.    30 minutes spent in the patient's encounter today with time spent in review of the chart along with in chart preparation.  Time was also spent in reviewing his treatment plan and signing his treatment plan.  Time was also spent obtaining a review of systems and in planning next follow-up and in chart documentation.    Sabra Jensen, NP  APRN, FNP-BC, AOCNP

## 2022-03-02 NOTE — TELEPHONE ENCOUNTER
Called Colin. I received a call from Jennifer Jensen NP, about Colin's high BG readings. His lab glucose today was 444.    Colin reported to me that his fasting BG today was 315, 383 post-supper last night and 360 yesterday morning.    I asked Colin to go back to Tresiba 20 units daily and NovoLOG 4 units prior to supper tonight. I also asked him to test before and after supper tonight. I will call him tomorrow.    Colin has a lab visit on 3/4/22 so I asked him to come in prior to download the BG sensor that he is wearing. I can then possibly do a sliding scale for the NovoLOG.

## 2022-03-02 NOTE — PROGRESS NOTES
Patient is a 77 here accompanied by self today for infusion of nivolumab per order of Dr. Bartlett.  Patient identified with two identifiers, order verified, and verbal consent for today's infusion obtained from patient.      Home meds, allergies, vitals, fall risk and abuse screening done at [facility] On[facility] appt.  Reviewed by this nurse prior to treating.      Lab values:      Component      Latest Ref Rng & Units 3/2/2022   WBC      4.0 - 11.0 10e3/uL 4.3   RBC Count      4.40 - 5.90 10e6/uL 3.64 (L)   Hemoglobin      13.3 - 17.7 g/dL 11.3 (L)   Hematocrit      40.0 - 53.0 % 33.6 (L)   MCV      78 - 100 fL 92   MCH      26.5 - 33.0 pg 31.0   MCHC      31.5 - 36.5 g/dL 33.6   RDW      10.0 - 15.0 % 13.9   Platelet Count      150 - 450 10e3/uL 139 (L)   % Neutrophils      % 61   % Lymphocytes      % 23   % Monocytes      % 12   % Eosinophils      % 3   % Basophils      % 1   % Immature Granulocytes      % 0   NRBCs per 100 WBC      <1 /100 0   Absolute Neutrophils      1.6 - 8.3 10e3/uL 2.6   Absolute Lymphocytes      0.8 - 5.3 10e3/uL 1.0   Absolute Monocytes      0.0 - 1.3 10e3/uL 0.5   Absolute Eosinophils      0.0 - 0.7 10e3/uL 0.1   Absolute Basophils      0.0 - 0.2 10e3/uL 0.1   Absolute Immature Granulocytes      <=0.4 10e3/uL 0.0   Absolute NRBCs      10e3/uL 0.0   Sodium      133 - 144 mmol/L 132 (L)   Potassium      3.4 - 5.3 mmol/L 5.0   Chloride      94 - 109 mmol/L 103   Carbon Dioxide      20 - 32 mmol/L 22   Anion Gap      3 - 14 mmol/L 7   Urea Nitrogen      7 - 30 mg/dL 37 (H)   Creatinine      0.66 - 1.25 mg/dL 2.12 (H)   Calcium      8.5 - 10.1 mg/dL 8.7   Glucose      70 - 99 mg/dL 444 (H)   Alkaline Phosphatase      40 - 150 U/L 90   AST      0 - 45 U/L 11   ALT      0 - 70 U/L 14   Protein Total      6.8 - 8.8 g/dL 7.2   Albumin      3.4 - 5.0 g/dL 3.7   Bilirubin Total      0.2 - 1.3 mg/dL 0.6   GFR Estimate      >60 mL/min/1.73m2 32 (L)   TSH      0.40 - 4.00 mU/L 5.26 (H)   T4 Free      0.76 - 1.46  ng/dL 0.82       Discussed elevated glucose of 444 and elevated TSH of 5.26 with Jennifer Jensen NP, and received VORB to treat patient.  Jennifer is going to contact diabetes ed regarding elevated BG levels.  Patient meets order parameters for today's treatment.    Independent dose check completed with Mickie LUCIO RN.     1253 IV pump verified with Nivolumab dose, drug, and rate of administration.  Infusion administered per protocol.  Patient tolerated infusion well, no signs or symptoms of adverse reaction noted.  Patient denies pain nor discomfort.     IV removed, catheter intact.  Site clean, dry and intact.  No signs or symptoms of infiltration or infection.  Covered with a sterile bandage, slight pressure applied for 30 seconds.  Pt instructed to leave bandage intact for a minimum of one hour, and to call with questions or concerns.  Patient states understanding, discharged.

## 2022-03-02 NOTE — PROGRESS NOTES
1 failed attempt at IV start in right hand.  Site covered with sterile bandage.    24 gauge angio cath inserted into left hand.  Immediate blood return noted.  IV secured with sterile, transparent dressing and tape.  Patient tolerated well, denies pain or discomfort at this time.  Flushes easily without resistance, no signs or symptoms of infiltration or infection.  Syringe not filling fast enough for labs.  IV flushed easily with 3 mLs of normal saline.      1 attempt at lab draw in right antecubital that was unsuccessful.  Covered with a sterile bandage.      Benietz WELDON RN moose 2 tubes of blood from left antecubital and tubes brought to lab.  Site covered with a sterile bandage.

## 2022-03-02 NOTE — NURSING NOTE
"Oncology Rooming Note    March 2, 2022 9:33 AM   Colin Baxter is a 77 year old male who presents for:    Chief Complaint   Patient presents with     Oncology Clinic Visit     Follow up Carcinoma in situ of bladder     Initial Vitals: /64   Pulse 69   Temp (!) 96.6  F (35.9  C) (Tympanic)   Resp 20   Ht 1.702 m (5' 7\")   Wt 78.9 kg (173 lb 15.1 oz)   SpO2 98%   BMI 27.24 kg/m   Estimated body mass index is 27.24 kg/m  as calculated from the following:    Height as of this encounter: 1.702 m (5' 7\").    Weight as of this encounter: 78.9 kg (173 lb 15.1 oz). Body surface area is 1.93 meters squared.  No Pain (0) Comment: Data Unavailable   No LMP for male patient.  Allergies reviewed: Yes  Medications reviewed: Yes    Medications: Medication refills not needed today.  Pharmacy name entered into [facility]: [id number] - [place], [place] - [address]            "

## 2022-03-02 NOTE — PATIENT INSTRUCTIONS
We would like to see you back per your schedule.     When you are in need of a refill, please call your pharmacy and they will send us a request.     If you have any questions please call [phone]    Other instructions:  none

## 2022-03-03 ENCOUNTER — TELEPHONE (OUTPATIENT)
Dept: ONCOLOGY | Facility: OTHER | Age: 77
End: 2022-03-03
Payer: COMMERCIAL

## 2022-03-03 NOTE — TELEPHONE ENCOUNTER
Discussed patient's blood sugar with Caryl Griffin with diabetic education.  She is currently managing his diabetes.  We discussed that these elevations are likely related to the immunotherapy.  He was started on mealtime insulin previously but then had a big drop in blood sugars so this was discontinued.  They began to elevate again, she is considering using a sliding scale.  She will contact the patient with recommendations.

## 2022-03-04 ENCOUNTER — TELEPHONE (OUTPATIENT)
Dept: EDUCATION SERVICES | Facility: HOSPITAL | Age: 77
End: 2022-03-04
Payer: COMMERCIAL

## 2022-03-04 ENCOUNTER — ALLIED HEALTH/NURSE VISIT (OUTPATIENT)
Dept: EDUCATION SERVICES | Facility: OTHER | Age: 77
End: 2022-03-04
Attending: NURSE PRACTITIONER
Payer: COMMERCIAL

## 2022-03-04 ENCOUNTER — LAB (OUTPATIENT)
Dept: LAB | Facility: OTHER | Age: 77
End: 2022-03-04
Attending: NURSE PRACTITIONER
Payer: COMMERCIAL

## 2022-03-04 DIAGNOSIS — N18.4 ANEMIA DUE TO STAGE 4 CHRONIC KIDNEY DISEASE (H): ICD-10-CM

## 2022-03-04 DIAGNOSIS — D63.1 ANEMIA DUE TO STAGE 4 CHRONIC KIDNEY DISEASE (H): ICD-10-CM

## 2022-03-04 DIAGNOSIS — N18.9 CHRONIC KIDNEY DISEASE-MINERAL AND BONE DISORDER: ICD-10-CM

## 2022-03-04 DIAGNOSIS — N18.4 CKD (CHRONIC KIDNEY DISEASE) STAGE 4, GFR 15-29 ML/MIN (H): ICD-10-CM

## 2022-03-04 DIAGNOSIS — E83.9 CHRONIC KIDNEY DISEASE-MINERAL AND BONE DISORDER: ICD-10-CM

## 2022-03-04 DIAGNOSIS — E11.65 TYPE 2 DIABETES MELLITUS WITH HYPERGLYCEMIA, WITHOUT LONG-TERM CURRENT USE OF INSULIN (H): Primary | ICD-10-CM

## 2022-03-04 DIAGNOSIS — M89.9 CHRONIC KIDNEY DISEASE-MINERAL AND BONE DISORDER: ICD-10-CM

## 2022-03-04 LAB
ALBUMIN SERPL-MCNC: 3.9 G/DL (ref 3.4–5)
ALBUMIN UR-MCNC: 50 MG/DL
ANION GAP SERPL CALCULATED.3IONS-SCNC: 5 MMOL/L (ref 3–14)
APPEARANCE UR: CLEAR
BILIRUB UR QL STRIP: NEGATIVE
BUN SERPL-MCNC: 34 MG/DL (ref 7–30)
CALCIUM SERPL-MCNC: 8.8 MG/DL (ref 8.5–10.1)
CHLORIDE BLD-SCNC: 106 MMOL/L (ref 94–109)
CO2 SERPL-SCNC: 24 MMOL/L (ref 20–32)
COLOR UR AUTO: YELLOW
CREAT SERPL-MCNC: 2.13 MG/DL (ref 0.66–1.25)
CREAT UR-MCNC: 212 MG/DL
CREAT UR-MCNC: 212 MG/DL
FERRITIN SERPL-MCNC: 124 NG/ML (ref 26–388)
GFR SERPL CREATININE-BSD FRML MDRD: 32 ML/MIN/1.73M2
GLUCOSE BLD-MCNC: 219 MG/DL (ref 70–99)
GLUCOSE UR STRIP-MCNC: 100 MG/DL
HGB UR QL STRIP: NEGATIVE
HYALINE CASTS: 2 /LPF
IRON SERPL-MCNC: 75 UG/DL (ref 35–180)
KETONES UR STRIP-MCNC: NEGATIVE MG/DL
LEUKOCYTE ESTERASE UR QL STRIP: NEGATIVE
MICROALBUMIN UR-MCNC: 167 MG/L
MICROALBUMIN/CREAT UR: 78.77 MG/G CR (ref 0–17)
MUCOUS THREADS #/AREA URNS LPF: PRESENT /LPF
NITRATE UR QL: NEGATIVE
PH UR STRIP: 5.5 [PH] (ref 4.7–8)
PHOSPHATE SERPL-MCNC: 3.2 MG/DL (ref 2.5–4.5)
POTASSIUM BLD-SCNC: 4.2 MMOL/L (ref 3.4–5.3)
PROT UR-MCNC: 0.71 G/L
PROT/CREAT 24H UR: 0.33 G/G CR (ref 0–0.2)
PTH-INTACT SERPL-MCNC: 108 PG/ML (ref 18–80)
RBC URINE: <1 /HPF
SODIUM SERPL-SCNC: 135 MMOL/L (ref 133–144)
SP GR UR STRIP: 1.02 (ref 1–1.03)
SQUAMOUS EPITHELIAL: 0 /HPF
UROBILINOGEN UR STRIP-MCNC: NORMAL MG/DL
WBC URINE: 4 /HPF

## 2022-03-04 PROCEDURE — 80069 RENAL FUNCTION PANEL: CPT | Mod: ZL

## 2022-03-04 PROCEDURE — 83970 ASSAY OF PARATHORMONE: CPT | Mod: ZL

## 2022-03-04 PROCEDURE — 36415 COLL VENOUS BLD VENIPUNCTURE: CPT | Mod: ZL

## 2022-03-04 PROCEDURE — 82306 VITAMIN D 25 HYDROXY: CPT | Mod: ZL

## 2022-03-04 PROCEDURE — 81001 URINALYSIS AUTO W/SCOPE: CPT | Mod: ZL

## 2022-03-04 PROCEDURE — 82043 UR ALBUMIN QUANTITATIVE: CPT | Mod: ZL

## 2022-03-04 PROCEDURE — 82728 ASSAY OF FERRITIN: CPT | Mod: ZL

## 2022-03-04 PROCEDURE — 83540 ASSAY OF IRON: CPT | Mod: ZL

## 2022-03-04 PROCEDURE — 84156 ASSAY OF PROTEIN URINE: CPT | Mod: ZL

## 2022-03-04 NOTE — TELEPHONE ENCOUNTER
Met with Colin today while he was here for [employer] Snackr.    BGs have been high across the day since I  Tresiba and NovoLOG on 22.  Increased Tresiba back up to 20 units and NovoLOG to 4 units on 3/2/22.    After Tresiba 20 units on 3/3 and NovoLOG at 4 units before supper on 3/2 and 3/3. BG down to 180 at midnight on 3/4 and going down to 120 about 5 am. After breakfast it is up again to approximately 200 by 7 am.    Will keep Tresiba at 20 units daily for now.    Instructed Colin to test before meals and 2 hours after supper.    I changed NovoLOG dose to: inject NovoLOG 2 units before all meals if BG is above 200 as he does have a drop at night with the 4 units of NovoLOG before supper.    Colin should have a personal CGM with alarms.    Will follow by phone on 3/7/22

## 2022-03-06 LAB — DEPRECATED CALCIDIOL+CALCIFEROL SERPL-MC: 27 UG/L (ref 20–75)

## 2022-03-08 DIAGNOSIS — E11.9 TYPE 2 DIABETES MELLITUS WITHOUT COMPLICATION, WITHOUT LONG-TERM CURRENT USE OF INSULIN (H): ICD-10-CM

## 2022-03-09 RX ORDER — ATORVASTATIN CALCIUM 40 MG/1
40 TABLET, FILM COATED ORAL DAILY
Qty: 90 TABLET | Refills: 3 | Status: SHIPPED | OUTPATIENT
Start: 2022-03-09 | End: 2023-01-01

## 2022-03-10 ENCOUNTER — ALLIED HEALTH/NURSE VISIT (OUTPATIENT)
Dept: EDUCATION SERVICES | Facility: OTHER | Age: 77
End: 2022-03-10
Attending: NURSE PRACTITIONER
Payer: COMMERCIAL

## 2022-03-10 ENCOUNTER — TRANSFERRED RECORDS (OUTPATIENT)
Dept: HEALTH INFORMATION MANAGEMENT | Facility: CLINIC | Age: 77
End: 2022-03-10
Payer: COMMERCIAL

## 2022-03-10 VITALS
BODY MASS INDEX: 27.47 KG/M2 | DIASTOLIC BLOOD PRESSURE: 80 MMHG | HEIGHT: 67 IN | SYSTOLIC BLOOD PRESSURE: 130 MMHG | WEIGHT: 175 LBS | OXYGEN SATURATION: 99 % | HEART RATE: 62 BPM

## 2022-03-10 DIAGNOSIS — E11.65 TYPE 2 DIABETES MELLITUS WITH HYPERGLYCEMIA, WITHOUT LONG-TERM CURRENT USE OF INSULIN (H): Primary | ICD-10-CM

## 2022-03-10 PROCEDURE — 95251 CONT GLUC MNTR ANALYSIS I&R: CPT | Performed by: NURSE PRACTITIONER

## 2022-03-10 PROCEDURE — G0463 HOSPITAL OUTPT CLINIC VISIT: HCPCS

## 2022-03-10 PROCEDURE — 99214 OFFICE O/P EST MOD 30 MIN: CPT | Performed by: NURSE PRACTITIONER

## 2022-03-10 ASSESSMENT — PAIN SCALES - GENERAL: PAINLEVEL: NO PAIN (0)

## 2022-03-10 NOTE — PROGRESS NOTES
"Chief Complaint   Patient presents with     Diabetes       Initial /80 (BP Location: Left arm, Patient Position: Chair, Cuff Size: Adult Regular)   Pulse 62   Ht 1.695 m (5' 6.75\")   Wt 79.4 kg (175 lb)   SpO2 99%   BMI 27.61 kg/m   Estimated body mass index is 27.61 kg/m  as calculated from the following:    Height as of this encounter: 1.695 m (5' 6.75\").    Weight as of this encounter: 79.4 kg (175 lb).  Medication Reconciliation: complete  ILEANA SIDHU LPN    "

## 2022-03-10 NOTE — PROGRESS NOTES
SUBJECTIVE:  Colin Baxter, 77 year old, male presents with the following Chief Complaint(s) with HPI to follow:  Chief Complaint   Patient presents with     Diabetes        Diabetes Follow-up      Patient is checking blood sugars: 4 times daily.  Results:    BG ave: 280  Highest: 450  Lowest: 88      Symptoms of hypoglycemia (low blood sugar): yes, 69 a couple weeks ago--not sure what happened    Paresthesias (numbness or burning in feet) or sores: No    Diabetic eye exam within the last year: Yes    Breakfast eaten regularly: sometimes    Patient counting carbs: Yes       HPI:  Colin's here today for the follow up regarding his Diabetes mellitus, Type 2.    Lab Results   Component Value Date    A1C 10.3 02/22/2022    A1C 7.1 11/24/2021    A1C 5.8 08/19/2021    A1C 5.7 01/14/2021    A1C 5.7 10/02/2020    A1C 5.7 03/13/2020    A1C 6.2 10/31/2019     Current Diabetes medication:   1.  Tresiba 20 units daily in the morning  2.  Novolog 3 units with meals, three times a day.    ASA use: no  Statin use: yes       Colin's here today for his professional CGM eval.   Reports the following:  Diagnosed with diabetes: 4-5 years ago.   Lost weight  Family hx of diabetes: mom     Reported food intake and when:   Breakfast: coffee + toast (6:30-7 am), sometimes skipped  Lunch (1200): 1/2 sandwich--tuna/mahmood, cottage, cheese or slice of cheese, PB + J (sugar free) + milk   Supper: meat, veggies, sometimes skipped  No fruit, no juice, no pasta/rice/potatoes.     Working with [facility] and Jennifer Jensen NP for his infusion therapy.  Hx of ureter and bladder cancer.  One kidney removed--he's not sure why    Weight trend  Wt Readings from Last 4 Encounters:   03/10/22 79.4 kg (175 lb)   03/02/22 78.9 kg (173 lb 15.1 oz)   02/22/22 78.9 kg (174 lb)   02/02/22 78.4 kg (172 lb 13.5 oz)     No acute concerns today.     Patient Active Problem List   Diagnosis     Comprehensive Medical Examination     Glaucoma     ACP (advance care  planning)     Obesity (BMI 35.0-39.9) with comorbidity (H)     History of high risk bladder cancer     History of bladder cancer     Type 2 diabetes mellitus without complication, without long-term current use of insulin (H)     Type 2 diabetes mellitus with hyperglycemia, without long-term current use of insulin (H)     Ureter cancer (H)     Ureteral mass     Hyperlipidemia     CKD (chronic kidney disease) stage 4, GFR 15-29 ml/min (H)     Carcinoma in situ of bladder       Past Medical History:   Diagnosis Date     Benign essential hypertension      Cancer (H)     Bladder     Carcinoma in situ of bladder 11/19/2021     Diabetes (H)      Dyslipidemia      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) 12/11/2020       Past Surgical History:   Procedure Laterality Date     APPENDECTOMY  1958     COLONOSCOPY  2-     COMBINED CYSTOSCOPY, RETROGRADES, URETEROSCOPY, INSERT STENT Left 1/18/2021    Procedure: CYSTOURETEROSCOPY, WITH RETROGRADE PYELOGRAM with interpretation of fluroscopy, ureteroscopy, ureteral biopsy, bladder biopsy and fulgaration;  Surgeon: Shonda Morrell MD;  Location[facility]     CYSTOSCOPY N/A 6/16/2021    Procedure: CYSTOSCOPY;  Surgeon: Javier Mcnulty MD;  Location: [facility]     CYSTOSCOPY, BIOPSY BLADDER, COMBINED N/A 9/8/2015    Procedure: COMBINED CYSTOSCOPY, BIOPSY BLADDER;  Surgeon: Randy Martinez MD;  Location: [facility]     CYSTOSCOPY, BIOPSY BLADDER, COMBINED N/A 2/14/2017    Procedure: COMBINED CYSTOSCOPY, BIOPSY BLADDER;  Surgeon: Randy Martinez MD;  Location: [facility]     CYSTOSCOPY, BIOPSY BLADDER, COMBINED N/A 11/13/2018    Procedure: COMBINED CYSTOSCOPY, BIOPSY BLADDER;  Surgeon: Ed Hlem MD;  Location: [facility]     CYSTOSCOPY, BIOPSY BLADDER, COMBINED N/A 11/5/2021    Procedure: CYSTOSCOPY, WITH BLADDER BIOPSY;  Surgeon: Shonda Morrell MD;  Location: [facility]     CYSTOSCOPY, RETROGRADES, COMBINED Bilateral 11/13/2018    Procedure: Bilateral  Retrograde Pyelagram, Bladder Biopsy;  Surgeon: Ed Helm MD;  Location: [facility]     CYSTOSCOPY, RETROGRADES, COMBINED Right 11/5/2021    Procedure: CYSTOSCOPY, WITH RETROGRADE PYELOGRAM;  Surgeon: Shonda Morrell MD;  [facility]     CYSTOSCOPY, RETROGRADES, INSERT STENT URETER(S), COMBINED Left 9/8/2015    Procedure: COMBINED CYSTOSCOPY, RETROGRADES, INSERT STENT URETER(S);  Surgeon: Randy Martinez MD;  [facility]     CYSTOSCOPY, TRANSURETHRAL RESECTION (TUR) TUMOR BLADDER, COMBINED N/A 9/8/2015    Procedure: COMBINED CYSTOSCOPY, TRANSURETHRAL RESECTION (TUR) TUMOR BLADDER;  Surgeon: Randy Martinez MD;  [facility]     CYSTOSCOPY, TRANSURETHRAL RESECTION (TUR) TUMOR BLADDER, COMBINED N/A 1/12/2016    Procedure: COMBINED CYSTOSCOPY, TRANSURETHRAL RESECTION (TUR) TUMOR BLADDER;  Surgeon: Randy Martinez MD;  [facility]     CYSTOSCOPY, TRANSURETHRAL RESECTION (TUR) TUMOR BLADDER, COMBINED N/A 9/13/2016    Procedure: COMBINED CYSTOSCOPY, TRANSURETHRAL RESECTION (TUR) TUMOR BLADDER;  Surgeon: Randy Martinez MD;  [facility]     DAVINCI NEPHROURETERECTOMY Left 6/16/2021    Procedure: NEPHROURETERECTOMY, ROBOT-ASSISTED, lymph node dissection;  Surgeon: Javier Mcnulty MD;  [facility]     PHACOEMULSIFICATION WITH STANDARD INTRAOCULAR LENS IMPLANT Right 10/9/2018    Procedure: PHACOEMULSIFICATION WITH STANDARD INTRAOCULAR LENS IMPLANT;  PHACOEMULSIFICATION CATARACT EXTRACTION POSTERIOR CHAMBER LENS RIGHT;  Surgeon: Marlo Mcconnell MD;  Location[facility]     PHACOEMULSIFICATION WITH STANDARD INTRAOCULAR LENS IMPLANT Left 10/23/2018    Procedure: PHACOEMULSIFICATION CATARACT EXTRACTION POSTERIOR CHAMBER LENS LEFT;  Surgeon: Marlo Mcconnell MD;  Location[facility]       Family History   Problem Relation Age of Onset     Diabetes Mother      Parkinsonism Brother      Coronary Artery Disease No family hx of      Hypertension No family hx of       Hyperlipidemia No family hx of      Cerebrovascular Disease No family hx of      Breast Cancer No family hx of      Colon Cancer No family hx of      Prostate Cancer No family hx of      Anesthesia Reaction No family hx of      Asthma No family hx of      Thyroid Disease No family hx of      Genetic Disorder No family hx of        Social History     Tobacco Use     Smoking status: Former Smoker     Quit date: 1992     Years since quittin.1     Smokeless tobacco: Never Used   Substance Use Topics     Alcohol use: Yes     Comment: rarely       Current Outpatient Medications   Medication Sig Dispense Refill     acetaminophen (TYLENOL) 325 MG tablet Take 2 tablets (650 mg) by mouth every 4 hours as needed for pain 100 tablet 0     atorvastatin (LIPITOR) 40 MG tablet TAKE 1 TABLET (40 MG) BY MOUTH DAILY 90 tablet 3     blood glucose (ONETOUCH VERIO IQ) test strip USE TO TEST BLOOD SUGAR 2 TIMES DAILY 100 strip 11     brimonidine (ALPHAGAN-P) 0.15 % ophthalmic solution INSTILL 1 DROP INTO RIGHT EYE TWICE DAILY  12     Cholecalciferol (VITAMIN D3) 25 MCG TABS TAKE 1 CAPSULE BY MOUTH ONE TIME A DAY.       insulin aspart (NOVOLOG FLEXPEN) 100 UNIT/ML pen Inject 4 units prior to evening meal to start. Will titrate up as needed with correction. TDD 15 units 15 mL 3     insulin degludec (TRESIBA FLEXTOUCH) 100 UNIT/ML pen Inject 20 units daily 15 mL 3     insulin pen needle (32G X 4 MM) 32G X 4 MM miscellaneous Use 1 pen needles daily 100 each 11     latanoprost (XALATAN) 0.005 % ophthalmic solution Place 1 drop into both eyes At Bedtime       ONETOUCH DELICA LANCETS 33G MISC 1 each 2 times daily 100 each 10     tamsulosin (FLOMAX) 0.4 MG capsule Take 1 capsule (0.4 mg) by mouth daily 90 capsule 1     VITRON-C  MG TABS tablet TAKE 1 CAPSULE BY MOUTH ONE TIME A DAY.         Allergies   Allergen Reactions     No Clinical Screening - See Comments Other (See Comments)     Beta blocker in glaucoma gtt.s caused low  "pulse and passing out      Timolol      Beta blocker in glaucoma gtt.s caused low pulse and passing out   - patient thinks it was timolol but not 100% sure which beta blocker eye drop.        REVIEW OF SYSTEMS  Skin: itching from immunotherapy--no rash   Eyes: negative; appt next month with the eye MD  Ears/Nose/Throat: dry mouth  Respiratory: No shortness of breath, dyspnea on exertion, cough, or hemoptysis  Cardiovascular: negative  Gastrointestinal: negative  Genitourinary: frequency--same  Musculoskeletal: negative  Neurologic: negative  Psychiatric: negative  Hematologic/Lymphatic/Immunologic: ureter cancer, kidney removed, bladder cancer   Endocrine: positive for diabetes    OBJECTIVE:  /80 (BP Location: Left arm, Patient Position: Chair, Cuff Size: Adult Regular)   Pulse 62   Ht 1.695 m (5' 6.75\")   Wt 79.4 kg (175 lb)   SpO2 99%   BMI 27.61 kg/m    Constitutional: alert and no distress  Respiratory:  Good diaphragmatic excursion.  Musculoskeletal: extremities normal- no gross deformities noted and gait normal  Skin: no suspicious lesions or rashes to visible skin  Psychiatric: mentation appears normal and affect normal/bright    LABS  No results found for any visits on 03/10/22.    ASSESSMENT / PLAN:  (E11.65) Type 2 diabetes mellitus with hyperglycemia, without long-term current use of insulin (H)  (primary encounter diagnosis)  Comment:   CGM data as followed:  Date: 2/22 to 3/8/22  Glucose management indicator: 9.8%    Average glucose: 272    Glucose range  Very low (<54): 0  low (<70): 1%  In target range (): 16%  High (>180): 22%  Very high (>250): 61%    He does admit to liking the [employer] donuts.  States he eat 1/2 donut at a time, 1-2x/day.      Plan: GLUCOSE MONITOR, 72 HOUR, PHYS INTERP          Education focused on the flexibility of his meal time insulin     We discussed the following:  It's okay to be flexible with this before meals  If BGs >100--okay to take it    Small meal: " 3 units  Medium meal: 4 units  Large meal: 5 units    We also talked about a personal CGM (Freestyle Tiffany 2 with alarms)    He wasn't too interested today but will think about it.      Follow up  2 weeks for nurse only download    Call sooner if he changes his mind about the Freestyle Tiffany  Or any other concerns.     Patient Instructions   Continue working on healthy eating and moving (start low and slow, work up to 30 min, 5x/week)    BG goals:  Fasting and before meals <130, >80  2 hour after eating <180    We only need 1/2 of these numbers to be within target then your A1c will be within target    Medication changes   1.  Tresiba  No change    2.  Novolog  It's okay to be flexible with this before meals  If BGs >100--okay to take it    Small meal: 3 units  Medium meal: 4 units  Large meal: 5 units    Follow up   Nurse only download on 3/30/22    Call me sooner if any problems/concerns and/or questions develop including consistent low BGs <70 or consistent high BGs >200  [phone] (Unit Coordinator)    [phone] (Nurse)        Time: 30 min with patient + 5 min CGM review  Barrier: none  Willingness to learn: accepting    Maryann CARRASCO [facility]  Diabetes and Wound Care    Cc: Dr. Wallace    30 minutes was spent with patient.    All of this time was spent on counseling patient regarding illness, medication and/or treatment options, coordinating further cares and follow ups that are needed along with resource material that will be helpful in the treatment of these issues.         With the electronic record, we can now more quickly and easily track our patient diabetic goals. Our diabetes clinical review is in progress and these are the indicators we are monitoring for good diabetes health.     1.) HbA1C less than 7 (measurement of your average blood sugars)  2.) Blood Pressure less than 140/80  3.) LDL less than 100 (bad cholesterol)  4.) HbA1C is checked in the last 6 months and below 7% (more frequently  if not at goal or adjusting medications)  5.) LDL is checked in the last 12 months (more frequently if not at goal or adjusting medications)  6.) Taking one baby aspirin daily (unless otherwise instructed)  7.) No tobacco use  8) Statin use     You have achieved 5 out of 8 of these and I am encouraging you to come in and get tuned up to achieve 8 out of 8!  Here is what you have achieved so far in my goals for you:  1.) HbA1C  less than 7:                              NO  Your last  HbA1C :  Lab Results   Component Value Date    A1C 10.3 02/22/2022    A1C 7.1 11/24/2021   .  2.) Blood Pressure less than 140/80:       YES      Your last    BP Readings from Last 1 Encounters:   03/10/22 130/80     3.) LDL less than 100:                              NO   Your last     LDL Cholesterol Calculated   Date Value Ref Range Status   02/22/2022 108 (H) <=100 mg/dL Final   10/02/2020 75 <100 mg/dL Final     Comment:     Desirable:       <100 mg/dl       4.) Checked HbA1C in the past 6 months: YES      5.) Checked LDL in the past 12 months:    YES      6.) Taking one aspirin daily:                       NO  7.) No tobacco use:                                        YES      8.) Statin use      YES       CGM criteria  1.  Patient has been seen in the clinic within the last 6 month  2. Diagnosis of Type 2 diabetes  3. Has been testing their BGs 4x/day  4. Takes 3x/day injections  5. Requires frequent adjustments to their treatment regimen based on BGM and/or CGM testing results.

## 2022-03-10 NOTE — PATIENT INSTRUCTIONS
Continue working on healthy eating and moving (start low and slow, work up to 30 min, 5x/week)    BG goals:  Fasting and before meals <130, >80  2 hour after eating <180    We only need 1/2 of these numbers to be within target then your A1c will be within target    Medication changes   1.  Tresiba  No change    2.  Novolog  It's okay to be flexible with this before meals  If BGs >100--okay to take it    Small meal: 3 units  Medium meal: 4 units  Large meal: 5 units    Follow up   Nurse only download on 3/30/22    Call me sooner if any problems/concerns and/or questions develop including consistent low BGs <70 or consistent high BGs >200  [phone] (Unit Coordinator)    [phone] (Nurse)

## 2022-03-11 DIAGNOSIS — R39.14 BENIGN PROSTATIC HYPERPLASIA WITH INCOMPLETE BLADDER EMPTYING: ICD-10-CM

## 2022-03-11 DIAGNOSIS — N40.1 BENIGN PROSTATIC HYPERPLASIA WITH INCOMPLETE BLADDER EMPTYING: ICD-10-CM

## 2022-03-11 RX ORDER — TAMSULOSIN HYDROCHLORIDE 0.4 MG/1
0.4 CAPSULE ORAL DAILY
Qty: 90 CAPSULE | Refills: 3 | Status: SHIPPED | OUTPATIENT
Start: 2022-03-11 | End: 2022-01-01

## 2022-03-15 ENCOUNTER — TELEPHONE (OUTPATIENT)
Dept: EDUCATION SERVICES | Facility: HOSPITAL | Age: 77
End: 2022-03-15
Payer: COMMERCIAL

## 2022-03-15 DIAGNOSIS — E11.65 TYPE 2 DIABETES MELLITUS WITH HYPERGLYCEMIA, WITH LONG-TERM CURRENT USE OF INSULIN (H): Primary | ICD-10-CM

## 2022-03-15 DIAGNOSIS — Z79.4 TYPE 2 DIABETES MELLITUS WITH HYPERGLYCEMIA, WITH LONG-TERM CURRENT USE OF INSULIN (H): Primary | ICD-10-CM

## 2022-03-15 NOTE — TELEPHONE ENCOUNTER
Called Colin to discuss BGs, PAP and CGM.    Colin would like us to order a CGM for him.    I have pended the Tiffany orders for you to sign and send.

## 2022-03-16 NOTE — TELEPHONE ENCOUNTER
Fax received from [facility], the Tiffany is not covered as a pharmacy benefit and needs to be sent to the [facility] DME supplier.

## 2022-03-24 ENCOUNTER — TELEPHONE (OUTPATIENT)
Dept: EDUCATION SERVICES | Facility: OTHER | Age: 77
End: 2022-03-24
Payer: COMMERCIAL

## 2022-03-24 DIAGNOSIS — E11.9 TYPE 2 DIABETES MELLITUS WITHOUT COMPLICATION, WITHOUT LONG-TERM CURRENT USE OF INSULIN (H): ICD-10-CM

## 2022-03-24 RX ORDER — BLOOD SUGAR DIAGNOSTIC
STRIP MISCELLANEOUS
Qty: 100 STRIP | Refills: 11 | Status: SHIPPED | OUTPATIENT
Start: 2022-03-24 | End: 2023-01-01

## 2022-03-24 NOTE — TELEPHONE ENCOUNTER
Pt calls he has not been able to refill his test strips and will run out before insurance allow next refill. Rx states to test bid, but pt is testing tid. Can we update Rx and send in refill?

## 2022-03-25 ENCOUNTER — TELEPHONE (OUTPATIENT)
Dept: SURGERY | Facility: OTHER | Age: 77
End: 2022-03-25
Payer: COMMERCIAL

## 2022-03-25 DIAGNOSIS — E11.65 TYPE 2 DIABETES MELLITUS WITH HYPERGLYCEMIA, WITHOUT LONG-TERM CURRENT USE OF INSULIN (H): ICD-10-CM

## 2022-03-25 RX ORDER — PEN NEEDLE, DIABETIC 32GX 5/32"
NEEDLE, DISPOSABLE MISCELLANEOUS
Qty: 100 EACH | Refills: 11 | Status: SHIPPED | OUTPATIENT
Start: 2022-03-25

## 2022-03-25 NOTE — TELEPHONE ENCOUNTER
Referral was received from  for patient to have a colonoscopy for a diagnosis of: Special screening for malignant neoplasms,colon.   This patient will need a preop appointment prior to procedure.   Patient called and states that he does not want to schedule at this time due to other appointments that he needs to take care of.   Patient states that he would like a letter in the mail with more information about colonoscopies, and our office phone numbers to call when he is ready to schedule colonoscopy.      will be routed this phone encounter to notify her that patient has not been scheduled for meet and greet colonoscopy.

## 2022-03-29 ENCOUNTER — HOSPITAL ENCOUNTER (OUTPATIENT)
Dept: EDUCATION SERVICES | Facility: HOSPITAL | Age: 77
Discharge: HOME | End: 2022-03-29
Attending: NURSE PRACTITIONER | Admitting: FAMILY MEDICINE
Payer: COMMERCIAL

## 2022-03-29 VITALS
HEIGHT: 67 IN | SYSTOLIC BLOOD PRESSURE: 132 MMHG | RESPIRATION RATE: 16 BRPM | OXYGEN SATURATION: 97 % | WEIGHT: 176.8 LBS | DIASTOLIC BLOOD PRESSURE: 81 MMHG | HEART RATE: 62 BPM | BODY MASS INDEX: 27.75 KG/M2

## 2022-03-29 DIAGNOSIS — Z79.4 TYPE 2 DIABETES MELLITUS WITH HYPERGLYCEMIA, WITH LONG-TERM CURRENT USE OF INSULIN (H): Primary | ICD-10-CM

## 2022-03-29 DIAGNOSIS — E11.65 TYPE 2 DIABETES MELLITUS WITH HYPERGLYCEMIA, WITH LONG-TERM CURRENT USE OF INSULIN (H): Primary | ICD-10-CM

## 2022-03-29 PROCEDURE — G0108 DIAB MANAGE TRN  PER INDIV: HCPCS

## 2022-03-29 ASSESSMENT — PAIN SCALES - GENERAL: PAINLEVEL: NO PAIN (0)

## 2022-03-29 NOTE — LETTER
"    3/29/2022        RE: Colin Baxter  [address] [place] [address]        Diabetes Self-Management Education & Support    Presents for: Follow-up    SUBJECTIVE/OBJECTIVE:  Presents for: Follow-up  Accompanied by: Self  Diabetes education in the past 24mo: Yes  Focus of Visit: Monitoring, Taking Medication  Diabetes type: Type 2  Date of diagnosis: 7/29/19  Disease course: Improving (elevated BGs with chemo treatments)  How confident are you filling out medical forms by yourself:: Quite a bit  Transportation concerns: No  Difficulty affording diabetes medication?: No  Difficulty affording diabetes testing supplies?: No  Other concerns:: Glasses  Cultural Influences/Ethnic Background:  American        Diabetes Symptoms & Complications:  Fatigue: Yes  Neuropathy: No  Polydipsia: No  Polyphagia: No  Polyuria: No  Visual change: No  Slow healing wounds: No  Symptom course: Stable  Weight trend: Stable  Complications assessed today?: Yes  Autonomic neuropathy: No  CVA: No  Heart disease: No  Nephropathy: Yes  Peripheral neuropathy: No  Foot ulcerations: No  Peripheral Vascular Disease: No  Retinopathy: No    Patient Problem List and Family Medical History reviewed for relevant medical history, current medical status, and diabetes risk factors.    Vitals:  /81   Pulse 62   Resp 16   Ht 1.695 m (5' 6.75\")   Wt 80.2 kg (176 lb 12.8 oz)   SpO2 97%   BMI 27.90 kg/m    Estimated body mass index is 27.9 kg/m  as calculated from the following:    Height as of this encounter: 1.695 m (5' 6.75\").    Weight as of this encounter: 80.2 kg (176 lb 12.8 oz).   Last 3 BP:   BP Readings from Last 3 Encounters:   03/30/22 134/64   03/30/22 119/78   03/29/22 132/81       History   Smoking Status     Former Smoker     Quit date: 1/6/1992   Smokeless Tobacco     Never Used       Labs:  Lab Results   Component Value Date    A1C 10.3 02/22/2022    A1C 7.1 11/24/2021     Lab Results   Component Value Date    GLC 95 " 03/30/2022    GLC 99 07/06/2021     Lab Results   Component Value Date     02/22/2022    LDL 75 10/02/2020     HDL Cholesterol   Date Value Ref Range Status   10/02/2020 59 >39 mg/dL Final     Direct Measure HDL   Date Value Ref Range Status   02/22/2022 67 >=40 mg/dL Final   ]  GFR Estimate   Date Value Ref Range Status   03/30/2022 31 (L) >60 mL/min/1.73m2 Final     Comment:     Effective December 21, 2021 eGFRcr in adults is calculated using the 2021 CKD-EPI creatinine equation which includes age and gender (Lindy et al., NEJ, DOI: 10.1056/DGEMpq3611486)   07/06/2021 26 (L) >60 mL/min/[1.73_m2] Final     Comment:     Non  GFR Calc  Starting 12/18/2018, serum creatinine based estimated GFR (eGFR) will be   calculated using the Chronic Kidney Disease Epidemiology Collaboration   (CKD-EPI) equation.       GFR Estimate If Black   Date Value Ref Range Status   07/06/2021 31 (L) >60 mL/min/[1.73_m2] Final     Comment:      GFR Calc  Starting 12/18/2018, serum creatinine based estimated GFR (eGFR) will be   calculated using the Chronic Kidney Disease Epidemiology Collaboration   (CKD-EPI) equation.       Lab Results   Component Value Date    CR 2.19 03/30/2022    CR 2.33 07/06/2021     No results found for: MICROALBUMIN    Healthy Eating:  Healthy Eating Assessed Today: Yes  Cultural/Amish diet restrictions?: No  Meal planning/habits: Smaller portions, Avoiding sweets  Meals include: Breakfast, Lunch, Dinner  Breakfast: 1 toast or 1 english muffin, 1-2 eggs, breakfast meat - coffee/cream/splenda or corn flakes  Lunch: 1/2 sandwich, fruit, milk or 1.5 cups chili with corn bread or beef, cheese, milk, coffee - sometimes skips  Dinner: meat, veggies, 1 bread - sometimes salad - occasional starch (1/2 potato)  Snacks: grapes, leftover meat, 1/2 sandwich - Dove ice cream or 1/2 donut  Beverages: Water, Coffee, Milk  Has patient met with a dietitian in the past?: Yes    Being  Active:  Being Active Assessed Today: Yes  Exercise:: Currently not exercising  Barrier to exercise: None    Monitoring:  Monitoring Assessed Today: Yes  Did patient bring glucose meter to appointment? : Yes  Blood Glucose Meter: One Touch  Times checking blood sugar at home (number): 1  Times checking blood sugar at home (per): Day  Blood glucose trend: Decreasing        Taking Medications:  Diabetes Medication(s)     Insulin       insulin aspart (NOVOLOG FLEXPEN) 100 UNIT/ML pen    Inject 4 units prior to evening meal to start. Will titrate up as needed with correction. TDD 15 units     insulin degludec (TRESIBA FLEXTOUCH) 100 UNIT/ML pen    Inject 20 units daily          Taking Medication Assessed Today: Yes  Current Treatments: Diet  Problems taking diabetes medications regularly?: No  Diabetes medication side effects?: No    Problem Solving:  Problem Solving Assessed Today: Yes  Is the patient at risk for hypoglycemia?: Yes  Hypoglycemia Frequency: Weekly  Patient carries a carbohydrate source: Yes  Medical ID: No  Does patient have DKA prevention plan?: No  Does patient have severe weather/disaster plan for diabetes management?: No              Reducing Risks:  Reducing Risks Assessed Today: No  Has dilated eye exam at least once a year?: No (scheduled)  Sees dentist every 6 months?: No  Feet checked by healthcare provider in the last year?: No (Due)    Healthy Coping:  Healthy Coping Assessed Today: Yes  Emotional response to diabetes: Ready to learn, Concern for health and well-being  Informal Support system:: Family  Stage of change: PREPARATION (Decided to change - considering how)  Support resources: None  Patient Activation Measure Survey Score:  SHARITA Score (Last Two) 9/26/2018   SHARITA Raw Score 30   Activation Score 56   SHARITA Level 3       Diabetes knowledge and skills assessment:   Patient is knowledgeable in diabetes management concepts related to: Healthy Eating, Being Active, Monitoring and Taking  Medication    Patient needs further education on the following diabetes management concepts: Monitoring, Taking Medication and Problem Solving    Based on learning assessment above, most appropriate setting for further diabetes education would be: Individual setting.      INTERVENTIONS:    Education provided today on:  AADE Self-Care Behaviors:  Diabetes Pathophysiology  Monitoring: log and interpret results, individual blood glucose targets and frequency of monitoring  Taking Medication: action of prescribed medication, drawing up, administering and storing injectable diabetes medications, proper site selection and rotation for injections, side effects of prescribed medications and when to take medications    Opportunities for ongoing education and support in diabetes-self management were discussed.    Pt verbalized understanding of concepts discussed and recommendations provided today.       Education Materials Provided:  No new materials provided today      ASSESSMENT:  Readings range as follows: fastin-267, pre-lunch: 60, , pre-supper: 57, 100-349, post-supper: 58, 97, 121-228    Colin wants to apply for a PAP for his Tresiba and NovoLOG. Completed forms reviewed and faxed    Colin hasn't heard from [facility] about his Tiffany CGM    Colin has been following the NovoLOG dose instructions that go by units for a small, medium and large meal.        Patient's most recent   Lab Results   Component Value Date    A1C 10.3 2022    A1C 7.1 2021    is not meeting goal of <8.0    PLAN  See Patient Instructions for co-developed, patient-stated behavior change goals.  We will contact [facility] about your Tiffany CGM.  Expect a phone call from [employer] about the insulin  AVS printed and provided to patient today. See Follow-Up section for recommended follow-up.      Time Spent: 30 minutes  Encounter Type: Individual    Any diabetes medication dose changes were made via the [facility] Protocol and Collaborative Practice  Agreement with the patient's primary care provider. A copy of this encounter was shared with the provider.            Sincerely,        Caryl Griffin RN

## 2022-03-29 NOTE — PROGRESS NOTES
"Diabetes Self-Management Education & Support    Presents for: Follow-up    SUBJECTIVE/OBJECTIVE:  Presents for: Follow-up  Accompanied by: Self  Diabetes education in the past 24mo: Yes  Focus of Visit: Monitoring, Taking Medication  Diabetes type: Type 2  Date of diagnosis: 7/29/19  Disease course: Improving (elevated BGs with chemo treatments)  How confident are you filling out medical forms by yourself:: Quite a bit  Transportation concerns: No  Difficulty affording diabetes medication?: No  Difficulty affording diabetes testing supplies?: No  Other concerns:: Glasses  Cultural Influences/Ethnic Background:  American        Diabetes Symptoms & Complications:  Fatigue: Yes  Neuropathy: No  Polydipsia: No  Polyphagia: No  Polyuria: No  Visual change: No  Slow healing wounds: No  Symptom course: Stable  Weight trend: Stable  Complications assessed today?: Yes  Autonomic neuropathy: No  CVA: No  Heart disease: No  Nephropathy: Yes  Peripheral neuropathy: No  Foot ulcerations: No  Peripheral Vascular Disease: No  Retinopathy: No    Patient Problem List and Family Medical History reviewed for relevant medical history, current medical status, and diabetes risk factors.    Vitals:  /81   Pulse 62   Resp 16   Ht 1.695 m (5' 6.75\")   Wt 80.2 kg (176 lb 12.8 oz)   SpO2 97%   BMI 27.90 kg/m    Estimated body mass index is 27.9 kg/m  as calculated from the following:    Height as of this encounter: 1.695 m (5' 6.75\").    Weight as of this encounter: 80.2 kg (176 lb 12.8 oz).   Last 3 BP:   BP Readings from Last 3 Encounters:   03/30/22 134/64   03/30/22 119/78   03/29/22 132/81       History   Smoking Status     Former Smoker     Quit date: 1/6/1992   Smokeless Tobacco     Never Used       Labs:  Lab Results   Component Value Date    A1C 10.3 02/22/2022    A1C 7.1 11/24/2021     Lab Results   Component Value Date    GLC 95 03/30/2022    GLC 99 07/06/2021     Lab Results   Component Value Date     02/22/2022 "    LDL 75 10/02/2020     HDL Cholesterol   Date Value Ref Range Status   10/02/2020 59 >39 mg/dL Final     Direct Measure HDL   Date Value Ref Range Status   02/22/2022 67 >=40 mg/dL Final   ]  GFR Estimate   Date Value Ref Range Status   03/30/2022 31 (L) >60 mL/min/1.73m2 Final     Comment:     Effective December 21, 2021 eGFRcr in adults is calculated using the 2021 CKD-EPI creatinine equation which includes age and gender (Lindy et al., [place], DOI: 10.1056/FXONew2157656)   07/06/2021 26 (L) >60 mL/min/[1.73_m2] Final     Comment:     Non  GFR Calc  Starting 12/18/2018, serum creatinine based estimated GFR (eGFR) will be   calculated using the Chronic Kidney Disease Epidemiology Collaboration   (CKD-EPI) equation.       GFR Estimate If Black   Date Value Ref Range Status   07/06/2021 31 (L) >60 mL/min/[1.73_m2] Final     Comment:      GFR Calc  Starting 12/18/2018, serum creatinine based estimated GFR (eGFR) will be   calculated using the Chronic Kidney Disease Epidemiology Collaboration   (CKD-EPI) equation.       Lab Results   Component Value Date    CR 2.19 03/30/2022    CR 2.33 07/06/2021     No results found for: MICROALBUMIN    Healthy Eating:  Healthy Eating Assessed Today: Yes  Cultural/Caodaism diet restrictions?: No  Meal planning/habits: Smaller portions, Avoiding sweets  Meals include: Breakfast, Lunch, Dinner  Breakfast: 1 toast or 1 english muffin, 1-2 eggs, breakfast meat - coffee/cream/splenda or corn flakes  Lunch: 1/2 sandwich, fruit, milk or 1.5 cups chili with corn bread or beef, cheese, milk, coffee - sometimes skips  Dinner: meat, veggies, 1 bread - sometimes salad - occasional starch (1/2 potato)  Snacks: grapes, leftover meat, 1/2 sandwich - Dove ice cream or 1/2 donut  Beverages: Water, Coffee, Milk  Has patient met with a dietitian in the past?: Yes    Being Active:  Being Active Assessed Today: Yes  Exercise:: Currently not exercising  Barrier to  exercise: None    Monitoring:  Monitoring Assessed Today: Yes  Did patient bring glucose meter to appointment? : Yes  Blood Glucose Meter: One Touch  Times checking blood sugar at home (number): 1  Times checking blood sugar at home (per): Day  Blood glucose trend: Decreasing        Taking Medications:  Diabetes Medication(s)     Insulin       insulin aspart (NOVOLOG FLEXPEN) 100 UNIT/ML pen    Inject 4 units prior to evening meal to start. Will titrate up as needed with correction. TDD 15 units     insulin degludec (TRESIBA FLEXTOUCH) 100 UNIT/ML pen    Inject 20 units daily          Taking Medication Assessed Today: Yes  Current Treatments: Diet  Problems taking diabetes medications regularly?: No  Diabetes medication side effects?: No    Problem Solving:  Problem Solving Assessed Today: Yes  Is the patient at risk for hypoglycemia?: Yes  Hypoglycemia Frequency: Weekly  Patient carries a carbohydrate source: Yes  Medical ID: No  Does patient have DKA prevention plan?: No  Does patient have severe weather/disaster plan for diabetes management?: No              Reducing Risks:  Reducing Risks Assessed Today: No  Has dilated eye exam at least once a year?: No (scheduled)  Sees dentist every 6 months?: No  Feet checked by healthcare provider in the last year?: No (Due)    Healthy Coping:  Healthy Coping Assessed Today: Yes  Emotional response to diabetes: Ready to learn, Concern for health and well-being  Informal Support system:: Family  Stage of change: PREPARATION (Decided to change - considering how)  Support resources: None  Patient Activation Measure Survey Score:  SHARITA Score (Last Two) 9/26/2018   SHARITA Raw Score 30   Activation Score 56   SHARITA Level 3       Diabetes knowledge and skills assessment:   Patient is knowledgeable in diabetes management concepts related to: Healthy Eating, Being Active, Monitoring and Taking Medication    Patient needs further education on the following diabetes management concepts:  Monitoring, Taking Medication and Problem Solving    Based on learning assessment above, most appropriate setting for further diabetes education would be: Individual setting.      INTERVENTIONS:    Education provided today on:  AADE Self-Care Behaviors:  Diabetes Pathophysiology  Monitoring: log and interpret results, individual blood glucose targets and frequency of monitoring  Taking Medication: action of prescribed medication, drawing up, administering and storing injectable diabetes medications, proper site selection and rotation for injections, side effects of prescribed medications and when to take medications    Opportunities for ongoing education and support in diabetes-self management were discussed.    Pt verbalized understanding of concepts discussed and recommendations provided today.       Education Materials Provided:  No new materials provided today      ASSESSMENT:  Readings range as follows: fastin-267, pre-lunch: 60, , pre-supper: 57, 100-349, post-supper: 58, 97, 121-228    Colin wants to apply for a PAP for his Tresiba and NovoLOG. Completed forms reviewed and faxed    Colin hasn't heard from [employer] about his Tiffany CGM    Colin has been following the NovoLOG dose instructions that go by units for a small, medium and large meal.        Patient's most recent   Lab Results   Component Value Date    A1C 10.3 2022    A1C 7.1 2021    is not meeting goal of <8.0    PLAN  See Patient Instructions for co-developed, patient-stated behavior change goals.  We will contact [employer] about your Tiffany CGM.  Expect a phone call from [employer] about the insulin  AVS printed and provided to patient today. See Follow-Up section for recommended follow-up.      Time Spent: 30 minutes  Encounter Type: Individual    Any diabetes medication dose changes were made via the CDE Protocol and Collaborative Practice Agreement with the patient's primary care provider. A copy of this encounter was shared with the  provider.

## 2022-03-30 ENCOUNTER — INFUSION THERAPY VISIT (OUTPATIENT)
Dept: INFUSION THERAPY | Facility: OTHER | Age: 77
End: 2022-03-30
Attending: INTERNAL MEDICINE
Payer: COMMERCIAL

## 2022-03-30 ENCOUNTER — ONCOLOGY VISIT (OUTPATIENT)
Dept: ONCOLOGY | Facility: OTHER | Age: 77
End: 2022-03-30
Attending: NURSE PRACTITIONER
Payer: COMMERCIAL

## 2022-03-30 VITALS — SYSTOLIC BLOOD PRESSURE: 134 MMHG | DIASTOLIC BLOOD PRESSURE: 64 MMHG | HEART RATE: 66 BPM

## 2022-03-30 VITALS
BODY MASS INDEX: 27.82 KG/M2 | RESPIRATION RATE: 20 BRPM | TEMPERATURE: 97.5 F | SYSTOLIC BLOOD PRESSURE: 119 MMHG | WEIGHT: 177.25 LBS | HEIGHT: 67 IN | DIASTOLIC BLOOD PRESSURE: 78 MMHG | HEART RATE: 76 BPM | OXYGEN SATURATION: 96 %

## 2022-03-30 DIAGNOSIS — E11.65 TYPE 2 DIABETES MELLITUS WITH HYPERGLYCEMIA, WITHOUT LONG-TERM CURRENT USE OF INSULIN (H): ICD-10-CM

## 2022-03-30 DIAGNOSIS — E03.2 HYPOTHYROIDISM DUE TO MEDICATION: Primary | ICD-10-CM

## 2022-03-30 DIAGNOSIS — Q60.0 SOLITARY KIDNEY, CONGENITAL: ICD-10-CM

## 2022-03-30 DIAGNOSIS — C66.2 MALIGNANT NEOPLASM OF LEFT URETER (H): Primary | ICD-10-CM

## 2022-03-30 DIAGNOSIS — D64.9 ANEMIA, UNSPECIFIED TYPE: ICD-10-CM

## 2022-03-30 DIAGNOSIS — C67.3 MALIGNANT NEOPLASM OF ANTERIOR WALL OF URINARY BLADDER (H): ICD-10-CM

## 2022-03-30 DIAGNOSIS — N18.4 CKD (CHRONIC KIDNEY DISEASE) STAGE 4, GFR 15-29 ML/MIN (H): ICD-10-CM

## 2022-03-30 DIAGNOSIS — C66.2 MALIGNANT NEOPLASM OF LEFT URETER (H): ICD-10-CM

## 2022-03-30 LAB
ALBUMIN SERPL-MCNC: 3.5 G/DL (ref 3.4–5)
ALP SERPL-CCNC: 77 U/L (ref 40–150)
ALT SERPL W P-5'-P-CCNC: 13 U/L (ref 0–70)
ANION GAP SERPL CALCULATED.3IONS-SCNC: 6 MMOL/L (ref 3–14)
AST SERPL W P-5'-P-CCNC: 15 U/L (ref 0–45)
BASOPHILS # BLD AUTO: 0 10E3/UL (ref 0–0.2)
BASOPHILS NFR BLD AUTO: 1 %
BILIRUB SERPL-MCNC: 0.5 MG/DL (ref 0.2–1.3)
BUN SERPL-MCNC: 39 MG/DL (ref 7–30)
CALCIUM SERPL-MCNC: 8.4 MG/DL (ref 8.5–10.1)
CHLORIDE BLD-SCNC: 109 MMOL/L (ref 94–109)
CO2 SERPL-SCNC: 22 MMOL/L (ref 20–32)
CREAT SERPL-MCNC: 2.19 MG/DL (ref 0.66–1.25)
EOSINOPHIL # BLD AUTO: 0.2 10E3/UL (ref 0–0.7)
EOSINOPHIL NFR BLD AUTO: 4 %
ERYTHROCYTE [DISTWIDTH] IN BLOOD BY AUTOMATED COUNT: 14 % (ref 10–15)
GFR SERPL CREATININE-BSD FRML MDRD: 31 ML/MIN/1.73M2
GLUCOSE BLD-MCNC: 95 MG/DL (ref 70–99)
HCT VFR BLD AUTO: 34.9 % (ref 40–53)
HGB BLD-MCNC: 11.7 G/DL (ref 13.3–17.7)
IMM GRANULOCYTES # BLD: 0 10E3/UL
IMM GRANULOCYTES NFR BLD: 0 %
LYMPHOCYTES # BLD AUTO: 1.4 10E3/UL (ref 0.8–5.3)
LYMPHOCYTES NFR BLD AUTO: 28 %
MCH RBC QN AUTO: 31.7 PG (ref 26.5–33)
MCHC RBC AUTO-ENTMCNC: 33.5 G/DL (ref 31.5–36.5)
MCV RBC AUTO: 95 FL (ref 78–100)
MONOCYTES # BLD AUTO: 0.5 10E3/UL (ref 0–1.3)
MONOCYTES NFR BLD AUTO: 11 %
NEUTROPHILS # BLD AUTO: 2.7 10E3/UL (ref 1.6–8.3)
NEUTROPHILS NFR BLD AUTO: 56 %
NRBC # BLD AUTO: 0 10E3/UL
NRBC BLD AUTO-RTO: 0 /100
PLATELET # BLD AUTO: 194 10E3/UL (ref 150–450)
POTASSIUM BLD-SCNC: 4 MMOL/L (ref 3.4–5.3)
PROT SERPL-MCNC: 7.2 G/DL (ref 6.8–8.8)
RBC # BLD AUTO: 3.69 10E6/UL (ref 4.4–5.9)
SODIUM SERPL-SCNC: 137 MMOL/L (ref 133–144)
T4 FREE SERPL-MCNC: 0.55 NG/DL (ref 0.76–1.46)
TSH SERPL DL<=0.005 MIU/L-ACNC: 46.66 MU/L (ref 0.4–4)
VIT B12 SERPL-MCNC: 524 PG/ML (ref 193–986)
WBC # BLD AUTO: 4.9 10E3/UL (ref 4–11)

## 2022-03-30 PROCEDURE — 85025 COMPLETE CBC W/AUTO DIFF WBC: CPT | Mod: ZL

## 2022-03-30 PROCEDURE — 84443 ASSAY THYROID STIM HORMONE: CPT | Mod: ZL | Performed by: NURSE PRACTITIONER

## 2022-03-30 PROCEDURE — 250N000011 HC RX IP 250 OP 636: Performed by: NURSE PRACTITIONER

## 2022-03-30 PROCEDURE — 99214 OFFICE O/P EST MOD 30 MIN: CPT | Performed by: NURSE PRACTITIONER

## 2022-03-30 PROCEDURE — 96413 CHEMO IV INFUSION 1 HR: CPT

## 2022-03-30 PROCEDURE — G0463 HOSPITAL OUTPT CLINIC VISIT: HCPCS

## 2022-03-30 PROCEDURE — 82747 ASSAY OF FOLIC ACID RBC: CPT | Mod: ZL

## 2022-03-30 PROCEDURE — 84439 ASSAY OF FREE THYROXINE: CPT | Mod: ZL | Performed by: NURSE PRACTITIONER

## 2022-03-30 PROCEDURE — 80053 COMPREHEN METABOLIC PANEL: CPT | Mod: ZL | Performed by: NURSE PRACTITIONER

## 2022-03-30 PROCEDURE — 258N000003 HC RX IP 258 OP 636: Performed by: NURSE PRACTITIONER

## 2022-03-30 PROCEDURE — 36415 COLL VENOUS BLD VENIPUNCTURE: CPT | Mod: ZL

## 2022-03-30 PROCEDURE — 82607 VITAMIN B-12: CPT | Mod: ZL

## 2022-03-30 PROCEDURE — G0463 HOSPITAL OUTPT CLINIC VISIT: HCPCS | Mod: 25

## 2022-03-30 RX ORDER — ALBUTEROL SULFATE 0.83 MG/ML
2.5 SOLUTION RESPIRATORY (INHALATION)
Status: CANCELLED | OUTPATIENT
Start: 2022-03-30

## 2022-03-30 RX ORDER — EPINEPHRINE 1 MG/ML
0.3 INJECTION, SOLUTION, CONCENTRATE INTRAVENOUS EVERY 5 MIN PRN
Status: CANCELLED | OUTPATIENT
Start: 2022-03-30

## 2022-03-30 RX ORDER — LEVOTHYROXINE SODIUM 100 UG/1
100 TABLET ORAL DAILY
Qty: 60 TABLET | Refills: 0 | Status: SHIPPED | OUTPATIENT
Start: 2022-03-30 | End: 2022-06-08

## 2022-03-30 RX ORDER — ALBUTEROL SULFATE 90 UG/1
1-2 AEROSOL, METERED RESPIRATORY (INHALATION)
Status: CANCELLED
Start: 2022-03-30

## 2022-03-30 RX ORDER — HEPARIN SODIUM,PORCINE 10 UNIT/ML
5 VIAL (ML) INTRAVENOUS
Status: CANCELLED | OUTPATIENT
Start: 2022-03-30

## 2022-03-30 RX ORDER — MEPERIDINE HYDROCHLORIDE 25 MG/ML
25 INJECTION INTRAMUSCULAR; INTRAVENOUS; SUBCUTANEOUS EVERY 30 MIN PRN
Status: CANCELLED | OUTPATIENT
Start: 2022-03-30

## 2022-03-30 RX ORDER — NALOXONE HYDROCHLORIDE 0.4 MG/ML
0.2 INJECTION, SOLUTION INTRAMUSCULAR; INTRAVENOUS; SUBCUTANEOUS
Status: CANCELLED | OUTPATIENT
Start: 2022-03-30

## 2022-03-30 RX ORDER — DIPHENHYDRAMINE HYDROCHLORIDE 50 MG/ML
50 INJECTION INTRAMUSCULAR; INTRAVENOUS
Status: CANCELLED
Start: 2022-03-30

## 2022-03-30 RX ORDER — HEPARIN SODIUM (PORCINE) LOCK FLUSH IV SOLN 100 UNIT/ML 100 UNIT/ML
5 SOLUTION INTRAVENOUS
Status: CANCELLED | OUTPATIENT
Start: 2022-03-30

## 2022-03-30 RX ORDER — METHYLPREDNISOLONE SODIUM SUCCINATE 125 MG/2ML
125 INJECTION, POWDER, LYOPHILIZED, FOR SOLUTION INTRAMUSCULAR; INTRAVENOUS
Status: CANCELLED
Start: 2022-03-30

## 2022-03-30 RX ORDER — LORAZEPAM 2 MG/ML
0.5 INJECTION INTRAMUSCULAR EVERY 4 HOURS PRN
Status: CANCELLED | OUTPATIENT
Start: 2022-03-30

## 2022-03-30 RX ADMIN — SODIUM CHLORIDE 480 MG: 9 INJECTION, SOLUTION INTRAVENOUS at 09:57

## 2022-03-30 RX ADMIN — SODIUM CHLORIDE 250 ML: 9 INJECTION, SOLUTION INTRAVENOUS at 10:01

## 2022-03-30 ASSESSMENT — PAIN SCALES - GENERAL: PAINLEVEL: NO PAIN (0)

## 2022-03-30 NOTE — PROGRESS NOTES
Patient is 77  years old, here today for infusion of Nivolumab under the orders of Dr Bartlett.      Lab values: Provider reviewed the labs patient will start Levothyroxine.  Okay to treat    Patient meets parameters for today's infusion.  Denies questions or concerns regarding today's infusion and/or medications being administered.        Patient identified with two identifiers, order verified, and verbal consent for today's infusion obtained from patient.     1001 IV pump verified with Nivolumab dose, drug, and rate of administration. Infusion administered per protocol. Patient tolerated infusion well, no signs or symptoms of adverse reaction noted. Patient denies pain nor discomfort.     Needle removed, tip intact. Site clean, dry and intact. Covered with a sterile bandage, slight pressure applied for 30 seconds. Pt instructed to leave bandage intact for a minimum of one hour, and to call with questions or concerns. Patient states understanding, discharged.

## 2022-03-30 NOTE — PATIENT INSTRUCTIONS
We would like to see you back per your schedule.     Your prescription for levothyroxine was sent to [facility] pharmacy.    When you are in need of a refill, please call your pharmacy and they will send us a request.     If you have any questions please call [phone]    Other instructions:  none

## 2022-03-30 NOTE — NURSING NOTE
"Oncology Rooming Note    March 30, 2022 8:27 AM   Colin Baxter is a 77 year old male who presents for:    Chief Complaint   Patient presents with     Oncology Clinic Visit     Follow up Carcinoma in situ of bladder     Initial Vitals: /78   Pulse 76   Temp 97.5  F (36.4  C) (Tympanic)   Resp 20   Ht 1.702 m (5' 7\")   Wt 80.4 kg (177 lb 4 oz)   SpO2 96%   BMI 27.76 kg/m   Estimated body mass index is 27.76 kg/m  as calculated from the following:    Height as of this encounter: 1.702 m (5' 7\").    Weight as of this encounter: 80.4 kg (177 lb 4 oz). Body surface area is 1.95 meters squared.  No Pain (0) Comment: Data Unavailable   No LMP for male patient.  Allergies reviewed: Yes  Medications reviewed: Yes    Medications: Medication refills not needed today.  Pharmacy name entered into EPIC:    [id number] - [place] - [address] [id number] - [place], [place] - [address] [place] [address] ST Rawlsa SILVERIO Leung LPN            "

## 2022-03-30 NOTE — PROGRESS NOTES
22 gauge angio cath inserted into right.  Immediate blood return noted.  IV secured with sterile, transparent dressing and tape.  Patient tolerated well, denies pain or discomfort at this time.  Flushes easily without resistance, no signs or symptoms of infiltration or infection.  3mL blood wasted and blood removed for ordered labs. Flushed with 3mL normal saline to clear line. Patient denies questions or concerns regarding infusion and/or medication(s) being administered.

## 2022-03-30 NOTE — PROGRESS NOTES
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  He completed neoadjuvant chemotherapy on 5/11/2021, then underwent nephroureterectomy 6/16/2021 and now receivning adjuvant Nivolumab to complete 1 year.    He presents to the clinic today with few complaints.  States he did develop a little bit of a rash to the back of his knees and the back of his hands but feels that this is now resolving.  He continues to have itching.  We had discussed using Benadryl or Claritin and he read the side effects of the Benadryl and is hesitant.  He does feel the itching is tolerable.  Blood sugars are somewhat improved but do continue to fluctuate and he is following closely with diabetic education.    He saw Dr. Arvizu on 3/10/2022 and he felt his kidney functions were essentially stable.  He will see him again in 6 months.    He has an appointment with Dr. Davies with [facility] urology in April.    PCP, Dr. Wallace and will see her in May.    Oncologic History:     2016 patient was diagnosed with high-grade bladder cancer T1; patient was treated with BCG and then again reinduction BCG then GemCis intravesically x3 in May 2018     1/5/2021 patient was seen by Dr. Morrell for history of hematuria.  He underwent cystoscopy which revealed bladder lesions with biopsy being negative.  Cytology showed atypical urothelial cells.  He underwent CT urogram which showed a mass in the left ureter measuring 2 cm with no hydronephrosis.  He then underwent cystoureteroscopy with biopsy with pathology showing high-grade urothelial cell carcinoma  suspicious for lamina propria invasion.  2/10/2021  he underwent PET scan that was negative for metastatic disease and then seen by Dr. Mcnulty at the [facility] and scheduled for robotic assisted laparoscopic left nephroureterectomy.    2/15/2021 he was seen by Dr. Bartlett with Medical Oncology to discuss neoadjuvant chemotherapy.  Recommendation was for gemcitabine 1000 mg per metered squared day 1 and day 8 and cisplatin 70 mg per metered squared on day 1 of a 21-day cycle x4 cycles followed by surgery.  5/11/2021 completion of neoadjuvant chemotherapy  6/16/2021 he underwent Cystourethroscopy and Left robot assisted nephroureterectomy with Left pelvic lymph node dissection by Dr. Jorge Mcnulty and Dr. Reyes Romano; pathology showed a multifocal high-grade urothelial cell carcinoma, staged pT3N0  11/10/2021 initiation of nivolumab     Current Chemo Regime/TX: Nivolumab 480 mg every 28 days  Current Cycle:  6  # of completed cycles: 5     Previous treatment:  Gemcitabine 1000 mg per metered squared on day 1 and day 8 and cisplatin 56 mg per metered squared day 1 every 21 days     Past Medical History:   Diagnosis Date     Benign essential hypertension      Cancer (H)     Bladder     Carcinoma in situ of bladder 11/19/2021     Diabetes (H)      Dyslipidemia      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) 12/11/2020       Past Surgical History:   Procedure Laterality Date     APPENDECTOMY  1958     COLONOSCOPY  2-     COMBINED CYSTOSCOPY, RETROGRADES, URETEROSCOPY, INSERT STENT Left 1/18/2021    Procedure: CYSTOURETEROSCOPY, WITH RETROGRADE PYELOGRAM with interpretation of fluroscopy, ureteroscopy, ureteral biopsy, bladder biopsy and fulgaration;  Surgeon: Shonda Morrell MD;  Location: [facility]     CYSTOSCOPY N/A 6/16/2021    Procedure: CYSTOSCOPY;  Surgeon: Javier Mcnulty MD;  [facility]     CYSTOSCOPY, BIOPSY BLADDER, COMBINED N/A 9/8/2015    Procedure: COMBINED  CYSTOSCOPY, BIOPSY BLADDER;  Surgeon: Randy Martinez MD;  Location: [facility]     CYSTOSCOPY, BIOPSY BLADDER, COMBINED N/A 2/14/2017    Procedure: COMBINED CYSTOSCOPY, BIOPSY BLADDER;  Surgeon: Randy Martinez MD;  Location: [facility]     CYSTOSCOPY, BIOPSY BLADDER, COMBINED N/A 11/13/2018    Procedure: COMBINED CYSTOSCOPY, BIOPSY BLADDER;  Surgeon: Ed Helm MD;  Location: [facility] OR     CYSTOSCOPY, BIOPSY BLADDER, COMBINED N/A 11/5/2021    Procedure: CYSTOSCOPY, WITH BLADDER BIOPSY;  Surgeon: Shonda Morrell MD;  Location: [facility]     CYSTOSCOPY, RETROGRADES, COMBINED Bilateral 11/13/2018    Procedure: Bilateral Retrograde Pyelagram, Bladder Biopsy;  Surgeon: Ed Helm MD;  Location: [facility] OR     CYSTOSCOPY, RETROGRADES, COMBINED Right 11/5/2021    Procedure: CYSTOSCOPY, WITH RETROGRADE PYELOGRAM;  Surgeon: Shonda Morrell MD;  Location: [facility]     CYSTOSCOPY, RETROGRADES, INSERT STENT URETER(S), COMBINED Left 9/8/2015    Procedure: COMBINED CYSTOSCOPY, RETROGRADES, INSERT STENT URETER(S);  Surgeon: Randy Martinez MD;  Location: [facility]     CYSTOSCOPY, TRANSURETHRAL RESECTION (TUR) TUMOR BLADDER, COMBINED N/A 9/8/2015    Procedure: COMBINED CYSTOSCOPY, TRANSURETHRAL RESECTION (TUR) TUMOR BLADDER;  Surgeon: Randy Martinez MD;  [facility]     CYSTOSCOPY, TRANSURETHRAL RESECTION (TUR) TUMOR BLADDER, COMBINED N/A 1/12/2016    Procedure: COMBINED CYSTOSCOPY, TRANSURETHRAL RESECTION (TUR) TUMOR BLADDER;  Surgeon: Randy Martinez MD;  [facility]     CYSTOSCOPY, TRANSURETHRAL RESECTION (TUR) TUMOR BLADDER, COMBINED N/A 9/13/2016    Procedure: COMBINED CYSTOSCOPY, TRANSURETHRAL RESECTION (TUR) TUMOR BLADDER;  Surgeon: Randy Martinez MD;  [facility]: [facility]     DAVINCI NEPHROURETERECTOMY Left 6/16/2021    Procedure: NEPHROURETERECTOMY, ROBOT-ASSISTED, lymph node dissection;  Surgeon: Javier Mcnulty MD;  [facility]     PHACOEMULSIFICATION WITH STANDARD  INTRAOCULAR LENS IMPLANT Right 10/9/2018    Procedure: PHACOEMULSIFICATION WITH STANDARD INTRAOCULAR LENS IMPLANT;  PHACOEMULSIFICATION CATARACT EXTRACTION POSTERIOR CHAMBER LENS RIGHT;  Surgeon: Marlo Mcconnell MD;  Location: [facility]     PHACOEMULSIFICATION WITH STANDARD INTRAOCULAR LENS IMPLANT Left 10/23/2018    Procedure: PHACOEMULSIFICATION CATARACT EXTRACTION POSTERIOR CHAMBER LENS LEFT;  Surgeon: Marlo Mcconnell MD;  Location: [facility]       Family History   Problem Relation Age of Onset     Diabetes Mother      Parkinsonism Brother      Coronary Artery Disease No family hx of      Hypertension No family hx of      Hyperlipidemia No family hx of      Cerebrovascular Disease No family hx of      Breast Cancer No family hx of      Colon Cancer No family hx of      Prostate Cancer No family hx of      Anesthesia Reaction No family hx of      Asthma No family hx of      Thyroid Disease No family hx of      Genetic Disorder No family hx of        Social History     Socioeconomic History     Marital status:      Spouse name: Not on file     Number of children: Not on file     Years of education: Not on file     Highest education level: Not on file   Occupational History     Not on file   Tobacco Use     Smoking status: Former Smoker     Quit date: 1992     Years since quittin.2     Smokeless tobacco: Never Used   Vaping Use     Vaping Use: Never used   Substance and Sexual Activity     Alcohol use: Yes     Comment: rarely     Drug use: No     Sexual activity: Not Currently   Other Topics Concern     Parent/sibling w/ CABG, MI or angioplasty before 65F 55M? No   Social History Narrative     Not on file     Social Determinants of Health     Financial Resource Strain: Not on file   Food Insecurity: Not on file   Transportation Needs: Not on file   Physical Activity: Not on file   Stress: Not on file   Social Connections: Not on file   Intimate Partner Violence: Not on file   Housing Stability: Not on  file       Current Outpatient Medications   Medication     atorvastatin (LIPITOR) 40 MG tablet     blood glucose (ONETOUCH VERIO IQ) test strip     brimonidine (ALPHAGAN-P) 0.15 % ophthalmic solution     Cholecalciferol (VITAMIN D3) 25 MCG TABS     Continuous Blood Gluc  (FREESTYLE DELILAH 2 READER) TENNILLE     Continuous Blood Gluc Sensor (FREESTYLE DELILAH 2 SENSOR) MISC     insulin aspart (NOVOLOG FLEXPEN) 100 UNIT/ML pen     insulin degludec (TRESIBA FLEXTOUCH) 100 UNIT/ML pen     insulin pen needle (BD PEN NEEDLE DONTAE 2ND GEN) 32G X 4 MM miscellaneous     latanoprost (XALATAN) 0.005 % ophthalmic solution     ONETOUCH DELICA LANCETS 33G MISC     tamsulosin (FLOMAX) 0.4 MG capsule     VITRON-C  MG TABS tablet     acetaminophen (TYLENOL) 325 MG tablet     No current facility-administered medications for this visit.        Allergies   Allergen Reactions     No Clinical Screening - See Comments Other (See Comments)     Beta blocker in glaucoma gtt.s caused low pulse and passing out      Timolol      Beta blocker in glaucoma gtt.s caused low pulse and passing out   - patient thinks it was timolol but not 100% sure which beta blocker eye drop.        Review Of Systems:  Constitutional:    denies fever, weight changes, chills, and night sweats.  Eyes:    denies double vision-has some blurry vision-sees eye  4/19/22  Ears/Nose/Throat:   denies ear pain, nose problems, difficulty swallowing  Respiratory:   denies shortness of breath, cough   Skin:   denies rash, lesions  Cardiovascular:   denies chest pain, palpitations, edema  Gastrointestinal:   denies abdominal pain, bloating, nausea, early satiety; no change in bowel habits or blood in stool-does have loose stools on occasion  Genitourinary:   denies difficulty with urination, blood in urine; has ome frequency  Musculoskeletal:    denies new muscle pain, bone pain  Neurologic:   denies lightheadedness, headaches, numbness or tingling  Psychiatric:   denies  "anxiety, depression  Hematologic/Lymphatic/Immunologic:   denies easy bleeding, lumps or bumps noted; some easy bruising  Endocrine:   Denies increased thirst-has dry mouth      ECOG Performance Status: 0    Physical Exam:  /78   Pulse 76   Temp 97.5  F (36.4  C) (Tympanic)   Resp 20   Ht 1.702 m (5' 7\")   Wt 80.4 kg (177 lb 4 oz)   SpO2 96%   BMI 27.76 kg/m      GENERAL APPEARANCE: Healthy, alert and in no acute distress.  HEENT: Normocephalic, Sclerae anicteric.   NECK:   No asymmetry or masses, no thyromegaly.  LYMPHATICS: No palpable cervical, supraclavicular, axillary, or inguinal nodes   RESP: Lungs clear to auscultation bilaterally, respirations regular and easy  CARDIOVASCULAR: Regular rate and rhythm. Normal S1, S2; no murmur, gallop, or rub.  ABDOMEN: Soft, nontender. Bowel sounds auscultated all 4 quadrants. No palpable organomegaly or masses.  MUSCULOSKELETAL: Extremities without gross deformities noted. No edema of bilateral lower extremities.  SKIN: No suspicious lesions or rashes to exposed skin  NEURO: Alert and oriented x 3.  Gait steady.  PSYCHIATRIC: Mentation and affect appear normal.  Mood appropriate.    Laboratory:    TSH pending    Results for orders placed or performed in visit on 03/30/22   Comprehensive metabolic panel     Status: Abnormal   Result Value Ref Range    Sodium 137 133 - 144 mmol/L    Potassium 4.0 3.4 - 5.3 mmol/L    Chloride 109 94 - 109 mmol/L    Carbon Dioxide (CO2) 22 20 - 32 mmol/L    Anion Gap 6 3 - 14 mmol/L    Urea Nitrogen 39 (H) 7 - 30 mg/dL    Creatinine 2.19 (H) 0.66 - 1.25 mg/dL    Calcium 8.4 (L) 8.5 - 10.1 mg/dL    Glucose 95 70 - 99 mg/dL    Alkaline Phosphatase 77 40 - 150 U/L    AST 15 0 - 45 U/L    ALT 13 0 - 70 U/L    Protein Total 7.2 6.8 - 8.8 g/dL    Albumin 3.5 3.4 - 5.0 g/dL    Bilirubin Total 0.5 0.2 - 1.3 mg/dL    GFR Estimate 31 (L) >60 mL/min/1.73m2   CBC with platelets and differential     Status: Abnormal   Result Value Ref Range    " WBC Count 4.9 4.0 - 11.0 10e3/uL    RBC Count 3.69 (L) 4.40 - 5.90 10e6/uL    Hemoglobin 11.7 (L) 13.3 - 17.7 g/dL    Hematocrit 34.9 (L) 40.0 - 53.0 %    MCV 95 78 - 100 fL    MCH 31.7 26.5 - 33.0 pg    MCHC 33.5 31.5 - 36.5 g/dL    RDW 14.0 10.0 - 15.0 %    Platelet Count 194 150 - 450 10e3/uL    % Neutrophils 56 %    % Lymphocytes 28 %    % Monocytes 11 %    % Eosinophils 4 %    % Basophils 1 %    % Immature Granulocytes 0 %    NRBCs per 100 WBC 0 <1 /100    Absolute Neutrophils 2.7 1.6 - 8.3 10e3/uL    Absolute Lymphocytes 1.4 0.8 - 5.3 10e3/uL    Absolute Monocytes 0.5 0.0 - 1.3 10e3/uL    Absolute Eosinophils 0.2 0.0 - 0.7 10e3/uL    Absolute Basophils 0.0 0.0 - 0.2 10e3/uL    Absolute Immature Granulocytes 0.0 <=0.4 10e3/uL    Absolute NRBCs 0.0 10e3/uL   CBC with Platelets & Differential     Status: Abnormal    Narrative    The following orders were created for panel order CBC with Platelets & Differential.  Procedure                               Abnormality         Status                     ---------                               -----------         ------                     CBC with platelets and d...[[id number]]  Abnormal            Final result                 Please view results for these tests on the individual orders.       Imaging Studies:  None completed for today's visit      ASSESSMENT/PLAN:    1. 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  He completed neoadjuvant chemotherapy then underwent a nephroureterectomy and is now undergoing adjuvant therapy with nivolumab with plan to complete 1 year of therapy.  He will initiate cycle 6 today and follow-up prior to cycle 7 with labs per treatment plan.  Imaging will be completed to Dr. Davies in April.     2.  Type 2 diabetes mellitus Patient previously with type 2 diabetes.  Hyperglycemia likely due to immunotherapy with likely conversion to type I which  can happen while on immunotherapy. He is currently being managed through the diabetic clinic.  Blood sugars have improved.     3.  Stage IV chronic kidney disease:  He'll continue to follow with nephrology.     4.  bladder cancer:  He'll be seeing Dr. Schmitz with [facility] urology in April.  Plan at that point is likely a cystoscopy and CT urogram.      5.  Itching:  Discussed again the use of Benadryl or Claritin.    6.  anemia: Likely related to chronic kidney disease.  Will obtain a B12 and folate.    7.   hypothyroidism: Elevated TSH.  T4 pending.  Will initiate levothyroxine 100 mcg daily.  We will check another TSH in 4 weeks with likely no adjustment for at least 8 weeks.    I encouraged patient to call with any questions or concerns.    30 minutes spent in the patient's encounter today with time spent in review of the chart along with in chart preparation.  Time was also spent in review of his treatment plan and signing of his treatment plan.  Time was also spent obtaining a review of systems and performing a physical exam along with review of his lab work in detail with him.  Time was also spent in discussion of effects of the immunotherapy and ongoing management.  Time was spent in planning the next follow-up, ordering additional testing and in chart documentation.    Sabra Jensen, NP  APRN, FNP-BC, AOCNP

## 2022-04-03 LAB — FOLATE RBC-MCNC: 375 NG/ML

## 2022-04-04 ENCOUNTER — MEDICAL CORRESPONDENCE (OUTPATIENT)
Dept: HEALTH INFORMATION MANAGEMENT | Facility: CLINIC | Age: 77
End: 2022-04-04
Payer: COMMERCIAL

## 2022-04-11 ENCOUNTER — TELEPHONE (OUTPATIENT)
Dept: EDUCATION SERVICES | Facility: HOSPITAL | Age: 77
End: 2022-04-11
Payer: COMMERCIAL

## 2022-04-11 NOTE — TELEPHONE ENCOUNTER
Pt called stating he keeps getting messages from a place out [facility] [place] [facility] regarding the Tiffany.  Pt states he thought he already had that all worked out and that he should be receiving it soon.    Please advise.  ILEANA SIDHU LPN

## 2022-04-12 ENCOUNTER — TRANSFERRED RECORDS (OUTPATIENT)
Dept: HEALTH INFORMATION MANAGEMENT | Facility: CLINIC | Age: 77
End: 2022-04-12
Payer: COMMERCIAL

## 2022-04-12 NOTE — TELEPHONE ENCOUNTER
Called Colin. It sounds like he is getting phone calls from a different company for a Tiffany. I will call [employer].

## 2022-04-14 NOTE — TELEPHONE ENCOUNTER
I called [employer] on 4/13/22. I was told that [employer] does not work with Colin's insurance and that they have transferred all of Colin's orders/documents to their other company: [employer]. His information was sent to them a week ago. (That may be who has been calling Colin?)    They gave me the phone number to [employer]: [phone].    Please would you call [employer] to find out where things are with his Tiffany order?    Thanks!

## 2022-04-14 NOTE — TELEPHONE ENCOUNTER
I called [employer] [facility] they are located in [place]. He has not been enrolled yet. Pr needs to call them to enroll as a new pt.  [phone]

## 2022-04-14 NOTE — TELEPHONE ENCOUNTER
I called the pt and notified.   MANDAI: Pt received a call today from [employer] today and he will be receiving his pens tomorrow.

## 2022-04-15 NOTE — TELEPHONE ENCOUNTER
Pt called, he called [employer] and registered, they will be calling us for more information.   [Well Nourished] : well nourished [Well Developed] : well developed [Well-Appearing] : well-appearing [Normal Sclera/Conjunctiva] : normal sclera/conjunctiva [PERRL] : pupils equal round and reactive to light [EOMI] : extraocular movements intact [Normal Outer Ear/Nose] : the outer ears and nose were normal in appearance [Normal Oropharynx] : the oropharynx was normal [No JVD] : no jugular venous distention [No Lymphadenopathy] : no lymphadenopathy [Supple] : supple [Thyroid Normal, No Nodules] : the thyroid was normal and there were no nodules present [No Respiratory Distress] : no respiratory distress  [No Accessory Muscle Use] : no accessory muscle use [Clear to Auscultation] : lungs were clear to auscultation bilaterally [Normal Rate] : normal rate  [Regular Rhythm] : with a regular rhythm [Normal S1, S2] : normal S1 and S2 [No Murmur] : no murmur heard [No Carotid Bruits] : no carotid bruits [No Abdominal Bruit] : a ~M bruit was not heard ~T in the abdomen [No Varicosities] : no varicosities [Pedal Pulses Present] : the pedal pulses are present [No Edema] : there was no peripheral edema [No Palpable Aorta] : no palpable aorta [No Extremity Clubbing/Cyanosis] : no extremity clubbing/cyanosis [Soft] : abdomen soft [Non Tender] : non-tender [Non-distended] : non-distended [No Masses] : no abdominal mass palpated [No HSM] : no HSM [Normal Bowel Sounds] : normal bowel sounds [Normal Posterior Cervical Nodes] : no posterior cervical lymphadenopathy [Normal Anterior Cervical Nodes] : no anterior cervical lymphadenopathy [No CVA Tenderness] : no CVA  tenderness [No Spinal Tenderness] : no spinal tenderness [No Joint Swelling] : no joint swelling [Grossly Normal Strength/Tone] : grossly normal strength/tone [No Rash] : no rash [Coordination Grossly Intact] : coordination grossly intact [No Focal Deficits] : no focal deficits [Normal Gait] : normal gait [Deep Tendon Reflexes (DTR)] : deep tendon reflexes were 2+ and symmetric [Normal Affect] : the affect was normal [Normal Insight/Judgement] : insight and judgment were intact

## 2022-04-19 ENCOUNTER — TRANSFERRED RECORDS (OUTPATIENT)
Dept: HEALTH INFORMATION MANAGEMENT | Facility: CLINIC | Age: 77
End: 2022-04-19
Payer: COMMERCIAL

## 2022-04-19 LAB — RETINOPATHY: NEGATIVE

## 2022-04-25 ENCOUNTER — HOSPITAL ENCOUNTER (OUTPATIENT)
Dept: EDUCATION SERVICES | Facility: HOSPITAL | Age: 77
Discharge: HOME | End: 2022-04-25
Attending: NURSE PRACTITIONER | Admitting: FAMILY MEDICINE
Payer: COMMERCIAL

## 2022-04-25 VITALS
RESPIRATION RATE: 16 BRPM | OXYGEN SATURATION: 98 % | HEART RATE: 66 BPM | HEIGHT: 67 IN | BODY MASS INDEX: 28.17 KG/M2 | SYSTOLIC BLOOD PRESSURE: 147 MMHG | DIASTOLIC BLOOD PRESSURE: 80 MMHG | WEIGHT: 179.5 LBS

## 2022-04-25 DIAGNOSIS — Z79.4 TYPE 2 DIABETES MELLITUS WITH HYPERGLYCEMIA, WITH LONG-TERM CURRENT USE OF INSULIN (H): Primary | ICD-10-CM

## 2022-04-25 DIAGNOSIS — E11.65 TYPE 2 DIABETES MELLITUS WITH HYPERGLYCEMIA, WITH LONG-TERM CURRENT USE OF INSULIN (H): Primary | ICD-10-CM

## 2022-04-25 PROCEDURE — G0108 DIAB MANAGE TRN  PER INDIV: HCPCS

## 2022-04-25 ASSESSMENT — PAIN SCALES - GENERAL: PAINLEVEL: NO PAIN (0)

## 2022-04-25 NOTE — LETTER
"    4/25/2022        RE: Colin Baxter  [address] [place] [address]        Abnormal VS:    Problem: I spoke to Maryann Mccord  Via phone regarding the pt's elevated BP. Pt has no lightheadedness, HA, CP, chest tightness or chest pressure.  Plan: Provider recommended recheck at infusion later this week and if BP remains elevated follow-up with primary.  Pt notified and is satisfied with this plan.    Rowena Phan LPN          Diabetes Self-Management Education & Support    Presents for: Personal CGM Start    SUBJECTIVE/OBJECTIVE:  Presents for: Personal CGM Start  Accompanied by: Self  Diabetes education in the past 24mo: Yes  Focus of Visit: Monitoring, Taking Medication  Diabetes type: Type 2  Date of diagnosis: 7/29/19  Disease course: Improving (elevated BGs with chemo treatments)  How confident are you filling out medical forms by yourself:: Quite a bit  Diabetes management related comments/concerns: How to use the [employer]  Transportation concerns: No  Difficulty affording diabetes medication?: No  Difficulty affording diabetes testing supplies?: No  Other concerns:: Glasses  Cultural Influences/Ethnic Background:  Not  or       Diabetes Symptoms & Complications:  Fatigue: Yes  Neuropathy: No  Polydipsia: No  Polyphagia: No  Polyuria: No  Visual change: No  Slow healing wounds: No  Symptom course: Stable  Weight trend: Stable  Complications assessed today?: Yes  Autonomic neuropathy: No  CVA: No  Heart disease: No  Nephropathy: Yes  Peripheral neuropathy: No  Foot ulcerations: No  Peripheral Vascular Disease: No  Retinopathy: No    Patient Problem List and Family Medical History reviewed for relevant medical history, current medical status, and diabetes risk factors.    Vitals:  /80   Pulse 66   Resp 16   Ht 1.695 m (5' 6.75\")   Wt 81.4 kg (179 lb 8 oz)   SpO2 98%   BMI 28.32 kg/m    Estimated body mass index is 28.32 kg/m  as calculated from the following:    Height as " "of this encounter: 1.695 m (5' 6.75\").    Weight as of this encounter: 81.4 kg (179 lb 8 oz).   Last 3 BP:   BP Readings from Last 3 Encounters:   04/27/22 135/78   04/25/22 147/80   03/30/22 134/64       History   Smoking Status     Former Smoker     Quit date: 1/6/1992   Smokeless Tobacco     Never Used       Labs:  Lab Results   Component Value Date    A1C 10.3 02/22/2022    A1C 7.1 11/24/2021     Lab Results   Component Value Date    GLC 92 04/27/2022    GLC 99 07/06/2021     Lab Results   Component Value Date     02/22/2022    LDL 75 10/02/2020     HDL Cholesterol   Date Value Ref Range Status   10/02/2020 59 >39 mg/dL Final     Direct Measure HDL   Date Value Ref Range Status   02/22/2022 67 >=40 mg/dL Final   ]  GFR Estimate   Date Value Ref Range Status   04/27/2022 32 (L) >60 mL/min/1.73m2 Final     Comment:     Effective December 21, 2021 eGFRcr in adults is calculated using the 2021 CKD-EPI creatinine equation which includes age and gender (Lindy et al., NEJM, DOI: 10.1056/TPWZyh0236716)   07/06/2021 26 (L) >60 mL/min/[1.73_m2] Final     Comment:     Non  GFR Calc  Starting 12/18/2018, serum creatinine based estimated GFR (eGFR) will be   calculated using the Chronic Kidney Disease Epidemiology Collaboration   (CKD-EPI) equation.       GFR Estimate If Black   Date Value Ref Range Status   07/06/2021 31 (L) >60 mL/min/[1.73_m2] Final     Comment:      GFR Calc  Starting 12/18/2018, serum creatinine based estimated GFR (eGFR) will be   calculated using the Chronic Kidney Disease Epidemiology Collaboration   (CKD-EPI) equation.       Lab Results   Component Value Date    CR 2.12 04/27/2022    CR 2.33 07/06/2021     No results found for: MICROALBUMIN    Healthy Eating:  Healthy Eating Assessed Today: Yes  Cultural/[place] diet restrictions?: No  Meal planning/habits: Smaller portions, Avoiding sweets  Meals include: Breakfast, Lunch, Dinner  Breakfast: 1 toast or 1 " english muffin, 1-2 eggs, breakfast meat - coffee/cream/splenda or corn flakes  Lunch: 1/2 sandwich, fruit, milk or 1.5 cups chili with corn bread or beef, cheese, milk, coffee - sometimes skips  Dinner: meat, veggies, 1 bread - sometimes salad - occasional starch (1/2 potato)  Snacks: grapes, leftover meat, 1/2 sandwich - Dove ice cream or 1/2 donut  Beverages: Water, Coffee, Milk  Has patient met with a dietitian in the past?: Yes    Being Active:  Being Active Assessed Today: Yes  Exercise:: Currently not exercising  Barrier to exercise: None    Monitoring:  Monitoring Assessed Today: Yes  Did patient bring glucose meter to appointment? : Yes  Blood Glucose Meter: One Touch  Times checking blood sugar at home (number): 3  Times checking blood sugar at home (per): Day  Blood glucose trend: Decreasing        Taking Medications:  Diabetes Medication(s)     Insulin       insulin aspart (NOVOLOG FLEXPEN) 100 UNIT/ML pen    Inject 4 units prior to evening meal to start. Will titrate up as needed with correction. TDD 15 units     insulin degludec (TRESIBA FLEXTOUCH) 100 UNIT/ML pen    Inject 20 units daily          Taking Medication Assessed Today: Yes  Current Treatments: Diet, Insulin Injections  Dose schedule: Pre-breakfast, Pre-lunch, Pre-dinner  Given by: Patient  Injection/Infusion sites: Abdomen  Problems taking diabetes medications regularly?: No  Diabetes medication side effects?: No    Problem Solving:  Problem Solving Assessed Today: Yes  Is the patient at risk for hypoglycemia?: Yes  Hypoglycemia Frequency: Weekly  Patient carries a carbohydrate source: Yes  Medical ID: No  Does patient have DKA prevention plan?: No  Does patient have severe weather/disaster plan for diabetes management?: No    Hypoglycemia symptoms  Hunger: Yes  Feeling shaky: Yes    Hypoglycemia Complications  Blackouts: No  Hospitalization: No  Nocturnal hypoglycemia: No  Required assistance: No  Required glucagon injection: No  Seizures:  No    Reducing Risks:  Reducing Risks Assessed Today: No  Has dilated eye exam at least once a year?: No (scheduled)  Sees dentist every 6 months?: No  Feet checked by healthcare provider in the last year?: No (Due)    Healthy Coping:  Healthy Coping Assessed Today: Yes  Emotional response to diabetes: Ready to learn, Concern for health and well-being  Informal Support system:: Family  Stage of change: ACTION (Actively working towards change)  Support resources: None  Patient Activation Measure Survey Score:  SHARITA Score (Last Two) 9/26/2018   SHARITA Raw Score 30   Activation Score 56   SHARITA Level 3       Diabetes knowledge and skills assessment:   Patient is knowledgeable in diabetes management concepts related to: Healthy Eating, Being Active, Monitoring and Taking Medication    Patient needs further education on the following diabetes management concepts: Monitoring and Taking Medication    Based on learning assessment above, most appropriate setting for further diabetes education would be: Individual setting.      INTERVENTIONS:  Sensor was inserted with no resistance or bleeding at insertion site.    Education provided today on:  AADE Self-Care Behaviors:  Diabetes Pathophysiology  Monitoring: log and interpret results, individual blood glucose targets and frequency of monitoring  Taking Medication: action of prescribed medication, drawing up, administering and storing injectable diabetes medications, proper site selection and rotation for injections, side effects of prescribed medications and when to take medications    CGM-specific education:   Freestyle Tiffany sensor: insertion technique, sensor site location and rotation, insulin administration in relation to sensor placement, sensor wear, reasons to remove sensor (MRI, CT, diathermy), Vitamin C & Aspirin effects on sensor, Tiffany [place]: frequency of scanning sensor, length of time data is visible, use of built in glucose meter, Precision X-tra test strips, Use of  trends and graphs for pattern management and problem solving and Dosing insulin based on sensor glucose results       Education Materials Provided:  No new materials provided today    ASSESSMENT:  Readings range as follows:fastin-241, pre-lunch: 120-260, pre-supper:  and post-supper: 105-185  Colin was able to insert his Tiffany sensor with minimal prompting and states that he would feel comfortable inserting his own sensors.    Showed Colin the [employer] phone zeke. He will need his Apple password to download the zeke and an email to share data with us. He will ask his granddaughter for help with this.    Colin tells me that he has a rash behind his knees and on the back of his hands. I asked him to discuss this with Dr. Wallace. He has discussed it with the Oncology providers.    Pt verbalized understanding of concepts discussed and recommendations provided today.    PLAN  See Patient Instructions for co-developed, patient-stated behavior change goals.  Will schedule a CGM download in the next month.  AVS printed and provided to patient today. See Follow-Up section for recommended follow-up.      Time Spent: 30 minutes  Encounter Type: Individual    Any diabetes medication dose changes were made via the CDE Protocol and Collaborative Practice Agreement with the patient's primary care provider. A copy of this encounter was shared with the provider.            Sincerely,        Caryl Griffin RN

## 2022-04-25 NOTE — PROGRESS NOTES
"Diabetes Self-Management Education & Support    Presents for: Personal CGM Start    SUBJECTIVE/OBJECTIVE:  Presents for: Personal CGM Start  Accompanied by: Self  Diabetes education in the past 24mo: Yes  Focus of Visit: Monitoring, Taking Medication  Diabetes type: Type 2  Date of diagnosis: 7/29/19  Disease course: Improving (elevated BGs with chemo treatments)  How confident are you filling out medical forms by yourself:: Quite a bit  Diabetes management related comments/concerns: How to use the [employer]  Transportation concerns: No  Difficulty affording diabetes medication?: No  Difficulty affording diabetes testing supplies?: No  Other concerns:: Glasses  Cultural Influences/Ethnic Background:  Not  or       Diabetes Symptoms & Complications:  Fatigue: Yes  Neuropathy: No  Polydipsia: No  Polyphagia: No  Polyuria: No  Visual change: No  Slow healing wounds: No  Symptom course: Stable  Weight trend: Stable  Complications assessed today?: Yes  Autonomic neuropathy: No  CVA: No  Heart disease: No  Nephropathy: Yes  Peripheral neuropathy: No  Foot ulcerations: No  Peripheral Vascular Disease: No  Retinopathy: No    Patient Problem List and Family Medical History reviewed for relevant medical history, current medical status, and diabetes risk factors.    Vitals:  /80   Pulse 66   Resp 16   Ht 1.695 m (5' 6.75\")   Wt 81.4 kg (179 lb 8 oz)   SpO2 98%   BMI 28.32 kg/m    Estimated body mass index is 28.32 kg/m  as calculated from the following:    Height as of this encounter: 1.695 m (5' 6.75\").    Weight as of this encounter: 81.4 kg (179 lb 8 oz).   Last 3 BP:   BP Readings from Last 3 Encounters:   04/27/22 135/78   04/25/22 147/80   03/30/22 134/64       History   Smoking Status     Former Smoker     Quit date: 1/6/1992   Smokeless Tobacco     Never Used       Labs:  Lab Results   Component Value Date    A1C 10.3 02/22/2022    A1C 7.1 11/24/2021     Lab Results   Component Value Date "    GLC 92 04/27/2022    GLC 99 07/06/2021     Lab Results   Component Value Date     02/22/2022    LDL 75 10/02/2020     HDL Cholesterol   Date Value Ref Range Status   10/02/2020 59 >39 mg/dL Final     Direct Measure HDL   Date Value Ref Range Status   02/22/2022 67 >=40 mg/dL Final   ]  GFR Estimate   Date Value Ref Range Status   04/27/2022 32 (L) >60 mL/min/1.73m2 Final     Comment:     Effective December 21, 2021 eGFRcr in adults is calculated using the 2021 CKD-EPI creatinine equation which includes age and gender (Lindy et al., NEJM, DOI: 10.1056/[id number])   07/06/2021 26 (L) >60 mL/min/[1.73_m2] Final     Comment:     Non  GFR Calc  Starting 12/18/2018, serum creatinine based estimated GFR (eGFR) will be   calculated using the Chronic Kidney Disease Epidemiology Collaboration   (CKD-EPI) equation.       GFR Estimate If Black   Date Value Ref Range Status   07/06/2021 31 (L) >60 mL/min/[1.73_m2] Final     Comment:      GFR Calc  Starting 12/18/2018, serum creatinine based estimated GFR (eGFR) will be   calculated using the Chronic Kidney Disease Epidemiology Collaboration   (CKD-EPI) equation.       Lab Results   Component Value Date    CR 2.12 04/27/2022    CR 2.33 07/06/2021     No results found for: MICROALBUMIN    Healthy Eating:  Healthy Eating Assessed Today: Yes  Cultural/Evangelical diet restrictions?: No  Meal planning/habits: Smaller portions, Avoiding sweets  Meals include: Breakfast, Lunch, Dinner  Breakfast: 1 toast or 1 english muffin, 1-2 eggs, breakfast meat - coffee/cream/splenda or corn flakes  Lunch: 1/2 sandwich, fruit, milk or 1.5 cups chili with corn bread or beef, cheese, milk, coffee - sometimes skips  Dinner: meat, veggies, 1 bread - sometimes salad - occasional starch (1/2 potato)  Snacks: grapes, leftover meat, 1/2 sandwich - Dove ice cream or 1/2 donut  Beverages: Water, Coffee, Milk  Has patient met with a dietitian in the past?:  Yes    Being Active:  Being Active Assessed Today: Yes  Exercise:: Currently not exercising  Barrier to exercise: None    Monitoring:  Monitoring Assessed Today: Yes  Did patient bring glucose meter to appointment? : Yes  Blood Glucose Meter: One Touch  Times checking blood sugar at home (number): 3  Times checking blood sugar at home (per): Day  Blood glucose trend: Decreasing        Taking Medications:  Diabetes Medication(s)     Insulin       insulin aspart (NOVOLOG FLEXPEN) 100 UNIT/ML pen    Inject 4 units prior to evening meal to start. Will titrate up as needed with correction. TDD 15 units     insulin degludec (TRESIBA FLEXTOUCH) 100 UNIT/ML pen    Inject 20 units daily          Taking Medication Assessed Today: Yes  Current Treatments: Diet, Insulin Injections  Dose schedule: Pre-breakfast, Pre-lunch, Pre-dinner  Given by: Patient  Injection/Infusion sites: Abdomen  Problems taking diabetes medications regularly?: No  Diabetes medication side effects?: No    Problem Solving:  Problem Solving Assessed Today: Yes  Is the patient at risk for hypoglycemia?: Yes  Hypoglycemia Frequency: Weekly  Patient carries a carbohydrate source: Yes  Medical ID: No  Does patient have DKA prevention plan?: No  Does patient have severe weather/disaster plan for diabetes management?: No    Hypoglycemia symptoms  Hunger: Yes  Feeling shaky: Yes    Hypoglycemia Complications  Blackouts: No  Hospitalization: No  Nocturnal hypoglycemia: No  Required assistance: No  Required glucagon injection: No  Seizures: No    Reducing Risks:  Reducing Risks Assessed Today: No  Has dilated eye exam at least once a year?: No (scheduled)  Sees dentist every 6 months?: No  Feet checked by healthcare provider in the last year?: No (Due)    Healthy Coping:  Healthy Coping Assessed Today: Yes  Emotional response to diabetes: Ready to learn, Concern for health and well-being  Informal Support system:: Family  Stage of change: ACTION (Actively working  towards change)  Support resources: None  Patient Activation Measure Survey Score:  SHARITA Score (Last Two) 2018   SHARITA Raw Score 30   Activation Score 56   SHARITA Level 3       Diabetes knowledge and skills assessment:   Patient is knowledgeable in diabetes management concepts related to: Healthy Eating, Being Active, Monitoring and Taking Medication    Patient needs further education on the following diabetes management concepts: Monitoring and Taking Medication    Based on learning assessment above, most appropriate setting for further diabetes education would be: Individual setting.      INTERVENTIONS:  Sensor was inserted with no resistance or bleeding at insertion site.    Education provided today on:  AADE Self-Care Behaviors:  Diabetes Pathophysiology  Monitoring: log and interpret results, individual blood glucose targets and frequency of monitoring  Taking Medication: action of prescribed medication, drawing up, administering and storing injectable diabetes medications, proper site selection and rotation for injections, side effects of prescribed medications and when to take medications    CGM-specific education:   Freestyle Tiffany sensor: insertion technique, sensor site location and rotation, insulin administration in relation to sensor placement, sensor wear, reasons to remove sensor (MRI, CT, diathermy), Vitamin C & Aspirin effects on sensor, Tiffany [place]: frequency of scanning sensor, length of time data is visible, use of built in glucose meter, Precision X-tra test strips, Use of trends and graphs for pattern management and problem solving and Dosing insulin based on sensor glucose results       Education Materials Provided:  No new materials provided today    ASSESSMENT:  Readings range as follows:fastin-241, pre-lunch: 120-260, pre-supper:  and post-supper: 105-185  Colin was able to insert his Tiffany sensor with minimal prompting and states that he would feel comfortable inserting his own  sensors.    Showed Colin the [employer] phone zeke. He will need his [employer] password to download the zeke and an email to share data with us. He will ask his granddaughter for help with this.    Colin tells me that he has a rash behind his knees and on the back of his hands. I asked him to discuss this with Dr. Wallace. He has discussed it with the Oncology providers.    Pt verbalized understanding of concepts discussed and recommendations provided today.    PLAN  See Patient Instructions for co-developed, patient-stated behavior change goals.  Will schedule a CGM download in the next month.  AVS printed and provided to patient today. See Follow-Up section for recommended follow-up.      Time Spent: 30 minutes  Encounter Type: Individual    Any diabetes medication dose changes were made via the CDE Protocol and Collaborative Practice Agreement with the patient's primary care provider. A copy of this encounter was shared with the provider.

## 2022-04-25 NOTE — PROGRESS NOTES
Abnormal VS:    Problem: I spoke to Maryann Mccord  Via phone regarding the pt's elevated BP. Pt has no lightheadedness, HA, CP, chest tightness or chest pressure.  Plan: Provider recommended recheck at infusion later this week and if BP remains elevated follow-up with primary.  Pt notified and is satisfied with this plan.    Rowena Phan LPN

## 2022-04-27 ENCOUNTER — INFUSION THERAPY VISIT (OUTPATIENT)
Dept: INFUSION THERAPY | Facility: OTHER | Age: 77
End: 2022-04-27
Attending: INTERNAL MEDICINE
Payer: COMMERCIAL

## 2022-04-27 VITALS
HEART RATE: 58 BPM | BODY MASS INDEX: 28.25 KG/M2 | SYSTOLIC BLOOD PRESSURE: 135 MMHG | TEMPERATURE: 97.4 F | RESPIRATION RATE: 16 BRPM | DIASTOLIC BLOOD PRESSURE: 78 MMHG | WEIGHT: 179.01 LBS | OXYGEN SATURATION: 100 %

## 2022-04-27 DIAGNOSIS — C66.2 MALIGNANT NEOPLASM OF LEFT URETER (H): Primary | ICD-10-CM

## 2022-04-27 LAB
ALBUMIN SERPL-MCNC: 3.6 G/DL (ref 3.4–5)
ALP SERPL-CCNC: 76 U/L (ref 40–150)
ALT SERPL W P-5'-P-CCNC: 16 U/L (ref 0–70)
ANION GAP SERPL CALCULATED.3IONS-SCNC: 6 MMOL/L (ref 3–14)
AST SERPL W P-5'-P-CCNC: 16 U/L (ref 0–45)
BASOPHILS # BLD AUTO: 0 10E3/UL (ref 0–0.2)
BASOPHILS NFR BLD AUTO: 1 %
BILIRUB SERPL-MCNC: 0.5 MG/DL (ref 0.2–1.3)
BUN SERPL-MCNC: 37 MG/DL (ref 7–30)
CALCIUM SERPL-MCNC: 8.5 MG/DL (ref 8.5–10.1)
CHLORIDE BLD-SCNC: 109 MMOL/L (ref 94–109)
CO2 SERPL-SCNC: 23 MMOL/L (ref 20–32)
CREAT SERPL-MCNC: 2.12 MG/DL (ref 0.66–1.25)
EOSINOPHIL # BLD AUTO: 0.1 10E3/UL (ref 0–0.7)
EOSINOPHIL NFR BLD AUTO: 2 %
ERYTHROCYTE [DISTWIDTH] IN BLOOD BY AUTOMATED COUNT: 13.5 % (ref 10–15)
GFR SERPL CREATININE-BSD FRML MDRD: 32 ML/MIN/1.73M2
GLUCOSE BLD-MCNC: 92 MG/DL (ref 70–99)
HCT VFR BLD AUTO: 34.8 % (ref 40–53)
HGB BLD-MCNC: 11.7 G/DL (ref 13.3–17.7)
IMM GRANULOCYTES # BLD: 0 10E3/UL
IMM GRANULOCYTES NFR BLD: 0 %
LYMPHOCYTES # BLD AUTO: 1.5 10E3/UL (ref 0.8–5.3)
LYMPHOCYTES NFR BLD AUTO: 21 %
MCH RBC QN AUTO: 31.4 PG (ref 26.5–33)
MCHC RBC AUTO-ENTMCNC: 33.6 G/DL (ref 31.5–36.5)
MCV RBC AUTO: 93 FL (ref 78–100)
MONOCYTES # BLD AUTO: 0.8 10E3/UL (ref 0–1.3)
MONOCYTES NFR BLD AUTO: 11 %
NEUTROPHILS # BLD AUTO: 4.8 10E3/UL (ref 1.6–8.3)
NEUTROPHILS NFR BLD AUTO: 65 %
NRBC # BLD AUTO: 0 10E3/UL
NRBC BLD AUTO-RTO: 0 /100
PLATELET # BLD AUTO: 190 10E3/UL (ref 150–450)
POTASSIUM BLD-SCNC: 4.1 MMOL/L (ref 3.4–5.3)
PROT SERPL-MCNC: 7.2 G/DL (ref 6.8–8.8)
RBC # BLD AUTO: 3.73 10E6/UL (ref 4.4–5.9)
SODIUM SERPL-SCNC: 138 MMOL/L (ref 133–144)
T4 FREE SERPL-MCNC: 1.41 NG/DL (ref 0.76–1.46)
TSH SERPL DL<=0.005 MIU/L-ACNC: 0.23 MU/L (ref 0.4–4)
WBC # BLD AUTO: 7.2 10E3/UL (ref 4–11)

## 2022-04-27 PROCEDURE — 84439 ASSAY OF FREE THYROXINE: CPT | Mod: ZL | Performed by: NURSE PRACTITIONER

## 2022-04-27 PROCEDURE — 85025 COMPLETE CBC W/AUTO DIFF WBC: CPT | Mod: ZL

## 2022-04-27 PROCEDURE — 84443 ASSAY THYROID STIM HORMONE: CPT | Mod: ZL | Performed by: NURSE PRACTITIONER

## 2022-04-27 PROCEDURE — 96413 CHEMO IV INFUSION 1 HR: CPT

## 2022-04-27 PROCEDURE — 258N000003 HC RX IP 258 OP 636: Performed by: NURSE PRACTITIONER

## 2022-04-27 PROCEDURE — 250N000011 HC RX IP 250 OP 636: Performed by: NURSE PRACTITIONER

## 2022-04-27 PROCEDURE — 96365 THER/PROPH/DIAG IV INF INIT: CPT

## 2022-04-27 PROCEDURE — 36415 COLL VENOUS BLD VENIPUNCTURE: CPT | Mod: ZL

## 2022-04-27 PROCEDURE — 80053 COMPREHEN METABOLIC PANEL: CPT | Mod: ZL | Performed by: NURSE PRACTITIONER

## 2022-04-27 RX ORDER — ALBUTEROL SULFATE 0.83 MG/ML
2.5 SOLUTION RESPIRATORY (INHALATION)
Status: CANCELLED | OUTPATIENT
Start: 2022-04-27

## 2022-04-27 RX ORDER — EPINEPHRINE 1 MG/ML
0.3 INJECTION, SOLUTION, CONCENTRATE INTRAVENOUS EVERY 5 MIN PRN
Status: CANCELLED | OUTPATIENT
Start: 2022-04-27

## 2022-04-27 RX ORDER — HEPARIN SODIUM,PORCINE 10 UNIT/ML
5 VIAL (ML) INTRAVENOUS
Status: CANCELLED | OUTPATIENT
Start: 2022-04-27

## 2022-04-27 RX ORDER — DIPHENHYDRAMINE HYDROCHLORIDE 50 MG/ML
50 INJECTION INTRAMUSCULAR; INTRAVENOUS
Status: CANCELLED
Start: 2022-04-27

## 2022-04-27 RX ORDER — ALBUTEROL SULFATE 90 UG/1
1-2 AEROSOL, METERED RESPIRATORY (INHALATION)
Status: CANCELLED
Start: 2022-04-27

## 2022-04-27 RX ORDER — METHYLPREDNISOLONE SODIUM SUCCINATE 125 MG/2ML
125 INJECTION, POWDER, LYOPHILIZED, FOR SOLUTION INTRAMUSCULAR; INTRAVENOUS
Status: CANCELLED
Start: 2022-04-27

## 2022-04-27 RX ORDER — LORAZEPAM 2 MG/ML
0.5 INJECTION INTRAMUSCULAR EVERY 4 HOURS PRN
Status: CANCELLED | OUTPATIENT
Start: 2022-04-27

## 2022-04-27 RX ORDER — MEPERIDINE HYDROCHLORIDE 25 MG/ML
25 INJECTION INTRAMUSCULAR; INTRAVENOUS; SUBCUTANEOUS EVERY 30 MIN PRN
Status: CANCELLED | OUTPATIENT
Start: 2022-04-27

## 2022-04-27 RX ORDER — NALOXONE HYDROCHLORIDE 0.4 MG/ML
0.2 INJECTION, SOLUTION INTRAMUSCULAR; INTRAVENOUS; SUBCUTANEOUS
Status: CANCELLED | OUTPATIENT
Start: 2022-04-27

## 2022-04-27 RX ORDER — HEPARIN SODIUM (PORCINE) LOCK FLUSH IV SOLN 100 UNIT/ML 100 UNIT/ML
5 SOLUTION INTRAVENOUS
Status: CANCELLED | OUTPATIENT
Start: 2022-04-27

## 2022-04-27 RX ADMIN — SODIUM CHLORIDE 480 MG: 9 INJECTION, SOLUTION INTRAVENOUS at 13:47

## 2022-04-27 RX ADMIN — SODIUM CHLORIDE 250 ML: 9 INJECTION, SOLUTION INTRAVENOUS at 13:47

## 2022-04-27 NOTE — PROGRESS NOTES
Patient is a 76 y/o here accompanied by self today for infusion of nivolumab per order of Dr. Bartlett.  Patient identified with two identifiers, order verified, and verbal consent for today's infusion obtained from patient.      22 gauge angio cath inserted into right arm.  Immediate blood return noted.  Ordered labs taken. Flushed with 3mL normal saline, flushes easily without resistance, no signs or symptoms of infiltration or infection. IV secured with sterile, transparent dressing and tape.  Patient tolerated well, denies pain or discomfort at this time.     Component      Latest Ref Rng & Units 4/27/2022   WBC      4.0 - 11.0 10e3/uL 7.2   RBC Count      4.40 - 5.90 10e6/uL 3.73 (L)   Hemoglobin      13.3 - 17.7 g/dL 11.7 (L)   Hematocrit      40.0 - 53.0 % 34.8 (L)   MCV      78 - 100 fL 93   MCH      26.5 - 33.0 pg 31.4   MCHC      31.5 - 36.5 g/dL 33.6   RDW      10.0 - 15.0 % 13.5   Platelet Count      150 - 450 10e3/uL 190   % Neutrophils      % 65   % Lymphocytes      % 21   % Monocytes      % 11   % Eosinophils      % 2   % Basophils      % 1   % Immature Granulocytes      % 0   NRBCs per 100 WBC      <1 /100 0   Absolute Neutrophils      1.6 - 8.3 10e3/uL 4.8   Absolute Lymphocytes      0.8 - 5.3 10e3/uL 1.5   Absolute Monocytes      0.0 - 1.3 10e3/uL 0.8   Absolute Eosinophils      0.0 - 0.7 10e3/uL 0.1   Absolute Basophils      0.0 - 0.2 10e3/uL 0.0   Absolute Immature Granulocytes      <=0.4 10e3/uL 0.0   Absolute NRBCs      10e3/uL 0.0   Sodium      133 - 144 mmol/L 138   Potassium      3.4 - 5.3 mmol/L 4.1   Chloride      94 - 109 mmol/L 109   Carbon Dioxide      20 - 32 mmol/L 23   Anion Gap      3 - 14 mmol/L 6   Urea Nitrogen      7 - 30 mg/dL 37 (H)   Creatinine      0.66 - 1.25 mg/dL 2.12 (H)   Calcium      8.5 - 10.1 mg/dL 8.5   Glucose      70 - 99 mg/dL 92   Alkaline Phosphatase      40 - 150 U/L 76   AST      0 - 45 U/L 16   ALT      0 - 70 U/L 16   Protein Total      6.8 - 8.8 g/dL 7.2    Albumin      3.4 - 5.0 g/dL 3.6   Bilirubin Total      0.2 - 1.3 mg/dL 0.5   GFR Estimate      >60 mL/min/1.73m2 32 (L)     Component      Latest Ref Rng & Units 4/27/2022   TSH      0.40 - 4.00 mU/L 0.23 (L)   T4 Free      0.76 - 1.46 ng/dL 1.41     Patient meets order parameters for today's treatment.    1347 IV pump verified with nivolumab dose, drug, and rate of administration.  Infusion administered per protocol.  Patient tolerated infusion well, no signs or symptoms of adverse reaction noted.  Patient denies pain nor discomfort.     IV removed, catheter intact.  Site clean, dry and intact.  No signs or symptoms of infiltration or infection.  Covered with a sterile bandage, slight pressure applied for 30 seconds.  Pt instructed to leave bandage intact for a minimum of one hour, and to call with questions or concerns.  Copy of appointments, discharge instructions, and after visit summary (AVS) provided to patient.  Patient states understanding, discharged.

## 2022-04-29 ENCOUNTER — TELEPHONE (OUTPATIENT)
Dept: ONCOLOGY | Facility: OTHER | Age: 77
End: 2022-04-29
Payer: COMMERCIAL

## 2022-04-29 NOTE — TELEPHONE ENCOUNTER
Called and notified patient of information below. Pt notes his diarrhea has increased to 2 times a day since Thursday, advised could be treatment related but if the amount of stools does increase to where it is all day and night to follow up with us next week.     Pt also notes itching, this was noted in the last visit note and Jennifer recommended Benadryl or Claritin. Asked pt if he tried either one, he said he did take 1 Benadryl which seemed to help but didn't take anymore. Advised to try that again and take as directed on the bottle to see if it helps relieve the itching. Pt verbalized understanding.

## 2022-04-29 NOTE — TELEPHONE ENCOUNTER
It's possible-more likely the treatment though.  As long as it's not worsening we should just monitor.  BRAT diet.  I'll discuss with him at his next follow up.

## 2022-04-29 NOTE — TELEPHONE ENCOUNTER
----- Message from Amarilis Bell RN sent at 4/27/2022  2:10 PM CDT -----  Regarding: loose stools  Colin wanted you to be aware that he has had 1 loose/day for the past 1-1.5 weeks.  He is wondering if the Levothyroxine could be causing this.  Thanks, Amarilis

## 2022-05-10 ENCOUNTER — TELEPHONE (OUTPATIENT)
Dept: ONCOLOGY | Facility: OTHER | Age: 77
End: 2022-05-10
Payer: COMMERCIAL

## 2022-05-10 DIAGNOSIS — C66.2 MALIGNANT NEOPLASM OF LEFT URETER (H): Primary | ICD-10-CM

## 2022-05-10 NOTE — TELEPHONE ENCOUNTER
Please call and see if he is still having loose stools and if so, how many per day and what was his baseline.  Also, I would like a CT done before his next follow up.  Let him know that I was under the impression that Dr. Davies was going to do imaging but per review of her note, she is not.

## 2022-05-11 NOTE — TELEPHONE ENCOUNTER
I spoke with patient he is having 1 loose stool a day, not really any change. He would like to do the CT the same day as he is seeing Dr Wallace. That day he take off his diabetic monitor as it is due to be changed and he thinks that it may have metal, so the best day to do CT would be 5/23/2022. I will forward to Heide to schedule accordingly.  Verónica Leung LPN

## 2022-05-18 NOTE — PROGRESS NOTES
Assessment & Plan     Type 2 diabetes mellitus with hyperglycemia, with long-term current use of insulin (H)  A1c (5/23/2022) 7.4 improved from 10.3  - Hemoglobin A1c  - c/w tresiba 20u  - c/w novolog prandial ssi   - Colin will bring in his meter on Wednesday    Hyperlipidemia, unspecified hyperlipidemia type  LDL (2/22/2022): 108  - atorvastatin 40mg    Hypothyroidism, unspecified type  TSH wnl  - TSH with free T4 reflex  - c/w levothyroxine 100mcg  - consideration for recheck in one month    CKD (chronic kidney disease) stage 4, GFR 15-29 ml/min (H)  Cr (4/27/2022): 2.12 stable  Follows with Dr. Arvizu with next visit 9/2022    Carcinoma in situ of bladder  Follows with [facility] Dr. Davies  - obtain records  - imaging updating today    Malignant neoplasm of left ureter (H)  Follows with Jennifer Jensen with last visit 3/30/2022    Rash  Consistent with eczema   - message sent to oncology to verify rash is not a result of immunotherapy   - consideration for kenalog cream     Right shoulder pain  - further discussions at next visit       37 minutes spent on the date of the encounter doing chart review, review of test results, interpretation of tests, patient visit, documentation and message sent to oncology    See Patient Instructions    Return in about 3 months (around 8/23/2022) for Diabetic Visit, Lab Work.    Libia Wallace MD  [facility] - ROBINA Shaw is a 77 year old who presents for the following health issues     HPI     Diabetes Follow-up    How often are you checking your blood sugar? Continuous glucose monitor  What time of day are you checking your blood sugars (select all that apply)?  Continuous  Have you had any blood sugars above 200?  Yes   Have you had any blood sugars below 70?  Yes     What symptoms do you notice when your blood sugar is low?  None    What concerns do you have today about your diabetes? None     Do you have any of these symptoms? (Select  all that apply)  No numbness or tingling in feet.  No redness, sores or blisters on feet.  No complaints of excessive thirst.  No reports of blurry vision.  No significant changes to weight.    Have you had a diabetic eye exam in the last 12 months? No  - Last A1c (2/22/2022):10.3  - BG: CGM. Few times over 200. Low few times into the 60s  - Diet:  - Exercise:  - started novolog 4u prandial, if BG is over 100s. ssi   - tresiba 20u in the morning    - ASA (previously stopped), ACE(not on, follows with nephrology), statin (atorvastatin)  - last eye exam 4/2022    Wt Readings from Last 4 Encounters:   05/23/22 81.3 kg (179 lb 4 oz)   04/27/22 81.2 kg (179 lb 0.2 oz)   04/25/22 81.4 kg (179 lb 8 oz)   03/30/22 80.4 kg (177 lb 4 oz)       Hyperlipidemia Follow-Up      Are you regularly taking any medication or supplement to lower your cholesterol?   Yes- Lipitor 40 mg    Are you having muscle aches or other side effects that you think could be caused by your cholesterol lowering medication?  No    LDL (2/22/2022): 108  - atorvastatin     Hypertension Follow-up      Do you check your blood pressure regularly outside of the clinic? No     Are you following a low salt diet? No    Are your blood pressures ever more than 140 on the top number (systolic) OR more   than 90 on the bottom number (diastolic), for example 140/90? Yes    BP Readings from Last 2 Encounters:   05/23/22 138/70   04/27/22 135/78     Hemoglobin A1C POCT (%)   Date Value   11/24/2021 7.1 (A)   01/14/2021 5.7 (H)     Hemoglobin A1C (%)   Date Value   02/22/2022 10.3 (H)   08/19/2021 5.8 (H)     LDL Cholesterol Calculated (mg/dL)   Date Value   02/22/2022 108 (H)   10/02/2020 75   09/09/2019 84       Depression and Anxiety Follow-Up    How are you doing with your depression since your last visit? No change    How are you doing with your anxiety since your last visit?  No change    Are you having other symptoms that might be associated with depression or  anxiety? No    Have you had a significant life event? No     Do you have any concerns with your use of alcohol or other drugs? No        Social History     Tobacco Use     Smoking status: Former Smoker     Quit date: 1992     Years since quittin.3     Smokeless tobacco: Never Used   Vaping Use     Vaping Use: Never used   Substance Use Topics     Alcohol use: Yes     Comment: rarely     Drug use: No     PHQ 10/6/2020 2021 2022   PHQ-9 Total Score 0 2 4   Q9: Thoughts of better off dead/self-harm past 2 weeks Not at all Not at all Not at all     BRYCE-7 SCORE 2019 10/6/2020 2021   Total Score 0 0 2     Last PHQ-9 2022   1.  Little interest or pleasure in doing things 1   2.  Feeling down, depressed, or hopeless 0   3.  Trouble falling or staying asleep, or sleeping too much 1   4.  Feeling tired or having little energy 1   5.  Poor appetite or overeating 0   6.  Feeling bad about yourself 0   7.  Trouble concentrating 1   8.  Moving slowly or restless 0   Q9: Thoughts of better off dead/self-harm past 2 weeks 0   PHQ-9 Total Score 4   Difficulty at work, home, or with people Not difficult at all     BRYCE-7  2021   1. Feeling nervous, anxious, or on edge 0   2. Not being able to stop or control worrying 0   3. Worrying too much about different things 0   4. Trouble relaxing 0   5. Being so restless that it is hard to sit still 0   6. Becoming easily annoyed or irritable 1   7. Feeling afraid, as if something awful might happen 1   BRYCE-7 Total Score 2   If you checked any problems, how difficult have they made it for you to do your work, take care of things at home, or get along with other people? Not difficult at all       # oncology: carcinoma of bladder / ureter  - follows with Jennifer eJnsen with last visit 3/30/2022. Seeing Jennifer on Wednesday.   - on immunotherapy  - [facility], Dr. Davies with next visit in 6 months. No issues. Cystoscopy, per Colin's report  was no concerning findings    # rash:  Getting worse  - rash and itching   - itching all over  - cortisone 10% OTC helps with itches     # Hypothyroid  TSH high on 3/30/2022 and levothyroxine 100mcg was started  TSH (4/27/2022): 0.23   - diarrhea started after starting levothyroxine     # CKD  - follows with  with last visit 3/10/2022. No changes. Follow up in 6months    # right elbow pain   - duration of week  - has been doing more outside. [place]  - worse with reaching back     Review of Systems   Constitutional: Negative for chills and fever.   HENT: Negative for congestion.    Respiratory: Negative for shortness of breath and wheezing.    Cardiovascular: Negative for chest pain and palpitations.   Gastrointestinal: Positive for diarrhea. Negative for abdominal pain.   Musculoskeletal: Positive for arthralgias (right elbow).   Skin: Positive for rash.   Psychiatric/Behavioral: Negative for dysphoric mood.          Objective    /70   Pulse 77   Temp 97.2  F (36.2  C) (Tympanic)   Resp 18   Wt 81.3 kg (179 lb 4 oz)   SpO2 99%   BMI 28.29 kg/m    Body mass index is 28.29 kg/m .  Physical Exam  Constitutional:       General: He is not in acute distress.     Appearance: He is not ill-appearing.   Cardiovascular:      Rate and Rhythm: Normal rate and regular rhythm.      Pulses: Normal pulses.      Heart sounds: No murmur heard.  Pulmonary:      Effort: Pulmonary effort is normal. No respiratory distress.      Breath sounds: No wheezing or rales.   Musculoskeletal:      Comments: Mild TTP over right lateral epicondyle of right elbow.  Occasional pain with flexion.    Neurological:      Mental Status: He is alert.   Psychiatric:         Mood and Affect: Mood normal.                    Results for orders placed or performed in visit on 05/23/22 (from the past 24 hour(s))   Hemoglobin A1c   Result Value Ref Range    Estimated Average Glucose 166 mg/dL    Hemoglobin A1C 7.4 (H) 0.0 - 5.6 %    TSH with free T4 reflex   Result Value Ref Range    TSH 0.59 0.40 - 4.00 mU/L       CT chest/abd/pelvis (5/23/2022):  FINDINGS: CT chest: No pulmonary masses are noted. The hilar and  mediastinal lymph nodes are normal in caliber. Heart is normal in  size. Axillary and supraclavicular lymph nodes appear normal. The  regional skeleton is intact.     CT scan of the abdomen and pelvis: The liver is free of masses or  biliary ductal enlargement. No calcified gallstones are seen. The  spleen and pancreas are normal. The adrenal glands are normal. There  is been a left nephrectomy. The right kidney is free of masses or  hydronephrosis. The periaortic lymph nodes are normal in caliber. No  intraperitoneal masses or inflammatory changes are noted. The bladder  and rectum are normal. Degenerative changes are present in the lumbar  spine                                                                        IMPRESSION: Stable appearance of the chest abdomen and pelvis from a  PET CT scan in September 2021

## 2022-05-19 DIAGNOSIS — L30.9 ECZEMA, UNSPECIFIED TYPE: Primary | ICD-10-CM

## 2022-05-19 PROBLEM — E11.65 TYPE 2 DIABETES MELLITUS WITH HYPERGLYCEMIA, WITH LONG-TERM CURRENT USE OF INSULIN (H): Status: ACTIVE | Noted: 2020-12-11

## 2022-05-19 PROBLEM — Z79.4 TYPE 2 DIABETES MELLITUS WITH HYPERGLYCEMIA, WITH LONG-TERM CURRENT USE OF INSULIN (H): Status: ACTIVE | Noted: 2020-12-11

## 2022-05-23 ENCOUNTER — LAB (OUTPATIENT)
Dept: LAB | Facility: OTHER | Age: 77
End: 2022-05-23
Attending: FAMILY MEDICINE
Payer: COMMERCIAL

## 2022-05-23 ENCOUNTER — HOSPITAL ENCOUNTER (OUTPATIENT)
Dept: CT IMAGING | Facility: HOSPITAL | Age: 77
Discharge: HOME | End: 2022-05-23
Attending: NURSE PRACTITIONER
Payer: COMMERCIAL

## 2022-05-23 ENCOUNTER — OFFICE VISIT (OUTPATIENT)
Dept: FAMILY MEDICINE | Facility: OTHER | Age: 77
End: 2022-05-23
Attending: FAMILY MEDICINE
Payer: COMMERCIAL

## 2022-05-23 VITALS
HEART RATE: 77 BPM | DIASTOLIC BLOOD PRESSURE: 70 MMHG | SYSTOLIC BLOOD PRESSURE: 138 MMHG | BODY MASS INDEX: 28.29 KG/M2 | OXYGEN SATURATION: 99 % | TEMPERATURE: 97.2 F | RESPIRATION RATE: 18 BRPM | WEIGHT: 179.25 LBS

## 2022-05-23 DIAGNOSIS — E11.65 TYPE 2 DIABETES MELLITUS WITH HYPERGLYCEMIA, WITH LONG-TERM CURRENT USE OF INSULIN (H): Primary | ICD-10-CM

## 2022-05-23 DIAGNOSIS — C66.2 MALIGNANT NEOPLASM OF LEFT URETER (H): ICD-10-CM

## 2022-05-23 DIAGNOSIS — E78.5 HYPERLIPIDEMIA, UNSPECIFIED HYPERLIPIDEMIA TYPE: ICD-10-CM

## 2022-05-23 DIAGNOSIS — M25.511 CHRONIC RIGHT SHOULDER PAIN: ICD-10-CM

## 2022-05-23 DIAGNOSIS — G89.29 CHRONIC RIGHT SHOULDER PAIN: ICD-10-CM

## 2022-05-23 DIAGNOSIS — N18.4 CKD (CHRONIC KIDNEY DISEASE) STAGE 4, GFR 15-29 ML/MIN (H): ICD-10-CM

## 2022-05-23 DIAGNOSIS — Z79.4 TYPE 2 DIABETES MELLITUS WITH HYPERGLYCEMIA, WITH LONG-TERM CURRENT USE OF INSULIN (H): Primary | ICD-10-CM

## 2022-05-23 DIAGNOSIS — R21 RASH: ICD-10-CM

## 2022-05-23 DIAGNOSIS — E03.9 HYPOTHYROIDISM, UNSPECIFIED TYPE: ICD-10-CM

## 2022-05-23 DIAGNOSIS — C66.2: Primary | ICD-10-CM

## 2022-05-23 DIAGNOSIS — D09.0 CARCINOMA IN SITU OF BLADDER: ICD-10-CM

## 2022-05-23 PROBLEM — D63.1 ANEMIA DUE TO STAGE 4 CHRONIC KIDNEY DISEASE (H): Status: ACTIVE | Noted: 2021-10-22

## 2022-05-23 PROBLEM — C68.9 UROTHELIAL CANCER (H): Status: ACTIVE | Noted: 2021-10-22

## 2022-05-23 LAB
CREAT SERPL-MCNC: 2.17 MG/DL (ref 0.66–1.25)
EST. AVERAGE GLUCOSE BLD GHB EST-MCNC: 166 MG/DL
GFR SERPL CREATININE-BSD FRML MDRD: 31 ML/MIN/1.73M2
HBA1C MFR BLD: 7.4 % (ref 0–5.6)
TSH SERPL DL<=0.005 MIU/L-ACNC: 0.59 MU/L (ref 0.4–4)

## 2022-05-23 PROCEDURE — 83036 HEMOGLOBIN GLYCOSYLATED A1C: CPT | Mod: ZL | Performed by: FAMILY MEDICINE

## 2022-05-23 PROCEDURE — 74177 CT ABD & PELVIS W/CONTRAST: CPT

## 2022-05-23 PROCEDURE — 36415 COLL VENOUS BLD VENIPUNCTURE: CPT | Mod: ZL | Performed by: FAMILY MEDICINE

## 2022-05-23 PROCEDURE — G0463 HOSPITAL OUTPT CLINIC VISIT: HCPCS | Mod: 25 | Performed by: FAMILY MEDICINE

## 2022-05-23 PROCEDURE — 250N000011 HC RX IP 250 OP 636: Performed by: RADIOLOGY

## 2022-05-23 PROCEDURE — 84443 ASSAY THYROID STIM HORMONE: CPT | Mod: ZL | Performed by: FAMILY MEDICINE

## 2022-05-23 PROCEDURE — 99214 OFFICE O/P EST MOD 30 MIN: CPT | Performed by: FAMILY MEDICINE

## 2022-05-23 PROCEDURE — 82565 ASSAY OF CREATININE: CPT | Mod: ZL

## 2022-05-23 PROCEDURE — G0463 HOSPITAL OUTPT CLINIC VISIT: HCPCS | Performed by: FAMILY MEDICINE

## 2022-05-23 RX ORDER — IOPAMIDOL 755 MG/ML
88 INJECTION, SOLUTION INTRAVASCULAR ONCE
Status: COMPLETED | OUTPATIENT
Start: 2022-05-23 | End: 2022-05-23

## 2022-05-23 RX ORDER — ERYTHROMYCIN 5 MG/G
OINTMENT OPHTHALMIC
COMMUNITY
Start: 2022-05-17 | End: 2022-06-08

## 2022-05-23 RX ORDER — VALACYCLOVIR HYDROCHLORIDE 1 G/1
TABLET, FILM COATED ORAL
COMMUNITY
Start: 2022-05-17 | End: 2022-06-08

## 2022-05-23 RX ADMIN — IOPAMIDOL 88 ML: 755 INJECTION, SOLUTION INTRAVENOUS at 12:44

## 2022-05-23 ASSESSMENT — ENCOUNTER SYMPTOMS
ABDOMINAL PAIN: 0
FEVER: 0
WHEEZING: 0
CHILLS: 0
SHORTNESS OF BREATH: 0
DIARRHEA: 1
PALPITATIONS: 0
DYSPHORIC MOOD: 0
ARTHRALGIAS: 1

## 2022-05-23 NOTE — NURSING NOTE
"Chief Complaint   Patient presents with     Diabetes     Hypertension     Lipids     Anxiety     Depression       Initial /70   Pulse 77   Temp 97.2  F (36.2  C) (Tympanic)   Resp 18   Wt 81.3 kg (179 lb 4 oz)   SpO2 99%   BMI 28.29 kg/m   Estimated body mass index is 28.29 kg/m  as calculated from the following:    Height as of 4/25/22: 1.695 m (5' 6.75\").    Weight as of this encounter: 81.3 kg (179 lb 4 oz).  Medication Reconciliation: complete  Coreen Castro LPN  "

## 2022-05-24 ENCOUNTER — TRANSFERRED RECORDS (OUTPATIENT)
Dept: HEALTH INFORMATION MANAGEMENT | Facility: CLINIC | Age: 77
End: 2022-05-24
Payer: COMMERCIAL

## 2022-05-24 RX ORDER — TRIAMCINOLONE ACETONIDE 1 MG/G
CREAM TOPICAL 2 TIMES DAILY
Qty: 30 G | Refills: 0 | Status: SHIPPED | OUTPATIENT
Start: 2022-05-24 | End: 2022-06-03

## 2022-05-25 ENCOUNTER — MEDICAL CORRESPONDENCE (OUTPATIENT)
Dept: INFUSION THERAPY | Facility: OTHER | Age: 77
End: 2022-05-25

## 2022-05-25 ENCOUNTER — ONCOLOGY VISIT (OUTPATIENT)
Dept: ONCOLOGY | Facility: OTHER | Age: 77
End: 2022-05-25
Attending: NURSE PRACTITIONER
Payer: COMMERCIAL

## 2022-05-25 ENCOUNTER — LAB (OUTPATIENT)
Dept: INFUSION THERAPY | Facility: OTHER | Age: 77
End: 2022-05-25
Attending: INTERNAL MEDICINE
Payer: COMMERCIAL

## 2022-05-25 VITALS
HEART RATE: 70 BPM | OXYGEN SATURATION: 98 % | BODY MASS INDEX: 28.09 KG/M2 | SYSTOLIC BLOOD PRESSURE: 112 MMHG | WEIGHT: 179 LBS | RESPIRATION RATE: 20 BRPM | DIASTOLIC BLOOD PRESSURE: 70 MMHG | TEMPERATURE: 97.1 F | HEIGHT: 67 IN

## 2022-05-25 VITALS — WEIGHT: 175.04 LBS | BODY MASS INDEX: 27.62 KG/M2

## 2022-05-25 DIAGNOSIS — E11.65 TYPE 2 DIABETES MELLITUS WITH HYPERGLYCEMIA, WITHOUT LONG-TERM CURRENT USE OF INSULIN (H): ICD-10-CM

## 2022-05-25 DIAGNOSIS — C66.2 MALIGNANT NEOPLASM OF LEFT URETER (H): Primary | ICD-10-CM

## 2022-05-25 DIAGNOSIS — R19.5 LOOSE STOOLS: ICD-10-CM

## 2022-05-25 DIAGNOSIS — N18.4 CKD (CHRONIC KIDNEY DISEASE) STAGE 4, GFR 15-29 ML/MIN (H): ICD-10-CM

## 2022-05-25 DIAGNOSIS — C67.3 MALIGNANT NEOPLASM OF ANTERIOR WALL OF URINARY BLADDER (H): ICD-10-CM

## 2022-05-25 DIAGNOSIS — B02.30 HERPES ZOSTER WITH OPHTHALMIC COMPLICATION, UNSPECIFIED HERPES ZOSTER EYE DISEASE: ICD-10-CM

## 2022-05-25 DIAGNOSIS — E03.2 HYPOTHYROIDISM DUE TO MEDICATION: ICD-10-CM

## 2022-05-25 DIAGNOSIS — E83.51 HYPOCALCEMIA: ICD-10-CM

## 2022-05-25 LAB
ALBUMIN SERPL-MCNC: 3.6 G/DL (ref 3.4–5)
ALP SERPL-CCNC: 75 U/L (ref 40–150)
ALT SERPL W P-5'-P-CCNC: 14 U/L (ref 0–70)
ANION GAP SERPL CALCULATED.3IONS-SCNC: 6 MMOL/L (ref 3–14)
AST SERPL W P-5'-P-CCNC: 18 U/L (ref 0–45)
BASOPHILS # BLD AUTO: 0 10E3/UL (ref 0–0.2)
BASOPHILS NFR BLD AUTO: 1 %
BILIRUB SERPL-MCNC: 0.6 MG/DL (ref 0.2–1.3)
BUN SERPL-MCNC: 28 MG/DL (ref 7–30)
CALCIUM SERPL-MCNC: 8.3 MG/DL (ref 8.5–10.1)
CHLORIDE BLD-SCNC: 110 MMOL/L (ref 94–109)
CO2 SERPL-SCNC: 21 MMOL/L (ref 20–32)
CREAT SERPL-MCNC: 1.98 MG/DL (ref 0.66–1.25)
EOSINOPHIL # BLD AUTO: 0.1 10E3/UL (ref 0–0.7)
EOSINOPHIL NFR BLD AUTO: 2 %
ERYTHROCYTE [DISTWIDTH] IN BLOOD BY AUTOMATED COUNT: 13.6 % (ref 10–15)
GFR SERPL CREATININE-BSD FRML MDRD: 35 ML/MIN/1.73M2
GLUCOSE BLD-MCNC: 194 MG/DL (ref 70–99)
HCT VFR BLD AUTO: 35.1 % (ref 40–53)
HGB BLD-MCNC: 12 G/DL (ref 13.3–17.7)
IMM GRANULOCYTES # BLD: 0 10E3/UL
IMM GRANULOCYTES NFR BLD: 0 %
LYMPHOCYTES # BLD AUTO: 1.4 10E3/UL (ref 0.8–5.3)
LYMPHOCYTES NFR BLD AUTO: 26 %
MCH RBC QN AUTO: 31.2 PG (ref 26.5–33)
MCHC RBC AUTO-ENTMCNC: 34.2 G/DL (ref 31.5–36.5)
MCV RBC AUTO: 91 FL (ref 78–100)
MONOCYTES # BLD AUTO: 0.8 10E3/UL (ref 0–1.3)
MONOCYTES NFR BLD AUTO: 14 %
NEUTROPHILS # BLD AUTO: 3.1 10E3/UL (ref 1.6–8.3)
NEUTROPHILS NFR BLD AUTO: 57 %
NRBC # BLD AUTO: 0 10E3/UL
NRBC BLD AUTO-RTO: 0 /100
PLATELET # BLD AUTO: 213 10E3/UL (ref 150–450)
POTASSIUM BLD-SCNC: 4.1 MMOL/L (ref 3.4–5.3)
PROT SERPL-MCNC: 7.3 G/DL (ref 6.8–8.8)
RBC # BLD AUTO: 3.85 10E6/UL (ref 4.4–5.9)
SODIUM SERPL-SCNC: 137 MMOL/L (ref 133–144)
TSH SERPL DL<=0.005 MIU/L-ACNC: 1.14 MU/L (ref 0.4–4)
WBC # BLD AUTO: 5.4 10E3/UL (ref 4–11)

## 2022-05-25 PROCEDURE — 87329 GIARDIA AG IA: CPT | Mod: ZL

## 2022-05-25 PROCEDURE — 36415 COLL VENOUS BLD VENIPUNCTURE: CPT | Mod: ZL | Performed by: INTERNAL MEDICINE

## 2022-05-25 PROCEDURE — 84443 ASSAY THYROID STIM HORMONE: CPT | Mod: ZL | Performed by: INTERNAL MEDICINE

## 2022-05-25 PROCEDURE — G0463 HOSPITAL OUTPT CLINIC VISIT: HCPCS

## 2022-05-25 PROCEDURE — 83630 LACTOFERRIN FECAL (QUAL): CPT | Mod: ZL

## 2022-05-25 PROCEDURE — 87506 IADNA-DNA/RNA PROBE TQ 6-11: CPT | Mod: ZL,GZ

## 2022-05-25 PROCEDURE — G0463 HOSPITAL OUTPT CLINIC VISIT: HCPCS | Mod: 25

## 2022-05-25 PROCEDURE — 87493 C DIFF AMPLIFIED PROBE: CPT | Mod: ZL,59

## 2022-05-25 PROCEDURE — 80053 COMPREHEN METABOLIC PANEL: CPT | Mod: ZL | Performed by: INTERNAL MEDICINE

## 2022-05-25 PROCEDURE — 85004 AUTOMATED DIFF WBC COUNT: CPT | Mod: ZL

## 2022-05-25 PROCEDURE — 99215 OFFICE O/P EST HI 40 MIN: CPT | Performed by: NURSE PRACTITIONER

## 2022-05-25 PROCEDURE — 87328 CRYPTOSPORIDIUM AG IA: CPT | Mod: ZL

## 2022-05-25 RX ORDER — DIPHENHYDRAMINE HYDROCHLORIDE 50 MG/ML
50 INJECTION INTRAMUSCULAR; INTRAVENOUS
Status: CANCELLED
Start: 2022-05-25

## 2022-05-25 RX ORDER — HEPARIN SODIUM (PORCINE) LOCK FLUSH IV SOLN 100 UNIT/ML 100 UNIT/ML
5 SOLUTION INTRAVENOUS
Status: CANCELLED | OUTPATIENT
Start: 2022-05-25

## 2022-05-25 RX ORDER — HEPARIN SODIUM,PORCINE 10 UNIT/ML
5 VIAL (ML) INTRAVENOUS
Status: CANCELLED | OUTPATIENT
Start: 2022-05-25

## 2022-05-25 RX ORDER — EPINEPHRINE 1 MG/ML
0.3 INJECTION, SOLUTION, CONCENTRATE INTRAVENOUS EVERY 5 MIN PRN
Status: CANCELLED | OUTPATIENT
Start: 2022-05-25

## 2022-05-25 RX ORDER — LORAZEPAM 2 MG/ML
0.5 INJECTION INTRAMUSCULAR EVERY 4 HOURS PRN
Status: CANCELLED | OUTPATIENT
Start: 2022-05-25

## 2022-05-25 RX ORDER — MEPERIDINE HYDROCHLORIDE 25 MG/ML
25 INJECTION INTRAMUSCULAR; INTRAVENOUS; SUBCUTANEOUS EVERY 30 MIN PRN
Status: CANCELLED | OUTPATIENT
Start: 2022-05-25

## 2022-05-25 RX ORDER — PREDNISOLONE ACETATE 10 MG/ML
SUSPENSION/ DROPS OPHTHALMIC
COMMUNITY
Start: 2022-05-24 | End: 2022-01-01

## 2022-05-25 RX ORDER — ALBUTEROL SULFATE 0.83 MG/ML
2.5 SOLUTION RESPIRATORY (INHALATION)
Status: CANCELLED | OUTPATIENT
Start: 2022-05-25

## 2022-05-25 RX ORDER — METHYLPREDNISOLONE SODIUM SUCCINATE 125 MG/2ML
125 INJECTION, POWDER, LYOPHILIZED, FOR SOLUTION INTRAMUSCULAR; INTRAVENOUS
Status: CANCELLED
Start: 2022-05-25

## 2022-05-25 RX ORDER — NALOXONE HYDROCHLORIDE 0.4 MG/ML
0.2 INJECTION, SOLUTION INTRAMUSCULAR; INTRAVENOUS; SUBCUTANEOUS
Status: CANCELLED | OUTPATIENT
Start: 2022-05-25

## 2022-05-25 RX ORDER — PREDNISONE 20 MG/1
20 TABLET ORAL DAILY
Qty: 15 TABLET | Refills: 0 | Status: SHIPPED | OUTPATIENT
Start: 2022-05-25 | End: 2022-06-08

## 2022-05-25 RX ORDER — ALBUTEROL SULFATE 90 UG/1
1-2 AEROSOL, METERED RESPIRATORY (INHALATION)
Status: CANCELLED
Start: 2022-05-25

## 2022-05-25 ASSESSMENT — PAIN SCALES - GENERAL: PAINLEVEL: NO PAIN (0)

## 2022-05-25 NOTE — NURSING NOTE
"Oncology Rooming Note    May 25, 2022 8:08 AM   Colin Baxter is a 77 year old male who presents for:    Chief Complaint   Patient presents with     Oncology Clinic Visit     Follow up Carcinoma in situ of bladder         Initial Vitals: /70   Pulse 70   Temp 97.1  F (36.2  C) (Tympanic)   Resp 20   Ht 1.702 m (5' 7\")   Wt 81.2 kg (179 lb)   SpO2 98%   BMI 28.04 kg/m   Estimated body mass index is 28.04 kg/m  as calculated from the following:    Height as of this encounter: 1.702 m (5' 7\").    Weight as of this encounter: 81.2 kg (179 lb). Body surface area is 1.96 meters squared.  No Pain (0) Comment: Data Unavailable   No LMP for male patient.  Allergies reviewed: Yes  Medications reviewed: Yes    Medications: Medication refills not needed today.  Pharmacy name entered into EPIC:    [id number] - [place], [place] - [address] [facility] [id number] - [place], [place] - [facility] [address] Verónica Leung LPN            "

## 2022-05-25 NOTE — PROGRESS NOTES
Patient is a 78 y/o here accompanied by self today for infusion of nivolumab per order of Dr. Bartlett.  Patient identified with two identifiers, order verified, and verbal consent for today's infusion obtained from patient.      Lab values:   Component      Latest Ref Rng & Units 5/25/2022   WBC      4.0 - 11.0 10e3/uL 5.4   RBC Count      4.40 - 5.90 10e6/uL 3.85 (L)   Hemoglobin      13.3 - 17.7 g/dL 12.0 (L)   Hematocrit      40.0 - 53.0 % 35.1 (L)   MCV      78 - 100 fL 91   MCH      26.5 - 33.0 pg 31.2   MCHC      31.5 - 36.5 g/dL 34.2   RDW      10.0 - 15.0 % 13.6   Platelet Count      150 - 450 10e3/uL 213   % Neutrophils      % 57   % Lymphocytes      % 26   % Monocytes      % 14   % Eosinophils      % 2   % Basophils      % 1   % Immature Granulocytes      % 0   NRBCs per 100 WBC      <1 /100 0   Absolute Neutrophils      1.6 - 8.3 10e3/uL 3.1   Absolute Lymphocytes      0.8 - 5.3 10e3/uL 1.4   Absolute Monocytes      0.0 - 1.3 10e3/uL 0.8   Absolute Eosinophils      0.0 - 0.7 10e3/uL 0.1   Absolute Basophils      0.0 - 0.2 10e3/uL 0.0   Absolute Immature Granulocytes      <=0.4 10e3/uL 0.0   Absolute NRBCs      10e3/uL 0.0   Sodium      133 - 144 mmol/L 137   Potassium      3.4 - 5.3 mmol/L 4.1   Chloride      94 - 109 mmol/L 110 (H)   Carbon Dioxide      20 - 32 mmol/L 21   Anion Gap      3 - 14 mmol/L 6   Urea Nitrogen      7 - 30 mg/dL 28   Creatinine      0.66 - 1.25 mg/dL 1.98 (H)   Calcium      8.5 - 10.1 mg/dL 8.3 (L)   Glucose      70 - 99 mg/dL 194 (H)   Alkaline Phosphatase      40 - 150 U/L 75   AST      0 - 45 U/L 18   ALT      0 - 70 U/L 14   Protein Total      6.8 - 8.8 g/dL 7.3   Albumin      3.4 - 5.0 g/dL 3.6   Bilirubin Total      0.2 - 1.3 mg/dL 0.6   GFR Estimate      >60 mL/min/1.73m2 35 (L)   TSH      0.40 - 4.00 mU/L 1.14        Patient does not meet order parameters for today's treatment d/t loose stools per Jennifer Jensen NP.      IV removed, catheter intact.  Site clean, dry and  intact.  No signs or symptoms of infiltration or infection.  Covered with a sterile bandage, slight pressure applied for 30 seconds.  Pt instructed to leave bandage intact for a minimum of one hour, and to call with questions or concerns.  Copy of appointments, discharge instructions, and after visit summary (AVS) provided to patient.  Patient states understanding, discharged.

## 2022-05-25 NOTE — PROGRESS NOTES
22 gauge angio cath inserted into right lateral wrist.  Immediate blood return noted.  IV secured with sterile, transparent dressing and tape.  Patient tolerated well, denies pain or discomfort at this time.  Flushes easily without resistance, no signs or symptoms of infiltration or infection.  3mL blood wasted and blood removed for ordered labs. Flushed with 3mL normal saline to clear line. Patient denies questions or concerns regarding infusion and/or medication(s) being administered.

## 2022-05-25 NOTE — PROGRESS NOTES
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  He completed neoadjuvant chemotherapy on 5/11/2021, then underwent nephroureterectomy 6/16/2021 and now receivning adjuvant Nivolumab to complete 1 year.    He follows with Dr. Schmitz for bladder cancer and will see her again in July for likely retreatment of BCG.  He sees Dr. Arvizu for chronic kidney disease and will see him again in September.      He presents to the clinic today with some concerns.  He was seen by his PCP this week with a rash to the back of his knees.  He states the rash seems to be going up middle of his thighs.  She felt this was likely eczema and prescribed Kenalog cream which she has not picked up yet.  In addition, he was diagnosed with shingles to the right eye on 5/17/2022.  He was placed on Valtrex and erythromycin ointment for 10 days and we will see the eye doctor back next week.  Patient also complains of having loose stools for the past 6 to 8 weeks.  He questions if it is related to his thyroid medication as it seemed to start when he started this.  States he is having 1-2 loose watery stools daily.  He denies any blood or mucus in the stool.  States prior to this he was having 1 formed bowel movement a day.  He denies any abdominal pain or fevers.  States his blood sugars remain somewhat erratic but seem to be under somewhat better control.  He continues to not sleep well but states his energy is good.    Oncologic History:     2016 patient was diagnosed with high-grade bladder cancer T1; patient was treated with BCG and then  again reinduction BCG then GemCis intravesically x3 in May 2018     1/5/2021 patient was seen by Dr. Morrell for history of hematuria.  He underwent cystoscopy which revealed bladder lesions with biopsy being negative.  Cytology showed atypical urothelial cells.  He underwent CT urogram which showed a mass in the left ureter measuring 2 cm with no hydronephrosis.  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  2/10/2021  he underwent PET scan that was negative for metastatic disease and then seen by Dr. Mcnulty at the [facility] and scheduled for robotic assisted laparoscopic left nephroureterectomy.    2/15/2021 he was seen by Dr. Bartlett with Medical Oncology to discuss neoadjuvant chemotherapy.  Recommendation was for gemcitabine 1000 mg per metered squared day 1 and day 8 and cisplatin 70 mg per metered squared on day 1 of a 21-day cycle x4 cycles followed by surgery.  5/11/2021 completion of neoadjuvant chemotherapy  6/16/2021 he underwent Cystourethroscopy and Left robot assisted nephroureterectomy with Left pelvic lymph node dissection by Dr. Jorge Mcnulty and Dr. Reyes Romano; pathology showed a multifocal high-grade urothelial cell carcinoma, staged pT3N0  11/10/2021 initiation of nivolumab     Current Chemo Regime/TX: Nivolumab 480 mg every 28 days  Current Cycle:  8  # of completed cycles: 7     Previous treatment:  Gemcitabine 1000 mg per metered squared on day 1 and day 8 and cisplatin 56 mg per metered squared day 1 every 21 days     Past Medical History:   Diagnosis Date     Benign essential hypertension      Cancer (H)     Bladder     Carcinoma in situ of bladder 11/19/2021     Diabetes (H)      Dyslipidemia      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) 12/11/2020       Past Surgical History:   Procedure Laterality Date     APPENDECTOMY  1958     COLONOSCOPY  2-      COMBINED CYSTOSCOPY, RETROGRADES, URETEROSCOPY, INSERT STENT Left 1/18/2021    Procedure: CYSTOURETEROSCOPY, WITH RETROGRADE PYELOGRAM with interpretation of fluroscopy, ureteroscopy, ureteral biopsy, bladder biopsy and fulgaration;  Surgeon: Shonda Morrell MD;  Location: [facility]     CYSTOSCOPY N/A 6/16/2021    Procedure: CYSTOSCOPY;  Surgeon: Javier Mcnulty MD;  [facility]     CYSTOSCOPY, BIOPSY BLADDER, COMBINED N/A 9/8/2015    Procedure: COMBINED CYSTOSCOPY, BIOPSY BLADDER;  Surgeon: Randy Martinez MD;  Location: [facility]     CYSTOSCOPY, BIOPSY BLADDER, COMBINED N/A 2/14/2017    Procedure: COMBINED CYSTOSCOPY, BIOPSY BLADDER;  Surgeon: Randy Martinez MD;  Location: [facility]     CYSTOSCOPY, BIOPSY BLADDER, COMBINED N/A 11/13/2018    Procedure: COMBINED CYSTOSCOPY, BIOPSY BLADDER;  Surgeon: Ed Helm MD;  Location: [facility]     CYSTOSCOPY, BIOPSY BLADDER, COMBINED N/A 11/5/2021    Procedure: CYSTOSCOPY, WITH BLADDER BIOPSY;  Surgeon: Shonda Morrell MD;  Location: [facility]     CYSTOSCOPY, RETROGRADES, COMBINED Bilateral 11/13/2018    Procedure: Bilateral Retrograde Pyelagram, Bladder Biopsy;  Surgeon: Ed Helm MD;  Location: [facility]     CYSTOSCOPY, RETROGRADES, COMBINED Right 11/5/2021    Procedure: CYSTOSCOPY, WITH RETROGRADE PYELOGRAM;  Surgeon: Shonda Morrell MD;  Location: [facility]     CYSTOSCOPY, RETROGRADES, INSERT STENT URETER(S), COMBINED Left 9/8/2015    Procedure: COMBINED CYSTOSCOPY, RETROGRADES, INSERT STENT URETER(S);  Surgeon: Randy Martinez MD;  Location[facility]     CYSTOSCOPY, TRANSURETHRAL RESECTION (TUR) TUMOR BLADDER, COMBINED N/A 9/8/2015    Procedure: COMBINED CYSTOSCOPY, TRANSURETHRAL RESECTION (TUR) TUMOR BLADDER;  Surgeon: Randy Martinez MD;  [facility]     CYSTOSCOPY, TRANSURETHRAL RESECTION (TUR) TUMOR BLADDER, COMBINED N/A 1/12/2016    Procedure: COMBINED CYSTOSCOPY, TRANSURETHRAL RESECTION (TUR) TUMOR BLADDER;  Surgeon: Juan  Randy Floyd MD;  Location: [facility]     CYSTOSCOPY, TRANSURETHRAL RESECTION (TUR) TUMOR BLADDER, COMBINED N/A 2016    Procedure: COMBINED CYSTOSCOPY, TRANSURETHRAL RESECTION (TUR) TUMOR BLADDER;  Surgeon: Randy Martinez MD;  [facility]: [facility]     DAVINCI NEPHROURETERECTOMY Left 2021    Procedure: NEPHROURETERECTOMY, ROBOT-ASSISTED, lymph node dissection;  Surgeon: Javier Mcnulty MD;  [facility]     PHACOEMULSIFICATION WITH STANDARD INTRAOCULAR LENS IMPLANT Right 10/9/2018    Procedure: PHACOEMULSIFICATION WITH STANDARD INTRAOCULAR LENS IMPLANT;  PHACOEMULSIFICATION CATARACT EXTRACTION POSTERIOR CHAMBER LENS RIGHT;  Surgeon: Marlo Mcconnell MD;  Location[facility]     PHACOEMULSIFICATION WITH STANDARD INTRAOCULAR LENS IMPLANT Left 10/23/2018    Procedure: PHACOEMULSIFICATION CATARACT EXTRACTION POSTERIOR CHAMBER LENS LEFT;  Surgeon: Marlo Mcconnell MD;  Location: [facility]       Family History   Problem Relation Age of Onset     Diabetes Mother      Parkinsonism Brother      Coronary Artery Disease No family hx of      Hypertension No family hx of      Hyperlipidemia No family hx of      Cerebrovascular Disease No family hx of      Breast Cancer No family hx of      Colon Cancer No family hx of      Prostate Cancer No family hx of      Anesthesia Reaction No family hx of      Asthma No family hx of      Thyroid Disease No family hx of      Genetic Disorder No family hx of        Social History     Socioeconomic History     Marital status:      Spouse name: Not on file     Number of children: Not on file     Years of education: Not on file     Highest education level: Not on file   Occupational History     Not on file   Tobacco Use     Smoking status: Former Smoker     Quit date: 1992     Years since quittin.4     Smokeless tobacco: Never Used   Vaping Use     Vaping Use: Never used   Substance and Sexual Activity     Alcohol use: Yes     Comment: rarely     Drug use: No      Sexual activity: Not Currently   Other Topics Concern     Parent/sibling w/ CABG, MI or angioplasty before 65F 55M? No   Social History Narrative     Not on file     Social Determinants of Health     Financial Resource Strain: Not on file   Food Insecurity: Not on file   Transportation Needs: Not on file   Physical Activity: Not on file   Stress: Not on file   Social Connections: Not on file   Intimate Partner Violence: Not on file   Housing Stability: Not on file       Current Outpatient Medications   Medication     acetaminophen (TYLENOL) 325 MG tablet     atorvastatin (LIPITOR) 40 MG tablet     blood glucose (ONETOUCH VERIO IQ) test strip     brimonidine (ALPHAGAN-P) 0.15 % ophthalmic solution     Cholecalciferol (VITAMIN D3) 25 MCG TABS     Continuous Blood Gluc  (FREESTYLE DELILAH 2 READER) TENNILLE     Continuous Blood Gluc Sensor (FREESTYLE DELILAH 2 SENSOR) MISC     erythromycin (ROMYCIN) 5 MG/GM ophthalmic ointment     insulin aspart (NOVOLOG FLEXPEN) 100 UNIT/ML pen     insulin degludec (TRESIBA FLEXTOUCH) 100 UNIT/ML pen     insulin pen needle (BD PEN NEEDLE DONTAE 2ND GEN) 32G X 4 MM miscellaneous     latanoprost (XALATAN) 0.005 % ophthalmic solution     levothyroxine (SYNTHROID/LEVOTHROID) 100 MCG tablet     ONETOUCH DELICA LANCETS 33G MISC     tamsulosin (FLOMAX) 0.4 MG capsule     triamcinolone (KENALOG) 0.1 % external cream     valACYclovir (VALTREX) 1000 mg tablet     VITRON-C  MG TABS tablet     No current facility-administered medications for this visit.        Allergies   Allergen Reactions     No Clinical Screening - See Comments Other (See Comments)     Beta blocker in glaucoma gtt.s caused low pulse and passing out      Timolol      Beta blocker in glaucoma gtt.s caused low pulse and passing out   - patient thinks it was timolol but not 100% sure which beta blocker eye drop.        Review Of Systems:  Constitutional:    denies fever, weight changes, chills, and night sweats.  Eyes:     "Blurred vision to her right eye and unsure if this started prior to treatment- see HPI  Ears/Nose/Throat:   denies ear pain, nose problems, difficulty swallowing  Respiratory:   denies shortness of breath, cough   Skin:   See HPI  Cardiovascular:   denies chest pain, palpitations, edema  Gastrointestinal:   denies abdominal pain, bloating, nausea,  early satiety; see HPI  Genitourinary:   denies difficulty with urination, blood in urine  Musculoskeletal:    denies new muscle pain, bone pain  Neurologic:   denies lightheadedness, headaches, numbness or tingling  Psychiatric:   denies anxiety, depression  Hematologic/Lymphatic/Immunologic:   denies easy bruising, easy bleeding, lumps or bumps noted  Endocrine: Complains of dry mouth      ECOG Performance Status: 1    Physical Exam:  Ht 1.702 m (5' 7\")   BMI 27.42 kg/m      GENERAL APPEARANCE: Healthy, alert and in no acute distress.  HEENT: Normocephalic, Sclerae to right is erythematous; no active lesions  NECK:   No asymmetry or masses, no thyromegaly.  LYMPHATICS: No palpable cervical, supraclavicular, axillary, or inguinal nodes   RESP: Lungs clear to auscultation bilaterally, respirations regular and easy  CARDIOVASCULAR: Regular rate and rhythm. Normal S1, S2; no murmur, gallop, or rub.  ABDOMEN: Soft, nontender. Bowel sounds auscultated all 4 quadrants. No palpable organomegaly or masses.  MUSCULOSKELETAL: Extremities without gross deformities noted. No edema of bilateral lower extremities.  SKIN: No suspicious lesions or rashes to exposed skin; reviewed pictures in media of rash to posterior knees  NEURO: Alert and oriented x 3.  Gait steady.  PSYCHIATRIC: Mentation and affect appear normal.  Mood appropriate.    Laboratory:  Results for orders placed or performed in visit on 05/25/22   Comprehensive metabolic panel     Status: Abnormal   Result Value Ref Range    Sodium 137 133 - 144 mmol/L    Potassium 4.1 3.4 - 5.3 mmol/L    Chloride 110 (H) 94 - 109 " mmol/L    Carbon Dioxide (CO2) 21 20 - 32 mmol/L    Anion Gap 6 3 - 14 mmol/L    Urea Nitrogen 28 7 - 30 mg/dL    Creatinine 1.98 (H) 0.66 - 1.25 mg/dL    Calcium 8.3 (L) 8.5 - 10.1 mg/dL    Glucose 194 (H) 70 - 99 mg/dL    Alkaline Phosphatase 75 40 - 150 U/L    AST 18 0 - 45 U/L    ALT 14 0 - 70 U/L    Protein Total 7.3 6.8 - 8.8 g/dL    Albumin 3.6 3.4 - 5.0 g/dL    Bilirubin Total 0.6 0.2 - 1.3 mg/dL    GFR Estimate 35 (L) >60 mL/min/1.73m2   TSH with free T4 reflex     Status: Normal   Result Value Ref Range    TSH 1.14 0.40 - 4.00 mU/L   CBC with platelets and differential     Status: Abnormal   Result Value Ref Range    WBC Count 5.4 4.0 - 11.0 10e3/uL    RBC Count 3.85 (L) 4.40 - 5.90 10e6/uL    Hemoglobin 12.0 (L) 13.3 - 17.7 g/dL    Hematocrit 35.1 (L) 40.0 - 53.0 %    MCV 91 78 - 100 fL    MCH 31.2 26.5 - 33.0 pg    MCHC 34.2 31.5 - 36.5 g/dL    RDW 13.6 10.0 - 15.0 %    Platelet Count 213 150 - 450 10e3/uL    % Neutrophils 57 %    % Lymphocytes 26 %    % Monocytes 14 %    % Eosinophils 2 %    % Basophils 1 %    % Immature Granulocytes 0 %    NRBCs per 100 WBC 0 <1 /100    Absolute Neutrophils 3.1 1.6 - 8.3 10e3/uL    Absolute Lymphocytes 1.4 0.8 - 5.3 10e3/uL    Absolute Monocytes 0.8 0.0 - 1.3 10e3/uL    Absolute Eosinophils 0.1 0.0 - 0.7 10e3/uL    Absolute Basophils 0.0 0.0 - 0.2 10e3/uL    Absolute Immature Granulocytes 0.0 <=0.4 10e3/uL    Absolute NRBCs 0.0 10e3/uL   CBC with Platelets & Differential     Status: Abnormal    Narrative    The following orders were created for panel order CBC with Platelets & Differential.  Procedure                               Abnormality         Status                     ---------                               -----------         ------                     CBC with platelets and d...[[id number]]  Abnormal            Final result                 Please view results for these tests on the individual orders.       Imaging Studies:      Results for orders placed or  performed during the hospital encounter of 05/23/22   CT Chest/Abdomen/Pelvis w Contrast    Narrative    PROCEDURE: CT CHEST/ABDOMEN/PELVIS W CONTRAST 5/23/2022 12:53 PM    HISTORY: surveillance ureteral cancer-underwent nephroureterectomy;  Malignant neoplasm of left ureter (H)    COMPARISONS: PET CT scan September 2021    Meds/Dose Given: ISOVUE 370  88mL    TECHNIQUE: CT scan of the chest abdomen and pelvis with IV contrast  sagittal coronal reconstructions    FINDINGS: CT chest: No pulmonary masses are noted. The hilar and  mediastinal lymph nodes are normal in caliber. Heart is normal in  size. Axillary and supraclavicular lymph nodes appear normal. The  regional skeleton is intact.    CT scan of the abdomen and pelvis: The liver is free of masses or  biliary ductal enlargement. No calcified gallstones are seen. The  spleen and pancreas are normal. The adrenal glands are normal. There  is been a left nephrectomy. The right kidney is free of masses or  hydronephrosis. The periaortic lymph nodes are normal in caliber. No  intraperitoneal masses or inflammatory changes are noted. The bladder  and rectum are normal. Degenerative changes are present in the lumbar  spine         Impression    IMPRESSION: Stable appearance of the chest abdomen and pelvis from a  PET CT scan in September 2021    FARSHAD LUTZ MD         SYSTEM ID:  [id number]       ASSESSMENT/PLAN:    1. 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  He completed neoadjuvant chemotherapy then underwent a nephroureterectomy and is now undergoing adjuvant therapy with nivolumab with plan to complete 1 year of therapy.  We will delay today's treatment for 2 weeks.  I will see him back then for reassessment in regards to the loose stools and rash.  We will plan to do lab work prior.    2.  Type 2 diabetes mellitus Patient previously with type 2 diabetes.  Hyperglycemia likely due to immunotherapy with likely conversion to type I which can happen while on immunotherapy. He is currently being managed through the diabetic clinic.  Blood sugars have improved.     3.  Stage IV chronic kidney disease: Stable.  He'll continue to follow with nephrology.     4.  bladder cancer:  He'll be seeing Dr. Schmitz with [facility] urology again in September for likely BCG again.     5.  shingles: We will continue with current therapy.  Nivolumab will be held x2 weeks.  He will follow-up with the eye doctor.    6. hypocalcemia: Recommended instituting 600 mg of calcium with vitamin D 400 units daily.    7.  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  We will also obtain stools for O&P, fecal lactoferrin, culture and C. difficile.  I will see him back prior to resumption of therapy.    8.  hypothyroidism: TSH within normal limits.  He will continue with the levothyroxine at current dose.    I encouraged patient to call with any questions or concerns.    50 minutes spent in the patient's encounter today with time spent in review of the chart along with in chart preparation.  Time was also spent in review of his treatment plan and delaying of his treatment plan.  Time was also spent obtaining a review of systems and performing a physical exam along with review of his lab work in detail with him.  Time was also spent in discussion of his symptoms and my recommendation for treatment with steroid and delay of therapy.  Time was also spent in ordering prescriptions, planning his next follow-up and in chart documentation.    Sabra Jensen, NP  APRN, FNP-BC, AOCNP

## 2022-05-25 NOTE — PATIENT INSTRUCTIONS
Please collect the stool samples as soon as you can; then start the Prednisone tomorrow.    We will delay your treatment for 2 weeks.  I will see you back with lab prior.    Your prescription for Prednisone has been sent to: [facility].  Please start the Prednisone tomorrow after your stools are collected.    When you are in need of a refill, please call your pharmacy and they will send us a request.     If you have any questions please call [phone]    Other instructions:  Start Calcium 600mg with vitamin D3 400units.

## 2022-05-26 ENCOUNTER — APPOINTMENT (OUTPATIENT)
Dept: LAB | Facility: OTHER | Age: 77
End: 2022-05-26
Payer: COMMERCIAL

## 2022-05-26 LAB
C DIFF TOX B STL QL: NEGATIVE
LACTOFERRIN STL QL IA: POSITIVE

## 2022-05-27 ENCOUNTER — TELEPHONE (OUTPATIENT)
Dept: EDUCATION SERVICES | Facility: HOSPITAL | Age: 77
End: 2022-05-27
Payer: COMMERCIAL

## 2022-05-27 LAB
C COLI+JEJUNI+LARI FUSA STL QL NAA+PROBE: NOT DETECTED
C PARVUM AG STL QL IA: NEGATIVE
EC STX1 GENE STL QL NAA+PROBE: NOT DETECTED
EC STX2 GENE STL QL NAA+PROBE: NOT DETECTED
G LAMBLIA AG STL QL IA: NEGATIVE
NOROV GI+II ORF1-ORF2 JNC STL QL NAA+PR: NOT DETECTED
RVA NSP5 STL QL NAA+PROBE: NOT DETECTED
SALMONELLA SP RPOD STL QL NAA+PROBE: NOT DETECTED
SHIGELLA SP+EIEC IPAH STL QL NAA+PROBE: NOT DETECTED
V CHOL+PARA RFBL+TRKH+TNAA STL QL NAA+PR: NOT DETECTED
Y ENTERO RECN STL QL NAA+PROBE: NOT DETECTED

## 2022-05-27 NOTE — TELEPHONE ENCOUNTER
Called Colin. He started Prednisone yesterday. He had been in to see Jennifer Jensen NP, on 5/25/22 and we downloaded Colin's Tiffany at that time. I had discussed with him at that time that BG readings will increase when he takes the Prednisone.    Liz P Report  05/12/2022 to 05/25/2022    % Time CGM is Active 92%    Average Glucose  132    Glucose Management Indicator  (GMI)    6.5%    Glucose Variabilty  32.9%    Time in Ranges:  >250 mg/dL   1%  181-250 mg/dL  15%   mg/dL   79%  54-69 mg/dL   5%  <54 mg/dL   1%    Colin reports that for the past 3 days FBG have been in 200s with 300s over the rest of the day yesterday and today. Discussed with him that the once the Prednisone has been taken in the AM the BG will then rise across the day. Usually Colin takes his NovoLOG based on the size of his meal : small - 3 units, medium- 4 units or large - 5 units. He states that he has been taking 5 units with the higher BG. I did tell him that he can add a unit or 2 to the 5 units before meals during the time he is taking the Prednisone. He can expect his readings to go down after the Prednisone is done and he can again decrease NovoLOG dose.    Will follow with Colin by phone on 5/31.

## 2022-06-02 DIAGNOSIS — L30.9 ECZEMA, UNSPECIFIED TYPE: ICD-10-CM

## 2022-06-03 RX ORDER — TRIAMCINOLONE ACETONIDE 1 MG/G
CREAM TOPICAL 2 TIMES DAILY
Qty: 30 G | Refills: 0 | Status: SHIPPED | OUTPATIENT
Start: 2022-06-03 | End: 2023-01-01

## 2022-06-03 NOTE — TELEPHONE ENCOUNTER
kenalog      Last Written Prescription Date:  5/24/22  Last Fill Quantity: 30 g,   # refills: 0  Last Office Visit: 4/8/21  Future Office visit:    Next 5 appointments (look out 90 days)    Jun 08, 2022  9:00 AM  (Arrive by 8:45 AM)  Return Visit with Sabra Jensen NP  [facility] [address] [facility] [place] [address]  [phone]   Aug 26, 2022 10:30 AM  (Arrive by 10:15 AM)  SHORT with Libia Wallace MD  [facility] ([facility] ) [address]  [facility] [address]  [phone]

## 2022-06-08 ENCOUNTER — ONCOLOGY VISIT (OUTPATIENT)
Dept: ONCOLOGY | Facility: OTHER | Age: 77
End: 2022-06-08
Attending: NURSE PRACTITIONER
Payer: COMMERCIAL

## 2022-06-08 ENCOUNTER — INFUSION THERAPY VISIT (OUTPATIENT)
Dept: INFUSION THERAPY | Facility: OTHER | Age: 77
End: 2022-06-08
Attending: INTERNAL MEDICINE
Payer: COMMERCIAL

## 2022-06-08 VITALS
HEART RATE: 68 BPM | HEIGHT: 67 IN | WEIGHT: 178.57 LBS | DIASTOLIC BLOOD PRESSURE: 68 MMHG | RESPIRATION RATE: 18 BRPM | TEMPERATURE: 96.8 F | BODY MASS INDEX: 28.03 KG/M2 | SYSTOLIC BLOOD PRESSURE: 110 MMHG | OXYGEN SATURATION: 98 %

## 2022-06-08 VITALS — DIASTOLIC BLOOD PRESSURE: 76 MMHG | HEART RATE: 54 BPM | SYSTOLIC BLOOD PRESSURE: 128 MMHG

## 2022-06-08 DIAGNOSIS — C66.2 MALIGNANT NEOPLASM OF LEFT URETER (H): Primary | ICD-10-CM

## 2022-06-08 DIAGNOSIS — E03.2 HYPOTHYROIDISM DUE TO MEDICATION: Primary | ICD-10-CM

## 2022-06-08 DIAGNOSIS — C66.2 MALIGNANT NEOPLASM OF LEFT URETER (H): ICD-10-CM

## 2022-06-08 LAB
ALBUMIN SERPL-MCNC: 3.4 G/DL (ref 3.4–5)
ALP SERPL-CCNC: 68 U/L (ref 40–150)
ALT SERPL W P-5'-P-CCNC: 17 U/L (ref 0–70)
ANION GAP SERPL CALCULATED.3IONS-SCNC: 5 MMOL/L (ref 3–14)
AST SERPL W P-5'-P-CCNC: 19 U/L (ref 0–45)
BASOPHILS # BLD AUTO: 0 10E3/UL (ref 0–0.2)
BASOPHILS NFR BLD AUTO: 1 %
BILIRUB SERPL-MCNC: 0.6 MG/DL (ref 0.2–1.3)
BUN SERPL-MCNC: 28 MG/DL (ref 7–30)
CALCIUM SERPL-MCNC: 8.3 MG/DL (ref 8.5–10.1)
CHLORIDE BLD-SCNC: 112 MMOL/L (ref 94–109)
CO2 SERPL-SCNC: 22 MMOL/L (ref 20–32)
CREAT SERPL-MCNC: 1.96 MG/DL (ref 0.66–1.25)
EOSINOPHIL # BLD AUTO: 0.2 10E3/UL (ref 0–0.7)
EOSINOPHIL NFR BLD AUTO: 2 %
ERYTHROCYTE [DISTWIDTH] IN BLOOD BY AUTOMATED COUNT: 15.8 % (ref 10–15)
GFR SERPL CREATININE-BSD FRML MDRD: 35 ML/MIN/1.73M2
GLUCOSE BLD-MCNC: 110 MG/DL (ref 70–99)
HCT VFR BLD AUTO: 34.4 % (ref 40–53)
HGB BLD-MCNC: 11.6 G/DL (ref 13.3–17.7)
IMM GRANULOCYTES # BLD: 0 10E3/UL
IMM GRANULOCYTES NFR BLD: 0 %
LYMPHOCYTES # BLD AUTO: 1.3 10E3/UL (ref 0.8–5.3)
LYMPHOCYTES NFR BLD AUTO: 16 %
MCH RBC QN AUTO: 32.1 PG (ref 26.5–33)
MCHC RBC AUTO-ENTMCNC: 33.7 G/DL (ref 31.5–36.5)
MCV RBC AUTO: 95 FL (ref 78–100)
MONOCYTES # BLD AUTO: 0.8 10E3/UL (ref 0–1.3)
MONOCYTES NFR BLD AUTO: 10 %
NEUTROPHILS # BLD AUTO: 5.9 10E3/UL (ref 1.6–8.3)
NEUTROPHILS NFR BLD AUTO: 71 %
NRBC # BLD AUTO: 0 10E3/UL
NRBC BLD AUTO-RTO: 0 /100
PLATELET # BLD AUTO: 207 10E3/UL (ref 150–450)
POTASSIUM BLD-SCNC: 3.8 MMOL/L (ref 3.4–5.3)
PROT SERPL-MCNC: 6.7 G/DL (ref 6.8–8.8)
RBC # BLD AUTO: 3.61 10E6/UL (ref 4.4–5.9)
SODIUM SERPL-SCNC: 139 MMOL/L (ref 133–144)
T4 FREE SERPL-MCNC: 0.94 NG/DL (ref 0.76–1.46)
TSH SERPL DL<=0.005 MIU/L-ACNC: 5.97 MU/L (ref 0.4–4)
WBC # BLD AUTO: 8.3 10E3/UL (ref 4–11)

## 2022-06-08 PROCEDURE — 258N000003 HC RX IP 258 OP 636: Performed by: NURSE PRACTITIONER

## 2022-06-08 PROCEDURE — G0463 HOSPITAL OUTPT CLINIC VISIT: HCPCS | Mod: 25

## 2022-06-08 PROCEDURE — G0463 HOSPITAL OUTPT CLINIC VISIT: HCPCS

## 2022-06-08 PROCEDURE — 96413 CHEMO IV INFUSION 1 HR: CPT

## 2022-06-08 PROCEDURE — 84443 ASSAY THYROID STIM HORMONE: CPT | Mod: ZL | Performed by: INTERNAL MEDICINE

## 2022-06-08 PROCEDURE — 84439 ASSAY OF FREE THYROXINE: CPT | Mod: ZL | Performed by: INTERNAL MEDICINE

## 2022-06-08 PROCEDURE — 99215 OFFICE O/P EST HI 40 MIN: CPT | Performed by: NURSE PRACTITIONER

## 2022-06-08 PROCEDURE — 36415 COLL VENOUS BLD VENIPUNCTURE: CPT | Mod: ZL | Performed by: INTERNAL MEDICINE

## 2022-06-08 PROCEDURE — 85025 COMPLETE CBC W/AUTO DIFF WBC: CPT | Mod: ZL

## 2022-06-08 PROCEDURE — 80053 COMPREHEN METABOLIC PANEL: CPT | Mod: ZL | Performed by: INTERNAL MEDICINE

## 2022-06-08 PROCEDURE — 250N000011 HC RX IP 250 OP 636: Performed by: NURSE PRACTITIONER

## 2022-06-08 RX ORDER — METHYLPREDNISOLONE SODIUM SUCCINATE 125 MG/2ML
125 INJECTION, POWDER, LYOPHILIZED, FOR SOLUTION INTRAMUSCULAR; INTRAVENOUS
Status: CANCELLED
Start: 2022-06-08

## 2022-06-08 RX ORDER — LEVOTHYROXINE SODIUM 100 UG/1
100 TABLET ORAL DAILY
Qty: 90 TABLET | Refills: 0 | Status: ON HOLD | OUTPATIENT
Start: 2022-06-08 | End: 2022-01-01

## 2022-06-08 RX ORDER — EPINEPHRINE 1 MG/ML
0.3 INJECTION, SOLUTION, CONCENTRATE INTRAVENOUS EVERY 5 MIN PRN
Status: CANCELLED | OUTPATIENT
Start: 2022-06-08

## 2022-06-08 RX ORDER — LEVOTHYROXINE SODIUM 100 UG/1
100 TABLET ORAL DAILY
Qty: 60 TABLET | Refills: 0 | Status: CANCELLED | OUTPATIENT
Start: 2022-06-08

## 2022-06-08 RX ORDER — HEPARIN SODIUM (PORCINE) LOCK FLUSH IV SOLN 100 UNIT/ML 100 UNIT/ML
5 SOLUTION INTRAVENOUS
Status: CANCELLED | OUTPATIENT
Start: 2022-06-08

## 2022-06-08 RX ORDER — NALOXONE HYDROCHLORIDE 0.4 MG/ML
0.2 INJECTION, SOLUTION INTRAMUSCULAR; INTRAVENOUS; SUBCUTANEOUS
Status: CANCELLED | OUTPATIENT
Start: 2022-06-08

## 2022-06-08 RX ORDER — ALBUTEROL SULFATE 0.83 MG/ML
2.5 SOLUTION RESPIRATORY (INHALATION)
Status: CANCELLED | OUTPATIENT
Start: 2022-06-08

## 2022-06-08 RX ORDER — ALBUTEROL SULFATE 90 UG/1
1-2 AEROSOL, METERED RESPIRATORY (INHALATION)
Status: CANCELLED
Start: 2022-06-08

## 2022-06-08 RX ORDER — LORAZEPAM 2 MG/ML
0.5 INJECTION INTRAMUSCULAR EVERY 4 HOURS PRN
Status: CANCELLED | OUTPATIENT
Start: 2022-06-08

## 2022-06-08 RX ORDER — DIPHENHYDRAMINE HYDROCHLORIDE 50 MG/ML
50 INJECTION INTRAMUSCULAR; INTRAVENOUS
Status: CANCELLED
Start: 2022-06-08

## 2022-06-08 RX ORDER — LEVOTHYROXINE SODIUM 100 MCG
100 TABLET ORAL DAILY
Qty: 60 TABLET | Refills: 0 | Status: SHIPPED | OUTPATIENT
Start: 2022-06-08 | End: 2022-06-08 | Stop reason: ALTCHOICE

## 2022-06-08 RX ORDER — HEPARIN SODIUM,PORCINE 10 UNIT/ML
5 VIAL (ML) INTRAVENOUS
Status: CANCELLED | OUTPATIENT
Start: 2022-06-08

## 2022-06-08 RX ORDER — MEPERIDINE HYDROCHLORIDE 25 MG/ML
25 INJECTION INTRAMUSCULAR; INTRAVENOUS; SUBCUTANEOUS EVERY 30 MIN PRN
Status: CANCELLED | OUTPATIENT
Start: 2022-06-08

## 2022-06-08 RX ADMIN — SODIUM CHLORIDE 480 MG: 9 INJECTION, SOLUTION INTRAVENOUS at 10:20

## 2022-06-08 RX ADMIN — SODIUM CHLORIDE 250 ML: 9 INJECTION, SOLUTION INTRAVENOUS at 10:20

## 2022-06-08 ASSESSMENT — PAIN SCALES - GENERAL
PAINLEVEL: NO PAIN (0)
PAINLEVEL: NO PAIN (0)

## 2022-06-08 NOTE — PATIENT INSTRUCTIONS
We would like to see you back per your schedule.     Your prescription for levothyroxine has been sent to: [facility].      When you are in need of a refill, please call your pharmacy and they will send us a request.     If you have any questions please call [phone]    Other instructions:       Start calcium 600mg supplement daily  Start Metamucil twice daily

## 2022-06-08 NOTE — PROGRESS NOTES
Patient is a 78 y/o here accompanied by self today for infusion of nivolumab per order of Dr. Bartlett.  Patient identified with two identifiers, order verified, and verbal consent for today's infusion obtained from patient.  Oncology toxicity assessment, fall risk, abuse screening, and medication/allergy reconciliation completed during provider visit today.     Lab values:    Component      Latest Ref Rng & Units 6/8/2022   WBC      4.0 - 11.0 10e3/uL 8.3   RBC Count      4.40 - 5.90 10e6/uL 3.61 (L)   Hemoglobin      13.3 - 17.7 g/dL 11.6 (L)   Hematocrit      40.0 - 53.0 % 34.4 (L)   MCV      78 - 100 fL 95   MCH      26.5 - 33.0 pg 32.1   MCHC      31.5 - 36.5 g/dL 33.7   RDW      10.0 - 15.0 % 15.8 (H)   Platelet Count      150 - 450 10e3/uL 207   % Neutrophils      % 71   % Lymphocytes      % 16   % Monocytes      % 10   % Eosinophils      % 2   % Basophils      % 1   % Immature Granulocytes      % 0   NRBCs per 100 WBC      <1 /100 0   Absolute Neutrophils      1.6 - 8.3 10e3/uL 5.9   Absolute Lymphocytes      0.8 - 5.3 10e3/uL 1.3   Absolute Monocytes      0.0 - 1.3 10e3/uL 0.8   Absolute Eosinophils      0.0 - 0.7 10e3/uL 0.2   Absolute Basophils      0.0 - 0.2 10e3/uL 0.0   Absolute Immature Granulocytes      <=0.4 10e3/uL 0.0   Absolute NRBCs      10e3/uL 0.0   Sodium      133 - 144 mmol/L 139   Potassium      3.4 - 5.3 mmol/L 3.8   Chloride      94 - 109 mmol/L 112 (H)   Carbon Dioxide      20 - 32 mmol/L 22   Anion Gap      3 - 14 mmol/L 5   Urea Nitrogen      7 - 30 mg/dL 28   Creatinine      0.66 - 1.25 mg/dL 1.96 (H)   Calcium      8.5 - 10.1 mg/dL 8.3 (L)   Glucose      70 - 99 mg/dL 110 (H)   Alkaline Phosphatase      40 - 150 U/L 68   AST      0 - 45 U/L 19   ALT      0 - 70 U/L 17   Protein Total      6.8 - 8.8 g/dL 6.7 (L)   Albumin      3.4 - 5.0 g/dL 3.4   Bilirubin Total      0.2 - 1.3 mg/dL 0.6   GFR Estimate      >60 mL/min/1.73m2 35 (L)   TSH      0.40 - 4.00 mU/L 5.97 (H)   T4 Free       0.76 - 1.46 ng/dL 0.94       Patient meets order parameters for today's treatment.    1020 IV pump verified with nivolumab dose, drug, and rate of administration.  Infusion administered per protocol.  Patient tolerated infusion well, no signs or symptoms of adverse reaction noted.  Patient denies pain nor discomfort.     IV removed, catheter intact.  Site clean, dry and intact.  No signs or symptoms of infiltration or infection.  Covered with a sterile bandage, slight pressure applied for 30 seconds.  Pt instructed to leave bandage intact for a minimum of one hour, and to call with questions or concerns.  Copy of appointments, discharge instructions, and after visit summary (AVS) provided to patient.  Patient states understanding, discharged.

## 2022-06-08 NOTE — NURSING NOTE
"Oncology Rooming Note    June 8, 2022 8:57 AM   Colin Baxter is a 77 year old male who presents for:    Chief Complaint   Patient presents with     Oncology Clinic Visit     Urothelial cancer     Initial Vitals: /68   Pulse 68   Resp 18   Ht 1.702 m (5' 7\")   Wt 81 kg (178 lb 9.2 oz)   SpO2 98%   BMI 27.97 kg/m   Estimated body mass index is 27.97 kg/m  as calculated from the following:    Height as of this encounter: 1.702 m (5' 7\").    Weight as of this encounter: 81 kg (178 lb 9.2 oz). Body surface area is 1.96 meters squared.  No Pain (0) Comment: Data Unavailable   No LMP for male patient.  Allergies reviewed: Yes  Medications reviewed: Yes    Medications: MEDICATION REFILLS NEEDED TODAY. Provider was notified.  Pharmacy name entered into EPIC:    [id number] - [place], [place] - [address] [id number] - [place], [place] - [address] [place] [address]    Clinical concerns: no concerns Jennifer Jensen was NOT notified.      Nasreen Ontiveros RN            "

## 2022-06-08 NOTE — PROGRESS NOTES
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  He completed neoadjuvant chemotherapy on 5/11/2021, then underwent nephroureterectomy 6/16/2021 and now receivning adjuvant Nivolumab to complete 1 year.     He follows with Dr. Schmitz for bladder cancer and will see her again in July for likely retreatment of BCG.  He sees Dr. Arvizu for chronic kidney disease and will see him again in September.      He developed shingles of the right eye 5/17/2022 and was treated with Valtrex and an eye ointment.  He also developed what was felt to be possibly eczema to the back of knees and thighs.  In addition, he developed loose watery stools after his last treatment.  He have 1-2 stools a day.  He was placed on prednisone x1 week and treatment has been held.    He presents to the clinic today stating he is doing okay.  He feels that the right eye has improved and that it is less red and itchy and watery.  Does continue to be blurry but this is not new for him and this happened actually prior to the shingles diagnosis.  He continues to have 1-2 loose bowel movements a day stating he feels the prednisone maybe helped a little bit.  He states they are \"far from normal\".  He did have difficulty with elevated blood sugar while on the prednisone and this is leveling out now again.  He is been out of his levothyroxine for 3 to 4 days and he has had no improvement in the stools.  He does continue to have a rash on the back of both of his legs but states it is much better.  He continues to use the Kenalog cream.  Energy level is pretty good.    "   Oncologic History:     2016 patient was diagnosed with high-grade bladder cancer T1; patient was treated with BCG and then again reinduction BCG then GemCis intravesically x3 in May 2018     1/5/2021 patient was seen by Dr. Morrell for history of hematuria.  He underwent cystoscopy which revealed bladder lesions with biopsy being negative.  Cytology showed atypical urothelial cells.  He underwent CT urogram which showed a mass in the left ureter measuring 2 cm with no hydronephrosis.  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  2/10/2021  he underwent PET scan that was negative for metastatic disease and then seen by Dr. Mcnulty at the [facility] and scheduled for robotic assisted laparoscopic left nephroureterectomy.    2/15/2021 he was seen by Dr. Bartlett with Medical Oncology to discuss neoadjuvant chemotherapy.  Recommendation was for gemcitabine 1000 mg per metered squared day 1 and day 8 and cisplatin 70 mg per metered squared on day 1 of a 21-day cycle x4 cycles followed by surgery.  5/11/2021 completion of neoadjuvant chemotherapy  6/16/2021 he underwent Cystourethroscopy and Left robot assisted nephroureterectomy with Left pelvic lymph node dissection by Dr. Jorge Mcnulty and Dr. Reyes Romano; pathology showed a multifocal high-grade urothelial cell carcinoma, staged pT3N0  11/10/2021 initiation of nivolumab     Current Chemo Regime/TX: Nivolumab 480 mg every 28 days  Current Cycle:  8  # of completed cycles: 7     Previous treatment:  Gemcitabine 1000 mg per metered squared on day 1 and day 8 and cisplatin 56 mg per metered squared day 1 every 21 days     Past Medical History:   Diagnosis Date     Benign essential hypertension      Cancer (H)     Bladder     Carcinoma in situ of bladder 11/19/2021     Diabetes (H)      Dyslipidemia      Malignant neoplasm of anterior wall of urinary  bladder (H) 12/11/2020       Past Surgical History:   Procedure Laterality Date     APPENDECTOMY  1958     COLONOSCOPY  2-     COMBINED CYSTOSCOPY, RETROGRADES, URETEROSCOPY, INSERT STENT Left 1/18/2021    Procedure: CYSTOURETEROSCOPY, WITH RETROGRADE PYELOGRAM with interpretation of fluroscopy, ureteroscopy, ureteral biopsy, bladder biopsy and fulgaration;  Surgeon: Shonda Morrell MD;  Location: [facility]     CYSTOSCOPY N/A 6/16/2021    Procedure: CYSTOSCOPY;  Surgeon: Javier Mcnulty MD;  [facility]     CYSTOSCOPY, BIOPSY BLADDER, COMBINED N/A 9/8/2015    Procedure: COMBINED CYSTOSCOPY, BIOPSY BLADDER;  Surgeon: Randy Martinez MD;  Location: [facility]     CYSTOSCOPY, BIOPSY BLADDER, COMBINED N/A 2/14/2017    Procedure: COMBINED CYSTOSCOPY, BIOPSY BLADDER;  Surgeon: Randy Martinez MD;  Location: [facility]     CYSTOSCOPY, BIOPSY BLADDER, COMBINED N/A 11/13/2018    Procedure: COMBINED CYSTOSCOPY, BIOPSY BLADDER;  Surgeon: Ed Helm MD;  Location: [facility]     CYSTOSCOPY, BIOPSY BLADDER, COMBINED N/A 11/5/2021    Procedure: CYSTOSCOPY, WITH BLADDER BIOPSY;  Surgeon: Shonda Morrell MD;  Location: [facility]     CYSTOSCOPY, RETROGRADES, COMBINED Bilateral 11/13/2018    Procedure: Bilateral Retrograde Pyelagram, Bladder Biopsy;  Surgeon: Ed Helm MD;  Location: [facility]     CYSTOSCOPY, RETROGRADES, COMBINED Right 11/5/2021    Procedure: CYSTOSCOPY, WITH RETROGRADE PYELOGRAM;  Surgeon: Shonda Morrell MD;  Location: [facility]     CYSTOSCOPY, RETROGRADES, INSERT STENT URETER(S), COMBINED Left 9/8/2015    Procedure: COMBINED CYSTOSCOPY, RETROGRADES, INSERT STENT URETER(S);  Surgeon: Randy Martinez MD;  Location: [facility]     CYSTOSCOPY, TRANSURETHRAL RESECTION (TUR) TUMOR BLADDER, COMBINED N/A 9/8/2015    Procedure: COMBINED CYSTOSCOPY, TRANSURETHRAL RESECTION (TUR) TUMOR BLADDER;  Surgeon: Randy Martinez MD;  [facility]     CYSTOSCOPY, TRANSURETHRAL RESECTION (TUR) TUMOR  BLADDER, COMBINED N/A 2016    Procedure: COMBINED CYSTOSCOPY, TRANSURETHRAL RESECTION (TUR) TUMOR BLADDER;  Surgeon: Randy Martinez MD;  [facility]     CYSTOSCOPY, TRANSURETHRAL RESECTION (TUR) TUMOR BLADDER, COMBINED N/A 2016    Procedure: COMBINED CYSTOSCOPY, TRANSURETHRAL RESECTION (TUR) TUMOR BLADDER;  Surgeon: Randy Martinez MD;  Location: [facility]     DAVINCI NEPHROURETERECTOMY Left 2021    Procedure: NEPHROURETERECTOMY, ROBOT-ASSISTED, lymph node dissection;  Surgeon: Javier Mcnulty MD;  [facility]     PHACOEMULSIFICATION WITH STANDARD INTRAOCULAR LENS IMPLANT Right 10/9/2018    Procedure: PHACOEMULSIFICATION WITH STANDARD INTRAOCULAR LENS IMPLANT;  PHACOEMULSIFICATION CATARACT EXTRACTION POSTERIOR CHAMBER LENS RIGHT;  Surgeon: Marlo Mcconnell MD;  Location: [facility]     PHACOEMULSIFICATION WITH STANDARD INTRAOCULAR LENS IMPLANT Left 10/23/2018    Procedure: PHACOEMULSIFICATION CATARACT EXTRACTION POSTERIOR CHAMBER LENS LEFT;  Surgeon: Marlo Mcconnell MD;  Location: [facility]       Family History   Problem Relation Age of Onset     Diabetes Mother      Parkinsonism Brother      Coronary Artery Disease No family hx of      Hypertension No family hx of      Hyperlipidemia No family hx of      Cerebrovascular Disease No family hx of      Breast Cancer No family hx of      Colon Cancer No family hx of      Prostate Cancer No family hx of      Anesthesia Reaction No family hx of      Asthma No family hx of      Thyroid Disease No family hx of      Genetic Disorder No family hx of        Social History     Socioeconomic History     Marital status:      Spouse name: Not on file     Number of children: Not on file     Years of education: Not on file     Highest education level: Not on file   Occupational History     Not on file   Tobacco Use     Smoking status: Former Smoker     Quit date: 1992     Years since quittin.4     Smokeless tobacco: Never Used    Vaping Use     Vaping Use: Never used   Substance and Sexual Activity     Alcohol use: Yes     Comment: rarely     Drug use: No     Sexual activity: Not Currently   Other Topics Concern     Parent/sibling w/ CABG, MI or angioplasty before 65F 55M? No   Social History Narrative     Not on file     Social Determinants of Health     Financial Resource Strain: Not on file   Food Insecurity: Not on file   Transportation Needs: Not on file   Physical Activity: Not on file   Stress: Not on file   Social Connections: Not on file   Intimate Partner Violence: Not on file   Housing Stability: Not on file       Current Outpatient Medications   Medication     atorvastatin (LIPITOR) 40 MG tablet     blood glucose (ONETOUCH VERIO IQ) test strip     brimonidine (ALPHAGAN-P) 0.15 % ophthalmic solution     Cholecalciferol (VITAMIN D3) 25 MCG TABS     Continuous Blood Gluc  (FREESTYLE DELILAH 2 READER) TENNILLE     Continuous Blood Gluc Sensor (FREESTYLE DELILAH 2 SENSOR) MISC     insulin aspart (NOVOLOG FLEXPEN) 100 UNIT/ML pen     insulin degludec (TRESIBA FLEXTOUCH) 100 UNIT/ML pen     insulin pen needle (BD PEN NEEDLE DONTAE 2ND GEN) 32G X 4 MM miscellaneous     latanoprost (XALATAN) 0.005 % ophthalmic solution     levothyroxine (SYNTHROID/LEVOTHROID) 100 MCG tablet     ONETOUCH DELICA LANCETS 33G MISC     prednisoLONE acetate (PRED FORTE) 1 % ophthalmic suspension     tamsulosin (FLOMAX) 0.4 MG capsule     triamcinolone (KENALOG) 0.1 % external cream     VITRON-C  MG TABS tablet     acetaminophen (TYLENOL) 325 MG tablet     No current facility-administered medications for this visit.        Allergies   Allergen Reactions     No Clinical Screening - See Comments Other (See Comments)     Beta blocker in glaucoma gtt.s caused low pulse and passing out      Timolol      Beta blocker in glaucoma gtt.s caused low pulse and passing out   - patient thinks it was timolol but not 100% sure which beta blocker eye drop.        Review  "Of Systems:  Constitutional:    denies fever, weight changes, chills, and night sweats.  Eyes:    Denies double vision  Ears/Nose/Throat:   denies ear pain, nose problems, difficulty swallowing  Respiratory:   denies shortness of breath, cough  Skin:   denies new rash, lesions-see HPI  Cardiovascular:   denies chest pain, palpitations, edema  Gastrointestinal:   denies abdominal pain, bloating, nausea, early satiety-see HPI  Genitourinary:   denies difficulty with urination, blood in urine  Musculoskeletal:    denies new muscle pain, bone pain  Neurologic:   denies lightheadedness, headaches, numbness or tingling  Psychiatric:   denies anxiety, depression  Hematologic/Lymphatic/Immunologic:   denies easy bleeding, lumps or bumps noted; some easy bruising  Endocrine:   Denies increased thirst; has dry mouth      ECOG Performance Status: 1    Physical Exam:  /68   Pulse 68   Temp 96.8  F (36  C)   Resp 18   Ht 1.702 m (5' 7\")   Wt 81 kg (178 lb 9.2 oz)   SpO2 98%   BMI 27.97 kg/m      GENERAL APPEARANCE: Healthy, alert and in no acute distress.  HEENT: Normocephalic, Sclerae anicteric.   NECK:   No asymmetry or masses, no thyromegaly.  LYMPHATICS: No palpable cervical, supraclavicular, axillary, or inguinal nodes   RESP: Lungs clear to auscultation bilaterally, respirations regular and easy  CARDIOVASCULAR: Regular rate and rhythm. Normal S1, S2; no murmur, gallop, or rub.  ABDOMEN: Soft, nontender. Bowel sounds auscultated all 4 quadrants. No palpable organomegaly or masses.  MUSCULOSKELETAL: Extremities without gross deformities noted. No edema of bilateral lower extremities.  SKIN: No suspicious lesions or rashes to exposed skin  NEURO: Alert and oriented x 3.  Gait steady.  PSYCHIATRIC: Mentation and affect appear normal.  Mood appropriate.    Laboratory:  Component      Latest Ref Rng & Units 6/8/2022   WBC      4.0 - 11.0 10e3/uL 8.3   RBC Count      4.40 - 5.90 10e6/uL 3.61 (L)   Hemoglobin      " 13.3 - 17.7 g/dL 11.6 (L)   Hematocrit      40.0 - 53.0 % 34.4 (L)   MCV      78 - 100 fL 95   MCH      26.5 - 33.0 pg 32.1   MCHC      31.5 - 36.5 g/dL 33.7   RDW      10.0 - 15.0 % 15.8 (H)   Platelet Count      150 - 450 10e3/uL 207   % Neutrophils      % 71   % Lymphocytes      % 16   % Monocytes      % 10   % Eosinophils      % 2   % Basophils      % 1   % Immature Granulocytes      % 0   NRBCs per 100 WBC      <1 /100 0   Absolute Neutrophils      1.6 - 8.3 10e3/uL 5.9   Absolute Lymphocytes      0.8 - 5.3 10e3/uL 1.3   Absolute Monocytes      0.0 - 1.3 10e3/uL 0.8   Absolute Eosinophils      0.0 - 0.7 10e3/uL 0.2   Absolute Basophils      0.0 - 0.2 10e3/uL 0.0   Absolute Immature Granulocytes      <=0.4 10e3/uL 0.0   Absolute NRBCs      10e3/uL 0.0   Sodium      133 - 144 mmol/L 139   Potassium      3.4 - 5.3 mmol/L 3.8   Chloride      94 - 109 mmol/L 112 (H)   Carbon Dioxide      20 - 32 mmol/L 22   Anion Gap      3 - 14 mmol/L 5   Urea Nitrogen      7 - 30 mg/dL 28   Creatinine      0.66 - 1.25 mg/dL 1.96 (H)   Calcium      8.5 - 10.1 mg/dL 8.3 (L)   Glucose      70 - 99 mg/dL 110 (H)   Alkaline Phosphatase      40 - 150 U/L 68   AST      0 - 45 U/L 19   ALT      0 - 70 U/L 17   Protein Total      6.8 - 8.8 g/dL 6.7 (L)   Albumin      3.4 - 5.0 g/dL 3.4   Bilirubin Total      0.2 - 1.3 mg/dL 0.6   GFR Estimate      >60 mL/min/1.73m2 35 (L)   TSH      0.40 - 4.00 mU/L 5.97 (H)   T4 Free      0.76 - 1.46 ng/dL 0.94       Imaging Studies:      Results for orders placed or performed during the hospital encounter of 05/23/22   CT Chest/Abdomen/Pelvis w Contrast    Narrative    PROCEDURE: CT CHEST/ABDOMEN/PELVIS W CONTRAST 5/23/2022 12:53 PM    HISTORY: surveillance ureteral cancer-underwent nephroureterectomy;  Malignant neoplasm of left ureter (H)    COMPARISONS: PET CT scan September 2021    Meds/Dose Given: ISOVUE 370  88mL    TECHNIQUE: CT scan of the chest abdomen and pelvis with IV contrast  sagittal  coronal reconstructions    FINDINGS: CT chest: No pulmonary masses are noted. The hilar and  mediastinal lymph nodes are normal in caliber. Heart is normal in  size. Axillary and supraclavicular lymph nodes appear normal. The  regional skeleton is intact.    CT scan of the abdomen and pelvis: The liver is free of masses or  biliary ductal enlargement. No calcified gallstones are seen. The  spleen and pancreas are normal. The adrenal glands are normal. There  is been a left nephrectomy. The right kidney is free of masses or  hydronephrosis. The periaortic lymph nodes are normal in caliber. No  intraperitoneal masses or inflammatory changes are noted. The bladder  and rectum are normal. Degenerative changes are present in the lumbar  spine         Impression    IMPRESSION: Stable appearance of the chest abdomen and pelvis from a  PET CT scan in September 2021    FARSHAD LUTZ MD         SYSTEM ID:  [id number]       ASSESSMENT/PLAN:    1. 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  He completed neoadjuvant chemotherapy then underwent a nephroureterectomy and is now undergoing adjuvant therapy with nivolumab with plan to complete 1 year of therapy.  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  He was treated with 1 week of prednisone.  Little improvement with the prednisone.  We will treat with cycle 8 today and follow-up prior to cycle 9 with labs per treatment plan.    2.  Type 2 diabetes mellitus:  Patient previously with type 2 diabetes. Hyperglycemia likely due to immunotherapy with likely conversion to type I which can happen while on immunotherapy. He is currently being managed through the diabetic clinic.  Blood sugars have improved.     3.  Stage IV chronic kidney disease: Stable.  He'll continue to follow with nephrology and see Dr. Arvizu in September.     4.   bladder cancer:  He'll be seeing Dr. Schmitz with [facility] urology again in September for likely BCG again.      5. loose stools: Possibly more related to side effect of the immunotherapy versus a colitis.  I recommended instituting Metamucil twice daily to try and bulk up the stools.  We will reassess in 1 month.    6. rash: We will continue with the Kenalog cream.  We will reassess in 1 month.    7.  Hypocalcemia: I recommended again instituting calcium 600 mg daily.    I encouraged patient to call with any questions or concerns.    40 minutes spent in the patient's encounter today with time spent in review of the chart along with in chart preparation.  Time was also spent in review of his treatment plan and signing of his treatment plan.  Time was also spent obtaining a review of systems and performing a physical exam along with review of his lab work with him in detail.  Time was spent in discussion of the side effects of the immunotherapy versus possible immune mediated side effects including hypothyroidism and loose stools.  Time was spent in discussion of management of these.  Time was also spent in ordering prescription and in chart documentation.      Sabra Jensen, NP  APRN, FNP-BC, AOCNP

## 2022-06-08 NOTE — PROGRESS NOTES
22 gauge angio cath inserted into right lower forearm.  Immediate blood return noted. Wasted 3mL blood, ordered labs taken. Flushed with 3mL normal saline, flushes easily without resistance, no signs or symptoms of infiltration or infection. IV secured with sterile, transparent dressing and tape.  Patient tolerated well, denies pain or discomfort at this time. IV left in place for upcoming infusion appointment. Patient discharged ambulatory.

## 2022-06-20 ENCOUNTER — TELEPHONE (OUTPATIENT)
Dept: ONCOLOGY | Facility: OTHER | Age: 77
End: 2022-06-20

## 2022-06-20 ENCOUNTER — TELEPHONE (OUTPATIENT)
Dept: EDUCATION SERVICES | Facility: OTHER | Age: 77
End: 2022-06-20
Payer: COMMERCIAL

## 2022-06-20 DIAGNOSIS — C66.2 MALIGNANT NEOPLASM OF LEFT URETER (H): Primary | ICD-10-CM

## 2022-06-20 DIAGNOSIS — K52.9 COLITIS: ICD-10-CM

## 2022-06-20 NOTE — TELEPHONE ENCOUNTER
Patient called stating he is having ongoing diarrhea but in the last 2 days was worse. Yesterday 10 watery loose stools and today 3 loose stools this morning. He states he stopped the metamucil as it has not helped and made him feel sick, and he also stopped the calcium as it made him feels sick. He is wondering if stools could be caused from taking the levothyroxine as that's about when the initial diarrhea started?  Please advise

## 2022-06-20 NOTE — TELEPHONE ENCOUNTER
Pt called he needs Tresiba and his CGM sensors. He doesn't know if we order this for him or if her orders it. I called the pt back we will order the medication through the P[facility], and he orders the CGM refills. Tresiba, Novolog and insulin pen needles will be ordered and faxed tomorrow to [employer].

## 2022-06-20 NOTE — TELEPHONE ENCOUNTER
I spoke with ELIOT Jesnen NP and she instructs to have patient hold thyroid medication and we will call and see how patient is doing on Friday. I notified patient via phone and he agreeable to the plan.  Verónica Leung LPN

## 2022-06-22 ENCOUNTER — TELEPHONE (OUTPATIENT)
Dept: EDUCATION SERVICES | Facility: HOSPITAL | Age: 77
End: 2022-06-22

## 2022-06-22 NOTE — TELEPHONE ENCOUNTER
Called Colin as he had recently checked with us regarding re-ordering of his insulin and CGM supplies.    Colin had found his information regarding [employer] and he has the re-order phone number to call. He will be calling them.    I will look to see when he needs his 6 month visit for documentation of meeting Medicare criteria for CGM use.    He reports BGs have come down some after Prednisone was done. Reports fastin-180, but concerns are pre-lunch readings in 200s and a couple post-lunch lows.    Colin tells me that he is outside doing chores in the afternoons. I instructed him to take an extra unit of NovoLOG at breakfast which would bring him to 5 units and then 1-2 less units before lunch, especially on those days when he is doing more physical activity. He verbalizes understanding of this and wrote down instructions.    Instructed Colin to call if he has any more lows. He has our department number.    Asked Colin to bring in his Tiffany Hamilton for download on 22 when he has his next infusion.

## 2022-06-24 RX ORDER — PREDNISONE 20 MG/1
20 TABLET ORAL DAILY
Qty: 80 TABLET | Refills: 0 | Status: SHIPPED | OUTPATIENT
Start: 2022-06-24 | End: 2022-01-01

## 2022-06-24 RX ORDER — SULFAMETHOXAZOLE/TRIMETHOPRIM 800-160 MG
1 TABLET ORAL ONCE
Qty: 60 TABLET | Refills: 0 | Status: SHIPPED | OUTPATIENT
Start: 2022-06-24 | End: 2023-01-01

## 2022-06-24 NOTE — TELEPHONE ENCOUNTER
Called to check patient, states still having loose stools, had 3 episodes already this morning. Not as watery he is unsure how to describe them. Synthroid has been on hold all week, and hasn't taken his Metamucil or Calcium. Pt wondering if any other recommendations.

## 2022-06-24 NOTE — TELEPHONE ENCOUNTER
Have him reinitiate the thyroid medication as this is likely not the culprit.  It is related to the immunotherapy.  We need to get him back on a prednisone taper but need to do this for about 6 weeks.  Have him start tomorrow morning and he needs to take it with food.  I will send a prescription to his pharmacy with instructions.  His treatment will need to be pushed off and I should see him in 6 weeks with plan for treatment then.  He will also need to be on Prilosec 20 mg daily to help protect his stomach from the steroid.  I did also place him on an antibiotic 1 tablet daily.  This will help prevent any steroid-induced pneumonia.

## 2022-06-27 ENCOUNTER — TELEPHONE (OUTPATIENT)
Dept: EDUCATION SERVICES | Facility: HOSPITAL | Age: 77
End: 2022-06-27

## 2022-06-27 NOTE — TELEPHONE ENCOUNTER
Pt called he has been having diarrhea, Jennifer Jensen put him on Prilosec, Prednisone and Sulfa. These are helping with the diarrhea, but his BG is elevated and he is having severe cramps in his hands and fingers.

## 2022-06-29 NOTE — TELEPHONE ENCOUNTER
Called Colin. He is currently taking Predisone 80 mg daily until July1st then he will take 60 mg daily for seven days.    He reports BG readings in 200s in AM and 300s to High across the day.    Increased Tresiba to 25 units daily and gave him a sliding scale for his NovoLOG before meals:    BG  Units of NovoLOG  <200  Take usual dose of 4 to 5 units  200-250 Take 7 units  251-300 Take 8 units  Above 300 Take 9 units    Yesterday, Colin forgot his Prednisone and BG readings were lower. Instructed him to take usual insulin doses if he forgets or stops Predisone.    I will follow by phone on Friday, 7/1/22

## 2022-07-01 ENCOUNTER — TELEPHONE (OUTPATIENT)
Dept: EDUCATION SERVICES | Facility: HOSPITAL | Age: 77
End: 2022-07-01

## 2022-07-01 NOTE — TELEPHONE ENCOUNTER
Called Colin. He feels that he is doing better with insulin increase.    His dose of Prednisone will go down to 60 mg daily starting tomorrow.    Will follow by phone on 7/5

## 2022-07-07 ENCOUNTER — LAB (OUTPATIENT)
Dept: INFUSION THERAPY | Facility: OTHER | Age: 77
End: 2022-07-07
Attending: INTERNAL MEDICINE
Payer: COMMERCIAL

## 2022-07-07 ENCOUNTER — ONCOLOGY VISIT (OUTPATIENT)
Dept: ONCOLOGY | Facility: OTHER | Age: 77
End: 2022-07-07
Attending: NURSE PRACTITIONER
Payer: COMMERCIAL

## 2022-07-07 ENCOUNTER — ALLIED HEALTH/NURSE VISIT (OUTPATIENT)
Dept: EDUCATION SERVICES | Facility: OTHER | Age: 77
End: 2022-07-07
Attending: INTERNAL MEDICINE
Payer: COMMERCIAL

## 2022-07-07 ENCOUNTER — APPOINTMENT (OUTPATIENT)
Dept: LAB | Facility: OTHER | Age: 77
End: 2022-07-07
Payer: COMMERCIAL

## 2022-07-07 VITALS
HEIGHT: 67 IN | WEIGHT: 180.12 LBS | TEMPERATURE: 97.5 F | SYSTOLIC BLOOD PRESSURE: 128 MMHG | OXYGEN SATURATION: 98 % | BODY MASS INDEX: 28.27 KG/M2 | DIASTOLIC BLOOD PRESSURE: 74 MMHG | RESPIRATION RATE: 19 BRPM | HEART RATE: 53 BPM

## 2022-07-07 DIAGNOSIS — K52.9 COLITIS: ICD-10-CM

## 2022-07-07 DIAGNOSIS — E11.65 TYPE 2 DIABETES MELLITUS WITH HYPERGLYCEMIA, WITH LONG-TERM CURRENT USE OF INSULIN (H): Primary | ICD-10-CM

## 2022-07-07 DIAGNOSIS — Z79.4 TYPE 2 DIABETES MELLITUS WITH HYPERGLYCEMIA, WITH LONG-TERM CURRENT USE OF INSULIN (H): Primary | ICD-10-CM

## 2022-07-07 DIAGNOSIS — R21 RASH AND NONSPECIFIC SKIN ERUPTION: ICD-10-CM

## 2022-07-07 DIAGNOSIS — E11.65 TYPE 2 DIABETES MELLITUS WITH HYPERGLYCEMIA, WITHOUT LONG-TERM CURRENT USE OF INSULIN (H): ICD-10-CM

## 2022-07-07 DIAGNOSIS — C66.2 MALIGNANT NEOPLASM OF LEFT URETER (H): Primary | ICD-10-CM

## 2022-07-07 DIAGNOSIS — C67.3 MALIGNANT NEOPLASM OF ANTERIOR WALL OF URINARY BLADDER (H): ICD-10-CM

## 2022-07-07 DIAGNOSIS — N18.4 CKD (CHRONIC KIDNEY DISEASE) STAGE 4, GFR 15-29 ML/MIN (H): ICD-10-CM

## 2022-07-07 DIAGNOSIS — E03.2 HYPOTHYROIDISM DUE TO MEDICATION: ICD-10-CM

## 2022-07-07 LAB
ALBUMIN SERPL-MCNC: 3.3 G/DL (ref 3.4–5)
ALP SERPL-CCNC: 75 U/L (ref 40–150)
ALT SERPL W P-5'-P-CCNC: 21 U/L (ref 0–70)
ANION GAP SERPL CALCULATED.3IONS-SCNC: 8 MMOL/L (ref 3–14)
AST SERPL W P-5'-P-CCNC: 16 U/L (ref 0–45)
BASOPHILS # BLD AUTO: 0 10E3/UL (ref 0–0.2)
BASOPHILS NFR BLD AUTO: 0 %
BILIRUB SERPL-MCNC: 0.6 MG/DL (ref 0.2–1.3)
BUN SERPL-MCNC: 44 MG/DL (ref 7–30)
CALCIUM SERPL-MCNC: 8.2 MG/DL (ref 8.5–10.1)
CHLORIDE BLD-SCNC: 103 MMOL/L (ref 94–109)
CO2 SERPL-SCNC: 23 MMOL/L (ref 20–32)
CREAT SERPL-MCNC: 2.15 MG/DL (ref 0.66–1.25)
EOSINOPHIL # BLD AUTO: 0.1 10E3/UL (ref 0–0.7)
EOSINOPHIL NFR BLD AUTO: 1 %
ERYTHROCYTE [DISTWIDTH] IN BLOOD BY AUTOMATED COUNT: 16.2 % (ref 10–15)
GFR SERPL CREATININE-BSD FRML MDRD: 31 ML/MIN/1.73M2
GLUCOSE BLD-MCNC: 458 MG/DL (ref 70–99)
HCT VFR BLD AUTO: 35.6 % (ref 40–53)
HGB BLD-MCNC: 12.3 G/DL (ref 13.3–17.7)
IMM GRANULOCYTES # BLD: 0.1 10E3/UL
IMM GRANULOCYTES NFR BLD: 1 %
LYMPHOCYTES # BLD AUTO: 0.6 10E3/UL (ref 0.8–5.3)
LYMPHOCYTES NFR BLD AUTO: 4 %
MCH RBC QN AUTO: 32.8 PG (ref 26.5–33)
MCHC RBC AUTO-ENTMCNC: 34.6 G/DL (ref 31.5–36.5)
MCV RBC AUTO: 95 FL (ref 78–100)
MONOCYTES # BLD AUTO: 1.4 10E3/UL (ref 0–1.3)
MONOCYTES NFR BLD AUTO: 10 %
NEUTROPHILS # BLD AUTO: 12.1 10E3/UL (ref 1.6–8.3)
NEUTROPHILS NFR BLD AUTO: 84 %
NRBC # BLD AUTO: 0 10E3/UL
NRBC BLD AUTO-RTO: 0 /100
PLATELET # BLD AUTO: 222 10E3/UL (ref 150–450)
POTASSIUM BLD-SCNC: 4.1 MMOL/L (ref 3.4–5.3)
PROT SERPL-MCNC: 6.5 G/DL (ref 6.8–8.8)
RBC # BLD AUTO: 3.75 10E6/UL (ref 4.4–5.9)
SODIUM SERPL-SCNC: 134 MMOL/L (ref 133–144)
TSH SERPL DL<=0.005 MIU/L-ACNC: 1.64 MU/L (ref 0.4–4)
WBC # BLD AUTO: 14.2 10E3/UL (ref 4–11)

## 2022-07-07 PROCEDURE — 99215 OFFICE O/P EST HI 40 MIN: CPT | Performed by: NURSE PRACTITIONER

## 2022-07-07 PROCEDURE — 84443 ASSAY THYROID STIM HORMONE: CPT | Mod: ZL | Performed by: INTERNAL MEDICINE

## 2022-07-07 PROCEDURE — 36415 COLL VENOUS BLD VENIPUNCTURE: CPT | Mod: ZL | Performed by: INTERNAL MEDICINE

## 2022-07-07 PROCEDURE — G0463 HOSPITAL OUTPT CLINIC VISIT: HCPCS

## 2022-07-07 PROCEDURE — 80053 COMPREHEN METABOLIC PANEL: CPT | Mod: ZL | Performed by: INTERNAL MEDICINE

## 2022-07-07 PROCEDURE — 85014 HEMATOCRIT: CPT | Mod: ZL

## 2022-07-07 RX ORDER — HUMAN INSULIN 100 [IU]/ML
INJECTION, SUSPENSION SUBCUTANEOUS
Qty: 15 ML | Refills: 1 | Status: SHIPPED | OUTPATIENT
Start: 2022-07-07 | End: 2022-01-01 | Stop reason: ALTCHOICE

## 2022-07-07 ASSESSMENT — PAIN SCALES - GENERAL: PAINLEVEL: NO PAIN (0)

## 2022-07-07 NOTE — NURSING NOTE
"Oncology Rooming Note    July 7, 2022 9:15 AM   Colin Baxter is a 77 year old male who presents for:    Chief Complaint   Patient presents with     Oncology Clinic Visit     Follow up. Urothelial cancer      Initial Vitals: /74   Pulse 53   Temp 97.5  F (36.4  C) (Tympanic)   Resp 19   Ht 1.702 m (5' 7\")   Wt 81.7 kg (180 lb 1.9 oz)   SpO2 98%   BMI 28.21 kg/m   Estimated body mass index is 28.21 kg/m  as calculated from the following:    Height as of this encounter: 1.702 m (5' 7\").    Weight as of this encounter: 81.7 kg (180 lb 1.9 oz). Body surface area is 1.97 meters squared.  No Pain (0) Comment: Data Unavailable   No LMP for male patient.  Allergies reviewed: Yes  Medications reviewed: Yes    Medications: Medication refills not needed today.  Pharmacy name entered into EPIC:    [id number] - [place] - [address] [id number] - [place], [place] - [address]              "

## 2022-07-07 NOTE — PROGRESS NOTES
Reviewed Freestyle Tiffany P and Prednisone regime with Maryann Mccord NP. Colin is taking Prednisone with dose tapering down slowly.   Liz AGP Report  6/24/2022 to 7/7/2022    % Time CGM is Active 96%    Average Glucose  219    Glucose Management Indicator  (GMI)    8.5%    Glucose Variabilty  45.7%    Time in Ranges:  >250 mg/dL   35%  181-250 mg/dL  22%   mg/dL   42%  54-69 mg/dL   1%  <54 mg/dL   0%      I had increased Tresiba and NovoLOG which has not been bringing down elevated BGs well.     Will go back to usual insulin doses and add an NPH insulin when Colin takes his Prednisone every day at noon to work when Prednisone creates the biggest BG elevation.    Instructed Colin how to use the NPH insulin pen, to take the NPH when he takes the Prednisone and doses to take.    Will follow by phone.

## 2022-07-07 NOTE — TELEPHONE ENCOUNTER
Per discussion and review of Colin's [facility] report with Maryann Mccord NP, I instructed him to go back to usual doses of Tresiba (20 units daily) and NovoLOG (4 to 5 units prior to meals) and that we will add NovoLIN N (NPH) to be taken when he takes his Prednisone daily at noon.    NovoLIN N (NPH) to start at 20 units daily at noon, decrease to 10 units daily on 7/16/22, then decrease to 5 units daily on 7/26/22 until Prednisone gone. Asked him not to take the NovoLOG at noon when he starts the NPH, but will add it back after the weekend if needed.    Please find the NovoLIN N (NPH) prescription in this encounter for your signature.

## 2022-07-07 NOTE — PROGRESS NOTES
Oncology Follow-up Visit:  July 7, 2022    Reason for Visit:  Patient presents with:  Oncology Clinic Visit: Follow up. 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  He completed neoadjuvant chemotherapy on 5/11/2021, then underwent nephroureterectomy 6/16/2021 and now receivning adjuvant Nivolumab to complete 1 year.     He follows with Dr. Schmitz for bladder cancer and will see her again in July for likely retreatment of BCG.  He sees Dr. Arvizu for chronic kidney disease and will see him again in September.       He developed shingles of the right eye 5/17/2022 and was treated with Valtrex and an eye ointment.  He also developed what was felt to be possibly eczema to the back of knees and thighs.  In addition, he developed loose watery stools after his last treatment.  He would have 1-2 stools a day.  He was placed on prednisone x1 week and treatment held.      He was then treated with cycle 8 therapy and once again developed loose stools post therapy.  Stools were increased up to 10 a day some days and were watery.  He was placed on high-dose prednisone and is currently in the middle of this taper.  He is currently on 60 mg daily and will decrease to 40 mg daily on Saturday.  He tells me today that his stools are much improved having 1 to 2/day and they are soft.  He denies any blood or mucus in his stool.  The biggest complaint he has today is in regards to his elevated blood sugars stating they are in the mid 200s to 300s most times.  He has been working with diabetic education for management of his blood sugars while on the prednisone.    He tells me his energy is good and he continues to keep active.  He is developed small red spots to his arms and is unsure when this  occurred as he noticed it this morning.  He denies any itching.  Denies any changing of soaps, laundry detergents, medications or any new foods.    Oncologic History:     2016 patient was diagnosed with high-grade bladder cancer T1; patient was treated with BCG and then again reinduction BCG then GemCis intravesically x3 in May 2018     1/5/2021 patient was seen by Dr. Morrell for history of hematuria.  He underwent cystoscopy which revealed bladder lesions with biopsy being negative.  Cytology showed atypical urothelial cells.  He underwent CT urogram which showed a mass in the left ureter measuring 2 cm with no hydronephrosis.  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  2/10/2021  he underwent PET scan that was negative for metastatic disease and then seen by Dr. Mcnulty at the [facility] and scheduled for robotic assisted laparoscopic left nephroureterectomy.    2/15/2021 he was seen by Dr. Bartlett with Medical Oncology to discuss neoadjuvant chemotherapy.  Recommendation was for gemcitabine 1000 mg per metered squared day 1 and day 8 and cisplatin 70 mg per metered squared on day 1 of a 21-day cycle x4 cycles followed by surgery.  5/11/2021 completion of neoadjuvant chemotherapy  6/16/2021 he underwent Cystourethroscopy and Left robot assisted nephroureterectomy with Left pelvic lymph node dissection by Dr. Jorge Mcnulty and Dr. Reyes Romano; pathology showed a multifocal high-grade urothelial cell carcinoma, staged pT3N0  11/10/2021 initiation of nivolumab     Current Chemo Regime/TX: Nivolumab 480 mg every 28 days  Current Cycle:  8  # of completed cycles: 7     Previous treatment:  Gemcitabine 1000 mg per metered squared on day 1 and day 8 and cisplatin 56 mg per metered squared day 1 every 21 days    Past Medical History:   Diagnosis Date     Benign essential hypertension      Cancer (H)      Bladder     Carcinoma in situ of bladder 11/19/2021     Diabetes (H)      Dyslipidemia      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) 12/11/2020       Past Surgical History:   Procedure Laterality Date     APPENDECTOMY  1958     COLONOSCOPY  2-     COMBINED CYSTOSCOPY, RETROGRADES, URETEROSCOPY, INSERT STENT Left 1/18/2021    Procedure: CYSTOURETEROSCOPY, WITH RETROGRADE PYELOGRAM with interpretation of fluroscopy, ureteroscopy, ureteral biopsy, bladder biopsy and fulgaration;  Surgeon: Shonda Morrell MD;  Location: [facility]     CYSTOSCOPY N/A 6/16/2021    Procedure: CYSTOSCOPY;  Surgeon: Javier Mcnulty MD;  [facility]     CYSTOSCOPY, BIOPSY BLADDER, COMBINED N/A 9/8/2015    Procedure: COMBINED CYSTOSCOPY, BIOPSY BLADDER;  Surgeon: Randy Martinez MD;  Location: [facility]     CYSTOSCOPY, BIOPSY BLADDER, COMBINED N/A 2/14/2017    Procedure: COMBINED CYSTOSCOPY, BIOPSY BLADDER;  Surgeon: Randy Martinez MD;  Location: [facility]     CYSTOSCOPY, BIOPSY BLADDER, COMBINED N/A 11/13/2018    Procedure: COMBINED CYSTOSCOPY, BIOPSY BLADDER;  Surgeon: Ed Helm MD;  Location: [facility]     CYSTOSCOPY, BIOPSY BLADDER, COMBINED N/A 11/5/2021    Procedure: CYSTOSCOPY, WITH BLADDER BIOPSY;  Surgeon: Shonda Morrell MD;  Location: [facility]     CYSTOSCOPY, RETROGRADES, COMBINED Bilateral 11/13/2018    Procedure: Bilateral Retrograde Pyelagram, Bladder Biopsy;  Surgeon: Ed Helm MD;  Location: [facility]     CYSTOSCOPY, RETROGRADES, COMBINED Right 11/5/2021    Procedure: CYSTOSCOPY, WITH RETROGRADE PYELOGRAM;  Surgeon: Shonda Morrell MD;  Location: [facility]     CYSTOSCOPY, RETROGRADES, INSERT STENT URETER(S), COMBINED Left 9/8/2015    Procedure: COMBINED CYSTOSCOPY, RETROGRADES, INSERT STENT URETER(S);  Surgeon: Randy Martinez MD;  Location: [facility]     CYSTOSCOPY, TRANSURETHRAL RESECTION (TUR) TUMOR BLADDER, COMBINED N/A 9/8/2015    Procedure: COMBINED CYSTOSCOPY, TRANSURETHRAL  RESECTION (TUR) TUMOR BLADDER;  Surgeon: Randy Martinez MD;  Location: [facility]     CYSTOSCOPY, TRANSURETHRAL RESECTION (TUR) TUMOR BLADDER, COMBINED N/A 1/12/2016    Procedure: COMBINED CYSTOSCOPY, TRANSURETHRAL RESECTION (TUR) TUMOR BLADDER;  Surgeon: Randy Martinez MD;  [facility]: [facility]     CYSTOSCOPY, TRANSURETHRAL RESECTION (TUR) TUMOR BLADDER, COMBINED N/A 9/13/2016    Procedure: COMBINED CYSTOSCOPY, TRANSURETHRAL RESECTION (TUR) TUMOR BLADDER;  Surgeon: Randy Martinez MD;  Location: [facility]     DAVINCI NEPHROURETERECTOMY Left 6/16/2021    Procedure: NEPHROURETERECTOMY, ROBOT-ASSISTED, lymph node dissection;  Surgeon: Javier Mcnulty MD;  [facility]     PHACOEMULSIFICATION WITH STANDARD INTRAOCULAR LENS IMPLANT Right 10/9/2018    Procedure: PHACOEMULSIFICATION WITH STANDARD INTRAOCULAR LENS IMPLANT;  PHACOEMULSIFICATION CATARACT EXTRACTION POSTERIOR CHAMBER LENS RIGHT;  Surgeon: Marlo Mcconnell MD;  Location: [facility]     PHACOEMULSIFICATION WITH STANDARD INTRAOCULAR LENS IMPLANT Left 10/23/2018    Procedure: PHACOEMULSIFICATION CATARACT EXTRACTION POSTERIOR CHAMBER LENS LEFT;  Surgeon: Marlo Mcconnell MD;  Location: [facility]       Family History   Problem Relation Age of Onset     Diabetes Mother      Parkinsonism Brother      Coronary Artery Disease No family hx of      Hypertension No family hx of      Hyperlipidemia No family hx of      Cerebrovascular Disease No family hx of      Breast Cancer No family hx of      Colon Cancer No family hx of      Prostate Cancer No family hx of      Anesthesia Reaction No family hx of      Asthma No family hx of      Thyroid Disease No family hx of      Genetic Disorder No family hx of        Social History     Socioeconomic History     Marital status:      Spouse name: Not on file     Number of children: Not on file     Years of education: Not on file     Highest education level: Not on file   Occupational History     Not on  file   Tobacco Use     Smoking status: Former Smoker     Quit date: 1992     Years since quittin.5     Smokeless tobacco: Never Used   Vaping Use     Vaping Use: Never used   Substance and Sexual Activity     Alcohol use: Yes     Comment: rarely     Drug use: No     Sexual activity: Not Currently   Other Topics Concern     Parent/sibling w/ CABG, MI or angioplasty before 65F 55M? No   Social History Narrative     Not on file     Social Determinants of Health     Financial Resource Strain: Not on file   Food Insecurity: Not on file   Transportation Needs: Not on file   Physical Activity: Not on file   Stress: Not on file   Social Connections: Not on file   Intimate Partner Violence: Not on file   Housing Stability: Not on file       Current Outpatient Medications   Medication     atorvastatin (LIPITOR) 40 MG tablet     blood glucose (ONETOUCH VERIO IQ) test strip     brimonidine (ALPHAGAN-P) 0.15 % ophthalmic solution     Cholecalciferol (VITAMIN D3) 25 MCG TABS     Continuous Blood Gluc  (FREESTYLE DELILAH 2 READER) TENNILLE     Continuous Blood Gluc Sensor (FREESTYLE DELILAH 2 SENSOR) MISC     insulin aspart (NOVOLOG FLEXPEN) 100 UNIT/ML pen     insulin degludec (TRESIBA FLEXTOUCH) 100 UNIT/ML pen     insulin pen needle (BD PEN NEEDLE DONTAE 2ND GEN) 32G X 4 MM miscellaneous     latanoprost (XALATAN) 0.005 % ophthalmic solution     levothyroxine (SYNTHROID/LEVOTHROID) 100 MCG tablet     omeprazole (PRILOSEC) 20 MG DR capsule     ONETOUCH DELICA LANCETS 33G MISC     prednisoLONE acetate (PRED FORTE) 1 % ophthalmic suspension     predniSONE (DELTASONE) 20 MG tablet     tamsulosin (FLOMAX) 0.4 MG capsule     VITRON-C  MG TABS tablet     acetaminophen (TYLENOL) 325 MG tablet     No current facility-administered medications for this visit.        Allergies   Allergen Reactions     No Clinical Screening - See Comments Other (See Comments)     Beta blocker in glaucoma gtt.s caused low pulse and passing out   "    Timolol      Beta blocker in glaucoma gtt.s caused low pulse and passing out   - patient thinks it was timolol but not 100% sure which beta blocker eye drop.        Review Of Systems:  Constitutional:    denies fever, weight changes, chills, and night sweats.  Eyes:    denies blurred or double vision  Ears/Nose/Throat:   denies ear pain, nose problems, difficulty swallowing  Respiratory:   denies shortness of breath, cough   Skin:   See hPI  Cardiovascular:   denies chest pain, palpitations, edema  Gastrointestinal:   denies abdominal pain, bloating, nausea, early satiety-see HPI  Genitourinary:   denies difficulty with urination, blood in urine  Musculoskeletal:    denies new muscle pain, bone pain  Neurologic:   denies lightheadedness, headaches, numbness or tingling  Psychiatric:   denies anxiety, depression  Hematologic/Lymphatic/Immunologic:   denies easy bleeding, lumps or bumps noted; has easy bruising  Endocrine:   Dry mouth      ECOG Performance Status: 0    Physical Exam:  /74   Pulse 53   Temp 97.5  F (36.4  C) (Tympanic)   Resp 19   Ht 1.702 m (5' 7\")   Wt 81.7 kg (180 lb 1.9 oz)   SpO2 98%   BMI 28.21 kg/m      GENERAL APPEARANCE: Healthy, alert and in no acute distress.  HEENT: Normocephalic, Sclerae anicteric.   NECK:   No asymmetry or masses, no thyromegaly.  LYMPHATICS: No palpable cervical, supraclavicular, axillary, or inguinal nodes   RESP: Lungs clear to auscultation bilaterally, respirations regular and easy  CARDIOVASCULAR: Regular rate and rhythm. Normal S1, S2; no murmur, gallop, or rub.  ABDOMEN: Soft, nontender. Bowel sounds auscultated all 4 quadrants. No palpable organomegaly or masses.  MUSCULOSKELETAL: Extremities without gross deformities noted.   SKIN: Pink maculopapular rash scattered to his forearms and chest and few noted to his back and neck  NEURO: Alert and oriented x 3.  Gait steady.  PSYCHIATRIC: Mentation and affect appear normal.  Mood " appropriate.    Laboratory:  Results for orders placed or performed in visit on 07/07/22   Comprehensive metabolic panel     Status: Abnormal   Result Value Ref Range    Sodium 134 133 - 144 mmol/L    Potassium 4.1 3.4 - 5.3 mmol/L    Chloride 103 94 - 109 mmol/L    Carbon Dioxide (CO2) 23 20 - 32 mmol/L    Anion Gap 8 3 - 14 mmol/L    Urea Nitrogen 44 (H) 7 - 30 mg/dL    Creatinine 2.15 (H) 0.66 - 1.25 mg/dL    Calcium 8.2 (L) 8.5 - 10.1 mg/dL    Glucose 458 (H) 70 - 99 mg/dL    Alkaline Phosphatase 75 40 - 150 U/L    AST 16 0 - 45 U/L    ALT 21 0 - 70 U/L    Protein Total 6.5 (L) 6.8 - 8.8 g/dL    Albumin 3.3 (L) 3.4 - 5.0 g/dL    Bilirubin Total 0.6 0.2 - 1.3 mg/dL    GFR Estimate 31 (L) >60 mL/min/1.73m2   TSH with free T4 reflex     Status: Normal   Result Value Ref Range    TSH 1.64 0.40 - 4.00 mU/L   CBC with platelets and differential     Status: Abnormal   Result Value Ref Range    WBC Count 14.2 (H) 4.0 - 11.0 10e3/uL    RBC Count 3.75 (L) 4.40 - 5.90 10e6/uL    Hemoglobin 12.3 (L) 13.3 - 17.7 g/dL    Hematocrit 35.6 (L) 40.0 - 53.0 %    MCV 95 78 - 100 fL    MCH 32.8 26.5 - 33.0 pg    MCHC 34.6 31.5 - 36.5 g/dL    RDW 16.2 (H) 10.0 - 15.0 %    Platelet Count 222 150 - 450 10e3/uL    % Neutrophils 84 %    % Lymphocytes 4 %    % Monocytes 10 %    % Eosinophils 1 %    % Basophils 0 %    % Immature Granulocytes 1 %    NRBCs per 100 WBC 0 <1 /100    Absolute Neutrophils 12.1 (H) 1.6 - 8.3 10e3/uL    Absolute Lymphocytes 0.6 (L) 0.8 - 5.3 10e3/uL    Absolute Monocytes 1.4 (H) 0.0 - 1.3 10e3/uL    Absolute Eosinophils 0.1 0.0 - 0.7 10e3/uL    Absolute Basophils 0.0 0.0 - 0.2 10e3/uL    Absolute Immature Granulocytes 0.1 <=0.4 10e3/uL    Absolute NRBCs 0.0 10e3/uL   CBC with Platelets & Differential     Status: Abnormal    Narrative    The following orders were created for panel order CBC with Platelets & Differential.  Procedure                               Abnormality         Status                      ---------                               -----------         ------                     CBC with platelets and d...[[id number]]  Abnormal            Final result                 Please view results for these tests on the individual orders.       Imaging Studies: None completed for today's visit      ASSESSMENT/PLAN:    1. 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  He completed neoadjuvant chemotherapy then underwent a nephroureterectomy and is now undergoing adjuvant therapy with nivolumab with plan to complete 1 year of therapy.  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  He was treated with 1 week of prednisone.  Little improvement with the prednisone.    He was treated then with cycle 8 and once again developed more severe diarrhea and was placed on a longer steroid taper and is currently in the middle of this.  I will see him back in 2 to 3 weeks prior to reinitiation of therapy.       2.  Type 2 diabetes mellitus:  Patient previously with type 2 diabetes. Hyperglycemia likely due to immunotherapy with likely conversion to type I which can happen while on immunotherapy. He is currently being managed through the diabetic clinic.  Blood sugars somewhat erratic and elevated related to the prednisone.     3.  Stage IV chronic kidney disease: Stable.  He'll continue to follow with nephrology and see Dr. Arvizu in September.     4.  bladder cancer:  He'll be seeing Dr. Schmitz with [facility]rology again in September for likely BCG again.      5.   Colitis: Developed a grade 2-3 diarrhea after cycle 8.  Placed on high-dose prednisone and is currently in the middle of this taper with much improvement.  I will see him back in 2 to 3 weeks prior to initiation of chemo again with cycle 9.      6. rash: Unsure of etiology and nonbothersome.  I did asked that he monitor it  and if he develops any worsening of the rash or any development of sore throat, difficulty swallowing or trouble breathing he needs to be seen immediately in the emergency room.  I did recommend he  some Benadryl and use 25 mg today and 25 mg at night and monitor.    #7 hypothyroidism: TSH is now within normal limits and he will continue on the levothyroxine same dose.    I encouraged patient to call with any questions or concerns.    42 minutes spent in the patient's encounter today with time spent in review of the chart along with in chart preparation.  Time also spent in review of his treatment plan.  Time was also spent obtaining a review of systems and performing a physical exam along with review of his lab work with him in detail.  Time was also spent in discussion of immune mediated colitis and management of this and the need to continue the prednisone.  Time was also spent in discussion of his new rash and management.  Time was spent in discussing plan for follow-up and in chart documentation.    Sabra Jensen NP  APRN, FNP-BC, AOCNP

## 2022-07-07 NOTE — PROGRESS NOTES
Pt came in for a Tiffany download today. This was completed and given to Caryl Griffin.    Rowena Phan

## 2022-07-07 NOTE — PROGRESS NOTES
Patient Instructions:    Tresiba 20 units daily  NovoLOG before meals as before: 4 to 5 units. For now just take with breakfast and evening meal    Add NovoLIN N. (NPH) Inject when you take your Prednisone dose at noon. Roll pen before injecting.   NovoLIN N (NPH) doses:  Now until and including 7/15/22: 20 units daily at noon  7/16/22 to 7/25/22: 10 units   7/26/22 until Prednisone gone: 5 units

## 2022-07-07 NOTE — PATIENT INSTRUCTIONS
We would like to see you back per your schedule.     When you are in need of a refill, please call your pharmacy and they will send us a request.     If you have any questions please call [phone]    Other instructions:       Take 25 mg of Benadryl (diphenhydramine) when you get home and before bed.   DRINK WATER!!!

## 2022-07-08 ENCOUNTER — TELEPHONE (OUTPATIENT)
Dept: EDUCATION SERVICES | Facility: HOSPITAL | Age: 77
End: 2022-07-08

## 2022-07-08 NOTE — TELEPHONE ENCOUNTER
Called Colin to see if he can get his NPH started today. He states that Elke Ramirez has to order it and it won't be in until Monday: 7/11/22.    He has been taking a higher dose of his NovoLOG before meals in the mean time. Reminded him that he will be decreasing the Prednisone tomorrow so he can decrease the NovoLOG scale that I gave him last week by 2 units.    Will follow him next week to see how the NPH goes.

## 2022-07-11 ENCOUNTER — TELEPHONE (OUTPATIENT)
Dept: EDUCATION SERVICES | Facility: HOSPITAL | Age: 77
End: 2022-07-11

## 2022-07-11 NOTE — TELEPHONE ENCOUNTER
"Called Colin. He tells me that he has tried to call me about 6 times, but the last one went through. He tells me that he almost came in. Reports that he left a message for Jennifer Jensen NP.    Colin is very concerned that his \"BG is just crazy.\" The reports the last 2 days- BG has  been high, often reading \"HI\". States his mouth is dry, lips dry/cracked and his teeth hurt.    States that he picked up the NPH insulin on Saturday and started it yesterday. He did not take the NovoLOG at noon yesterday. He did not take the NPH today and has not yet taken the Prednisone. States that he took the Omeprazole and SWX Citron along with usual dose of Tresiba and AM NovoLOG.    Colin states that he is concerned about the rash on the back of his legs.    I told him that I would talk with Dr. Wallace and call him back.  "

## 2022-07-11 NOTE — TELEPHONE ENCOUNTER
I spoke with Dr. Wallace and called Colin back. She would like to get him on her schedule this week and has Friday, 7/15/22,  open at 3 pm. He agrees to this date/time.    States he took his temp and he didn't have a fever. States that his ears feel hot.    BG at this time is 367 with straight arrow. I discussed with him that he should take his Prednisone and the NPH 20 units now. And reminded him that the NPH starts to peak when the Prednisone start affecting his BG.  He sounds more calm than he did earlier. Also told him that Dr. Wallace may biopsy his rash.    Will call him later today to see how he is doing with NPH..

## 2022-07-11 NOTE — TELEPHONE ENCOUNTER
Called Colin. After taking the NPH, Colin states BG went down into 70s. He did eat a small Snickers Bar and BG went to 93.    For tomorrow, I asked Colin to not take the NovoLOG at noon, but to only take the NPH at that time.    Colin reports eating ketchup with his hamburger for supper. States the ketchup burned his mouth/tongue.    Will follow by phone.

## 2022-07-12 ENCOUNTER — TELEPHONE (OUTPATIENT)
Dept: ONCOLOGY | Facility: OTHER | Age: 77
End: 2022-07-12

## 2022-07-12 NOTE — TELEPHONE ENCOUNTER
Pt calling to report some type of medication reaction, states his lips are very dry and cracking, sensitivity in his mouth to certain foods and warm drinks, also notes the rash on his chest, back and both arms is worse. Very red, raised areas, no itching or burning. He thinks its related to one of the medications he was recently started on. Looking back in his chart on 6/20/22 his thyroid med was restarted after this didn't resolve the diarrhea he was having after a week. He was placed on prednisone taper, Bactrim and Omeprazole. He notes he completed the Bactrim since it was one dose and is currently taking Prednisone 2 tablets daily and Omeprazole 20 mg daily. Saw Jennifer last week and was advised to start Benadryl 25 mg prn for the rash, he notes he has been taking this 3 times a day this week and no help. Wondering what else he can do? Advised I would send a message on to the provider to review.

## 2022-07-12 NOTE — TELEPHONE ENCOUNTER
Notified via phone to stop Bactrim (SMX/TMP) Patient agreeable to plan and any concerns he will let us know

## 2022-07-12 NOTE — TELEPHONE ENCOUNTER
I spoke with both patient and pharmacy to clarify if patient was on bactrim. Patient states he is on SMX//160 po daily for 2 weeks. Please advise

## 2022-07-13 ENCOUNTER — TELEPHONE (OUTPATIENT)
Dept: EDUCATION SERVICES | Facility: HOSPITAL | Age: 77
End: 2022-07-13

## 2022-07-13 NOTE — PROGRESS NOTES
Assessment & Plan     Drug rash  Most likely d/t bactrim. Bactrim placed on allergies list with rash    Eczema, unspecified type  Mostly resolved with prednisone     Leg cramp  Encourage hydration and stretching before bed    Aphthous ulcer  - lidocaine, viscous, (XYLOCAINE) 2 % solution; Swish and spit 15 mLs in mouth every 4 hours as needed for moderate pain ; Max 8 doses/24 hour period.    Insomnia, unspecified type  Declines medication.   - discussed sleep hygiene  - melatonin 3 MG tablet; Take 1 tablet (3 mg) by mouth nightly as needed for sleep    Urinary retention  Trial off flomax. H/o of one episode of urinary retention.   If symptoms, restart flomax    Type 2 DM  Blood sugars are high d/t prednisone  - c/w NPH w/ prednisone  - appreciate diabetic education assistance today    See Patient Instructions    Return if symptoms worsen or fail to improve.    Libia Wallace MD  [facility] - [place] is a 77 year old, presenting for the following health issues:  Diabetes and Derm Problem      HPI       Rash      Duration: Started when he first used bactrim     Description  Location: arms  Itching: no    Intensity:  mild    Accompanying signs and symptoms: Both arms bilateral    History (similar episodes/previous evaluation): None    Precipitating or alleviating factors:  New exposures:  medication bactrim   Recent travel: no      Therapies tried and outcome: none       - does not itch  - leg rash resolved  - oral sores     - bactrim was stopped and rash is improved    Diabetes Follow-up    How often are you checking your blood sugar? Continuous glucose monitor  What time of day are you checking your blood sugars (select all that apply)?  Through out the day  Have you had any blood sugars above 200?  Yes   Have you had any blood sugars below 70?  Yes     What symptoms do you notice when your blood sugar is low?  Shaky, Dizzy and Weak    What concerns do you have today about  your diabetes? Blood sugar is often over 200     Do you have any of these symptoms? (Select all that apply)  No numbness or tingling in feet.  No redness, sores or blisters on feet.  No complaints of excessive thirst.  No reports of blurry vision.  No significant changes to weight.      BP Readings from Last 2 Encounters:   07/15/22 129/60   07/07/22 128/74     Hemoglobin A1C POCT (%)   Date Value   11/24/2021 7.1 (A)   01/14/2021 5.7 (H)     Hemoglobin A1C (%)   Date Value   05/23/2022 7.4 (H)   02/22/2022 10.3 (H)     LDL Cholesterol Calculated (mg/dL)   Date Value   02/22/2022 108 (H)   10/02/2020 75   09/09/2019 84     # urinary retention - one episode  - no issues since  - would like a trial off flomax    # Leg cramping at night   - occurring every night      # sleep issues  - has not been sleeping   - decline medication   - melatonin did not help  - issues with going back to sleep once up        Review of Systems   Constitutional: Negative for chills and fever.   HENT: Negative for congestion.         Dry mouth, sores, lips are cracked   Respiratory: Negative for shortness of breath and wheezing.    Cardiovascular: Negative for chest pain and palpitations.   Gastrointestinal: Negative for abdominal pain.   Genitourinary: Negative for difficulty urinating.   Musculoskeletal: Positive for arthralgias (leg cramping).   Skin: Positive for rash (improved).   Psychiatric/Behavioral: Positive for sleep disturbance (not sleeping well ). Negative for dysphoric mood.          Objective    /60   Pulse 78   Temp 98.6  F (37  C) (Tympanic)   Resp 18   Wt 81 kg (178 lb 8 oz)   SpO2 97%   BMI 27.96 kg/m    Body mass index is 27.96 kg/m .  Physical Exam  Constitutional:       General: He is not in acute distress.     Appearance: He is not ill-appearing.   HENT:      Mouth/Throat:      Comments: Ulcerations on bottom and top inside of lips consistent with aphthous ulcers  Cardiovascular:      Rate and Rhythm:  Normal rate and regular rhythm.      Pulses: Normal pulses.      Heart sounds: No murmur heard.  Pulmonary:      Effort: Pulmonary effort is normal. No respiratory distress.      Breath sounds: No wheezing or rales.   Skin:     Comments: Eczema on legs has cleared.  Lacy, erythematous rash on arms, chest consistent with drug reaction   Neurological:      Mental Status: He is alert.

## 2022-07-13 NOTE — TELEPHONE ENCOUNTER
Called Colin. He tells that his mouth still hurts when he uses salt, ketchup or drinks warm coffee. He stopped the Bactrim after talking with Jennifer Jensen, NP, yesterday. His rash continues.    He took the NPH at noon yesterday with Prednisone with no NovoLOG. BG was in 300s. BG was 69 before evening meal. Reports BG went up again after supper. He states FBG this AM is 60. He may be eating less due to mouth pain.    Instructed him to continue his Prednisone at noon with NPH 18 units and no NovoLOG. He continues with Tresiba 20 units daily with NovoLOG at breakfast and supper. BG is currently elevated before lunch.    Requested Colin bring his Tiffany Berwick to his appointment with Dr. Monsalve we can see his readings

## 2022-07-15 ENCOUNTER — OFFICE VISIT (OUTPATIENT)
Dept: FAMILY MEDICINE | Facility: OTHER | Age: 77
End: 2022-07-15
Attending: FAMILY MEDICINE
Payer: COMMERCIAL

## 2022-07-15 ENCOUNTER — ALLIED HEALTH/NURSE VISIT (OUTPATIENT)
Dept: EDUCATION SERVICES | Facility: OTHER | Age: 77
End: 2022-07-15
Attending: NURSE PRACTITIONER
Payer: COMMERCIAL

## 2022-07-15 VITALS
WEIGHT: 178.5 LBS | OXYGEN SATURATION: 97 % | DIASTOLIC BLOOD PRESSURE: 60 MMHG | HEART RATE: 78 BPM | SYSTOLIC BLOOD PRESSURE: 129 MMHG | BODY MASS INDEX: 27.96 KG/M2 | TEMPERATURE: 98.6 F | RESPIRATION RATE: 18 BRPM

## 2022-07-15 DIAGNOSIS — E11.65 TYPE 2 DIABETES MELLITUS WITH HYPERGLYCEMIA, WITH LONG-TERM CURRENT USE OF INSULIN (H): Primary | ICD-10-CM

## 2022-07-15 DIAGNOSIS — L27.0 DRUG RASH: Primary | ICD-10-CM

## 2022-07-15 DIAGNOSIS — E11.65 TYPE 2 DIABETES MELLITUS WITH HYPERGLYCEMIA, WITH LONG-TERM CURRENT USE OF INSULIN (H): ICD-10-CM

## 2022-07-15 DIAGNOSIS — Z79.4 TYPE 2 DIABETES MELLITUS WITH HYPERGLYCEMIA, WITH LONG-TERM CURRENT USE OF INSULIN (H): ICD-10-CM

## 2022-07-15 DIAGNOSIS — R33.9 URINARY RETENTION: ICD-10-CM

## 2022-07-15 DIAGNOSIS — G47.00 INSOMNIA, UNSPECIFIED TYPE: ICD-10-CM

## 2022-07-15 DIAGNOSIS — K12.0 APHTHOUS ULCER: ICD-10-CM

## 2022-07-15 DIAGNOSIS — R25.2 LEG CRAMP: ICD-10-CM

## 2022-07-15 DIAGNOSIS — L30.9 ECZEMA, UNSPECIFIED TYPE: ICD-10-CM

## 2022-07-15 DIAGNOSIS — Z79.4 TYPE 2 DIABETES MELLITUS WITH HYPERGLYCEMIA, WITH LONG-TERM CURRENT USE OF INSULIN (H): Primary | ICD-10-CM

## 2022-07-15 PROCEDURE — 99213 OFFICE O/P EST LOW 20 MIN: CPT | Performed by: FAMILY MEDICINE

## 2022-07-15 PROCEDURE — G0463 HOSPITAL OUTPT CLINIC VISIT: HCPCS

## 2022-07-15 PROCEDURE — G0463 HOSPITAL OUTPT CLINIC VISIT: HCPCS | Mod: 25

## 2022-07-15 RX ORDER — LANOLIN ALCOHOL/MO/W.PET/CERES
3 CREAM (GRAM) TOPICAL
Qty: 90 TABLET | Refills: 1 | Status: SHIPPED | OUTPATIENT
Start: 2022-07-15 | End: 2023-01-01

## 2022-07-15 RX ORDER — LIDOCAINE HYDROCHLORIDE 20 MG/ML
15 SOLUTION OROPHARYNGEAL EVERY 4 HOURS PRN
Qty: 100 ML | Refills: 0 | Status: SHIPPED | OUTPATIENT
Start: 2022-07-15 | End: 2022-01-01

## 2022-07-15 ASSESSMENT — ENCOUNTER SYMPTOMS
ARTHRALGIAS: 1
WHEEZING: 0
FEVER: 0
CHILLS: 0
DIFFICULTY URINATING: 0
PALPITATIONS: 0
DYSPHORIC MOOD: 0
ABDOMINAL PAIN: 0
SLEEP DISTURBANCE: 1
SHORTNESS OF BREATH: 0

## 2022-07-15 NOTE — NURSING NOTE
"Chief Complaint   Patient presents with     Diabetes     Derm Problem       Initial /60   Pulse 78   Temp 98.6  F (37  C) (Tympanic)   Resp 18   Wt 81 kg (178 lb 8 oz)   SpO2 97%   BMI 27.96 kg/m   Estimated body mass index is 27.96 kg/m  as calculated from the following:    Height as of 7/7/22: 1.702 m (5' 7\").    Weight as of this encounter: 81 kg (178 lb 8 oz).  Medication Reconciliation: complete  Coreen Castro LPN  "

## 2022-07-15 NOTE — PROGRESS NOTES
Pt came in for his Tiffany download.  Tiffany was downloaded, results given to pt and Caryl Griffin and Caryl will meet with pt regarding his numbers.  ILEANA SIDHU LPN

## 2022-07-15 NOTE — PATIENT INSTRUCTIONS
Okay for a trial off flomax    Try viscous lidocaine for your mouth sores.    Take melatonin 3mg one hour before you want to go to bed  Turn TV off two hours before you want to go to bed.     Try stretching your legs before you want to go to bed.  Encourage hydration

## 2022-07-20 ENCOUNTER — TELEPHONE (OUTPATIENT)
Dept: EDUCATION SERVICES | Facility: HOSPITAL | Age: 77
End: 2022-07-20

## 2022-07-21 NOTE — TELEPHONE ENCOUNTER
Called Colin. He had decreased the Prednisone on Saturday to 20 mg daily and decreased the noon NPH to 8 units. Reports BGs trending downward with 130 in am, high midmornings and lower in the afternoon.    States mouth is feeling much better.    Colin will decrease Prednisone to 10 mg daily starting Saturday and NPH at noon will go to 4 units.    Will follow next week by phone.

## 2022-07-26 ENCOUNTER — TELEPHONE (OUTPATIENT)
Dept: INFUSION THERAPY | Facility: OTHER | Age: 77
End: 2022-07-26

## 2022-07-26 NOTE — PROGRESS NOTES
Signed faxed orders from Dr Schmitz with [facility] for BCG 50mg in 50mL 0.9% naCl weekly x3. Lidocaine uroget x1 prn. Discussion with Estephania and Nasreen FERNANDEZ CC's ONC re plan entry, as patient has an active treatment plan and unable to find BCG plan under supportive plan area. Shannon took return call from Estephania, updating that it could be entered into a therapy plan and orders would need to be individually entered.    Reached out to supervisor/admin re plan entry process, as provider orders differ greatly from our BCG plans.

## 2022-07-28 ENCOUNTER — TELEPHONE (OUTPATIENT)
Dept: EDUCATION SERVICES | Facility: HOSPITAL | Age: 77
End: 2022-07-28

## 2022-08-01 NOTE — TELEPHONE ENCOUNTER
Pt called he has not received his Tresiba and does not have much left. His appt with Jennifer Mikey was changed to 08/05/22, and lab at 11am. I spoke to Caryl Griffin and scheduled the pt for a download at 11:00am. I called [employer] to check on the pt's Tresiba order. The order was supposed to be processed starting on 07/11/22 and received on 07/25/22. There has been nothing started on his order. The rep I spoke to will send a note to the processing team with reminder. Pt can call for a voucher if needed. I called the pt and notified.

## 2022-08-02 NOTE — TELEPHONE ENCOUNTER
Patient comes tomorrow for labs from IV prior to seeing you and planned treatment after if parameters/etc met.     Patient's plan was not updated after treatment was not given at last visit (appears to be 7-7-22) and no labs in open orders.     Could you update plan or enter orders for labs outside plan?

## 2022-08-03 NOTE — NURSING NOTE
"Oncology Rooming Note    August 3, 2022 11:53 AM   Colin Baxter is a 77 year old male who presents for:    Chief Complaint   Patient presents with     Oncology Clinic Visit     Follow up Carcinoma in situ of bladder         Initial Vitals: /80   Pulse 61   Temp (!) 96.5  F (35.8  C) (Tympanic)   Resp 20   Ht 1.702 m (5' 7\")   Wt 78.8 kg (173 lb 11.6 oz)   SpO2 98%   BMI 27.21 kg/m   Estimated body mass index is 27.21 kg/m  as calculated from the following:    Height as of this encounter: 1.702 m (5' 7\").    Weight as of this encounter: 78.8 kg (173 lb 11.6 oz). Body surface area is 1.93 meters squared.  No Pain (0) Comment: Data Unavailable   No LMP for male patient.  Allergies reviewed: Yes  Medications reviewed: Yes    Medications: Medication refills not needed today.  Pharmacy name entered into EPIC:    [id number] - [place], [place] - [address] [facility] [id number] - [place], [place] - [address] [place] [address]          Verónica Leung LPN            "

## 2022-08-03 NOTE — PROGRESS NOTES
Call from Jennifer Jensen NP ONC alerting patient will not be treated today and that she updated the plan.       This encounter was opened in error. Please disregard.

## 2022-08-03 NOTE — PROGRESS NOTES
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  He completed neoadjuvant chemotherapy on 5/11/2021, then underwent nephroureterectomy 6/16/2021 and now receivning adjuvant Nivolumab to complete 1 year.     He follows with Dr. Schmitz for bladder cancer and will see her again in September for likely retreatment of BCG.  He sees Dr. Arvizu for chronic kidney disease and will see him again in September.       He developed shingles of the right eye 5/17/2022 and was treated with Valtrex and an eye ointment.  He also developed what was felt to be possibly eczema to the back of knees and thighs.  In addition, he developed loose watery stools after his last treatment.  He would have 1-2 stools a day.  He was placed on prednisone x1 week and treatment held.       He was then treated with cycle 8 therapy and once again developed loose stools post therapy.  Stools were increased up to 10 a day some days and were watery.  He was placed on high-dose prednisone.     He presents to the clinic today stating he is doing pretty good.  He has 1 day left of the prednisone.  This is a 10 mg dose.  States his stools are much improved and he is only having 1 a day now.  He states they are somewhat soft but no longer dealing with any watery or loose stools.  He denies any blood or mucus in his stool.  Blood sugars are under better control now that he is on the lower dose of prednisone.  He is no longer dealing with any rash.  He does have issues with leg cramps at night and also having cramps in his hands.  This does not happen every night.  He developed some mild  soreness and mouth sores on the prednisone and saw his PCP.  He was placed on a mouth rinse that did resolve the issue.  He denies any issues with shortness of breath or coughing.  He has no abdominal pain and states his appetite is good.  He has no problems with urination but does get up quite often at night to urinate.  He initiated melatonin 3 mg at night and is unsure that it is really helping.    He saw Dr. Schmitz in July and will be undergoing BCG infusions again.  He states his cystoscopy went well and there was no concerns.    He will be taking a trip to [place] September 1 to September 8.      Oncologic History:     2016 patient was diagnosed with high-grade bladder cancer T1; patient was treated with BCG and then again reinduction BCG then GemCis intravesically x3 in May 2018     1/5/2021 patient was seen by Dr. Morrell for history of hematuria.  He underwent cystoscopy which revealed bladder lesions with biopsy being negative.  Cytology showed atypical urothelial cells.  He underwent CT urogram which showed a mass in the left ureter measuring 2 cm with no hydronephrosis.  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  2/10/2021  he underwent PET scan that was negative for metastatic disease and then seen by Dr. Mcnulty at the [facility] and scheduled for robotic assisted laparoscopic left nephroureterectomy.    2/15/2021 he was seen by Dr. Bartlett with Medical Oncology to discuss neoadjuvant chemotherapy.  Recommendation was for gemcitabine 1000 mg per metered squared day 1 and day 8 and cisplatin 70 mg per metered squared on day 1 of a 21-day cycle x4 cycles followed by surgery.  5/11/2021 completion of neoadjuvant chemotherapy  6/16/2021 he underwent Cystourethroscopy and Left robot assisted nephroureterectomy with Left pelvic lymph node dissection by Dr. Jorge Mcnulty and Dr. Chambers  Stout; pathology showed a multifocal high-grade urothelial cell carcinoma, staged pT3N0  11/10/2021 initiation of nivolumab     Current Chemo Regime/TX: Nivolumab 480 mg every 28 days  Current Cycle:  9  # of completed cycles: 8     Previous treatment:  Gemcitabine 1000 mg per metered squared on day 1 and day 8 and cisplatin 56 mg per metered squared day 1 every 21 days    Past Medical History:   Diagnosis Date     Benign essential hypertension      Cancer (H)     Bladder     Carcinoma in situ of bladder 11/19/2021     Diabetes (H)      Dyslipidemia      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) 12/11/2020       Past Surgical History:   Procedure Laterality Date     APPENDECTOMY  1958     COLONOSCOPY  2-     COMBINED CYSTOSCOPY, RETROGRADES, URETEROSCOPY, INSERT STENT Left 1/18/2021    Procedure: CYSTOURETEROSCOPY, WITH RETROGRADE PYELOGRAM with interpretation of fluroscopy, ureteroscopy, ureteral biopsy, bladder biopsy and fulgaration;  Surgeon: Shonda Morrell MD;  Location: [facility]     CYSTOSCOPY N/A 6/16/2021    Procedure: CYSTOSCOPY;  Surgeon: Javier Mcnulty MD;  [facility]     CYSTOSCOPY, BIOPSY BLADDER, COMBINED N/A 9/8/2015    Procedure: COMBINED CYSTOSCOPY, BIOPSY BLADDER;  Surgeon: Randy Martinez MD;  Location: [facility]     CYSTOSCOPY, BIOPSY BLADDER, COMBINED N/A 2/14/2017    Procedure: COMBINED CYSTOSCOPY, BIOPSY BLADDER;  Surgeon: Randy Martinez MD;  Location: [facility]     CYSTOSCOPY, BIOPSY BLADDER, COMBINED N/A 11/13/2018    Procedure: COMBINED CYSTOSCOPY, BIOPSY BLADDER;  Surgeon: Ed Helm MD;  Location: [facility]     CYSTOSCOPY, BIOPSY BLADDER, COMBINED N/A 11/5/2021    Procedure: CYSTOSCOPY, WITH BLADDER BIOPSY;  Surgeon: Shonda Morrell MD;  Location: [facility]     CYSTOSCOPY, RETROGRADES, COMBINED Bilateral 11/13/2018    Procedure: Bilateral Retrograde Pyelagram, Bladder Biopsy;  Surgeon: Ed Helm MD;  Location: [facility]     CYSTOSCOPY,  RETROGRADES, COMBINED Right 11/5/2021    Procedure: CYSTOSCOPY, WITH RETROGRADE PYELOGRAM;  Surgeon: Shonda Morrell MD;  Location[facility]     CYSTOSCOPY, RETROGRADES, INSERT STENT URETER(S), COMBINED Left 9/8/2015    Procedure: COMBINED CYSTOSCOPY, RETROGRADES, INSERT STENT URETER(S);  Surgeon: Randy Martinez MD;  [facility]: [facility]     CYSTOSCOPY, TRANSURETHRAL RESECTION (TUR) TUMOR BLADDER, COMBINED N/A 9/8/2015    Procedure: COMBINED CYSTOSCOPY, TRANSURETHRAL RESECTION (TUR) TUMOR BLADDER;  Surgeon: Randy Martinez MD;  [facility]     CYSTOSCOPY, TRANSURETHRAL RESECTION (TUR) TUMOR BLADDER, COMBINED N/A 1/12/2016    Procedure: COMBINED CYSTOSCOPY, TRANSURETHRAL RESECTION (TUR) TUMOR BLADDER;  Surgeon: Randy Martinez MD;  [facility]: [facility]     CYSTOSCOPY, TRANSURETHRAL RESECTION (TUR) TUMOR BLADDER, COMBINED N/A 9/13/2016    Procedure: COMBINED CYSTOSCOPY, TRANSURETHRAL RESECTION (TUR) TUMOR BLADDER;  Surgeon: Rnady Martinez MD;  Location: [facility]     DAVINCI NEPHROURETERECTOMY Left 6/16/2021    Procedure: NEPHROURETERECTOMY, ROBOT-ASSISTED, lymph node dissection;  Surgeon: Javier Mcnulty MD;  [facility]     PHACOEMULSIFICATION WITH STANDARD INTRAOCULAR LENS IMPLANT Right 10/9/2018    Procedure: PHACOEMULSIFICATION WITH STANDARD INTRAOCULAR LENS IMPLANT;  PHACOEMULSIFICATION CATARACT EXTRACTION POSTERIOR CHAMBER LENS RIGHT;  Surgeon: Marlo Mcconnell MD;  Location: [facility]     PHACOEMULSIFICATION WITH STANDARD INTRAOCULAR LENS IMPLANT Left 10/23/2018    Procedure: PHACOEMULSIFICATION CATARACT EXTRACTION POSTERIOR CHAMBER LENS LEFT;  Surgeon: Marlo Mcconnell MD;  Location: [facility]       Family History   Problem Relation Age of Onset     Diabetes Mother      Parkinsonism Brother      Coronary Artery Disease No family hx of      Hypertension No family hx of      Hyperlipidemia No family hx of      Cerebrovascular Disease No family hx of      Breast Cancer No family  hx of      Colon Cancer No family hx of      Prostate Cancer No family hx of      Anesthesia Reaction No family hx of      Asthma No family hx of      Thyroid Disease No family hx of      Genetic Disorder No family hx of        Social History     Socioeconomic History     Marital status:      Spouse name: Not on file     Number of children: Not on file     Years of education: Not on file     Highest education level: Not on file   Occupational History     Not on file   Tobacco Use     Smoking status: Former Smoker     Quit date: 1992     Years since quittin.5     Smokeless tobacco: Never Used   Vaping Use     Vaping Use: Never used   Substance and Sexual Activity     Alcohol use: Yes     Comment: rarely     Drug use: No     Sexual activity: Not Currently   Other Topics Concern     Parent/sibling w/ CABG, MI or angioplasty before 65F 55M? No   Social History Narrative     Not on file     Social Determinants of Health     Financial Resource Strain: Not on file   Food Insecurity: Not on file   Transportation Needs: Not on file   Physical Activity: Not on file   Stress: Not on file   Social Connections: Not on file   Intimate Partner Violence: Not on file   Housing Stability: Not on file       Current Outpatient Medications   Medication     atorvastatin (LIPITOR) 40 MG tablet     blood glucose (ONETOUCH VERIO IQ) test strip     brimonidine (ALPHAGAN-P) 0.15 % ophthalmic solution     Cholecalciferol (VITAMIN D3) 25 MCG TABS     Continuous Blood Gluc  (FREESTYLE DELILAH 2 READER) TENNILLE     Continuous Blood Gluc Sensor (FREESTYLE DELILAH 2 SENSOR) MISC     insulin aspart (NOVOLOG FLEXPEN) 100 UNIT/ML pen     insulin degludec (TRESIBA FLEXTOUCH) 100 UNIT/ML pen     insulin NPH (NOVOLIN N FLEXPEN) 100 UNIT/ML injection     insulin pen needle (BD PEN NEEDLE DONTAE 2ND GEN) 32G X 4 MM miscellaneous     latanoprost (XALATAN) 0.005 % ophthalmic solution     levothyroxine (SYNTHROID/LEVOTHROID) 100 MCG tablet      "lidocaine, viscous, (XYLOCAINE) 2 % solution     melatonin 3 MG tablet     omeprazole (PRILOSEC) 20 MG DR capsule     ONETOUCH DELICA LANCETS 33G MISC     prednisoLONE acetate (PRED FORTE) 1 % ophthalmic suspension     predniSONE (DELTASONE) 20 MG tablet     tamsulosin (FLOMAX) 0.4 MG capsule     VITRON-C  MG TABS tablet     acetaminophen (TYLENOL) 325 MG tablet     No current facility-administered medications for this visit.        Allergies   Allergen Reactions     No Clinical Screening - See Comments Other (See Comments)     Beta blocker in glaucoma gtt.s caused low pulse and passing out      Timolol      Beta blocker in glaucoma gtt.s caused low pulse and passing out   - patient thinks it was timolol but not 100% sure which beta blocker eye drop.      Bactrim [Sulfamethoxazole W/Trimethoprim] Rash       Review Of Systems:  Constitutional:    denies fever, weight changes, chills, and night sweats.  Eyes:    denies blurred or double vision  Ears/Nose/Throat:   denies ear pain, nose problems, difficulty swallowing  Respiratory:   denies shortness of breath, cough or hemoptysis  Skin:   denies rash, lesions  Cardiovascular:   denies chest pain, palpitations, edema  Gastrointestinal:   denies abdominal pain, bloating, nausea, vomiting, early satiety; no change in bowel habits or blood in stool  Genitourinary:   denies difficulty with urination, blood in urine  Musculoskeletal:    denies new muscle pain, bone pain  Neurologic:   denies lightheadedness, headaches, numbness or tingling  Psychiatric:   denies anxiety, depression  Hematologic/Lymphatic/Immunologic:   denies easy bruising, easy bleeding, lumps or bumps noted  Endocrine:   Denies increased thirst      ECOG Performance Status: 0    Physical Exam:  /80   Pulse 61   Temp (!) 96.5  F (35.8  C) (Tympanic)   Resp 20   Ht 1.702 m (5' 7\")   Wt 78.8 kg (173 lb 11.6 oz)   SpO2 98%   BMI 27.21 kg/m      GENERAL APPEARANCE: Healthy, alert and in no " acute distress.  HEENT: Normocephalic, Sclerae anicteric.  No oral lesions or thrush  NECK:   No asymmetry or masses, no thyromegaly.  LYMPHATICS: No palpable cervical, supraclavicular, axillary, or inguinal nodes   RESP: Lungs clear to auscultation bilaterally, respirations regular and easy  CARDIOVASCULAR: Regular rate and rhythm. Normal S1, S2; no murmur, gallop, or rub.  NEURO: Alert and oriented x 3.  Gait steady.  PSYCHIATRIC: Mentation and affect appear normal.  Mood appropriate.    Laboratory:    Corrected calcium equals 8.1    Results for orders placed or performed in visit on 08/03/22   Comprehensive metabolic panel     Status: Abnormal   Result Value Ref Range    Sodium 137 133 - 144 mmol/L    Potassium      Chloride 113 (H) 94 - 109 mmol/L    Carbon Dioxide (CO2) 19 (L) 20 - 32 mmol/L    Anion Gap 5 3 - 14 mmol/L    Urea Nitrogen 28 7 - 30 mg/dL    Creatinine 1.48 (H) 0.66 - 1.25 mg/dL    Calcium 7.1 (L) 8.5 - 10.1 mg/dL    Glucose 110 (H) 70 - 99 mg/dL    Alkaline Phosphatase 50 40 - 150 U/L    AST      ALT      Protein Total 5.9 (L) 6.8 - 8.8 g/dL    Albumin 2.7 (L) 3.4 - 5.0 g/dL    Bilirubin Total 0.5 0.2 - 1.3 mg/dL    GFR Estimate 49 (L) >60 mL/min/1.73m2   TSH with free T4 reflex     Status: Abnormal   Result Value Ref Range    TSH 12.93 (H) 0.40 - 4.00 mU/L   CBC with platelets and differential     Status: Abnormal   Result Value Ref Range    WBC Count 7.0 4.0 - 11.0 10e3/uL    RBC Count 3.49 (L) 4.40 - 5.90 10e6/uL    Hemoglobin 11.3 (L) 13.3 - 17.7 g/dL    Hematocrit 32.8 (L) 40.0 - 53.0 %    MCV 94 78 - 100 fL    MCH 32.4 26.5 - 33.0 pg    MCHC 34.5 31.5 - 36.5 g/dL    RDW 15.9 (H) 10.0 - 15.0 %    Platelet Count 184 150 - 450 10e3/uL    % Neutrophils 61 %    % Lymphocytes 25 %    % Monocytes 12 %    % Eosinophils 1 %    % Basophils 0 %    % Immature Granulocytes 1 %    NRBCs per 100 WBC 0 <1 /100    Absolute Neutrophils 4.3 1.6 - 8.3 10e3/uL    Absolute Lymphocytes 1.8 0.8 - 5.3 10e3/uL     Absolute Monocytes 0.8 0.0 - 1.3 10e3/uL    Absolute Eosinophils 0.1 0.0 - 0.7 10e3/uL    Absolute Basophils 0.0 0.0 - 0.2 10e3/uL    Absolute Immature Granulocytes 0.1 <=0.4 10e3/uL    Absolute NRBCs 0.0 10e3/uL   T4 free     Status: Normal   Result Value Ref Range    Free T4 1.19 0.76 - 1.46 ng/dL   Magnesium     Status: Normal   Result Value Ref Range    Magnesium 1.7 1.6 - 2.3 mg/dL   CBC with Platelets & Differential     Status: Abnormal    Narrative    The following orders were created for panel order CBC with Platelets & Differential.  Procedure                               Abnormality         Status                     ---------                               -----------         ------                     CBC with platelets and d...[[id number]]  Abnormal            Final result                 Please view results for these tests on the individual orders.       Imaging Studies: None completed for today's visit      ASSESSMENT/PLAN:    1. 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  He completed neoadjuvant chemotherapy then underwent a nephroureterectomy and is now undergoing adjuvant therapy with nivolumab with plan to complete 1 year of therapy.  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  He was treated with 1 week of prednisone.  Little improvement with the prednisone.    He was treated then with cycle 8 and once again developed more severe diarrhea and was placed on a longer steroid taper and will complete this tomorrow.  Discussed colitis with Dr. Bartlett and I will see him back after his trip in September to resume next treatment.     2.  Type 2 diabetes mellitus:  Patient previously with type 2 diabetes. Hyperglycemia likely due to immunotherapy with likely conversion to type I which can happen while on immunotherapy. He is currently being managed through  the diabetic clinic.       3.  Stage IV chronic kidney disease: Improved.  He'll continue to follow with nephrology and see Dr. Arvizu in September.     4.  bladder cancer: He is following with Dr. Schmitz with urology with [facility].  Due to undergo BCG infusions again.  Patient will contact her office in regards to when these need to be started.  He would like to wait until after his trip.     5.   Colitis: Developed a grade 2-3 diarrhea after cycle 8.  Placed on high-dose prednisone taper.  Essentially resolution.  We will hold therapy until he is back from his trip.     6. rash:  Resolved.    7.  hypocalcemia: Corrected calcium 8.1.  Magnesium level is within normal limits.  We will also obtain a vitamin D and phosphorus.  Likely reason for his cramping symptoms.  I recommended he initiate calcium 500 mg with vitamin D 400 units twice a day.  This will be rechecked when he returns for his next treatment.    8. hypothyroidism: TSH is up again today with a normal T4.  Continue same levothyroxine dose at this point and we will recheck when he returns for his next treatment.    I encouraged patient to call with any questions or concerns.    50 minutes spent in the patient's encounter today with time spent in review of the chart along with in chart preparation.  Time was also spent in reviewing his treatment plan and adjusting his treatment plan.  Time was also spent obtaining a review of systems and performing a physical exam along with review of his lab work in detail with him.  Time was also spent in communicating with Dr. Bartlett in regards to treatment.  Time was spent in discussion of recommendations for calcium dosing.  Time was spent in placing extra orders and in planning his next follow-up and chart documentation.    Sabra Jensen, NP  APRN, FNP-BC, AOCNP

## 2022-08-03 NOTE — PATIENT INSTRUCTIONS
We will HOLD therapy today.    Start calcium 500mg with Vitamin D 400 units twice a day.    We would like to see you back after your trip for treatment.     When you are in need of a refill, please call your pharmacy and they will send a request.     If you have any questions please call [phone]    Other instructions:  none

## 2022-08-03 NOTE — NURSING NOTE
"Oncology Rooming Note    August 3, 2022 12:50 PM   Colin Baxter is a 77 year old male who presents for:    Chief Complaint   Patient presents with     Oncology Clinic Visit     Follow up Carcinoma in situ of bladder         Initial Vitals: /80   Pulse 61   Temp (!) 96.5  F (35.8  C) (Tympanic)   Resp 20   Ht 1.702 m (5' 7\")   Wt 78.8 kg (173 lb 11.6 oz)   SpO2 98%   BMI 27.21 kg/m   Estimated body mass index is 27.21 kg/m  as calculated from the following:    Height as of this encounter: 1.702 m (5' 7\").    Weight as of this encounter: 78.8 kg (173 lb 11.6 oz). Body surface area is 1.93 meters squared.  No Pain (0) Comment: Data Unavailable   No LMP for male patient.  Allergies reviewed: Yes  Medications reviewed: Yes    Medications: Medication refills not needed today.  Pharmacy name entered into EPIC:    [id number] - [place], [place] - [address] [id number] - [place], [place] - [address] [place] [address]          Verónica Leung LPN            "

## 2022-08-03 NOTE — PROGRESS NOTES
Pt arrived with Tiffany meter.  I downloaded the meter, copy of report given to pt and to Caryl Griffin.  ILEANA SIDHU LPN

## 2022-08-03 NOTE — PROGRESS NOTES
Peripheral labs ordered. Butterfly needle inserted into RT ARM.  Immediate blood return noted. Ordered labs drawn. Needle removed, covered with sterile guaze and coban. Patient tolerated well, denies pain or discomfort at this time. Patient discharged.

## 2022-08-15 NOTE — TELEPHONE ENCOUNTER
Called Colin. He states that he had a refill of NovoLOG at [facility] and he picked it up and co-pay was significant. He did not request a refill and he thinks that it was recently ordered by Dr. Wallace.    He also states that he has not yet received his shipment from [facility].    I told him that I would call [facility] and that I will ask Estrella Phan to call [facility].

## 2022-08-15 NOTE — TELEPHONE ENCOUNTER
"I called [facility]. They state that he has \"ready refill\". I discussed that he receives insulin through [facility]. They did tell me that when he was there yesterday he told them that he gets his insulin by mail and they stated it was confusing.    Pharmacist stated that they will take the NovoLOG prescription off his prescriptions.  "

## 2022-08-16 NOTE — TELEPHONE ENCOUNTER
I called [facility] to check on the status of the pt's shipment/order and was told that the pt's order was cancelled by the fulfillment center. I spoke to the fulfillment center and was told they only had the order for the pt's pen needles and this was cancelled because the pt did not return their calls. New order will need to be sent. We refaxed the June order since this was never filled.

## 2022-08-18 NOTE — PROGRESS NOTES
This writer contacted [facility] after speaking with supervisor.  Patient due for maintenance BCG in August of 2022, at this time [facility] is still working through the process of entering out side oncology orders.  Due to time sensitive nature of this administration,  [facility] was notified that we will not be able to accommodate patient bladder instillation.  We recommend that other arrangements be made for this cycle, including patient receiving at [facility].  Amarilis with [facility] stated she would talk with the ordering provider and let us know if anything further is needed.

## 2022-08-23 NOTE — TELEPHONE ENCOUNTER
Pt called he is out of Tresiba, he states he get this from [facility], not [facility]. Please advise.

## 2022-08-23 NOTE — TELEPHONE ENCOUNTER
Sample of Tresiba U200 obtained from [facility].  Pt can  at his convenience.  He is able to use this in place of his Tresiba U100 at same dose per discussion with Maryann Mccord NP.

## 2022-08-25 PROBLEM — E11.9 TYPE 2 DIABETES MELLITUS WITHOUT COMPLICATION, WITHOUT LONG-TERM CURRENT USE OF INSULIN (H): Status: RESOLVED | Noted: 2019-07-30 | Resolved: 2022-01-01

## 2022-08-25 NOTE — PROGRESS NOTES
Assessment & Plan     Type 2 diabetes mellitus with hyperglycemia, with long-term current use of insulin (H)  A1c (8/26/2022) 8.6 which is an increase from 7.4  - Hemoglobin A1c  - [facility] FOOT EXAM  NO CHARGE  - no change in insulin dosages today  - Issues with ordering insulins, which has been resolved   - next visit with diabetic education on 9/21/2022  - appreciate diabetic educations time today    Hyperlipidemia, unspecified hyperlipidemia type  LDL (2/22/2022): 108  - c/w atorvastatin 40mg     CKD (chronic kidney disease) stage 4, GFR 15-29 ml/min (H) / Anemia due to stage 4 chronic kidney disease (H)  Follows with Dr. Arvizu with next visit in September     Carcinoma in situ of bladder  Follows with Dr. Schmitz, [facility] urology  - [facility] when Colin returns from his vacation     Urothelial cancer (H)  Follows with Jennifer Jensen with next visit 9/15/2022  - s/p cycle 8.   - consideration for resuming treatment when he returns from vacation     Leg cramp  Colin stretches before he goes to bed, but does not drink much water in the evenings. He is up in the night to urinate. Will do a trial of increased hydration in the evenings     Fatigue, unspecified type  Colin is very active. Fatigue most likely related to oncology issues and frustration regarding health issues.   Hgb stable. TSH at next oncology visit   - continue to monitor     Hypothyroid  TSH (8/3/2022): 12.93, but free T4 steady at 1.19  - repeat labs at next oncology visit    27 minutes spent on the date of the encounter doing chart review, review of test results, interpretation of tests, patient visit, documentation and discussion with diabetic education      See Patient Instructions    Return in about 3 months (around 11/26/2022).    Libia Wallace MD  [facility] - ROBINA Shaw is a 77 year old, presenting for the following health issues:  Diabetes, Hypertension, and Lipids      HPI     Diabetes Follow-up    How often  "are you checking your blood sugar? Continuous glucose monitor  What time of day are you checking your blood sugars (select all that apply)?  throughout the day  Have you had any blood sugars above 200?  Yes   Have you had any blood sugars below 70?  No    What symptoms do you notice when your blood sugar is low?  None    What concerns do you have today about your diabetes? None and Blood sugar is often over 200     Do you have any of these symptoms? (Select all that apply)  No numbness or tingling in feet.  No redness, sores or blisters on feet.  No complaints of excessive thirst.  No reports of blurry vision.  No significant changes to weight.  - Last A1c (5/23/2022): 7.4  - BG: qing  - Diet:  - Exercise:  - ran out of Tresiba for a few days. BG are resolving   - Tresiba U200 20u  - novolog 4/4/4  - ASA (), ACE/ARB(), statin (atorvastatin 40)      # not feeling well  - has been feeling \"queezy\" and fatigue. Occurs off and on. Generally 3 to 4 times per week   - fatiguing easier. Fatigued after cutting brush.   - duration of one month  - going on vacation end of the month  - no chest pain, no heart issues     # Leg cramps in the morning  - Colin is stretching before bed  -     Diabetic Foot Screen:  Any complaints of increased pain or numbness ? No  Is there a foot ulcer now or a history of foot ulcer? No  Does the foot have an abnormal shape? No  Are the nails thick, too long or ingrown? No  Are there any redness or open areas? No         Sensation Testing done at all points on the diagram with monofilament     Right Foot: Sensation Normal at all points  Left Foot: Sensation Normal at all points     Risk Category: 0- No loss of protective sensation  Performed by Libia Wallace MD        Hyperlipidemia Follow-Up      Are you regularly taking any medication or supplement to lower your cholesterol?   Yes- Atorvastatin 40 mg    Are you having muscle aches or other side effects that you think could be caused by " your cholesterol lowering medication?  No  LDL (2/22/2022): 108    Hypertension Follow-up      Do you check your blood pressure regularly outside of the clinic? No     Are you following a low salt diet? Yes    Are your blood pressures ever more than 140 on the top number (systolic) OR more   than 90 on the bottom number (diastolic), for example 140/90? No    BP Readings from Last 2 Encounters:   08/26/22 122/80   08/03/22 120/80     Hemoglobin A1C (%)   Date Value   05/23/2022 7.4 (H)   02/22/2022 10.3 (H)   11/24/2021 7.1 (A)   01/14/2021 5.7 (H)     LDL Cholesterol Calculated (mg/dL)   Date Value   02/22/2022 108 (H)   10/02/2020 75   09/09/2019 84     # Hypothyroid: no change in dosage d/t free T4 wnl. Recheck at next visit with oncology on 9/15/2022  Lab Results   Component Value Date    TSH 12.93 08/03/2022    TSH 1.41 10/02/2020         # CKD  - follows with Dr Arvizu with next visit, next month     # malignant neoplasm of left ureter  - follows with Jennifer Jensen with last visit 8/3/2022    # carcinoma of bladder  - follows with Dr. Schmitz [facility], not sure when next visit.  - not scheduled for BGD. Will schedule after he gets back from [place]       - stopped flomax. No issues       # Social  - taking a trip to [place] Sept 1st to 8th      Review of Systems   Constitutional: Positive for fatigue. Negative for chills and fever.   HENT: Negative for congestion.    Respiratory: Negative for shortness of breath and wheezing.    Cardiovascular: Negative for chest pain and palpitations.   Gastrointestinal: Positive for nausea. Negative for abdominal pain.   Musculoskeletal:        Leg cramps in the morning. Below knees, not in thighs any more    Psychiatric/Behavioral:        Occasional frustration          Objective    /80   Pulse 72   Temp 97.1  F (36.2  C) (Tympanic)   Resp 18   Wt 78.1 kg (172 lb 2 oz)   SpO2 99%   BMI 26.96 kg/m    Body mass index is 26.96 kg/m .  Physical Exam  Constitutional:        General: He is not in acute distress.     Appearance: He is not ill-appearing.   Cardiovascular:      Rate and Rhythm: Normal rate and regular rhythm.      Pulses: Normal pulses.           Dorsalis pedis pulses are 2+ on the right side and 2+ on the left side.      Heart sounds: No murmur heard.  Pulmonary:      Effort: Pulmonary effort is normal. No respiratory distress.      Breath sounds: No wheezing or rales.   Feet:      Right foot:      Protective Sensation: 10 sites tested. 10 sites sensed.      Skin integrity: Skin integrity normal.      Toenail Condition: Right toenails are normal.      Left foot:      Protective Sensation: 10 sites tested. 10 sites sensed.      Skin integrity: Skin integrity normal.      Toenail Condition: Left toenails are normal.   Neurological:      Mental Status: He is alert.   Psychiatric:         Mood and Affect: Mood normal.          Results for orders placed or performed in visit on 08/26/22 (from the past 24 hour(s))   Hemoglobin A1c   Result Value Ref Range    Estimated Average Glucose 200 mg/dL    Hemoglobin A1C 8.6 (H) 0.0 - 5.6 %                   .  ..

## 2022-08-26 PROBLEM — E03.9 ACQUIRED HYPOTHYROIDISM: Status: ACTIVE | Noted: 2022-01-01

## 2022-08-26 NOTE — PATIENT INSTRUCTIONS
Continue current insulin doses.    Follow-up with Diabetes Ed with Maryann Mccord NP, on 9/21/22 at 10:00 am - Suyapa Flowres RN, will follow with you for your next Diabetes Ed appointment    Reminder:  Your insulin: Tresiba and NovoLOG with pen needles and will be coming from [employer] [facility] in 10 to 14 business days  Your Tiffany 2 glucose sensors come from [employer]

## 2022-08-26 NOTE — TELEPHONE ENCOUNTER
I called [employer], they reactivated the pt's order and he will be receiving in 10 - 14 business days.

## 2022-08-26 NOTE — TELEPHONE ENCOUNTER
I called [employer], they reactivated the pt's order and he will be receiving in 10 - 14 business days.

## 2022-08-26 NOTE — NURSING NOTE
"Chief Complaint   Patient presents with     Diabetes     Hypertension     Lipids       Initial /80   Pulse 72   Temp 97.1  F (36.2  C) (Tympanic)   Resp 18   Wt 78.1 kg (172 lb 2 oz)   SpO2 99%   BMI 26.96 kg/m   Estimated body mass index is 26.96 kg/m  as calculated from the following:    Height as of 8/3/22: 1.702 m (5' 7\").    Weight as of this encounter: 78.1 kg (172 lb 2 oz).  Medication Reconciliation: complete  Coreen Castro LPN  "

## 2022-08-29 NOTE — DISCHARGE INSTRUCTIONS
You were seen for nausea vomiting and diarrhea in the emergency department today.  Your evaluation included generally stable lab studies.  You were treated with some IV fluids and antinausea medication called Zofran.  We are glad this seems to have improved your symptoms.  We are going to prescribe you dissolving Zofran tablets to take at home as needed for further nausea or vomiting episodes.  Please try to stick to a very bland diet drinking plenty of liquids and avoiding spicy foods fatty foods and alcohol.  It would be a good idea to check in with your primary doctor to review any ongoing symptoms.  If you have any other immediate concerns like severe abdominal pain, intractable vomiting, fever, etc. we can reevaluate you at any time in the emergency department.

## 2022-08-29 NOTE — ED TRIAGE NOTES
Has been vomiting after eating and having some diarrhea over the 3-4 days. Can keep liquid down but not food, Denies pain

## 2022-08-29 NOTE — ED PROVIDER NOTES
EMERGENCY DEPARTMENT ENCOUNTER      NAME: Colin Baxter  AGE: 77 year old male  YOB: 1945  MRN: [id number]  EVALUATION DATE & TIME: 2022 10:17 AM    PCP: Libia Wallace    ED PROVIDER: Luis Salvador M.D.      Chief Complaint   Patient presents with     Nausea, Vomiting, & Diarrhea       FINAL IMPRESSION:  1. Nausea and vomiting, intractability of vomiting not specified, unspecified vomiting type        ED COURSE [employer] MEDICAL DECISION MAKIN year old male presents to the Emergency Department for evaluation of nausea and vomiting.  Patient is vitally stable when he arrives to the emergency department.  History of urological cancer with a previous nephrectomy and baseline CKD.  He also has been dealing with intermittent diarrhea per chart review associated with his infusions.  He presents with nausea vomiting and diarrhea.  He has no abdominal pain or tenderness.  He was treated with IV fluids and Zofran.  Labs were undertaken as below which revealed normal white blood cell count.  He has stable CKD with no major metabolic disturbances associated with his diarrhea.  He does have a mild nonspecific elevation of his lipase.  Again he has no abdominal tenderness or epigastric pain concerns.  This is fairly twice the upper limit of normal and not diagnostic of acute pancreatitis.  I think it might be reactive from his vomiting.  I do not suspect any acute intra-abdominal process like obstruction, perforation, etc.  Patient was p.o. tolerant after receiving Zofran here.  He ate lunch with no difficulties and reported improvement in his nausea.  He may have a gastroenteritis causing nausea vomiting and a flareup of diarrhea.  He was agreeable with discharge with plan for Zofran as needed at home and we discussed bland diet and small but frequent doses of liquids to stay hydrated.  Primary care follow-up was advised.  Return precautions were reviewed for any abdominal pain, fever, tractable  vomiting, or other immediate concern.    At the conclusion of the encounter I discussed the results of all of the tests and the disposition. The questions were answered. The patient or family acknowledged understanding and was agreeable with the care plan.         MEDICATIONS GIVEN IN THE EMERGENCY:  Medications   0.9% sodium chloride BOLUS (0 mLs Intravenous Stopped 8/29/22 1130)   ondansetron (ZOFRAN) injection 4 mg (4 mg Intravenous Given 8/29/22 1036)       NEW PRESCRIPTIONS STARTED AT TODAY'S ER VISIT  New Prescriptions    ONDANSETRON (ZOFRAN ODT) 4 MG ODT TAB    Take 1 tablet (4 mg) by mouth every 6 hours as needed for nausea or vomiting          =================================================================    HPI    Patient information was obtained from: Patient      Colin Baxter is a 77 year old male with a pertinent history of bladder cancer, nephrectomy, undergoing chemotherapy who presents to this ED today for evaluation of vomiting and diarrhea.  He has been dealing with loose stools intermittently for some time associated with his ongoing chemotherapy for urologic malignancy.  He developed nausea and vomiting over the last few days as well and is having trouble keeping down solid food.  He says the last time he vomited was this morning after breakfast.  He has been able to drink liquids.  He denies any abdominal pain whatsoever.  He denies any fevers.  No cough or respiratory symptoms.  No other associated symptoms, urinary symptoms, rash.  He has not taken anything for nausea      REVIEW OF SYSTEMS   All systems reviewed and negative except as noted in HPI.    PAST MEDICAL HISTORY:  Past Medical History:   Diagnosis Date     Benign essential hypertension      Cancer (H)     Bladder     Carcinoma in situ of bladder 11/19/2021     Diabetes (H)      Dyslipidemia      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) 12/11/2020       PAST SURGICAL HISTORY:  Past Surgical History:   Procedure  Laterality Date     APPENDECTOMY  1958     COLONOSCOPY  2-     COMBINED CYSTOSCOPY, RETROGRADES, URETEROSCOPY, INSERT STENT Left 1/18/2021    Procedure: CYSTOURETEROSCOPY, WITH RETROGRADE PYELOGRAM with interpretation of fluroscopy, ureteroscopy, ureteral biopsy, bladder biopsy and fulgaration;  Surgeon: Shonda Morrell MD;  Location: [facility] OR     CYSTOSCOPY N/A 6/16/2021    Procedure: CYSTOSCOPY;  Surgeon: Javier Mcnulty MD;  Location: [facility] OR     CYSTOSCOPY, BIOPSY BLADDER, COMBINED N/A 9/8/2015    Procedure: COMBINED CYSTOSCOPY, BIOPSY BLADDER;  Surgeon: Randy Martinez MD;  Location: [facility] OR     CYSTOSCOPY, BIOPSY BLADDER, COMBINED N/A 2/14/2017    Procedure: COMBINED CYSTOSCOPY, BIOPSY BLADDER;  Surgeon: Randy Martinez MD;  Location: [facility] OR     CYSTOSCOPY, BIOPSY BLADDER, COMBINED N/A 11/13/2018    Procedure: COMBINED CYSTOSCOPY, BIOPSY BLADDER;  Surgeon: Ed Helm MD;  Location: [facility] OR     CYSTOSCOPY, BIOPSY BLADDER, COMBINED N/A 11/5/2021    Procedure: CYSTOSCOPY, WITH BLADDER BIOPSY;  Surgeon: Shonda Morrell MD;  Location: [facility]     CYSTOSCOPY, RETROGRADES, COMBINED Bilateral 11/13/2018    Procedure: Bilateral Retrograde Pyelagram, Bladder Biopsy;  Surgeon: Ed Helm MD;  Location: [facility] OR     CYSTOSCOPY, RETROGRADES, COMBINED Right 11/5/2021    Procedure: CYSTOSCOPY, WITH RETROGRADE PYELOGRAM;  Surgeon: Shonda Morrell MD;  Location: [facility]     CYSTOSCOPY, RETROGRADES, INSERT STENT URETER(S), COMBINED Left 9/8/2015    Procedure: COMBINED CYSTOSCOPY, RETROGRADES, INSERT STENT URETER(S);  Surgeon: Randy Martinez MD;  Location: [facility]     CYSTOSCOPY, TRANSURETHRAL RESECTION (TUR) TUMOR BLADDER, COMBINED N/A 9/8/2015    Procedure: COMBINED CYSTOSCOPY, TRANSURETHRAL RESECTION (TUR) TUMOR BLADDER;  Surgeon: Randy Martinez MD;  Location: [facility]     CYSTOSCOPY, TRANSURETHRAL RESECTION (TUR) TUMOR BLADDER, COMBINED N/A 1/12/2016    Procedure: COMBINED  CYSTOSCOPY, TRANSURETHRAL RESECTION (TUR) TUMOR BLADDER;  Surgeon: Randy Martinez MD;  Location: [facility]     CYSTOSCOPY, TRANSURETHRAL RESECTION (TUR) TUMOR BLADDER, COMBINED N/A 9/13/2016    Procedure: COMBINED CYSTOSCOPY, TRANSURETHRAL RESECTION (TUR) TUMOR BLADDER;  Surgeon: Randy Martinez MD;  Location: [facility]     DAVINCI NEPHROURETERECTOMY Left 6/16/2021    Procedure: NEPHROURETERECTOMY, ROBOT-ASSISTED, lymph node dissection;  Surgeon: Javier Mcnulty MD;  Location: [facility]     PHACOEMULSIFICATION WITH STANDARD INTRAOCULAR LENS IMPLANT Right 10/9/2018    Procedure: PHACOEMULSIFICATION WITH STANDARD INTRAOCULAR LENS IMPLANT;  PHACOEMULSIFICATION CATARACT EXTRACTION POSTERIOR CHAMBER LENS RIGHT;  Surgeon: Marlo Mcconnell MD;  Location: [facility]     PHACOEMULSIFICATION WITH STANDARD INTRAOCULAR LENS IMPLANT Left 10/23/2018    Procedure: PHACOEMULSIFICATION CATARACT EXTRACTION POSTERIOR CHAMBER LENS LEFT;  Surgeon: Marlo Mcconnell MD;  Location: [facility] OR           CURRENT MEDICATIONS:    No current facility-administered medications for this encounter.     Current Outpatient Medications   Medication     ondansetron (ZOFRAN ODT) 4 MG ODT tab     atorvastatin (LIPITOR) 40 MG tablet     blood glucose (ONETOUCH VERIO IQ) test strip     brimonidine (ALPHAGAN-P) 0.15 % ophthalmic solution     Cholecalciferol (VITAMIN D3) 25 MCG TABS     Continuous Blood Gluc  (FREESTYLE DELILAH 2 READER) TENNILLE     Continuous Blood Gluc Sensor (FREESTYLE DELILAH 2 SENSOR) MISC     insulin aspart (NOVOLOG FLEXPEN) 100 UNIT/ML pen     insulin degludec (TRESIBA FLEXTOUCH) 100 UNIT/ML pen     insulin pen needle (BD PEN NEEDLE DONTAE 2ND GEN) 32G X 4 MM miscellaneous     latanoprost (XALATAN) 0.005 % ophthalmic solution     levothyroxine (SYNTHROID/LEVOTHROID) 100 MCG tablet     melatonin 3 MG tablet     omeprazole (PRILOSEC) 20 MG DR capsule     ONETOUCH DELICA LANCETS 33G MISC     prednisoLONE acetate (PRED FORTE) 1 %  ophthalmic suspension     VITRON-C  MG TABS tablet         ALLERGIES:  Allergies   Allergen Reactions     No Clinical Screening - See Comments Other (See Comments)     Beta blocker in glaucoma gtt.s caused low pulse and passing out      Timolol      Beta blocker in glaucoma gtt.s caused low pulse and passing out   - patient thinks it was timolol but not 100% sure which beta blocker eye drop.      Bactrim [Sulfamethoxazole W/Trimethoprim] Rash       FAMILY HISTORY:  Family History   Problem Relation Age of Onset     Diabetes Mother      Parkinsonism Brother      Coronary Artery Disease No family hx of      Hypertension No family hx of      Hyperlipidemia No family hx of      Cerebrovascular Disease No family hx of      Breast Cancer No family hx of      Colon Cancer No family hx of      Prostate Cancer No family hx of      Anesthesia Reaction No family hx of      Asthma No family hx of      Thyroid Disease No family hx of      Genetic Disorder No family hx of        SOCIAL HISTORY:   Social History     Socioeconomic History     Marital status:      Spouse name: None     Number of children: None     Years of education: None     Highest education level: None   Tobacco Use     Smoking status: Former Smoker     Quit date: 1992     Years since quittin.6     Smokeless tobacco: Never Used   Vaping Use     Vaping Use: Never used   Substance and Sexual Activity     Alcohol use: Yes     Comment: rarely     Drug use: No     Sexual activity: Not Currently   Other Topics Concern     Parent/sibling w/ CABG, MI or angioplasty before 65F 55M? No       VITALS:  /81   Pulse 91   Temp 97.9  F (36.6  C) (Tympanic)   Resp 16   SpO2 99%     PHYSICAL EXAM    Constitutional: Well developed, Well nourished, NAD.  HENT: Normocephalic, Atraumatic. Neck Supple.  Eyes: EOMI, Conjunctiva normal.  Respiratory: Breathing comfortably on room air. Speaks full sentences easily. Lungs clear to  ascultation.  Cardiovascular: Normal heart rate, Regular rhythm. No peripheral edema.  Abdomen: Soft, nontender  Musculoskeletal: Good range of motion in all major joints. No major deformities noted.  Integument: Warm, Dry.  Neurologic: Alert & awake, Normal motor function, Normal sensory function, No focal deficits noted.   Psychiatric: Cooperative. Affect appropriate.     LAB:  All pertinent labs reviewed and interpreted.  Labs Ordered and Resulted from Time of ED Arrival to Time of ED Departure   BASIC METABOLIC PANEL - Abnormal       Result Value    Sodium 135      Potassium 3.5      Chloride 108      Carbon Dioxide (CO2) 23      Anion Gap 4      Urea Nitrogen 30      Creatinine 1.89 (*)     Calcium 8.3 (*)     Glucose 178 (*)     GFR Estimate 37 (*)    HEPATIC FUNCTION PANEL - Abnormal    Bilirubin Total 0.6      Bilirubin Direct 0.2      Protein Total 6.6 (*)     Albumin 3.3 (*)     Alkaline Phosphatase 78      AST 13      ALT 14     LIPASE - Abnormal    Lipase 863 (*)    CBC WITH PLATELETS AND DIFFERENTIAL - Abnormal    WBC Count 8.4      RBC Count 4.15 (*)     Hemoglobin 13.6      Hematocrit 39.3 (*)     MCV 95      MCH 32.8      MCHC 34.6      RDW 14.5      Platelet Count 233      % Neutrophils 68      % Lymphocytes 14      % Monocytes 15      % Eosinophils 1      % Basophils 1      % Immature Granulocytes 1      NRBCs per 100 WBC 0      Absolute Neutrophils 5.8      Absolute Lymphocytes 1.1      Absolute Monocytes 1.3      Absolute Eosinophils 0.1      Absolute Basophils 0.0      Absolute Immature Granulocytes 0.0      Absolute NRBCs 0.0     MAGNESIUM - Normal    Magnesium 1.8     CRP INFLAMMATION - Normal    CRP Inflammation 6.2         RADIOLOGY:  Reviewed all pertinent imaging. Please see official radiology report.  No orders to display         Luis Salvador M.D.  Emergency Medicine  [facility]  [address] [place] [id number] [phone]  Dept: [phone]       [place]  MD Luis  08/29/22 1818

## 2022-08-29 NOTE — TELEPHONE ENCOUNTER
Writer relayed Providers recommendation below  Pt states he will present to  [facility] today  No further concerns at calls end

## 2022-08-29 NOTE — PROGRESS NOTES
Diabetes Self-Management Education & Support    Presents for: Follow-up    CDE VISIT MODE: In Person      ASSESSMENT:    [facility] Report  08/13/2022 to 08/26/2022    % Time CGM is Active 93%    Average Glucose  209    Glucose Management Indicator  (GMI)    8.3%    Glucose Variabilty  34.8%    Time in Ranges:  >250 mg/dL   26%  181-250 mg/dL  37%   mg/dL   36%  54-69 mg/dL   1%  <54 mg/dL   0%      Patient's most recent   Lab Results   Component Value Date    A1C 8.6 08/26/2022    A1C 7.1 11/24/2021     is not meeting goal of <8.0    Diabetes knowledge and skills assessment:   Patient is knowledgeable in diabetes management concepts related to: Healthy Eating, Being Active, Monitoring and Taking Medication    Continue education with the following diabetes management concepts: Monitoring and Taking Medication    Based on learning assessment above, most appropriate setting for further diabetes education would be: Individual setting.      PLAN    Continue current insulin doses.    Follow-up with Diabetes Ed with Maryann Mccord NP, on 9/21/22 at 10:00 am - Suyapa Flowers RN, will follow with you for your next Diabetes Ed appointment    Reminder:  Your insulin: Tresiba and NovoLOG with pen needles and will be coming from [employer] [facility] in 10 to 14 business days  Your Tiffany 2 glucose sensors come from [employer] to cover at upcoming visits: Monitoring and Taking Medication    Follow-up: 9/21/22 at 10:00 am for 6 month Medicare Criteria visit for personal CGM    See Care Plan for co-developed, patient-state behavior change goals.  AVS provided for patient today.    Education Materials Provided:  No new materials provided today      SUBJECTIVE/OBJECTIVE:  Presents for: Follow-up  Accompanied by: Self  Diabetes education in the past 24mo: Yes  Focus of Visit: Monitoring, Taking Medication  Diabetes type: Type 2  Date of diagnosis: 7/29/19  Disease course: Stable (elevated BGs with chemo  "treatments)  How confident are you filling out medical forms by yourself:: Quite a bit  Diabetes management related comments/concerns: Still hasn't received PAP shipment even after multiple phone calls  Transportation concerns: No  Difficulty affording diabetes medication?: No  Difficulty affording diabetes testing supplies?: No  Other concerns:: Glasses  Cultural Influences/Ethnic Background:  Not  or       Diabetes Symptoms & Complications:  Fatigue: Yes  Neuropathy: No  Polydipsia: No  Polyphagia: No  Polyuria: No  Visual change: No  Slow healing wounds: No  Symptom course: Stable  Weight trend: Stable  Complications assessed today?: Yes  Autonomic neuropathy: No  CVA: No  Heart disease: No  Nephropathy: Yes  Peripheral neuropathy: No  Foot ulcerations: No  Peripheral Vascular Disease: No  Retinopathy: No    Patient Problem List and Family Medical History reviewed for relevant medical history, current medical status, and diabetes risk factors.    Vitals:  /80   Pulse 72   Ht 1.695 m (5' 6.75\")   Wt 78.1 kg (172 lb 2 oz)   SpO2 99%   BMI 27.16 kg/m    Estimated body mass index is 27.16 kg/m  as calculated from the following:    Height as of this encounter: 1.695 m (5' 6.75\").    Weight as of this encounter: 78.1 kg (172 lb 2 oz).   Last 3 BP:   BP Readings from Last 3 Encounters:   08/26/22 120/80   08/26/22 122/80   08/03/22 120/80       History   Smoking Status     Former Smoker     Quit date: 1/6/1992   Smokeless Tobacco     Never Used       Labs:  Lab Results   Component Value Date    A1C 8.6 08/26/2022    A1C 7.1 11/24/2021     Lab Results   Component Value Date     08/03/2022    GLC 99 07/06/2021     Lab Results   Component Value Date     02/22/2022    LDL 75 10/02/2020     HDL Cholesterol   Date Value Ref Range Status   10/02/2020 59 >39 mg/dL Final     Direct Measure HDL   Date Value Ref Range Status   02/22/2022 67 >=40 mg/dL Final   ]  GFR Estimate   Date Value Ref " Range Status   08/03/2022 49 (L) >60 mL/min/1.73m2 Final     Comment:     Effective December 21, 2021 eGFRcr in adults is calculated using the 2021 CKD-EPI creatinine equation which includes age and gender (Lindy ford al., NEJM, DOI: 10.1056/IUZKgc4177913)   07/06/2021 26 (L) >60 mL/min/[1.73_m2] Final     Comment:     Non  GFR Calc  Starting 12/18/2018, serum creatinine based estimated GFR (eGFR) will be   calculated using the Chronic Kidney Disease Epidemiology Collaboration   (CKD-EPI) equation.       GFR Estimate If Black   Date Value Ref Range Status   07/06/2021 31 (L) >60 mL/min/[1.73_m2] Final     Comment:      GFR Calc  Starting 12/18/2018, serum creatinine based estimated GFR (eGFR) will be   calculated using the Chronic Kidney Disease Epidemiology Collaboration   (CKD-EPI) equation.       Lab Results   Component Value Date    CR 1.48 08/03/2022    CR 2.33 07/06/2021     No results found for: MICROALBUMIN    Healthy Eating:  Healthy Eating Assessed Today: No  Cultural/Mandaen diet restrictions?: No  Meal planning/habits: Smaller portions, Avoiding sweets  Meals include: Breakfast, Lunch, Dinner  Breakfast: 1 toast or 1 english muffin, 1-2 eggs, breakfast meat - coffee/cream/splenda or corn flakes  Lunch: 1/2 sandwich, fruit, milk or 1.5 cups chili with corn bread or beef, cheese, milk, coffee - sometimes skips  Dinner: meat, veggies, 1 bread - sometimes salad - occasional starch (1/2 potato)  Snacks: grapes, leftover meat, 1/2 sandwich - Dove ice cream or 1/2 donut  Beverages: Water, Coffee, Milk  Has patient met with a dietitian in the past?: Yes    Being Active:  Being Active Assessed Today: Yes  Exercise:: Currently not exercising  Barrier to exercise: None    Monitoring:  Monitoring Assessed Today: Yes  Did patient bring glucose meter to appointment? : Yes  Blood Glucose Meter: One Touch  Times checking blood sugar at home (number): 3  Times checking blood sugar at home  (per): Day  Blood glucose trend: Fluctuating        Taking Medications:  Diabetes Medication(s)     Insulin       insulin aspart (NOVOLOG FLEXPEN) 100 UNIT/ML pen    Inject 4 units prior to evening meal to start. Will titrate up as needed with correction. TDD 15 units     insulin degludec (TRESIBA FLEXTOUCH) 100 UNIT/ML pen    Inject 20 units daily          Taking Medication Assessed Today: Yes  Current Treatments: Diet, Insulin Injections  Dose schedule: Pre-breakfast, Pre-lunch, Pre-dinner  Given by: Patient  Injection/Infusion sites: Abdomen  Problems taking diabetes medications regularly?: No  Diabetes medication side effects?: No    Problem Solving:  Problem Solving Assessed Today: Yes  Is the patient at risk for hypoglycemia?: Yes  Hypoglycemia Frequency: Rarely  Patient carries a carbohydrate source: Yes  Medical ID: No  Does patient have DKA prevention plan?: No  Does patient have severe weather/disaster plan for diabetes management?: No    Hypoglycemia symptoms  Hunger: Yes  Feeling shaky: Yes    Hypoglycemia Complications  Blackouts: No  Hospitalization: No  Nocturnal hypoglycemia: No  Required assistance: No  Required glucagon injection: No  Seizures: No    Reducing Risks:  Reducing Risks Assessed Today: No  Has dilated eye exam at least once a year?: Yes  Sees dentist every 6 months?: No  Feet checked by healthcare provider in the last year?: No (Due)    Healthy Coping:  Healthy Coping Assessed Today: Yes  Emotional response to diabetes: Ready to learn, Concern for health and well-being  Informal Support system:: Family  Stage of change: ACTION (Actively working towards change)  Support resources: None  Patient Activation Measure Survey Score:  SHARITA Score (Last Two) 9/26/2018   SHARITA Raw Score 30   Activation Score 56   SHARITA Level 3         Care Plan and Education Provided:  See care plan for targets and goals.        Time Spent: 30 minutes  Encounter Type: Individual    Any diabetes medication dose changes  were made via the CDE Protocol per the patient's primary care provider. A copy of this encounter was shared with the provider.     n/a

## 2022-08-29 NOTE — ED NOTES
Patient reports emesis after eating accompanied by some diarrhea, can keep liquids down, but not solids. This has been going on for 4 days and hasn't gotten any better or worse.

## 2022-08-29 NOTE — ED NOTES
Discharge instructions reviewed and patient verbalizes understanding. Reviewed prescriptions with patient. Instructed to return to ED with worsening symptoms or concerns. No nausea/vomiting/diarrhea since before meal.

## 2022-08-29 NOTE — TELEPHONE ENCOUNTER
Protocol:  See in office today  Request for overbook appointment with PCP?    Patient will be traveling in two days  Writer recommended UC if overbook is not available.    Reason for Disposition    Patient wants to be seen    Additional Information    Negative: Shock suspected (e.g., cold/pale/clammy skin, too weak to stand, low BP, rapid pulse)    Negative: Difficult to awaken or acting confused (e.g., disoriented, slurred speech)    Negative: Sounds like a life-threatening emergency to the triager    Negative: Vomiting occurs only while coughing    Negative: Pregnant < 20 Weeks and nausea/vomiting began in early pregnancy (i.e., 4-8 weeks pregnant)    Negative: Chest pain    Negative: Headache is main symptom    Negative: Vomiting red blood or black (coffee ground) material    Negative: Vomiting and hernia is more painful or swollen than usual    Negative: Recent head injury (within 3 days)    Negative: Recent abdominal injury (within 7 days)    Negative: Insulin-dependent diabetes and glucose > 240 mg/dL (13 mmol/L)    Negative: Severe pain in one eye    Negative: SEVERE vomiting (e.g., 6 or more times/day)  (Exception: Patient sounds well, is drinking liquids, does not sound dehydrated, and vomiting has lasted less than 24 hours.)    Negative: MODERATE vomiting (e.g., 3 - 5 times/day) and age > 60 years    Negative: Constant abdominal pain lasting > 2 hours    Negative: High-risk adult (e.g., brain tumor, V-P shunt, hernia)    Negative: Drinking very little and has signs of dehydration (e.g., no urine > 12 hours, very dry mouth, very lightheaded)    Negative: Patient sounds very sick or weak to the triager    Negative: Vomiting and abdomen looks much more swollen than usual    Negative: Vomiting contains bile (green color)    Negative: Fever > 103 F (39.4 C)    Negative: Fever > 101 F (38.3 C) and over 60 years of age    Negative: Fever > 100.0 F (37.8 C) and has a weak immune system (e.g., HIV positive, cancer  "chemo, organ transplant, splenectomy, chronic steroids)    Negative: Fever > 100.0 F (37.8 C) and bedridden (e.g., nursing home patient, stroke, chronic illness, recovering from surgery)    Negative: Taking any of the following medications: digoxin (Lanoxin), lithium, theophylline, phenytoin (Dilantin)    Negative: SEVERE headache and vomiting    Answer Assessment - Initial Assessment Questions  1. VOMITING SEVERITY: \"How many times have you vomited in the past 24 hours?\"      - MILD:  1 - 2 times/day     - MODERATE: 3 - 5 times/day, decreased oral intake without significant weight loss or symptoms of dehydration     - SEVERE: 6 or more times/day, vomits everything or nearly everything, with significant weight loss, symptoms of dehydration       twice  2. ONSET: \"When did the vomiting begin?\"       Approx 4-5 days ago  3. FLUIDS: \"What fluids or food have you vomited up today?\" \"Have you been able to keep any fluids down?\"      After breakfast  4. ABDOMINAL PAIN: \"Are your having any abdominal pain?\" If yes : \"How bad is it and what does it feel like?\" (e.g., crampy, dull, intermittent, constant)       No pain  5. DIARRHEA: \"Is there any diarrhea?\" If Yes, ask: \"How many times today?\"       Yes. A couple more BM's than normal  6. CONTACTS: \"Is there anyone else in the family with the same symptoms?\"       no  7. CAUSE: \"What do you think is causing your vomiting?\"      unknown  8. HYDRATION STATUS: \"Any signs of dehydration?\" (e.g., dry mouth [not only dry lips], too weak to stand) \"When did you last urinate?\"      Keeping liquids down  9. OTHER SYMPTOMS: \"Do you have any other symptoms?\" (e.g., fever, headache, vertigo, vomiting blood or coffee grounds, recent head injury)      weakness  10. PREGNANCY: \"Is there any chance you are pregnant?\" \"When was your last menstrual period?\"        no    Protocols used: VOMITING-A-OH      "

## 2022-09-08 NOTE — TELEPHONE ENCOUNTER
I received a message from [facility] stating the pt needs a refill on his pen needles, Tresiba and Novolog. Pt gets these medications from [facility]. I called the pt and left a message to verify the pt's doses on his medications.

## 2022-09-09 NOTE — TELEPHONE ENCOUNTER
"I spoke to Suyapa Flowers and we reviewed the pt's 08/26/22 phone note. The pt should be receiving his Nedra [employer]isk shipment today as this will be day 14.  I called [facility] the pt's order was cancelled as they need a new order. The pharmacy considers this form \"used.\" New form completed and left for Maryann flood to complete. Pt is on vacation. I will wait for him to call back to explain.      "

## 2022-09-10 PROBLEM — N28.89 URETERAL MASS: Status: RESOLVED | Noted: 2021-02-02 | Resolved: 2022-01-01

## 2022-09-10 PROBLEM — K86.9 PANCREATIC LESION: Status: ACTIVE | Noted: 2022-01-01

## 2022-09-10 PROBLEM — C68.9 UROTHELIAL CANCER (H): Status: RESOLVED | Noted: 2021-10-22 | Resolved: 2022-01-01

## 2022-09-10 PROBLEM — Z85.51 HISTORY OF BLADDER CANCER: Status: RESOLVED | Noted: 2019-02-27 | Resolved: 2022-01-01

## 2022-09-10 PROBLEM — N18.4 CRF (CHRONIC RENAL FAILURE), STAGE 4 (SEVERE) (H): Status: ACTIVE | Noted: 2022-01-01

## 2022-09-10 PROBLEM — N17.9 ACUTE-ON-CHRONIC KIDNEY INJURY (H): Status: ACTIVE | Noted: 2022-01-01

## 2022-09-10 PROBLEM — R19.7 DIARRHEA: Status: ACTIVE | Noted: 2022-01-01

## 2022-09-10 PROBLEM — N18.9 ACUTE-ON-CHRONIC KIDNEY INJURY (H): Status: ACTIVE | Noted: 2022-01-01

## 2022-09-10 NOTE — ED TRIAGE NOTES
He has had diarrhea for a week. He also has nausea. He was seen in [place] in the ER and given fluids. He is fatigued.

## 2022-09-10 NOTE — PHARMACY-ADMISSION MEDICATION HISTORY
Pharmacy Note - Admission Medication History    Pertinent Provider Information:      ______________________________________________________________________    Prior To Admission (PTA) med list completed and updated in EMR.       PTA Med List   Medication Sig Last Dose     atorvastatin (LIPITOR) 40 MG tablet TAKE 1 TABLET (40 MG) BY MOUTH DAILY Past Week at Unknown time     brimonidine (ALPHAGAN-P) 0.15 % ophthalmic solution INSTILL 1 DROP INTO RIGHT EYE TWICE DAILY Past Week at Unknown time     Cholecalciferol (VITAMIN D3) 25 MCG TABS Take 25 mcg by mouth daily Past Week at Unknown time     insulin aspart (NOVOLOG FLEXPEN) 100 UNIT/ML pen Inject 4 units prior to evening meal to start. Will titrate up as needed with correction. TDD 15 units 9/10/2022 at Unknown time     insulin degludec (TRESIBA FLEXTOUCH) 100 UNIT/ML pen Inject 20 units daily 9/9/2022 at Unknown time     latanoprost (XALATAN) 0.005 % ophthalmic solution Place 1 drop into both eyes At Bedtime 9/9/2022 at Unknown time     levothyroxine (SYNTHROID/LEVOTHROID) 100 MCG tablet Take 1 tablet (100 mcg) by mouth daily 9/9/2022 at Unknown time     melatonin 3 MG tablet Take 1 tablet (3 mg) by mouth nightly as needed for sleep Past Week at Unknown time     omeprazole (PRILOSEC) 20 MG DR capsule Take 1 capsule (20 mg) by mouth daily Past Week at Unknown time     ondansetron (ZOFRAN ODT) 4 MG ODT tab Take 1 tablet (4 mg) by mouth every 6 hours as needed for nausea or vomiting Past Week at Unknown time     VITRON-C  MG TABS tablet Take 1 tablet by mouth daily Past Week at Unknown time       Information source(s): Patient, Family member and CareEverywhere/SureScripts  Method of interview communication: in-person    Summary of Changes to PTA Med List  New: none  Discontinued: prednisolone  Changed: none    Patient was asked about OTC/herbal products specifically.  PTA med list reflects this.    In the past week, patient estimated taking medication this  percent of the time:  greater than 90%.    Allergies were reviewed, assessed, and updated with the patient.      Patient did not bring any medications to the hospital and can't retrieve from home. No multi-dose medications are available for use during hospital stay.     The information provided in this note is only as accurate as the sources available at the time of the update(s).    Thank you for the opportunity to participate in the care of this patient.    Neida Grimes [facility]  9/10/2022 6:34 PM

## 2022-09-10 NOTE — ED PROVIDER NOTES
EMERGENCY DEPARTMENT ENCOUNTER      NAME: Colin Baxter  AGE: 77 year old male  YOB: 1945  MRN: [id number]  EVALUATION DATE & TIME: No admission date for patient encounter.    PCP: Libia Wallace    ED PROVIDER: Lexi Cárdenas M.D.        Chief Complaint   Patient presents with     Diarrhea     Nausea         FINAL IMPRESSION:    1. Diarrhea, unspecified type    2. Dehydration    3. Decreased oral intake    4. Pancreatic mass            MEDICAL DECISION MAKING:    Colin Baxter is a 77 year old male with history of DM2, hyperglycemia, ureter cancer, HLD, and CKD who presents to the ER via walk-in with complaints of diarrhea and nausea.  Work-up in the ER overall unremarkable as far as etiology for his diarrhea.  He does have concerns about possible parasite or other infectious etiologies due to the fact that he was working with his septic tank just prior to developing symptoms.  Stool studies including parasites have been ordered.  Stool studies have been sent.  He does not appear toxic but certainly appears dehydrated.  He has been trying to manage this at home as an outpatient and not doing well.  Patient has been accepted to the hospital by the hospitalist for ongoing fluid hydration.    Incidental pancreatic abnormality was seen on CT imaging and he will require further work-up for this as well.  He was noted to have elevated lipase back on August 29 at 863 but is now down to 133.  He denies any abdominal pain at this time.      ED COURSE:  5:08 PM  I met with the patient to gather history and perform my exam. ED course and treatment discussed.    6:35 PM  Spoke with radiologist who reports pancreatic body changes. Will need outpatient followup for this.    7:41 PM  I spoke with Dr. Fajardo, hospitalist.  Pt will go to [facility] under observation status.  He is hemodynamically stable.    7:44 PM  I updated the patient.  I did update him on the pancreatic abnormality and that he will  need more imaging to evaluate this.  He agrees with the plan for admission to the hospital and all of his questions have been answered.    I do not think that this represents ACS, PE, ruptured AAA, aortic dissection, bowel obstruction, bowel ischemia, cholecystitis, pancreatitis, appendicitis, diverticulitis, kidney stone, pyelonephritis, incarcerated or strangulated hernia, testicular torsion, viscus perforation, perforated GI ulcer, or other such etiologies at this time.    COVID-19 PPE worn during patient evaluation:  Mask: n95 and homemade masks   Eye Protection: goggles   Gown: none   Hair cover: yes  Face shield: none   Patient wearing a mask: yes     At the conclusion of the encounter I discussed the results of all of the tests and the disposition. Their questions were answered. The patient (and any family present) acknowledged understanding and were agreeable with the care plan.      CONSULTANTS:  Radiologist - Dr. Lin        MEDICATIONS GIVEN IN THE EMERGENCY:  Medications   0.9% sodium chloride BOLUS (0 mLs Intravenous Stopped 9/10/22 1848)   sodium chloride 0.9% infusion ( Intravenous New Bag 9/10/22 1849)   iopamidol (ISOVUE-370) solution 75 mL (75 mLs Intravenous Given 9/10/22 1756)           NEW PRESCRIPTIONS STARTED AT TODAY'S ER VISIT     Medication List      There are no discharge medications for this visit.             CONDITION:  stable        DISPOSITION:  Med surg as accepted by Dr. Mcclain, hospitalist         =================================================================  =================================================================    HPI    Patient information was obtained from: Patient and his daughter    Use of Intrepreter: N/A     Colin Baxter is a 77 year old male with history of DM2, hyperglycemia, ureter cancer, HLD, and CKD who presents to the ER via walk-in with complaints of diarrhea and nausea.    Per chart review, patient was seen at [facility] on 8/29/22 for  "the evaluation of nausea, vomiting, and diarrhea. Patient states he was having loose stools that associated with his ongoing chemotherapy for urologic malignancy. During his visit, he was treated with IV fluids and Zofran. Patient felt improved and was discharged with Zofran and doses of liquids to stay hydrated. He was advised to follow up with his PCP.     Patient and his daughter went to [place] and he was seen in the ER for the same symptoms. He was given fluids and felt better however the daughter states \"he continues to declined\" and \"never really improved.\" [place] the [place] yesterday around 1100.     Patient reports diarrhea and nausea that started around 2 weeks ago. He initially vomited the first few days but it has resolved. He believes that there could possibly be parasite in his system from working on his septic system in [facility].  He had been working on his septic system about 2 days before he started developing symptoms.  He visited the [facility] ED (see info above) and felt improved and therefore was discharged.     Currently, patient states he is not drinking much water. He denies any antibiotics in the last 6 months.  Patient has a history of kidney cancer and bladder cancer. Otherwise, patient denies fever, cough, chest pain, and abdominal pain. No other complaints at this time.       REVIEW OF SYSTEMS  Review of Systems   Constitutional: Positive for fatigue. Negative for fever.   HENT: Negative for trouble swallowing.    Respiratory: Negative for cough and shortness of breath.    Cardiovascular: Negative for chest pain.   Gastrointestinal: Positive for diarrhea and nausea. Negative for abdominal pain and vomiting (last emesis was >1 week ago).   Genitourinary: Negative for dysuria.   All other systems reviewed and are negative.        PAST MEDICAL HISTORY:  Past Medical History:   Diagnosis Date     Benign essential hypertension      Cancer (H)     Bladder     Carcinoma in situ of " bladder 11/19/2021     Diabetes (H)      Dyslipidemia      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) 12/11/2020         PAST SURGICAL HISTORY:  Past Surgical History:   Procedure Laterality Date     APPENDECTOMY  1958     COLONOSCOPY  2-     COMBINED CYSTOSCOPY, RETROGRADES, URETEROSCOPY, INSERT STENT Left 1/18/2021    Procedure: CYSTOURETEROSCOPY, WITH RETROGRADE PYELOGRAM with interpretation of fluroscopy, ureteroscopy, ureteral biopsy, bladder biopsy and fulgaration;  Surgeon: Shonda Morrell MD;  Location: [facility]     CYSTOSCOPY N/A 6/16/2021    Procedure: CYSTOSCOPY;  Surgeon: Javier Mcnulty MD;  [facility]     CYSTOSCOPY, BIOPSY BLADDER, COMBINED N/A 9/8/2015    Procedure: COMBINED CYSTOSCOPY, BIOPSY BLADDER;  Surgeon: Randy Martinez MD;  Location: [facility]     CYSTOSCOPY, BIOPSY BLADDER, COMBINED N/A 2/14/2017    Procedure: COMBINED CYSTOSCOPY, BIOPSY BLADDER;  Surgeon: Randy Martinez MD;  Location: [facility]     CYSTOSCOPY, BIOPSY BLADDER, COMBINED N/A 11/13/2018    Procedure: COMBINED CYSTOSCOPY, BIOPSY BLADDER;  Surgeon: Ed Helm MD;  Location: [facility] OR     CYSTOSCOPY, BIOPSY BLADDER, COMBINED N/A 11/5/2021    Procedure: CYSTOSCOPY, WITH BLADDER BIOPSY;  Surgeon: Shonda Morrell MD;  Location: [facility]     CYSTOSCOPY, RETROGRADES, COMBINED Bilateral 11/13/2018    Procedure: Bilateral Retrograde Pyelagram, Bladder Biopsy;  Surgeon: Ed Helm MD;  Location: [facility] OR     CYSTOSCOPY, RETROGRADES, COMBINED Right 11/5/2021    Procedure: CYSTOSCOPY, WITH RETROGRADE PYELOGRAM;  Surgeon: Shonda Morrell MD;  Location: [facility]     CYSTOSCOPY, RETROGRADES, INSERT STENT URETER(S), COMBINED Left 9/8/2015    Procedure: COMBINED CYSTOSCOPY, RETROGRADES, INSERT STENT URETER(S);  Surgeon: Randy Martinez MD;  [facility]     CYSTOSCOPY, TRANSURETHRAL RESECTION (TUR) TUMOR BLADDER, COMBINED N/A 9/8/2015    Procedure: COMBINED CYSTOSCOPY, TRANSURETHRAL  RESECTION (TUR) TUMOR BLADDER;  Surgeon: Randy Martinez MD;  [facility]     CYSTOSCOPY, TRANSURETHRAL RESECTION (TUR) TUMOR BLADDER, COMBINED N/A 1/12/2016    Procedure: COMBINED CYSTOSCOPY, TRANSURETHRAL RESECTION (TUR) TUMOR BLADDER;  Surgeon: Randy Martinez MD;  [facility]     CYSTOSCOPY, TRANSURETHRAL RESECTION (TUR) TUMOR BLADDER, COMBINED N/A 9/13/2016    Procedure: COMBINED CYSTOSCOPY, TRANSURETHRAL RESECTION (TUR) TUMOR BLADDER;  Surgeon: Randy Martinez MD;  [facility]     DAVINCI NEPHROURETERECTOMY Left 6/16/2021    Procedure: NEPHROURETERECTOMY, ROBOT-ASSISTED, lymph node dissection;  Surgeon: Javier Mcnulty MD;  [facility]     PHACOEMULSIFICATION WITH STANDARD INTRAOCULAR LENS IMPLANT Right 10/9/2018    Procedure: PHACOEMULSIFICATION WITH STANDARD INTRAOCULAR LENS IMPLANT;  PHACOEMULSIFICATION CATARACT EXTRACTION POSTERIOR CHAMBER LENS RIGHT;  Surgeon: Marlo Mcconnell MD;  Location: [facility]     PHACOEMULSIFICATION WITH STANDARD INTRAOCULAR LENS IMPLANT Left 10/23/2018    Procedure: PHACOEMULSIFICATION CATARACT EXTRACTION POSTERIOR CHAMBER LENS LEFT;  Surgeon: Marlo Mcconnell MD;  Location: [facility]         CURRENT MEDICATIONS:    Prior to Admission medications    Medication Sig Start Date End Date Taking? Authorizing Provider   atorvastatin (LIPITOR) 40 MG tablet TAKE 1 TABLET (40 MG) BY MOUTH DAILY 3/9/22   Libia Wallace MD   blood glucose (ONETOUCH VERIO IQ) test strip USE TO TEST BLOOD SUGAR 3 TIMES DAILY 3/24/22   Maryann Mccord APRN CNP   brimonidine (ALPHAGAN-P) 0.15 % ophthalmic solution INSTILL 1 DROP INTO RIGHT EYE TWICE DAILY 5/11/18   Reported, Patient   Cholecalciferol (VITAMIN D3) 25 MCG TABS TAKE 1 CAPSULE BY MOUTH ONE TIME A DAY. 1/7/22   Reported, Patient   Continuous Blood Gluc  (FREESTYLE DELILAH 2 READER) TENNILLE 1 each continuous 3/15/22   Maryann Mccord APRN CNP   Continuous Blood Gluc Sensor (FREESTYLE DELILAH 2  SENSOR) MISC 1 each every 14 days 3/15/22   Maryann Mccord APRN CNP   insulin aspart (NOVOLOG FLEXPEN) 100 UNIT/ML pen Inject 4 units prior to evening meal to start. Will titrate up as needed with correction. TDD 15 units 8/29/22   Basilia Daily APRN CNP   insulin degludec (TRESIBA FLEXTOUCH) 100 UNIT/ML pen Inject 20 units daily 8/29/22   Basilia Daily APRN CNP   insulin pen needle (BD PEN NEEDLE DONTAE 2ND GEN) 32G X 4 MM miscellaneous USE 3 PEN NEEDLES DAILY 3/25/22   Maryann Mccord APRN CNP   latanoprost (XALATAN) 0.005 % ophthalmic solution Place 1 drop into both eyes At Bedtime    Reported, Patient   levothyroxine (SYNTHROID/LEVOTHROID) 100 MCG tablet Take 1 tablet (100 mcg) by mouth daily 6/8/22   Sabra Jensen NP   melatonin 3 MG tablet Take 1 tablet (3 mg) by mouth nightly as needed for sleep 7/15/22   Libia Wallace MD   omeprazole (PRILOSEC) 20 MG DR capsule Take 1 capsule (20 mg) by mouth daily 6/24/22   Sabra Jensen NP   ondansetron (ZOFRAN ODT) 4 MG ODT tab Take 1 tablet (4 mg) by mouth every 6 hours as needed for nausea or vomiting 8/29/22   Luis Salvador MD   ONETOUCH DELICA LANCETS 33G MISC 1 each 2 times daily 8/8/19   Luis Tran MD   prednisoLONE acetate (PRED FORTE) 1 % ophthalmic suspension INSTILL 1 DROP INTO RIGHT EYE THREE TIMES A DAY 5/24/22   Reported, Patient   VITRON-C  MG TABS tablet TAKE 1 CAPSULE BY MOUTH ONE TIME A DAY. 1/7/22   Reported, Patient         ALLERGIES:  Allergies   Allergen Reactions     No Clinical Screening - See Comments Other (See Comments)     Beta blocker in glaucoma gtt.s caused low pulse and passing out      Timolol      Beta blocker in glaucoma gtt.s caused low pulse and passing out   - patient thinks it was timolol but not 100% sure which beta blocker eye drop.      Bactrim [Sulfamethoxazole W/Trimethoprim] Rash         FAMILY HISTORY:  Family History   Problem Relation Age of Onset      Diabetes Mother      Parkinsonism Brother      Coronary Artery Disease No family hx of      Hypertension No family hx of      Hyperlipidemia No family hx of      Cerebrovascular Disease No family hx of      Breast Cancer No family hx of      Colon Cancer No family hx of      Prostate Cancer No family hx of      Anesthesia Reaction No family hx of      Asthma No family hx of      Thyroid Disease No family hx of      Genetic Disorder No family hx of          SOCIAL HISTORY:  Social History     Socioeconomic History     Marital status:    Tobacco Use     Smoking status: Former Smoker     Quit date: 1992     Years since quittin.6     Smokeless tobacco: Never Used   Vaping Use     Vaping Use: Never used   Substance and Sexual Activity     Alcohol use: Yes     Comment: rarely     Drug use: No     Sexual activity: Not Currently   Other Topics Concern     Parent/sibling w/ CABG, MI or angioplasty before 65F 55M? No         VITALS:  Patient Vitals for the past 24 hrs:   BP Temp Temp src Pulse Resp SpO2 Weight   09/10/22 1930 -- -- -- 75 11 99 % --   09/10/22 1915 119/70 -- -- 75 9 98 % --   09/10/22 1845 -- -- -- 77 12 98 % --   09/10/22 1830 120/62 -- -- 76 14 92 % --   09/10/22 1726 117/68 97.6  F (36.4  C) Oral -- -- -- --   09/10/22 1630 115/68 -- -- -- -- 99 % --   09/10/22 1530 112/69 97.6  F (36.4  C) Temporal 93 12 96 % 80 kg (176 lb 5.9 oz)       Wt Readings from Last 3 Encounters:   09/10/22 80 kg (176 lb 5.9 oz)   22 78.1 kg (172 lb 2 oz)   22 78.1 kg (172 lb 2 oz)       Estimated Creatinine Clearance: 28.6 mL/min (A) (based on SCr of 2.25 mg/dL (H)).    PHYSICAL EXAM    Constitutional:  Well developed, Well nourished, NAD, GCS  15  HENT:  Normocephalic, Atraumatic, Bilateral external ears normal, Nose normal. Neck- Supple, No stridor.   Eyes:  PERRL, EOMI, Conjunctiva normal, No discharge.  Respiratory:  Normal breath sounds, No respiratory distress, No wheezing, Speaks full sentences  easily. No cough.  Cardiovascular:  Normal heart rate, Regular rhythm,  No rubs, No gallops.   GI:  No excessive obesity.  Bowel sounds normal, Soft, No tenderness, No masses, No flank tenderness. No rebound or guarding.   : deferred  Musculoskeletal: 2+ DP pulses. No cyanosis, No clubbing. Good range of motion in all major joints. No major deformities noted.   Integument:  Warm, Dry, No erythema, No rash.  No petechiae.   Neurologic:  Alert & oriented x 3, No focal deficits noted. Normal gait.  Psychiatric:  Affect normal, Cooperative         LAB:  All pertinent labs reviewed and interpreted.  Recent Results (from the past 24 hour(s))   Basic metabolic panel    Collection Time: 09/10/22  4:13 PM   Result Value Ref Range    Sodium 133 (L) 136 - 145 mmol/L    Potassium 3.6 3.5 - 5.0 mmol/L    Chloride 106 98 - 107 mmol/L    Carbon Dioxide (CO2) 15 (L) 22 - 31 mmol/L    Anion Gap 12 5 - 18 mmol/L    Urea Nitrogen 29 (H) 8 - 28 mg/dL    Creatinine 2.25 (H) 0.70 - 1.30 mg/dL    Calcium 7.5 (L) 8.5 - 10.5 mg/dL    Glucose 230 (H) 70 - 125 mg/dL    GFR Estimate 30 (L) >60 mL/min/1.73m2   Magnesium    Collection Time: 09/10/22  4:13 PM   Result Value Ref Range    Magnesium 1.8 1.8 - 2.6 mg/dL   CBC with platelets and differential    Collection Time: 09/10/22  4:13 PM   Result Value Ref Range    WBC Count 13.9 (H) 4.0 - 11.0 10e3/uL    RBC Count 4.63 4.40 - 5.90 10e6/uL    Hemoglobin 15.3 13.3 - 17.7 g/dL    Hematocrit 43.8 40.0 - 53.0 %    MCV 95 78 - 100 fL    MCH 33.0 26.5 - 33.0 pg    MCHC 34.9 31.5 - 36.5 g/dL    RDW 13.9 10.0 - 15.0 %    Platelet Count 357 150 - 450 10e3/uL    % Neutrophils 77 %    % Lymphocytes 9 %    % Monocytes 13 %    % Eosinophils 0 %    % Basophils 0 %    % Immature Granulocytes 1 %    NRBCs per 100 WBC 0 <1 /100    Absolute Neutrophils 10.9 (H) 1.6 - 8.3 10e3/uL    Absolute Lymphocytes 1.2 0.8 - 5.3 10e3/uL    Absolute Monocytes 1.8 (H) 0.0 - 1.3 10e3/uL    Absolute Eosinophils 0.0 0.0 - 0.7  10e3/uL    Absolute Basophils 0.1 0.0 - 0.2 10e3/uL    Absolute Immature Granulocytes 0.1 <=0.4 10e3/uL    Absolute NRBCs 0.0 10e3/uL   Extra Red Top Tube    Collection Time: 09/10/22  4:13 PM   Result Value Ref Range    Hold Specimen JIC    Extra Green Top (Lithium Heparin) Tube    Collection Time: 09/10/22  4:13 PM   Result Value Ref Range    Hold Specimen JIC    Extra Purple Top Tube    Collection Time: 09/10/22  4:13 PM   Result Value Ref Range    Hold Specimen JIC    Troponin I (now)    Collection Time: 09/10/22  4:13 PM   Result Value Ref Range    Troponin I 0.02 0.00 - 0.29 ng/mL   Hepatic function panel    Collection Time: 09/10/22  4:13 PM   Result Value Ref Range    Bilirubin Total 0.6 0.0 - 1.0 mg/dL    Bilirubin Direct 0.2 <=0.5 mg/dL    Protein Total 4.8 (L) 6.0 - 8.0 g/dL    Albumin 2.2 (L) 3.5 - 5.0 g/dL    Alkaline Phosphatase 69 45 - 120 U/L    AST 15 0 - 40 U/L    ALT <9 0 - 45 U/L   Lipase    Collection Time: 09/10/22  4:13 PM   Result Value Ref Range    Lipase 133 (H) 0 - 52 U/L       No results found for: ABORH        RADIOLOGY:  Reviewed all pertinent imaging. Please see official radiology report.    CT Abdomen Pelvis w Contrast   Final Result   IMPRESSION:    1.  No acute findings in the abdomen or pelvis.      2.  Subtle new 2.1 cm hypoattenuating area in the pancreatic body, without ductal dilatation, nonspecific. This may be inflammatory or sequela of prior pancreatitis, although underlying lesion not excluded. Consider correlation with contrast-enhanced    MRI.      Findings in impression #2 were communicated to Dr. Lexi Cárdenas over the telephone by Dr. Lin at 9/10/2022 6:37 PM.             EKG:    Indication: vomiting    Performed at: 18:13p  Impression: Sinus rhythm at 77 bpm.  Flipped T waves noted in lead aVR, aVL.  DC interval 188 ms,  ms, QTC 4 and 88 ms.  No prior EKGs to compare to.      I have independently reviewed and interpreted the EKG(s) documented  above.        PROCEDURES:  none      I, Lissa , am serving as a scribe to document services personally performed by Dr. Lexi Cárdenas based on my observation and the provider's statements to me. I, Dr. Lexi Cárdenas MD attest that Lissa Layton is acting in a scribe capacity, has observed my performance of the services and has documented them in accordance with my direction.        Lexi Cárdenas M.D. [facility]  Emergency Medicine and Medical Toxicology  [facility] EMERGENCY DEPARTMENT  [address] [place] [id number]  [phone]  Dept: [phone]           Lexi Cárdenas MD  09/10/22 1951

## 2022-09-11 NOTE — CONSULTS
"Care Management Initial Consult    General Information  Assessment completed with: Colin Herring  Type of CM/SW Visit: Initial Assessment    Primary Care Provider verified and updated as needed: Yes   Readmission within the last 30 days: no previous admission in last 30 days      Reason for Consult: discharge planning  Advance Care Planning: Advance Care Planning Reviewed: present on chart          Communication Assessment  Patient's communication style: spoken language (English or Bilingual)             Cognitive  Cognitive/Neuro/Behavioral:                        Living Environment:   People in home: alone     Current living Arrangements: house (\"Lives in [place], [place]. After leaving the hospital he plans on staying with his daughter who lives here in the [facility]\".)      Able to return to prior arrangements: yes       Family/Social Support:  Care provided by: self  Provides care for: no one  Marital Status:   Children          Description of Support System: Supportive, Involved    Support Assessment: Adequate family and caregiver support, Adequate social supports, Patient communicates needs well met    Current Resources:   Patient receiving home care services: No     Community Resources: None  Equipment currently used at home: none  Supplies currently used at home: Other (\"glasses\")    Employment/Financial:  Employment Status: retired     Employment/ Comments: \"no  benefits\"  Financial Concerns:     Referral to Financial Worker: No       Lifestyle & Psychosocial Needs:  Social Determinants of Health     Tobacco Use: Medium Risk     Smoking Tobacco Use: Former Smoker     Smokeless Tobacco Use: Never Used   Alcohol Use: Not on file   Financial Resource Strain: Not on file   Food Insecurity: Not on file   Transportation Needs: Not on file   Physical Activity: Not on file   Stress: Not on file   Social Connections: Not on file   Intimate Partner Violence: Not on file   Depression: Not at risk     " PHQ-2 Score: 0   Housing Stability: Not on file       Functional Status:  Prior to admission patient needed assistance:   Dependent ADLs:: Independent, Ambulation-no assistive device  Dependent IADLs:: Independent       Mental Health Status:          Chemical Dependency Status:                Values/Beliefs:  Spiritual, Cultural Beliefs, Rastafari Practices, Values that affect care:                 Additional Information:  Colin lives in a house in [place], [place] alone. At discharge he plans to stay with his daughter who lives here in the [facility].    Likely no discharge needs at this time.    Daughter to transport at discharge.    Ivone Salazar RN

## 2022-09-11 NOTE — ED NOTES
[facility] ED Handoff Report    ED Chief Complaint: Diarrhea    ED Diagnosis:  (R19.7) Diarrhea, unspecified type  Comment:   Plan:     (E86.0) Dehydration  Comment:   Plan:     (R63.8) Decreased oral intake  Comment:   Plan:     (K86.89) Pancreatic mass  Comment:   Plan:        PMH:    Past Medical History:   Diagnosis Date     Benign essential hypertension      Bladder cancer (H)      Carcinoma in situ of bladder 11/19/2021     Diabetes mellitus, type 2 (H)      Dyslipidemia      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) 12/11/2020    getting BCG (from his Urologist in [place], [place])     Renal cell carcinoma, left (H) 2021    Left nephrectomy     Ureter cancer, left (H)         Code Status:  Full Code     Falls Risk: Yes Band: Applied    Current Living Situation/Residence: lives alone     Elimination Status: Continent: Yes     Activity Level: SBA w/ walker    Patients Preferred Language:  English     Needed: No    Vital Signs:  /60   Pulse 83   Temp 97.7  F (36.5  C) (Oral)   Resp 18   Wt 80 kg (176 lb 5.9 oz)   SpO2 97%   BMI 27.83 kg/m       Cardiac Rhythm: SR    Pain Score: 0/10    Is the Patient Confused:  No    Last Food or Drink: 09/11/22 at noon    Focused Assessment:  Pt is a 76 yo male who [place] [address]. Pt was cleaning septic tank, when it exploded on him. Per pt having D/V and weakness since. Pt alert and oriented x 4. Negative c.diff and COVID. NS bolus 77 cc/hr.     Tests Performed: Done: Labs and Imaging    Treatments Provided:  See charting    Family Dynamics/Concerns: No    Family Updated On Visitor Policy: Yes    Plan of Care Communicated to Family: Yes    Who Was Updated about Plan of Care: DTR    Belongings Checklist Done and Signed by Patient: No    Medications sent with patient: novolog and eyedrops    Covid: symptomatic, negative    Additional Information: call DTR Jessika if possible,d/t her living in [facility] other DTR lives in [facility]  [place]    RN: Leydi Leger RN   9/11/2022 3:34 PM

## 2022-09-11 NOTE — CONSULTS
[facility]    Colin Baxter   [address]  [facility] [address]  77 year old male  Admission Date/Time: 9/10/2022  4:53 PM    Primary Care Provider:  Libia Wallace    Requesting Physician: Annabelle Kern MD      CHIEF COMPLAINT:   diarrhea    REASON FOR THE CONSULT:  Diarrhea, abnormal CT    HPI:     This is a nice 78 yo man who presents for evaluation of diarrhea    He claims this came on suddenly and has not improved.  It really doesn't matter what he eats or what he does.  He still has diarrhea.  He is very tired and worn out  He claims multiple stools per day., 4-5 loose, watery.  No blood noted.  He feels very run down  He is not certain of anything that he did to start the symptoms.  He does not remember abd pain as a part of this.    He has not noticed more GERD symptoms, cough, chest pain.  He denies nausea or vomiting.  No black or bloody stools  No fevers or chills  No one else has been ill around him.  He has not taken any NSAIDs or recent antibiotics      He does have hx of bladder cancer. Diabetes  He has not had any recent BCG infusions.     He has not noticed any new rashes, flushing, fevers  Chills, urinary symptoms     Outside records, Colon with bx 2011    REVIEW OF SYSTEMS: 13 point review of systems is negative except that noted above.    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     Diabetes mellitus, type 2 (H)      Dyslipidemia      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) 12/11/2020    getting BCG (from his Urologist in [place], [place])     Renal cell carcinoma, left (H) 2021    Left nephrectomy     Ureter cancer, left (H)        PAST SURGICAL HISTORY:   Past Surgical History:   Procedure Laterality Date     APPENDECTOMY  1958     COLONOSCOPY  2-     COMBINED CYSTOSCOPY, RETROGRADES, URETEROSCOPY, INSERT STENT Left  1/18/2021    Procedure: CYSTOURETEROSCOPY, WITH RETROGRADE PYELOGRAM with interpretation of fluroscopy, ureteroscopy, ureteral biopsy, bladder biopsy and fulgaration;  Surgeon: Shonda Morrell MD;  Location[facility]     CYSTOSCOPY N/A 6/16/2021    Procedure: CYSTOSCOPY;  Surgeon: Javier Mcnulty MD;  [facility]     CYSTOSCOPY, BIOPSY BLADDER, COMBINED N/A 9/8/2015    Procedure: COMBINED CYSTOSCOPY, BIOPSY BLADDER;  Surgeon: Randy Martinez MD;  Location: [facility]     CYSTOSCOPY, BIOPSY BLADDER, COMBINED N/A 2/14/2017    Procedure: COMBINED CYSTOSCOPY, BIOPSY BLADDER;  Surgeon: Randy Martinez MD;  Location: [facility]     CYSTOSCOPY, BIOPSY BLADDER, COMBINED N/A 11/13/2018    Procedure: COMBINED CYSTOSCOPY, BIOPSY BLADDER;  Surgeon: Ed Helm MD;  Location: [facility]     CYSTOSCOPY, BIOPSY BLADDER, COMBINED N/A 11/5/2021    Procedure: CYSTOSCOPY, WITH BLADDER BIOPSY;  Surgeon: Shonda Morrell MD;  Location[facility]     CYSTOSCOPY, RETROGRADES, COMBINED Bilateral 11/13/2018    Procedure: Bilateral Retrograde Pyelagram, Bladder Biopsy;  Surgeon: Ed Helm MD;  Location: [facility]     CYSTOSCOPY, RETROGRADES, COMBINED Right 11/5/2021    Procedure: CYSTOSCOPY, WITH RETROGRADE PYELOGRAM;  Surgeon: Shonda Morrell MD;  [facility]     CYSTOSCOPY, RETROGRADES, INSERT STENT URETER(S), COMBINED Left 9/8/2015    Procedure: COMBINED CYSTOSCOPY, RETROGRADES, INSERT STENT URETER(S);  Surgeon: Randy Martinez MD;  [facility]     CYSTOSCOPY, TRANSURETHRAL RESECTION (TUR) TUMOR BLADDER, COMBINED N/A 9/8/2015    Procedure: COMBINED CYSTOSCOPY, TRANSURETHRAL RESECTION (TUR) TUMOR BLADDER;  Surgeon: Randy Martinez MD;  [facility]     CYSTOSCOPY, TRANSURETHRAL RESECTION (TUR) TUMOR BLADDER, COMBINED N/A 1/12/2016    Procedure: COMBINED CYSTOSCOPY, TRANSURETHRAL RESECTION (TUR) TUMOR BLADDER;  Surgeon: Randy Martinez MD;  [facility]     CYSTOSCOPY, TRANSURETHRAL  RESECTION (TUR) TUMOR BLADDER, COMBINED N/A 2016    Procedure: COMBINED CYSTOSCOPY, TRANSURETHRAL RESECTION (TUR) TUMOR BLADDER;  Surgeon: Randy Martinez MD;  [facility]     DAVINCI NEPHROURETERECTOMY Left 2021    Procedure: NEPHROURETERECTOMY, ROBOT-ASSISTED, lymph node dissection;  Surgeon: Javier Mcnulty MD;  [facility]     PHACOEMULSIFICATION WITH STANDARD INTRAOCULAR LENS IMPLANT Right 10/9/2018    Procedure: PHACOEMULSIFICATION WITH STANDARD INTRAOCULAR LENS IMPLANT;  PHACOEMULSIFICATION CATARACT EXTRACTION POSTERIOR CHAMBER LENS RIGHT;  Surgeon: Marlo Mcconnell MD;  [facility]     PHACOEMULSIFICATION WITH STANDARD INTRAOCULAR LENS IMPLANT Left 10/23/2018    Procedure: PHACOEMULSIFICATION CATARACT EXTRACTION POSTERIOR CHAMBER LENS LEFT;  Surgeon: Marlo Mcconnell MD;  Location: [facility]       FAMILY HISTORY:   Family History   Problem Relation Age of Onset     Diabetes Mother      Other - See Comments Father         cause of death unknown     Parkinsonism Brother      Coronary Artery Disease No family hx of      Hypertension No family hx of      Hyperlipidemia No family hx of      Cerebrovascular Disease No family hx of      Breast Cancer No family hx of      Colon Cancer No family hx of      Prostate Cancer No family hx of      Anesthesia Reaction No family hx of      Asthma No family hx of      Thyroid Disease No family hx of      Genetic Disorder No family hx of        SOCIAL HISTORY:   Social History     Tobacco Use     Smoking status: Former Smoker     Quit date: 1992     Years since quittin.7     Smokeless tobacco: Never Used   Substance Use Topics     Alcohol use: Yes     Comment: < 1 drink a month        MEDS:  (Not in a hospital admission)      ALLERGIES/SENSITIVITIES: No clinical screening - see comments, Timolol, and Bactrim [sulfamethoxazole w/trimethoprim]    MEDICATIONS:  Current Outpatient Medications   Medication Sig Dispense Refill      atorvastatin (LIPITOR) 40 MG tablet TAKE 1 TABLET (40 MG) BY MOUTH DAILY 90 tablet 3     brimonidine (ALPHAGAN-P) 0.15 % ophthalmic solution INSTILL 1 DROP INTO RIGHT EYE TWICE DAILY  12     Cholecalciferol (VITAMIN D3) 25 MCG TABS Take 25 mcg by mouth daily       insulin aspart (NOVOLOG FLEXPEN) 100 UNIT/ML pen Inject 4 units prior to evening meal to start. Will titrate up as needed with correction. TDD 15 units 15 mL 3     insulin degludec (TRESIBA FLEXTOUCH) 100 UNIT/ML pen Inject 20 units daily 15 mL 3     latanoprost (XALATAN) 0.005 % ophthalmic solution Place 1 drop into both eyes At Bedtime       levothyroxine (SYNTHROID/LEVOTHROID) 100 MCG tablet Take 1 tablet (100 mcg) by mouth daily 90 tablet 0     melatonin 3 MG tablet Take 1 tablet (3 mg) by mouth nightly as needed for sleep 90 tablet 1     omeprazole (PRILOSEC) 20 MG DR capsule Take 1 capsule (20 mg) by mouth daily 60 capsule 0     ondansetron (ZOFRAN ODT) 4 MG ODT tab Take 1 tablet (4 mg) by mouth every 6 hours as needed for nausea or vomiting 20 tablet 0     VITRON-C  MG TABS tablet Take 1 tablet by mouth daily       blood glucose (ONETOUCH VERIO IQ) test strip USE TO TEST BLOOD SUGAR 3 TIMES DAILY 100 strip 11     Continuous Blood Gluc  (FREESTYLE DELILAH 2 READER) TENNILLE 1 each continuous 1 each 0     Continuous Blood Gluc Sensor (FREESTYLE DELILAH 2 SENSOR) MISC 1 each every 14 days 2 each 11     insulin pen needle (BD PEN NEEDLE DONTAE 2ND GEN) 32G X 4 MM miscellaneous USE 3 PEN NEEDLES DAILY 100 each 11     ONETOUCH DELICA LANCETS 33G MISC 1 each 2 times daily 100 each 10       PHYSICAL EXAM:  Temp:  [97.5  F (36.4  C)-97.7  F (36.5  C)] 97.7  F (36.5  C)  Pulse:  [71-93] 78  Resp:  [9-18] 17  BP: ()/(54-70) 104/63  SpO2:  [89 %-99 %] 97 %  Body mass index is 27.83 kg/m .  GEN: Well developed, well nourished 77 year old in no acute distress.  HEENT: sclera anicteric, moist mucous membranes.   LYMPH: No cervical lymphadenopathy  PULM:  Nonlabored breathing. Breath sounds equal.   CARDIO: Regular rate  GI: Non-distended. Soft.  Non-tender to palpation.  No guarding. No rebound tenderness.  EXT: warm, no lower extremity edema  NEURO: Alert. No focal defects.   PSYCH: Mental status appropriate, mood and affect normal.    SKIN: No rashes  MSK: No joint abnormalities    LABORATORY DATA:  CBC RESULTS:   Recent Labs   Lab Test 09/11/22  0707   WBC 14.6*   RBC 4.25*   HGB 13.8   HCT 40.7   MCV 96   MCH 32.5   MCHC 33.9   RDW 13.8           CMP Results:   Recent Labs   Lab Test 09/11/22  0736 09/11/22  0707 09/10/22  2234 09/10/22  1613   NA  --  132*  --  133*   POTASSIUM  --  3.5  --  3.6   CHLORIDE  --  109*  --  106   CO2  --  16*  --  15*   ANIONGAP  --  7  --  12   * 178*   < > 230*   BUN  --  25  --  29*   CR  --  1.95*  --  2.25*   BILITOTAL  --   --   --  0.6   ALKPHOS  --   --   --  69   ALT  --   --   --  <9   AST  --   --   --  15    < > = values in this interval not displayed.      LFTS normal now.  Lipase was elevated 8/22  INR Results: No results for input(s): INR in the last 83499 hours.     TSH mildly elevated    RELEVANT IMAGING:      CT  New subtle pancreas mass    ASSESSMENT:     1.  Diarrhea  The etiology remains unclear.  Looks like stool samples normal to date  ? Diabetes. Thyroid.  Pancreas mass?  ? Pancreatic pseudocyst given recent elevation in lipase  Less likely but consider neuroendocrine tumor  Met from uro tumor    PLAN:    Normally would plan EUS sever weeks, but may consider earlier MRCP  For now, supportive cares  GI service will follow with you    Approximately 42 minutes of total time was spent providing patient care, including patient evaluation, reviewing documentation/test results and .           Panchito Ferreira, DO  Thank you for the opportunity to participate in the care of this patient.   Please feel free to call me with any questions or concerns.  Phone number [phone].            CC:  [facility], Libia Wallace

## 2022-09-11 NOTE — PROGRESS NOTES
[facility]    Medicine Progress Note - Hospitalist Service       Date of Admission:  9/10/2022    Assessment & Plan            Colin Baxter is a 77 year old male admitted on 9/10/2022. He has history of bladder cancer, diabetes, CKD 4, hyperlipidemia presented for evaluation of progressive weakness in the setting of 2 weeks of diarrhea. Hospital Day: 2     #Prolonged diarrhea  2 weeks of loose stools  Onset after he was working on his septic tank.  He was on a trip to [place] when symptoms began but was not camping etc.  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  Denies family history of IBD. Had colonoscopy 7-8 years ago, normal per patient.  Presented with dehydration, metabolic acidosis, YOU.  Has had ongoing loose stools here perhaps 3 since admission  C. difficile negative.  Stool ova and parasite, multiplex PCR pending  Supportive care.  GI did evaluate, appreciate assistance.  Uncertain if could be related to pancreatic process, see next problem.  Possible malabsorption?  Recent elevated TSH with normal free T4, can recheck.    #Pancreatic mass  2.1 cm hypoattenuating lesion seen in the pancreatic body, sequela of prior pancreatitis versus mass lesion. Ductal dilation not seen.  GI recommending endoscopic ultrasound versus MRCP, final decision not yet made    #YOU on CKD 4  Single kidney (left nephrectomy for cancer)  Baseline Cr 1.5-2, here presented 2.25-->1.95, improving with IVF.  Likely prerenal from diarrhea.  Continue IVF and recheck Cr in AM    #Hyponatremia  Likely hypovolemic  IVF and recheck in AM    #Metabolic acidosis  Likely from diarrhea  Improving/. Trend    #Hypocalcemia  Mild, corrects to 8.4 with low albumin  Hydration and support PO intake    #Urothelial cancer  Had bladder cancer  treated in the past then found to have 2cm tumor on his left ureter, did neoadjuvant chemo with gemcitabine and cisplatin followed by left nephroureterectomy in 2021. Now he is on 1 year course of Nivolumab as adjuvant treatment, has completed 8 of 9 cycles. Has had a lot of side effects from treatment including immune mediated colitis, Shingles and multiple other rashes and so final cycle has been delayed. Recently completed a long steroid taper for the colitis.    #Moderate malnutrition  Related to GI illness, poor PO, diarrhea. Cancer treatment could be playing a role too    #DM  Home regimen is Tresiba 20 units daily, NovoLog 4 units with meals.  Has not taken his Tresiba since 9/9.  Resume home Tresiba at lower dose 5 units  Continue NovoLog sliding scale    #Hypothyroidism  Recent TSH was elevated at 12.  Did not sound like a Synthroid dose adjustment was made at that time.  We can recheck TSH given diarrhea issues.    #GERD, home PPI  #Hyperlipidemia, home statin       Diet: Combination Diet Regular Diet; Moderate Consistent Carb (60 g CHO per Meal) Diet    DVT Prophylaxis: Moderate risk. ambulation   Santo Catheter: Not present  Central Lines: None  Code Status: Full Code        Disposition Plan   Disposition: Home     Expected Discharge Date: 09/12/2022        Discharge Comments: diarrhea, IVF, results     Medically ready to discharge today: No     The patient's care was discussed with the Patient.    Annabelle Kern MD  Hospitalist Service  [facility]  Securely message with the Vocera Web Console (learn more here)  Text page via [facility] Paging/Directory         Clinically Significant Risk Factors Present on Admission         # Hyponatremia: Na = 132 mmol/L (Ref range: 136 - 145 mmol/L) on admission, will monitor as appropriate  # Hypocalcemia: corrected calcium <8.5 on admission, will replace as needed    # Hypoalbuminemia: Albumin = 2.2 g/dL (Ref range: 3.5 - 5.0 g/dL) on admission,  "will monitor as appropriate        # Overweight: Estimated body mass index is 27.83 kg/m  as calculated from the following:    Height as of 8/26/22: 1.695 m (5' 6.75\").    Weight as of this encounter: 80 kg (176 lb 5.9 oz).        ____________        Physical Exam   Vital Signs: Temp: 97.7  F (36.5  C) Temp src: Oral BP: 117/72 Pulse: 85   Resp: 19 SpO2: 96 % O2 Device: None (Room air)    Weight: 176 lbs 5.89 oz  General: in no apparent distress, non-toxic and alert male lying in hospital bed oriented x3  HEENT: Head normocephalic atraumatic, oral mucosa moist. Sclerae anicteric  CV: Regular rhythm, normal rate, no murmurs  Resp: No wheezes, no rales or rhonchi, no focal consolidations  GI: Belly soft, nondistended, nontender, bowel sounds present  Skin: No rashes or lesions  Extremities: Trace ankle edema bilaterally  Psych: Normal affect, anxious mood  Neuro: CNII-XII grossly intact, moving all 4 extremities      Data   Recent Results (from the past 24 hour(s))   Basic metabolic panel    Collection Time: 09/10/22  4:13 PM   Result Value Ref Range    Sodium 133 (L) 136 - 145 mmol/L    Potassium 3.6 3.5 - 5.0 mmol/L    Chloride 106 98 - 107 mmol/L    Carbon Dioxide (CO2) 15 (L) 22 - 31 mmol/L    Anion Gap 12 5 - 18 mmol/L    Urea Nitrogen 29 (H) 8 - 28 mg/dL    Creatinine 2.25 (H) 0.70 - 1.30 mg/dL    Calcium 7.5 (L) 8.5 - 10.5 mg/dL    Glucose 230 (H) 70 - 125 mg/dL    GFR Estimate 30 (L) >60 mL/min/1.73m2   Magnesium    Collection Time: 09/10/22  4:13 PM   Result Value Ref Range    Magnesium 1.8 1.8 - 2.6 mg/dL   CBC with platelets and differential    Collection Time: 09/10/22  4:13 PM   Result Value Ref Range    WBC Count 13.9 (H) 4.0 - 11.0 10e3/uL    RBC Count 4.63 4.40 - 5.90 10e6/uL    Hemoglobin 15.3 13.3 - 17.7 g/dL    Hematocrit 43.8 40.0 - 53.0 %    MCV 95 78 - 100 fL    MCH 33.0 26.5 - 33.0 pg    MCHC 34.9 31.5 - 36.5 g/dL    RDW 13.9 10.0 - 15.0 %    Platelet Count 357 150 - 450 10e3/uL    % Neutrophils " 77 %    % Lymphocytes 9 %    % Monocytes 13 %    % Eosinophils 0 %    % Basophils 0 %    % Immature Granulocytes 1 %    NRBCs per 100 WBC 0 <1 /100    Absolute Neutrophils 10.9 (H) 1.6 - 8.3 10e3/uL    Absolute Lymphocytes 1.2 0.8 - 5.3 10e3/uL    Absolute Monocytes 1.8 (H) 0.0 - 1.3 10e3/uL    Absolute Eosinophils 0.0 0.0 - 0.7 10e3/uL    Absolute Basophils 0.1 0.0 - 0.2 10e3/uL    Absolute Immature Granulocytes 0.1 <=0.4 10e3/uL    Absolute NRBCs 0.0 10e3/uL   Extra Red Top Tube    Collection Time: 09/10/22  4:13 PM   Result Value Ref Range    Hold Specimen JIC    Extra Green Top (Lithium Heparin) Tube    Collection Time: 09/10/22  4:13 PM   Result Value Ref Range    Hold Specimen JIC    Extra Purple Top Tube    Collection Time: 09/10/22  4:13 PM   Result Value Ref Range    Hold Specimen JIC    Troponin I (now)    Collection Time: 09/10/22  4:13 PM   Result Value Ref Range    Troponin I 0.02 0.00 - 0.29 ng/mL   Hepatic function panel    Collection Time: 09/10/22  4:13 PM   Result Value Ref Range    Bilirubin Total 0.6 0.0 - 1.0 mg/dL    Bilirubin Direct 0.2 <=0.5 mg/dL    Protein Total 4.8 (L) 6.0 - 8.0 g/dL    Albumin 2.2 (L) 3.5 - 5.0 g/dL    Alkaline Phosphatase 69 45 - 120 U/L    AST 15 0 - 40 U/L    ALT <9 0 - 45 U/L   Lipase    Collection Time: 09/10/22  4:13 PM   Result Value Ref Range    Lipase 133 (H) 0 - 52 U/L   Enteric Bacteria and Virus Panel by KATYA Stool    Collection Time: 09/10/22  7:05 PM    Specimen: Per Rectum; Stool   Result Value Ref Range    Campylobacter group Not Detected Not Detected    Salmonella species Not Detected Not Detected    Shigella species Not Detected Not Detected    Vibrio group Not Detected Not Detected    Rotavirus Not Detected Not Detected    Shiga toxin 1 gene Not Detected Not Detected    Shiga toxin 2 gene Not Detected Not Detected    Norovirus I and II Not Detected Not Detected    Yersinia enterocolitica Not Detected Not Detected   Clostridium difficile toxin B PCR     Collection Time: 09/10/22  7:05 PM    Specimen: Per Rectum; Stool   Result Value Ref Range    C Difficile Toxin B by PCR Negative Negative   Asymptomatic COVID-19 Virus (Coronavirus) by PCR Nasopharyngeal    Collection Time: 09/10/22  7:59 PM    Specimen: Nasopharyngeal; Swab   Result Value Ref Range    SARS CoV2 PCR Negative Negative   Glucose by meter    Collection Time: 09/10/22 10:34 PM   Result Value Ref Range    GLUCOSE BY METER POCT 220 (H) 70 - 99 mg/dL   CBC with platelets    Collection Time: 09/11/22  7:07 AM   Result Value Ref Range    WBC Count 14.6 (H) 4.0 - 11.0 10e3/uL    RBC Count 4.25 (L) 4.40 - 5.90 10e6/uL    Hemoglobin 13.8 13.3 - 17.7 g/dL    Hematocrit 40.7 40.0 - 53.0 %    MCV 96 78 - 100 fL    MCH 32.5 26.5 - 33.0 pg    MCHC 33.9 31.5 - 36.5 g/dL    RDW 13.8 10.0 - 15.0 %    Platelet Count 333 150 - 450 10e3/uL   Basic metabolic panel    Collection Time: 09/11/22  7:07 AM   Result Value Ref Range    Sodium 132 (L) 136 - 145 mmol/L    Potassium 3.5 3.5 - 5.0 mmol/L    Chloride 109 (H) 98 - 107 mmol/L    Carbon Dioxide (CO2) 16 (L) 22 - 31 mmol/L    Anion Gap 7 5 - 18 mmol/L    Urea Nitrogen 25 8 - 28 mg/dL    Creatinine 1.95 (H) 0.70 - 1.30 mg/dL    Calcium 7.0 (L) 8.5 - 10.5 mg/dL    Glucose 178 (H) 70 - 125 mg/dL    GFR Estimate 35 (L) >60 mL/min/1.73m2   Glucose by meter    Collection Time: 09/11/22  7:36 AM   Result Value Ref Range    GLUCOSE BY METER POCT 176 (H) 70 - 99 mg/dL   Glucose by meter    Collection Time: 09/11/22 12:00 PM   Result Value Ref Range    GLUCOSE BY METER POCT 155 (H) 70 - 99 mg/dL     ____________  Interval History   Data reviewed today: I reviewed all medications, new labs and imaging results over the last 24 hours. I personally reviewed no images or EKG's today.  patient has had 3 loose stools since arrival. labs improved slightly. c diff negative, other stool tests pending. GI consulted for evaluation. not ready for discharge. IVF ordered but not running, RN to  please run this.

## 2022-09-11 NOTE — H&P
[facility]    History and Physical  Hospitalist       Date of Admission:  9/10/2022    Assessment & Plan   77 year old male with past medical hx of DM type 2, who presents with diarrhea for 2 weeks:    Summary:    Principal Problem:    Diarrhea -- for 2 weeks, cause not identified   -- stool for pathogens and C Diff, clear liquid diet to start with   -- consider GI consult for flex-sig if no cause identified       Incidental 2.1 cm pancreatic lesion -- ?signficance   -- will consult [facility], any need for EUS? Or get MRI?     Active Problems:    Bladder cancer    -- is still getting BCG      Type 2 diabetes mellitus -- Hgb A1C 8.6 on 8/26/22   -- corrective dose insulin for now       Hyperlipidemia      CRF (chronic renal failure), stage 4 -- stable at 2.25 (was 2.15 on 7/7/22)   -- check BMP in AM        Plan:as above, discussed with patient.     DVT Prophylaxis: Pneumatic Compression Devices  Code Status: Full Code    Disposition: Expected discharge in 2-3 days    Roque Fajardo MD  Pager: [phone]  Cell Phone:  [phone]     Primary Care Physician   Libia Wallace    Chief Complaint   diarrhea    History is obtained from Patient    History of Present Illness   77 year old male with history of DM2, ureter/bladder/kidney cancer, HLD, and CKD who presents to the ER via walk-in with complaints of diarrhea and nausea.     Per chart review, patient was seen [facility] on 8/29/22 for the evaluation of nausea, vomiting, and diarrhea. Patient states he was having loose stools that associated with his ongoing chemotherapy for urologic malignancy. During his visit, he was treated with IV fluids and Zofran. Patient felt improved and was discharged with Zofran and doses of liquids to stay hydrated. He was advised to follow up with his PCP.      Patient and his daughter went to [place] and he was seen in the ER for the same symptoms. He was given fluids and felt  "better however the daughter states \"he continues to declined\" and \"never really improved.\" [place] the [place] yesterday.       He says he changed the pump in his septic system 2 days before all this started.  He currently is having 4-5 watery stools a day, no associated blood. No abdominal pain.  He thinks he may have lost weight over the last 2 weeks -- but he is not sure.    In ER, AVSS, Creat 2.25, Sodium 1.33, WBC 13.8, Hgb 15.3. abd CT shows a subtle new 2.1 cm hypoattenuating area in the pancreatic body, without ductal dilatation, nonspecific. This may be inflammatory or sequela of prior pancreatitis, although underlying lesion not excluded. Consider correlation with contrast-enhanced MRI.    PAST MEDICAL HISTORY    Past Medical History:   Diagnosis Date     Benign essential hypertension      Bladder cancer (H)      Carcinoma in situ of bladder 11/19/2021     Diabetes mellitus, type 2 (H)      Dyslipidemia      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) 12/11/2020    getting BCG (from his Urologist in [place], [place])     Renal cell carcinoma, left (H) 2021    Left nephrectomy     Ureter cancer, left (H)         PAST SURGICAL HISTORY    Past Surgical History:   Procedure Laterality Date     APPENDECTOMY  1958     COLONOSCOPY  2-     COMBINED CYSTOSCOPY, RETROGRADES, URETEROSCOPY, INSERT STENT Left 1/18/2021    Procedure: CYSTOURETEROSCOPY, WITH RETROGRADE PYELOGRAM with interpretation of fluroscopy, ureteroscopy, ureteral biopsy, bladder biopsy and fulgaration;  Surgeon: Shonda Morrell MD;  [facility]     CYSTOSCOPY N/A 6/16/2021    Procedure: CYSTOSCOPY;  Surgeon: Javier Mcnulty MD;  [facility]     CYSTOSCOPY, BIOPSY BLADDER, COMBINED N/A 9/8/2015    Procedure: COMBINED CYSTOSCOPY, BIOPSY BLADDER;  Surgeon: Randy Martinez MD;  [facility]     CYSTOSCOPY, BIOPSY BLADDER, COMBINED N/A 2/14/2017    Procedure: COMBINED CYSTOSCOPY, BIOPSY BLADDER;  " Surgeon: Randy Martinez MD;  [facility]     CYSTOSCOPY, BIOPSY BLADDER, COMBINED N/A 11/13/2018    Procedure: COMBINED CYSTOSCOPY, BIOPSY BLADDER;  Surgeon: Ed Helm MD;  Location: [facility]     CYSTOSCOPY, BIOPSY BLADDER, COMBINED N/A 11/5/2021    Procedure: CYSTOSCOPY, WITH BLADDER BIOPSY;  Surgeon: Shonda Morrell MD;  [facility]     CYSTOSCOPY, RETROGRADES, COMBINED Bilateral 11/13/2018    Procedure: Bilateral Retrograde Pyelagram, Bladder Biopsy;  Surgeon: Ed Helm MD;  Location: [facility]     CYSTOSCOPY, RETROGRADES, COMBINED Right 11/5/2021    Procedure: CYSTOSCOPY, WITH RETROGRADE PYELOGRAM;  Surgeon: Shonda Morrell MD;  [facility]     CYSTOSCOPY, RETROGRADES, INSERT STENT URETER(S), COMBINED Left 9/8/2015    Procedure: COMBINED CYSTOSCOPY, RETROGRADES, INSERT STENT URETER(S);  Surgeon: Randy Martinez MD;  [facility]     CYSTOSCOPY, TRANSURETHRAL RESECTION (TUR) TUMOR BLADDER, COMBINED N/A 9/8/2015    Procedure: COMBINED CYSTOSCOPY, TRANSURETHRAL RESECTION (TUR) TUMOR BLADDER;  Surgeon: Randy Martinez MD;  [facility]     CYSTOSCOPY, TRANSURETHRAL RESECTION (TUR) TUMOR BLADDER, COMBINED N/A 1/12/2016    Procedure: COMBINED CYSTOSCOPY, TRANSURETHRAL RESECTION (TUR) TUMOR BLADDER;  Surgeon: Randy Martinez MD;  [facility]     CYSTOSCOPY, TRANSURETHRAL RESECTION (TUR) TUMOR BLADDER, COMBINED N/A 9/13/2016    Procedure: COMBINED CYSTOSCOPY, TRANSURETHRAL RESECTION (TUR) TUMOR BLADDER;  Surgeon: Randy Martinez MD;  [facility]     DAVINCI NEPHROURETERECTOMY Left 6/16/2021    Procedure: NEPHROURETERECTOMY, ROBOT-ASSISTED, lymph node dissection;  Surgeon: Javier Mcnulty MD;  [facility]     PHACOEMULSIFICATION WITH STANDARD INTRAOCULAR LENS IMPLANT Right 10/9/2018    Procedure: PHACOEMULSIFICATION WITH STANDARD INTRAOCULAR LENS IMPLANT;  PHACOEMULSIFICATION CATARACT EXTRACTION POSTERIOR CHAMBER LENS RIGHT;   Surgeon: Marlo Mcconnell MD;  [facility]     PHACOEMULSIFICATION WITH STANDARD INTRAOCULAR LENS IMPLANT Left 10/23/2018    Procedure: PHACOEMULSIFICATION CATARACT EXTRACTION POSTERIOR CHAMBER LENS LEFT;  Surgeon: Marlo Mcconnell MD;  Location: [facility]        Prior to Admission Medications   Prior to Admission Medications   Prescriptions Last Dose Informant Patient Reported? Taking?   Cholecalciferol (VITAMIN D3) 25 MCG TABS Past Week at Unknown time Self Yes Yes   Sig: Take 25 mcg by mouth daily   Continuous Blood Gluc  (FREESTYLE DELILAH 2 READER) TENNILLE   No No   Si each continuous   Continuous Blood Gluc Sensor (FREESTYLE DELILAH 2 SENSOR) MISC   No No   Si each every 14 days   ONETOUCH DELICA LANCETS 33G MISC  Self No No   Si each 2 times daily   VITRON-C  MG TABS tablet Past Week at Unknown time Self Yes Yes   Sig: Take 1 tablet by mouth daily   atorvastatin (LIPITOR) 40 MG tablet Past Week at Unknown time Self No Yes   Sig: TAKE 1 TABLET (40 MG) BY MOUTH DAILY   blood glucose (ONETOUCH VERIO IQ) test strip   No No   Sig: USE TO TEST BLOOD SUGAR 3 TIMES DAILY   brimonidine (ALPHAGAN-P) 0.15 % ophthalmic solution Past Week at Unknown time Self Yes Yes   Sig: INSTILL 1 DROP INTO RIGHT EYE TWICE DAILY   insulin aspart (NOVOLOG FLEXPEN) 100 UNIT/ML pen 9/10/2022 at Unknown time  No Yes   Sig: Inject 4 units prior to evening meal to start. Will titrate up as needed with correction. TDD 15 units   insulin degludec (TRESIBA FLEXTOUCH) 100 UNIT/ML pen 2022 at Unknown time  No Yes   Sig: Inject 20 units daily   insulin pen needle (BD PEN NEEDLE DONTAE 2ND GEN) 32G X 4 MM miscellaneous  Self No No   Sig: USE 3 PEN NEEDLES DAILY   latanoprost (XALATAN) 0.005 % ophthalmic solution 2022 at Unknown time Self Yes Yes   Sig: Place 1 drop into both eyes At Bedtime   levothyroxine (SYNTHROID/LEVOTHROID) 100 MCG tablet 2022 at Unknown time  No Yes   Sig: Take 1 tablet (100 mcg) by mouth daily    melatonin 3 MG tablet Past Week at Unknown time  No Yes   Sig: Take 1 tablet (3 mg) by mouth nightly as needed for sleep   omeprazole (PRILOSEC) 20 MG DR capsule Past Week at Unknown time  No Yes   Sig: Take 1 capsule (20 mg) by mouth daily   ondansetron (ZOFRAN ODT) 4 MG ODT tab Past Week at Unknown time  No Yes   Sig: Take 1 tablet (4 mg) by mouth every 6 hours as needed for nausea or vomiting      Facility-Administered Medications: None     Allergies   Allergies   Allergen Reactions     No Clinical Screening - See Comments Other (See Comments)     Beta blocker in glaucoma gtt.s caused low pulse and passing out      Timolol      Beta blocker in glaucoma gtt.s caused low pulse and passing out   - patient thinks it was timolol but not 100% sure which beta blocker eye drop.      Bactrim [Sulfamethoxazole W/Trimethoprim] Rash       SOCIAL HISTORY    Social History     Social History Narrative    , 3 children, he lives in [place], [place] but is in [facility] visiting his daughter.  He is retired, he previously worked in the [employer] in [place]. (last updated 9/10/2022)       Social History     Tobacco Use     Smoking status: Former Smoker     Quit date: 1992     Years since quittin.6     Smokeless tobacco: Never Used   Vaping Use     Vaping Use: Never used   Substance Use Topics     Alcohol use: Yes     Comment: < 1 drink a month     Drug use: No        FAMILY HISTORY    Family History   Problem Relation Age of Onset     Diabetes Mother      Other - See Comments Father         cause of death unknown     Parkinsonism Brother      Coronary Artery Disease No family hx of      Hypertension No family hx of      Hyperlipidemia No family hx of      Cerebrovascular Disease No family hx of      Breast Cancer No family hx of      Colon Cancer No family hx of      Prostate Cancer No family hx of      Anesthesia Reaction No family hx of      Asthma No family hx of      Thyroid Disease No family hx of      Genetic  Disorder No family hx of         Review of Systems   The 10 point Review of Systems is negative other than noted in the HPI or here.       PHYSICAL EXAM     Temp: 97.6  F (36.4  C) Temp src: Oral BP: 116/68 Pulse: 74   Resp: 17 SpO2: 98 % O2 Device: None (Room air)    Vital Signs with Ranges  Temp:  [97.6  F (36.4  C)] 97.6  F (36.4  C)  Pulse:  [74-93] 74  Resp:  [9-17] 17  BP: (112-120)/(62-70) 116/68  SpO2:  [92 %-99 %] 98 %  176 lbs 5.89 oz    Constitutional: Awake, alert, cooperative, no apparent distress.  Eyes: Conjunctiva and pupils examined and normal.  HEENT: Moist mucous membranes, normal dentition.  Respiratory: Clear to auscultation bilaterally, no crackles or wheezing.  Cardiovascular: Regular rate and rhythm, normal S1 and S2, and no murmur noted, no carotid bruits.  No ankle edema.   GI: Soft, non-distended, non-tender, normal bowel sounds.   Lymph/Hematologic: No anterior cervical, supraclavicular or axillary adenopathy.  Skin: No rashes, no cyanosis.   Musculoskeletal: No joint swelling, erythema or tenderness.  Neurologic: Alert, Ox3, Cranial nerves 2-12 intact, no focal weakness or numbness  Psychiatric:  No obvious anxiety or depression.    Data   Data reviewed today:  I personally reviewed no images or EKG's today.  Recent Labs   Lab 09/10/22  1613   WBC 13.9*   HGB 15.3   MCV 95      *   POTASSIUM 3.6   CHLORIDE 106   CO2 15*   BUN 29*   CR 2.25*   ANIONGAP 12   MAHSA 7.5*   *   ALBUMIN 2.2*   PROTTOTAL 4.8*   BILITOTAL 0.6   ALKPHOS 69   ALT <9   AST 15   LIPASE 133*       Imaging:  Recent Results (from the past 24 hour(s))   CT Abdomen Pelvis w Contrast    Narrative    EXAM: CT ABDOMEN PELVIS W CONTRAST  LOCATION: [facility]  DATE/TIME: 9/10/2022 6:15 PM    INDICATION: Diarrhea for 10 days.  COMPARISON: 05/23/2022.  TECHNIQUE: CT scan of the abdomen and pelvis was performed following injection of IV contrast. Multiplanar reformats were obtained.  Dose reduction techniques were used.  CONTRAST: isovue 370 75ml    FINDINGS:   LOWER CHEST: Unremarkable.    HEPATOBILIARY: Normal.    PANCREAS: Subtle hypoattenuating area in the pancreatic body, measuring 2.1 x 1.5 cm (series 2, image 29). This does not appear to be present on prior exams. No associated main pancreatic duct dilatation. No adjacent fat stranding.    SPLEEN: Normal.    ADRENAL GLANDS: Normal.    KIDNEYS/BLADDER: Status post left nephrectomy. Right kidney appears normal. No hydronephrosis. Urinary bladder is mostly decompressed.    BOWEL: No obstruction or inflammatory change. Appendix not visualized, although no secondary signs of acute appendicitis.     LYMPH NODES: No lymphadenopathy.    VASCULATURE: Unremarkable.    PELVIC ORGANS: Unremarkable.    MUSCULOSKELETAL: Multilevel degenerative changes of the spine.      Impression    IMPRESSION:   1.  No acute findings in the abdomen or pelvis.    2.  Subtle new 2.1 cm hypoattenuating area in the pancreatic body, without ductal dilatation, nonspecific. This may be inflammatory or sequela of prior pancreatitis, although underlying lesion not excluded. Consider correlation with contrast-enhanced   MRI.    Findings in impression #2 were communicated to Dr. Lexi Cárdenas over the telephone by Dr. Lin at 9/10/2022 6:37 PM.

## 2022-09-12 PROBLEM — E86.0 DEHYDRATION: Status: ACTIVE | Noted: 2022-01-01

## 2022-09-12 PROBLEM — R19.7 DIARRHEA, UNSPECIFIED TYPE: Status: ACTIVE | Noted: 2022-01-01

## 2022-09-12 PROBLEM — R63.8 DECREASED ORAL INTAKE: Status: ACTIVE | Noted: 2022-01-01

## 2022-09-12 PROBLEM — K86.89 PANCREATIC MASS: Status: ACTIVE | Noted: 2022-01-01

## 2022-09-12 NOTE — PLAN OF CARE
"PRIMARY DIAGNOSIS: \"GENERIC\" NURSING  OUTPATIENT/OBSERVATION GOALS TO BE MET BEFORE DISCHARGE:  ADLs back to baseline: Yes    Activity and level of assistance: Up with standby assistance.    Pain status: Improved-controlled with oral pain medications.    Return to near baseline physical activity: Yes     Discharge Planner Nurse   Safe discharge environment identified: Yes  Barriers to discharge: Yes, having watery stool.         Entered by: Jay Wynn RN 09/11/2022 9:08 PM     Please review provider order for any additional goals.   Nurse to notify provider when observation goals have been met and patient is ready for discharge.Goal Outcome Evaluation:                      "

## 2022-09-12 NOTE — PLAN OF CARE
Goal Outcome Evaluation:    Plan of Care Reviewed With: patient, daughter     Daughter Jenny was at bedside this afternoon; pt for bowel prep tonight. VS stable, eating drinking well. BM diarrhea x3 today.

## 2022-09-12 NOTE — PROGRESS NOTES
[facility]    Medicine Progress Note - Hospitalist Service       Date of Admission:  9/10/2022    Assessment & Plan            Colin Baxter is a 77 year old male admitted on 9/10/2022. He has history of bladder cancer, diabetes, CKD 4, hyperlipidemia presented for evaluation of progressive weakness in the setting of 2 weeks of diarrhea. Hospital Day: 3     #Prolonged diarrhea  #Suspected Nivolumab toxicity- colitis versus pancreatic insufficiency  2 weeks of loose stools  Onset after he was working on his septic tank.  He was on a trip to [place] when symptoms began but was not camping etc.  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  Presented with dehydration, metabolic acidosis, YOU.  Has had ongoing loose stools here, clinically 75cc/hr NS not keeping up with losses and having to increase IVF rate today.  C. difficile, multiplex PCR negative.  Stool ova and parasite pending  Supportive care.  GI did evaluate, appreciate assistance.  TSH elevated but should not cause diarrhea  GI planning colonoscopy to look at the possibility of immune mediated colitis. Nivolumab also known to cause new onset Celiac.  Pancreatic insufficiency could also be possible. If colonoscopy unremarkable, could consider trial of Creon.    #Pancreatic mass  2.1 cm hypoattenuating lesion seen in the pancreatic body, sequela of prior pancreatitis versus mass lesion. Ductal dilation not seen.  MRCP done yesterday, radiologist advising at minimum 3 month followup imaging, can also consider tissue sampling. Defer plan to GI    #YOU on CKD 4  Single kidney (left nephrectomy for cancer)  Baseline Cr 1.5-2, here presented 2.25-->1.98, improved a little with IVF but today worsening acidosis, suspect not keeping up with GI losses. Increase IVF rate.  Likely  prerenal from diarrhea.  Continue IVF and recheck Cr in AM    #Hyponatremia  Likely hypovolemic  IVF and recheck in AM    #Metabolic acidosis  Likely from diarrhea  Worsening. Increase IVF rate as above. Trend    #Hypocalcemia  Mild, corrects with low albumin  Hydration and support PO intake    #DM  Home regimen is Tresiba 20 units daily, NovoLog 4 units with meals.   Continue home Tresiba at lower dose 12 units for tonight  Continue NovoLog sliding scale  Add Novolog carb count 1:15    #Urothelial cancer  Had bladder cancer treated in the past then found to have 2cm tumor on his left ureter, did neoadjuvant chemo with gemcitabine and cisplatin followed by left nephroureterectomy in 2021. Now he is on 1 year course of Nivolumab as adjuvant treatment, has completed 8 of 9 cycles. Has had a lot of side effects from treatment including immune mediated colitis, Shingles and multiple other rashes and so final cycle has been delayed. Recently completed a long steroid taper for the colitis.    #Moderate malnutrition  Related to GI illness, poor PO, diarrhea. Cancer treatment could be playing a role too    #Hypothyroidism  TSH elevated at 46.  Increase Synthroid.  Nivolumab can cause thyroid abnormalities as well (both hypo and hyper)    #GERD, home PPI  #Hyperlipidemia, home statin       Diet: Combination Diet Regular Diet; Moderate Consistent Carb (60 g CHO per Meal) Diet    DVT Prophylaxis: Moderate risk. ambulation   [place]anto Catheter: Not present  Central Lines: None  Code Status: Full Code        Disposition Plan   Disposition: Home     Expected Discharge Date: 09/14/2022        Discharge Comments: diarrhea, IVF, results     Medically ready to discharge today: No     The patient's care was discussed with the Patient and Patient's Family.    Annabelle Kern MD  Hospitalist Service  [facility]  Securely message with the Vocera Web Console (learn more here)  Text page via [facility] Paging/Directory  "        Clinically Significant Risk Factors Present on Admission                    # Overweight: Estimated body mass index is 28.41 kg/m  as calculated from the following:    Height as of this encounter: 1.676 m (5' 6\").    Weight as of this encounter: 79.8 kg (176 lb).        ____________        Physical Exam   Vital Signs: Temp: 98.3  F (36.8  C) Temp src: Axillary BP: 116/64 Pulse: 74   Resp: 16 SpO2: 96 % O2 Device: None (Room air)    Weight: 176 lbs 0 oz  General: in no apparent distress, non-toxic and alert male lying in hospital bed oriented x3. Appears more energetic today  HEENT: Head normocephalic atraumatic, oral mucosa moist. Sclerae anicteric  Skin: No rashes or lesions  Extremities: Trace ankle edema bilaterally  Psych: Normal affect, anxious mood  Neuro: CNII-XII grossly intact, moving all 4 extremities      Data   Recent Results (from the past 24 hour(s))   MR Abdomen MRCP w/o & w Contrast    Collection Time: 09/11/22  3:05 PM   Result Value Ref Range    Radiologist flags Pancreatic lesion. Recommend further evaluation. (Urgent)    Glucose by meter    Collection Time: 09/11/22  5:05 PM   Result Value Ref Range    GLUCOSE BY METER POCT 161 (H) 70 - 99 mg/dL   Glucose by meter    Collection Time: 09/11/22  9:10 PM   Result Value Ref Range    GLUCOSE BY METER POCT 192 (H) 70 - 99 mg/dL   Basic metabolic panel    Collection Time: 09/12/22  6:12 AM   Result Value Ref Range    Sodium 133 (L) 136 - 145 mmol/L    Potassium 4.3 3.5 - 5.0 mmol/L    Chloride 110 (H) 98 - 107 mmol/L    Carbon Dioxide (CO2) 12 (L) 22 - 31 mmol/L    Anion Gap 11 5 - 18 mmol/L    Urea Nitrogen 24 8 - 28 mg/dL    Creatinine 1.98 (H) 0.70 - 1.30 mg/dL    Calcium 7.3 (L) 8.5 - 10.5 mg/dL    Glucose 251 (H) 70 - 125 mg/dL    GFR Estimate 35 (L) >60 mL/min/1.73m2   CBC with platelets    Collection Time: 09/12/22  6:12 AM   Result Value Ref Range    WBC Count 14.9 (H) 4.0 - 11.0 10e3/uL    RBC Count 4.26 (L) 4.40 - 5.90 10e6/uL    " Hemoglobin 13.9 13.3 - 17.7 g/dL    Hematocrit 41.2 40.0 - 53.0 %    MCV 97 78 - 100 fL    MCH 32.6 26.5 - 33.0 pg    MCHC 33.7 31.5 - 36.5 g/dL    RDW 13.9 10.0 - 15.0 %    Platelet Count 378 150 - 450 10e3/uL   Glucose by meter    Collection Time: 09/12/22  7:56 AM   Result Value Ref Range    GLUCOSE BY METER POCT 235 (H) 70 - 99 mg/dL   Glucose by meter    Collection Time: 09/12/22 12:49 PM   Result Value Ref Range    GLUCOSE BY METER POCT 250 (H) 70 - 99 mg/dL     ____________  Interval History   Data reviewed today: I reviewed all medications, new labs and imaging results over the last 24 hours. I personally reviewed no images or EKG's today.  Patient states still having frequent diarrhea, he has had 4 loose stools just since midnight.  Labs this morning reflecting worsening acidosis, lack of improvement in creatinine.  We will increase IV fluid rate.  Left message for GI and discussed with PA, strong concern for nivolumab toxicity versus pancreatic insufficiency, I think would be beneficial to try treating for 1 of these problems whichever GI thinks is more likely.  Not ready for discharge.     I called and updated dtr Jessika

## 2022-09-12 NOTE — PLAN OF CARE
Problem: Diarrhea  Goal: Fluid and Electrolyte Balance  Outcome: Ongoing, Progressing  Intervention: Manage Diarrhea  Recent Flowsheet Documentation  Taken 9/11/2022 1800 by Jay Wynn RN  Medication Review/Management: medications reviewed     Problem: Diabetes Comorbidity  Goal: Blood Glucose Level Within Targeted Range  Outcome: Ongoing, Progressing   Goal Outcome Evaluation:    Pt had been having frequent watery green stool.  Cont IVFs @ 75 ml/hr.  Pt ate 75% of dinner.  Blood glucose checked for 161 and 192.  Pt c/o tire and weak from getting up to the bathroom.  Provided pull-ups and wipes.  Pt is independent.

## 2022-09-12 NOTE — UTILIZATION REVIEW
Admission Status; Secondary Review Determination   Under the authority of the Utilization Management Committee, the utilization review process indicated a secondary review on Colin Baxter. The review outcome is based on review of the medical records, discussions with staff, and applying clinical experience noted on the date of the review.   (x) Inpatient Status Appropriate - This patient's medical care is consistent with medical management for inpatient care and reasonable inpatient medical practice.     RATIONALE FOR DETERMINATION   77 yr old male presented 9/10/22.  Hx bladder cancer, DM, CKD4, HLD.  Has had x 2 weeks diarrhea.  All evaluation to date has been negative with GI following for possible procedures. Also identified pancreatic mass.  Baseline Cr 1.5-2 and was 2.25 on presentation and 1.95 after IVF.  This AM still having loose stool x 4 overnight alone with more acidosis despite IVF with rate now to increase.  Lack of improvement in Cr still.  Possible Nivolumab toxicity vs pancreatic insufficiency however has had recent travel and did work on septic tank.  Not stable for discharge despite 2 nights in observation with ongoing IVF need and close monitoring given progressive acidosis with know CKD4 and having GI loss.    At the time of admission with the information available to the attending physician more than 2 nights Hospital complex care was anticipated, based on patient risk of adverse outcome if treated as outpatient and complex care required. Inpatient admission is appropriate based on the Medicare guidelines.   The information on this document is developed by the utilization review team in order for the business office to ensure compliance. This only denotes the appropriateness of proper admission status and does not reflect the quality of care rendered.   The definitions of Inpatient Status and Observation Status used in making the determination above are those provided in the CMS Coverage  Manual, Chapter 1 and Chapter 6, section 70.4.   Sincerely,   Yissel Rangel MD  Utilization Review  Physician Advisor  [facility]

## 2022-09-12 NOTE — PLAN OF CARE
Problem: Plan of Care - These are the overarching goals to be used throughout the patient stay.    Goal: Plan of Care Review/Shift Note  Description: The Plan of Care Review/Shift note should be completed every shift.  The Outcome Evaluation is a brief statement about your assessment that the patient is improving, declining, or no change.  This information will be displayed automatically on your shift note.  Outcome: Ongoing, Progressing     Problem: Plan of Care - These are the overarching goals to be used throughout the patient stay.    Goal: Absence of Hospital-Acquired Illness or Injury  Intervention: Identify and Manage Fall Risk  Recent Flowsheet Documentation  Taken 9/12/2022 0000 by Audrey Valdivia RN  Safety Promotion/Fall Prevention:   activity supervised   lighting adjusted   clutter free environment maintained   nonskid shoes/slippers when out of bed   patient and family education   safety round/check completed     Problem: Diarrhea  Goal: Fluid and Electrolyte Balance  Intervention: Manage Diarrhea  Recent Flowsheet Documentation  Taken 9/12/2022 0000 by Audrey Valdivia RN  Medication Review/Management: medications reviewed   Goal Outcome Evaluation:  Pt has been resting comfortably through NOC. VSS. Cont to be ind in room. Tolerating IV fluids without adverse effect.

## 2022-09-12 NOTE — SIGNIFICANT EVENT
Significant Event Note    Time of event: 3:05 AM September 12, 2022    Description of event:  Called to pt bedside for evaluation following a witnessed fall. Pt was up to the bathroom and was washing his hands when his legs began to feel weak underneath him. He attempted to squat and lower himself to the floor with assistance from his nurse but they were unable to stop him from falling and hitting the back left portion of his head on the sink. Also hit his left elbow on the ground in this fall and suffered a skin tear. Pt does have dementia at baseline but appears to be at baseline mentation. He did not lose consciousness in this incident and is not anticoagulated.     On exam there is no laceration to the head and HENT exam is atraumatic w/o bony stepoff or crepitance. He does have full ROM of the LUE though states that this is hurting him. It is difficult to assess if this is due to the skin tear here or deeper bony pain given pt's baseline dementia.     Plan:  - Head CT w/o contrast as pt meets [place] head CT rules and Nexus criteria based on age alone  - XR of the left elbow also ordered as he is stating this hurts him    Discussed with: bedside nurse    Nash King MD

## 2022-09-12 NOTE — PROGRESS NOTES
"GI Progress Note  Colin Baxter  -70     Subjective:   Pt reports onset of stools 10-12 days ago.  Passing about 6 BM per day; watery, urgent, occasional incontinence.  Passed 4 loose stools since midnight.   Has been on Nivolumab.  Previously prescribed prednisone by oncology for diarrhea in the past.      Objective:   /70 (BP Location: Right arm)   Pulse 83   Temp 98.4  F (36.9  C) (Axillary)   Resp 16   Ht 1.676 m (5' 6\")   Wt 79.8 kg (176 lb)   SpO2 97%   BMI 28.41 kg/m    Body mass index is 28.41 kg/m .   Gen: No acute distress  Cardio: RRR  GI: Non-distended, BS positive, soft, non-tender. No guarding.    Laboratory  Recent Labs   Lab 09/12/22  0612 09/11/22  0707 09/10/22  1613   WBC 14.9* 14.6* 13.9*   RBC 4.26* 4.25* 4.63   HGB 13.9 13.8 15.3   HCT 41.2 40.7 43.8   MCV 97 96 95   MCH 32.6 32.5 33.0   MCHC 33.7 33.9 34.9   RDW 13.9 13.8 13.9    333 357      Recent Labs   Lab 09/12/22  0612 09/11/22  0707 09/10/22  1613   * 132* 133*   CO2 12* 16* 15*   BUN 24 25 29*     Recent Labs   Lab 09/10/22  1613   ALKPHOS 69   AST 15   ALT <9     No results found for: INR, TROPONIN, TROPI, TROPONIN, TROPR, TROPN    MR Abdomen MRCP w/o & w Contrast    Result Date: 9/11/2022  EXAM: MR ABDOMEN MRCP W/O and W CONTRAST LOCATION: [facility] DATE/TIME: 9/11/2022 3:05 PM INDICATION: Pancreas eval  Mass COMPARISON: CT 09/10/2022 at 1757 hours TECHNIQUE: Routine MR liver/pancreas protocol including axial and coronal MRCP sequences. 2D and 3D reconstruction performed by MR technologist including MIP reconstruction and slab cholangiograms. If performed with contrast, additional dynamic T1 post IV contrast images. CONTRAST: 7ml gadavist FINDINGS: MRCP: Normal gallbladder. No bile duct dilatation. No biliary filling defects or strictures. Normal caliber of the pancreatic duct. LIVER: Normal. PANCREAS: There is a 2.0 x 1.6 cm focus in the pancreatic body which corresponds with " the finding on the recent CT study (series 13 image 24, for example). This is hypointense to the adjacent pancreatic parenchyma on precontrast imaging, is hypointense on arterial phase imaging, and is isointense on later postcontrast imaging. This also appears to exhibit diffusion restriction.  Otherwise the pancreatic parenchyma is normal in appearance. ADDITIONAL FINDINGS: Left kidney is not identified. Mesenteric lymph nodes measure up to 7 mm. There is a 6 mm skin lesion along the posterior left abdominal wall.     IMPRESSION: A 2 cm focus in the pancreatic body is nonspecific though could be sequela of history of pancreatitis. Malignancy cannot be excluded. Recommend further evaluation, perhaps with tissue sampling. At a minimum, 3 month follow-up MRI is recommended. [Access Center: Pancreatic lesion. Recommend further evaluation. ] This report will be copied to the [facility] to ensure a provider acknowledges the finding. Access Center is available Monday through Friday 8am-3:30 pm.     CT Abdomen Pelvis w Contrast    Result Date: 9/10/2022  EXAM: CT ABDOMEN PELVIS W CONTRAST LOCATION: [facility] DATE/TIME: 9/10/2022 6:15 PM INDICATION: Diarrhea for 10 days. COMPARISON: 05/23/2022. TECHNIQUE: CT scan of the abdomen and pelvis was performed following injection of IV contrast. Multiplanar reformats were obtained. Dose reduction techniques were used. CONTRAST: isovue 370 75ml FINDINGS: LOWER CHEST: Unremarkable. HEPATOBILIARY: Normal. PANCREAS: Subtle hypoattenuating area in the pancreatic body, measuring 2.1 x 1.5 cm (series 2, image 29). This does not appear to be present on prior exams. No associated main pancreatic duct dilatation. No adjacent fat stranding. SPLEEN: Normal. ADRENAL GLANDS: Normal. KIDNEYS/BLADDER: Status post left nephrectomy. Right kidney appears normal. No hydronephrosis. Urinary bladder is mostly decompressed. BOWEL: No obstruction or inflammatory  change. Appendix not visualized, although no secondary signs of acute appendicitis. LYMPH NODES: No lymphadenopathy. VASCULATURE: Unremarkable. PELVIC ORGANS: Unremarkable. MUSCULOSKELETAL: Multilevel degenerative changes of the spine.     IMPRESSION: 1.  No acute findings in the abdomen or pelvis. 2.  Subtle new 2.1 cm hypoattenuating area in the pancreatic body, without ductal dilatation, nonspecific. This may be inflammatory or sequela of prior pancreatitis, although underlying lesion not excluded. Consider correlation with contrast-enhanced MRI. Findings in impression #2 were communicated to Dr. Lexi Cárdenas over the telephone by Dr. Lin at 9/10/2022 6:37 PM.      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    Pancreas insufficiency another possibility.  Stool studies neg -- C difficile, O&P, multiplex PCR.   Other DDx: mets from urothelial source; neuroendocrine   2) Pancreas mass - 2.1 cm.No duct dilation. Radiology reels this could be sequelae of prior pancreatitis. Had elevated lipase 8/29/2022 in 800's. Otherwise, felt could be inflammatory.    3) Urothelial cancer. 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 On Nivolumab x 1 year.   4) GERD - PPI.    Patient Active Problem List   Diagnosis     Glaucoma     Bladder cancer (H)     Obesity (BMI 35.0-39.9) with comorbidity (H)     DM type 2, Hgb A1C 8.6 on 8/26/22     Ureter cancer (H)     Hyperlipidemia     CKD (chronic kidney disease) stage 4, GFR 15-29 ml/min (H)     Carcinoma in situ of bladder     Anemia due to stage 4 chronic kidney disease (H)     Acquired hypothyroidism     CRF (chronic renal failure), stage 4 (severe) (H)     Diarrhea     Pancreatic lesion, 2.1 cm on Abd CT      Plan:   1) Colonoscopy prep today for colonoscopy tomorrow.   2) EUS versus MRCP -- will update chart.   3) Check lipase    Discussed with Dr Zimmerman.  Thank  you.  Cachorro Marie PA-C  [facility]  [phone]       ADDENDUM  Will plan on outpatient EUS in the near future.  Our office will contact pt to help arrange.     Discussed with Dr Zimmerman.  Thank you.  Cachorro Marie PA-C  [facility]  [phone]

## 2022-09-13 NOTE — PLAN OF CARE
Goal Outcome Evaluation:    Plan of Care Reviewed With: patient     Overall Patient Progress: no change  Pt finished all bowel prep, sched for colonoscopy at 3pm. Daughter at bedside. Clear BMs x3 this AM.  Denies any n/v.

## 2022-09-13 NOTE — PROGRESS NOTES
[facility]    Medicine Progress Note - Hospitalist Service    Date of Admission:  9/10/2022    Assessment & Plan            Colin Baxter is a 77 year old male admitted with weakness in the setting of subacute diarrhea.  Also he has history of bladder cancer, diabetes, CKD 4, hyperlipidemia     #Subacute diarrhea  ---possible Nivolumab toxicity- colitis versus pancreatic insufficiency  ---Hx Onset after he was working on his septic tank.  He was on a trip to [place] when symptoms began but was not camping etc.  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  --- Continue IV fluids  ---C. difficile, multiplex PCR negative.  Stool ova and parasite pending; TSH elevated  --- GI following, planning colonoscopy to look at the possibility of immune mediated colitis. Nivolumab also known to cause new onset Celiac.  ---Pancreatic insufficiency could also be possible. If colonoscopy unremarkable, could consider trial of Creon.    #Pancreatic mass  ---2.1 cm hypoattenuating lesion seen in the pancreatic body, sequela of prior pancreatitis versus mass lesion. Ductal dilation not seen.  ---MRCP done 9/11 radiologist advising at minimum 3 month followup imaging, can also consider tissue sampling.   ---Defer plan to GI    #YOU on CKD 4; improved with IV fluid  Metabolic acidosis  Single kidney (left nephrectomy for cancer)  ---Baseline Cr 1.5-2,   --- Continue increased IVF rate.  ---Likely prerenal from diarrhea.  --- Follow daily lab    #Hyponatremia  --- Stable and still likely hypovolemic  ---IVF and recheck in AM    #Metabolic acidosis  --- Anion gap normal   ----likely from diarrhea  --- Still worse despite being on increasing IV fluid  --- Consider nephrology consultation    #Hypocalcemia  ---Mild, corrects with low  albumin  ---Hydration and support PO intake    #DM  --- Hyperglycemia noted persistently   ---Home regimen is Tresiba 20 units daily, NovoLog 4 units with meals.   --- Increase  Tresiba back to home dose  ---Continue NovoLog sliding scale  --- Continue Novolog carb count 1:15    #Urothelial cancer  ----Had bladder cancer treated in the past then found to have 2cm tumor on his left ureter, did neoadjuvant chemo with gemcitabine and cisplatin followed by left nephroureterectomy in 2021.   ---Now he is on 1 year course of Nivolumab as adjuvant treatment, has completed 8 of 9 cycles. Has had a lot of side effects from treatment including immune mediated colitis, Shingles and multiple other rashes and so final cycle has been delayed.   ---Recently completed a long steroid taper for the colitis.    #Moderate malnutrition  Related to GI illness, poor PO, diarrhea. Cancer treatment could be playing a role too  Hypoalbuminemia noted as well with poor nutrition    #Hypothyroidism  TSH elevated at 46.  Free T4 low   --- Continue on increased Synthroid.  Nivolumab can cause thyroid abnormalities as well (both hypo and hyper)    #GERD, home PPI  #Hyperlipidemia, home statin  #Leukocytosis--suspect related to diarrhea.  Continue treatment per above         Diet: NPO per Anesthesia Guidelines for Procedure/Surgery Except for: Meds    DVT Prophylaxis: Pneumatic Compression Devices  [place] Catheter: Not present  Central Lines: None  Cardiac Monitoring: None  Code Status: Full Code      Disposition Plan      Expected Discharge Date: 09/15/2022        Discharge Comments: colonoscopy and sx improvement        The patient's care was discussed with the Bedside Nurse and Patient.    Rossana Bravo MD  Hospitalist Service  [facility]  Securely message with the Vocera Web Console (learn more here)  Text page via [employer] Paging/Directory         Clinically Significant Risk Factors Present on Admission         #  "Hyponatremia: Na = 132 mmol/L (Ref range: 136 - 145 mmol/L) on admission, will monitor as appropriate     # Hypoalbuminemia: Albumin = 2.0 g/dL (Ref range: 3.5 - 5.0 g/dL) on admission, will monitor as appropriate        # Overweight: Estimated body mass index is 28.41 kg/m  as calculated from the following:    Height as of this encounter: 1.676 m (5' 6\").    Weight as of this encounter: 79.8 kg (176 lb).    # Moderate Malnutrition: based on nutrition assessment     ______________________________________________________________________    Interval History   --- Patient seen and chart reviewed  --- He tells me he is still feeling weak and tired.  He was like this before he came and feels that it is no better  --- Unable to finish prep overnight.    Data reviewed today: I reviewed all medications, new labs and imaging results over the last 24 hours. .    Physical Exam   Vital Signs: Temp: 96.8  F (36  C) Temp src: Axillary BP: 102/64 Pulse: 70   Resp: 14 SpO2: 98 % O2 Device: None (Room air)    Weight: 176 lbs 0 oz  General Appearance: Appears frail, no apparent distress  Respiratory: Clear to auscultation  Cardiovascular: Regular rate and rhythm without murmurs rubs or gallops  GI: Soft and nontender without hepatosplenomegaly  Skin: Trace to 1+ lower extremity edema  Other: Neurologically grossly intact without focal deficits appreciated  Psych; flat affect.  Mood euthymic    Data   Recent Labs   Lab 09/13/22  1156 09/13/22  0829 09/13/22  0536 09/12/22  0756 09/12/22  0612 09/11/22  0736 09/11/22  0707 09/10/22  2234 09/10/22  1613   WBC  --   --  14.1*  --  14.9*  --  14.6*  --  13.9*   HGB  --   --  14.1  --  13.9  --  13.8  --  15.3   MCV  --   --  97  --  97  --  96  --  95   PLT  --   --  419  --  378  --  333  --  357   NA  --   --  132*  --  133*  --  132*  --  133*   POTASSIUM  --   --  3.5  --  4.3  --  3.5  --  3.6   CHLORIDE  --   --  110*  --  110*  --  109*  --  106   CO2  --   --  12*  --  12*  --  " 16*  --  15*   BUN  --   --  26  --  24  --  25  --  29*   CR  --   --  1.77*  --  1.98*  --  1.95*  --  2.25*   ANIONGAP  --   --  10  --  11  --  7  --  12   MAHSA  --   --  7.2*  --  7.3*  --  7.0*  --  7.5*   * 259* 254*   < > 251*   < > 178*   < > 230*   ALBUMIN  --   --  2.0*  --   --   --   --   --  2.2*   PROTTOTAL  --   --  4.2*  --   --   --   --   --  4.8*   BILITOTAL  --   --  0.4  --   --   --   --   --  0.6   ALKPHOS  --   --  66  --   --   --   --   --  69   ALT  --   --  <9  --   --   --   --   --  <9   AST  --   --  15  --   --   --   --   --  15   LIPASE  --   --   --   --   --   --   --   --  133*    < > = values in this interval not displayed.     No results found for this or any previous visit (from the past 24 hour(s)).

## 2022-09-13 NOTE — ANESTHESIA CARE TRANSFER NOTE
Patient: Colin Baxter    Procedure: Procedure(s):  COLONOSCOPY       Diagnosis: Diarrhea, unspecified type [R19.7]  Diagnosis Additional Information: No value filed.    Anesthesia Type:   MAC     Note:    Oropharynx: oropharynx clear of all foreign objects and spontaneously breathing  Level of Consciousness: awake  Oxygen Supplementation: room air    Independent Airway: airway patency satisfactory and stable  Dentition: dentition unchanged  Vital Signs Stable: post-procedure vital signs reviewed and stable  Report to RN Given: handoff report given  Patient transferred to: Medical/Surgical Unit    Handoff Report: Identifed the Patient, Identified the Reponsible Provider, Reviewed the pertinent medical history, Discussed the surgical course, Reviewed Intra-OP anesthesia mangement and issues during anesthesia, Set expectations for post-procedure period and Allowed opportunity for questions and acknowledgement of understanding      Vitals:  Vitals Value Taken Time   BP     Temp     Pulse     Resp     SpO2       See VS at 1600 per writer (did not slave in)  Report called to RN on floor. All questions answered. Pt brought to PACU where staff will transport patient to the floor. VSS.    Electronically Signed By: DANILO Nick CRNA  September 13, 2022  4:07 PM

## 2022-09-13 NOTE — PLAN OF CARE
"  Problem: Plan of Care - These are the overarching goals to be used throughout the patient stay.    Goal: Plan of Care Review/Shift Note  Description: The Plan of Care Review/Shift note should be completed every shift.  The Outcome Evaluation is a brief statement about your assessment that the patient is improving, declining, or no change.  This information will be displayed automatically on your shift note.  Outcome: Ongoing, Progressing  Goal: Patient-Specific Goal (Individualized)  Description: You can add care plan individualizations to a care plan. Examples of Individualization might be:  \"Parent requests to be called daily at 9am for status\", \"I have a hard time hearing out of my right ear\", or \"Do not touch me to wake me up as it startles me\".  Outcome: Ongoing, Progressing  Goal: Absence of Hospital-Acquired Illness or Injury  Outcome: Ongoing, Progressing  Intervention: Identify and Manage Fall Risk  Recent Flowsheet Documentation  Taken 9/13/2022 0300 by Augusta Saunders RN  Safety Promotion/Fall Prevention:   assistive device/personal items within reach   clutter free environment maintained   fall prevention program maintained   increased rounding and observation   nonskid shoes/slippers when out of bed   patient and family education  Taken 9/12/2022 2100 by Augusta Suanders RN  Safety Promotion/Fall Prevention:   assistive device/personal items within reach   clutter free environment maintained   fall prevention program maintained   increased rounding and observation   nonskid shoes/slippers when out of bed   patient and family education  Intervention: Prevent Skin Injury  Recent Flowsheet Documentation  Taken 9/13/2022 0300 by Augusta Saunders RN  Body Position: position changed independently  Taken 9/12/2022 2100 by Augusta Saunders RN  Body Position: position changed independently  Intervention: Prevent and Manage VTE (Venous Thromboembolism) Risk  Recent Flowsheet Documentation  Taken 9/13/2022 " 0300 by Augusta Saunders RN  Activity Management: ambulated to bathroom  Taken 9/12/2022 2100 by Augusta Saunders RN  Activity Management: ambulated to bathroom  Goal: Optimal Comfort and Wellbeing  Outcome: Ongoing, Progressing  Goal: Readiness for Transition of Care  Outcome: Ongoing, Progressing     Problem: Diarrhea  Goal: Fluid and Electrolyte Balance  Outcome: Ongoing, Progressing  Intervention: Manage Diarrhea  Recent Flowsheet Documentation  Taken 9/13/2022 0300 by Augusta Saunders RN  Medication Review/Management: medications reviewed  Taken 9/12/2022 2100 by Augusta Saunders RN  Medication Review/Management: medications reviewed     Problem: Diabetes Comorbidity  Goal: Blood Glucose Level Within Targeted Range  Outcome: Ongoing, Progressing     Problem: Risk for Delirium  Goal: Optimal Coping  Outcome: Ongoing, Progressing  Goal: Improved Behavioral Control  Outcome: Ongoing, Progressing  Goal: Improved Attention and Thought Clarity  Outcome: Ongoing, Progressing  Goal: Improved Sleep  Outcome: Ongoing, Progressing     Problem: Nausea and Vomiting  Goal: Fluid and Electrolyte Balance  Outcome: Ongoing, Progressing   Goal Outcome Evaluation:

## 2022-09-13 NOTE — ANESTHESIA PREPROCEDURE EVALUATION
Anesthesia Pre-Procedure Evaluation    Patient: Colin Baxter   MRN: [id number] : 1945        Preoperative Diagnosis: colonoscopy   Procedure : Procedure(s):  colonoscopy          Past Medical History:   Diagnosis Date     Benign essential hypertension      Bladder cancer (H)      Carcinoma in situ of bladder 2021     Diabetes mellitus, type 2 (H)      Dyslipidemia      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) 2020    getting BCG (from his Urologist in [place], [place])     Renal cell carcinoma, left (H)     Left nephrectomy     Ureter cancer, left (H)       Past Surgical History:   Procedure Laterality Date     APPENDECTOMY       COLONOSCOPY  2011     COMBINED CYSTOSCOPY, RETROGRADES, URETEROSCOPY, INSERT STENT Left 2021    Procedure: CYSTOURETEROSCOPY, WITH RETROGRADE PYELOGRAM with interpretation of fluroscopy, ureteroscopy, ureteral biopsy, bladder biopsy and fulgaration;  Surgeon: Shonda Morrell MD;  [facility]     CYSTOSCOPY N/A 2021    Procedure: CYSTOSCOPY;  Surgeon: Javier Mcnulty MD;  [facility]     CYSTOSCOPY, BIOPSY BLADDER, COMBINED N/A 2015    Procedure: COMBINED CYSTOSCOPY, BIOPSY BLADDER;  Surgeon: Randy Martinez MD;  [facility]     CYSTOSCOPY, BIOPSY BLADDER, COMBINED N/A 2017    Procedure: COMBINED CYSTOSCOPY, BIOPSY BLADDER;  Surgeon: Randy Martinez MD;  [facility]     CYSTOSCOPY, BIOPSY BLADDER, COMBINED N/A 2018    Procedure: COMBINED CYSTOSCOPY, BIOPSY BLADDER;  Surgeon: Ed Helm MD;  Location: [facility]     CYSTOSCOPY, BIOPSY BLADDER, COMBINED N/A 2021    Procedure: CYSTOSCOPY, WITH BLADDER BIOPSY;  Surgeon: Shonda Morrell MD;  [facility]     CYSTOSCOPY, RETROGRADES, COMBINED Bilateral 2018    Procedure: Bilateral Retrograde Pyelagram, Bladder Biopsy;  Surgeon: Ed Helm MD;  Location: [facility]     CYSTOSCOPY, RETROGRADES, COMBINED Right 2021     Procedure: CYSTOSCOPY, WITH RETROGRADE PYELOGRAM;  Surgeon: Shonda Morrell MD;  [facility]     CYSTOSCOPY, RETROGRADES, INSERT STENT URETER(S), COMBINED Left 9/8/2015    Procedure: COMBINED CYSTOSCOPY, RETROGRADES, INSERT STENT URETER(S);  Surgeon: Randy Martinez MD;  [facility]     CYSTOSCOPY, TRANSURETHRAL RESECTION (TUR) TUMOR BLADDER, COMBINED N/A 9/8/2015    Procedure: COMBINED CYSTOSCOPY, TRANSURETHRAL RESECTION (TUR) TUMOR BLADDER;  Surgeon: Randy Martinez MD;  [facility]     CYSTOSCOPY, TRANSURETHRAL RESECTION (TUR) TUMOR BLADDER, COMBINED N/A 1/12/2016    Procedure: COMBINED CYSTOSCOPY, TRANSURETHRAL RESECTION (TUR) TUMOR BLADDER;  Surgeon: Randy Martinez MD;  [facility]     CYSTOSCOPY, TRANSURETHRAL RESECTION (TUR) TUMOR BLADDER, COMBINED N/A 9/13/2016    Procedure: COMBINED CYSTOSCOPY, TRANSURETHRAL RESECTION (TUR) TUMOR BLADDER;  Surgeon: Randy Martinez MD;  [facility]     DAVINCI NEPHROURETERECTOMY Left 6/16/2021    Procedure: NEPHROURETERECTOMY, ROBOT-ASSISTED, lymph node dissection;  Surgeon: Javier Mcnulty MD;  [facility]     PHACOEMULSIFICATION WITH STANDARD INTRAOCULAR LENS IMPLANT Right 10/9/2018    Procedure: PHACOEMULSIFICATION WITH STANDARD INTRAOCULAR LENS IMPLANT;  PHACOEMULSIFICATION CATARACT EXTRACTION POSTERIOR CHAMBER LENS RIGHT;  Surgeon: Marlo Mcconnell MD;  [facility]     PHACOEMULSIFICATION WITH STANDARD INTRAOCULAR LENS IMPLANT Left 10/23/2018    Procedure: PHACOEMULSIFICATION CATARACT EXTRACTION POSTERIOR CHAMBER LENS LEFT;  Surgeon: Marlo Mcconnell MD;  [facility]      Allergies   Allergen Reactions     No Clinical Screening - See Comments Other (See Comments)     Beta blocker in glaucoma gtt.s caused low pulse and passing out      Timolol      Beta blocker in glaucoma gtt.s caused low pulse and passing out   - patient thinks it was timolol but not 100% sure which beta blocker eye drop.       Bactrim [Sulfamethoxazole W/Trimethoprim] Rash      Social History     Tobacco Use     Smoking status: Former Smoker     Quit date: 1992     Years since quittin.7     Smokeless tobacco: Never Used   Substance Use Topics     Alcohol use: Yes     Comment: < 1 drink a month      Wt Readings from Last 1 Encounters:   22 79.8 kg (176 lb)        Anesthesia Evaluation   Pt has had prior anesthetic. Type: General and MAC.    No history of anesthetic complications       ROS/MED HX  ENT/Pulmonary:     (+) tobacco use, Past use,     Neurologic:  - neg neurologic ROS     Cardiovascular:     (+) Dyslipidemia hypertension-----Previous cardiac testing   Echo: Date: Results:    Stress Test: Date: Results:    ECG Reviewed: Date: 10/22/21 Results:  SB @51  Left axis deviation  Possible anterior infarct, age undetermined  Cath: Date: Results:      METS/Exercise Tolerance: >4 METS    Hematologic:  - neg hematologic  ROS     Musculoskeletal:  - neg musculoskeletal ROS     GI/Hepatic:       Renal/Genitourinary:     (+) renal disease (stage 4), type: CRI,     Endo:     (+) type II DM (metformin discontinued), Last HgA1c: 5.8, date: 2021, Not using insulin, Normal glucose range: 130,     Psychiatric/Substance Use:  - neg psychiatric ROS     Infectious Disease:  - neg infectious disease ROS     Malignancy:   (+) Malignancy, History of Other.Other CA bladder tumor Remission status post Chemo and Surgery.    Other:  - neg other ROS          Physical Exam    Airway        Mallampati: II   TM distance: > 3 FB   Neck ROM: full   Mouth opening: > 3 cm    Respiratory Devices and Support         Dental       (+) missing      Cardiovascular   cardiovascular exam normal       Rhythm and rate: regular and normal     Pulmonary   pulmonary exam normal        breath sounds clear to auscultation           OUTSIDE LABS:  CBC:   Lab Results   Component Value Date    WBC 14.1 (H) 2022    WBC 14.9 (H) 2022    HGB 14.1  09/13/2022    HGB 13.9 09/12/2022    HCT 42.3 09/13/2022    HCT 41.2 09/12/2022     09/13/2022     09/12/2022     BMP:   Lab Results   Component Value Date     (L) 09/13/2022     (L) 09/12/2022    POTASSIUM 3.5 09/13/2022    POTASSIUM 4.3 09/12/2022    CHLORIDE 110 (H) 09/13/2022    CHLORIDE 110 (H) 09/12/2022    CO2 12 (L) 09/13/2022    CO2 12 (L) 09/12/2022    BUN 26 09/13/2022    BUN 24 09/12/2022    CR 1.77 (H) 09/13/2022    CR 1.98 (H) 09/12/2022     (H) 09/13/2022     (H) 09/13/2022     COAGS: No results found for: PTT, INR, FIBR  POC:   Lab Results   Component Value Date    BGM 98 06/17/2021     HEPATIC:   Lab Results   Component Value Date    ALBUMIN 2.0 (L) 09/13/2022    PROTTOTAL 4.2 (L) 09/13/2022    ALT <9 09/13/2022    AST 15 09/13/2022    ALKPHOS 66 09/13/2022    BILITOTAL 0.4 09/13/2022     OTHER:   Lab Results   Component Value Date    PH 7.29 (L) 06/16/2021    LACT 1.3 06/16/2021    A1C 8.6 (H) 08/26/2022    MAHSA 7.2 (L) 09/13/2022    PHOS 2.7 08/03/2022    MAG 1.8 09/10/2022    LIPASE 133 (H) 09/10/2022    TSH 46.22 (H) 09/11/2022    T4 0.28 (L) 09/11/2022    CRP 6.2 08/29/2022       Anesthesia Plan    ASA Status:  3   NPO Status:  NPO Appropriate    Anesthesia Type: MAC.     - Reason for MAC: straight local not clinically adequate   Induction: Intravenous, Propofol.           Consents    Anesthesia Plan(s) and associated risks, benefits, and realistic alternatives discussed. Questions answered and patient/representative(s) expressed understanding.    - Discussed:     - Discussed with:  Patient      - Extended Intubation/Ventilatory Support Discussed: No.      - Patient is DNR/DNI Status: No    Use of blood products discussed: No .     Postoperative Care    Pain management: IV analgesics.   PONV prophylaxis: Ondansetron (or other 5HT-3)     Comments:                    iVviane Savage MD

## 2022-09-13 NOTE — PROGRESS NOTES
"CLINICAL NUTRITION SERVICES - ASSESSMENT NOTE     Nutrition Prescription    RECOMMENDATIONS FOR MDs/PROVIDERS TO ORDER:  None    Malnutrition Status:    Moderate malnutrition in the context of chronic illness    Recommendations already ordered by Registered Dietitian (RD):  When diet advances, add Nepro BID    Future/Additional Recommendations:  Monitor intake     REASON FOR ASSESSMENT  Colin Baxter is a/an 77 year old male assessed by the dietitian for Nutrition Risk Monitoring    NUTRITION HISTORY  History of bladder cancer, diabetes, CKD 4, hyperlipidemia presented for evaluation of progressive weakness in the setting of 2 weeks of diarrhea.     Patient reports that he has had a low appetite and has not been eating well for 10 days. He had an episode of emesis last night. He reports no nausea currently. He had a large gatorade bottle on his table and stated that the doctor wanted him to drink 3 of those bottles. He attributes his recent emesis to drinking too much gatorade.   Last BM 9/12    CURRENT NUTRITION ORDERS  Diet: NPO  Intake/Tolerance: NPO    LABS  BG >250, Na 132(L), Ca 7.2(L), creatinine 1.77(H)    MEDICATIONS  Lipitor, novolog, protonix, zofran    ANTHROPOMETRICS  Height: 167.6 cm (5' 6\")  Most Recent Weight: 79.8 kg (176 lb)    IBW: 65 kg  BMI: Overweight BMI 25-29.9  Weight History:   Wt Readings from Last 10 Encounters:   09/11/22 79.8 kg (176 lb)   08/26/22 78.1 kg (172 lb 2 oz)   08/26/22 78.1 kg (172 lb 2 oz)   08/03/22 78.8 kg (173 lb 11.6 oz)   08/03/22 78.8 kg (173 lb 11.6 oz)   07/15/22 81 kg (178 lb 8 oz)   07/07/22 81.7 kg (180 lb 1.9 oz)   06/08/22 81 kg (178 lb 9.2 oz)   05/25/22 81.2 kg (179 lb)   05/25/22 79.4 kg (175 lb 0.7 oz)   Dosing Weight: 69 kg adjusted weight    ASSESSED NUTRITION NEEDS  Estimated Energy Needs: 5186-8377 kcals/day (25 - 30 kcals/kg)  Justification: Maintenance  Estimated Protein Needs: 42-55 grams protein/day (0.6 - 0.8 grams of pro/kg)  Justification: " CKD  Estimated Fluid Needs: Per MD    MALNUTRITION  % Intake: </= 50% for >/= 5 days (severe)  % Weight Loss: Weight loss does not meet criteria  Subcutaneous Fat Loss: None observed  Muscle Loss: Temporal: moderate and Thoracic region (clavicle, acromium bone, deltoid, trapezius, pectoral): moderate  Fluid Accumulation/Edema: None noted  Malnutrition Diagnosis: Moderate malnutrition in the context of chronic illness    NUTRITION DIAGNOSIS  Malnutrition related to chronic illness as evidenced by moderate muscle loss and decreased oral intake.    INTERVENTIONS  Implementation  When diet advances, add Nepro BID    Goals  Diet advancement  Meet estimated nutrition needs     Monitoring/Evaluation  Progress toward goals will be monitored and evaluated per protocol.  Yumiko Hensley RDN, LD

## 2022-09-13 NOTE — ANESTHESIA POSTPROCEDURE EVALUATION
Patient: Colin Baxter    Procedure: Procedure(s):  COLONOSCOPY       Anesthesia Type:  MAC    Note:  Disposition: Outpatient   Postop Pain Control: Uneventful            Sign Out: Well controlled pain   PONV: No   Neuro/Psych: Uneventful            Sign Out: Acceptable/Baseline neuro status   Airway/Respiratory: Uneventful            Sign Out: Acceptable/Baseline resp. status   CV/Hemodynamics: Uneventful            Sign Out: Acceptable CV status; No obvious hypovolemia; No obvious fluid overload   Other NRE:    DID A NON-ROUTINE EVENT OCCUR?            Last vitals:  Vitals:    09/13/22 1434 09/13/22 1558 09/13/22 1624   BP: 107/64 102/58 111/67   Pulse: 78 78 65   Resp: 18 16 16   Temp: 36.4  C (97.6  F)  36.3  C (97.4  F)   SpO2: 99% 98% 99%       Electronically Signed By: Viviane Savage MD  September 13, 2022  4:40 PM

## 2022-09-14 NOTE — PLAN OF CARE
"  Problem: Plan of Care - These are the overarching goals to be used throughout the patient stay.    Goal: Plan of Care Review/Shift Note  Description: The Plan of Care Review/Shift note should be completed every shift.  The Outcome Evaluation is a brief statement about your assessment that the patient is improving, declining, or no change.  This information will be displayed automatically on your shift note.  Outcome: Ongoing, Progressing  Goal: Patient-Specific Goal (Individualized)  Description: You can add care plan individualizations to a care plan. Examples of Individualization might be:  \"Parent requests to be called daily at 9am for status\", \"I have a hard time hearing out of my right ear\", or \"Do not touch me to wake me up as it startles me\".  Outcome: Ongoing, Progressing  Goal: Absence of Hospital-Acquired Illness or Injury  Outcome: Ongoing, Progressing  Intervention: Identify and Manage Fall Risk  Recent Flowsheet Documentation  Taken 9/13/2022 2130 by Augusta Saunders RN  Safety Promotion/Fall Prevention:   assistive device/personal items within reach   clutter free environment maintained   fall prevention program maintained   increased rounding and observation   nonskid shoes/slippers when out of bed   patient and family education  Intervention: Prevent Skin Injury  Recent Flowsheet Documentation  Taken 9/13/2022 2130 by Augusta Saunders RN  Body Position: position changed independently  Intervention: Prevent and Manage VTE (Venous Thromboembolism) Risk  Recent Flowsheet Documentation  Taken 9/13/2022 2130 by Augusta Saunders RN  VTE Prevention/Management: SCDs (sequential compression devices) on  Activity Management: ambulated to bathroom  Goal: Optimal Comfort and Wellbeing  Outcome: Ongoing, Progressing  Goal: Readiness for Transition of Care  Outcome: Ongoing, Progressing     Problem: Diarrhea  Goal: Fluid and Electrolyte Balance  Outcome: Ongoing, Progressing  Intervention: Manage " Diarrhea  Recent Flowsheet Documentation  Taken 9/13/2022 2130 by Augusta Saunders, RN  Medication Review/Management: medications reviewed     Problem: Diabetes Comorbidity  Goal: Blood Glucose Level Within Targeted Range  Outcome: Ongoing, Progressing     Problem: Risk for Delirium  Goal: Optimal Coping  Outcome: Ongoing, Progressing  Goal: Improved Behavioral Control  Outcome: Ongoing, Progressing  Goal: Improved Attention and Thought Clarity  Outcome: Ongoing, Progressing  Goal: Improved Sleep  Outcome: Ongoing, Progressing     Problem: Nausea and Vomiting  Goal: Fluid and Electrolyte Balance  Outcome: Ongoing, Progressing     Problem: Malnutrition  Goal: Improved Nutritional Intake  Outcome: Ongoing, Progressing   Goal Outcome Evaluation:

## 2022-09-14 NOTE — PROVIDER NOTIFICATION
"Potassium 3.0- Rossana Frances notified. K IV ordered and now infusing at slow rate per pt tolerance as IV site \"burns\" - MD aware.  " dad came to get me to tell me baby was breathing irregularly now.  I went to bedside and observed a normal breathing pattern for an infant falling asleep   baby takes some deep breaths than makes a few noises and then fell asleep

## 2022-09-14 NOTE — PROGRESS NOTES
"GI Progress Note  Colin Baxter  -12     Subjective:   4 BM's since colonoscopy.  Had solid food this morning for breakfast.  C/O fagitue and feeling wiped-out.     Objective:   /66 (BP Location: Right arm)   Pulse 74   Temp 98.1  F (36.7  C) (Oral)   Resp 16   Ht 1.676 m (5' 6\")   Wt 79.8 kg (176 lb)   SpO2 96%   BMI 28.41 kg/m    Body mass index is 28.41 kg/m .   Gen: No acute distress  Cardio: RRR  GI: Non-distended, BS positive, soft, non-tender. No guarding.    Laboratory  Recent Labs   Lab 09/14/22 0619 09/13/22  0536 09/12/22  0612   WBC 13.9* 14.1* 14.9*   RBC 4.11* 4.37* 4.26*   HGB 13.4 14.1 13.9   HCT 39.0* 42.3 41.2   MCV 95 97 97   MCH 32.6 32.3 32.6   MCHC 34.4 33.3 33.7   RDW 13.9 14.0 13.9    419 378      Recent Labs   Lab 09/14/22  0619 09/13/22  0536 09/12/22  0612    132* 133*   CO2 12* 12* 12*   BUN 22 26 24     Recent Labs   Lab 09/13/22  0536 09/10/22  1613   ALKPHOS 66 69   AST 15 15   ALT <9 <9     MR Abdomen MRCP w/o & w Contrast    Result Date: 9/11/2022  EXAM: MR ABDOMEN MRCP W/O and W CONTRAST LOCATION: [facility] DATE/TIME: 9/11/2022 3:05 PM INDICATION: Pancreas eval  Mass COMPARISON: CT 09/10/2022 at 1757 hours TECHNIQUE: Routine MR liver/pancreas protocol including axial and coronal MRCP sequences. 2D and 3D reconstruction performed by MR technologist including MIP reconstruction and slab cholangiograms. If performed with contrast, additional dynamic T1 post IV contrast images. CONTRAST: 7ml gadavist FINDINGS: MRCP: Normal gallbladder. No bile duct dilatation. No biliary filling defects or strictures. Normal caliber of the pancreatic duct. LIVER: Normal. PANCREAS: There is a 2.0 x 1.6 cm focus in the pancreatic body which corresponds with the finding on the recent CT study (series 13 image 24, for example). This is hypointense to the adjacent pancreatic parenchyma on precontrast imaging, is hypointense on arterial phase imaging, " and is isointense on later postcontrast imaging. This also appears to exhibit diffusion restriction.  Otherwise the pancreatic parenchyma is normal in appearance. ADDITIONAL FINDINGS: Left kidney is not identified. Mesenteric lymph nodes measure up to 7 mm. There is a 6 mm skin lesion along the posterior left abdominal wall.     IMPRESSION: A 2 cm focus in the pancreatic body is nonspecific though could be sequela of history of pancreatitis. Malignancy cannot be excluded. Recommend further evaluation, perhaps with tissue sampling. At a minimum, 3 month follow-up MRI is recommended. [Access Center: Pancreatic lesion. Recommend further evaluation. ] This report will be copied to the [facility] to ensure a provider acknowledges the finding. Access Center is available Monday through Friday 8am-3:30 pm.     CT Abdomen Pelvis w Contrast    Result Date: 9/10/2022  EXAM: CT ABDOMEN PELVIS W CONTRAST LOCATION: [facility] DATE/TIME: 9/10/2022 6:15 PM INDICATION: Diarrhea for 10 days. COMPARISON: 05/23/2022. TECHNIQUE: CT scan of the abdomen and pelvis was performed following injection of IV contrast. Multiplanar reformats were obtained. Dose reduction techniques were used. CONTRAST: isovue 370 75ml FINDINGS: LOWER CHEST: Unremarkable. HEPATOBILIARY: Normal. PANCREAS: Subtle hypoattenuating area in the pancreatic body, measuring 2.1 x 1.5 cm (series 2, image 29). This does not appear to be present on prior exams. No associated main pancreatic duct dilatation. No adjacent fat stranding. SPLEEN: Normal. ADRENAL GLANDS: Normal. KIDNEYS/BLADDER: Status post left nephrectomy. Right kidney appears normal. No hydronephrosis. Urinary bladder is mostly decompressed. BOWEL: No obstruction or inflammatory change. Appendix not visualized, although no secondary signs of acute appendicitis. LYMPH NODES: No lymphadenopathy. VASCULATURE: Unremarkable. PELVIC ORGANS: Unremarkable. MUSCULOSKELETAL:  Multilevel degenerative changes of the spine.     IMPRESSION: 1.  No acute findings in the abdomen or pelvis. 2.  Subtle new 2.1 cm hypoattenuating area in the pancreatic body, without ductal dilatation, nonspecific. This may be inflammatory or sequela of prior pancreatitis, although underlying lesion not excluded. Consider correlation with contrast-enhanced MRI. Findings in impression #2 were communicated to Dr. Lexi Cárdenas over the telephone by Dr. Lin at 9/10/2022 6:37 PM.      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    Had colonoscopy yesterday showing mild colitis - path pending.  Villous atrophy or pancreas insufficiency other possibilities for cause of diarrhea.    Stool studies neg -- C difficile, O&P, multiplex PCR.   2) Pancreas mass - 2.1 cm.No duct dilation. Radiology feels this could be sequelae of prior pancreatitis. Had elevated lipase 8/29/2022 in 800's. Otherwise, felt could be inflammatory.    Other DDx: mets from urothelial source; neuroendocrine   3) Urothelial cancer. 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 On Nivolumab x 1 year.   4) GERD - PPI.    Patient Active Problem List   Diagnosis     Glaucoma     Bladder cancer (H)     Obesity (BMI 35.0-39.9) with comorbidity (H)     DM type 2, Hgb A1C 8.6 on 8/26/22     Ureter cancer (H)     Hyperlipidemia     CKD (chronic kidney disease) stage 4, GFR 15-29 ml/min (H)     Carcinoma in situ of bladder     Anemia due to stage 4 chronic kidney disease (H)     Acquired hypothyroidism     CRF (chronic renal failure), stage 4 (severe) (H)     Diarrhea     Pancreatic lesion, 2.1 cm on Abd CT     Dehydration     Pancreatic mass     Decreased oral intake     Diarrhea, unspecified type      Plan:   1) Path pending from colonoscopy.    2) OK for diet from GI perspective.  3) EUS as outpatient - can get duodenal biospies at that time if path from  colon was neg and there is concern for villous atrophy.     Discussed with Dr Zimmerman.  Thank you.  Cachorro Marie PA-C  [facility]  [phone]

## 2022-09-14 NOTE — PROGRESS NOTES
[facility]    Medicine Progress Note - Hospitalist Service    Date of Admission:  9/10/2022    Assessment & Plan          Colin Baxter is a 77 year old male admitted with weakness in the setting of subacute diarrhea.  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  He was on a trip to [place] when symptoms began but was not camping etc.  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  --- today stop IV fluids  ---C. difficile, multiplex PCR negative.  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  --- Discussed with GI who is doing follow-up.  ---stop IVF today  ---with weakness post PT/OT    #DM  --- Hyperglycemia noted persistently but had a.m. hypoglycemia when resumed Tresiba back to home dosing  ---Home regimen is Tresiba 20 units daily, today backoff to 15 units daily due to hypoglycemia  --- NovoLog 4 units with meals.   --- Increase  Tresiba back to home dose  ---Continue NovoLog sliding scale  --- Continue Novolog carb count 1:15  --- Diet per GI    Hypokalemia/hypomagnesia  --- replace    #Pancreatic mass  ---2.1 cm hypoattenuating lesion seen in the pancreatic body, sequela of prior pancreatitis versus mass lesion. Ductal dilation not seen.  ---MRCP done 9/11 radiologist advising at minimum 3 month followup imaging, can also consider tissue sampling.   --- GI planning further EUS    #YOU on CKD 4; improved with IV fluid  Metabolic acidosis  Single kidney (left nephrectomy for  cancer)  ---Baseline Cr 1.5-2,   --- stop IVF   ---Likely prerenal from diarrhea.  --- Follow daily lab    #Hyponatremia  --- Resolved, presumed secondary to hypovolemia   --- Stop IVF and recheck in AM    #Metabolic acidosis  --- Persistent   ---anion gap normal   ----likely from diarrhea  --- Monitor daily and consider nephrology consultation    #Hypocalcemia  ---Mild, corrects with low albumin  ---Hydration and support PO intake    #Urothelial cancer  ----Had bladder cancer treated in the past then found to have 2cm tumor on his left ureter, did neoadjuvant chemo with gemcitabine and cisplatin followed by left nephroureterectomy in 2021.   ---Now he is on 1 year course of Nivolumab as adjuvant treatment, has completed 8 of 9 cycles. Has had a lot of side effects from treatment including immune mediated colitis, Shingles and multiple other rashes and so final cycle has been delayed.   ---Recently completed a long steroid taper for the colitis.  --- Last nivolumab 6/8/2022    #Moderate malnutrition  Related to GI illness, poor PO, diarrhea. Cancer treatment could be playing a role too  Hypoalbuminemia noted as well with poor nutrition    #Hypothyroidism  TSH elevated at 46.  Free T4 low   --- Continue on increased Synthroid.  Nivolumab can cause thyroid abnormalities as well (both hypo and hyper)    #GERD, home PPI  #Hyperlipidemia, home statin  #Leukocytosis--suspect related to diarrhea.  Continue treatment per above         Diet: Advance Diet as Tolerated: Clear Liquid Diet    DVT Prophylaxis: start heparin  Santo Catheter: Not present  Central Lines: None  Cardiac Monitoring: None  Code Status: Full Code      Disposition Plan      Expected Discharge Date: 09/16/2022      Destination: home  Discharge Comments: colonoscopy and sx improvement        The patient's care was discussed with the Bedside Nurse and Patient.    Rossana Bravo MD  Hospitalist Service  [facility]  Securely message with  "the [employer] Web Console (learn more here)  Text page via [employer] Paging/Directory         Clinically Significant Risk Factors Present on Admission                # Overweight: Estimated body mass index is 28.41 kg/m  as calculated from the following:    Height as of this encounter: 1.676 m (5' 6\").    Weight as of this encounter: 79.8 kg (176 lb).    # Moderate Malnutrition: based on nutrition assessment     ______________________________________________________________________    Interval History   --- weak  Hypoglycemic overnight on home dose insulin  From [place] - oncologist there  Four stools overnight  No vomiting    Data reviewed today: I reviewed all medications, new labs and imaging results over the last 24 hours. .    Physical Exam   Vital Signs: Temp: 97.9  F (36.6  C) Temp src: Oral BP: 130/67 Pulse: 68   Resp: 17 SpO2: 95 % O2 Device: None (Room air)    Weight: 176 lbs 0 oz  General Appearance: Appears frail, no apparent distress  Respiratory: Clear to auscultation, some pre-sacral edema  Cardiovascular: Regular rate and rhythm without murmurs rubs or gallops  GI: Soft and nontender without hepatosplenomegaly  Skin:1+ lower extremity edema  Other: Neurologically grossly intact without focal deficits appreciated  Psych; flat affect.  Mood euthymic    Data   Recent Labs   Lab 09/14/22  1308 09/14/22  1205 09/14/22  1144 09/14/22  0619 09/13/22  0829 09/13/22  0536 09/12/22  0756 09/12/22  0612 09/10/22  2234 09/10/22  1613   WBC  --   --   --  13.9*  --  14.1*  --  14.9*   < > 13.9*   HGB  --   --   --  13.4  --  14.1  --  13.9   < > 15.3   MCV  --   --   --  95  --  97  --  97   < > 95   PLT  --   --   --  408  --  419  --  378   < > 357   NA  --   --   --  136  --  132*  --  133*   < > 133*   POTASSIUM  --   --   --  3.0*  --  3.5  --  4.3   < > 3.6   CHLORIDE  --   --   --  116*  --  110*  --  110*   < > 106   CO2  --   --   --  12*  --  12*  --  12*   < > 15*   BUN  --   --   --  22  --  26  --  24   < > " 29*   CR  --   --   --  1.81*  --  1.77*  --  1.98*   < > 2.25*   ANIONGAP  --   --   --  8  --  10  --  11   < > 12   MAHSA  --   --   --  6.9*  --  7.2*  --  7.3*   < > 7.5*   GLC 74 60* 68* 52*   < > 254*   < > 251*   < > 230*   ALBUMIN  --   --   --   --   --  2.0*  --   --   --  2.2*   PROTTOTAL  --   --   --   --   --  4.2*  --   --   --  4.8*   BILITOTAL  --   --   --   --   --  0.4  --   --   --  0.6   ALKPHOS  --   --   --   --   --  66  --   --   --  69   ALT  --   --   --   --   --  <9  --   --   --  <9   AST  --   --   --   --   --  15  --   --   --  15   LIPASE  --   --   --   --   --   --   --   --   --  133*    < > = values in this interval not displayed.     No results found for this or any previous visit (from the past 24 hour(s)).

## 2022-09-15 NOTE — PROGRESS NOTES
"Occupational Therapy     09/15/22 1000   Quick Adds   Type of Visit Initial Occupational Therapy Evaluation   Living Environment   People in Home child(sudha), adult  (dtr-works outside of the home; pt will be home alone during the day)   Current Living Arrangements house  (has walk in shower in the basement; tub/shower on main level)   Home Accessibility stairs to enter home;stairs within home   Number of Stairs, Main Entrance   (\"1 or 2\")   Number of Stairs, Within Home, Primary five   Stair Railings, Within Home, Primary railing on right side (ascending)   Living Environment Comments (S)  Pt is from [place] & lives alone. Above info based on dtr's home (plans to stay w/her after DC).   Self-Care   Usual Activity Tolerance good   Current Activity Tolerance fair   Equipment Currently Used at Home none   Activity/Exercise/Self-Care Comment Prior to admit, independent w/ADLs/IADLs; mobility w/o AD   General Information   Onset of Illness/Injury or Date of Surgery 09/10/22   Referring Physician Rossana Braov   Patient/Family Therapy Goal Statement (OT) \"get rid of diarrhea\"  \"I'm weak.\"   Existing Precautions/Restrictions fall   General Observations and Info Per chart:  \"77 year old male admitted with weakness in the setting of subacute diarrhea.  Also he has history of bladder cancer, diabetes, CKD 4, hyperlipidemia\"   Cognitive Status Examination   Orientation Status orientation to person, place and time   Follows Commands WNL   Cognitive Status Comments flat affect   Pain Assessment   Patient Currently in Pain No   Integumentary/Edema   Integumentary/Edema Comments edematous throughout-hands & LEs.   Posture   Posture not impaired   Range of Motion Comprehensive   General Range of Motion no range of motion deficits identified   Strength Comprehensive (MMT)   Comment, General Manual Muscle Testing (MMT) Assessment generalized overall weakness; fatigues easily.   Coordination   Fine Motor Coordination R hand dominant; " "edema in hands; using w/o apparent difficulty.   Bed Mobility   Comment (Bed Mobility) SBA w/HOB elevated & use of bedrail   Transfers   Transfer Comments SBA   Balance   Balance Comments moving slowly; needing HHA for mob. to bathroom. Pt states he feels \"weak\".   Lower Body Dressing Assessment/Training   Comment, (Lower Body Dressing) mod A   Grooming Assessment/Training   Comment, (Grooming) SBA-washing hands @ sink; slightly SOB & tired.   Toileting   Comment, (Toileting) SBA   Clinical Impression   Criteria for Skilled Therapeutic Interventions Met (OT) Yes, treatment indicated   OT Diagnosis decreased ADLs   OT Problem List-Impairments impacting ADL activity tolerance impaired;balance;mobility;strength   Assessment of Occupational Performance 3-5 Performance Deficits   Identified Performance Deficits activity tolerance, balance, LB drsg   Planned Therapy Interventions (OT) ADL retraining;strengthening;transfer training;progressive activity/exercise;home program guidelines   Clinical Decision Making Complexity (OT) low complexity   Risk & Benefits of therapy have been explained evaluation/treatment results reviewed;care plan/treatment goals reviewed;risks/benefits reviewed;current/potential barriers reviewed;patient   OT Discharge Planning   OT Discharge Recommendation (DC Rec) home with assist   OT Rationale for DC Rec @ this time, d/t weakness & decreased endurance, would rec. 24 hr supervision-anticipate pt will progress to where he could be home alone during the day while his dtr is @ work.   Total Evaluation Time (Minutes)   Total Evaluation Time (Minutes) 10   OT Goals   Therapy Frequency (OT) Daily   OT Predicted Duration/Target Date for Goal Attainment 09/22/22   OT Goals Hygiene/Grooming;Lower Body Dressing;Transfers;Aerobic Activity   OT: Hygiene/Grooming modified independent   OT: Lower Body Dressing Modified independent   OT: Transfer Modified independent   OT: Perform aerobic activity with stable " cardiovascular response intermittent activity;15 minutes

## 2022-09-15 NOTE — PROGRESS NOTES
2:37 PM  Chart reviewed. SW spoke with Hospitalist who anticipated discharge for Monday 9/19. PT/OT consults ordered. OT recommending home with assist. Pt lives alone in [place] but plans to stay with his daugher at discharge. CM to follow for PT recommendations and progression of care.    ANNALEE Eagle

## 2022-09-15 NOTE — PLAN OF CARE
Goal Outcome Evaluation:    Problem: Diarrhea  Goal: Fluid and Electrolyte Balance  Outcome: Ongoing, Not Progressing  Intervention: Manage Diarrhea  Recent Flowsheet Documentation  Taken 9/15/2022 1008 by Anita Helm RN  Medication Review/Management: medications reviewed  Continues to have watery stools per patient report, they were not visualized by this nurse.    Problem: Diabetes Comorbidity  Goal: Blood Glucose Level Within Targeted Range  Outcome: Ongoing, Not Progressing  Low blood sugars this afternoon of 62, 57, 57, 62 and finally 124. He was initially treated with juice x3, once IV access was restored he was given D50. Pt denies any symptoms of hypoglycemia. Dr. Bravo was updated at the bedside about his blood sugar being low.     PT/OT following  Daughter present at bedside this afternoon

## 2022-09-15 NOTE — PROGRESS NOTES
"GI Progress Note  Colin Baxter  -77     Subjective:   Still with diarrhea, even though the night.   Fatigued, understandably.  Still awaiting path - which was sent as STAT.    Tolerating diet.    Objective:   /65 (BP Location: Right arm)   Pulse 76   Temp 97.6  F (36.4  C) (Oral)   Resp 18   Ht 1.676 m (5' 6\")   Wt 79.8 kg (176 lb)   SpO2 96%   BMI 28.41 kg/m    Body mass index is 28.41 kg/m .   Gen: No acute distress  Cardio: RRR  GI: Non-distended, BS positive, soft, non-tender. No guarding.    Laboratory  Recent Labs   Lab 09/15/22  0936 09/14/22 0619 09/13/22  0536   WBC 16.0* 13.9* 14.1*   RBC 3.96* 4.11* 4.37*   HGB 12.8* 13.4 14.1   HCT 37.7* 39.0* 42.3   MCV 95 95 97   MCH 32.3 32.6 32.3   MCHC 34.0 34.4 33.3   RDW 14.1 13.9 14.0    408 419      Recent Labs   Lab 09/15/22  0936 09/14/22 0619 09/13/22  0536   * 136 132*   CO2 15* 12* 12*   BUN 20 22 26     Recent Labs   Lab 09/13/22  0536 09/10/22  1613   ALKPHOS 66 69   AST 15 15   ALT <9 <9     MR Abdomen MRCP w/o & w Contrast    Result Date: 9/11/2022  EXAM: MR ABDOMEN MRCP W/O and W CONTRAST LOCATION: [facility] DATE/TIME: 9/11/2022 3:05 PM INDICATION: Pancreas eval  Mass COMPARISON: CT 09/10/2022 at 1757 hours TECHNIQUE: Routine MR liver/pancreas protocol including axial and coronal MRCP sequences. 2D and 3D reconstruction performed by MR technologist including MIP reconstruction and slab cholangiograms. If performed with contrast, additional dynamic T1 post IV contrast images. CONTRAST: 7ml gadavist FINDINGS: MRCP: Normal gallbladder. No bile duct dilatation. No biliary filling defects or strictures. Normal caliber of the pancreatic duct. LIVER: Normal. PANCREAS: There is a 2.0 x 1.6 cm focus in the pancreatic body which corresponds with the finding on the recent CT study (series 13 image 24, for example). This is hypointense to the adjacent pancreatic parenchyma on precontrast imaging, is " hypointense on arterial phase imaging, and is isointense on later postcontrast imaging. This also appears to exhibit diffusion restriction.  Otherwise the pancreatic parenchyma is normal in appearance. ADDITIONAL FINDINGS: Left kidney is not identified. Mesenteric lymph nodes measure up to 7 mm. There is a 6 mm skin lesion along the posterior left abdominal wall.     IMPRESSION: A 2 cm focus in the pancreatic body is nonspecific though could be sequela of history of pancreatitis. Malignancy cannot be excluded. Recommend further evaluation, perhaps with tissue sampling. At a minimum, 3 month follow-up MRI is recommended. [Access Center: Pancreatic lesion. Recommend further evaluation. ] This report will be copied to the [facility] to ensure a provider acknowledges the finding. Access Center is available Monday through Friday 8am-3:30 pm.     CT Abdomen Pelvis w Contrast    Result Date: 9/10/2022  EXAM: CT ABDOMEN PELVIS W CONTRAST LOCATION: [facility] DATE/TIME: 9/10/2022 6:15 PM INDICATION: Diarrhea for 10 days. COMPARISON: 05/23/2022. TECHNIQUE: CT scan of the abdomen and pelvis was performed following injection of IV contrast. Multiplanar reformats were obtained. Dose reduction techniques were used. CONTRAST: isovue 370 75ml FINDINGS: LOWER CHEST: Unremarkable. HEPATOBILIARY: Normal. PANCREAS: Subtle hypoattenuating area in the pancreatic body, measuring 2.1 x 1.5 cm (series 2, image 29). This does not appear to be present on prior exams. No associated main pancreatic duct dilatation. No adjacent fat stranding. SPLEEN: Normal. ADRENAL GLANDS: Normal. KIDNEYS/BLADDER: Status post left nephrectomy. Right kidney appears normal. No hydronephrosis. Urinary bladder is mostly decompressed. BOWEL: No obstruction or inflammatory change. Appendix not visualized, although no secondary signs of acute appendicitis. LYMPH NODES: No lymphadenopathy. VASCULATURE: Unremarkable. PELVIC ORGANS:  Unremarkable. MUSCULOSKELETAL: Multilevel degenerative changes of the spine.     IMPRESSION: 1.  No acute findings in the abdomen or pelvis. 2.  Subtle new 2.1 cm hypoattenuating area in the pancreatic body, without ductal dilatation, nonspecific. This may be inflammatory or sequela of prior pancreatitis, although underlying lesion not excluded. Consider correlation with contrast-enhanced MRI. Findings in impression #2 were communicated to Dr. Lexi Cárdenas over the telephone by Dr. Lin at 9/10/2022 6:37 PM.      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    Had colonoscopy 9/13 showing mild colitis - path pending; awaiting answers if this is immune mediated colitis.  Villous atrophy or pancreas insufficiency are other possibilities for cause of diarrhea.    Stool studies neg -- C difficile, O&P, multiplex PCR.   2) Pancreas mass - 2.1 cm.No duct dilation. Radiology feels this could be sequelae of prior pancreatitis. Had elevated lipase 8/29/2022 in 800's. Otherwise, felt could be inflammatory.    Other DDx: mets from urothelial source; neuroendocrine   3) Urothelial cancer. 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 On Nivolumab x 1 year.   4) GERD - PPI.    Patient Active Problem List   Diagnosis     Glaucoma     Bladder cancer (H)     Obesity (BMI 35.0-39.9) with comorbidity (H)     DM type 2, Hgb A1C 8.6 on 8/26/22     Ureter cancer (H)     Hyperlipidemia     CKD (chronic kidney disease) stage 4, GFR 15-29 ml/min (H)     Carcinoma in situ of bladder     Anemia due to stage 4 chronic kidney disease (H)     Acquired hypothyroidism     CRF (chronic renal failure), stage 4 (severe) (H)     Diarrhea     Pancreatic lesion, 2.1 cm on Abd CT     Dehydration     Pancreatic mass     Decreased oral intake     Diarrhea, unspecified type      Plan:   1) Path still pending from colonoscopy.    2) CHO controled  diet.  3) EUS as outpatient - can get duodenal biospies at that time if path from colon was neg and there is concern for villous atrophy.     Discussed with Dr Zimmerman.  Thank you.  Cachorro Marie PA-C  [facility]  [phone]

## 2022-09-15 NOTE — PROGRESS NOTES
[facility]    Medicine Progress Note - Hospitalist Service    Date of Admission:  9/10/2022    Assessment & Plan          Colin Baxter is a 77 year old male admitted with weakness in the setting of subacute diarrhea.  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  ---off IV fluids since 9/14 - monitor closely as may need restart  --- Happened after working on septic tank in [place] but no clear infectious source found ;  ---C. difficile, multiplex PCR negative.  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  --- Discussed with GI who is doing follow-up; they would like to await biopsies before starting treatment  --- Post oncology for input  ---with weakness post PT/OT    #DM  --- Hyperglycemia noted persistently earlier in hospital stay   --- Increase Tresiba to home dosing and hypoglycemia.  Still hypoglycemia today despite decrease of Tresiba dose  ---decrease tresiba again 15 - 12  ---Home regimen is Tresiba 20 units daily,   --- NovoLog 4 units with meals.   --- Increase  Tresiba back to home dose  ---Continue NovoLog sliding scale  --- Continue Novolog carb count 1:15  --- Diabetic diet    Hypokalemia/hypomagnesia  --- replace    #Pancreatic mass  ---2.1 cm hypoattenuating lesion seen in the pancreatic body, sequela of prior pancreatitis versus mass lesion. Ductal dilation not seen.  ---MRCP done 9/11 radiologist advising at minimum 3 month followup imaging, can also  consider tissue sampling.   --- GI planning further EUS    #YOU on CKD 4; improved with IV fluid  Metabolic acidosis  Single kidney (left nephrectomy for cancer)  ---Baseline Cr 1.5-2,   ---slightly worse without fluids now and diarrhea - resume LR  ---Likely prerenal from diarrhea.  --- Follow daily lab    #Hyponatremia  --- Slightly worse since stopping IV fluid after had resolved  --- Initially presumed secondary to hypovolemia   --- Resume IV fluids with LR recheck in AM    #Metabolic acidosis  --- Mildly improved  ---anion gap normal   ----likely from diarrhea  --- Monitor daily  ---consider nephrology consultation    #Hypocalcemia  ---Mild, corrects with low albumin  ---Hydration and support PO intake    #Urothelial cancer  ----Had bladder cancer treated in the past then found to have 2cm tumor on his left ureter, did neoadjuvant chemo with gemcitabine and cisplatin followed by left nephroureterectomy in 2021.   ---Now he is on 1 year course of Nivolumab as adjuvant treatment, has completed 8 of 9 cycles. Has had a lot of side effects from treatment including immune mediated colitis, Shingles and multiple other rashes and so final cycle has been delayed.   ---Recently completed a long steroid taper for the colitis.  --- Last nivolumab 6/8/2022    #Moderate malnutrition  Related to GI illness, poor PO, diarrhea. Cancer treatment could be playing a role too  Hypoalbuminemia noted as well with poor nutrition    #Hypothyroidism  TSH elevated at 46.  Free T4 low   --- Continue on increased Synthroid.  ---Nivolumab can cause thyroid abnormalities as well (both hypo and hyper)    #GERD, home PPI  #Hyperlipidemia, home statin  #Leukocytosis--suspect related to diarrhea.  Continue treatment per above         Diet: Combination Diet Regular Diet; Moderate Consistent Carb (60 g CHO per Meal) Diet; Low Lactose Diet  Snacks/Supplements Adult: Nepro Oral Supplement; Between Meals    DVT Prophylaxis:  Heparin sub q  Santo  "Catheter: Not present  Central Lines: None  Cardiac Monitoring: None  Code Status: Full Code      Disposition Plan      Expected Discharge Date: 09/19/2022      Destination: home  Discharge Comments: colonoscopy and sx improvement        The patient's care was discussed with the Bedside Nurse and Patient.    Rossana Bravo MD  Hospitalist Service  [facility]  Securely message with the Vocera Web Console (learn more here)  Text page via [employer] Paging/Directory         Clinically Significant Risk Factors Present on Admission                # Overweight: Estimated body mass index is 28.41 kg/m  as calculated from the following:    Height as of this encounter: 1.676 m (5' 6\").    Weight as of this encounter: 79.8 kg (176 lb).    # Moderate Malnutrition: based on nutrition assessment     ______________________________________________________________________    Interval History   --- weak  --- Still hyperglycemic despite insulin changes  --- Minimal appetite  --- Diarrhea becoming more frequent again  --- Denies nausea or vomiting  --- No abdominal pain or cramping  --- Watery stools without melena or hematochezia    Data reviewed today: I reviewed all medications, new labs and imaging results over the last 24 hours. .    Physical Exam   Vital Signs: Temp: 97.6  F (36.4  C) Temp src: Oral BP: 103/65 Pulse: 76   Resp: 18 SpO2: 96 % O2 Device: None (Room air)    Weight: 176 lbs 0 oz  General Appearance: Appears frail, no apparent distress  Respiratory: Clear to auscultation, some pre-sacral edema  Cardiovascular: Regular rate and rhythm without murmurs rubs or gallops  GI: Soft and nontender without hepatosplenomegaly  Skin:1+ diffuse extremity edema  Other: Neurologically grossly intact without focal deficits appreciated  Psych; flat affect.  Mood euthymic    Data   Recent Labs   Lab 09/15/22  1442 09/15/22  1406 09/15/22  1346 09/15/22  1319 09/15/22  0936 09/15/22  0616 09/15/22  0136 09/14/22  0758 " 09/14/22  0619 09/13/22  0829 09/13/22  0536 09/10/22  2234 09/10/22  1613   WBC  --   --   --   --  16.0*  --   --   --  13.9*  --  14.1*   < > 13.9*   HGB  --   --   --   --  12.8*  --   --   --  13.4  --  14.1   < > 15.3   MCV  --   --   --   --  95  --   --   --  95  --  97   < > 95   PLT  --   --   --   --  369  --   --   --  408  --  419   < > 357   NA  --   --   --   --  135*  --   --   --  136  --  132*   < > 133*   POTASSIUM  --   --   --   --  3.4*  3.4*  --  3.3*  --  3.0*  --  3.5   < > 3.6   CHLORIDE  --   --   --   --  117*  --   --   --  116*  --  110*   < > 106   CO2  --   --   --   --  15*  --   --   --  12*  --  12*   < > 15*   BUN  --   --   --   --  20  --   --   --  22  --  26   < > 29*   CR  --   --   --   --  1.92*  --   --   --  1.81*  --  1.77*   < > 2.25*   ANIONGAP  --   --   --   --  3*  --   --   --  8  --  10   < > 12   MAHSA  --   --   --   --  7.0*  --   --   --  6.9*  --  7.2*   < > 7.5*   GLC 62* 57* 57*   < > 67*   < >  --    < > 52*   < > 254*   < > 230*   ALBUMIN  --   --   --   --   --   --   --   --   --   --  2.0*  --  2.2*   PROTTOTAL  --   --   --   --   --   --   --   --   --   --  4.2*  --  4.8*   BILITOTAL  --   --   --   --   --   --   --   --   --   --  0.4  --  0.6   ALKPHOS  --   --   --   --   --   --   --   --   --   --  66  --  69   ALT  --   --   --   --   --   --   --   --   --   --  <9  --  <9   AST  --   --   --   --   --   --   --   --   --   --  15  --  15   LIPASE  --   --   --   --   --   --   --   --   --   --   --   --  133*    < > = values in this interval not displayed.     No results found for this or any previous visit (from the past 24 hour(s)).

## 2022-09-16 NOTE — PLAN OF CARE
Problem: Malnutrition  Goal: Improved Nutritional Intake  Outcome: Ongoing, Not Progressing   Goal Outcome Evaluation:      Pt eating very small amounts of po intake at a time.  He has not yet tried the nepro but will try it today. Plan of care reviewed with patient

## 2022-09-16 NOTE — PROGRESS NOTES
[facility]    Medicine Progress Note - Hospitalist Service    Date of Admission:  9/10/2022    Assessment & Plan          Colin Baxter is a 77 year old male admitted with weakness in the setting of subacute diarrhea.  Also he has history of bladder cancer, diabetes, CKD 4, hyperlipidemia     #Subacute diarrhea  --- recurrent Nivolumab induced colitis; biopsy pathology consistent with checkpoint colitis   ---last dose nivolumab (cycle 8) was on 6/8/2022    --- Discussed with pharmacist previous dosing of prednisone.  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  --- Happened after working on septic tank in [place] but no clear infectious source found ;  ---C. difficile, multiplex PCR negative.  Stool ova and parasite negative; TSH elevated  --- s/p colonoscopy 9/13 showing mild colitis suggestive of checkpoint inhibitor induced colitis with biopsies pending.  ---PT/OT following  --- GI following  --- Continue IV fluids another day until diarrhea starts to slow down.  He has not tolerated stop IV fluids with worsening YOU and borderline hypotension    #DM  --- Less hypoglycemia today   --- Previously hyperglycemia noted persistently earlier in hospital stay; did not tolerate home dosing of Tresiba with significant hypoglycemia  --- Appears stable on Tresiba 12 units   ---Home regimen is Tresiba 20 units daily,   --- NovoLog 4 units with meals.   ---Continue NovoLog sliding scale  --- Continue Novolog carb count 1:15  --- Diabetic diet    Hypokalemia/hypomagnesia  --- replace    #Pancreatic mass  ---2.1 cm hypoattenuating lesion seen in  the pancreatic body, sequela of prior pancreatitis versus mass lesion. Ductal dilation not seen.  ---MRCP done 9/11 radiologist advising at minimum 3 month followup imaging, can also consider tissue sampling.   --- GI planning further EUS; they will schedule this within few weeks after discharge    #YOU on CKD 4; improved with IV fluid  Metabolic acidosis  Single kidney (left nephrectomy for cancer)  ---Baseline Cr 1.5-2,   ---slightly worse without fluids now and diarrhea - resume LR  ---Likely prerenal from diarrhea.  --- Follow daily lab    #Hyponatremia  --- Remained stable.  --- Initially presumed secondary to hypovolemia   --- Continue IV fluids with LR recheck in AM    #Metabolic acidosis  --- Stable   ---anion gap normal   ----likely from diarrhea  --- Monitor daily  --- If worsens consider nephrology consultation    #Hypocalcemia  ---Mild, corrects with low albumin  ---Hydration and support PO intake    #Urothelial cancer  ----Appears intolerant of nivolumab per above   ---had bladder cancer treated in the past then found to have 2cm tumor on his left ureter, did neoadjuvant chemo with gemcitabine and cisplatin followed by left nephroureterectomy in 2021.   ---Now he is on 1 year course of Nivolumab as adjuvant treatment, has completed 8 of 9 cycles. Has had a lot of side effects from treatment including immune mediated colitis, Shingles and multiple other rashes and so final cycle has been delayed.   --- Last nivolumab 6/8/2022; completed 8 of 9 treatments.  --- Posted oncology.  I doubt he will tolerate nivolumab in the future.  Await any further input they have on this.    #Moderate malnutrition  Related to GI illness, poor PO, diarrhea. Cancer treatment could be playing a role too  Hypoalbuminemia noted as well with poor nutrition    #Hypothyroidism  TSH elevated at 46.  Free T4 low   --- Continue on increased Synthroid.  ---Nivolumab can cause thyroid abnormalities as well (both hypo and  hyper)    #GERD, home PPI  #Hyperlipidemia, home statin  #Leukocytosis--suspect related to colitis/diarrhea.  Continue treatment per above         Diet: Combination Diet Regular Diet; Moderate Consistent Carb (60 g CHO per Meal) Diet; Low Lactose Diet  Snacks/Supplements Adult: Nepro Oral Supplement; Between Meals    DVT Prophylaxis:  Heparin sub q  Santo Catheter: Not present  Central Lines: None  Cardiac Monitoring: None  Code Status: Full Code      Disposition Plan      Expected Discharge Date: 09/19/2022      Destination: home  Discharge Comments: colonoscopy and sx improvement        The patient's care was discussed with the Bedside Nurse and Patient.    Rossana Bravo MD  Hospitalist Service  [facility]  Securely message with the Vocera Web Console (learn more here)  Text page via [employer] Paging/Directory         Clinically Significant Risk Factors Present on Admission                   # Moderate Malnutrition: based on nutrition assessment     ______________________________________________________________________    Interval History   --- Still weak  -- 8 stools a day  --- Some nausea intermittently although this was improved today  --- No significant abdominal pain  --- Understands start of prednisone.  --- Appetite not great.    Data reviewed today: I reviewed all medications, new labs and imaging results over the last 24 hours. .    Physical Exam   Vital Signs: Temp: 98.5  F (36.9  C) Temp src: Oral BP: 101/57 Pulse: 71   Resp: 18 SpO2: 97 % O2 Device: None (Room air)    Weight: 176 lbs 4.8 oz  General Appearance: Appears frail, no apparent distress  Respiratory: Clear to auscultation, some pre-sacral edema  Cardiovascular: Regular rate and rhythm without murmurs rubs or gallops  GI: Soft and nontender without hepatosplenomegaly  Skin:1+ diffuse extremity edema  Other: Neurologically grossly intact without focal deficits appreciated  Psych; flat affect.  Mood euthymic    Data   Recent  Labs   Lab 09/16/22  1212 09/16/22  1140 09/16/22  0723 09/16/22  0611 09/16/22  0010 09/15/22  2031 09/15/22  1730 09/15/22  1319 09/15/22  0936 09/14/22  0758 09/14/22  0619 09/13/22  0829 09/13/22  0536 09/10/22  2234 09/10/22  1613   WBC  --   --   --  15.8*  --   --   --   --  16.0*  --  13.9*  --  14.1*   < > 13.9*   HGB  --   --   --  13.2*  --   --   --   --  12.8*  --  13.4  --  14.1   < > 15.3   MCV  --   --   --  95  --   --   --   --  95  --  95  --  97   < > 95   PLT  --   --   --  404  --   --   --   --  369  --  408  --  419   < > 357   NA  --   --   --  133*  --   --   --   --  135*  --  136  --  132*   < > 133*   POTASSIUM  --   --   --  3.7 3.5  --  3.2*  --  3.4*  3.4*   < > 3.0*  --  3.5   < > 3.6   CHLORIDE  --   --   --  116*  --   --   --   --  117*  --  116*  --  110*   < > 106   CO2  --   --   --  12*  --   --   --   --  15*  --  12*  --  12*   < > 15*   BUN  --   --   --  18  --   --   --   --  20  --  22  --  26   < > 29*   CR  --   --   --  1.81*  --   --   --   --  1.92*  --  1.81*  --  1.77*   < > 2.25*   ANIONGAP  --   --   --  5  --   --   --   --  3*  --  8  --  10   < > 12   MAHSA  --   --   --  7.0*  --   --   --   --  7.0*  --  6.9*  --  7.2*   < > 7.5*   * 57* 72 77  --    < >  --    < > 67*   < > 52*   < > 254*   < > 230*   ALBUMIN  --   --   --  1.6*  --   --   --   --   --   --   --   --  2.0*  --  2.2*   PROTTOTAL  --   --   --  3.8*  --   --   --   --   --   --   --   --  4.2*  --  4.8*   BILITOTAL  --   --   --  0.4  --   --   --   --   --   --   --   --  0.4  --  0.6   ALKPHOS  --   --   --  59  --   --   --   --   --   --   --   --  66  --  69   ALT  --   --   --  <9  --   --   --   --   --   --   --   --  <9  --  <9   AST  --   --   --  21  --   --   --   --   --   --   --   --  15  --  15   LIPASE  --   --   --   --   --   --   --   --   --   --   --   --   --   --  133*    < > = values in this interval not displayed.     No results found for this or any previous  visit (from the past 24 hour(s)).

## 2022-09-16 NOTE — PROGRESS NOTES
"CLINICAL NUTRITION SERVICES - REASSESSMENT NOTE     Nutrition Prescription    RECOMMENDATIONS FOR MDs/PROVIDERS TO ORDER:    Malnutrition Status:    Moderate in the context of chronic illness    Recommendations already ordered by Registered Dietitian (RD):  Nepro ordered bid-follow for intake/acceptance    Future/Additional Recommendations:  Po intake, weight, labs, poc     EVALUATION OF THE PROGRESS TOWARD GOALS   Diet: moderate consistent CHO (60 g CHO with each meal), low lactose  Nutrition Supplements: Nepro bid between meals at 10 am and 8 pm  Intake: 100% breakfast this am (oatmeal and toast), 75% dinner on 9/15, 50% lunch on 9/15=377 Calories and 19 g protein. Pt states he has not yet tried the nepro supplements but will today  (2 Nepro/day=840 rah and 38 g pro)     NEW FINDINGS   Pt continues to C/O early satiety/fullness    ANTHROPOMETRICS  Height: 167.6 cm (5' 6\")  Most Recent Weight: 79.8 kg (176 lb)    Weight History:   Wt Readings from Last 10 Encounters:   09/11/22 79.8 kg (176 lb)   08/26/22 78.1 kg (172 lb 2 oz)   08/26/22 78.1 kg (172 lb 2 oz)   08/03/22 78.8 kg (173 lb 11.6 oz)   08/03/22 78.8 kg (173 lb 11.6 oz)   07/15/22 81 kg (178 lb 8 oz)   07/07/22 81.7 kg (180 lb 1.9 oz)   06/08/22 81 kg (178 lb 9.2 oz)   05/25/22 81.2 kg (179 lb)   05/25/22 79.4 kg (175 lb 0.7 oz)     GI CONCERNS  Abdomen rounded w/ normoactive BS  Diarrhea/ nausea  Last BM 9/15/2022 per nurse (watery brown, green)    LABS  Reviewed: Na 133, K 3.7, Cr 1.81, calcium 7.0, Mg 1.6, alb 1.6, Glu 77,72    MEDICATIONS  Reviewed: lipitor, novolog, tresiba, levothyroxine, pantoprazole    Malnutrition Diagnosis: Moderate malnutrition  In Context of:  Chronic illness or disease    CURRENT NUTRITION DIAGNOSIS  Malnutrition related to chronic illness as evidenced by decreased oral intake and moderate muscle loss      INTERVENTIONS  Implementation  Nepro has been added bid between meals  Encouraged po intake and to try the nepro even if " just a small amount    Goals  Diet advancement-met  Meet nutritional needs-not met    Monitoring/Evaluation  Progress toward goals will be monitored and evaluated per protocol.

## 2022-09-16 NOTE — PLAN OF CARE
Shift from 1500 to 2330-    Goal Outcome Evaluation:    Plan of Care Reviewed With: patient     Overall Patient Progress: no change  See previous note.   Patient nauseated the first half of shift; Given zofran and compazine; updated hospitalist. Ate well for dinner. Then updated MD that patient still having intermittent nausea; Zofran changed to every 4hrs prn.     IVF started at beginning of shift per order, later rate increased per order..     Patient having watery diarrhea x3;     K+ protocol rechecked and was 3.2 at beginning of shift; Given Kdur.  Recheck is at 0015;

## 2022-09-16 NOTE — PROGRESS NOTES
09/16/22 1025   Quick Adds   Type of Visit Initial PT Evaluation   Living Environment   People in Home child(sudha), adult  (Pt's daughter does work in the day.)   Current Living Arrangements house  (split level entry.)   Home Accessibility stairs to enter home   Number of Stairs, Main Entrance 1   Stair Railings, Main Entrance none   Number of Stairs, Within Home, Primary three   Stair Railings, Within Home, Primary railing on right side (ascending)   Living Environment Comments Pt p;ans to stay with his daughter at [place].  Information above on the daughters home.   Self-Care   Usual Activity Tolerance good   Current Activity Tolerance fair   Equipment Currently Used at Home none   Fall history within last six months no   Activity/Exercise/Self-Care Comment Pt is indep with all mobility and does drive.  Indep with ADLs   General Information   Onset of Illness/Injury or Date of Surgery 09/10/22   Referring Physician Rossana Bravo   Patient/Family Therapy Goals Statement (PT) Pt plans to go home to her duaghter's home   Pertinent History of Current Problem (include personal factors and/or comorbidities that impact the POC) Per H%P Colin Baxter is a 77 year old male admitted with weakness in the setting of subacute diarrhea.  Also he has history of bladder cancer, diabetes, CKD 4, hyperlipidemia   Weight-Bearing Status - LLE weight-bearing as tolerated   Weight-Bearing Status - RLE weight-bearing as tolerated   Cognition   Orientation Status (Cognition) oriented to;person;place;time   Cognitive Status Comments Pt is sleepy.   Pain Assessment   Patient Currently in Pain No   Integumentary/Edema   Integumentary/Edema Comments Increasededema bilat LEs and hands.   Range of Motion (ROM)   Range of Motion ROM is WNL  (LEs)   Strength (Manual Muscle Testing)   Strength (Manual Muscle Testing) strength is WNL  (LEs)   Bed Mobility   Comment, (Bed Mobility) Supine<>sit with SBA with pt needing to sit at the EOB for a few minutes  due to weakness.   Transfers   Comment, (Transfers) Sit<>stand with no AD with CGA x1.   Gait/Stairs (Locomotion)   [place] Level (Gait) verbal cues;contact guard   Assistive Device (Gait) other (see comments)  (none)   Distance in Feet (Required for LE Total Joints) 60'   Pattern (Gait) swing-through   Deviations/Abnormal Patterns (Gait) gait speed decreased   Comment, (Gait/Stairs) Pt did get tired with walking and needed to sit at the end of walking.  Steps not pat   Balance   Balance Comments CGA with no AD   Sensory Examination   Sensory Perception WNL   Clinical Impression   Criteria for Skilled Therapeutic Intervention Yes, treatment indicated   PT Diagnosis (PT) impaired mobility.   Influenced by the following impairments dec bal, dec endurance, pt is below his PLOF.   Functional limitations due to impairments bed mobility, transfers, gait, steps.   Clinical Presentation (PT Evaluation Complexity) Stable/Uncomplicated   Clinical Presentation Rationale Pt presents medically diagnosed.   Clinical Decision Making (Complexity) moderate complexity   Planned Therapy Interventions (PT) balance training;bed mobility training;gait training;home exercise program;stair training;strengthening;transfer training   Anticipated Equipment Needs at Discharge (PT) cane, straight  (TBD)   Risk & Benefits of therapy have been explained evaluation/treatment results reviewed;care plan/treatment goals reviewed;risks/benefits reviewed;patient;participants voiced agreement with care plan   PT Discharge Planning   PT Discharge Recommendation (DC Rec) home with home care physical therapy   PT Rationale for DC Rec Pt does need CGA with gait and should be able to progress to dc to home with family to assist.  At this time, he will need 24 hour assist due to dec bal and dec endurance. .   Plan of Care Review   Plan of Care Reviewed With patient   Total Evaluation Time   Total Evaluation Time (Minutes) 15   Physical Therapy Goals   PT  Frequency Daily   PT Goals Bed Mobility;Transfers;Gait;Stairs;PT Goal 1   PT: Bed Mobility Independent;Rolling;Supine to/from sit   PT: Transfers Independent;Sit to/from stand;Bed to/from chair   PT: Gait Supervision/stand-by assist;150 feet   PT: Stairs 5 stairs;Minimal assist;Rail on right   PT: Goal 1 Pt to pat bilat LE x 10 to 20 reps to increase strength for mobility.

## 2022-09-16 NOTE — PLAN OF CARE
Nausea intermittently during shift; Given zofran and nausea resolved for about an hour; He ate dinner then nauseated again.  BP hypotensive; Triggered [place];   Updated MD; Bolus started and IVF started on eves and increased rate at this time.   PRN compazine ordered and given; Nausea intermittent still then resolved.

## 2022-09-16 NOTE — PROGRESS NOTES
"GI Progress Note  Colin Baxter  -89     Subjective:   Discussed biopsy findings with pathologist today.  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  Discussed with Dr. Inman, who was helpful in initiating prednisone therapy.  Patient reports his diarrhea persists, but was not quite as bad last night.  He still feels very weak.  I called our office from his room to make sure they get in touch with him or his daughter to set up the endoscopic ultrasound as an outpatient.    Objective:   /57 (BP Location: Left arm)   Pulse 71   Temp 98.5  F (36.9  C) (Oral)   Resp 18   Ht 1.676 m (5' 6\")   Wt 80 kg (176 lb 4.8 oz)   SpO2 97%   BMI 28.46 kg/m    Body mass index is 28.46 kg/m .   Gen: No acute distress  Cardio: RRR  GI: Non-distended, BS positive, soft, non-tender. No guarding.    Laboratory  Recent Labs   Lab 09/16/22  0611 09/15/22  0936 09/14/22  0619   WBC 15.8* 16.0* 13.9*   RBC 4.05* 3.96* 4.11*   HGB 13.2* 12.8* 13.4   HCT 38.4* 37.7* 39.0*   MCV 95 95 95   MCH 32.6 32.3 32.6   MCHC 34.4 34.0 34.4   RDW 14.4 14.1 13.9    369 408      Recent Labs   Lab 09/16/22  0611 09/15/22  0936 09/14/22  0619   * 135* 136   CO2 12* 15* 12*   BUN 18 20 22     Recent Labs   Lab 09/16/22  0611 09/13/22  0536 09/10/22  1613   ALKPHOS 59 66 69   AST 21 15 15   ALT <9 <9 <9     MR Abdomen MRCP w/o & w Contrast    Result Date: 9/11/2022  EXAM: MR ABDOMEN MRCP W/O and W CONTRAST LOCATION: [facility] DATE/TIME: 9/11/2022 3:05 PM INDICATION: Pancreas eval  Mass COMPARISON: CT 09/10/2022 at 1757 hours TECHNIQUE: Routine MR liver/pancreas protocol including axial and coronal MRCP sequences. 2D and 3D reconstruction performed by MR technologist including MIP reconstruction and slab cholangiograms. If performed with contrast, additional dynamic T1 post IV " contrast images. CONTRAST: 7ml gadavist FINDINGS: MRCP: Normal gallbladder. No bile duct dilatation. No biliary filling defects or strictures. Normal caliber of the pancreatic duct. LIVER: Normal. PANCREAS: There is a 2.0 x 1.6 cm focus in the pancreatic body which corresponds with the finding on the recent CT study (series 13 image 24, for example). This is hypointense to the adjacent pancreatic parenchyma on precontrast imaging, is hypointense on arterial phase imaging, and is isointense on later postcontrast imaging. This also appears to exhibit diffusion restriction.  Otherwise the pancreatic parenchyma is normal in appearance. ADDITIONAL FINDINGS: Left kidney is not identified. Mesenteric lymph nodes measure up to 7 mm. There is a 6 mm skin lesion along the posterior left abdominal wall.     IMPRESSION: A 2 cm focus in the pancreatic body is nonspecific though could be sequela of history of pancreatitis. Malignancy cannot be excluded. Recommend further evaluation, perhaps with tissue sampling. At a minimum, 3 month follow-up MRI is recommended. [Access Center: Pancreatic lesion. Recommend further evaluation. ] This report will be copied to the [facility] to ensure a provider acknowledges the finding. Access Center is available Monday through Friday 8am-3:30 pm.     CT Abdomen Pelvis w Contrast    Result Date: 9/10/2022  EXAM: CT ABDOMEN PELVIS W CONTRAST LOCATION: [facility] DATE/TIME: 9/10/2022 6:15 PM INDICATION: Diarrhea for 10 days. COMPARISON: 05/23/2022. TECHNIQUE: CT scan of the abdomen and pelvis was performed following injection of IV contrast. Multiplanar reformats were obtained. Dose reduction techniques were used. CONTRAST: isovue 370 75ml FINDINGS: LOWER CHEST: Unremarkable. HEPATOBILIARY: Normal. PANCREAS: Subtle hypoattenuating area in the pancreatic body, measuring 2.1 x 1.5 cm (series 2, image 29). This does not appear to be present on prior exams. No  associated main pancreatic duct dilatation. No adjacent fat stranding. SPLEEN: Normal. ADRENAL GLANDS: Normal. KIDNEYS/BLADDER: Status post left nephrectomy. Right kidney appears normal. No hydronephrosis. Urinary bladder is mostly decompressed. BOWEL: No obstruction or inflammatory change. Appendix not visualized, although no secondary signs of acute appendicitis. LYMPH NODES: No lymphadenopathy. VASCULATURE: Unremarkable. PELVIC ORGANS: Unremarkable. MUSCULOSKELETAL: Multilevel degenerative changes of the spine.     IMPRESSION: 1.  No acute findings in the abdomen or pelvis. 2.  Subtle new 2.1 cm hypoattenuating area in the pancreatic body, without ductal dilatation, nonspecific. This may be inflammatory or sequela of prior pancreatitis, although underlying lesion not excluded. Consider correlation with contrast-enhanced MRI. Findings in impression #2 were communicated to Dr. Lexi Cárdenas over the telephone by Dr. Lin at 9/10/2022 6:37 PM.      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  Patient empirically been treated with steroids by oncology in the past.  He will start on steroids today and continue to follow with oncology as an outpatient.    -Stool studies neg -- C difficile, O&P, multiplex PCR.   2) Pancreas mass - 2.1 cm.No duct dilation. Radiology feels this could be sequelae of prior pancreatitis. Had elevated lipase 8/29/2022 in 800's. Otherwise, felt could be inflammatory.    Other DDx: mets from urothelial source; neuroendocrine   -Outpatient endoscopic ultrasound.  3) Urothelial cancer. 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 On Nivolumab x 1 year.   4) GERD - PPI.    Patient Active Problem List   Diagnosis     Glaucoma     Bladder cancer (H)     Obesity (BMI 35.0-39.9) with  comorbidity (H)     DM type 2, Hgb A1C 8.6 on 8/26/22     Ureter cancer (H)     Hyperlipidemia     CKD (chronic kidney disease) stage 4, GFR 15-29 ml/min (H)     Carcinoma in situ of bladder     Anemia due to stage 4 chronic kidney disease (H)     Acquired hypothyroidism     CRF (chronic renal failure), stage 4 (severe) (H)     Diarrhea     Pancreatic lesion, 2.1 cm on Abd CT     Dehydration     Pancreatic mass     Decreased oral intake     Diarrhea, unspecified type      Plan:   1) Begin prednisone. Appreciate Dr Bravo initiating this already.   2) EUS as outpatient - can get duodenal biospies at that time if path from colon was neg and there is concern for villous atrophy.   3) Continue to follow with oncology      Discussed with Dr Zimmerman - ROSALVA, Dr Bravo - Hospitalist, Roque Rivera -  Pathology    Nothing further from GI.  We will not continue to follow at this point but can be contacted with questions or concerns prior to discharge.  Again, we will get EUS arranged as an outpatient for this gentleman.  Thank you for the opportunity to participate in his care.  Cachorro Marie PA-C  [facility]  [phone]

## 2022-09-16 NOTE — PLAN OF CARE
Problem: Diarrhea  Goal: Fluid and Electrolyte Balance  Outcome: Ongoing, Not Progressing  Intervention: Manage Diarrhea  Recent Flowsheet Documentation  Taken 9/15/2022 2348 by Smitha Govea, RN  Medication Review/Management: medications reviewed      Problem: Nausea and Vomiting  Goal: Fluid and Electrolyte Balance  Outcome: Ongoing, Progressing  Intervention: Prevent and Manage Nausea and Vomiting  Recent Flowsheet Documentation  Taken 9/15/2022 2348 by Smitha Govea, RN  Nausea/Vomiting Interventions: (refused anti-emetics for now) other (see comments)          Goal Outcome Evaluation:         Complaint of nausea, offered antiemetic but declined. Had 3 watery stool so far. Per note awaiting biopsy result any antidiarrheal meds. Patient getting weaker. Replacing electrolytes.

## 2022-09-16 NOTE — PLAN OF CARE
"BG was 57; given D50; recheck 119 and is about to eat; refused any juices-\"gets heartburn\"; Prn med ordered for heartburn-Dr. Bravo updated.  "

## 2022-09-16 NOTE — PLAN OF CARE
Shift from 0700 to 2330-      Problem: Diarrhea  Goal: Fluid and Electrolyte Balance  9/16/2022 1057 by Inés Holley RN  Outcome: Ongoing, Progressing  9/15/2022 2207 by Inés Holley RN  Outcome: Ongoing, Progressing  Intervention: Manage Diarrhea  Recent Flowsheet Documentation  Taken 9/16/2022 0925 by Inés Holley RN  Medication Review/Management: medications reviewed     Problem: Diabetes Comorbidity  Goal: Blood Glucose Level Within Targeted Range  9/16/2022 1057 by Inés Holley RN  Outcome: Ongoing, Progressing  9/15/2022 2207 by Inés Holley RN  Outcome: Ongoing, Progressing     Problem: Nausea and Vomiting  Goal: Fluid and Electrolyte Balance  9/16/2022 1057 by Inés Holley RN  Outcome: Ongoing, Progressing  9/15/2022 2207 by Inés Holley RN  Outcome: Ongoing, Progressing     Problem: Activity Intolerance  Goal: Enhanced Capacity and Energy  Outcome: Ongoing, Progressing  Intervention: Optimize Activity Tolerance  Recent Flowsheet Documentation  Taken 9/16/2022 0925 by Inés Holley RN  Activity Management: up in chair     Goal Outcome Evaluation:    Plan of Care Reviewed With: patient     Overall Patient Progress: no change     Patient up to chair midmorning and walked in room with therapy. He states he feels very weak; SBA with belt.   Denies nausea today; tolerated breakfast. Pt on ensure also;  Stated he feels better today; denied nausea today and heartburn.  Continues to have diarrhea; BM x 6; Watery green.  Started on oral steriods today to be given in morning.   IVF infusing;   K+ protocol; K+ 3.7; Recheck in am.  Mg+ protocol; Mg+ 1.6; Recheck in am.     BG was 72 this am; Pt declines juice due to causing heartburn; Had breakfast instead. Recheck at lunch time was 57; Given D50 per orders and Dr. Bravo updated; Recheck was 119;Neela BG was 148 and 174. Patient on sliding scale and evening insulin coverage; See MAR.    Seen by oncology on eves;

## 2022-09-17 PROBLEM — R63.8 DECREASED ORAL INTAKE: Status: RESOLVED | Noted: 2022-01-01 | Resolved: 2022-01-01

## 2022-09-17 PROBLEM — R19.7 DIARRHEA, UNSPECIFIED TYPE: Status: RESOLVED | Noted: 2022-01-01 | Resolved: 2022-01-01

## 2022-09-17 PROBLEM — N17.9 AKI (ACUTE KIDNEY INJURY) (H): Status: ACTIVE | Noted: 2022-01-01

## 2022-09-17 PROBLEM — T50.905A ADVERSE DRUG REACTION: Status: ACTIVE | Noted: 2022-01-01

## 2022-09-17 NOTE — PROGRESS NOTES
Care Management Follow Up    Length of Stay (days): 5    Expected Discharge Date: 09/19/2022     Concerns to be Addressed: medical progression      Patient plan of care discussed at interdisciplinary rounds: yes    Anticipated Discharge Disposition: Home with daughter     Anticipated Discharge Services:   Anticipated Discharge DME:      Patient/family educated on Medicare website which has current facility and service quality ratings:    Education Provided on the Discharge Plan:    Patient/Family in Agreement with the Plan:      Referrals Placed by CM/SW:    Private pay costs discussed: Not applicable    Additional Information:    Expected discharge 1-2 days once diarrhea is better. Will go home with daughter at discharge.      Shani Lawson RN

## 2022-09-17 NOTE — PROGRESS NOTES
[facility]    Hospitalist Progress Note    Assessment & Plan   77 year old male admitted with weakness in the setting of subacute diarrhea.  Also he has history of bladder cancer, diabetes, CKD 4, hyperlipidemia      Persistent Diarrhea 2nd to Nivolumab   --- recurrent Nivolumab induced colitis; biopsy pathology consistent with checkpoint colitis   ---last dose nivolumab (cycle 8) was on 6/8/2022    --- Discussed with pharmacist previous dosing of prednisone.  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  --- Happened after working on septic tank in [employer] but no clear infectious source found ;  ---C. difficile, multiplex PCR negative.  Stool ova and parasite negative; TSH elevated  --- s/p colonoscopy 9/13 showing mild colitis suggestive of checkpoint inhibitor induced colitis with biopsies pending.  ---PT/OT following  --- GI following  --- Continue IV fluids another day until diarrhea starts to slow down.  He has not tolerated stop IV fluids with worsening YOU and borderline hypotension     #DM  --- Less hypoglycemia today   --- Previously hyperglycemia noted persistently earlier in hospital stay; did not tolerate home dosing of Tresiba with significant hypoglycemia  --- Appears stable on Tresiba 12 units   ---Home regimen is Tresiba 20 units daily,   --- NovoLog 4 units with meals.   ---Continue NovoLog sliding scale  --- Continue Novolog carb count 1:15  --- Diabetic diet     Hypokalemia/hypomagnesia  --- replace     #Pancreatic mass  ---2.1 cm hypoattenuating lesion seen in the pancreatic body, sequela of prior pancreatitis  versus mass lesion. Ductal dilation not seen.  ---MRCP done 9/11 radiologist advising at minimum 3 month followup imaging, can also consider tissue sampling.   --- GI planning further EUS; they will schedule this within few weeks after discharge     #YOU on CKD 4; improved with IV fluid  Metabolic acidosis  Single kidney (left nephrectomy for cancer)  ---Baseline Cr 1.5-2,   ---slightly worse without fluids now and diarrhea - resume LR  ---Likely prerenal from diarrhea.  --- Follow daily lab     #Hyponatremia  --- Remained stable.  --- Initially presumed secondary to hypovolemia   --- Continue IV fluids with LR recheck in AM     #Metabolic acidosis  --- Stable   ---anion gap normal   ----likely from diarrhea  --- Monitor daily  --- If worsens consider nephrology consultation     #Hypocalcemia  ---Mild, corrects with low albumin  ---Hydration and support PO intake     #Urothelial cancer  ----Appears intolerant of nivolumab per above   ---had bladder cancer treated in the past then found to have 2cm tumor on his left ureter, did neoadjuvant chemo with gemcitabine and cisplatin followed by left nephroureterectomy in 2021.   ---Now he is on 1 year course of Nivolumab as adjuvant treatment, has completed 8 of 9 cycles. Has had a lot of side effects from treatment including immune mediated colitis, Shingles and multiple other rashes and so final cycle has been delayed.   --- Last nivolumab 6/8/2022; completed 8 of 9 treatments.  --- Posted oncology.  I doubt he will tolerate nivolumab in the future.  Await any further input they have on this.     #Moderate malnutrition  Related to GI illness, poor PO, diarrhea. Cancer treatment could be playing a role too  Hypoalbuminemia noted as well with poor nutrition     #Hypothyroidism  TSH elevated at 46.  Free T4 low   --- Continue on increased Synthroid.  ---Nivolumab can cause thyroid abnormalities as well (both hypo and hyper)     #GERD, home PPI  #Hyperlipidemia, home  statin  #Leukocytosis--suspect related to colitis/diarrhea.  Continue treatment per above       Plan:  Anticipate home once diarrhea better     DVT Prophylaxis: Pneumatic Compression Devices  Code Status: Full Code    Disposition: Expected discharge in 1-2 days if diarrhea better, Daughter Jessika updated.     Roque Fajardo MD  Pager [phone]  Cell Phone [phone]  Text Page (7am to 6pm)    Interval History   Still had to get up several times last night for liquid diarrhea.     Physical Exam   Temp: 97.7  F (36.5  C) Temp src: Oral BP: 110/62 Pulse: 65   Resp: 16 SpO2: 95 % O2 Device: None (Room air)    Vitals:    09/10/22 1530 09/11/22 1730 09/16/22 0900   Weight: 80 kg (176 lb 5.9 oz) 79.8 kg (176 lb) 80 kg (176 lb 4.8 oz)     Vital Signs with Ranges  Temp:  [97.7  F (36.5  C)-98.5  F (36.9  C)] 97.7  F (36.5  C)  Pulse:  [65-89] 65  Resp:  [15-20] 16  BP: (107-114)/(58-68) 110/62  SpO2:  [94 %-95 %] 95 %  I/O last 3 completed shifts:  In: 3391.34 [P.O.:960; I.V.:2431.34]  Out: 700 [Urine:700]    # Pain Assessment:  Current Pain Score 9/17/2022   Patient currently in pain? denies   Pain score (0-10) -   Pain location -   Pain descriptors -   Colin s pain level was assessed and he currently denies pain.        Constitutional: Awake, alert, cooperative, no apparent distress  Respiratory: Clear to auscultation bilaterally, no crackles or wheezing  Cardiovascular: Regular rate and rhythm, normal S1 and S2, and no murmur noted  GI: Normal bowel sounds, soft, non-distended, non-tender  Extrem: No calf tenderness, no ankle edema  Neuro: Ox3, no focal motor or sensory deficits    Medications     lactated ringers 100 mL/hr at 09/17/22 0131       atorvastatin  40 mg Oral Daily     brimonidine  1 drop Right Eye BID     heparin ANTICOAGULANT  5,000 Units Subcutaneous Q12H     [Held by provider] insulin aspart   Subcutaneous Daily with supper     insulin aspart  1-7 Units Subcutaneous TID AC     insulin degludec   12 Units Subcutaneous At Bedtime     latanoprost  1 drop Both Eyes At Bedtime     levothyroxine  150 mcg Oral Daily     pantoprazole  40 mg Oral Daily     predniSONE  80 mg Oral Daily     sodium chloride (PF)  3 mL Intracatheter Q8H       Data   Recent Labs   Lab 09/17/22  0726 09/17/22  0621 09/16/22  2120 09/16/22  1719 09/16/22  0723 09/16/22  0611 09/16/22  0010 09/15/22  1319 09/15/22  0936 09/14/22  0758 09/14/22  0619 09/13/22  0829 09/13/22  0536 09/10/22  2234 09/10/22  1613   WBC  --   --   --   --   --  15.8*  --   --  16.0*  --  13.9*  --  14.1*   < > 13.9*   HGB  --   --   --   --   --  13.2*  --   --  12.8*  --  13.4  --  14.1   < > 15.3   MCV  --   --   --   --   --  95  --   --  95  --  95  --  97   < > 95   PLT  --   --   --   --   --  404  --   --  369  --  408  --  419   < > 357   NA  --   --   --   --   --  133*  --   --  135*  --  136  --  132*   < > 133*   POTASSIUM  --  4.2  --   --   --  3.7 3.5   < > 3.4*  3.4*   < > 3.0*  --  3.5   < > 3.6   CHLORIDE  --   --   --   --   --  116*  --   --  117*  --  116*  --  110*   < > 106   CO2  --   --   --   --   --  12*  --   --  15*  --  12*  --  12*   < > 15*   BUN  --   --   --   --   --  18  --   --  20  --  22  --  26   < > 29*   CR  --   --   --   --   --  1.81*  --   --  1.92*  --  1.81*  --  1.77*   < > 2.25*   ANIONGAP  --   --   --   --   --  5  --   --  3*  --  8  --  10   < > 12   MAHSA  --   --   --   --   --  7.0*  --   --  7.0*  --  6.9*  --  7.2*   < > 7.5*   *  --  174* 148*   < > 77  --    < > 67*   < > 52*   < > 254*   < > 230*   ALBUMIN  --   --   --   --   --  1.6*  --   --   --   --   --   --  2.0*  --  2.2*   PROTTOTAL  --   --   --   --   --  3.8*  --   --   --   --   --   --  4.2*  --  4.8*   BILITOTAL  --   --   --   --   --  0.4  --   --   --   --   --   --  0.4  --  0.6   ALKPHOS  --   --   --   --   --  59  --   --   --   --   --   --  66  --  69   ALT  --   --   --   --   --  <9  --   --   --   --   --   --  <9  --   <9   AST  --   --   --   --   --  21  --   --   --   --   --   --  15  --  15   LIPASE  --   --   --   --   --   --   --   --   --   --   --   --   --   --  133*    < > = values in this interval not displayed.       Imaging:   No results found for this or any previous visit (from the past 24 hour(s)).

## 2022-09-17 NOTE — CONSULTS
[facility] Hematology and Oncology Inpatient Consult Note    Patient: Colin Baxter  MRN: [id number]  Date of Service: 9/17/2022      Reason for Visit  Diarrhea      Assessment  Immunotherapy related colitis  High-grade urothelial cancer status post nephroureterectomy, currently on adjuvant nivolumab  Pancreatic lesion    Plan:  Immunotherapy related colitis  This is a current problem for him. Has been treated with steroids in the past.  Has gotten 8 cycles of nivolumab so far.  Grade 2-3.  Colonoscopy with biopsy showing lymphocytic colitis and ileitis.  This is consistent with immunotherapy related side effect.  Has been started on high-dose steroids.  Agree with the plan.  He will probably need a slow taper going forward.  I would continue high-dose steroids for at least 3 to 4 days.  If there is no symptom relief then could add infliximab.  Also consider adding oral budesonide 9 mg daily if there is a subdued response.  But if he responds to high-dose steroids then slow taper after that.  He follows with oncology up [place].  To resume immunotherapy or not will have to be discussed as an outpatient.  He has had recurrent grade 2-3 colitis.  Benefits of adjuvant immunotherapy in the setting have to be weighed against the risks.  I feel overall the risks outweigh the benefits at this point in time.  So probably he is done with immunotherapy.    Pancreatic lesion  GI on board.  The plan to set up an outpatient endoscopic ultrasound guided biopsy.    Staging History    Cancer Staging  Ureter cancer (H)  Staging form: Renal Pelvis and Ureter, AJCC 8th Edition  - Clinical: No stage assigned - Unsigned        History  Mr. Colin Baxter is a 77 year old male with history of bladder and left ureteral cancer status post neoadjuvant chemotherapy followed by left-sided nephroureterectomy who is currently on adjuvant chemotherapy with nivolumab, hospitalized for colitis probably secondary to immunotherapy.    He gets his  oncology care up [place] in [facility].  Was diagnosed with high-grade left ureteral cancer back in January 2021.  Received neoadjuvant cisplatin and decitabine followed by left-sided nephroureterectomy.  Has been on adjuvant nivolumab since June 2021.  He has had significant issues with immunotherapy with recurrent inflammatory diarrhea.  He also had an episode of shingles in the right eye back in May.  He has been treated with steroids in the past for inflammatory colitis.  He has received 8 cycles so far.  He presented to the ER couple days ago with diarrhea has been going on for the last 10 days.  Says it started after he changed the pump and septic system.  No fevers.  About 4-5 watery stools per day.  No blood.  No abdominal pain.  No nausea or vomiting.  Stool test for ova and parasites and C. difficile has been negative so far.  Underwent colonoscopy a few days ago which showed changes suspicious for saquinavir induced colitis.  Biopsies were consistent with lymphocytic colitis throughout the colon consistent with immunotherapy related side effect.  He has been started on high-dose prednisone since yesterday.  Diarrhea seems to have gotten a bit better now.    Of note CT scan done on 1922 showed a pancreatic body cystic lesion.  It was thought to be sequela of previous pancreatitis.  MRI of the abdomen done on 1122 showed some suspicion for possible malignancy.  Tissue sampling or repeat MRI was recommended.    Review of systems.    A 14 point review of systems was obtained.  Positive findings noted in the history.  Rest of the review of system is otherwise negative.      Past History  Past Medical History:   Diagnosis Date     Benign essential hypertension      Bladder cancer (H)      Carcinoma in situ of bladder 11/19/2021     Diabetes mellitus, type 2 (H)      Dyslipidemia      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) 12/11/2020    getting BCG (from his Urologist in [place], [place])      Renal cell carcinoma, left (H) 2021    Left nephrectomy     Ureter cancer, left (H)      Past Surgical History:   Procedure Laterality Date     APPENDECTOMY  1958     COLONOSCOPY  2-     COLONOSCOPY N/A 9/13/2022    Procedure: COLONOSCOPY;  Surgeon: Keyon Zimmerman MD;  Location: [facility] OR     COMBINED CYSTOSCOPY, RETROGRADES, URETEROSCOPY, INSERT STENT Left 1/18/2021    Procedure: CYSTOURETEROSCOPY, WITH RETROGRADE PYELOGRAM with interpretation of fluroscopy, ureteroscopy, ureteral biopsy, bladder biopsy and fulgaration;  Surgeon: Shonda Morrell MD;  Location: [facility] OR     CYSTOSCOPY N/A 6/16/2021    Procedure: CYSTOSCOPY;  Surgeon: Javier Mcnulty MD;  Location: [facility] OR     CYSTOSCOPY, BIOPSY BLADDER, COMBINED N/A 9/8/2015    Procedure: COMBINED CYSTOSCOPY, BIOPSY BLADDER;  Surgeon: Randy Martinez MD;  Location: [facility] OR     CYSTOSCOPY, BIOPSY BLADDER, COMBINED N/A 2/14/2017    Procedure: COMBINED CYSTOSCOPY, BIOPSY BLADDER;  Surgeon: Randy Martinez MD;  Location: [facility] OR     CYSTOSCOPY, BIOPSY BLADDER, COMBINED N/A 11/13/2018    Procedure: COMBINED CYSTOSCOPY, BIOPSY BLADDER;  Surgeon: Ed Helm MD;  Location: [facility] OR     CYSTOSCOPY, BIOPSY BLADDER, COMBINED N/A 11/5/2021    Procedure: CYSTOSCOPY, WITH BLADDER BIOPSY;  Surgeon: Shonda Morrell MD;  Location: [facility] OR     CYSTOSCOPY, RETROGRADES, COMBINED Bilateral 11/13/2018    Procedure: Bilateral Retrograde Pyelagram, Bladder Biopsy;  Surgeon: Ed Helm MD;  Location: [facility] OR     CYSTOSCOPY, RETROGRADES, COMBINED Right 11/5/2021    Procedure: CYSTOSCOPY, WITH RETROGRADE PYELOGRAM;  Surgeon: Shonda Morrell MD;  Location: [facility]     CYSTOSCOPY, RETROGRADES, INSERT STENT URETER(S), COMBINED Left 9/8/2015    Procedure: COMBINED CYSTOSCOPY, RETROGRADES, INSERT STENT URETER(S);  Surgeon: Randy Martinez MD;  Location: [facility]     CYSTOSCOPY, TRANSURETHRAL RESECTION (TUR) TUMOR BLADDER, COMBINED N/A 9/8/2015     Procedure: COMBINED CYSTOSCOPY, TRANSURETHRAL RESECTION (TUR) TUMOR BLADDER;  Surgeon: Randy Martinez MD;  Location: [facility]     CYSTOSCOPY, TRANSURETHRAL RESECTION (TUR) TUMOR BLADDER, COMBINED N/A 1/12/2016    Procedure: COMBINED CYSTOSCOPY, TRANSURETHRAL RESECTION (TUR) TUMOR BLADDER;  Surgeon: Randy Martinez MD;  Location: [facility]     CYSTOSCOPY, TRANSURETHRAL RESECTION (TUR) TUMOR BLADDER, COMBINED N/A 9/13/2016    Procedure: COMBINED CYSTOSCOPY, TRANSURETHRAL RESECTION (TUR) TUMOR BLADDER;  Surgeon: Randy Martinez MD;  Location: [facility]     DAVINCI NEPHROURETERECTOMY Left 6/16/2021    Procedure: NEPHROURETERECTOMY, ROBOT-ASSISTED, lymph node dissection;  Surgeon: Javier Mcnulty MD;  Location: [facility] OR     PHACOEMULSIFICATION WITH STANDARD INTRAOCULAR LENS IMPLANT Right 10/9/2018    Procedure: PHACOEMULSIFICATION WITH STANDARD INTRAOCULAR LENS IMPLANT;  PHACOEMULSIFICATION CATARACT EXTRACTION POSTERIOR CHAMBER LENS RIGHT;  Surgeon: Marlo Mcconnell MD;  Location: [facility]     PHACOEMULSIFICATION WITH STANDARD INTRAOCULAR LENS IMPLANT Left 10/23/2018    Procedure: PHACOEMULSIFICATION CATARACT EXTRACTION POSTERIOR CHAMBER LENS LEFT;  Surgeon: Marlo Mcconnell MD;  Location: [facility] OR     Family History   Problem Relation Age of Onset     Diabetes Mother      Other - See Comments Father         cause of death unknown     Parkinsonism Brother      Coronary Artery Disease No family hx of      Hypertension No family hx of      Hyperlipidemia No family hx of      Cerebrovascular Disease No family hx of      Breast Cancer No family hx of      Colon Cancer No family hx of      Prostate Cancer No family hx of      Anesthesia Reaction No family hx of      Asthma No family hx of      Thyroid Disease No family hx of      Genetic Disorder No family hx of      Social History     Socioeconomic History     Marital status:      Spouse name: None     Number of children: 3     Years of education:  "None     Highest education level: None   Tobacco Use     Smoking status: Former Smoker     Quit date: 1992     Years since quittin.7     Smokeless tobacco: Never Used   Vaping Use     Vaping Use: Never used   Substance and Sexual Activity     Alcohol use: Yes     Comment: < 1 drink a month     Drug use: No     Sexual activity: Not Currently   Other Topics Concern     Parent/sibling w/ CABG, MI or angioplasty before 65F 55M? No   Social History Narrative    , 3 children, he lives in [place], [place] but is in [place] his daughter.  He is retired, he previously worked in the [employer] in [place]. (last updated 9/10/2022)        Allergies    Allergies   Allergen Reactions     No Clinical Screening - See Comments Other (See Comments)     Beta blocker in glaucoma gtt.s caused low pulse and passing out      Timolol      Beta blocker in glaucoma gtt.s caused low pulse and passing out   - patient thinks it was timolol but not 100% sure which beta blocker eye drop.      Bactrim [Sulfamethoxazole W/Trimethoprim] Rash          Physical Exam    /62 (BP Location: Right arm)   Pulse 65   Temp 97.7  F (36.5  C) (Oral)   Resp 16   Ht 1.676 m (5' 6\")   Wt 80 kg (176 lb 4.8 oz)   SpO2 95%   BMI 28.46 kg/m      GENERAL: Alert and oriented to time place and person. In no distress.    HEAD: Atraumatic and normocephalic.    EYES: RAJI, EOMI. No pallor. No icterus.    LYMPH NODES: No palpable, cervical, axillary or inguinal lymphadenopathy.    CHEST: clear to auscultation bilaterally.    CVS: S1 and S2 are Regular rate and rhythm.    ABDOMEN: Soft. Not tender    EXTREMITIES: Warm.    NEUROLOGICAL: Preserved orientation.  Neuromuscular system intact.  Cranial nerve appears to be intact.      SKIN: no rash, or bruising or purpura.      Lab Results  Recent Results (from the past 24 hour(s))   Glucose by meter    Collection Time: 22 11:40 AM   Result Value Ref Range    GLUCOSE BY METER POCT 57 (L) " 70 - 99 mg/dL   Glucose by meter    Collection Time: 09/16/22 12:12 PM   Result Value Ref Range    GLUCOSE BY METER POCT 119 (H) 70 - 99 mg/dL   Glucose by meter    Collection Time: 09/16/22  5:19 PM   Result Value Ref Range    GLUCOSE BY METER POCT 148 (H) 70 - 99 mg/dL   Glucose by meter    Collection Time: 09/16/22  9:20 PM   Result Value Ref Range    GLUCOSE BY METER POCT 174 (H) 70 - 99 mg/dL   Magnesium    Collection Time: 09/17/22  6:21 AM   Result Value Ref Range    Magnesium 1.6 (L) 1.8 - 2.6 mg/dL   Potassium    Collection Time: 09/17/22  6:21 AM   Result Value Ref Range    Potassium 4.2 3.5 - 5.0 mmol/L   Extra Purple Top Tube    Collection Time: 09/17/22  6:21 AM   Result Value Ref Range    Hold Specimen JIC    Glucose by meter    Collection Time: 09/17/22  7:26 AM   Result Value Ref Range    GLUCOSE BY METER POCT 242 (H) 70 - 99 mg/dL        Imaging Results    MR Abdomen MRCP w/o & w Contrast    Result Date: 9/11/2022  EXAM: MR ABDOMEN MRCP W/O and W CONTRAST LOCATION: [facility] DATE/TIME: 9/11/2022 3:05 PM INDICATION: Pancreas eval  Mass COMPARISON: CT 09/10/2022 at 1757 hours TECHNIQUE: Routine MR liver/pancreas protocol including axial and coronal MRCP sequences. 2D and 3D reconstruction performed by MR technologist including MIP reconstruction and slab cholangiograms. If performed with contrast, additional dynamic T1 post IV contrast images. CONTRAST: 7ml gadavist FINDINGS: MRCP: Normal gallbladder. No bile duct dilatation. No biliary filling defects or strictures. Normal caliber of the pancreatic duct. LIVER: Normal. PANCREAS: There is a 2.0 x 1.6 cm focus in the pancreatic body which corresponds with the finding on the recent CT study (series 13 image 24, for example). This is hypointense to the adjacent pancreatic parenchyma on precontrast imaging, is hypointense on arterial phase imaging, and is isointense on later postcontrast imaging. This also appears to exhibit  diffusion restriction.  Otherwise the pancreatic parenchyma is normal in appearance. ADDITIONAL FINDINGS: Left kidney is not identified. Mesenteric lymph nodes measure up to 7 mm. There is a 6 mm skin lesion along the posterior left abdominal wall.     IMPRESSION: A 2 cm focus in the pancreatic body is nonspecific though could be sequela of history of pancreatitis. Malignancy cannot be excluded. Recommend further evaluation, perhaps with tissue sampling. At a minimum, 3 month follow-up MRI is recommended. [Access Center: Pancreatic lesion. Recommend further evaluation. ] This report will be copied to the [facility] to ensure a provider acknowledges the finding. Access Center is available Monday through Friday 8am-3:30 pm.     CT Abdomen Pelvis w Contrast    Result Date: 9/10/2022  EXAM: CT ABDOMEN PELVIS W CONTRAST LOCATION: [facility] DATE/TIME: 9/10/2022 6:15 PM INDICATION: Diarrhea for 10 days. COMPARISON: 05/23/2022. TECHNIQUE: CT scan of the abdomen and pelvis was performed following injection of IV contrast. Multiplanar reformats were obtained. Dose reduction techniques were used. CONTRAST: isovue 370 75ml FINDINGS: LOWER CHEST: Unremarkable. HEPATOBILIARY: Normal. PANCREAS: Subtle hypoattenuating area in the pancreatic body, measuring 2.1 x 1.5 cm (series 2, image 29). This does not appear to be present on prior exams. No associated main pancreatic duct dilatation. No adjacent fat stranding. SPLEEN: Normal. ADRENAL GLANDS: Normal. KIDNEYS/BLADDER: Status post left nephrectomy. Right kidney appears normal. No hydronephrosis. Urinary bladder is mostly decompressed. BOWEL: No obstruction or inflammatory change. Appendix not visualized, although no secondary signs of acute appendicitis. LYMPH NODES: No lymphadenopathy. VASCULATURE: Unremarkable. PELVIC ORGANS: Unremarkable. MUSCULOSKELETAL: Multilevel degenerative changes of the spine.     IMPRESSION: 1.  No acute findings in  the abdomen or pelvis. 2.  Subtle new 2.1 cm hypoattenuating area in the pancreatic body, without ductal dilatation, nonspecific. This may be inflammatory or sequela of prior pancreatitis, although underlying lesion not excluded. Consider correlation with contrast-enhanced MRI. Findings in impression #2 were communicated to Dr. Lexi Cárdenas over the telephone by Dr. Lin at 9/10/2022 6:37 PM.      A total of 25 min were spent today on this visit which included face to face conversation with the patient, EMR review, counseling and co-ordination of care and medical documentation.      Signed by: Irma Flor MD

## 2022-09-17 NOTE — PLAN OF CARE
Problem: Diarrhea  Goal: Fluid and Electrolyte Balance  Outcome: Ongoing, Progressing     Problem: Malnutrition  Goal: Improved Nutritional Intake  Outcome: Ongoing, Progressing     Problem: Activity Intolerance  Goal: Enhanced Capacity and Energy  Outcome: Ongoing, Progressing   Goal Outcome Evaluation:    X3 episodes of diarrhea during the night. Iv fluids infusing. Up with stand by assist. Short of breath on exertion.

## 2022-09-17 NOTE — PLAN OF CARE
Goal Outcome Evaluation:  Problem: Plan of Care - These are the overarching goals to be used throughout the patient stay.    Goal: Plan of Care Review/Shift Note  Description: The Plan of Care Review/Shift note should be completed every shift.  The Outcome Evaluation is a brief statement about your assessment that the patient is improving, declining, or no change.  This information will be displayed automatically on your shift note.  Outcome: Ongoing, Progressing  Pt doing well.  Continues to be independent in room.  Steady on feet. Several loose stools but states it has slowed down.     Problem: Plan of Care - These are the overarching goals to be used throughout the patient stay.    Goal: Optimal Comfort and Wellbeing  Outcome: Ongoing, Progressing  Denies pain and tolerating diet.

## 2022-09-18 NOTE — PROGRESS NOTES
[facility]    Hospitalist Progress Note    Assessment & Plan   77 year old male admitted with weakness in the setting of subacute diarrhea.  Also he has history of bladder cancer, diabetes, CKD 4, hyperlipidemia      Persistent Diarrhea 2nd to Nivolumab   --- recurrent Nivolumab induced colitis; biopsy pathology consistent with checkpoint colitis   ---last dose nivolumab (cycle 8) was on 6/8/2022    --- Discussed with pharmacist previous dosing of prednisone.  6/24/2022 he was started on prednisone 80 mg for 7 days, 60 mg for 7 days, 40 mg for 7 days, 20 mg for 10 days, and then 10 mg daily for 14 days, but will decrease to 60 mg today given improved diarrhea and high glucose   ---previous recurrent issues with loose stools following Nivolumab infusions, suspected immune mediated colitis treated with steroid taper, has not had Nivolumab since 6/8 but onset can be delayed.  ---C. difficile, multiplex PCR negative.  Stool ova and parasite negative; TSH elevated  --- s/p colonoscopy 9/13 showing mild colitis suggestive of checkpoint inhibitor induced colitis with biopsies pending.  ---PT/OT following     DM type 2, uncontrolled 2nd to steroids  --- initial hypoglycemia resolved   --- Increase Tresiba to 30 units qAM, Novolog increased    Hyponatremia   -- restart IV NS     Hypokalemia/hypomagnesia  --- replacee     Pancreatic mass  ---2.1 cm hypoattenuating lesion seen in the pancreatic body, sequela of prior pancreatitis versus mass lesion. Ductal dilation not seen.  ---MRCP done 9/11 radiologist advising at minimum 3 month followup imaging, can also consider tissue sampling.   --- GI planning further EUS; they will schedule this within few weeks after discharge     YOU on CKD 4; improved with IV fluid  Metabolic acidosis  Single kidney (left nephrectomy for cancer)  ---Baseline Cr 1.5-2,   ---slightly worse without fluids now and diarrhea - resume LR  ---Likely prerenal from diarrhea.  ---  Follow daily lab      Hypocalcemia  ---Mild, corrects with low albumin  ---Hydration and support PO intake     Urothelial cancer  ----Appears intolerant of nivolumab per above   ---had bladder cancer treated in the past then found to have 2cm tumor on his left ureter, did neoadjuvant chemo with gemcitabine and cisplatin followed by left nephroureterectomy in 2021.   ---Now he is on 1 year course of Nivolumab as adjuvant treatment, has completed 8 of 9 cycles. Has had a lot of side effects from treatment including immune mediated colitis, Shingles and multiple other rashes and so final cycle has been delayed.   --- Last nivolumab 6/8/2022; completed 8 of 9 treatments.     Moderate malnutrition -- Related to GI illness, poor PO, diarrhea.      Hypothyroidism  -- TSH elevated at 46.  Free T4 low   --- Continue on increased Synthroid.  ---Nivolumab can cause thyroid abnormalities as well (both hypo and hyper)     GERD, home PPI  Hyperlipidemia, home statin  Leukocytosis--suspect related to colitis/diarrhea.     -- now possibly elevated 2nd to steroids        Plan:  Anticipate discharge to stay with daughterJessika, tomorrow -- she is aware of plan (discussed with her today)    DVT Prophylaxis: Pneumatic Compression Devices  Code Status: Full Code    Disposition: Expected discharge to Daughter's house tomorrow    Roque Fajardo MD  Pager [phone]  Cell Phone [phone]  Text Page (7am to 6pm)    Interval History   Diarrhea less, did not have to get up once for diarrhea last night, but blood sugars now > 400.     Physical Exam   Temp: 98  F (36.7  C) Temp src: Oral BP: 111/67 Pulse: 70   Resp: 20 SpO2: 95 % O2 Device: None (Room air)    Vitals:    09/10/22 1530 09/11/22 1730 09/16/22 0900   Weight: 80 kg (176 lb 5.9 oz) 79.8 kg (176 lb) 80 kg (176 lb 4.8 oz)     Vital Signs with Ranges  Temp:  [97.8  F (36.6  C)-98  F (36.7  C)] 98  F (36.7  C)  Pulse:  [70-71] 70  Resp:  [18-20] 20  BP: ()/(59-69)  111/67  SpO2:  [94 %-95 %] 95 %  I/O last 3 completed shifts:  In: 816 [P.O.:240; I.V.:576]  Out: 450 [Urine:450]    # Pain Assessment:  Current Pain Score 9/18/2022   Patient currently in pain? denies   Pain score (0-10) -   Pain location -   Pain descriptors -   Colin s pain level was assessed and he currently denies pain.        Constitutional: Awake, alert, cooperative, no apparent distress  Respiratory: Clear to auscultation bilaterally, no crackles or wheezing  Cardiovascular: Regular rate and rhythm, normal S1 and S2, and no murmur noted  GI: Normal bowel sounds, soft, non-distended, non-tender  Extrem: No calf tenderness, no ankle edema  Neuro: Ox3, no focal motor or sensory deficits    Medications     sodium chloride         atorvastatin  40 mg Oral Daily     brimonidine  1 drop Right Eye BID     heparin ANTICOAGULANT  5,000 Units Subcutaneous Q12H     insulin aspart  1-10 Units Subcutaneous TID AC     insulin aspart  1-7 Units Subcutaneous At Bedtime     insulin aspart   Subcutaneous Daily with supper     insulin degludec  30 Units Subcutaneous QAM     latanoprost  1 drop Both Eyes At Bedtime     levothyroxine  150 mcg Oral Daily     pantoprazole  40 mg Oral Daily     predniSONE  80 mg Oral Daily     sodium chloride (PF)  3 mL Intracatheter Q8H       Data   Recent Labs   Lab 09/18/22  0756 09/18/22  0740 09/18/22  0701 09/17/22  2235 09/17/22  0726 09/17/22  0621 09/16/22  0723 09/16/22  0611 09/15/22  1319 09/15/22  0936 09/13/22  0829 09/13/22  0536   WBC 22.8*  --   --   --   --   --   --  15.8*  --  16.0*   < > 14.1*   HGB 12.4*  --   --   --   --   --   --  13.2*  --  12.8*   < > 14.1   MCV 95  --   --   --   --   --   --  95  --  95   < > 97     --   --   --   --   --   --  404  --  369   < > 419   NA  --   --  125*  --   --   --   --  133*  --  135*   < > 132*   POTASSIUM  --   --  4.8  --   --  4.2  --  3.7   < > 3.4*  3.4*   < > 3.5   CHLORIDE  --   --  110*  --   --   --   --  116*  --   117*   < > 110*   CO2  --   --  10*  --   --   --   --  12*  --  15*   < > 12*   BUN  --   --  24  --   --   --   --  18  --  20   < > 26   CR  --   --  2.18*  --   --   --   --  1.81*  --  1.92*   < > 1.77*   ANIONGAP  --   --  5  --   --   --   --  5  --  3*   < > 10   MAHSA  --   --  6.8*  --   --   --   --  7.0*  --  7.0*   < > 7.2*   GLC  --  309* 360* 332*   < >  --    < > 77   < > 67*   < > 254*   ALBUMIN  --   --   --   --   --   --   --  1.6*  --   --   --  2.0*   PROTTOTAL  --   --   --   --   --   --   --  3.8*  --   --   --  4.2*   BILITOTAL  --   --   --   --   --   --   --  0.4  --   --   --  0.4   ALKPHOS  --   --   --   --   --   --   --  59  --   --   --  66   ALT  --   --   --   --   --   --   --  <9  --   --   --  <9   AST  --   --   --   --   --   --   --  21  --   --   --  15    < > = values in this interval not displayed.       Imaging:   No results found for this or any previous visit (from the past 24 hour(s)).

## 2022-09-18 NOTE — PLAN OF CARE
Problem: Diabetes Comorbidity  Goal: Blood Glucose Level Within Targeted Range  Outcome: Ongoing, Progressing     Problem: Risk for Delirium  Goal: Optimal Coping  Outcome: Ongoing, Progressing  Goal: Improved Behavioral Control  Outcome: Ongoing, Progressing  Goal: Improved Attention and Thought Clarity  Outcome: Ongoing, Progressing  Goal: Improved Sleep  Outcome: Ongoing, Progressing     Problem: Nausea and Vomiting  Goal: Fluid and Electrolyte Balance  Outcome: Ongoing, Progressing   Goal Outcome Evaluation: Pt has been alert and coherent. No signs of delirium/agitation. Denies nausea and tolerated his dinner well. Scheduled  Insulin given. Denies pain.

## 2022-09-18 NOTE — PROGRESS NOTES
"Plan of Care Review/Shift Note    Outcome: Ongoing, Progressing  Goal Outcome Evaluation:      Patient is a/ox4, VS stable, independent in room. He denies any pain although unable to get much sleep last night \"I literally had 10 minutes of sleep last night.\" Daughter will visit him today and bring melatonin from home.   "

## 2022-09-19 NOTE — PLAN OF CARE
"Shift from 0700 to 2330-  Problem: Diarrhea  Goal: Fluid and Electrolyte Balance  Outcome: Ongoing, Progressing  Intervention: Manage Diarrhea  Recent Flowsheet Documentation  Taken 9/18/2022 1608 by Inés Holley, RN  Nutrition Interventions: supplemental drinks provided  Medication Review/Management: medications reviewed     Problem: Diabetes Comorbidity  Goal: Blood Glucose Level Within Targeted Range  Outcome: Ongoing, Progressing     Problem: Malnutrition  Goal: Improved Nutritional Intake  Outcome: Ongoing, Progressing     Problem: Activity Intolerance  Goal: Enhanced Capacity and Energy  Outcome: Ongoing, Progressing  Intervention: Optimize Activity Tolerance  Recent Flowsheet Documentation  Taken 9/18/2022 1608 by Inés Holley, RN  Activity Management: activity adjusted per tolerance  Taken 9/18/2022 1604 by Inés Holley, RN  Activity Management:    activity adjusted per tolerance    activity encouraged   Goal Outcome Evaluation:     During dayshift patient said \"I am really feeling a lot better\". Denied diarrhea on days and evening shift. Cr was elevated today in am; Started on IVF. Appetite really improving and no nausea.    Denies pain. Continues to have swelling of LE's +3;    Patient sees PT; ambulated once today and up in room independently. He declined to ambulate in afternoon and eves. Informed refusal..Needs a lot of encouragement to walk tomorrow.     Stated he didn't get any sleep last night. Cluster cares sign placed on door; ativan ordered and given.     BG were 309,422, 404 and 355; Updated MD each time.  Sliding scale given each time; See MAR. Also started on meal time insulin since on steriods.     Daughter who is LPN updated.                         "

## 2022-09-19 NOTE — PLAN OF CARE
Problem: Plan of Care - These are the overarching goals to be used throughout the patient stay.    Goal: Plan of Care Review/Shift Note  Description: The Plan of Care Review/Shift note should be completed every shift.  The Outcome Evaluation is a brief statement about your assessment that the patient is improving, declining, or no change.  This information will be displayed automatically on your shift note.  Outcome: Adequate for Care Transition     Problem: Plan of Care - These are the overarching goals to be used throughout the patient stay.    Goal: Absence of Hospital-Acquired Illness or Injury  Outcome: Adequate for Care Transition  Intervention: Identify and Manage Fall Risk  Recent Flowsheet Documentation  Taken 9/19/2022 0800 by Helen Santoyo RN  Safety Promotion/Fall Prevention:   assistive device/personal items within reach   clutter free environment maintained   nonskid shoes/slippers when out of bed     Problem: Plan of Care - These are the overarching goals to be used throughout the patient stay.    Goal: Optimal Comfort and Wellbeing  Outcome: Adequate for Care Transition     Problem: Diarrhea  Goal: Fluid and Electrolyte Balance  Outcome: Adequate for Care Transition  Intervention: Manage Diarrhea  Recent Flowsheet Documentation  Taken 9/19/2022 0800 by Helen Santoyo RN  Medication Review/Management: medications reviewed     Problem: Plan of Care - These are the overarching goals to be used throughout the patient stay.    Goal: Readiness for Transition of Care  Outcome: Adequate for Care Transition       Pt ok to discharge home today.  Pt is to discharge home with daughter to assist with cares.  IV catheter was removed.  Discharge instructions were reviewed with patient and daughter.   Pt was taken to door in wheelchair, daughter is transporting patient home via car.

## 2022-09-19 NOTE — PLAN OF CARE
"Goal Outcome Evaluation:      Pt alert and oriented x4, VSS, denies pain, good urine out put, up to bathroom, steady gait, medicated per MAR, will continue to monitor.  Problem: Plan of Care - These are the overarching goals to be used throughout the patient stay.    Goal: Plan of Care Review/Shift Note  Description: The Plan of Care Review/Shift note should be completed every shift.  The Outcome Evaluation is a brief statement about your assessment that the patient is improving, declining, or no change.  This information will be displayed automatically on your shift note.  Outcome: Ongoing, Progressing  Goal: Patient-Specific Goal (Individualized)  Description: You can add care plan individualizations to a care plan. Examples of Individualization might be:  \"Parent requests to be called daily at 9am for status\", \"I have a hard time hearing out of my right ear\", or \"Do not touch me to wake me up as it startles me\".  Outcome: Ongoing, Progressing  Goal: Absence of Hospital-Acquired Illness or Injury  Outcome: Ongoing, Progressing  Intervention: Identify and Manage Fall Risk  Recent Flowsheet Documentation  Taken 9/19/2022 0200 by Adriano Herrera, RN  Safety Promotion/Fall Prevention:   fall prevention program maintained   clutter free environment maintained   nonskid shoes/slippers when out of bed   patient and family education  Intervention: Prevent Skin Injury  Recent Flowsheet Documentation  Taken 9/19/2022 0200 by Adriano Herrera, RN  Body Position: position changed independently  Goal: Optimal Comfort and Wellbeing  Outcome: Ongoing, Progressing  Goal: Readiness for Transition of Care  Outcome: Ongoing, Progressing     Problem: Diarrhea  Goal: Fluid and Electrolyte Balance  Outcome: Ongoing, Progressing  Intervention: Manage Diarrhea  Recent Flowsheet Documentation  Taken 9/19/2022 0200 by Adriano Herrera, RN  Medication Review/Management: medications reviewed     Problem: Diabetes Comorbidity  Goal: Blood Glucose Level " Within Targeted Range  Outcome: Ongoing, Progressing     Problem: Risk for Delirium  Goal: Optimal Coping  Outcome: Ongoing, Progressing  Goal: Improved Behavioral Control  Outcome: Ongoing, Progressing  Goal: Improved Attention and Thought Clarity  Outcome: Ongoing, Progressing  Goal: Improved Sleep  Outcome: Ongoing, Progressing     Problem: Nausea and Vomiting  Goal: Fluid and Electrolyte Balance  Outcome: Ongoing, Progressing     Problem: Malnutrition  Goal: Improved Nutritional Intake  Outcome: Ongoing, Progressing     Problem: Activity Intolerance  Goal: Enhanced Capacity and Energy  Outcome: Ongoing, Progressing

## 2022-09-19 NOTE — TELEPHONE ENCOUNTER
Phone call received from [employer] they received the pt's Tresiba dn Novolog order. The pt should receive his medication in 10 - 14 days.

## 2022-09-19 NOTE — DISCHARGE SUMMARY
"[facility]    Discharge Summary  Hospitalist    Date of Admission:  9/10/2022  Date of Discharge:  9/19/2022  Discharging Provider: Roque Fajardo MD, MD    Discharge Diagnoses   Principal Problem:    Diarrhea 2nd to Chemotherapy    Adverse drug reaction -- related to immunotherapy    Pancreatic lesion, 2.1 cm on Abd CT    Dehydration    Pancreatic mass    YOU (acute kidney injury) (H)     Acquired hypothyroidism  Active Problems:    Bladder cancer     DM type 2, Hgb A1C 8.6 on 8/26/22    Ureter cancer     Hyperlipidemia    CRF (chronic renal failure), stage 4 (severe) (H)        History of Present Illness   77 year old male with history of DM2, ureter/bladder/kidney cancer, HLD, and CKD who presents to the ER via walk-in with complaints of diarrhea and nausea.     Per chart review, patient was seen [facility] on 8/29/22 for the evaluation of nausea, vomiting, and diarrhea. Patient states he was having loose stools that associated with his ongoing chemotherapy for urologic malignancy. During his visit, he was treated with IV fluids and Zofran. Patient felt improved and was discharged with Zofran and doses of liquids to stay hydrated. He was advised to follow up with his PCP.      Patient and his daughter went to [place] and he was seen in the ER for the same symptoms. He was given fluids and felt better however the daughter states \"he continues to declined\" and \"never really improved.\" [place] back [place] the [place] yesterday.       He says he changed the pump in his septic system 2 days before all this started.  He currently is having 4-5 watery stools a day, no associated blood. No abdominal pain.  He thinks he may have lost weight over the last 2 weeks -- but he is not sure.     In ER, AVSS, Creat 2.25, Sodium 1.33, WBC 13.8, Hgb 15.3. abd CT shows a subtle new 2.1 cm hypoattenuating area in the pancreatic body, without ductal dilatation, nonspecific. This may be " inflammatory or sequela of prior pancreatitis, although underlying lesion not excluded. Consider correlation with contrast-enhanced MRI.     Hospital Course   Admitted to medical floor, hydrated with normal saline, seen by [facility] and underwent colonoscopy which showed:  Diffuse mild inflammation characterized by altered vascularity and erythema was found in the entire colon. Several biopsies were obtained from right and left colon with cold forceps for histology. A diffuse area of mucosa in the terminal ileum was mildly erythematous. This was biopsied with a cold forceps for histology.  Multiple small and large-mouthed diverticula were found in the sigmoid colon, descending colon and transverse colon.   Three sessile polyps were found in the transverse colon and ascending colon. The polyps were 1 to 7 mm in size. Polypectomy was not attempted due to inadequate bowel preparation and poor endoscopic visualization.     Started on Prednisone 80 mg daily, diarrhea subsided 2 days later, but blood sugars were > 400.  Insulin increased, prednisone dropped to 60 mg daily and will taper off in 15 days, and will follow-up with primary provider in 5 days and check BMP then to monitor sodium and creatinine.  Sodium was 132, Creat 2.07, and WBC 23.1 at discharge.  Cause of Leukocytosis suspected to be colitis, and possibly aggravated by steroids, no infection identified.      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  Discussed with Dr. Inman, who was helpful in initiating prednisone therapy.  [facility] will contact patient to set up an endoscopic ultrasound as an outpatient concerning the 2.1 cm pancreatic lesion.     His TSH was 49 on admit, and levothyroxine was increased from 100 mcg daily to 150 mcg daily, and advised to get TSH rechecked in next 3-4 weeks, and suspect it might also be  related to the Immunotherapy        Roque Fajardo MD  Pager: [phone]  Cell Phone:  [phone]       Significant Results and Procedures   As above    Pending Results   These results will be followed up by Dr. Fajardo  Unresulted Labs Ordered in the Past 30 Days of this Admission     No orders found from 8/11/2022 to 9/11/2022.          Code Status   Full Code       Primary Care Physician   Libia Wallace    Physical Exam   Temp: 98  F (36.7  C) Temp src: Oral BP: 99/62 Pulse: 66   Resp: 20 SpO2: 97 % O2 Device: None (Room air)    Vitals:    09/10/22 1530 09/11/22 1730 09/16/22 0900   Weight: 80 kg (176 lb 5.9 oz) 79.8 kg (176 lb) 80 kg (176 lb 4.8 oz)     Vital Signs with Ranges  Temp:  [97.9  F (36.6  C)-98  F (36.7  C)] 98  F (36.7  C)  Pulse:  [66-75] 66  Resp:  [19-20] 20  BP: ()/(59-67) 99/62  SpO2:  [96 %-97 %] 97 %  I/O last 3 completed shifts:  In: 2520 [P.O.:1880; I.V.:640]  Out: 1250 [Urine:1250]    Exam on discharge:   Lungs clear  CV with reg S1S2  Abd non-tender    Discharge Disposition   Discharged to home  Condition at discharge: Stable    Consultations This Hospital Stay   CARE MANAGEMENT / SOCIAL WORK IP CONSULT  GASTROENTEROLOGY IP CONSULT  OCCUPATIONAL THERAPY ADULT IP CONSULT  PHYSICAL THERAPY ADULT IP CONSULT  HEMATOLOGY & ONCOLOGY IP CONSULT    Time Spent on this Encounter   I spent a total of 35 minutes discharging this patient.     Discharge Orders     Discharge Medications   Current Discharge Medication List      CONTINUE these medications which have NOT CHANGED    Details   atorvastatin (LIPITOR) 40 MG tablet TAKE 1 TABLET (40 MG) BY MOUTH DAILY  Qty: 90 tablet, Refills: 3    Associated Diagnoses: Type 2 diabetes mellitus without complication, without long-term current use of insulin (H)      brimonidine (ALPHAGAN-P) 0.15 % ophthalmic solution INSTILL 1 DROP INTO RIGHT EYE TWICE DAILY  Refills: 12      Cholecalciferol (VITAMIN D3) 25 MCG TABS Take 25 mcg by mouth  daily      insulin aspart (NOVOLOG FLEXPEN) 100 UNIT/ML pen Inject 4 units prior to evening meal to start. Will titrate up as needed with correction. TDD 15 units  Qty: 15 mL, Refills: 3    Comments: Do not send prescription to pharmacy. Patient receives NovoLOG from a patient assistance program  Associated Diagnoses: Type 2 diabetes mellitus with hyperglycemia, without long-term current use of insulin (H)      insulin degludec (TRESIBA FLEXTOUCH) 100 UNIT/ML pen Inject 20 units daily  Qty: 15 mL, Refills: 3    Comments: Do not send prescription to pharmacy. Patient receives Tresiba from a patient assistance program  Associated Diagnoses: Type 2 diabetes mellitus with hyperglycemia, without long-term current use of insulin (H)      latanoprost (XALATAN) 0.005 % ophthalmic solution Place 1 drop into both eyes At Bedtime    Associated Diagnoses: Routine general medical examination at a health care facility      levothyroxine (SYNTHROID/LEVOTHROID) 100 MCG tablet Take 1 tablet (100 mcg) by mouth daily  Qty: 90 tablet, Refills: 0    Associated Diagnoses: Hypothyroidism due to medication      melatonin 3 MG tablet Take 1 tablet (3 mg) by mouth nightly as needed for sleep  Qty: 90 tablet, Refills: 1    Associated Diagnoses: Insomnia, unspecified type      omeprazole (PRILOSEC) 20 MG DR capsule Take 1 capsule (20 mg) by mouth daily  Qty: 60 capsule, Refills: 0    Associated Diagnoses: Colitis      ondansetron (ZOFRAN ODT) 4 MG ODT tab Take 1 tablet (4 mg) by mouth every 6 hours as needed for nausea or vomiting  Qty: 20 tablet, Refills: 0      VITRON-C  MG TABS tablet Take 1 tablet by mouth daily      blood glucose (ONETOUCH VERIO IQ) test strip USE TO TEST BLOOD SUGAR 3 TIMES DAILY  Qty: 100 strip, Refills: 11    Associated Diagnoses: Type 2 diabetes mellitus without complication, without long-term current use of insulin (H)      Continuous Blood Gluc  (FREESTYLE DELILAH 2 READER) TENNILLE 1 each continuous  Qty: 1  each, Refills: 0    Associated Diagnoses: Type 2 diabetes mellitus with hyperglycemia, with long-term current use of insulin (H)      Continuous Blood Gluc Sensor (FREESTYLE DELILAH 2 SENSOR) MISC 1 each every 14 days  Qty: 2 each, Refills: 11    Associated Diagnoses: Type 2 diabetes mellitus with hyperglycemia, with long-term current use of insulin (H)      insulin pen needle (BD PEN NEEDLE DONTAE 2ND GEN) 32G X 4 MM miscellaneous USE 3 PEN NEEDLES DAILY  Qty: 100 each, Refills: 11    Comments: PT SAYS HE IS USING 3 NEEDLES PER DAY, PLEASE SEND NEW RX IFTHIS IS CORRECT  Associated Diagnoses: Type 2 diabetes mellitus with hyperglycemia, without long-term current use of insulin (H)      ONETOUCH DELICA LANCETS 33G MISC 1 each 2 times daily  Qty: 100 each, Refills: 10    Associated Diagnoses: Type 2 diabetes mellitus without complication, without long-term current use of insulin (H)           Allergies   Allergies   Allergen Reactions     No Clinical Screening - See Comments Other (See Comments)     Beta blocker in glaucoma gtt.s caused low pulse and passing out      Timolol      Beta blocker in glaucoma gtt.s caused low pulse and passing out   - patient thinks it was timolol but not 100% sure which beta blocker eye drop.      Bactrim [Sulfamethoxazole W/Trimethoprim] Rash     Data   Most Recent 3 CBC's:Recent Labs   Lab Test 09/19/22  0608 09/18/22  0756 09/16/22  0611   WBC 23.1* 22.8* 15.8*   HGB 11.2* 12.4* 13.2*   MCV 93 95 95    403 404      Most Recent 3 BMP's:  Recent Labs   Lab Test 09/19/22  0738 09/19/22  0608 09/18/22  2050 09/18/22  0740 09/18/22  0701 09/17/22  0726 09/17/22  0621 09/16/22  0723 09/16/22  0611   NA  --  132*  --   --  125*  --   --   --  133*   POTASSIUM  --  4.1  --   --  4.8  --  4.2  --  3.7   CHLORIDE  --  113*  --   --  110*  --   --   --  116*   CO2  --  15*  --   --  10*  --   --   --  12*   BUN  --  29*  --   --  24  --   --   --  18   CR  --  2.07*  --   --  2.18*  --   --    --  1.81*   ANIONGAP  --  4*  --   --  5  --   --   --  5   MAHSA  --  6.9*  --   --  6.8*  --   --   --  7.0*   * 184* 355*   < > 360*   < >  --    < > 77    < > = values in this interval not displayed.     Most Recent 2 LFT's:  Recent Labs   Lab Test 09/16/22  0611 09/13/22  0536   AST 21 15   ALT <9 <9   ALKPHOS 59 66   BILITOTAL 0.4 0.4     Most Recent INR's and Anticoagulation Dosing History:  Anticoagulation Dose History    There is no flowsheet data to display.       Most Recent 3 Troponin's:No lab results found.  Most Recent Cholesterol Panel:  Recent Labs   Lab Test 02/22/22  0952   CHOL 193   *   HDL 67   TRIG 92     Most Recent 6 Bacteria Isolates From Any Culture (See EPIC Reports for Culture Details):  Recent Labs   Lab Test 06/09/21  1008 01/22/21 2015 01/14/21  1116 09/15/20  1044 03/29/16  1055 03/22/16  1100   CULT No growth No growth No growth No growth No growth No growth     Most Recent TSH, T4 and A1c Labs:  Recent Labs   Lab Test 09/11/22  0707 08/26/22  1012   TSH 46.22*  --    T4 0.28*  --    A1C  --  8.6*

## 2022-09-19 NOTE — PROGRESS NOTES
Care Management Discharge Note    Discharge Date: 09/19/2022       Discharge Disposition: Home    Discharge Services:  None    Discharge DME:  None    Discharge Transportation:  Family or friend    Private pay costs discussed: Not applicable      Education Provided on the Discharge Plan:  Yes  Persons Notified of Discharge Plans: MD, RN, SW, Patient  Patient/Family in Agreement with the Plan:  Yes    Handoff Referral Completed: Yes    Additional Information:  Patient will discharge home with his daughter today. Patients daughter lives in the [facility] and will transport. No CM needs.         Veronica Jaimes

## 2022-09-19 NOTE — PLAN OF CARE
; Updated Dr. Bowling.Will be given 10 units Novolog for sliding scale and 8 units Novolog added for meal time insulin.

## 2022-09-19 NOTE — TELEPHONE ENCOUNTER
To: COVERING provider for Dr. Wallace    Daughter of patient calling to explain he is staying with her (in the [facility]) on a temporary basis and is in need of a glocose monitor sensor refill. Free style Libir 2 style.  Please return phone call to discuss @ [phone].  Please call her back today MON 09/19/22.   Thank you

## 2022-09-19 NOTE — PLAN OF CARE
Physical Therapy Discharge Summary    Reason for therapy discharge:    Discharged to home with home therapy.    Progress towards therapy goal(s). See goals on Care Plan in [facility] electronic health record for goal details.  Goals partially met.  Barriers to achieving goals:   discharge from facility.    Therapy recommendation(s):    Recommend continued therapies to improve overall strength, balance and mobility.       Viviane Prado, PT, DPT  9/19/2022

## 2022-09-19 NOTE — PLAN OF CARE
Occupational Therapy Discharge Summary    Reason for therapy discharge:    All goals and outcomes met, no further needs identified.    Progress towards therapy goal(s). See goals on Care Plan in [facility] electronic health record for goal details.  Goals met    Therapy recommendation(s):    Continue home exercise program.

## 2022-09-20 NOTE — PROGRESS NOTES
Addendum 9/20/2022: Re-opened to print AVS.     Giorgio Mccord, PharmD  Medication Therapy Management Pharmacist  [facility]  Office phone: [phone]    Medication Therapy Management (MTM) Encounter    ASSESSMENT:                            Medication Adherence/Access: No issues identified    Diarrhea Secondary to Chemotherapy: Improving with prednisone. Plan in place.     Type 2 Diabetes: Blood glucose is elevated from baseline with current prednisone use. Doses of prandial insulin increased on discharge. Follow up closely to evaluate blood glucose.     Hyperlipidemia: Stable.  Patient is on high intensity statin which is indicated based on 2019 ACC/AHA guidelines for lipid management.      Insomnia: Stable. No issues identified.     GERD: Stable. No issues identified.     Hypothyroidism: Improving. Last TSH is above normal range.    Pain: Stable. No issues identified.     Glaucoma: Stable. No issues identified.     Nausea: Stable. No issues identified.     Supplements: Stable. No issues identified.     Immunizations: Patient is eligible for the following vaccines: bivalent COVID-19, yearly influenza.     PLAN:                            The following recommendations are being made directly to the patient:  1. Monitor your blood sugar closely, especially while taking prednisone, which can raise your blood sugar. Reach out to the clinic for guidance if you are having very high blood sugars that your insulin does not lower (for example, blood sugar of 300 mg/dL or more consistently).   2. You are eligible for the following vaccines: a yearly influenza vaccine, and a bivalent COVID-19 booster vaccine. You can receive these vaccines at the clinic or at your local pharmacy at your convenience.     Follow-up: As needed.     SUBJECTIVE/OBJECTIVE:                          Colin Baxter is a 77 year old male called for a transitions of care visit. He was discharged from [facility] on  2022 for diarrhea secondary to chemotherapy. Accompanied by his daughter, Jessika. The provider's discharge note was not complete at the time of this visit.     Reason for visit: Transitions of care visit.     Allergies/ADRs: Reviewed in chart  Past Medical History: Reviewed in chart  Tobacco: He reports that he quit smoking about 30 years ago. He has never used smokeless tobacco.  Alcohol: not currently using  Caffeine: coffee - 2 to 3 cups per day.     Medication Adherence/Access: no issues reported  Patient takes medications directly from bottles.  Patient takes medications 1 time(s) per day.   Per patient, misses medication 0 times per week.   Medication barriers: none.     Diarrhea Secondary to Chemotherapy: Current medications include prednisone taper 60 mg x 5 days, then 40 mg x 5 days, then 20 mg x 5 days. Patient reports diarrhea is improved, but strength is not back yet. Diarrhea is believed to be secondary to Opdivo (nivolumab) for bladder cancer, which has been delayed due to this side effect.     Type 2 Diabetes:  Currently taking insulin aspart (Novolog) 5 units with meals plus a sliding scale (2 units for blood glucose between 150-200 mg/dL, 4 units for blood glucose between 201-250 mg/dL, 6 units for blood glucose over 250 mg/dL), insulin degludec (Tresiba) 20 units daily. Patient is not experiencing side effects.  Blood sugar monitorin time(s) daily - patient checks blood glucose more when his Freestyle Tiffany sensor is placed. He just picked up a new sensor from [employer] today and will be placing it today.   Ranges (patient reported): See below  Fasting- 200 mg/dL  Post-Prandial- has been higher with prednisone 250 mg/dL or higher.  Symptoms of low blood sugar? shaky, dizzy, sweaty - rarely.   Symptoms of high blood sugar? polyuria  Eye exam: up to date  Foot exam: up to date  Diet: most recent dinner - red and yellow peppers, meat and cheese. Has been drinking Gatorade Zero.   Exercise:  light exercise - walking quite a bit.   Aspirin: No  Statin: Yes: atorvastatin 40 mg daily.    ACEi/ARB: No.   Urine Albumin:   Lab Results   Component Value Date    UMALCR 78.77 (H) 03/04/2022      Lab Results   Component Value Date    A1C 8.6 08/26/2022    A1C 7.4 05/23/2022    A1C 10.3 02/22/2022    A1C 7.1 11/24/2021    A1C 5.8 08/19/2021    A1C 5.7 01/14/2021    A1C 5.7 10/02/2020    A1C 5.7 03/13/2020    A1C 6.2 10/31/2019     Hyperlipidemia: Current therapy includes atorvastatin 40 mg daily.  Patient reports no significant myalgias or other side effects.  The 10-year ASCVD risk score (Shannon VAZ Jr., et al., 2013) is: 26.7%    Values used to calculate the score:      Age: 77 years      Sex: Male      Is Non- : No      Diabetic: Yes      Tobacco smoker: No      Systolic Blood Pressure: 99 mmHg      Is BP treated: No      HDL Cholesterol: 67 mg/dL      Total Cholesterol: 193 mg/dL  Recent Labs   Lab Test 02/22/22  0952 10/02/20  0923 09/09/19  1144 07/07/15  0848   CHOL 193 145   < > 204*   HDL 67 59   < > 43   * 75   < > 142*   TRIG 92 54   < > 95   CHOLHDLRATIO  --   --   --  4.7    < > = values in this interval not displayed.     Insomnia: Current medications include lorazepam 0.5 mg nightly as needed for sleep (infrequent use, prescribed at discharge to help with sleep as needed with the prednisone causing insomnia), melatonin 3 mg nightly as needed for sleep. Patient reports these medications are effective when he needs them.    GERD: Current medications include: Prilosec (omeprazole) 20 mg once daily. Patient reports no current symptoms.  Patient feels that current regimen is effective.    Hypothyroidism: Patient is taking levothyroxine 150 mcg daily (dose increased at discharge due to increased TSH). Patient is having the following symptoms: none.   TSH   Date Value Ref Range Status   09/11/2022 46.22 (H) 0.30 - 5.00 uIU/mL Final   08/03/2022 12.93 (H) 0.40 - 4.00 mU/L Final    10/02/2020 1.41 0.40 - 4.00 mU/L Corrected     Comment:     CORRECTED ON 10/02 AT 1852: PREVIOUSLY REPORTED AS 36.06     Pain:  Current medications include: acetaminophen 650 mg every 4 hours as needed (how often? Very infrequently). Uses for random body aches and pains. No issues reported.     Glaucoma: Current medications include latanoprost 0.005% 1 drop into both eyes at bedtime, brimonidine 1 drop into the right eye twice daily. Patient reports no issues.     Nausea: Current medications include ondansetron ODT 4 mg every 6 hours as needed for nausea or vomiting (how often? Has not needed for four or five days). Patient reports ondansetron are effective when he needs them.     Supplements: Current supplements include cholecalciferol 25 mcg daily, Vitron-C  mg daily. Patient reports no issues.     Immunizations:   Most Recent Immunizations   Administered Date(s) Administered     COVID-19,PF,Pfizer (12+ Yrs) 10/04/2021, 03/25/2021, 02/25/2021     FLUAD(HD)65+ QUAD 10/04/2021     Flu, Unspecified 10/15/2013     Influenza (High Dose) 3 valent vaccine 10/24/2019     Influenza (IIV3) PF 10/14/2013     Influenza, Quad, High Dose, Pf, 65yr+ (Fluzone HD) 10/06/2020     Pneumo Conj 13-V (2010&after) 01/09/2018     Pneumococcal 23 valent 11/01/2019     TD (ADULT, 7+) 11/01/2019     Td (Adult), Adsorbed 08/13/2005     Tetanus 07/21/1989     Zoster vaccine recombinant adjuvanted (SHINGRIX) 12/22/2020, 09/03/2020     Zoster vaccine, live 12/22/2015     Today's Vitals: There were no vitals taken for this visit.  ----------------  Post Discharge Medication Reconciliation Status: discharge medications reconciled, continue medications without change.    I spent 26 minutes with this patient today. I offer these suggestions for consideration by Dr. Libia Wallace MD. A copy of the visit note was provided to the patient's provider(s).    The patient was mailed a summary of these recommendations.     Giorgio Mccord,  PharmD  Medication Therapy Management Pharmacist  [facility]  Office phone: [phone]    Telemedicine Visit Details  Type of service:  Telephone visit  Start Time: 1:32 PM  End Time: 1:58 PM  Originating Location (patient location): Home  Distant Location (provider location):  [facility] [place] [facility]     Medication Therapy Recommendations  Immunization counseling    Rationale: Preventive therapy - Needs additional medication therapy - Indication   Recommendation: Order Vaccine - PFIZER COVID-19 VAC BIVALENT IM   Status: Patient Agreed - Adherence/Education   Note: The patient is eligible for the following vaccines at their convenience: a yearly influenza vaccine and a bivalent COVID-19 booster vaccine.

## 2022-09-20 NOTE — PATIENT INSTRUCTIONS
"Recommendations from today's MTM visit:                                                    MTM (medication therapy management) is a service provided by a clinical pharmacist designed to help you get the most of out of your medicines.   Today we reviewed what your medicines are for, how to know if they are working, that your medicines are safe and how to make your medicine regimen as easy as possible.      1. Monitor your blood sugar closely, especially while taking prednisone, which can raise your blood sugar. Reach out to the clinic for guidance if you are having very high blood sugars that your insulin does not lower (for example, blood sugar of 300 mg/dL or more consistently).     2. You are eligible for the following vaccines: a yearly influenza vaccine, and a bivalent COVID-19 booster vaccine. You can receive these vaccines at the clinic or at your local pharmacy at your convenience.     Follow-up: As needed.    It was great speaking with you today.  I value your experience and would be very thankful for your time in providing feedback in our clinic survey. In the next few days, you may receive an email or text message from [employer] with a link to a survey related to your  clinical pharmacist.\"     To schedule another MTM appointment, please call the clinic directly or you may call the MTM scheduling line at [phone] or toll-free at [phone].     My Clinical Pharmacist's contact information:                                                      Please feel free to contact me with any questions or concerns you have.      Giorgio cMcord, PharmD  Medication Therapy Management Pharmacist  [facility]  Office phone: [phone]   "

## 2022-09-21 NOTE — PROGRESS NOTES
[facility]: Post-Discharge Note  SITUATION                                                      Admission:    Admission Date: 09/10/22   Reason for Admission: diarrhea x 2 weeks, dehydration, decreased oral intake  Discharge:   Discharge Date: 09/19/22  Discharge Diagnosis: immune mediated collitis, DMII, dehydration, pancreatic mass    BACKGROUND                                                      Per hospital discharge summary and inpatient provider notes.  See discharge summery    ASSESSMENT           Discharge Assessment  How are you doing now that you are home?: Feeling better gettting strength back. Still pretty weak  How are your symptoms? (Red Flag symptoms escalate to triage hotline per guidelines): Improved  Do you feel your condition is stable enough to be safe at home until your provider visit?: Yes  Does the patient have their discharge instructions? : Yes  Does the patient have questions regarding their discharge instructions? : No  Were you started on any new medications or were there changes to any of your previous medications? : No  Does the patient have all of their medications?: Yes  Do you have questions regarding any of your medications? : No  Do you have all of your needed medical supplies or equipment (DME)?  (i.e. oxygen tank, CPAP, cane, etc.): Yes  Discharge follow-up appointment scheduled within 14 calendar days? : No  Is patient agreeable to assistance with scheduling? : Yes (He would like to wait until next week)    Post-op ([facility] Only)  If the patient had a surgery or procedure, do they have any questions for a nurse?: No (colonoscopy with biopsies)    Post-op (Clinicians Only)  Fever: No  Chills: No        PLAN                                                      Outpatient Plan:Christiano was discharged to home. He is staying with his daughter but will returning home this weekend. He would like a call next week to set up treatment discussion.     Future Appointments   Date Time  Provider Department Center   10/3/2022 10:00 AM  [facility] [place]         For any urgent concerns, please contact our 24 hour clinic line:          Nasreen Ontiveros RN

## 2022-09-28 NOTE — PROGRESS NOTES
[facility]: Cancer Care                                                                                          TC to patient to inquire if he is ready to set up appointment for treatment discussion. Patient reports he is still in [place] and will call when he returns home to set up appointment.    Signature:  Nasreen Ontiveros RN

## 2022-09-28 NOTE — PATIENT INSTRUCTIONS
"Medication Instructions:  - Tylenol is safe to continue if needed for pain  - STOP taking all vitamins and herbal supplements 7 days before surgery.  - On the morning of your procedure, you should still take, with small amount of water, your omeprazole. You should also still take your tresiba 20 units but you can skip your short acting novolog. You can skip all of your other medications that morning and take them either later in the day or just restart like normal the next day.    You can search for \"graduated compression stockings\" online and you can pick the right size based on measurements of your legs.  Wearing them all day is the most helpful and then you can remove them in the evening.  There are also devices that help you put on compression socks when it's hard to do it by yourself.  "

## 2022-09-28 NOTE — PROGRESS NOTES
[facility]  [address] [facility] [address]  Phone: [phone]  Primary Provider: Libia Wallace  Pre-op Performing Provider: ANT JONES    PREOPERATIVE EVALUATION:  Today's date: 9/28/2022    Colin Baxter is a 77 year old male who presents for a preoperative evaluation.    Surgical Information:  Surgery/Procedure: UPPER ENDOSCOPIC ULTRASOUND WITH FINE NEEDLE ASPIRATION  Surgery Location: [facility]  Surgeon: Jason Dennis MD  Surgery Date: 9/30/22  Time of Surgery: 12:15pm  Where patient plans to recover: At home with family  Fax number for surgical facility: Note does not need to be faxed, will be available electronically in Epic.    Type of Anesthesia Anticipated: Local with MAC    Assessment & Plan     The proposed surgical procedure is considered INTERMEDIATE risk.    Preop general physical exam  Pancreatic mass  Indication for procedure    Type 2 diabetes mellitus with hyperglycemia, without long-term current use of insulin (H)  CKD (chronic kidney disease) stage 4, GFR 15-29 ml/min (H)  Acquired hypothyroidism  Contributes to complexity of medical care but does not restrict him from this procedure  These conditions are being appropriately managed, see comments in HPI    Urothelial cancer (H)  in remission but noted for possible relevance to current pancreatic mass    NO Implanted Device:  He does not have any artifical joints nor implanted devices such as a pacemaker  He only has a continuous glucose monitor sensor in his arm    Risks and Recommendations:  The patient has the following additional risks and recommendations for perioperative complications:   - Consult Hospitalist / IM to assist with post-op medical management if needing to stay in hospital    Medication Instructions:  - Tylenol is safe to continue if needed for pain  - STOP taking all vitamins and herbal supplements 7 days before surgery.  - On the morning of your procedure, you should  "still take, with small amount of water, your omeprazole. You should also still take your tresiba 20 units but you can skip your short acting novolog. You can skip all of your other medications that morning and take them either later in the day or just restart like normal the next day.    RECOMMENDATION:  APPROVAL GIVEN to proceed with proposed procedure, without further diagnostic evaluation.    Patient Instructions   Medication Instructions:  - Tylenol is safe to continue if needed for pain  - STOP taking all vitamins and herbal supplements 7 days before surgery.  - On the morning of your procedure, you should still take, with small amount of water, your omeprazole. You should also still take your tresiba 20 units but you can skip your short acting novolog. You can skip all of your other medications that morning and take them either later in the day or just restart like normal the next day.    You can search for \"graduated compression stockings\" online and you can pick the right size based on measurements of your legs.  Wearing them all day is the most helpful and then you can remove them in the evening.  There are also devices that help you put on compression socks when it's hard to do it by yourself.        Lexi Fernandez MD  Family Medicine  [facility]              Subjective     HPI related to upcoming procedure: Pancreatic mass. FNA to assess.    Preop Questions 9/28/2022   1. Have you ever had a heart attack or stroke? No   2. Have you ever had surgery on your heart or blood vessels, such as a stent placement, a coronary artery bypass, or surgery on an artery in your head, neck, heart, or legs? No   3. Do you have chest pain with activity? No   4. Do you have a history of  heart failure? No   5. Do you currently have a cold, bronchitis or symptoms of other infection? No   6. Do you have a cough, shortness of breath, or wheezing? No   7. Do you or anyone in your family have " previous history of blood clots? No   8. Do you or does anyone in your family have a serious bleeding problem such as prolonged bleeding following surgeries or cuts? No   9. Have you ever had problems with anemia or been told to take iron pills? YES - told to take 1/2 an iron pill in the past   10. Have you had any abnormal blood loss such as black, tarry or bloody stools? No   11. Have you ever had a blood transfusion? No   11a. Have you ever had a transfusion reaction? -   12. Are you willing to have a blood transfusion if it is medically needed before, during, or after your surgery? Yes   13. Have you or any of your relatives ever had problems with anesthesia? No   14. Do you have sleep apnea, excessive snoring or daytime drowsiness? No   15. Do you have any artifical heart valves or other implanted medical devices like a pacemaker, defibrillator, or continuous glucose monitor? YES - continuous glucose monitor   15a. What type of device do you have? Continuous glucose monitor   15b. Name of the clinic that manages your device:  [facility] [place]   16. Do you have artificial joints? No   17. Are you allergic to latex? No       Health Care Directive:  Patient has a Health Care Directive on file    Preoperative Review of :   reviewed - controlled substances reflected in medication list. only very few lorazepam PRN.    Status of Chronic Conditions:  ANEMIA - Patient has a recent history of moderate-severe anemia, which has not been symptomatic. It is stable and is attributable to CKD.    DIABETES - Patient has a longstanding history of DiabetesType Type II . Patient is being treated with insulin injections and denies significant side effects. Control has been acceptable but with recent worsening of hyperglycemia due to prednisone taper.    HYPOTHYROIDISM - Patient has a longstanding history of chronic Hypothyroidism. Patient has been doing well, noting no tremor, insomnia, hair loss or changes in skin  texture. Continues to take medications as directed, without adverse reactions or side effects. Last TSH   Lab Results   Component Value Date    TSH 46.22 (H) 09/11/2022   .    RENAL INSUFFICIENCY - Patient has a longstanding history of moderate-severe chronic renal insufficiency. Cr is stable in the high 1s to low 2s      Review of Systems  CONSTITUTIONAL: NEGATIVE for fever, chills, change in weight  INTEGUMENTARY/SKIN: NEGATIVE for worrisome rashes, moles or lesions  EYES: NEGATIVE for vision changes or irritation  ENT/MOUTH: NEGATIVE for ear, mouth and throat problems  RESP: NEGATIVE for significant cough or SOB  CV: NEGATIVE for chest pain, palpitations or peripheral edema  GI: NEGATIVE for nausea, abdominal pain, heartburn, or change in bowel habits  : NEGATIVE for frequency, dysuria, or hematuria  MUSCULOSKELETAL: NEGATIVE for significant arthralgias or myalgia  NEURO: NEGATIVE for weakness, dizziness or paresthesias  ENDOCRINE: NEGATIVE for temperature intolerance, skin/hair changes  HEME: NEGATIVE for bleeding problems  PSYCHIATRIC: NEGATIVE for changes in mood or affect    Patient Active Problem List    Diagnosis Date Noted     YOU (acute kidney injury) (H) 09/17/2022     Priority: Medium     Adverse drug reaction 09/17/2022     Priority: Medium     Dehydration 09/12/2022     Priority: Medium     Pancreatic mass 09/12/2022     Priority: Medium     CRF (chronic renal failure), stage 4 (severe) (H) 09/10/2022     Priority: Medium     Diarrhea 2nd to Chemotherapy 09/10/2022     Priority: Medium     Pancreatic lesion, 2.1 cm on Abd CT 09/10/2022     Priority: Medium     Acquired hypothyroidism 08/26/2022     Priority: Medium     Carcinoma in situ of bladder 11/19/2021     Priority: Medium     Anemia due to stage 4 chronic kidney disease (H) 10/22/2021     Priority: Medium     CKD (chronic kidney disease) stage 4, GFR 15-29 ml/min (H) 10/21/2021     Priority: Medium     Ureter cancer (H) 01/13/2021      Priority: Medium     Added automatically from request for surgery [id number]       DM type 2, Hgb A1C 8.6 on 8/26/22 12/11/2020     Priority: Medium     Obesity (BMI 35.0-39.9) with comorbidity (H) 09/26/2018     Priority: Medium     Bladder cancer (H) 10/06/2015     Priority: Medium     Glaucoma 07/06/2015     Priority: Medium     Hyperlipidemia 08/13/2008     Priority: Medium     Formatting of this note might be different from the original.  IMO Update 10/11        Past Medical History:   Diagnosis Date     Benign essential hypertension      Bladder cancer (H)      Carcinoma in situ of bladder 11/19/2021     Diabetes mellitus, type 2 (H)      Dyslipidemia      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) 12/11/2020    getting BCG (from his Urologist in [place], [place])     Renal cell carcinoma, left (H) 2021    Left nephrectomy     Ureter cancer, left (H)      Past Surgical History:   Procedure Laterality Date     APPENDECTOMY  1958     COLONOSCOPY  2-     COLONOSCOPY N/A 9/13/2022    Procedure: COLONOSCOPY;  Surgeon: Keyon Zimmerman MD;  Location: [facility] OR     COMBINED CYSTOSCOPY, RETROGRADES, URETEROSCOPY, INSERT STENT Left 1/18/2021    Procedure: CYSTOURETEROSCOPY, WITH RETROGRADE PYELOGRAM with interpretation of fluroscopy, ureteroscopy, ureteral biopsy, bladder biopsy and fulgaration;  Surgeon: Shonda Morrell MD;  Location: [facility] OR     CYSTOSCOPY N/A 6/16/2021    Procedure: CYSTOSCOPY;  Surgeon: Javier Mcnulty MD;  Location: [facility] OR     CYSTOSCOPY, BIOPSY BLADDER, COMBINED N/A 9/8/2015    Procedure: COMBINED CYSTOSCOPY, BIOPSY BLADDER;  Surgeon: Randy Martinez MD;  Location: [facility] OR     CYSTOSCOPY, BIOPSY BLADDER, COMBINED N/A 2/14/2017    Procedure: COMBINED CYSTOSCOPY, BIOPSY BLADDER;  Surgeon: Randy Martinez MD;  Location: [facility] OR     CYSTOSCOPY, BIOPSY BLADDER, COMBINED N/A 11/13/2018    Procedure: COMBINED CYSTOSCOPY, BIOPSY BLADDER;  Surgeon: Ed Helm  MD KEVIN;  Location: [facility] OR     CYSTOSCOPY, BIOPSY BLADDER, COMBINED N/A 11/5/2021    Procedure: CYSTOSCOPY, WITH BLADDER BIOPSY;  Surgeon: Shonda Morrell MD;  Location: [facility] OR     CYSTOSCOPY, RETROGRADES, COMBINED Bilateral 11/13/2018    Procedure: Bilateral Retrograde Pyelagram, Bladder Biopsy;  Surgeon: Ed Helm MD;  Location: [facility] OR     CYSTOSCOPY, RETROGRADES, COMBINED Right 11/5/2021    Procedure: CYSTOSCOPY, WITH RETROGRADE PYELOGRAM;  Surgeon: Shonda Morrell MD;  Location: [facility] OR     CYSTOSCOPY, RETROGRADES, INSERT STENT URETER(S), COMBINED Left 9/8/2015    Procedure: COMBINED CYSTOSCOPY, RETROGRADES, INSERT STENT URETER(S);  Surgeon: Randy Martinez MD;  Location: [facility]     CYSTOSCOPY, TRANSURETHRAL RESECTION (TUR) TUMOR BLADDER, COMBINED N/A 9/8/2015    Procedure: COMBINED CYSTOSCOPY, TRANSURETHRAL RESECTION (TUR) TUMOR BLADDER;  Surgeon: Randy Martinez MD;  Location: [facility]     CYSTOSCOPY, TRANSURETHRAL RESECTION (TUR) TUMOR BLADDER, COMBINED N/A 1/12/2016    Procedure: COMBINED CYSTOSCOPY, TRANSURETHRAL RESECTION (TUR) TUMOR BLADDER;  Surgeon: Randy Martinez MD;  Location: [facility]     CYSTOSCOPY, TRANSURETHRAL RESECTION (TUR) TUMOR BLADDER, COMBINED N/A 9/13/2016    Procedure: COMBINED CYSTOSCOPY, TRANSURETHRAL RESECTION (TUR) TUMOR BLADDER;  Surgeon: Randy Martinez MD;  Location: [facility] OR     DAVINCI NEPHROURETERECTOMY Left 6/16/2021    Procedure: NEPHROURETERECTOMY, ROBOT-ASSISTED, lymph node dissection;  Surgeon: Javier Mcnulty MD;  Location: [facility] OR     PHACOEMULSIFICATION WITH STANDARD INTRAOCULAR LENS IMPLANT Right 10/9/2018    Procedure: PHACOEMULSIFICATION WITH STANDARD INTRAOCULAR LENS IMPLANT;  PHACOEMULSIFICATION CATARACT EXTRACTION POSTERIOR CHAMBER LENS RIGHT;  Surgeon: Marlo Mcconnell MD;  Location: [facility] OR     PHACOEMULSIFICATION WITH STANDARD INTRAOCULAR LENS IMPLANT Left 10/23/2018    Procedure: PHACOEMULSIFICATION CATARACT EXTRACTION POSTERIOR  CHAMBER LENS LEFT;  Surgeon: Marlo Mcconnell MD;  Location: [facility] OR     Current Outpatient Medications   Medication Sig Dispense Refill     acetaminophen (TYLENOL) 325 MG tablet Take 2 tablets (650 mg) by mouth every 4 hours as needed for mild pain or other (and adjunct with moderate or severe pain or per patient request)  0     atorvastatin (LIPITOR) 40 MG tablet TAKE 1 TABLET (40 MG) BY MOUTH DAILY 90 tablet 3     blood glucose (ONETOUCH VERIO IQ) test strip USE TO TEST BLOOD SUGAR 3 TIMES DAILY 100 strip 11     brimonidine (ALPHAGAN-P) 0.15 % ophthalmic solution INSTILL 1 DROP INTO RIGHT EYE TWICE DAILY  12     Cholecalciferol (VITAMIN D3) 25 MCG TABS Take 25 mcg by mouth daily       Continuous Blood Gluc  (FREESTYLE DELILAH 2 READER) TENNILLE 1 each continuous 1 each 0     Continuous Blood Gluc Sensor (FREESTYLE DELILAH 2 SENSOR) MISC 1 each every 14 days 2 each 11     insulin aspart (NOVOLOG PEN) 100 UNIT/ML pen 5 units with meals, and for glucometer 150-200 add 2 units SQ, 201-250 add 4 units, >250 add 6 units 15 mL 0     insulin degludec (TRESIBA FLEXTOUCH) 100 UNIT/ML pen Inject 20 units daily 15 mL 3     insulin pen needle (BD PEN NEEDLE DONTAE 2ND GEN) 32G X 4 MM miscellaneous USE 3 PEN NEEDLES DAILY 100 each 11     latanoprost (XALATAN) 0.005 % ophthalmic solution Place 1 drop into both eyes At Bedtime       LORazepam (ATIVAN) 0.5 MG tablet Take 1 tablet (0.5 mg) by mouth nightly as needed for sleep 20 tablet 0     melatonin 3 MG tablet Take 1 tablet (3 mg) by mouth nightly as needed for sleep 90 tablet 1     omeprazole (PRILOSEC) 20 MG DR capsule Take 1 capsule (20 mg) by mouth daily 60 capsule 0     ondansetron (ZOFRAN ODT) 4 MG ODT tab Take 1 tablet (4 mg) by mouth every 6 hours as needed for nausea or vomiting 20 tablet 0     ONETOUCH DELICA LANCETS 33G MISC 1 each 2 times daily 100 each 10     VITRON-C  MG TABS tablet Take 1 tablet by mouth daily       levothyroxine (SYNTHROID/LEVOTHROID) 150 MCG  "tablet Take 1 tablet (150 mcg) by mouth daily 30 tablet 1     predniSONE (DELTASONE) 20 MG tablet 3 pills daily for 5 days, then 2 pills daily for 5 days then 1 pill daily for 5 days. 30 tablet 0       Allergies   Allergen Reactions     No Clinical Screening - See Comments Other (See Comments)     Beta blocker in glaucoma gtt.s caused low pulse and passing out      Timolol      Beta blocker in glaucoma gtt.s caused low pulse and passing out   - patient thinks it was timolol but not 100% sure which beta blocker eye drop.      Bactrim [Sulfamethoxazole W/Trimethoprim] Rash        Social History     Tobacco Use     Smoking status: Former Smoker     Quit date: 1992     Years since quittin.7     Smokeless tobacco: Never Used   Substance Use Topics     Alcohol use: Yes     Comment: < 1 drink a month     History   Drug Use No         Objective     /68 (BP Location: Right arm, Patient Position: Sitting, Cuff Size: Adult Regular)   Pulse 67   Temp 97.3  F (36.3  C) (Tympanic)   Resp 20   Ht 1.66 m (5' 5.35\")   Wt 87 kg (191 lb 12.8 oz)   SpO2 98%   BMI 31.57 kg/m      Physical Exam    GENERAL APPEARANCE: healthy, alert and no distress     EYES: EOMI,  PERRL     HENT: ear canals and TM's normal and nose and mouth without ulcers or lesions     NECK: no adenopathy, no asymmetry, masses, or scars and thyroid normal to palpation     RESP: lungs clear to auscultation - no rales, rhonchi or wheezes     CV: regular rates and rhythm, normal S1 S2, no S3 or S4 and no murmur, click or rub     ABDOMEN:  soft, nontender, no HSM or masses and bowel sounds normal     MS: extremities normal- no gross deformities noted, no evidence of inflammation in joints, FROM in all extremities. 2+ pitting edema in BLE extending up to knees     SKIN: no suspicious lesions or rashes     NEURO: Sensory exam grossly normal, mentation intact and speech normal. He does have general deconditioning with a slightly wide based and shuffling " gait. But symmetric movements on both sides.     PSYCH: mentation appears normal. and affect normal/bright     LYMPHATICS: No cervical adenopathy    Recent Labs   Lab Test 09/19/22  0608 09/18/22  0756 09/18/22  0701 08/29/22  1032 08/26/22  1012 05/25/22  0731 05/23/22  0944   HGB 11.2* 12.4*  --    < >  --    < >  --     403  --    < >  --    < >  --    *  --  125*   < >  --    < >  --    POTASSIUM 4.1  --  4.8   < >  --    < >  --    CR 2.07*  --  2.18*   < >  --    < > 2.17*   A1C  --   --   --   --  8.6*  --  7.4*    < > = values in this interval not displayed.        Diagnostics:  No labs were ordered during this visit. Reviewed recent labs. Pertinent ones are mentioned in HPI under discussion of chronic conditions.  No EKG required for low risk surgery (cataract, skin procedure, breast biopsy, etc). but also, one was done within the past 30 days for other reasons and this was reviewed.    Revised Cardiac Risk Index (RCRI):  The patient has the following serious cardiovascular risks for perioperative complications:   - Diabetes Mellitus (on Insulin) = 1 point   - Serum Creatinine >2.0 mg/dl = 1 point     RCRI Interpretation: 2 points: Class III (moderate risk - 6.6% complication rate)     Estimated Functional Capacity: Performs 4 METS exercise without symptoms (e.g., light housework, stairs, 4 mph walk, 7 mph bike, slow step dance)          Signed Electronically by: Lexi Fernandez MD  Copy of this evaluation report is provided to requesting physician.    Answers for HPI/ROS submitted by the patient on 9/28/2022  If you checked off any problems, how difficult have these problems made it for you to do your work, take care of things at home, or get along with other people?: Somewhat difficult  PHQ9 TOTAL SCORE: 1

## 2022-09-30 NOTE — H&P
GENERAL PRE-PROCEDURE:   Procedure:  EUS - Pancreatic Cystic Lesion  Date/Time:  9/30/2022 11:01 AM    Verbal consent obtained?: Yes    Written consent obtained?: Yes    Risks and benefits: Risks, benefits and alternatives were discussed    Consent given by:  Patient  Patient states understanding of procedure being performed: Yes    Patient's understanding of procedure matches consent: Yes    Procedure consent matches procedure scheduled: Yes    Expected level of sedation:  Deep  Appropriately NPO:  Yes  ASA Class:  3  Mallampati  :  Grade 2- soft palate, base of uvula, tonsillar pillars, and portion of posterior pharyngeal wall visible  Lungs:  Lungs clear with good breath sounds bilaterally  Heart:  Normal heart sounds and rate  History & Physical reviewed:  History and physical reviewed and no updates needed  Statement of review:  I have reviewed the lab findings, diagnostic data, medications, and the plan for sedation

## 2022-09-30 NOTE — ANESTHESIA CARE TRANSFER NOTE
Patient: Colin Baxter    Procedure: Procedure(s):  UPPER ENDOSCOPIC ULTRASOUND       Diagnosis: Pancreatic lesion [K86.9]  Diagnosis Additional Information: No value filed.    Anesthesia Type:   MAC     Note:    Oropharynx: oropharynx clear of all foreign objects and spontaneously breathing  Level of Consciousness: drowsy  Oxygen Supplementation: room air    Independent Airway: airway patency satisfactory and stable  Dentition: dentition unchanged  Vital Signs Stable: post-procedure vital signs reviewed and stable  Report to RN Given: handoff report given  Patient transferred to: Phase II    Handoff Report: Identifed the Patient, Identified the Reponsible Provider, Reviewed the pertinent medical history, Discussed the surgical course, Reviewed Intra-OP anesthesia mangement and issues during anesthesia, Set expectations for post-procedure period and Allowed opportunity for questions and acknowledgement of understanding      Vitals:  Vitals Value Taken Time   BP     Temp     Pulse     Resp     SpO2         Electronically Signed By: DANILO Laughlin CRNA  September 30, 2022  1:00 PM

## 2022-09-30 NOTE — ANESTHESIA PREPROCEDURE EVALUATION
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) 2020    getting BCG (from his Urologist in [place], [place])     Obesity      Pancreatic mass      Renal cell carcinoma, left (H)     Left nephrectomy     Ureter cancer, left (H)       Past Surgical History:   Procedure Laterality Date     APPENDECTOMY       COLONOSCOPY  2011     COLONOSCOPY N/A 2022    Procedure: COLONOSCOPY;  Surgeon: Keyon Zimmerman MD;  Location: [facility] OR     COMBINED CYSTOSCOPY, RETROGRADES, URETEROSCOPY, INSERT STENT Left 2021    Procedure: CYSTOURETEROSCOPY, WITH RETROGRADE PYELOGRAM with interpretation of fluroscopy, ureteroscopy, ureteral biopsy, bladder biopsy and fulgaration;  Surgeon: Shonda Morrell MD;  Location: [facility]     CYSTOSCOPY N/A 2021    Procedure: CYSTOSCOPY;  Surgeon: Javier Mcnulty MD;  Location: [facility] OR     CYSTOSCOPY, BIOPSY BLADDER, COMBINED N/A 2015    Procedure: COMBINED CYSTOSCOPY, BIOPSY BLADDER;  Surgeon: Randy Martinez MD;  Location: [facility] OR     CYSTOSCOPY, BIOPSY BLADDER, COMBINED N/A 2017    Procedure: COMBINED CYSTOSCOPY, BIOPSY BLADDER;  Surgeon: Randy Martinez MD;  Location: [facility]     CYSTOSCOPY, BIOPSY BLADDER, COMBINED N/A 2018    Procedure: COMBINED CYSTOSCOPY, BIOPSY BLADDER;  Surgeon: Ed Helm MD;  Location: [facility] OR     CYSTOSCOPY, BIOPSY BLADDER, COMBINED N/A 2021    Procedure: CYSTOSCOPY, WITH BLADDER BIOPSY;  Surgeon:  Shonda Morrell MD;  Location: [facility] OR     CYSTOSCOPY, RETROGRADES, COMBINED Bilateral 11/13/2018    Procedure: Bilateral Retrograde Pyelagram, Bladder Biopsy;  Surgeon: Ed Helm MD;  Location: [facility] OR     CYSTOSCOPY, RETROGRADES, COMBINED Right 11/5/2021    Procedure: CYSTOSCOPY, WITH RETROGRADE PYELOGRAM;  Surgeon: Shonda Morrell MD;  Location: [facility]     CYSTOSCOPY, RETROGRADES, INSERT STENT URETER(S), COMBINED Left 9/8/2015    Procedure: COMBINED CYSTOSCOPY, RETROGRADES, INSERT STENT URETER(S);  Surgeon: Randy Martinez MD;  Location: [facility]     CYSTOSCOPY, TRANSURETHRAL RESECTION (TUR) TUMOR BLADDER, COMBINED N/A 9/8/2015    Procedure: COMBINED CYSTOSCOPY, TRANSURETHRAL RESECTION (TUR) TUMOR BLADDER;  Surgeon: Randy Martinez MD;  Location: [facility]     CYSTOSCOPY, TRANSURETHRAL RESECTION (TUR) TUMOR BLADDER, COMBINED N/A 1/12/2016    Procedure: COMBINED CYSTOSCOPY, TRANSURETHRAL RESECTION (TUR) TUMOR BLADDER;  Surgeon: Randy Martinez MD;  Location[facility]     CYSTOSCOPY, TRANSURETHRAL RESECTION (TUR) TUMOR BLADDER, COMBINED N/A 9/13/2016    Procedure: COMBINED CYSTOSCOPY, TRANSURETHRAL RESECTION (TUR) TUMOR BLADDER;  Surgeon: Randy Martinez MD;  Location: [facility]     DAVINCI NEPHROURETERECTOMY Left 6/16/2021    Procedure: NEPHROURETERECTOMY, ROBOT-ASSISTED, lymph node dissection;  Surgeon: Javier Mcnulty MD;  Location: [facility] OR     PHACOEMULSIFICATION WITH STANDARD INTRAOCULAR LENS IMPLANT Right 10/9/2018    Procedure: PHACOEMULSIFICATION WITH STANDARD INTRAOCULAR LENS IMPLANT;  PHACOEMULSIFICATION CATARACT EXTRACTION POSTERIOR CHAMBER LENS RIGHT;  Surgeon: Marlo Mcconnell MD;  Location: [facility] OR     PHACOEMULSIFICATION WITH STANDARD INTRAOCULAR LENS IMPLANT Left 10/23/2018    Procedure: PHACOEMULSIFICATION CATARACT EXTRACTION POSTERIOR CHAMBER LENS LEFT;  Surgeon: Marlo Mcconnell MD;  Location: [facility] OR      Allergies   Allergen Reactions     No Clinical Screening - See  Comments Other (See Comments)     Beta blocker in glaucoma gtt.s caused low pulse and passing out      Timolol      Beta blocker in glaucoma gtt.s caused low pulse and passing out   - patient thinks it was timolol but not 100% sure which beta blocker eye drop.      Bactrim [Sulfamethoxazole W/Trimethoprim] Rash      Social History     Tobacco Use     Smoking status: Former Smoker     Quit date: 1992     Years since quittin.7     Smokeless tobacco: Never Used   Substance Use Topics     Alcohol use: Yes     Comment: < 1 drink a month      Wt Readings from Last 1 Encounters:   22 80.4 kg (177 lb 3 oz)        Anesthesia Evaluation            ROS/MED HX  ENT/Pulmonary:       Neurologic:       Cardiovascular:     (+) hypertension-----    METS/Exercise Tolerance:     Hematologic:     (+) anemia,     Musculoskeletal:   (+) arthritis,     GI/Hepatic:       Renal/Genitourinary:     (+) renal disease, type: CRI,     Endo:     (+) type II DM, thyroid problem, Obesity,     Psychiatric/Substance Use:       Infectious Disease:       Malignancy:   (+) Malignancy,     Other:            Physical Exam    Airway  airway exam normal      Mallampati: II   TM distance: > 3 FB   Neck ROM: full   Mouth opening: > 3 cm    Respiratory Devices and Support         Dental  no notable dental history         Cardiovascular   cardiovascular exam normal       Rhythm and rate: regular and normal     Pulmonary   pulmonary exam normal        breath sounds clear to auscultation           OUTSIDE LABS:  CBC:   Lab Results   Component Value Date    WBC 23.1 (H) 2022    WBC 22.8 (H) 2022    HGB 11.2 (L) 2022    HGB 12.4 (L) 2022    HCT 31.5 (L) 2022    HCT 36.3 (L) 2022     2022     2022     BMP:   Lab Results   Component Value Date     (L) 2022     (L) 2022    POTASSIUM 4.1 2022    POTASSIUM 4.8 2022    CHLORIDE 113 (H) 2022    CHLORIDE  110 (H) 09/18/2022    CO2 15 (L) 09/19/2022    CO2 10 (L) 09/18/2022    BUN 29 (H) 09/19/2022    BUN 24 09/18/2022    CR 2.07 (H) 09/19/2022    CR 2.18 (H) 09/18/2022     (H) 09/30/2022     (H) 09/19/2022     COAGS: No results found for: PTT, INR, FIBR  POC:   Lab Results   Component Value Date    BGM 98 06/17/2021     HEPATIC:   Lab Results   Component Value Date    ALBUMIN 1.6 (L) 09/16/2022    PROTTOTAL 3.8 (L) 09/16/2022    ALT <9 09/16/2022    AST 21 09/16/2022    ALKPHOS 59 09/16/2022    BILITOTAL 0.4 09/16/2022     OTHER:   Lab Results   Component Value Date    PH 7.29 (L) 06/16/2021    LACT 1.1 09/15/2022    A1C 8.6 (H) 08/26/2022    MAHSA 6.9 (L) 09/19/2022    PHOS 2.7 08/03/2022    MAG 2.2 09/18/2022    LIPASE 133 (H) 09/10/2022    TSH 46.22 (H) 09/11/2022    T4 0.28 (L) 09/11/2022    CRP 6.2 08/29/2022       Anesthesia Plan    ASA Status:  3   NPO Status:  NPO Appropriate    Anesthesia Type: MAC.              Consents    Anesthesia Plan(s) and associated risks, benefits, and realistic alternatives discussed. Questions answered and patient/representative(s) expressed understanding.    - Discussed:     - Discussed with:  Patient, Spouse      - Extended Intubation/Ventilatory Support Discussed: No.         Postoperative Care    Pain management: IV analgesics.   PONV prophylaxis: Ondansetron (or other 5HT-3), Dexamethasone or Solumedrol     Comments:                Randy Hardy MD

## 2022-09-30 NOTE — ANESTHESIA POSTPROCEDURE EVALUATION
Patient: Colin Baxter    Procedure: Procedure(s):  UPPER ENDOSCOPIC ULTRASOUND       Anesthesia Type:  MAC    Note:  Disposition: Outpatient   Postop Pain Control: Uneventful            Sign Out: Well controlled pain   PONV: No   Neuro/Psych: Uneventful            Sign Out: Acceptable/Baseline neuro status   Airway/Respiratory: Uneventful            Sign Out: Acceptable/Baseline resp. status   CV/Hemodynamics: Uneventful            Sign Out: Acceptable CV status; No obvious hypovolemia; No obvious fluid overload   Other NRE: NONE   DID A NON-ROUTINE EVENT OCCUR? No           Last vitals:  Vitals Value Taken Time   /69 09/30/22 1400   Temp 36.4  C (97.6  F) 09/30/22 1400   Pulse 48 09/30/22 1402   Resp 20 09/30/22 1400   SpO2 98 % 09/30/22 1402   Vitals shown include unvalidated device data.    Electronically Signed By: Randy Hardy MD  September 30, 2022  3:03 PM

## 2022-10-05 NOTE — TELEPHONE ENCOUNTER
lvm for pt advising of appt with kenyatta on 10/7 arriving at 115   Sarecycline Pregnancy And Lactation Text: This medication is Pregnancy Category D and not consider safe during pregnancy. It is also excreted in breast milk. Topical Sulfur Applications Pregnancy And Lactation Text: This medication is Pregnancy Category C and has an unknown safety profile during pregnancy. It is unknown if this topical medication is excreted in breast milk. Azithromycin Pregnancy And Lactation Text: This medication is considered safe during pregnancy and is also secreted in breast milk. Erythromycin Counseling:  I discussed with the patient the risks of erythromycin including but not limited to GI upset, allergic reaction, drug rash, diarrhea, increase in liver enzymes, and yeast infections. Benzoyl Peroxide Counseling: Patient counseled that medicine may cause skin irritation and bleach clothing.  In the event of skin irritation, the patient was advised to reduce the amount of the drug applied or use it less frequently.   The patient verbalized understanding of the proper use and possible adverse effects of benzoyl peroxide.  All of the patient's questions and concerns were addressed. Dapsone Counseling: I discussed with the patient the risks of dapsone including but not limited to hemolytic anemia, agranulocytosis, rashes, methemoglobinemia, kidney failure, peripheral neuropathy, headaches, GI upset, and liver toxicity.  Patients who start dapsone require monitoring including baseline LFTs and weekly CBCs for the first month, then every month thereafter.  The patient verbalized understanding of the proper use and possible adverse effects of dapsone.  All of the patient's questions and concerns were addressed. Spironolactone Counseling: Patient advised regarding risks of diarrhea, abdominal pain, hyperkalemia, birth defects (for female patients), liver toxicity and renal toxicity. The patient may need blood work to monitor liver and kidney function and potassium levels while on therapy. The patient verbalized understanding of the proper use and possible adverse effects of spironolactone.  All of the patient's questions and concerns were addressed. High Dose Vitamin A Pregnancy And Lactation Text: High dose vitamin A therapy is contraindicated during pregnancy and breast feeding. Tazorac Pregnancy And Lactation Text: This medication is not safe during pregnancy. It is unknown if this medication is excreted in breast milk. Detail Level: Detailed Erythromycin Pregnancy And Lactation Text: This medication is Pregnancy Category B and is considered safe during pregnancy. It is also excreted in breast milk. Benzoyl Peroxide Pregnancy And Lactation Text: This medication is Pregnancy Category C. It is unknown if benzoyl peroxide is excreted in breast milk. Bactrim Counseling:  I discussed with the patient the risks of sulfa antibiotics including but not limited to GI upset, allergic reaction, drug rash, diarrhea, dizziness, photosensitivity, and yeast infections.  Rarely, more serious reactions can occur including but not limited to aplastic anemia, agranulocytosis, methemoglobinemia, blood dyscrasias, liver or kidney failure, lung infiltrates or desquamative/blistering drug rashes. Minocycline Counseling: Patient advised regarding possible photosensitivity and discoloration of the teeth, skin, lips, tongue and gums.  Patient instructed to avoid sunlight, if possible.  When exposed to sunlight, patients should wear protective clothing, sunglasses, and sunscreen.  The patient was instructed to call the office immediately if the following severe adverse effects occur:  hearing changes, easy bruising/bleeding, severe headache, or vision changes.  The patient verbalized understanding of the proper use and possible adverse effects of minocycline.  All of the patient's questions and concerns were addressed. Isotretinoin Counseling: Patient should get monthly blood tests, not donate blood, not drive at night if vision affected, not share medication, and not undergo elective surgery for 6 months after tx completed. Side effects reviewed, pt to contact office should one occur. Dapsone Pregnancy And Lactation Text: This medication is Pregnancy Category C and is not considered safe during pregnancy or breast feeding. Spironolactone Pregnancy And Lactation Text: This medication can cause feminization of the male fetus and should be avoided during pregnancy. The active metabolite is also found in breast milk. Bactrim Pregnancy And Lactation Text: This medication is Pregnancy Category D and is known to cause fetal risk.  It is also excreted in breast milk. Topical Clindamycin Counseling: Patient counseled that this medication may cause skin irritation or allergic reactions.  In the event of skin irritation, the patient was advised to reduce the amount of the drug applied or use it less frequently.   The patient verbalized understanding of the proper use and possible adverse effects of clindamycin.  All of the patient's questions and concerns were addressed. Topical Retinoid counseling:  Patient advised to apply a pea-sized amount only at bedtime and wait 30 minutes after washing their face before applying.  If too drying, patient may add a non-comedogenic moisturizer. The patient verbalized understanding of the proper use and possible adverse effects of retinoids.  All of the patient's questions and concerns were addressed. Topical Clindamycin Pregnancy And Lactation Text: This medication is Pregnancy Category B and is considered safe during pregnancy. It is unknown if it is excreted in breast milk. Doxycycline Counseling:  Patient counseled regarding possible photosensitivity and increased risk for sunburn.  Patient instructed to avoid sunlight, if possible.  When exposed to sunlight, patients should wear protective clothing, sunglasses, and sunscreen.  The patient was instructed to call the office immediately if the following severe adverse effects occur:  hearing changes, easy bruising/bleeding, severe headache, or vision changes.  The patient verbalized understanding of the proper use and possible adverse effects of doxycycline.  All of the patient's questions and concerns were addressed. Include Pregnancy/Lactation Warning?: No Tetracycline Counseling: Patient counseled regarding possible photosensitivity and increased risk for sunburn.  Patient instructed to avoid sunlight, if possible.  When exposed to sunlight, patients should wear protective clothing, sunglasses, and sunscreen.  The patient was instructed to call the office immediately if the following severe adverse effects occur:  hearing changes, easy bruising/bleeding, severe headache, or vision changes.  The patient verbalized understanding of the proper use and possible adverse effects of tetracycline.  All of the patient's questions and concerns were addressed. Patient understands to avoid pregnancy while on therapy due to potential birth defects. Birth Control Pills Counseling: Birth Control Pill Counseling: I discussed with the patient the potential side effects of OCPs including but not limited to increased risk of stroke, heart attack, thrombophlebitis, deep venous thrombosis, hepatic adenomas, breast changes, GI upset, headaches, and depression.  The patient verbalized understanding of the proper use and possible adverse effects of OCPs. All of the patient's questions and concerns were addressed. Isotretinoin Pregnancy And Lactation Text: This medication is Pregnancy Category X and is considered extremely dangerous during pregnancy. It is unknown if it is excreted in breast milk. Topical Retinoid Pregnancy And Lactation Text: This medication is Pregnancy Category C. It is unknown if this medication is excreted in breast milk. Doxycycline Pregnancy And Lactation Text: This medication is Pregnancy Category D and not consider safe during pregnancy. It is also excreted in breast milk but is considered safe for shorter treatment courses. Sarecycline Counseling: Patient advised regarding possible photosensitivity and discoloration of the teeth, skin, lips, tongue and gums.  Patient instructed to avoid sunlight, if possible.  When exposed to sunlight, patients should wear protective clothing, sunglasses, and sunscreen.  The patient was instructed to call the office immediately if the following severe adverse effects occur:  hearing changes, easy bruising/bleeding, severe headache, or vision changes.  The patient verbalized understanding of the proper use and possible adverse effects of sarecycline.  All of the patient's questions and concerns were addressed. Birth Control Pills Pregnancy And Lactation Text: This medication should be avoided if pregnant and for the first 30 days post-partum. Azithromycin Counseling:  I discussed with the patient the risks of azithromycin including but not limited to GI upset, allergic reaction, drug rash, diarrhea, and yeast infections. High Dose Vitamin A Counseling: Side effects reviewed, pt to contact office should one occur. Tazorac Counseling:  Patient advised that medication is irritating and drying.  Patient may need to apply sparingly and wash off after an hour before eventually leaving it on overnight.  The patient verbalized understanding of the proper use and possible adverse effects of tazorac.  All of the patient's questions and concerns were addressed. Topical Sulfur Applications Counseling: Topical Sulfur Counseling: Patient counseled that this medication may cause skin irritation or allergic reactions.  In the event of skin irritation, the patient was advised to reduce the amount of the drug applied or use it less frequently.   The patient verbalized understanding of the proper use and possible adverse effects of topical sulfur application.  All of the patient's questions and concerns were addressed.

## 2022-10-10 PROBLEM — E55.9 VITAMIN D DEFICIENCY: Status: ACTIVE | Noted: 2022-01-01

## 2022-10-10 PROBLEM — N18.32 STAGE 3B CHRONIC KIDNEY DISEASE (H): Status: ACTIVE | Noted: 2022-01-01

## 2022-10-10 NOTE — TELEPHONE ENCOUNTER
"Request to schedule in on hold spot:    S- (SITUATION) :  Patient called for appointment with PCP    B- (BACKGROUND):  Patient experiencing upper respiratory symptoms for three days    A- (ASSESSMENT):  Fever yesterday of 100. Nasal congestion, cough, and chills. Slight shortness of breath. Patient speaking in full sentences and reports no difficulty breathing at rest. Patient reports negative home COVID test.    R- (RECOMMENDATIONS):  Per protocol patient to be seen in office today. Request being sent to PCP and primary due to \"on hold\" spots available this afternoon.   Contact information verified.      Reason for Disposition    MILD difficulty breathing (e.g., minimal/no SOB at rest, SOB with walking, pulse <100)    Additional Information    Negative: SEVERE difficulty breathing (e.g., struggling for each breath, speaks in single words)    Negative: Difficult to awaken or acting confused (e.g., disoriented, slurred speech)    Negative: Bluish (or gray) lips or face now    Negative: Shock suspected (e.g., cold/pale/clammy skin, too weak to stand, low BP, rapid pulse)    Negative: Sounds like a life-threatening emergency to the triager    Negative: [1] Diagnosed or suspected COVID-19 AND [2] symptoms lasting 3 or more weeks    Negative: COVID-19 vaccine reaction suspected (e.g., fever, headache, muscle aches) occurring 1 to 3 days after getting vaccine    Negative: COVID-19 vaccine, questions about    Negative: [1] COVID-19 exposure AND [2] no symptoms    Negative: [1] Lives with someone known to have influenza (flu test positive) AND [2] flu-like symptoms (e.g., cough, runny nose, sore throat, SOB; with or without fever)    Negative: [1] Adult with possible COVID-19 symptoms AND [2] triager concerned about severity of symptoms or other causes    Negative: COVID-19 and breastfeeding, questions about    Negative: SEVERE or constant chest pain or pressure  (Exception: Mild central chest pain, present only when " "coughing.)    Negative: MODERATE difficulty breathing (e.g., speaks in phrases, SOB even at rest, pulse 100-120)    Negative: Headache and stiff neck (can't touch chin to chest)    Negative: Oxygen level (e.g., pulse oximetry) 90 percent or lower    Negative: Chest pain or pressure    Negative: Patient sounds very sick or weak to the triager    Answer Assessment - Initial Assessment Questions  1. COVID-19 DIAGNOSIS: \"Who made your COVID-19 diagnosis?\" \"Was it confirmed by a positive lab test or self-test?\" If not diagnosed by a doctor (or NP/PA), ask \"Are there lots of cases (community spread) where you live?\" Note: See [facility] health department website, if unsure.      No diagnosis  2. COVID-19 EXPOSURE: \"Was there any known exposure to COVID before the symptoms began?\" [facility] Definition of close contact: within 6 feet (2 meters) for a total of 15 minutes or more over a 24-hour period.       No known exposure  3. ONSET: \"When did the COVID-19 symptoms start?\"       About three days ago  4. WORST SYMPTOM: \"What is your worst symptom?\" (e.g., cough, fever, shortness of breath, muscle aches)      Weakness and cough  5. COUGH: \"Do you have a cough?\" If Yes, ask: \"How bad is the cough?\"        Cough mild- moderate  6. FEVER: \"Do you have a fever?\" If Yes, ask: \"What is your temperature, how was it measured, and when did it start?\"      Yesterday, 100  7. RESPIRATORY STATUS: \"Describe your breathing?\" (e.g., shortness of breath, wheezing, unable to speak)       Slight shortness of breath. Patient speaking in full sentences. Patient able to take a deep breath if needed.   8. BETTER-SAME-WORSE: \"Are you getting better, staying the same or getting worse compared to yesterday?\"  If getting worse, ask, \"In what way?\"      same  9. HIGH RISK DISEASE: \"Do you have any chronic medical problems?\" (e.g., asthma, heart or lung disease, weak immune system, obesity, etc.)     On immunosupressive medication   10. VACCINE: \"Have you had " "the COVID-19 vaccine?\" If Yes, ask: \"Which one, how many shots, when did you get it?\"        Fully vaccinated  11. BOOSTER: \"Have you received your COVID-19 booster?\" If Yes, ask: \"Which one and when did you get it?\"        One booster  12. PREGNANCY: \"Is there any chance you are pregnant?\" \"When was your last menstrual period?\"        n/a  13. OTHER SYMPTOMS: \"Do you have any other symptoms?\"  (e.g., chills, fatigue, headache, loss of smell or taste, muscle pain, sore throat)        Chills, nasal congestion, chills, weakness,   14. O2 SATURATION MONITOR:  \"Do you use an oxygen saturation monitor (pulse oximeter) at home?\" If Yes, ask \"What is your reading (oxygen level) today?\" \"What is your usual oxygen saturation reading?\" (e.g., 95%)        Doesn't have one at home    Protocols used: CORONAVIRUS (COVID-19) DIAGNOSED OR [id number] 1.18.2022      "

## 2022-10-10 NOTE — PROGRESS NOTES
Assessment & Plan     COVID-19 ruled out / Fever, unspecified fever cause / Acute cough  Presentation and history consistent with COVID d/t h/o recent hospitalization.   WBC and diff are normal. Lactic acid normal. Not hypoxic. CRP elevated to 116 and fevering. Presentation consistent with viral   CXR negative for pneumonia   - Symptomatic; Yes; 10/4/2022 Influenza A/B & SARS-CoV2 (COVID-19) Virus PCR Multiplex Nose; Future  - Symptomatic; Yes; 10/4/2022 Influenza A/B & SARS-CoV2 (COVID-19) Virus PCR Multiplex Nose  - CBC with platelets and differential  - Comprehensive metabolic panel (BMP + Alb, Alk Phos, ALT, AST, Total. Bili, TP)  - CRP, inflammation  - XR CHEST 2 VW (Clinic Performed); Future  - start albuterol (PROAIR HFA/PROVENTIL HFA/VENTOLIN HFA) 108 (90 Base) MCG/ACT inhaler; Inhale 2 puffs into the lungs every 6 hours as needed for shortness of breath / dyspnea or wheezing  - Lactic acid whole blood  - spacer (OPTICHAMBER ROYR) holding chamber; Use with your albuterol inhaler  - if swab is negative and not feeling better, start course of steroids    Addendum (10/11/2022): negative for COVID, RSV, and influenza    Cramp of limb  Iron and ferritin wnl, TSH improving   - Vitamin B12    Acquired hypothyroidism  TSH improving   Levothyroxine was increased to 150mcg on 9/20/2022  - TSH with free T4 reflex  - c/w levothyroxine 150mcg     Pancreatic lesion, 2.1 cm on Abd CT  Repeat MRI 3/2023    CRF (chronic renal failure), stage 4 (severe) (H)  Cr stable, but worsening from previous d/t illness  Follows with Dr. Arvizu with last visit 10/7/2022. No change in medications. Follow up in 3 to 4 months   Improvement of Cr at visit with Dr. Arvizu    Drug-induced insomnia (H)  Will hold on course of steroids until COVID test returns.   Steroids have been causing sleep issues    49 minutes spent on the date of the encounter doing chart review, review of test results, interpretation of tests, patient visit,  documentation       See Patient Instructions    Return if symptoms worsen or fail to improve.    Libia Wallace MD  [facility] - ROBINA Shaw is a 77 year old, presenting for the following health issues:  URI      HPI     Acute Illness  Acute illness concerns: coughing, wheezing, congestion and SOB  Onset/Duration: 4 days ago  Symptoms:  Fever: YES- 102.1  Chills/Sweats: YES- chills  Headache (location?): YES- not for 2-3 days  Sinus Pressure: No  Conjunctivitis:  No  Ear Pain: no  Rhinorrhea: No  Congestion: YES  Sore Throat: YES  Cough: YES-productive of clear sputum  Wheeze: YES  Decreased Appetite: YES- food doesn't taste good at all  Nausea: YES  Vomiting: No  Diarrhea: No  Dysuria/Freq.: No  Dysuria or Hematuria: No  Fatigue/Achiness: YES- fatigue  Sick/Strep Exposure: No  Therapies tried and outcome: None    - got sick originally in [place]   - was hospitalized for diarrhea   - return for EUS  - then came home and was okay for 3 days then got sick   - no sick contact other than the hospitals  - off steroids     - home test for covid was negative      - followed up with Dr. Arvizu (10/7/2022) - recent visit w/ improvments     BP Readings from Last 6 Encounters:   10/10/22 112/70   09/30/22 139/69   09/28/22 120/68   09/19/22 99/62   08/29/22 123/77   08/26/22 120/80         Review of Systems   Constitutional: Positive for appetite change (decreased) and fatigue.   HENT: Positive for congestion and sore throat. Negative for sinus pressure and sinus pain.    Respiratory: Positive for cough (mild), shortness of breath and wheezing.    Gastrointestinal: Positive for nausea. Negative for diarrhea and vomiting.   Genitourinary: Negative for difficulty urinating.   Musculoskeletal:        Cramps hands and goes to forearm   Skin:        Bruises on left forearm   Neurological: Positive for weakness and headaches (resolved).   Psychiatric/Behavioral: Positive for sleep disturbance (2  to 3 hours of sleep, chronic).          Objective    /70   Pulse 93   Temp (!) 102.1  F (38.9  C) (Tympanic)   Resp 26   Wt 75.1 kg (165 lb 8 oz)   SpO2 94%   BMI 25.92 kg/m    Body mass index is 25.92 kg/m .  Physical Exam  Constitutional:       General: He is not in acute distress.     Appearance: He is ill-appearing.   HENT:      Right Ear: Tympanic membrane normal.      Left Ear: Tympanic membrane is bulging.      Mouth/Throat:      Pharynx: No oropharyngeal exudate or posterior oropharyngeal erythema.   Eyes:      Conjunctiva/sclera: Conjunctivae normal.   Cardiovascular:      Rate and Rhythm: Regular rhythm. Tachycardia present.      Pulses: Normal pulses.      Heart sounds: No murmur heard.  Pulmonary:      Effort: Pulmonary effort is normal. No respiratory distress.      Breath sounds: No wheezing or rales.   Skin:     Comments: ecchymosis    Neurological:      Mental Status: He is alert.          Results for orders placed or performed in visit on 10/10/22 (from the past 24 hour(s))   CBC with platelets and differential    Narrative    The following orders were created for panel order CBC with platelets and differential.  Procedure                               Abnormality         Status                     ---------                               -----------         ------                     CBC with platelets and d...[[id number]]  Abnormal            Final result                 Please view results for these tests on the individual orders.   Comprehensive metabolic panel (BMP + Alb, Alk Phos, ALT, AST, Total. Bili, TP)   Result Value Ref Range    Sodium 133 133 - 144 mmol/L    Potassium 4.1 3.4 - 5.3 mmol/L    Chloride 102 94 - 109 mmol/L    Carbon Dioxide (CO2) 23 20 - 32 mmol/L    Anion Gap 8 3 - 14 mmol/L    Urea Nitrogen 24 7 - 30 mg/dL    Creatinine 1.97 (H) 0.66 - 1.25 mg/dL    Calcium 8.2 (L) 8.5 - 10.1 mg/dL    Glucose 117 (H) 70 - 99 mg/dL    Alkaline Phosphatase 66 40 - 150 U/L    AST  35 0 - 45 U/L    ALT 21 0 - 70 U/L    Protein Total 6.7 (L) 6.8 - 8.8 g/dL    Albumin 2.8 (L) 3.4 - 5.0 g/dL    Bilirubin Total 0.7 0.2 - 1.3 mg/dL    GFR Estimate 35 (L) >60 mL/min/1.73m2   CRP, inflammation   Result Value Ref Range    CRP Inflammation 116.0 (H) 0.0 - 8.0 mg/L   TSH with free T4 reflex   Result Value Ref Range    TSH 10.39 (H) 0.40 - 4.00 mU/L   Lactic acid whole blood   Result Value Ref Range    Lactic Acid 1.6 0.7 - 2.0 mmol/L   CBC with platelets and differential   Result Value Ref Range    WBC Count 6.2 4.0 - 11.0 10e3/uL    RBC Count 3.68 (L) 4.40 - 5.90 10e6/uL    Hemoglobin 11.9 (L) 13.3 - 17.7 g/dL    Hematocrit 34.9 (L) 40.0 - 53.0 %    MCV 95 78 - 100 fL    MCH 32.3 26.5 - 33.0 pg    MCHC 34.1 31.5 - 36.5 g/dL    RDW 14.6 10.0 - 15.0 %    Platelet Count 269 150 - 450 10e3/uL    % Neutrophils 62 %    % Lymphocytes 24 %    % Monocytes 11 %    % Eosinophils 2 %    % Basophils 1 %    % Immature Granulocytes 0 %    NRBCs per 100 WBC 0 <1 /100    Absolute Neutrophils 3.9 1.6 - 8.3 10e3/uL    Absolute Lymphocytes 1.5 0.8 - 5.3 10e3/uL    Absolute Monocytes 0.7 0.0 - 1.3 10e3/uL    Absolute Eosinophils 0.1 0.0 - 0.7 10e3/uL    Absolute Basophils 0.0 0.0 - 0.2 10e3/uL    Absolute Immature Granulocytes 0.0 <=0.4 10e3/uL    Absolute NRBCs 0.0 10e3/uL   T4 free   Result Value Ref Range    Free T4 1.39 0.76 - 1.46 ng/dL     XR CHEST 2 VW (Clinic Performed)    Result Date: 10/10/2022  Procedure:XR CHEST 2 VIEWS Clinical history:Male, 77 years, COVID-19 ruled out Technique: Two views are submitted. Comparison: No relevant prior imaging. Findings: The cardiac silhouette is normal. The pulmonary vasculature is normal. The lungs lungs are hyperinflated and appear to be clear aside from mild interstitial thickening. Bony structures are unremarkable.     Impression:  Hyperinflation of the lungs with likely chronic interstitial thickening. No evidence of focal pneumonia. SHYANNE CHONG MD   SYSTEM ID:   [id number]

## 2022-10-10 NOTE — PATIENT INSTRUCTIONS
Try albuterol inhaler every 4 to 6 hours as needed for shortness of breath     Cut your Gatorade with 1/2 water.     We will call you with your lab results.

## 2022-10-10 NOTE — NURSING NOTE
"Chief Complaint   Patient presents with     URI       Initial /70   Pulse 93   Temp (!) 102.1  F (38.9  C) (Tympanic)   Resp 26   Wt 75.1 kg (165 lb 8 oz)   SpO2 94%   BMI 25.92 kg/m   Estimated body mass index is 25.92 kg/m  as calculated from the following:    Height as of 9/30/22: 1.702 m (5' 7\").    Weight as of this encounter: 75.1 kg (165 lb 8 oz).  Medication Reconciliation: complete  Coreen Castro LPN  "

## 2022-10-11 PROBLEM — J43.2 CENTRILOBULAR EMPHYSEMA (H): Status: ACTIVE | Noted: 2022-01-01

## 2022-10-11 NOTE — PROGRESS NOTES
Assessment & Plan     FUO (fever of unknown origin)  COVID, influenza, RSV negative, home COVID test negative. Duration of 5 days w/o improvement. No abd pain. CXR clear. Tachycardic, tachypnea, fever, increasing CRP, and procalcitonin in the recommendation for abx, will proceed with CT chest/abd/pelvis. Recent MRCP. GFR >30 will do with contrast per updated guidelines   Colin is active, no known tick exposures, but will check tick panels  CURB-65 score of 1  CT w/ concerns for atypica pneumonia  Recheck of temperature of 99.4   - UA reflex to Microscopic and Culture - [place]  - CBC with platelets and differential  - CRP, inflammation  - Blood Culture Peripheral Blood  - Blood Culture Peripheral Blood; Future  - Comprehensive metabolic panel (BMP + Alb, Alk Phos, ALT, AST, Total. Bili, TP)  - Group A Streptococcus PCR Throat Swab ([place] ONLY)  - Lipase, elevated, but currently ill, will recheck   - Lyme Disease Total Abs Bld with Reflex to Confirm CLIA  - Babesia Species by PCR  - Ehrlichia Anaplasma Sp by PCR  - Nebulizer and Supplies Order for DME - ONLY FOR DME  - ipratropium - albuterol 0.5 mg/2.5 mg/3 mL (DUONEB) 0.5-2.5 (3) MG/3ML neb solution; Take 1 vial (3 mLs) by nebulization every 6 hours as needed for shortness of breath / dyspnea or wheezing  - Procalcitonin; Future  - Procalcitonin  - CT Chest/Abdomen/Pelvis w Contrast; Future  - start augmentin (with CrCl does not need to be adjusted) + azithromycin, which would cover tick illnesses also  - ER precautions given   - low threshold to re-start steriods, but Colin is having issues with sleep which is made worse with steroids    Wheezing / COPD  - Nebulizer and Supplies Order for DME - ONLY FOR DME  - ipratropium - albuterol 0.5 mg/2.5 mg/3 mL (DUONEB) 0.5-2.5 (3) MG/3ML neb solution; Take 1 vial (3 mLs) by nebulization every 6 hours as needed for shortness of breath / dyspnea or wheezing  - CT Chest/Abdomen/Pelvis w Contrast; Future    Pancreatic  lesion, 2.1 cm on Abd CT  - CT Chest/Abdomen/Pelvis w Contrast; Future    Malignant neoplasm of urinary bladder, unspecified site (H) / microscopic hematuria  - CT Chest/Abdomen/Pelvis w Contrast; Future  Follow[facility] with [facility] urolog    Malignant neoplasm of left ureter (H)  - CT Chest/Abdomen/Pelvis w Contrast; Future    CKD (chronic kidney disease) stage 4, GFR 15-29 ml/min (H)  - CT Chest/Abdomen/Pelvis w Contrast; Future    Hyponatremia   Corrected normal with glucose   - added on serum osm        Estimated Creatinine Clearance: 36.1 mL/min (A) (based on SCr of 1.87 mg/dL (H)).    90 minutes spent on the date of the encounter doing chart review, review of test results, interpretation of tests, patient visit, documentation and discussion with other providers including radiology and family medicine       See Patient Instructions    No follow-ups on file.   Follow up on Thursday    Libia Wallace MD  [facility] - ROBINA Shaw is a 77 year old, presenting for the following health issues:  URI      HPI     Acute Illness  Acute illness concerns: Fever, cough congestion loss of appetite  Onset/Duration: Last Wednesday 10/5/2022  Symptoms:  Fever: YES  Chills/Sweats: YES  Headache (location?): YES  Sinus Pressure: No  Conjunctivitis:  No  Ear Pain: no  Rhinorrhea: No  Congestion: YES  Sore Throat: YES  Cough: YES-productive of clear sputum  Wheeze: YES  Decreased Appetite: YES  Nausea: YES  Vomiting: No  Diarrhea: No  Dysuria/Freq.: No  Dysuria or Hematuria: No  Fatigue/Achiness: YES  Sick/Strep Exposure: No  Therapies tried and outcome: None    - EUS on 9/30/2022  - no tick exposure     FUO, h/o bladder cancer, ureter cancer. recent MRCP and EUS    Discussed with radiology with regarding imaging and Cr/gfr. Current recommendations are for         Review of Systems   Constitutional: Positive for appetite change, chills, fatigue and fever.   HENT: Positive for congestion and sore  throat. Negative for sinus pressure and sinus pain.    Respiratory: Positive for cough and wheezing.    Cardiovascular: Negative for chest pain and palpitations.   Gastrointestinal: Positive for nausea. Negative for abdominal distention, abdominal pain, constipation, diarrhea, hematochezia and vomiting.   Genitourinary: Negative for difficulty urinating.   Musculoskeletal: Negative for back pain and neck pain.        Cramps last night below his knees   Skin: Negative for rash.   Neurological: Positive for headaches.   Psychiatric/Behavioral: Positive for sleep disturbance.          Objective    /68   Pulse 99   Temp (!) 101.4  F (38.6  C) (Tympanic)   Resp 23   Wt 74.8 kg (165 lb)   SpO2 99%   BMI 25.84 kg/m    Body mass index is 25.84 kg/m .  Physical Exam  Constitutional:       General: He is not in acute distress.     Appearance: He is well-developed.   HENT:      Head: Normocephalic and atraumatic.      Right Ear: Tympanic membrane is bulging.      Left Ear: Tympanic membrane is bulging.      Mouth/Throat:      Mouth: Mucous membranes are moist.      Pharynx: No oropharyngeal exudate.   Eyes:      Extraocular Movements: Extraocular movements intact.      Conjunctiva/sclera: Conjunctivae normal.      Pupils: Pupils are equal, round, and reactive to light.   Neck:      Thyroid: No thyromegaly.   Cardiovascular:      Rate and Rhythm: Normal rate and regular rhythm.      Pulses: Normal pulses.      Heart sounds: No murmur heard.  Pulmonary:      Effort: Pulmonary effort is normal. Tachypnea present. No respiratory distress.      Breath sounds: Decreased air movement (tight) present. Wheezing present. No rales.   Abdominal:      General: Bowel sounds are normal. There is no distension.      Palpations: Abdomen is soft.      Tenderness: There is no abdominal tenderness. There is no guarding.   Musculoskeletal:         General: Normal range of motion.      Cervical back: Normal range of motion and neck  supple.   Lymphadenopathy:      Cervical: No cervical adenopathy.   Skin:     General: Skin is warm and dry.   Neurological:      Mental Status: He is alert.   Psychiatric:         Mood and Affect: Mood normal.          Results for orders placed or performed in visit on 10/11/22 (from the past 24 hour(s))   CBC with platelets and differential    Narrative    The following orders were created for panel order CBC with platelets and differential.  Procedure                               Abnormality         Status                     ---------                               -----------         ------                     CBC with platelets and d...[[id number]]  Abnormal            Final result                 Please view results for these tests on the individual orders.   CBC with platelets and differential   Result Value Ref Range    WBC Count 6.4 4.0 - 11.0 10e3/uL    RBC Count 3.76 (L) 4.40 - 5.90 10e6/uL    Hemoglobin 12.0 (L) 13.3 - 17.7 g/dL    Hematocrit 35.7 (L) 40.0 - 53.0 %    MCV 95 78 - 100 fL    MCH 31.9 26.5 - 33.0 pg    MCHC 33.6 31.5 - 36.5 g/dL    RDW 14.7 10.0 - 15.0 %    Platelet Count 277 150 - 450 10e3/uL    % Neutrophils 78 %    % Lymphocytes 12 %    % Monocytes 8 %    % Eosinophils 1 %    % Basophils 0 %    % Immature Granulocytes 1 %    NRBCs per 100 WBC 0 <1 /100    Absolute Neutrophils 5.0 1.6 - 8.3 10e3/uL    Absolute Lymphocytes 0.8 0.8 - 5.3 10e3/uL    Absolute Monocytes 0.5 0.0 - 1.3 10e3/uL    Absolute Eosinophils 0.1 0.0 - 0.7 10e3/uL    Absolute Basophils 0.0 0.0 - 0.2 10e3/uL    Absolute Immature Granulocytes 0.0 <=0.4 10e3/uL    Absolute NRBCs 0.0 10e3/uL   CRP, inflammation   Result Value Ref Range    CRP Inflammation 121.0 (H) 0.0 - 8.0 mg/L   Comprehensive metabolic panel (BMP + Alb, Alk Phos, ALT, AST, Total. Bili, TP)   Result Value Ref Range    Sodium 132 (L) 133 - 144 mmol/L    Potassium 3.9 3.4 - 5.3 mmol/L    Chloride 101 94 - 109 mmol/L    Carbon Dioxide (CO2) 21 20 - 32 mmol/L     Anion Gap 10 3 - 14 mmol/L    Urea Nitrogen 25 7 - 30 mg/dL    Creatinine 1.87 (H) 0.66 - 1.25 mg/dL    Calcium 7.8 (L) 8.5 - 10.1 mg/dL    Glucose 225 (H) 70 - 99 mg/dL    Alkaline Phosphatase 67 40 - 150 U/L    AST 39 0 - 45 U/L    ALT 21 0 - 70 U/L    Protein Total 6.2 (L) 6.8 - 8.8 g/dL    Albumin 2.5 (L) 3.4 - 5.0 g/dL    Bilirubin Total 0.9 0.2 - 1.3 mg/dL    GFR Estimate 37 (L) >60 mL/min/1.73m2   Lipase   Result Value Ref Range    Lipase 1,312 (H) 73 - 393 U/L   Procalcitonin   Result Value Ref Range    Procalcitonin 0.61 (H) <0.05 ng/mL   Osmolarity ( Serum)   Result Value Ref Range    Osmolality Blood 281 280 - 301 mmol/kg    Narrative    Greater than 385 mmol/kg relates to stupor in hyperglycemia   Greater than 400 mmol/kg can relate to seizures   Greater than 420 mmol/kg can be lethal    Serum Osmalar Gap:   Normal <10   Larger suggest unmeasured substances present in serum (ethanol, methanol, isopropanol, mannitol, ethylene glycol).   UA reflex to Microscopic and Culture - [place]    Specimen: Urine, Midstream   Result Value Ref Range    Color Urine Yellow Colorless, Straw, Light Yellow, Yellow    Appearance Urine Slightly Cloudy (A) Clear    Glucose Urine 300 (A) Negative mg/dL    Bilirubin Urine Negative Negative    Ketones Urine Trace (A) Negative mg/dL    Specific Gravity Urine 1.026 1.003 - 1.035    Blood Urine Moderate (A) Negative    pH Urine 5.5 4.7 - 8.0    Protein Albumin Urine 100 (A) Negative mg/dL    Urobilinogen Urine Normal Normal, 2.0 mg/dL    Nitrite Urine Negative Negative    Leukocyte Esterase Urine Negative Negative    Bacteria Urine Few (A) None Seen /HPF    Mucus Urine Present (A) None Seen /LPF    RBC Urine <1 <=2 /HPF    WBC Urine 2 <=5 /HPF    Squamous Epithelials Urine 0 <=1 /HPF    Narrative    Urine Culture not indicated   Group A Streptococcus PCR Throat Swab ([place] ONLY)    Specimen: Throat; Swab   Result Value Ref Range    Group A strep by PCR Not Detected Not  Detected    Narrative    The Xpert Xpress Strep A test, performed on the [employer], is a rapid, qualitative in vitro diagnostic test for the detection of Streptococcus pyogenes (Group A ß-hemolytic Streptococcus, Strep A) in throat swab specimens from patients with signs and symptoms of pharyngitis. The Xpert Xpress Strep A test can be used as an aid in the diagnosis of Group A Streptococcal pharyngitis. The assay is not intended to monitor treatment for Group A Streptococcus infections. The Xpert Xpress Strep A test utilizes an automated real-time polymerase chain reaction (PCR) to detect Streptococcus pyogenes DNA.     Estimated Creatinine Clearance: 36.1 mL/min (A) (based on SCr of 1.87 mg/dL (H)).    CT Chest/Abdomen/Pelvis w Contrast    Result Date: 10/11/2022  Exam: CT CHEST/ABDOMEN/PELVIS W CONTRAST Exam reason: FUO, h/o bladder cancer, ureter cancer. recent MRCP and EUS. elevated lipase; FUO (fever of unknown origin); Wheezing; Pancreatic lesion; Malignant neoplasm of urinary bladder, unspecified site (H); Malignant neoplasm of left ureter (H); CKD (chronic kidney disease) stage 4, GFR 15-29 ml/min (H) Technique: Using multidetector helical CT technique, images of the chest, abdomen, and pelvis were obtained with IV contrast.  Coronal and sagittal reconstructions also performed. Thick slab coronal MIP reconstructions of the chest also performed. This CT was performed using one or more of the following dose reduction techniques: automated exposure control, adjustment of the mA and/or kV according to patient size, and/or use of iterative reconstruction technique. Meds/Contrast: Isovue 370 75ml Given Comparison: 9/11/2022, 9/10/2022, 5/23/2022, 9/8/2021, 7/6/2021 FINDINGS: CHEST: Lungs/Airways: There is moderate to severe centrilobular emphysema. There is dependent atelectasis and/or scarring. There is mild patchy peripheral ground glass opacity scattered throughout both lungs.  Blessing/Mediastinum: No adenopathy or mass. Pleura: No pleural effusion. No pneumothorax. Cardiovascular: No aortic aneurysm. The main pulmonary artery is normal caliber. Chest wall/Axilla: Within normal limits. ABDOMEN: Liver: No mass or any significant abnormality. Gallbladder: No calcified gallstones. No acute inflammatory changes. Bile Ducts: No biliary ductal dilation. Spleen: No splenomegaly or focal lesion. Pancreas: Similar appearance of the vague slightly hypoattenuating lesion in the pancreatic body measuring up to 2.1 cm. No main pancreatic duct dilation. No peripancreatic fat stranding. Kidneys: The left kidney has been removed. Afsaneh mass or hydronephrosis of the right kidney. No right renal calculi. Adrenals: No nodules. Lymph Nodes: No adenopathy. Vascular: No aortic aneurysm. Abdominal Wall: No acute findings. PELVIS: No mass or adenopathy. Bowel/Peritoneal Cavity/Mesentery: -No bowel obstruction or significant ileus. -No acute inflammatory changes. -No pneumoperitoneum. -No ascites. Musculoskeletal: No acute osseous abnormalities.     IMPRESSION: Mild patchy peripheral groundglass opacity scattered throughout both lungs, possibly due to an early/mild or atypical infectious process. This is new since 5/23/2022. Similar appearance of the subtle 2.1 cm slightly hypoattenuating lesion in the pancreatic body. Prior left nephrectomy without evidence of local recurrence. JENNIE FLOR MD   SYSTEM ID:  [id number]    XR CHEST 2 VW (Clinic Performed)    Result Date: 10/10/2022  Procedure:XR CHEST 2 VIEWS Clinical history:Male, 77 years, COVID-19 ruled out Technique: Two views are submitted. Comparison: No relevant prior imaging. Findings: The cardiac silhouette is normal. The pulmonary vasculature is normal. The lungs lungs are hyperinflated and appear to be clear aside from mild interstitial thickening. Bony structures are unremarkable.     Impression:  Hyperinflation of the lungs with likely chronic  interstitial thickening. No evidence of focal pneumonia. SHYANNE CHONG MD   SYSTEM ID:  [id number]

## 2022-10-11 NOTE — NURSING NOTE
"Chief Complaint   Patient presents with     URI       Initial /68   Pulse 99   Temp (!) 101.4  F (38.6  C) (Tympanic)   Resp 23   Wt 74.8 kg (165 lb)   SpO2 99%   BMI 25.84 kg/m   Estimated body mass index is 25.84 kg/m  as calculated from the following:    Height as of 9/30/22: 1.702 m (5' 7\").    Weight as of this encounter: 74.8 kg (165 lb).  Medication Reconciliation: complete  Coreen Castro LPN  "

## 2022-10-12 NOTE — PROGRESS NOTES
Assessment & Plan     Atypical pneumonia  Labs are improving and Colin is feeling better. One episode of diarrhea this morning.   - saccharomyces boulardii (FLORASTOR) 250 MG capsule; Take 1 capsule (250 mg) by mouth 2 times daily for 14 days  - c/w augmentin  / azithromycin  - if Colin starts to have issues with diarrhea, okay to repeat course of steroids     Cramp of limb  B12 normal, TSH improving, Colin keeps hydrated  - message sent to oncology requesting recommendation d/t consideration for immunotherapy contributing to cramping due to duration of symptoms     Insomnia   - if does not improve off steroids, consideration for trazodone           See Patient Instructions    Return in about 2 weeks (around 10/27/2022) for Recheck .    Libia Wallace MD  [facility] - ROBINA Shaw is a 77 year old accompanied by his self, presenting for the following health issues:  URI      HPI     Acute Illness  Acute illness concerns: pneumonia  Onset/Duration: weeks  Symptoms:  Fever: No  Chills/Sweats: YES  Headache (location?): No  Sinus Pressure: No  Conjunctivitis:  No  Ear Pain: no  Rhinorrhea: No  Congestion: No  Sore Throat: No  Cough: YES-productive of clear sputum  Wheeze: YES  Decreased Appetite: No  Nausea: No  Vomiting: No  Diarrhea: YES.  This morning  Dysuria/Freq.: No  Dysuria or Hematuria: No  Fatigue/Achiness: No  Sick/Strep Exposure: No  Therapies tried and outcome: None    - picked up nebulizer, but did get duoneb. Got this today  - started abx  - feeling better, but still tired      Review of Systems   Constitutional: Positive for chills. Negative for fever.   HENT: Negative for congestion, ear pain and sore throat.    Respiratory: Positive for cough and wheezing.    Gastrointestinal: Positive for diarrhea (first episode this morning). Negative for abdominal pain, nausea and vomiting.   Musculoskeletal:        Cramps   Psychiatric/Behavioral: Positive for sleep disturbance.           Objective    /60   Pulse 100   Temp 98  F (36.7  C) (Tympanic)   Wt 73.9 kg (163 lb)   SpO2 93%   BMI 25.53 kg/m    Body mass index is 25.53 kg/m .  Physical Exam  Constitutional:       General: He is not in acute distress.     Appearance: He is not ill-appearing.   Cardiovascular:      Rate and Rhythm: Normal rate and regular rhythm.      Heart sounds: No murmur heard.     Comments: Not tachycardic on exam. Trace edema   Pulmonary:      Effort: Pulmonary effort is normal. No respiratory distress.      Breath sounds: No wheezing or rales.   Neurological:      Mental Status: He is alert.        Results for orders placed or performed in visit on 10/13/22 (from the past 24 hour(s))   CRP, inflammation   Result Value Ref Range    CRP Inflammation 98.66 (H) <5.00 mg/L   CBC with platelets   Result Value Ref Range    WBC Count 6.0 4.0 - 11.0 10e3/uL    RBC Count 3.56 (L) 4.40 - 5.90 10e6/uL    Hemoglobin 11.6 (L) 13.3 - 17.7 g/dL    Hematocrit 33.8 (L) 40.0 - 53.0 %    MCV 95 78 - 100 fL    MCH 32.6 26.5 - 33.0 pg    MCHC 34.3 31.5 - 36.5 g/dL    RDW 14.5 10.0 - 15.0 %    Platelet Count 370 150 - 450 10e3/uL   Lipase   Result Value Ref Range    Lipase 529 (H) 13 - 60 U/L   Procalcitonin   Result Value Ref Range    Procalcitonin 0.41 (H) <0.05 ng/mL

## 2022-10-13 PROBLEM — G47.01 INSOMNIA DUE TO MEDICAL CONDITION: Status: ACTIVE | Noted: 2022-01-01

## 2022-10-13 NOTE — NURSING NOTE
"Chief Complaint   Patient presents with     URI       Initial /60   Pulse 100   Temp 98  F (36.7  C) (Tympanic)   Wt 73.9 kg (163 lb)   SpO2 93%   BMI 25.53 kg/m   Estimated body mass index is 25.53 kg/m  as calculated from the following:    Height as of 9/30/22: 1.702 m (5' 7\").    Weight as of this encounter: 73.9 kg (163 lb).  Medication Reconciliation: complete  Joselyn Pavon LPN  "

## 2022-10-24 NOTE — TELEPHONE ENCOUNTER
Patient is wondering if he can get his biavlent and flu shot now after having pneumonia. Please call patient and let him now if he should not. [phone]

## 2022-11-11 NOTE — TELEPHONE ENCOUNTER
----- Message from Rowena Phan LPN sent at 11/11/2022  3:52 PM CST -----  Please call the pt to schedule an appt with Suyapa Flowers for Dm follow-up. Thank you!

## 2022-11-17 NOTE — PATIENT INSTRUCTIONS
Recap of our visit:     Decrease Tresiba to 18 units daily.                    Novolog 5 units with meals. Hold if glucose is less then 150.     We started the 2023 application for the patient assistance program. May need a copy of your financial statements.       Follow up: I will call you on Monday to see how these changes helped with the low sugars.     In clinic follow up 2/27/2023 at 10 am. Bring your Tiffany reader. Or you may come in sooner if you have concerns.     If you have any questions or concerns, please call me at [phone] or send [employer] message.    Thank you for coming in today!  Suyapa Flowers, RN Diabetes Educator

## 2022-11-17 NOTE — PROGRESS NOTES
Diabetes Self-Management Education & Support    Presents for: Follow-up    Type of Service: In Person Visit    Assessment Type:     ASSESSMENT:  Colin, 77 year old male. Established with Diabetes Education. Last seen in August. Since this visit, pt was found to have pancreatic lesion and has been hospitalized. Has been home now, feeling much better. Not as tired, has more stamina. However, does share has concerns with waking during the night to go to the bathroom and has a hard time falling back to sleep. No trouble with initially falling asleep. Has Melatonin but feels this doesn't work for him. When unable to fall back to sleep, will read for a while. Starts his day at 4 am most every day.      Pt states he thought/worried his Diabetes was getting worse given recent medical issues. Reviewed 2 week CGM report, pt has had several lows in the morning and in the afternoon hours. Most readings are in target. Address lows today.  Currently taking Tresiba 20 units daily. NovoLog 5 units with meals. Pt shares he will vary 3-6 units depending on his glucose at time of meal. If glucose is lower, will take 3- 4 units and if higher will take up to 6 units depending on meal. Too early to check A1C today.     PAP application for 2023 started, need copy of financial statements to send with application.   BP today 109/71. Weight, see below. On Statin, No ASA. CKD/CRD.     Wt Readings from Last 10 Encounters:   11/17/22 76.4 kg (168 lb 6.4 oz)   10/13/22 73.9 kg (163 lb)   10/11/22 74.8 kg (165 lb)   10/10/22 75.1 kg (165 lb 8 oz)   09/30/22 80.4 kg (177 lb 3 oz)   09/28/22 87 kg (191 lb 12.8 oz)   09/16/22 80 kg (176 lb 4.8 oz)   08/26/22 78.1 kg (172 lb 2 oz)   08/26/22 78.1 kg (172 lb 2 oz)   08/03/22 78.8 kg (173 lb 11.6 oz)       Tiffany 2  AGP Report  Nov/4/2022 to nov/17/2023    % Time CGM is Active  98%    Average Glucose  145    Glucose Management Indicator  (GMI)    6.8%    Glucose Variabilty  31.4%    Time in Ranges:  >250  mg/dL   1%  181-250 mg/dL  22%   mg/dL   73%  54-69 mg/dL   4%  <54 mg/dL   0    Patient's most recent   Lab Results   Component Value Date    A1C 8.6 08/26/2022    A1C 7.1 11/24/2021     is not meeting goal of <8.0    Diabetes knowledge and skills assessment:   Patient is knowledgeable in diabetes management concepts related to: Healthy Eating and Taking Medication  Continue education with the following diabetes management concepts: Healthy Eating, Being Active, Monitoring, Taking Medication, Problem Solving, Reducing Risks and Healthy Coping  Based on learning assessment above, most appropriate setting for further diabetes education would be: Individual setting.      PLAN    Decrease Tresiba to 18 units daily from 20 units. (10% per protocol).                    Novolog 5 units with meals. Hold if glucose is less then 150.       2023 application for the patient assistance program. Need a copy of financial statements.     Follow up: I will call you on Monday to see how these changes helped with the low sugars.   Pt plans to call to schedule visit with PCP to address sleeping concerns and ask for A1C if able at that time.      In clinic DM ED follow up 2/27/2023 at 10 am. Bring [employer] reader. Or may come in sooner with concerns.   Provided contact information via phone for DRC.        Topics to cover at upcoming visits: Healthy Eating, Being Active, Monitoring, Taking Medication, Problem Solving, Reducing Risks and Healthy Coping    See Care Plan for co-developed, patient-state behavior change goals.  AVS provided for patient today.    Education Materials Provided:  No new materials provided today    SUBJECTIVE/OBJECTIVE:  Presents for: Follow-up  Accompanied by: Self  Diabetes education in the past 24mo: Yes  Focus of Visit: Monitoring, Taking Medication  Diabetes type: Type 2  Date of diagnosis: 7/29/19  Disease course: Improving (elevated BGs with chemo treatments)  How confident are you filling out medical  "forms by yourself:: Quite a bit  Diabetes management related comments/concerns: concerned about how currently doing- worried Diabetes is getting worse.  Transportation concerns: No  Difficulty affording diabetes medication?: No (PAP Tresiba Novolog)  Difficulty affording diabetes testing supplies?: No  Other concerns:: Glasses  Cultural Influences/Ethnic Background:  Not  or     Diabetes Symptoms & Complications:  Fatigue: Sometimes (poor sleep, wakes up during the night and has hard time falling back asleep.)  Neuropathy: No  Polydipsia: No  Polyphagia: No  Polyuria: No  Visual change: No  Slow healing wounds: No  Symptom course: Stable  Weight trend: Fluctuating (168.9# today.)  Complications assessed today?: Yes  Autonomic neuropathy: No  CVA: No  Heart disease: No  Nephropathy: Yes  Peripheral neuropathy: No  Foot ulcerations: No  Peripheral Vascular Disease: No  Retinopathy: No    Patient Problem List and Family Medical History reviewed for relevant medical history, current medical status, and diabetes risk factors.    Vitals:  /71   Pulse 69   Resp 16   Ht 1.695 m (5' 6.75\")   Wt 76.4 kg (168 lb 6.4 oz)   SpO2 97%   BMI 26.57 kg/m    Estimated body mass index is 26.57 kg/m  as calculated from the following:    Height as of this encounter: 1.695 m (5' 6.75\").    Weight as of this encounter: 76.4 kg (168 lb 6.4 oz).   Last 3 BP:   BP Readings from Last 3 Encounters:   11/17/22 109/71   10/13/22 110/60   10/11/22 112/70       History   Smoking Status     Former     Types: Cigarettes     Quit date: 1/6/1992   Smokeless Tobacco     Never       Labs:  Lab Results   Component Value Date    A1C 8.6 08/26/2022    A1C 7.1 11/24/2021     Lab Results   Component Value Date     10/11/2022    GLC 99 07/06/2021     Lab Results   Component Value Date     02/22/2022    LDL 75 10/02/2020     HDL Cholesterol   Date Value Ref Range Status   10/02/2020 59 >39 mg/dL Final     Direct Measure " HDL   Date Value Ref Range Status   02/22/2022 67 >=40 mg/dL Final   ]  GFR Estimate   Date Value Ref Range Status   10/11/2022 37 (L) >60 mL/min/1.73m2 Final     Comment:     Effective December 21, 2021 eGFRcr in adults is calculated using the 2021 CKD-EPI creatinine equation which includes age and gender (Lindy et al., [place], DOI: 10.1056/LUVJpu8089514)   07/06/2021 26 (L) >60 mL/min/[1.73_m2] Final     Comment:     Non  GFR Calc  Starting 12/18/2018, serum creatinine based estimated GFR (eGFR) will be   calculated using the Chronic Kidney Disease Epidemiology Collaboration   (CKD-EPI) equation.       GFR Estimate If Black   Date Value Ref Range Status   07/06/2021 31 (L) >60 mL/min/[1.73_m2] Final     Comment:      GFR Calc  Starting 12/18/2018, serum creatinine based estimated GFR (eGFR) will be   calculated using the Chronic Kidney Disease Epidemiology Collaboration   (CKD-EPI) equation.       Lab Results   Component Value Date    CR 1.87 10/11/2022    CR 2.33 07/06/2021     No results found for: MICROALBUMIN    Healthy Eating:  Healthy Eating Assessed Today: Yes  Cultural/Judaism diet restrictions?: No  Meal planning/habits: Smaller portions, Avoiding sweets  Meals include: Breakfast, Lunch, Dinner  Breakfast: 1 toast or 1 english muffin, 1-2 eggs, breakfast meat - coffee/cream/splenda or corn flakes. or oatmeal or cream of wheat.  Lunch: 1/2 sandwich, fruit, milk or 1.5 cups chili with corn bread or beef, cheese, milk, coffee - sometimes skips. gatorades zero.  Dinner: meat, veggies, 1 bread - sometimes salad - occasional starch (1/2 potato)  Snacks: grapes, leftover meat, 1/2 sandwich - Dove ice cream or 1/2 donut  Beverages: Water, Coffee, Milk, Sports drinks  Has patient met with a dietitian in the past?: Yes    Being Active:  Being Active Assessed Today: Yes  Exercise:: Currently not exercising  Barrier to exercise: Other (winter is harder to be active. summer months  outside projects.)    Monitoring:  Monitoring Assessed Today: Yes  Did patient bring glucose meter to appointment? : Yes  Blood Glucose Meter: One Touch, CGM  Times checking blood sugar at home (number): Other (continuous monitor)  Times checking blood sugar at home (per): Day  Blood glucose trend: Decreasing      Taking Medications:  Diabetes Medication(s)     Insulin       insulin aspart (NOVOLOG PEN) 100 UNIT/ML pen    5 units with meals, and for glucometer 150-200 add 2 units SQ, 201-250 add 4 units, >250 add 6 units     insulin degludec (TRESIBA FLEXTOUCH) 100 UNIT/ML pen    Inject 20 units daily          Taking Medication Assessed Today: Yes  Current Treatments: Diet, Insulin Injections (Tresiba 20 units daily, Novolog 5 units but varies 3-6 units. depending on BG.)  Dose schedule: Pre-breakfast, Pre-lunch, Pre-dinner  Given by: Patient  Injection/Infusion sites: Abdomen  Problems taking diabetes medications regularly?: No  Diabetes medication side effects?: Yes (lows from insulin)    Problem Solving:  Problem Solving Assessed Today: Yes  Is the patient at risk for hypoglycemia?: Yes  Hypoglycemia Frequency: Weekly  Hypoglycemia Treatment: Other food, Candy, Juice (oranges, apples.)  Patient carries a carbohydrate source: Yes  Medical ID: No  Does patient have DKA prevention plan?: No  Does patient have severe weather/disaster plan for diabetes management?: No    Hypoglycemia symptoms  Confusion: Yes (one occurance when  in 50s.)  Hunger: Yes  Sweats: Yes  Feeling shaky: Yes    Hypoglycemia Complications  Blackouts: No  Hospitalization: No  Nocturnal hypoglycemia: No  Required assistance: No  Required glucagon injection: No  Seizures: No    Reducing Risks:  Reducing Risks Assessed Today: Yes  Diabetes Risks: Age over 45 years, Hyperlipidemia  CAD Risks: Male sex, Diabetes Mellitus, Dyslipidemia, Stress  Has dilated eye exam at least once a year?: Yes (April 2022, Priscilla.- had appt last week.)  Sees dentist  every 6 months?: No  Feet checked by healthcare provider in the last year?: Yes (8/26/2022, PCP)    Healthy Coping:  Healthy Coping Assessed Today: Yes  Emotional response to diabetes: Ready to learn, Concern for health and well-being  Informal Support system:: Family  Stage of change: ACTION (Actively working towards change)  Support resources: None  Patient Activation Measure Survey Score:  SHARITA Score (Last Two) 9/26/2018   SHARITA Raw Score 30   Activation Score 56   SHARITA Level 3     Care Plan and Education Provided:  Care Plan: Diabetes   Updates made by Suyapa Flowers RN since 11/18/2022 12:00 AM      Problem: HbA1C Not In Goal       Goal: Get HbA1C Level in Goal       Task: Discuss diabetes treatment plan with patient Completed 11/18/2022   Responsible User: Caryl Griffin RN      Problem: Diabetes Self-Management Education Needed to Optimize Self-Care Behaviors       Goal: Understand diabetes pathophysiology and disease progression       Task: Provide education on diabetes pathophysiology and disease progression specfic to patient's diabetes type Completed 11/18/2022   Responsible User: Caryl Griffin RN      Goal: Being Active - get regular physical activity, working up to at least 150 minutes per week       Task: Provide education on relationship of activity to glucose and precautions to take if at risk for low glucose Completed 11/18/2022   Responsible User: Caryl Griffin RN      Task: Discuss barriers to physical activity with patient Completed 11/18/2022   Responsible User: Caryl Griffin RN      Goal: Taking Medication - patient is consistently taking medications as directed    Start Date: 8/26/2022   Expected End Date: 11/25/2022   Note:    Taking Tresiba and NovoLOG as directed and has received shipment from [employer] PAP     Task: Provide education on frequency and refill details of medications Completed 11/18/2022   Responsible User: Caryl Griffin RN      Goal: Problem Solving - know how to prevent and manage  short-term diabetes complications       Task: Provide education on low blood glucose - causes, signs/symptoms, prevention, treatment, carrying a carbohydrate source at all times, and medical identification Completed 11/18/2022   Responsible User: Caryl Griffin RN      Task: Provide education on when to call a health care provider Completed 11/18/2022   Responsible User: Caryl Griffin RN      Goal: Reducing Risks - know how to prevent and treat long-term diabetes complications       Task: Provide education on recommended care for dental, eye and foot health Completed 11/18/2022   Responsible User: Caryl Griffin RN      Task: Provide education on Hemoglobin A1c - goals and relationship to blood glucose levels Completed 11/18/2022   Responsible User: Caryl Griffin RN        Time Spent: 30 minutes  Encounter Type: Individual    Any diabetes medication dose changes were made via the CDE Protocol per the patient's primary care provider. A copy of this encounter was shared with the provider.  Suyapa Flowers RN Diabetes Educator,  [phone]  11/18/2022 at 11:22 AM

## 2022-11-21 NOTE — PROGRESS NOTES
"Diabetes Self-Management Education & Support    Pt seen 11/17/2022:  Decrease Tresiba to 18 units daily.                    Novolog 5 units with meals. Hold if glucose is less then 150.     Need copy of financial statements to continue processing PAP.    Pt states his glucose has been \"pretty good\".     Pt states hed ate too many carbs on Saturday. Was high 208-212 after meal and did take insulin.(Rice, toast)    During call: Glucose is 153 after eating breakfast. Took Tresiba 18 units and NovoLog 5 units.   Colin thinks his fasting glucose was fbg- 88-95 yesterday.     Plan discussed, no change. RN will call on Wednesday as Holiday Thursday.   Pt plans to bring financial statement for PAP either Tuesday or Wednesday.  Suyapa Flowers RN Diabetes Educator,  [phone]  11/21/2022 at 9:01 AM    "

## 2022-11-23 NOTE — PROGRESS NOTES
"Diabetes Self-Management Education & Support    Follow up call RE: insulin adjustment.     Pt states has not had any lows during the night or in the morning. Had a \"low\" reading this afternoon, resolved after eating.     Continue Tresiba 18 units daily.   Novolog 5 units with meals. Hold if BG is less than 150. Will reevaluate next follow up call.     Pt will come into clinic to  original form for the Patient Assistance program week of 11/28/2022  .   Suyapa Flowers RN Diabetes Educator,  [phone]  11/23/2022 at 9:01 PM    "

## 2022-11-23 NOTE — TELEPHONE ENCOUNTER
Form received 11/23/2022 ,placed in provider's wall bin.   After form is completed patient would like form to be looked over and then a call to patient to  form.

## 2022-11-29 NOTE — TELEPHONE ENCOUNTER
SYNTHROID 150MCG      Last Written Prescription Date:  9-  Last Fill Quantity: 30,   # refills: 1  Last Office Visit: 10-  Future Office visit:       Routing refill request to provider for review/approval because:

## 2022-11-29 NOTE — TELEPHONE ENCOUNTER
----- Message from Libia Wallace MD sent at 11/29/2022  8:32 AM CST -----  Due for thyroid lab recheck. Please help schedule. Lab order placed

## 2022-11-30 NOTE — PROGRESS NOTES
Diabetes Self-Management Education & Support    Follow up: Insulin adjustment.     Pt states has not had any lows during the night or in the morning. Had a couple of highs, resolved with insulin.   Pt reports: FBG range 129, 170.   After eating. 117-170.   During call 138, pre supper meal.      Pt doesn't feel need to change dosing at this time.    Continue Tresiba 18 units daily.   Novolog 5 units with meals. Hold if BG is less than 150. Will reevaluate next follow up call.     Suyapa Flowers RN Diabetes Educator,  [phone]  11/30/2022 at 4:09 PM

## 2022-11-30 NOTE — TELEPHONE ENCOUNTER
Reached out to Dr. Davies office and left a detailed voice mail with contact information.  Advised her to reach out to us when possible.  If no response by tomorrow this writer will reach out again.

## 2022-11-30 NOTE — TELEPHONE ENCOUNTER
Patient stopped in today stating that he is needing to be set up for bladder installations again.  He said he saw Dr. Schmitz recently and was waiting for a phone call from [place].  He is aware that I will forward through to the coordinators to get these arranged for him.  We will also plan to follow-up with him in January.  We will have him see Dr. Bartlett as he was not able to finish the nivolumab and will not get any more that treatment but he can make recommendations on surveillance.

## 2022-12-02 NOTE — PROGRESS NOTES
Patient has taken and tolerated BCG before.  He consents for maintenance BCG.  He gave me verbal consent at our visit for cysto on 11/22/22.

## 2022-12-02 NOTE — PROGRESS NOTES
Call from Dr Schmitz noting she is trying to enter BCG treatment plan but unable as patient has an active Nivolumab plan and unsure how to proceed. On review, patient has not been treated with Nivolumab since 6-8-22. Call to [facility]ste[facility] ON[facility] to ask how we should proceed and she noted that per ONC, patient will not receive any further Nivolumab and given authorization to delete plan. Done. Call to Dr Schmitz and updated, she will now enter BCG.

## 2022-12-05 NOTE — TELEPHONE ENCOUNTER
Called pt to schedule/ LVM because am not able to schedule more than 1 BCG per day so Thursday jordin not work, need to schedule week of 12th and 19th

## 2022-12-05 NOTE — PROGRESS NOTES
BCG plan is in, consent is documented by provider. Date for C1 D1 maintenance therapy to start is 12-2-22 so can start at any time. Note to PAC Viviane to call patient to schedule as able.

## 2022-12-13 NOTE — TELEPHONE ENCOUNTER
LVM nedsto change infusion 12/14   MS RN



RECEIVE PT IN BED A/O X 1, CAREGIVER AT BEDSIDE, RESPIRATIONS EVEN AND UNLABORED, NO SOB 
NOTED, NO DISTRESS, SAFETY MEASURES IN PLACE. WILL CONTINUE TO MONITOR.

## 2022-12-13 NOTE — TELEPHONE ENCOUNTER
Infusion will have to change this patient appointments for BCG due to schedule and pharmacy needs.  Please reach out to patient to change appointments for patient to December 22, December 29, and January 5th at 8AM.  This will accommodate scheduling needs and pharmacy needs.

## 2022-12-16 NOTE — TELEPHONE ENCOUNTER
Per infusion Shannon Doss will have to change this patient appointments for BCG due to schedule and pharmacy needs. Need to change to December 22, December 29, and January 5th. No room on 22nd but pt can still come Monday. OK per Mickie.

## 2022-12-19 NOTE — PATIENT INSTRUCTIONS
Discharge Instructions for Infusion Therapy/Chemotherapy Department:    Your doctor has prescribed the following medication(s) BCG (Bacilus Calmette-Carey) to treat your bladder cancer.    All treatments have potential side effects, but they don't all happen to everyone.  If you have any questions, problems, or concerns, we want you to call us.  You can reach the Infusion Health Unit Coordinator at [phone] Monday - Friday 8:00am to 5:00pm.      *For 6 hours after your treatment, sit when you urinate.  Place 1-2 cups of bleach in the toilet after you have urinated.  Let stand for 15-20 minutes.  Repeat this process each time you urinate for six (6) hours after the procedure.  *Wash your hands thoroughly after going to the bathroom  *Drink 2-3 liters of non caffeinated, non alcoholic beverages following your instillation to flush the bladder out.      After hours, evenings, weekends, and holidays please call Urgent Care [phone] or toll free [phone] or go to your nearest Emergency Department.    When To Contact Your Doctor or Health Care Provider:  Contact your healthcare provider immediately, day or night, if you should experience any of the following symptoms:  Shortness of breath   Confusion   Dizziness or lightheadedness  The following symptoms require medical attention, but are not an emergency. Contact your health care provider within 24 hours of noticing any of the following:  Fever of 39.5 degrees C (103 degrees F) or higher within 24 hours.   Fever of 38.5 degrees C (101 degrees F) or higher after 48 hours.   Blood in the urine.   Extreme fatigue (unable to perform self-care activities).   Fever, chills, malaise, flu-like symptoms, increased fatigue or an increase in urinary symptoms (such as burning or pain on urination) are not uncommon. However, if these increase in severity, or last more than 48 hours let your doctor know.  Always inform your health care provider if you experience any  unusual symptoms.      Before your next treatment, limit your fluid intake for 4 hours before your treatment.  Avoid caffeine containing products for 4-6 hours prior to the treatment and 2 hours afterwards.      ANY questions or problems, PLEASE do not hesitate to call us!!

## 2022-12-19 NOTE — PROGRESS NOTES
Patient here for bladder instillation of BCG.  UA negative for nitrates, no visible hematuria.  Denies any fever or chills.    Denies any pain with urination.    Results reviewed, labs met treatment parameters.    Proceed with treatment.    16 Korean catheter placed, sterile technique.  Allowed bladder to empty 100ccs of urine returned.    1047 BCG instillation via catheter, tolerated well, catheter removed.    Patient instructed to return immediately home and turn every 15 minutes per protocol, tolerated well.    Offered no complaints.     Encouraged to drink plenty of fluids for next couple of days.    Discharged in care of self.    Patient will return 12/29/2022 for next appointment.

## 2022-12-21 NOTE — PROGRESS NOTES
Diabetes Self-Management Education & Support    Follow up call for insulin adjustment.   No answer, generic message left for return call.   Will try again.   Suyapa Flowers RN Diabetes Educator,  [phone]  12/21/2022 at 11:35 AM    Pt returned call, message receive.   Spoke with patient:  Pt states has not had any lows during the night. Has had some lows with increased activity do to shoveling snow.   Morning range 125-130 up to 160  Had a couple of highs after eating but resolved with insulin.   Pt reports: FBG range 129, 170.   After eating. Up to 185-210.      Continue Tresiba 18 units daily.   Novolog 5 units with meals. Hold if BG is less than 120.  Will reevaluate next follow up call.     Pt shares he has 1 full pen and a partial- about 1/2 of current Tresiba pen. Next shipment is expected mid January.   Follow up call early January to check patient's Tresiba supply.     Suyapa Flowers RN Diabetes Educator,  [phone]  12/21/2022 at 1:15 PM

## 2022-12-29 NOTE — PROGRESS NOTES
Patient is 77  years old, here today for bladder instillation of BCG.      UA results .   Latest Reference Range & Units 12/29/22 08:53   Color Urine Colorless, Straw, Light Yellow, Yellow  Yellow   Appearance Urine Clear  Clear   Glucose Urine Negative mg/dL Negative   Bilirubin Urine Negative  Negative   Ketones Urine Negative mg/dL Negative   Specific Gravity Urine 1.003 - 1.035  1.024   pH Urine 4.7 - 8.0  5.0   Protein Albumin Urine Negative mg/dL 20 !   Urobilinogen mg/dL Normal, 2.0 mg/dL Normal   Nitrite Urine Negative  Negative   Blood Urine Negative  Trace !   Leukocyte Esterase Urine Negative  Negative   !: Data is abnormal      Results reviewed, labs  meet treatment parameters.    Denies any fever or chills.  Denies any pain with urination.  Proceed with treatment.       BCG instillation via catheter, tolerated well, catheter removed.      Patient alerted they could stay here or return home after instill, chose to leave. Patient instructed to turn every 15 minutes and hold bladder for 2 hours as tolerated.     .    Encouraged to drink plenty of fluids for next couple of days.  Instructions on cleaning/safety post urination.     Patient will return  for next appointment.     Discharged.

## 2022-12-30 NOTE — TELEPHONE ENCOUNTER
Pt called he has 1 Tresiba pen left, he started it Wednesday. [employer] PAP application was submitted 11/23/22. We received a phone call 12/09/22 that the pt was successfully enrolled into the PAP for 2023. Pt would like to talk to Suyapa Flowers.

## 2022-12-30 NOTE — TELEPHONE ENCOUNTER
Return call to patient. Just started last pen, which will last about 2 weeks. Pt is taking 18 units.     Plan: follow up next week with pen resource if able- ?project care.   Suyapa Flowers RN Diabetes Educator,  [phone]  12/30/2022 at 3:07 PM

## 2023-01-01 ENCOUNTER — PATIENT OUTREACH (OUTPATIENT)
Dept: EDUCATION SERVICES | Facility: HOSPITAL | Age: 78
End: 2023-01-01

## 2023-01-01 ENCOUNTER — OFFICE VISIT (OUTPATIENT)
Dept: FAMILY MEDICINE | Facility: OTHER | Age: 78
End: 2023-01-01
Attending: NURSE PRACTITIONER
Payer: COMMERCIAL

## 2023-01-01 ENCOUNTER — TRANSITIONAL CARE UNIT VISIT (OUTPATIENT)
Dept: GERIATRICS | Facility: CLINIC | Age: 78
End: 2023-01-01
Payer: COMMERCIAL

## 2023-01-01 ENCOUNTER — HOSPITAL ENCOUNTER (OUTPATIENT)
Dept: EDUCATION SERVICES | Facility: HOSPITAL | Age: 78
Discharge: HOME | End: 2023-05-31
Attending: NURSE PRACTITIONER | Admitting: NURSE PRACTITIONER
Payer: COMMERCIAL

## 2023-01-01 ENCOUNTER — TELEPHONE (OUTPATIENT)
Dept: INTERVENTIONAL RADIOLOGY/VASCULAR | Facility: HOSPITAL | Age: 78
End: 2023-01-01

## 2023-01-01 ENCOUNTER — LAB (OUTPATIENT)
Dept: LAB | Facility: OTHER | Age: 78
End: 2023-01-01
Attending: FAMILY MEDICINE
Payer: COMMERCIAL

## 2023-01-01 ENCOUNTER — MEDICAL CORRESPONDENCE (OUTPATIENT)
Dept: HEALTH INFORMATION MANAGEMENT | Facility: CLINIC | Age: 78
End: 2023-01-01
Payer: COMMERCIAL

## 2023-01-01 ENCOUNTER — TELEPHONE (OUTPATIENT)
Dept: SURGERY | Facility: OTHER | Age: 78
End: 2023-01-01

## 2023-01-01 ENCOUNTER — HOSPITAL ENCOUNTER (OUTPATIENT)
Dept: MRI IMAGING | Facility: HOSPITAL | Age: 78
Discharge: HOME | End: 2023-04-17
Attending: INTERNAL MEDICINE
Payer: COMMERCIAL

## 2023-01-01 ENCOUNTER — TELEPHONE (OUTPATIENT)
Dept: FAMILY MEDICINE | Facility: OTHER | Age: 78
End: 2023-01-01

## 2023-01-01 ENCOUNTER — PATIENT OUTREACH (OUTPATIENT)
Dept: ONCOLOGY | Facility: OTHER | Age: 78
End: 2023-01-01

## 2023-01-01 ENCOUNTER — OFFICE VISIT (OUTPATIENT)
Dept: FAMILY MEDICINE | Facility: OTHER | Age: 78
End: 2023-01-01
Attending: FAMILY MEDICINE
Payer: COMMERCIAL

## 2023-01-01 ENCOUNTER — TELEPHONE (OUTPATIENT)
Dept: ONCOLOGY | Facility: OTHER | Age: 78
End: 2023-01-01
Payer: COMMERCIAL

## 2023-01-01 ENCOUNTER — HOSPITAL ENCOUNTER (OUTPATIENT)
Dept: EDUCATION SERVICES | Facility: HOSPITAL | Age: 78
Discharge: HOME | End: 2023-02-27
Attending: NURSE PRACTITIONER | Admitting: NURSE PRACTITIONER
Payer: COMMERCIAL

## 2023-01-01 ENCOUNTER — HOSPITAL ENCOUNTER (INPATIENT)
Facility: HOSPITAL | Age: 78
LOS: 3 days | DRG: 643 | End: 2023-07-29
Attending: STUDENT IN AN ORGANIZED HEALTH CARE EDUCATION/TRAINING PROGRAM | Admitting: HOSPITALIST
Payer: COMMERCIAL

## 2023-01-01 ENCOUNTER — APPOINTMENT (OUTPATIENT)
Dept: GENERAL RADIOLOGY | Facility: HOSPITAL | Age: 78
End: 2023-01-01
Attending: SURGERY
Payer: COMMERCIAL

## 2023-01-01 ENCOUNTER — LAB (OUTPATIENT)
Dept: LAB | Facility: OTHER | Age: 78
End: 2023-01-01
Payer: COMMERCIAL

## 2023-01-01 ENCOUNTER — OFFICE VISIT (OUTPATIENT)
Dept: RADIATION ONCOLOGY | Facility: HOSPITAL | Age: 78
End: 2023-01-01
Payer: COMMERCIAL

## 2023-01-01 ENCOUNTER — ANESTHESIA EVENT (OUTPATIENT)
Dept: SURGERY | Facility: HOSPITAL | Age: 78
End: 2023-01-01
Payer: COMMERCIAL

## 2023-01-01 ENCOUNTER — TELEPHONE (OUTPATIENT)
Dept: EDUCATION SERVICES | Facility: HOSPITAL | Age: 78
End: 2023-01-01

## 2023-01-01 ENCOUNTER — HOSPITAL ENCOUNTER (OUTPATIENT)
Dept: CT IMAGING | Facility: HOSPITAL | Age: 78
Discharge: HOME | End: 2023-05-23
Attending: INTERNAL MEDICINE | Admitting: RADIOLOGY
Payer: COMMERCIAL

## 2023-01-01 ENCOUNTER — APPOINTMENT (OUTPATIENT)
Dept: CT IMAGING | Facility: HOSPITAL | Age: 78
DRG: 643 | End: 2023-01-01
Attending: STUDENT IN AN ORGANIZED HEALTH CARE EDUCATION/TRAINING PROGRAM
Payer: COMMERCIAL

## 2023-01-01 ENCOUNTER — OFFICE VISIT (OUTPATIENT)
Dept: FAMILY MEDICINE | Facility: OTHER | Age: 78
End: 2023-01-01
Attending: STUDENT IN AN ORGANIZED HEALTH CARE EDUCATION/TRAINING PROGRAM
Payer: COMMERCIAL

## 2023-01-01 ENCOUNTER — HOSPITAL ENCOUNTER (OUTPATIENT)
Dept: NUCLEAR MEDICINE | Facility: HOSPITAL | Age: 78
Discharge: HOME | End: 2023-04-27
Attending: INTERNAL MEDICINE
Payer: COMMERCIAL

## 2023-01-01 ENCOUNTER — LAB REQUISITION (OUTPATIENT)
Dept: LAB | Facility: CLINIC | Age: 78
End: 2023-01-01
Payer: COMMERCIAL

## 2023-01-01 ENCOUNTER — PREP FOR PROCEDURE (OUTPATIENT)
Dept: SURGERY | Facility: OTHER | Age: 78
End: 2023-01-01

## 2023-01-01 ENCOUNTER — PATIENT OUTREACH (OUTPATIENT)
Dept: INFUSION THERAPY | Facility: OTHER | Age: 78
End: 2023-01-01

## 2023-01-01 ENCOUNTER — RESULTS ONLY (OUTPATIENT)
Dept: RADIATION ONCOLOGY | Facility: HOSPITAL | Age: 78
End: 2023-01-01

## 2023-01-01 ENCOUNTER — ONCOLOGY VISIT (OUTPATIENT)
Dept: ONCOLOGY | Facility: OTHER | Age: 78
End: 2023-01-01
Attending: INTERNAL MEDICINE
Payer: COMMERCIAL

## 2023-01-01 ENCOUNTER — ANESTHESIA (OUTPATIENT)
Dept: SURGERY | Facility: HOSPITAL | Age: 78
End: 2023-01-01
Payer: COMMERCIAL

## 2023-01-01 ENCOUNTER — TELEPHONE (OUTPATIENT)
Dept: RADIATION ONCOLOGY | Facility: HOSPITAL | Age: 78
End: 2023-01-01
Payer: COMMERCIAL

## 2023-01-01 ENCOUNTER — MEDICAL CORRESPONDENCE (OUTPATIENT)
Dept: HEALTH INFORMATION MANAGEMENT | Facility: HOSPITAL | Age: 78
End: 2023-01-01

## 2023-01-01 ENCOUNTER — TRANSFERRED RECORDS (OUTPATIENT)
Dept: HEALTH INFORMATION MANAGEMENT | Facility: CLINIC | Age: 78
End: 2023-01-01
Payer: COMMERCIAL

## 2023-01-01 ENCOUNTER — HOSPITAL ENCOUNTER (OUTPATIENT)
Dept: CT IMAGING | Facility: HOSPITAL | Age: 78
Discharge: HOME | End: 2023-05-03
Attending: INTERNAL MEDICINE
Payer: COMMERCIAL

## 2023-01-01 ENCOUNTER — APPOINTMENT (OUTPATIENT)
Dept: CT IMAGING | Facility: HOSPITAL | Age: 78
DRG: 643 | End: 2023-01-01
Attending: PHYSICIAN ASSISTANT
Payer: COMMERCIAL

## 2023-01-01 ENCOUNTER — ALLIED HEALTH/NURSE VISIT (OUTPATIENT)
Dept: RADIATION ONCOLOGY | Facility: HOSPITAL | Age: 78
End: 2023-01-01
Payer: COMMERCIAL

## 2023-01-01 ENCOUNTER — ONCOLOGY VISIT (OUTPATIENT)
Dept: RADIATION ONCOLOGY | Facility: HOSPITAL | Age: 78
End: 2023-01-01
Attending: RADIOLOGY
Payer: COMMERCIAL

## 2023-01-01 ENCOUNTER — HEALTH MAINTENANCE LETTER (OUTPATIENT)
Age: 78
End: 2023-01-01

## 2023-01-01 ENCOUNTER — HOSPITAL ENCOUNTER (OUTPATIENT)
Dept: CT IMAGING | Facility: HOSPITAL | Age: 78
Discharge: HOME | End: 2023-06-27
Attending: RADIOLOGY
Payer: COMMERCIAL

## 2023-01-01 ENCOUNTER — HOSPITAL ENCOUNTER (OUTPATIENT)
Dept: CT IMAGING | Facility: HOSPITAL | Age: 78
Discharge: HOME | End: 2023-04-17
Attending: INTERNAL MEDICINE
Payer: COMMERCIAL

## 2023-01-01 ENCOUNTER — DOCUMENTATION ONLY (OUTPATIENT)
Dept: ONCOLOGY | Facility: OTHER | Age: 78
End: 2023-01-01

## 2023-01-01 ENCOUNTER — INFUSION THERAPY VISIT (OUTPATIENT)
Dept: INFUSION THERAPY | Facility: OTHER | Age: 78
End: 2023-01-01
Attending: UROLOGY
Payer: COMMERCIAL

## 2023-01-01 ENCOUNTER — APPOINTMENT (OUTPATIENT)
Dept: RADIATION ONCOLOGY | Facility: HOSPITAL | Age: 78
End: 2023-01-01
Payer: COMMERCIAL

## 2023-01-01 ENCOUNTER — HOSPITAL ENCOUNTER (OUTPATIENT)
Dept: MRI IMAGING | Facility: HOSPITAL | Age: 78
Discharge: HOME | End: 2023-01-26
Attending: INTERNAL MEDICINE | Admitting: INTERNAL MEDICINE
Payer: COMMERCIAL

## 2023-01-01 ENCOUNTER — HOSPITAL ENCOUNTER (OUTPATIENT)
Dept: PET IMAGING | Facility: HOSPITAL | Age: 78
Discharge: HOME | End: 2023-01-25
Attending: INTERNAL MEDICINE
Payer: COMMERCIAL

## 2023-01-01 ENCOUNTER — HOSPITAL ENCOUNTER (OUTPATIENT)
Dept: MRI IMAGING | Facility: HOSPITAL | Age: 78
Discharge: HOME | End: 2023-06-19
Attending: INTERNAL MEDICINE | Admitting: INTERNAL MEDICINE
Payer: COMMERCIAL

## 2023-01-01 ENCOUNTER — OFFICE VISIT (OUTPATIENT)
Dept: SURGERY | Facility: OTHER | Age: 78
End: 2023-01-01
Attending: FAMILY MEDICINE
Payer: COMMERCIAL

## 2023-01-01 ENCOUNTER — ANCILLARY PROCEDURE (OUTPATIENT)
Dept: GENERAL RADIOLOGY | Facility: OTHER | Age: 78
End: 2023-01-01
Attending: FAMILY MEDICINE
Payer: COMMERCIAL

## 2023-01-01 ENCOUNTER — OFFICE VISIT (OUTPATIENT)
Dept: SURGERY | Facility: OTHER | Age: 78
End: 2023-01-01
Attending: NURSE PRACTITIONER
Payer: COMMERCIAL

## 2023-01-01 ENCOUNTER — HOSPITAL ENCOUNTER (OUTPATIENT)
Facility: HOSPITAL | Age: 78
Discharge: HOME | End: 2023-02-13
Attending: SURGERY | Admitting: SURGERY
Payer: COMMERCIAL

## 2023-01-01 ENCOUNTER — HOSPITAL ENCOUNTER (OUTPATIENT)
Facility: HOSPITAL | Age: 78
Discharge: HOME | End: 2023-06-20
Attending: SURGERY | Admitting: SURGERY
Payer: COMMERCIAL

## 2023-01-01 ENCOUNTER — DOCUMENTATION ONLY (OUTPATIENT)
Dept: RADIATION ONCOLOGY | Facility: HOSPITAL | Age: 78
End: 2023-01-01

## 2023-01-01 ENCOUNTER — HOSPITAL ENCOUNTER (OUTPATIENT)
Facility: HOSPITAL | Age: 78
Discharge: HOME | End: 2023-05-23
Attending: RADIOLOGY | Admitting: RADIOLOGY
Payer: COMMERCIAL

## 2023-01-01 ENCOUNTER — ONCOLOGY VISIT (OUTPATIENT)
Dept: ONCOLOGY | Facility: OTHER | Age: 78
End: 2023-01-01
Attending: NURSE PRACTITIONER
Payer: COMMERCIAL

## 2023-01-01 ENCOUNTER — APPOINTMENT (OUTPATIENT)
Dept: NEUROLOGY | Facility: HOSPITAL | Age: 78
DRG: 643 | End: 2023-01-01
Attending: PSYCHIATRY & NEUROLOGY
Payer: COMMERCIAL

## 2023-01-01 ENCOUNTER — TELEPHONE (OUTPATIENT)
Dept: PET IMAGING | Facility: HOSPITAL | Age: 78
End: 2023-01-01

## 2023-01-01 ENCOUNTER — HOSPITAL ENCOUNTER (OUTPATIENT)
Dept: MAMMOGRAPHY | Facility: OTHER | Age: 78
Discharge: HOME | End: 2023-03-23
Attending: NURSE PRACTITIONER
Payer: COMMERCIAL

## 2023-01-01 ENCOUNTER — ONCOLOGY VISIT (OUTPATIENT)
Dept: ONCOLOGY | Facility: OTHER | Age: 78
End: 2023-01-01
Attending: FAMILY MEDICINE
Payer: COMMERCIAL

## 2023-01-01 ENCOUNTER — TELEPHONE (OUTPATIENT)
Dept: MAMMOGRAPHY | Facility: OTHER | Age: 78
End: 2023-01-01

## 2023-01-01 ENCOUNTER — ALLIED HEALTH/NURSE VISIT (OUTPATIENT)
Dept: EDUCATION SERVICES | Facility: OTHER | Age: 78
End: 2023-01-01
Attending: FAMILY MEDICINE
Payer: COMMERCIAL

## 2023-01-01 VITALS
TEMPERATURE: 97.5 F | HEIGHT: 67 IN | BODY MASS INDEX: 24.48 KG/M2 | WEIGHT: 156 LBS | OXYGEN SATURATION: 96 % | HEART RATE: 88 BPM | DIASTOLIC BLOOD PRESSURE: 66 MMHG | RESPIRATION RATE: 16 BRPM | SYSTOLIC BLOOD PRESSURE: 120 MMHG

## 2023-01-01 VITALS
RESPIRATION RATE: 20 BRPM | HEIGHT: 67 IN | SYSTOLIC BLOOD PRESSURE: 104 MMHG | BODY MASS INDEX: 24.6 KG/M2 | WEIGHT: 156.75 LBS | OXYGEN SATURATION: 98 % | DIASTOLIC BLOOD PRESSURE: 60 MMHG | HEART RATE: 64 BPM | TEMPERATURE: 98.2 F

## 2023-01-01 VITALS
DIASTOLIC BLOOD PRESSURE: 68 MMHG | HEART RATE: 67 BPM | BODY MASS INDEX: 27.43 KG/M2 | RESPIRATION RATE: 16 BRPM | WEIGHT: 170.7 LBS | OXYGEN SATURATION: 96 % | HEIGHT: 66 IN | SYSTOLIC BLOOD PRESSURE: 108 MMHG

## 2023-01-01 VITALS
DIASTOLIC BLOOD PRESSURE: 60 MMHG | SYSTOLIC BLOOD PRESSURE: 100 MMHG | HEIGHT: 67 IN | HEART RATE: 77 BPM | OXYGEN SATURATION: 96 % | WEIGHT: 170.86 LBS | TEMPERATURE: 97.3 F | RESPIRATION RATE: 20 BRPM | BODY MASS INDEX: 26.82 KG/M2

## 2023-01-01 VITALS
DIASTOLIC BLOOD PRESSURE: 78 MMHG | BODY MASS INDEX: 27.03 KG/M2 | HEIGHT: 67 IN | HEART RATE: 63 BPM | OXYGEN SATURATION: 99 % | SYSTOLIC BLOOD PRESSURE: 110 MMHG | TEMPERATURE: 97 F | WEIGHT: 172.2 LBS

## 2023-01-01 VITALS
HEIGHT: 67 IN | SYSTOLIC BLOOD PRESSURE: 120 MMHG | OXYGEN SATURATION: 98 % | BODY MASS INDEX: 24.72 KG/M2 | RESPIRATION RATE: 19 BRPM | DIASTOLIC BLOOD PRESSURE: 68 MMHG | WEIGHT: 157.5 LBS | HEART RATE: 91 BPM | TEMPERATURE: 97.6 F

## 2023-01-01 VITALS
SYSTOLIC BLOOD PRESSURE: 128 MMHG | HEART RATE: 87 BPM | OXYGEN SATURATION: 98 % | RESPIRATION RATE: 19 BRPM | TEMPERATURE: 97.4 F | WEIGHT: 159.39 LBS | BODY MASS INDEX: 25.02 KG/M2 | DIASTOLIC BLOOD PRESSURE: 74 MMHG | HEIGHT: 67 IN

## 2023-01-01 VITALS
TEMPERATURE: 97.2 F | DIASTOLIC BLOOD PRESSURE: 72 MMHG | BODY MASS INDEX: 27.02 KG/M2 | SYSTOLIC BLOOD PRESSURE: 122 MMHG | HEIGHT: 67 IN | HEART RATE: 107 BPM | OXYGEN SATURATION: 100 % | RESPIRATION RATE: 16 BRPM

## 2023-01-01 VITALS
BODY MASS INDEX: 26.16 KG/M2 | SYSTOLIC BLOOD PRESSURE: 110 MMHG | WEIGHT: 166.67 LBS | HEIGHT: 67 IN | HEART RATE: 87 BPM | DIASTOLIC BLOOD PRESSURE: 78 MMHG | RESPIRATION RATE: 20 BRPM | OXYGEN SATURATION: 99 % | TEMPERATURE: 98.3 F

## 2023-01-01 VITALS
RESPIRATION RATE: 18 BRPM | TEMPERATURE: 98 F | DIASTOLIC BLOOD PRESSURE: 84 MMHG | SYSTOLIC BLOOD PRESSURE: 137 MMHG | WEIGHT: 141.54 LBS | HEART RATE: 84 BPM | BODY MASS INDEX: 22.17 KG/M2 | OXYGEN SATURATION: 97 %

## 2023-01-01 VITALS
BODY MASS INDEX: 26.18 KG/M2 | WEIGHT: 166.8 LBS | HEART RATE: 71 BPM | OXYGEN SATURATION: 98 % | TEMPERATURE: 96.8 F | RESPIRATION RATE: 16 BRPM | SYSTOLIC BLOOD PRESSURE: 126 MMHG | HEIGHT: 67 IN | DIASTOLIC BLOOD PRESSURE: 69 MMHG

## 2023-01-01 VITALS
HEART RATE: 64 BPM | BODY MASS INDEX: 27.16 KG/M2 | WEIGHT: 172.1 LBS | RESPIRATION RATE: 17 BRPM | DIASTOLIC BLOOD PRESSURE: 72 MMHG | TEMPERATURE: 97.3 F | SYSTOLIC BLOOD PRESSURE: 119 MMHG | OXYGEN SATURATION: 99 %

## 2023-01-01 VITALS
BODY MASS INDEX: 24.71 KG/M2 | WEIGHT: 157.4 LBS | HEART RATE: 75 BPM | DIASTOLIC BLOOD PRESSURE: 91 MMHG | OXYGEN SATURATION: 100 % | TEMPERATURE: 97.4 F | RESPIRATION RATE: 16 BRPM | SYSTOLIC BLOOD PRESSURE: 152 MMHG | HEIGHT: 67 IN

## 2023-01-01 VITALS
BODY MASS INDEX: 26.16 KG/M2 | DIASTOLIC BLOOD PRESSURE: 64 MMHG | OXYGEN SATURATION: 98 % | HEART RATE: 69 BPM | TEMPERATURE: 97.6 F | SYSTOLIC BLOOD PRESSURE: 122 MMHG | RESPIRATION RATE: 18 BRPM | WEIGHT: 167 LBS

## 2023-01-01 VITALS
HEART RATE: 85 BPM | RESPIRATION RATE: 16 BRPM | DIASTOLIC BLOOD PRESSURE: 60 MMHG | BODY MASS INDEX: 24.48 KG/M2 | OXYGEN SATURATION: 98 % | TEMPERATURE: 97.8 F | SYSTOLIC BLOOD PRESSURE: 135 MMHG | HEIGHT: 67 IN | WEIGHT: 156 LBS

## 2023-01-01 VITALS
HEART RATE: 62 BPM | WEIGHT: 156 LBS | BODY MASS INDEX: 24.43 KG/M2 | OXYGEN SATURATION: 96 % | DIASTOLIC BLOOD PRESSURE: 60 MMHG | RESPIRATION RATE: 19 BRPM | SYSTOLIC BLOOD PRESSURE: 124 MMHG | TEMPERATURE: 97.6 F

## 2023-01-01 VITALS
RESPIRATION RATE: 16 BRPM | BODY MASS INDEX: 24.4 KG/M2 | SYSTOLIC BLOOD PRESSURE: 120 MMHG | DIASTOLIC BLOOD PRESSURE: 62 MMHG | HEART RATE: 70 BPM | WEIGHT: 155.8 LBS

## 2023-01-01 VITALS
SYSTOLIC BLOOD PRESSURE: 122 MMHG | WEIGHT: 162.48 LBS | DIASTOLIC BLOOD PRESSURE: 80 MMHG | HEART RATE: 79 BPM | BODY MASS INDEX: 25.45 KG/M2 | TEMPERATURE: 98.8 F | OXYGEN SATURATION: 99 %

## 2023-01-01 VITALS
OXYGEN SATURATION: 99 % | WEIGHT: 171.3 LBS | TEMPERATURE: 97.1 F | DIASTOLIC BLOOD PRESSURE: 60 MMHG | RESPIRATION RATE: 20 BRPM | BODY MASS INDEX: 26.89 KG/M2 | SYSTOLIC BLOOD PRESSURE: 104 MMHG | HEART RATE: 66 BPM | HEIGHT: 67 IN

## 2023-01-01 VITALS
RESPIRATION RATE: 17 BRPM | SYSTOLIC BLOOD PRESSURE: 108 MMHG | DIASTOLIC BLOOD PRESSURE: 68 MMHG | OXYGEN SATURATION: 97 % | HEART RATE: 90 BPM | TEMPERATURE: 99.2 F

## 2023-01-01 VITALS
BODY MASS INDEX: 25.49 KG/M2 | WEIGHT: 162.4 LBS | SYSTOLIC BLOOD PRESSURE: 129 MMHG | DIASTOLIC BLOOD PRESSURE: 82 MMHG | HEIGHT: 67 IN | OXYGEN SATURATION: 98 % | HEART RATE: 90 BPM | RESPIRATION RATE: 16 BRPM

## 2023-01-01 VITALS
HEART RATE: 79 BPM | OXYGEN SATURATION: 99 % | TEMPERATURE: 97.4 F | SYSTOLIC BLOOD PRESSURE: 132 MMHG | DIASTOLIC BLOOD PRESSURE: 72 MMHG | RESPIRATION RATE: 18 BRPM

## 2023-01-01 VITALS
OXYGEN SATURATION: 99 % | RESPIRATION RATE: 19 BRPM | SYSTOLIC BLOOD PRESSURE: 126 MMHG | TEMPERATURE: 97.4 F | WEIGHT: 170.19 LBS | HEIGHT: 67 IN | HEART RATE: 69 BPM | DIASTOLIC BLOOD PRESSURE: 70 MMHG | BODY MASS INDEX: 26.71 KG/M2

## 2023-01-01 VITALS
TEMPERATURE: 98 F | OXYGEN SATURATION: 97 % | WEIGHT: 172.5 LBS | DIASTOLIC BLOOD PRESSURE: 76 MMHG | RESPIRATION RATE: 17 BRPM | BODY MASS INDEX: 27.02 KG/M2 | HEART RATE: 66 BPM | SYSTOLIC BLOOD PRESSURE: 108 MMHG

## 2023-01-01 VITALS
WEIGHT: 159 LBS | BODY MASS INDEX: 24.96 KG/M2 | SYSTOLIC BLOOD PRESSURE: 112 MMHG | HEIGHT: 67 IN | DIASTOLIC BLOOD PRESSURE: 62 MMHG

## 2023-01-01 VITALS
RESPIRATION RATE: 16 BRPM | SYSTOLIC BLOOD PRESSURE: 132 MMHG | TEMPERATURE: 98.9 F | DIASTOLIC BLOOD PRESSURE: 70 MMHG | HEART RATE: 71 BPM

## 2023-01-01 VITALS
RESPIRATION RATE: 18 BRPM | TEMPERATURE: 97.3 F | DIASTOLIC BLOOD PRESSURE: 80 MMHG | SYSTOLIC BLOOD PRESSURE: 126 MMHG | OXYGEN SATURATION: 99 % | BODY MASS INDEX: 26.35 KG/M2 | HEART RATE: 77 BPM | WEIGHT: 168.25 LBS

## 2023-01-01 DIAGNOSIS — R74.8 ELEVATED ALKALINE PHOSPHATASE LEVEL: ICD-10-CM

## 2023-01-01 DIAGNOSIS — E11.9 TYPE 2 DIABETES MELLITUS WITHOUT COMPLICATION, WITH LONG-TERM CURRENT USE OF INSULIN (H): ICD-10-CM

## 2023-01-01 DIAGNOSIS — C79.51 MALIGNANT NEOPLASM METASTATIC TO BONE (H): Primary | ICD-10-CM

## 2023-01-01 DIAGNOSIS — C67.3 MALIGNANT NEOPLASM OF ANTERIOR WALL OF URINARY BLADDER (H): ICD-10-CM

## 2023-01-01 DIAGNOSIS — C66.2 MALIGNANT NEOPLASM OF LEFT URETER (H): ICD-10-CM

## 2023-01-01 DIAGNOSIS — M89.8X9 BONE PAIN: Primary | ICD-10-CM

## 2023-01-01 DIAGNOSIS — G89.3 CANCER RELATED PAIN: ICD-10-CM

## 2023-01-01 DIAGNOSIS — N63.41 SUBAREOLAR MASS OF RIGHT BREAST: Primary | ICD-10-CM

## 2023-01-01 DIAGNOSIS — Z79.4 TYPE 2 DIABETES MELLITUS WITHOUT COMPLICATION, WITH LONG-TERM CURRENT USE OF INSULIN (H): ICD-10-CM

## 2023-01-01 DIAGNOSIS — J43.2 CENTRILOBULAR EMPHYSEMA (H): ICD-10-CM

## 2023-01-01 DIAGNOSIS — C79.51 SECONDARY MALIGNANT NEOPLASM OF BONE (H): Primary | ICD-10-CM

## 2023-01-01 DIAGNOSIS — N18.4 ANEMIA DUE TO STAGE 4 CHRONIC KIDNEY DISEASE (H): ICD-10-CM

## 2023-01-01 DIAGNOSIS — K86.89 PANCREATIC MASS: ICD-10-CM

## 2023-01-01 DIAGNOSIS — E11.9 DIABETES MELLITUS WITHOUT COMPLICATION (H): ICD-10-CM

## 2023-01-01 DIAGNOSIS — C79.51 MALIGNANT NEOPLASM METASTATIC TO BONE (H): ICD-10-CM

## 2023-01-01 DIAGNOSIS — M25.552 LEFT HIP PAIN: ICD-10-CM

## 2023-01-01 DIAGNOSIS — G89.3 CANCER RELATED PAIN: Primary | ICD-10-CM

## 2023-01-01 DIAGNOSIS — D63.1 ANEMIA DUE TO STAGE 4 CHRONIC KIDNEY DISEASE (H): ICD-10-CM

## 2023-01-01 DIAGNOSIS — C67.9 BLADDER CANCER (H): ICD-10-CM

## 2023-01-01 DIAGNOSIS — N18.30 STAGE 3 CHRONIC KIDNEY DISEASE (H): ICD-10-CM

## 2023-01-01 DIAGNOSIS — K59.01 SLOW TRANSIT CONSTIPATION: ICD-10-CM

## 2023-01-01 DIAGNOSIS — C67.9 MALIGNANT NEOPLASM OF URINARY BLADDER, UNSPECIFIED SITE (H): Primary | ICD-10-CM

## 2023-01-01 DIAGNOSIS — N18.4 CKD (CHRONIC KIDNEY DISEASE) STAGE 4, GFR 15-29 ML/MIN (H): ICD-10-CM

## 2023-01-01 DIAGNOSIS — Z01.818 PREOP GENERAL PHYSICAL EXAM: Primary | ICD-10-CM

## 2023-01-01 DIAGNOSIS — C79.51 CANCER, METASTATIC TO BONE (H): ICD-10-CM

## 2023-01-01 DIAGNOSIS — C66.2 MALIGNANT NEOPLASM OF LEFT URETER (H): Primary | ICD-10-CM

## 2023-01-01 DIAGNOSIS — C66.9: ICD-10-CM

## 2023-01-01 DIAGNOSIS — N64.4 BREAST PAIN: ICD-10-CM

## 2023-01-01 DIAGNOSIS — M84.552D PATHOLOGICAL FRACTURE OF LEFT FEMUR DUE TO NEOPLASTIC DISEASE WITH ROUTINE HEALING, SUBSEQUENT ENCOUNTER: ICD-10-CM

## 2023-01-01 DIAGNOSIS — E03.9 ACQUIRED HYPOTHYROIDISM: ICD-10-CM

## 2023-01-01 DIAGNOSIS — Z51.5 PALLIATIVE CARE PATIENT: ICD-10-CM

## 2023-01-01 DIAGNOSIS — Z90.5 SOLITARY KIDNEY, ACQUIRED: ICD-10-CM

## 2023-01-01 DIAGNOSIS — K63.5 POLYP OF COLON, UNSPECIFIED PART OF COLON, UNSPECIFIED TYPE: ICD-10-CM

## 2023-01-01 DIAGNOSIS — K86.2 PANCREATIC CYST: ICD-10-CM

## 2023-01-01 DIAGNOSIS — E87.1 HYPONATREMIA: ICD-10-CM

## 2023-01-01 DIAGNOSIS — Z79.4 TYPE 2 DIABETES MELLITUS WITH HYPERGLYCEMIA, WITH LONG-TERM CURRENT USE OF INSULIN (H): Primary | ICD-10-CM

## 2023-01-01 DIAGNOSIS — N18.30 STAGE 3 CHRONIC KIDNEY DISEASE (H): Primary | ICD-10-CM

## 2023-01-01 DIAGNOSIS — N18.4 CHRONIC KIDNEY DISEASE, STAGE 4 (SEVERE) (H): ICD-10-CM

## 2023-01-01 DIAGNOSIS — M62.81 GENERALIZED MUSCLE WEAKNESS: ICD-10-CM

## 2023-01-01 DIAGNOSIS — E78.5 HYPERLIPIDEMIA, UNSPECIFIED HYPERLIPIDEMIA TYPE: ICD-10-CM

## 2023-01-01 DIAGNOSIS — E08.22 DIABETES MELLITUS DUE TO UNDERLYING CONDITION WITH STAGE 4 CHRONIC KIDNEY DISEASE, UNSPECIFIED WHETHER LONG TERM INSULIN USE (H): ICD-10-CM

## 2023-01-01 DIAGNOSIS — E03.2 HYPOTHYROIDISM DUE TO MEDICATION: ICD-10-CM

## 2023-01-01 DIAGNOSIS — N18.4 DIABETES MELLITUS DUE TO UNDERLYING CONDITION WITH STAGE 4 CHRONIC KIDNEY DISEASE, UNSPECIFIED WHETHER LONG TERM INSULIN USE (H): ICD-10-CM

## 2023-01-01 DIAGNOSIS — M79.662 PAIN OF LEFT LOWER LEG: ICD-10-CM

## 2023-01-01 DIAGNOSIS — M25.552 LEFT HIP PAIN: Primary | ICD-10-CM

## 2023-01-01 DIAGNOSIS — C67.9 MALIGNANT NEOPLASM OF URINARY BLADDER, UNSPECIFIED SITE (H): ICD-10-CM

## 2023-01-01 DIAGNOSIS — C79.51 SECONDARY MALIGNANT NEOPLASM OF BONE (H): ICD-10-CM

## 2023-01-01 DIAGNOSIS — C67.3 MALIGNANT NEOPLASM OF ANTERIOR WALL OF URINARY BLADDER (H): Primary | ICD-10-CM

## 2023-01-01 DIAGNOSIS — C66.1 MALIGNANT NEOPLASM OF RIGHT URETER (H): Primary | ICD-10-CM

## 2023-01-01 DIAGNOSIS — Z86.0100 HISTORY OF COLONIC POLYPS: Primary | ICD-10-CM

## 2023-01-01 DIAGNOSIS — G89.3 CANCER ASSOCIATED PAIN: ICD-10-CM

## 2023-01-01 DIAGNOSIS — E11.9 TYPE 2 DIABETES MELLITUS WITHOUT COMPLICATION, WITH LONG-TERM CURRENT USE OF INSULIN (H): Primary | ICD-10-CM

## 2023-01-01 DIAGNOSIS — E11.65 TYPE 2 DIABETES MELLITUS WITH HYPERGLYCEMIA, WITH LONG-TERM CURRENT USE OF INSULIN (H): Primary | ICD-10-CM

## 2023-01-01 DIAGNOSIS — Z79.4 TYPE 2 DIABETES MELLITUS WITHOUT COMPLICATION, WITH LONG-TERM CURRENT USE OF INSULIN (H): Primary | ICD-10-CM

## 2023-01-01 DIAGNOSIS — D09.0 CARCINOMA IN SITU OF BLADDER: Primary | ICD-10-CM

## 2023-01-01 DIAGNOSIS — D72.829 LEUKOCYTOSIS, UNSPECIFIED TYPE: ICD-10-CM

## 2023-01-01 DIAGNOSIS — R73.9 HYPERGLYCEMIA: ICD-10-CM

## 2023-01-01 DIAGNOSIS — C66.1 MALIGNANT NEOPLASM OF RIGHT URETER (H): ICD-10-CM

## 2023-01-01 DIAGNOSIS — N18.32 STAGE 3B CHRONIC KIDNEY DISEASE (H): ICD-10-CM

## 2023-01-01 DIAGNOSIS — M25.552 HIP PAIN, LEFT: ICD-10-CM

## 2023-01-01 DIAGNOSIS — M25.552 HIP PAIN, LEFT: Primary | ICD-10-CM

## 2023-01-01 DIAGNOSIS — Z53.9 PERSONS ENCOUNTERING HEALTH SERVICES FOR SPECIFIC PROCEDURES, NOT CARRIED OUT: Primary | ICD-10-CM

## 2023-01-01 DIAGNOSIS — M89.8X9 BONE PAIN: ICD-10-CM

## 2023-01-01 DIAGNOSIS — Z01.818 PREOP GENERAL PHYSICAL EXAM: ICD-10-CM

## 2023-01-01 DIAGNOSIS — E11.9 TYPE 2 DIABETES MELLITUS WITHOUT COMPLICATION, WITHOUT LONG-TERM CURRENT USE OF INSULIN (H): Primary | ICD-10-CM

## 2023-01-01 DIAGNOSIS — R60.0 BILATERAL LEG EDEMA: ICD-10-CM

## 2023-01-01 DIAGNOSIS — N18.4 CKD (CHRONIC KIDNEY DISEASE) STAGE 4, GFR 15-29 ML/MIN (H): Primary | ICD-10-CM

## 2023-01-01 DIAGNOSIS — C67.9 BLADDER CANCER (H): Primary | ICD-10-CM

## 2023-01-01 DIAGNOSIS — K86.89 PANCREATIC MASS: Primary | ICD-10-CM

## 2023-01-01 DIAGNOSIS — Z85.54 HISTORY OF CANCER OF URETER: ICD-10-CM

## 2023-01-01 DIAGNOSIS — E11.9 TYPE 2 DIABETES MELLITUS WITHOUT COMPLICATION, WITHOUT LONG-TERM CURRENT USE OF INSULIN (H): ICD-10-CM

## 2023-01-01 DIAGNOSIS — Z98.890 POSTOPERATIVE STATE: Primary | ICD-10-CM

## 2023-01-01 DIAGNOSIS — R97.8 OTHER ABNORMAL TUMOR MARKERS: ICD-10-CM

## 2023-01-01 DIAGNOSIS — K86.9 PANCREATIC LESION: Primary | ICD-10-CM

## 2023-01-01 DIAGNOSIS — G47.01 INSOMNIA DUE TO MEDICAL CONDITION: ICD-10-CM

## 2023-01-01 DIAGNOSIS — R41.0 CONFUSION: ICD-10-CM

## 2023-01-01 DIAGNOSIS — C79.9 METASTATIC MALIGNANT NEOPLASM, UNSPECIFIED SITE (H): Primary | ICD-10-CM

## 2023-01-01 DIAGNOSIS — Z71.89 ACP (ADVANCE CARE PLANNING): ICD-10-CM

## 2023-01-01 DIAGNOSIS — K86.9 PANCREATIC LESION: ICD-10-CM

## 2023-01-01 DIAGNOSIS — N63.41 SUBAREOLAR MASS OF RIGHT BREAST: ICD-10-CM

## 2023-01-01 LAB
ALBUMIN SERPL BCG-MCNC: 2.8 G/DL (ref 3.5–5.2)
ALBUMIN SERPL BCG-MCNC: 2.8 G/DL (ref 3.5–5.2)
ALBUMIN SERPL BCG-MCNC: 3.7 G/DL (ref 3.5–5.2)
ALBUMIN SERPL BCG-MCNC: 3.7 G/DL (ref 3.5–5.2)
ALBUMIN SERPL BCG-MCNC: 3.9 G/DL (ref 3.5–5.2)
ALBUMIN SERPL BCG-MCNC: 4 G/DL (ref 3.5–5.2)
ALBUMIN SERPL BCG-MCNC: 4.1 G/DL (ref 3.5–5.2)
ALBUMIN SERPL BCG-MCNC: 4.1 G/DL (ref 3.5–5.2)
ALBUMIN UR-MCNC: 20 MG/DL
ALBUMIN UR-MCNC: 30 MG/DL
ALP SERPL-CCNC: 1012 U/L (ref 40–129)
ALP SERPL-CCNC: 110 U/L (ref 40–129)
ALP SERPL-CCNC: 129 U/L (ref 40–129)
ALP SERPL-CCNC: 1622 U/L (ref 40–129)
ALP SERPL-CCNC: 1788 U/L (ref 40–129)
ALP SERPL-CCNC: 1812 U/L (ref 40–129)
ALP SERPL-CCNC: 327 U/L (ref 40–129)
ALT SERPL W P-5'-P-CCNC: 11 U/L (ref 0–70)
ALT SERPL W P-5'-P-CCNC: 8 U/L (ref 10–50)
ALT SERPL W P-5'-P-CCNC: 8 U/L (ref 10–50)
ALT SERPL W P-5'-P-CCNC: 9 U/L (ref 10–50)
ALT SERPL W P-5'-P-CCNC: <5 U/L (ref 0–70)
ALT SERPL W P-5'-P-CCNC: <5 U/L (ref 0–70)
ALT SERPL W P-5'-P-CCNC: <5 U/L (ref 10–50)
AMMONIA PLAS-SCNC: <10 UMOL/L (ref 16–60)
ANION GAP SERPL CALCULATED.3IONS-SCNC: 10 MMOL/L (ref 7–15)
ANION GAP SERPL CALCULATED.3IONS-SCNC: 12 MMOL/L (ref 7–15)
ANION GAP SERPL CALCULATED.3IONS-SCNC: 12 MMOL/L (ref 7–15)
ANION GAP SERPL CALCULATED.3IONS-SCNC: 13 MMOL/L (ref 7–15)
ANION GAP SERPL CALCULATED.3IONS-SCNC: 14 MMOL/L (ref 7–15)
ANION GAP SERPL CALCULATED.3IONS-SCNC: 14 MMOL/L (ref 7–15)
ANION GAP SERPL CALCULATED.3IONS-SCNC: 16 MMOL/L (ref 7–15)
ANION GAP SERPL CALCULATED.3IONS-SCNC: 18 MMOL/L (ref 7–15)
ANION GAP SERPL CALCULATED.3IONS-SCNC: 19 MMOL/L (ref 7–15)
ANION GAP SERPL CALCULATED.3IONS-SCNC: 20 MMOL/L (ref 7–15)
ANION GAP SERPL CALCULATED.3IONS-SCNC: 8 MMOL/L (ref 7–15)
APPEARANCE UR: CLEAR
APPEARANCE UR: CLEAR
APTT PPP: 33 SECONDS (ref 22–38)
APTT PPP: 43 SECONDS (ref 22–38)
AST SERPL W P-5'-P-CCNC: 17 U/L (ref 10–50)
AST SERPL W P-5'-P-CCNC: 21 U/L (ref 10–50)
AST SERPL W P-5'-P-CCNC: 24 U/L (ref 10–50)
AST SERPL W P-5'-P-CCNC: 33 U/L (ref 10–50)
AST SERPL W P-5'-P-CCNC: 35 U/L (ref 0–45)
AST SERPL W P-5'-P-CCNC: 36 U/L (ref 0–45)
AST SERPL W P-5'-P-CCNC: 37 U/L (ref 0–45)
ATRIAL RATE - MUSE: 84 BPM
BACTERIA #/AREA URNS HPF: ABNORMAL /HPF
BASOPHILS # BLD AUTO: 0 10E3/UL (ref 0–0.2)
BASOPHILS # BLD AUTO: 0.1 10E3/UL (ref 0–0.2)
BASOPHILS NFR BLD AUTO: 0 %
BASOPHILS NFR BLD AUTO: 1 %
BILIRUB DIRECT SERPL-MCNC: 0.24 MG/DL (ref 0–0.3)
BILIRUB DIRECT SERPL-MCNC: <0.2 MG/DL (ref 0–0.3)
BILIRUB SERPL-MCNC: 0.3 MG/DL
BILIRUB SERPL-MCNC: 0.5 MG/DL
BILIRUB SERPL-MCNC: 0.7 MG/DL
BILIRUB SERPL-MCNC: 0.8 MG/DL
BILIRUB UR QL STRIP: NEGATIVE
BILIRUB UR QL STRIP: NEGATIVE
BUN SERPL-MCNC: 14.3 MG/DL (ref 8–23)
BUN SERPL-MCNC: 15.6 MG/DL (ref 8–23)
BUN SERPL-MCNC: 16.4 MG/DL (ref 8–23)
BUN SERPL-MCNC: 18.3 MG/DL (ref 8–23)
BUN SERPL-MCNC: 18.9 MG/DL (ref 8–23)
BUN SERPL-MCNC: 25.7 MG/DL (ref 8–23)
BUN SERPL-MCNC: 27.3 MG/DL (ref 8–23)
BUN SERPL-MCNC: 27.8 MG/DL (ref 8–23)
BUN SERPL-MCNC: 29.9 MG/DL (ref 8–23)
BUN SERPL-MCNC: 31 MG/DL (ref 8–23)
BUN SERPL-MCNC: 45.9 MG/DL (ref 8–23)
CALCIUM SERPL-MCNC: 7.2 MG/DL (ref 8.8–10.2)
CALCIUM SERPL-MCNC: 7.6 MG/DL (ref 8.8–10.2)
CALCIUM SERPL-MCNC: 7.7 MG/DL (ref 8.8–10.2)
CALCIUM SERPL-MCNC: 8 MG/DL (ref 8.8–10.2)
CALCIUM SERPL-MCNC: 8.6 MG/DL (ref 8.8–10.2)
CALCIUM SERPL-MCNC: 8.8 MG/DL (ref 8.8–10.2)
CALCIUM SERPL-MCNC: 9 MG/DL (ref 8.8–10.2)
CALCIUM SERPL-MCNC: 9 MG/DL (ref 8.8–10.2)
CALCIUM SERPL-MCNC: 9.1 MG/DL (ref 8.8–10.2)
CALCIUM SERPL-MCNC: 9.2 MG/DL (ref 8.8–10.2)
CALCIUM SERPL-MCNC: 9.6 MG/DL (ref 8.8–10.2)
CANCER AG19-9 SERPL IA-ACNC: 12 U/ML
CEA SERPL-MCNC: 2.3 NG/ML
CHLORIDE SERPL-SCNC: 100 MMOL/L (ref 98–107)
CHLORIDE SERPL-SCNC: 103 MMOL/L (ref 98–107)
CHLORIDE SERPL-SCNC: 104 MMOL/L (ref 98–107)
CHLORIDE SERPL-SCNC: 104 MMOL/L (ref 98–107)
CHLORIDE SERPL-SCNC: 106 MMOL/L (ref 98–107)
CHLORIDE SERPL-SCNC: 88 MMOL/L (ref 98–107)
CHLORIDE SERPL-SCNC: 89 MMOL/L (ref 98–107)
CHLORIDE SERPL-SCNC: 92 MMOL/L (ref 98–107)
CHLORIDE SERPL-SCNC: 94 MMOL/L (ref 98–107)
CHLORIDE SERPL-SCNC: 94 MMOL/L (ref 98–107)
CHLORIDE SERPL-SCNC: 99 MMOL/L (ref 98–107)
CHOLEST SERPL-MCNC: 119 MG/DL
CLOSURE TME COLL+ADP BLD: 71 SECONDS
CLOSURE TME COLL+EPINEP BLD: 206 SECONDS
COLOR UR AUTO: ABNORMAL
COLOR UR AUTO: YELLOW
CREAT SERPL-MCNC: 1.18 MG/DL (ref 0.67–1.17)
CREAT SERPL-MCNC: 1.19 MG/DL (ref 0.67–1.17)
CREAT SERPL-MCNC: 1.22 MG/DL (ref 0.67–1.17)
CREAT SERPL-MCNC: 1.24 MG/DL (ref 0.67–1.17)
CREAT SERPL-MCNC: 1.28 MG/DL (ref 0.67–1.17)
CREAT SERPL-MCNC: 1.51 MG/DL (ref 0.67–1.17)
CREAT SERPL-MCNC: 1.64 MG/DL (ref 0.67–1.17)
CREAT SERPL-MCNC: 1.66 MG/DL (ref 0.67–1.17)
CREAT SERPL-MCNC: 1.67 MG/DL (ref 0.67–1.17)
CREAT SERPL-MCNC: 1.72 MG/DL (ref 0.67–1.17)
CREAT SERPL-MCNC: 1.74 MG/DL (ref 0.67–1.17)
CREAT SERPL-MCNC: 1.85 MG/DL (ref 0.67–1.17)
DEPRECATED HCO3 PLAS-SCNC: 14 MMOL/L (ref 22–29)
DEPRECATED HCO3 PLAS-SCNC: 14 MMOL/L (ref 22–29)
DEPRECATED HCO3 PLAS-SCNC: 17 MMOL/L (ref 22–29)
DEPRECATED HCO3 PLAS-SCNC: 19 MMOL/L (ref 22–29)
DEPRECATED HCO3 PLAS-SCNC: 20 MMOL/L (ref 22–29)
DEPRECATED HCO3 PLAS-SCNC: 20 MMOL/L (ref 22–29)
DEPRECATED HCO3 PLAS-SCNC: 21 MMOL/L (ref 22–29)
DEPRECATED HCO3 PLAS-SCNC: 22 MMOL/L (ref 22–29)
DEPRECATED HCO3 PLAS-SCNC: 23 MMOL/L (ref 22–29)
DEPRECATED HCO3 PLAS-SCNC: 24 MMOL/L (ref 22–29)
DEPRECATED HCO3 PLAS-SCNC: 25 MMOL/L (ref 22–29)
DIASTOLIC BLOOD PRESSURE - MUSE: 68 MMHG
EOSINOPHIL # BLD AUTO: 0 10E3/UL (ref 0–0.7)
EOSINOPHIL # BLD AUTO: 0 10E3/UL (ref 0–0.7)
EOSINOPHIL # BLD AUTO: 0.1 10E3/UL (ref 0–0.7)
EOSINOPHIL # BLD AUTO: 0.1 10E3/UL (ref 0–0.7)
EOSINOPHIL # BLD AUTO: 0.2 10E3/UL (ref 0–0.7)
EOSINOPHIL # BLD AUTO: 0.2 10E3/UL (ref 0–0.7)
EOSINOPHIL NFR BLD AUTO: 0 %
EOSINOPHIL NFR BLD AUTO: 0 %
EOSINOPHIL NFR BLD AUTO: 1 %
EOSINOPHIL NFR BLD AUTO: 2 %
ERYTHROCYTE [DISTWIDTH] IN BLOOD BY AUTOMATED COUNT: 12.7 % (ref 10–15)
ERYTHROCYTE [DISTWIDTH] IN BLOOD BY AUTOMATED COUNT: 12.7 % (ref 10–15)
ERYTHROCYTE [DISTWIDTH] IN BLOOD BY AUTOMATED COUNT: 13.5 % (ref 10–15)
ERYTHROCYTE [DISTWIDTH] IN BLOOD BY AUTOMATED COUNT: 13.6 % (ref 10–15)
ERYTHROCYTE [DISTWIDTH] IN BLOOD BY AUTOMATED COUNT: 13.8 % (ref 10–15)
ERYTHROCYTE [DISTWIDTH] IN BLOOD BY AUTOMATED COUNT: 13.9 % (ref 10–15)
ERYTHROCYTE [DISTWIDTH] IN BLOOD BY AUTOMATED COUNT: 14 % (ref 10–15)
ERYTHROCYTE [DISTWIDTH] IN BLOOD BY AUTOMATED COUNT: 14.2 % (ref 10–15)
ERYTHROCYTE [DISTWIDTH] IN BLOOD BY AUTOMATED COUNT: 14.6 % (ref 10–15)
EST. AVERAGE GLUCOSE BLD GHB EST-MCNC: 157 MG/DL
EST. AVERAGE GLUCOSE BLD GHB EST-MCNC: 166 MG/DL
GFR SERPL CREATININE-BSD FRML MDRD: 38 ML/MIN/1.73M2
GFR SERPL CREATININE-BSD FRML MDRD: 40 ML/MIN/1.73M2
GFR SERPL CREATININE-BSD FRML MDRD: 41 ML/MIN/1.73M2
GFR SERPL CREATININE-BSD FRML MDRD: 42 ML/MIN/1.73M2
GFR SERPL CREATININE-BSD FRML MDRD: 42 ML/MIN/1.73M2
GFR SERPL CREATININE-BSD FRML MDRD: 43 ML/MIN/1.73M2
GFR SERPL CREATININE-BSD FRML MDRD: 48 ML/MIN/1.73M2
GFR SERPL CREATININE-BSD FRML MDRD: 58 ML/MIN/1.73M2
GFR SERPL CREATININE-BSD FRML MDRD: 60 ML/MIN/1.73M2
GFR SERPL CREATININE-BSD FRML MDRD: 61 ML/MIN/1.73M2
GFR SERPL CREATININE-BSD FRML MDRD: 63 ML/MIN/1.73M2
GFR SERPL CREATININE-BSD FRML MDRD: 64 ML/MIN/1.73M2
GLUCOSE BLDC GLUCOMTR-MCNC: 142 MG/DL (ref 70–99)
GLUCOSE BLDC GLUCOMTR-MCNC: 197 MG/DL (ref 70–99)
GLUCOSE BLDC GLUCOMTR-MCNC: 213 MG/DL (ref 70–99)
GLUCOSE BLDC GLUCOMTR-MCNC: 230 MG/DL (ref 70–99)
GLUCOSE BLDC GLUCOMTR-MCNC: 263 MG/DL (ref 70–99)
GLUCOSE BLDC GLUCOMTR-MCNC: 274 MG/DL (ref 70–99)
GLUCOSE BLDC GLUCOMTR-MCNC: 277 MG/DL (ref 70–99)
GLUCOSE BLDC GLUCOMTR-MCNC: 292 MG/DL (ref 70–99)
GLUCOSE BLDC GLUCOMTR-MCNC: 299 MG/DL (ref 70–99)
GLUCOSE BLDC GLUCOMTR-MCNC: 314 MG/DL (ref 70–99)
GLUCOSE BLDC GLUCOMTR-MCNC: 328 MG/DL (ref 70–99)
GLUCOSE BLDC GLUCOMTR-MCNC: 95 MG/DL (ref 70–99)
GLUCOSE SERPL-MCNC: 145 MG/DL (ref 70–99)
GLUCOSE SERPL-MCNC: 148 MG/DL (ref 70–99)
GLUCOSE SERPL-MCNC: 161 MG/DL (ref 70–99)
GLUCOSE SERPL-MCNC: 162 MG/DL (ref 70–99)
GLUCOSE SERPL-MCNC: 172 MG/DL (ref 70–99)
GLUCOSE SERPL-MCNC: 197 MG/DL (ref 70–99)
GLUCOSE SERPL-MCNC: 210 MG/DL (ref 70–99)
GLUCOSE SERPL-MCNC: 217 MG/DL (ref 70–99)
GLUCOSE SERPL-MCNC: 251 MG/DL (ref 70–99)
GLUCOSE SERPL-MCNC: 285 MG/DL (ref 70–99)
GLUCOSE SERPL-MCNC: 388 MG/DL (ref 70–99)
GLUCOSE UR STRIP-MCNC: 30 MG/DL
GLUCOSE UR STRIP-MCNC: NEGATIVE MG/DL
HBA1C MFR BLD: 7.1 %
HBA1C MFR BLD: 7.4 %
HBA1C MFR BLD: 8.8 %
HCT VFR BLD AUTO: 24.9 % (ref 40–53)
HCT VFR BLD AUTO: 27.4 % (ref 40–53)
HCT VFR BLD AUTO: 29.1 % (ref 40–53)
HCT VFR BLD AUTO: 32.6 % (ref 40–53)
HCT VFR BLD AUTO: 32.6 % (ref 40–53)
HCT VFR BLD AUTO: 34.5 % (ref 40–53)
HCT VFR BLD AUTO: 35.2 % (ref 40–53)
HCT VFR BLD AUTO: 35.9 % (ref 40–53)
HCT VFR BLD AUTO: 35.9 % (ref 40–53)
HDLC SERPL-MCNC: 51 MG/DL
HGB BLD-MCNC: 11 G/DL (ref 13.3–17.7)
HGB BLD-MCNC: 11.3 G/DL (ref 13.3–17.7)
HGB BLD-MCNC: 11.7 G/DL (ref 13.3–17.7)
HGB BLD-MCNC: 11.8 G/DL (ref 13.3–17.7)
HGB BLD-MCNC: 11.9 G/DL (ref 13.3–17.7)
HGB BLD-MCNC: 12.3 G/DL (ref 13.3–17.7)
HGB BLD-MCNC: 8.3 G/DL (ref 13.3–17.7)
HGB BLD-MCNC: 9.4 G/DL (ref 13.3–17.7)
HGB BLD-MCNC: 9.6 G/DL (ref 13.3–17.7)
HGB UR QL STRIP: NEGATIVE
HGB UR QL STRIP: NEGATIVE
HOLD SPECIMEN: NORMAL
IMM GRANULOCYTES # BLD: 0 10E3/UL
IMM GRANULOCYTES # BLD: 0.1 10E3/UL
IMM GRANULOCYTES # BLD: 0.1 10E3/UL
IMM GRANULOCYTES NFR BLD: 0 %
IMM GRANULOCYTES NFR BLD: 1 %
IMM GRANULOCYTES NFR BLD: 2 %
INR PPP: 1.18 (ref 0.85–1.15)
INR PPP: 1.26 (ref 0.85–1.15)
INTERPRETATION ECG - MUSE: NORMAL
KETONES UR STRIP-MCNC: 40 MG/DL
KETONES UR STRIP-MCNC: NEGATIVE MG/DL
LACTATE SERPL-SCNC: 1.8 MMOL/L (ref 0.7–2)
LDH SERPL L TO P-CCNC: 157 U/L (ref 0–250)
LDH SERPL L TO P-CCNC: 195 U/L (ref 0–250)
LDH SERPL L TO P-CCNC: 401 U/L (ref 0–250)
LDLC SERPL CALC-MCNC: 50 MG/DL
LEUKOCYTE ESTERASE UR QL STRIP: NEGATIVE
LEUKOCYTE ESTERASE UR QL STRIP: NEGATIVE
LEVETIRACETAM SERPL-MCNC: 8.4 ΜG/ML (ref 10–40)
LIPASE SERPL-CCNC: 86 U/L (ref 13–60)
LYMPHOCYTES # BLD AUTO: 0.6 10E3/UL (ref 0.8–5.3)
LYMPHOCYTES # BLD AUTO: 1 10E3/UL (ref 0.8–5.3)
LYMPHOCYTES # BLD AUTO: 1.3 10E3/UL (ref 0.8–5.3)
LYMPHOCYTES # BLD AUTO: 1.8 10E3/UL (ref 0.8–5.3)
LYMPHOCYTES # BLD AUTO: 2 10E3/UL (ref 0.8–5.3)
LYMPHOCYTES # BLD AUTO: 2.1 10E3/UL (ref 0.8–5.3)
LYMPHOCYTES NFR BLD AUTO: 12 %
LYMPHOCYTES NFR BLD AUTO: 17 %
LYMPHOCYTES NFR BLD AUTO: 25 %
LYMPHOCYTES NFR BLD AUTO: 26 %
LYMPHOCYTES NFR BLD AUTO: 32 %
LYMPHOCYTES NFR BLD AUTO: 8 %
MCH RBC QN AUTO: 30.6 PG (ref 26.5–33)
MCH RBC QN AUTO: 30.7 PG (ref 26.5–33)
MCH RBC QN AUTO: 30.8 PG (ref 26.5–33)
MCH RBC QN AUTO: 30.9 PG (ref 26.5–33)
MCH RBC QN AUTO: 31.2 PG (ref 26.5–33)
MCH RBC QN AUTO: 31.3 PG (ref 26.5–33)
MCH RBC QN AUTO: 31.4 PG (ref 26.5–33)
MCH RBC QN AUTO: 31.5 PG (ref 26.5–33)
MCH RBC QN AUTO: 31.7 PG (ref 26.5–33)
MCHC RBC AUTO-ENTMCNC: 32.3 G/DL (ref 31.5–36.5)
MCHC RBC AUTO-ENTMCNC: 33.1 G/DL (ref 31.5–36.5)
MCHC RBC AUTO-ENTMCNC: 33.3 G/DL (ref 31.5–36.5)
MCHC RBC AUTO-ENTMCNC: 33.5 G/DL (ref 31.5–36.5)
MCHC RBC AUTO-ENTMCNC: 33.7 G/DL (ref 31.5–36.5)
MCHC RBC AUTO-ENTMCNC: 33.9 G/DL (ref 31.5–36.5)
MCHC RBC AUTO-ENTMCNC: 34.3 G/DL (ref 31.5–36.5)
MCHC RBC AUTO-ENTMCNC: 34.7 G/DL (ref 31.5–36.5)
MCHC RBC AUTO-ENTMCNC: 35 G/DL (ref 31.5–36.5)
MCV RBC AUTO: 89 FL (ref 78–100)
MCV RBC AUTO: 90 FL (ref 78–100)
MCV RBC AUTO: 92 FL (ref 78–100)
MCV RBC AUTO: 92 FL (ref 78–100)
MCV RBC AUTO: 93 FL (ref 78–100)
MCV RBC AUTO: 94 FL (ref 78–100)
MCV RBC AUTO: 96 FL (ref 78–100)
MONOCYTES # BLD AUTO: 0.7 10E3/UL (ref 0–1.3)
MONOCYTES # BLD AUTO: 0.8 10E3/UL (ref 0–1.3)
MONOCYTES # BLD AUTO: 0.9 10E3/UL (ref 0–1.3)
MONOCYTES # BLD AUTO: 1 10E3/UL (ref 0–1.3)
MONOCYTES NFR BLD AUTO: 10 %
MONOCYTES NFR BLD AUTO: 11 %
MONOCYTES NFR BLD AUTO: 11 %
MONOCYTES NFR BLD AUTO: 13 %
MONOCYTES NFR BLD AUTO: 9 %
MONOCYTES NFR BLD AUTO: 9 %
NEUTROPHILS # BLD AUTO: 3.5 10E3/UL (ref 1.6–8.3)
NEUTROPHILS # BLD AUTO: 4.2 10E3/UL (ref 1.6–8.3)
NEUTROPHILS # BLD AUTO: 5 10E3/UL (ref 1.6–8.3)
NEUTROPHILS # BLD AUTO: 5.7 10E3/UL (ref 1.6–8.3)
NEUTROPHILS # BLD AUTO: 6 10E3/UL (ref 1.6–8.3)
NEUTROPHILS # BLD AUTO: 6.3 10E3/UL (ref 1.6–8.3)
NEUTROPHILS NFR BLD AUTO: 55 %
NEUTROPHILS NFR BLD AUTO: 61 %
NEUTROPHILS NFR BLD AUTO: 62 %
NEUTROPHILS NFR BLD AUTO: 71 %
NEUTROPHILS NFR BLD AUTO: 71 %
NEUTROPHILS NFR BLD AUTO: 82 %
NITRATE UR QL: NEGATIVE
NITRATE UR QL: NEGATIVE
NONHDLC SERPL-MCNC: 68 MG/DL
NRBC # BLD AUTO: 0 10E3/UL
NRBC BLD AUTO-RTO: 0 /100
NT-PROBNP SERPL-MCNC: 518 PG/ML (ref 0–1800)
OSMOLALITY UR: 519 MMOL/KG (ref 100–1200)
P AXIS - MUSE: 78 DEGREES
PATH REPORT.COMMENTS IMP SPEC: ABNORMAL
PATH REPORT.COMMENTS IMP SPEC: NORMAL
PATH REPORT.COMMENTS IMP SPEC: YES
PATH REPORT.FINAL DX SPEC: ABNORMAL
PATH REPORT.FINAL DX SPEC: NORMAL
PATH REPORT.GROSS SPEC: ABNORMAL
PATH REPORT.GROSS SPEC: NORMAL
PATH REPORT.MICROSCOPIC SPEC OTHER STN: ABNORMAL
PATH REPORT.MICROSCOPIC SPEC OTHER STN: ABNORMAL
PATH REPORT.MICROSCOPIC SPEC OTHER STN: NORMAL
PATH REPORT.RELEVANT HX SPEC: ABNORMAL
PATH REPORT.RELEVANT HX SPEC: NORMAL
PH UR STRIP: 5 [PH] (ref 4.7–8)
PH UR STRIP: 6 [PH] (ref 5–7)
PHOSPHATE SERPL-MCNC: 3.5 MG/DL (ref 2.5–4.5)
PHOTO IMAGE: ABNORMAL
PLATELET # BLD AUTO: 236 10E3/UL (ref 150–450)
PLATELET # BLD AUTO: 247 10E3/UL (ref 150–450)
PLATELET # BLD AUTO: 278 10E3/UL (ref 150–450)
PLATELET # BLD AUTO: 297 10E3/UL (ref 150–450)
PLATELET # BLD AUTO: 320 10E3/UL (ref 150–450)
PLATELET # BLD AUTO: 365 10E3/UL (ref 150–450)
PLATELET # BLD AUTO: 372 10E3/UL (ref 150–450)
PLATELET # BLD AUTO: 467 10E3/UL (ref 150–450)
PLATELET # BLD AUTO: 499 10E3/UL (ref 150–450)
POTASSIUM SERPL-SCNC: 4.1 MMOL/L (ref 3.4–5.3)
POTASSIUM SERPL-SCNC: 4.1 MMOL/L (ref 3.4–5.3)
POTASSIUM SERPL-SCNC: 4.2 MMOL/L (ref 3.4–5.3)
POTASSIUM SERPL-SCNC: 4.2 MMOL/L (ref 3.4–5.3)
POTASSIUM SERPL-SCNC: 4.3 MMOL/L (ref 3.4–5.3)
POTASSIUM SERPL-SCNC: 4.4 MMOL/L (ref 3.4–5.3)
POTASSIUM SERPL-SCNC: 4.5 MMOL/L (ref 3.4–5.3)
POTASSIUM SERPL-SCNC: 4.5 MMOL/L (ref 3.4–5.3)
POTASSIUM SERPL-SCNC: 4.6 MMOL/L (ref 3.4–5.3)
POTASSIUM SERPL-SCNC: 4.6 MMOL/L (ref 3.4–5.3)
PR INTERVAL - MUSE: 202 MS
PROCALCITONIN SERPL IA-MCNC: 0.35 NG/ML
PROT SERPL-MCNC: 6.1 G/DL (ref 6.4–8.3)
PROT SERPL-MCNC: 6.2 G/DL (ref 6.4–8.3)
PROT SERPL-MCNC: 6.5 G/DL (ref 6.4–8.3)
PROT SERPL-MCNC: 6.7 G/DL (ref 6.4–8.3)
PROT SERPL-MCNC: 6.7 G/DL (ref 6.4–8.3)
PROT SERPL-MCNC: 7.1 G/DL (ref 6.4–8.3)
PROT SERPL-MCNC: 7.5 G/DL (ref 6.4–8.3)
QRS DURATION - MUSE: 94 MS
QT - MUSE: 416 MS
QTC - MUSE: 491 MS
R AXIS - MUSE: -52 DEGREES
RAD ONC ARIA COURSE ID: NORMAL
RAD ONC ARIA COURSE LAST TREATMENT DATE: NORMAL
RAD ONC ARIA COURSE START DATE: NORMAL
RAD ONC ARIA COURSE TREATMENT ELAPSED DAYS: 0
RAD ONC ARIA COURSE TREATMENT ELAPSED DAYS: 1
RAD ONC ARIA COURSE TREATMENT ELAPSED DAYS: 4
RAD ONC ARIA COURSE TREATMENT ELAPSED DAYS: 7
RAD ONC ARIA COURSE TREATMENT ELAPSED DAYS: 8
RAD ONC ARIA FIRST TREATMENT DATE: NORMAL
RAD ONC ARIA PLAN FRACTIONS TREATED TO DATE: 1
RAD ONC ARIA PLAN FRACTIONS TREATED TO DATE: 2
RAD ONC ARIA PLAN FRACTIONS TREATED TO DATE: 3
RAD ONC ARIA PLAN FRACTIONS TREATED TO DATE: 4
RAD ONC ARIA PLAN FRACTIONS TREATED TO DATE: 5
RAD ONC ARIA PLAN ID: NORMAL
RAD ONC ARIA PLAN PRESCRIBED DOSE PER FRACTION: 5 GY
RAD ONC ARIA PLAN TOTAL FRACTIONS PRESCRIBED: 5
RAD ONC ARIA PLAN TOTAL PRESCRIBED DOSE: 2500 CGY
RAD ONC ARIA REFERENCE POINT DOSAGE GIVEN TO DATE: NORMAL GY
RAD ONC ARIA REFERENCE POINT ID: NORMAL
RBC # BLD AUTO: 2.71 10E6/UL (ref 4.4–5.9)
RBC # BLD AUTO: 3.04 10E6/UL (ref 4.4–5.9)
RBC # BLD AUTO: 3.08 10E6/UL (ref 4.4–5.9)
RBC # BLD AUTO: 3.52 10E6/UL (ref 4.4–5.9)
RBC # BLD AUTO: 3.56 10E6/UL (ref 4.4–5.9)
RBC # BLD AUTO: 3.72 10E6/UL (ref 4.4–5.9)
RBC # BLD AUTO: 3.76 10E6/UL (ref 4.4–5.9)
RBC # BLD AUTO: 3.87 10E6/UL (ref 4.4–5.9)
RBC # BLD AUTO: 3.99 10E6/UL (ref 4.4–5.9)
RBC URINE: <1 /HPF
RETINOPATHY: NEGATIVE
SODIUM SERPL-SCNC: 121 MMOL/L (ref 136–145)
SODIUM SERPL-SCNC: 123 MMOL/L (ref 136–145)
SODIUM SERPL-SCNC: 125 MMOL/L (ref 136–145)
SODIUM SERPL-SCNC: 126 MMOL/L (ref 136–145)
SODIUM SERPL-SCNC: 127 MMOL/L (ref 136–145)
SODIUM SERPL-SCNC: 127 MMOL/L (ref 136–145)
SODIUM SERPL-SCNC: 128 MMOL/L (ref 136–145)
SODIUM SERPL-SCNC: 129 MMOL/L (ref 136–145)
SODIUM SERPL-SCNC: 129 MMOL/L (ref 136–145)
SODIUM SERPL-SCNC: 135 MMOL/L (ref 136–145)
SODIUM SERPL-SCNC: 135 MMOL/L (ref 136–145)
SODIUM SERPL-SCNC: 136 MMOL/L (ref 136–145)
SODIUM SERPL-SCNC: 138 MMOL/L (ref 136–145)
SODIUM UR-SCNC: 39 MMOL/L
SP GR UR STRIP: 1.02 (ref 1–1.03)
SP GR UR STRIP: 1.02 (ref 1–1.03)
SYSTOLIC BLOOD PRESSURE - MUSE: 123 MMHG
T AXIS - MUSE: 74 DEGREES
T4 FREE SERPL-MCNC: 1.27 NG/DL (ref 0.9–1.7)
T4 FREE SERPL-MCNC: 1.56 NG/DL (ref 0.9–1.7)
T4 FREE SERPL-MCNC: 1.66 NG/DL (ref 0.9–1.7)
TRANSITIONAL EPI: <1 /HPF
TRIGL SERPL-MCNC: 89 MG/DL
TROPONIN T SERPL HS-MCNC: 28 NG/L
TSH SERPL DL<=0.005 MIU/L-ACNC: 0.04 UIU/ML (ref 0.3–4.2)
TSH SERPL DL<=0.005 MIU/L-ACNC: 0.08 UIU/ML (ref 0.3–4.2)
TSH SERPL DL<=0.005 MIU/L-ACNC: 17.5 UIU/ML (ref 0.3–4.2)
TSH SERPL DL<=0.005 MIU/L-ACNC: 5 UIU/ML (ref 0.3–4.2)
UROBILINOGEN UR STRIP-MCNC: <2 MG/DL
UROBILINOGEN UR STRIP-MCNC: NORMAL MG/DL
VENTRICULAR RATE- MUSE: 84 BPM
WBC # BLD AUTO: 15.2 10E3/UL (ref 4–11)
WBC # BLD AUTO: 5.6 10E3/UL (ref 4–11)
WBC # BLD AUTO: 6.5 10E3/UL (ref 4–11)
WBC # BLD AUTO: 7 10E3/UL (ref 4–11)
WBC # BLD AUTO: 7.7 10E3/UL (ref 4–11)
WBC # BLD AUTO: 7.7 10E3/UL (ref 4–11)
WBC # BLD AUTO: 8 10E3/UL (ref 4–11)
WBC # BLD AUTO: 8 10E3/UL (ref 4–11)
WBC # BLD AUTO: 8.2 10E3/UL (ref 4–11)
WBC URINE: 1 /HPF

## 2023-01-01 PROCEDURE — 93010 ELECTROCARDIOGRAM REPORT: CPT | Mod: 77 | Performed by: INTERNAL MEDICINE

## 2023-01-01 PROCEDURE — 36415 COLL VENOUS BLD VENIPUNCTURE: CPT | Mod: ZL

## 2023-01-01 PROCEDURE — 120N000001 HC R&B MED SURG/OB

## 2023-01-01 PROCEDURE — 70498 CT ANGIOGRAPHY NECK: CPT

## 2023-01-01 PROCEDURE — 250N000011 HC RX IP 250 OP 636: Mod: JZ | Performed by: HOSPITALIST

## 2023-01-01 PROCEDURE — 250N000013 HC RX MED GY IP 250 OP 250 PS 637: Performed by: HOSPITALIST

## 2023-01-01 PROCEDURE — 99215 OFFICE O/P EST HI 40 MIN: CPT | Performed by: INTERNAL MEDICINE

## 2023-01-01 PROCEDURE — G0463 HOSPITAL OUTPT CLINIC VISIT: HCPCS | Mod: 25

## 2023-01-01 PROCEDURE — 82040 ASSAY OF SERUM ALBUMIN: CPT | Performed by: STUDENT IN AN ORGANIZED HEALTH CARE EDUCATION/TRAINING PROGRAM

## 2023-01-01 PROCEDURE — 85025 COMPLETE CBC W/AUTO DIFF WBC: CPT | Mod: ZL | Performed by: INTERNAL MEDICINE

## 2023-01-01 PROCEDURE — 84300 ASSAY OF URINE SODIUM: CPT | Performed by: INTERNAL MEDICINE

## 2023-01-01 PROCEDURE — 710N000012 HC RECOVERY PHASE 2, PER MINUTE: Performed by: SURGERY

## 2023-01-01 PROCEDURE — 81003 URINALYSIS AUTO W/O SCOPE: CPT | Mod: ZL | Performed by: UROLOGY

## 2023-01-01 PROCEDURE — 99232 SBSQ HOSP IP/OBS MODERATE 35: CPT | Performed by: INTERNAL MEDICINE

## 2023-01-01 PROCEDURE — 82962 GLUCOSE BLOOD TEST: CPT

## 2023-01-01 PROCEDURE — 99223 1ST HOSP IP/OBS HIGH 75: CPT | Mod: 25 | Performed by: PSYCHIATRY & NEUROLOGY

## 2023-01-01 PROCEDURE — 77334 RADIATION TREATMENT AID(S): CPT | Mod: 26 | Performed by: RADIOLOGY

## 2023-01-01 PROCEDURE — 272N000045 CT BONE BIOPSY DEEP

## 2023-01-01 PROCEDURE — G0105 COLORECTAL SCRN; HI RISK IND: HCPCS | Performed by: SURGERY

## 2023-01-01 PROCEDURE — 250N000011 HC RX IP 250 OP 636: Performed by: INTERNAL MEDICINE

## 2023-01-01 PROCEDURE — 73502 X-RAY EXAM HIP UNI 2-3 VIEWS: CPT | Mod: TC

## 2023-01-01 PROCEDURE — 77307 TELETHX ISODOSE PLAN CPLX: CPT | Mod: 26 | Performed by: RADIOLOGY

## 2023-01-01 PROCEDURE — 85730 THROMBOPLASTIN TIME PARTIAL: CPT | Performed by: STUDENT IN AN ORGANIZED HEALTH CARE EDUCATION/TRAINING PROGRAM

## 2023-01-01 PROCEDURE — 80053 COMPREHEN METABOLIC PANEL: CPT | Mod: ZL | Performed by: STUDENT IN AN ORGANIZED HEALTH CARE EDUCATION/TRAINING PROGRAM

## 2023-01-01 PROCEDURE — 250N000011 HC RX IP 250 OP 636: Mod: JZ | Performed by: STUDENT IN AN ORGANIZED HEALTH CARE EDUCATION/TRAINING PROGRAM

## 2023-01-01 PROCEDURE — 84295 ASSAY OF SERUM SODIUM: CPT | Performed by: INTERNAL MEDICINE

## 2023-01-01 PROCEDURE — 82565 ASSAY OF CREATININE: CPT | Performed by: INTERNAL MEDICINE

## 2023-01-01 PROCEDURE — G0463 HOSPITAL OUTPT CLINIC VISIT: HCPCS

## 2023-01-01 PROCEDURE — 84443 ASSAY THYROID STIM HORMONE: CPT | Mod: ZL

## 2023-01-01 PROCEDURE — 84443 ASSAY THYROID STIM HORMONE: CPT | Mod: ORL | Performed by: NURSE PRACTITIONER

## 2023-01-01 PROCEDURE — 99233 SBSQ HOSP IP/OBS HIGH 50: CPT | Performed by: PSYCHIATRY & NEUROLOGY

## 2023-01-01 PROCEDURE — 77290 THER RAD SIMULAJ FIELD CPLX: CPT | Mod: 26 | Performed by: RADIOLOGY

## 2023-01-01 PROCEDURE — 99214 OFFICE O/P EST MOD 30 MIN: CPT | Performed by: STUDENT IN AN ORGANIZED HEALTH CARE EDUCATION/TRAINING PROGRAM

## 2023-01-01 PROCEDURE — 99214 OFFICE O/P EST MOD 30 MIN: CPT | Performed by: FAMILY MEDICINE

## 2023-01-01 PROCEDURE — G0463 HOSPITAL OUTPT CLINIC VISIT: HCPCS | Performed by: FAMILY MEDICINE

## 2023-01-01 PROCEDURE — 250N000011 HC RX IP 250 OP 636: Performed by: HOSPITALIST

## 2023-01-01 PROCEDURE — 255N000002 HC RX 255 OP 636: Performed by: RADIOLOGY

## 2023-01-01 PROCEDURE — 80048 BASIC METABOLIC PNL TOTAL CA: CPT | Mod: ZL

## 2023-01-01 PROCEDURE — 99223 1ST HOSP IP/OBS HIGH 75: CPT | Performed by: PHYSICIAN ASSISTANT

## 2023-01-01 PROCEDURE — 85610 PROTHROMBIN TIME: CPT | Performed by: STUDENT IN AN ORGANIZED HEALTH CARE EDUCATION/TRAINING PROGRAM

## 2023-01-01 PROCEDURE — 99417 PROLNG OP E/M EACH 15 MIN: CPT | Performed by: INTERNAL MEDICINE

## 2023-01-01 PROCEDURE — 80053 COMPREHEN METABOLIC PANEL: CPT | Mod: ZL | Performed by: INTERNAL MEDICINE

## 2023-01-01 PROCEDURE — 85025 COMPLETE CBC W/AUTO DIFF WBC: CPT | Mod: ZL

## 2023-01-01 PROCEDURE — 88307 TISSUE EXAM BY PATHOLOGIST: CPT | Mod: 26 | Performed by: PATHOLOGY

## 2023-01-01 PROCEDURE — 343N000001 HC RX 343: Performed by: INTERNAL MEDICINE

## 2023-01-01 PROCEDURE — 84443 ASSAY THYROID STIM HORMONE: CPT | Mod: ZL | Performed by: FAMILY MEDICINE

## 2023-01-01 PROCEDURE — 85018 HEMOGLOBIN: CPT | Mod: ZL | Performed by: STUDENT IN AN ORGANIZED HEALTH CARE EDUCATION/TRAINING PROGRAM

## 2023-01-01 PROCEDURE — 80053 COMPREHEN METABOLIC PANEL: CPT | Mod: ZL

## 2023-01-01 PROCEDURE — A9503 TC99M MEDRONATE: HCPCS | Performed by: RADIOLOGY

## 2023-01-01 PROCEDURE — 80061 LIPID PANEL: CPT | Mod: ZL | Performed by: FAMILY MEDICINE

## 2023-01-01 PROCEDURE — 83036 HEMOGLOBIN GLYCOSYLATED A1C: CPT | Mod: ZL

## 2023-01-01 PROCEDURE — 81001 URINALYSIS AUTO W/SCOPE: CPT | Performed by: STUDENT IN AN ORGANIZED HEALTH CARE EDUCATION/TRAINING PROGRAM

## 2023-01-01 PROCEDURE — 250N000011 HC RX IP 250 OP 636: Performed by: RADIOLOGY

## 2023-01-01 PROCEDURE — 77307 TELETHX ISODOSE PLAN CPLX: CPT | Performed by: RADIOLOGY

## 2023-01-01 PROCEDURE — 370N000017 HC ANESTHESIA TECHNICAL FEE, PER MIN: Performed by: SURGERY

## 2023-01-01 PROCEDURE — 86301 IMMUNOASSAY TUMOR CA 19-9: CPT | Mod: ZL

## 2023-01-01 PROCEDURE — 83935 ASSAY OF URINE OSMOLALITY: CPT | Performed by: INTERNAL MEDICINE

## 2023-01-01 PROCEDURE — 82248 BILIRUBIN DIRECT: CPT | Performed by: STUDENT IN AN ORGANIZED HEALTH CARE EDUCATION/TRAINING PROGRAM

## 2023-01-01 PROCEDURE — 85027 COMPLETE CBC AUTOMATED: CPT | Mod: ORL | Performed by: NURSE PRACTITIONER

## 2023-01-01 PROCEDURE — 95816 EEG AWAKE AND DROWSY: CPT

## 2023-01-01 PROCEDURE — C1788 PORT, INDWELLING, IMP: HCPCS | Performed by: SURGERY

## 2023-01-01 PROCEDURE — 99309 SBSQ NF CARE MODERATE MDM 30: CPT | Performed by: NURSE PRACTITIONER

## 2023-01-01 PROCEDURE — 36561 INSERT TUNNELED CV CATH: CPT | Performed by: SURGERY

## 2023-01-01 PROCEDURE — 82248 BILIRUBIN DIRECT: CPT | Mod: ZL | Performed by: INTERNAL MEDICINE

## 2023-01-01 PROCEDURE — 99203 OFFICE O/P NEW LOW 30 MIN: CPT | Performed by: SURGERY

## 2023-01-01 PROCEDURE — 999N000141 HC STATISTIC PRE-PROCEDURE NURSING ASSESSMENT: Performed by: SURGERY

## 2023-01-01 PROCEDURE — 250N000011 HC RX IP 250 OP 636: Performed by: NURSE ANESTHETIST, CERTIFIED REGISTERED

## 2023-01-01 PROCEDURE — 250N000012 HC RX MED GY IP 250 OP 636 PS 637: Performed by: HOSPITALIST

## 2023-01-01 PROCEDURE — 36415 COLL VENOUS BLD VENIPUNCTURE: CPT | Performed by: STUDENT IN AN ORGANIZED HEALTH CARE EDUCATION/TRAINING PROGRAM

## 2023-01-01 PROCEDURE — 36561 INSERT TUNNELED CV CATH: CPT | Performed by: NURSE ANESTHETIST, CERTIFIED REGISTERED

## 2023-01-01 PROCEDURE — 70450 CT HEAD/BRAIN W/O DYE: CPT

## 2023-01-01 PROCEDURE — 80048 BASIC METABOLIC PNL TOTAL CA: CPT | Mod: ORL | Performed by: NURSE PRACTITIONER

## 2023-01-01 PROCEDURE — 84295 ASSAY OF SERUM SODIUM: CPT | Performed by: STUDENT IN AN ORGANIZED HEALTH CARE EDUCATION/TRAINING PROGRAM

## 2023-01-01 PROCEDURE — 74183 MRI ABD W/O CNTR FLWD CNTR: CPT

## 2023-01-01 PROCEDURE — 99223 1ST HOSP IP/OBS HIGH 75: CPT | Performed by: HOSPITALIST

## 2023-01-01 PROCEDURE — 85041 AUTOMATED RBC COUNT: CPT | Mod: ZL

## 2023-01-01 PROCEDURE — 250N000013 HC RX MED GY IP 250 OP 250 PS 637: Performed by: PHYSICIAN ASSISTANT

## 2023-01-01 PROCEDURE — A9585 GADOBUTROL INJECTION: HCPCS | Performed by: RADIOLOGY

## 2023-01-01 PROCEDURE — 77290 THER RAD SIMULAJ FIELD CPLX: CPT | Performed by: RADIOLOGY

## 2023-01-01 PROCEDURE — 84484 ASSAY OF TROPONIN QUANT: CPT | Performed by: STUDENT IN AN ORGANIZED HEALTH CARE EDUCATION/TRAINING PROGRAM

## 2023-01-01 PROCEDURE — 250N000009 HC RX 250: Performed by: UROLOGY

## 2023-01-01 PROCEDURE — 82140 ASSAY OF AMMONIA: CPT | Performed by: STUDENT IN AN ORGANIZED HEALTH CARE EDUCATION/TRAINING PROGRAM

## 2023-01-01 PROCEDURE — 343N000001 HC RX 343: Performed by: RADIOLOGY

## 2023-01-01 PROCEDURE — 72192 CT PELVIS W/O DYE: CPT

## 2023-01-01 PROCEDURE — 258N000003 HC RX IP 258 OP 636: Performed by: NURSE ANESTHETIST, CERTIFIED REGISTERED

## 2023-01-01 PROCEDURE — 99212 OFFICE O/P EST SF 10 MIN: CPT | Performed by: FAMILY MEDICINE

## 2023-01-01 PROCEDURE — 83690 ASSAY OF LIPASE: CPT | Mod: ZL | Performed by: FAMILY MEDICINE

## 2023-01-01 PROCEDURE — 250N000011 HC RX IP 250 OP 636: Performed by: UROLOGY

## 2023-01-01 PROCEDURE — 88120 CYTP URNE 3-5 PROBES EA SPEC: CPT | Mod: 26 | Performed by: PATHOLOGY

## 2023-01-01 PROCEDURE — 88342 IMHCHEM/IMCYTCHM 1ST ANTB: CPT | Mod: 26 | Performed by: PATHOLOGY

## 2023-01-01 PROCEDURE — 84145 PROCALCITONIN (PCT): CPT | Performed by: STUDENT IN AN ORGANIZED HEALTH CARE EDUCATION/TRAINING PROGRAM

## 2023-01-01 PROCEDURE — 36415 COLL VENOUS BLD VENIPUNCTURE: CPT | Mod: ZL | Performed by: STUDENT IN AN ORGANIZED HEALTH CARE EDUCATION/TRAINING PROGRAM

## 2023-01-01 PROCEDURE — A9552 F18 FDG: HCPCS | Performed by: INTERNAL MEDICINE

## 2023-01-01 PROCEDURE — 96360 HYDRATION IV INFUSION INIT: CPT | Mod: 59

## 2023-01-01 PROCEDURE — 99497 ADVNCD CARE PLAN 30 MIN: CPT | Performed by: NURSE PRACTITIONER

## 2023-01-01 PROCEDURE — 83036 HEMOGLOBIN GLYCOSYLATED A1C: CPT | Mod: ORL | Performed by: NURSE PRACTITIONER

## 2023-01-01 PROCEDURE — 45378 DIAGNOSTIC COLONOSCOPY: CPT | Performed by: NURSE ANESTHETIST, CERTIFIED REGISTERED

## 2023-01-01 PROCEDURE — 51720 TREATMENT OF BLADDER LESION: CPT

## 2023-01-01 PROCEDURE — 2894A VOIDCORRECT: CPT | Performed by: INTERNAL MEDICINE

## 2023-01-01 PROCEDURE — 99345 HOME/RES VST NEW HIGH MDM 75: CPT | Performed by: NURSE PRACTITIONER

## 2023-01-01 PROCEDURE — 83036 HEMOGLOBIN GLYCOSYLATED A1C: CPT | Mod: ZL | Performed by: FAMILY MEDICINE

## 2023-01-01 PROCEDURE — 250N000009 HC RX 250: Performed by: HOSPITALIST

## 2023-01-01 PROCEDURE — 77263 THER RADIOLOGY TX PLNG CPLX: CPT | Performed by: RADIOLOGY

## 2023-01-01 PROCEDURE — 77417 THER RADIOLOGY PORT IMAGE(S): CPT | Performed by: RADIOLOGY

## 2023-01-01 PROCEDURE — 99204 OFFICE O/P NEW MOD 45 MIN: CPT | Mod: 25 | Performed by: RADIOLOGY

## 2023-01-01 PROCEDURE — 88120 CYTP URNE 3-5 PROBES EA SPEC: CPT | Mod: TC | Performed by: UROLOGY

## 2023-01-01 PROCEDURE — 85730 THROMBOPLASTIN TIME PARTIAL: CPT | Mod: ZL

## 2023-01-01 PROCEDURE — 80177 DRUG SCRN QUAN LEVETIRACETAM: CPT | Performed by: PSYCHIATRY & NEUROLOGY

## 2023-01-01 PROCEDURE — 83615 LACTATE (LD) (LDH) ENZYME: CPT | Mod: ZL

## 2023-01-01 PROCEDURE — 250N000011 HC RX IP 250 OP 636: Performed by: PHYSICIAN ASSISTANT

## 2023-01-01 PROCEDURE — G0108 DIAB MANAGE TRN  PER INDIV: HCPCS

## 2023-01-01 PROCEDURE — 36415 COLL VENOUS BLD VENIPUNCTURE: CPT | Performed by: PHYSICIAN ASSISTANT

## 2023-01-01 PROCEDURE — 99100 ANES PT EXTEME AGE<1 YR&>70: CPT | Performed by: NURSE ANESTHETIST, CERTIFIED REGISTERED

## 2023-01-01 PROCEDURE — 85049 AUTOMATED PLATELET COUNT: CPT | Performed by: STUDENT IN AN ORGANIZED HEALTH CARE EDUCATION/TRAINING PROGRAM

## 2023-01-01 PROCEDURE — 99291 CRITICAL CARE FIRST HOUR: CPT | Mod: 25

## 2023-01-01 PROCEDURE — 360N000082 HC SURGERY LEVEL 2 W/ FLUORO, PER MIN: Performed by: SURGERY

## 2023-01-01 PROCEDURE — 77014 CT THERAPY CORRELATE: CPT | Mod: TC

## 2023-01-01 PROCEDURE — 250N000013 HC RX MED GY IP 250 OP 250 PS 637: Performed by: PSYCHIATRY & NEUROLOGY

## 2023-01-01 PROCEDURE — 78306 BONE IMAGING WHOLE BODY: CPT

## 2023-01-01 PROCEDURE — 84132 ASSAY OF SERUM POTASSIUM: CPT | Performed by: INTERNAL MEDICINE

## 2023-01-01 PROCEDURE — 84439 ASSAY OF FREE THYROXINE: CPT | Mod: ZL

## 2023-01-01 PROCEDURE — 99205 OFFICE O/P NEW HI 60 MIN: CPT | Performed by: RADIOLOGY

## 2023-01-01 PROCEDURE — 999N000065 XR CHEST 1 VIEW

## 2023-01-01 PROCEDURE — 250N000013 HC RX MED GY IP 250 OP 250 PS 637: Performed by: INTERNAL MEDICINE

## 2023-01-01 PROCEDURE — 84439 ASSAY OF FREE THYROXINE: CPT | Mod: ZL | Performed by: FAMILY MEDICINE

## 2023-01-01 PROCEDURE — 99213 OFFICE O/P EST LOW 20 MIN: CPT | Performed by: NURSE PRACTITIONER

## 2023-01-01 PROCEDURE — 77334 RADIATION TREATMENT AID(S): CPT | Performed by: RADIOLOGY

## 2023-01-01 PROCEDURE — 77412 RADIATION TX DELIVERY LVL 3: CPT | Performed by: RADIOLOGY

## 2023-01-01 PROCEDURE — 99024 POSTOP FOLLOW-UP VISIT: CPT | Performed by: NURSE PRACTITIONER

## 2023-01-01 PROCEDURE — 85576 BLOOD PLATELET AGGREGATION: CPT

## 2023-01-01 PROCEDURE — 85025 COMPLETE CBC W/AUTO DIFF WBC: CPT | Performed by: HOSPITALIST

## 2023-01-01 PROCEDURE — 77066 DX MAMMO INCL CAD BI: CPT | Mod: TC

## 2023-01-01 PROCEDURE — 51702 INSERT TEMP BLADDER CATH: CPT

## 2023-01-01 PROCEDURE — 250N000011 HC RX IP 250 OP 636: Performed by: SURGERY

## 2023-01-01 PROCEDURE — 360N000075 HC SURGERY LEVEL 2, PER MIN: Performed by: SURGERY

## 2023-01-01 PROCEDURE — 71260 CT THORAX DX C+: CPT

## 2023-01-01 PROCEDURE — 88341 IMHCHEM/IMCYTCHM EA ADD ANTB: CPT | Mod: 26 | Performed by: PATHOLOGY

## 2023-01-01 PROCEDURE — 258N000001 HC RX 258: Performed by: NURSE ANESTHETIST, CERTIFIED REGISTERED

## 2023-01-01 PROCEDURE — 77280 THER RAD SIMULAJ FIELD SMPL: CPT | Mod: 26 | Performed by: RADIOLOGY

## 2023-01-01 PROCEDURE — 250N000009 HC RX 250: Performed by: NURSE ANESTHETIST, CERTIFIED REGISTERED

## 2023-01-01 PROCEDURE — 83605 ASSAY OF LACTIC ACID: CPT | Performed by: STUDENT IN AN ORGANIZED HEALTH CARE EDUCATION/TRAINING PROGRAM

## 2023-01-01 PROCEDURE — 83880 ASSAY OF NATRIURETIC PEPTIDE: CPT | Performed by: STUDENT IN AN ORGANIZED HEALTH CARE EDUCATION/TRAINING PROGRAM

## 2023-01-01 PROCEDURE — 95816 EEG AWAKE AND DROWSY: CPT | Mod: 26 | Performed by: PSYCHIATRY & NEUROLOGY

## 2023-01-01 PROCEDURE — 272N000001 HC OR GENERAL SUPPLY STERILE: Performed by: SURGERY

## 2023-01-01 PROCEDURE — G0180 MD CERTIFICATION HHA PATIENT: HCPCS | Performed by: FAMILY MEDICINE

## 2023-01-01 PROCEDURE — 51720 TREATMENT OF BLADDER LESION: CPT | Performed by: INTERNAL MEDICINE

## 2023-01-01 PROCEDURE — 85041 AUTOMATED RBC COUNT: CPT | Mod: ZL | Performed by: STUDENT IN AN ORGANIZED HEALTH CARE EDUCATION/TRAINING PROGRAM

## 2023-01-01 PROCEDURE — 84295 ASSAY OF SERUM SODIUM: CPT | Performed by: HOSPITALIST

## 2023-01-01 PROCEDURE — 36415 COLL VENOUS BLD VENIPUNCTURE: CPT | Mod: ZL | Performed by: INTERNAL MEDICINE

## 2023-01-01 PROCEDURE — 99215 OFFICE O/P EST HI 40 MIN: CPT | Performed by: FAMILY MEDICINE

## 2023-01-01 PROCEDURE — 250N000009 HC RX 250: Performed by: SURGERY

## 2023-01-01 PROCEDURE — 83615 LACTATE (LD) (LDH) ENZYME: CPT | Mod: ZL | Performed by: INTERNAL MEDICINE

## 2023-01-01 PROCEDURE — 99222 1ST HOSP IP/OBS MODERATE 55: CPT | Performed by: NURSE PRACTITIONER

## 2023-01-01 PROCEDURE — 80069 RENAL FUNCTION PANEL: CPT | Mod: ZL

## 2023-01-01 PROCEDURE — 73723 MRI JOINT LWR EXTR W/O&W/DYE: CPT | Mod: LT

## 2023-01-01 PROCEDURE — 85027 COMPLETE CBC AUTOMATED: CPT | Mod: ZL

## 2023-01-01 PROCEDURE — 85610 PROTHROMBIN TIME: CPT | Mod: ZL

## 2023-01-01 PROCEDURE — 70496 CT ANGIOGRAPHY HEAD: CPT

## 2023-01-01 PROCEDURE — 82378 CARCINOEMBRYONIC ANTIGEN: CPT | Mod: ZL

## 2023-01-01 PROCEDURE — 99238 HOSP IP/OBS DSCHRG MGMT 30/<: CPT | Mod: 25 | Performed by: INTERNAL MEDICINE

## 2023-01-01 PROCEDURE — 88307 TISSUE EXAM BY PATHOLOGIST: CPT | Mod: TC

## 2023-01-01 PROCEDURE — 99233 SBSQ HOSP IP/OBS HIGH 50: CPT | Performed by: PHYSICIAN ASSISTANT

## 2023-01-01 PROCEDURE — 999N000180 XR SURGERY CARM FLUORO LESS THAN 5 MIN: Mod: TC

## 2023-01-01 PROCEDURE — 87040 BLOOD CULTURE FOR BACTERIA: CPT | Performed by: STUDENT IN AN ORGANIZED HEALTH CARE EDUCATION/TRAINING PROGRAM

## 2023-01-01 PROCEDURE — 77280 THER RAD SIMULAJ FIELD SMPL: CPT | Performed by: RADIOLOGY

## 2023-01-01 PROCEDURE — 93005 ELECTROCARDIOGRAM TRACING: CPT | Performed by: FAMILY MEDICINE

## 2023-01-01 PROCEDURE — 72131 CT LUMBAR SPINE W/O DYE: CPT

## 2023-01-01 PROCEDURE — 250N000013 HC RX MED GY IP 250 OP 250 PS 637: Performed by: NURSE ANESTHETIST, CERTIFIED REGISTERED

## 2023-01-01 PROCEDURE — 93005 ELECTROCARDIOGRAM TRACING: CPT | Performed by: STUDENT IN AN ORGANIZED HEALTH CARE EDUCATION/TRAINING PROGRAM

## 2023-01-01 PROCEDURE — 250N000009 HC RX 250: Performed by: RADIOLOGY

## 2023-01-01 PROCEDURE — 78815 PET IMAGE W/CT SKULL-THIGH: CPT | Mod: PS

## 2023-01-01 DEVICE — CATH PORT MRI POWERPORT 8FR SL 1808000: Type: IMPLANTABLE DEVICE | Site: CHEST | Status: FUNCTIONAL

## 2023-01-01 RX ORDER — GLIPIZIDE 10 MG/1
2 TABLET ORAL DAILY PRN
Qty: 15 ML | Refills: 3 | Status: SHIPPED | OUTPATIENT
Start: 2023-01-01

## 2023-01-01 RX ORDER — HEPARIN SODIUM,PORCINE 10 UNIT/ML
5 VIAL (ML) INTRAVENOUS
Status: CANCELLED | OUTPATIENT
Start: 2023-01-01

## 2023-01-01 RX ORDER — OXYCODONE HYDROCHLORIDE 5 MG/1
5 TABLET ORAL EVERY 8 HOURS PRN
Qty: 60 TABLET | Refills: 0 | Status: SHIPPED | OUTPATIENT
Start: 2023-01-01 | End: 2023-01-01

## 2023-01-01 RX ORDER — LIDOCAINE HYDROCHLORIDE 20 MG/ML
INJECTION, SOLUTION INFILTRATION; PERINEURAL PRN
Status: DISCONTINUED | OUTPATIENT
Start: 2023-01-01 | End: 2023-01-01

## 2023-01-01 RX ORDER — AMOXICILLIN 250 MG
1 CAPSULE ORAL 2 TIMES DAILY PRN
Status: DISCONTINUED | OUTPATIENT
Start: 2023-01-01 | End: 2023-01-01 | Stop reason: HOSPADM

## 2023-01-01 RX ORDER — MORPHINE SULFATE 2 MG/ML
2 INJECTION, SOLUTION INTRAMUSCULAR; INTRAVENOUS
Status: DISCONTINUED | OUTPATIENT
Start: 2023-01-01 | End: 2023-01-01

## 2023-01-01 RX ORDER — OXYCODONE HYDROCHLORIDE 5 MG/1
5 TABLET ORAL EVERY 8 HOURS PRN
Qty: 60 TABLET | Refills: 0 | Status: SHIPPED | OUTPATIENT
Start: 2023-01-01 | End: 2023-01-01 | Stop reason: DRUGHIGH

## 2023-01-01 RX ORDER — HALOPERIDOL 2 MG/ML
1 SOLUTION ORAL EVERY 4 HOURS PRN
Status: DISCONTINUED | OUTPATIENT
Start: 2023-01-01 | End: 2023-01-01 | Stop reason: HOSPADM

## 2023-01-01 RX ORDER — BRIMONIDINE TARTRATE 2 MG/ML
1 SOLUTION/ DROPS OPHTHALMIC 2 TIMES DAILY
Status: DISCONTINUED | OUTPATIENT
Start: 2023-01-01 | End: 2023-01-01 | Stop reason: HOSPADM

## 2023-01-01 RX ORDER — OXYCODONE HCL 10 MG/1
10 TABLET, FILM COATED, EXTENDED RELEASE ORAL 2 TIMES DAILY
Status: DISCONTINUED | OUTPATIENT
Start: 2023-01-01 | End: 2023-01-01

## 2023-01-01 RX ORDER — OXYCODONE HYDROCHLORIDE 5 MG/1
5-10 TABLET ORAL EVERY 4 HOURS PRN
COMMUNITY

## 2023-01-01 RX ORDER — DEXTROSE MONOHYDRATE 25 G/50ML
25-50 INJECTION, SOLUTION INTRAVENOUS
Status: DISCONTINUED | OUTPATIENT
Start: 2023-01-01 | End: 2023-01-01 | Stop reason: HOSPADM

## 2023-01-01 RX ORDER — OXYCODONE HYDROCHLORIDE 5 MG/1
5-10 TABLET ORAL EVERY 4 HOURS PRN
Qty: 30 TABLET | Refills: 0 | OUTPATIENT
Start: 2023-01-01

## 2023-01-01 RX ORDER — ASPIRIN 81 MG/1
81 TABLET ORAL 2 TIMES DAILY
COMMUNITY
Start: 2023-01-01 | End: 2023-08-19

## 2023-01-01 RX ORDER — LORAZEPAM 2 MG/ML
0.5 INJECTION INTRAMUSCULAR
Status: DISCONTINUED | OUTPATIENT
Start: 2023-01-01 | End: 2023-01-01 | Stop reason: HOSPADM

## 2023-01-01 RX ORDER — LATANOPROST 50 UG/ML
1 SOLUTION/ DROPS OPHTHALMIC AT BEDTIME
Status: DISCONTINUED | OUTPATIENT
Start: 2023-01-01 | End: 2023-01-01 | Stop reason: HOSPADM

## 2023-01-01 RX ORDER — DEXAMETHASONE 4 MG/1
4 TABLET ORAL 2 TIMES DAILY WITH MEALS
Qty: 10 TABLET | Refills: 0 | Status: SHIPPED | OUTPATIENT
Start: 2023-01-01 | End: 2023-01-01

## 2023-01-01 RX ORDER — METHYLPREDNISOLONE SODIUM SUCCINATE 125 MG/2ML
125 INJECTION, POWDER, LYOPHILIZED, FOR SOLUTION INTRAMUSCULAR; INTRAVENOUS
Status: CANCELLED
Start: 2023-01-01

## 2023-01-01 RX ORDER — NICOTINE POLACRILEX 4 MG
15-30 LOZENGE BUCCAL
Status: CANCELLED | OUTPATIENT
Start: 2023-01-01

## 2023-01-01 RX ORDER — OXYCODONE HCL 20 MG/1
20 TABLET, FILM COATED, EXTENDED RELEASE ORAL EVERY 12 HOURS
Qty: 42 TABLET | Refills: 0 | Status: SHIPPED | OUTPATIENT
Start: 2023-01-01

## 2023-01-01 RX ORDER — PROPOFOL 10 MG/ML
INJECTION, EMULSION INTRAVENOUS PRN
Status: DISCONTINUED | OUTPATIENT
Start: 2023-01-01 | End: 2023-01-01

## 2023-01-01 RX ORDER — OXYCODONE HYDROCHLORIDE 10 MG/1
10 TABLET ORAL EVERY 6 HOURS PRN
Qty: 90 TABLET | Refills: 0 | Status: SHIPPED | OUTPATIENT
Start: 2023-01-01 | End: 2023-01-01 | Stop reason: ALTCHOICE

## 2023-01-01 RX ORDER — SODIUM CHLORIDE, SODIUM LACTATE, POTASSIUM CHLORIDE, CALCIUM CHLORIDE 600; 310; 30; 20 MG/100ML; MG/100ML; MG/100ML; MG/100ML
INJECTION, SOLUTION INTRAVENOUS CONTINUOUS
Status: DISCONTINUED | OUTPATIENT
Start: 2023-01-01 | End: 2023-01-01 | Stop reason: HOSPADM

## 2023-01-01 RX ORDER — METHYLPREDNISOLONE SODIUM SUCCINATE 125 MG/2ML
125 INJECTION, POWDER, LYOPHILIZED, FOR SOLUTION INTRAMUSCULAR; INTRAVENOUS
Status: CANCELLED
Start: 2023-08-02

## 2023-01-01 RX ORDER — MORPHINE SULFATE 15 MG/1
15 TABLET, FILM COATED, EXTENDED RELEASE ORAL EVERY 12 HOURS
Qty: 30 TABLET | Refills: 0 | Status: SHIPPED | OUTPATIENT
Start: 2023-01-01 | End: 2023-01-01

## 2023-01-01 RX ORDER — NALOXONE HYDROCHLORIDE 0.4 MG/ML
0.4 INJECTION, SOLUTION INTRAMUSCULAR; INTRAVENOUS; SUBCUTANEOUS
Status: DISCONTINUED | OUTPATIENT
Start: 2023-01-01 | End: 2023-01-01 | Stop reason: HOSPADM

## 2023-01-01 RX ORDER — ATORVASTATIN CALCIUM 40 MG/1
TABLET, FILM COATED ORAL
Qty: 90 TABLET | Refills: 2 | Status: SHIPPED | OUTPATIENT
Start: 2023-01-01 | End: 2023-01-01

## 2023-01-01 RX ORDER — NALOXONE HYDROCHLORIDE 0.4 MG/ML
0.1 INJECTION, SOLUTION INTRAMUSCULAR; INTRAVENOUS; SUBCUTANEOUS
Status: DISCONTINUED | OUTPATIENT
Start: 2023-01-01 | End: 2023-01-01 | Stop reason: HOSPADM

## 2023-01-01 RX ORDER — HEPARIN SODIUM,PORCINE 10 UNIT/ML
5 VIAL (ML) INTRAVENOUS
Status: CANCELLED | OUTPATIENT
Start: 2023-08-02

## 2023-01-01 RX ORDER — MEPERIDINE HYDROCHLORIDE 25 MG/ML
25 INJECTION INTRAMUSCULAR; INTRAVENOUS; SUBCUTANEOUS EVERY 30 MIN PRN
Status: CANCELLED | OUTPATIENT
Start: 2023-08-02

## 2023-01-01 RX ORDER — ACETAMINOPHEN 325 MG/1
650 TABLET ORAL
Status: CANCELLED | OUTPATIENT
Start: 2023-08-09

## 2023-01-01 RX ORDER — GADOBUTROL 604.72 MG/ML
7.5 INJECTION INTRAVENOUS ONCE
Status: COMPLETED | OUTPATIENT
Start: 2023-01-01 | End: 2023-01-01

## 2023-01-01 RX ORDER — MEPERIDINE HYDROCHLORIDE 25 MG/ML
25 INJECTION INTRAMUSCULAR; INTRAVENOUS; SUBCUTANEOUS EVERY 30 MIN PRN
Status: CANCELLED | OUTPATIENT
Start: 2023-08-09

## 2023-01-01 RX ORDER — SODIUM CHLORIDE 9 MG/ML
INJECTION, SOLUTION INTRAVENOUS CONTINUOUS PRN
Status: DISCONTINUED | OUTPATIENT
Start: 2023-01-01 | End: 2023-01-01 | Stop reason: HOSPADM

## 2023-01-01 RX ORDER — INSULIN DEGLUDEC 100 U/ML
18 INJECTION, SOLUTION SUBCUTANEOUS DAILY
COMMUNITY
Start: 2023-01-01 | End: 2023-01-01

## 2023-01-01 RX ORDER — ALBUTEROL SULFATE 90 UG/1
1-2 AEROSOL, METERED RESPIRATORY (INHALATION)
Status: CANCELLED
Start: 2023-01-01

## 2023-01-01 RX ORDER — PROCHLORPERAZINE MALEATE 5 MG
5 TABLET ORAL EVERY 6 HOURS PRN
Status: DISCONTINUED | OUTPATIENT
Start: 2023-01-01 | End: 2023-01-01 | Stop reason: HOSPADM

## 2023-01-01 RX ORDER — LEVETIRACETAM 500 MG/1
500 TABLET ORAL 2 TIMES DAILY
Status: DISCONTINUED | OUTPATIENT
Start: 2023-01-01 | End: 2023-01-01 | Stop reason: HOSPADM

## 2023-01-01 RX ORDER — LIDOCAINE HYDROCHLORIDE 10 MG/ML
INJECTION, SOLUTION EPIDURAL; INFILTRATION; INTRACAUDAL; PERINEURAL
Status: DISCONTINUED
Start: 2023-01-01 | End: 2023-01-01 | Stop reason: HOSPADM

## 2023-01-01 RX ORDER — NICOTINE POLACRILEX 4 MG
15-30 LOZENGE BUCCAL
Status: DISCONTINUED | OUTPATIENT
Start: 2023-01-01 | End: 2023-01-01 | Stop reason: HOSPADM

## 2023-01-01 RX ORDER — LIDOCAINE 40 MG/G
CREAM TOPICAL
Status: DISCONTINUED | OUTPATIENT
Start: 2023-01-01 | End: 2023-01-01 | Stop reason: HOSPADM

## 2023-01-01 RX ORDER — OXYCODONE HCL 20 MG/1
20 TABLET, FILM COATED, EXTENDED RELEASE ORAL EVERY 12 HOURS
Qty: 42 TABLET | Refills: 0 | Status: SHIPPED | OUTPATIENT
Start: 2023-01-01 | End: 2023-01-01

## 2023-01-01 RX ORDER — HUMAN INSULIN 100 [IU]/ML
INJECTION, SUSPENSION SUBCUTANEOUS
Qty: 15 ML | Refills: 1 | Status: SHIPPED | OUTPATIENT
Start: 2023-01-01 | End: 2023-01-01

## 2023-01-01 RX ORDER — MORPHINE SULFATE 15 MG/1
15 TABLET, FILM COATED, EXTENDED RELEASE ORAL EVERY 12 HOURS
Qty: 30 TABLET | Refills: 0 | Status: SHIPPED | OUTPATIENT
Start: 2023-01-01 | End: 2023-01-01 | Stop reason: ALTCHOICE

## 2023-01-01 RX ORDER — ALBUTEROL SULFATE 0.83 MG/ML
2.5 SOLUTION RESPIRATORY (INHALATION)
Status: CANCELLED | OUTPATIENT
Start: 2023-08-02

## 2023-01-01 RX ORDER — DIPHENHYDRAMINE HCL 50 MG
50 CAPSULE ORAL
Status: CANCELLED
Start: 2023-08-09

## 2023-01-01 RX ORDER — ALBUTEROL SULFATE 0.83 MG/ML
2.5 SOLUTION RESPIRATORY (INHALATION)
Status: CANCELLED | OUTPATIENT
Start: 2023-01-01

## 2023-01-01 RX ORDER — OXYCODONE HYDROCHLORIDE 5 MG/1
5 TABLET ORAL
Status: DISCONTINUED | OUTPATIENT
Start: 2023-01-01 | End: 2023-01-01 | Stop reason: HOSPADM

## 2023-01-01 RX ORDER — LIDOCAINE HYDROCHLORIDE 20 MG/ML
10 JELLY TOPICAL
Status: DISCONTINUED | OUTPATIENT
Start: 2023-01-01 | End: 2023-01-01 | Stop reason: HOSPADM

## 2023-01-01 RX ORDER — LEVOTHYROXINE SODIUM 112 UG/1
112 TABLET ORAL DAILY
Qty: 90 TABLET | Refills: 1 | Status: SHIPPED | OUTPATIENT
Start: 2023-01-01

## 2023-01-01 RX ORDER — OXYCODONE HCL 20 MG/1
20 TABLET, FILM COATED, EXTENDED RELEASE ORAL EVERY 12 HOURS
Qty: 41 TABLET | Refills: 0 | Status: SHIPPED | OUTPATIENT
Start: 2023-01-01 | End: 2023-01-01

## 2023-01-01 RX ORDER — LIDOCAINE HYDROCHLORIDE 10 MG/ML
10-20 INJECTION, SOLUTION EPIDURAL; INFILTRATION; INTRACAUDAL; PERINEURAL ONCE
Status: COMPLETED | OUTPATIENT
Start: 2023-01-01 | End: 2023-01-01

## 2023-01-01 RX ORDER — LIDOCAINE 4 G/G
1 PATCH TOPICAL EVERY 24 HOURS
Qty: 10 PATCH | Refills: 1 | Status: SHIPPED | OUTPATIENT
Start: 2023-01-01 | End: 2023-01-01

## 2023-01-01 RX ORDER — ALBUTEROL SULFATE 90 UG/1
1-2 AEROSOL, METERED RESPIRATORY (INHALATION)
Status: CANCELLED
Start: 2023-08-09

## 2023-01-01 RX ORDER — OXYCODONE HYDROCHLORIDE 5 MG/1
5-10 TABLET ORAL EVERY 4 HOURS PRN
Status: DISCONTINUED | OUTPATIENT
Start: 2023-01-01 | End: 2023-01-01 | Stop reason: HOSPADM

## 2023-01-01 RX ORDER — OLANZAPINE 10 MG/2ML
5 INJECTION, POWDER, FOR SOLUTION INTRAMUSCULAR EVERY 6 HOURS PRN
Status: DISCONTINUED | OUTPATIENT
Start: 2023-01-01 | End: 2023-01-01

## 2023-01-01 RX ORDER — HYDROMORPHONE HYDROCHLORIDE 1 MG/ML
0.5 INJECTION, SOLUTION INTRAMUSCULAR; INTRAVENOUS; SUBCUTANEOUS
Status: DISCONTINUED | OUTPATIENT
Start: 2023-01-01 | End: 2023-01-01 | Stop reason: HOSPADM

## 2023-01-01 RX ORDER — ACETAMINOPHEN 650 MG/1
650 SUPPOSITORY RECTAL EVERY 4 HOURS PRN
Status: DISCONTINUED | OUTPATIENT
Start: 2023-01-01 | End: 2023-01-01 | Stop reason: HOSPADM

## 2023-01-01 RX ORDER — GLIPIZIDE 10 MG/1
2 TABLET ORAL DAILY PRN
Status: DISCONTINUED | OUTPATIENT
Start: 2023-01-01 | End: 2023-01-01 | Stop reason: HOSPADM

## 2023-01-01 RX ORDER — ALBUTEROL SULFATE 0.83 MG/ML
2.5 SOLUTION RESPIRATORY (INHALATION)
Status: CANCELLED | OUTPATIENT
Start: 2023-08-09

## 2023-01-01 RX ORDER — OXYCODONE HCL 20 MG/1
20 TABLET, FILM COATED, EXTENDED RELEASE ORAL EVERY 12 HOURS
Qty: 60 TABLET | Refills: 0 | Status: SHIPPED | OUTPATIENT
Start: 2023-01-01 | End: 2023-01-01

## 2023-01-01 RX ORDER — ONDANSETRON 4 MG/1
4 TABLET, ORALLY DISINTEGRATING ORAL EVERY 30 MIN PRN
Status: DISCONTINUED | OUTPATIENT
Start: 2023-01-01 | End: 2023-01-01 | Stop reason: HOSPADM

## 2023-01-01 RX ORDER — NALOXONE HYDROCHLORIDE 0.4 MG/ML
0.2 INJECTION, SOLUTION INTRAMUSCULAR; INTRAVENOUS; SUBCUTANEOUS
Status: DISCONTINUED | OUTPATIENT
Start: 2023-01-01 | End: 2023-01-01 | Stop reason: HOSPADM

## 2023-01-01 RX ORDER — BISACODYL 5 MG/1
TABLET, DELAYED RELEASE ORAL
Qty: 4 TABLET | Refills: 0 | Status: ON HOLD | OUTPATIENT
Start: 2023-01-01 | End: 2023-01-01

## 2023-01-01 RX ORDER — KETAMINE HYDROCHLORIDE 10 MG/ML
INJECTION INTRAMUSCULAR; INTRAVENOUS PRN
Status: DISCONTINUED | OUTPATIENT
Start: 2023-01-01 | End: 2023-01-01

## 2023-01-01 RX ORDER — ONDANSETRON 2 MG/ML
8 INJECTION INTRAMUSCULAR; INTRAVENOUS ONCE
Status: CANCELLED | OUTPATIENT
Start: 2023-08-02

## 2023-01-01 RX ORDER — ACETAMINOPHEN 325 MG/1
650 TABLET ORAL EVERY 4 HOURS PRN
Status: DISCONTINUED | OUTPATIENT
Start: 2023-01-01 | End: 2023-01-01 | Stop reason: HOSPADM

## 2023-01-01 RX ORDER — VALACYCLOVIR HYDROCHLORIDE 1 G/1
1 TABLET, FILM COATED ORAL
COMMUNITY
Start: 2023-01-01 | End: 2023-01-01

## 2023-01-01 RX ORDER — BISACODYL 10 MG
10 SUPPOSITORY, RECTAL RECTAL
Status: DISCONTINUED | OUTPATIENT
Start: 2023-07-31 | End: 2023-01-01 | Stop reason: HOSPADM

## 2023-01-01 RX ORDER — DIPHENHYDRAMINE HYDROCHLORIDE 50 MG/ML
50 INJECTION INTRAMUSCULAR; INTRAVENOUS
Status: CANCELLED
Start: 2023-01-01

## 2023-01-01 RX ORDER — DIPHENHYDRAMINE HCL 50 MG
50 CAPSULE ORAL
Status: CANCELLED
Start: 2023-08-02

## 2023-01-01 RX ORDER — OXYCODONE HYDROCHLORIDE 5 MG/1
5 TABLET ORAL EVERY 4 HOURS PRN
Status: DISCONTINUED | OUTPATIENT
Start: 2023-01-01 | End: 2023-01-01

## 2023-01-01 RX ORDER — CEFAZOLIN SODIUM/WATER 2 G/20 ML
2 SYRINGE (ML) INTRAVENOUS ONCE
Status: COMPLETED | OUTPATIENT
Start: 2023-01-01 | End: 2023-01-01

## 2023-01-01 RX ORDER — DEXTROSE MONOHYDRATE 25 G/50ML
25-50 INJECTION, SOLUTION INTRAVENOUS
Status: CANCELLED | OUTPATIENT
Start: 2023-01-01

## 2023-01-01 RX ORDER — BUPIVACAINE HYDROCHLORIDE AND EPINEPHRINE 2.5; 5 MG/ML; UG/ML
INJECTION, SOLUTION EPIDURAL; INFILTRATION; INTRACAUDAL; PERINEURAL
Status: DISCONTINUED
Start: 2023-01-01 | End: 2023-01-01 | Stop reason: HOSPADM

## 2023-01-01 RX ORDER — DEXTROSE MONOHYDRATE 25 G/50ML
25 INJECTION, SOLUTION INTRAVENOUS ONCE
Status: COMPLETED | OUTPATIENT
Start: 2023-01-01 | End: 2023-01-01

## 2023-01-01 RX ORDER — GADOBUTROL 604.72 MG/ML
10 INJECTION INTRAVENOUS ONCE
Status: COMPLETED | OUTPATIENT
Start: 2023-01-01 | End: 2023-01-01

## 2023-01-01 RX ORDER — LORAZEPAM 0.5 MG/1
0.5 TABLET ORAL
Status: DISCONTINUED | OUTPATIENT
Start: 2023-01-01 | End: 2023-01-01 | Stop reason: HOSPADM

## 2023-01-01 RX ORDER — ALBUTEROL SULFATE 90 UG/1
1-2 AEROSOL, METERED RESPIRATORY (INHALATION)
Status: CANCELLED
Start: 2023-08-02

## 2023-01-01 RX ORDER — HEPARIN SODIUM (PORCINE) LOCK FLUSH IV SOLN 100 UNIT/ML 100 UNIT/ML
5 SOLUTION INTRAVENOUS
Status: CANCELLED | OUTPATIENT
Start: 2023-08-09

## 2023-01-01 RX ORDER — OXYCODONE HYDROCHLORIDE 5 MG/1
5 TABLET ORAL EVERY 4 HOURS
COMMUNITY

## 2023-01-01 RX ORDER — LATANOPROST 50 UG/ML
1 SOLUTION/ DROPS OPHTHALMIC AT BEDTIME
COMMUNITY

## 2023-01-01 RX ORDER — GLYCOPYRROLATE 0.2 MG/ML
0.2 INJECTION, SOLUTION INTRAMUSCULAR; INTRAVENOUS EVERY 4 HOURS PRN
Status: DISCONTINUED | OUTPATIENT
Start: 2023-01-01 | End: 2023-01-01 | Stop reason: HOSPADM

## 2023-01-01 RX ORDER — PROCHLORPERAZINE MALEATE 10 MG
10 TABLET ORAL EVERY 6 HOURS PRN
Qty: 30 TABLET | Refills: 2 | Status: SHIPPED | OUTPATIENT
Start: 2023-01-01 | End: 2023-01-01

## 2023-01-01 RX ORDER — OXYCODONE HYDROCHLORIDE 5 MG/1
10 TABLET ORAL
Status: COMPLETED | OUTPATIENT
Start: 2023-01-01 | End: 2023-01-01

## 2023-01-01 RX ORDER — ATORVASTATIN CALCIUM 40 MG/1
40 TABLET, FILM COATED ORAL AT BEDTIME
COMMUNITY

## 2023-01-01 RX ORDER — TC 99M MEDRONATE 20 MG/10ML
20-30 INJECTION, POWDER, LYOPHILIZED, FOR SOLUTION INTRAVENOUS ONCE
Status: COMPLETED | OUTPATIENT
Start: 2023-01-01 | End: 2023-01-01

## 2023-01-01 RX ORDER — OXYCODONE HYDROCHLORIDE 10 MG/1
10 TABLET ORAL EVERY 6 HOURS PRN
Qty: 90 TABLET | Refills: 0 | Status: SHIPPED | OUTPATIENT
Start: 2023-01-01 | End: 2023-01-01

## 2023-01-01 RX ORDER — LEVOTHYROXINE SODIUM 125 UG/1
125 TABLET ORAL DAILY
Qty: 90 TABLET | Refills: 1 | Status: SHIPPED | OUTPATIENT
Start: 2023-01-01 | End: 2023-01-01 | Stop reason: DRUGHIGH

## 2023-01-01 RX ORDER — HEPARIN SODIUM (PORCINE) LOCK FLUSH IV SOLN 100 UNIT/ML 100 UNIT/ML
5 SOLUTION INTRAVENOUS
Status: CANCELLED | OUTPATIENT
Start: 2023-01-01

## 2023-01-01 RX ORDER — HYDROCODONE BITARTRATE AND ACETAMINOPHEN 5; 325 MG/1; MG/1
.5-1 TABLET ORAL 2 TIMES DAILY PRN
Qty: 10 TABLET | Refills: 0 | Status: SHIPPED | OUTPATIENT
Start: 2023-01-01 | End: 2023-01-01 | Stop reason: ALTCHOICE

## 2023-01-01 RX ORDER — OXYCODONE HCL 10 MG/1
20 TABLET, FILM COATED, EXTENDED RELEASE ORAL EVERY 12 HOURS
Status: DISCONTINUED | OUTPATIENT
Start: 2023-01-01 | End: 2023-01-01 | Stop reason: HOSPADM

## 2023-01-01 RX ORDER — GLYCOPYRROLATE 0.2 MG/ML
INJECTION, SOLUTION INTRAMUSCULAR; INTRAVENOUS PRN
Status: DISCONTINUED | OUTPATIENT
Start: 2023-01-01 | End: 2023-01-01

## 2023-01-01 RX ORDER — DIPHENHYDRAMINE HYDROCHLORIDE 50 MG/ML
50 INJECTION INTRAMUSCULAR; INTRAVENOUS
Status: CANCELLED
Start: 2023-08-02

## 2023-01-01 RX ORDER — EPINEPHRINE 1 MG/ML
0.3 INJECTION, SOLUTION, CONCENTRATE INTRAVENOUS EVERY 5 MIN PRN
Status: CANCELLED | OUTPATIENT
Start: 2023-08-02

## 2023-01-01 RX ORDER — IOPAMIDOL 755 MG/ML
60 INJECTION, SOLUTION INTRAVASCULAR ONCE
Status: COMPLETED | OUTPATIENT
Start: 2023-01-01 | End: 2023-01-01

## 2023-01-01 RX ORDER — HEPARIN SODIUM (PORCINE) LOCK FLUSH IV SOLN 100 UNIT/ML 100 UNIT/ML
5 SOLUTION INTRAVENOUS
Status: CANCELLED | OUTPATIENT
Start: 2023-08-02

## 2023-01-01 RX ORDER — 3% SODIUM CHLORIDE 3 G/100ML
100 INJECTION, SOLUTION INTRAVENOUS ONCE
Status: COMPLETED | OUTPATIENT
Start: 2023-01-01 | End: 2023-01-01

## 2023-01-01 RX ORDER — POLYETHYLENE GLYCOL 3350 17 G/17G
1 POWDER, FOR SOLUTION ORAL DAILY
COMMUNITY
End: 2023-01-01

## 2023-01-01 RX ORDER — DIPHENHYDRAMINE HYDROCHLORIDE 50 MG/ML
50 INJECTION INTRAMUSCULAR; INTRAVENOUS
Status: CANCELLED
Start: 2023-08-09

## 2023-01-01 RX ORDER — METHYLPREDNISOLONE SODIUM SUCCINATE 125 MG/2ML
125 INJECTION, POWDER, LYOPHILIZED, FOR SOLUTION INTRAMUSCULAR; INTRAVENOUS
Status: CANCELLED
Start: 2023-08-09

## 2023-01-01 RX ORDER — HEPARIN SODIUM,PORCINE 10 UNIT/ML
5 VIAL (ML) INTRAVENOUS
Status: CANCELLED | OUTPATIENT
Start: 2023-08-09

## 2023-01-01 RX ORDER — CEFAZOLIN SODIUM/WATER 2 G/20 ML
2 SYRINGE (ML) INTRAVENOUS
Status: DISCONTINUED | OUTPATIENT
Start: 2023-01-01 | End: 2023-01-01 | Stop reason: HOSPADM

## 2023-01-01 RX ORDER — LORAZEPAM 2 MG/ML
0.5 INJECTION INTRAMUSCULAR EVERY 4 HOURS PRN
Status: CANCELLED | OUTPATIENT
Start: 2023-01-01

## 2023-01-01 RX ORDER — LORAZEPAM 2 MG/ML
0.5 INJECTION INTRAMUSCULAR EVERY 6 HOURS PRN
Status: DISCONTINUED | OUTPATIENT
Start: 2023-01-01 | End: 2023-01-01

## 2023-01-01 RX ORDER — IOPAMIDOL 755 MG/ML
75 INJECTION, SOLUTION INTRAVASCULAR ONCE
Status: COMPLETED | OUTPATIENT
Start: 2023-01-01 | End: 2023-01-01

## 2023-01-01 RX ORDER — LORAZEPAM 2 MG/ML
0.5 INJECTION INTRAMUSCULAR EVERY 4 HOURS PRN
Status: CANCELLED | OUTPATIENT
Start: 2023-08-02

## 2023-01-01 RX ORDER — ATROPINE SULFATE 10 MG/ML
2 SOLUTION/ DROPS OPHTHALMIC EVERY 4 HOURS PRN
Status: DISCONTINUED | OUTPATIENT
Start: 2023-01-01 | End: 2023-01-01 | Stop reason: HOSPADM

## 2023-01-01 RX ORDER — EPINEPHRINE 1 MG/ML
0.3 INJECTION, SOLUTION, CONCENTRATE INTRAVENOUS EVERY 5 MIN PRN
Status: CANCELLED | OUTPATIENT
Start: 2023-08-09

## 2023-01-01 RX ORDER — SODIUM CHLORIDE 1 G/1
2 TABLET ORAL 2 TIMES DAILY WITH MEALS
Status: DISCONTINUED | OUTPATIENT
Start: 2023-01-01 | End: 2023-01-01

## 2023-01-01 RX ORDER — POLYETHYLENE GLYCOL 3350 17 G/17G
1 POWDER, FOR SOLUTION ORAL DAILY PRN
Qty: 1 G | Refills: 0
Start: 2023-01-01

## 2023-01-01 RX ORDER — ONDANSETRON 2 MG/ML
4 INJECTION INTRAMUSCULAR; INTRAVENOUS EVERY 30 MIN PRN
Status: DISCONTINUED | OUTPATIENT
Start: 2023-01-01 | End: 2023-01-01 | Stop reason: HOSPADM

## 2023-01-01 RX ORDER — EPINEPHRINE 1 MG/ML
0.3 INJECTION, SOLUTION, CONCENTRATE INTRAVENOUS EVERY 5 MIN PRN
Status: CANCELLED | OUTPATIENT
Start: 2023-01-01

## 2023-01-01 RX ORDER — HALOPERIDOL 5 MG/ML
1 INJECTION INTRAMUSCULAR
Status: DISCONTINUED | OUTPATIENT
Start: 2023-01-01 | End: 2023-01-01 | Stop reason: HOSPADM

## 2023-01-01 RX ORDER — HYDROMORPHONE HYDROCHLORIDE 1 MG/ML
0.3 INJECTION, SOLUTION INTRAMUSCULAR; INTRAVENOUS; SUBCUTANEOUS ONCE
Status: COMPLETED | OUTPATIENT
Start: 2023-01-01 | End: 2023-01-01

## 2023-01-01 RX ORDER — LORAZEPAM 2 MG/ML
0.5 INJECTION INTRAMUSCULAR EVERY 4 HOURS PRN
Status: DISCONTINUED | OUTPATIENT
Start: 2023-01-01 | End: 2023-01-01

## 2023-01-01 RX ORDER — LORAZEPAM 2 MG/ML
0.5 INJECTION INTRAMUSCULAR EVERY 4 HOURS PRN
Status: CANCELLED | OUTPATIENT
Start: 2023-08-09

## 2023-01-01 RX ORDER — HYDROMORPHONE HYDROCHLORIDE 1 MG/ML
0.3 INJECTION, SOLUTION INTRAMUSCULAR; INTRAVENOUS; SUBCUTANEOUS
Status: DISCONTINUED | OUTPATIENT
Start: 2023-01-01 | End: 2023-01-01 | Stop reason: HOSPADM

## 2023-01-01 RX ORDER — PROCHLORPERAZINE 25 MG
12.5 SUPPOSITORY, RECTAL RECTAL EVERY 12 HOURS PRN
Status: DISCONTINUED | OUTPATIENT
Start: 2023-01-01 | End: 2023-01-01 | Stop reason: HOSPADM

## 2023-01-01 RX ORDER — LORAZEPAM 0.5 MG/1
0.5 TABLET ORAL EVERY 4 HOURS PRN
Status: DISCONTINUED | OUTPATIENT
Start: 2023-01-01 | End: 2023-01-01

## 2023-01-01 RX ORDER — FENTANYL CITRATE 50 UG/ML
25-50 INJECTION, SOLUTION INTRAMUSCULAR; INTRAVENOUS EVERY 5 MIN PRN
Status: DISCONTINUED | OUTPATIENT
Start: 2023-01-01 | End: 2023-01-01 | Stop reason: HOSPADM

## 2023-01-01 RX ORDER — CEFAZOLIN SODIUM/WATER 2 G/20 ML
2 SYRINGE (ML) INTRAVENOUS SEE ADMIN INSTRUCTIONS
Status: DISCONTINUED | OUTPATIENT
Start: 2023-01-01 | End: 2023-01-01 | Stop reason: HOSPADM

## 2023-01-01 RX ORDER — ONDANSETRON 2 MG/ML
8 INJECTION INTRAMUSCULAR; INTRAVENOUS ONCE
Status: CANCELLED | OUTPATIENT
Start: 2023-08-09

## 2023-01-01 RX ORDER — ACETAMINOPHEN 325 MG/1
650 TABLET ORAL
Status: CANCELLED | OUTPATIENT
Start: 2023-08-02

## 2023-01-01 RX ORDER — INSULIN DEGLUDEC 100 U/ML
18 INJECTION, SOLUTION SUBCUTANEOUS DAILY
Qty: 15 ML | Refills: 0 | Status: SHIPPED | OUTPATIENT
Start: 2023-01-01

## 2023-01-01 RX ORDER — HEPARIN SODIUM (PORCINE) LOCK FLUSH IV SOLN 100 UNIT/ML 100 UNIT/ML
SOLUTION INTRAVENOUS
Status: DISCONTINUED
Start: 2023-01-01 | End: 2023-01-01 | Stop reason: HOSPADM

## 2023-01-01 RX ORDER — ONDANSETRON 2 MG/ML
8 INJECTION INTRAMUSCULAR; INTRAVENOUS ONCE
Status: CANCELLED | OUTPATIENT
Start: 2023-01-01

## 2023-01-01 RX ORDER — ACETAMINOPHEN 325 MG/1
650 TABLET ORAL 4 TIMES DAILY
Qty: 1 TABLET | Refills: 0
Start: 2023-01-01

## 2023-01-01 RX ORDER — MEPERIDINE HYDROCHLORIDE 25 MG/ML
25 INJECTION INTRAMUSCULAR; INTRAVENOUS; SUBCUTANEOUS EVERY 30 MIN PRN
Status: CANCELLED | OUTPATIENT
Start: 2023-01-01

## 2023-01-01 RX ORDER — AMOXICILLIN 250 MG
2 CAPSULE ORAL 2 TIMES DAILY
COMMUNITY

## 2023-01-01 RX ORDER — FLUMAZENIL 0.1 MG/ML
0.2 INJECTION, SOLUTION INTRAVENOUS
Status: DISCONTINUED | OUTPATIENT
Start: 2023-01-01 | End: 2023-01-01 | Stop reason: HOSPADM

## 2023-01-01 RX ORDER — BLOOD SUGAR DIAGNOSTIC
STRIP MISCELLANEOUS
Qty: 100 STRIP | Refills: 11 | Status: SHIPPED | OUTPATIENT
Start: 2023-01-01

## 2023-01-01 RX ADMIN — INSULIN ASPART 4 UNITS: 100 INJECTION, SOLUTION INTRAVENOUS; SUBCUTANEOUS at 05:36

## 2023-01-01 RX ADMIN — PROPOFOL 50 MG: 10 INJECTION, EMULSION INTRAVENOUS at 10:17

## 2023-01-01 RX ADMIN — HYDROMORPHONE HYDROCHLORIDE 0.5 MG: 1 INJECTION, SOLUTION INTRAMUSCULAR; INTRAVENOUS; SUBCUTANEOUS at 19:01

## 2023-01-01 RX ADMIN — OXYCODONE HYDROCHLORIDE 20 MG: 10 TABLET, FILM COATED, EXTENDED RELEASE ORAL at 20:38

## 2023-01-01 RX ADMIN — MORPHINE SULFATE 2 MG: 2 INJECTION, SOLUTION INTRAMUSCULAR; INTRAVENOUS at 19:31

## 2023-01-01 RX ADMIN — LATANOPROST 1 DROP: 50 SOLUTION OPHTHALMIC at 20:07

## 2023-01-01 RX ADMIN — SODIUM CHLORIDE TAB 1 GM 2 G: 1 TAB at 17:00

## 2023-01-01 RX ADMIN — IOPAMIDOL 75 ML: 755 INJECTION, SOLUTION INTRAVENOUS at 13:25

## 2023-01-01 RX ADMIN — IOPAMIDOL 60 ML: 755 INJECTION, SOLUTION INTRAVENOUS at 09:59

## 2023-01-01 RX ADMIN — PROPOFOL 50 MG: 10 INJECTION, EMULSION INTRAVENOUS at 10:30

## 2023-01-01 RX ADMIN — OXYCODONE HYDROCHLORIDE 20 MG: 10 TABLET, FILM COATED, EXTENDED RELEASE ORAL at 09:22

## 2023-01-01 RX ADMIN — PROPOFOL 20 MG: 10 INJECTION, EMULSION INTRAVENOUS at 14:26

## 2023-01-01 RX ADMIN — BRIMONIDINE TARTRATE 1 DROP: 2 SOLUTION OPHTHALMIC at 19:45

## 2023-01-01 RX ADMIN — LIDOCAINE HYDROCHLORIDE 40 MG: 20 INJECTION, SOLUTION INFILTRATION; PERINEURAL at 14:18

## 2023-01-01 RX ADMIN — SODIUM CHLORIDE 100 ML: 3 INJECTION, SOLUTION INTRAVENOUS at 15:05

## 2023-01-01 RX ADMIN — GADOBUTROL 7.5 ML: 604.72 INJECTION INTRAVENOUS at 11:32

## 2023-01-01 RX ADMIN — FENTANYL CITRATE 50 MCG: 50 INJECTION, SOLUTION INTRAMUSCULAR; INTRAVENOUS at 09:20

## 2023-01-01 RX ADMIN — PROPOFOL 30 MG: 10 INJECTION, EMULSION INTRAVENOUS at 14:25

## 2023-01-01 RX ADMIN — HYDROMORPHONE HYDROCHLORIDE 0.5 MG: 1 INJECTION, SOLUTION INTRAMUSCULAR; INTRAVENOUS; SUBCUTANEOUS at 12:10

## 2023-01-01 RX ADMIN — Medication 10 MG: at 10:17

## 2023-01-01 RX ADMIN — LORAZEPAM 0.5 MG: 2 INJECTION INTRAMUSCULAR; INTRAVENOUS at 12:37

## 2023-01-01 RX ADMIN — MIDAZOLAM 1 MG: 1 INJECTION INTRAMUSCULAR; INTRAVENOUS at 09:28

## 2023-01-01 RX ADMIN — PROPOFOL 30 MG: 10 INJECTION, EMULSION INTRAVENOUS at 14:22

## 2023-01-01 RX ADMIN — OXYCODONE HYDROCHLORIDE 10 MG: 10 TABLET, FILM COATED, EXTENDED RELEASE ORAL at 16:59

## 2023-01-01 RX ADMIN — INSULIN ASPART 3 UNITS: 100 INJECTION, SOLUTION INTRAVENOUS; SUBCUTANEOUS at 06:20

## 2023-01-01 RX ADMIN — SODIUM CHLORIDE, POTASSIUM CHLORIDE, SODIUM LACTATE AND CALCIUM CHLORIDE: 600; 310; 30; 20 INJECTION, SOLUTION INTRAVENOUS at 08:29

## 2023-01-01 RX ADMIN — PROPOFOL 50 MG: 10 INJECTION, EMULSION INTRAVENOUS at 14:20

## 2023-01-01 RX ADMIN — LATANOPROST 1 DROP: 50 SOLUTION OPHTHALMIC at 22:16

## 2023-01-01 RX ADMIN — LORAZEPAM 0.5 MG: 2 INJECTION INTRAMUSCULAR; INTRAVENOUS at 20:38

## 2023-01-01 RX ADMIN — GADOBUTROL 10 ML: 604.72 INJECTION INTRAVENOUS at 12:49

## 2023-01-01 RX ADMIN — BRIMONIDINE TARTRATE 1 DROP: 2 SOLUTION OPHTHALMIC at 08:16

## 2023-01-01 RX ADMIN — SODIUM CHLORIDE, POTASSIUM CHLORIDE, SODIUM LACTATE AND CALCIUM CHLORIDE: 600; 310; 30; 20 INJECTION, SOLUTION INTRAVENOUS at 11:51

## 2023-01-01 RX ADMIN — GADOBUTROL 7.5 ML: 604.72 INJECTION INTRAVENOUS at 15:15

## 2023-01-01 RX ADMIN — PROPOFOL 50 MG: 10 INJECTION, EMULSION INTRAVENOUS at 10:42

## 2023-01-01 RX ADMIN — INSULIN ASPART 4 UNITS: 100 INJECTION, SOLUTION INTRAVENOUS; SUBCUTANEOUS at 02:18

## 2023-01-01 RX ADMIN — GLYCOPYRROLATE 0.2 MG: 0.2 INJECTION, SOLUTION INTRAMUSCULAR; INTRAVENOUS at 10:12

## 2023-01-01 RX ADMIN — OXYCODONE HYDROCHLORIDE 10 MG: 5 TABLET ORAL at 11:40

## 2023-01-01 RX ADMIN — LIDOCAINE HYDROCHLORIDE 15 ML: 10 INJECTION, SOLUTION EPIDURAL; INFILTRATION; INTRACAUDAL; PERINEURAL at 09:51

## 2023-01-01 RX ADMIN — PROPOFOL 30 MG: 10 INJECTION, EMULSION INTRAVENOUS at 14:23

## 2023-01-01 RX ADMIN — INSULIN ASPART 3 UNITS: 100 INJECTION, SOLUTION INTRAVENOUS; SUBCUTANEOUS at 01:48

## 2023-01-01 RX ADMIN — HYDROMORPHONE HYDROCHLORIDE 0.3 MG: 1 INJECTION, SOLUTION INTRAMUSCULAR; INTRAVENOUS; SUBCUTANEOUS at 09:22

## 2023-01-01 RX ADMIN — FLUDEOXYGLUCOSE F-18 14.22 MCI.: 500 INJECTION, SOLUTION INTRAVENOUS at 09:44

## 2023-01-01 RX ADMIN — OXYCODONE HYDROCHLORIDE 5 MG: 5 TABLET ORAL at 09:38

## 2023-01-01 RX ADMIN — INSULIN ASPART 5 UNITS: 100 INJECTION, SOLUTION INTRAVENOUS; SUBCUTANEOUS at 14:22

## 2023-01-01 RX ADMIN — LORAZEPAM 0.5 MG: 2 INJECTION INTRAMUSCULAR; INTRAVENOUS at 17:26

## 2023-01-01 RX ADMIN — MORPHINE SULFATE 2 MG: 2 INJECTION, SOLUTION INTRAMUSCULAR; INTRAVENOUS at 14:21

## 2023-01-01 RX ADMIN — PROPOFOL 50 MG: 10 INJECTION, EMULSION INTRAVENOUS at 10:36

## 2023-01-01 RX ADMIN — MORPHINE SULFATE 2 MG: 2 INJECTION, SOLUTION INTRAMUSCULAR; INTRAVENOUS at 17:26

## 2023-01-01 RX ADMIN — LIDOCAINE HYDROCHLORIDE 40 MG: 20 INJECTION, SOLUTION INFILTRATION; PERINEURAL at 10:17

## 2023-01-01 RX ADMIN — INSULIN ASPART 4 UNITS: 100 INJECTION, SOLUTION INTRAVENOUS; SUBCUTANEOUS at 10:55

## 2023-01-01 RX ADMIN — DEXTROSE MONOHYDRATE 25 ML: 25 INJECTION, SOLUTION INTRAVENOUS at 13:36

## 2023-01-01 RX ADMIN — LIDOCAINE HYDROCHLORIDE 10 ML: 20 JELLY TOPICAL at 10:43

## 2023-01-01 RX ADMIN — LEVETIRACETAM 500 MG: 500 TABLET, FILM COATED ORAL at 20:05

## 2023-01-01 RX ADMIN — FENTANYL CITRATE 50 MCG: 50 INJECTION, SOLUTION INTRAMUSCULAR; INTRAVENOUS at 09:28

## 2023-01-01 RX ADMIN — PROPOFOL 50 MG: 10 INJECTION, EMULSION INTRAVENOUS at 10:24

## 2023-01-01 RX ADMIN — LEVETIRACETAM 500 MG: 500 TABLET, FILM COATED ORAL at 09:23

## 2023-01-01 RX ADMIN — TC 99M MEDRONATE 25.3 MILLICURIE: 20 INJECTION, POWDER, LYOPHILIZED, FOR SOLUTION INTRAVENOUS at 10:44

## 2023-01-01 RX ADMIN — OLANZAPINE 5 MG: 10 INJECTION, POWDER, FOR SOLUTION INTRAMUSCULAR at 05:05

## 2023-01-01 RX ADMIN — PROPOFOL 60 MG: 10 INJECTION, EMULSION INTRAVENOUS at 14:18

## 2023-01-01 RX ADMIN — INSULIN ASPART 3 UNITS: 100 INJECTION, SOLUTION INTRAVENOUS; SUBCUTANEOUS at 17:57

## 2023-01-01 RX ADMIN — MORPHINE SULFATE 2 MG: 2 INJECTION, SOLUTION INTRAMUSCULAR; INTRAVENOUS at 03:56

## 2023-01-01 RX ADMIN — Medication 2 G: at 09:40

## 2023-01-01 RX ADMIN — LATANOPROST 1 DROP: 50 SOLUTION OPHTHALMIC at 21:13

## 2023-01-01 RX ADMIN — BACILLUS CALMETTE-GUERIN 50 MG: 50 POWDER, FOR SUSPENSION INTRAVESICAL at 10:43

## 2023-01-01 RX ADMIN — BRIMONIDINE TARTRATE 1 DROP: 2 SOLUTION OPHTHALMIC at 20:07

## 2023-01-01 RX ADMIN — LEVETIRACETAM 500 MG: 500 TABLET, FILM COATED ORAL at 19:44

## 2023-01-01 RX ADMIN — LORAZEPAM 0.5 MG: 2 INJECTION INTRAMUSCULAR; INTRAVENOUS at 00:00

## 2023-01-01 RX ADMIN — MIDAZOLAM 1 MG: 1 INJECTION INTRAMUSCULAR; INTRAVENOUS at 09:20

## 2023-01-01 RX ADMIN — INSULIN ASPART 2 UNITS: 100 INJECTION, SOLUTION INTRAVENOUS; SUBCUTANEOUS at 19:32

## 2023-01-01 RX ADMIN — Medication 10 MG: at 10:14

## 2023-01-01 RX ADMIN — Medication 10 MG: at 10:09

## 2023-01-01 RX ADMIN — PROPOFOL 40 MG: 10 INJECTION, EMULSION INTRAVENOUS at 14:24

## 2023-01-01 RX ADMIN — ACETAMINOPHEN 650 MG: 650 SUPPOSITORY RECTAL at 20:39

## 2023-01-01 RX ADMIN — INSULIN ASPART 2 UNITS: 100 INJECTION, SOLUTION INTRAVENOUS; SUBCUTANEOUS at 21:32

## 2023-01-01 ASSESSMENT — ENCOUNTER SYMPTOMS
ARTHRALGIAS: 1
FEVER: 0
SHORTNESS OF BREATH: 0
ABDOMINAL PAIN: 0
PALPITATIONS: 0
POLYDIPSIA: 0
APPETITE CHANGE: 1
SHORTNESS OF BREATH: 0
FREQUENCY: 1
ABDOMINAL PAIN: 0
ARTHRALGIAS: 1
PALPITATIONS: 0
CONSTIPATION: 1
WHEEZING: 0
DIARRHEA: 0
CHILLS: 0
ARTHRALGIAS: 1
NERVOUS/ANXIOUS: 0
CHILLS: 0
WHEEZING: 0
CHILLS: 0
ABDOMINAL PAIN: 0
FEVER: 0
CHEST TIGHTNESS: 0
SHORTNESS OF BREATH: 0
HEARTBURN: 0
CHILLS: 0
ARTHRALGIAS: 1
DIFFICULTY URINATING: 0
NAUSEA: 0
COUGH: 0
VOMITING: 0
SHORTNESS OF BREATH: 0
FATIGUE: 0
NERVOUS/ANXIOUS: 1
DYSPHORIC MOOD: 1
CONSTIPATION: 1
SLEEP DISTURBANCE: 1
PALPITATIONS: 0
FEVER: 0
WHEEZING: 0
FEVER: 0
SHORTNESS OF BREATH: 0
WHEEZING: 0
DIFFICULTY URINATING: 0
CHILLS: 0
PALPITATIONS: 0
POLYPHAGIA: 0
ABDOMINAL PAIN: 0

## 2023-01-01 ASSESSMENT — PAIN SCALES - GENERAL
PAINLEVEL: NO PAIN (0)
PAINLEVEL: WORST PAIN (10)
PAINLEVEL: SEVERE PAIN (6)
PAINLEVEL: NO PAIN (0)
PAINLEVEL: MILD PAIN (3)
PAINLEVEL: SEVERE PAIN (6)
PAINLEVEL: NO PAIN (0)
PAINLEVEL: EXTREME PAIN (8)
PAINLEVEL: SEVERE PAIN (7)
PAINLEVEL: NO PAIN (0)
PAINLEVEL: SEVERE PAIN (6)
PAINLEVEL: MODERATE PAIN (4)
PAINLEVEL: MODERATE PAIN (5)
PAINLEVEL: NO PAIN (0)
PAINLEVEL: SEVERE PAIN (6)
PAINLEVEL: SEVERE PAIN (6)
PAINLEVEL: NO PAIN (0)
PAINLEVEL: MODERATE PAIN (5)
PAINLEVEL: NO PAIN (0)

## 2023-01-01 ASSESSMENT — ANXIETY QUESTIONNAIRES
3. WORRYING TOO MUCH ABOUT DIFFERENT THINGS: NOT AT ALL
4. TROUBLE RELAXING: NOT AT ALL
2. NOT BEING ABLE TO STOP OR CONTROL WORRYING: NOT AT ALL
8. IF YOU CHECKED OFF ANY PROBLEMS, HOW DIFFICULT HAVE THESE MADE IT FOR YOU TO DO YOUR WORK, TAKE CARE OF THINGS AT HOME, OR GET ALONG WITH OTHER PEOPLE?: VERY DIFFICULT
2. NOT BEING ABLE TO STOP OR CONTROL WORRYING: NOT AT ALL
7. FEELING AFRAID AS IF SOMETHING AWFUL MIGHT HAPPEN: NOT AT ALL
7. FEELING AFRAID AS IF SOMETHING AWFUL MIGHT HAPPEN: NOT AT ALL
5. BEING SO RESTLESS THAT IT IS HARD TO SIT STILL: NOT AT ALL
5. BEING SO RESTLESS THAT IT IS HARD TO SIT STILL: NOT AT ALL
GAD7 TOTAL SCORE: 1
GAD7 TOTAL SCORE: 4
1. FEELING NERVOUS, ANXIOUS, OR ON EDGE: NOT AT ALL
GAD7 TOTAL SCORE: 4
7. FEELING AFRAID AS IF SOMETHING AWFUL MIGHT HAPPEN: NOT AT ALL
IF YOU CHECKED OFF ANY PROBLEMS ON THIS QUESTIONNAIRE, HOW DIFFICULT HAVE THESE PROBLEMS MADE IT FOR YOU TO DO YOUR WORK, TAKE CARE OF THINGS AT HOME, OR GET ALONG WITH OTHER PEOPLE: VERY DIFFICULT
1. FEELING NERVOUS, ANXIOUS, OR ON EDGE: NOT AT ALL
IF YOU CHECKED OFF ANY PROBLEMS ON THIS QUESTIONNAIRE, HOW DIFFICULT HAVE THESE PROBLEMS MADE IT FOR YOU TO DO YOUR WORK, TAKE CARE OF THINGS AT HOME, OR GET ALONG WITH OTHER PEOPLE: NOT DIFFICULT AT ALL
GAD7 TOTAL SCORE: 4
3. WORRYING TOO MUCH ABOUT DIFFERENT THINGS: NOT AT ALL
6. BECOMING EASILY ANNOYED OR IRRITABLE: SEVERAL DAYS
6. BECOMING EASILY ANNOYED OR IRRITABLE: SEVERAL DAYS
4. TROUBLE RELAXING: NEARLY EVERY DAY
GAD7 TOTAL SCORE: 1

## 2023-01-01 ASSESSMENT — ACTIVITIES OF DAILY LIVING (ADL)
ADLS_ACUITY_SCORE: 35
ADLS_ACUITY_SCORE: 49
ADLS_ACUITY_SCORE: 37
ADLS_ACUITY_SCORE: 35
ADLS_ACUITY_SCORE: 35
ADLS_ACUITY_SCORE: 49
DEPENDENT_IADLS:: CLEANING;COOKING;LAUNDRY;SHOPPING;MEAL PREPARATION;MEDICATION MANAGEMENT;TRANSPORTATION
ADLS_ACUITY_SCORE: 45
ADLS_ACUITY_SCORE: 49
ADLS_ACUITY_SCORE: 47
ADLS_ACUITY_SCORE: 35
ADLS_ACUITY_SCORE: 35
ADLS_ACUITY_SCORE: 45
ADLS_ACUITY_SCORE: 49
ADLS_ACUITY_SCORE: 51
ADLS_ACUITY_SCORE: 45
ADLS_ACUITY_SCORE: 45
ADLS_ACUITY_SCORE: 51
ADLS_ACUITY_SCORE: 35
ADLS_ACUITY_SCORE: 35
ADLS_ACUITY_SCORE: 45
ADLS_ACUITY_SCORE: 49
ADLS_ACUITY_SCORE: 47
ADLS_ACUITY_SCORE: 49
ADLS_ACUITY_SCORE: 35
ADLS_ACUITY_SCORE: 45
ADLS_ACUITY_SCORE: 45
ADLS_ACUITY_SCORE: 47
ADLS_ACUITY_SCORE: 49
ADLS_ACUITY_SCORE: 51
ADLS_ACUITY_SCORE: 47
ADLS_ACUITY_SCORE: 45
ADLS_ACUITY_SCORE: 47
ADLS_ACUITY_SCORE: 49
ADLS_ACUITY_SCORE: 47
ADLS_ACUITY_SCORE: 49
ADLS_ACUITY_SCORE: 45
ADLS_ACUITY_SCORE: 49
ADLS_ACUITY_SCORE: 51
ADLS_ACUITY_SCORE: 49
ADLS_ACUITY_SCORE: 49
ADLS_ACUITY_SCORE: 35
ADLS_ACUITY_SCORE: 37
ADLS_ACUITY_SCORE: 49
ADLS_ACUITY_SCORE: 45
ADLS_ACUITY_SCORE: 45

## 2023-01-01 ASSESSMENT — LIFESTYLE VARIABLES: TOBACCO_USE: 1

## 2023-01-01 ASSESSMENT — PATIENT HEALTH QUESTIONNAIRE - PHQ9
SUM OF ALL RESPONSES TO PHQ QUESTIONS 1-9: 11
SUM OF ALL RESPONSES TO PHQ QUESTIONS 1-9: 11
10. IF YOU CHECKED OFF ANY PROBLEMS, HOW DIFFICULT HAVE THESE PROBLEMS MADE IT FOR YOU TO DO YOUR WORK, TAKE CARE OF THINGS AT HOME, OR GET ALONG WITH OTHER PEOPLE: VERY DIFFICULT

## 2023-01-01 ASSESSMENT — COPD QUESTIONNAIRES: COPD: 1

## 2023-01-04 NOTE — TELEPHONE ENCOUNTER
Follow up call. Resource provided to patient to prevent running out of Tresiba prior to PAP delivery.  Suyapa Flowers RN Diabetes Educator,  [phone]  1/4/2023 at 11:49 AM

## 2023-01-05 NOTE — PROGRESS NOTES
Patient expressed concerns about his hair becoming more brittle and dry.  Patient reports his hair is falling out.  Patient hair appeared neat and trim and this writer did not note any bald patches or symptoms of fungal growth to head.  Scalp showed no indication of irritation.  TC to NP in oncology who had been following him, appears to have a recent history of hyper thyroid medication had recently been decreased.  NP advised he follow up with provider as patient showing symptomatic of hyper thyroidism.  This writer did talk with patient and he has appointment with provider on 1/12/2023, he wishes to wait to talk with her vs me sending a message.  Patient appreciative of the time put in by all to address his question.

## 2023-01-05 NOTE — PROGRESS NOTES
Patient here for bladder instillation of BCG.  UA negative for nitrates, negative for leukocytes.  Denies any fever or chills.  Denies any pain with urination.  Results reviewed, labs met treatment parameters.  Proceed with treatment.  BCG instillation via catheter, tolerated well, catheter removed.  Patient instructed to return immediately home and turn every 15 minutes per protocol, tolerated well.  Offered no complaints.  VS stable Encouraged to drink plenty of fluids for next couple of days.  Discharged in care of self Patient will return as ordered by ordering provider.  Patient aware of next steps to take for future appointments.

## 2023-01-09 NOTE — PATIENT INSTRUCTIONS
We have ordered a PET scan for you. Please plan for it to be on the 25th. You will have labs prior to your PET scan. We will see you back the week after the PET scan to review results.

## 2023-01-09 NOTE — NURSING NOTE
"Oncology Rooming Note    January 9, 2023 9:07 AM   Colin Baxter is a 77 year old male who presents for:    Chief Complaint   Patient presents with     Oncology Clinic Visit     Follow up Carcinoma in situ of bladder     Initial Vitals: /60   Pulse 66   Temp 97.1  F (36.2  C) (Tympanic)   Resp 20   Ht 1.702 m (5' 7\")   Wt 77.7 kg (171 lb 4.8 oz)   SpO2 99%   BMI 26.83 kg/m   Estimated body mass index is 26.83 kg/m  as calculated from the following:    Height as of this encounter: 1.702 m (5' 7\").    Weight as of this encounter: 77.7 kg (171 lb 4.8 oz). Body surface area is 1.92 meters squared.  No Pain (0) Comment: Data Unavailable   No LMP for male patient.  Allergies reviewed: Yes  Medications reviewed: Yes    Medications: Medication refills not needed today.  Pharmacy name entered into EPIC:     [id number] - [place], [place] - [address] [id number] - [place], [place] - [address] [place] [address]          Verónica SILVERIO Leung LPN            "

## 2023-01-09 NOTE — PROGRESS NOTES
Visit Date: 01/09/2023    HEMATOLOGY/ONCOLOGY CLINIC NOTE     HISTORY OF PRESENT ILLNESS:  Mr. 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  We had seen the patient in consultation at the request of Dr. Javier Mcnulty back on 02/15/2021.  At that time, Mr. 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  The patient had presented with hematuria, was seen by Dr. Morrell on 01/05/2021 who performed a cystoscopy, which revealed a bladder lesion.  Biopsy was negative.  Pathology came back atypical urothelial cells.  She ordered a CT urogram, which was read as an apparent mass in the left ureter measuring 2 cm in maximal transverse diameter.  There was no hydronephrosis.  The patient underwent cystourethroscopy with biopsy.  Pathology was read as high-grade urothelial cell carcinoma with superficial lamina propria invasion.  The patient also had a PET scan, which was negative for metastatic disease.  The patient was seen by Dr. Javier Mcnulty at the , who scheduled the patient for robotic-assisted laparoscopic left nephroureterectomy.  It was noted that the patient had a personal history of high-grade bladder cancer treated with BCG followed by reinduction BCG in 2016 with GemCis given intravesically x 3, which he completed in 05/2018.  He had been an on and off smoker for at least 25 years. 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  We recommended  cisplatin given at a dose 70 mg/m2 on day 1 with 20% dose reduction and gemcitabine given at 1000 mg/m2 on days 1 and 8 of a 21-day cycle x 4 cycles, then go on to surgery.  The patient went on to receive 4 cycles.  Course was complicated by neutropenia requiring delay of chemotherapy.  Otherwise, he did complete 4 cycles, then went on to have staging studies.  He underwent CT urogram on 05/14/2021 and the findings were that there was decrease in size of left uretic mass.  PET scan done earlier in February showed decreasing size of the mass from 2.6 cm to 2.1 cm, consistent with positive response.  His hematuria resolved.  He tolerated chemotherapy relatively well.  The patient subsequently went on to undergo a nephroureterectomy on the left performed by Dr. Javier Mcnulty on 06/16/2020.  Pathology was read as multifocal invasive high-grade urothelial cell carcinoma of the ureter, the largest tumor being 4.2 cm.  Carcinoma invaded periureteric adventitial tissue.  Margins were free of tumor. At the periaortic margin, there was less than 0.1 cm from the bladder cuff.  The patient was staged pathologic yT3 N0.  The patient had a PET scan done subsequently in 12/2021 and there was no evidence of recurrent residual disease.  Postoperative changes were consistent with left nephroureterectomy.  We also noted creatinine was elevated.  CT abdomen and pelvis revealed normal-appearing right kidney.  We referred the patient to Dr. Arvizu due to his rising creatinine.  According to the patient, he did not perform any further workup.  He did see Urology to remove his catheter.  The plan was to proceed with surveillance cystoscopy in October.      When we saw the patient, the plan was to monitor surveillance.  Since then, adjuvant nivolumab therapy has been approved for invasive urothelial carcinoma based on the [place] Journal of Medicine article by MIGUEL Suazo in which patients receiving neoadjuvant nivolumab with a  T2 or greater lesion did better than those who did not; therefore, the patient was started on adjuvant nivolumab therapy.  Course was complicated by shingles of the right eye, requiring Valtrex.  He developed loose stools after cycle 7.  He was placed on prednisone.  Symptoms did not improve after cycle 8 of nivolumab.  The patient was placed on steroid taper.  He was seen in the [place] emergency room on 08/29/2022 for evaluation of nausea, vomiting, diarrhea.  He was treated with IV fluids and Zofran and clinically improved.  He was discharged on Zofran.  The patient and his daughter went to [place] and he was seen in the emergency room with extreme symptoms.  He was given fluids and felt better, but then continued to decline. He was having 4-5 watery stools a day.  In the emergency room at [facility] 09/10/2022, he was noted to have a creatinine of 2.25, sodium was 133.  CT showed subtle new 2.1 cm hypoattenuating area in the pancreatic body without ductal dilatation.  The patient was admitted to the medical floor and hydrated with normal saline.  He was seen by [facility] and underwent colonoscopy, which showed diffuse mild inflammation characterized by altered vascularity and erythema that was found in the entire colon. Several biopsies were obtained of the right and left colon with cold forceps for histology.  A diffuse area of mucosa in the terminal ileum was mildly erythematous.  This was biopsied with cold forceps for histology.  Multiple small and large mouth diverticula were found in the sigmoid colon, descending colon and transverse colon.  Three sessile polyps were found in the transverse colon and ascending colon. Polyps were 1-7 mm in size.  Polypectomy was not attempted due to inadequate bowel preparation for endoscopic visualization.  The patient was started on prednisone 80 mg daily and diarrhea subsided.  Two days later, when his blood sugars were greater than 400, insulin  was increased.  Prednisone was dropped to 60 mg daily with plan to taper off in 15 days.  His creatinine was 2.07.  He was noted to have leukocytosis felt to be secondary to colitis and possibly aggravated by steroids.  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  TSH was 49 and levothyroxine was increased from 100 mcg at 150 mcg daily.      The patient was seen by [facility] for endoscopic ultrasound-guided biopsy of this pancreatic lesion and endosonographic findings revealed the patient had an occult lesion suggestive of cyst identified in the pancreatic body.  The lesion measured 15 mm. Tissue was not obtained; however, the clinical appearance was suggestive of pancreatic pseudocyst.  It was recommended the patient have an MRCP in 6 months.  The patient has been receiving BCG bladder instillation under the care of Dr. Stefanie Schmitz and now returns for followup.  He has not had any recent scans.  He denies any fevers, night sweats, weight loss, abdominal pain.    MEDICATIONS:  Include:  1.  Synthroid 137 mcg daily.  2.  DuoNeb nebulizers p.r.n.  3.  Albuterol 2 puffs into the lungs q.6 hours.  4.  Prilosec 20 mg daily.  5.  Tylenol p.r.n.  6.  Ativan p.r.n.  7.  Zofran ODT 4 mg q.8 hours p.r.n.  8.  Melatonin 3 mg daily.  9.  Lipitor 40 mg daily.    PHYSICAL EXAMINATION:    GENERAL:  He is an elderly white male in no acute distress.  ECOG performance status is 0.  VITAL SIGNS:  Blood pressure 104/60, pulse 66, respirations 20, temperature 97.1.  HEENT:  Atraumatic, normocephalic.  Oropharynx nonerythematous.  NECK:  Supple, no thyromegaly.  LUNGS:  Clear to auscultation and percussion.  HEART:  Regular rhythm, S1, S2 normal.  ABDOMEN:  Soft, normoactive bowel sounds.  No mass, nontender.  LYMPHATICS:  No cervical, supraclavicular, axillary or inguinal nodes.  EXTREMITIES:  No edema.  NEUROLOGIC:   Nonfocal.    LABORATORY DATA:  Not done.    IMPRESSION:    1.  High-grade urothelial cell carcinoma of the left ureter with a 2 cm mass invading the lamina propria.  The patient went on to neoadjuvant chemotherapy with cisplatin and gemcitabine with 20% dose reduction of cisplatin.  The patient received 4 cycles, then went on to nephroureterectomy and was found to have a pathologic T3 N0 high-grade urothelial cell carcinoma.  PET scan was negative.  The patient will be a candidate for adjuvant nivolumab based on the recent FDA approval for patients with grade muscle-invasive urothelial cell carcinoma with greater than T2 lesions, which has shown improved progression-free survival.  The patient was started on nivolumab 480 mg IV every 28 days.  After 7 cycles, he developed loose stools and after 8 cycles, he developed significant diarrhea.  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  He was tapered off prednisone.  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  The plan is to restage the patient with a PET scan.  2.  History of T1 bladder cancer.  The patient is currently on BCG therapy under the direction of Dr. Schmitz.  3.  Pancreatic pseudocyst.  Biopsy was not performed.  The plan is to repeat MRI of the abdomen with attention to pancreas as well as PET scan.      We will see the patient after the above workup.    Ninety-three minutes was spent on this patient.  Time was spent reviewing voluminous notes from [facility], reviewing multiple physician provider notes, pathology reports, discussing results with the patient, performing history and physical, documenting history and physical, ordering followup labs, PET scan and MRI.    Jhony Bartlett MD        D: 2023   T: 2023   MT: BRIANNA    Name:     CLARISSA COSTA  MRN:      [id number]        Account:    [id number]   :      1945           Visit  Date: 01/09/2023     Document: [id number]

## 2023-01-10 NOTE — PLAN OF CARE
Goal Outcome Evaluation:    Plan of Care Reviewed With: patient, daughter     Overall Patient Progress: no change     0825: glucose 52 per lab, oj and glucose gel given, recheck is 82  Lunchtime glucose 60: oj and glucose gel given; + ice cream per pt's preference. Recheck is 74. Dr.Mork rojo.           O-Z Plasty Text: The defect edges were debeveled with a #15 scalpel blade.  Given the location of the defect, shape of the defect and the proximity to free margins an O-Z plasty (double transposition flap) was deemed most appropriate.  Using a sterile surgical marker, the appropriate transposition flaps were drawn incorporating the defect and placing the expected incisions within the relaxed skin tension lines where possible.    The area thus outlined was incised deep to adipose tissue with a #15 scalpel blade.  The skin margins were undermined to an appropriate distance in all directions utilizing iris scissors.  Hemostasis was achieved with electrocautery.  The flaps were then transposed into place, one clockwise and the other counterclockwise, and anchored with interrupted buried subcutaneous sutures.

## 2023-01-12 NOTE — PROGRESS NOTES
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 Plan is to perform MRCP in 6 months and to repeat MRI (1/26/2023)   -continue treatment plan with Dr Bartlett      Type 2 diabetes mellitus without complication, with long-term current use of insulin (H)  Last A1C 01/12/23 7.1, A1C goal <8. He is stable with no lows and an average blood sugar levels of 159. Will not make any adjustments to his medications at this time. Discussed insulin sliding scale with patient. discussed using non-pharmacologic interventions to improve blood sugar goals.  - continue with insulin aspart daily   -continue with tresiba daily   - continue with freestyle qing to check blood sugars  - follows with [facility] diabetic education with next visit 2/27/2023    Hyperlipidemia, unspecified hyperlipidemia type  LDL, total cholesterol and trigs were last taken 10/2/2022 and controlled. Discussed the maintaining active lifestyle goals.   - continue with atorvastatin 40 mg      Acquired hypothyroidism  -low last TSH 0.04 and free T4 is within normal limits. His thyroid function elevated. Decreasing the dose of levothyroxine will be appropriate.  -decrease levothyroxine from 137 mcg to 125 mcg  -follow-up with labs in 6 weeks     Malignant neoplasm of urinary bladder, unspecified site (H)  Treatment with BCG managed by [facility] Dr Schmitz. Discussed plan to follow with Dr Schmitz   - continue the treatment plan with Dr. 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  PET scan was negative.  Treatment with adjuvant nivolumab 480 mg IV every 28 days was initiated.  After 7 cycles, he developed loose stools and after 8 cycles, he developed diarrhea.  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  He was tapered off prednisone.  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 The plan going forward is to restage with PET (1/25/2023)  - continue with the treatment plan laid out by Dr Bartlett    Stage 3b chronic kidney disease (H)  Managed by Dr Arvizu, creatinine of 1.85 with GFR 38 and remained fairly stable.  -continue to follow with Dr Arvizu    Insomnia due to medical condition  He is having difficulty falling back to sleep after getting up to urinate at night. Taking tylenol helps him to sleep better without having the urge to urinate. He does feel rested even though he is unable to return back to sleep. Plan to restart melatonin with 1 mg increase to 3 mg as needed.   - initiate melatonin 1 mg at bedtime and increase as needed to 3 mg for insomnia   - take tylenol 625 mg at bedtime as needed for sleep.        BMI:   Estimated body mass index is 27.02 kg/m  as calculated from the following:    Height as of 1/9/23: 1.702 m (5' 7\").    Weight as of this encounter: 78.2 kg (172 lb 8 oz).   Weight management plan: Discussed healthy diet and exercise guidelines-He is very active individual especially in the summer and fall.     Return in about 3 months (around 4/12/2023) for Lab Work, Diabetic Visit.    DOMO Crowder-S  [facility]     I was present with the physician assistant student who participated in the service and in the documentation of the note. I have verified the history and personally performed the physical exam and medical decision making. I agree with the assessment and plan of care as documented in the note.       Libia Wallace MD  [facility] - ROBINA Shaw is a 77 year old, presenting for the following health issues:  Diabetes, Thyroid Problem, Lipids, and Hypertension      HPI     Diabetes Follow-up    How often are you checking your blood sugar? Continuous glucose monitor  What time of day are you checking your blood sugars " (select all that apply)?  thropughout the day  Have you had any blood sugars above 200?  Yes     Have you had any blood sugars below 70?  No- once in the past month,     What symptoms do you notice when your blood sugar is low?  None- when its in the 60s    What concerns do you have today about your diabetes? None and Blood sugar is often over 200     Do you have any of these symptoms? (Select all that apply)  No numbness or tingling in feet.  No redness, sores or blisters on feet.  No complaints of excessive thirst.  No reports of blurry vision.  No significant changes to weight.    - He is not interest in meeting with diabetes education at the moment and  will contact us if anything changes  - sliding scale insulin directions were discussed to make sure the right dose is used.     Hyperlipidemia Follow-Up      Are you regularly taking any medication or supplement to lower your cholesterol?   Yes- Atorvastatin 40 mg    Are you having muscle aches or other side effects that you think could be caused by your cholesterol lowering medication?  No    Hypertension Follow-up      Do you check your blood pressure regularly outside of the clinic? No     Are you following a low salt diet? Yes    Are your blood pressures ever more than 140 on the top number (systolic) OR more   than 90 on the bottom number (diastolic), for example 140/90? No    BP Readings from Last 2 Encounters:   01/12/23 108/76   01/09/23 104/60     Hemoglobin A1C (%)   Date Value   08/26/2022 8.6 (H)   05/23/2022 7.4 (H)   11/24/2021 7.1 (A)   01/14/2021 5.7 (H)     LDL Cholesterol Calculated (mg/dL)   Date Value   01/12/2023 50   02/22/2022 108 (H)   10/02/2020 75   09/09/2019 84     Hypothyroidism Follow-up      Since last visit, patient describes the following symptoms: Weight stable, no skin changes, no constipation, no loose stools but feels like he is loosing some hair.     Lab Results   Component Value Date    TSH 0.17 11/30/2022    TSH 10.39  10/10/2022    TSH 1.41 10/02/2020     #Mood  -His mood has been stable and appropriate     # Oncology: bladder cancer (Dr Nadir Patterson),  Ureter cancer (Dr. Bartlett)  - follows with Dr Bartlett with last visit 1/9/2023  - colonoscopy d/t h/o multiple polyps seen but not able to be bx**  - restaging with PET on 1/25/2023    # pancreatic mass  - MRI scheduled for 1/26/2023    # CKD  Follows with Dr Arvizu with last visit 10/7/2022.  ** follow up appt- for details see assessment and plan  1. Acquired solitary kidney,right    2. Urothelial cancer s/p left nephrectomy     3. Stage 3b chronic kidney disease   Stable    4. h/o bladder cancer    5. Vitamin D deficiency  On supplements    If swelling does not improve with time I would consider a short course of loop diuretics    Follow up: 3 to 4 months    # Immunizations: COVID booster  - up-to-date COVID and Influenza vaccines. Both we given at [facility].     -colonoscopy will be scheduled for [place]     Review of Systems   Constitutional: Negative for chills and fatigue.   HENT: Negative.    Respiratory: Negative for cough, chest tightness and shortness of breath.    Cardiovascular: Negative for chest pain.   Gastrointestinal: Negative for diarrhea, heartburn, nausea and vomiting.   Endocrine: Positive for polyuria. Negative for polydipsia and polyphagia.   Genitourinary: Positive for frequency (about 2 to 3 times at night ).   Skin: Negative.    Psychiatric/Behavioral: Negative for mood changes. The patient is not nervous/anxious.           Objective    /76 (BP Location: Left arm, Patient Position: Chair, Cuff Size: Adult Regular)   Pulse 66   Temp 98  F (36.7  C) (Tympanic)   Resp 17   Wt 78.2 kg (172 lb 8 oz)   SpO2 97%   BMI 27.02 kg/m    Body mass index is 27.02 kg/m .     Physical Exam  Constitutional:       Appearance: Normal appearance.   HENT:      Head: Normocephalic and atraumatic.   Cardiovascular:      Rate and Rhythm: Normal rate and  regular rhythm.   Pulmonary:      Effort: Pulmonary effort is normal.      Breath sounds: Normal breath sounds. No wheezing, rhonchi or rales.   Musculoskeletal:      Right lower leg: No edema.      Left lower leg: No edema.   Neurological:      Mental Status: He is alert.   Psychiatric:         Mood and Affect: Mood normal.      Comments: Appropriate mood and affect        Results for orders placed or performed in visit on 01/12/23   Hemoglobin A1c     Status: Abnormal   Result Value Ref Range    Estimated Average Glucose 157 mg/dL    Hemoglobin A1C 7.1 (H) <5.7 %   TSH with free T4 reflex     Status: Abnormal   Result Value Ref Range    TSH 0.04 (L) 0.30 - 4.20 uIU/mL   Lipid Profile (Chol, Trig, HDL, LDL calc)     Status: Normal   Result Value Ref Range    Cholesterol 119 <200 mg/dL    Triglycerides 89 <150 mg/dL    Direct Measure HDL 51 >=40 mg/dL    LDL Cholesterol Calculated 50 <=100 mg/dL    Non HDL Cholesterol 68 <130 mg/dL    Narrative    Cholesterol  Desirable:  <200 mg/dL    Triglycerides  Normal:  Less than 150 mg/dL  Borderline High:  150-199 mg/dL  High:  200-499 mg/dL  Very High:  Greater than or equal to 500 mg/dL    Direct Measure HDL  Female:  Greater than or equal to 50 mg/dL   Male:  Greater than or equal to 40 mg/dL    LDL Cholesterol  Desirable:  <100mg/dL  Above Desirable:  100-129 mg/dL   Borderline High:  130-159 mg/dL   High:  160-189 mg/dL   Very High:  >= 190 mg/dL    Non HDL Cholesterol  Desirable:  130 mg/dL  Above Desirable:  130-159 mg/dL  Borderline High:  160-189 mg/dL  High:  190-219 mg/dL  Very High:  Greater than or equal to 220 mg/dL   T4 free     Status: Normal   Result Value Ref Range    Free T4 1.56 0.90 - 1.70 ng/dL   Results for orders placed or performed in visit on 01/12/23   Lactate Dehydrogenase     Status: Normal   Result Value Ref Range    Lactate Dehydrogenase 157 0 - 250 U/L   Comprehensive metabolic panel     Status: Abnormal   Result Value Ref Range    Sodium 138  136 - 145 mmol/L    Potassium 4.3 3.4 - 5.3 mmol/L    Chloride 106 98 - 107 mmol/L    Carbon Dioxide (CO2) 24 22 - 29 mmol/L    Anion Gap 8 7 - 15 mmol/L    Urea Nitrogen 31.0 (H) 8.0 - 23.0 mg/dL    Creatinine 1.85 (H) 0.67 - 1.17 mg/dL    Calcium 9.1 8.8 - 10.2 mg/dL    Glucose 161 (H) 70 - 99 mg/dL    Alkaline Phosphatase 110 40 - 129 U/L    AST 17 10 - 50 U/L    ALT 8 (L) 10 - 50 U/L    Protein Total 6.7 6.4 - 8.3 g/dL    Albumin 4.1 3.5 - 5.2 g/dL    Bilirubin Total 0.5 <=1.2 mg/dL    GFR Estimate 38 (L) >60 mL/min/1.73m2   CBC with platelets and differential     Status: Abnormal   Result Value Ref Range    WBC Count 7.0 4.0 - 11.0 10e3/uL    RBC Count 3.76 (L) 4.40 - 5.90 10e6/uL    Hemoglobin 11.8 (L) 13.3 - 17.7 g/dL    Hematocrit 35.2 (L) 40.0 - 53.0 %    MCV 94 78 - 100 fL    MCH 31.4 26.5 - 33.0 pg    MCHC 33.5 31.5 - 36.5 g/dL    RDW 12.7 10.0 - 15.0 %    Platelet Count 236 150 - 450 10e3/uL    % Neutrophils 61 %    % Lymphocytes 25 %    % Monocytes 11 %    % Eosinophils 2 %    % Basophils 1 %    % Immature Granulocytes 0 %    NRBCs per 100 WBC 0 <1 /100    Absolute Neutrophils 4.2 1.6 - 8.3 10e3/uL    Absolute Lymphocytes 1.8 0.8 - 5.3 10e3/uL    Absolute Monocytes 0.8 0.0 - 1.3 10e3/uL    Absolute Eosinophils 0.1 0.0 - 0.7 10e3/uL    Absolute Basophils 0.1 0.0 - 0.2 10e3/uL    Absolute Immature Granulocytes 0.0 <=0.4 10e3/uL    Absolute NRBCs 0.0 10e3/uL   CBC with Platelets & Differential     Status: Abnormal    Narrative    The following orders were created for panel order CBC with Platelets & Differential.  Procedure                               Abnormality         Status                     ---------                               -----------         ------                     CBC with platelets and d...[[id number]]  Abnormal            Final result                 Please view results for these tests on the individual orders.

## 2023-01-12 NOTE — PATIENT INSTRUCTIONS
Decrease your levothyroxine to 125mcg every day  Recheck labs in 6 weeks    Order placed for colonoscopy

## 2023-01-12 NOTE — LETTER
My COPD Action Plan     Name: Colin Baxter    YOB: 1945   Date: 1/12/2023    My doctor: Libia Wallace MD   My clinic: [facility] [place]    [address]  [place] [address]  [phone]  My Controller Medicine: Albuterol (Proair/Ventolin/Proventolin)   Dose: 2 puffs every 6 hrs PRN     My Rescue Medicine: Albuterol nebulizer solution    Dose: 1 vial every 6 hrs PRN     My Flare Up Medicine: NA   Dose: na     My COPD Severity: Chronic      Use of Oxygen: Oxygen Not Prescribed      Make sure you've had your pneumonia   vaccines.          GREEN ZONE       Doing well today      Usual level of activity and exercise    Usual amount of cough and mucus    No shortness of breath    Usual level of health (thinking clearly, sleeping well, feel like eating) Actions:      Take daily medicines    Use oxygen as prescribed    Follow regular exercise and diet plan    Avoid cigarette smoke and other irritants that harm the lungs           YELLOW ZONE          Having a bad day or flare up      Short of breath more than usual    A lot more sputum (mucus) than usual    Sputum looks yellow, green, tan, brown or bloody    More coughing or wheezing    Fever or chills    Less energy; trouble completing activities    Trouble thinking or focusing    Using quick relief inhaler or nebulizer more often    Poor sleep; symptoms wake me up    Do not feel like eating Actions:      Get plenty of rest    Take daily medicines    Use quick relief inhaler every 6 hours    If you use oxygen, call you doctor to see if you should adjust your oxygen    Do breathing exercises or other things to help you relax    Let a loved one, friend or neighbor know you are feeling worse    Call your care team if you have 2 or more symptoms.  Start taking steroids or antibiotics if directed by your care team           RED ZONE       Need medical care now      Severe shortness of breath (feel you can't breathe)    Fever,  chills    Not enough breath to do any activity    Trouble coughing up mucus, walking or talking    Blood in mucus    Frequent coughing   Rescue medicines are not working    Not able to sleep because of breathing    Feel confused or drowsy    Chest pain    Actions:      Call your health care team.  If you cannot reach your care team, call 911 or go to the emergency room.        Annual Reminders:  Meet with Care Team, Flu Shot every Fall  Pharmacy:    [id number] [place] [facility] [address]  [id number] - [place], [place] - [address] [place] [address]

## 2023-01-12 NOTE — PROGRESS NOTES
Pt came in for a Tiffany download today. This was completed and given to Dr Wallace and Suyapa Flowers.    Rowena Phan

## 2023-01-13 NOTE — PROGRESS NOTES
Diabetes Self-Management Education & Support    Follow up, pt seen PCP yesterday 1/12/23. Noted A1C:  7.1, down from 8.6. Target goal is <8.0.     No changes made to insulin.   CGM report- shows no low events.   TIR 68%. High 31% Very high 1%.     No answer, generic message left for patient.   Suyapa Flowers, RN Diabetes Educator,  [phone]  1/13/2023 at 2:04 PM

## 2023-01-18 NOTE — PROGRESS NOTES
Diabetes Self-Management Education & Support    Follow up with patient-  pt seen PCP 1/12/23. Noted A1C:  7.1, down from 8.6. Target goal is <8.0.      No changes made to insulin.   CGM report- shows no low events.   TIR 68%. High 31% Very high 1%.      Colin reports if he has a low, it is usually after activity such as shoveling. Treats low and resolves.     Pt states he is anticipating a call from [facility] for delivery, he received a letter he will get a call before they send his Tresiba. Has about 11 days left.   Will follow up to ensure he has insulin supply.   Suyapa Flowers RN Diabetes Educator,  [phone]  1/18/2023 at 4:38 PM

## 2023-01-24 NOTE — PROGRESS NOTES
Diabetes Self-Management Education & Support    Call to Colin to follow up Tresiba status. Pt states he called [employer] yesterday and was told there is a shipping or manufacturing issue with Tresiba. Is supposed to have a call back but doesn't know when he will get his Tresiba through [facility]. Has about 1/2 pen left.     Will follow up with options.   Suyapa Flowers RN Diabetes Educator,  [phone]  1/24/2023 at 4:04 PM

## 2023-01-25 NOTE — PROGRESS NOTES
CLINIC NOTE - CONSULT  1/25/2023    Patient:Colin Baxter  Referring Physician: Libia Wallace    Reason for Referral: History of polyps    This is a 78 year old male who on 9/13/2022 had a colonoscopy.  This was done at [facility].  At that time 3 sessile polyps were noted in the transverse and ascending colon.  These were not biopsied secondary to a poor prep.  The recommendation at that time was to have a follow-up colonoscopy in 3 months.  He is here for scheduling of that procedure.  No other complaints.     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     Chronic kidney disease      Diabetes mellitus, type 2 (H)      Dyslipidemia      Glaucoma      Hyperlipidemia      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) 12/11/2020    getting BCG (from his Urologist in [place], [place])     Obesity      Pancreatic mass      Renal cell carcinoma, left (H) 2021    Left nephrectomy     Ureter cancer, left (H)        Past Surgical History:  Past Surgical History:   Procedure Laterality Date     APPENDECTOMY  1958     COLONOSCOPY  2-     COLONOSCOPY N/A 9/13/2022    Procedure: COLONOSCOPY;  Surgeon: Keyon Zimmerman MD;  Location: [facility] [place] OR     COMBINED CYSTOSCOPY, RETROGRADES, URETEROSCOPY, INSERT STENT Left 1/18/2021    Procedure: CYSTOURETEROSCOPY, WITH RETROGRADE PYELOGRAM with interpretation of fluroscopy, ureteroscopy, ureteral biopsy, bladder biopsy and fulgaration;  Surgeon: Shonda Morrell MD;  Location: [facility]     CYSTOSCOPY N/A 6/16/2021    Procedure: CYSTOSCOPY;  Surgeon: Javier Mcnulty MD;  Location: [facility] OR     CYSTOSCOPY, BIOPSY BLADDER, COMBINED N/A 9/8/2015    Procedure: COMBINED CYSTOSCOPY, BIOPSY BLADDER;  Surgeon: Randy Martinez MD;  Location: [facility]     CYSTOSCOPY, BIOPSY  BLADDER, COMBINED N/A 2/14/2017    Procedure: COMBINED CYSTOSCOPY, BIOPSY BLADDER;  Surgeon: Randy Martinez MD;  Location: [facility]     CYSTOSCOPY, BIOPSY BLADDER, COMBINED N/A 11/13/2018    Procedure: COMBINED CYSTOSCOPY, BIOPSY BLADDER;  Surgeon: Ed Helm MD;  Location: [facility] OR     CYSTOSCOPY, BIOPSY BLADDER, COMBINED N/A 11/5/2021    Procedure: CYSTOSCOPY, WITH BLADDER BIOPSY;  Surgeon: Shonda Morrell MD;  Location: [facility]     CYSTOSCOPY, RETROGRADES, COMBINED Bilateral 11/13/2018    Procedure: Bilateral Retrograde Pyelagram, Bladder Biopsy;  Surgeon: Ed Helm MD;  Location: [facility] OR     CYSTOSCOPY, RETROGRADES, COMBINED Right 11/5/2021    Procedure: CYSTOSCOPY, WITH RETROGRADE PYELOGRAM;  Surgeon: Shonda Morrell MD;  Location: [facility]     CYSTOSCOPY, RETROGRADES, INSERT STENT URETER(S), COMBINED Left 9/8/2015    Procedure: COMBINED CYSTOSCOPY, RETROGRADES, INSERT STENT URETER(S);  Surgeon: Randy Martinez MD;  [facility]     CYSTOSCOPY, TRANSURETHRAL RESECTION (TUR) TUMOR BLADDER, COMBINED N/A 9/8/2015    Procedure: COMBINED CYSTOSCOPY, TRANSURETHRAL RESECTION (TUR) TUMOR BLADDER;  Surgeon: Randy Martinez MD;  [facility]     CYSTOSCOPY, TRANSURETHRAL RESECTION (TUR) TUMOR BLADDER, COMBINED N/A 1/12/2016    Procedure: COMBINED CYSTOSCOPY, TRANSURETHRAL RESECTION (TUR) TUMOR BLADDER;  Surgeon: Randy Martinez MD;  [facility]     CYSTOSCOPY, TRANSURETHRAL RESECTION (TUR) TUMOR BLADDER, COMBINED N/A 9/13/2016    Procedure: COMBINED CYSTOSCOPY, TRANSURETHRAL RESECTION (TUR) TUMOR BLADDER;  Surgeon: Randy Martinez MD;  [facility]     DAVINCI NEPHROURETERECTOMY Left 6/16/2021    Procedure: NEPHROURETERECTOMY, ROBOT-ASSISTED, lymph node dissection;  Surgeon: Javier Mcnulty MD;  Location[facility]     ESOPHAGOSCOPY, GASTROSCOPY, DUODENOSCOPY (EGD), COMBINED N/A 9/30/2022    Procedure: UPPER ENDOSCOPIC ULTRASOUND;  Surgeon: Jason Dennis  MD Jose;  Location: [facility] [place] [facility]     PHACOEMULSIFICATION WITH STANDARD INTRAOCULAR LENS IMPLANT Right 10/9/2018    Procedure: PHACOEMULSIFICATION WITH STANDARD INTRAOCULAR LENS IMPLANT;  PHACOEMULSIFICATION CATARACT EXTRACTION POSTERIOR CHAMBER LENS RIGHT;  Surgeon: Marlo Mcconnell MD;  Location[facility]     PHACOEMULSIFICATION WITH STANDARD INTRAOCULAR LENS IMPLANT Left 10/23/2018    Procedure: PHACOEMULSIFICATION CATARACT EXTRACTION POSTERIOR CHAMBER LENS LEFT;  Surgeon: Marlo Mcconnell MD;  Location: [facility]       Family History History:  Family History   Problem Relation Age of Onset     Diabetes Mother      Other - See Comments Father         cause of death unknown     Parkinsonism Brother      Coronary Artery Disease No family hx of      Hypertension No family hx of      Hyperlipidemia No family hx of      Cerebrovascular Disease No family hx of      Breast Cancer No family hx of      Colon Cancer No family hx of      Prostate Cancer No family hx of      Anesthesia Reaction No family hx of      Asthma No family hx of      Thyroid Disease No family hx of      Genetic Disorder No family hx of        History of Tobacco Use:  History   Smoking Status     Former     Types: Cigarettes     Quit date: 1/6/1992   Smokeless Tobacco     Never       Current Medications:  Current Outpatient Medications   Medication Sig Dispense Refill     acetaminophen (TYLENOL) 325 MG tablet Take 2 tablets (650 mg) by mouth every 4 hours as needed for mild pain or other (and adjunct with moderate or severe pain or per patient request)  0     atorvastatin (LIPITOR) 40 MG tablet TAKE 1 TABLET (40 MG) BY MOUTH DAILY 90 tablet 3     bisacodyl (DULCOLAX) 5 MG EC tablet Take 2 tabs at bedtime 2 nights prior to procedure. Take 2 tabs at 3pm the day before procedure. 4 tablet 0     blood glucose (ONETOUCH VERIO IQ) test strip USE TO TEST BLOOD SUGAR 3 TIMES DAILY 100 strip 11     brimonidine (ALPHAGAN-P) 0.15 % ophthalmic solution INSTILL 1  DROP INTO RIGHT EYE TWICE DAILY  12     Cholecalciferol (VITAMIN D3) 25 MCG TABS Take 25 mcg by mouth daily       Continuous Blood Gluc  (FREESTYLE DELILAH 2 READER) TENNILLE 1 each continuous 1 each 0     Continuous Blood Gluc Sensor (FREESTYLE DELILAH 2 SENSOR) MISC 1 each every 14 days 2 each 11     insulin aspart (NOVOLOG PEN) 100 UNIT/ML pen 5 units with meals, and for glucometer 150-200 add 2 units SQ, 201-250 add 4 units, >250 add 6 units 15 mL 0     insulin degludec (TRESIBA FLEXTOUCH) 100 UNIT/ML pen Inject 20 units daily (Patient taking differently: 18 Units Inject 20 units daily) 15 mL 3     insulin pen needle (BD PEN NEEDLE DONTAE 2ND GEN) 32G X 4 MM miscellaneous USE 3 PEN NEEDLES DAILY 100 each 11     ipratropium - albuterol 0.5 mg/2.5 mg/3 mL (DUONEB) 0.5-2.5 (3) MG/3ML neb solution Take 1 vial (3 mLs) by nebulization every 6 hours as needed for shortness of breath / dyspnea or wheezing 90 mL 0     latanoprost (XALATAN) 0.005 % ophthalmic solution Place 1 drop into both eyes At Bedtime       levothyroxine (SYNTHROID/LEVOTHROID) 125 MCG tablet Take 1 tablet (125 mcg) by mouth daily 90 tablet 1     LORazepam (ATIVAN) 0.5 MG tablet Take 1 tablet (0.5 mg) by mouth nightly as needed for sleep 20 tablet 0     melatonin 3 MG tablet Take 1 tablet (3 mg) by mouth nightly as needed for sleep 90 tablet 1     omeprazole (PRILOSEC) 20 MG DR capsule TAKE 1 CAPSULE 60 capsule 0     ondansetron (ZOFRAN ODT) 4 MG ODT tab Take 1 tablet (4 mg) by mouth every 6 hours as needed for nausea or vomiting 20 tablet 0     ONETOUCH DELICA LANCETS 33G MISC 1 each 2 times daily 100 each 10     polyethylene glycol (GOLYTELY) 236 g suspension Drink 8 ounces every 10 minutes until the jug is half-empty at 6pm the night before procedure. Refrigerate. Repeat 6 hours prior to procedure. 4000 mL 0     spacer (CynvecBER RORY) holding chamber Use with your albuterol inhaler 1 each 0     VITRON-C  MG TABS tablet Take 1 tablet by  "mouth daily       albuterol (PROAIR HFA/PROVENTIL HFA/VENTOLIN HFA) 108 (90 Base) MCG/ACT inhaler Inhale 2 puffs into the lungs every 6 hours as needed for shortness of breath / dyspnea or wheezing (Patient not taking: Reported on 1/9/2023) 18 g 1       Allergies:  Allergies   Allergen Reactions     No Clinical Screening - See Comments Other (See Comments)     Beta blocker in glaucoma gtt.s caused low pulse and passing out      Timolol      Beta blocker in glaucoma gtt.s caused low pulse and passing out   - patient thinks it was timolol but not 100% sure which beta blocker eye drop.      Bactrim [Sulfamethoxazole W/Trimethoprim] Rash       ROS:  Pertinent items are noted in HPI.  All other systems are negative.    PHYSICAL EXAM:     Vital signs: /72   Pulse 107   Temp 97.2  F (36.2  C) (Tympanic)   Resp 16   Ht 1.702 m (5' 7\")   SpO2 100%   BMI 27.02 kg/m     Weight: [unfilled]   BMI: Body mass index is 27.02 kg/m .   General: Normal, healthy, cooperative, in no acute distress, alert   Skin: no jaundice   HEENT: PERRLA and EOMI   Neck: supple     ASSESSMENT:      78 year old male with a need of a colonoscopy for With a history of polyps that were not biopsied..    PLAN:   A colonoscopy will be scheduled.  The procedure with their risks, benefits and alternatives were explained.  Risks include but are not limited to bleeding, perforation, missing lesions, need for additional procedures, reaction to anesthesia.  All the patients questions were answered.  The patient consents to proceed as planned.      "

## 2023-01-27 NOTE — TELEPHONE ENCOUNTER
Pt calls he has not heard anything from the [employer] [facility]. I will refax forms as these were faxed 11/23/22 and may have been too early. He should have heard by now. Pt also needs a refill on his test strips.

## 2023-01-27 NOTE — TELEPHONE ENCOUNTER
Order sent for test strips.   Suyapa Flowers RN Diabetes Educator,  [phone]  1/27/2023 at 10:59 AM

## 2023-01-27 NOTE — TELEPHONE ENCOUNTER
I called the pt and notified. He states he did get a letter stating he was approved for the program and would get a call when meds are being shipped. Pt is starting to run low on his Tresiba. Pt has left messages with [employer] to call back and they have not. I advised most companies do not call back when their system states it will. I advised he call when he has 2 days left of medication if he has not heard from them and check on his order and he can request a voucher for free medication to p/u at a local pharmacy.

## 2023-01-30 NOTE — PATIENT INSTRUCTIONS
We would like to see you back in 3 months with MRI of abdomen and CT chest with labs the week prior. When you are in need of a refill of your medications, please call your pharmacy and they will send us the request. If you have any questions please call [phone]

## 2023-01-30 NOTE — NURSING NOTE
"Oncology Rooming Note    January 30, 2023 8:59 AM   Colin Baxter is a 78 year old male who presents for:    Chief Complaint   Patient presents with     Oncology Clinic Visit     Follow up Carcinoma in situ of bladder       Initial Vitals: /60   Pulse 77   Temp 97.3  F (36.3  C) (Tympanic)   Resp 20   Ht 1.702 m (5' 7\")   Wt 77.5 kg (170 lb 13.7 oz)   SpO2 96%   BMI 26.76 kg/m   Estimated body mass index is 26.76 kg/m  as calculated from the following:    Height as of this encounter: 1.702 m (5' 7\").    Weight as of this encounter: 77.5 kg (170 lb 13.7 oz). Body surface area is 1.91 meters squared.  No Pain (0) Comment: Data Unavailable   No LMP for male patient.  Allergies reviewed: Yes  Medications reviewed: Yes    Medications: Medication refills not needed today.  Pharmacy name entered into EPIC:    [id number] - [place], [place] - [address] [id number] - [place], [place] - [address] [place] [address]          Verónica SILVERIO Leung LPN            "

## 2023-01-30 NOTE — PROGRESS NOTES
Visit Date: 01/30/2023    HISTORY OF PRESENT ILLNESS:  Mr. 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  He was just seen on 06/09/2023.  For details of his history, see previous note dictated on 01/09/2023.  The patient had been on adjuvant nivolumab for invasive urothelial cell carcinoma.  The patient developed loose stools after cycle 7 of nivolumab and was placed on prednisone.  Symptoms did not improve after cycle 8 of nivolumab.  The patient was placed on steroid tapers.  He was seen in the [place] [facility] on 08/29/2022 for evaluation of nausea, vomiting, diarrhea and he was treated with IV fluids and Zofran and clinically improved.  He was discharged on Zofran.  The patient and his daughter went to [place] and he was seen in the Emergency Room with extreme symptoms of 4-5 watery stools a day.  He was given IV fluids and felt better, but then subsequently his symptoms worsen.  He was seen in the Emergency Room at [facility] on 09/10/2022.  He was noted to have a creatinine of 2.25, sodium was 133.  CT showed subtle new 2.1 cm hypoattenuating area in the pancreatic body without ductal dilatation.  The patient was admitted to the medical floor, hydrated with normal saline, was seen by [facility] and underwent colonoscopy, which showed diffuse mild inflammation characterized by altered vascularity and erythema that was found in the entire colon.  Several biopsies were obtained of the right and left colon with cold forceps for histology.  Diffuse area of mucosa in the terminal ileum was mildly erythematous.  This was biopsied with cold forceps for histology.  Multiple small and large mouth diverticula were found in the sigmoid colon, descending colon and transverse colon.  Three sessile polyps were found in the transverse colon, ascending colon.  Polyps were 1-7 mm in size.  Polypectomy was not attempted due to inadequate bowel preparation  for endoscopic visualization.  The patient was started on prednisone 80 mg daily and diarrhea subsided 2 days later.  His blood sugars were greater than 400.  Insulin was increased.  Prednisone was dropped to 60 mg daily with a plan to taper off in 15 days.  His creatinine was 2.07.  He was noted to have leukocytosis, felt to be secondary to colitis, possibly aggravated by steroids.  Pathology from the colon.  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  TSH was 49.  Levothyroxine was increased from 100 mcg to 150 mcg daily.  The patient was seen by [facility] for endoscopic ultrasound-guided biopsy of the pancreatic lesion.  Endosonographic findings revealed the patient had an occult lesion suggestive of a cyst identified in the pancreatic by the lesion measuring 50 mm.  Tissue was not obtained; however, the clinical appearance was suggestive of a pancreatic pseudocyst.  It was recommended that the patient have an MRCP in 6 months.  The patient also had been receiving BCG bladder instillation under the care of Dr. Randee Schmitz.  We had seen him as stated above on 01/09/2023, and given the uncertainty of the pancreatic lesion, as well as history of bladder cancer, we wanted to restage the patient.  PET scan was performed on 01/25/2023 and the findings were there was no hypermetabolism to suggest recurrent or metastatic disease, no evidence of hypo-enhancing pancreatic lesion was not well visualized, and according Dr. Becerril had improved and likely was not malignant.  MRI of the abdomen revealed no evidence of any pancreatic lesion, but it was felt that the pancreatic   lesion had resolved or nearly resolved.  No other cystic solid or pancreatic mass was seen.  There was a 9 mm peripheral enhancing lesion in the dome of the liver, which was not present on 10/11 or 09/11.  There was a subtle focus of enhancement in the inferior aspect of  segment 6 of the liver.  These were definitely new and could be suggestive of metastatic disease, but according to Dr. Becerril, they were too small to biopsy and too small to appreciate on PET scan; therefore, it was recommended that the patient have an MRI of the abdomen, attention liver, in 3 months to see if they enlarge.  If they enlarge, then they were concerning for possible metastatic disease.  The patient otherwise is asymptomatic.  Denies any fevers, night sweats, shortness of breath, weight loss, abdominal pain, jaundice.  He does have chronic fatigue.    PHYSICAL EXAMINATION:    GENERAL:  He is an elderly white male in no acute distress, ECOG performance status is 0.  VITAL SIGNS:  Reveal blood pressure 160, pulse 77, respirations 20, temperature 97.3.  HEENT:  Atraumatic, normocephalic.  Oropharynx nonerythematous.  NECK:  Supple.  LUNGS:  Clear to auscultation and percussion.  HEART:  Regular rhythm.  S1, S2 normal.  ABDOMEN:  Soft, normoactive bowel sounds, nondistended, nontender.  LYMPHATICS:  No cervical, supraclavicular, axillary or inguinal nodes.  EXTREMITIES:  No edema.  NEUROLOGIC:  Nonfocal.    LABORATORY DATA:  Reveal from 01/12, his CEA was 2.3.  CA 19-9 was 12.  CBC -- white count 7.0, H and H was 11.8 and 35.3, platelet count is 236.  BUN 31, creatinine 1.85.    IMPRESSION:  1.  High-grade urothelial cell carcinoma of the left ureter with a 2 cm mass involving the lamina propria.  The patient went on to have neoadjuvant chemotherapy with cisplatin and gemcitabine, 20% dose reduction of cisplatin.  The patient received 4 cycles, then went on to nephroureterectomy and was found to have a pathologic T3 N0 high-grade urothelial cell carcinoma.  PET scan was negative.  The patient will be a candidate for adjuvant nivolumab based on the recent FDA approval for patients with grade muscle-invasive urothelial cell carcinoma greater than T2  lesions which showed improved progression-free survival.   The patient was started on nivolumab 480 mg IV every 28 days.  After 7 cycles, he developed loose stools, and after 8 cycles he developed significant diarrhea.  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  Recent PET scan indicates no evidence of metastatic disease, but MRI of the abdomen, attention liver, reveals at least 1 liver lesion that measures 9 mm, too small to biopsy.  We will therefore repeat MRI of the abdomen, attention liver, in 3 months.  If the lesion size remains stable, likely not malignant.  If it enlarges, will need to biopsy this.    2.  Pancreatic pseudocyst, essentially resolved on recent MRI of the abdomen.    3.  History of T1 bladder cancer.  The patient is currently on BCG therapy under the direction of Dr. Schmitz.    TIME SPENT:  70 minutes were spent on this patient.  Time was spent reviewing MRI results with Radiology, Dr. Becerril, performing history and physical, documenting history and physical, and ordering followup scans and labs.    Jhony Bartlett MD        D: 2023   T: 2023   MT: [id number]    Name:     CLARISSA COSTA  MRN:      [id number]        Account:    [id number]   :      1945           Visit Date: 2023     Document: [id number]    cc:  MD Libia Cleary MD Rebekah Beach, MD

## 2023-01-31 NOTE — TELEPHONE ENCOUNTER
Pt called his insulin arrived today from the PAP. He requested a call back. I called the pt and left a message to call back.

## 2023-02-01 NOTE — TELEPHONE ENCOUNTER
Pt received his insulin shipment, voucher was not needed. He was unable to use voucher as he did not have an Rx for that medication at the pharmacy. Pt will discard voucher since he received his shipment.   No Decelerations

## 2023-02-06 NOTE — ANESTHESIA PREPROCEDURE EVALUATION
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) 2020    getting BCG (from his Urologist in [place], [place])     Obesity      Pancreatic mass      Renal cell carcinoma, left (H)     Left nephrectomy     Ureter cancer, left (H)       Past Surgical History:   Procedure Laterality Date     APPENDECTOMY       COLONOSCOPY  2011     COLONOSCOPY N/A 2022    Procedure: COLONOSCOPY;  Surgeon: Keyon Zimmerman MD;  Location: [facility] OR     COMBINED CYSTOSCOPY, RETROGRADES, URETEROSCOPY, INSERT STENT Left 2021    Procedure: CYSTOURETEROSCOPY, WITH RETROGRADE PYELOGRAM with interpretation of fluroscopy, ureteroscopy, ureteral biopsy, bladder biopsy and fulgaration;  Surgeon: Shonda Morrell MD;  Location: [facility] OR     CYSTOSCOPY N/A 2021    Procedure: CYSTOSCOPY;  Surgeon: Javier Mcnulty MD;  Location: [facility] OR     CYSTOSCOPY, BIOPSY BLADDER, COMBINED N/A 2015    Procedure: COMBINED CYSTOSCOPY, BIOPSY BLADDER;  Surgeon: Randy Martinez MD;  Location: [facility] OR     CYSTOSCOPY, BIOPSY BLADDER, COMBINED N/A 2017    Procedure: COMBINED CYSTOSCOPY, BIOPSY BLADDER;  Surgeon: Randy Martinez MD;  Location: [facility] OR     CYSTOSCOPY, BIOPSY BLADDER, COMBINED N/A 2018    Procedure: COMBINED CYSTOSCOPY, BIOPSY BLADDER;  Surgeon: Ed Helm MD;  Location: [facility] OR     CYSTOSCOPY, BIOPSY BLADDER, COMBINED N/A 2021    Procedure: CYSTOSCOPY, WITH BLADDER BIOPSY;  Surgeon: Shonda Morrell MD;  Location: [facility] OR      CYSTOSCOPY, RETROGRADES, COMBINED Bilateral 11/13/2018    Procedure: Bilateral Retrograde Pyelagram, Bladder Biopsy;  Surgeon: Ed Helm MD;  Location: [facility] OR     CYSTOSCOPY, RETROGRADES, COMBINED Right 11/5/2021    Procedure: CYSTOSCOPY, WITH RETROGRADE PYELOGRAM;  Surgeon: Shonda Morrell MD;  Location: [facility] OR     CYSTOSCOPY, RETROGRADES, INSERT STENT URETER(S), COMBINED Left 9/8/2015    Procedure: COMBINED CYSTOSCOPY, RETROGRADES, INSERT STENT URETER(S);  Surgeon: Randy Martinez MD;  Location: [facility]     CYSTOSCOPY, TRANSURETHRAL RESECTION (TUR) TUMOR BLADDER, COMBINED N/A 9/8/2015    Procedure: COMBINED CYSTOSCOPY, TRANSURETHRAL RESECTION (TUR) TUMOR BLADDER;  Surgeon: Randy Martinez MD;  Location: [facility] OR     CYSTOSCOPY, TRANSURETHRAL RESECTION (TUR) TUMOR BLADDER, COMBINED N/A 1/12/2016    Procedure: COMBINED CYSTOSCOPY, TRANSURETHRAL RESECTION (TUR) TUMOR BLADDER;  Surgeon: Randy Martinez MD;  Location: [facility] OR     CYSTOSCOPY, TRANSURETHRAL RESECTION (TUR) TUMOR BLADDER, COMBINED N/A 9/13/2016    Procedure: COMBINED CYSTOSCOPY, TRANSURETHRAL RESECTION (TUR) TUMOR BLADDER;  Surgeon: Randy Martinez MD;  Location: [facility] OR     DAVINCI NEPHROURETERECTOMY Left 6/16/2021    Procedure: NEPHROURETERECTOMY, ROBOT-ASSISTED, lymph node dissection;  Surgeon: Javier Mcnulty MD;  Location: [facility] OR     ESOPHAGOSCOPY, GASTROSCOPY, DUODENOSCOPY (EGD), COMBINED N/A 9/30/2022    Procedure: UPPER ENDOSCOPIC ULTRASOUND;  Surgeon: Jason Dennis MD;  Location: [facility] OR     PHACOEMULSIFICATION WITH STANDARD INTRAOCULAR LENS IMPLANT Right 10/9/2018    Procedure: PHACOEMULSIFICATION WITH STANDARD INTRAOCULAR LENS IMPLANT;  PHACOEMULSIFICATION CATARACT EXTRACTION POSTERIOR CHAMBER LENS RIGHT;  Surgeon: Marlo Mcconnell MD;  Location: [facility] OR     PHACOEMULSIFICATION WITH STANDARD INTRAOCULAR LENS IMPLANT Left 10/23/2018    Procedure: PHACOEMULSIFICATION CATARACT EXTRACTION  POSTERIOR CHAMBER LENS LEFT;  Surgeon: Marlo Mcconnell MD;  Location: [facility] OR      Allergies   Allergen Reactions     No Clinical Screening - See Comments Other (See Comments)     Beta blocker in glaucoma gtt.s caused low pulse and passing out      Timolol      Beta blocker in glaucoma gtt.s caused low pulse and passing out   - patient thinks it was timolol but not 100% sure which beta blocker eye drop.      Bactrim [Sulfamethoxazole W/Trimethoprim] Rash      Social History     Tobacco Use     Smoking status: Former     Types: Cigarettes     Quit date: 1992     Years since quittin.1     Smokeless tobacco: Never   Substance Use Topics     Alcohol use: Yes     Comment: < 1 drink a month      Wt Readings from Last 1 Encounters:   23 77.5 kg (170 lb 13.7 oz)        Anesthesia Evaluation   Pt has had prior anesthetic. Type: MAC and General.        ROS/MED HX  ENT/Pulmonary:     (+) COPD (pt denies; not on inhalers),     Neurologic:  - neg neurologic ROS     Cardiovascular:     (+) Dyslipidemia hypertension-range: 134/78 this am in preop / ----    METS/Exercise Tolerance: >4 METS    Hematologic:     (+) anemia,     Musculoskeletal:  - neg musculoskeletal ROS     GI/Hepatic:     (+) bowel prep,     Renal/Genitourinary:     (+) renal disease (solitary kidney), type: CRI,     Endo:     (+) type II DM, Last HgA1c: 7.1, date: 2023, thyroid problem, hypothyroidism, Obesity,     Psychiatric/Substance Use:  - neg psychiatric ROS     Infectious Disease:       Malignancy:   (+) Malignancy, History of Other.Other CA bladder, kidney status post Surgery and Chemo.    Other:            Physical Exam    Airway        Mallampati: I   TM distance: > 3 FB   Neck ROM: full   Mouth opening: > 3 cm    Respiratory Devices and Support         Dental       (+) Multiple crowns, permanant bridges      Cardiovascular          Rhythm and rate: regular and normal     Pulmonary           breath sounds clear to auscultation            OUTSIDE LABS:  CBC:   Lab Results   Component Value Date    WBC 7.0 01/12/2023    WBC 6.0 10/13/2022    WBC 6.0 10/13/2022    HGB 11.8 (L) 01/12/2023    HGB 11.6 (L) 10/13/2022    HGB 11.5 (L) 10/13/2022    HCT 35.2 (L) 01/12/2023    HCT 33.8 (L) 10/13/2022    HCT 34.0 (L) 10/13/2022     01/12/2023     10/13/2022     10/13/2022     BMP:   Lab Results   Component Value Date     01/12/2023     (L) 10/11/2022    POTASSIUM 4.3 01/12/2023    POTASSIUM 3.9 10/11/2022    CHLORIDE 106 01/12/2023    CHLORIDE 101 10/11/2022    CO2 24 01/12/2023    CO2 21 10/11/2022    BUN 31.0 (H) 01/12/2023    BUN 25 10/11/2022    CR 1.85 (H) 01/12/2023    CR 1.87 (H) 10/11/2022     (H) 01/12/2023     (H) 10/11/2022     COAGS: No results found for: PTT, INR, FIBR  POC:   Lab Results   Component Value Date    BGM 98 06/17/2021     HEPATIC:   Lab Results   Component Value Date    ALBUMIN 4.1 01/12/2023    PROTTOTAL 6.7 01/12/2023    ALT 8 (L) 01/12/2023    AST 17 01/12/2023    ALKPHOS 110 01/12/2023    BILITOTAL 0.5 01/12/2023     OTHER:   Lab Results   Component Value Date    PH 7.29 (L) 06/16/2021    LACT 1.6 10/10/2022    A1C 7.1 (H) 01/12/2023    MAHSA 9.1 01/12/2023    PHOS 2.4 (L) 10/03/2022    MAG 2.2 09/18/2022    LIPASE 529 (H) 10/13/2022    TSH 0.04 (L) 01/12/2023    T4 1.56 01/12/2023    .0 (H) 10/11/2022       Anesthesia Plan    ASA Status:  3   NPO Status:  NPO Appropriate (0700 finished prep )    Anesthesia Type: MAC.     - Reason for MAC: straight local not clinically adequate              Consents    Anesthesia Plan(s) and associated risks, benefits, and realistic alternatives discussed. Questions answered and patient/representative(s) expressed understanding.    - Discussed:     - Discussed with:  Patient      - Extended Intubation/Ventilatory Support Discussed: No.      - Patient is DNR/DNI Status: No    Use of blood products discussed: No .     Postoperative  Care            Comments:    Other Comments: [facility] 1/30/23    Risks and benefits of MAC anesthetic discussed including dental damage, aspiration, loss of airway, conversion to general anesthetic, CV complications, MI, stroke, death. Pt wishes to proceed.             DANILO Galan CRNA

## 2023-02-09 NOTE — PROGRESS NOTES
[facility] - [place]  [address]  [place] [address]  Phone: [phone]  Primary Provider: Libia Wallace  Pre-op Performing Provider: DEEDEE DAVIS      PREOPERATIVE EVALUATION:  Today's date: 2/10/2023    Colin Baxter is a 78 year old male who presents for a preoperative evaluation.    Surgical Information:  Surgery/Procedure: colonoscopy   Surgery Location: [facility]  Surgeon: Dr. Lucius Croft   Surgery Date: 2/13/2023  Time of Surgery: TBD  Where patient plans to recover: At home with family  Fax number for surgical facility: Note does not need to be faxed, will be available electronically in Epic.    Type of Anesthesia Anticipated: to be determined    Assessment & Plan     The proposed surgical procedure is considered LOW risk.    Preop general physical exam  Polyp of colon, unspecified part of colon, unspecified type  Had colonoscopy 9/13/2022 at [facility]; 3 sessile polyps noted in transverse and ascending colon that were unable to be biopsied due to poor prep with recommendation for 3-month repeat.  Diarrhea has since essentially resolved, bowel movements have normalized, no GI symptoms at this time.  No prior complications with anesthesia.  Optimized for proposed procedure.  - CBC with platelet and differential  - Comprehensive metabolic panel    CKD (chronic kidney disease) stage 4, GFR 15-29 ml/min (H)  Solitary kidney, acquired  Malignant neoplasm of left ureter (H)  Malignant neoplasm of urinary bladder, unspecified site (H)  History left nephrectomy for urothelial cancer.  Follows with nephrology.  Follows with oncology for history of high-grade urothelial cell carcinoma s/p chemotherapy which was complicated by severe diarrhea.  PET negative for metastatic disease although MRI notable for small unspecified liver lesion that is being monitored with surveillance MRIs.  - Baseline creatinine around 1.9  - Comprehensive metabolic panel  - Avoid nephrotoxic  medications    Anemia due to stage 4 chronic kidney disease (H)  Stable.  - Baseline hemoglobin 11-12  - CBC with platelets and differential    Type 2 diabetes mellitus without complication, with long-term current use of insulin (H)  Controlled.  Last A1c 7.1 1/12/2023.  - Tresiba 20 units daily  - NovoLog 5 units 3 times daily plus sliding scale  - Lipitor 40 mg daily    Acquired hypothyroidism  Stable.  Last TSH 1.56 1/212/23.  - Levothyroxine 125 mcg daily    Pancreatic lesion, 2.1 cm on Abd CT  Lesion noted on CT, subsequent upper endoscopic ultrasound with FNA consistent with 15 mm pseudocyst.  Plan repeat MRCP 6/2023.  Follow-up MRI abdomen unable to clearly assess pancreatic lesion 1/26/2023.  - Ongoing surveillance    Centrilobular emphysema (H)  Stable.  Moderate-severe centrilobular emphysema noted on chest CT.  Supportive management.  - No longer using DuoNebs or albuterol    Implanted Device:  Continuous glucose monitor qing freestyle    Risks and Recommendations:  The patient has the following additional risks and recommendations for perioperative complications:   - No identified additional risk factors other than previously addressed    Medication Instructions:  Patient is to take all scheduled medications on the day of surgery EXCEPT for modifications listed below:   - No aspirin for 7 days before procedure  - No NSAIDs for 3 days prior to procedure  - Holding vitamins/supplements for 7 days prior to procedure  - Follow bowel prep directions    RECOMMENDATION:  APPROVAL GIVEN to proceed with proposed procedure, without further diagnostic evaluation.    38 minutes spent on the date of the encounter doing chart review, history and exam, documentation and further activities per the note      Subjective     HPI related to upcoming procedure:  Previously was struggling with severe colitis likely secondary to chemotherapy, and underwent colonoscopy at [facility] as part of the work-up in 9/2022.   Findings notable for 3 suspected sessile polyps that were unable to be biopsied due to poor prep and recommendation for short interval repeat scope.  Since then bowel habits have improved drastically, no longer having diarrhea, no blood/melena in the stool.  Still needs repeat colonoscopy to evaluate polyps.  No recent illness/injury.  No prior complications with surgery/anesthesia.    Preop Questions 2/10/2023   1. Have you ever had a heart attack or stroke? No   2. Have you ever had surgery on your heart or blood vessels, such as a stent placement, a coronary artery bypass, or surgery on an artery in your head, neck, heart, or legs? No   3. Do you have chest pain with activity? No   4. Do you have a history of  heart failure? No   5. Do you currently have a cold, bronchitis or symptoms of other infection? No   6. Do you have a cough, shortness of breath, or wheezing? No   7. Do you or anyone in your family have previous history of blood clots? No   8. Do you or does anyone in your family have a serious bleeding problem such as prolonged bleeding following surgeries or cuts? No   9. Have you ever had problems with anemia or been told to take iron pills? UNKNOWN - anemia of CKD; see above   10. Have you had any abnormal blood loss such as black, tarry or bloody stools? No   11. Have you ever had a blood transfusion? No   11a. Have you ever had a transfusion reaction? -   12. Are you willing to have a blood transfusion if it is medically needed before, during, or after your surgery? Yes   13. Have you or any of your relatives ever had problems with anesthesia? No   14. Do you have sleep apnea, excessive snoring or daytime drowsiness? No   15. Do you have any artifical heart valves or other implanted medical devices like a pacemaker, defibrillator, or continuous glucose monitor? YES - CGM   15a. What type of device do you have? Freestyle Tiffany   15b. Name of the clinic that manages your device:  [facility]   16.  Do you have artificial joints? No   17. Are you allergic to latex? No     Health Care Directive:  Patient has a Health Care Directive on file    Preoperative Review of :   reviewed - no record of controlled substances prescribed.      Status of Chronic Conditions:  See problem list for active medical problems.  Problems all longstanding and stable, except as noted/documented.  See ROS for pertinent symptoms related to these conditions.    Review of Systems  CONSTITUTIONAL: NEGATIVE for fever, chills, change in weight  INTEGUMENTARY/SKIN: NEGATIVE for worrisome rashes, moles or lesions  EYES: NEGATIVE for vision changes or irritation  ENT/MOUTH: NEGATIVE for ear, mouth and throat problems  RESP: NEGATIVE for significant cough or SOB  CV: NEGATIVE for chest pain, palpitations or peripheral edema  GI: NEGATIVE for nausea, abdominal pain, heartburn, or change in bowel habits  : NEGATIVE for frequency, dysuria, or hematuria  MUSCULOSKELETAL: NEGATIVE for significant arthralgias or myalgia  NEURO: NEGATIVE for weakness, dizziness or paresthesias  ENDOCRINE: NEGATIVE for temperature intolerance, skin/hair changes  HEME: NEGATIVE for bleeding problems  PSYCHIATRIC: NEGATIVE for changes in mood or affect    Patient Active Problem List    Diagnosis Date Noted     Insomnia due to medical condition 10/13/2022     Priority: Medium     Centrilobular emphysema (H) 10/11/2022     Priority: Medium     Stage 3b chronic kidney disease (H) 10/07/2022     Priority: Medium     Vitamin D deficiency 10/07/2022     Priority: Medium     YOU (acute kidney injury) (H) 09/17/2022     Priority: Medium     Adverse drug reaction 09/17/2022     Priority: Medium     Dehydration 09/12/2022     Priority: Medium     Pancreatic mass 09/12/2022     Priority: Medium     CRF (chronic renal failure), stage 4 (severe) (H) 09/10/2022     Priority: Medium     Diarrhea 2nd to Chemotherapy 09/10/2022     Priority: Medium     Pancreatic lesion, 2.1 cm on  Abd CT 09/10/2022     Priority: Medium     Acquired hypothyroidism 08/26/2022     Priority: Medium     Carcinoma in situ of bladder 11/19/2021     Priority: Medium     Anemia due to stage 4 chronic kidney disease (H) 10/22/2021     Priority: Medium     CKD (chronic kidney disease) stage 4, GFR 15-29 ml/min (H) 10/21/2021     Priority: Medium     Ureter cancer (H) 01/13/2021     Priority: Medium     Added automatically from request for surgery [id number]       DM type 2, Hgb A1C 8.6 on 8/26/22 12/11/2020     Priority: Medium     Obesity (BMI 35.0-39.9) with comorbidity (H) 09/26/2018     Priority: Medium     Bladder cancer (H) 10/06/2015     Priority: Medium     Glaucoma 07/06/2015     Priority: Medium     Hyperlipidemia 08/13/2008     Priority: Medium     Formatting of this note might be different from the original.  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) 12/11/2020    getting BCG (from his Urologist in [place], [place])     Obesity      Pancreatic mass      Renal cell carcinoma, left (H) 2021    Left nephrectomy     Ureter cancer, left (H)      Past Surgical History:   Procedure Laterality Date     APPENDECTOMY  1958     COLONOSCOPY  2-     COLONOSCOPY N/A 9/13/2022    Procedure: COLONOSCOPY;  Surgeon: Keyon Zimmerman MD;  [place] [facility] [place]     COMBINED CYSTOSCOPY, RETROGRADES, URETEROSCOPY, INSERT STENT Left 1/18/2021    Procedure: CYSTOURETEROSCOPY, WITH RETROGRADE PYELOGRAM with interpretation of fluroscopy, ureteroscopy, ureteral biopsy, bladder biopsy and fulgaration;  Surgeon: Shonda Morrell MD;  Location: [facility]     CYSTOSCOPY N/A 6/16/2021    Procedure:  CYSTOSCOPY;  Surgeon: Javier Mcnulty MD;  [facility]     CYSTOSCOPY, BIOPSY BLADDER, COMBINED N/A 9/8/2015    Procedure: COMBINED CYSTOSCOPY, BIOPSY BLADDER;  Surgeon: Randy Martinez MD;  Location: [facility]     CYSTOSCOPY, BIOPSY BLADDER, COMBINED N/A 2/14/2017    Procedure: COMBINED CYSTOSCOPY, BIOPSY BLADDER;  Surgeon: Randy Martinez MD;  Location: [facility]     CYSTOSCOPY, BIOPSY BLADDER, COMBINED N/A 11/13/2018    Procedure: COMBINED CYSTOSCOPY, BIOPSY BLADDER;  Surgeon: Ed Helm MD;  Location: [facility]     CYSTOSCOPY, BIOPSY BLADDER, COMBINED N/A 11/5/2021    Procedure: CYSTOSCOPY, WITH BLADDER BIOPSY;  Surgeon: Shonda Morrell MD;  [facility]: [facility]     CYSTOSCOPY, RETROGRADES, COMBINED Bilateral 11/13/2018    Procedure: Bilateral Retrograde Pyelagram, Bladder Biopsy;  Surgeon: Ed Helm MD;  Location: [facility]     CYSTOSCOPY, RETROGRADES, COMBINED Right 11/5/2021    Procedure: CYSTOSCOPY, WITH RETROGRADE PYELOGRAM;  Surgeon: Shonda Morrell MD;  [facility]: [facility]     CYSTOSCOPY, RETROGRADES, INSERT STENT URETER(S), COMBINED Left 9/8/2015    Procedure: COMBINED CYSTOSCOPY, RETROGRADES, INSERT STENT URETER(S);  Surgeon: Randy Martinez MD;  [facility]     CYSTOSCOPY, TRANSURETHRAL RESECTION (TUR) TUMOR BLADDER, COMBINED N/A 9/8/2015    Procedure: COMBINED CYSTOSCOPY, TRANSURETHRAL RESECTION (TUR) TUMOR BLADDER;  Surgeon: Randy Martinez MD;  [facility]     CYSTOSCOPY, TRANSURETHRAL RESECTION (TUR) TUMOR BLADDER, COMBINED N/A 1/12/2016    Procedure: COMBINED CYSTOSCOPY, TRANSURETHRAL RESECTION (TUR) TUMOR BLADDER;  Surgeon: Randy Martinez MD;  [facility]     CYSTOSCOPY, TRANSURETHRAL RESECTION (TUR) TUMOR BLADDER, COMBINED N/A 9/13/2016    Procedure: COMBINED CYSTOSCOPY, TRANSURETHRAL RESECTION (TUR) TUMOR BLADDER;  Surgeon: Randy Martinez MD;  [facility]     DAVINCI NEPHROURETERECTOMY Left 6/16/2021    Procedure:  NEPHROURETERECTOMY, ROBOT-ASSISTED, lymph node dissection;  Surgeon: Javier Mcnulty MD;  [facility]     ESOPHAGOSCOPY, GASTROSCOPY, DUODENOSCOPY (EGD), COMBINED N/A 9/30/2022    Procedure: UPPER ENDOSCOPIC ULTRASOUND;  Surgeon: aJson Dennis MD;  [place] [facility] [place]     PHACOEMULSIFICATION WITH STANDARD INTRAOCULAR LENS IMPLANT Right 10/9/2018    Procedure: PHACOEMULSIFICATION WITH STANDARD INTRAOCULAR LENS IMPLANT;  PHACOEMULSIFICATION CATARACT EXTRACTION POSTERIOR CHAMBER LENS RIGHT;  Surgeon: Marlo Mcconnell MD;  [facility]: [facility]     PHACOEMULSIFICATION WITH STANDARD INTRAOCULAR LENS IMPLANT Left 10/23/2018    Procedure: PHACOEMULSIFICATION CATARACT EXTRACTION POSTERIOR CHAMBER LENS LEFT;  Surgeon: Marlo Mcconnell MD;  Location: [facility]     Current Outpatient Medications   Medication Sig Dispense Refill     atorvastatin (LIPITOR) 40 MG tablet TAKE 1 TABLET (40 MG) BY MOUTH DAILY 90 tablet 3     blood glucose (ONETOUCH VERIO IQ) test strip USE TO TEST BLOOD SUGAR 3 TIMES DAILY 100 strip 11     brimonidine (ALPHAGAN-P) 0.15 % ophthalmic solution INSTILL 1 DROP INTO RIGHT EYE TWICE DAILY  12     Continuous Blood Gluc  (FREESTYLE DELILAH 2 READER) TENNILLE 1 each continuous 1 each 0     Continuous Blood Gluc Sensor (FREESTYLE DELILAH 2 SENSOR) MISC 1 each every 14 days 2 each 11     insulin aspart (NOVOLOG PEN) 100 UNIT/ML pen 5 units with meals, and for glucometer 150-200 add 2 units SQ, 201-250 add 4 units, >250 add 6 units 15 mL 0     insulin degludec (TRESIBA FLEXTOUCH) 100 UNIT/ML pen Inject 18 Units Subcutaneous daily       insulin pen needle (BD PEN NEEDLE DONTAE 2ND GEN) 32G X 4 MM miscellaneous USE 3 PEN NEEDLES DAILY 100 each 11     latanoprost (XALATAN) 0.005 % ophthalmic solution Place 1 drop into both eyes At Bedtime       levothyroxine (SYNTHROID/LEVOTHROID) 125 MCG tablet Take 1 tablet (125 mcg) by mouth daily 90 tablet 1     ONETOUCH DELICA LANCETS 33G MISC 1 each 2 times daily  100 each 10     spacer (OPTICHAMBER RORY) holding chamber Use with your albuterol inhaler 1 each 0     acetaminophen (TYLENOL) 325 MG tablet Take 2 tablets (650 mg) by mouth every 4 hours as needed for mild pain or other (and adjunct with moderate or severe pain or per patient request) (Patient not taking: Reported on 2023)  0     bisacodyl (DULCOLAX) 5 MG EC tablet Take 2 tabs at bedtime 2 nights prior to procedure. Take 2 tabs at 3pm the day before procedure. (Patient not taking: Reported on 2023) 4 tablet 0     Cholecalciferol (VITAMIN D3) 25 MCG TABS Take 25 mcg by mouth daily (Patient not taking: Reported on 2/10/2023)       polyethylene glycol (GOLYTELY) 236 g suspension Drink 8 ounces every 10 minutes until the jug is half-empty at 6pm the night before procedure. Refrigerate. Repeat 6 hours prior to procedure. (Patient not taking: Reported on 2023) 4000 mL 0     VITRON-C  MG TABS tablet Take 1 tablet by mouth daily (Patient not taking: Reported on 2/10/2023)         Allergies   Allergen Reactions     No Clinical Screening - See Comments Other (See Comments)     Beta blocker in glaucoma gtt.s caused low pulse and passing out      Timolol      Beta blocker in glaucoma gtt.s caused low pulse and passing out   - patient thinks it was timolol but not 100% sure which beta blocker eye drop.      Bactrim [Sulfamethoxazole W/Trimethoprim] Rash        Social History     Tobacco Use     Smoking status: Former     Types: Cigarettes     Quit date: 1992     Years since quittin.1     Smokeless tobacco: Never   Substance Use Topics     Alcohol use: Yes     Comment: < 1 drink a month     Family History   Problem Relation Age of Onset     Diabetes Mother      Other - See Comments Father         cause of death unknown     Parkinsonism Brother      Coronary Artery Disease No family hx of      Hypertension No family hx of      Hyperlipidemia No family hx of      Cerebrovascular Disease No family  "hx of      Breast Cancer No family hx of      Colon Cancer No family hx of      Prostate Cancer No family hx of      Anesthesia Reaction No family hx of      Asthma No family hx of      Thyroid Disease No family hx of      Genetic Disorder No family hx of      History   Drug Use No         Objective     /78 (BP Location: Right arm, Patient Position: Sitting, Cuff Size: Adult Regular)   Pulse 63   Temp 97  F (36.1  C) (Tympanic)   Ht 1.702 m (5' 7\")   Wt 78.1 kg (172 lb 3.2 oz)   SpO2 99%   BMI 26.97 kg/m      Physical Exam    GENERAL APPEARANCE: healthy, alert and no distress     EYES: EOMI,  PERRL     HENT: ear canals and TM's normal and nose and mouth without ulcers or lesions     NECK: no adenopathy, no asymmetry, masses, or scars and thyroid normal to palpation     RESP: lungs clear to auscultation - no rales, rhonchi or wheezes     CV: regular rates and rhythm, normal S1 S2, no S3 or S4 and no murmur, click or rub     ABDOMEN:  soft, nontender, no HSM or masses and bowel sounds normal     MS: extremities normal- no gross deformities noted, no evidence of inflammation in joints, FROM in all extremities.     SKIN: no suspicious lesions or rashes     NEURO: Normal strength and tone, sensory exam grossly normal, mentation intact and speech normal     PSYCH: mentation appears normal. and affect normal/bright     LYMPHATICS: No cervical adenopathy    Recent Labs   Lab Test 01/12/23  0851 10/13/22  1005 10/11/22  0857 08/29/22  1032 08/26/22  1012   HGB 11.8* 11.5*  11.6*  --    < >  --     370  370  --    < >  --      --  132*   < >  --    POTASSIUM 4.3  --  3.9   < >  --    CR 1.85*  --  1.87*   < >  --    A1C 7.1*  --   --   --  8.6*    < > = values in this interval not displayed.        Diagnostics:  Recent Results (from the past 24 hour(s))   Comprehensive metabolic panel (BMP + Alb, Alk Phos, ALT, AST, Total. Bili, TP)    Collection Time: 02/10/23 11:52 AM   Result Value Ref Range    " Sodium 138 136 - 145 mmol/L    Potassium 4.6 3.4 - 5.3 mmol/L    Chloride 104 98 - 107 mmol/L    Carbon Dioxide (CO2) 20 (L) 22 - 29 mmol/L    Anion Gap 14 7 - 15 mmol/L    Urea Nitrogen 27.8 (H) 8.0 - 23.0 mg/dL    Creatinine 1.72 (H) 0.67 - 1.17 mg/dL    Calcium 8.8 8.8 - 10.2 mg/dL    Glucose 145 (H) 70 - 99 mg/dL    Alkaline Phosphatase 129 40 - 129 U/L    AST 24 10 - 50 U/L    ALT 9 (L) 10 - 50 U/L    Protein Total 6.7 6.4 - 8.3 g/dL    Albumin 3.9 3.5 - 5.2 g/dL    Bilirubin Total 0.3 <=1.2 mg/dL    GFR Estimate 41 (L) >60 mL/min/1.73m2   CBC with platelets and differential    Collection Time: 02/10/23 11:52 AM   Result Value Ref Range    WBC Count 6.5 4.0 - 11.0 10e3/uL    RBC Count 3.87 (L) 4.40 - 5.90 10e6/uL    Hemoglobin 11.9 (L) 13.3 - 17.7 g/dL    Hematocrit 35.9 (L) 40.0 - 53.0 %    MCV 93 78 - 100 fL    MCH 30.7 26.5 - 33.0 pg    MCHC 33.1 31.5 - 36.5 g/dL    RDW 12.7 10.0 - 15.0 %    Platelet Count 247 150 - 450 10e3/uL    % Neutrophils 55 %    % Lymphocytes 32 %    % Monocytes 10 %    % Eosinophils 2 %    % Basophils 1 %    % Immature Granulocytes 0 %    NRBCs per 100 WBC 0 <1 /100    Absolute Neutrophils 3.5 1.6 - 8.3 10e3/uL    Absolute Lymphocytes 2.1 0.8 - 5.3 10e3/uL    Absolute Monocytes 0.7 0.0 - 1.3 10e3/uL    Absolute Eosinophils 0.2 0.0 - 0.7 10e3/uL    Absolute Basophils 0.1 0.0 - 0.2 10e3/uL    Absolute Immature Granulocytes 0.0 <=0.4 10e3/uL    Absolute NRBCs 0.0 10e3/uL      No EKG required for low risk surgery (cataract, skin procedure, breast biopsy, etc).  No EKG required, no history of coronary heart disease, significant arrhythmia, peripheral arterial disease or other structural heart disease.  EKG from 9/10/2022 reviewed; NSR at 77 bpm, left axis deviation, , no ST segment changes.    Revised Cardiac Risk Index (RCRI):  The patient has the following serious cardiovascular risks for perioperative complications:   - No serious cardiac risks = 0 points     RCRI Interpretation:  0 points: Class I (very low risk - 0.4% complication rate)     Signed Electronically by: Douglas Lopez MD  Copy of this evaluation report is provided to requesting physician.

## 2023-02-09 NOTE — H&P (VIEW-ONLY)
[facility] [place]  [address]  [place] [address]  Phone: [phone]  Primary Provider: Libia Wallace  Pre-op Performing Provider: DEEDEE DAVIS      PREOPERATIVE EVALUATION:  Today's date: 2/10/2023    Colin Baxter is a 78 year old male who presents for a preoperative evaluation.    Surgical Information:  Surgery/Procedure: colonoscopy   Surgery Location: [facility]  Surgeon: Dr. Lucius Croft   Surgery Date: 2/13/2023  Time of Surgery: TBD  Where patient plans to recover: At home with family  Fax number for surgical facility: Note does not need to be faxed, will be available electronically in Epic.    Type of Anesthesia Anticipated: to be determined    Assessment & Plan     The proposed surgical procedure is considered LOW risk.    Preop general physical exam  Polyp of colon, unspecified part of colon, unspecified type  Had colonoscopy 9/13/2022 at [facility]; 3 sessile polyps noted in transverse and ascending colon that were unable to be biopsied due to poor prep with recommendation for 3-month repeat.  Diarrhea has since essentially resolved, bowel movements have normalized, no GI symptoms at this time.  No prior complications with anesthesia.  Optimized for proposed procedure.  - CBC with platelet and differential  - Comprehensive metabolic panel    CKD (chronic kidney disease) stage 4, GFR 15-29 ml/min (H)  Solitary kidney, acquired  Malignant neoplasm of left ureter (H)  Malignant neoplasm of urinary bladder, unspecified site (H)  History left nephrectomy for urothelial cancer.  Follows with nephrology.  Follows with oncology for history of high-grade urothelial cell carcinoma s/p chemotherapy which was complicated by severe diarrhea.  PET negative for metastatic disease although MRI notable for small unspecified liver lesion that is being monitored with surveillance MRIs.  - Baseline creatinine around 1.9  - Comprehensive metabolic panel  - Avoid nephrotoxic  medications    Anemia due to stage 4 chronic kidney disease (H)  Stable.  - Baseline hemoglobin 11-12  - CBC with platelets and differential    Type 2 diabetes mellitus without complication, with long-term current use of insulin (H)  Controlled.  Last A1c 7.1 1/12/2023.  - Tresiba 20 units daily  - NovoLog 5 units 3 times daily plus sliding scale  - Lipitor 40 mg daily    Acquired hypothyroidism  Stable.  Last TSH 1.56 1/212/23.  - Levothyroxine 125 mcg daily    Pancreatic lesion, 2.1 cm on Abd CT  Lesion noted on CT, subsequent upper endoscopic ultrasound with FNA consistent with 15 mm pseudocyst.  Plan repeat MRCP 6/2023.  Follow-up MRI abdomen unable to clearly assess pancreatic lesion 1/26/2023.  - Ongoing surveillance    Centrilobular emphysema (H)  Stable.  Moderate-severe centrilobular emphysema noted on chest CT.  Supportive management.  - No longer using DuoNebs or albuterol    Implanted Device:  Continuous glucose monitor qing freestyle    Risks and Recommendations:  The patient has the following additional risks and recommendations for perioperative complications:   - No identified additional risk factors other than previously addressed    Medication Instructions:  Patient is to take all scheduled medications on the day of surgery EXCEPT for modifications listed below:   - No aspirin for 7 days before procedure  - No NSAIDs for 3 days prior to procedure  - Holding vitamins/supplements for 7 days prior to procedure  - Follow bowel prep directions    RECOMMENDATION:  APPROVAL GIVEN to proceed with proposed procedure, without further diagnostic evaluation.    38 minutes spent on the date of the encounter doing chart review, history and exam, documentation and further activities per the note      Subjective     HPI related to upcoming procedure:  Previously was struggling with severe colitis likely secondary to chemotherapy, and underwent colonoscopy at [facility] as part of the work-up in 9/2022.   Findings notable for 3 suspected sessile polyps that were unable to be biopsied due to poor prep and recommendation for short interval repeat scope.  Since then bowel habits have improved drastically, no longer having diarrhea, no blood/melena in the stool.  Still needs repeat colonoscopy to evaluate polyps.  No recent illness/injury.  No prior complications with surgery/anesthesia.    Preop Questions 2/10/2023   1. Have you ever had a heart attack or stroke? No   2. Have you ever had surgery on your heart or blood vessels, such as a stent placement, a coronary artery bypass, or surgery on an artery in your head, neck, heart, or legs? No   3. Do you have chest pain with activity? No   4. Do you have a history of  heart failure? No   5. Do you currently have a cold, bronchitis or symptoms of other infection? No   6. Do you have a cough, shortness of breath, or wheezing? No   7. Do you or anyone in your family have previous history of blood clots? No   8. Do you or does anyone in your family have a serious bleeding problem such as prolonged bleeding following surgeries or cuts? No   9. Have you ever had problems with anemia or been told to take iron pills? UNKNOWN - anemia of CKD; see above   10. Have you had any abnormal blood loss such as black, tarry or bloody stools? No   11. Have you ever had a blood transfusion? No   11a. Have you ever had a transfusion reaction? -   12. Are you willing to have a blood transfusion if it is medically needed before, during, or after your surgery? Yes   13. Have you or any of your relatives ever had problems with anesthesia? No   14. Do you have sleep apnea, excessive snoring or daytime drowsiness? No   15. Do you have any artifical heart valves or other implanted medical devices like a pacemaker, defibrillator, or continuous glucose monitor? YES - CGM   15a. What type of device do you have? Freestyle Tiffany   15b. Name of the clinic that manages your device:  [facility]   16.  Do you have artificial joints? No   17. Are you allergic to latex? No     Health Care Directive:  Patient has a Health Care Directive on file    Preoperative Review of :   reviewed - no record of controlled substances prescribed.      Status of Chronic Conditions:  See problem list for active medical problems.  Problems all longstanding and stable, except as noted/documented.  See ROS for pertinent symptoms related to these conditions.    Review of Systems  CONSTITUTIONAL: NEGATIVE for fever, chills, change in weight  INTEGUMENTARY/SKIN: NEGATIVE for worrisome rashes, moles or lesions  EYES: NEGATIVE for vision changes or irritation  ENT/MOUTH: NEGATIVE for ear, mouth and throat problems  RESP: NEGATIVE for significant cough or SOB  CV: NEGATIVE for chest pain, palpitations or peripheral edema  GI: NEGATIVE for nausea, abdominal pain, heartburn, or change in bowel habits  : NEGATIVE for frequency, dysuria, or hematuria  MUSCULOSKELETAL: NEGATIVE for significant arthralgias or myalgia  NEURO: NEGATIVE for weakness, dizziness or paresthesias  ENDOCRINE: NEGATIVE for temperature intolerance, skin/hair changes  HEME: NEGATIVE for bleeding problems  PSYCHIATRIC: NEGATIVE for changes in mood or affect    Patient Active Problem List    Diagnosis Date Noted     Insomnia due to medical condition 10/13/2022     Priority: Medium     Centrilobular emphysema (H) 10/11/2022     Priority: Medium     Stage 3b chronic kidney disease (H) 10/07/2022     Priority: Medium     Vitamin D deficiency 10/07/2022     Priority: Medium     YOU (acute kidney injury) (H) 09/17/2022     Priority: Medium     Adverse drug reaction 09/17/2022     Priority: Medium     Dehydration 09/12/2022     Priority: Medium     Pancreatic mass 09/12/2022     Priority: Medium     CRF (chronic renal failure), stage 4 (severe) (H) 09/10/2022     Priority: Medium     Diarrhea 2nd to Chemotherapy 09/10/2022     Priority: Medium     Pancreatic lesion, 2.1 cm on  Abd CT 09/10/2022     Priority: Medium     Acquired hypothyroidism 08/26/2022     Priority: Medium     Carcinoma in situ of bladder 11/19/2021     Priority: Medium     Anemia due to stage 4 chronic kidney disease (H) 10/22/2021     Priority: Medium     CKD (chronic kidney disease) stage 4, GFR 15-29 ml/min (H) 10/21/2021     Priority: Medium     Ureter cancer (H) 01/13/2021     Priority: Medium     Added automatically from request for surgery [id number]       DM type 2, Hgb A1C 8.6 on 8/26/22 12/11/2020     Priority: Medium     Obesity (BMI 35.0-39.9) with comorbidity (H) 09/26/2018     Priority: Medium     Bladder cancer (H) 10/06/2015     Priority: Medium     Glaucoma 07/06/2015     Priority: Medium     Hyperlipidemia 08/13/2008     Priority: Medium     Formatting of this note might be different from the original.  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) 12/11/2020    getting BCG (from his Urologist in [place], [place])     Obesity      Pancreatic mass      Renal cell carcinoma, left (H) 2021    Left nephrectomy     Ureter cancer, left (H)      Past Surgical History:   Procedure Laterality Date     APPENDECTOMY  1958     COLONOSCOPY  2-     COLONOSCOPY N/A 9/13/2022    Procedure: COLONOSCOPY;  Surgeon: Keyon Zimmerman MD;  Location: [facility]     COMBINED CYSTOSCOPY, RETROGRADES, URETEROSCOPY, INSERT STENT Left 1/18/2021    Procedure: CYSTOURETEROSCOPY, WITH RETROGRADE PYELOGRAM with interpretation of fluroscopy, ureteroscopy, ureteral biopsy, bladder biopsy and fulgaration;  Surgeon: Shonda Morrlel MD;  Location: [facility] OR     CYSTOSCOPY N/A 6/16/2021    Procedure:  CYSTOSCOPY;  Surgeon: Javier Mcnulty MD;  [facility] OR     CYSTOSCOPY, BIOPSY BLADDER, COMBINED N/A 9/8/2015    Procedure: COMBINED CYSTOSCOPY, BIOPSY BLADDER;  Surgeon: Randy Martinez MD;  Location: [facility]     CYSTOSCOPY, BIOPSY BLADDER, COMBINED N/A 2/14/2017    Procedure: COMBINED CYSTOSCOPY, BIOPSY BLADDER;  Surgeon: Randy Martinez MD;  Location: [facility]     CYSTOSCOPY, BIOPSY BLADDER, COMBINED N/A 11/13/2018    Procedure: COMBINED CYSTOSCOPY, BIOPSY BLADDER;  Surgeon: Ed Helm MD;  Location: [facility] OR     CYSTOSCOPY, BIOPSY BLADDER, COMBINED N/A 11/5/2021    Procedure: CYSTOSCOPY, WITH BLADDER BIOPSY;  Surgeon: Shonda Morrell MD;  Location: [facility]     CYSTOSCOPY, RETROGRADES, COMBINED Bilateral 11/13/2018    Procedure: Bilateral Retrograde Pyelagram, Bladder Biopsy;  Surgeon: Ed Helm MD;  Location: [facility] OR     CYSTOSCOPY, RETROGRADES, COMBINED Right 11/5/2021    Procedure: CYSTOSCOPY, WITH RETROGRADE PYELOGRAM;  Surgeon: Shonda Morrell MD;  Location: [facility]     CYSTOSCOPY, RETROGRADES, INSERT STENT URETER(S), COMBINED Left 9/8/2015    Procedure: COMBINED CYSTOSCOPY, RETROGRADES, INSERT STENT URETER(S);  Surgeon: Randy Martinez MD;  Location: [facility]     CYSTOSCOPY, TRANSURETHRAL RESECTION (TUR) TUMOR BLADDER, COMBINED N/A 9/8/2015    Procedure: COMBINED CYSTOSCOPY, TRANSURETHRAL RESECTION (TUR) TUMOR BLADDER;  Surgeon: Randy Martinez MD;  [facility]: [facility]     CYSTOSCOPY, TRANSURETHRAL RESECTION (TUR) TUMOR BLADDER, COMBINED N/A 1/12/2016    Procedure: COMBINED CYSTOSCOPY, TRANSURETHRAL RESECTION (TUR) TUMOR BLADDER;  Surgeon: Randy Martinez MD;  [facility]     CYSTOSCOPY, TRANSURETHRAL RESECTION (TUR) TUMOR BLADDER, COMBINED N/A 9/13/2016    Procedure: COMBINED CYSTOSCOPY, TRANSURETHRAL RESECTION (TUR) TUMOR BLADDER;  Surgeon: Randy Maritnez MD;  Location: [facility]     DAVINCI NEPHROURETERECTOMY Left 6/16/2021    Procedure:  NEPHROURETERECTOMY, ROBOT-ASSISTED, lymph node dissection;  Surgeon: Javier Mcnulty MD;  [facility]     ESOPHAGOSCOPY, GASTROSCOPY, DUODENOSCOPY (EGD), COMBINED N/A 9/30/2022    Procedure: UPPER ENDOSCOPIC ULTRASOUND;  Surgeon: Jason Dennis MD;  Location: [facility] OR     PHACOEMULSIFICATION WITH STANDARD INTRAOCULAR LENS IMPLANT Right 10/9/2018    Procedure: PHACOEMULSIFICATION WITH STANDARD INTRAOCULAR LENS IMPLANT;  PHACOEMULSIFICATION CATARACT EXTRACTION POSTERIOR CHAMBER LENS RIGHT;  Surgeon: Marlo Mcconnell MD;  Location: [facility]     PHACOEMULSIFICATION WITH STANDARD INTRAOCULAR LENS IMPLANT Left 10/23/2018    Procedure: PHACOEMULSIFICATION CATARACT EXTRACTION POSTERIOR CHAMBER LENS LEFT;  Surgeon: Marlo Mcconnell MD;  Location: [facility]     Current Outpatient Medications   Medication Sig Dispense Refill     atorvastatin (LIPITOR) 40 MG tablet TAKE 1 TABLET (40 MG) BY MOUTH DAILY 90 tablet 3     blood glucose (ONETOUCH VERIO IQ) test strip USE TO TEST BLOOD SUGAR 3 TIMES DAILY 100 strip 11     brimonidine (ALPHAGAN-P) 0.15 % ophthalmic solution INSTILL 1 DROP INTO RIGHT EYE TWICE DAILY  12     Continuous Blood Gluc  (FREESTYLE DELILAH 2 READER) TENNILLE 1 each continuous 1 each 0     Continuous Blood Gluc Sensor (FREESTYLE DELILAH 2 SENSOR) MISC 1 each every 14 days 2 each 11     insulin aspart (NOVOLOG PEN) 100 UNIT/ML pen 5 units with meals, and for glucometer 150-200 add 2 units SQ, 201-250 add 4 units, >250 add 6 units 15 mL 0     insulin degludec (TRESIBA FLEXTOUCH) 100 UNIT/ML pen Inject 18 Units Subcutaneous daily       insulin pen needle (BD PEN NEEDLE DONTAE 2ND GEN) 32G X 4 MM miscellaneous USE 3 PEN NEEDLES DAILY 100 each 11     latanoprost (XALATAN) 0.005 % ophthalmic solution Place 1 drop into both eyes At Bedtime       levothyroxine (SYNTHROID/LEVOTHROID) 125 MCG tablet Take 1 tablet (125 mcg) by mouth daily 90 tablet 1     ONETOUCH DELICA LANCETS 33G MISC 1 each 2 times daily  100 each 10     spacer (OPTICHAMBER RORY) holding chamber Use with your albuterol inhaler 1 each 0     acetaminophen (TYLENOL) 325 MG tablet Take 2 tablets (650 mg) by mouth every 4 hours as needed for mild pain or other (and adjunct with moderate or severe pain or per patient request) (Patient not taking: Reported on 2023)  0     bisacodyl (DULCOLAX) 5 MG EC tablet Take 2 tabs at bedtime 2 nights prior to procedure. Take 2 tabs at 3pm the day before procedure. (Patient not taking: Reported on 2023) 4 tablet 0     Cholecalciferol (VITAMIN D3) 25 MCG TABS Take 25 mcg by mouth daily (Patient not taking: Reported on 2/10/2023)       polyethylene glycol (GOLYTELY) 236 g suspension Drink 8 ounces every 10 minutes until the jug is half-empty at 6pm the night before procedure. Refrigerate. Repeat 6 hours prior to procedure. (Patient not taking: Reported on 2023) 4000 mL 0     VITRON-C  MG TABS tablet Take 1 tablet by mouth daily (Patient not taking: Reported on 2/10/2023)         Allergies   Allergen Reactions     No Clinical Screening - See Comments Other (See Comments)     Beta blocker in glaucoma gtt.s caused low pulse and passing out      Timolol      Beta blocker in glaucoma gtt.s caused low pulse and passing out   - patient thinks it was timolol but not 100% sure which beta blocker eye drop.      Bactrim [Sulfamethoxazole W/Trimethoprim] Rash        Social History     Tobacco Use     Smoking status: Former     Types: Cigarettes     Quit date: 1992     Years since quittin.1     Smokeless tobacco: Never   Substance Use Topics     Alcohol use: Yes     Comment: < 1 drink a month     Family History   Problem Relation Age of Onset     Diabetes Mother      Other - See Comments Father         cause of death unknown     Parkinsonism Brother      Coronary Artery Disease No family hx of      Hypertension No family hx of      Hyperlipidemia No family hx of      Cerebrovascular Disease No family  "hx of      Breast Cancer No family hx of      Colon Cancer No family hx of      Prostate Cancer No family hx of      Anesthesia Reaction No family hx of      Asthma No family hx of      Thyroid Disease No family hx of      Genetic Disorder No family hx of      History   Drug Use No         Objective     /78 (BP Location: Right arm, Patient Position: Sitting, Cuff Size: Adult Regular)   Pulse 63   Temp 97  F (36.1  C) (Tympanic)   Ht 1.702 m (5' 7\")   Wt 78.1 kg (172 lb 3.2 oz)   SpO2 99%   BMI 26.97 kg/m      Physical Exam    GENERAL APPEARANCE: healthy, alert and no distress     EYES: EOMI,  PERRL     HENT: ear canals and TM's normal and nose and mouth without ulcers or lesions     NECK: no adenopathy, no asymmetry, masses, or scars and thyroid normal to palpation     RESP: lungs clear to auscultation - no rales, rhonchi or wheezes     CV: regular rates and rhythm, normal S1 S2, no S3 or S4 and no murmur, click or rub     ABDOMEN:  soft, nontender, no HSM or masses and bowel sounds normal     MS: extremities normal- no gross deformities noted, no evidence of inflammation in joints, FROM in all extremities.     SKIN: no suspicious lesions or rashes     NEURO: Normal strength and tone, sensory exam grossly normal, mentation intact and speech normal     PSYCH: mentation appears normal. and affect normal/bright     LYMPHATICS: No cervical adenopathy    Recent Labs   Lab Test 01/12/23  0851 10/13/22  1005 10/11/22  0857 08/29/22  1032 08/26/22  1012   HGB 11.8* 11.5*  11.6*  --    < >  --     370  370  --    < >  --      --  132*   < >  --    POTASSIUM 4.3  --  3.9   < >  --    CR 1.85*  --  1.87*   < >  --    A1C 7.1*  --   --   --  8.6*    < > = values in this interval not displayed.        Diagnostics:  Recent Results (from the past 24 hour(s))   Comprehensive metabolic panel (BMP + Alb, Alk Phos, ALT, AST, Total. Bili, TP)    Collection Time: 02/10/23 11:52 AM   Result Value Ref Range    " Sodium 138 136 - 145 mmol/L    Potassium 4.6 3.4 - 5.3 mmol/L    Chloride 104 98 - 107 mmol/L    Carbon Dioxide (CO2) 20 (L) 22 - 29 mmol/L    Anion Gap 14 7 - 15 mmol/L    Urea Nitrogen 27.8 (H) 8.0 - 23.0 mg/dL    Creatinine 1.72 (H) 0.67 - 1.17 mg/dL    Calcium 8.8 8.8 - 10.2 mg/dL    Glucose 145 (H) 70 - 99 mg/dL    Alkaline Phosphatase 129 40 - 129 U/L    AST 24 10 - 50 U/L    ALT 9 (L) 10 - 50 U/L    Protein Total 6.7 6.4 - 8.3 g/dL    Albumin 3.9 3.5 - 5.2 g/dL    Bilirubin Total 0.3 <=1.2 mg/dL    GFR Estimate 41 (L) >60 mL/min/1.73m2   CBC with platelets and differential    Collection Time: 02/10/23 11:52 AM   Result Value Ref Range    WBC Count 6.5 4.0 - 11.0 10e3/uL    RBC Count 3.87 (L) 4.40 - 5.90 10e6/uL    Hemoglobin 11.9 (L) 13.3 - 17.7 g/dL    Hematocrit 35.9 (L) 40.0 - 53.0 %    MCV 93 78 - 100 fL    MCH 30.7 26.5 - 33.0 pg    MCHC 33.1 31.5 - 36.5 g/dL    RDW 12.7 10.0 - 15.0 %    Platelet Count 247 150 - 450 10e3/uL    % Neutrophils 55 %    % Lymphocytes 32 %    % Monocytes 10 %    % Eosinophils 2 %    % Basophils 1 %    % Immature Granulocytes 0 %    NRBCs per 100 WBC 0 <1 /100    Absolute Neutrophils 3.5 1.6 - 8.3 10e3/uL    Absolute Lymphocytes 2.1 0.8 - 5.3 10e3/uL    Absolute Monocytes 0.7 0.0 - 1.3 10e3/uL    Absolute Eosinophils 0.2 0.0 - 0.7 10e3/uL    Absolute Basophils 0.1 0.0 - 0.2 10e3/uL    Absolute Immature Granulocytes 0.0 <=0.4 10e3/uL    Absolute NRBCs 0.0 10e3/uL      No EKG required for low risk surgery (cataract, skin procedure, breast biopsy, etc).  No EKG required, no history of coronary heart disease, significant arrhythmia, peripheral arterial disease or other structural heart disease.  EKG from 9/10/2022 reviewed; NSR at 77 bpm, left axis deviation, , no ST segment changes.    Revised Cardiac Risk Index (RCRI):  The patient has the following serious cardiovascular risks for perioperative complications:   - No serious cardiac risks = 0 points     RCRI Interpretation:  0 points: Class I (very low risk - 0.4% complication rate)     Signed Electronically by: Douglas Lopez MD  Copy of this evaluation report is provided to requesting physician.

## 2023-02-10 NOTE — LETTER
February 10, 2023      Colin Baxter  [address] [address]        Dear ,    We are writing to inform you of your test results.    Your test results fall within the expected range(s) or remain unchanged from previous results.  Please continue with current treatment plan.    Resulted Orders   Comprehensive metabolic panel (BMP + Alb, Alk Phos, ALT, AST, Total. Bili, TP)   Result Value Ref Range    Sodium 138 136 - 145 mmol/L    Potassium 4.6 3.4 - 5.3 mmol/L    Chloride 104 98 - 107 mmol/L    Carbon Dioxide (CO2) 20 (L) 22 - 29 mmol/L    Anion Gap 14 7 - 15 mmol/L    Urea Nitrogen 27.8 (H) 8.0 - 23.0 mg/dL    Creatinine 1.72 (H) 0.67 - 1.17 mg/dL    Calcium 8.8 8.8 - 10.2 mg/dL    Glucose 145 (H) 70 - 99 mg/dL    Alkaline Phosphatase 129 40 - 129 U/L    AST 24 10 - 50 U/L    ALT 9 (L) 10 - 50 U/L    Protein Total 6.7 6.4 - 8.3 g/dL    Albumin 3.9 3.5 - 5.2 g/dL    Bilirubin Total 0.3 <=1.2 mg/dL    GFR Estimate 41 (L) >60 mL/min/1.73m2      Comment:      eGFR calculated using 2021 CKD-EPI equation.   CBC with platelets and differential   Result Value Ref Range    WBC Count 6.5 4.0 - 11.0 10e3/uL    RBC Count 3.87 (L) 4.40 - 5.90 10e6/uL    Hemoglobin 11.9 (L) 13.3 - 17.7 g/dL    Hematocrit 35.9 (L) 40.0 - 53.0 %    MCV 93 78 - 100 fL    MCH 30.7 26.5 - 33.0 pg    MCHC 33.1 31.5 - 36.5 g/dL    RDW 12.7 10.0 - 15.0 %    Platelet Count 247 150 - 450 10e3/uL    % Neutrophils 55 %    % Lymphocytes 32 %    % Monocytes 10 %    % Eosinophils 2 %    % Basophils 1 %    % Immature Granulocytes 0 %    NRBCs per 100 WBC 0 <1 /100    Absolute Neutrophils 3.5 1.6 - 8.3 10e3/uL    Absolute Lymphocytes 2.1 0.8 - 5.3 10e3/uL    Absolute Monocytes 0.7 0.0 - 1.3 10e3/uL    Absolute Eosinophils 0.2 0.0 - 0.7 10e3/uL    Absolute Basophils 0.1 0.0 - 0.2 10e3/uL    Absolute Immature Granulocytes 0.0 <=0.4 10e3/uL    Absolute NRBCs 0.0 10e3/uL     Hi Colin,  Here are your preop screening labs.  All values are stable  to slightly improved from your baseline and most recent labs.  We will proceed with scheduled colonoscopy.    If you have any questions or concerns, please call the clinic at the number listed above.       Sincerely,      Douglas Lopez MD

## 2023-02-10 NOTE — PATIENT INSTRUCTIONS
- No aspirin for 7 days before procedure  - No NSAIDs for 3 days prior to procedure  - Holding vitamins/supplements for 7 days prior to procedure  - Follow bowel prep directions

## 2023-02-10 NOTE — TELEPHONE ENCOUNTER
Patient would like a call back to go over his colonoscopy prep again.    Thank You    [phone]    Ashley Kraft

## 2023-02-13 NOTE — ANESTHESIA CARE TRANSFER NOTE
Patient: Colin Baxter    Procedure: Procedure(s):  COLONOSCOPY       Diagnosis: History of colonic polyps [Z86.010]  Diagnosis Additional Information: No value filed.    Anesthesia Type:   MAC     Note:    Oropharynx: oropharynx clear of all foreign objects and spontaneously breathing  Level of Consciousness: drowsy  Oxygen Supplementation: room air    Independent Airway: airway patency satisfactory and stable  Dentition: dentition unchanged  Vital Signs Stable: post-procedure vital signs reviewed and stable  Report to RN Given: handoff report given  Patient transferred to: Phase II    Handoff Report: Identifed the Patient, Identified the Reponsible Provider, Reviewed the pertinent medical history, Discussed the surgical course, Reviewed Intra-OP anesthesia mangement and issues during anesthesia, Set expectations for post-procedure period and Allowed opportunity for questions and acknowledgement of understanding      Vitals:  Vitals Value Taken Time   BP     Temp     Pulse     Resp     SpO2         Electronically Signed By: DANILO Croft CRNA  February 13, 2023  2:28 PM

## 2023-02-13 NOTE — INTERVAL H&P NOTE
"I have reviewed the surgical (or preoperative) H&P that is linked to this encounter, and examined the patient. There are no significant changes    Clinical Conditions Present on Arrival:  Clinically Significant Risk Factors Present on Admission                    # Overweight: Estimated body mass index is 26.12 kg/m  as calculated from the following:    Height as of this encounter: 1.702 m (5' 7\").    Weight as of this encounter: 75.7 kg (166 lb 12.8 oz).       "

## 2023-02-13 NOTE — ANESTHESIA POSTPROCEDURE EVALUATION
Patient: Colin Baxter    Procedure: Procedure(s):  COLONOSCOPY       Anesthesia Type:  MAC    Note:  Disposition: Outpatient   Postop Pain Control: Uneventful            Sign Out: Well controlled pain   PONV: No   Neuro/Psych: Uneventful            Sign Out: Acceptable/Baseline neuro status   Airway/Respiratory: Uneventful            Sign Out: Acceptable/Baseline resp. status   CV/Hemodynamics: Uneventful            Sign Out: Acceptable CV status; No obvious hypovolemia; No obvious fluid overload   Other NRE: NONE   DID A NON-ROUTINE EVENT OCCUR? No           Last vitals:  Vitals Value Taken Time   /84 02/13/23 1512   Temp 96.8  F (36  C) 02/13/23 1432   Pulse 65 02/13/23 1512   Resp 16 02/13/23 1500   SpO2 98 % 02/13/23 1512   Vitals shown include unvalidated device data.    Electronically Signed By: DANILO EATON CRNA  February 13, 2023  4:00 PM

## 2023-02-13 NOTE — DISCHARGE INSTRUCTIONS

## 2023-02-13 NOTE — OR NURSING
"Patient wide awake at this time.  Patient able to sit up with minimal help.  Patient notes he is not having any pain and \"can't wait to eat and drink\"  Lungs at this time are clear with good/equal air movement noted.  Ice water and Onofre Doones; provided.    "

## 2023-02-13 NOTE — OP NOTE
REPORT OF OPERATION  DATE OF PROCEDURE: 2/13/2023    PATIENT: Colin Baxter    SURGERY PERFORMED: Colonoscopy    PREOPERATIVE DIAGNOSIS: History of Colon Polyps    POSTOPERATIVE DIAGNOSIS:    Same   Normal colonoscopy   Diverticulosis was identified.   Hemorrhoids  were  identified.    SURGEON: Antony Croft MD    ASSISTANTS: None    ANESTHESIA: Monitored Anesthesia Care    COMPLICATIONS: None apparent    TRANSFUSIONS: None    TISSUE TO PATHOLOGY: None    FINDINGS: Normal colonoscopy.  Diverticulosis was identified.  Hemorrhoids  were  identified.    INDICATIONS: This is a 78 year old male in need of a colonoscopy for History of Colon Polyps.  The patient will be taken to the endoscopy suite for that procedure.    DESCRIPTIONS OF PROCEDURE IN DETAIL: After consent was obtained the patient was taken to the endoscopy suite and placed in the left lateral decubitus position.  The patient was identified and the correct patient was confirmed.  Monitored Anesthesia Care was given.  A time out was performed verifying the correct patient and the correct procedure.  The entire operative team was in agreement.  All necessary equipment and supplies were in the room.    Rectal exam was performed and no lesions of the anal canal were noted.  The colonoscope was inserted into the anus and passed without difficulty to the cecum.  The cecum was identified by the ileocecal valve, the coalescence of the tinea and the appendiceal orifice.  Upon withdrawal all walls of the colon were visualized.  There were no polyps, masses or evidence of colitis seen.  Diverticulosis was seen.  Upon reaching the rectum the scope was retroflexed and internal hemorrhoids  were  seen.  The scope was straightened back out and removed from the patient.  The patient was then taken to the recovery room in stable condition tolerating the procedure well.      Prep: fair    Withdrawal time was 6 minutes.    It is recommended that the patient have another  colonoscopy PRN.

## 2023-02-13 NOTE — OR NURSING
Patient ready to go.  Patient was able to sit up at the edge of the bed without dizziness; denies any NV.  IV removed.  Patient up to the bathroom to void.  Gait steady.  Denies any pain.  Back to bed and getting dressed at this time.

## 2023-02-27 NOTE — PATIENT INSTRUCTIONS
Recap of our visit:     Monitoring:  Your A1C was 7.1 on 1/12/23. Goal is less than  <8.0%  Next A1C: After April 12.     Fasting in the morning, before meals for meal time insulin.  2 hours after your largest meal are good times to check.   Bedtime.     Target blood sugar: Fasting or before meals target is .   2 hours after meal <200  Bedtime 120-180     CGM: Tiffany 2     Medications:   Tresiba 18 units every day  Novolog meal time, decrease to 3 units with meals. If glucose is 200 or higher, take additional 2 units = 5 units total for your meal.     Healthy Eating:    Good snack examples: meat, cheese, cottage cheese, nuts, hard boiled egg, veggies, fruit/berries, yogurt (Greek yogurt/protein).   Target Carb: Men 60 grams/meal 15-30 snacks.     Activity/Exercise:   Increase exercise as tolerated, even multiple short times during your day helps.   Adult goal is 150 minutes/week =  30 minutes 5 days of the week.   Weight: 170.7#    Foot exam: current- August  Eye exam: current- April     Follow up: I will call you by the end of this week to check on your glucose/sugars.   See Dr Wallace in April as scheduled.   Return to see Antony Dale 5/31/2023 at 9 am.      Please bring your meter/reader to your appointment.     If you have any questions or concerns, please call me at [phone] or send [employer] message.    Thank you for coming in today!  Suyapa Flowers RN Diabetes Educator

## 2023-03-01 NOTE — PROGRESS NOTES
"Diabetes Self-Management Education & Support    Presents for: Follow-up    Type of Service: In Person Visit    Assessment Type:   ASSESSMENT:  Colin comes to [facility] today for glucose review. Shares with nurse, has trouble with the sliding scale.     No overnight lows but does have some lows during the day and after activity.   A1C in January was well under <8% target. Tresiba 18 units once daily, no overnight/early am lows. Novolog 5 units with meals, x3 meals. Somes times takes a little less or more if having \"more\" carb during meal.     Feels sweaty 57,58. Treats low with food resolves (prunes or ice-cream cup). Pt states he doesn't usually eat pasta or rice, these items send glucose up rapidly. Eats breakfast around 6 am. Lunch 12/1pm and 430-5 pm is dinner time.  HS snack- 1/2 doughnut or sugar free cookie.   Diet is usually consistent, has routine.   Pt notes does have low if working/being active. Winter months, not as active compared to summer when outside working on projects.     Tiffany 2  AGP Report  2/14/2023 to 2/27/2023    % Time CGM is Active  97%    Average Glucose  148    Glucose Management Indicator  (GMI)    6.9%    Glucose Variabilty  30.7    Time in Ranges:  >250 mg/dL   2  181-250 mg/dL  22   mg/dL   75  54-69 mg/dL   1  <54 mg/dL   0    Pt would benefit from a simple insulin plan to prevent low glucose.   Continue Tresiba 18 units once daily.   Decrease Novolog to 3 units with meals. If glucose is >200, take additional 2 units for total of 5 units. [facility] RN will call to follow up glucose trends with dose change.     /68. Statin use. No ASA- defer to PCP. Former tobacco use.   Foot exam, current 8/2022. Eye exam current. - due in April. Goes to [facility].     Patient's most recent   Lab Results   Component Value Date    A1C 7.1 01/12/2023    A1C 7.1 11/24/2021     is meeting goal of <8.0    Diabetes knowledge and skills assessment:   Patient is knowledgeable in diabetes management " concepts related to: Healthy Eating, Being Active, Monitoring, Taking Medication, Problem Solving, Reducing Risks and Healthy Coping  Continue education with the following diabetes management concepts: Healthy Eating, Being Active, Monitoring, Taking Medication, Problem Solving, Reducing Risks and Healthy Coping  Based on learning assessment above, most appropriate setting for further diabetes education would be: Individual setting.      PLAN    Monitoring:   A1C was 7.1 on 1/12/23. Goal is less than  <8.0%  Next A1C: After April 12.      Fasting in the morning, before meals for meal time insulin.  2 hours after your largest meal are good times to check.   Bedtime.      Target blood sugar: Fasting or before meals target is .   2 hours after meal <200  Bedtime 120-180      CGM: Tiffany 2      Medications:   Tresiba 18 units every day  Novolog meal time, decrease to 3 units with meals. If glucose is 200 or higher, take additional 2 units = 5 units total for your meal.      Healthy Eating:    Good snack examples: meat, cheese, cottage cheese, nuts, hard boiled egg, veggies, fruit/berries, yogurt (Greek yogurt/protein).   Target Carb: Men 60 grams/meal 15-30 snacks.      Activity/Exercise:   Increase exercise as tolerated, even multiple short times during your day helps.   Adult goal is 150 minutes/week =  30 minutes 5 days of the week.   Weight: 170.7#     Foot exam: current- August  Eye exam: current- April      Follow up: I will call you by the end of this week to check on your glucose/sugars.   See Dr Wallace in April as scheduled.   Return to see Antony Dale 5/31/2023 at 9 am.      Please bring your meter/reader to your appointment.      If you have any questions or concerns, please call me at [phone] or send [employer] message.     Topics to cover at upcoming visits: Healthy Eating, Being Active, Monitoring, Taking Medication, Problem Solving, Reducing Risks and Healthy Coping    See Care Plan for  "co-developed, patient-state behavior change goals.  AVS provided for patient today.    Education Materials Provided:  No new materials provided today      SUBJECTIVE/OBJECTIVE:  Presents for: Follow-up  Accompanied by: Self  Diabetes education in the past 24mo: Yes  Focus of Visit: Monitoring, Taking Medication  Diabetes type: Type 2  Date of diagnosis: 7/29/19  Disease course: Improving (elevated BGs with chemo treatments)  How confident are you filling out medical forms by yourself:: Quite a bit  Diabetes management related comments/concerns: glucose variation.  Transportation concerns: No  Difficulty affording diabetes medication?: No (PAP Tresiba Novolog)  Difficulty affording diabetes testing supplies?: No  Other concerns:: Glasses  Cultural Influences/Ethnic Background:  Not  or     Diabetes Symptoms & Complications:  Fatigue: No (poor sleep, wakes up during the night and has hard time falling back asleep.)  Neuropathy: No  Polydipsia: No  Polyphagia: Sometimes  Polyuria: No  Visual change: Sometimes (blurred -pt thinks his glucose maybe high when he has blurry vision which is not too often.)  Slow healing wounds: No  Symptom course: Stable  Weight trend: Fluctuating  Complications assessed today?: Yes  Autonomic neuropathy: No  CVA: No  Heart disease: No  Nephropathy: Yes  Peripheral neuropathy: No  Foot ulcerations: No  Peripheral Vascular Disease: No  Retinopathy: No    Patient Problem List and Family Medical History reviewed for relevant medical history, current medical status, and diabetes risk factors.    Vitals:  /68   Pulse 67   Resp 16   Ht 1.67 m (5' 5.75\")   Wt 77.4 kg (170 lb 11.2 oz)   SpO2 96%   BMI 27.76 kg/m    Estimated body mass index is 27.76 kg/m  as calculated from the following:    Height as of this encounter: 1.67 m (5' 5.75\").    Weight as of this encounter: 77.4 kg (170 lb 11.2 oz).   Last 3 BP:   BP Readings from Last 3 Encounters:   02/27/23 108/68 "   02/13/23 126/69   02/10/23 110/78       History   Smoking Status     Former     Types: Cigarettes     Quit date: 1/6/1992   Smokeless Tobacco     Never       Labs:  Lab Results   Component Value Date    A1C 7.1 01/12/2023    A1C 7.1 11/24/2021     Lab Results   Component Value Date     02/16/2023     02/13/2023     10/11/2022    GLC 99 07/06/2021     Lab Results   Component Value Date    LDL 50 01/12/2023    LDL 75 10/02/2020     HDL Cholesterol   Date Value Ref Range Status   10/02/2020 59 >39 mg/dL Final     Direct Measure HDL   Date Value Ref Range Status   01/12/2023 51 >=40 mg/dL Final   ]  GFR Estimate   Date Value Ref Range Status   02/16/2023 42 (L) >60 mL/min/1.73m2 Final     Comment:     eGFR calculated using 2021 CKD-EPI equation.   07/06/2021 26 (L) >60 mL/min/[1.73_m2] Final     Comment:     Non  GFR Calc  Starting 12/18/2018, serum creatinine based estimated GFR (eGFR) will be   calculated using the Chronic Kidney Disease Epidemiology Collaboration   (CKD-EPI) equation.       GFR Estimate If Black   Date Value Ref Range Status   07/06/2021 31 (L) >60 mL/min/[1.73_m2] Final     Comment:      GFR Calc  Starting 12/18/2018, serum creatinine based estimated GFR (eGFR) will be   calculated using the Chronic Kidney Disease Epidemiology Collaboration   (CKD-EPI) equation.       Lab Results   Component Value Date    CR 1.67 02/16/2023    CR 2.33 07/06/2021     No results found for: MICROALBUMIN    Healthy Eating:  Healthy Eating Assessed Today: Yes  Cultural/Restorationist diet restrictions?: No  Meal planning/habits: Smaller portions, Avoiding sweets  How many times a week on average do you eat food made away from home (restaurant/take-out)?: 0  Meals include: Breakfast, Lunch, Dinner  Breakfast: 1 toast or 1 english muffin, 1-2 eggs, breakfast meat - coffee/cream/splenda or corn flakes. or oatmeal or cream of wheat.  Lunch: 1/2 sandwich, fruit, milk or 1/2   cup chili with corn bread or beef, cheese, milk, coffee - sometimes skips. gatorades zero.  Dinner: meat, veggies, 1 bread - sometimes salad - occasional starch (1/2 potato)  Snacks: grapes, leftover meat, 1/2 sandwich - Dove ice cream or 1/2 donut  Beverages: Water, Coffee, Milk, Sports drinks  Has patient met with a dietitian in the past?: Yes    Being Active:  Being Active Assessed Today: Yes  Exercise:: Currently not exercising  Barrier to exercise: Other (winter is harder to be active. summer months outside projects.)    Monitoring:  Monitoring Assessed Today: Yes  Did patient bring glucose meter to appointment? : Yes  Blood Glucose Meter: One Touch, CGM  Times checking blood sugar at home (number): Other (continuous monitor)  Times checking blood sugar at home (per): Day  Blood glucose trend: Fluctuating    Taking Medications:  Diabetes Medication(s)     Insulin       insulin aspart (NOVOLOG PEN) 100 UNIT/ML pen    5 units with meals, and for glucometer 150-200 add 2 units SQ, 201-250 add 4 units, >250 add 6 units     insulin degludec (TRESIBA FLEXTOUCH) 100 UNIT/ML pen    Inject 18 Units Subcutaneous daily          Taking Medication Assessed Today: Yes  Current Treatments: Diet, Insulin Injections (Tresiba 18 units daily, Novolog 5 units but varies 3-6 units. depending on BG.)  Dose schedule: Pre-breakfast, Pre-lunch, Pre-dinner  Given by: Patient  Injection/Infusion sites: Abdomen  Problems taking diabetes medications regularly?: No  Diabetes medication side effects?: Yes (lows from insulin)    Problem Solving:  Problem Solving Assessed Today: Yes  Is the patient at risk for hypoglycemia?: Yes  Hypoglycemia Frequency: Weekly  Hypoglycemia Treatment: Other food, Candy, Juice (oranges, apples.)  Patient carries a carbohydrate source: Yes  Medical ID: No  Does patient have DKA prevention plan?: No  Does patient have severe weather/disaster plan for diabetes management?: No    Hypoglycemia symptoms  Confusion:  Yes (one occurance when  in 50s.)  Hunger: Yes  Sweats: Yes  Feeling shaky: Yes    Hypoglycemia Complications  Blackouts: No  Hospitalization: No  Nocturnal hypoglycemia: No  Required assistance: No  Required glucagon injection: No  Seizures: No    Reducing Risks:  Reducing Risks Assessed Today: Yes  Diabetes Risks: Age over 45 years, Hyperlipidemia  CAD Risks: Male sex, Diabetes Mellitus, Dyslipidemia, Stress  Has dilated eye exam at least once a year?: Yes (April 2022, Priscilla.- had appt last week.)  Sees dentist every 6 months?: No  Feet checked by healthcare provider in the last year?: Yes (8/26/2022, PCP)    Healthy Coping:  Healthy Coping Assessed Today: Yes  Emotional response to diabetes: Ready to learn, Concern for health and well-being  Informal Support system:: Family  Stage of change: ACTION (Actively working towards change)  Support resources: None  Patient Activation Measure Survey Score:  SHARITA Score (Last Two) 9/26/2018   SHARITA Raw Score 30   Activation Score 56   SHARITA Level 3         Care Plan and Education Provided:  Care Plan: Diabetes   Updates made by Suyapa Flowers RN since 3/1/2023 12:00 AM      Problem: Diabetes Self-Management Education Needed to Optimize Self-Care Behaviors       Goal: Understand diabetes pathophysiology and disease progression    This Visit's Progress: 100%      Goal: Healthy Eating - follow a healthy eating pattern for diabetes    This Visit's Progress: On track   Note:    Consistent with diet/meals.      Goal: Being Active - get regular physical activity, working up to at least 150 minutes per week    This Visit's Progress: 80%   Note:    Increase activity as tolerated, safely.        Goal: Monitoring - monitor glucose and ketones as directed    Start Date: 8/26/2022   Expected End Date: 11/25/2022   This Visit's Progress: 100%   Note:    Monitoring BG with personal CGM Tiffany     Goal: Taking Medication - patient is consistently taking medications as directed    Start Date:  8/26/2022   Expected End Date: 11/25/2022   This Visit's Progress: 100%   Note:    Taking Tresiba and NovoLOG as directed and has received shipment from [facility]     Goal: Problem Solving - know how to prevent and manage short-term diabetes complications    This Visit's Progress: 70%      Task: Provide education on high blood glucose - causes, signs/symptoms, prevention and treatment Completed 3/1/2023   Responsible User: Caryl Griffin RN      Task: Provide education on how to care for diabetes on sick days Completed 3/1/2023   Responsible User: Caryl Griffin RN      Goal: Reducing Risks - know how to prevent and treat long-term diabetes complications    This Visit's Progress: 80%   Note:    BP <130/80  Statin use  No ASA- defer to PCP.   Former tobacco use.   Eye exam current  Foot exam current.      Goal: Healthy Coping - use available resources to cope with the challenges of managing diabetes    This Visit's Progress: On track      Task: Discuss recognizing feelings about having diabetes Completed 3/1/2023   Responsible User: Caryl Griffin RN      Task: Provide education on the benefits of making appropriate lifestyle changes    Responsible User: Caryl Griffin RN      Task: Provide education on benefits of utilizing support systems Completed 3/1/2023   Responsible User: Caryl Griffin RN          Time Spent: 30 minutes  Encounter Type: Individual    Any diabetes medication dose changes were made via the CDE Protocol per the patient's primary care provider. A copy of this encounter was shared with the provider.    Suyapa Flowers RN Diabetes Educator,  [phone]

## 2023-03-03 NOTE — PROGRESS NOTES
Diabetes Self-Management Education & Support    Follow up insulin adjust.     Pt states he has been mostly above 200 post meals when he checks his glucose. Would like to increase Novolog to 4 units with meals, x3.     DR[facility] RN will follow up next week to review glucose.   Suyapa Flowers RN Diabetes Educator,  [phone]  3/3/2023 at 1:58 PM

## 2023-03-09 PROBLEM — Z85.54 HISTORY OF CANCER OF URETER: Status: ACTIVE | Noted: 2019-02-27

## 2023-03-09 NOTE — PROGRESS NOTES
[facility]: Cancer Care                                                                                          Patient stopped in this morning requesting an appointment for a suspicious spot on chest. Spoke with Jennifer Jensen and she is willing to see patient. Patient would like to come back tomorrow at 910.     Signature:  Nasreen Ontiveros RN

## 2023-03-09 NOTE — PROGRESS NOTES
Diabetes Self-Management Education & Support    Follow  up with Colin: insulin adjustment. (Novolog).     Medications:   Tresiba 18 units every day  Novolog meal time, 4 units with meals.     Target blood sugar: Fasting or before meals target is .   2 hours after meal <200  Bedtime 120-180.     Pt states hasn't had a BG less than 95-97, no low symptoms, felt fine.   Checked glucose a few mintues prior to call, 124.     Pt states he is consistent with meal/food intake but sometimes will add an extra slice of bread and will take an additional unit of insulin to cover the extra carbs.     Pt states he is taking Vitamin C250 mg cuts it in half for 125 mg dose.   Has noticed his Glucose when he takes is reading all over on CGM but when test with BG meter, is usually normal and 20-50 points off. Pt has stopped taking VItamin C and noticed glucose pattern returned to normal patterns. Will follow up with PCP if he should take the VIt C or okay to stop.       Plan to continue Tresiba 18 units daily  Novolog 4-5 units with meals. Depending on carb.     Suyapa Flowers RN Diabetes Educator,  [phone]  3/9/2023 at 2:46 PM

## 2023-03-09 NOTE — PROGRESS NOTES
Oncology Follow-up Visit:  March 9, 2023    Reason for Visit:  Patient presents with:  Oncology Clinic Visit: Follow up. Bladder cancer      Nursing Note and documentation reviewed: yes    HPI: This is a 78-year-old male patient who presents to the oncology clinic today for nonroutine follow-up.  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 Bartlett on 1/30/2023 with plan to follow-up in April.  He presented to the clinic yesterday with complaints of a concerning lump and requested to be seen.    He presents to the clinic today stating he is doing okay.  He does have a lot of upcoming appointments next month.  States he noticed a lump behind the right nipple a couple of weeks ago.  He states it does hurt when he palpates it some days worse than others.  States it really has not changed in size and he denies any rash to the area.  He has never noticed this before.  He has been well without any recent infections.    Past Medical History:   Diagnosis Date     Acquired hypothyroidism      Anemia      Benign essential hypertension      Bladder cancer (H)      Carcinoma in situ of bladder 11/19/2021     Chronic kidney disease      Diabetes mellitus, type 2 (H)      Dyslipidemia      Glaucoma      Hyperlipidemia      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) 12/11/2020    getting BCG (from his Urologist in [place], [place])     Obesity      Pancreatic mass      Renal cell carcinoma, left (H) 2021    Left nephrectomy     Ureter cancer, left (H)        Past Surgical History:   Procedure Laterality Date     APPENDECTOMY  1958     COLONOSCOPY  2-     COLONOSCOPY N/A 9/13/2022    Procedure: COLONOSCOPY;  Surgeon: Keyon Zimmerman MD;  Location: [facility]     COLONOSCOPY N/A 2/13/2023    Procedure: COLONOSCOPY;  Surgeon: Antony Croft MD;  Location: [facility]     COMBINED CYSTOSCOPY, RETROGRADES, URETEROSCOPY, INSERT  STENT Left 1/18/2021    Procedure: CYSTOURETEROSCOPY, WITH RETROGRADE PYELOGRAM with interpretation of fluroscopy, ureteroscopy, ureteral biopsy, bladder biopsy and fulgaration;  Surgeon: Shonda Morrell MD;  Location: [facility]     CYSTOSCOPY N/A 6/16/2021    Procedure: CYSTOSCOPY;  Surgeon: Javier Mcnulty MD;  [facility]     CYSTOSCOPY, BIOPSY BLADDER, COMBINED N/A 9/8/2015    Procedure: COMBINED CYSTOSCOPY, BIOPSY BLADDER;  Surgeon: Randy Martinez MD;  [facility]     CYSTOSCOPY, BIOPSY BLADDER, COMBINED N/A 2/14/2017    Procedure: COMBINED CYSTOSCOPY, BIOPSY BLADDER;  Surgeon: Randy Martinez MD;  [facility]     CYSTOSCOPY, BIOPSY BLADDER, COMBINED N/A 11/13/2018    Procedure: COMBINED CYSTOSCOPY, BIOPSY BLADDER;  Surgeon: Ed Helm MD;  Location: [facility] OR     CYSTOSCOPY, BIOPSY BLADDER, COMBINED N/A 11/5/2021    Procedure: CYSTOSCOPY, WITH BLADDER BIOPSY;  Surgeon: Shonda Morrell MD;  [facility]     CYSTOSCOPY, RETROGRADES, COMBINED Bilateral 11/13/2018    Procedure: Bilateral Retrograde Pyelagram, Bladder Biopsy;  Surgeon: Ed Helm MD;  Location: [facility] OR     CYSTOSCOPY, RETROGRADES, COMBINED Right 11/5/2021    Procedure: CYSTOSCOPY, WITH RETROGRADE PYELOGRAM;  Surgeon: Shonda Morrell MD;  [facility]     CYSTOSCOPY, RETROGRADES, INSERT STENT URETER(S), COMBINED Left 9/8/2015    Procedure: COMBINED CYSTOSCOPY, RETROGRADES, INSERT STENT URETER(S);  Surgeon: Randy Maritnez MD;  [facility]     CYSTOSCOPY, TRANSURETHRAL RESECTION (TUR) TUMOR BLADDER, COMBINED N/A 9/8/2015    Procedure: COMBINED CYSTOSCOPY, TRANSURETHRAL RESECTION (TUR) TUMOR BLADDER;  Surgeon: Randy Martinez MD;  [facility]     CYSTOSCOPY, TRANSURETHRAL RESECTION (TUR) TUMOR BLADDER, COMBINED N/A 1/12/2016    Procedure: COMBINED CYSTOSCOPY, TRANSURETHRAL RESECTION (TUR) TUMOR BLADDER;  Surgeon: Randy Martinez MD;  [facility]     CYSTOSCOPY,  TRANSURETHRAL RESECTION (TUR) TUMOR BLADDER, COMBINED N/A 9/13/2016    Procedure: COMBINED CYSTOSCOPY, TRANSURETHRAL RESECTION (TUR) TUMOR BLADDER;  Surgeon: Randy Martinez MD;  [facility]     DAVINCI NEPHROURETERECTOMY Left 6/16/2021    Procedure: NEPHROURETERECTOMY, ROBOT-ASSISTED, lymph node dissection;  Surgeon: Javier Mcnulty MD;  [facility]     ESOPHAGOSCOPY, GASTROSCOPY, DUODENOSCOPY (EGD), COMBINED N/A 9/30/2022    Procedure: UPPER ENDOSCOPIC ULTRASOUND;  Surgeon: Jason Dennis MD;  Location: [facility]     PHACOEMULSIFICATION WITH STANDARD INTRAOCULAR LENS IMPLANT Right 10/9/2018    Procedure: PHACOEMULSIFICATION WITH STANDARD INTRAOCULAR LENS IMPLANT;  PHACOEMULSIFICATION CATARACT EXTRACTION POSTERIOR CHAMBER LENS RIGHT;  Surgeon: Marlo Mcconnell MD;  [facility]     PHACOEMULSIFICATION WITH STANDARD INTRAOCULAR LENS IMPLANT Left 10/23/2018    Procedure: PHACOEMULSIFICATION CATARACT EXTRACTION POSTERIOR CHAMBER LENS LEFT;  Surgeon: Marlo Mcconnell MD;  Location: [facility]       Family History   Problem Relation Age of Onset     Diabetes Mother      Other - See Comments Father         cause of death unknown     Parkinsonism Brother      Coronary Artery Disease No family hx of      Hypertension No family hx of      Hyperlipidemia No family hx of      Cerebrovascular Disease No family hx of      Breast Cancer No family hx of      Colon Cancer No family hx of      Prostate Cancer No family hx of      Anesthesia Reaction No family hx of      Asthma No family hx of      Thyroid Disease No family hx of      Genetic Disorder No family hx of        Social History     Socioeconomic History     Marital status:      Spouse name: Not on file     Number of children: 3     Years of education: Not on file     Highest education level: Not on file   Occupational History     Not on file   Tobacco Use     Smoking status: Former     Types: Cigarettes     Quit date: 1/6/1992      Years since quittin.1     Smokeless tobacco: Never   Vaping Use     Vaping Use: Never used   Substance and Sexual Activity     Alcohol use: Yes     Comment: < 1 drink a month     Drug use: No     Sexual activity: Not Currently   Other Topics Concern     Parent/sibling w/ CABG, MI or angioplasty before 65F 55M? No   Social History Narrative    , 3 children, he lives in [place], [place] but is in [place] his daughter.  He is retired, he previously worked in the [employer] in [place]. (last updated 9/10/2022)      Social Determinants of Health     Financial Resource Strain: Not on file   Food Insecurity: Not on file   Transportation Needs: Not on file   Physical Activity: Not on file   Stress: Not on file   Social Connections: Not on file   Intimate Partner Violence: Not on file   Housing Stability: Not on file       Current Outpatient Medications   Medication     acetaminophen (TYLENOL) 325 MG tablet     atorvastatin (LIPITOR) 40 MG tablet     blood glucose (ONETOUCH VERIO IQ) test strip     brimonidine (ALPHAGAN-P) 0.15 % ophthalmic solution     Cholecalciferol (VITAMIN D3) 25 MCG TABS     Continuous Blood Gluc  (FREESTYLE DELILAH 2 READER) TENNILLE     Continuous Blood Gluc Sensor (FREESTYLE DELILAH 2 SENSOR) MISC     insulin aspart (NOVOLOG PEN) 100 UNIT/ML pen     insulin degludec (TRESIBA FLEXTOUCH) 100 UNIT/ML pen     insulin pen needle (BD PEN NEEDLE DONTAE 2ND GEN) 32G X 4 MM miscellaneous     latanoprost (XALATAN) 0.005 % ophthalmic solution     levothyroxine (SYNTHROID/LEVOTHROID) 125 MCG tablet     ONETOUCH DELICA LANCETS 33G MISC     spacer (OPTICHAMBER RORY) holding chamber     VITRON-C  MG TABS tablet     No current facility-administered medications for this visit.        Allergies   Allergen Reactions     No Clinical Screening - See Comments Other (See Comments)     Beta blocker in glaucoma gtt.s caused low pulse and passing out      Timolol      Beta blocker in glaucoma  "gtt.s caused low pulse and passing out   - patient thinks it was timolol but not 100% sure which beta blocker eye drop.      Bactrim [Sulfamethoxazole W/Trimethoprim] Rash       Review Of Systems: See HPI    Physical Exam:  /70   Pulse 69   Temp 97.4  F (36.3  C) (Tympanic)   Resp 19   Ht 1.702 m (5' 7\")   Wt 77.2 kg (170 lb 3.1 oz)   SpO2 99%   BMI 26.66 kg/m      GENERAL APPEARANCE: Healthy, alert and in no acute distress.  HEENT: Normocephalic, Sclerae anicteric.  LYMPHATICS: No palpable Axillary nodes   RESP: respirations regular and easy  BREAST/Chest wall: Right:  Approximately 1 cm firm area palpated to behind the right nipple, No nipple discharge, erythema, skin thickening, nipple inversion, skin dimpling, swelling Left: No masses, nipple discharge, erythema, skin thickening, nipple inversion, skin dimpling, swelling; breasts were examined in the supine and sitting position  MUSCULOSKELETAL: Extremities without gross deformities noted. No edema of bilateral lower extremities.  SKIN: No suspicious lesions or rashes to exposed skin  NEURO: Alert and oriented x 3.  Gait steady.  PSYCHIATRIC: Mentation and affect appear normal.  Mood appropriate.    Laboratory: None completed for today's visit    Imaging Studies: None completed for today's visit      ASSESSMENT/PLAN:    #1  Breast lump/pain: 2-week history of breast lump.  We will obtain a mammogram and ultrasound and call him with those results.    #2 History of malignant neoplasm of the left ureter: History of high-grade urothelial cell carcinoma of the left ureter diagnosed January 2021.  He will follow-up in April as planned.    I encouraged patient to call with any questions or concerns.    Sabra Jensen, NP  APRN, FNP-BC, AOCNP  "

## 2023-03-10 NOTE — PATIENT INSTRUCTIONS
We will schedule you for a mammogram and ultrasound and I will call you with the results.    If you have any questions please call [phone]    Other instructions:  none

## 2023-03-10 NOTE — PROGRESS NOTES
Follow up call:     Libia Wallace MD  You      SH  Okay to stop the vitamin C.  The vitamin C was to help with iron absorption       Patient updated, plans to stop Vit C.   Suyapa Flowers RN Diabetes Educator,  [phone]  3/10/2023 at 3:50 PM

## 2023-03-10 NOTE — NURSING NOTE
"Oncology Rooming Note    March 10, 2023 9:11 AM   Colin Baxter is a 78 year old male who presents for:    Chief Complaint   Patient presents with     Oncology Clinic Visit     Follow up. Bladder cancer      Initial Vitals: /70   Pulse 69   Temp 97.4  F (36.3  C) (Tympanic)   Resp 19   Ht 1.702 m (5' 7\")   Wt 77.2 kg (170 lb 3.1 oz)   SpO2 99%   BMI 26.66 kg/m   Estimated body mass index is 26.66 kg/m  as calculated from the following:    Height as of this encounter: 1.702 m (5' 7\").    Weight as of this encounter: 77.2 kg (170 lb 3.1 oz). Body surface area is 1.91 meters squared.  No Pain (0) Comment: Data Unavailable   No LMP for male patient.  Allergies reviewed: Yes  Medications reviewed: Yes    Medications: Medication refills not needed today.  Pharmacy name entered into EPIC:     [id number] - [place], [place] - [address] [facility] [id number] - [place], [place] - [address] [place] [address]    Clinical concerns: small lump on right chest under breast area, been there about 2 to 3 weeks. Tender to the touch at times. Denies any pain right now.  Jennifer Jensen was notified.      Estephania Wesley LPN              "

## 2023-03-17 PROBLEM — D09.0 CARCINOMA IN SITU OF BLADDER: Status: ACTIVE | Noted: 2021-11-19

## 2023-03-24 NOTE — PROGRESS NOTES
Diabetes Self-Management Education & Support    Follow up with patient. No concerns with glucose or insulin dosing:  Tresiba 18 units daily  Novolog 4-5 units with meals. Depending on carb.     Denies lows overnight or early am. Had 1 low during day after meal, treated.     Has PCP visit in April, due for A1C at that time.   DRC RN visit in May.   Suyapa Flowers, RN Diabetes Educator,  [phone]  3/24/2023 at 10:11 AM

## 2023-04-10 NOTE — PROGRESS NOTES
Diabetes Self-Management Education & Support    Nedra Nordisk PAP refill forms completed.   Suyapa Flowers, RN Diabetes Educator,  [phone]  4/10/2023 at 8:52 AM

## 2023-04-11 NOTE — PROGRESS NOTES
Assessment & Plan     Type 2 diabetes mellitus without complication, with long-term current use of insulin (H)  A1c today of 7.4 which is a slight increase from 7.1.  Tiffany report reviewed. No lows. avg   - Hemoglobin A1c; Future  - no change in diabetic medications  - follows with [facility] diabetic education with next visit 5/31/2023    Hyperlipidemia, unspecified hyperlipidemia type  LDL (1/12/2023) 50  - c/w atorvastatin     Acquired hypothyroidism  TSH low. Reduce levothyroxine and recheck 6 weeks. Lab order placed.   - TSH with free T4 reflex; Future  - reduce levothyroxine (SYNTHROID/LEVOTHROID) 112 MCG tablet; Take 1 tablet (112 mcg) by mouth daily  - TSH with free T4 reflex; Future    Elevated alkaline phosphatase level / Pancreatic mass  Lipase elevated. MRI abd scheduled for Monday   - Lipase      Malignant neoplasm of urinary bladder, unspecified site (H)  Follows with Dr. Schmitz, [facility] urology with next visit 4/25/2023    Malignant neoplasm of left ureter (H)  Follows with [facility] oncology  with next visit 4/24/2023  Staff message sent with elevated alk phos and lipase     CKD (chronic kidney disease) stage 4, GFR 15-29 ml/min (H)  Follows with Dr Arvizu with last visit 2/17/2023. No changes. Follow up in one year    Left hip pain  Xray normal  - XR Hip Left 2-3 Views (Clinic Performed); Future  - Lidocaine (LIDOCARE) 4 % Patch; Place 1 patch onto the skin every 24 hours To prevent lidocaine toxicity, patient should be patch free for 12 hrs daily.  - Orthopedic  Referral; OA       See Patient Instructions    Return in about 3 months (around 7/13/2023) for Diabetic Visit, Lab Work.    Libia Wallace MD  [facility] - ROBINA Shaw is a 78 year old, presenting for the following health issues:  Diabetes, Hypertension, Lipids, Kidney Problem, Thyroid Problem, and Musculoskeletal Problem         View : No data to display.              HPI      Musculoskeletal problem/pain      Duration: February    Description  Location: left hip    Intensity:  severe, 7/10    Accompanying signs and symptoms: radiation of pain to left leg    History  Previous similar problem: no   Previous evaluation:  none    Precipitating or alleviating factors:  Trauma or overuse: no   Aggravating factors include: sitting, walking and sleeping    Therapies tried and outcome: acetaminophen 2 tablets 1000 mg at bedtime     - APAP is not helping much     Diabetes Follow-up    How often are you checking your blood sugar? Continuous glucose monitor  What time of day are you checking your blood sugars (select all that apply)?  throughout the day  Have you had any blood sugars above 200?  Yes   Have you had any blood sugars below 70?  No    What symptoms do you notice when your blood sugar is low?  None    What concerns do you have today about your diabetes? Blood sugar is often over 200     Do you have any of these symptoms? (Select all that apply)  No numbness or tingling in feet.  No redness, sores or blisters on feet.  No complaints of excessive thirst.  No reports of blurry vision.  No significant changes to weight.    Have you had a diabetic eye exam in the last 12 months? Yes- Date of last eye exam: 04/04/2023,  Location: [facility]    - follows with [facility] diabetic education    Hyperlipidemia Follow-Up      Are you regularly taking any medication or supplement to lower your cholesterol?   Yes- Atorvastatin 40 mg    Are you having muscle aches or other side effects that you think could be caused by your cholesterol lowering medication?  No    Hypertension Follow-up      Do you check your blood pressure regularly outside of the clinic? No     Are you following a low salt diet? Yes    Are your blood pressures ever more than 140 on the top number (systolic) OR more   than 90 on the bottom number (diastolic), for example 140/90? No    BP Readings from Last 2 Encounters:   04/13/23 126/80    03/10/23 126/70     Hemoglobin A1C (%)   Date Value   04/13/2023 7.4 (H)   01/12/2023 7.1 (H)   11/24/2021 7.1 (A)   01/14/2021 5.7 (H)     LDL Cholesterol Calculated (mg/dL)   Date Value   01/12/2023 50   02/22/2022 108 (H)   10/02/2020 75   09/09/2019 84       Hypothyroidism Follow-up      Since last visit, patient describes the following symptoms: Weight stable, no hair loss, no skin changes, no constipation, no loose stools      Chronic Kidney Disease Follow-up  Do you take any over the counter pain medicine?: Yes  What over the counter medicine are you taking for your pain?:  Tylenol 2 tabs before d/t hip pain      How often do you take this medicine?:  nightly d/t hip pain    - follows with Dr Arvizu with last visit 2/17/2023. No changes. Follow up in one year    # Oncology: urothelial cell carcinoma followed by Dr Bartlett. Bladder cancer followed by Dr Schmitz, [facility] urology   - follows with [facility], Dr. Bartlett with last visit 1/30/2023 and next visit 4/24/2023  - breast lump - mammogram benign  - PET (1/25/2023) negative  - BCG therapy under the direction of Dr. Schmitz next appt on 4/25/2023      # pancreatic lesion  - MRI (1/26/2023) - concerning for early metastases. Recommendation for repeat MRI 3 months which is scheduled for 4/17/2023      # h/o colon polyps  - colonoscopy (2/13/2023): normal. Return prn    Wt Readings from Last 4 Encounters:   04/13/23 76.3 kg (168 lb 4 oz)   03/10/23 77.2 kg (170 lb 3.1 oz)   02/27/23 77.4 kg (170 lb 11.2 oz)   02/13/23 75.7 kg (166 lb 12.8 oz)       # Mood / insomnia: no changes      Labs on 4/17/2023    Review of Systems   Constitutional: Positive for appetite change (slight decrease). Negative for chills and fever.   HENT: Negative for congestion.    Respiratory: Negative for shortness of breath and wheezing.    Cardiovascular: Negative for chest pain and palpitations.   Gastrointestinal: Negative for abdominal pain.   Musculoskeletal: Positive for  arthralgias (left hip).   Psychiatric/Behavioral: Positive for sleep disturbance (chronic and d/t pain). Negative for mood changes.          Objective    /80 (BP Location: Right arm, Patient Position: Chair, Cuff Size: Adult Regular)   Pulse 77   Temp 97.3  F (36.3  C) (Tympanic)   Resp 18   Wt 76.3 kg (168 lb 4 oz)   SpO2 99%   BMI 26.35 kg/m    Body mass index is 26.35 kg/m .  Physical Exam  Constitutional:       General: He is not in acute distress.     Appearance: He is not ill-appearing.   Cardiovascular:      Rate and Rhythm: Normal rate and regular rhythm.      Pulses: Normal pulses.      Heart sounds: No murmur heard.  Pulmonary:      Effort: Pulmonary effort is normal. No respiratory distress.      Breath sounds: No wheezing or rales.   Abdominal:      General: Bowel sounds are normal. There is no distension.      Tenderness: There is no abdominal tenderness.   Musculoskeletal:      Comments: Left hip: very uncomfortable. Has to change from sit / stand. No TTP over greater trochanter. Straight leg raise results in pain in hip joint, no back pain. Colin points to groin for location of pain and anterior of left leg. No back pain   Neurological:      Mental Status: He is alert.          Results for orders placed or performed in visit on 04/13/23 (from the past 24 hour(s))   TSH with free T4 reflex   Result Value Ref Range    TSH 0.08 (L) 0.30 - 4.20 uIU/mL   Hemoglobin A1c   Result Value Ref Range    Estimated Average Glucose 166 mg/dL    Hemoglobin A1C 7.4 (H) <5.7 %   Lactate Dehydrogenase   Result Value Ref Range    Lactate Dehydrogenase 195 0 - 250 U/L   Comprehensive metabolic panel   Result Value Ref Range    Sodium 136 136 - 145 mmol/L    Potassium 4.4 3.4 - 5.3 mmol/L    Chloride 104 98 - 107 mmol/L    Carbon Dioxide (CO2) 20 (L) 22 - 29 mmol/L    Anion Gap 12 7 - 15 mmol/L    Urea Nitrogen 29.9 (H) 8.0 - 23.0 mg/dL    Creatinine 1.64 (H) 0.67 - 1.17 mg/dL    Calcium 9.6 8.8 - 10.2 mg/dL     Glucose 251 (H) 70 - 99 mg/dL    Alkaline Phosphatase 327 (H) 40 - 129 U/L    AST 21 10 - 50 U/L    ALT 8 (L) 10 - 50 U/L    Protein Total 7.5 6.4 - 8.3 g/dL    Albumin 4.1 3.5 - 5.2 g/dL    Bilirubin Total 0.5 <=1.2 mg/dL    GFR Estimate 43 (L) >60 mL/min/1.73m2   CBC with Platelets & Differential    Narrative    The following orders were created for panel order CBC with Platelets & Differential.  Procedure                               Abnormality         Status                     ---------                               -----------         ------                     CBC with platelets and d...[[id number]]  Abnormal            Final result                 Please view results for these tests on the individual orders.   CBC with platelets and differential   Result Value Ref Range    WBC Count 8.0 4.0 - 11.0 10e3/uL    RBC Count 3.99 (L) 4.40 - 5.90 10e6/uL    Hemoglobin 12.3 (L) 13.3 - 17.7 g/dL    Hematocrit 35.9 (L) 40.0 - 53.0 %    MCV 90 78 - 100 fL    MCH 30.8 26.5 - 33.0 pg    MCHC 34.3 31.5 - 36.5 g/dL    RDW 13.6 10.0 - 15.0 %    Platelet Count 278 150 - 450 10e3/uL    % Neutrophils 62 %    % Lymphocytes 26 %    % Monocytes 9 %    % Eosinophils 2 %    % Basophils 1 %    % Immature Granulocytes 0 %    NRBCs per 100 WBC 0 <1 /100    Absolute Neutrophils 5.0 1.6 - 8.3 10e3/uL    Absolute Lymphocytes 2.0 0.8 - 5.3 10e3/uL    Absolute Monocytes 0.7 0.0 - 1.3 10e3/uL    Absolute Eosinophils 0.2 0.0 - 0.7 10e3/uL    Absolute Basophils 0.1 0.0 - 0.2 10e3/uL    Absolute Immature Granulocytes 0.0 <=0.4 10e3/uL    Absolute NRBCs 0.0 10e3/uL   T4 free   Result Value Ref Range    Free T4 1.66 0.90 - 1.70 ng/dL

## 2023-04-13 NOTE — PATIENT INSTRUCTIONS
We will call you with your xray results   We put in lidocaine patches for your left hip pain. If you are not able to get the patches, please let me know    We put in a referral to [facility]

## 2023-04-14 NOTE — PROGRESS NOTES
"Diabetes Self-Management Education & Support    Follow up with Colin.  Complains of \"Trouble with sore hip\", seen PCP yesterday. ?cortisone shot. Not sleeping well with pain in hip-has referral to ortho.     7.4% <target =  <8%    Colin would like to continue with current insulin dosing.     Next visit with TOM 5/31     Tiffany 2  AGP Report  3/31/202* to 4/13/202*    % Time CGM is Active  97%    Average Glucose  162    Glucose Management Indicator  (GMI)    7.2    Glucose Variabilty  27.1%    Time in Ranges:  >250 mg/dL   2  181-250 mg/dL  30   mg/dL   68  54-69 mg/dL   0  <54 mg/dL   0    Suyapa Flowers RN Diabetes Educator,  [phone]    "

## 2023-04-17 NOTE — TELEPHONE ENCOUNTER
CKD so cannot use NSAIDs  Lidocaine patch with some help  Has not had opioids in the past  Discussed trying norco 5/325mg, starting with 1/2 pill  Do not drive when using norco. Be careful medication can make you dizzy  Reviewed imaging today, did not discuss results with Colin.  Libia Wallace MD

## 2023-04-17 NOTE — TELEPHONE ENCOUNTER
To: Nurse to Dr. Wallace    Patient saw you last week for hip pain.  He stopped in asking for pain medication.  Please call him to discuss, he should be home by 2:30 today (04/17/23) Phone is [phone]    Thank you

## 2023-04-19 NOTE — TELEPHONE ENCOUNTER
I called the pt he spoke to [facility] yesterday to request a refill. Pt is wondering if we have sent a refill to [facility]. Refill was sent on 04/10/23. Pt advised if he runs low to call [facility] for voucher. Pt will let us know if he needs the voucher to get an Rx sent to local pharmacy.

## 2023-04-19 NOTE — TELEPHONE ENCOUNTER
Confirmed with patient preferred pharmacy and dosing.   Rx sent.  Suyapa Flowers RN Diabetes Educator,  [phone]  4/19/2023 at 3:10 PM

## 2023-04-24 NOTE — PROGRESS NOTES
Message received from patient, Pharmacy would not fill the Tresiba because he has Medicare.     TOM RN contacted pharmacy, they will relook at the voucher, however insistent that he has Medicare and can not have filled without billing insurance.     Explained if patient uses his Medicare to fill this prescription, he would be disqualified from [employer] for using insurance. He mets their criteria to provide him with the Tresiba.     Pharmacy agrees to look at the voucher.  Updated patient, he plans to go tomorrow. Will call TOM if pharmacy doesn't fill. If this happens, TOM will contact [employer] rep for assistance.   Suyapa Flowers RN Diabetes Educator,  [phone]  4/24/2023 at 4:30 PM

## 2023-04-24 NOTE — PROGRESS NOTES
Visit Date: 04/24/2023    HISTORY OF PRESENT ILLNESS:  Mr. 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  He was seen recently on 01/30/2023.  For details, please see previous notes.  The patient had been treated with adjuvant nivolumab for invasive urothelial cell carcinoma.  The patient had developed loose stools after cycle 7 of nivolumab and was placed on prednisone.  Symptoms did not improve after cycle 8 of nivolumab.  The patient was placed on steroid taper.  The patient was seen in the [facility] on 08/29/2022 for evaluation of nausea, vomiting, and diarrhea.  He was treated with IV fluids and Zofran.  He clinically improved.  He was discharged on Zofran.  The patient and his daughter went to [place] and subsequently was seen in the Emergency Room with extreme symptoms of 4 to 5 watery stools a day.  He was given IV fluids and felt better.  Subsequently his symptoms worsened.  He was seen in the Emergency Room at [facility] on 09/10/2022.  He was noted to have a creatinine of 2.25.  Sodium is 133.  CT showed subtle new 2.1 cm hypoattenuating area in the pancreatic body without ductal dilatation.  The patient was admitted to medical floor, hydrated with normal saline and was seen by [facility].  He underwent colonoscopy, which showed diffuse mild inflammation characterized by altered vascularity and erythemia that was noted throughout the entire colon.  Several biopsies were taken; three sessile polyps were found in the transverse colon, ascending colon.  The patient was treated with a checkpoint inhibitor colitis with prednisone 80 mg daily, and diarrhea subsided 2 days later.  His blood sugars were greater than 400.  Insulin was increased, but insulin was dropped to 60 mg daily, and the plan was to taper off in 50 days.  His creatinine was 2.07.  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  The patient was also seen by [facility] for endoscopic ultrasound-guided biopsy of the pancreatic lesion and endosonographic findings revealed the patient had an occult lesion suggestive of cysts identified in the pancreas measuring 15 mm.  Tissue was noted to have the clinical appearance that was suggestive of a pancreatic pseudocyst.  It was recommended that the patient have an MRCP in 6 months.  The patient has also been seen by Dr. Stefanie Schmitz who was treating the patient with BCG bladder instillation.  We elected to restage the patient with a PET scan on 01/25/2023 and the findings were there was no hypermetabolism suggesting recurrent metastatic disease, no evidence of hypoenhancing pancreatic lesion.  Dr. Becerril felt that it was likely not malignant.  MRI of the abdomen revealed no evidence of any pancreatic lesion, but it was felt the pancreatic lesion nearly resolved.  There was a 9 mm peripheral enhancing lesions in the liver, which was not present on 10/11.  There was a subtle focus of enhancement in the inferior aspect of segment 6 of the liver.  There were definitely new and could be suggestive of metastatic disease, but according to Dr. Becerril they were too small to biopsy.  The patient had a PET scan.  It was recommended that the patient undergo a repeat MRI of the abdomen in 3 months.  The patient did have repeat imaging done on 04/17/2023.  MRI of the abdomen revealed that there was new bony metastatic disease at L2 and L3, and a lesion in the right iliac crest noted as well.  There was also an enlarging liver lesion, which had increased from 9 mm to 15 x 20 mm at segment 8.  Pancreas was normal.  Left kidney was surgically absent.  The patient's major complaints are related to left hip pain today, but otherwise, he is doing well.  Denies any fevers, night sweats, weight loss, back pain or right iliac  pain.  Denies any hematuria, headache, shortness of breath, cough, hemoptysis, fevers, night sweats or weight loss.    PHYSICAL EXAMINATION:    GENERAL:  He is a well-developed, well-nourished elderly white male in no acute distress.  ECOG performance status is 0.  VITAL SIGNS:  Reveal blood pressure 110/78, pulse 87, respirations 20, temperature 98.3.  HEENT:  Atraumatic, normocephalic.  Oropharynx nonerythematous.  NECK:  Supple.  LUNGS:  Clear to auscultation and percussion.  HEART:  Regular rhythm.  S1, S2 normal.  ABDOMEN:  Soft, normoactive bowel sounds.  No mass, nontender.  LYMPHATICS:  No cervical, supraclavicular, axillary or inguinal nodes.  EXTREMITIES:  Revealed tenderness over the left anterior hip.  NEUROLOGIC:  Grossly nonfocal.    LABORATORY DATA:  CBC :  White count is 8.0, H and H 12.3 and 35.9, platelet count 278.  BUN is 29.9, creatinine 1.64, glucose 251, alkaline phosphatase 327.  LDH is 195.    IMPRESSION:     High-grade urothelial cell carcinoma of the left ureter with 2 cm mass involving the lamina propria.  The patient went on to have neoadjuvant chemotherapy with cisplatin and gemcitabine with a 20% dose reduction of cisplatin.  The patient received 4 cycles, then went on to nephroureterectomy and was found to have a pathologic T3 N0 high-grade urothelial cell carcinoma.  PET scan was negative.  The patient was deemed a candidate for adjuvant nivolumab based on its indication for high grade muscle-invasive urothelial cell carcinoma that is greater than T2.  Studies had shown improved progression-free survival.  The patient was started on nivolumab 480 mg IV every 28 days.  After 7 cycles, developed loose stools and after 8 cycles, he developed significant diarrhea.  The patient was admitted to [facility] and underwent colonoscopy and was found to have checkpoint inhibitor colitis tapered off and was treated with prednisone with taper checkpoint inhibitor colitis, resolved.   Recent PET scan indicates no evidence of metastatic disease.  MRI of the abdomen, with attention to liver, lung or liver lesion that measured 9 mm.  Repeat MRI now indicates suspicious lesions at L2, L3, as well as the right iliac crest as well as enlarging liver lesions.  We discussed the case with Radiology, who felt that a bone scan should be obtained to rule out other lesions, especially since he is having left hip pain.  Otherwise, we will proceed with biopsy of one of these bone lesions, likely the right iliac crest lesion.  We will send tissue off for FGFR3 and FGFR2.  Otherwise, we will see the patient after biopsy results and proceed with therapy if necessary.    TIME SPENT:  Eighty minutes were spent on this patient.  Time was spent reviewing literature on treatment of potentially metastatic urothelial cell carcinoma, reviewing staging requirements, reviewing the role of biopsy, performing a history and physical on the patient, discussing MRI results with the patient as well as CT chest results, documenting history and physical and ordering followup scans and biopsies.    Jhony Bartlett MD        D: 2023   T: 2023   MT: [facility]    Name:     CLARISSA COSTA  MRN:      [id number]        Account:    [id number]   :      1945           Visit Date: 2023     Document: [id number]    cc:  Stefanie Schmitz MD

## 2023-04-24 NOTE — PATIENT INSTRUCTIONS
We will see you back after the bone biopsy. You have been scheduled for a whole body bone scan. The biopsy will be after the scan.

## 2023-04-24 NOTE — NURSING NOTE
"Oncology Rooming Note    April 24, 2023 9:03 AM   Colin Baxter is a 78 year old male who presents for:    Chief Complaint   Patient presents with     Oncology Clinic Visit     Follow up Carcinoma in situ of bladder     Initial Vitals: /78   Pulse 87   Temp 98.3  F (36.8  C) (Tympanic)   Resp 20   Ht 1.702 m (5' 7\")   Wt 75.6 kg (166 lb 10.7 oz)   SpO2 99%   BMI 26.10 kg/m   Estimated body mass index is 26.1 kg/m  as calculated from the following:    Height as of this encounter: 1.702 m (5' 7\").    Weight as of this encounter: 75.6 kg (166 lb 10.7 oz). Body surface area is 1.89 meters squared.  Moderate Pain (5) Comment: Data Unavailable   No LMP for male patient.  Allergies reviewed: Yes  Medications reviewed: Yes    Medications: Medication refills not needed today.  Pharmacy name entered into EPIC:    [id number] - [place], [place] - [address] [id number] - [place], [place] - [address] [place] [address]          Verónica Leung LPN            "

## 2023-04-28 NOTE — PROGRESS NOTES
Patient returned call. Informed of the need for a CT scan. Pt agreeable with plan. Knows that PAC will call him to get this scheduled. Routing to PAC to schedule.

## 2023-04-28 NOTE — PROGRESS NOTES
Jhony Bartlett MD McLaughlin, Helene, JIM; Estephania Donnelly RN         Previous Messages       ----- Message -----   From: Daisy Brito RN   Sent: 4/28/2023  11:07 AM CDT   To: Jhony Bartlett MD; *   Subject: Bone Biopsy                                       Stephanielo Dr. Bartlett,   Our radiologist did look at this patient's bone scan results and would like CT scans of the lumbar spine and pelvis without contrast before deciding on the bone biopsy. Any questions feel free to contact DI nurses or the radiologist. Thank you!     Daisy FERNANDEZ.   Diagnostic Imaging   [phone]       Attempted to call patient, No answer, left message to return call.

## 2023-05-08 NOTE — TELEPHONE ENCOUNTER
Dr. Carver from  would like to talk to Dr. Wallace regarding pt. Please call Dr. Carver at direct number [phone]

## 2023-05-17 NOTE — PROGRESS NOTES
[facility] [place]  [address] [place] [address]  Phone: [phone]  Primary Provider: Libia Wallace  Pre-op Performing Provider: LIBIA WALLACE      PREOPERATIVE EVALUATION:  Today's date: 5/17/2023    Colin Baxter is a 78 year old male who presents for a preoperative evaluation.    Surgical Information:  Surgery/Procedure: Bone Biopsy  Surgery Location: [facility] [place]  Surgeon: Dr. Valles  Surgery Date: 05/23/2023  Time of Surgery: 9AM  Where patient plans to recover: At home with family  Fax number for surgical facility: Note does not need to be faxed, will be available electronically in Epic.    Assessment & Plan     The proposed surgical procedure is considered LOW risk.    Preop general physical exam  No concerning lab or exam findings. Colin is still active with outdoor work w/o cardiac or pulmonary symptoms    Hip pain, left / Malignant neoplasm of urinary bladder, unspecified site (H) / Malignant neoplasm of left ureter (H)  Reason for the procedure   [place] PDMP reviewed and appropriate    - oxyCODONE (ROXICODONE) 5 MG tablet; Take 1 tablet (5 mg) by mouth every 8 hours as needed for severe pain - okay to take an extra tablet at night  - Colin will call the clinic if he feels he needs five pills per day for pain control     Type 2 diabetes mellitus without complication, with long-term current use of insulin (H)  A1c (4/13/2023) 7.4  - on tresbia and novolog    Hyperlipidemia, unspecified hyperlipidemia type  LDL 91/12/2023) 50  - c/w atorvastatin     Acquired hypothyroidism  TSH is now high, free t4 wnl. Colin is under stress with health issues and hip pain   - levothyroxine was decreased to 112mcg on 4/13/2023  - recheck at next visit on 6/12/2023. Lab order placed.       CKD (chronic kidney disease) stage 4, GFR 15-29 ml/min (H)  stable    Anemia due to stage 4 chronic kidney disease (H)  Stable  - c/w iron supplements        Risks and Recommendations:  The patient has the  following additional risks and recommendations for perioperative complications:  Diabetes:  - Patient is on insulin therapy; diabetic NPO guidelines provided and discussed.  Pulmonary:    - Incentive spirometry post-op  Social and Substance:    - Patient is taking medications for chronic pain    Increase INR noted. Most likely d/t malignancy. Alk phos elevated (4/13/2023), but AST/ALT wnl    Antiplatelet or Anticoagulation Medication Instructions:   - Patient is on no antiplatelet or anticoagulation medications.    Additional Medication Instructions:  Patient is to take all scheduled medications on the day of surgery EXCEPT for modifications listed below:   - Long acting insulin (e.g. glargine, detemir): Take 50% of the usual evening or morning dose before surgery.    - short acting insulin (e.g. regular, lispro, aspart): HOLD on the morning of surgery.     RECOMMENDATION:  APPROVAL GIVEN to proceed with proposed procedure, without further diagnostic evaluation.      Subjective     HPI related to upcoming procedure: Colin has a h/o bladder and ureter cancer. He has been having issues with hip pain. Recent MRI showed concerns for mets to L2, L3 as well as right iliac crest. Colin will be undergoing bone marrow bx.          5/18/2023    10:06 AM   Preop Questions   1. Have you ever had a heart attack or stroke? No   2. Have you ever had surgery on your heart or blood vessels, such as a stent placement, a coronary artery bypass, or surgery on an artery in your head, neck, heart, or legs? No   3. Do you have chest pain with activity? No   4. Do you have a history of  heart failure? No   5. Do you currently have a cold, bronchitis or symptoms of other infection? No   6. Do you have a cough, shortness of breath, or wheezing? No   7. Do you or anyone in your family have previous history of blood clots? No   8. Do you or does anyone in your family have a serious bleeding problem such as prolonged bleeding following surgeries  or cuts? No   9. Have you ever had problems with anemia or been told to take iron pills? No   10. Have you had any abnormal blood loss such as black, tarry or bloody stools? No   11. Have you ever had a blood transfusion? No   12. Are you willing to have a blood transfusion if it is medically needed before, during, or after your surgery? Yes   13. Have you or any of your relatives ever had problems with anesthesia? No   14. Do you have sleep apnea, excessive snoring or daytime drowsiness? YES - daytime drowsiness d/t not sleeping well   14a. Do you have a CPAP machine? No   15. Do you have any artifical heart valves or other implanted medical devices like a pacemaker, defibrillator, or continuous glucose monitor? No   16. Do you have artificial joints? No   17. Are you allergic to latex? No     Health Care Directive:  Patient has a Health Care Directive on file      Preoperative Review of :   reviewed - controlled substances reflected in medication list.      Status of Chronic Conditions:  ANEMIA - Patient has a recent history of moderate-severe anemia, which has not been symptomatic. Work up to date has revealed CKD. Treatment has been iron supplements.     COPD - Patient has a longstanding history of moderate-severe COPD . Patient has been doing well overall noting NO SYMPTOMS     DIABETES - Patient has a longstanding history of DiabetesType Type II . Patient is being treated with insulin injections and denies significant side effects. Control has been good. Complicating factors include but are not limited to: hypertension, hyperlipidemia and chronic kidney disease.   Lab Results   Component Value Date    A1C 7.4 04/13/2023     - novolog   - tresiba 18u     HYPERLIPIDEMIA - Patient has a long history of significant Hyperlipidemia requiring medication for treatment with recent good control. Patient reports no problems or side effects with the medication.     LDL Cholesterol Calculated   Date Value Ref Range  Status   01/12/2023 50 <=100 mg/dL Final   10/02/2020 75 <100 mg/dL Final     Comment:     Desirable:       <100 mg/dl         HYPERTENSION - Patient has longstanding history of HTN , currently denies any symptoms referable to elevated blood pressure. Specifically denies chest pain, palpitations, dyspnea, orthopnea, PND or peripheral edema. Blood pressure readings have been in normal range.  - no HTN medications    RENAL INSUFFICIENCY - Patient has a longstanding history of moderate-severe chronic renal insufficiency. Last Cr 1.66.       # pain control: Colin is concerned regarding taking opioid  - taking senna -s (4) pills per day   - also taking colace  - constipation, BM every three day    - oxycodone 10mg works for 4 to 5 hr  - has one or two left from Rx sent by Dr. Carver  - taking one pill twice per day, then (2) pills at night     # Cardiovascular: able to walk up a flight of stairs w/o cardiac or pulmonary issues    # Pulmonary: quit smoker 1992    # Bleeding/Clotting risk:  no personal or family h/o bleeding or clotting issues    # Previous surgical history: no issues with anesthesia      Review of Systems   Constitutional: Negative for chills and fever.   HENT: Negative for congestion.    Respiratory: Negative for shortness of breath and wheezing.    Cardiovascular: Negative for chest pain and palpitations.   Gastrointestinal: Positive for constipation. Negative for abdominal pain.   Genitourinary: Negative for difficulty urinating.   Musculoskeletal: Positive for arthralgias (left hip).         Patient Active Problem List    Diagnosis Date Noted     Insomnia due to medical condition 10/13/2022     Priority: Medium     Centrilobular emphysema (H) 10/11/2022     Priority: Medium     Stage 3b chronic kidney disease (H) 10/07/2022     Priority: Medium     Vitamin D deficiency 10/07/2022     Priority: Medium     YOU (acute kidney injury) (H) 09/17/2022     Priority: Medium     Adverse drug reaction 09/17/2022      Priority: Medium     Dehydration 09/12/2022     Priority: Medium     Pancreatic mass 09/12/2022     Priority: Medium     CRF (chronic renal failure), stage 4 (severe) (H) 09/10/2022     Priority: Medium     Diarrhea 2nd to Chemotherapy 09/10/2022     Priority: Medium     Pancreatic lesion, 2.1 cm on Abd CT 09/10/2022     Priority: Medium     Acquired hypothyroidism 08/26/2022     Priority: Medium     Carcinoma in situ of bladder 11/19/2021     Priority: Medium     Anemia due to stage 4 chronic kidney disease (H) 10/22/2021     Priority: Medium     CKD (chronic kidney disease) stage 4, GFR 15-29 ml/min (H) 10/21/2021     Priority: Medium     Ureter cancer (H) 01/13/2021     Priority: Medium     Added automatically from request for surgery [id number]       DM type 2, Hgb A1C 8.6 on 8/26/22 12/11/2020     Priority: Medium     History of cancer of ureter 02/27/2019     Priority: Medium     Obesity (BMI 35.0-39.9) with comorbidity (H) 09/26/2018     Priority: Medium     Bladder cancer (H) 10/06/2015     Priority: Medium     Glaucoma 07/06/2015     Priority: Medium     Hyperlipidemia 08/13/2008     Priority: Medium     Formatting of this note might be different from the original.  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) 12/11/2020    getting BCG (from his Urologist in [place], [place])     Obesity      Pancreatic mass      Renal cell carcinoma, left (H) 2021    Left nephrectomy     Ureter cancer, left (H)      Past Surgical History:   Procedure Laterality Date     APPENDECTOMY  1958     COLONOSCOPY  2-     COLONOSCOPY N/A 9/13/2022    Procedure: COLONOSCOPY;  Surgeon: Elsie  Keyon Floyd MD;  [place] [facility] [place]     COLONOSCOPY N/A 2/13/2023    Procedure: COLONOSCOPY;  Surgeon: Antony Croft MD;  Location: [facility]     COMBINED CYSTOSCOPY, RETROGRADES, URETEROSCOPY, INSERT STENT Left 1/18/2021    Procedure: CYSTOURETEROSCOPY, WITH RETROGRADE PYELOGRAM with interpretation of fluroscopy, ureteroscopy, ureteral biopsy, bladder biopsy and fulgaration;  Surgeon: Shonda Morrell MD;  Location: [facility]     CYSTOSCOPY N/A 6/16/2021    Procedure: CYSTOSCOPY;  Surgeon: Javier Mcnulty MD;  Location[facility]     CYSTOSCOPY, BIOPSY BLADDER, COMBINED N/A 9/8/2015    Procedure: COMBINED CYSTOSCOPY, BIOPSY BLADDER;  Surgeon: Randy Martinez MD;  [facility]: [facility]     CYSTOSCOPY, BIOPSY BLADDER, COMBINED N/A 2/14/2017    Procedure: COMBINED CYSTOSCOPY, BIOPSY BLADDER;  Surgeon: Randy Martinez MD;  [facility]: [facility]     CYSTOSCOPY, BIOPSY BLADDER, COMBINED N/A 11/13/2018    Procedure: COMBINED CYSTOSCOPY, BIOPSY BLADDER;  Surgeon: Ed Helm MD;  Location: [facility]     CYSTOSCOPY, BIOPSY BLADDER, COMBINED N/A 11/5/2021    Procedure: CYSTOSCOPY, WITH BLADDER BIOPSY;  Surgeon: Shonda Morrell MD;  Location: [facility]     CYSTOSCOPY, RETROGRADES, COMBINED Bilateral 11/13/2018    Procedure: Bilateral Retrograde Pyelagram, Bladder Biopsy;  Surgeon: Ed Helm MD;  Location: [facility]     CYSTOSCOPY, RETROGRADES, COMBINED Right 11/5/2021    Procedure: CYSTOSCOPY, WITH RETROGRADE PYELOGRAM;  Surgeon: Shonda Morrell MD;  [facility]     CYSTOSCOPY, RETROGRADES, INSERT STENT URETER(S), COMBINED Left 9/8/2015    Procedure: COMBINED CYSTOSCOPY, RETROGRADES, INSERT STENT URETER(S);  Surgeon: Randy Martinez MD;  [facility]     CYSTOSCOPY, TRANSURETHRAL RESECTION (TUR) TUMOR BLADDER, COMBINED N/A 9/8/2015    Procedure: COMBINED CYSTOSCOPY, TRANSURETHRAL RESECTION (TUR) TUMOR BLADDER;  Surgeon: Randy Martinez MD;  [facility]     CYSTOSCOPY,  TRANSURETHRAL RESECTION (TUR) TUMOR BLADDER, COMBINED N/A 1/12/2016    Procedure: COMBINED CYSTOSCOPY, TRANSURETHRAL RESECTION (TUR) TUMOR BLADDER;  Surgeon: Randy Martinez MD;  [facility]     CYSTOSCOPY, TRANSURETHRAL RESECTION (TUR) TUMOR BLADDER, COMBINED N/A 9/13/2016    Procedure: COMBINED CYSTOSCOPY, TRANSURETHRAL RESECTION (TUR) TUMOR BLADDER;  Surgeon: Randy Martinez MD;  [facility]: [facility]     DAVINCI NEPHROURETERECTOMY Left 6/16/2021    Procedure: NEPHROURETERECTOMY, ROBOT-ASSISTED, lymph node dissection;  Surgeon: Javier Mcnulty MD;  Location: [facility]     ESOPHAGOSCOPY, GASTROSCOPY, DUODENOSCOPY (EGD), COMBINED N/A 9/30/2022    Procedure: UPPER ENDOSCOPIC ULTRASOUND;  Surgeon: Jason Dennis MD;  [place] [facility] [place]     PHACOEMULSIFICATION WITH STANDARD INTRAOCULAR LENS IMPLANT Right 10/9/2018    Procedure: PHACOEMULSIFICATION WITH STANDARD INTRAOCULAR LENS IMPLANT;  PHACOEMULSIFICATION CATARACT EXTRACTION POSTERIOR CHAMBER LENS RIGHT;  Surgeon: Marlo Mcconnell MD;  Location: [facility]     PHACOEMULSIFICATION WITH STANDARD INTRAOCULAR LENS IMPLANT Left 10/23/2018    Procedure: PHACOEMULSIFICATION CATARACT EXTRACTION POSTERIOR CHAMBER LENS LEFT;  Surgeon: Marlo Mcconnell MD;  Location: [facility]     Current Outpatient Medications   Medication Sig Dispense Refill     acetaminophen (TYLENOL) 325 MG tablet Take 2 tablets (650 mg) by mouth every 4 hours as needed for mild pain or other (and adjunct with moderate or severe pain or per patient request)  0     atorvastatin (LIPITOR) 40 MG tablet TAKE 1 TABLET BY MOUTH 90 tablet 2     blood glucose (ONETOUCH VERIO IQ) test strip USE TO TEST BLOOD SUGAR 3 TIMES DAILY 100 strip 11     brimonidine (ALPHAGAN-P) 0.15 % ophthalmic solution INSTILL 1 DROP INTO RIGHT EYE TWICE DAILY  12     Cholecalciferol (VITAMIN D3) 25 MCG TABS Take 25 mcg by mouth daily       Continuous Blood Gluc  (FREESTYLE DELILAH 2 READER) TENNILLE 1 each  continuous 1 each 0     Continuous Blood Gluc Sensor (FREESTYLE DELILAH 2 SENSOR) MISC 1 each every 14 days 2 each 11     insulin aspart (NOVOLOG PEN) 100 UNIT/ML pen 5 units with meals, and for glucometer 150-200 add 2 units SQ, 201-250 add 4 units, >250 add 6 units 15 mL 0     insulin degludec (TRESIBA FLEXTOUCH) 100 UNIT/ML pen Inject 18 Units Subcutaneous daily 15 mL 0     insulin pen needle (BD PEN NEEDLE DONTAE 2ND GEN) 32G X 4 MM miscellaneous USE 3 PEN NEEDLES DAILY 100 each 11     latanoprost (XALATAN) 0.005 % ophthalmic solution Place 1 drop into both eyes At Bedtime       levothyroxine (SYNTHROID/LEVOTHROID) 112 MCG tablet Take 1 tablet (112 mcg) by mouth daily 90 tablet 1     Lidocaine (LIDOCARE) 4 % Patch Place 1 patch onto the skin every 24 hours To prevent lidocaine toxicity, patient should be patch free for 12 hrs daily. 10 patch 1     ONETOUCH DELICA LANCETS 33G MISC 1 each 2 times daily 100 each 10     oxyCODONE (ROXICODONE) 5 MG tablet        valACYclovir (VALTREX) 1000 mg tablet Take 1 tablet by mouth 3 times daily       VITRON-C  MG TABS tablet Take 1 tablet by mouth daily         Allergies   Allergen Reactions     No Clinical Screening - See Comments Other (See Comments)     Beta blocker in glaucoma gtt.s caused low pulse and passing out      Timolol      Beta blocker in glaucoma gtt.s caused low pulse and passing out   - patient thinks it was timolol but not 100% sure which beta blocker eye drop.      Bactrim [Sulfamethoxazole-Trimethoprim] Rash        Social History     Tobacco Use     Smoking status: Former     Types: Cigarettes     Quit date: 1992     Years since quittin.3     Smokeless tobacco: Never   Vaping Use     Vaping status: Never Used   Substance Use Topics     Alcohol use: Yes     Comment: < 1 drink a month     Family History   Problem Relation Age of Onset     Diabetes Mother      Other - See Comments Father         cause of death unknown     Parkinsonism Brother       Coronary Artery Disease No family hx of      Hypertension No family hx of      Hyperlipidemia No family hx of      Cerebrovascular Disease No family hx of      Breast Cancer No family hx of      Colon Cancer No family hx of      Prostate Cancer No family hx of      Anesthesia Reaction No family hx of      Asthma No family hx of      Thyroid Disease No family hx of      Genetic Disorder No family hx of      History   Drug Use No         Objective     /64   Pulse 69   Temp 97.6  F (36.4  C) (Tympanic)   Resp 18   Wt 75.8 kg (167 lb)   SpO2 98%   BMI 26.16 kg/m      Physical Exam  Constitutional:       General: He is not in acute distress.     Appearance: He is not ill-appearing.   Cardiovascular:      Rate and Rhythm: Normal rate and regular rhythm.      Pulses: Normal pulses.      Heart sounds: No murmur heard.  Pulmonary:      Effort: Pulmonary effort is normal. No respiratory distress.      Breath sounds: No wheezing or rales.   Musculoskeletal:      Right lower leg: No edema.      Left lower leg: No edema.   Neurological:      Mental Status: He is alert.         Recent Labs   Lab Test 04/13/23  0957 02/16/23  1223 02/10/23  1152 01/12/23  0851   HGB 12.3*  --  11.9* 11.8*     --  247 236    138 138 138   POTASSIUM 4.4 4.1 4.6 4.3   CR 1.64* 1.67* 1.72* 1.85*   A1C 7.4*  --   --  7.1*        Diagnostics:  Recent Results (from the past 24 hour(s))   Reflex INR    Collection Time: 05/18/23  9:55 AM   Result Value Ref Range    INR 1.18 (H) 0.85 - 1.15   Partial thromboplastin time    Collection Time: 05/18/23  9:55 AM   Result Value Ref Range    aPTT 33 22 - 38 Seconds   CBC with platelets    Collection Time: 05/18/23  9:55 AM   Result Value Ref Range    WBC Count 5.6 4.0 - 11.0 10e3/uL    RBC Count 3.72 (L) 4.40 - 5.90 10e6/uL    Hemoglobin 11.7 (L) 13.3 - 17.7 g/dL    Hematocrit 34.5 (L) 40.0 - 53.0 %    MCV 93 78 - 100 fL    MCH 31.5 26.5 - 33.0 pg    MCHC 33.9 31.5 - 36.5 g/dL    RDW 14.6  10.0 - 15.0 %    Platelet Count 297 150 - 450 10e3/uL   Basic metabolic panel    Collection Time: 05/18/23  9:55 AM   Result Value Ref Range    Sodium 135 (L) 136 - 145 mmol/L    Potassium 4.2 3.4 - 5.3 mmol/L    Chloride 100 98 - 107 mmol/L    Carbon Dioxide (CO2) 23 22 - 29 mmol/L    Anion Gap 12 7 - 15 mmol/L    Urea Nitrogen 25.7 (H) 8.0 - 23.0 mg/dL    Creatinine 1.66 (H) 0.67 - 1.17 mg/dL    Calcium 9.0 8.8 - 10.2 mg/dL    Glucose 217 (H) 70 - 99 mg/dL    GFR Estimate 42 (L) >60 mL/min/1.73m2   TSH with free T4 reflex    Collection Time: 05/18/23  9:55 AM   Result Value Ref Range    TSH 5.00 (H) 0.30 - 4.20 uIU/mL   T4 free    Collection Time: 05/18/23  9:55 AM   Result Value Ref Range    Free T4 1.27 0.90 - 1.70 ng/dL      No EKG required for low risk surgery (cataract, skin procedure, breast biopsy, etc).    Revised Cardiac Risk Index (RCRI):  The patient has the following serious cardiovascular risks for perioperative complications:   - Diabetes Mellitus (on Insulin) = 1 point     RCRI Interpretation: 1 point: Class II (low risk - 0.9% complication rate)           Signed Electronically by: Libia Wallace MD  Copy of this evaluation report is provided to requesting physician.

## 2023-05-17 NOTE — PATIENT INSTRUCTIONS
Try miralax once to twice per day constipation     Take tresiba 9u the morning of procedure   Do not take your short acting novolog day of your procedure    Monitor your BG every 4 hours   If BG high, take corrective dose (not meal dose) sliding scale insulin if that is what you are used to doing   IF BG is <100 or symptoms of hypoglycemia follow the following guidelines:   - Drink 4oz of fruit juice without pulp or 4oz of regular soda   - Eat 3 glucose gels or 5 sugar cubes or packets and monitor BG q15min until stable BG   - Repeat the treatment as needed and monitor BG until >100       For informational purposes only. Not to replace the advice of your health care provider. Copyright   ,  [facility]. All rights reserved. Clinically reviewed by Robyn Christianson MD. [employer] [id number] - REV .  Preparing for Your Surgery  Getting started  A nurse will call you to review your health history and instructions. They will give you an arrival time based on your scheduled surgery time. Please be ready to share:  Your doctor's clinic name and phone number  Your medical, surgical, and anesthesia history  A list of allergies and sensitivities  A list of medicines, including herbal treatments and over-the-counter drugs  Whether the patient has a legal guardian (ask how to send us the papers in advance)  Please tell us if you're pregnant--or if there's any chance you might be pregnant. Some surgeries may injure a fetus (unborn baby), so they require a pregnancy test. Surgeries that are safe for a fetus don't always need a test, and you can choose whether to have one.   If you have a child who's having surgery, please ask for a copy of Preparing for Your Child's Surgery.    Preparing for surgery  Within 10 to 30 days of surgery: Have a pre-op exam (sometimes called an H&P, or History and Physical). This can be done at a clinic or pre-operative center.  If you're having a , you may not need this exam.  Talk to your care team.  At your pre-op exam, talk to your care team about all medicines you take. If you need to stop any medicines before surgery, ask when to start taking them again.  We do this for your safety. Many medicines can make you bleed too much during surgery. Some change how well surgery (anesthesia) drugs work.  Call your insurance company to let them know you're having surgery. (If you don't have insurance, call [phone].)  Call your clinic if there's any change in your health. This includes signs of a cold or flu (sore throat, runny nose, cough, rash, fever). It also includes a scrape or scratch near the surgery site.  If you have questions on the day of surgery, call your hospital or surgery center.  Eating and drinking guidelines  For your safety: Unless your surgeon tells you otherwise, follow the guidelines below.  Eat and drink as usual until 8 hours before you arrive for surgery. After that, no food or milk.  Drink clear liquids until 2 hours before you arrive. These are liquids you can see through, like water, Gatorade, and Propel Water. They also include plain black coffee and tea (no cream or milk), candy, and breath mints. You can spit out gum when you arrive.  If you drink alcohol: Stop drinking it the night before surgery.  If your care team tells you to take medicine on the morning of surgery, it's okay to take it with a sip of water.  Preventing infection  Shower or bathe the night before and morning of your surgery. Follow the instructions your clinic gave you. (If no instructions, use regular soap.)  Don't shave or clip hair near your surgery site. We'll remove the hair if needed.  Don't smoke or vape the morning of surgery. You may chew nicotine gum up to 2 hours before surgery. A nicotine patch is okay.  Note: Some surgeries require you to completely quit smoking and nicotine. Check with your surgeon.  Your care team will make every effort to keep you safe from infection. We  will:  Clean our hands often with soap and water (or an alcohol-based hand rub).  Clean the skin at your surgery site with a special soap that kills germs.  Give you a special gown to keep you warm. (Cold raises the risk of infection.)  Wear special hair covers, masks, gowns and gloves during surgery.  Give antibiotic medicine, if prescribed. Not all surgeries need antibiotics.  What to bring on the day of surgery  Photo ID and insurance card  Copy of your health care directive, if you have one  Glasses and hearing aids (bring cases)  You can't wear contacts during surgery  Inhaler and eye drops, if you use them (tell us about these when you arrive)  CPAP machine or breathing device, if you use them  A few personal items, if spending the night  If you have . . .  A pacemaker, ICD (cardiac defibrillator) or other implant: Bring the ID card.  An implanted stimulator: Bring the remote control.  A legal guardian: Bring a copy of the certified (court-stamped) guardianship papers.  Please remove any jewelry, including body piercings. Leave jewelry and other valuables at home.  If you're going home the day of surgery  You must have a responsible adult drive you home. They should stay with you overnight as well.  If you don't have someone to stay with you, and you aren't safe to go home alone, we may keep you overnight. Insurance often won't pay for this.  After surgery  If it's hard to control your pain or you need more pain medicine, please call your surgeon's office.  Questions?   If you have any questions for your care team, list them here: _________________________________________________________________________________________________________________________________________________________________________ ____________________________________ ____________________________________ ____________________________________

## 2023-05-22 NOTE — PROVIDER NOTIFICATION
Pt called back and stated he understood to be NPO tonight after midnight and that he has a  and someone who can stay with him afterwards. Pt stated his provider discussed how they would like the insulin to be taken when NPO.

## 2023-05-22 NOTE — PROVIDER NOTIFICATION
Attempted call to pt to remind of appointment tomorrow. Message left on answering machine for pt to have a  tomorrow and not to eat anything after midnight. Call back number left.

## 2023-05-23 NOTE — PRE-PROCEDURE
.[facility] Procedure Note        Sedation:      Performed by: Zohaib Valles MD  Authorized by: Zohaib Valles MD    Pre-Procedure Assessment done at 0830.    Expected Level:  Moderate Sedation    Indication:  Sedation is required to allow for CT-guided bone biopsy    Consent obtained from patient after discussing the risks, benefits and alternatives.     Review of Anesthesia/Sedation History Completed: YES     Sleep Apnea Assessment Completed: YES    PO Intake:  Appropriately NPO for procedure    ASA Class:  Class 3 - SEVERE SYSTEMIC DISEASE, DEFINITE FUNCTIONAL LIMITATIONS.    Mallampati:  Grade 2:  Soft palate, base of uvula, tonsillar pillars, and portion of posterior pharyngeal wall visible    Lungs: Lungs Clear with good breath sounds bilaterally.     Heart: Normal heart sounds and rate    History and physical reviewed and no updates needed. I have reviewed the lab findings, diagnostic data, medications, and the plan for sedation. I have determined this patient to be an appropriate candidate for the planned sedation and procedure and have reassessed the patient IMMEDIATELY PRIOR to sedation and procedure.

## 2023-05-23 NOTE — IP AVS SNAPSHOT
HI CT SCAN  [address] [address]  Phone: [phone]  Fax: [phone]                              After Visit Summary   5/23/2023    Colin Baxter   MRN: [id number]           After Visit Summary Signature Page    I have received my discharge instructions, and my questions have been answered. I have discussed any challenges I see with this plan with the nurse or doctor.    ..........................................................................................................................................  Patient/Patient Representative Signature      ..........................................................................................................................................  Patient Representative Print Name and Relationship to Patient    ..................................................               ................................................  Date                                   Time    ..........................................................................................................................................  Reviewed by Signature/Title    ...................................................              ..............................................  Date                                               Time    22EPIC Rev 08/18

## 2023-05-23 NOTE — SEDATION DOCUMENTATION
Procedure: bone biopsy, femur, left    There were  no complications and patient has no symptoms..      Tolerated procedure well.    Radiologist:Dr. Valles    Time Out: Prior to the start of the procedure and with procedural staff participation, I verbally confirmed the patient s identity using two indicators, relevant allergies, that the procedure was appropriate and matched the consent or emergent situation, and that the correct equipment/implants were available. Immediately prior to starting the procedure I conducted the Time Out with the procedural staff and re-confirmed the patient s name, procedure, and site/side. (The Joint Commission universal protocol was followed.)  Yes    Position:  prone    Pain:  0    Sedation:IR nurse monitored 100 mcg fentanyl, 2 mg versed  Moderate (conscious) sedation:  TIME IN MINUTES FROM START OF SEDATION TO END OF PROCEDURE:  50    Estimated Blood Loss: Minimal     Condition: Stable    Comments: See dictated procedure note for full details     MARGARITA NUNEZ RN

## 2023-05-23 NOTE — IP AVS SNAPSHOT
After Visit Summary Template Not Found    This Print Group is only intended to be used in the After Visit Summary and can only be used in a report that uses a released After Visit Summary Template.                       MRN:[id number]                      After Visit Summary   5/23/2023    Colin Baxter   MRN: [id number]           Visit Information        Provider Department      5/23/2023  9:00 AM HIXRRN; HIIRRAD; HICT1 HI CT SCAN           Review of your medicines       Notice    This visit is during an admission. Changes to the med list made in this visit will be reflected in the After Visit Summary of the admission.           Protect others around you: Learn how to safely use, store and throw away your medicines at www.disposemymeds.org.       Follow-ups after your visit       Your next 10 appointments already scheduled    May 31, 2023  9:00 AM  (Arrive by 8:45 AM)  Diabetes Education with Suyapa Flowers RN  [facility] ([facility] ) [address] [address]  [phone]      Jun 12, 2023  1:00 PM  (Arrive by 12:45 PM)  Return Visit with Jhony Bartlett MD  [facility] ([facility] ) [address]  [place] [address]  [phone]      Jun 12, 2023  3:00 PM  (Arrive by 2:45 PM)  SHORT with Libia Wallace MD  [facility] ([facility] ) [address]  [place] [address]  [phone]      Jul 13, 2023 10:30 AM  (Arrive by 10:15 AM)  SHORT with Libia Wallace MD  [facility] ([facility] ) [address] [address]  [phone]         Care Instructions       Further instructions from your care team           Additional Information About Your Visit       Care EveryWhere ID    This is your Care EveryWhere ID. This could be used by other organizations to access your [place] medical records  [id number]        Your Vitals Were  Most recent update: 5/23/2023 10:46 AM    Blood Pressure   128/66 (BP Location: Right arm, Patient Position: Semi-Gibbs's, Cuff Size: Adult Regular)    Pulse   80    Temperature   97.9  F (36.6  C) (Tympanic)    Respirations   18    Pulse Oximetry   98%          Primary Care Provider  Libia Wallace MD Office Phone #  [phone] Fax #  [phone]      Equal Access to Services    CHARLEY GROVES : Hadii aad ku hadasho Soomaali, waaxda luqadaha, qaybta kaalmada adeegyada, waxay idiin hayaan adeeg kharash la'aan ah. So [facility] [phone].    ATENCIÓN: Si habla español, tiene a berger disposición servicios gratuitos de asistencia lingüística. Llame al [phone].    We comply with applicable federal and state civil rights laws, including the [place] Human Rights Act. We do not discriminate on the basis of race, color, creed, Scientologist, national origin, marital status, age, disability, sex, sexual orientation, or gender identity.    If you would like an itemization of your charges they will now be available in [employer] 30 days after discharge. To access the itemized statements in [employer] go to billing/billing summary. From there select view account. There will be multiple tabs showing an overview of your account, detail, payments, and communications. From the communications tab you can see your monthly statements, your itemized statements, and any billing letters generated for your account. If you do not have a [employer] account and need help getting access please contact [employer] support at [phone].  If you would prefer to have your itemized statements mailed please contact our automated itemized bill request line at [phone] option  2.       Thank you!    Thank you for choosing [facility] for your care. Our goal is always to provide you with excellent care. Hearing back from our patients is one way we can continue to improve our services. Please take a few minutes to complete the written  survey that you may receive in the mail after you visit with us. Thank you!         Medication List      Notice    This visit is during an admission. Changes to the med list made in this visit will be reflected in the After Visit Summary of the admission.

## 2023-05-23 NOTE — PROCEDURES
Procedure: CT-guided femur biopsy, left  no complications    Sedation Post Procedure Summary:    Prior to the start of the procedure and with procedural staff participation, I verbally confirmed the patient s identity using two indicators, relevant allergies, that the procedure was appropriate and matched the consent or emergent situation, and that the correct equipment/implants were available. Immediately prior to starting the procedure I conducted the Time Out with the procedural staff and re-confirmed the patient s name, procedure, and site/side. (The Joint Commission universal protocol was followed.)  Yes      Sedatives: Fentanyl and Midazolam (Versed)    Vital signs, airway, End Tidal CO2 and pulse oximetry were monitored and remained stable throughout the procedure and sedation was maintained until the procedure was complete.  The patient was monitored by staff until sedation discharge criteria were met.    Patient tolerance: Patient tolerated the procedure well with no immediate complications.    Time of sedation in minutes:  30 minutes from beginning to end of physician one to one monitoring.

## 2023-05-24 NOTE — TELEPHONE ENCOUNTER
Called patient to follow up with how he is feeling after his bone biopsy yesterday. Patient does report some discomfort at biopsy site, he denies any swelling or bruising. He does state that he is taking his pain medication more often now, he had been not wanting to take it as much before but now that he knows this pain is not going to go away he is taking it how his doctor directed. Instructed patient to call us back with any further questions or concerns.

## 2023-05-31 NOTE — PATIENT INSTRUCTIONS
Recap of our visit:     Monitoring:  Your A1C was 7.4 on 4/13/23. Goal is less than 7.5%   Next A1C: July 14th- soonest.    Target blood sugar: Fasting or before meals target is . 2 hours after meal <180.    We only need 1/2 of these numbers to be within target then your A1c will be within target.    CGM: Tiffany 2    Medications:   Continue:Tresiba 18 units once daily.   Change:Novolog 6 units with breakfast. 5 units with lunch and dinner plus correction.      Healthy Eating:  diabetesfoodhub.org     Mediterranean Lifestyle: Fish/seafood 2 times a week, vegetables, fruit, nuts, legumes, whole grains, water, regular activity.    Eat a Healthy diet  Eat more vegetables/plants in your diet  Eat healthy fats  Olive oil  Avocados  Eat healthy proteins  Poultry without the skin  Fish  Limit red meat    Snacks: Try to  work on healthy, low carbohydrate food choices.   Good snack examples: meat, cheese, cottage cheese, nuts, hard boiled egg, veggies, fruit/berries, yogurt (Greek yogurt/protein).     Target Carb:   Men 60 grams/meal 15-30 grams optional snacks.     Make sure you always have a protein with any all carb option      Activity/Exercise:   Try to walk or be active 5-10 minutes after eating any meal     Continue working on healthy eating and moving (start low and slow, work up to 30 min, 5x/week). Increase exercise as tolerated, even multiple short times during your day helps.Slowly work up to 30 minutes of physical activity most days of the week    Adult goal is 150 minutes/week =  30 minutes 5 days of the week.   Aerobic activity 150 minute a week  2 days of resistance exercising      Healthy Coping:   Practicing health promotion can help improve diabetes (suggested by the ADA, March 2019)              Meditation                          Apps: for IPhone and [employer]                          -Calm                          -Headspace                          -Insight Timer              Yoga               Mindful eating         Other:   Foot exam: yearly, aug-current  Eye exam: yearly, April- current  Encourage regular dental check ups.      Follow up: August 14th at 9 am for BG review.     Please bring your meter/reader to your appointment.     If you have any questions or concerns, please call me at [phone] (Estrella, Nurse directly) or send [employer] message.    Scheduling Number: [phone]    Thank you for coming in today!  Suyapa Flowers, JIM Diabetes Educator

## 2023-05-31 NOTE — PROGRESS NOTES
Diabetes Self-Management Education & Support    Presents for: Follow-up    Type of Service: In Person Visit    Assessment Type:   ASSESSMENT:  Colin 78 year old  Pt comes to [facility] for bg review/cgm download.    Pt of Dr. Wallace .     Monitoring:  A1C:  7.4% April. Target <8.0%.  Next in July- has appt with PCP for recheck.     Susi 2 AGP Report  5/18/2023 to 5/31/2023    % Time CGM is Active  92%    Average Glucose  153    Glucose Management Indicator  (GMI)    7.0%    Glucose Variabilty  36.9%    Time in Ranges:  >250 mg/dL   6  181-250 mg/dL  27   mg/dL   61  54-69 mg/dL   6  <54 mg/dL   0      Discussed lows.     Diabetes Medications: Tresiba 18 units once daily. Novolog 5 units with meals, plus correction.   Plan- increase breakfast to 6 units, 5 units with lunch and supper plus correction. Pt has BG spike consistently at breakfast.   May consider altering doses with lows- will follow up with PCP and discuss his trends.    Health Eating: decreased appetite lately, otherwise consistent.     Activity:  Limited, having severe hip pain. Focus of visit today. Colin shares he thinks its cancer, doesn't see Dr. Bartlett or Dr. Wallace until 6/16th and is worried about his pain levels. Is taking Oxycodone 5 mg every 8 hours 1 tab in am, 1 tab at 2 pm then 2 tabs at HS. Pt states it takes the edge off and can sleep 4-5 hours. Has bout 8-10 pills left, requesting refill which was sent to PCP to review.     Reducing Risks: /82. Statin use. No asa use- defer to pcp. Former tobacco use. Foot exam due in aug. Eye exam- current. [place]. A1C is meeting target goal.   Weight is trending down. 8 pounds since last [facility] visit.     Wt Readings from Last 10 Encounters:   05/31/23 73.7 kg (162 lb 6.4 oz)   05/18/23 75.8 kg (167 lb)   04/24/23 75.6 kg (166 lb 10.7 oz)   04/13/23 76.3 kg (168 lb 4 oz)   03/10/23 77.2 kg (170 lb 3.1 oz)   02/27/23 77.4 kg (170 lb 11.2 oz)   02/13/23 75.7 kg (166 lb 12.8 oz)   02/10/23 78.1 kg (172  lb 3.2 oz)   01/30/23 77.5 kg (170 lb 13.7 oz)   01/12/23 78.2 kg (172 lb 8 oz)       Allergies   Allergen Reactions     No Clinical Screening - See Comments Other (See Comments)     Beta blocker in glaucoma gtt.s caused low pulse and passing out      Timolol      Beta blocker in glaucoma gtt.s caused low pulse and passing out   - patient thinks it was timolol but not 100% sure which beta blocker eye drop.      Bactrim [Sulfamethoxazole-Trimethoprim] Rash       Patient's most recent   Lab Results   Component Value Date    A1C 7.4 04/13/2023    A1C 7.1 11/24/2021     is meeting goal of <8.0    Diabetes knowledge and skills assessment:   Patient is knowledgeable in diabetes management concepts related to: Healthy Eating, Being Active, Monitoring and Taking Medication  Continue education with the following diabetes management concepts: Healthy Eating, Being Active, Monitoring, Taking Medication, Problem Solving, Reducing Risks and Healthy Coping  Based on learning assessment above, most appropriate setting for further diabetes education would be: Individual setting.      PLAN-  Monitoring:  Your A1C was 7.4 on 4/13/23. Goal is less than 7.5%   Next A1C: July 14th- soonest.    Target blood sugar: Fasting or before meals target is . 2 hours after meal <180.    We only need 1/2 of these numbers to be within target then your A1c will be within target.    CGM: Tiffany 2    Medications:   Continue:Tresiba 18 units once daily.   Change:Novolog 6 units with breakfast. 5 units with lunch and dinner plus correction.      Healthy Eating:  diabetesfoodhub.org     Mediterranean Lifestyle: Fish/seafood 2 times a week, vegetables, fruit, nuts, legumes, whole grains, water, regular activity.    Eat a Healthy diet  a. Eat more vegetables/plants in your diet  b. Eat healthy fats  i. Olive oil  ii. Avocados  c. Eat healthy proteins  i. Poultry without the skin  ii. Fish  iii. Limit red meat  iv.   Snacks: Try to  work on healthy, low  carbohydrate food choices.   Good snack examples: meat, cheese, cottage cheese, nuts, hard boiled egg, veggies, fruit/berries, yogurt (Greek yogurt/protein).     Target Carb:   Men 60 grams/meal 15-30 grams optional snacks.     Make sure you always have a protein with any all carb option      Activity/Exercise:   Try to walk or be active 5-10 minutes after eating any meal     Continue working on healthy eating and moving (start low and slow, work up to 30 min, 5x/week). Increase exercise as tolerated, even multiple short times during your day helps.Slowly work up to 30 minutes of physical activity most days of the week    Adult goal is 150 minutes/week =  30 minutes 5 days of the week.   i. Aerobic activity 150 minute a week  ii. 2 days of resistance exercising      Healthy Coping:   Practicing health promotion can help improve diabetes (suggested by the ADA, March 2019)              Meditation                          Apps: for IPhone and [employer]                          -Calm                          -Headspace                          -Insight Timer              Yoga              Mindful eating         Other:   Foot exam: yearly, aug-current  Eye exam: yearly, April- current  Encourage regular dental check ups.      Follow up: August 14th at 9 am for BG review.     Please bring your meter/reader to your appointment.     If you have any questions or concerns, please call me at [phone] (Estrella, Nurse directly) or send [employer] message.    Scheduling Number: [phone]  Topics to cover at upcoming visits: Healthy Eating, Being Active, Monitoring, Taking Medication, Problem Solving, Reducing Risks and Healthy Coping    See Care Plan for co-developed, patient-state behavior change goals.  AVS provided for patient today.    Education Materials Provided:  No new materials provided today      SUBJECTIVE/OBJECTIVE:  Presents for: Follow-up  Accompanied by: Self  Diabetes education in the past 24mo: Yes  Focus of Visit:  "Monitoring, Taking Medication  Diabetes type: Type 2  Date of diagnosis: 7/29/19  Disease course: Stable (elevated BGs with chemo treatments)  How confident are you filling out medical forms by yourself:: Quite a bit  Diabetes management related comments/concerns: glucose variation. having severe pain in hip.  Transportation concerns: No  Difficulty affording diabetes medication?: No (PAP Tresiba Novolog)  Difficulty affording diabetes testing supplies?: No  Other concerns:: Glasses  Cultural Influences/Ethnic Background:  Not  or     Diabetes Symptoms & Complications:  Fatigue: Yes (poor sleep, wakes up during the night and has hard time falling back asleep.)  Neuropathy: No  Polydipsia: No  Polyphagia: No (poor appetite)  Polyuria: No  Visual change: Sometimes (blurred -pt thinks his glucose maybe high when he has blurry vision which is not too often.)  Slow healing wounds: No  Symptom course: Stable  Weight trend: Fluctuating  Complications assessed today?: Yes  Autonomic neuropathy: No  CVA: No  Heart disease: No  Nephropathy: Yes  Peripheral neuropathy: No  Foot ulcerations: No  Peripheral Vascular Disease: No  Retinopathy: No    Patient Problem List and Family Medical History reviewed for relevant medical history, current medical status, and diabetes risk factors.    Vitals:  /82   Pulse 90   Resp 16   Ht 1.702 m (5' 7\")   Wt 73.7 kg (162 lb 6.4 oz)   SpO2 98%   BMI 25.44 kg/m    Estimated body mass index is 25.44 kg/m  as calculated from the following:    Height as of this encounter: 1.702 m (5' 7\").    Weight as of this encounter: 73.7 kg (162 lb 6.4 oz).   Last 3 BP:   BP Readings from Last 3 Encounters:   05/31/23 129/82   05/23/23 132/72   05/18/23 122/64       History   Smoking Status     Former     Types: Cigarettes     Quit date: 1/6/1992   Smokeless Tobacco     Never       Labs:  Lab Results   Component Value Date    A1C 7.4 04/13/2023    A1C 7.1 11/24/2021     Lab Results "   Component Value Date     05/18/2023     02/13/2023     10/11/2022    GLC 99 07/06/2021     Lab Results   Component Value Date    LDL 50 01/12/2023    LDL 75 10/02/2020     HDL Cholesterol   Date Value Ref Range Status   10/02/2020 59 >39 mg/dL Final     Direct Measure HDL   Date Value Ref Range Status   01/12/2023 51 >=40 mg/dL Final   ]  GFR Estimate   Date Value Ref Range Status   05/18/2023 42 (L) >60 mL/min/1.73m2 Final     Comment:     eGFR calculated using 2021 CKD-EPI equation.   07/06/2021 26 (L) >60 mL/min/[1.73_m2] Final     Comment:     Non  GFR Calc  Starting 12/18/2018, serum creatinine based estimated GFR (eGFR) will be   calculated using the Chronic Kidney Disease Epidemiology Collaboration   (CKD-EPI) equation.       GFR Estimate If Black   Date Value Ref Range Status   07/06/2021 31 (L) >60 mL/min/[1.73_m2] Final     Comment:      GFR Calc  Starting 12/18/2018, serum creatinine based estimated GFR (eGFR) will be   calculated using the Chronic Kidney Disease Epidemiology Collaboration   (CKD-EPI) equation.       Lab Results   Component Value Date    CR 1.66 05/18/2023    CR 2.33 07/06/2021     No results found for: MICROALBUMIN    Healthy Eating:  Healthy Eating Assessed Today: Yes  Cultural/Religion diet restrictions?: No  Meal planning/habits: Smaller portions, Avoiding sweets  How many times a week on average do you eat food made away from home (restaurant/take-out)?: 0  Meals include: Breakfast, Lunch, Dinner  Breakfast: 1 toast or 1 english muffin, 1-2 eggs, breakfast meat - coffee/cream/splenda or cheerios, or  corn flakes. or oatmeal or cream of wheat. today- 1 eggo waffle.  Lunch: 1/2 sandwich, fruit, milk or coffee or 1/2  cup chili with corn bread or beef, cheese, milk, coffee - sometimes skips. gatorades zero.  Dinner: meat, veggies, 1 bread - sometimes salad - occasional starch (1/2 potato)  Snacks: grapes, leftover meat, 1/2  sandwich - Dove ice cream or 1/2 donut  Beverages: Water, Coffee, Milk, Sports drinks  Has patient met with a dietitian in the past?: Yes    Being Active:  Being Active Assessed Today: Yes  Exercise:: Currently not exercising  Barrier to exercise: Other, Physical limitation (hip pain.)    Monitoring:  Monitoring Assessed Today: Yes  Did patient bring glucose meter to appointment? : Yes  Blood Glucose Meter: One Touch, CGM  Times checking blood sugar at home (number): Other (continuous monitor)  Times checking blood sugar at home (per): Day  Blood glucose trend: Fluctuating    Taking Medications:  Diabetes Medication(s)     Insulin       insulin aspart (NOVOLOG PEN) 100 UNIT/ML pen    5 units with meals, and for glucometer 150-200 add 2 units SQ, 201-250 add 4 units, >250 add 6 units     insulin degludec (TRESIBA FLEXTOUCH) 100 UNIT/ML pen    Inject 18 Units Subcutaneous daily          Taking Medication Assessed Today: Yes  Current Treatments: Diet, Insulin Injections (Tresiba 18 units daily, Novolog 5 units but varies 3-6 units. depending on BG.)  Dose schedule: Pre-breakfast, Pre-lunch, Pre-dinner  Given by: Patient  Injection/Infusion sites: Abdomen  Problems taking diabetes medications regularly?: No  Diabetes medication side effects?: Yes (lows from insulin)    Problem Solving:  Problem Solving Assessed Today: Yes  Is the patient at risk for hypoglycemia?: Yes  Hypoglycemia Frequency: Weekly  Hypoglycemia Treatment: Other food, Candy, Juice (oranges, apples.)  Patient carries a carbohydrate source: Yes  Medical ID: No  Does patient have DKA prevention plan?: No  Does patient have severe weather/disaster plan for diabetes management?: No    Hypoglycemia symptoms  Confusion: Yes (one occurance when  in 50s.)  Hunger: Yes  Sweats: Yes  Feeling shaky: Yes    Hypoglycemia Complications  Blackouts: No  Hospitalization: No  Nocturnal hypoglycemia: No  Required assistance: No  Required glucagon injection: No  Seizures:  No    Reducing Risks:  Reducing Risks Assessed Today: Yes  Diabetes Risks: Age over 45 years, Hyperlipidemia  CAD Risks: Male sex, Diabetes Mellitus, Dyslipidemia, Stress  Has dilated eye exam at least once a year?: Yes (April 2022, Priscilla.- had appt last week.)  Sees dentist every 6 months?: No  Feet checked by healthcare provider in the last year?: Yes (8/26/2022, PCP)    Healthy Coping:  Healthy Coping Assessed Today: Yes  Emotional response to diabetes: Ready to learn, Concern for health and well-being  Informal Support system:: Family  Stage of change: ACTION (Actively working towards change)  Support resources: None  Patient Activation Measure Survey Score:      9/26/2018    11:00 AM   SHARITA Score (Last Two)   SHARITA Raw Score 30   Activation Score 56   SHARITA Level 3         Care Plan and Education Provided:  There are no care plans that you recently modified to display for this patient.    Time Spent: 30 minutes  Encounter Type: Individual    Any diabetes medication dose changes were made via the CDE Protocol per the patient's primary care provider. A copy of this encounter was shared with the provider.    Suyapa Flowers RN Diabetes Educator,  [phone]

## 2023-05-31 NOTE — TELEPHONE ENCOUNTER
Pt is in to see Dm Ed and requests a refill of his Oxycodone.  Oxycodone      Last Written Prescription Date:  05/18/23  Last Fill Quantity: 60,   # refills: 0  Last Office Visit: 05/18/23  Future Office visit:    Next 5 appointments (look out 90 days)    Jun 12, 2023  1:00 PM  (Arrive by 12:45 PM)  Return Visit with Jhony Bartlett MD  [facility] ) [address]  [facility] [address]  [phone]   Jun 12, 2023  3:00 PM  (Arrive by 2:45 PM)  SHORT with Libia Wallace MD  [facility] ([facility] ) [address]  [facility] [address]  [phone]   Jul 13, 2023 10:30 AM  (Arrive by 10:15 AM)  SHORT with Libia Wallace MD  [facility] ([facility] ) [address]  [facility] [address]  [phone]

## 2023-06-01 NOTE — PROGRESS NOTES
Spoke with PCP, agree to  decrease Tresiba given his lows. Will decrease to 14 units once daily from 18 units.    Spoke with patient, updated. Agrees with the plan. He did  new rx for Oxycodone and appreciative.  DRC RN will follow up next week to review.    Suyapa Flowers, RN Diabetes Educator,  [phone]  6/1/2023 at 3:59 PM

## 2023-06-09 PROBLEM — F11.90 CHRONIC, CONTINUOUS USE OF OPIOIDS: Status: ACTIVE | Noted: 2023-01-01

## 2023-06-09 NOTE — PROGRESS NOTES
Diabetes Self-Management Education & Support    Follow up with Colin to review BG, insulin adjustment.  Decreased to 14 units 6/1, due to low blood sugars.     Colin is reporting his BG is consistently  200-230.    He shares he did take 16 units of Tresiba today. Agree with plan and to continue with 16 units. DRC RN will follow up next week.     Suyapa Flowers RN Diabetes Educator,  [phone]  6/9/2023 at 3:44 PM

## 2023-06-09 NOTE — PROGRESS NOTES
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  Pain mgt by oncology   - starting oxycontin 20mg bid   - increased oxycodone IR to 10mg q6h prn  - port placement next week  - MRI left hip on 6/19/2023    Type 2 diabetes mellitus without complication, with long-term current use of insulin (H)  Appreciate diabetic education reaching out to Colin     10 minutes spent on the date of the encounter doing chart review, review of test results, interpretation of tests, patient visit, documentation and discussion with other provider(s)        See Patient Instructions    Next appt on 7/13/2023    Libia Wlalace MD  [facility] - ROBINA Shaw is a 78 year old, presenting for the following health issues:  Pain and Diabetes      HPI       Pain History:  When did you first notice your pain? f/u   Have you seen this provider for your pain in the past?   Yes   Where in your body do you have pain? Left Hip, and femur  Are you seeing anyone else for your pain? Yes - Provatas        2/2/2022     8:00 AM 9/28/2022    11:06 AM 6/12/2023     3:17 PM   PHQ-9 SCORE   PHQ-9 Total Score MyChart  1 (Minimal depression) 11 (Moderate depression)   PHQ-9 Total Score 4 1 11             6/12/2023     3:32 PM   PEG Score   PEG Total Score 10       Chronic Pain Follow Up:    Location of pain: left hip and femur  Analgesia/pain control:    - Recent changes:  Worsening    - Overall control: Tolerable with discomfort    - Current treatments: Opioids   Adherence:     - Do you ever take more pain medicine than prescribed? No    - When did you take your last dose of pain medicine?  1130 AM   Adverse effects: No     - visited with oncology today  - plans to place port next week   - chemotherapy will raise blood sugars, appreciate diabetic eduction assistance    PDMP Review       Value Time User    State PDMP site checked  Yes 5/31/2023  9:20 AM Libia Wallace,  MD        Last CSA Agreement:   CSA -- Patient Level:    CSA: None found at the patient level.       Last UDS:       Diabetes Follow-up    How often are you checking your blood sugar? Continuous glucose monitor  What time of day are you checking your blood sugars (select all that apply)?  CONTINUOUS  Have you had any blood sugars above 200?  Yes   Have you had any blood sugars below 70?  No    What symptoms do you notice when your blood sugar is low?  None    What concerns do you have today about your diabetes? Blood sugar is often over 200     Do you have any of these symptoms? (Select all that apply)  No numbness or tingling in feet.  No redness, sores or blisters on feet.  No complaints of excessive thirst.  No reports of blurry vision.  No significant changes to weight.    - follows with Suyapa diabetic education     BP Readings from Last 2 Encounters:   06/12/23 122/80   06/12/23 128/74     Hemoglobin A1C (%)   Date Value   04/13/2023 7.4 (H)   01/12/2023 7.1 (H)   11/24/2021 7.1 (A)   01/14/2021 5.7 (H)     LDL Cholesterol Calculated (mg/dL)   Date Value   01/12/2023 50   02/22/2022 108 (H)   10/02/2020 75   09/09/2019 84       Answers for HPI/ROS submitted by the patient on 6/12/2023  If you checked off any problems, how difficult have these problems made it for you to do your work, take care of things at home, or get along with other people?: Very difficult  PHQ9 TOTAL SCORE: 11  BRYCE 7 TOTAL SCORE: 4      Review of Systems   Constitutional: Negative for chills and fever.   HENT: Negative for congestion.    Respiratory: Negative for shortness of breath and wheezing.    Cardiovascular: Negative for chest pain and palpitations.   Gastrointestinal: Negative for abdominal pain.   Musculoskeletal: Positive for arthralgias.   Psychiatric/Behavioral: Positive for dysphoric mood. The patient is nervous/anxious.             Objective    /80   Pulse 79   Temp 98.8  F (37.1  C) (Tympanic)   Wt 73.7 kg (162 lb 7.7  oz)   SpO2 99%   BMI 25.45 kg/m    Body mass index is 25.45 kg/m .  Physical Exam  Constitutional:       General: He is in acute distress.   Neurological:      Mental Status: He is alert.   Psychiatric:         Mood and Affect: Mood is anxious and depressed.            Lab Results   Component Value Date    A1C 7.4 04/13/2023    A1C 7.1 01/12/2023    A1C 8.6 08/26/2022    A1C 7.4 05/23/2022    A1C 10.3 02/22/2022    A1C 7.1 11/24/2021    A1C 5.7 01/14/2021    A1C 5.7 10/02/2020    A1C 5.7 03/13/2020    A1C 6.2 10/31/2019

## 2023-06-12 PROBLEM — G89.3 CANCER ASSOCIATED PAIN: Status: ACTIVE | Noted: 2023-01-01

## 2023-06-12 NOTE — TELEPHONE ENCOUNTER
Referral received for port placement from .  Diagnosis: malignant neoplasm of left ureter  First attempt to call patient to schedule. No answer.     Seen by [facility] surgeon in past?: Dr.Smith Nohemi Griffiths LPN on 6/12/2023 at 3:02 PM

## 2023-06-12 NOTE — NURSING NOTE
"Oncology Rooming Note    June 12, 2023 1:01 PM   Colin Baxter is a 78 year old male who presents for:    Chief Complaint   Patient presents with     Oncology Clinic Visit     Follow up. Bladder cancer      Initial Vitals: /74   Pulse 87   Temp 97.4  F (36.3  C) (Tympanic)   Resp 19   Ht 1.702 m (5' 7\")   Wt 72.3 kg (159 lb 6.3 oz)   SpO2 98%   BMI 24.96 kg/m   Estimated body mass index is 24.96 kg/m  as calculated from the following:    Height as of this encounter: 1.702 m (5' 7\").    Weight as of this encounter: 72.3 kg (159 lb 6.3 oz). Body surface area is 1.85 meters squared.  Severe Pain (6) Comment: Data Unavailable   No LMP for male patient.  Allergies reviewed: Yes  Medications reviewed: Yes    Medications: Medication refills not needed today.  Pharmacy name entered into EPIC:    [id number] - [place], [place] - [address] [facility] [id number] - [place], [place] - [address] [place] [address]    Clinical concerns: having lots of pain and discomfort,is using the Tylenol and Oxycodone prn but never takes the pain away.       Estephania Wesley LPN              "

## 2023-06-12 NOTE — LETTER
Opioid / Opioid Plus Controlled Substance Agreement    This is an agreement between you and your provider about the safe and appropriate use of controlled substance/opioids prescribed by your care team. Controlled substances are medicines that can cause physical and mental dependence (abuse).    There are strict laws about having and using these medicines. We here at [facility] are committing to working with you in your efforts to get better. To support you in this work, we ll help you schedule regular office appointments for medicine refills. If we must cancel or change your appointment for any reason, we ll make sure you have enough medicine to last until your next appointment.     As a Provider, I will:  Listen carefully to your concerns and treat you with respect.   Recommend a treatment plan that I believe is in your best interest. This plan may involve therapies other than opioid pain medication.   Talk with you often about the possible benefits, and the risk of harm of any medicine that we prescribe for you.   Provide a plan on how to taper (discontinue or go off) using this medicine if the decision is made to stop its use.    As a Patient, I understand that opioid(s):   Are a controlled substance prescribed by my care team to help me function or work and manage my condition(s).   Are strong medicines and can cause serious side effects such as:  Drowsiness, which can seriously affect my driving ability  A lower breathing rate, enough to cause death  Harm to my thinking ability   Depression   Abuse of and addiction to this medicine  Need to be taken exactly as prescribed. Combining opioids with certain medicines or chemicals (such as illegal drugs, sedatives, sleeping pills, and benzodiazepines) can be dangerous or even fatal. If I stop opioids suddenly, I may have severe withdrawal symptoms.  Do not work for all types of pain nor for all patients. If they re not helpful, I may be asked to stop  them.        The risks, benefits and side effects of these medicine(s) were explained to me. I agree that:  I will take part in other treatments as advised by my care team. This may be psychiatry or counseling, physical therapy, behavioral therapy, group treatment or a referral to a specialist.     I will keep all my appointments. I understand that this is part of the monitoring of opioids. My care team may require an office visit for EVERY opioid/controlled substance refill. If I miss appointments or don t follow instructions, my care team may stop my medicine.    I will take my medicines as prescribed. I will not change the dose or schedule unless my care team tells me to. There will be no refills if I run out early.     I may be asked to come to the clinic and complete a urine drug test or complete a pill count at any time. If I don t give a urine sample or participate in a pill count, the care team may stop my medicine.    I will only receive prescriptions from this clinic for chronic pain. If I am treated by another provider for acute pain issues, I will tell them that I am taking opioid pain medication for chronic pain and that I have a treatment agreement with this provider. I will inform my [facility] care team within one business day if I am given a prescription for any pain medication by another healthcare provider. My [facility] care team can contact other providers and pharmacists about my use of any medicines.    It is up to me to make sure that I don t run out of my medicines on weekends or holidays. If my care team is willing to refill my opioid prescription without a visit, I must request refills only during office hours. Refills may take up to 3 business days to process. I will use one pharmacy to fill all my opioid and other controlled substance prescriptions. I will notify the clinic about any changes to my insurance or medication availability.    I am responsible for my  prescriptions. If the medicine/prescription is lost, stolen or destroyed, it will not be replaced. I also agree not to share controlled substance medicines with anyone.    I am aware I should not use any illegal or recreational drugs. I agree not to drink alcohol unless my care team says I can.       If I enroll in the [place] [facility] program, I will tell my care team prior to my next refill.     I will tell my care team right away if I become pregnant, have a new medical problem treated outside of my regular clinic, or have a change in my medications.    I understand that this medicine can affect my thinking, judgment and reaction time. Alcohol and drugs affect the brain and body, which can affect the safety of my driving. Being under the influence of alcohol or drugs can affect my decision-making, behaviors, personal safety, and the safety of others. Driving while impaired (DWI) can occur if a person is driving, operating, or in physical control of a car, motorcycle, boat, snowmobile, ATV, motorbike, off-road vehicle, or any other motor vehicle ([place] Statute 169A.20). I understand the risk if I choose to drive or operate any vehicle or machinery.    I understand that if I do not follow any of the conditions above, my prescriptions or treatment may be stopped or changed.          Opioids  What You Need to Know    What are opioids?   Opioids are pain medicines that must be prescribed by a doctor. They are also known as narcotics.     Examples are:   morphine (MS Contin, Anali)  oxycodone (Oxycontin)  oxycodone and acetaminophen (Percocet)  hydrocodone and acetaminophen (Vicodin, Norco)   fentanyl patch (Duragesic)   hydromorphone (Dilaudid)   methadone  codeine (Tylenol #3)     What do opioids do well?   Opioids are best for severe short-term pain such as after a surgery or injury. They may work well for cancer pain. They may help some people with long-lasting (chronic) pain.     What do opioids NOT do  well?   Opioids never get rid of pain entirely, and they don t work well for most patients with chronic pain. Opioids don t reduce swelling, one of the causes of pain.                                    Other ways to manage chronic pain and improve function include:     Treat the health problem that may be causing pain  Anti-inflammation medicines, which reduce swelling and tenderness, such as ibuprofen (Advil, Motrin) or naproxen (Aleve)  Acetaminophen (Tylenol)  Antidepressants and anti-seizure medicines, especially for nerve pain  Topical treatments such as patches or creams  Injections or nerve blocks  Chiropractic or osteopathic treatment  Acupuncture, massage, deep breathing, meditation, visual imagery, aromatherapy  Use heat or ice at the pain site  Physical therapy   Exercise  Stop smoking  Take part in therapy       Risks and side effects     Talk to your doctor before you start or decide to keep taking opioids. Possible side effects include:    Lowering your breathing rate enough to cause death  Overdose, including death, especially if taking higher than prescribed doses  Worse depression symptoms; less pleasure in things you usually enjoy  Feeling tired or sluggish  Slower thoughts or cloudy thinking  Being more sensitive to pain over time; pain is harder to control  Trouble sleeping or restless sleep  Changes in hormone levels (for example, less testosterone)  Changes in sex drive or ability to have sex  Constipation  Unsafe driving  Itching and sweating  Dizziness  Nausea, throwing up and dry mouth    What else should I know about opioids?    Opioids may lead to dependence, tolerance, or addiction.    Dependence means that if you stop or reduce the medicine too quickly, you will have withdrawal symptoms. These include loose poop (diarrhea), jitters, flu-like symptoms, nervousness and tremors. Dependence is not the same as addiction.                     Tolerance means needing higher doses over time to  get the same effect. This may increase the chance of serious side effects.    Addiction is when people improperly use a substance that harms their body, their mind or their relations with others. Use of opiates can cause a relapse of addiction if you have a history of drug or alcohol abuse.    People who have used opioids for a long time may have a lower quality of life, worse depression, higher levels of pain and more visits to doctors.    You can overdose on opioids. Take these steps to lower your risk of overdose:    Recognize the signs:  Signs of overdose include decrease or loss of consciousness (blackout), slowed breathing, trouble waking up and blue lips. If someone is worried about overdose, they should call 911.    Talk to your doctor about Narcan (naloxone).   If you are at risk for overdose, you may be given a prescription for Narcan. This medicine very quickly reverses the effects of opioids.   If you overdose, a friend or family member can give you Narcan while waiting for the ambulance. They need to know the signs of overdose and how to give Narcan.     Don't use alcohol or street drugs.   Taking them with opioids can cause death.    Do not take any of these medicines unless your doctor says it s OK. Taking these with opioids can cause death:  Benzodiazepines, such as lorazepam (Ativan), alprazolam (Xanax) or diazepam (Valium)  Muscle relaxers, such as cyclobenzaprine (Flexeril)  Sleeping pills like zolpidem (Ambien)   Other opioids      How to keep you and other people safe while taking opioids:    Never share your opioids with others.  Opioid medicines are regulated by the Drug Enforcement Agency (). Selling or sharing medications is a criminal act.    2. Be sure to store opioids in a secure place, locked up if possible. Young children can easily swallow them and overdose.    3. When you are traveling with your medicines, keep them in the original bottles. If you use a pill box, be sure you also  carry a copy of your medicine list from your clinic or pharmacy.    4. Safe disposal of opioids    Most pharmacies have places to get rid of medicine, called disposal kiosks. Medicine disposal options are also available in every [facility]. Search your county and  medication disposal  to find more options. You can find more details at:  https://www.pca.[email or web address]-[email or web address]-medications     I agree that my provider, clinic care team, and pharmacy may work with any city, state or federal law enforcement agency that investigates the misuse, sale, or other diversion of my controlled medicine. I will allow my provider to discuss my care with, or share a copy of, this agreement with any other treating provider, pharmacy or emergency room where I receive care.    I have read this agreement and have asked questions about anything I did not understand.    _______________________________________________________  Patient Signature - Colin Baxter _____________________                   Date     _______________________________________________________  Provider Signature - Libia Wallace MD   _____________________                   Date     _______________________________________________________  Witness Signature (required if provider not present while patient signing)   _____________________                   Date

## 2023-06-13 NOTE — PROGRESS NOTES
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  For details, see previous notes.  The patient had been treated with adjuvant nivolumab for invasive urothelial cell carcinoma. 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  The patient also had undergone an endoscopic ultrasound-guided biopsy of a pancreatic lesion.  Endosonographic findings revealed the patient had an occult lesion suggestive of cyst identified in the pancreas measuring 50 mm.  Tissue was noted to have the clinical appearance that was suggestive of a pancreatic pseudocyst.  It was recommended the patient have an MRCP in 6 months.  The patient also stated that Dr. Randee Schmitz was treating the patient with BCG bladder instillation.      We  elected torestage the patient with a PET scan  the findings were there was no evidence of recurrent metastatic disease, no evidence of hyper enhancing pancreatic lesion.  It was felt the pancreatic lesion had resolved on repeat MRI of the abdomen.  There was a 9 mm peripheral enhancing lesion in the liver, which was not present on 10/11.  There was subtle focus of enhancement in the inferior aspect of segment 6 liver.  This was definitely new, which could be suggestive of metastatic disease.  According to radiology, it was too small to biopsy.  The patient had a repeat MRI of the abdomen on 04/17/2023.  MRI of the abdomen revealed that there was new bony metastatic disease at L2 and L3 and a lesion in  the right iliac crest noted as well as this enlarging liver lesion, which had increased from 9 mm to 15 x 20 mm involving segment 8.  The patient had complaints of left hip pain.  We felt the patient would require a biopsy to rule out metastatic disease.  Further imaging was ordered prior to the biopsy including bone scan that was done on 04/27/2023 and it was read as multiple areas of abnormal uptake consistent with bony metastatic disease.  There was extensive bony metastatic disease involving the skull, mandible, shoulders, ribs, spine, pelvis and both proximal femurs.  There was a very large lesion seen in the proximal shaft of the left femur.  The patient also had a CT of the pelvis, which was read as osteoblastic metastatic disease in the right ilium and the roof of the left acetabulum.  There was mixed osteolytic osteoblastic metastatic disease seen in the femoral shaft in the left.  CT of the lumbar spine was read as osteoblastic metastatic disease involving L2 vertebral body, left L3 pedicle.      The patient underwent a CT-guided biopsy of the destructive lesion in the posterior aspect of the left femur.  Pathology was read as left femur curettings consistent of metastatic carcinoma, consistent with prior noninvasive urothelial cell carcinoma.      The patient comes in today.  He is having excruciating pain, can barely walk, he was put on oxycodone 5 mg q.8 hours by Dr. Wallace. He says this does helped.  He says it may last for 4 hours and then the pain comes right back.  He has had some mild weight loss.  Denies any hematuria, shortness of breath, chest pain, headaches, just excruciating left hip pain.    PHYSICAL EXAMINATION:    GENERAL:  He is an elderly white male in no acute distress, ECOG performance status is 1.  VITAL SIGNS:  Reveal blood pressure 128/74, pulse 87, respirations 19, temperature 97.4.  HEENT:  Atraumatic, normocephalic.  Oropharynx is nonerythematous.  NECK:  Supple.  LUNGS:  Clear to  auscultation and percussion.  HEART:  Regular rhythm.  S1, S2 normal.  ABDOMEN:  Soft, normoactive bowel sounds.  No mass, nontender.  LYMPHATICS:  No cervical or supraclavicular nodes.  EXTREMITIES:  Trace ankle edema.  There is tenderness over the left hip.  Limited mobility and increasing tenderness on flexion.  NEUROLOGIC:  Nonfocal.    LABORATORY DATA:  Reveals CBC with white count of 11.3, hematocrit 32.6, platelet count is 320.  BUN is 18.9, creatinine 1.51, alkaline phosphatase is 1012.  LDH is 401.    IMPRESSION:    High-grade urothelial cell carcinoma of the left ureter with 2 cm mass involving the lamina propria.  The patient went on to have neoadjuvant chemotherapy with cisplatin and gemcitabine with 20% dose reduction of cisplatin.  The patient received 4 cycles, then went on to have a ureterectomy and was found to have pathologic T3 N0 high-grade urothelial cell carcinoma.  PET scan was negative.  The patient was deemed a candidate for adjuvant nivolumab based on its indication for high-grade muscle-invasive urothelial cell carcinoma that is greater than T2.  The patient was started on nivolumab 480 mg IV every 28 days.  After 7 cycles developed loose stools and after 8 cycles developed significant diarrhea.  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  He was treated with prednisone and then tapered off.  The patient was deemed not a candidate for further checkpoint inhibitor therapy.  MRI revealed lesions at L2, L3, as well as right iliac crest as well as enlarging liver lesion.  The patient underwent biopsy of the destructive lesion in the left femur, which was consistent with metastatic urothelial cell carcinoma.  We would like to obtain [facility] testing for FGR3 and FGFR2 to see if the patient is a candidate for targeted therapy.      In the interim, based on NCCN guidelines, the patient would be a candidate for  enfortumab-vedotin, which is an antibody drug conjugate which is a targeted to Nectin-4, which is seen on these urothelial malignant cells.  It is *an antibody microtubule inhibitor drug conjugate In a 3 trial with patients with locally advanced or metastatic urothelial cell carcinoma, previously treated with platinum-based chemotherapy,or PD-L1 inhibitor therapy who  had progressed patients were randomly assigned to enfortumab-vedotin or investigator's choice of chemotherapy. followup approximately 11 months enfortumab-vedotin compared to chemotherapy  had an improved overall survival with a median of13 months versus 9 months in patients that were treated with chemotherapy alone.  Toxicities include rash, peripheral neuropathy, hyperglycemia, ocular toxicities.  Therefore, we would recommend starting this regimen.  We would like to start at a lower dose of 0.75 mg/kg given on days 1, 8, 15 of a 28-day cycle.  We will likely restage after 3 cycles.      Discussed side effects with the patient including ocular toxicities as well as hyperglycemia and peripheral neuropathy and rash including risk of Samayoa-Johan syndrome.  We will watch closely.  We will have to monitor glucose levels; patients can develop hyperglycemia.  The patient is willing to proceed.  [facility]  If patient is FGFr2/3 mutated, patient will be a candidate for targeted therapy with a drug called erdafitinib.    Eighty-five minutes were spent on this patient.  Time was spent reviewing literature on treatment of metastatic urothelial cell carcinoma, ordering enfortumab-vedotin, discussing side effects and ordering followup labs. Also, in terms of his pain, we recommend a radiation oncology consultation for radiation to the left hip as well as obtain an MRI of the left hip to rule out potential fracture.  We also changed his pain regimen to OxyContin 20 mg q.12 hours, oxycodone 10 mg q.6 hours p.r.n. breakthrough  pain.  The patient is willing to proceed.    Jhony Bartlett MD        D: 2023   T: 2023   MT: francesco    Name:     CLARISSA COSTA  MRN:      [id number]        Account:    [id number]   :      1945           Visit Date: 2023     Document: [id number]    cc:  MD DAMION Monsalve, APRN, CNP

## 2023-06-14 NOTE — PROGRESS NOTES
"Radiation Oncology Rooming Note    June 14, 2023 1:31 PM     Colin Baxter is a 78 year old male who presents for:       Chief Complaint   Patient presents with     Consult     Radiation Oncology Consultation        Initial Vitals: /68 (BP Location: Left arm, Patient Position: Chair, Cuff Size: Adult Regular)   Pulse 91   Temp 97.6  F (36.4  C) (Tympanic)   Resp 19   Ht 1.702 m (5' 7\")   Wt 71.4 kg (157 lb 8 oz)   SpO2 98%   BMI 24.67 kg/m     Estimated body mass index is 24.67 kg/m  as calculated from the following:    Height as of this encounter: 1.702 m (5' 7\").    Weight as of this encounter: 71.4 kg (157 lb 8 oz).   Body surface area is 1.84 meters squared.  Severe Pain (6) Comment: Left hip and thigh. Occasionally radiates down into knee.       Allergies reviewed: Yes  Medications reviewed: Yes      Patient completed the following questionnaires: NCCN Distress Thermometer.       Patient was assessed using the NCCN psychosocial distress thermometer. Patient rated the score as a six. Patient rated current stressors as constipation, fatigue, getting around, memory/concentration, loss of interest in usual activities, pain, and sleep. Stressors brought to the attention of Perla Ramesh CNP for a score of 6 or greater or per nurses discretion.       Patient received folder containing the following:  [employer] Radiation Therapy and You booklet  radiation site specific side effects chart  radiation planning process packet  radiation therapy timeline  financial letter  vaccines during cancer treatment hand out  mental health services and providers packet  family support group handouts   business card  radiation therapy business card      Financial assistance resources given to patient:  [employer] application  [employer] application       Lovely Ac LPN              "

## 2023-06-15 NOTE — PROGRESS NOTES
"Diabetes Self-Management Education & Support    Follow up with Colin.     Last phone call, patient had increased Tresiba to 16 units, FBG was \"too high\".  Novolog 6,5,5. Correction.     Steroid-Decadron:  8 mg 6/14, then 4 mg once daily after.     Pt reports his BG high- again 200-260. So increased Tresiba back to 18 units daily, \"headed in right direction\".   Reminded Colin, steroids can raise blood sugars, we will need to monitor and adjust insulin to help manage glucose.   Colin shares he is feeling pretty good right now, no other needs/questions at this time.     DRC RN will follow up early next week.     Suyapa Flowers, RN Diabetes Educator,  [phone]  6/15/2023 at 4:11 PM     "

## 2023-06-15 NOTE — TELEPHONE ENCOUNTER
[facility]  oxyCODONE (OXYCONTIN) 20 MG 12 hr tablet. Effective dates 1/1/23-12/31/23. Scanned in Epic.

## 2023-06-15 NOTE — TELEPHONE ENCOUNTER
Received a PA request from [employer] for oxyCODONE (OXYCONTIN) 20 MG 12 hr tablet. Faxed form to [employer]. Waiting for a response.

## 2023-06-15 NOTE — ANESTHESIA PREPROCEDURE EVALUATION
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) 2020    getting BCG (from his Urologist in [place], [place])     Obesity      Pancreatic mass      Renal cell carcinoma, left (H)     Left nephrectomy     Ureter cancer, left (H)       Past Surgical History:   Procedure Laterality Date     APPENDECTOMY       COLONOSCOPY  2011     COLONOSCOPY N/A 2022    Procedure: COLONOSCOPY;  Surgeon: Keyon Zimmerman MD;  Location: [facility] [place] [facility]     COLONOSCOPY N/A 2023    Procedure: COLONOSCOPY;  Surgeon: Antony Croft MD;  [facility]     COMBINED CYSTOSCOPY, RETROGRADES, URETEROSCOPY, INSERT STENT Left 2021    Procedure: CYSTOURETEROSCOPY, WITH RETROGRADE PYELOGRAM with interpretation of fluroscopy, ureteroscopy, ureteral biopsy, bladder biopsy and fulgaration;  Surgeon: Shonda Morrell MD;  [facility]     CYSTOSCOPY N/A 2021    Procedure: CYSTOSCOPY;  Surgeon: Javier Mcnulty MD;  [facility]: [facility]     CYSTOSCOPY, BIOPSY BLADDER, COMBINED N/A 2015    Procedure: COMBINED CYSTOSCOPY, BIOPSY BLADDER;  Surgeon: Randy Martinez MD;  [facility]     CYSTOSCOPY, BIOPSY BLADDER, COMBINED N/A 2017    Procedure: COMBINED CYSTOSCOPY, BIOPSY BLADDER;  Surgeon: Randy Martinez MD;  [facility]     CYSTOSCOPY, BIOPSY BLADDER, COMBINED N/A 2018    Procedure: COMBINED CYSTOSCOPY, BIOPSY BLADDER;  Surgeon: Ed Helm MD;  Location: [facility] OR     CYSTOSCOPY, BIOPSY BLADDER,  COMBINED N/A 11/5/2021    Procedure: CYSTOSCOPY, WITH BLADDER BIOPSY;  Surgeon: Shonda Morrell MD;  [facility]     CYSTOSCOPY, RETROGRADES, COMBINED Bilateral 11/13/2018    Procedure: Bilateral Retrograde Pyelagram, Bladder Biopsy;  Surgeon: Ed Helm MD;  Location: [facility]     CYSTOSCOPY, RETROGRADES, COMBINED Right 11/5/2021    Procedure: CYSTOSCOPY, WITH RETROGRADE PYELOGRAM;  Surgeon: Shonda Morrell MD;  [facility]     CYSTOSCOPY, RETROGRADES, INSERT STENT URETER(S), COMBINED Left 9/8/2015    Procedure: COMBINED CYSTOSCOPY, RETROGRADES, INSERT STENT URETER(S);  Surgeon: Randy Martinez MD;  [facility]     CYSTOSCOPY, TRANSURETHRAL RESECTION (TUR) TUMOR BLADDER, COMBINED N/A 9/8/2015    Procedure: COMBINED CYSTOSCOPY, TRANSURETHRAL RESECTION (TUR) TUMOR BLADDER;  Surgeon: Randy Martinez MD;  [facility]     CYSTOSCOPY, TRANSURETHRAL RESECTION (TUR) TUMOR BLADDER, COMBINED N/A 1/12/2016    Procedure: COMBINED CYSTOSCOPY, TRANSURETHRAL RESECTION (TUR) TUMOR BLADDER;  Surgeon: Randy Martinez MD;  [facility]     CYSTOSCOPY, TRANSURETHRAL RESECTION (TUR) TUMOR BLADDER, COMBINED N/A 9/13/2016    Procedure: COMBINED CYSTOSCOPY, TRANSURETHRAL RESECTION (TUR) TUMOR BLADDER;  Surgeon: Randy Martinez MD;  [facility]     DAVINCI NEPHROURETERECTOMY Left 6/16/2021    Procedure: NEPHROURETERECTOMY, ROBOT-ASSISTED, lymph node dissection;  Surgeon: Javier Mnculty MD;  [facility]     ESOPHAGOSCOPY, GASTROSCOPY, DUODENOSCOPY (EGD), COMBINED N/A 9/30/2022    Procedure: UPPER ENDOSCOPIC ULTRASOUND;  Surgeon: Jason Dennis MD;  Location: [facility]     PHACOEMULSIFICATION WITH STANDARD INTRAOCULAR LENS IMPLANT Right 10/9/2018    Procedure: PHACOEMULSIFICATION WITH STANDARD INTRAOCULAR LENS IMPLANT;  PHACOEMULSIFICATION CATARACT EXTRACTION POSTERIOR CHAMBER LENS RIGHT;  Surgeon: Marlo Mcconnell MD;  [facility]      PHACOEMULSIFICATION WITH STANDARD INTRAOCULAR LENS IMPLANT Left 10/23/2018    Procedure: PHACOEMULSIFICATION CATARACT EXTRACTION POSTERIOR CHAMBER LENS LEFT;  Surgeon: Marlo Mcconnell MD;  [facility]        Allergies   Allergen Reactions     No Clinical Screening - See Comments Other (See Comments)     Beta blocker in glaucoma gtt.s caused low pulse and passing out      Timolol      Beta blocker in glaucoma gtt.s caused low pulse and passing out   - patient thinks it was timolol but not 100% sure which beta blocker eye drop.      Bactrim [Sulfamethoxazole-Trimethoprim] Rash      Social History     Tobacco Use     Smoking status: Former     Years: 30.00     Types: Cigarettes     Quit date: 1992     Years since quittin.4     Smokeless tobacco: Never   Vaping Use     Vaping status: Never Used   Substance Use Topics     Alcohol use: Yes     Comment: < 1 drink a month      Wt Readings from Last 1 Encounters:   23 71.4 kg (157 lb 8 oz)        Anesthesia Evaluation   Pt has had prior anesthetic. Type: MAC and General.    No history of anesthetic complications       ROS/MED HX  ENT/Pulmonary:     (+) tobacco use, Past use,  COPD: pt denies; not on inhalers.   Neurologic:  - neg neurologic ROS     Cardiovascular:     (+) Dyslipidemia hypertension-----    METS/Exercise Tolerance: 1 - Eating, dressing    Hematologic:     (+) anemia,     Musculoskeletal:   (+) arthritis,     GI/Hepatic:  - neg GI/hepatic ROS     Renal/Genitourinary:     (+) renal disease, type: CRI,     Endo:     (+) type II DM, Last HgA1c: 7.1, date: 2023, Using insulin, Normal glucose range: 190 today, thyroid problem, hypothyroidism, Obesity,     Psychiatric/Substance Use:     (+) H/O chronic opiod use .     Infectious Disease:       Malignancy:   (+) Malignancy, History of Other.Other CA bladder, kidney, ureter, mets to bone status post Surgery and Chemo.    Other:      (+) , H/O Chronic Pain,        Physical Exam    Airway         Mallampati: III   TM distance: > 3 FB   Neck ROM: full   Mouth opening: > 3 cm    Respiratory Devices and Support         Dental       (+) Multiple crowns, permanant bridges      Cardiovascular   cardiovascular exam normal       Rhythm and rate: regular and normal     Pulmonary   pulmonary exam normal        breath sounds clear to auscultation           OUTSIDE LABS:  CBC:   Lab Results   Component Value Date    WBC 8.0 06/12/2023    WBC 5.6 05/18/2023    HGB 11.3 (L) 06/12/2023    HGB 11.7 (L) 05/18/2023    HCT 32.6 (L) 06/12/2023    HCT 34.5 (L) 05/18/2023     06/12/2023     05/18/2023     BMP:   Lab Results   Component Value Date     (L) 06/12/2023     (L) 05/18/2023    POTASSIUM 4.1 06/12/2023    POTASSIUM 4.2 05/18/2023    CHLORIDE 99 06/12/2023    CHLORIDE 100 05/18/2023    CO2 22 06/12/2023    CO2 23 05/18/2023    BUN 18.9 06/12/2023    BUN 25.7 (H) 05/18/2023    CR 1.51 (H) 06/12/2023    CR 1.66 (H) 05/18/2023     (H) 06/12/2023     (H) 05/18/2023     COAGS:   Lab Results   Component Value Date    PTT 33 05/18/2023    INR 1.18 (H) 05/18/2023     POC:   Lab Results   Component Value Date    BGM 98 06/17/2021     HEPATIC:   Lab Results   Component Value Date    ALBUMIN 3.7 06/12/2023    PROTTOTAL 7.1 06/12/2023    ALT <5 (L) 06/12/2023    AST 33 06/12/2023    ALKPHOS 1,012 (H) 06/12/2023    BILITOTAL 0.5 06/12/2023     OTHER:   Lab Results   Component Value Date    PH 7.29 (L) 06/16/2021    LACT 1.6 10/10/2022    A1C 7.4 (H) 04/13/2023    MAHSA 9.2 06/12/2023    PHOS 3.5 02/16/2023    MAG 2.2 09/18/2022    LIPASE 86 (H) 04/13/2023    TSH 5.00 (H) 05/18/2023    T4 1.27 05/18/2023    .0 (H) 10/11/2022       Anesthesia Plan    ASA Status:  3   NPO Status:  NPO Appropriate    Anesthesia Type: MAC.     - Reason for MAC: straight local not clinically adequate   Induction: Intravenous, Propofol.   Maintenance: Balanced.        Consents    Anesthesia Plan(s) and  associated risks, benefits, and realistic alternatives discussed. Questions answered and patient/representative(s) expressed understanding.     - Discussed: Risks, Benefits and Alternatives for BOTH SEDATION and the PROCEDURE were discussed     - Discussed with:  Patient      - Extended Intubation/Ventilatory Support Discussed: No.      - Patient is DNR/DNI Status: No    Use of blood products discussed: No .     Postoperative Care    Pain management: IV analgesics, Multi-modal analgesia.   PONV prophylaxis: Ondansetron (or other 5HT-3), Dexamethasone or Solumedrol     Comments:    Other Comments: 6/16/23 DANILO An CRNA

## 2023-06-20 NOTE — OR NURSING
Patient and responsible adult given discharge instructions with no questions regarding instructions. David score 20. Received oral medication for pain to left hip, has been chronic. Denies pain to incision.  Discharged from unit via wheelchair. Patient discharged to home.

## 2023-06-20 NOTE — H&P
HISTORY AND PHYSICAL - PORT PLACEMENT  6/20/2023    Patient : Colin Baxter    Planned Procedures : Placement of a tunneled  Venous catheter with subcutaneous port.      History of Present Illness:   This is a 78 year old male has a history of ureteral cancer and needs long term IV access for chemotherapy.       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     Chronic kidney disease      Diabetes mellitus, type 2 (H)      Dyslipidemia      Glaucoma      Hyperlipidemia      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) 12/11/2020    getting BCG (from his Urologist in [place], [place])     Obesity      Pancreatic mass      Renal cell carcinoma, left (H) 2021    Left nephrectomy     Ureter cancer, left (H)        Past Surgical History:  Past Surgical History:   Procedure Laterality Date     APPENDECTOMY  1958     COLONOSCOPY  2-     COLONOSCOPY N/A 9/13/2022    Procedure: COLONOSCOPY;  Surgeon: Keyon Zimmerman MD;  [place] [facility] [place]     COLONOSCOPY N/A 2/13/2023    Procedure: COLONOSCOPY;  Surgeon: Antony Croft MD;  [facility]     COMBINED CYSTOSCOPY, RETROGRADES, URETEROSCOPY, INSERT STENT Left 1/18/2021    Procedure: CYSTOURETEROSCOPY, WITH RETROGRADE PYELOGRAM with interpretation of fluroscopy, ureteroscopy, ureteral biopsy, bladder biopsy and fulgaration;  Surgeon: Shonda Morrell MD;  [facility]     CYSTOSCOPY N/A 6/16/2021    Procedure: CYSTOSCOPY;  Surgeon: Javier Mcnulty MD;  [facility]     CYSTOSCOPY, BIOPSY BLADDER, COMBINED N/A 9/8/2015    Procedure: COMBINED CYSTOSCOPY, BIOPSY BLADDER;  Surgeon: Randy Martinez MD;  [facility]     CYSTOSCOPY, BIOPSY BLADDER, COMBINED N/A 2/14/2017    Procedure: COMBINED CYSTOSCOPY, BIOPSY BLADDER;  Surgeon: Randy Martinez MD;   [facility]     CYSTOSCOPY, BIOPSY BLADDER, COMBINED N/A 11/13/2018    Procedure: COMBINED CYSTOSCOPY, BIOPSY BLADDER;  Surgeon: Ed Helm MD;  Location[facility]     CYSTOSCOPY, BIOPSY BLADDER, COMBINED N/A 11/5/2021    Procedure: CYSTOSCOPY, WITH BLADDER BIOPSY;  Surgeon: Shonda Morrell MD;  [facility]     CYSTOSCOPY, RETROGRADES, COMBINED Bilateral 11/13/2018    Procedure: Bilateral Retrograde Pyelagram, Bladder Biopsy;  Surgeon: Ed Heml MD;  Location: [facility] OR     CYSTOSCOPY, RETROGRADES, COMBINED Right 11/5/2021    Procedure: CYSTOSCOPY, WITH RETROGRADE PYELOGRAM;  Surgeon: Shonda Morrell MD;  [facility]     CYSTOSCOPY, RETROGRADES, INSERT STENT URETER(S), COMBINED Left 9/8/2015    Procedure: COMBINED CYSTOSCOPY, RETROGRADES, INSERT STENT URETER(S);  Surgeon: Randy Martinez MD;  [facility]     CYSTOSCOPY, TRANSURETHRAL RESECTION (TUR) TUMOR BLADDER, COMBINED N/A 9/8/2015    Procedure: COMBINED CYSTOSCOPY, TRANSURETHRAL RESECTION (TUR) TUMOR BLADDER;  Surgeon: Randy Martinez MD;  [facility]     CYSTOSCOPY, TRANSURETHRAL RESECTION (TUR) TUMOR BLADDER, COMBINED N/A 1/12/2016    Procedure: COMBINED CYSTOSCOPY, TRANSURETHRAL RESECTION (TUR) TUMOR BLADDER;  Surgeon: Randy Martinez MD;  [facility]     CYSTOSCOPY, TRANSURETHRAL RESECTION (TUR) TUMOR BLADDER, COMBINED N/A 9/13/2016    Procedure: COMBINED CYSTOSCOPY, TRANSURETHRAL RESECTION (TUR) TUMOR BLADDER;  Surgeon: Randy Martinez MD;  [facility]     DAVINCI NEPHROURETERECTOMY Left 6/16/2021    Procedure: NEPHROURETERECTOMY, ROBOT-ASSISTED, lymph node dissection;  Surgeon: Javier Mcnulty MD;  [facility]     ESOPHAGOSCOPY, GASTROSCOPY, DUODENOSCOPY (EGD), COMBINED N/A 9/30/2022    Procedure: UPPER ENDOSCOPIC ULTRASOUND;  Surgeon: Jason Dennis MD;  Location: [facility] [place]     PHACOEMULSIFICATION WITH STANDARD INTRAOCULAR LENS IMPLANT Right 10/9/2018     "Procedure: PHACOEMULSIFICATION WITH STANDARD INTRAOCULAR LENS IMPLANT;  PHACOEMULSIFICATION CATARACT EXTRACTION POSTERIOR CHAMBER LENS RIGHT;  Surgeon: Marlo Mcconnell MD;  [facility]     PHACOEMULSIFICATION WITH STANDARD INTRAOCULAR LENS IMPLANT Left 10/23/2018    Procedure: PHACOEMULSIFICATION CATARACT EXTRACTION POSTERIOR CHAMBER LENS LEFT;  Surgeon: Marlo Mcconnell MD;  [facility]       Family History History:  Family History   Problem Relation Age of Onset     Diabetes Mother      Other - See Comments Father         cause of death unknown     Parkinsonism Brother      Coronary Artery Disease No family hx of      Hypertension No family hx of      Hyperlipidemia No family hx of      Cerebrovascular Disease No family hx of      Breast Cancer No family hx of      Colon Cancer No family hx of      Prostate Cancer No family hx of      Anesthesia Reaction No family hx of      Asthma No family hx of      Thyroid Disease No family hx of      Genetic Disorder No family hx of        History of Tobacco Use:  History   Smoking Status     Former     Years: 30.00     Types: Cigarettes     Quit date: 1/6/1992   Smokeless Tobacco     Never       Current Medications:  No current outpatient medications on file.       Allergies:  Allergies   Allergen Reactions     No Clinical Screening - See Comments Other (See Comments)     Beta blocker in glaucoma gtt.s caused low pulse and passing out      Timolol      Beta blocker in glaucoma gtt.s caused low pulse and passing out   - patient thinks it was timolol but not 100% sure which beta blocker eye drop.      Bactrim [Sulfamethoxazole-Trimethoprim] Rash       ROS:  Pertinent items are noted in HPI.  All other systems are negative.    PHYSICAL EXAM:     Vital signs: /79   Pulse 64   Temp 98  F (36.7  C) (Temporal)   Ht 1.702 m (5' 7\")   Wt 71.4 kg (157 lb 6.4 oz)   SpO2 98%   BMI 24.65 kg/m     Weight: [unfilled]   BMI: Body mass index is 24.65 kg/m .   General: " Normal, healthy, cooperative, in no acute distress, alert   Skin: no jaundice   HEENT: PERRLA and EOMI   Neck: supple   Lungs: clear to auscultation   CV: Regular rate and rhythm without murmer   Abdominal: abdomen is soft without significant tenderness, masses, organomegaly or guarding   Extremities: No cyanosis, clubbing or edema noted bilaterally in Upper and Lower Extremities   Neurological: without deficit    Assessment:   This is a 78 year old male has a history of ureteral cancer and needs long term IV access for chemotherapy.      Plan:   Will schedule for a placement of tunneled catheter with subcutaneous port.      The risks, benefits, and alternatives to the planned procedure were fully discussed with the patient and/or the patient's representative(s). The risks of bleeding, infection, death, missing pathology, the need for additional procedures intra-operatively, the possible need for intra-operative consults, the possible need for transfusion therapy, cardiopulmonary compromise, the possible need for additional surgery for a complication were discussed with the patient and/or the patient's representative(s). The patient's and/or patient's representative(s) questions were addressed and answered. Informed consent was obtained from the patient and/or the patient's representative(s). The patient and/or the patient's representative(s) consent to proceed.      Specific risks:  Risks include but are not limited to bleeding, infection, MI, arrhythmias, damage to vessels, pneumothorax, hemothorax, hydrothorax, need for additional procedures, reaction to anesthesia, death.  All the patients questions were answered.  The patient consents to proceed.  The procedure will be scheduled.

## 2023-06-20 NOTE — NURSING NOTE
Call to [facility] and they have not received a referral.  Referral and recent office note faxed.      6/21/23:  Received a fax from [facility] stating they do not treat this condition.  New referral and office note faxed to [facility].     Amarilis Bell RN

## 2023-06-20 NOTE — OP NOTE
REPORT OF OPERATION  DATE OF PROCEDURE: 6/20/2023    PATIENT: Colin Baxter    SURGERY PERFORMED: Placement of Tunneled Central Venous Catheter with Subcutaneous Port.   Ultrasound was not utilized.   Fluoroscopy was utilized    PREOPERATIVE DIAGNOSIS: Ureteral cancer,  Need for long term central venous access.    POSTOPERATIVE DIAGNOSIS: Same    SURGEON: Antony Croft MD    ASSISTANTS: None    ANESTHESIA: Monitored Anesthesia Care    COMPLICATIONS: None apparent    TRANSFUSIONS: None    TISSUE TO PATHOLOGY: None    FINDINGS: Tip of catheter in superior vena cava via the left subclavian vein    INDICATIONS: This is a 78 year old male with need of long tern central venous access for chemotherapy secondary to ureteral cacner.  The patient will be taken to the operating room for placement of a tunneled cental venous catheter with subcutaneous port.    DESCRIPTIONS OF PROCEDURE IN DETAIL: After consent was obtained the patient was taken to the operative suite and lien in the supine position.  The patient was identified and the correct patient was confirmed.  Monitored Anesthesia Care was administered by anesthesia.  The patient was sterilely prepped and draped in the usual fashion.  A time out was performed verifying the correct patient and the correct procedure.  The entire operative team was in agreement.  All necessary equipment and supplies were in the room.    After identification of landmarks, the area was anesthetized with local anesthesia.   The left subclavian vein was then cannulize and the guide wire was inserted and positioned into the superior vena cava.  Ultrasound was not utilized.  Position was verified by fluoroscopy.  The wire was secured.  A location for the subcutaneous port was decided upon and the area was anesthetized with local anesthesia.  The skin was sharply entered and dissection was carried down to the chest wall.  A pocket was made, superficial to the fascia of the pectoralis major, to  accept the port.  With fluoroscopy guidance, the dilator and sheath was then passed over the wire and positioned in the superior vena cava.  The wire and dilator were removed.  The catheter was inserted through the sheath and positioned in the superior vena cave.  Position was verified with fluoroscopy.  The sheath was removed and again the position of the catheter was checked with fluoroscopy.  The catheter was cut to length and attached to the port and the port was positioned in the previously made subcutaneous pocket.  The path of the catheter was positioned as to be a garth curve with no abrupt angles.  The port was secured to the chest wall with 2-0 Prolene sutures.  The port was flushed with NS and was found to flush easily.  Hemostasis was assured.  The deep tissues were closed with inturrupted 3-0 vicryl sutures and the skin was closed with subcuticular 4-0 Monocryl.  Sterile dressings were applied.  The patient was awaken and take to the recovery room in stable condition tolerating the procedure well.    Total fluoroscopy time was : 26 sec  Catheter length : 24.5 cm

## 2023-06-20 NOTE — ANESTHESIA POSTPROCEDURE EVALUATION
Patient: Colin Baxter    Procedure: Procedure(s):  port-a-cath placement       Anesthesia Type:  MAC    Note:  Disposition: Outpatient   Postop Pain Control: Uneventful            Sign Out: Well controlled pain   PONV: No   Neuro/Psych: Uneventful            Sign Out: Acceptable/Baseline neuro status   Airway/Respiratory: Uneventful            Sign Out: Acceptable/Baseline resp. status   CV/Hemodynamics: Uneventful            Sign Out: Acceptable CV status; No obvious hypovolemia; No obvious fluid overload   Other NRE: NONE   DID A NON-ROUTINE EVENT OCCUR? No           Last vitals:  Vitals Value Taken Time   /91 06/20/23 1145   Temp 97.4  F (36.3  C) 06/20/23 1055   Pulse 75 06/20/23 1145   Resp 16 06/20/23 1145   SpO2 100 % 06/20/23 1154   Vitals shown include unvalidated device data.    Electronically Signed By: DANILO Croft CRNA  June 20, 2023  12:17 PM

## 2023-06-22 NOTE — PROGRESS NOTES
Call placed to patient to inform him that Dr Bartlett would like him to wait to start his chemotherapy until he is done with radiation. As of now, no radiation appts schedule due to wanting him to see Orthopedics first. Pt in agreeance with this and understands that all oncology appts will be cancelled until we know a plan with radiation.

## 2023-06-23 NOTE — TELEPHONE ENCOUNTER
Patients daughter calling to request referral to  home care.  Pended referral      Also, requesting a handicap placard.  [facility] printed application & placed in PCP's box      Please call either daughter to update when referral is placed & placard paperwork is completed      Daughters -   Perri: [phone]  Christine: [phone]  Both lines are

## 2023-06-23 NOTE — PROGRESS NOTES
Diabetes Self-Management Education & Support    Message left for return call. DRC RN will try next week.   Suyapa Flowers, RN Diabetes Educator,  [phone]  6/23/2023 at 2:50 PM

## 2023-06-26 NOTE — PROGRESS NOTES
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     History Of Present Illness:  Mr. Baxter is a 78 year old male is here for follow-up of his bone metastases.  His pain in the left femur is reasonably welll controlled with MS Contin and Oxycodone for break-through use. Level is 4-5/10. Needs renewal of his MS Contin.    Review Of Systems:  There were no vitals taken for this visit.    Past medical, social, surgical, and family histories reviewed.    Allergies:  Allergies as of 06/27/2023 - Reviewed 06/20/2023   Allergen Reaction Noted     No clinical screening - see comments Other (See Comments) 11/08/2018     Timolol  06/04/2021     Bactrim [sulfamethoxazole-trimethoprim] Rash 07/15/2022       Current Medications:  Current Outpatient Medications   Medication Sig Dispense Refill     acetaminophen (TYLENOL) 325 MG tablet Take 2 tablets (650 mg) by mouth every 4 hours as needed for mild pain or other (and adjunct with moderate or severe pain or per patient request)  0     artificial tears (GENTEAL) 0.1-0.2-0.3 % ophthalmic solution Place 2 drops into both eyes daily as needed 15 mL 3     atorvastatin (LIPITOR) 40 MG tablet TAKE 1 TABLET BY MOUTH 90 tablet 2     blood glucose (ONETOUCH VERIO IQ) test strip USE TO TEST BLOOD SUGAR 3 TIMES DAILY 100 strip 11     brimonidine (ALPHAGAN-P) 0.15 % ophthalmic solution INSTILL 1 DROP INTO RIGHT EYE TWICE DAILY  12     Cholecalciferol (VITAMIN D3) 25 MCG TABS Take 25 mcg by mouth daily       Continuous Blood Gluc  (FREESTYLE DELILAH 2 READER) TENNILLE 1 each continuous 1 each 0     Continuous Blood Gluc Sensor (FREESTYLE DELILAH 2 SENSOR) MISC 1 each every 14 days 2 each 11     dexamethasone (DECADRON) 4 MG tablet Take 1 tablet (4 mg) by mouth 2 times daily (with meals) 10 tablet 0     insulin aspart (NOVOLOG PEN) 100 UNIT/ML pen 5 units with meals, and for glucometer 150-200 add 2 units SQ, 201-250 add 4 units,  >250 add 6 units 15 mL 0     insulin degludec (TRESIBA FLEXTOUCH) 100 UNIT/ML pen Inject 18 Units Subcutaneous daily 15 mL 0     insulin pen needle (BD PEN NEEDLE DONTAE 2ND GEN) 32G X 4 MM miscellaneous USE 3 PEN NEEDLES DAILY 100 each 11     latanoprost (XALATAN) 0.005 % ophthalmic solution Place 1 drop into both eyes At Bedtime       levothyroxine (SYNTHROID/LEVOTHROID) 112 MCG tablet Take 1 tablet (112 mcg) by mouth daily 90 tablet 1     morphine (MS CONTIN) 15 MG CR tablet Take 1 tablet (15 mg) by mouth every 12 hours for 15 days maximum 2 tablet(s) per day 30 tablet 0     naloxone (NARCAN) 4 MG/0.1ML nasal spray Spray 1 spray (4 mg) into one nostril alternating nostrils as needed for opioid reversal every 2-3 minutes until assistance arrives 0.2 mL 1     ONETOUCH DELICA LANCETS 33G MISC 1 each 2 times daily 100 each 10     oxyCODONE (OXYCONTIN) 20 MG 12 hr tablet Take 1 tablet (20 mg) by mouth every 12 hours (Patient not taking: Reported on 6/14/2023) 60 tablet 0     oxyCODONE IR (ROXICODONE) 10 MG tablet Take 1 tablet (10 mg) by mouth every 6 hours as needed for severe pain 90 tablet 0     prochlorperazine (COMPAZINE) 10 MG tablet Take 1 tablet (10 mg) by mouth every 6 hours as needed for nausea or vomiting 30 tablet 2     valACYclovir (VALTREX) 1000 mg tablet Take 1 tablet by mouth 3 times daily (Patient not taking: Reported on 6/12/2023)       VITRON-C  MG TABS tablet Take 1 tablet by mouth daily          Physical Exam:  There were no vitals taken for this visit.  Patient is alert and oriented. No obvious effects of his pain medication with cognition.    { Laboratory/Imaging Studies  No visits with results within 2 Week(s) from this visit.   Latest known visit with results is:   Oncology Visit on 06/12/2023   Component Date Value Ref Range Status     Sodium 06/12/2023 135 (L)  136 - 145 mmol/L Final     Potassium 06/12/2023 4.1  3.4 - 5.3 mmol/L Final     Chloride 06/12/2023 99  98 - 107 mmol/L Final      Carbon Dioxide (CO2) 06/12/2023 22  22 - 29 mmol/L Final     Anion Gap 06/12/2023 14  7 - 15 mmol/L Final     Urea Nitrogen 06/12/2023 18.9  8.0 - 23.0 mg/dL Final     Creatinine 06/12/2023 1.51 (H)  0.67 - 1.17 mg/dL Final     Calcium 06/12/2023 9.2  8.8 - 10.2 mg/dL Final     Glucose 06/12/2023 148 (H)  70 - 99 mg/dL Final     Alkaline Phosphatase 06/12/2023 1,012 (H)  40 - 129 U/L Final     AST 06/12/2023 33  10 - 50 U/L Final     ALT 06/12/2023 <5 (L)  10 - 50 U/L Final     Protein Total 06/12/2023 7.1  6.4 - 8.3 g/dL Final     Albumin 06/12/2023 3.7  3.5 - 5.2 g/dL Final     Bilirubin Total 06/12/2023 0.5  <=1.2 mg/dL Final     GFR Estimate 06/12/2023 48 (L)  >60 mL/min/1.73m2 Final    eGFR calculated using 2021 CKD-EPI equation.     Lactate Dehydrogenase 06/12/2023 401 (H)  0 - 250 U/L Final     WBC Count 06/12/2023 8.0  4.0 - 11.0 10e3/uL Final     RBC Count 06/12/2023 3.56 (L)  4.40 - 5.90 10e6/uL Final     Hemoglobin 06/12/2023 11.3 (L)  13.3 - 17.7 g/dL Final     Hematocrit 06/12/2023 32.6 (L)  40.0 - 53.0 % Final     MCV 06/12/2023 92  78 - 100 fL Final     MCH 06/12/2023 31.7  26.5 - 33.0 pg Final     MCHC 06/12/2023 34.7  31.5 - 36.5 g/dL Final     RDW 06/12/2023 14.2  10.0 - 15.0 % Final     Platelet Count 06/12/2023 320  150 - 450 10e3/uL Final     % Neutrophils 06/12/2023 71  % Final     % Lymphocytes 06/12/2023 17  % Final     % Monocytes 06/12/2023 11  % Final     % Eosinophils 06/12/2023 0  % Final     % Basophils 06/12/2023 1  % Final     % Immature Granulocytes 06/12/2023 0  % Final     NRBCs per 100 WBC 06/12/2023 0  <1 /100 Final     Absolute Neutrophils 06/12/2023 5.7  1.6 - 8.3 10e3/uL Final     Absolute Lymphocytes 06/12/2023 1.3  0.8 - 5.3 10e3/uL Final     Absolute Monocytes 06/12/2023 0.9  0.0 - 1.3 10e3/uL Final     Absolute Eosinophils 06/12/2023 0.0  0.0 - 0.7 10e3/uL Final     Absolute Basophils 06/12/2023 0.0  0.0 - 0.2 10e3/uL Final     Absolute Immature Granulocytes 06/12/2023  0.0  <=0.4 10e3/uL Final     Absolute NRBCs 06/12/2023 0.0  10e3/uL Final        ASSESSMENT/PLAN: Patient has moderate control of his bone pain with current regimen. Plan palliative radiation to l;eft acetabulum and femur to a dose of 25 Gy in five fractions.  Discussed course of therapy and possible side effects. His and his wife's questions were answered to their satisfaction in my opinion. Patient to get simulated today.      Will renew MS Contin.  Will have patient see Orthopedic service to assess role for intervention.

## 2023-06-26 NOTE — PROGRESS NOTES
[facility]/Voicemail    Clinical Data: Care Coordinator Outreach    Outreach attempted x 1.  Left message on patient's voicemail with call back information and requested return call.    Plan: Care Coordinator requested a return call.    Called in regards to radiation referral.

## 2023-06-27 NOTE — PROGRESS NOTES
[facility]  DEPARTMENT OF RADIATION ONCOLOGY   SIMULATION NOTE    Name: Colin aBxter MRN: [id number]   : 1945 (78 year old)  Date of Service: 2023        Diagnosis and Cancer Staging   Ureter cancer (H)  Staging form: Renal Pelvis and Ureter, AJCC 8th Edition  - Clinical: No stage assigned - Unsigned  - Pathologic stage from 2023: Stage IV (pT2, pN0, pM1) - Signed by Jhony Bartlett MD on 2023       Procedure   For non-SBRT treatment, the patient comes to clinic for simulation and radiation therapy for treatment as specified on the written consent (site of treatment, type of cancer). Therapy, Treatment Planning, and I reviewed the anticipated radiation therapy, clinical history and documentation, and radiographic information and images. We obtained written consent for treatment. With the patient, we verified their identification, site, and side of treatment. We evaluated multiple setup positions and elected to simulate the patient in a modified supine position. We used orthogonal lasers to align them with the CT simulator. We immobilized the patient with a customized torso mold and accessories to improve the reproducibility and safety for daily radiation therapy. We placed radiopaque markers to assist in identifying topographical landmarks for simulation. We obtained  and axial CT imaging through the target region. We used virtual simulation techniques to verify the adequacy of the CT images and to create a preliminary setup isocenter. Motion management was not utilized. We placed tattoos to zander the setup isocenter. The patient tolerated the procedure well and without complications. We will use available diagnostic and radiation therapy imaging studies for CT-based treatment planning with image fusion as indicated. I anticipate utilizing a form of intensity-modulated or 3D-conformal radiation therapy to develop a computerized treatment plan whose dosimetric  analysis (e.g., dose-volume histogram (DVH)) indicates adequate coverage of target tissues and sparing of nearby normal structures. We will complete routine QA procedures. The patient wished to proceed as recommended. We answered all questions to his satisfaction.    Implanted Cardiac Devices: No.      Luis Rayo MD   Department of Radiation Oncology  Tel: [phone]  Fax: [phone]

## 2023-06-27 NOTE — PROGRESS NOTES
Patient was simulated today.    Narayan LOYA(KEVIN.)  Department of Radiation Oncology  Phone:[phone]

## 2023-06-28 PROBLEM — F11.90 CHRONIC, CONTINUOUS USE OF OPIOIDS: Status: RESOLVED | Noted: 2023-01-01 | Resolved: 2023-01-01

## 2023-06-28 NOTE — PROGRESS NOTES
CLINIC NOTE - POST-OP SURGERY  6/28/2023    Patient:Colin Baxter    Procedure: Placement of Tunneled Central Venous Catheter with Subcutaneous Port.    This is a 78 year old male who is 1 weeks s/p Placement of Tunneled Central Venous Catheter with Subcutaneous Port..  The patient has no complaints today.     Current Medications:  Current Outpatient Medications   Medication Sig Dispense Refill     acetaminophen (TYLENOL) 325 MG tablet Take 2 tablets (650 mg) by mouth every 4 hours as needed for mild pain or other (and adjunct with moderate or severe pain or per patient request)  0     artificial tears (GENTEAL) 0.1-0.2-0.3 % ophthalmic solution Place 2 drops into both eyes daily as needed 15 mL 3     atorvastatin (LIPITOR) 40 MG tablet TAKE 1 TABLET BY MOUTH 90 tablet 2     blood glucose (ONETOUCH VERIO IQ) test strip USE TO TEST BLOOD SUGAR 3 TIMES DAILY 100 strip 11     brimonidine (ALPHAGAN-P) 0.15 % ophthalmic solution INSTILL 1 DROP INTO RIGHT EYE TWICE DAILY  12     Cholecalciferol (VITAMIN D3) 25 MCG TABS Take 25 mcg by mouth daily       Continuous Blood Gluc  (FREESTYLE DELILAH 2 READER) TENNILLE 1 each continuous 1 each 0     Continuous Blood Gluc Sensor (FREESTYLE DELILAH 2 SENSOR) MISC 1 each every 14 days 2 each 11     insulin aspart (NOVOLOG PEN) 100 UNIT/ML pen 5 units with meals, and for glucometer 150-200 add 2 units SQ, 201-250 add 4 units, >250 add 6 units 15 mL 0     insulin degludec (TRESIBA FLEXTOUCH) 100 UNIT/ML pen Inject 18 Units Subcutaneous daily 15 mL 0     insulin pen needle (BD PEN NEEDLE DONTAE 2ND GEN) 32G X 4 MM miscellaneous USE 3 PEN NEEDLES DAILY 100 each 11     latanoprost (XALATAN) 0.005 % ophthalmic solution Place 1 drop into both eyes At Bedtime       levothyroxine (SYNTHROID/LEVOTHROID) 112 MCG tablet Take 1 tablet (112 mcg) by mouth daily 90 tablet 1     morphine (MS CONTIN) 15 MG CR tablet Take 1 tablet (15 mg) by mouth every 12 hours maximum 2 tablet(s) per day 30 tablet 0  "    ONETOUCH DELICA LANCETS 33G MISC 1 each 2 times daily 100 each 10     oxyCODONE IR (ROXICODONE) 10 MG tablet Take 1 tablet (10 mg) by mouth every 6 hours as needed for severe pain 90 tablet 0     VITRON-C  MG TABS tablet Take 1 tablet by mouth daily       dexamethasone (DECADRON) 4 MG tablet Take 1 tablet (4 mg) by mouth 2 times daily (with meals) (Patient not taking: Reported on 6/28/2023) 10 tablet 0     naloxone (NARCAN) 4 MG/0.1ML nasal spray Spray 1 spray (4 mg) into one nostril alternating nostrils as needed for opioid reversal every 2-3 minutes until assistance arrives (Patient not taking: Reported on 6/28/2023) 0.2 mL 1     oxyCODONE (OXYCONTIN) 20 MG 12 hr tablet Take 1 tablet (20 mg) by mouth every 12 hours (Patient not taking: Reported on 6/14/2023) 60 tablet 0     prochlorperazine (COMPAZINE) 10 MG tablet Take 1 tablet (10 mg) by mouth every 6 hours as needed for nausea or vomiting (Patient not taking: Reported on 6/28/2023) 30 tablet 2     valACYclovir (VALTREX) 1000 mg tablet Take 1 tablet by mouth 3 times daily (Patient not taking: Reported on 6/12/2023)         Allergies:  Allergies   Allergen Reactions     No Clinical Screening - See Comments Other (See Comments)     Beta blocker in glaucoma gtt.s caused low pulse and passing out      Timolol      Beta blocker in glaucoma gtt.s caused low pulse and passing out   - patient thinks it was timolol but not 100% sure which beta blocker eye drop.      Bactrim [Sulfamethoxazole-Trimethoprim] Rash       PHYSICAL EXAM:   Vital signs: /62 (BP Location: Right arm, Cuff Size: Adult Regular)   Ht 1.702 m (5' 7\")   Wt 72.1 kg (159 lb)   BMI 24.90 kg/m     BMI: Body mass index is 24.9 kg/m .   General: Normal, healthy, cooperative, in no acute distress, alert   Lungs: respirations are non-labored   Abdominal: non-distended   Wounds:  Well healed surgical scars consistent with his operation.     PATHOLOGY:  Case Report   Date Value Ref Range " Status   05/23/2023   Final    Surgical Pathology Report                         Case: [id number]                                  Authorizing Provider:  Zohaib Valles,   Collected:           05/23/2023 09:45 AM                                 MD                                                                           Ordering Location:     [facility]      Received:            05/24/2023 02:21 PM          Pathologist:           Lucius Benedict MD                                                     Specimen:    Femur, Left                                                                                 Final Diagnosis   Date Value Ref Range Status   05/23/2023   Final    A.  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  Doing well.     PLAN:    Follow-up as needed

## 2023-06-29 NOTE — PROGRESS NOTES
Diabetes Self-Management Education & Support    Call to patient, went directly to voicemail. Message left for return call.   Suyapa Flowers RN Diabetes Educator,  [phone]  6/29/2023 at 3:01 PM

## 2023-06-30 NOTE — PROGRESS NOTES
Discussed patient's pain with Homecare nurse Neha. Dr. Bartlett prescribed Oxycontin 20mg ER and Oxycodone 10mgIR. Another medication filled by Perla Ramesh NP the next day for Morphine 15mg ER. Neha wondering which one patient should be taking and wondering if there could possibly be an increase in pain medication because patient is in a lot of pain.     Discussed with Jennifer Jensen NP who stated since medications were changed by [facility], it should be them dealing with the pain medications. Pain is likely related to radiation since patient is not getting chemo at this time.    Discussed with Amarilis JOHNSON, RNCC for radiation. Amarilis was going to call the homecare nurse to discuss further.

## 2023-06-30 NOTE — TELEPHONE ENCOUNTER
Neha, homecare RN, called and spoke with Estephania Donnelly regarding increased left hip/leg pain.  Estephania Donnelly relayed message to radiation therapy.  This writer called Neha.  She is at Colin's home admitting [facility] and he is experiencing excruciating left hip/leg pain, 10/10. He did take Dexamethasone 8 mg and Oxycodone 10 mg at noon.  He took his MS Contin 15 mg at 0730.  Spoke with Dr. Rayo.  He states patient needs time for the Dexamethasone to work and if pain is not controlled he advised that patient be seen in ED.  Relayed this information to Neha and she will discuss this with Colin.  She has no further concerns.     Amarilis Bell RN

## 2023-06-30 NOTE — PROGRESS NOTES
Progress Notes  Encounter Date: Jun 30, 2023  Luis Rayo MD     RADIATION ONCOLOGY WEEKLY MANAGEMENT PROGRESS NOTE     Patient Care Team       Relationship Specialty Notifications Start End    Libia Wallace MD PCP - General [facility] Medicine  2/22/22     Phone: [phone] Fax: [phone] [address] [facility] [address]    Rowena Phan, LPN LPN   8/16/19     Tawanna Kirk RD Diabetes Educator Diabetes Education  10/17/19     Phone: [phone] Fax: [phone]        Luis Tran MD Referring Physician [facility]  2/2/21     Phone: [phone] Fax: [phone] [address] [facility] [address]    Javier Mcnulty MD MD Urology  2/2/21     Phone: [phone] Fax: [phone]         [address] [facility] [address]    Laney Neumann, RN Registered Nurse Urology  2/2/21     Phone: [phone] Pager: [phone]        Sabra Jensen, NP Assigned Cancer Care Provider   2/13/22     Phone: [phone] Fax: [phone] [address] [facility] [address]    Maryann Mccord APRN CNP Nurse Practitioner Family Medicine  2/25/22     Phone: [phone] Fax: [phone]         [address] [address]    Libia Wallace MD Assigned PCP   2/27/22     Phone: [phone] Fax: [phone] [address] [facility] [place] [address]    Nasreen Rhodes, RN Specialty Care Coordinator Hematology & Oncology All results, Admissions 5/12/22     Estephania Donnelly, RN Specialty Care Coordinator Hematology & Oncology All results, Admissions 5/12/22     Phone: [phone] Fax: [phone]         [facility] [address] [facility] [address]    Suyapa Flowers, RN Diabetes Educator Diabetes Education Admissions 9/9/22     Phone: [phone]         Giorgio Mccord RPH Pharmacist Pharmacist  9/20/22     Phone: [phone] Fax: [phone]         [facility] [address] [facility] [address]    Giorgio Mccord RPH Assigned  [facility] Pharmacist   9/28/22     Phone: [phone] Fax: [phone]         [facility] [address] [facility] [address]    Antony Croft MD Assigned Surgical Provider   2/4/23     Phone: [phone] Fax: [phone]         [address] [address]    Libia Wallace MD Assigned Pain Medication Provider   6/17/23     Phone: [phone] Fax: [phone] [address] [facility] [address]                  DIAGNOSIS:  Cancer Staging   Ureter cancer (H)  Staging form: Renal Pelvis and Ureter, AJCC 8th Edition  - Clinical: No stage assigned - Unsigned  - Pathologic stage from 6/11/2023: Stage IV (pT2, pN0, pM1) - Signed by Jhony Bartlett MD on 6/11/2023          RADIATION THERAPY:    Colin Baxter has received 1000 cGy to date to left femur.   Treatment 2/5  Total planned dose: 2500 cGy      SUBJECTIVE:    Currently he started to experience more pain in his left hip last night when he went to bed.  He is taking MS Contin 15 mg twice a day and oxycodone 10 mg 2-3 times a day.  He has not started taking the dexamethasone yet because he has been having difficulty controlling his blood sugars.  He will start taking it now.  He has not been having regular BMs.  Given laxative protocol instructions and reviewed.       OBJECTIVE:    WEIGHT: 0 lbs 0 oz. /70 (BP Location: Right arm, Patient Position: Chair, Cuff Size: Adult Regular)   Pulse 71   Temp 98.9  F (37.2  C) (Tympanic)   Resp 16   Examination reveals .        IMPRESSION:  Patient has a probable flair reaction to radiation therapy.       PLAN:  Continue treatment as planned. Will have patient take 4-8 mg of dexamethasone to decrease flair on a prn basis.   Will also coordinate care with Dr. Jordan at [facility].       Medical record and imaging reviewed by covering locum provider.      Luis Rayo MD

## 2023-07-03 NOTE — TELEPHONE ENCOUNTER
Neha with  HC calling and requesting VO.  VO to continue skilled nursing and continue OT for 1x/week for 1 week and 2x/week for 3 weeks. OT for self cares and therapeutic activity.   Verbal orders given to continue skilled nursing and OT.  Neha verbalized understanding.

## 2023-07-03 NOTE — PROGRESS NOTES
Patient reports to clinic for treatment and states he only has 2 tabs of dexamethasone and would like this reordered. Spoke with Dr. Rayo and medication has been reordered and sent via [facility] to [facility].  Patient would also like oxycodone 10 mg reordered, this was previously ordered by Dr. Bartlett whom he sees in [place].  Spoke with Estephania FERNANDEZ and she will work on getting reordered.

## 2023-07-03 NOTE — PROGRESS NOTES
Call rec'd from [facility] with [facility]. Pt was at radiation today and told the tech that he needed a refill of his Oxycodone 10mg. Routing to Bria PRESSLEY NP for signature.

## 2023-07-03 NOTE — TELEPHONE ENCOUNTER
Dr. Bartlett patient. Requesting refill of oxycodone for breakthrough pain. Refill provided. Patient was started on Oxycontin per Dr. Bartlett but switched to MSContin per Walt Serna Onc NP.     Received flag regarding scripts. Oxycontin removed from med list as patient isn't taking two long activing opioids after verifying with RNCC Estephania Donnelly.     DANILO Cage CNP

## 2023-07-05 NOTE — TELEPHONE ENCOUNTER
12:03 PM    Reason for Call: OVERBOOK    Patient is having the following symptoms: PREOP/ 7/12/23 / ESSENTIA/ LEFT FEMORE SURGERY/ DR ALMEIDA. Please call to schedule.. Please also fax preop results to [phone] with ATTENTION RAMIRO. Thank you.    The patient is requesting an appointment for before DOS with Dr. Wallace or covering provider.    Was an appointment offered for this call? No  If yes : Appointment type              Date    Preferred method for responding to this message: Telephone Call  What is your phone number ? [phone] - Kuyg    If we cannot reach you directly, may we leave a detailed response at the number you provided? Yes    Can this message wait until your PCP/provider returns, if unavailable today? Vicki Gibson

## 2023-07-05 NOTE — PROGRESS NOTES
[facility] [place]  [address]  [place] [address]  Phone: [phone]  Primary Provider: Libia Wallace  Pre-op Performing Provider: LIBIA WALLACE      PREOPERATIVE EVALUATION:  Today's date: 7/7/2023    Colin Baxter is a 78 year old male who presents for a preoperative evaluation.    Surgical Information:  Surgery/Procedure: Rodding intramedullary femur, left  Surgery Location: [facility]  Surgeon: Dr. Jordan  Surgery Date: 07/12/2023  Time of Surgery: TBD  Where patient plans to recover: At home with family.  One night in the hospital   Fax number for surgical facility: [phone] ATTN ; Eleonora    Assessment & Plan     The proposed surgical procedure is considered INTERMEDIATE risk.    Preop general physical exam  Stable.   - Basic metabolic panel; Future  - CBC with platelets; Future  - EKG 12-lead complete w/read - (Clinic Performed)    Cancer related pain / Malignant neoplasm of right ureter (H) / Pain of left lower leg  Mets to left femur  Reason for the procedure    Centrilobular emphysema (H)  No pulmonary issues  No inhalers    Acquired hypothyroidism  TSH (5/18/2023) 5.00. will recheck labs at next visit   - c/w levothyroxine 112mcg    Type 2 diabetes mellitus without complication, with long-term current use of insulin (H)  A1c (4/13/2023) 7.4  - on tresiba and novolog    Hyperglycemia  Due to dexamethasone. Started to wean down today  - start NPH, appreciate diabetic education assistance today    Hyponatremia  Sodium corrects to 132.6.   Baseline sodium ~ 135     Hyperlipidemia, unspecified hyperlipidemia type  LDL (1/12/2023) 50  - c/w atorvastatin     CKD (chronic kidney disease) stage 4, GFR 15-29 ml/min (H)  stable    Leukocytosis, unspecified type  D/t dexamethasone       Risks and Recommendations:  The patient has the following additional risks and recommendations for perioperative complications:   - Recurrent use of steroids  Diabetes:  - Patient is on  insulin therapy; diabetic NPO guidelines provided and discussed.    Antiplatelet or Anticoagulation Medication Instructions:   - Patient is on no antiplatelet or anticoagulation medications.    Additional Medication Instructions:  Patient is to take all scheduled medications on the day of surgery EXCEPT for modifications listed below:   - Long acting insulin (e.g. glargine, detemir): Take 10u  morning dose before surgery.    - short acting insulin (e.g. regular, lispro, aspart): HOLD on the morning of surgery.     RECOMMENDATION:  Colin is optimized to proceed with proposed procedure, without further diagnostic evaluation.  We will repeat labs on Tuesday due starting NPH and hyponatremia   Discussed with Dr. Jordan's staff. Dr Jordan reviewed labs, okay to proceed with surgery   [id number]}    Subjective       HPI related to upcoming procedure: Colin has left leg pain d/t metastasis. He will be undergoing the above procedure to prevent pathologic fracture           7/7/2023    11:26 AM   Preop Questions   1. Have you ever had a heart attack or stroke? No   2. Have you ever had surgery on your heart or blood vessels, such as a stent placement, a coronary artery bypass, or surgery on an artery in your head, neck, heart, or legs? No   3. Do you have chest pain with activity? No   4. Do you have a history of  heart failure? No   5. Do you currently have a cold, bronchitis or symptoms of other infection? No   6. Do you have a cough, shortness of breath, or wheezing? No   7. Do you or anyone in your family have previous history of blood clots? No   8. Do you or does anyone in your family have a serious bleeding problem such as prolonged bleeding following surgeries or cuts? No   9. Have you ever had problems with anemia or been told to take iron pills? No   10. Have you had any abnormal blood loss such as black, tarry or bloody stools? No   11. Have you ever had a blood transfusion? No    12. Are you willing to have a blood  "transfusion if it is medically needed before, during, or after your surgery? Yes   13. Have you or any of your relatives ever had problems with anesthesia? No   14. Do you have sleep apnea, excessive snoring or daytime drowsiness? YES - Daytime drowsiness   14a. Do you have a CPAP machine? No   15. Do you have any artifical heart valves or other implanted medical devices like a pacemaker, defibrillator, or continuous glucose monitor? No   16. Do you have artificial joints? No   17. Are you allergic to latex? No     Health Care Directive:  Patient has a Health Care Directive on file      Preoperative Review of :   reviewed - controlled substances reflected in medication list.      Status of Chronic Conditions:  COPD - Patient has a longstanding history of moderate-severe COPD . Patient has been doing well overall noting NO SYMPTOMS. No inhalers.    DIABETES - Patient has a longstanding history of DiabetesType Type II . Patient is being treated with insulin injections and denies significant side effects. Control has been poor due to dexamethasone. Complicating factors include but are not limited to: hypertension and hyperlipidemia.     - CDM has been showing \"high\"  - novolog ssi   - tresiba 18u  - currently on dexamethasone and weaning down 2mg bid starting today    Hemoglobin A1C   Date Value Ref Range Status   04/13/2023 7.4 (H) <5.7 % Final     Comment:     Normal <5.7%   Prediabetes 5.7-6.4%    Diabetes 6.5% or higher     Note: Adopted from ADA consensus guidelines.   11/24/2021 7.1 (A) 0.0 - 5.7 % Final         HYPERLIPIDEMIA - Patient has a long history of significant Hyperlipidemia requiring medication for treatment with recent good control. Patient reports no problems or side effects with the medication.     LDL Cholesterol Calculated   Date Value Ref Range Status   01/12/2023 50 <=100 mg/dL Final   10/02/2020 75 <100 mg/dL Final     Comment:     Desirable:       <100 mg/dl         HYPOTHYROIDISM - " Patient has a longstanding history of chronic Hypothyroidism. Patient has been doing well, noting no tremor, insomnia, hair loss or changes in skin texture. Continues to take medications as directed, without adverse reactions or side effects. Last TSH   Lab Results   Component Value Date    TSH 5.00 (H) 05/18/2023   .      RENAL INSUFFICIENCY - Patient has a longstanding history of moderate-severe chronic renal insufficiency. Last Cr 1.74.       Answers for HPI/ROS submitted by the patient on 7/7/2023  BRYCE 7 TOTAL SCORE: 1        Review of Systems   Constitutional: Negative for chills and fever.   HENT: Negative for congestion.    Respiratory: Negative for shortness of breath and wheezing.    Cardiovascular: Negative for chest pain and palpitations.   Gastrointestinal: Positive for constipation (just got constipation medications today). Negative for abdominal pain.   Genitourinary: Negative for difficulty urinating.   Musculoskeletal: Positive for arthralgias.   Psychiatric/Behavioral: Negative for mood changes.         Patient Active Problem List    Diagnosis Date Noted     Cancer related pain 06/12/2023     Priority: Medium     Insomnia due to medical condition 10/13/2022     Priority: Medium     Centrilobular emphysema (H) 10/11/2022     Priority: Medium     Stage 3b chronic kidney disease (H) 10/07/2022     Priority: Medium     Vitamin D deficiency 10/07/2022     Priority: Medium     YOU (acute kidney injury) (H) 09/17/2022     Priority: Medium     Adverse drug reaction 09/17/2022     Priority: Medium     Dehydration 09/12/2022     Priority: Medium     Pancreatic mass 09/12/2022     Priority: Medium     CRF (chronic renal failure), stage 4 (severe) (H) 09/10/2022     Priority: Medium     Diarrhea 2nd to Chemotherapy 09/10/2022     Priority: Medium     Pancreatic lesion, 2.1 cm on Abd CT 09/10/2022     Priority: Medium     Acquired hypothyroidism 08/26/2022     Priority: Medium     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     Priority: Medium     Anemia due to stage 4 chronic kidney disease (H) 10/22/2021     Priority: Medium     CKD (chronic kidney disease) stage 4, GFR 15-29 ml/min (H) 10/21/2021     Priority: Medium     Ureter cancer (H) 01/13/2021     Priority: Medium     Added automatically from request for surgery [id number]       DM type 2, Hgb A1C 8.6 on 8/26/22 12/11/2020     Priority: Medium     History of cancer of ureter 02/27/2019     Priority: Medium     Obesity (BMI 35.0-39.9) with comorbidity (H) 09/26/2018     Priority: Medium     Bladder cancer (H) 10/06/2015     Priority: Medium     Glaucoma 07/06/2015     Priority: Medium     Hyperlipidemia 08/13/2008     Priority: Medium     Formatting of this note might be different from the original.  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) 12/11/2020    getting BCG (from his Urologist in [place], [place])     Obesity      Pancreatic mass      Renal cell carcinoma, left (H) 2021    Left nephrectomy     Ureter cancer, left (H)      Past Surgical History:   Procedure Laterality Date     APPENDECTOMY  1958     COLONOSCOPY  2-     COLONOSCOPY N/A 9/13/2022    Procedure: COLONOSCOPY;  Surgeon: Keyon Zimmerman MD;  Location: [facility] OR     COLONOSCOPY N/A 2/13/2023    Procedure: COLONOSCOPY;  Surgeon: Antony Croft MD;  Location: [facility] OR     COMBINED CYSTOSCOPY, RETROGRADES, URETEROSCOPY, INSERT STENT Left 1/18/2021    Procedure: CYSTOURETEROSCOPY, WITH RETROGRADE PYELOGRAM with interpretation of fluroscopy, ureteroscopy, ureteral biopsy, bladder biopsy and fulgaration;  Surgeon: Shonda Morrell MD;  Location: [facility] OR      CYSTOSCOPY N/A 6/16/2021    Procedure: CYSTOSCOPY;  Surgeon: Javier Mcnulty MD;  Location: [facility] OR     CYSTOSCOPY, BIOPSY BLADDER, COMBINED N/A 9/8/2015    Procedure: COMBINED CYSTOSCOPY, BIOPSY BLADDER;  Surgeon: Randy Martinez MD;  Location: [facility] OR     CYSTOSCOPY, BIOPSY BLADDER, COMBINED N/A 2/14/2017    Procedure: COMBINED CYSTOSCOPY, BIOPSY BLADDER;  Surgeon: Randy Martinez MD;  Location: [facility] OR     CYSTOSCOPY, BIOPSY BLADDER, COMBINED N/A 11/13/2018    Procedure: COMBINED CYSTOSCOPY, BIOPSY BLADDER;  Surgeon: Ed Helm MD;  Location: [facility] OR     CYSTOSCOPY, BIOPSY BLADDER, COMBINED N/A 11/5/2021    Procedure: CYSTOSCOPY, WITH BLADDER BIOPSY;  Surgeon: Shonda Morrell MD;  Location: [facility] OR     CYSTOSCOPY, RETROGRADES, COMBINED Bilateral 11/13/2018    Procedure: Bilateral Retrograde Pyelagram, Bladder Biopsy;  Surgeon: Ed Helm MD;  Location: [facility] OR     CYSTOSCOPY, RETROGRADES, COMBINED Right 11/5/2021    Procedure: CYSTOSCOPY, WITH RETROGRADE PYELOGRAM;  Surgeon: Shonda Morrell MD;  Location: [facility]     CYSTOSCOPY, RETROGRADES, INSERT STENT URETER(S), COMBINED Left 9/8/2015    Procedure: COMBINED CYSTOSCOPY, RETROGRADES, INSERT STENT URETER(S);  Surgeon: Randy Martinez MD;  Location: [facility]     CYSTOSCOPY, TRANSURETHRAL RESECTION (TUR) TUMOR BLADDER, COMBINED N/A 9/8/2015    Procedure: COMBINED CYSTOSCOPY, TRANSURETHRAL RESECTION (TUR) TUMOR BLADDER;  Surgeon: Randy Martinez MD;  Location: [facility]     CYSTOSCOPY, TRANSURETHRAL RESECTION (TUR) TUMOR BLADDER, COMBINED N/A 1/12/2016    Procedure: COMBINED CYSTOSCOPY, TRANSURETHRAL RESECTION (TUR) TUMOR BLADDER;  Surgeon: Randy Martinez MD;  Location: [facility]     CYSTOSCOPY, TRANSURETHRAL RESECTION (TUR) TUMOR BLADDER, COMBINED N/A 9/13/2016    Procedure: COMBINED CYSTOSCOPY, TRANSURETHRAL RESECTION (TUR) TUMOR BLADDER;  Surgeon: Randy Martinez MD;  Location: [facility] OR     [facility]  NEPHROURETERECTOMY Left 6/16/2021    Procedure: NEPHROURETERECTOMY, ROBOT-ASSISTED, lymph node dissection;  Surgeon: Javier Mcnulty MD;  Location: [facility] OR     ESOPHAGOSCOPY, GASTROSCOPY, DUODENOSCOPY (EGD), COMBINED N/A 9/30/2022    Procedure: UPPER ENDOSCOPIC ULTRASOUND;  Surgeon: Jason Dennis MD;  Location: [facility] OR     INSERT PORT VASCULAR ACCESS N/A 6/20/2023    Procedure: port-a-cath placement;  Surgeon: Antony Croft MD;  Location: [facility]     PHACOEMULSIFICATION WITH STANDARD INTRAOCULAR LENS IMPLANT Right 10/9/2018    Procedure: PHACOEMULSIFICATION WITH STANDARD INTRAOCULAR LENS IMPLANT;  PHACOEMULSIFICATION CATARACT EXTRACTION POSTERIOR CHAMBER LENS RIGHT;  Surgeon: Marlo Mcconnell MD;  Location: [facility] OR     PHACOEMULSIFICATION WITH STANDARD INTRAOCULAR LENS IMPLANT Left 10/23/2018    Procedure: PHACOEMULSIFICATION CATARACT EXTRACTION POSTERIOR CHAMBER LENS LEFT;  Surgeon: Marlo Mcconnell MD;  Location: [facility] OR     Current Outpatient Medications   Medication Sig Dispense Refill     acetaminophen (TYLENOL) 325 MG tablet Take 2 tablets (650 mg) by mouth every 4 hours as needed for mild pain or other (and adjunct with moderate or severe pain or per patient request)  0     artificial tears (GENTEAL) 0.1-0.2-0.3 % ophthalmic solution Place 2 drops into both eyes daily as needed 15 mL 3     atorvastatin (LIPITOR) 40 MG tablet TAKE 1 TABLET BY MOUTH 90 tablet 2     blood glucose (ONETOUCH VERIO IQ) test strip USE TO TEST BLOOD SUGAR 3 TIMES DAILY 100 strip 11     brimonidine (ALPHAGAN-P) 0.15 % ophthalmic solution INSTILL 1 DROP INTO RIGHT EYE TWICE DAILY  12     Cholecalciferol (VITAMIN D3) 25 MCG TABS Take 25 mcg by mouth daily       Continuous Blood Gluc  (FREESTYLE DELILAH 2 READER) TENNILLE 1 each continuous 1 each 0     Continuous Blood Gluc Sensor (FREESTYLE DELILAH 2 SENSOR) MISC 1 each every 14 days 2 each 11     dexamethasone (DECADRON) 4 MG tablet Take 1 tablet (4 mg) by mouth 2  times daily (with meals) 10 tablet 0     insulin aspart (NOVOLOG PEN) 100 UNIT/ML pen 5 units with meals, and for glucometer 150-200 add 2 units SQ, 201-250 add 4 units, >250 add 6 units 15 mL 0     insulin degludec (TRESIBA FLEXTOUCH) 100 UNIT/ML pen Inject 18 Units Subcutaneous daily 15 mL 0     insulin pen needle (BD PEN NEEDLE DONTAE 2ND GEN) 32G X 4 MM miscellaneous USE 3 PEN NEEDLES DAILY 100 each 11     latanoprost (XALATAN) 0.005 % ophthalmic solution Place 1 drop into both eyes At Bedtime       levothyroxine (SYNTHROID/LEVOTHROID) 112 MCG tablet Take 1 tablet (112 mcg) by mouth daily 90 tablet 1     ONETOUCH DELICA LANCETS 33G MISC 1 each 2 times daily 100 each 10     oxyCODONE (OXYCONTIN) 20 MG 12 hr tablet Take 1 tablet (20 mg) by mouth every 12 hours 60 tablet 0     oxyCODONE IR (ROXICODONE) 10 MG tablet Take 1 tablet (10 mg) by mouth every 6 hours as needed for severe pain 90 tablet 0     polyethylene glycol (MIRALAX) 17 g packet Take 1 packet by mouth daily       senna-docusate (SENOKOT S) 8.6-50 MG tablet Take 1 tablet by mouth as needed for constipation       VITRON-C  MG TABS tablet Take 1 tablet by mouth daily       morphine (MS CONTIN) 15 MG CR tablet Take 1 tablet (15 mg) by mouth every 12 hours maximum 2 tablet(s) per day (Patient not taking: Reported on 7/7/2023) 30 tablet 0     naloxone (NARCAN) 4 MG/0.1ML nasal spray Spray 1 spray (4 mg) into one nostril alternating nostrils as needed for opioid reversal every 2-3 minutes until assistance arrives (Patient not taking: Reported on 6/28/2023) 0.2 mL 1     prochlorperazine (COMPAZINE) 10 MG tablet Take 1 tablet (10 mg) by mouth every 6 hours as needed for nausea or vomiting (Patient not taking: Reported on 6/28/2023) 30 tablet 2     valACYclovir (VALTREX) 1000 mg tablet Take 1 tablet by mouth 3 times daily (Patient not taking: Reported on 6/12/2023)         Allergies   Allergen Reactions     No Clinical Screening - See Comments Other (See  Comments)     Beta blocker in glaucoma gtt.s caused low pulse and passing out      Timolol      Beta blocker in glaucoma gtt.s caused low pulse and passing out   - patient thinks it was timolol but not 100% sure which beta blocker eye drop.      Bactrim [Sulfamethoxazole-Trimethoprim] Rash        Social History     Tobacco Use     Smoking status: Former     Years: 30.00     Types: Cigarettes     Quit date: 1992     Years since quittin.5     Smokeless tobacco: Never   Substance Use Topics     Alcohol use: Yes     Comment: < 1 drink a month     Family History   Problem Relation Age of Onset     Diabetes Mother      Other - See Comments Father         cause of death unknown     Parkinsonism Brother      Coronary Artery Disease No family hx of      Hypertension No family hx of      Hyperlipidemia No family hx of      Cerebrovascular Disease No family hx of      Breast Cancer No family hx of      Colon Cancer No family hx of      Prostate Cancer No family hx of      Anesthesia Reaction No family hx of      Asthma No family hx of      Thyroid Disease No family hx of      Genetic Disorder No family hx of      History   Drug Use No         Objective     /60   Pulse 62   Temp 97.6  F (36.4  C) (Tympanic)   Resp 19   Wt 70.8 kg (156 lb)   SpO2 96%   BMI 24.43 kg/m      Physical Exam  Constitutional:       General: He is not in acute distress.     Appearance: He is well-developed.   HENT:      Head: Normocephalic and atraumatic.      Right Ear: Tympanic membrane normal.      Left Ear: Tympanic membrane normal.      Mouth/Throat:      Mouth: Mucous membranes are moist.      Pharynx: No oropharyngeal exudate.   Eyes:      Extraocular Movements: Extraocular movements intact.      Conjunctiva/sclera: Conjunctivae normal.   Neck:      Thyroid: No thyromegaly.   Cardiovascular:      Rate and Rhythm: Normal rate and regular rhythm.      Pulses: Normal pulses.      Heart sounds: No murmur heard.  Pulmonary:       Effort: Pulmonary effort is normal. No respiratory distress.      Breath sounds: No wheezing or rales.   Abdominal:      General: Bowel sounds are normal. There is no distension.      Palpations: Abdomen is soft.      Tenderness: There is no abdominal tenderness. There is no guarding.   Musculoskeletal:         General: Normal range of motion.      Cervical back: Normal range of motion and neck supple.      Right lower leg: No edema.      Left lower leg: No edema.   Lymphadenopathy:      Cervical: No cervical adenopathy.   Skin:     General: Skin is warm and dry.   Neurological:      Mental Status: He is alert.   Psychiatric:         Mood and Affect: Mood normal.         Recent Labs   Lab Test 06/12/23  1341 05/18/23  0955 04/13/23  0957 02/10/23  1152 01/12/23  0851   HGB 11.3* 11.7* 12.3*   < > 11.8*    297 278   < > 236   INR  --  1.18*  --   --   --    * 135* 136   < > 138   POTASSIUM 4.1 4.2 4.4   < > 4.3   CR 1.51* 1.66* 1.64*   < > 1.85*   A1C  --   --  7.4*  --  7.1*    < > = values in this interval not displayed.        Diagnostics:  Recent Results (from the past 24 hour(s))   Rad Onc [facility] Session Summary    Collection Time: 07/07/23 10:00 AM   Result Value Ref Range    Course ID C1     Course Start Date 6/28/2023     Course First Treatment Date 6/29/2023     Course Last Treatment Date 7/7/2023     Course Elapsed Days 8     Reference Point ID CalcPt Femur Lt     Reference Point Dosage Given to Date (Gy) 25.30070931  [id number]   Gy    Reference Point ID RX Femur Left     Reference Point Dosage Given to Date (Gy) 25  5   Gy    Plan ID Femur Left     Plan Fractions Treated to Date 5     Plan Total Fractions Prescribed 5     Plan Prescribed Dose Per Fraction (Gy) 5 Gy    Plan Total Prescribed Dose (cGy) 2,500 cGy   Basic metabolic panel    Collection Time: 07/07/23 11:27 AM   Result Value Ref Range    Sodium 128 (L) 136 - 145 mmol/L    Potassium 4.3 3.4 - 5.3 mmol/L    Chloride 94 (L) 98 - 107  mmol/L    Carbon Dioxide (CO2) 21 (L) 22 - 29 mmol/L    Anion Gap 13 7 - 15 mmol/L    Urea Nitrogen 45.9 (H) 8.0 - 23.0 mg/dL    Creatinine 1.74 (H) 0.67 - 1.17 mg/dL    Calcium 8.6 (L) 8.8 - 10.2 mg/dL    Glucose 388 (H) 70 - 99 mg/dL    GFR Estimate 40 (L) >60 mL/min/1.73m2   CBC with platelets    Collection Time: 07/07/23 11:27 AM   Result Value Ref Range    WBC Count 15.2 (H) 4.0 - 11.0 10e3/uL    RBC Count 3.52 (L) 4.40 - 5.90 10e6/uL    Hemoglobin 11.0 (L) 13.3 - 17.7 g/dL    Hematocrit 32.6 (L) 40.0 - 53.0 %    MCV 93 78 - 100 fL    MCH 31.3 26.5 - 33.0 pg    MCHC 33.7 31.5 - 36.5 g/dL    RDW 13.9 10.0 - 15.0 %    Platelet Count 372 150 - 450 10e3/uL      EKG (7/7/2023): sinus bradycardia to 51bpm, left axis, normal intervals, no acute ST/T changes c/w ischemia, no LVH by voltage criteria, Qw III, aVF. unchanged from previous tracings (10/22/2021)    Revised Cardiac Risk Index (RCRI):  The patient has the following serious cardiovascular risks for perioperative complications:   - Coronary Artery Disease (MI, positive stress test, angina, Qs on EKG) = 1 point   - Diabetes Mellitus (on Insulin) = 1 point     RCRI Interpretation: 2 points: Class III (moderate risk - 6.6% complication rate)     Estimated Functional Capacity: Performs 4 METS exercise without symptoms (e.g., light housework, stairs, 4 mph walk, 7 mph bike, slow step dance)       49 minutes spent on the date of the encounter doing chart review, review of test results, interpretation of tests, patient visit, documentation and discussion with other provider(s)       Signed Electronically by: Libia Wallace MD  Copy of this evaluation report is provided to requesting physician.

## 2023-07-06 NOTE — PATIENT INSTRUCTIONS
Take 10u of your tresiba the morning of your procedure   Do not take your short acting insulin the day of your procedure     Monitor your BG every 4 hours   If BG high, take corrective dose (not meal dose) sliding scale insulin if that is what you are used to doing   IF BG is <100 or symptoms of hypoglycemia follow the following guidelines:   - Drink 4oz of fruit juice without pulp or 4oz of regular soda   - Eat 3 glucose gels or 5 sugar cubes or packets and monitor BG q15min until stable BG   - Repeat the treatment as needed and monitor BG until >100       For informational purposes only. Not to replace the advice of your health care provider. Copyright   ,  [facility]. All rights reserved. Clinically reviewed by Robyn Christianson MD. [employer] [id number] - REV .  Preparing for Your Surgery  Getting started  A nurse will call you to review your health history and instructions. They will give you an arrival time based on your scheduled surgery time. Please be ready to share:  Your doctor's clinic name and phone number  Your medical, surgical, and anesthesia history  A list of allergies and sensitivities  A list of medicines, including herbal treatments and over-the-counter drugs  Whether the patient has a legal guardian (ask how to send us the papers in advance)  Please tell us if you're pregnant--or if there's any chance you might be pregnant. Some surgeries may injure a fetus (unborn baby), so they require a pregnancy test. Surgeries that are safe for a fetus don't always need a test, and you can choose whether to have one.   If you have a child who's having surgery, please ask for a copy of Preparing for Your Child's Surgery.    Preparing for surgery  Within 10 to 30 days of surgery: Have a pre-op exam (sometimes called an H&P, or History and Physical). This can be done at a clinic or pre-operative center.  If you're having a , you may not need this exam. Talk to your care team.  At  your pre-op exam, talk to your care team about all medicines you take. If you need to stop any medicines before surgery, ask when to start taking them again.  We do this for your safety. Many medicines can make you bleed too much during surgery. Some change how well surgery (anesthesia) drugs work.  Call your insurance company to let them know you're having surgery. (If you don't have insurance, call [phone].)  Call your clinic if there's any change in your health. This includes signs of a cold or flu (sore throat, runny nose, cough, rash, fever). It also includes a scrape or scratch near the surgery site.  If you have questions on the day of surgery, call your hospital or surgery center.  Eating and drinking guidelines  For your safety: Unless your surgeon tells you otherwise, follow the guidelines below.  Eat and drink as usual until 8 hours before you arrive for surgery. After that, no food or milk.  Drink clear liquids until 2 hours before you arrive. These are liquids you can see through, like water, Gatorade, and Propel Water. They also include plain black coffee and tea (no cream or milk), candy, and breath mints. You can spit out gum when you arrive.  If you drink alcohol: Stop drinking it the night before surgery.  If your care team tells you to take medicine on the morning of surgery, it's okay to take it with a sip of water.  Preventing infection  Shower or bathe the night before and morning of your surgery. Follow the instructions your clinic gave you. (If no instructions, use regular soap.)  Don't shave or clip hair near your surgery site. We'll remove the hair if needed.  Don't smoke or vape the morning of surgery. You may chew nicotine gum up to 2 hours before surgery. A nicotine patch is okay.  Note: Some surgeries require you to completely quit smoking and nicotine. Check with your surgeon.  Your care team will make every effort to keep you safe from infection. We will:  Clean our hands often  with soap and water (or an alcohol-based hand rub).  Clean the skin at your surgery site with a special soap that kills germs.  Give you a special gown to keep you warm. (Cold raises the risk of infection.)  Wear special hair covers, masks, gowns and gloves during surgery.  Give antibiotic medicine, if prescribed. Not all surgeries need antibiotics.  What to bring on the day of surgery  Photo ID and insurance card  Copy of your health care directive, if you have one  Glasses and hearing aids (bring cases)  You can't wear contacts during surgery  Inhaler and eye drops, if you use them (tell us about these when you arrive)  CPAP machine or breathing device, if you use them  A few personal items, if spending the night  If you have . . .  A pacemaker, ICD (cardiac defibrillator) or other implant: Bring the ID card.  An implanted stimulator: Bring the remote control.  A legal guardian: Bring a copy of the certified (court-stamped) guardianship papers.  Please remove any jewelry, including body piercings. Leave jewelry and other valuables at home.  If you're going home the day of surgery  You must have a responsible adult drive you home. They should stay with you overnight as well.  If you don't have someone to stay with you, and you aren't safe to go home alone, we may keep you overnight. Insurance often won't pay for this.  After surgery  If it's hard to control your pain or you need more pain medicine, please call your surgeon's office.  Questions?   If you have any questions for your care team, list them here: _________________________________________________________________________________________________________________________________________________________________________ ____________________________________ ____________________________________ ____________________________________    How to Manage Your Diabetes Before Surgery  If you use insulin for your diabetes, follow these steps to keep your blood sugar in a  safe range before surgery, when you ve been told not to eat or drink:   Check your blood sugar every 4 hours   If your blood sugar is high, take a corrective dose (not a meal dose) of sliding-scale insulin, if that is what you re used to doing  If your blood sugar is below 100, or you have symptoms of hypoglycemia, follow these steps:   Have 1 item from this list:  4 oz (1/2 cup) of fruit juice without pulp    4 oz (1/2 cup) of regular soda (not diet soda)    3 glucose gels    5 sugar cubes or sugar packets   Check your blood sugar again after 15 minutes  Repeat steps 1 and 2 again until your blood sugar is greater than 100

## 2023-07-06 NOTE — PROGRESS NOTES
Call rec'd from [place] [facility] with [facility]. Pt requesting refill of his Oxycontin. Patient is no longer taking the Morphine 15mg because it wasn't working. Pt switched back to the Onxycontin which his helping his pain-currently 3-4/10. Patient uses [facility] pharmacy in [place].    Per Peri, patient also needs a refill of his Dex. Will report to [facility] for refill.     Peri also stated that patient will be having his lizett placed next Wednesday 7/12.    Oxycontin pended for Dr Bartlett to sign

## 2023-07-07 NOTE — PROGRESS NOTES
Patient unable to get Oxycontin 20mg ER at [facility] because they do not have it in stock and it requires a prior authorization. Patient is willing to pay out of pocket as long as he is able to  today since he will be out of them over the weekend.     Call placed to [facility]. They can go generic Oxycontin (41 tabs) for 155.99$. Patient willing to pay this amount. Encouraged patient to call approx 1 week before running out so [facility] has a chance to order it and get the prior auth done before he runs out. Pt verbalized understanding and agrees with this plan.     Medication pended to Cecy PRESSLEY for signature.

## 2023-07-07 NOTE — PROGRESS NOTES
Progress Notes  Encounter Date: Jul 7, 2023  Luis Rayo MD     RADIATION ONCOLOGY WEEKLY MANAGEMENT PROGRESS NOTE     Patient Care Team       Relationship Specialty Notifications Start End    Libia Wallace MD PCP - General [facility] Medicine  2/22/22     Phone: [phone] Fax: [phone] [address] [facility] [place] [address]    Rowena Phan, LPN LPN   8/16/19     Tawanna Kirk RD Diabetes Educator Diabetes Education  10/17/19     Phone: [phone] Fax: [phone]        Luis Tran MD Referring Physician [facility]  2/2/21     Phone: [phone] Fax: [phone] [address] [facility] [place] [address]    Javier Mcnulty MD MD Urology  2/2/21     Phone: [phone] Fax: [phone]         [address] [facility] [address]    Laney Neumann, RN Registered Nurse Urology  2/2/21     Phone: [phone] Pager: [phone]        Sabra Jensen, NP Assigned Cancer Care Provider   2/13/22     Phone: [phone] Fax: [phone] [address] [facility] [address]    Maryann Mccord APRN CNP Nurse Practitioner Family Medicine  2/25/22     Phone: [phone] Fax: [phone]         [address] [address]    Libia Wallace MD Assigned PCP   2/27/22     Phone: [phone] Fax: [phone] [address] [facility] [address]    Nasreen Rhodes, RN Specialty Care Coordinator Hematology & Oncology All results, Admissions 5/12/22     Estephania Donnelly, RN Specialty Care Coordinator Hematology & Oncology All results, Admissions 5/12/22     Phone: [phone] Fax: [phone]         [facility] [address] [facility] [address]    Suyapa Flowers, RN Diabetes Educator Diabetes Education Admissions 9/9/22     Phone: [phone]         Giorgio Mccord RPH Pharmacist Pharmacist  9/20/22     Phone: [phone] Fax: [phone]         [facility] [address] [facility] [address]    Giorgio Mcocrd RPH Assigned  [facility] Pharmacist   9/28/22     Phone: [phone] Fax: [phone]         [facility] [address] [facility] [address]    Antony Croft MD Assigned Surgical Provider   2/4/23     Phone: [phone] Fax: [phone]         [address] [address]    Libia Wallace MD Assigned Pain Medication Provider   6/17/23     Phone: [phone] Fax: [phone] [address] [facility] [address]                  DIAGNOSIS:  Cancer Staging   Ureter cancer (H)  Staging form: Renal Pelvis and Ureter, AJCC 8th Edition  - Clinical: No stage assigned - Unsigned  - Pathologic stage from 6/11/2023: Stage IV (pT2, pN0, pM1) - Signed by Jhony Bartlett MD on 6/11/2023          RADIATION THERAPY:    Colin Baxter has received 2500 cGy to date to left femur.   Treatment 5/5  Total planned dose: 2500 cGy      SUBJECTIVE:    His left hip and left femur pain has improved in that he is much more comfortable in bed..  He is taking Oxycontin 20 mg twice a day and oxycodone 10 mg every 6 hours.  He is taking Dexamethasone 4 mg twice a day.  BM have been more regular.  Using Senokot.  He is having lizett placement by Dr. Jordan on 7/12/23.      OBJECTIVE:    WEIGHT: 155 lbs 12.8 oz. /62 (BP Location: Right arm, Patient Position: Chair, Cuff Size: Adult Regular)   Pulse 70   Resp 16   Wt 70.7 kg (155 lb 12.8 oz)   BMI 24.40 kg/m    Examination reveals .        IMPRESSION:  Good partial response to radiation for pain control..       PLAN:  Continue treatment as planned. Will have patient decrease his Decadron to 2 mg Bid.  He will finish on Monday.  He will call if he needs a refill.   Will also coordinate care with Dr. Jordan at [facility].       Medical record and imaging reviewed by covering locum provider.      Luis Rayo MD

## 2023-07-07 NOTE — PROGRESS NOTES
Call went directly to voicemail.   Message left.     Call to Favio barragan-   Voicemail left.     Suyapa Flowers RN Diabetes Educator,  [phone]  7/7/2023 at 4:28 PM

## 2023-07-07 NOTE — PROGRESS NOTES
Colin Baxter  Gender: male  : 1945  Medical Record: [id number]  Primary Care Provider: Libia Wallace    REFERRING PHYSICIANS: Dr. 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    TREATMENT INTENT: Palliative  AREA TREATED: Lt hip and femur.  PRIMARY TREATMENT TECHNIQUE: AP/PA  ENERGY: 10 X  TUMOR DOSE: 2500 cGy  NUMBER OF TREATMENTS: 5    TOTAL NUMBER OF TREATMENTS: 5  TOTAL DOSE: 2500 cGy  ELAPSED CALENDAR DAYS: 5  COMPLETION DATE: 2023       Perla Ramesh DNP, APRN, FNP-C   Department of Radiation Oncology

## 2023-07-07 NOTE — PROGRESS NOTES
Diabetes Self-Management Education & Support    Pt seen today by primary with hyperglycemia with steroid therapy.  Lilliam, ex-wife and support is here with Ted.     Colin has been taking Dexamethasone 4 mg twice daily.   Dosing is being tapered, starting at 2 pm today, will be taking 2 mg twice daily. With plan for continued taper.    Per CDCES protocol:   Recommend NPH for Dexamethasone <6 mg /day: three times daily: 0.2 units/kg in the morning. 0.2 units /kg 6-8 hours later, 0.1 unit/kg at bedtime.     Weight: 70.8 kg.   Am dosing 14 units  Second dose 14 units.   Bedtime dose 7 units.     Orders pend for PCP to review, sign. Send to Elke Ramirez.     TOM RN will follow up with patient this afternoon and Monday.    Suyapa Flowers RN Diabetes Educator,  [phone]  7/7/2023 at 1:27 PM

## 2023-07-10 NOTE — PROGRESS NOTES
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  For details of previous history, see previous notes.  The patient initially had been treated for high-grade invasive urothelial cell carcinoma in the adjuvant setting with nivolumab.  He had received 8 cycles of adjuvant nivolumab.  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  The patient had undergone a colonoscopy and there was a biopsy performed that was consistent with checkpoint inhibitor colitis and it was recommended that he would not be a candidate for further treatment with checkpoint inhibitors.  The patient also had undergone an endoscopic ultrasound-guided biopsy of a pancreatic lesion.  Endosonographic findings were consistent with an occult lesion suggestive of a cyst identified in the pancreas.  The tissue was noted to have the clinical appearance that was suggestive of a pancreatic pseudocyst.  It was recommended the patient have a repeat MRCP in 6 months.  The patient had also been followed by Dr. Randee Schmitz of [facility], who was treating the patient with BCG bladder instillations.  We elected to restage the patient with a PET scan in 01/2023 and the findings were there was no evidence of recurrent metastatic disease, no evidence of hyperenhancing pancreatic lesion.  It was felt the pancreatic lesion had resolved on repeat MRI of the abdomen, attention liver, but there was a 9 mm peripheral enhancing lesion in the liver, which was not present on 10/11/2022.  There was also a subtle focus of enhancement in the inferior aspect of segment 6 of the liver.  These were definitely new and suggestive of metastatic disease.  According to the radiologist, it too small to biopsy.  It was recommended that MRI of the abdomen be repeated in 3 months.  MRI of the abdomen revealed there  was new bony metastatic disease at L2 and L3 and a lesion in the right iliac crest noted, as well as an enlarging liver lesion, which had increased from 9 mm to 15 x 20 mm involving segment 8.  The patient had complaints of left hip pain and we felt the patient would require biopsy to rule out metastatic disease.  Further imaging was ordered prior to the biopsy, including bone scan that was done on 04/27/2023 and was read as multiple areas of abnormal uptake consistent with bony metastatic disease.  There was extensive bony metastatic disease primarily involving the pelvis and the left proximal femur.  There was also uptake noted in the skull, mandible, shoulders, ribs, spine.  The patient also had a CT of the pelvis that was read as osteoblastic metastatic disease in the right ilium and roof of the left acetabulum.  There was mixed osteolytic osteoblastic metastatic disease seen in the femoral shaft on the left.  CT of the lumbar spine was read as osteoblastic metastatic disease involving the L2 vertebral body and left L3 pedicle.  The patient eventually had a CT-guided biopsy of the destructive lesion in the posterior aspect of the left femur.  Pathology was read as left femur curettings consistent with metastatic carcinoma consistent with prior invasive urothelial cell carcinoma.  When we saw the patient on 06/12/2023, he was having excruciating pain involving the left hip and femur.  He could barely walk.  He was on oxycodone 5 mg q. 8 hours.  When we saw the patient, we felt that the patient could not receive further checkpoint inhibitors as the patient had developed significant grade 4 diarrhea in the past. As per NCCN guidelines, the patient would be a candidate for enfortumab vedotin, which is an antibody drug conjugate which is targeting Nectin4, which was seen in his urothelial malignant cells. In a phase 3 trial with patients with locally advanced metastatic urothelial cell carcinoma previously treated  with platinum-based chemotherapy, PD-L1 inhibitor therapy who had progressed, patients who received enfortumab vedotin had improved overall survival of median 13 months versus 9 months in patients that were treated with chemotherapy alone. Toxicities included rash, peripheral neuropathy, hyperglycemia, ocular toxicities.  The plan was to start at a lower dose at 0.75 mg/kg on days 1, 8, and 15 of a 28-day cycle, but given his excruciating pain, we elected to have the patient be seen by Radiation immediately, as well as to obtain an MRI of the left hip to rule out fracture.  We also ordered [facility] testing for FGFR3 and FGFR2 to see if the patient would be a candidate for targeted therapy.  In the meantime, MRI of the left hip was ordered and performed on 06/19/2023 and was read as extensive bony metastasis involving the bony pelvis, the left and right femur with a large destructive mixed bony soft tissue mass in the proximal left femur that measured approximately 17 cm in longitudinal length x 6.7 cm in AP and transverse dimensions.  He had also undergone CT of the lumbar spine, which was read as osteoblastic metastatic disease involving the L2 vertebral body and left L3 pedicle.  The patient was seen by Radiation Oncology and it was recommended that the patient receive palliative radiation therapy, as well as obtain an orthopedic consult for possible prophylactic stabilization.  He received his last radiation treatment on 07/07.  He received a total of 2500 cGy to the left femur. His left femur pain did improve and he was much more comfortable.  His dexamethasone was tapered to 2 mg b.i.d. and he did not require any more necrotic analgesics, other than OxyContin 20 mg q. 12 hours and oxycodone 10 mg every 6 hours for breakthrough pain.  The patient was referred to Dr. Regina Jordan of [facility], who recommended surgery, which is planned on Wednesday, which is 07/12, at [facility] in [place].  She felt the patient has an impending pathologic fracture secondary to metastatic urothelial cell carcinoma involving the subtrochanteric region, which is a very high risk area.  She discussed the possibility of additional bone cement, although if she felt it would not be necessary, she recommended using a walker and offloading his limb at this time.  She felt that he would require less narcotic analgesics after stabilization and reinforcement of the bone.  The patient comes in today.  He says his pain is much better.  He denies any fevers, night sweats, weight loss.  He is having trouble controlling his glucose levels, likely due to steroids, which have been tapered.  He is currently on a regimen that Dr. Wallace has prescribed, including insulin NPH given at a dose of 14 units in the morning and then 14 units 6-8 hours later and then 7 units at bedtime.  The patient voices concerns about getting a second opinion for further treatment.  He understands that his prognosis is poor and this is likely terminal if he does not receive any treatment and he likely has less than 6 months to live and may or may not respond to PADCEV, which we have prescribed or enfortumab vedotin.  We would plan to proceed with 3 cycles, then restaging.  The patient states that he will contact Dr. Regina Jordan for possibly seeing a second opinion oncologist at [facility], which is reasonable.  Otherwise, his pain is much better.  He is using a cane currently, but was encouraged to use a walker.    PHYSICAL EXAMINATION:  He is an elderly white male in no acute distress.  ECOG performance status of 1.  VITAL SIGNS:  Reveal blood pressure 104/60, pulse 64, respirations 20, temperature 98.2.  HEENT:  Atraumatic, normocephalic.  Oropharynx nonerythematous.  NECK:  Supple, no thyromegaly.  LUNGS:  Clear to auscultation and percussion.  HEART:  Regular rhythm.  S1, S2 normal.  ABDOMEN:  Soft, normoactive bowel sounds, nondistended.   LYMPHATICS:  No cervical, supraclavicular, axillary, or inguinal nodes.  EXTREMITIES:  Trace ankle edema.  NEUROLOGIC:  Grossly nonfocal.    LABORATORIES:  From 07/07/2023 revealed CBC with white count of 15.2, H and H 11.0 and 32.6, platelet count is 372.  BUN is 45.9, creatinine 1.74, glucose of 388.  LDH is 401.  On 06/12, alkaline phosphatase was 1012.    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  The patient went on to have neoadjuvant chemotherapy with cisplatin and gemcitabine with 20% dose reduction of cisplatin.  The patient received 4 cycles, then went on to have a ureterectomy and was found to have pathologic T3 N0 high-grade urothelial cell carcinoma.  PET scan was negative.  The patient was deemed a candidate for adjuvant nivolumab based on indication for grade muscle-invasive urothelial cell carcinoma that was greater than T2 staging.  The patient was started on nivolumab 480 mg intravenously every 28 days.  After 7 cycles, he developed significant diarrhea and after 8 cycles, the diarrhea worsened to the point where it was grade 4.  He was admitted to [facility] and underwent colonoscopy and was found to have checkpoint inhibitor colitis.  He was treated with prednisone and then tapered off.  The patient was deemed not a candidate for further checkpoint inhibitor therapy.  MRI revealed lesions at L2, L3, right iliac crest, as well as enlarging liver lesion, as well as destructive lesion in the left femur consistent with metastatic urothelial cell carcinoma, biopsy-proven.  The patient was deemed a candidate for enfortumab vedotin, but developed significant pain requiring radiation and now involving the left proximal femur, as well as prophylactic stabilization as per Dr. Regina Jordan of [facility].  He is scheduled to have surgical prophylactic stabilization procedure on 07/12.  We would like  to initiate chemotherapy at least 2 weeks, assuming he has healed from the surgery with PADCEV or enfortumab vedotin at a lower dose of 0.75 mg/kg given on days 1, 8, and 15 of a 28-day cycle.  The patient voices concern about his prognosis and would likely want a second opinion, but is not sure of this.  He will discuss this with Dr. Jordan.  If he agrees to be seen at [facility], he will get a second opinion and that is reasonable and knows his prognosis is poor without treatment.  With treatment, there was some improvement in disease-free survival in these patients, but again we will await the FGFR2 and FGFR3 [employer] testing to see if he is a potential candidate for targeted therapy.  We discussed side effects of PADCEV, including hyperglycemia, neuropathy, ocular toxicities, risk of rash, including risk of Samayoa-Johan syndrome.  We will otherwise see the patient 2 weeks after he undergoes orthopedic surgical procedure by Dr. Regina Jordan.    Eighty-seven minutes were spent on this patient.  Time was spent reviewing multiple physician provider notes, reviewing imaging results with the patient, performing history and physical, documenting history and physical, ordering enfortumab vedotin, reviewing notes from Dr. Jordan and radiation therapy, and setting up followup appointments.    Jhony Bartlett MD        D: 07/10/2023   T: 07/10/2023   MT: hubert    Name:     CLARISSA COSTA  MRN:      [id number]        Account:    [id number]   :      1945           Visit Date: 07/10/2023     Document: [id number]    cc:  MD DAMION Estrada APRN, CNP   Libia Wallace MD

## 2023-07-10 NOTE — PROGRESS NOTES
Diabetes Self-Management Education & Support    Left message for Colin to return call.  RE: NPH started Friday during steroid therapy. Remind Colin if able, repeat labs tomorrow if he is willing to recheck. Per PCP- not required as he is cleared for surgery is on Wednesday.     Suyapa Flowers RN Diabetes Educator,  [phone]  7/10/2023 at 3:07 PM

## 2023-07-10 NOTE — NURSING NOTE
"Oncology Rooming Note    July 10, 2023 1:43 PM   Colin Baxter is a 78 year old male who presents for:    Chief Complaint   Patient presents with     Oncology Clinic Visit     Follow up Carcinoma in situ of bladder     Initial Vitals: /60   Pulse 64   Temp 98.2  F (36.8  C) (Tympanic)   Resp 20   Ht 1.702 m (5' 7\")   Wt 71.1 kg (156 lb 12 oz)   SpO2 98%   BMI 24.55 kg/m   Estimated body mass index is 24.55 kg/m  as calculated from the following:    Height as of this encounter: 1.702 m (5' 7\").    Weight as of this encounter: 71.1 kg (156 lb 12 oz). Body surface area is 1.83 meters squared.  Moderate Pain (5) Comment: Data Unavailable   No LMP for male patient.  Allergies reviewed: Yes  Medications reviewed: Yes    Medications: Medication refills not needed today.  Pharmacy name entered into EPIC:    [id number] - [place], [place] - [address] [id number] - [place], [place] - [address] [place] [address] PHARMACY - [place], [place] - [address]    Clinical concerns: Left hip lizett placement surgery Wednesday at [facility] in [place]            "

## 2023-07-11 NOTE — Clinical Note
Patient is currently on palliative skilled cares now.  I will assume ordering his narcotics to make it a bit easier for him.  Please let me know if this is an issue with anyone. Thank you, Rick

## 2023-07-11 NOTE — PROGRESS NOTES
Palliative Care Consultation    Subjective     Reason for Consultation:  Colin Baxter, 78 year old, male is seen for a skilled palliative care provider visit.  He is seen in his home.  His ex-wife is present.  She drives from [place] to his residence often to help care for Colin. He is planning on moving to [place] before winter to live with his daughter (at this time)  He suffers from metastatic urothelial call carcinoma that is now present in his bones.  In particular, he has deterioration of his left femur and is scheduled for surgery tomorrow to have a lizett inserted to both stabilize the bone and help with the pain.  He is taking oxcontin 20 mg q 12 hours for the pain and requires IR 10 mg oxcodone approximately 2-3 times a day for breakthrough pain.  He was taking decadron for the pain, but did not get it started right away for various reasons and is not taking it now because of high BG levels and his scheduled surgery.  He fell yesterday for the first time.  He doesn't really know what happened.  He was able to get up on his own and he had no apparent injury.   First chemo is scheduled for 7/26, then 3 rounds 28 days apart then PET scan to re-stage him.   His quality of life is still pretty good and he has no other symptoms.     Advanced Directives:  Full Code    Palliative Care Goals:  He would like to be comfortable and he would like to live a quality of life    Attestation:   Total time spent on evaluation, counseling, and coordination of care:    60 minutes.     History of Present Illness:  Colin suffers from metastatic high grade urothelial cell carcinoma of the left ureter.   He was treated for high grade urothelial bladder cancer from [id number].  He was found to have a 2 cm tumor in the left ureter after a cystoscopy found a bladder lesion on 1/5/21.   He subsequently had chemotherapy, the tumor mass size decreased and he underwent a nephroureterectomy on 6/16/2020.  1/2023- PET scan shows  no evidence of pancreatic issue previously seen, but does see a lesion on the liver  4/27/23- Bone scan shows multiple areas of uptake consistent with bony metastatic disease in the pelvis and left proximal femur primarily, but also in the skull, mandible, shoulders, ribs and spine. A CT also showed osteoblastic metastatic disease in the right ilium and roof of the left acetabulum.  Also the L2 and L3.  5/23/23- CT guided biopsy of the left femur showed metastatic carcinoma consistent with prior invasive urothelial cell carcinoma  6/27/23- Radiation oncology consultation, received radiation x5 days for pain (palliative), completed 7/7/23.    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  Ears/Nose/Throat: negative  Respiratory: No shortness of breath, dyspnea on exertion, cough, or hemoptysis  Cardiovascular: negative  Gastrointestinal: constipation  Genitourinary: negative, hx of ureteral and bladder cancer  Musculoskeletal: skeletal metastases, most of pain is in left hip and femur, he has a cane and a walker  Neurologic: negative  Psychiatric: negative  Hematologic/Lymphatic/Immunologic: negative  Endocrine: thyroid disorder and diabetes    Concerns/Worries: He verbalizes no particular worries at this time    Objective     VS:  O2 sat on room air is 91%, AP 80  GENERAL: Well groomed, well developed, well nourished male in no acute distress.  HEENT: extra ocular movements intact, atraumatic,  normocephalic.  SKIN: Warm to touch, dry.  No visible rashes or lesions on examined areas.  RESP: No increased respiratory effort. Lungs clear to ausculation bilateral.  CV: Rate and rhythm regular, no murmurs/rubs/gallops.  LYMPH: No lower extremity edema.  ABD: Soft, non tender, non distended, no palpable masses.  Normoactive bowel sounds.  No CVA tenderness bilaterally.  MS: Full range of motion  in extremities.  NEURO: Alert and oriented  x 3.  PSYCH: Normal mood and affect, pleasant and agreeable during interview and exam.      Laboratory/Imaging:  Reviewed    Assessment   1. We will continue palliative cares at this time, he is currently on the skilled program, with nursing involved.    2. I will plan to see him again in 2 months, or sooner if needed.  He has had issues with getting his pain medications in the past, due to different pharmacies, prior authorizations and confusion about prescribers.  I will plan to assume ordering his pain medications if that is agreeable to the team.    Time spent on today's visit was 60 min including review of outside records, labs, face to face with patient discussing options and his disease process and after visit ordering his oxycontin and communicating with the palliative care team.

## 2023-07-12 NOTE — TELEPHONE ENCOUNTER
FMLA forms requested by daughter who is an employee at [employer]. Spoke with HR and daughter needs to send form to [employer] for further assistance.  Jerilyn Lanier RN...........7/12/2023 10:39 AM

## 2023-07-17 NOTE — PROGRESS NOTES
Nedra nordisk refill request/fax. Completed for DRC NP to review, sign. Fax upon completion.     Tresiba, Novolog and pen gordy.  Suyapa Flowers RN Diabetes Educator,  [phone]  7/17/2023 at 8:53 AM

## 2023-07-18 NOTE — PROGRESS NOTES
Call received from patient's daughter, Jessika. Jessika is stating that pt just got his lizett in his femur placed yesterday and he will be going to a transitional care unit down where she lives, which is [facility]. Surgery was delayed due to high blood sugars and the need to get those under control before the surgery. [place] he will be moving to the [facility]. They are not going to pursue chemotherapy at this time because they want him to be stronger via physical therapy before he starts chemo.     Discussed that Dr Bartlett had recommended that pt starts chemotherapy 2 weeks after he has his surgery. Discussed that we will cancel all of pt's appts here while he is in the TCU, since his entire schedule would likely change anyways. Discussed TCU placement and how neither of us know how long he will be there. Direct phone provided to Jessika so she can keep us in the loop. Jessika did state that in the event that pt cannot go home by himself, he will be moving in with her and will likely transfer his care closer to her house. Will place any referrals needed for the patient when they are ready and once a plan is established. Jessika aware. Jessika and patient agree with this plan.     Will route to PAC to cancel appointments for now, until we hear from the family about the plan.    Routing to Dr. Bartlett and Jennifer Jensen, DANIEL for their awareness of the situation.

## 2023-07-21 PROBLEM — E11.9 DIABETES MELLITUS WITHOUT COMPLICATION (H): Status: ACTIVE | Noted: 2020-12-11

## 2023-07-21 PROBLEM — K59.01 SLOW TRANSIT CONSTIPATION: Status: ACTIVE | Noted: 2023-01-01

## 2023-07-21 PROBLEM — C79.9 METASTATIC MALIGNANT NEOPLASM, UNSPECIFIED SITE (H): Status: ACTIVE | Noted: 2023-01-01

## 2023-07-21 PROBLEM — M84.552D PATHOLOGICAL FRACTURE OF LEFT FEMUR DUE TO NEOPLASTIC DISEASE WITH ROUTINE HEALING, SUBSEQUENT ENCOUNTER: Status: ACTIVE | Noted: 2023-01-01

## 2023-07-21 PROBLEM — M62.81 GENERALIZED MUSCLE WEAKNESS: Status: ACTIVE | Noted: 2023-01-01

## 2023-07-21 NOTE — PROGRESS NOTES
[facility] GERIATRICS    PRIMARY CARE PROVIDER AND CLINIC:  Libia Wallace MD, [address] / [place] [address]  Chief Complaint   Patient presents with     Hospital F/U      [place] Medical Record Number:  [id number]  Place of Service where encounter took place:  [facility]  AT [facility] ([facility]) [[id number]]    Colin Baxter  is a 78 year old  (1945), admitted to the above facility from  [facility] . Hospital stay 07/12/2023 through 07/20/2023..   HPI:    Hospital Course   Colin is a 78 year old male with PMH of metastatic urothelial carcinoma who presented with impending pathologic fracture of L femur at the site of a metastatic lesion. Currently undergoing radiation treatment. Evaluated by orthopedics, planning surgery to reinforce this area most likely on Monday as he was not medically stable for surgery on admission due to hyperglycemia caused by not taking insulin at home. He was placed on insulin drip and switched back over to SQ insulin afterward with significant improvement in blood glucose control.  Surgery for prophylactic stabilization of left femur performed 7/17 by Ortho. Post op course was uncomplicated.    Patient seen today for follow up, daughter also present, reviewed status, L hip 2 incisions with blue sutures, approximated no discharge, reports pain 2/10 at rest, mild swelling in are but no KAREN, ambulates with walker, on high levels of opioids for cancer related pain, I&O adequate, goal to get stronger and return home, overall appears healthy.    Advance Care Planning Goals of Care Discussion 7/21/2023  Goals of care discussed with Colin Baxter on 7/21. Present at discussion: patient, daughter. Questions discussed and patient response:  What is your understanding now of where you are with your illness?: good 2. How much information about what is likely to be ahead with your illness would you like to have?: all. As the clinician I communicated the  following to the patient regarding their prognosis: good. If your health situation worsens, what are your most important goals?: get home. What are your biggest fears and worries about the future with your health?: not getting home. Unacceptable function : brain dead. What abilities are so critical to your life that you cannot imagine living without them?: walking. Pt does NOT want to: die yet. If you become sicker, how much are you willing to go through for the possibility of gaining more time?: maybe. Would this change if these were permanent states, if they did not get better?: yes. How much does your agent and/or family know about your priorities and wishes?: everything. Added by DANILO Coelho CNP           CODE STATUS/ADVANCE DIRECTIVES DISCUSSION:  Prior  On file    ALLERGIES:   Allergies   Allergen Reactions     No Clinical Screening - See Comments Other (See Comments)     Beta blocker in glaucoma gtt.s caused low pulse and passing out      Timolol      Beta blocker in glaucoma gtt.s caused low pulse and passing out   - patient thinks it was timolol but not 100% sure which beta blocker eye drop.      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     Chronic kidney disease      Diabetes mellitus, type 2 (H)      Dyslipidemia      Glaucoma      Hyperlipidemia      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) 12/11/2020    getting BCG (from his Urologist in [place], [place])     Obesity      Pancreatic mass      Renal cell carcinoma, left (H) 2021    Left nephrectomy     Ureter cancer, left (H)       PAST SURGICAL HISTORY:   has a past surgical history that includes appendectomy (1958); Cystoscopy, transurethral resection (TUR) tumor bladder, combined (N/A, 9/8/2015); Cystoscopy,  biopsy bladder, combined (N/A, 9/8/2015); Cystoscopy, retrogrades, insert stent ureter(s), combined (Left, 9/8/2015); Cystoscopy, transurethral resection (TUR) tumor bladder, combined (N/A, 1/12/2016); colonoscopy (2-); Cystoscopy, transurethral resection (TUR) tumor bladder, combined (N/A, 9/13/2016); Cystoscopy, biopsy bladder, combined (N/A, 2/14/2017); Phacoemulsification with standard intraocular lens implant (Right, 10/9/2018); Phacoemulsification with standard intraocular lens implant (Left, 10/23/2018); Cystoscopy, retrogrades, combined (Bilateral, 11/13/2018); Cystoscopy, biopsy bladder, combined (N/A, 11/13/2018); Combined Cystoscopy, Retrogrades, Ureteroscopy, Insert Stent (Left, 1/18/2021); Davinci Nephroureterectomy (Left, 6/16/2021); Cystoscopy (N/A, 6/16/2021); Cystoscopy, retrogrades, combined (Right, 11/5/2021); Cystoscopy, biopsy bladder, combined (N/A, 11/5/2021); Colonoscopy (N/A, 9/13/2022); Esophagoscopy, gastroscopy, duodenoscopy (EGD), combined (N/A, 9/30/2022); Colonoscopy (N/A, 2/13/2023); and Insert port vascular access (N/A, 6/20/2023).  FAMILY HISTORY: family history includes Diabetes in his mother; Other - See Comments in his father; Parkinsonism in his brother.  SOCIAL HISTORY:   reports that he quit smoking about 31 years ago. His smoking use included cigarettes. He has never used smokeless tobacco. He reports current alcohol use. He reports that he does not use drugs.  Patient's living condition: lives alone    Post Discharge Medication Reconciliation Status:   MED REC REQUIRED  Post Medication Reconciliation Status: discharge medications reconciled and changed, per note/orders       Current Outpatient Medications   Medication Sig     acetaminophen (TYLENOL) 325 MG tablet Take 2 tablets (650 mg) by mouth every 4 hours as needed for mild pain or other (and adjunct with moderate or severe pain or per patient request)     artificial tears (GENTEAL) 0.1-0.2-0.3 % ophthalmic  solution Place 2 drops into both eyes daily as needed     aspirin 81 MG EC tablet Take 81 mg by mouth 2 times daily     atorvastatin (LIPITOR) 40 MG tablet TAKE 1 TABLET BY MOUTH     brimonidine (ALPHAGAN-P) 0.15 % ophthalmic solution INSTILL 1 DROP INTO RIGHT EYE TWICE DAILY     Cholecalciferol (VITAMIN D3) 25 MCG TABS Take 25 mcg by mouth daily     Continuous Blood Gluc  (FREESTYLE DELILAH 2 READER) TENNILLE 1 each continuous     Continuous Blood Gluc Sensor (FREESTYLE DELILAH 2 SENSOR) MISC 1 each every 14 days     insulin aspart (NOVOLOG PEN) 100 UNIT/ML pen 5 units with meals, and for glucometer 150-200 add 2 units SQ, 201-250 add 4 units, >250 add 6 units     insulin degludec (TRESIBA FLEXTOUCH) 100 UNIT/ML pen Inject 18 Units Subcutaneous daily     insulin NPH (NOVOLIN N FLEXPEN) 100 UNIT/ML injection Inject 14 units in the morning. Then  6-8 hours later, inject 14 units. At bedtime inject 7 units. For injection during steroid treatment resulting in hyperglycemia.     insulin pen needle (BD PEN NEEDLE DONTAE 2ND GEN) 32G X 4 MM miscellaneous USE 3 PEN NEEDLES DAILY     latanoprost (XALATAN) 0.005 % ophthalmic solution Place 1 drop into both eyes At Bedtime     levothyroxine (SYNTHROID/LEVOTHROID) 112 MCG tablet Take 1 tablet (112 mcg) by mouth daily     naloxone (NARCAN) 4 MG/0.1ML nasal spray Spray 1 spray (4 mg) into one nostril alternating nostrils as needed for opioid reversal every 2-3 minutes until assistance arrives     oxyCODONE (ROXICODONE) 5 MG tablet Take 5-10 mg by mouth every 4 hours as needed     oxyCODONE IR (ROXICODONE) 10 MG tablet Take 1 tablet (10 mg) by mouth every 6 hours as needed for severe pain     polyethylene glycol (MIRALAX) 17 g packet Take 1 packet by mouth daily     senna-docusate (SENOKOT S) 8.6-50 MG tablet Take 1 tablet by mouth as needed for constipation     VITRON-C  MG TABS tablet Take 1 tablet by mouth daily     blood glucose (ONETOUCH VERIO IQ) test strip USE TO TEST  "BLOOD SUGAR 3 TIMES DAILY     dexamethasone (DECADRON) 4 MG tablet Take 1 tablet (4 mg) by mouth 2 times daily (with meals)     ONETOUCH DELICA LANCETS 33G MISC 1 each 2 times daily     [START ON 7/26/2023] oxyCODONE (OXYCONTIN) 20 MG 12 hr tablet Take 1 tablet (20 mg) by mouth every 12 hours     prochlorperazine (COMPAZINE) 10 MG tablet Take 1 tablet (10 mg) by mouth every 6 hours as needed for nausea or vomiting     No current facility-administered medications for this visit.       ROS:  4 point ROS including Respiratory, CV, GI and , other than that noted in the HPI,  is negative    Vitals:  /60   Pulse 85   Temp 97.8  F (36.6  C)   Resp 16   Ht 1.702 m (5' 7\")   Wt 70.8 kg (156 lb)   SpO2 98%   BMI 24.43 kg/m    Exam:  GENERAL APPEARANCE:  in no distress, appears healthy  ENT:  Mouth and posterior oropharynx normal, moist mucous membranes, normal hearing acuity  RESP:  lungs clear to auscultation , no respiratory distress  CV:  regular rate and rhythm, no murmur, rub, or gallop, no edema  ABDOMEN:  bowel sounds normal  M/S:   Gait and station abnormal using walker  SKIN:  Inspection of skin and subcutaneous tissue baseline  NEURO:   Examination of sensation by touch normal  PSYCH:  affect and mood normal    Lab/Diagnostic data:  Recent labs in [facility] reviewed by me today.     ASSESSMENT/PLAN:    (C79.9) Metastatic malignant neoplasm, unspecified site (H)  (primary encounter diagnosis)  (M84.835Q) Pathological fracture of left femur due to neoplastic disease with routine healing, subsequent encounter  (M62.81) Generalized muscle weakness  (G89.3) Cancer related pain  (K59.01) Slow transit constipation  Comment: post L femur nail on 7/17, incision approximated, cancer pain+, constipation+  Plan:  -PT/OT eval and treat  -start miralax PRN  -change senokot to 2 tabs BID  -continue ASA81 BID x30 days  -change APAP to 650 QID scheduled  -increase oxycodone to 5mg Q4h and Q4h PRN  -CBC, BMP, A1C, TSH on " 7/24    (N18.32) Stage 3b chronic kidney disease (H)  Comment: recent creat 1.52  Plan: dose medications renally as possible  -BMP on 7/24    (E11.9) Diabetes mellitus without complication (H)  Comment: no recent A1C in chart, unsure home compliance  Plan: continue aspart TID, tresiba  -check BG's QID  -A1C on 7/24    (Z71.89) ACP (advance care planning)  Comment: code status DNR  Plan: [place] ADVANCE CARE PLANNING FIRST 30 MINS          I spent 17 minutes F2F with patient in addition to todays visit completing a POLST form.        Electronically signed by:  DANILO Coelho CNP

## 2023-07-21 NOTE — LETTER
7/21/2023        RE: Colin Baxter  [address]  [place] [address]        [facility]    PRIMARY CARE PROVIDER AND CLINIC:  Libia Wallace MD, [address] / [place] [address]  Chief Complaint   Patient presents with     Hospital F/U      [place] Medical Record Number:  [id number]  Place of Service where encounter took place:  [facility] AT [facility] ([facility]) [[id number]]    Colin Baxter  is a 78 year old  (1945), admitted to the above facility from  [facility] . Hospital stay 07/12/2023 through 07/20/2023..   HPI:    Hospital Course   Colin is a 78 year old male with PMH of metastatic urothelial carcinoma who presented with impending pathologic fracture of L femur at the site of a metastatic lesion. Currently undergoing radiation treatment. Evaluated by orthopedics, planning surgery to reinforce this area most likely on Monday as he was not medically stable for surgery on admission due to hyperglycemia caused by not taking insulin at home. He was placed on insulin drip and switched back over to SQ insulin afterward with significant improvement in blood glucose control.  Surgery for prophylactic stabilization of left femur performed 7/17 by Ortho. Post op course was uncomplicated.    Patient seen today for follow up, daughter also present, reviewed status, L hip 2 incisions with blue sutures, approximated no discharge, reports pain 2/10 at rest, mild swelling in are but no KAREN, ambulates with walker, on high levels of opioids for cancer related pain, I&O adequate, goal to get stronger and return home, overall appears healthy.    Advance Care Planning Goals of Care Discussion 7/21/2023  Goals of care discussed with Colin Baxter on 7/21. Present at discussion: patient, daughter. Questions discussed and patient response:  What is your understanding now of where you are with your illness?: good 2. How much information about what is likely to be ahead  with your illness would you like to have?: all. As the clinician I communicated the following to the patient regarding their prognosis: good. If your health situation worsens, what are your most important goals?: get home. What are your biggest fears and worries about the future with your health?: not getting home. Unacceptable function : brain dead. What abilities are so critical to your life that you cannot imagine living without them?: walking. Pt does NOT want to: die yet. If you become sicker, how much are you willing to go through for the possibility of gaining more time?: maybe. Would this change if these were permanent states, if they did not get better?: yes. How much does your agent and/or family know about your priorities and wishes?: everything. Added by DANILO Coelho CNP           CODE STATUS/ADVANCE DIRECTIVES DISCUSSION:  Prior  On file    ALLERGIES:   Allergies   Allergen Reactions     No Clinical Screening - See Comments Other (See Comments)     Beta blocker in glaucoma gtt.s caused low pulse and passing out      Timolol      Beta blocker in glaucoma gtt.s caused low pulse and passing out   - patient thinks it was timolol but not 100% sure which beta blocker eye drop.      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     Chronic kidney disease      Diabetes mellitus, type 2 (H)      Dyslipidemia      Glaucoma      Hyperlipidemia      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) 12/11/2020    getting BCG (from his Urologist in [place], [place])     Obesity      Pancreatic mass      Renal cell carcinoma, left (H) 2021    Left nephrectomy     Ureter cancer, left (H)       PAST SURGICAL HISTORY:   has a past surgical history that includes appendectomy (1958);  Cystoscopy, transurethral resection (TUR) tumor bladder, combined (N/A, 9/8/2015); Cystoscopy, biopsy bladder, combined (N/A, 9/8/2015); Cystoscopy, retrogrades, insert stent ureter(s), combined (Left, 9/8/2015); Cystoscopy, transurethral resection (TUR) tumor bladder, combined (N/A, 1/12/2016); colonoscopy (2-); Cystoscopy, transurethral resection (TUR) tumor bladder, combined (N/A, 9/13/2016); Cystoscopy, biopsy bladder, combined (N/A, 2/14/2017); Phacoemulsification with standard intraocular lens implant (Right, 10/9/2018); Phacoemulsification with standard intraocular lens implant (Left, 10/23/2018); Cystoscopy, retrogrades, combined (Bilateral, 11/13/2018); Cystoscopy, biopsy bladder, combined (N/A, 11/13/2018); Combined Cystoscopy, Retrogrades, Ureteroscopy, Insert Stent (Left, 1/18/2021); Davinci Nephroureterectomy (Left, 6/16/2021); Cystoscopy (N/A, 6/16/2021); Cystoscopy, retrogrades, combined (Right, 11/5/2021); Cystoscopy, biopsy bladder, combined (N/A, 11/5/2021); Colonoscopy (N/A, 9/13/2022); Esophagoscopy, gastroscopy, duodenoscopy (EGD), combined (N/A, 9/30/2022); Colonoscopy (N/A, 2/13/2023); and Insert port vascular access (N/A, 6/20/2023).  FAMILY HISTORY: family history includes Diabetes in his mother; Other - See Comments in his father; Parkinsonism in his brother.  SOCIAL HISTORY:   reports that he quit smoking about 31 years ago. His smoking use included cigarettes. He has never used smokeless tobacco. He reports current alcohol use. He reports that he does not use drugs.  Patient's living condition: lives alone    Post Discharge Medication Reconciliation Status:   MED REC REQUIRED  Post Medication Reconciliation Status: discharge medications reconciled and changed, per note/orders       Current Outpatient Medications   Medication Sig     acetaminophen (TYLENOL) 325 MG tablet Take 2 tablets (650 mg) by mouth every 4 hours as needed for mild pain or other (and adjunct with moderate or  severe pain or per patient request)     artificial tears (GENTEAL) 0.1-0.2-0.3 % ophthalmic solution Place 2 drops into both eyes daily as needed     aspirin 81 MG EC tablet Take 81 mg by mouth 2 times daily     atorvastatin (LIPITOR) 40 MG tablet TAKE 1 TABLET BY MOUTH     brimonidine (ALPHAGAN-P) 0.15 % ophthalmic solution INSTILL 1 DROP INTO RIGHT EYE TWICE DAILY     Cholecalciferol (VITAMIN D3) 25 MCG TABS Take 25 mcg by mouth daily     Continuous Blood Gluc  (FREESTYLE DELILAH 2 READER) TENNILLE 1 each continuous     Continuous Blood Gluc Sensor (FREESTYLE DELILAH 2 SENSOR) MISC 1 each every 14 days     insulin aspart (NOVOLOG PEN) 100 UNIT/ML pen 5 units with meals, and for glucometer 150-200 add 2 units SQ, 201-250 add 4 units, >250 add 6 units     insulin degludec (TRESIBA FLEXTOUCH) 100 UNIT/ML pen Inject 18 Units Subcutaneous daily     insulin NPH (NOVOLIN N FLEXPEN) 100 UNIT/ML injection Inject 14 units in the morning. Then  6-8 hours later, inject 14 units. At bedtime inject 7 units. For injection during steroid treatment resulting in hyperglycemia.     insulin pen needle (BD PEN NEEDLE DONTAE 2ND GEN) 32G X 4 MM miscellaneous USE 3 PEN NEEDLES DAILY     latanoprost (XALATAN) 0.005 % ophthalmic solution Place 1 drop into both eyes At Bedtime     levothyroxine (SYNTHROID/LEVOTHROID) 112 MCG tablet Take 1 tablet (112 mcg) by mouth daily     naloxone (NARCAN) 4 MG/0.1ML nasal spray Spray 1 spray (4 mg) into one nostril alternating nostrils as needed for opioid reversal every 2-3 minutes until assistance arrives     oxyCODONE (ROXICODONE) 5 MG tablet Take 5-10 mg by mouth every 4 hours as needed     oxyCODONE IR (ROXICODONE) 10 MG tablet Take 1 tablet (10 mg) by mouth every 6 hours as needed for severe pain     polyethylene glycol (MIRALAX) 17 g packet Take 1 packet by mouth daily     senna-docusate (SENOKOT S) 8.6-50 MG tablet Take 1 tablet by mouth as needed for constipation     VITRON-C  MG TABS  "tablet Take 1 tablet by mouth daily     blood glucose (ONETOUCH VERIO IQ) test strip USE TO TEST BLOOD SUGAR 3 TIMES DAILY     dexamethasone (DECADRON) 4 MG tablet Take 1 tablet (4 mg) by mouth 2 times daily (with meals)     ONETOUCH DELICA LANCETS 33G MISC 1 each 2 times daily     [START ON 7/26/2023] oxyCODONE (OXYCONTIN) 20 MG 12 hr tablet Take 1 tablet (20 mg) by mouth every 12 hours     prochlorperazine (COMPAZINE) 10 MG tablet Take 1 tablet (10 mg) by mouth every 6 hours as needed for nausea or vomiting     No current facility-administered medications for this visit.       ROS:  4 point ROS including Respiratory, CV, GI and , other than that noted in the HPI,  is negative    Vitals:  /60   Pulse 85   Temp 97.8  F (36.6  C)   Resp 16   Ht 1.702 m (5' 7\")   Wt 70.8 kg (156 lb)   SpO2 98%   BMI 24.43 kg/m    Exam:  GENERAL APPEARANCE:  in no distress, appears healthy  ENT:  Mouth and posterior oropharynx normal, moist mucous membranes, normal hearing acuity  RESP:  lungs clear to auscultation , no respiratory distress  CV:  regular rate and rhythm, no murmur, rub, or gallop, no edema  ABDOMEN:  bowel sounds normal  M/S:   Gait and station abnormal using walker  SKIN:  Inspection of skin and subcutaneous tissue baseline  NEURO:   Examination of sensation by touch normal  PSYCH:  affect and mood normal    Lab/Diagnostic data:  [facility] in [facility] reviewed by me today.     ASSESSMENT/PLAN:    (C79.9) Metastatic malignant neoplasm, unspecified site (H)  (primary encounter diagnosis)  (M84.680V) Pathological fracture of left femur due to neoplastic disease with routine healing, subsequent encounter  (M62.81) Generalized muscle weakness  (G89.3) Cancer related pain  (K59.01) Slow transit constipation  Comment: post L femur nail on 7/17, incision approximated, cancer pain+, constipation+  Plan:  -PT/OT eval and treat  -start miralax PRN  -change senokot to 2 tabs BID  -continue ASA81 BID x30 days  -change " APAP to 650 QID scheduled  -increase oxycodone to 5mg Q4h and Q4h PRN  -CBC, BMP, A1C, TSH on 7/24    (N18.32) Stage 3b chronic kidney disease (H)  Comment: recent creat 1.52  Plan: dose medications renally as possible  -BMP on 7/24    (E11.9) Diabetes mellitus without complication (H)  Comment: no recent A1C in chart, unsure home compliance  Plan: continue aspart TID, tresiba  -check BG's QID  -A1C on 7/24    (Z71.89) ACP (advance care planning)  Comment: code status DNR  Plan: [place] ADVANCE CARE PLANNING FIRST 30 MINS          I spent 17 minutes F2F with patient in addition to todays visit completing a POLST form.        Electronically signed by:  DANILO Coelho CNP                     Sincerely,        DANILO Coelho CNP

## 2023-07-25 PROBLEM — R60.0 BILATERAL LEG EDEMA: Status: ACTIVE | Noted: 2023-01-01

## 2023-07-25 NOTE — PROGRESS NOTES
"[facility] GERIATRICS    Chief Complaint   Patient presents with    RECHECK     HPI:  Colin Baxter is a 78 year old  (1945), who is being seen today for an episodic care visit at: [facility] ([facility]) [[id number]]. Today's concern is:    1. CKD (chronic kidney disease) stage 4, GFR 15-29 ml/min (H)    2. 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 Bilateral leg edema      Patient seen for follow up, also met with daughter Jessika today RE: status and plan, labs 7/24 with W 7.7, H 9l4, A1C 8.8, N 129, K 4.5, Cr 1.24, Gfr 60, Ca 7.6, appears healthy, thin, not thriving with multiple cancer and mets along with recent fall and L femur Fx, pain moderately controlled, sleepy but able to participate with therapies, leg edema now 1+ intact skin, overall progression of disease, plans to return to daughters home on TCU discharge for support.    Allergies, and PMH/PSH reviewed in EPIC today.  REVIEW OF SYSTEMS:  4 point ROS including Respiratory, CV, GI and , other than that noted in the HPI,  is negative    Objective:   /66   Pulse 88   Temp 97.5  F (36.4  C)   Resp 16   Ht 1.702 m (5' 7\")   Wt 70.8 kg (156 lb)   SpO2 96%   BMI 24.43 kg/m    GENERAL APPEARANCE:  in no distress, appears healthy  ENT:  Mouth and posterior oropharynx normal, moist mucous membranes  RESP:  lungs clear to auscultation   CV:  peripheral edema 1-2+ in lower legs  ABDOMEN:  bowel sounds normal  M/S:   Gait and station abnormal transfer assist  SKIN:  Inspection of skin and subcutaneous tissue baseline  NEURO:   Examination of sensation by touch normal  PSYCH:  affect and mood normal    Labs done in SNF are in [place] EPIC. Please refer to them using [employer] Everywhere.    Assessment/Plan:  (N18.4) CKD (chronic kidney disease) stage 4, GFR 15-29 ml/min (H)  (primary encounter diagnosis)  Comment: creat 1.24, GFR 60  Plan: dose medications renally as " possible  -recheck BMP in 2-3 weeks    (M44.329K) Pathological fracture of left femur due to neoplastic disease with routine healing, subsequent encounter  Comment: post fall 7/12, nailed 7.17, pain +, mild edema  Plan: staff to support as indicated  -PT/OT working with  -continue oxycontin 20mg BID plus oxycodone 5mg Q4h and Q4h PRN  -next step will be to increase oxycontin to 30mg  -instructed to obtain ortho, PCP and oncology locally for pending TCU discharge 2-3 weeks    (R60.0) Bilateral leg edema  Comment: increased to 1-2+, no s/s/ DVT  Plan: lymph to eval and treat edema  -OK ace wraps until lymph able to evaluate      MED REC REQUIRED  Post Medication Reconciliation Status: discharge medications reconciled and changed, per note/orders        Electronically signed by: DANILO Coelho CNP

## 2023-07-25 NOTE — LETTER
"    7/25/2023        RE: Colin Baxter  [address]  [place] [address]        [facility] GERIATRICS    Chief Complaint   Patient presents with     RECHECK     HPI:  Colin Baxter is a 78 year old  (1945), who is being seen today for an episodic care visit at: [facility] ([facility]) [[id number]]. Today's concern is:    1. CKD (chronic kidney disease) stage 4, GFR 15-29 ml/min (H)    2. 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 Bilateral leg edema      Patient seen for follow up, also met with daughter Jessika today RE: status and plan, labs 7/24 with W 7.7, H 9l4, A1C 8.8, N 129, K 4.5, Cr 1.24, Gfr 60, Ca 7.6, appears healthy, thin, not thriving with multiple cancer and mets along with recent fall and L femur Fx, pain moderately controlled, sleepy but able to participate with therapies, leg edema now 1+ intact skin, overall progression of disease, plans to return to daughters home on TCU discharge for support.    Allergies, and PMH/PSH reviewed in EPIC today.  REVIEW OF SYSTEMS:  4 point ROS including Respiratory, CV, GI and , other than that noted in the HPI,  is negative    Objective:   /66   Pulse 88   Temp 97.5  F (36.4  C)   Resp 16   Ht 1.702 m (5' 7\")   Wt 70.8 kg (156 lb)   SpO2 96%   BMI 24.43 kg/m    GENERAL APPEARANCE:  in no distress, appears healthy  ENT:  Mouth and posterior oropharynx normal, moist mucous membranes  RESP:  lungs clear to auscultation   CV:  peripheral edema 1-2+ in lower legs  ABDOMEN:  bowel sounds normal  M/S:   Gait and station abnormal transfer assist  SKIN:  Inspection of skin and subcutaneous tissue baseline  NEURO:   Examination of sensation by touch normal  PSYCH:  affect and mood normal    Labs done in SNF are in [facility]. Please refer to them using EPIC/Care Everywhere.    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 with routine healing, subsequent encounter  Comment: post fall 7/12, nailed 7.17, pain +, mild edema  Plan: staff to support as indicated  -PT/OT working with  -continue oxycontin 20mg BID plus oxycodone 5mg Q4h and Q4h PRN  -next step will be to increase oxycontin to 30mg  -instructed to obtain ortho, PCP and oncology locally for pending TCU discharge 2-3 weeks    (R60.0) Bilateral leg edema  Comment: increased to 1-2+, no s/s/ DVT  Plan: lymph to eval and treat edema  -OK ace wraps until lymph able to evaluate      MED REC REQUIRED  Post Medication Reconciliation Status: discharge medications reconciled and changed, per note/orders        Electronically signed by: DANIOL Coelho CNP          Sincerely,        DANILO Coelho CNP

## 2023-07-26 PROBLEM — R41.0 CONFUSION: Status: ACTIVE | Noted: 2023-01-01

## 2023-07-26 NOTE — PHARMACY-ADMISSION MEDICATION HISTORY
Pharmacist Admission Medication History    Admission medication history is complete. The information provided in this note is only as accurate as the sources available at the time of the update.    Medication reconciliation/reorder completed by provider prior to medication history? No    Information Source(s): Facility ([facility] medication list/MAR via  faxed MAR    Pertinent Information: N/A    Changes made to PTA medication list:  Added: None  Deleted: Insulin NPH  Changed: Insulin aspart and oxycodone regimen    Medication Affordability: N/A    Allergies reviewed with patient and updates made in EHR: yes    Medication History Completed By: YUNIER IBARRA [facility] 7/26/2023 2:32 PM    Prior to Admission medications    Medication Sig Last Dose Taking? Auth Provider Long Term End Date   acetaminophen (TYLENOL) 325 MG tablet Take 2 tablets (650 mg) by mouth 4 times daily 7/26/2023 at am Yes Erik Joy APRN CNP     artificial tears (GENTEAL) 0.1-0.2-0.3 % ophthalmic solution Place 2 drops into both eyes daily as needed Unknown Yes Jhony Bartlett MD     aspirin 81 MG EC tablet Take 81 mg by mouth 2 times daily 7/26/2023 at am Yes Reported, Patient No 8/19/23   atorvastatin (LIPITOR) 40 MG tablet Take 40 mg by mouth At Bedtime 7/25/2023 at pm Yes Unknown, Entered By History No    brimonidine (ALPHAGAN-P) 0.15 % ophthalmic solution Place 1 drop into the right eye 2 times daily 7/26/2023 at am Yes Reported, Patient     Cholecalciferol (VITAMIN D3) 25 MCG TABS Take 25 mcg by mouth daily 7/26/2023 at am Yes Reported, Patient     insulin aspart (NOVOLOG PEN) 100 UNIT/ML pen Inject 5 Units Subcutaneous 3 times daily (with meals) 7/25/2023 at pm Yes Unknown, Entered By History No    insulin aspart (NOVOLOG PEN) 100 UNIT/ML pen Inject 2-6 Units Subcutaneous 3 times daily Sliding scale:   -200: 2 units  201-250: 4 units  >250: 6 units 7/26/2023 at am Yes Unknown, Entered By History No    insulin  degludec (TRESIBA FLEXTOUCH) 100 UNIT/ML pen Inject 18 Units Subcutaneous daily 7/26/2023 at am Yes Maryann Mccord APRN CNP     levothyroxine (SYNTHROID/LEVOTHROID) 112 MCG tablet Take 1 tablet (112 mcg) by mouth daily 7/26/2023 at am Yes Libia Wallace MD Yes    naloxone (NARCAN) 4 MG/0.1ML nasal spray Spray 1 spray (4 mg) into one nostril alternating nostrils as needed for opioid reversal every 2-3 minutes until assistance arrives Unknown Yes Perla Ramesh APRN CNP Yes    oxyCODONE (OXYCONTIN) 20 MG 12 hr tablet Take 1 tablet (20 mg) by mouth every 12 hours 7/26/2023 at am Yes Erik Joy APRN CNP     oxyCODONE (ROXICODONE) 5 MG tablet Take 5-10 mg by mouth every 4 hours as needed for severe pain 7/24/2023 at pm Yes Unknown, Entered By History     oxyCODONE (ROXICODONE) 5 MG tablet Take 5 mg by mouth every 4 hours 7/26/2023 at am Yes Reported, Patient     polyethylene glycol (MIRALAX) 17 GM/Dose powder Take 17 g (1 Capful) by mouth daily as needed for constipation Unknown Yes Erik Joy APRN CNP     senna-docusate (SENOKOT S) 8.6-50 MG tablet Take 2 tablets by mouth 2 times daily 7/26/2023 at am Yes Reported, Patient     VITRON-C  MG TABS tablet Take 1 tablet by mouth daily 7/26/2023 at am Yes Reported, Patient     blood glucose (ONETOUCH VERIO IQ) test strip USE TO TEST BLOOD SUGAR 3 TIMES DAILY   Libia Wallace MD     Continuous Blood Gluc  (FREESTYLE DELILAH 2 READER) TENNILLE 1 each continuous   Maryann Mccord APRN CNP Yes    Continuous Blood Gluc Sensor (FREESTYLE DELILAH 2 SENSOR) MISC 1 each every 14 days   Basilia Daily APRN CNP Yes    insulin pen needle (BD PEN NEEDLE DONTAE 2ND GEN) 32G X 4 MM miscellaneous USE 3 PEN NEEDLES DAILY   Maryann Mccord APRN CNP

## 2023-07-26 NOTE — CONSULTS
"Care Management Initial Consult    General Information  Assessment completed with: Children, daughters  Type of CM/SW Visit: Initial Assessment    Primary Care Provider verified and updated as needed: Yes   Readmission within the last 30 days: current reason for admission unrelated to previous admission (7/12/23 - 7/20/23 at [facility])      Reason for Consult: discharge planning  Advance Care Planning: Advance Care Planning Reviewed: present on chart          Communication Assessment  Patient's communication style: spoken language (English or Bilingual)         Living Environment:   People in home: facility resident     Current living Arrangements: extended care facility  Name of Facility: [facility]   Able to return to prior arrangements: yes       Family/Social Support:  Care provided by: other (see comments), self ([facility] staff)  Provides care for: no one, unable/limited ability to care for self  Marital Status:   Children, Facility resident(s)/Staff          Description of Support System: Supportive, Involved    Support Assessment: Adequate social supports, Adequate family and caregiver support    Current Resources:   Patient receiving home care services: No     Community Resources: Skilled Nursing Facility ([facility])  Equipment currently used at home: wheelchair, manual, walker, rolling, glucometer  Supplies currently used at home: Other (\"glasses\")    Employment/Financial:  Employment Status: retired        Financial Concerns:     Referral to Financial Worker: No       Does the patient's insurance plan have a 3 day qualifying hospital stay waiver?  No    Lifestyle & Psychosocial Needs:  Social Determinants of Health     Tobacco Use: Medium Risk (7/26/2023)    Patient History     Smoking Tobacco Use: Former     Smokeless Tobacco Use: Never     Passive Exposure: Not on file   Alcohol Use: Not on file   Financial Resource " Strain: Not on file   Food Insecurity: Not on file   Transportation Needs: Not on file   Physical Activity: Not on file   Stress: Not on file   Social Connections: Not on file   Intimate Partner Violence: Not on file   Depression: Not at risk (7/10/2023)    PHQ-2     PHQ-2 Score: 0   Housing Stability: Not on file       Functional Status:  Prior to admission patient needed assistance:   Dependent ADLs:: Ambulation-walker, Wheelchair-with assist, Bathing, Dressing, Grooming, Positioning, Transfers, Toileting  Dependent IADLs:: Cleaning, Cooking, Laundry, Shopping, Meal Preparation, Medication Management, Transportation  Assesssment of Functional Status: Not at baseline with ADL Functioning, Not at baseline with mobility, Not at  functional baseline    Mental Health Status:          Chemical Dependency Status:                Values/Beliefs:  Spiritual, Cultural Beliefs, Hinduism Practices, Values that affect care:                 Additional Information:  Colin is in rehab at [facility] and will need to return for continued rehab. He ws living in [place], but after his recent hospitalization he is at [facility] and his daughters wanted him closer to them. His eventual plan is to return home to living with his daughter here in the [facility].    Family is working to transfer his cancer cares to [facility].     He may benefit from hospice or palliative services. Unknown other discharge needs at this time besides needing to return to [facility].    [facility] transport at discharge.    CM to follow for medical progression of care, discharge recommendations, and final discharge plan.    Ivone Salazar RN

## 2023-07-26 NOTE — PROGRESS NOTES
[facility]: Cancer Care                                                                                        Message rec'd via PAC that pt's daughter called and is wondering if we can place a referral for [facility] since patient is going be down there at the TCU and then living with the his daughter that lives in the [facility].     Referral placed.       Signature:  Estephania Donnelly RN

## 2023-07-26 NOTE — CONSULTS
NEUROSURGERY CONSULTATION NOTE    Colin Baxter   [address] [place] [address]  78 year old male  Admission Date/Time: 7/26/2023  1:11 PM  Primary Care Provider: miroslava Libia [facility] Attending Physician: Panchito Espinoza DO    Neurosurgery was asked to see this patient by Panchito Espinoza DO for evaluation of AMS.     PROBLEM LIST:  Active Problems:    * No active hospital problems. *   Hyponatremia   Chronic bilateral subdural hematoma   Altered mental status     Neurosurgery Attending: The patient's clinical examination, laboratory data, and plan was discussed with Dr Case.     CONSULTATION ASSESSMENT AND PLAN:  Colin seen in ED room 2. Daughter is present and gives patient history. Patient known well earlier this am per daughter. Now presents with altered mental status. Very confused, combative, speech nonsensical, moves all extremities spontaneously, eyes open. Discussed and shared head CTA with daughter. She and family members are not sure they would want to pursue [place] hole for Colin. Correcting sodium of 121 seems more urgent matter as I do not believe the bilateral mostly chronic SDH noted on CTA are the source of his current AMS. Family would like to see complete lab work up results before they decide on transfer with possible intervention for SDH. I would favor monitoring patient response to correction of sodium prior to making additional plans for [place] hole procedure. I did briefly discuss the procedure to offer additional information in order to adie their decision making.     CTA head today bilateral low attenuation subdural fluid collections both frontal and parietal with right measuring 12 mm and left 8 mm favored to be chronic. Right side has small layer of hyperdensity, 4 mm. Two small areas of low attenuation right occipital lobe also consistent with chronic subdural hematoma. Mass effect present without midline shift. Chronic lacunar infarct.    PLAN:   Transfer if family  would like cranial intervention   Rule out, treat other sources of AMS (hyponatremia known at this time 121)   HOB greater than 30 degree's at all times  CT head 6 hours  No blood thinners  Coag's  Antiepileptics per neurology - SDH mostly chronic with initial trauma almost 4 weeks ago.     HPI: Colin Baxter is a 78 year old arrives to [facility] ED with AMS from TCU. Head trauma noted 7/12/2023 with 2 small subdural hematoma's (6 and 8 mm per CT reports in care everywhere). This image was completed 1.5 weeks after traumatic fall. No recent trauma per patient's daughter. PMH; HTN, HLD, DM2, CDK, liver and uterer cancer as well as skeletal mets per daughter with 6 months prognosis without intervention and possible 18 months with intervention. Recent femur surgery due to cancer and potential instability. Lives in [place] and was in the [facility] at TCU to be close to family. Patient recently had radiation and has plans to start chemo in the short interval. CTA head today bilateral low attenuation subdural fluid collections both frontal and parietal with right measuring 12 mm and left 8 mm favored to be chronic. Right side has small layer of hyperdensity, 4 mm. Two small areas of low attenuation right occipital lobe also consistent with chronic subdural hematoma. Mass effect present without midline shift. Chronic lacunar infarct. Patient is confused. Unable to follow commands or answer questions. He talks continuously, not all his words are comprehendible. The content of his sentences are nonsensical. He moves all extremities spontaneously. Does not answer examination questions. Unclear if he is having pain. Pupils are equal and reactive. Family deciding if they want any intervention for bilateral SDH.     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 OR    COLONOSCOPY N/A 2/13/2023    Procedure: COLONOSCOPY;  Surgeon: Antony Croft MD;  Location: [facility] OR    COMBINED CYSTOSCOPY, RETROGRADES, URETEROSCOPY, INSERT STENT Left 1/18/2021    Procedure: CYSTOURETEROSCOPY, WITH RETROGRADE PYELOGRAM with interpretation of fluroscopy, ureteroscopy, ureteral biopsy, bladder biopsy and fulgaration;  Surgeon: Shonda Morrell MD;  Location: [facility] OR    CYSTOSCOPY N/A 6/16/2021    Procedure: CYSTOSCOPY;  Surgeon: Javier Mcnulty MD;  Location: [facility] OR    CYSTOSCOPY, BIOPSY BLADDER, COMBINED N/A 9/8/2015    Procedure: COMBINED CYSTOSCOPY, BIOPSY BLADDER;  Surgeon: Randy Martinez MD;  Location: [facility] OR    CYSTOSCOPY, BIOPSY BLADDER, COMBINED N/A 2/14/2017    Procedure: COMBINED CYSTOSCOPY, BIOPSY BLADDER;  Surgeon: Randy Martinez MD;  Location: [facility] OR    CYSTOSCOPY, BIOPSY BLADDER, COMBINED N/A 11/13/2018    Procedure: COMBINED CYSTOSCOPY, BIOPSY BLADDER;  Surgeon: Ed Helm MD;  Location: [facility] OR    CYSTOSCOPY, BIOPSY BLADDER, COMBINED N/A 11/5/2021    Procedure: CYSTOSCOPY, WITH BLADDER BIOPSY;  Surgeon: Shonda Morrell MD;  Location: [facility] OR    CYSTOSCOPY, RETROGRADES, COMBINED Bilateral 11/13/2018    Procedure: Bilateral Retrograde Pyelagram, Bladder Biopsy;  Surgeon: Ed Helm MD;  Location: [facility] OR    CYSTOSCOPY, RETROGRADES, COMBINED Right 11/5/2021    Procedure: CYSTOSCOPY, WITH RETROGRADE PYELOGRAM;  Surgeon: Petros  Shonda MCCABE MD;  Location: [facility] OR    CYSTOSCOPY, RETROGRADES, INSERT STENT URETER(S), COMBINED Left 9/8/2015    Procedure: COMBINED CYSTOSCOPY, RETROGRADES, INSERT STENT URETER(S);  Surgeon: Randy Martinez MD;  Location: [facility] OR    CYSTOSCOPY, TRANSURETHRAL RESECTION (TUR) TUMOR BLADDER, COMBINED N/A 9/8/2015    Procedure: COMBINED CYSTOSCOPY, TRANSURETHRAL RESECTION (TUR) TUMOR BLADDER;  Surgeon: Randy Martinez MD;  Location: [facility] OR    CYSTOSCOPY, TRANSURETHRAL RESECTION (TUR) TUMOR BLADDER, COMBINED N/A 1/12/2016    Procedure: COMBINED CYSTOSCOPY, TRANSURETHRAL RESECTION (TUR) TUMOR BLADDER;  Surgeon: Randy Martinez MD;  Location: [facility] OR    CYSTOSCOPY, TRANSURETHRAL RESECTION (TUR) TUMOR BLADDER, COMBINED N/A 9/13/2016    Procedure: COMBINED CYSTOSCOPY, TRANSURETHRAL RESECTION (TUR) TUMOR BLADDER;  Surgeon: Randy Martinez MD;  Location: [facility] OR    DAVINCI NEPHROURETERECTOMY Left 6/16/2021    Procedure: NEPHROURETERECTOMY, ROBOT-ASSISTED, lymph node dissection;  Surgeon: Javier Mcnulty MD;  Location: [facility] OR    ESOPHAGOSCOPY, GASTROSCOPY, DUODENOSCOPY (EGD), COMBINED N/A 9/30/2022    Procedure: UPPER ENDOSCOPIC ULTRASOUND;  Surgeon: Jason Dennis MD;  [facility]    INSERT PORT VASCULAR ACCESS N/A 6/20/2023    Procedure: port-a-cath placement;  Surgeon: Antony Croft MD;  Location: [facility] OR    PHACOEMULSIFICATION WITH STANDARD INTRAOCULAR LENS IMPLANT Right 10/9/2018    Procedure: PHACOEMULSIFICATION WITH STANDARD INTRAOCULAR LENS IMPLANT;  PHACOEMULSIFICATION CATARACT EXTRACTION POSTERIOR CHAMBER LENS RIGHT;  Surgeon: Marlo Mcconnell MD;  Location: [facility] OR    PHACOEMULSIFICATION WITH STANDARD INTRAOCULAR LENS IMPLANT Left 10/23/2018    Procedure: PHACOEMULSIFICATION CATARACT EXTRACTION POSTERIOR CHAMBER LENS LEFT;  Surgeon: Marlo Mcconnell MD;  Location: [facility] OR       REVIEW OF SYSTEMS:   Negative with exception of HPI    MEDICATIONS:    Current  Outpatient Medications   Medication Sig Dispense Refill    acetaminophen (TYLENOL) 325 MG tablet Take 2 tablets (650 mg) by mouth 4 times daily 1 tablet 0    artificial tears (GENTEAL) 0.1-0.2-0.3 % ophthalmic solution Place 2 drops into both eyes daily as needed 15 mL 3    aspirin 81 MG EC tablet Take 81 mg by mouth 2 times daily      atorvastatin (LIPITOR) 40 MG tablet TAKE 1 TABLET BY MOUTH 90 tablet 2    blood glucose (ONETOUCH VERIO IQ) test strip USE TO TEST BLOOD SUGAR 3 TIMES DAILY 100 strip 11    brimonidine (ALPHAGAN-P) 0.15 % ophthalmic solution INSTILL 1 DROP INTO RIGHT EYE TWICE DAILY  12    Cholecalciferol (VITAMIN D3) 25 MCG TABS Take 25 mcg by mouth daily      Continuous Blood Gluc  (FREESTYLE DELILAH 2 READER) TENNILLE 1 each continuous 1 each 0    Continuous Blood Gluc Sensor (FREESTYLE DELILAH 2 SENSOR) MISC 1 each every 14 days 2 each 11    insulin aspart (NOVOLOG PEN) 100 UNIT/ML pen 5 units with meals, and for glucometer 150-200 add 2 units SQ, 201-250 add 4 units, >250 add 6 units 15 mL 0    insulin degludec (TRESIBA FLEXTOUCH) 100 UNIT/ML pen Inject 18 Units Subcutaneous daily 15 mL 0    insulin NPH (NOVOLIN N FLEXPEN) 100 UNIT/ML injection Inject 14 units in the morning. Then  6-8 hours later, inject 14 units. At bedtime inject 7 units. For injection during steroid treatment resulting in hyperglycemia. 15 mL 1    insulin pen needle (BD PEN NEEDLE DONTAE 2ND GEN) 32G X 4 MM miscellaneous USE 3 PEN NEEDLES DAILY 100 each 11    latanoprost (XALATAN) 0.005 % ophthalmic solution Place 1 drop into both eyes At Bedtime      levothyroxine (SYNTHROID/LEVOTHROID) 112 MCG tablet Take 1 tablet (112 mcg) by mouth daily 90 tablet 1    naloxone (NARCAN) 4 MG/0.1ML nasal spray Spray 1 spray (4 mg) into one nostril alternating nostrils as needed for opioid reversal every 2-3 minutes until assistance arrives 0.2 mL 1    oxyCODONE (OXYCONTIN) 20 MG 12 hr tablet Take 1 tablet (20 mg) by mouth every 12 hours 42  tablet 0    oxyCODONE (ROXICODONE) 5 MG tablet Take 5 mg by mouth every 4 hours And 5mg Q4h PRN      polyethylene glycol (MIRALAX) 17 g packet Take 1 packet by mouth daily      polyethylene glycol (MIRALAX) 17 GM/Dose powder Take 17 g (1 Capful) by mouth daily as needed for constipation 1 g 0    senna-docusate (SENOKOT S) 8.6-50 MG tablet Take 2 tablets by mouth 2 times daily      VITRON-C  MG TABS tablet Take 1 tablet by mouth daily           ALLERGIES/SENSITIVITIES:     Allergies   Allergen Reactions    No Clinical Screening - See Comments Other (See Comments)     Beta blocker in glaucoma gtt.s caused low pulse and passing out     Timolol      Beta blocker in glaucoma gtt.s caused low pulse and passing out   - patient thinks it was timolol but not 100% sure which beta blocker eye drop.     Bactrim [Sulfamethoxazole-Trimethoprim] Rash       PERTINENT SOCIAL HISTORY: personally reviewed   Social History     Socioeconomic History    Marital status:      Spouse name: None    Number of children: 3    Years of education: None    Highest education level: None   Occupational History    Occupation: [employer],  and a few other positions   Tobacco Use    Smoking status: Former     Years: 30.00     Types: Cigarettes     Quit date: 1992     Years since quittin.5    Smokeless tobacco: Never   Vaping Use    Vaping Use: Never used   Substance and Sexual Activity    Alcohol use: Yes     Comment: < 1 drink a month    Drug use: No    Sexual activity: Not Currently   Other Topics Concern    Parent/sibling w/ CABG, MI or angioplasty before 65F 55M? No   Social History Narrative    , 3 children, he lives in [place], [place] but is in [place] visiting his daughter.  He is retired, he previously worked in the [employer] in [place]. (last updated 9/10/2022)          FAMILY HISTORY:  Family History   Problem Relation Age of Onset    Diabetes Mother     Other - See Comments Father          cause of death unknown    Parkinsonism Brother     Coronary Artery Disease No family hx of     Hypertension No family hx of     Hyperlipidemia No family hx of     Cerebrovascular Disease No family hx of     Breast Cancer No family hx of     Colon Cancer No family hx of     Prostate Cancer No family hx of     Anesthesia Reaction No family hx of     Asthma No family hx of     Thyroid Disease No family hx of     Genetic Disorder No family hx of         PHYSICAL EXAM:   Constitutional: /68   Pulse 91   Temp 99.6  F (37.6  C) (Rectal)   Resp 21      Exam limited due to AMS  Mental Status: Alert. Not answering questions. Talking incessantly, nonsensical. Some words are not understandable.      Cranial Nerves: PERRL, EOMI, face symmetric    Motor: Normal bulk and tone all muscle groups of upper and lower extremities.    Strength:   Seems to have full strength  Does not participate in strength exam     Sensory: Sensation intact bilaterally to light touch.     Coordination: not tested      Reflexes: No hoffmans/babinski/ clonus.    IMAGING:  I personally reviewed all radiographic images, image reports from outside.     DANILO Martinez [facility] Neurosurgery        Cc:   No referring provider defined for this encounter.    Libia Wallace  [unfilled]

## 2023-07-26 NOTE — ED NOTES
Bed: JNED-02  Expected date:   Expected time:   Means of arrival: Ambulance  Comments:  [facility]: Confusion, last known well 1000

## 2023-07-26 NOTE — CONSULTS
[facility]    Stroke Telephone Note    I was called by Panchito Espinoza on 07/26/23 regarding patient Colin Baxter. The patient is a 78 year old male who is currently in a TCU with multiple medical problems. At 10 am, he was noted by staff to be acting in an odd manner and talking incomprehensibly. EMS noted stable vital signs and glucose. In the ED, he is awake but not following commands and no verbal output. Of note, the patient had traumatic SDH noted on a CT scan done 2 weeks ago. Per Care everywhere, he was admitted to [facility] from 7/12 to 7/20 with pathological L femur fracture. Patient has metastatic malignancy and CKD.       There were no vitals taken for this visit.     Stroke Code Data (for stroke code without tele)  Stroke code activated 07/26/23   1306   Stroke provider first response  07/26/23   1307            Last known normal 07/26/23   1000        Time of discovery   (or onset of symptoms) 07/26/23   1000   Head CT read by Stroke Neuro Dr/Provider 07/26/23   1320   Was stroke code de-escalated? Yes 07/26/23 1327          Imaging Findings  CT head: bilat SDH, mostly chronic with a small acute component on the right   CTA head/neck: no stenosis or occlusion     Intravenous Thrombolysis  Not given due to:   - active bleeding    Endovascular Treatment  Not initiated due to absence of proximal vessel occlusion    Impression  Encephalopathy and speech impairment in the context of SDH.  He could have had a seizure.     Recommendations   Admit to local hospital   Avoid all antithrombitics   Avoid CNS depressants such as narcotics  SBP goal < 140   S glucose goal < 180   If there is a witnessed seizure, give 2 mg ativan IV and load with keppra 2000 mg   Neurosurgery consult to assess SDH  General neurology consult to assess for seizure   NPO until swallow evaluated     My recommendations are based on the information provided over the phone by Colin Baxter's in-person  "providers. They are not intended to replace the clinical judgment of his in-person providers. I was not requested to personally see or examine the patient at this time.    Lilly Hills MD, Msc, TORSTEN, FAAN   of Neurology  [facility]     07/26/2023 1:30 PM  To page me or covering stroke neurology team member, click here: AMCOM  Choose \"On Call\" tab at top, then search dropdown box for \"Neurology Adult\" & press Enter, look for Neuro ICU/Stroke      "

## 2023-07-26 NOTE — ED PROVIDER NOTES
Emergency Department Encounter         FINAL IMPRESSION:  Hyponatremia, subdural hematoma        ED COURSE AND MEDICAL DECISION MAKING   1:05 PM I met the patient and performed my initial interview and exam.   1:09 PM Tier 1 stroke code called  1:19 PM I spoke with stroke team  1:25 PM I spoke with stroke neurologist  1:31 PM I spoke with stroke radiolgist   1:32 PM I rechecked the patient   1:46 PM I spoke with neurosurgery  1:54 PM Will be calling and discussing with the patient's family regarding plan of care as neurosurgery would likely take the patient into surgery.  2:20 PM Patient's family ultimately decided to not proceed with surgery.   2:48 PM I spoke with nephrology, who recommends a hypertonic. Recheck in 2 hours.   4:31 PM I spoke with neurosurgery    ED Course as of 07/26/23 1457   Wed Jul 26, 2023   1312 Patient is a 78-year-old male with multiple medical problems, here from a TCU with altered mental status.  Last known normal was around 9 or 10.  I at that point TCU staff noticed patient with garbled speech, confusion.  Normally is alert and oriented x4.  Only assist of 1 usually however was unable to walk or follow instructions.  Glucose was normal at the time.  Sent here for evaluation.  Arrival here he is maintaining his airway.  Is alert however not oriented.  Would not answer questions.  Not follow instructions.  Tier 1 stroke code was called.  Vitals otherwise are stable.  Per chart review, it looks like patient had a CT of his head done for some sort of head trauma 2 weeks ago, which showed 2 small subdural hematomas.  Due to this fact, patient will not be a TNK candidate however we will still proceed with the stroke work-up here.   1358 Discussed with neurosurgery regarding the findings.  They said because of the size they would recommend surgery however understand the clinical picture with patient's extensive cancer as well as family is hesitant see for full work-up.  Discussed with  daughter at bedside who will be touching base with her sisters regarding continued goals of care.  If they would like surgery or interested in surgery he will be transferred.  Otherwise he can stay here.    Blood work returning showing a sodium of 121.  This could be the cause of his delirium.  We will touch base with nephrology regarding hypertonic administration.   1402 EKG is sinus 84, no signs acute ischemia, no inversions no depressions no STEMI criteria QTc is 491, unchanged from previous which was in September 2022           Medical Decision Making    History:  Supplemental history from: EMS  External Record(s) reviewed: Documented in chart, if applicable. and Prior Imaging: CT Head without Contrast (7/12/23)    Work Up:  Chart documentation includes differential considered and any EKGs or imaging independently interpreted by provider, where specified.  In additional to work up documented, I considered the following work up: Documented in chart, if applicable.    External consultation:  Discussion of management with another provider: Documented in chart, if applicable and Neurosurgery and Other: Nephrology and Stroke Team    Complicating factors:  Care impacted by chronic illness: Cancer/Chemotherapy, Chronic Kidney Disease, Diabetes, Hyperlipidemia, and Hypertension  Care affected by social determinants of health: N/A    Disposition considerations: Admit.          Critical Care     Performed by: Panchito Espinoza or    Authorized by: Panchito Espinoza  Total critical care time: 60 minutes  Critical care was necessary to treat or prevent imminent or life-threatening deterioration of the following conditions: hyponatremia, subdural  Critical care was time spent personally by me on the following activities: development of treatment plan with patient or surrogate, discussions with consultants, examination of patient, evaluation of patient's response to treatment, obtaining history from patient or surrogate, ordering and  performing treatments and interventions, ordering and review of laboratory studies, ordering and review of radiographic studies, re-evaluation of patient's condition and monitoring for potential decompensation.  Critical care time was exclusive of separately billable procedures and treating other patients.'    At the conclusion of the encounter I discussed the results of all the tests and the disposition. The questions were answered. The patient or family acknowledged understanding and was agreeable with the care plan.      MEDICATIONS GIVEN IN THE EMERGENCY DEPARTMENT:  Medications   sodium chloride 3% infusion 100 mL (has no administration in time range)   iopamidol (ISOVUE-370) solution 75 mL (75 mLs Intravenous $Given 7/26/23 1325)       NEW PRESCRIPTIONS STARTED AT TODAY'S ED VISIT:  New Prescriptions    No medications on file       HPI     Patient information obtained from: EMS    Use of Interpretor: N/A      Colin Baxter is a 78 year old male with a pertinent history of hypertension, HLD, DMII, CKD, and liver and ureter cancer who presents to this ED via private vehicle for evaluation of altered mental status.    Per chart review, head CT scan on 7/12/23 done for some sort of head trauma which showed 2 small subdural hematomas.     Per EMS, patient arrives from TCU due to sudden onset of confusion at 10 AM with inability to follow commands and with slurred speech. At baseline, patient is alert and oriented with last well known at 9 AM.     HPI limited due to altered mental status.       REVIEW OF SYSTEMS:  Review of Systems   Constitutional: Negative for fever, malaise  HEENT: Negative runny nose, sore throat, ear pain, neck pain  Respiratory: Negative for shortness of breath, cough, congestion  Cardiovascular: Negative for chest pain, leg edema  Gastrointestinal: Negative for abdominal distention, abdominal pain, constipation, vomiting, nausea, diarrhea  Genitourinary: Negative for dysuria and hematuria.    Integument: Negative for rash, skin breakdown  Neurological: Negative for paresthesias, weakness, headache. Positive for confusion  Musculoskeletal: Negative for joint pain, joint swelling    ROS limited due to altered mental status.         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/19/2021    Chronic kidney disease     Diabetes mellitus, type 2 (H)     Dyslipidemia     Glaucoma     Hyperlipidemia     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) 12/11/2020    Obesity     Pancreatic mass     Renal cell carcinoma, left (H) 2021    Ureter cancer, left (H)        Past Surgical History:   Procedure Laterality Date    APPENDECTOMY  1958    COLONOSCOPY  2-    COLONOSCOPY N/A 9/13/2022    Procedure: COLONOSCOPY;  Surgeon: Keyon Zimmerman MD;  Location: [facility] OR    COLONOSCOPY N/A 2/13/2023    Procedure: COLONOSCOPY;  Surgeon: Antony Croft MD;  Location: [facility] OR    COMBINED CYSTOSCOPY, RETROGRADES, URETEROSCOPY, INSERT STENT Left 1/18/2021    Procedure: CYSTOURETEROSCOPY, WITH RETROGRADE PYELOGRAM with interpretation of fluroscopy, ureteroscopy, ureteral biopsy, bladder biopsy and fulgaration;  Surgeon: Shonda Morrell MD;  Location: [facility] OR    CYSTOSCOPY N/A 6/16/2021    Procedure: CYSTOSCOPY;  Surgeon: Javier Mcnulty MD;  Location: [facility] OR    CYSTOSCOPY, BIOPSY BLADDER, COMBINED N/A 9/8/2015    Procedure: COMBINED CYSTOSCOPY, BIOPSY BLADDER;  Surgeon: Randy Martinez MD;  Location: [facility] OR    CYSTOSCOPY, BIOPSY BLADDER, COMBINED N/A 2/14/2017    Procedure: COMBINED CYSTOSCOPY, BIOPSY BLADDER;  Surgeon: Randy Martinez MD;  Location: [facility] OR    CYSTOSCOPY, BIOPSY BLADDER, COMBINED N/A 11/13/2018    Procedure: COMBINED CYSTOSCOPY, BIOPSY BLADDER;  Surgeon: Ed Helm MD;  Location: [facility] OR    CYSTOSCOPY, BIOPSY  BLADDER, COMBINED N/A 11/5/2021    Procedure: CYSTOSCOPY, WITH BLADDER BIOPSY;  Surgeon: Shonda Morrell MD;  Location: [facility] OR    CYSTOSCOPY, RETROGRADES, COMBINED Bilateral 11/13/2018    Procedure: Bilateral Retrograde Pyelagram, Bladder Biopsy;  Surgeon: Ed Helm MD;  Location: [facility] OR    CYSTOSCOPY, RETROGRADES, COMBINED Right 11/5/2021    Procedure: CYSTOSCOPY, WITH RETROGRADE PYELOGRAM;  Surgeon: Shonda Morrell MD;  Location: [facility] OR    CYSTOSCOPY, RETROGRADES, INSERT STENT URETER(S), COMBINED Left 9/8/2015    Procedure: COMBINED CYSTOSCOPY, RETROGRADES, INSERT STENT URETER(S);  Surgeon: Randy Martinez MD;  Location: [facility] OR    CYSTOSCOPY, TRANSURETHRAL RESECTION (TUR) TUMOR BLADDER, COMBINED N/A 9/8/2015    Procedure: COMBINED CYSTOSCOPY, TRANSURETHRAL RESECTION (TUR) TUMOR BLADDER;  Surgeon: Randy Martinez MD;  Location: [facility] OR    CYSTOSCOPY, TRANSURETHRAL RESECTION (TUR) TUMOR BLADDER, COMBINED N/A 1/12/2016    Procedure: COMBINED CYSTOSCOPY, TRANSURETHRAL RESECTION (TUR) TUMOR BLADDER;  Surgeon: Randy Martinez MD;  Location: [facility] OR    CYSTOSCOPY, TRANSURETHRAL RESECTION (TUR) TUMOR BLADDER, COMBINED N/A 9/13/2016    Procedure: COMBINED CYSTOSCOPY, TRANSURETHRAL RESECTION (TUR) TUMOR BLADDER;  Surgeon: Randy Martinez MD;  Location: [facility] OR    DAVINCI NEPHROURETERECTOMY Left 6/16/2021    Procedure: NEPHROURETERECTOMY, ROBOT-ASSISTED, lymph node dissection;  Surgeon: Javier Mcnulty MD;  Location: [facility] OR    ESOPHAGOSCOPY, GASTROSCOPY, DUODENOSCOPY (EGD), COMBINED N/A 9/30/2022    Procedure: UPPER ENDOSCOPIC ULTRASOUND;  Surgeon: Jason Dennis MD;  [facility]    INSERT PORT VASCULAR ACCESS N/A 6/20/2023    Procedure: port-a-cath placement;  Surgeon: Antony Croft MD;  Location: [facility] OR    PHACOEMULSIFICATION WITH STANDARD INTRAOCULAR LENS IMPLANT Right 10/9/2018    Procedure: PHACOEMULSIFICATION WITH STANDARD INTRAOCULAR LENS IMPLANT;   PHACOEMULSIFICATION CATARACT EXTRACTION POSTERIOR CHAMBER LENS RIGHT;  Surgeon: Marlo Mcconnell MD;  Location: [facility] OR    PHACOEMULSIFICATION WITH STANDARD INTRAOCULAR LENS IMPLANT Left 10/23/2018    Procedure: PHACOEMULSIFICATION CATARACT EXTRACTION POSTERIOR CHAMBER LENS LEFT;  Surgeon: Marlo Mcconnell MD;  Location: [facility] OR       Social History     Tobacco Use    Smoking status: Former     Years: 30.00     Types: Cigarettes     Quit date: 1992     Years since quittin.5    Smokeless tobacco: Never   Vaping Use    Vaping Use: Never used   Substance Use Topics    Alcohol use: Yes     Comment: < 1 drink a month    Drug use: No       acetaminophen (TYLENOL) 325 MG tablet  artificial tears (GENTEAL) 0.1-0.2-0.3 % ophthalmic solution  aspirin 81 MG EC tablet  atorvastatin (LIPITOR) 40 MG tablet  brimonidine (ALPHAGAN-P) 0.15 % ophthalmic solution  Cholecalciferol (VITAMIN D3) 25 MCG TABS  insulin aspart (NOVOLOG PEN) 100 UNIT/ML pen  insulin aspart (NOVOLOG PEN) 100 UNIT/ML pen  insulin degludec (TRESIBA FLEXTOUCH) 100 UNIT/ML pen  latanoprost (XALATAN) 0.005 % ophthalmic solution  levothyroxine (SYNTHROID/LEVOTHROID) 112 MCG tablet  naloxone (NARCAN) 4 MG/0.1ML nasal spray  oxyCODONE (OXYCONTIN) 20 MG 12 hr tablet  oxyCODONE (ROXICODONE) 5 MG tablet  oxyCODONE (ROXICODONE) 5 MG tablet  polyethylene glycol (MIRALAX) 17 GM/Dose powder  senna-docusate (SENOKOT S) 8.6-50 MG tablet  VITRON-C  MG TABS tablet  blood glucose (ONETOUCH VERIO IQ) test strip  Continuous Blood Gluc  (FREESTYLE DELILAH 2 READER) TENNILLE  Continuous Blood Gluc Sensor (FREESTYLE DELILAH 2 SENSOR) MISC  insulin pen needle (BD PEN NEEDLE DONTAE 2ND GEN) 32G X 4 MM miscellaneous            PHYSICAL EXAM     BP (!) 154/68   Pulse 75   Temp 99.6  F (37.6  C) (Rectal)   Resp 24   SpO2 100%       PHYSICAL EXAM:     General: Patient appears well, nontoxic. Would not answer questions or follow instructions  HEENT: Moist mucous membranes,  No  head trauma.    Cardiovascular: Normal rate, normal rhythm, no extremity edema.  No appreciable murmur.  Respiratory: No signs of respiratory distress, lungs are clear to auscultation bilaterally with no wheezes rhonchi or rales.  Abdominal: Soft, nontender, nondistended, no palpable masses, no guarding, no rebound  Musculoskeletal: Full range of motion of joints, no deformities appreciated.  Left-sided postoperative site looks well, no redness or streaking.  No soaked bandages  Neurological: Alert but not oriented, grossly neurologically intact with moving extremities spontaneously otherwise will not follow instructions.  Psychological: Normal affect and mood.  Integument: No rashes appreciated      RESULTS       Labs Ordered and Resulted from Time of ED Arrival to Time of ED Departure   BASIC METABOLIC PANEL - Abnormal       Result Value    Sodium 121 (*)     Potassium 4.2      Chloride 88 (*)     Carbon Dioxide (CO2) 14 (*)     Anion Gap 19 (*)     Urea Nitrogen 16.4      Creatinine 1.18 (*)     Calcium 7.7 (*)     Glucose 210 (*)     GFR Estimate 64     INR - Abnormal    INR 1.26 (*)    PARTIAL THROMBOPLASTIN TIME - Abnormal    aPTT 43 (*)    TROPONIN T, HIGH SENSITIVITY - Abnormal    Troponin T, High Sensitivity 28 (*)    CBC WITH PLATELETS AND DIFFERENTIAL - Abnormal    WBC Count 7.7      RBC Count 3.08 (*)     Hemoglobin 9.6 (*)     Hematocrit 27.4 (*)     MCV 89      MCH 31.2      MCHC 35.0      RDW 13.5      Platelet Count 499 (*)     % Neutrophils 82      % Lymphocytes 8      % Monocytes 9      % Eosinophils 0      % Basophils 0      % Immature Granulocytes 1      NRBCs per 100 WBC 0      Absolute Neutrophils 6.3      Absolute Lymphocytes 0.6 (*)     Absolute Monocytes 0.7      Absolute Eosinophils 0.0      Absolute Basophils 0.0      Absolute Immature Granulocytes 0.1      Absolute NRBCs 0.0     PROCALCITONIN - Abnormal    Procalcitonin 0.35 (*)    AMMONIA - Abnormal    Ammonia <10 (*)    LACTIC ACID  WHOLE BLOOD - Normal    Lactic Acid 1.8     NT PROBNP INPATIENT - Normal    N terminal Pro BNP Inpatient 518     GLUCOSE MONITOR NURSING POCT   COMPREHENSIVE METABOLIC PANEL   ROUTINE UA WITH MICROSCOPIC REFLEX TO CULTURE   PLATELET FUNCTION CLOSURE WITH REFLEX   HEPATIC FUNCTION PANEL   SODIUM   SODIUM   SODIUM   BLOOD CULTURE   BLOOD CULTURE       CTA Head Neck with Contrast   Final Result   IMPRESSION:    HEAD CT:   1.  Chronic bilateral subdural hematomas on the right measuring up to 12 mm in thickness and on the left measuring up to 8 mm in thickness. Shallow right hyperattenuating component is present compatible with acute on chronic subdural hemorrhage.    Localized mass effect without midline shift.      2.  No finding for mass or definite finding for acute infarct. Very small chronic lacunar infarct or dilated perivascular space right caudate.      HEAD CTA:    1.  Developmental variation of the [place] without vascular cutoff, aneurysm, or flow-limiting stenosis.      NECK CTA:   1.  No significant stenosis either cervical carotid or vertebral system. No findings for dissection.      Acute on chronic intracranial hemorrhage on noncontrast head CT and lack of vascular cutoff on CTA called to Dr. Espinoza at 1:18 and 1:30 PM respectively.                        PROCEDURES:  Procedures:  Procedures       I, Erik Rodriguez am serving as a scribe to document services personally performed by Panchito Espinoza DO, based on my observations and the provider's statements to me.  I, Panchito Espinoza DO, attest that Erik Rodriguez is acting in a scribe capacity, has observed my performance of the services and has documented them in accordance with my direction.    Panchito Espinoza DO  Emergency Medicine  [facility] EMERGENCY DEPARTMENT       Panchito Espinoza DO  07/26/23 4768

## 2023-07-26 NOTE — ED TRIAGE NOTES
Patient arrives from a TCU via [facility] due to sudden onset confusion at 1000.  Last known well 0900.  Patient not following commands and speech appears slurred/garbled.  Arrives with 20 gauge IV in right hand.  Per EMS, blood glucose 210 mg/dL and blood pressure 147/76 mmHg.  Staff at facility reports patient is typically alert and oriented x4 and only assist of one.  Patient agitated with EMS and pulling at IV, Benadryl given in route.  Dr. Espinoza at bedside to evaluate, Tier one stroke code initiated.

## 2023-07-26 NOTE — H&P
[facility]    History and Physical - Hospitalist Service       Date of Admission:  7/26/2023    Assessment & Plan    Patient is a 78-year-old male with history of metastatic bladder cancer who presents with altered mental status.  Found to have sodium of 121 and bilateral subdural hematoma.    #Hyponatremia  Na initially 121  S/p 3% hypertonic saline in ED per nephrology  Na now 127  Nephrology consulted  Q2H Na levels    #Altered mental status  Likely related to hyponatremia   UA shows no clear UTI  IV ativan for agitation    #Bilateral subdural hematoma  Mostly chronic with an area of acute, mass effect no shift  Head trauma ~4 weeks ago  Neurosurgery consulted and offered surgical intervention (which would involve transfer to [facility]) but family declined    #Metastatic bladder cancer  #Recent pathologic femur fracture s/p surgery and TCU placement  Daughter confirmed DNR/DNI status. She is okay with medical work up for altered mental status   Consult palliative care to help address goals of care    #Diabetes type II  SSI       Diet: NPO for Medical/Clinical Reasons Except for: Meds    DVT Prophylaxis: Pneumatic Compression Devices  [place]anto Catheter: Not present  Lines: None     Cardiac Monitoring: None  Code Status: No CPR- Do NOT Intubate      Clinically Significant Risk Factors Present on Admission         # Hyponatremia: Lowest Na = 121 mmol/L in last 2 days, will monitor as appropriate     # Anion Gap Metabolic Acidosis: Highest Anion Gap = 20 mmol/L in last 2 days, will monitor and treat as appropriate  # Hypoalbuminemia: Lowest albumin = 2.8 g/dL at 7/26/2023  1:31 PM, will monitor as appropriate  # Coagulation Defect: INR = 1.26 (Ref range: 0.85 - 1.15) and/or PTT = 43 Seconds (Ref range: 22 - 38 Seconds), will monitor for bleeding  # Drug Induced Platelet Defect: home medication list includes an antiplatelet medication       # DMII: A1C = 8.8 % (Ref range: <5.7 %) within past 6  months            Disposition Plan      Expected Discharge Date: 07/29/2023      Destination: inpatient rehabilitation facility            Sukumar Cleveland DO  Hospitalist Service  [facility]  Securely message with [employer] (more info)  Text page via [employer] Paging/Directory     ______________________________________________________________________    Chief Complaint   Altered mental status    History is obtained from the electronic health record, emergency department physician, and patient's daughter    History of Present Illness   Patient is a 78-year-old male with history of metastatic bladder cancer who presents to the emergency department with altered mental status.  Patient's daughter is at bedside and reports that he was mentating normally this morning.  Now he is very confused and agitated.  Found to have sodium of 121 and has been started on hypertonic saline.  CTA of the head revealed bilateral chronic subdural hematoma with an area of acute on chronic.  Neurosurgery consulted and family decided against surgery.  They are okay with medical work-up for altered mental status.      Past Medical History    Past Medical History:   Diagnosis Date    Acquired hypothyroidism     Anemia     Benign essential hypertension     Bladder cancer (H)     Carcinoma in situ of bladder 11/19/2021    Chronic kidney disease     Diabetes mellitus, type 2 (H)     Dyslipidemia     Glaucoma     Hyperlipidemia     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) 12/11/2020    getting BCG (from his Urologist in [place], [place])    Obesity     Pancreatic mass     Renal cell carcinoma, left (H) 2021    Left nephrectomy    Ureter cancer, left (H)        Past Surgical History   Past Surgical History:   Procedure Laterality Date    APPENDECTOMY  1958    COLONOSCOPY  2-    COLONOSCOPY N/A 9/13/2022    Procedure: COLONOSCOPY;  Surgeon: Keyon Zimmerman MD;  Location: [facility] OR    COLONOSCOPY N/A  2/13/2023    Procedure: COLONOSCOPY;  Surgeon: Antony Croft MD;  Location: [facility] OR    COMBINED CYSTOSCOPY, RETROGRADES, URETEROSCOPY, INSERT STENT Left 1/18/2021    Procedure: CYSTOURETEROSCOPY, WITH RETROGRADE PYELOGRAM with interpretation of fluroscopy, ureteroscopy, ureteral biopsy, bladder biopsy and fulgaration;  Surgeon: Shonda Morrell MD;  Location: [facility] OR    CYSTOSCOPY N/A 6/16/2021    Procedure: CYSTOSCOPY;  Surgeon: Javier Mcnulty MD;  Location: [facility] OR    CYSTOSCOPY, BIOPSY BLADDER, COMBINED N/A 9/8/2015    Procedure: COMBINED CYSTOSCOPY, BIOPSY BLADDER;  Surgeon: Randy Martinez MD;  Location: [facility]    CYSTOSCOPY, BIOPSY BLADDER, COMBINED N/A 2/14/2017    Procedure: COMBINED CYSTOSCOPY, BIOPSY BLADDER;  Surgeon: Randy Martinez MD;  Location: [facility]    CYSTOSCOPY, BIOPSY BLADDER, COMBINED N/A 11/13/2018    Procedure: COMBINED CYSTOSCOPY, BIOPSY BLADDER;  Surgeon: Ed Helm MD;  Location: [facility] OR    CYSTOSCOPY, BIOPSY BLADDER, COMBINED N/A 11/5/2021    Procedure: CYSTOSCOPY, WITH BLADDER BIOPSY;  Surgeon: Shonda Morrell MD;  Location: [facility]    CYSTOSCOPY, RETROGRADES, COMBINED Bilateral 11/13/2018    Procedure: Bilateral Retrograde Pyelagram, Bladder Biopsy;  Surgeon: Ed Helm MD;  Location: [facility] OR    CYSTOSCOPY, RETROGRADES, COMBINED Right 11/5/2021    Procedure: CYSTOSCOPY, WITH RETROGRADE PYELOGRAM;  Surgeon: Shonda Morrell MD;  Location: [facility]    CYSTOSCOPY, RETROGRADES, INSERT STENT URETER(S), COMBINED Left 9/8/2015    Procedure: COMBINED CYSTOSCOPY, RETROGRADES, INSERT STENT URETER(S);  Surgeon: Randy Martinez MD;  [facility]: [facility]    CYSTOSCOPY, TRANSURETHRAL RESECTION (TUR) TUMOR BLADDER, COMBINED N/A 9/8/2015    Procedure: COMBINED CYSTOSCOPY, TRANSURETHRAL RESECTION (TUR) TUMOR BLADDER;  Surgeon: Randy Martinez MD;  Location: [facility]    CYSTOSCOPY, TRANSURETHRAL RESECTION (TUR) TUMOR BLADDER, COMBINED N/A 1/12/2016    Procedure:  COMBINED CYSTOSCOPY, TRANSURETHRAL RESECTION (TUR) TUMOR BLADDER;  Surgeon: Randy Martinez MD;  Location: [facility]    CYSTOSCOPY, TRANSURETHRAL RESECTION (TUR) TUMOR BLADDER, COMBINED N/A 2016    Procedure: COMBINED CYSTOSCOPY, TRANSURETHRAL RESECTION (TUR) TUMOR BLADDER;  Surgeon: Randy Martinez MD;  Location: [facility]    DAVINCI NEPHROURETERECTOMY Left 2021    Procedure: NEPHROURETERECTOMY, ROBOT-ASSISTED, lymph node dissection;  Surgeon: Javier Mcnulty MD;  Location: [facility] OR    ESOPHAGOSCOPY, GASTROSCOPY, DUODENOSCOPY (EGD), COMBINED N/A 2022    Procedure: UPPER ENDOSCOPIC ULTRASOUND;  Surgeon: Jason Dennis MD;  Location: [facility] OR    INSERT PORT VASCULAR ACCESS N/A 2023    Procedure: port-a-cath placement;  Surgeon: Antony Croft MD;  Location: [facility]    PHACOEMULSIFICATION WITH STANDARD INTRAOCULAR LENS IMPLANT Right 10/9/2018    Procedure: PHACOEMULSIFICATION WITH STANDARD INTRAOCULAR LENS IMPLANT;  PHACOEMULSIFICATION CATARACT EXTRACTION POSTERIOR CHAMBER LENS RIGHT;  Surgeon: Marlo Mcconnell MD;  Location: [facility]    PHACOEMULSIFICATION WITH STANDARD INTRAOCULAR LENS IMPLANT Left 10/23/2018    Procedure: PHACOEMULSIFICATION CATARACT EXTRACTION POSTERIOR CHAMBER LENS LEFT;  Surgeon: Marlo Mcconnell MD;  Location: [facility] OR       Prior to Admission Medications   Prior to Admission Medications   Prescriptions Last Dose Informant Patient Reported? Taking?   Cholecalciferol (VITAMIN D3) 25 MCG TABS 2023 at am Self Yes Yes   Sig: Take 25 mcg by mouth daily   Continuous Blood Gluc  (FREESTYLE DELILAH 2 READER) TENNILLE  Self No No   Si each continuous   Continuous Blood Gluc Sensor (FREESTYLE DELILAH 2 SENSOR) MISC  Self No No   Si each every 14 days   VITRON-C  MG TABS tablet 2023 at am Self Yes Yes   Sig: Take 1 tablet by mouth daily   acetaminophen (TYLENOL) 325 MG tablet 2023 at am  No Yes   Sig: Take 2 tablets (650 mg) by  mouth 4 times daily   artificial tears (GENTEAL) 0.1-0.2-0.3 % ophthalmic solution Unknown  No Yes   Sig: Place 2 drops into both eyes daily as needed   aspirin 81 MG EC tablet 7/26/2023 at am  Yes Yes   Sig: Take 81 mg by mouth 2 times daily   atorvastatin (LIPITOR) 40 MG tablet 7/25/2023 at pm  Yes Yes   Sig: Take 40 mg by mouth At Bedtime   blood glucose (ONETOUCH VERIO IQ) test strip  Self No No   Sig: USE TO TEST BLOOD SUGAR 3 TIMES DAILY   brimonidine (ALPHAGAN-P) 0.15 % ophthalmic solution 7/26/2023 at am Self Yes Yes   Sig: Place 1 drop into the right eye 2 times daily   insulin aspart (NOVOLOG PEN) 100 UNIT/ML pen 7/25/2023 at pm  Yes Yes   Sig: Inject 5 Units Subcutaneous 3 times daily (with meals)   insulin aspart (NOVOLOG PEN) 100 UNIT/ML pen 7/26/2023 at am  Yes Yes   Sig: Inject 2-6 Units Subcutaneous 3 times daily (with meals) Sliding scale:   -200: 2 units  201-250: 4 units  >250: 6 units   insulin degludec (TRESIBA FLEXTOUCH) 100 UNIT/ML pen 7/25/2023 at am  No Yes   Sig: Inject 18 Units Subcutaneous daily   insulin pen needle (BD PEN NEEDLE DONTAE 2ND GEN) 32G X 4 MM miscellaneous  Self No No   Sig: USE 3 PEN NEEDLES DAILY   latanoprost (XALATAN) 0.005 % ophthalmic solution 7/25/2023 at pm  Yes Yes   Sig: Place 1 drop into both eyes At Bedtime   levothyroxine (SYNTHROID/LEVOTHROID) 112 MCG tablet 7/26/2023 at am  No Yes   Sig: Take 1 tablet (112 mcg) by mouth daily   naloxone (NARCAN) 4 MG/0.1ML nasal spray Unknown  No Yes   Sig: Spray 1 spray (4 mg) into one nostril alternating nostrils as needed for opioid reversal every 2-3 minutes until assistance arrives   oxyCODONE (OXYCONTIN) 20 MG 12 hr tablet 7/26/2023 at am  No Yes   Sig: Take 1 tablet (20 mg) by mouth every 12 hours   oxyCODONE (ROXICODONE) 5 MG tablet 7/26/2023 at 0740  Yes Yes   Sig: Take 5 mg by mouth every 4 hours   oxyCODONE (ROXICODONE) 5 MG tablet 7/24/2023 at pm  Yes Yes   Sig: Take 5-10 mg by mouth every 4 hours as needed  for severe pain   polyethylene glycol (MIRALAX) 17 GM/Dose powder Unknown  No Yes   Sig: Take 17 g (1 Capful) by mouth daily as needed for constipation   senna-docusate (SENOKOT S) 8.6-50 MG tablet 7/26/2023 at am  Yes Yes   Sig: Take 2 tablets by mouth 2 times daily      Facility-Administered Medications: None        Review of Systems    Unable to obtain review of systems due to altered mental status     Physical Exam   Vital Signs: Temp: 99.2  F (37.3  C) Temp src: Axillary BP: (!) 146/88 Pulse: 114   Resp: 21 SpO2: 100 % O2 Device: None (Room air)    Weight: 0 lbs 0 oz    General Appearance:  No acute distress  Respiratory: Clear to auscultation bilaterally  Cardiovascular: Regular rate and rhythm  GI: Normal bowel sounds, abdomen is soft, nondistended with no rebound or guarding  Extremities: No peripheral edema or cyanosis  Neuro: Nonsensical speech, moves all 4 extremities spontaneously      Medical Decision Making             Data     I have personally reviewed the following data over the past 24 hrs:    7.7  \   9.6 (L)   / 499 (H)     127 (L) 88 (L); 89 (L) 16.4; 15.6 /  210 (H); 197 (H)   4.2; 4.3 14 (L); 14 (L) 1.18 (H); 1.19 (H) \     ALT: <5; <5 AST: 36; 37 AP: 1,812 (H); 1,788 (H) TBILI: 0.7; 0.8   ALB: 2.8 (L); 2.8 (L) TOT PROTEIN: 6.2 (L); 6.1 (L) LIPASE: N/A     Trop: 28 (H) BNP: 518     Procal: 0.35 (H) CRP: N/A Lactic Acid: 1.8       INR:  1.26 (H) PTT:  43 (H)   D-dimer:  N/A Fibrinogen:  N/A       Imaging results reviewed over the past 24 hrs:   Recent Results (from the past 24 hour(s))   CTA Head Neck with Contrast    Narrative    EXAM: CTA HEAD NECK W CONTRAST  LOCATION: [facility]  DATE: 7/26/2023    INDICATION: Code Stroke to evaluate for potential thrombolysis and thrombectomy. PLEASE READ IMMEDIATELY.  COMPARISON: None.  CONTRAST: Isovue 370, 75 mL.  TECHNIQUE: Head and neck CT angiogram with IV contrast. Noncontrast head CT followed by axial helical CT images of  the head and neck vessels obtained during the arterial phase of intravenous contrast administration. Axial 2D reconstructed images and   multiplanar 3D MIP reconstructed images of the head and neck vessels were performed by the technologist. Dose reduction techniques were used. All stenosis measurements made according to NASCET criteria unless otherwise specified.    FINDINGS:   NONCONTRAST HEAD CT:   INTRACRANIAL CONTENTS: There are bilateral low-attenuation subdural collections extending along both frontal and parietal inner tables, on the right measuring up to 12 mm in thickness in the coronal plane and on the left measuring up to 8 mm in   thickness. These are compatible with chronic subdural hematomas. On the right there is superimposed small band of high attenuation/acute blood products measuring up to 4 mm in thickness (coronal image 28). Two very small lentiform foci of low-attenuation   change are seen along the posterior surface of the right occipital lobe compatible with additional small chronic subdural hematomas (sagittal images 18 and 27).    Although there is localized mass effect, there is no significant midline shift. Lateral and third ventricles are small in size. Gray-white matter differentiation is preserved. 2 mm focus of low-attenuation change right caudate compatible with a tiny   chronic lacunar infarct versus dilated perivascular space. Cerebellar tonsils are normally positioned. Sella is unremarkable technique. Corpus callosum is normally formed.    VISUALIZED ORBITS/SINUSES/MASTOIDS: No intraorbital abnormality. No paranasal sinus mucosal disease. No middle ear or mastoid effusion.    BONES/SOFT TISSUES: No suspicious lytic or blastic foci.    HEAD CTA:  ANTERIOR CIRCULATION: Normal enhancement is seen within the intracranial internal carotid arteries, anterior cerebral arteries, and middle cerebral arteries. No vascular cutoff, aneurysm, or flow-limiting stenosis.    POSTERIOR  CIRCULATION: Left vertebral artery is dominant. Small caliber right vertebral artery. Common origin/junctional ectasia of the takeoff of the left posterior cerebral and superior cerebellar arteries. Posterior communicating arteries are not   identified. Posterior circulation otherwise unremarkable.    DURAL VENOUS SINUSES: Dural venous sinuses are not well evaluated on the basis of this exam.    NECK CTA:  RIGHT CAROTID: No measurable stenosis or dissection. Minor atherosclerotic plaque.    LEFT CAROTID: No measurable stenosis or dissection.    VERTEBRAL ARTERIES: No focal stenosis or dissection. Left vertebral artery is larger than the right.    AORTIC ARCH: Left common carotid artery arises from the takeoff of the right brachiocephalic artery. Mild atherosclerotic plaque is seen within the aortic arch.    NONVASCULAR STRUCTURES: Left-sided Port-A-Cath. Visualized lung apices reveal centrilobular emphysema. Visualized osseous structures are without suspicious lytic or blastic foci. Moderate to advanced cervical spondylosis.      Impression    IMPRESSION:   HEAD CT:  1.  Chronic bilateral subdural hematomas on the right measuring up to 12 mm in thickness and on the left measuring up to 8 mm in thickness. Shallow right hyperattenuating component is present compatible with acute on chronic subdural hemorrhage.   Localized mass effect without midline shift.    2.  No finding for mass or definite finding for acute infarct. Very small chronic lacunar infarct or dilated perivascular space right caudate.    HEAD CTA:   1.  Developmental variation of the [place] without vascular cutoff, aneurysm, or flow-limiting stenosis.    NECK CTA:  1.  No significant stenosis either cervical carotid or vertebral system. No findings for dissection.    Acute on chronic intracranial hemorrhage on noncontrast head CT and lack of vascular cutoff on CTA called to Dr. Espinoza at 1:18 and 1:30 PM respectively.

## 2023-07-27 NOTE — PROGRESS NOTES
"Care Management Follow Up    Length of Stay (days): 1    Expected Discharge Date: 07/29/2023     Concerns to be Addressed:     Discharge planning  Patient plan of care discussed at interdisciplinary rounds: Yes    Anticipated Discharge Disposition:  Return to [facility]     Anticipated Discharge Services:  per TCU  Anticipated Discharge DME:  per TCU    Patient/family educated on Medicare website which has current facility and service quality ratings:  NA  Education Provided on the Discharge Plan:  yes, per team  Patient/Family in Agreement with the Plan:  yes, per team    Referrals Placed by CM/SW:  return to TCU  Private pay costs discussed:  not at this time, will likely need [facility] transport    Additional Information:  Placed referral to previous TCU patient was at, and plan to return to.    Social Hx:   \"Colin is in rehab at [facility] and will need to return for continued rehab. He was living in [place], but after his recent hospitalization he is at [facility] and his daughters wanted him closer to them. His eventual plan is to return home to living with his daughter here in the [facility].\"    \"Family is working to transfer his cancer cares to [facility].\"     \"He may benefit from hospice or palliative services. Unknown other discharge needs at this time besides needing to return to TCU.\"    [facility] transport at discharge.    CM to follow for medical progression of care, discharge recommendations, and final discharge plan.    Carla Hull RN    "

## 2023-07-27 NOTE — PROGRESS NOTES
RECEIVING UNIT ED HANDOFF REVIEW    ED Nurse Handoff Report was reviewed by: Mustapha Soni RN on July 26, 2023 at 9:02 PM

## 2023-07-27 NOTE — CONSULTS
Palliative Care Consultation Note  [facility]      Patient: Colin Baxter  Date of Admission:  7/26/2023    Requesting Clinician / Team: Hospitalist  Reason for consult: Goals of care       Recommendations & Counseling     GOALS OF CARE:   Life-prolonging with limits (DNR/DNI)  Colin's mentation has improved since admission per family report, but is still not at baseline. He does not currently have capacity for medical decision making, defers to his daughters/HCAs.  Jessika (daughter/RN) reports Colin has had a difficult few months with increasing cancer pain and hip surgery for pathologic fracture. Family wants to avoid further invasive procedures, but will consider Neurosurgery intervention if Colin does not improve with conservative management.  For now, family wants to continue with plan to treat hyponatremia and repeat CT head.    ADVANCE CARE PLANNING:  Patient has an advance directive dated 1/12/2023.  Primary Health Care Agent Rachelle Oropezasha (daughter).  Alternate(s) Jessika Joel (daughter).   There is no POLST form on file, plan to complete prior to DC.  Code status: No CPR- Do NOT Intubate    MEDICAL MANAGEMENT:   #Cancer-related pain  Followed by outpatient Palliative in [place]. Has been taking oxycontin 20mg BID with prn oxycodone 10mg 2-3x daily. No recent changes to pain regimen.  Resumed home oxycontin at reduced dose (10mg BID) due to AMS and recent falls. If tolerating without worsening confusion, increase to home dose (20mg BID).  Increased prn oxycodone to 5-10mg q4h prn  Continue IV morphine 2mg q2h prn severe/breakthrough pain    #Altered mental status - improving  Treatment for hyponatremia and SDH per primary team, Renal and NSGY.  Stopped ativan for anxiety/agitation as benzodiazepines can worsen delirium. If Colin develops acute agitation, consider haldol or zyprexa.  Treat pain to avoid worsening delirium    PSYCHOSOCIAL/SPIRITUAL SUPPORT:  Family - strong support  from 3 adult daughters and ex-wife  Has lived in [place] his whole life. Now unable to live independently. Plan to stay with daughter Jessika and her  after TCU.  Jenn - spiritual > Baptism, declined  support today.    Palliative Care will continue to follow. Thank you for the consult and allowing us to aid in the care of Colin Baxter.    These recommendations have been discussed with primary team.    Deib Flood PA-C  Securely message with [employer] (more info)  Text page via [facility] Paging/Directory       Palliative Summary/HPI     Colin is a 77 y/o man with metastatic bladder cancer s/p chemo, radiation and ureterectomy (mets to lumbar spine, pelvis and L femur), CKD3, DM2, chronic hyponatremia, recently admitted at [facility] 7/12-7/20 for L femur stabilization surgery 7/17. Also found to have nonacute subdural hematomas after fall at home. Colin was admitted here 7/26 from TCU for altered mental status. Found to have acute on chronic hyponatremia and bilateral subdural hematomas. Neurosurgery consulted and offered surgical intervention but family declined. Palliative consulted to assist with goals of care.    Chart review:  Follows with [place] oncology as well as palliative care for pain management. Seen by Palliative NP at home 7/11 with ex-wife Lilliam present. At that time was full code, goals include comfort and good quality of life. Continued on Oxycontin 20mg q12h with oxycodone 10mg prn.    Today, the patient was seen for:  Introductory visit, pain management, goals of care    Palliative Care Summary:   Met with Colin and Jessika (daughter) at bedside today. Neurosurgery team also present, completing their visit. Later joined by Dr. Mancilla (hospitalist).    Colin and family are familiar with Palliative Care from outpatient clinic in [place]. Colin is alert and oriented to self and hospital but not purpose (believes he is here for his hip). He complains of hip and back pain, denies any other  complaints. Colin spoke easily about his life and family but struggled with short term memory. He does not remember coming in to the hospital or falling at home recently, even several minutes after being told about the fall. He defers to Jessika for medical updates and decision making.    Prognosis, Goals, & Planning:    Functional Status just prior to this current hospitalization:  Colin previously lived independently at home on a large property in [place]. After recent falls and surgery, he understands he is no longer safe to live independently.  Colin was ambulating with assistance at TCU just prior to this admission.  Family notes reduced appetite and weight loss. Weight documented as 77.7kg in January 2023 > now 64.2kg (down 17%).    Prognosis, Goals, and/or Advance Care Planning:  HCD on file and reviewed  Colin is able to state that quality of life is more important to him than quantity.    Code Status was addressed today:   No    Patient's decision making preferences: unable to assess        Patient has decision-making capacity today for complex decisions:Unreliable            Coping, Meaning, & Spirituality:   Mood, coping, and/or meaning in the context of serious illness were addressed today: Yes    Social:   Living situation: previously independent > most recently TCU  Important relationships/caregivers: 3 adult daughters, ex-wife  Occupation: mining (now retired)  Areas of fulfillment/nicholas: being outdoors, fishing, gardening, shooting targets    Medications:  I have reviewed this patient's medication profile and medications from this hospitalization. Notable medications:  Tylenol 650mg q4h prn (received x1 last 24h)  IV/PO ativan 0.5mg q4h prn anxiety or agitation (received x1 last 24h)  PO oxycodone 5mg q2h prn (received x1 last 24h)  IV morphine 2mg q2h prn (received x3 last 24h)    Physical Exam   Vital Signs with Ranges  Temp:  [98.4  F (36.9  C)-101.2  F (38.4  C)] 98.7  F (37.1  C)  Pulse:  []  87  Resp:  [18-24] 18  BP: (115-154)/(57-88) 115/61  SpO2:  [96 %-100 %] 98 %  141 lbs 8.57 oz    PHYSICAL EXAM:  General: Laying in bed, A&Ox2, speech fluent, forgetful, moving all extremities    Data reviewed:  CMP  Recent Labs   Lab 07/27/23  0617 07/27/23  0605 07/27/23  0201 07/26/23  1622 07/26/23  1331   *  --  125*   < > 123*  121*   POTASSIUM 4.5  --   --   --  4.3  4.2   CHLORIDE 92*  --   --   --  89*  88*   CO2 17*  --   --   --  14*  14*   ANIONGAP 18*  --   --   --  20*  19*   * 263*  --    < > 197*  210*   BUN 18.3  --   --   --  15.6  16.4   CR 1.28*  --   --   --  1.19*  1.18*   GFRESTIMATED 58*  --   --   --  63  64   MAHSA 7.2*  --   --   --  8.0*  7.7*   PROTTOTAL  --   --   --   --  6.1*  6.2*   ALBUMIN  --   --   --   --  2.8*  2.8*   BILITOTAL  --   --   --   --  0.8  0.7   ALKPHOS  --   --   --   --  1,788*  1,812*   AST  --   --   --   --  37  36   ALT  --   --   --   --  <5  <5    < > = values in this interval not displayed.     CBC  Recent Labs   Lab 07/27/23  0617 07/26/23  1331   WBC 8.2 7.7   RBC 2.71* 3.08*   HGB 8.3* 9.6*   HCT 24.9* 27.4*   MCV 92 89   MCH 30.6 31.2   MCHC 33.3 35.0   RDW 13.8 13.5   * 499*     CTA head and neck 7/26  IMPRESSION:   HEAD CT:  1.  Chronic bilateral subdural hematomas on the right measuring up to 12 mm in thickness and on the left measuring up to 8 mm in thickness. Shallow right hyperattenuating component is present compatible with acute on chronic subdural hemorrhage. Localized mass effect without midline shift.  2.  No finding for mass or definite finding for acute infarct. Very small chronic lacunar infarct or dilated perivascular space right caudate.     HEAD CTA:   1.  Developmental variation of the [place] without vascular cutoff, aneurysm, or flow-limiting stenosis.     NECK CTA:  1.  No significant stenosis either cervical carotid or vertebral system. No findings for dissection.    Medical Decision  Making       MANAGEMENT DISCUSSED with the following over the past 24 hours: Medicine   NOTE(S)/MEDICAL RECORDS REVIEWED over the past 24 hours: Neurosurgery, Medicine, prior admissions and outpatient Palliative visits  Medical complexity over the past 24 hours:  - Prescription DRUG MANAGEMENT performed

## 2023-07-27 NOTE — PROGRESS NOTES
Bedside EEG was performed. Wake, drowsy were obtained.   Activations completed were: (eyes open/eyes closed, mental activations,photic)   Activations omitted & reasoning: Hyperventilation due to patient sate  Patient's mental status was: (confused/uncooperative)  Was the neurologist consulted regarding significant waveforms or interventions needed?   Skin intact? intact     EEG #   EEG system used: [id number]    Neurologist dictation to follow.

## 2023-07-27 NOTE — PROGRESS NOTES
Staff nurse from [facility] called for update on patient at this time and on this date.

## 2023-07-27 NOTE — PROGRESS NOTES
Nephrology cross cover note.   Na 121 in ED with neurologic symptoms (? Due to hyponatremia and/or subdural hematoma)  Rec'd 100 ml 3% saline.  Na now 126 at 1827 (change of 5 meq/5 hours)  Recommend no further correction over night.     Suspect SIADH vs volume depletion.    --check urine osmol and urine sodium.  --add fluid restriction 1000 ml/day  --no further IVF for now  --would target a sodium of 125-128 overnight, no further correction.   --can decrease frequency of sodium checks now that 3% has been stopped.     Please contact nephrology overnight if Na is 124 or less. May need a small repeat bolus of 3% saline.    Full consult in am.     Curtis Stone MD  Nephrology Fellow  [phone] pager

## 2023-07-27 NOTE — PROGRESS NOTES
Patient continue to be 1 to 1 with staff,  extremely confused and pulling on lines. Unable to gather any assessment information from this patient at this time.

## 2023-07-27 NOTE — PROGRESS NOTES
Daily Progress Note        CODE STATUS:  No CPR- Do NOT Intubate    07/27/23  Assessment/Plan:  Patient is a 78-year-old male with history of metastatic bladder cancer who presents with altered mental status. Found to have sodium of 121 and bilateral subdural hematoma.     #Hyponatremia  -- Likely SIADh vs vol depletion per nephrology.   -- Na initially 121. S/p 3% hypertonic saline in ED per nephrology. Na now 127  -- Nephrology consult appreciated     #Altered mental status  -- Likely related to hyponatremia vs other etiologgies  -- Clinically better per family, but not at baseline yet  -- IV ativan for agitation     #Bilateral subdural hematoma  -- Mostly chronic with an area of acute, mass effect no shift  -- Head trauma ~4 weeks ago  -- Neurosurgery consulted and offered surgical intervention (which would involve transfer to [facility]) but family declined. Repeat CT head per neurosurgery  -- Activity level per neurosurgery  -- Seen by stroke tele unit in ED. Anti-epileptics (if needed) deferred to neurology.      #Metastatic bladder cancer  #Recent pathologic femur fracture, s/p surgery and TCU placement  -- Will resume PT/OT when ok with NS team    #Code status  -- Daughter confirmed DNR/DNI status. She is okay with medical work up for altered mental status   -- Consulted palliative care to help address goals of care     #Diabetes type II  -- SSI     #CKD stage 3a  -- Stable    Diet: NPO for Medical/Clinical Reasons Except for: Meds    DVT Prophylaxis: Pneumatic Compression Devices  [place] Catheter: Not present  Lines: None     Cardiac Monitoring: None  Code Status: No CPR- Do NOT Intubate      Clinically Significant Risk Factors Present on Admission         # Hyponatremia: Lowest Na = 121 mmol/L in last 2 days, will monitor as appropriate     # Anion Gap Metabolic Acidosis: Highest Anion Gap = 20 mmol/L in last 2 days, will monitor and treat as appropriate  # Hypoalbuminemia: Lowest albumin = 2.8 g/dL at  7/26/2023  1:31 PM, will monitor as appropriate  # Coagulation Defect: INR = 1.26 (Ref range: 0.85 - 1.15) and/or PTT = 43 Seconds (Ref range: 22 - 38 Seconds), will monitor for bleeding  # Drug Induced Platelet Defect: home medication list includes an antiplatelet medication       # DMII: A1C = 8.8 % (Ref range: <5.7 %) within past 6 months              Disposition; Few more days of hospitalization anticipated  Barrier to discharge; SDH, hyponatremia     LOS: 1 day     Subjective:  Interval History: Patient seen and examined. Notes, labs, imaging reports personally reviewed. Patient is new to me today. Daughter and Pall care provider were in the room during my visit. Daughter stated he is less confused today compared to yesterday, but still not at his baseline. Patient denies any extremity weakness. No difficulty with speech or vision.     Review of Systems:   As mentioned in subjective.    Patient Active Problem List   Diagnosis    Glaucoma    Bladder cancer (H)    Obesity (BMI 35.0-39.9) with comorbidity (H)    History of cancer of ureter    Diabetes mellitus without complication (H)    Ureter cancer (H)    Hyperlipidemia    CKD (chronic kidney disease) stage 4, GFR 15-29 ml/min (H)    Carcinoma in situ of bladder    Anemia due to stage 4 chronic kidney disease (H)    Acquired hypothyroidism    CRF (chronic renal failure), stage 4 (severe) (H)    Diarrhea 2nd to Chemotherapy    Pancreatic lesion, 2.1 cm on Abd CT    Dehydration    Pancreatic mass    YOU (acute kidney injury) (H)    Adverse drug reaction    Stage 3b chronic kidney disease (H)    Vitamin D deficiency    Centrilobular emphysema (H)    Insomnia due to medical condition    Cancer related pain    Slow transit constipation    Generalized muscle weakness    Pathological fracture of left femur due to neoplastic disease with routine healing, subsequent encounter    Metastatic malignant neoplasm, unspecified site (H)    Bilateral leg edema    Confusion        Scheduled Meds:   brimonidine  1 drop Right Eye BID    insulin aspart  1-6 Units Subcutaneous Q4H    latanoprost  1 drop Both Eyes At Bedtime    oxyCODONE  10 mg Oral BID    sodium chloride (PF)  3 mL Intracatheter Q8H     Continuous Infusions:  PRN Meds:.acetaminophen **OR** acetaminophen, artificial tears, glucose **OR** dextrose **OR** glucagon, lidocaine 4%, lidocaine (buffered or not buffered), melatonin, morphine, naloxone **OR** naloxone **OR** naloxone **OR** naloxone, oxyCODONE, prochlorperazine **OR** prochlorperazine **OR** prochlorperazine, sodium chloride (PF)    Objective:  Vital signs in last 24 hours:  Temp:  [98.4  F (36.9  C)-101.2  F (38.4  C)] 98.7  F (37.1  C)  Pulse:  [] 87  Resp:  [18-21] 18  BP: (115-146)/(57-88) 115/61  SpO2:  [96 %-100 %] 98 %        Intake/Output Summary (Last 24 hours) at 7/27/2023 1407  Last data filed at 7/27/2023 1012  Gross per 24 hour   Intake 70 ml   Output 525 ml   Net -455 ml       Physical Exam:    General: Not in obvious distress.  HEENT: NC, AT   Chest: Clear to auscultation bilaterally  Heart: S1S2 normal, regular. No M/R/G  Abdomen: Soft. NT, ND. Bowel sounds- active.  Extremities: No legs swelling  Neuro: Awake. Grossly non-focal      Lab Results:(I have personally reviewed the results)    Recent Results (from the past 24 hour(s))   Blood Culture Peripheral Blood    Collection Time: 07/26/23  2:49 PM    Specimen: Peripheral Blood   Result Value Ref Range    Culture No growth after 12 hours    Sodium    Collection Time: 07/26/23  4:22 PM   Result Value Ref Range    Sodium 127 (L) 136 - 145 mmol/L   UA with Microscopic reflex to Culture    Collection Time: 07/26/23  4:45 PM    Specimen: Urine, Catheter   Result Value Ref Range    Color Urine Yellow Colorless, Straw, Light Yellow, Yellow    Appearance Urine Clear Clear    Glucose Urine Negative Negative mg/dL    Bilirubin Urine Negative Negative    Ketones Urine 40 (A) Negative mg/dL    Specific  Gravity Urine 1.025 1.003 - 1.035    Blood Urine Negative Negative    pH Urine 6.0 5.0 - 7.0    Protein Albumin Urine 30 (A) Negative mg/dL    Urobilinogen Urine <2.0 <2.0 mg/dL    Nitrite Urine Negative Negative    Leukocyte Esterase Urine Negative Negative    Bacteria Urine Few (A) None Seen /HPF    RBC Urine <1 <=2 /HPF    WBC Urine 1 <=5 /HPF    Transitional Epithelials Urine <1 <=1 /HPF   Sodium random urine    Collection Time: 07/26/23  4:45 PM   Result Value Ref Range    Sodium Urine mmol/L 39 mmol/L   Osmolality urine    Collection Time: 07/26/23  4:45 PM   Result Value Ref Range    Osmolality Urine 519 100 - 1,200 mmol/kg   Sodium    Collection Time: 07/26/23  6:27 PM   Result Value Ref Range    Sodium 126 (L) 136 - 145 mmol/L   Glucose by meter    Collection Time: 07/26/23  7:10 PM   Result Value Ref Range    GLUCOSE BY METER POCT 213 (H) 70 - 99 mg/dL   Sodium    Collection Time: 07/26/23  9:00 PM   Result Value Ref Range    Sodium 126 (L) 136 - 145 mmol/L   Sodium    Collection Time: 07/26/23 10:03 PM   Result Value Ref Range    Sodium 126 (L) 136 - 145 mmol/L   Glucose by meter    Collection Time: 07/27/23  1:44 AM   Result Value Ref Range    GLUCOSE BY METER POCT 277 (H) 70 - 99 mg/dL   Sodium    Collection Time: 07/27/23  2:01 AM   Result Value Ref Range    Sodium 125 (L) 136 - 145 mmol/L   Glucose by meter    Collection Time: 07/27/23  6:05 AM   Result Value Ref Range    GLUCOSE BY METER POCT 263 (H) 70 - 99 mg/dL   Platelet function closure with reflex    Collection Time: 07/27/23  6:17 AM   Result Value Ref Range    PFA-Col/Epi 206 (H) <170 Seconds   Basic metabolic panel    Collection Time: 07/27/23  6:17 AM   Result Value Ref Range    Sodium 127 (L) 136 - 145 mmol/L    Potassium 4.5 3.4 - 5.3 mmol/L    Chloride 92 (L) 98 - 107 mmol/L    Carbon Dioxide (CO2) 17 (L) 22 - 29 mmol/L    Anion Gap 18 (H) 7 - 15 mmol/L    Urea Nitrogen 18.3 8.0 - 23.0 mg/dL    Creatinine 1.28 (H) 0.67 - 1.17 mg/dL     Calcium 7.2 (L) 8.8 - 10.2 mg/dL    Glucose 285 (H) 70 - 99 mg/dL    GFR Estimate 58 (L) >60 mL/min/1.73m2   CBC with platelets and differential    Collection Time: 07/27/23  6:17 AM   Result Value Ref Range    WBC Count 8.2 4.0 - 11.0 10e3/uL    RBC Count 2.71 (L) 4.40 - 5.90 10e6/uL    Hemoglobin 8.3 (L) 13.3 - 17.7 g/dL    Hematocrit 24.9 (L) 40.0 - 53.0 %    MCV 92 78 - 100 fL    MCH 30.6 26.5 - 33.0 pg    MCHC 33.3 31.5 - 36.5 g/dL    RDW 13.8 10.0 - 15.0 %    Platelet Count 467 (H) 150 - 450 10e3/uL    % Neutrophils 71 %    % Lymphocytes 12 %    % Monocytes 13 %    % Eosinophils 1 %    % Basophils 1 %    % Immature Granulocytes 2 %    NRBCs per 100 WBC 0 <1 /100    Absolute Neutrophils 6.0 1.6 - 8.3 10e3/uL    Absolute Lymphocytes 1.0 0.8 - 5.3 10e3/uL    Absolute Monocytes 1.0 0.0 - 1.3 10e3/uL    Absolute Eosinophils 0.1 0.0 - 0.7 10e3/uL    Absolute Basophils 0.0 0.0 - 0.2 10e3/uL    Absolute Immature Granulocytes 0.1 <=0.4 10e3/uL    Absolute NRBCs 0.0 10e3/uL   Platelet function closure ADP    Collection Time: 07/27/23  6:17 AM   Result Value Ref Range    PFA-Col/ADP 71 <120 Seconds   Glucose by meter    Collection Time: 07/27/23 10:35 AM   Result Value Ref Range    GLUCOSE BY METER POCT 292 (H) 70 - 99 mg/dL       All laboratory and imaging data in the past 24 hours reviewed  Serum Glucose range:   Recent Labs   Lab 07/27/23  1035 07/27/23  0617 07/27/23  0605 07/27/23  0144   * 285* 263* 277*     ABG: No lab results found in last 7 days.  CBC:   Recent Labs   Lab 07/27/23  0617 07/26/23  1331 07/24/23  1105   WBC 8.2 7.7 7.7   HGB 8.3* 9.6* 9.4*   HCT 24.9* 27.4* 29.1*   MCV 92 89 96   * 499* 365   NEUTROPHIL 71 82  --    LYMPH 12 8  --    MONOCYTE 13 9  --    EOSINOPHIL 1 0  --      Chemistry:   Recent Labs   Lab 07/27/23  0617 07/27/23  0201 07/26/23  2203 07/26/23  1622 07/26/23  1353 07/26/23  1331 07/24/23  1105   * 125* 126*   < >  --  123*  121* 129*   POTASSIUM 4.5  --   --    --   --  4.3  4.2 4.5   CHLORIDE 92*  --   --   --   --  89*  88* 94*   CO2 17*  --   --   --   --  14*  14* 19*   BUN 18.3  --   --   --   --  15.6  16.4 14.3   CR 1.28*  --   --   --   --  1.19*  1.18* 1.24*   GFRESTIMATED 58*  --   --   --   --  63  64 60*   MAHSA 7.2*  --   --   --   --  8.0*  7.7* 7.6*   PROTTOTAL  --   --   --   --   --  6.1*  6.2*  --    ALBUMIN  --   --   --   --   --  2.8*  2.8*  --    AST  --   --   --   --   --  37  36  --    ALT  --   --   --   --   --  <5  <5  --    ALKPHOS  --   --   --   --   --  1,788*  1,812*  --    BILITOTAL  --   --   --   --   --  0.8  0.7  --    KARLY  --   --   --   --  <10*  --   --     < > = values in this interval not displayed.     Coags:  Recent Labs   Lab 07/26/23  1331   INR 1.26*   PTT 43*     Cardiac Markers:  No results for input(s): CKTOTAL, TROPONINI in the last 168 hours.       CTA Head Neck with Contrast    Result Date: 7/26/2023  EXAM: CTA HEAD NECK W CONTRAST LOCATION: [facility] DATE: 7/26/2023 INDICATION: Code Stroke to evaluate for potential thrombolysis and thrombectomy. PLEASE READ IMMEDIATELY. COMPARISON: None. CONTRAST: Isovue 370, 75 mL. TECHNIQUE: Head and neck CT angiogram with IV contrast. Noncontrast head CT followed by axial helical CT images of the head and neck vessels obtained during the arterial phase of intravenous contrast administration. Axial 2D reconstructed images and multiplanar 3D MIP reconstructed images of the head and neck vessels were performed by the technologist. Dose reduction techniques were used. All stenosis measurements made according to NASCET criteria unless otherwise specified. FINDINGS: NONCONTRAST HEAD CT: INTRACRANIAL CONTENTS: There are bilateral low-attenuation subdural collections extending along both frontal and parietal inner tables, on the right measuring up to 12 mm in thickness in the coronal plane and on the left measuring up to 8 mm in thickness. These are  compatible with chronic subdural hematomas. On the right there is superimposed small band of high attenuation/acute blood products measuring up to 4 mm in thickness (coronal image 28). Two very small lentiform foci of low-attenuation  change are seen along the posterior surface of the right occipital lobe compatible with additional small chronic subdural hematomas (sagittal images 18 and 27). Although there is localized mass effect, there is no significant midline shift. Lateral and third ventricles are small in size. Gray-white matter differentiation is preserved. 2 mm focus of low-attenuation change right caudate compatible with a tiny chronic lacunar infarct versus dilated perivascular space. Cerebellar tonsils are normally positioned. Sella is unremarkable technique. Corpus callosum is normally formed. VISUALIZED ORBITS/SINUSES/MASTOIDS: No intraorbital abnormality. No paranasal sinus mucosal disease. No middle ear or mastoid effusion. BONES/SOFT TISSUES: No suspicious lytic or blastic foci. HEAD CTA: ANTERIOR CIRCULATION: Normal enhancement is seen within the intracranial internal carotid arteries, anterior cerebral arteries, and middle cerebral arteries. No vascular cutoff, aneurysm, or flow-limiting stenosis. POSTERIOR CIRCULATION: Left vertebral artery is dominant. Small caliber right vertebral artery. Common origin/junctional ectasia of the takeoff of the left posterior cerebral and superior cerebellar arteries. Posterior communicating arteries are not identified. Posterior circulation otherwise unremarkable. DURAL VENOUS SINUSES: Dural venous sinuses are not well evaluated on the basis of this exam. NECK CTA: RIGHT CAROTID: No measurable stenosis or dissection. Minor atherosclerotic plaque. LEFT CAROTID: No measurable stenosis or dissection. VERTEBRAL ARTERIES: No focal stenosis or dissection. Left vertebral artery is larger than the right. AORTIC ARCH: Left common carotid artery arises from the takeoff of  the right brachiocephalic artery. Mild atherosclerotic plaque is seen within the aortic arch. NONVASCULAR STRUCTURES: Left-sided Port-A-Cath. Visualized lung apices reveal centrilobular emphysema. Visualized osseous structures are without suspicious lytic or blastic foci. Moderate to advanced cervical spondylosis.     IMPRESSION: HEAD CT: 1.  Chronic bilateral subdural hematomas on the right measuring up to 12 mm in thickness and on the left measuring up to 8 mm in thickness. Shallow right hyperattenuating component is present compatible with acute on chronic subdural hemorrhage. Localized mass effect without midline shift. 2.  No finding for mass or definite finding for acute infarct. Very small chronic lacunar infarct or dilated perivascular space right caudate. HEAD CTA: 1.  Developmental variation of the [place] of [place] without vascular cutoff, aneurysm, or flow-limiting stenosis. NECK CTA: 1.  No significant stenosis either cervical carotid or vertebral system. No findings for dissection. Acute on chronic intracranial hemorrhage on noncontrast head CT and lack of vascular cutoff on CTA called to Dr. Espinoza at 1:18 and 1:30 PM respectively.    CT HEAD WO IV CONTRAST    Result Date: 7/12/2023  This document is currently in Final Status Exam Accession# [id number] CT HEAD WO IV CONTRAST HISTORY: Head trauma, moderate-severe. COMPARISON: None FINDINGS: The ventricles are midline and normal in size. Intact gray and white matter junctions. There are small extra-axial fluid collections which appear to reside in the subdural space about the right anterior frontal convexity and the right occipital region. These exhibit diminished attenuation. Intact calvarium. Visualized paranasal sinuses are clear. IMPRESSION: 1.  Most likely nonacute right frontal and occipital small subdural hematomas. Transverse diameter frontally is 7 mm and posteriorly is 6 mm. 2.  No associated mass effect. Dictated By: Darrius Kumar MD 7/12/2023 8:51  AM Edited By: TERESITA 7/12/2023 9:15 AM Electronically Signed: Darrius Kumar MD 7/12/2023 4:08 PM    US VENOUS LOWER EXT LEFT    Result Date: 7/12/2023  This document is currently in Final Status Exam Accession# [id number] US VENOUS LOWER EXT LEFT HISTORY: swelling; COMPARISON: None. TECHNIQUE: Duplex ultrasound of the deep veins of the left lower extremity from the groin to the calf. FINDINGS: Normal compressibility, color Doppler signal, venous waveforms and flow augmentation. IMPRESSION: No deep venous thrombus demonstrated in the left lower extremity. Electronically Signed: Cachorro Ponce MD 7/12/2023 11:04 AM    XR FEMUR LEFT 2 OR MORE VIEWS    Result Date: 7/5/2023  This document is currently in Final Status Exam Accession# [id number] XR PELVIS 1 OR 2 VIEWS, XR FEMUR LEFT 2 OR MORE VIEWS HISTORY: Fx.   FINDINGS: Moderate left hip joint space narrowing. Mild right hip joint space narrowing. Lytic and permeative lesion left proximal femoral diaphysis with some aggressive periosteal changes. Fairly significant cortical thinning posteriorly. No fracture line is seen on this exam. IMPRESSION: Aggressive mass left proximal femur, metastasis versus primary neoplasm. Dictated By: Lencho Dozier MD 7/5/2023 10:26 AM Edited By: TERESITA 7/5/2023 10:35 AM Electronically Signed: Lencho Dozier MD 7/5/2023 4:53 PM    XR PELVIS 1 OR 2 VIEWS    Result Date: 7/5/2023  This document is currently in Final Status Exam Accession# [id number] XR PELVIS 1 OR 2 VIEWS, XR FEMUR LEFT 2 OR MORE VIEWS HISTORY: Fx.   FINDINGS: Moderate left hip joint space narrowing. Mild right hip joint space narrowing. Lytic and permeative lesion left proximal femoral diaphysis with some aggressive periosteal changes. Fairly significant cortical thinning posteriorly. No fracture line is seen on this exam. IMPRESSION: Aggressive mass left proximal femur, metastasis versus primary neoplasm. Dictated By: Lencho Dozier MD 7/5/2023 10:26 AM Edited By: TERESITA 7/5/2023 10:35 AM  Electronically Signed: Lencho Dozier MD 7/5/2023 4:53 PM    CT Therapy Correlate    Result Date: 6/27/2023  This exam was marked as non-reportable because it will not be read by a radiologist or a [facility] non-radiologist provider.       Latest radiology report personally reviewed.    Note created using dragon voice recognition software so sounds alike errors may have escaped editing.      07/27/2023   Elvis Mancilla MD  Hospitalist, [facility]  Pager: [phone]

## 2023-07-27 NOTE — CONSULTS
RENAL CONSULTATION:     Date of Consultation:  7/27/2023    Requesting Physician: Dr. NAVEED Cleveland  Reason for Consult:  Hyponatremia    Assessment/ Recommendations:  Acute on chronic hyponatremia: Na levels 128-132 over the past month prior to admission now presents with poor PO intake, confusion and Na 121. UOSm 519, Fe 39 consistent with CA induced SIADH. No thiazides/NSAIDs PTA.    - Improved  with 3% saline bolus given in ED (Care d/w Dr. Espinoza at that time) and mentation a bit better today per Dtr but still confused    - Start NaCl 2 g PO BID for now with ongoing NPO (fluid restriction), Add Lsaix if weight/volume considerably increases   - Daily labs    CKD-3B: Followed by Dr. Arvizu at [facility] in [place] with last visit 2/2023. Baseline Cr ~ 1.6 mg/dl with previous left nephrectomy/ solitary right kidney status causative. Improved from baseline. Trend labs     Metastatic urothelial CA: Bladder cancer diagnosed 2016. Hematuria on cysto with discovery of 2 cm left ureter mass in 2021 --> s/p Left nephroureterectomy 2021. Developed diffuse mets to liver/bone despite gemcitabine / immunotherapy provided at time. Pathologic Fx left femur  noted June 2023 with stabilization procedure 7/17/23 in [place]. Mets to lumbar spine, shoulder, left femur and liver.    - Family leaning toward sushila palliative approach to his care.     Hypothyroidism: Under-replaced at present with TSH 17.5. Synthroid adjusted. Less likely to be causing hyponatremia than CA induced SIADH, however. Management per Hosp med.     S/P fall with bilateral SDH. (2-3 weeks ago). Neurology/ Neurosurgery following. Family uncertain how aggressive to be with possible surgery given advancing metastatic cancer.     Acute encephalopathy: Some improvement with correction of hyponatremia but currently back to levels of 2 weeks ago when not confused. Target Na > 130 for now and trend with neurology eval ongoing. EEG today.     DM-2 (with CKD): Recent  severe hyperglycemia delaying femur procedure 2 weeks ago. Currently under reasonable control but poor PO intake reported. Management per Hosp med.       This document is created with the help of Dragon dictation system. All grammatical errors are unintentional.     Isai Mcclure MD  [facility]  [phone] (off)       History of present illness: Colin is a 78-year-old gentleman with history of type 2 diabetes mellitus, hypertension, hypothyroidism, hyperlipidemia, bladder cancer diagnosed 2016 with subsequent developed cancer in his left ureter for which he underwent a left nephroureterectomy 6/16/21. Baseline Cr 1.6 mg/dl with CKD monitored in [place] (Dr. Arvizu). Recent left femur stabilization procedure 7/17/23 delayed due to hyperglycemia. During that admit Na 128-132 mg/dl with glucose ~ 200 most of the time. Now in TCU [facility], brought to ED yesterday due to AMS changes/combativeness. Na 123/121 (2 levels at same draw time) . Also had fall 2-3 weeks ago with chronic bilateral SDH noted on CT. Given 3% saline bolus to see if confusion due to hyponatremia and we were consulted. Today, I see pt with his daughter, Jessika at bedside (She is RN). She feels he is more alert today but still confused. He denies recent change in appetite but Jessika notes poor intake. No dizziness, dyspnea. Back hurting today. No nausea/vomiting or diarrhea. Na 127 overnight with 3% saline.     Past Medical History:   Diagnosis Date    Acquired hypothyroidism     Anemia     Benign essential hypertension     Bladder cancer (H)     Carcinoma in situ of bladder 11/19/2021    Chronic kidney disease     Diabetes mellitus, type 2 (H)     Dyslipidemia     Glaucoma     Hyperlipidemia     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) 12/11/2020    getting BCG (from his Urologist in [place], [place])    Obesity     Pancreatic mass     Renal cell carcinoma, left (H) 2021    Left nephrectomy    Ureter  cancer, left (H)          Current Facility-Administered Medications:     acetaminophen (TYLENOL) tablet 650 mg, 650 mg, Oral, Q4H PRN **OR** acetaminophen (TYLENOL) Suppository 650 mg, 650 mg, Rectal, Q4H PRN, Sukumar Cleveland R, DO, 650 mg at 07/26/23 2039    artificial tears (GENTEAL) 0.1-0.2-0.3 % ophthalmic solution 2 drop, 2 drop, Both Eyes, Daily PRN, Sukumar Cleveland R, DO    brimonidine (ALPHAGAN) 0.2 % ophthalmic solution 1 drop, 1 drop, Right Eye, BID, Sukumar Cleveland R, DO, 1 drop at 07/27/23 0816    glucose gel 15-30 g, 15-30 g, Oral, Q15 Min PRN **OR** dextrose 50 % injection 25-50 mL, 25-50 mL, Intravenous, Q15 Min PRN **OR** glucagon injection 1 mg, 1 mg, Subcutaneous, Q15 Min PRN, Sukumar Cleveland R, DO    insulin aspart (NovoLOG) injection (RAPID ACTING), 1-6 Units, Subcutaneous, Q4H, Sukumar Cleveland, DO, 4 Units at 07/27/23 1055    latanoprost (XALATAN) 0.005 % ophthalmic solution 1 drop, 1 drop, Both Eyes, At Bedtime, Sukumar Cleveland DO, 1 drop at 07/26/23 2216    lidocaine (LMX4) cream, , Topical, Q1H PRN, Sukumar Cleveland R, DO    lidocaine 1 % 0.1-1 mL, 0.1-1 mL, Other, Q1H PRN, Sukumar Cleveland R, DO    LORazepam (ATIVAN) injection 0.5 mg, 0.5 mg, Intravenous, Q4H PRN, Sukumar Cleveland R, DO, 0.5 mg at 07/26/23 1726    LORazepam (ATIVAN) tablet 0.5 mg, 0.5 mg, Oral, Q4H PRN, Sukumar Cleveland R, DO    melatonin tablet 1 mg, 1 mg, Oral, At Bedtime PRN, Sukumar Cleveland R, DO    morphine (PF) injection 2 mg, 2 mg, Intravenous, Q2H PRN, Sukumar Cleveland R, DO, 2 mg at 07/27/23 0356    oxyCODONE (ROXICODONE) tablet 5 mg, 5 mg, Oral, Q4H PRN, Sukumar Cleveland DO, 5 mg at 07/27/23 0938    prochlorperazine (COMPAZINE) injection 5 mg, 5 mg, Intravenous, Q6H PRN **OR** prochlorperazine (COMPAZINE) tablet 5 mg, 5 mg, Oral, Q6H PRN **OR** prochlorperazine (COMPAZINE) suppository 12.5 mg, 12.5 mg, Rectal, Q12H PRN, Sukumar Cleveland,     sodium chloride (PF) 0.9% PF flush 3 mL, 3 mL, Intracatheter, Q8H, Sukumar Cleveland DO, 3 mL at 07/27/23 0899     sodium chloride (PF) 0.9% PF flush 3 mL, 3 mL, Intracatheter, q1 min prn, Sukumar Cleveland R,     Allergies   Allergen Reactions    No Clinical Screening - See Comments Other (See Comments)     Beta blocker in glaucoma gtt.s caused low pulse and passing out     Timolol      Beta blocker in glaucoma gtt.s caused low pulse and passing out   - patient thinks it was timolol but not 100% sure which beta blocker eye drop.     Bactrim [Sulfamethoxazole-Trimethoprim] Rash       Social History     Socioeconomic History    Marital status:      Spouse name: None    Number of children: 3    Years of education: None    Highest education level: None   Occupational History    Occupation: [employer],  and a few other positions   Tobacco Use    Smoking status: Former     Years:      Types: Cigarettes     Quit date: 1992     Years since quittin.    Smokeless tobacco: Never   Vaping Use    Vaping Use: Never used   Substance and Sexual Activity    Alcohol use: Yes     Comment: < 1 drink a month    Drug use: No    Sexual activity: Not Currently   Other Topics Concern    Parent/sibling w/ CABG, MI or angioplasty before 65F 55M? No   Social History Narrative    , 3 children, he lives in [place], [place] but is in [facility] visiting his daughter.  He is retired, he previously worked in the [employer] in [place]. (last updated 9/10/2022)        Family History   Problem Relation Age of Onset    Diabetes Mother     Other - See Comments Father         cause of death unknown    Parkinsonism Brother     Coronary Artery Disease No family hx of     Hypertension No family hx of     Hyperlipidemia No family hx of     Cerebrovascular Disease No family hx of     Breast Cancer No family hx of     Colon Cancer No family hx of     Prostate Cancer No family hx of     Anesthesia Reaction No family hx of     Asthma No family hx of     Thyroid Disease No family hx of     Genetic Disorder No family hx of   "      Review of Systems: Limited given confusion - minimizes symptoms and recent health issues. The remainder of 10 point review of systems is negative except as noted in HPI above.     /61 (BP Location: Right arm)   Pulse 87   Temp 98.7  F (37.1  C) (Oral)   Resp 18   Wt 64.2 kg (141 lb 8.6 oz)   SpO2 98%   BMI 22.17 kg/m      Intake/Output Summary (Last 24 hours) at 7/27/2023 1316  Last data filed at 7/27/2023 1012  Gross per 24 hour   Intake 70 ml   Output 525 ml   Net -455 ml     Physical Exam:   GENERAL: communicative, follows commands. NAD  EYES: No scleral icterus, conjunctiva clear  ENT: Hearing normal, Oral mucosa moist  RESP: Clear to auscultation bilaterally with no respiratory distress, normal effort.  CV: RRR, no murmurs. no leg edema.    GI: Active BS, Soft, NT/ND, no masses or HSM  : No CVA tenderness   Musculoskeletal: Left hip incision clean, sutures intact.    SKIN: No rash, warm/ dry  PSYCH:  Calm, Oriented to person  + \"hospital\"     Lymph: No cervical/ inguinal adenopathy    LABS:  Recent Labs   Lab 07/27/23  0617 07/27/23  0201 07/26/23  2203 07/26/23  2100 07/26/23  1827 07/26/23  1622 07/26/23  1331 07/24/23  1105   * 125* 126* 126* 126* 127* 123*  121* 129*   POTASSIUM 4.5  --   --   --   --   --  4.3  4.2 4.5   CHLORIDE 92*  --   --   --   --   --  89*  88* 94*   CO2 17*  --   --   --   --   --  14*  14* 19*   BUN 18.3  --   --   --   --   --  15.6  16.4 14.3   CR 1.28*  --   --   --   --   --  1.19*  1.18* 1.24*   GFRESTIMATED 58*  --   --   --   --   --  63  64 60*   MAHSA 7.2*  --   --   --   --   --  8.0*  7.7* 7.6*   ALBUMIN  --   --   --   --   --   --  2.8*  2.8*  --          Recent Labs   Lab 07/27/23  0617 07/26/23  1331 07/24/23  1105   WBC 8.2 7.7 7.7   HGB 8.3* 9.6* 9.4*   HCT 24.9* 27.4* 29.1*   MCV 92 89 96   * 499* 365     MR HIP LEFT W/O & W CONTRAST, 6/19/2023 3:25 PM     History: Male, age 78 years; left hip pain, rule out fracture, " bone  mets; Hip pain; Cancer or mass, known or r/o; Hip x-ray result  available; No known/automatically detected potential contraindications  to imaging; Malignant neoplasm of left ureter (H); Cancer, metastatic  to bone (H)     Comparison: CT scan of the pelvis 5/3/2023     Technique: Coronal T1, T2 STIR and T1 postcontrast fat saturation of  the pelvis; axial T1 postcontrast fat saturation of the pelvis;  coronal and axial T1, T2 STIR, T1 fat saturation of the left hip. .     FINDINGS: There are numerous lytic lesions seen throughout the bony  structures of the pelvis and the visualized portions of the left and  right femur.     There is a large destructive lesion seen in the proximal left femur  associated with thickening of the periosteum and surrounding soft  tissue mass involving the intertrochanteric and proximal left femur.  The mass in this location measures approximately 17 cm in longitudinal  length by 7 cm in AP by 6 cm in transverse dimension.     There is also a destructive lesion seen in the proximal portions of  the right femur. Numerous small lesions are seen throughout the bony  pelvis.     Additional findings include tendinosis of the iliopsoas tendon which  inserts immediately adjacent to and on top of the previously defined  destructive mass of the left femur.                                                                      IMPRESSION:   Extensive bony metastasis the bony pelvis and left and right femur.     Large destructive mixed bony/soft tissue mass of the proximal left  femur measures approximately 17 cm in longitudinal length by 6 to 7 cm  in AP and transverse dimension.      All lab data was reviewed at 1:16 PM

## 2023-07-27 NOTE — PROGRESS NOTES
Neurosurgery Progress Note:       A/P: Colin seen in ED room 2. Daughter is present and gives patient history. Patient known well earlier this am per daughter. Now presents with altered mental status. Very confused, combative, speech nonsensical, moves all extremities spontaneously, eyes open. Discussed and shared head CTA with bilateral subdural hygromas. Could offer bilateral jeremiah holes but family still undecided. Na currently 127- hospitalist managing      Patient with continued confusion though seems slightly improved from yesterday, family thoughts that if other causes for confusion resolved maybe they would want surgical intervention and also thinking they would not want surgery with all his other medical history      Plan:  1. Repeat head ct  2. HOB greater than 30 degree's at all times  3.No blood thinners  4.Antiepileptics per neurology - SDH mostly chronic with initial trauma almost 4 weeks ago.   Will continue to follow      Neurosurgery Attending: The patient's clinical examination, laboratory data, and plan was discussed with Dr. Case     HPI:Colin Baxter is a 78 year old arrives to [facility] ED with AMS from TCU. Head trauma noted 7/12/2023 with 2 small subdural hematoma's (6 and 8 mm per CT reports in care everywhere). This image was completed 1.5 weeks after traumatic fall. No recent trauma per patient's daughter. PMH; HTN, HLD, DM2, CDK, liver and uterer cancer as well as skeletal mets per daughter with 6 months prognosis without intervention and possible 18 months with intervention. Recent femur surgery due to cancer and potential instability. Lives in [place] and was in [facility] to be close to family. Patient recently had radiation and has plans to start chemo in the short interval. CTA head today bilateral low attenuation subdural fluid collections both frontal and parietal with right measuring 12 mm and left 8 mm favored to be chronic. Right side has small layer of hyperdensity, 4  mm. Two small areas of low attenuation right occipital lobe also consistent with chronic subdural hematoma. Mass effect present without midline shift. Chronic lacunar infarct. Patient is confused. Unable to follow commands or answer questions. He talks continuously, not all his words are comprehendible. The content of his sentences are nonsensical. He moves all extremities spontaneously. Does not answer examination questions. Unclear if he is having pain. Pupils are equal and reactive. Family deciding if they want any intervention for bilateral SDH.       S:   Patient states he is doing okay denies headaches, nausea, emesis or visual changes, still appears confused though per daughter slightly improved      O:  /61 (BP Location: Right arm)   Pulse 87   Temp 98.7  F (37.1  C) (Oral)   Resp 18   Wt 64.2 kg (141 lb 8.6 oz)   SpO2 98%   BMI 22.17 kg/m      General: Awake, alert to self and place      Motor: normal bulk and tone     Strength: full strength in all extremities throughout, bilaterally.     Sensation: intact to light touch throughout both upper and lower extremities        Unique Vega PA-C  [facility]  O: [phone]

## 2023-07-27 NOTE — PLAN OF CARE
Problem: Risk for Delirium  Goal: Optimal Coping  Outcome: Progressing   Goal Outcome Evaluation:    Major Shift Events:  Mentation slightly improved. Continued to be confuse. VSS. Blood glucose continues to increased throughout this shift. Head CT, and EEG ordered.    Plan: Continues POC.    For vital signs and complete assessments, please see documentation flowsheets.

## 2023-07-27 NOTE — CONSULTS
NEUROLOGY CONSULTATION NOTE     Colin Baxter,  1945, MRN [id number] Date: 2023     [facility]   Code status:  No CPR- Do NOT Intubate   PCP: Libia Wallace, [phone]      ASSESSMENT & PLAN   Diagnosis code: Confusion-rule out seizure    Confusion-rule out seizure  Bilateral subdural hematoma  History of metastatic bladder cancer  Hyponatremia  Encephalopathy    Head CT shows evidence of bilateral subdural hematoma  See angiogram negative for large vessel occlusion/stenosis  EEG shows slowing in the right frontal region with questionable seizure  Would recommend starting the patient on antiepileptic medication  Keppra 500 mg twice daily  Keppra level  EEG further suggestive of encephalopathy.  This be secondary to hyponatremia.  Slow correction of sodium  PT/OT/speech therapy as needed  Management of subdural per neurosurgery.  Repeat head CT planned for today.  Ammonia normal.    Dr. Wang to follow in the morning       Chief Complaint   Patient presents with    Tier One Stroke Code; Confusion; Garbled Speech        HISTORY OF PRESENT ILLNESS     We have been requested by Dr. Mancilla found to evaluate Colin Baxter who is a 78 year old  male for evaluation of confusion.  Is a 78-year-old male with history of metastatic bladder cancer who presented with altered mental status.  He was mentating well in the morning though became very confused and agitated later in the day.  He was found to have a sodium of 121.  Was started on hypertonic saline.  CTA of the head revealed bilateral chronic subdural hematomas with an area of acute on chronic hemorrhage.  Neurosurgery is involved.  Repeat head CT has been recommended.  No firm seizure-like activity.  Patient is not on seizure medication.  Palliative care has been involved to establish goals of care.     PAST MEDICAL & SURGICAL HISTORY     Medical History  Past Medical History:   Diagnosis Date    Acquired hypothyroidism     Anemia      Benign essential hypertension     Bladder cancer (H)     Carcinoma in situ of bladder 11/19/2021    Chronic kidney disease     Diabetes mellitus, type 2 (H)     Dyslipidemia     Glaucoma     Hyperlipidemia     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) 12/11/2020    getting BCG (from his Urologist in [place], [place])    Obesity     Pancreatic mass     Renal cell carcinoma, left (H) 2021    Left nephrectomy    Ureter cancer, left (H)      Surgical History  Past Surgical History:   Procedure Laterality Date    APPENDECTOMY  1958    COLONOSCOPY  2-    COLONOSCOPY N/A 9/13/2022    Procedure: COLONOSCOPY;  Surgeon: Keyon Zimmerman MD;  Location: [facility] OR    COLONOSCOPY N/A 2/13/2023    Procedure: COLONOSCOPY;  Surgeon: Antony Croft MD;  Location: [facility] OR    COMBINED CYSTOSCOPY, RETROGRADES, URETEROSCOPY, INSERT STENT Left 1/18/2021    Procedure: CYSTOURETEROSCOPY, WITH RETROGRADE PYELOGRAM with interpretation of fluroscopy, ureteroscopy, ureteral biopsy, bladder biopsy and fulgaration;  Surgeon: Shonda Morrell MD;  Location: [facility] OR    CYSTOSCOPY N/A 6/16/2021    Procedure: CYSTOSCOPY;  Surgeon: Javier Mcnulty MD;  Location: [facility] OR    CYSTOSCOPY, BIOPSY BLADDER, COMBINED N/A 9/8/2015    Procedure: COMBINED CYSTOSCOPY, BIOPSY BLADDER;  Surgeon: Randy Martinez MD;  Location: [facility] OR    CYSTOSCOPY, BIOPSY BLADDER, COMBINED N/A 2/14/2017    Procedure: COMBINED CYSTOSCOPY, BIOPSY BLADDER;  Surgeon: Randy Martinez MD;  Location: [facility]    CYSTOSCOPY, BIOPSY BLADDER, COMBINED N/A 11/13/2018    Procedure: COMBINED CYSTOSCOPY, BIOPSY BLADDER;  Surgeon: Ed Helm MD;  Location: [facility] OR    CYSTOSCOPY, BIOPSY BLADDER, COMBINED N/A 11/5/2021    Procedure: CYSTOSCOPY, WITH BLADDER BIOPSY;  Surgeon: Shonda Morrell MD;  Location: [facility] OR    CYSTOSCOPY, RETROGRADES, COMBINED Bilateral 11/13/2018    Procedure: Bilateral Retrograde Pyelagram, Bladder Biopsy;  Surgeon:  Ed Helm MD;  Location: [facility] OR    CYSTOSCOPY, RETROGRADES, COMBINED Right 11/5/2021    Procedure: CYSTOSCOPY, WITH RETROGRADE PYELOGRAM;  Surgeon: Shonda Morrell MD;  Location: [facility]    CYSTOSCOPY, RETROGRADES, INSERT STENT URETER(S), COMBINED Left 9/8/2015    Procedure: COMBINED CYSTOSCOPY, RETROGRADES, INSERT STENT URETER(S);  Surgeon: Randy Martinez MD;  [facility]    CYSTOSCOPY, TRANSURETHRAL RESECTION (TUR) TUMOR BLADDER, COMBINED N/A 9/8/2015    Procedure: COMBINED CYSTOSCOPY, TRANSURETHRAL RESECTION (TUR) TUMOR BLADDER;  Surgeon: Randy Martinez MD;  Location[facility]    CYSTOSCOPY, TRANSURETHRAL RESECTION (TUR) TUMOR BLADDER, COMBINED N/A 1/12/2016    Procedure: COMBINED CYSTOSCOPY, TRANSURETHRAL RESECTION (TUR) TUMOR BLADDER;  Surgeon: Randy Martinez MD;  Location: [facility]    CYSTOSCOPY, TRANSURETHRAL RESECTION (TUR) TUMOR BLADDER, COMBINED N/A 9/13/2016    Procedure: COMBINED CYSTOSCOPY, TRANSURETHRAL RESECTION (TUR) TUMOR BLADDER;  Surgeon: Randy Martinez MD;  Location: [facility]    DAVINCI NEPHROURETERECTOMY Left 6/16/2021    Procedure: NEPHROURETERECTOMY, ROBOT-ASSISTED, lymph node dissection;  Surgeon: Javier Mcnulty MD;  Location: [facility] OR    ESOPHAGOSCOPY, GASTROSCOPY, DUODENOSCOPY (EGD), COMBINED N/A 9/30/2022    Procedure: UPPER ENDOSCOPIC ULTRASOUND;  Surgeon: Jason Dennis MD;  [place] [facility] [place]    INSERT PORT VASCULAR ACCESS N/A 6/20/2023    Procedure: port-a-cath placement;  Surgeon: Antony Croft MD;  Location: [facility]    PHACOEMULSIFICATION WITH STANDARD INTRAOCULAR LENS IMPLANT Right 10/9/2018    Procedure: PHACOEMULSIFICATION WITH STANDARD INTRAOCULAR LENS IMPLANT;  PHACOEMULSIFICATION CATARACT EXTRACTION POSTERIOR CHAMBER LENS RIGHT;  Surgeon: Marlo Mcconnell MD;  Location: [facility]    PHACOEMULSIFICATION WITH STANDARD INTRAOCULAR LENS IMPLANT Left 10/23/2018    Procedure: PHACOEMULSIFICATION CATARACT EXTRACTION  POSTERIOR CHAMBER LENS LEFT;  Surgeon: Marlo Mcconnell MD;  Location: [facility] OR        SOCIAL HISTORY     Social History     Tobacco Use    Smoking status: Former     Years: 30.00     Types: Cigarettes     Quit date: 1992     Years since quittin.5    Smokeless tobacco: Never   Vaping Use    Vaping Use: Never used   Substance Use Topics    Alcohol use: Yes     Comment: < 1 drink a month    Drug use: No          FAMILY HISTORY     Reviewed, and family history includes Diabetes in his mother; Other - See Comments in his father; Parkinsonism in his brother.     ALLERGIES     Allergies   Allergen Reactions    No Clinical Screening - See Comments Other (See Comments)     Beta blocker in glaucoma gtt.s caused low pulse and passing out     Timolol      Beta blocker in glaucoma gtt.s caused low pulse and passing out   - patient thinks it was timolol but not 100% sure which beta blocker eye drop.     Bactrim [Sulfamethoxazole-Trimethoprim] Rash        REVIEW OF SYSTEMS     Review of systems done with the patient and he mainly complains of back pain.  Comprehensive review of systems not possible due to confusion.     HOME & HOSPITAL MEDICATIONS     Prior to Admission Medications  Medications Prior to Admission   Medication Sig Dispense Refill Last Dose    acetaminophen (TYLENOL) 325 MG tablet Take 2 tablets (650 mg) by mouth 4 times daily 1 tablet 0 2023 at am    artificial tears (GENTEAL) 0.1-0.2-0.3 % ophthalmic solution Place 2 drops into both eyes daily as needed 15 mL 3 Unknown    aspirin 81 MG EC tablet Take 81 mg by mouth 2 times daily   2023 at am    atorvastatin (LIPITOR) 40 MG tablet Take 40 mg by mouth At Bedtime   2023 at pm    brimonidine (ALPHAGAN-P) 0.15 % ophthalmic solution Place 1 drop into the right eye 2 times daily  12 2023 at am    Cholecalciferol (VITAMIN D3) 25 MCG TABS Take 25 mcg by mouth daily   2023 at am    insulin aspart (NOVOLOG PEN) 100 UNIT/ML pen Inject 5 Units  Subcutaneous 3 times daily (with meals)   7/25/2023 at pm    insulin aspart (NOVOLOG PEN) 100 UNIT/ML pen Inject 2-6 Units Subcutaneous 3 times daily (with meals) Sliding scale:   -200: 2 units  201-250: 4 units  >250: 6 units   7/26/2023 at am    insulin degludec (TRESIBA FLEXTOUCH) 100 UNIT/ML pen Inject 18 Units Subcutaneous daily 15 mL 0 7/25/2023 at am    latanoprost (XALATAN) 0.005 % ophthalmic solution Place 1 drop into both eyes At Bedtime   7/25/2023 at pm    levothyroxine (SYNTHROID/LEVOTHROID) 112 MCG tablet Take 1 tablet (112 mcg) by mouth daily 90 tablet 1 7/26/2023 at am    naloxone (NARCAN) 4 MG/0.1ML nasal spray Spray 1 spray (4 mg) into one nostril alternating nostrils as needed for opioid reversal every 2-3 minutes until assistance arrives 0.2 mL 1 Unknown    oxyCODONE (OXYCONTIN) 20 MG 12 hr tablet Take 1 tablet (20 mg) by mouth every 12 hours 42 tablet 0 7/26/2023 at am    oxyCODONE (ROXICODONE) 5 MG tablet Take 5-10 mg by mouth every 4 hours as needed for severe pain   7/24/2023 at pm    oxyCODONE (ROXICODONE) 5 MG tablet Take 5 mg by mouth every 4 hours   7/26/2023 at 0740    polyethylene glycol (MIRALAX) 17 GM/Dose powder Take 17 g (1 Capful) by mouth daily as needed for constipation 1 g 0 Unknown    senna-docusate (SENOKOT S) 8.6-50 MG tablet Take 2 tablets by mouth 2 times daily   7/26/2023 at am    VITRON-C  MG TABS tablet Take 1 tablet by mouth daily   7/26/2023 at am    blood glucose (ONETOUCH VERIO IQ) test strip USE TO TEST BLOOD SUGAR 3 TIMES DAILY 100 strip 11     Continuous Blood Gluc  (FREESTYLE DELILAH 2 READER) TENNILLE 1 each continuous 1 each 0     Continuous Blood Gluc Sensor (FREESTYLE DELILAH 2 SENSOR) MISC 1 each every 14 days 2 each 11     insulin pen needle (BD PEN NEEDLE DONTAE 2ND GEN) 32G X 4 MM miscellaneous USE 3 PEN NEEDLES DAILY 100 each 11        Hospital Medications   brimonidine  1 drop Right Eye BID    insulin aspart  1-6 Units Subcutaneous Q4H     latanoprost  1 drop Both Eyes At Bedtime    oxyCODONE  10 mg Oral BID    sodium chloride (PF)  3 mL Intracatheter Q8H    sodium chloride  2 g Oral BID w/meals        PHYSICAL EXAM     Vital signs  Temp:  [98.4  F (36.9  C)-101.2  F (38.4  C)] 98.6  F (37  C)  Pulse:  [] 87  Resp:  [18-21] 18  BP: (115-146)/(57-88) 130/68  SpO2:  [96 %-100 %] 99 %    PHYSICAL EXAMINATION  VITALS: /68 (BP Location: Right arm)   Pulse 87   Temp 98.6  F (37  C) (Oral)   Resp 18   Wt 64.2 kg (141 lb 8.6 oz)   SpO2 99%   BMI 22.17 kg/m    GENERAL -Health appearing, No apparent distress  EYES- No scleral icterus, no eyelid droop, Pupils - see Neuro section  HEENT - Normocephalic, atraumatic, Hearing grossly intact; Oral mucosa moist and pink in color. External Ears and nose intact.   Neck - supple.  PULM - Good spontaneous respiratory effort; Normal palpation of chest.   CV-extremities warm and well-perfused  MSK- Gait - see Neuro section; Strength and tone- see Neuro section; Range of motion grossly intact.  PSYCH -cooperative    Neurological  Mental status - Patient is awake and not oriented to self, place and time. Attention span is normal. Language is fluent and follows some pill commands appropriately.   Cranial nerves - CN II-XII intact. Pupils are reactive and symmetric; EOMI, VFIT, NLF symmetric  Motor - There is no pronator drift. Motor exam shows 5/5 strength in all extremities.  Tone - Tone is symmetric bilaterally in upper and lower extremities.  Reflexes - Reflexes are symmetric.  Sensation - Sensory exam is grossly intact to light touch, pain.  Coordination - Finger to nose and heel to shin is without dysmetria.  Gait and station --needs assistance to ambulate.  Formal gait testing cannot be done due to safety concerns from ongoing medical issues.     DIAGNOSTIC STUDIES     Pertinent Radiology   Head CT  HEAD CT:  1.  Chronic bilateral subdural hematomas on the right measuring up to 12 mm in thickness and on  the left measuring up to 8 mm in thickness. Shallow right hyperattenuating component is present compatible with acute on chronic subdural hemorrhage.   Localized mass effect without midline shift.     2.  No finding for mass or definite finding for acute infarct. Very small chronic lacunar infarct or dilated perivascular space right caudate.     HEAD CTA:   1.  Developmental variation of the [place] without vascular cutoff, aneurysm, or flow-limiting stenosis.     NECK CTA:  1.  No significant stenosis either cervical carotid or vertebral system. No findings for dissection.    EEG  IMPRESSION/REPORT/PLAN  This is an abnormal EEG during wakefulness and drowsiness due to a generalized slow background with intermittent right frontal slowing.  EEG is suggestive of a generalized encephalopathy with superimposed right frontal cortical dysfunction which could be secondary to underlying lesion.  Questionable seizure in the right frontal region noted during the recording.  Further clinical correlation is needed.     Please note that the absence of epileptiform abnormalities does not exclude the possibility of epilepsy in any patient.     CT head  IMPRESSION:   1.  Most likely nonacute right frontal and occipital small subdural hematomas. Transverse diameter frontally is 7 mm and posteriorly is 6 mm.   2.  No associated mass effect.     Neurosurgery notes reviewed.  Recent Results (from the past 24 hour(s))   Sodium    Collection Time: 07/26/23  4:22 PM   Result Value Ref Range    Sodium 127 (L) 136 - 145 mmol/L   UA with Microscopic reflex to Culture    Collection Time: 07/26/23  4:45 PM    Specimen: Urine, Catheter   Result Value Ref Range    Color Urine Yellow Colorless, Straw, Light Yellow, Yellow    Appearance Urine Clear Clear    Glucose Urine Negative Negative mg/dL    Bilirubin Urine Negative Negative    Ketones Urine 40 (A) Negative mg/dL    Specific Gravity Urine 1.025 1.003 - 1.035    Blood Urine Negative  Negative    pH Urine 6.0 5.0 - 7.0    Protein Albumin Urine 30 (A) Negative mg/dL    Urobilinogen Urine <2.0 <2.0 mg/dL    Nitrite Urine Negative Negative    Leukocyte Esterase Urine Negative Negative    Bacteria Urine Few (A) None Seen /HPF    RBC Urine <1 <=2 /HPF    WBC Urine 1 <=5 /HPF    Transitional Epithelials Urine <1 <=1 /HPF   Sodium random urine    Collection Time: 07/26/23  4:45 PM   Result Value Ref Range    Sodium Urine mmol/L 39 mmol/L   Osmolality urine    Collection Time: 07/26/23  4:45 PM   Result Value Ref Range    Osmolality Urine 519 100 - 1,200 mmol/kg   Sodium    Collection Time: 07/26/23  6:27 PM   Result Value Ref Range    Sodium 126 (L) 136 - 145 mmol/L   Glucose by meter    Collection Time: 07/26/23  7:10 PM   Result Value Ref Range    GLUCOSE BY METER POCT 213 (H) 70 - 99 mg/dL   Sodium    Collection Time: 07/26/23  9:00 PM   Result Value Ref Range    Sodium 126 (L) 136 - 145 mmol/L   Sodium    Collection Time: 07/26/23 10:03 PM   Result Value Ref Range    Sodium 126 (L) 136 - 145 mmol/L   Glucose by meter    Collection Time: 07/27/23  1:44 AM   Result Value Ref Range    GLUCOSE BY METER POCT 277 (H) 70 - 99 mg/dL   Sodium    Collection Time: 07/27/23  2:01 AM   Result Value Ref Range    Sodium 125 (L) 136 - 145 mmol/L   Glucose by meter    Collection Time: 07/27/23  6:05 AM   Result Value Ref Range    GLUCOSE BY METER POCT 263 (H) 70 - 99 mg/dL   Platelet function closure with reflex    Collection Time: 07/27/23  6:17 AM   Result Value Ref Range    PFA-Col/Epi 206 (H) <170 Seconds   Basic metabolic panel    Collection Time: 07/27/23  6:17 AM   Result Value Ref Range    Sodium 127 (L) 136 - 145 mmol/L    Potassium 4.5 3.4 - 5.3 mmol/L    Chloride 92 (L) 98 - 107 mmol/L    Carbon Dioxide (CO2) 17 (L) 22 - 29 mmol/L    Anion Gap 18 (H) 7 - 15 mmol/L    Urea Nitrogen 18.3 8.0 - 23.0 mg/dL    Creatinine 1.28 (H) 0.67 - 1.17 mg/dL    Calcium 7.2 (L) 8.8 - 10.2 mg/dL    Glucose 285 (H) 70 - 99  mg/dL    GFR Estimate 58 (L) >60 mL/min/1.73m2   CBC with platelets and differential    Collection Time: 07/27/23  6:17 AM   Result Value Ref Range    WBC Count 8.2 4.0 - 11.0 10e3/uL    RBC Count 2.71 (L) 4.40 - 5.90 10e6/uL    Hemoglobin 8.3 (L) 13.3 - 17.7 g/dL    Hematocrit 24.9 (L) 40.0 - 53.0 %    MCV 92 78 - 100 fL    MCH 30.6 26.5 - 33.0 pg    MCHC 33.3 31.5 - 36.5 g/dL    RDW 13.8 10.0 - 15.0 %    Platelet Count 467 (H) 150 - 450 10e3/uL    % Neutrophils 71 %    % Lymphocytes 12 %    % Monocytes 13 %    % Eosinophils 1 %    % Basophils 1 %    % Immature Granulocytes 2 %    NRBCs per 100 WBC 0 <1 /100    Absolute Neutrophils 6.0 1.6 - 8.3 10e3/uL    Absolute Lymphocytes 1.0 0.8 - 5.3 10e3/uL    Absolute Monocytes 1.0 0.0 - 1.3 10e3/uL    Absolute Eosinophils 0.1 0.0 - 0.7 10e3/uL    Absolute Basophils 0.0 0.0 - 0.2 10e3/uL    Absolute Immature Granulocytes 0.1 <=0.4 10e3/uL    Absolute NRBCs 0.0 10e3/uL   Platelet function closure ADP    Collection Time: 07/27/23  6:17 AM   Result Value Ref Range    PFA-Col/ADP 71 <120 Seconds   Glucose by meter    Collection Time: 07/27/23 10:35 AM   Result Value Ref Range    GLUCOSE BY METER POCT 292 (H) 70 - 99 mg/dL   Glucose by meter    Collection Time: 07/27/23  2:19 PM   Result Value Ref Range    GLUCOSE BY METER POCT 328 (H) 70 - 99 mg/dL       Total time spent for face to face visit, reviewing labs/imaging studies, counseling and coordination of care was: 80 min More than 50% of this time was spent on counseling and coordination of care.    Patient new to me.  Reviewing chart/imaging studies.  Reviewing neurosurgery notes.  Getting EEG.    Abraham Bains MD  Neurologist  [facility]  Tel:- [phone]

## 2023-07-27 NOTE — PLAN OF CARE
Problem: Electrolyte Imbalance  Goal: Electrolyte Imbalance: Plan of Care  Outcome: Progressing     Problem: Diabetes Comorbidity  Goal: Blood Glucose Level Within Targeted Range  Outcome: Progressing     Problem: Pain Acute  Goal: Optimal Pain Control and Function  Outcome: Progressing   Goal Outcome Evaluation:    Vital signs stable on room air. Prn morphine given for L hip pain with good effect. L hip dressing intact. Pt still confused. Oriented to self and at times to place. Able to follow commands. Na check at 0200 was 125 and 127 at 0600. NPO.  Passed bedside swallow eval. BG checked Q4 hrs and given sliding scale insulin. 1:1 sitter at bedside for patient safety.

## 2023-07-28 NOTE — PROGRESS NOTES
Daily Progress Note        CODE STATUS:  No CPR- Do NOT Intubate    07/27/23  Assessment/Plan:  Patient is a 78-year-old male with history of metastatic bladder cancer who presents with altered mental status. Found to have sodium of 121 and bilateral subdural hematoma. Family decided comfort care.      #Hyponatremia  -- Likely SIADH vs vol depletion per nephrology.   -- Na initially 121. S/p 3% hypertonic saline in ED per nephrology. Na now 129  -- Nephrology consult appreciated     #Altered mental status  #acute encephalopathy, toxix metabolic  #Agitation  -- Likely related to hyponatremia vs other etiologgies  -- Clinically better per family, but not at baseline yet  -- IV ativan for agitation     #Bilateral subdural hematoma  -- Mostly chronic with an area of acute, mass effect no shift  -- Head trauma ~4 weeks ago  -- Neurosurgery consulted and offered surgical intervention (which would involve transfer to [facility]) but family declined. Repeat CT head- no new changes  -- Activity level per neurosurgery  -- Seen by stroke tele unit in ED. Anti-epileptics (keppra) started 7/27     #Metastatic bladder cancer  #Recent pathologic femur fracture, s/p surgery and TCU placement    #Code status  -- Comfort care now     #Diabetes type II  -- SSI     #CKD stage 3a  -- Stable    Diet: regular diet  DVT Prophylaxis: Pneumatic Compression Devices  [place] Catheter: Not present  Lines: None     Cardiac Monitoring: None  Code Status: No CPR- Do NOT Intubate      Clinically Significant Risk Factors         # Hyponatremia: Lowest Na = 125 mmol/L in last 2 days, will monitor as appropriate      # Hypoalbuminemia: Lowest albumin = 2.8 g/dL at 7/26/2023  1:31 PM, will monitor as appropriate    # Coagulation Defect: INR = 1.26 (Ref range: 0.85 - 1.15) and/or PTT = 43 Seconds (Ref range: 22 - 38 Seconds), will monitor for bleeding            # DMII: A1C = 8.8 % (Ref range: <5.7 %) within past 6 months                Disposition; Needs  placement with hospice when agitation is well controlled  Barrier to discharge; Agitation, 1:1    Care plan discussed with the daughter and son-in-law. Family decided on comfort care. Comfort care measures initiated per pall care.  to start finding placement.     Subjective:  Interval History: Patent apparently remained confused; and started to have severe agitation since last night requiring zyprex and 1:1.    Review of Systems:   As mentioned in subjective.    Patient Active Problem List   Diagnosis    Glaucoma    Bladder cancer (H)    Obesity (BMI 35.0-39.9) with comorbidity (H)    History of cancer of ureter    Diabetes mellitus without complication (H)    Ureter cancer (H)    Hyperlipidemia    CKD (chronic kidney disease) stage 4, GFR 15-29 ml/min (H)    Carcinoma in situ of bladder    Anemia due to stage 4 chronic kidney disease (H)    Acquired hypothyroidism    CRF (chronic renal failure), stage 4 (severe) (H)    Diarrhea 2nd to Chemotherapy    Pancreatic lesion, 2.1 cm on Abd CT    Dehydration    Pancreatic mass    YOU (acute kidney injury) (H)    Adverse drug reaction    Stage 3b chronic kidney disease (H)    Vitamin D deficiency    Centrilobular emphysema (H)    Insomnia due to medical condition    Cancer related pain    Slow transit constipation    Generalized muscle weakness    Pathological fracture of left femur due to neoplastic disease with routine healing, subsequent encounter    Metastatic malignant neoplasm, unspecified site (H)    Bilateral leg edema    Confusion       Scheduled Meds:   brimonidine  1 drop Right Eye BID    latanoprost  1 drop Both Eyes At Bedtime    levETIRAcetam  500 mg Oral BID    oxyCODONE  20 mg Oral Q12H    sodium chloride (PF)  3 mL Intracatheter Q8H     Continuous Infusions:  PRN Meds:.acetaminophen **OR** acetaminophen, artificial tears, atropine, [START ON 7/31/2023] bisacodyl, glucose **OR** dextrose **OR** glucagon, glycopyrrolate, haloperidol, haloperidol  lactate, HYDROmorphone, HYDROmorphone, lidocaine 4%, lidocaine (buffered or not buffered), LORazepam, LORazepam, melatonin, naloxone, naloxone, naloxone **OR** naloxone **OR** naloxone **OR** naloxone, oxyCODONE, prochlorperazine **OR** prochlorperazine **OR** prochlorperazine, senna-docusate, sodium chloride (PF)    Objective:  Vital signs in last 24 hours:  Temp:  [98  F (36.7  C)-98.6  F (37  C)] 98  F (36.7  C)  Pulse:  [80-87] 84  Resp:  [18] 18  BP: (126-137)/(68-84) 137/84  SpO2:  [97 %-99 %] 97 %        Intake/Output Summary (Last 24 hours) at 7/27/2023 1407  Last data filed at 7/27/2023 1012  Gross per 24 hour   Intake 70 ml   Output 525 ml   Net -455 ml       Physical Exam:    General: Not in obvious distress.  HEENT: NC, AT   Chest: Clear to auscultation bilaterally  Heart: S1S2 normal, regular. No M/R/G  Abdomen: Soft. NT, ND. Bowel sounds- active.  Extremities: No legs swelling  Neuro: Awake. Grossly non-focal      Lab Results:(I have personally reviewed the results)    Recent Results (from the past 24 hour(s))   Glucose by meter    Collection Time: 07/27/23  5:55 PM   Result Value Ref Range    GLUCOSE BY METER POCT 274 (H) 70 - 99 mg/dL   Glucose by meter    Collection Time: 07/27/23  9:13 PM   Result Value Ref Range    GLUCOSE BY METER POCT 230 (H) 70 - 99 mg/dL   Glucose by meter    Collection Time: 07/28/23  2:15 AM   Result Value Ref Range    GLUCOSE BY METER POCT 314 (H) 70 - 99 mg/dL   Glucose by meter    Collection Time: 07/28/23  5:12 AM   Result Value Ref Range    GLUCOSE BY METER POCT 299 (H) 70 - 99 mg/dL   Sodium    Collection Time: 07/28/23  6:28 AM   Result Value Ref Range    Sodium 129 (L) 136 - 145 mmol/L   Creatinine    Collection Time: 07/28/23  6:28 AM   Result Value Ref Range    Creatinine 1.22 (H) 0.67 - 1.17 mg/dL    GFR Estimate 61 >60 mL/min/1.73m2   Potassium    Collection Time: 07/28/23  6:28 AM   Result Value Ref Range    Potassium 4.6 3.4 - 5.3 mmol/L   Keppra  (Levetiracetam) Level    Collection Time: 07/28/23  6:28 AM   Result Value Ref Range    Keppra (Levetiracetam) Level 8.4 (L) 10.0 - 40.0  g/mL       All laboratory and imaging data in the past 24 hours reviewed  Serum Glucose range:   Recent Labs   Lab 07/28/23  0512 07/28/23  0215 07/27/23  2113 07/27/23  1755   * 314* 230* 274*       ABG: No lab results found in last 7 days.  CBC:   Recent Labs   Lab 07/27/23  0617 07/26/23  1331 07/24/23  1105   WBC 8.2 7.7 7.7   HGB 8.3* 9.6* 9.4*   HCT 24.9* 27.4* 29.1*   MCV 92 89 96   * 499* 365   NEUTROPHIL 71 82  --    LYMPH 12 8  --    MONOCYTE 13 9  --    EOSINOPHIL 1 0  --        Chemistry:   Recent Labs   Lab 07/28/23  0628 07/27/23  0617 07/27/23  0201 07/26/23  1622 07/26/23  1353 07/26/23  1331 07/24/23  1105   * 127* 125*   < >  --  123*  121* 129*   POTASSIUM 4.6 4.5  --   --   --  4.3  4.2 4.5   CHLORIDE  --  92*  --   --   --  89*  88* 94*   CO2  --  17*  --   --   --  14*  14* 19*   BUN  --  18.3  --   --   --  15.6  16.4 14.3   CR 1.22* 1.28*  --   --   --  1.19*  1.18* 1.24*   GFRESTIMATED 61 58*  --   --   --  63  64 60*   MAHSA  --  7.2*  --   --   --  8.0*  7.7* 7.6*   PROTTOTAL  --   --   --   --   --  6.1*  6.2*  --    ALBUMIN  --   --   --   --   --  2.8*  2.8*  --    AST  --   --   --   --   --  37  36  --    ALT  --   --   --   --   --  <5  <5  --    ALKPHOS  --   --   --   --   --  1,788*  1,812*  --    BILITOTAL  --   --   --   --   --  0.8  0.7  --    KARLY  --   --   --   --  <10*  --   --     < > = values in this interval not displayed.       Coags:  Recent Labs   Lab 07/26/23  1331   INR 1.26*   PTT 43*       Cardiac Markers:  No results for input(s): CKTOTAL, TROPONINI in the last 168 hours.       CTA Head Neck with Contrast    Result Date: 7/26/2023  EXAM: CTA HEAD NECK W CONTRAST LOCATION: [facility] DATE: 7/26/2023 INDICATION: Code Stroke to evaluate for potential thrombolysis and  thrombectomy. PLEASE READ IMMEDIATELY. COMPARISON: None. CONTRAST: Isovue 370, 75 mL. TECHNIQUE: Head and neck CT angiogram with IV contrast. Noncontrast head CT followed by axial helical CT images of the head and neck vessels obtained during the arterial phase of intravenous contrast administration. Axial 2D reconstructed images and multiplanar 3D MIP reconstructed images of the head and neck vessels were performed by the technologist. Dose reduction techniques were used. All stenosis measurements made according to NASCET criteria unless otherwise specified. FINDINGS: NONCONTRAST HEAD CT: INTRACRANIAL CONTENTS: There are bilateral low-attenuation subdural collections extending along both frontal and parietal inner tables, on the right measuring up to 12 mm in thickness in the coronal plane and on the left measuring up to 8 mm in thickness. These are compatible with chronic subdural hematomas. On the right there is superimposed small band of high attenuation/acute blood products measuring up to 4 mm in thickness (coronal image 28). Two very small lentiform foci of low-attenuation  change are seen along the posterior surface of the right occipital lobe compatible with additional small chronic subdural hematomas (sagittal images 18 and 27). Although there is localized mass effect, there is no significant midline shift. Lateral and third ventricles are small in size. Gray-white matter differentiation is preserved. 2 mm focus of low-attenuation change right caudate compatible with a tiny chronic lacunar infarct versus dilated perivascular space. Cerebellar tonsils are normally positioned. Sella is unremarkable technique. Corpus callosum is normally formed. VISUALIZED ORBITS/SINUSES/MASTOIDS: No intraorbital abnormality. No paranasal sinus mucosal disease. No middle ear or mastoid effusion. BONES/SOFT TISSUES: No suspicious lytic or blastic foci. HEAD CTA: ANTERIOR CIRCULATION: Normal enhancement is seen within the  intracranial internal carotid arteries, anterior cerebral arteries, and middle cerebral arteries. No vascular cutoff, aneurysm, or flow-limiting stenosis. POSTERIOR CIRCULATION: Left vertebral artery is dominant. Small caliber right vertebral artery. Common origin/junctional ectasia of the takeoff of the left posterior cerebral and superior cerebellar arteries. Posterior communicating arteries are not identified. Posterior circulation otherwise unremarkable. DURAL VENOUS SINUSES: Dural venous sinuses are not well evaluated on the basis of this exam. NECK CTA: RIGHT CAROTID: No measurable stenosis or dissection. Minor atherosclerotic plaque. LEFT CAROTID: No measurable stenosis or dissection. VERTEBRAL ARTERIES: No focal stenosis or dissection. Left vertebral artery is larger than the right. AORTIC ARCH: Left common carotid artery arises from the takeoff of the right brachiocephalic artery. Mild atherosclerotic plaque is seen within the aortic arch. NONVASCULAR STRUCTURES: Left-sided Port-A-Cath. Visualized lung apices reveal centrilobular emphysema. Visualized osseous structures are without suspicious lytic or blastic foci. Moderate to advanced cervical spondylosis.     IMPRESSION: HEAD CT: 1.  Chronic bilateral subdural hematomas on the right measuring up to 12 mm in thickness and on the left measuring up to 8 mm in thickness. Shallow right hyperattenuating component is present compatible with acute on chronic subdural hemorrhage. Localized mass effect without midline shift. 2.  No finding for mass or definite finding for acute infarct. Very small chronic lacunar infarct or dilated perivascular space right caudate. HEAD CTA: 1.  Developmental variation of the [place] without vascular cutoff, aneurysm, or flow-limiting stenosis. NECK CTA: 1.  No significant stenosis either cervical carotid or vertebral system. No findings for dissection. Acute on chronic intracranial hemorrhage on noncontrast head CT and  lack of vascular cutoff on CTA called to Dr. Espinoza at 1:18 and 1:30 PM respectively.    CT HEAD WO IV CONTRAST    Result Date: 7/12/2023  This document is currently in Final Status Exam Accession# [id number] CT HEAD WO IV CONTRAST HISTORY: Head trauma, moderate-severe. COMPARISON: None FINDINGS: The ventricles are midline and normal in size. Intact gray and white matter junctions. There are small extra-axial fluid collections which appear to reside in the subdural space about the right anterior frontal convexity and the right occipital region. These exhibit diminished attenuation. Intact calvarium. Visualized paranasal sinuses are clear. IMPRESSION: 1.  Most likely nonacute right frontal and occipital small subdural hematomas. Transverse diameter frontally is 7 mm and posteriorly is 6 mm. 2.  No associated mass effect. Dictated By: Darrius Kumar MD 7/12/2023 8:51 AM Edited By: TERESITA 7/12/2023 9:15 AM Electronically Signed: Darrius Kumar MD 7/12/2023 4:08 PM    US VENOUS LOWER EXT LEFT    Result Date: 7/12/2023  This document is currently in Final Status Exam Accession# [id number] US VENOUS LOWER EXT LEFT HISTORY: swelling; COMPARISON: None. TECHNIQUE: Duplex ultrasound of the deep veins of the left lower extremity from the groin to the calf. FINDINGS: Normal compressibility, color Doppler signal, venous waveforms and flow augmentation. IMPRESSION: No deep venous thrombus demonstrated in the left lower extremity. Electronically Signed: Cachorro Ponce MD 7/12/2023 11:04 AM    XR FEMUR LEFT 2 OR MORE VIEWS    Result Date: 7/5/2023  This document is currently in Final Status Exam Accession# [id number] XR PELVIS 1 OR 2 VIEWS, XR FEMUR LEFT 2 OR MORE VIEWS HISTORY: Fx.   FINDINGS: Moderate left hip joint space narrowing. Mild right hip joint space narrowing. Lytic and permeative lesion left proximal femoral diaphysis with some aggressive periosteal changes. Fairly significant cortical thinning posteriorly. No fracture line is seen  on this exam. IMPRESSION: Aggressive mass left proximal femur, metastasis versus primary neoplasm. Dictated By: Lencho Dozier MD 7/5/2023 10:26 AM Edited By: TERESITA 7/5/2023 10:35 AM Electronically Signed: Lencho Dozier MD 7/5/2023 4:53 PM    XR PELVIS 1 OR 2 VIEWS    Result Date: 7/5/2023  This document is currently in Final Status Exam Accession# [id number] XR PELVIS 1 OR 2 VIEWS, XR FEMUR LEFT 2 OR MORE VIEWS HISTORY: Fx.   FINDINGS: Moderate left hip joint space narrowing. Mild right hip joint space narrowing. Lytic and permeative lesion left proximal femoral diaphysis with some aggressive periosteal changes. Fairly significant cortical thinning posteriorly. No fracture line is seen on this exam. IMPRESSION: Aggressive mass left proximal femur, metastasis versus primary neoplasm. Dictated By: Lencho Dozier MD 7/5/2023 10:26 AM Edited By: TERESITA 7/5/2023 10:35 AM Electronically Signed: Lencho Dozier MD 7/5/2023 4:53 PM    CT Therapy Correlate    Result Date: 6/27/2023  This exam was marked as non-reportable because it will not be read by a radiologist or a [place] non-radiologist provider.       Latest radiology report personally reviewed.    Note created using dragon voice recognition software so sounds alike errors may have escaped editing.      07/28/2023   Elvis Mancilla MD  Hospitalist, [facility]  Pager: [phone]

## 2023-07-28 NOTE — PROGRESS NOTES
NEUROLOGY PROGRESS NOTE     ASSESSMENT & PLAN     Diagnosis: Confusion-rule out seizure     Confusion-rule out seizure  Bilateral subdural hematoma  History of metastatic bladder cancer  Hyponatremia  Encephalopathy     Head CT shows evidence of bilateral subdural hematoma  Angiogram negative for large vessel occlusion/stenosis  EEG shows slowing in the right frontal region with questionable seizure  Continue Keppra 500mg twice daily  Keppra level pending, not necessarily needed before discharge  EEG further suggestive of encephalopathy.  This be secondary to hyponatremia.  Slow correction of sodium pre primary team  PT/OT/speech therapy as needed  Management of subdural per neurosurgery.  Repeat head CT planned for today, but canceled due to change in plan  Ammonia normal.  Family's plan is to transition to comfort cares so no additional work-up recommended.  We did discuss continuing the Keppra going forward, as part of his comfort care.  Family is in agreement  Neurology will sign off.  Please call if additional questions arise     Neurology Discharge Planning: Per primary team.    Patient Active Problem List    Diagnosis Date Noted    Confusion 07/26/2023     Priority: Medium    Bilateral leg edema 07/25/2023     Priority: Medium    Slow transit constipation 07/21/2023     Priority: Medium    Generalized muscle weakness 07/21/2023     Priority: Medium    Pathological fracture of left femur due to neoplastic disease with routine healing, subsequent encounter 07/21/2023     Priority: Medium    Metastatic malignant neoplasm, unspecified site (H) 07/21/2023     Priority: Medium    Cancer related pain 06/12/2023     Priority: Medium    Insomnia due to medical condition 10/13/2022     Priority: Medium    Centrilobular emphysema (H) 10/11/2022     Priority: Medium    Stage 3b chronic kidney disease (H) 10/07/2022     Priority: Medium    Vitamin D deficiency 10/07/2022     Priority: Medium    YOU (acute kidney injury)  (H) 09/17/2022     Priority: Medium    Adverse drug reaction 09/17/2022     Priority: Medium    Dehydration 09/12/2022     Priority: Medium    Pancreatic mass 09/12/2022     Priority: Medium    CRF (chronic renal failure), stage 4 (severe) (H) 09/10/2022     Priority: Medium    Diarrhea 2nd to Chemotherapy 09/10/2022     Priority: Medium    Pancreatic lesion, 2.1 cm on Abd CT 09/10/2022     Priority: Medium    Acquired hypothyroidism 08/26/2022     Priority: Medium    Carcinoma in situ of bladder 11/19/2021     Priority: Medium    Anemia due to stage 4 chronic kidney disease (H) 10/22/2021     Priority: Medium    CKD (chronic kidney disease) stage 4, GFR 15-29 ml/min (H) 10/21/2021     Priority: Medium    Ureter cancer (H) 01/13/2021     Priority: Medium     Added automatically from request for surgery [id number]      Diabetes mellitus without complication (H) 12/11/2020     Priority: Medium    History of cancer of ureter 02/27/2019     Priority: Medium    Obesity (BMI 35.0-39.9) with comorbidity (H) 09/26/2018     Priority: Medium    Bladder cancer (H) 10/06/2015     Priority: Medium    Glaucoma 07/06/2015     Priority: Medium    Hyperlipidemia 08/13/2008     Priority: Medium     Formatting of this note might be different from the original.  IMO Update 10/11       Medical History  Past Medical History:   Diagnosis Date    Acquired hypothyroidism     Anemia     Benign essential hypertension     Bladder cancer (H)     Carcinoma in situ of bladder 11/19/2021    Chronic kidney disease     Diabetes mellitus, type 2 (H)     Dyslipidemia     Glaucoma     Hyperlipidemia     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) 12/11/2020    getting BCG (from his Urologist in [place], [place])    Obesity     Pancreatic mass     Renal cell carcinoma, left (H) 2021    Left nephrectomy    Ureter cancer, left (H)         SUBJECTIVE     77 yo man with metastatic bladder cancer presenting with confusion.  Hyponatremic.    Patient  had some impulsiveness, agitation overnight, given Ativan and Zyprexa. No reported seizure activity.    Colin's daughter and son-in-law are at bedside.  They tell me that the updated CT scan was canceled, it would not change their management plan.  They have decided to move toward comfort cares and are working with palliative care regarding this.       OBJECTIVE     Vital signs in last 24 hours  Temp:  [98.2  F (36.8  C)-98.7  F (37.1  C)] 98.2  F (36.8  C)  Pulse:  [80-87] 80  Resp:  [18] 18  BP: (115-130)/(61-71) 126/71  SpO2:  [97 %-99 %] 97 %    Weight:   [unfilled]    Review of Systems   Review of systems not obtained due to patient factors.    General Physical Exam: Vital signs were reviewed and are documented in EMR.   Neurological  Mental status - Patient is awake and not oriented to place and time. Does know daughter and ROMERO's names. Attention span is poor. Language is fluent and follows some simple commands appropriately.   Cranial nerves - Face symmetric.  Motor - There is no pronator drift. Motor exam shows 5/5 strength in all extremities.  Tone - Tone is symmetric bilaterally in upper and lower extremities.  Coordination - Fine motor movements appear normal.  Gait and station - needs assistance to ambulate.  Formal gait testing cannot be done due to safety concerns from ongoing medical issues.     DIAGNOSTIC STUDIES     Pertinent Radiology   Radiology Results: Personally reviewed image/s    CT (rpt 7/27):  FINDINGS:  INTRACRANIAL CONTENTS: Small bilateral predominantly low-attenuation subdural collections right larger than left not significantly changed compared to CT head 07/26/2023.     No new or significant progressive acute intracranial hemorrhage. No midline shift. No CT evidence of acute infarct. Normal parenchymal attenuation. Normal ventricles and sulci.      VISUALIZED ORBITS/SINUSES/MASTOIDS: No intraorbital abnormality. No paranasal sinus mucosal disease. No middle ear or mastoid effusion.      BONES/SOFT TISSUES: No acute abnormality.                                                                      IMPRESSION:  1.  No significant interval change compared to CT head 07/26/2023.    CT/CTA (7/26):  IMPRESSION:   HEAD CT:  1.  Chronic bilateral subdural hematomas on the right measuring up to 12 mm in thickness and on the left measuring up to 8 mm in thickness. Shallow right hyperattenuating component is present compatible with acute on chronic subdural hemorrhage.   Localized mass effect without midline shift.     2.  No finding for mass or definite finding for acute infarct. Very small chronic lacunar infarct or dilated perivascular space right caudate.     HEAD CTA:   1.  Developmental variation of the [place] without vascular cutoff, aneurysm, or flow-limiting stenosis.     NECK CTA:  1.  No significant stenosis either cervical carotid or vertebral system. No findings for dissection.    Electroencephalogram:  IMPRESSION/REPORT/PLAN  This is an abnormal EEG during wakefulness and drowsiness due to a generalized slow background with intermittent right frontal slowing.  EEG is suggestive of a generalized encephalopathy with superimposed right frontal cortical dysfunction which could be secondary to underlying lesion.  Questionable seizure in the right frontal region noted during the recording.  Further clinical correlation is needed.    Pertinent Labs   Lab Results: personally reviewed.   Recent Results (from the past 24 hour(s))   Glucose by meter    Collection Time: 07/27/23 10:35 AM   Result Value Ref Range    GLUCOSE BY METER POCT 292 (H) 70 - 99 mg/dL   Glucose by meter    Collection Time: 07/27/23  2:19 PM   Result Value Ref Range    GLUCOSE BY METER POCT 328 (H) 70 - 99 mg/dL   Glucose by meter    Collection Time: 07/27/23  5:55 PM   Result Value Ref Range    GLUCOSE BY METER POCT 274 (H) 70 - 99 mg/dL   Glucose by meter    Collection Time: 07/27/23  9:13 PM   Result Value Ref Range     GLUCOSE BY METER POCT 230 (H) 70 - 99 mg/dL   Glucose by meter    Collection Time: 07/28/23  2:15 AM   Result Value Ref Range    GLUCOSE BY METER POCT 314 (H) 70 - 99 mg/dL   Glucose by meter    Collection Time: 07/28/23  5:12 AM   Result Value Ref Range    GLUCOSE BY METER POCT 299 (H) 70 - 99 mg/dL         HOSPITAL MEDICATIONS      brimonidine  1 drop Right Eye BID    insulin aspart  1-6 Units Subcutaneous Q4H    latanoprost  1 drop Both Eyes At Bedtime    levETIRAcetam  500 mg Oral BID    oxyCODONE  10 mg Oral BID    sodium chloride (PF)  3 mL Intracatheter Q8H    sodium chloride  2 g Oral BID w/meals        Total time spent for face to face visit, reviewing labs/imaging studies, counseling and coordination of care was: 1 Hour. More than 50% of this time was spent on counseling and coordination of care.    Dragon software used in the dictation on this note.    Theresa Wang MD  [facility] - [address], [address] [place], [place] [id number]

## 2023-07-28 NOTE — PLAN OF CARE
Problem: Risk for Delirium  Goal: Improved Behavioral Control  Outcome: Not Progressing   Goal Outcome Evaluation:    Alert confused.disoriented to place,time and situation. Restless,agitated and impulsive.  1 to 1 sitter present for patient safety. PRN ativan and zyprexa given this shift with minimal effect.

## 2023-07-28 NOTE — PROGRESS NOTES
"Care Management Follow Up    Length of Stay (days): 2    Expected Discharge Date: 07/29/2023     Concerns to be Addressed:     Discharge planning  Patient plan of care discussed at interdisciplinary rounds: Yes    Anticipated Discharge Disposition:  Home with hospice?     Anticipated Discharge Services:  per hospice  Anticipated Discharge DME:  per hospice    Patient/family educated on Medicare website which has current facility and service quality ratings:  NA  Education Provided on the Discharge Plan:  yes, per team  Patient/Family in Agreement with the Plan:  yes, per team    Referrals Placed by CM/SW:  hospice  Private pay costs discussed:  not at this time, will likely need [facility] transport    Additional Information:  Per nursing notes overnight, patient confused, agitated, pulling out IV.  Given PRN ativan and zyprexa.   On 1:1 for safety    Per palliative note today, family requests to transition to comfort cares.  Plan to transition to hospice care on discharge.    1600 : RNBHARAT met with family in patient's room.  Provided list of nursing homes and highlighted LTC facilities.  Also provided [facility] [employer] brochure.  Family plans on researching facilities and calling [facility] [employer] tomorrow.  Family is interested in intermediate/LTC that will take patient with hospice support.        Social Hx:   \"Colin is in rehab at [facility] and will need to return for continued rehab. He was living in [place], but after his recent hospitalization he is at [facility] and his daughters wanted him closer to them. His eventual plan is to return home to living with his daughter here in the [facility].  Family is working to transfer his cancer cares to [facility].\"       [facility] transport at discharge.    CM to follow for medical progression of care, discharge recommendations, and final discharge plan.    Carla Hull RN    "

## 2023-07-28 NOTE — PROGRESS NOTES
Stopped to see patient after chart review, evaluate progress. Spoke with patient's daughter and her . Family has decided to focus on comfort for patient. We will sign off. Please call with questions.     SIMIN Alcazar  [facility] Neurosurgery  O: [phone]

## 2023-07-28 NOTE — PROGRESS NOTES
[facility]  Palliative Care Daily Progress Note       Recommendations & Counseling     GOALS OF CARE:   Comfort focused /Comfort care  Family requested to transition to comfort measures this morning with plan to discharge with hospice care in the coming days.  Colin was previously living independently, most recently at TCU. Family is not able to care for him if prognosis is greater than short weeks. Looking into placement options.    ADVANCE CARE PLANNING:  Patient has an advance directive dated 1/12/2023.  Primary Health Care Agent Rachelle Baxter (daughter).  Alternate(s) Jessika Joel (daughter).   There is no POLST form on file, plan to complete prior to DC.  Code status: No CPR- Do NOT Intubate    MEDICAL MANAGEMENT:   #Cancer-related pain  Followed by outpatient Palliative in [place]. Has been taking oxycontin 20mg BID with prn oxycodone 10mg 2-3x daily. No recent changes to pain regimen.  Resumed home oxycontin 20mg BID (increased from reduced dose)  Continue prn oxycodone to 5-10mg q4h prn (1st choice)  Changed prn IV morphine to prn IV dilaudid 0.3-0.5mg (2nd choice)     #Altered mental status, SDH, possible seizures  No NSGY intervention desired by family.  Recommend continuing Keppra while on comfort care  Started IV/PO haldol 1mg prn for agitation (1st choice)  Continue IV/PO lorazepam 0.5mg for anxiety (2nd choice) - caution as benzodiazepines can worsen delirium    PSYCHOSOCIAL/SPIRITUAL:  Family - strong support from 3 adult daughters and ex-wife  Has lived in [place] his whole life. Now unable to live independently. Plan to stay with lu Rosen and her  after TCU.  Jenn - spiritual > [employer], declined  support today.    Palliative Care will continue to follow.     Debi Flood PA-C  Securely message with [employer] (more info)  Text page via "[employer] Paging/Directory       Assessments          Colin is a 79 y/o man with metastatic bladder cancer s/p chemo,  radiation and ureterectomy (mets to lumbar spine, pelvis and L femur), CKD3, DM2, chronic hyponatremia, recently admitted at [facility] 7/12-7/20 for L femur stabilization surgery 7/17. Also found to have nonacute subdural hematomas after fall at home. Colin was admitted here 7/26 from TCU for altered mental status. Found to have acute on chronic hyponatremia and bilateral subdural hematomas. Neurosurgery consulted and offered surgical intervention but family declined. Palliative consulted to assist with goals of care.     Today, the patient was seen for:  Goals of care, symptom management            Interval History:     Chart review/discussion with unit or clinical team members:   - Confused and agitated overnight requiring ativan and IM zyprexa, on 1:1  - Refusing pain medication but complaining of pain    Per patient or family/caregivers today:  Colin was laying in bed, eating mashed potatoes during my visit. He thought he was in a hospital in [place]. He denied any pain or other discomfort. Daughter Jessika and son in law at bedside, report Colin has been agitated but much better now.          Medications:     Notable for:  Oxycontin 10mg BID  Tylenol 650mg q4h prn   IV/PO ativan 0.5mg q4h prn agitation  PO oxycodone 5mg-10mg oxycodone q2h prn  IV morphine 2mg q2h prn           Physical Exam:   Vital Signs: Blood pressure 137/84, pulse 84, temperature 98  F (36.7  C), temperature source Oral, resp. rate 18, weight 64.2 kg (141 lb 8.6 oz), SpO2 97 %.   GENERAL: laying in bed, alert and engaged, A&Ox1, moving all extremities        Medical Decision Making       MANAGEMENT DISCUSSED with the following over the past 24 hours: hospitalist   NOTE(S)/MEDICAL RECORDS REVIEWED over the past 24 hours: Medicine, Renal, Neuro  Medical complexity over the past 24 hours:  - Decision to DE-ESCALATE CARE based on prognosis

## 2023-07-28 NOTE — PROGRESS NOTES
Brief Palliative Care Note    Returned call from daughter Jessika requesting transition to comfort care. She and her sisters have discussed and want to ensure Colin is as comfortable as possible. They do not want to proceed with repeat CTH as they would not want Neurosurgery intervention. Plan to transition to hospice care on discharge.    Notified Dr. Mancilla and placed comfort orders.    Debi Flood PA-C  [facility], Palliative Care  Securely message with the [employer] Web Console (learn more here) or  Text page via [employer] Paging/Directory

## 2023-07-28 NOTE — PROGRESS NOTES
Pt changed and repositioned, Pt incontinent of bladder. Pt cooperative with cares, Pt stated having 2/10 pain, nurse asked if Pt wanted any medication for pain and Pt refused at this time. Pt ate mashed potatoes for lunch with family.

## 2023-07-28 NOTE — PLAN OF CARE
Goal Outcome Evaluation:      Problem: Electrolyte Imbalance  Goal: Electrolyte Imbalance: Plan of Care  7/27/2023 2227 by Daisy Wilson RN  Outcome: Progressing  7/27/2023 2156 by Daisy Wilson RN  Outcome: Progressing     Problem: Pain Acute  Goal: Optimal Pain Control and Function  Outcome: Progressing  Intervention: Develop Pain Management Plan  Recent Flowsheet Documentation  Taken 7/27/2023 1659 by Daisy Wilson RN  Pain Management Interventions:   medication (see MAR)   emotional support   repositioned  Intervention: Prevent or Manage Pain  Recent Flowsheet Documentation  Taken 7/27/2023 1545 by Daisy Wilson RN  Medication Review/Management: medications reviewed     Problem: Fall Injury Risk  Goal: Absence of Fall and Fall-Related Injury  Outcome: Progressing  Intervention: Identify and Manage Contributors  Recent Flowsheet Documentation  Taken 7/27/2023 1545 by Daisy Wilson RN  Medication Review/Management: medications reviewed  Intervention: Promote Injury-Free Environment  Recent Flowsheet Documentation  Taken 7/27/2023 1545 by Daisy Wilson RN  Safety Promotion/Fall Prevention:   bedside attendant   activity supervised   nonskid shoes/slippers when out of bed   increase visualization of patient     Problem: Pain Acute (Oncology Care)  Goal: Optimal Pain Control  Outcome: Progressing  Intervention: Develop Pain Management Plan  Recent Flowsheet Documentation  Taken 7/27/2023 1659 by Daisy Wilson RN  Pain Management Interventions:   medication (see MAR)   emotional support   repositioned  Intervention: Prevent or Manage Pain  Recent Flowsheet Documentation  Taken 7/27/2023 1545 by Daisy Wilson RN  Medication Review/Management: medications reviewed     4/10 pain reported. Scheduled oxycodone given-patient frequently complained of pain but declined pain medications. Patient followed directions and seemed pleasant at beginning of shift but around [id number] patient became more angry and was  "not understanding why staff was in the room and there to help him. He picked at his IV more and would not let staff help him. Patient began trying to walk around the room to try and get \"around the house\" despite reorientation. Patient ended up laying back in bed watching TV but does wake up confused and trying to leave and angry when told he cannot. BS still elevated at 274 and 230-coverage given. Na getting better at 127.    Daisy Wilson RN   "

## 2023-07-28 NOTE — PROGRESS NOTES
Comfort cares - we will sign off.    Isai Mcclure MD  [facility]  [phone] (off)

## 2023-07-28 NOTE — PLAN OF CARE
Pt is alert and oriented to self, able to communicate basic needs, assist of 2. Pt switched to comfort cares this morning, family has called palliative care and anticipating hospice after discharge. Portacath dressing changed due to Pt partially peeling off previous. NPO switched to regular diet as tolerated. Pt remains 1:1 due to pulling at lines and agitation. PRN dilaudid given and has been effective for pain, Pt rated pain 2/10 when resting.  Family currently at bedside.

## 2023-07-29 NOTE — PROGRESS NOTES
Family in room and [facility] [phone]. Contacted  home at 1620. Family would like to be present while body is transported. Estimated time from  home is 1.5 hours.

## 2023-07-29 NOTE — PROGRESS NOTES
Daily Progress Note        CODE STATUS:  No CPR- Do NOT Intubate    07/27/23  Assessment/Plan:  Patient is a 78-year-old male with history of metastatic bladder cancer who presents with altered mental status. Found to have sodium of 121 and bilateral subdural hematoma. Family decided comfort care.      #Hyponatremia  -- Likely SIADH vs vol depletion per nephrology.   -- Na initially 121. S/p 3% hypertonic saline in ED per nephrology. Na improved to 129     #Altered mental status  #acute encephalopathy, toxic metabolic  #Agitation  -- Likely related to hyponatremia vs other etiologies  -- IV ativan for agitation     #Bilateral subdural hematoma  -- Mostly chronic with an area of acute, mass effect no shift  -- Head trauma ~4 weeks ago  -- Neurosurgery consulted and offered surgical intervention (which would involve transfer to [facility]) but family declined. Repeat CT head- no new changes  -- Seen by stroke tele unit in ED. Anti-epileptics (keppra) started 7/27  -- Now comfort care     #Metastatic bladder cancer  #Recent pathologic femur fracture, s/p surgery and TCU placement    #Code status  -- Comfort care now     #Diabetes type II  #CKD stage 3a      Diet: regular diet  DVT Prophylaxis: Pneumatic Compression Devices  [place] Catheter: Not present  Lines: None     Cardiac Monitoring: None  Code Status: No CPR- Do NOT Intubate      Clinically Significant Risk Factors         # Hyponatremia: Lowest Na = 129 mmol/L in last 2 days, will monitor as appropriate      # Hypoalbuminemia: Lowest albumin = 2.8 g/dL at 7/26/2023  1:31 PM, will monitor as appropriate    # Coagulation Defect: INR = 1.26 (Ref range: 0.85 - 1.15) and/or PTT = 43 Seconds (Ref range: 22 - 38 Seconds), will monitor for bleeding            # DMII: A1C = 8.8 % (Ref range: <5.7 %) within past 6 months                Disposition; Needs placement with hospice when agitation is well controlled  Barrier to discharge; Agitation, 1:1    Subjective:  Interval  History: Patent comfortably sleeping during my visit. Still on 1:1.     Review of Systems:   As mentioned in subjective.    Patient Active Problem List   Diagnosis    Glaucoma    Bladder cancer (H)    Obesity (BMI 35.0-39.9) with comorbidity (H)    History of cancer of ureter    Diabetes mellitus without complication (H)    Ureter cancer (H)    Hyperlipidemia    CKD (chronic kidney disease) stage 4, GFR 15-29 ml/min (H)    Carcinoma in situ of bladder    Anemia due to stage 4 chronic kidney disease (H)    Acquired hypothyroidism    CRF (chronic renal failure), stage 4 (severe) (H)    Diarrhea 2nd to Chemotherapy    Pancreatic lesion, 2.1 cm on Abd CT    Dehydration    Pancreatic mass    YOU (acute kidney injury) (H)    Adverse drug reaction    Stage 3b chronic kidney disease (H)    Vitamin D deficiency    Centrilobular emphysema (H)    Insomnia due to medical condition    Cancer related pain    Slow transit constipation    Generalized muscle weakness    Pathological fracture of left femur due to neoplastic disease with routine healing, subsequent encounter    Metastatic malignant neoplasm, unspecified site (H)    Bilateral leg edema    Confusion       Scheduled Meds:   brimonidine  1 drop Right Eye BID    latanoprost  1 drop Both Eyes At Bedtime    levETIRAcetam  500 mg Oral BID    oxyCODONE  20 mg Oral Q12H    sodium chloride (PF)  3 mL Intracatheter Q8H     Continuous Infusions:  PRN Meds:.acetaminophen **OR** acetaminophen, artificial tears, atropine, [START ON 7/31/2023] bisacodyl, glucose **OR** dextrose **OR** glucagon, glycopyrrolate, haloperidol, haloperidol lactate, HYDROmorphone, HYDROmorphone, lidocaine 4%, lidocaine (buffered or not buffered), LORazepam, LORazepam, melatonin, naloxone, naloxone, naloxone **OR** naloxone **OR** naloxone **OR** naloxone, oxyCODONE, prochlorperazine **OR** prochlorperazine **OR** prochlorperazine, senna-docusate, sodium chloride (PF)    Objective:  Vital signs in last 24  hours:           Intake/Output Summary (Last 24 hours) at 7/27/2023 1407  Last data filed at 7/27/2023 1012  Gross per 24 hour   Intake 70 ml   Output 525 ml   Net -455 ml       Physical Exam:    General: Not in obvious distress.  HEENT: NC, AT   Chest: Clear to auscultation bilaterally  Heart: S1S2 normal, regular. No M/R/G  Abdomen: Soft. NT, ND. Bowel sounds- active.  Extremities: No legs swelling  Neuro: Awake. Grossly non-focal      Lab Results:(I have personally reviewed the results)    No results found for this or any previous visit (from the past 24 hour(s)).      All laboratory and imaging data in the past 24 hours reviewed  Serum Glucose range:   Recent Labs   Lab 07/28/23  0512 07/28/23  0215 07/27/23  2113 07/27/23  1755   * 314* 230* 274*       ABG: No lab results found in last 7 days.  CBC:   Recent Labs   Lab 07/27/23  0617 07/26/23  1331 07/24/23  1105   WBC 8.2 7.7 7.7   HGB 8.3* 9.6* 9.4*   HCT 24.9* 27.4* 29.1*   MCV 92 89 96   * 499* 365   NEUTROPHIL 71 82  --    LYMPH 12 8  --    MONOCYTE 13 9  --    EOSINOPHIL 1 0  --        Chemistry:   Recent Labs   Lab 07/28/23  0628 07/27/23  0617 07/27/23  0201 07/26/23  1622 07/26/23  1353 07/26/23  1331 07/24/23  1105   * 127* 125*   < >  --  123*  121* 129*   POTASSIUM 4.6 4.5  --   --   --  4.3  4.2 4.5   CHLORIDE  --  92*  --   --   --  89*  88* 94*   CO2  --  17*  --   --   --  14*  14* 19*   BUN  --  18.3  --   --   --  15.6  16.4 14.3   CR 1.22* 1.28*  --   --   --  1.19*  1.18* 1.24*   GFRESTIMATED 61 58*  --   --   --  63  64 60*   MAHSA  --  7.2*  --   --   --  8.0*  7.7* 7.6*   PROTTOTAL  --   --   --   --   --  6.1*  6.2*  --    ALBUMIN  --   --   --   --   --  2.8*  2.8*  --    AST  --   --   --   --   --  37  36  --    ALT  --   --   --   --   --  <5  <5  --    ALKPHOS  --   --   --   --   --  1,788*  1,812*  --    BILITOTAL  --   --   --   --   --  0.8  0.7  --    KARLY  --   --   --   --  <10*  --   --     <  > = values in this interval not displayed.       Coags:  Recent Labs   Lab 07/26/23  1331   INR 1.26*   PTT 43*       Cardiac Markers:  No results for input(s): CKTOTAL, TROPONINI in the last 168 hours.       CTA Head Neck with Contrast    Result Date: 7/26/2023  EXAM: CTA HEAD NECK W CONTRAST LOCATION: [facility] DATE: 7/26/2023 INDICATION: Code Stroke to evaluate for potential thrombolysis and thrombectomy. PLEASE READ IMMEDIATELY. COMPARISON: None. CONTRAST: Isovue 370, 75 mL. TECHNIQUE: Head and neck CT angiogram with IV contrast. Noncontrast head CT followed by axial helical CT images of the head and neck vessels obtained during the arterial phase of intravenous contrast administration. Axial 2D reconstructed images and multiplanar 3D MIP reconstructed images of the head and neck vessels were performed by the technologist. Dose reduction techniques were used. All stenosis measurements made according to NASCET criteria unless otherwise specified. FINDINGS: NONCONTRAST HEAD CT: INTRACRANIAL CONTENTS: There are bilateral low-attenuation subdural collections extending along both frontal and parietal inner tables, on the right measuring up to 12 mm in thickness in the coronal plane and on the left measuring up to 8 mm in thickness. These are compatible with chronic subdural hematomas. On the right there is superimposed small band of high attenuation/acute blood products measuring up to 4 mm in thickness (coronal image 28). Two very small lentiform foci of low-attenuation  change are seen along the posterior surface of the right occipital lobe compatible with additional small chronic subdural hematomas (sagittal images 18 and 27). Although there is localized mass effect, there is no significant midline shift. Lateral and third ventricles are small in size. Gray-white matter differentiation is preserved. 2 mm focus of low-attenuation change right caudate compatible with a tiny chronic lacunar  infarct versus dilated perivascular space. Cerebellar tonsils are normally positioned. Sella is unremarkable technique. Corpus callosum is normally formed. VISUALIZED ORBITS/SINUSES/MASTOIDS: No intraorbital abnormality. No paranasal sinus mucosal disease. No middle ear or mastoid effusion. BONES/SOFT TISSUES: No suspicious lytic or blastic foci. HEAD CTA: ANTERIOR CIRCULATION: Normal enhancement is seen within the intracranial internal carotid arteries, anterior cerebral arteries, and middle cerebral arteries. No vascular cutoff, aneurysm, or flow-limiting stenosis. POSTERIOR CIRCULATION: Left vertebral artery is dominant. Small caliber right vertebral artery. Common origin/junctional ectasia of the takeoff of the left posterior cerebral and superior cerebellar arteries. Posterior communicating arteries are not identified. Posterior circulation otherwise unremarkable. DURAL VENOUS SINUSES: Dural venous sinuses are not well evaluated on the basis of this exam. NECK CTA: RIGHT CAROTID: No measurable stenosis or dissection. Minor atherosclerotic plaque. LEFT CAROTID: No measurable stenosis or dissection. VERTEBRAL ARTERIES: No focal stenosis or dissection. Left vertebral artery is larger than the right. AORTIC ARCH: Left common carotid artery arises from the takeoff of the right brachiocephalic artery. Mild atherosclerotic plaque is seen within the aortic arch. NONVASCULAR STRUCTURES: Left-sided Port-A-Cath. Visualized lung apices reveal centrilobular emphysema. Visualized osseous structures are without suspicious lytic or blastic foci. Moderate to advanced cervical spondylosis.     IMPRESSION: HEAD CT: 1.  Chronic bilateral subdural hematomas on the right measuring up to 12 mm in thickness and on the left measuring up to 8 mm in thickness. Shallow right hyperattenuating component is present compatible with acute on chronic subdural hemorrhage. Localized mass effect without midline shift. 2.  No finding for mass or  definite finding for acute infarct. Very small chronic lacunar infarct or dilated perivascular space right caudate. HEAD CTA: 1.  Developmental variation of the Eek of [place] without vascular cutoff, aneurysm, or flow-limiting stenosis. NECK CTA: 1.  No significant stenosis either cervical carotid or vertebral system. No findings for dissection. Acute on chronic intracranial hemorrhage on noncontrast head CT and lack of vascular cutoff on CTA called to Dr. Espinoza at 1:18 and 1:30 PM respectively.    CT HEAD WO IV CONTRAST    Result Date: 7/12/2023  This document is currently in Final Status Exam Accession# [id number] CT HEAD WO IV CONTRAST HISTORY: Head trauma, moderate-severe. COMPARISON: None FINDINGS: The ventricles are midline and normal in size. Intact gray and white matter junctions. There are small extra-axial fluid collections which appear to reside in the subdural space about the right anterior frontal convexity and the right occipital region. These exhibit diminished attenuation. Intact calvarium. Visualized paranasal sinuses are clear. IMPRESSION: 1.  Most likely nonacute right frontal and occipital small subdural hematomas. Transverse diameter frontally is 7 mm and posteriorly is 6 mm. 2.  No associated mass effect. Dictated By: Darrius Kumar MD 7/12/2023 8:51 AM Edited By: TERESITA 7/12/2023 9:15 AM Electronically Signed: Darrius Kumar MD 7/12/2023 4:08 PM    US VENOUS LOWER EXT LEFT    Result Date: 7/12/2023  This document is currently in Final Status Exam Accession# [id number] US VENOUS LOWER EXT LEFT HISTORY: swelling; COMPARISON: None. TECHNIQUE: Duplex ultrasound of the deep veins of the left lower extremity from the groin to the calf. FINDINGS: Normal compressibility, color Doppler signal, venous waveforms and flow augmentation. IMPRESSION: No deep venous thrombus demonstrated in the left lower extremity. Electronically Signed: Cachorro Ponce MD 7/12/2023 11:04 AM    XR FEMUR LEFT 2 OR MORE VIEWS    Result  Date: 7/5/2023  This document is currently in Final Status Exam Accession# [id number] XR PELVIS 1 OR 2 VIEWS, XR FEMUR LEFT 2 OR MORE VIEWS HISTORY: Fx.   FINDINGS: Moderate left hip joint space narrowing. Mild right hip joint space narrowing. Lytic and permeative lesion left proximal femoral diaphysis with some aggressive periosteal changes. Fairly significant cortical thinning posteriorly. No fracture line is seen on this exam. IMPRESSION: Aggressive mass left proximal femur, metastasis versus primary neoplasm. Dictated By: Lencho Dozier MD 7/5/2023 10:26 AM Edited By: TERESITA 7/5/2023 10:35 AM Electronically Signed: Lencho Dozier MD 7/5/2023 4:53 PM    XR PELVIS 1 OR 2 VIEWS    Result Date: 7/5/2023  This document is currently in Final Status Exam Accession# [id number] XR PELVIS 1 OR 2 VIEWS, XR FEMUR LEFT 2 OR MORE VIEWS HISTORY: Fx.   FINDINGS: Moderate left hip joint space narrowing. Mild right hip joint space narrowing. Lytic and permeative lesion left proximal femoral diaphysis with some aggressive periosteal changes. Fairly significant cortical thinning posteriorly. No fracture line is seen on this exam. IMPRESSION: Aggressive mass left proximal femur, metastasis versus primary neoplasm. Dictated By: Lencho Dozier MD 7/5/2023 10:26 AM Edited By: TERESITA 7/5/2023 10:35 AM Electronically Signed: Lencho Dozier MD 7/5/2023 4:53 PM    CT Therapy Correlate    Result Date: 6/27/2023  This exam was marked as non-reportable because it will not be read by a radiologist or a [place] non-radiologist provider.       Latest radiology report personally reviewed.    Note created using dragon voice recognition software so sounds alike errors may have escaped editing.      07/29/2023   Elvis Mancilla MD  Hospitalist, [facility]  Pager: [phone]

## 2023-07-29 NOTE — DISCHARGE SUMMARY
Discharge/Death Summary    Patient was a 77 YO male with PMH significant for metastatic bladder cancer, cancer related pain on chronic opioid, recent pathologic femoral fracture, hypertension, hyperlipidemia, DM-2 and hypothyroidism who was admitted on 7/26/23 for evaluation of altered mental status. He was found to have hyponatremia and bilateral acute on chronic SDH.     Mental status improved only to some extent with the correction of hyponatremia. Seen by NS team for the subdural hematomas. Family declined specific therapy (jeremiah hole)  for the SDHs. Pall care consulted. Started on comfort care measures. Patient passed away peacefully on 7/29/23 at 1:20PM. Condolence offered to the family.     Causes of death:  Metastatic bladder cancer  Hyponatremia  Subdural hematoma  CKD 3a

## 2023-07-29 NOTE — PROGRESS NOTES
home arrived. Per daughter liza all belongings were taken home the prior day.  home left with patient at 1835.

## 2023-07-29 NOTE — PLAN OF CARE
Goal Outcome Evaluation:    Pt AO to self only. Given ativan for agitation and restlessness. 1:1 at bedside for safety. Given scheduled tylenol.

## 2023-07-29 NOTE — PROGRESS NOTES
Care Management Follow Up    Received request from nursing to visit family members due to patient passing, they had questions regarding cremation. Family stated that they knew Pt's wish was to be cremated, but he did not have a  home selected or pre-arrangements in place. Provided resource booklet with local  homes offering cremation. Family members will review and select preferred  home, nursing staff will coordinate once selection is made. SWCM will remain available in case of any additional needs.    Daisy Fitch, TEMOSW

## 2023-07-31 LAB
BACTERIA BLD CULT: NO GROWTH
BACTERIA BLD CULT: NO GROWTH

## 2024-04-15 NOTE — PATIENT INSTRUCTIONS
Encourage hydration today due to contrast received for your CT scan     Start Augmentin and azithromycin for your fevers and chest congestion.    Okay to use duoneb every 6 hours as needed. Prescription sent for a nebulizer    Is This A New Presentation, Or A Follow-Up?: Phototherapy Treatment

## 2025-01-19 NOTE — PATIENT INSTRUCTIONS
Preventive Health Recommendations:     See your health care provider every year to    Review health changes.     Discuss preventive care.      Review your medicines if your doctor has prescribed any.      Talk with your health care provider about whether you should have a test to screen for prostate cancer (PSA).    Every 3 years, have a diabetes test (fasting glucose). If you are at risk for diabetes, you should have this test more often.    Every 5 years, have a cholesterol test. Have this test more often if you are at risk for high cholesterol or heart disease.     Every 10 years, have a colonoscopy. Or, have a yearly FIT test (stool test). These exams will check for colon cancer.    Talk to with your health care provider about screening for Abdominal Aortic Aneurysm if you have a family history of AAA or have a history of smoking.    Shots:     Get a flu shot each year.     Get a tetanus shot every 10 years.     Talk to your doctor about your pneumonia vaccines. There are now two you should receive - Pneumovax (PPSV 23) and Prevnar (PCV 13).     Talk to your pharmacist about a shingles vaccine.     Talk to your doctor about the hepatitis B vaccine.  Nutrition:     Eat at least 5 servings of fruits and vegetables each day.     Eat whole-grain bread, whole-wheat pasta and brown rice instead of white grains and rice.     Get adequate Calcium and Vitamin D.   Lifestyle    Exercise for at least 150 minutes a week (30 minutes a day, 5 days a week). This will help you control your weight and prevent disease.     Limit alcohol to one drink per day.     No smoking.     Wear sunscreen to prevent skin cancer.    See your dentist every six months for an exam and cleaning.    See your eye doctor every 1 to 2 years to screen for conditions such as glaucoma, macular degeneration, cataracts, etc.    Personalized Prevention Plan  You are due for the preventive services outlined below.  Your care team is available to assist you  Flu Discharge Instructions  Please follow up with your PCP if there is any uncertainty as it pertains to medications you can take.   You have been diagnosed with Influenza. Which is viral in nature. Influenza may take 5-7 days to run its course.   You are contagious until you are fever free for 24 hours without any antipyretic medications such as tylenol or ibuprofen.   Please drink plenty of fluids.  Please get plenty of rest.    Tamiflu prescription has been discussed and if prescribed, please take to completion unless you cannot tolerate the side effects.   If you were prescribed a narcotic medication, do not drive or operate heavy equipment or machinery while taking these medications.  If not contraindicated, Take tylenol (acetominophen) for fever, chills or body aches every 4 hours. do not exceed 4000 mg/ day. Avoid tylenol (acetaminophen) if you  have any liver issues.  If not contraindicated, Take Motrin (Ibuprofen) every 4 hours for fever, chills, pain or inflammation. Avoid NSAIDS motrin(ibuprofen), aleve, naproxen ETC if you are taking blood thinners , have a history of GI bleeds or Kidney issues.   Use an antihistmine such as claritin or zyrtec to dry you out. Use pseudoephedrine (behind the counter) to decongest (beware this can raise your blood pressure). Use mucinex (guaifenisin) to break up mucus.  Promethazine DM as needed for cough. This medication may cause drowsiness so please avoid operating heavy machinery or driving while taking this medication.       Please arrange follow up with your primary medical clinic as soon as possible. You must understand that you've received an Urgent Care treatment only and that you may be released before all of your medical problems are known or treated. You, the patient, will arrange for follow up as instructed. If your symptoms worsen or fail to improve you should go to the Emergency Room.       in scheduling these services.  If you have already completed any of these items, please share that information with your care team to update in your medical record.  Health Maintenance Due   Topic Date Due     Diabetic Foot Exam  1945     Hepatitis C Screening  1945     Zoster (Shingles) Vaccine (2 of 3) 02/16/2016     Annual Wellness Visit  07/06/2016     PHQ-2  01/01/2020     Depression Assessment  01/29/2020     A1C Lab  01/31/2020

## 2025-04-10 NOTE — H&P
No significant changes to H&P from 2/1/17.    Randy Martinez MD  Department of Urology Staff  [facility]     [facility] - New Patient Evaluation    Referred by: No ref. provider found   Reason for referral: * No diagnoses found *     CC:   No chief complaint on file.        3/13/2025    12:08 PM   Last 3 PDI Scores   Pain Disability Index (PDI) 70           Interval History 4/10/2025:     Crystal Alvaraod is a 88 y.o. female presents to the clinic for follow up.  Since last visit the pain has {BETTER/WORSE:10957}.    The pain is located in the *** area and {radiates/does not:20791}.  The pain is described as {Desc; pain character:43335}    At BEST  {GEN PAIN SCALE HI:08841}   At WORST  {GEN PAIN SCALE HI:16342} on the WORST day.    On average pain is rated as {GEN PAIN SCALE HI:64380}.   Today the pain is rated as {GEN PAIN SCALE HI:79599}  Symptoms interfere with {INTERFERE:75405}.   Exacerbating factors: {Causes; Pain:59366}.    Mitigating factors {MITIGATIN}.     Current pain medications:  Failed Pain Medications:    Subjective 2025:   Crystal Alvarado is a 88 y.o. female who presents complaining of right-sided neck pain.  Patient states the pain started approximately 2 weeks ago.  No inciting incident or trauma.  Pain is worse with flexion of the neck.  Denies any radiation into the upper extremity or fingers.    Denies any weakness numbness or tingling.  No shoulder pain.  No history of cervical spine surgeries.  No history of shoulder surgeries. She was prescribed a Medrol Dosepak by the ED, she reports minimal relief.    Initial Pain Assessment:  Location: NECK PAIN, right-sided  Onset:  2 weeks  Current Pain Score: 9/10  Daily Pain of Range: 8-8/10  Quality: Burning and Sharp  Radiation:  Right-sided neck pain that radiates into the cervical paraspinal muscles and upper trapezius  Worsened by: extension and flexion  Improved by: medications     Patient denies night fever/night sweats, urinary incontinence, bowel incontinence, significant weight loss, significant motor  weakness, and loss of sensations.      Previous Interventions:  - none    Previous Therapies:  PT/OT: no   Chiropractor:   HEP:   Relevant Surgery: no     Previous Medications:   - Tylenol or NSAIDS:  Tylenol, Avoid NSAIDs due to renal function  - Muscle Relaxants:    - TCAs:   - SNRIs:   - Topicals:   - Anticonvulsants:    - Opioids:  Norco per PCP  - Adjuvants:     Current Pain Medications:  Norco 5/325 p.r.n.  Tylenol    Anticoagulation: NO    Review of Systems:  ROS    GENERAL:  No weight loss, malaise or fevers.  HEENT:   No recent changes in vision or hearing  NECK:  No difficulty with swallowing. No stridor.   RESPIRATORY:  Negative for cough, wheezing or shortness of breath, patient denies any recent URI.  CARDIOVASCULAR:  Negative for chest pain, leg swelling or palpitations.  GI:  Negative for abdominal discomfort, blood in stools or black stools or change in bowel habits.  MUSCULOSKELETAL:  See HPI.  SKIN:  Negative for lesions, rash, and itching.  PSYCH:  No mood disorder or recent psychosocial stressors.    HEMATOLOGY/LYMPHOLOGY:  Negative for prolonged bleeding, bruising easily or swollen nodes.  Patient is not currently taking any anti-coagulants  NEURO:   No history of headaches, syncope, paralysis, seizures or tremors.  All other reviewed and negative other than HPI.    History:  Current medications, allergies, medical history, surgical history,   family history, and social history were reviewed in the chart as marked.    Full Medication List:  Current Medications[1]     Allergies:  Patient has no known allergies.     Medical History:   has a past medical history of Arthritis, CHF (congestive heart failure), Coronary artery disease, Hyperlipidemia, and Hypertension.    Surgical History:   has a past surgical history that includes Hysterectomy (N/A); A-V cardiac pacemaker insertion (N/A, 7/9/2018); Insertion of implantable loop recorder (N/A, 7/9/2018); Esophagogastroduodenoscopy (N/A, 8/4/2020);  Colonoscopy; Colonoscopy (N/A, 8/5/2020); Cataract extraction w/  intraocular lens implant (Left, 11/23/2020); and cataract removal (Right, 2018).    Family History:  family history is not on file.    Social History:   reports that she quit smoking about 11 years ago. Her smoking use included cigarettes. She has never used smokeless tobacco. She reports that she does not drink alcohol and does not use drugs.    Physical Exam:  There were no vitals filed for this visit.      GENERAL: Well appearing, in no acute distress, alert and oriented x3.  PSYCH:  Mood and affect appropriate.  SKIN: Skin color, texture, turgor normal, no rashes or lesions.  HEAD/FACE:  Normocephalic, atraumatic. Cranial nerves grossly intact.  NECK: decreased ROM of the neck to the right 2/2 pain. +pain to palpation over the right cervical paraspinous muscles, trapezius and SCM. Spurling Negative.   CV: RRR with palpation of the radial artery.  PULM: No evidence of respiratory difficulty, symmetric chest rise.  GI:  Soft and non-distended.  MSK: No obvious deformities, edema, or skin discoloration.  No atrophy or tone abnormalities are noted.   NEURO: Bilateral upper and lower extremity coordination and strength is symmetric.  No loss of sensation is noted. Pike negative.  MENTAL STATUS: A x O x 3, good concentration, speech is fluent and goal directed  MOTOR: 5/5 in all muscle groups  GAIT: Normal. Ambulates unassisted.    Imaging:  No pertinent imaging available.    Labs:  BMP  Lab Results   Component Value Date     10/24/2024    K 3.9 10/24/2024     10/24/2024    CO2 22 (L) 10/24/2024    BUN 34 (H) 10/24/2024    CREATININE 1.6 (H) 10/24/2024    CALCIUM 9.5 10/24/2024    ANIONGAP 10 10/24/2024    EGFRNORACEVR 31.2 (A) 10/24/2024     Lab Results   Component Value Date    ALT 8 (L) 10/24/2024    AST 11 10/24/2024    ALKPHOS 61 10/24/2024    BILITOT 0.5 10/24/2024     Lab Results   Component Value Date    WBC 4.48 10/24/2024    HGB  11.6 (L) 10/24/2024    HCT 37.4 10/24/2024    MCV 94 10/24/2024     10/24/2024       Assessment:  Problem List Items Addressed This Visit    None        03/13/2025 - Crystal Alvarado is a 88 y.o. female who  has a past medical history of Arthritis, CHF (congestive heart failure), Coronary artery disease, Hyperlipidemia, and Hypertension.  By history and examination this patient has acute neck pain.  Based on exam, it appears myofascial in nature, likely a muscle strain.  I will get an x-ray to assess further however. We discussed the underlying diagnoses and multiple treatment options including conservative therapy such as ice, heat, muscle relaxant medications, Voltaren gel.  If her pain persists, we can consider trigger point injections.  Recommending holding off on trigger point injections at today's visit as she just completed a Medrol Dosepak.  The risks and benefits of each treatment option were discussed and all questions were answered.      Treatment Plan:   Procedures:  Consider trigger point injection if pain continues.  PT/OT/HEP: I have stressed the importance of physical activity and a home exercise plan to help with pain and improve health.  Medications:    - Tylenol p.r.n.   - methocarbamol q.i.d. 500 mg p.r.n.   - Voltaren gel   - she is prescribed Norco by her PCP   -  Reviewed and consistent with medication use as prescribed.  Imaging:  Cervical x-ray ordered.  Follow Up: RTC 4 weeks or sooner if needed    Kassidy Gomez DO   Interventional Pain Medicine / Anesthesiology    Disclaimer: This note was partly generated using dictation software which may occasionally result in transcription errors.         [1]   Current Outpatient Medications:     acetaminophen (TYLENOL) 325 MG tablet, Take 2 tablets (650 mg total) by mouth every 6 (six) hours as needed for Pain., Disp: , Rfl: 0    amLODIPine (NORVASC) 2.5 MG tablet, Take 2 tablets (5 mg total) by mouth once daily., Disp: 180 tablet,  Rfl: 3    aspirin (ECOTRIN) 81 MG EC tablet, Take 1 tablet (81 mg total) by mouth once daily., Disp: 90 tablet, Rfl: 3    atorvastatin (LIPITOR) 40 MG tablet, Take 1 tablet (40 mg total) by mouth once daily., Disp: 90 tablet, Rfl: 3    diclofenac sodium (VOLTAREN) 1 % Gel, Apply 2 g topically 4 (four) times daily as needed (pain)., Disp: 2 each, Rfl: 3    HYDROcodone-acetaminophen (NORCO) 5-325 mg per tablet, Take 1 tablet by mouth 2 (two) times daily as needed., Disp: , Rfl:     isosorbide mononitrate (IMDUR) 30 MG 24 hr tablet, Take 1 tablet (30 mg total) by mouth once daily., Disp: 30 tablet, Rfl: 11    levocetirizine (XYZAL) 5 MG tablet, , Disp: , Rfl:     losartan (COZAAR) 100 MG tablet, TAKE 1 TABLET BY MOUTH EVERY DAY, Disp: 90 tablet, Rfl: 3    methocarbamoL (ROBAXIN) 500 MG Tab, Take 1 tablet (500 mg total) by mouth 4 (four) times daily as needed (muscle pain)., Disp: 120 tablet, Rfl: 0    methylPREDNISolone (MEDROL DOSEPACK) 4 mg tablet, Take as directed on the package, Disp: 1 each, Rfl: 0    montelukast (SINGULAIR) 10 mg tablet, Take 10 mg by mouth., Disp: , Rfl:     nitroGLYCERIN (NITROSTAT) 0.4 MG SL tablet, Place 1 tablet (0.4 mg total) under the tongue every 5 (five) minutes as needed for Chest pain (and notify MD)., Disp: 25 tablet, Rfl: 5

## (undated) DEVICE — VESSEL LOOPS YELLOW MAXI 31145694

## (undated) DEVICE — KNIFE-MICRO UNITOME 5.0MM

## (undated) DEVICE — Device

## (undated) DEVICE — BIN-CATARACT BIN

## (undated) DEVICE — HANDPIECE-CAPSULEGUARD I/A STELLARIS

## (undated) DEVICE — TUBING SUCTION 20FT N620A

## (undated) DEVICE — PACK-BASIN SET-UP

## (undated) DEVICE — BAG-FLUID COLLECTOR FOR UROLOGY NON-STERILE

## (undated) DEVICE — NDL 25GA 1.5" 305127

## (undated) DEVICE — IRRIGATION-GLYCINE 1.5% 3000ML

## (undated) DEVICE — SENSOR-OXISENSOR II ADULT

## (undated) DEVICE — DAVINCI XI DRAPE COLUMN 470341

## (undated) DEVICE — KNIFE-KERATOME SLIT 2.8MM

## (undated) DEVICE — LUBRICANT JELLY 2OZ. TUBE

## (undated) DEVICE — MARKER-SKIN REG

## (undated) DEVICE — CANISTER-SUCTION 2000CC

## (undated) DEVICE — ADH SKIN CLOSURE PREMIERPRO EXOFIN 1.0ML 3470

## (undated) DEVICE — BLADE CLIPPER SGL USE 9680

## (undated) DEVICE — GLV-7.0 PROTEXIS PI CLASSIC LF/PF

## (undated) DEVICE — LIGHT HANDLE COVER

## (undated) DEVICE — SCD SLEEVE-KNEE REG.

## (undated) DEVICE — TUBING-SUCTION 20FT

## (undated) DEVICE — LABEL-STERILE PREPRINTED FOR OR

## (undated) DEVICE — SU WND CLOSURE VLOC 90 ABS 3-0 VIOLET 6" CV-23 VLOCM0804

## (undated) DEVICE — SOL WATER IRRIG 1000ML BOTTLE 2F7114

## (undated) DEVICE — ESU GROUND PAD ADULT W/CORD E7507

## (undated) DEVICE — ENDO TROCAR CONMED AIRSEAL BLADELESS 12X120MM IAS12-120LP

## (undated) DEVICE — DAVINCI XI DRIVER NDL LARGE 8MM EXT 471006

## (undated) DEVICE — TUBING IRR TUR Y TYPE 2C4041

## (undated) DEVICE — PREP CHLORAPREP 26ML TINTED ORANGE  260815

## (undated) DEVICE — SUCTION MANIFOLD NEPTUNE 2 SYS 4 PORT 0702-020-000

## (undated) DEVICE — IRRIGATOR-PATHFINDER PLUS BULB W/10" TUBING

## (undated) DEVICE — PACK-PHACO STELLARIS

## (undated) DEVICE — SOL NACL 0.9% IRRIG 1000ML BOTTLE 2F7124

## (undated) DEVICE — GLOVE PROTEXIS BLUE W/NEU-THERA 6.5  2D73EB65

## (undated) DEVICE — GLOVE PROTEXIS POWDER FREE SMT 8.5 2D72PT85X

## (undated) DEVICE — GOWN-SURG XL/XLONG LVL 4 IMPERVIOUS

## (undated) DEVICE — BETADINE 5% STERILE OPHTHALMIC SOLUTION 1 OZ.

## (undated) DEVICE — GLV-7.5 PROTEXIS LATEX MICRO

## (undated) DEVICE — CYSTOTOME-IRRIGATING  25G

## (undated) DEVICE — SYRINGE-TOOMEY-70CC W/REMOVABLE TIP

## (undated) DEVICE — SURGICEL HEMOSTAT 4X8" 1952

## (undated) DEVICE — CATH PLUG W/CAP 000076

## (undated) DEVICE — SOL WATER 1500ML

## (undated) DEVICE — DRAPE C-ARM W/STRAPS 42X72" 07-CA104

## (undated) DEVICE — GOWN XLG DISP 9545

## (undated) DEVICE — COVER-TABLE SHEET

## (undated) DEVICE — CAUTERY PAD-POLYHESIVE II ADULT

## (undated) DEVICE — SET-TUR Y-TYPE BLADDER IRRIGATION

## (undated) DEVICE — BIN-LENS IMPLANT CART

## (undated) DEVICE — DRSG TEGADERM 2 3/8X2 3/4" 1624W

## (undated) DEVICE — CATH FOLEY 3WAY 18FR 30ML LATEX 0167SI18

## (undated) DEVICE — APPLICATOR BNZN TNCT RYN SWBSTK NWVN SNGL APLS1106

## (undated) DEVICE — GLOVE 6.5 PROTEXIS PI CLSC PF BD CUF STRL LF 12IN 2D72PL65X

## (undated) DEVICE — DAVINCI XI OBTURATOR BLADELESS 8MM 470359

## (undated) DEVICE — DRAPE IOBAN INCISE 23X17" 6650EZ

## (undated) DEVICE — ZIPWIRE-STRAIGHT 0.035" X 150CM

## (undated) DEVICE — TUBING SUCTION MEDI-VAC 1/4"X20' N620A - HE

## (undated) DEVICE — TUBING IRRIG CYSTO/BLADDER SET 81" LF 2C4040

## (undated) DEVICE — DRSG-NON ADHERING 3 X 4 TELFA

## (undated) DEVICE — CANNULA-NUCLEUS HYDRODISSECTOR

## (undated) DEVICE — DRSG-KERLIX 6 X 6 3/4 FLUFF

## (undated) DEVICE — GLOVE PROTEXIS W/NEU-THERA 8.0  2D73TE80

## (undated) DEVICE — SUCTION MANIFOLD NEPTUNE 2 SYS 1 PORT 702-025-000

## (undated) DEVICE — SLEEVE SCD EXPRESS KNEE LENGTH MED 9529

## (undated) DEVICE — LABEL STERILE PREPRINTED FOR OR FRRH01-2M

## (undated) DEVICE — GOWN-SURG XL LVL 3 REINFORCED

## (undated) DEVICE — PACK DAVINCI UROL

## (undated) DEVICE — GLV-7.5 PROTEXIS PI CLASSIC LF/PF

## (undated) DEVICE — DRSG STERI STRIP 1/2X4" R1547

## (undated) DEVICE — CATH-URETERAL 5FR 70CM OPEN END

## (undated) DEVICE — CANISTER SUCTION MEDI-VAC GUARDIAN 2000ML 90D 65651-220

## (undated) DEVICE — STRAP UNIVERSAL POSITIONING 2-PIECE 4X47X76" 91-287

## (undated) DEVICE — ENDO POUCH UNIVERSAL RETRIEVAL SYSTEM INZII 12/15MM CD004

## (undated) DEVICE — BIN-TECNIS DCB00 LENSES

## (undated) DEVICE — TRAY-SKIN PREP POVIDONE/IODINE

## (undated) DEVICE — SOL NACL 0.9% IRRIG 3000ML BAG 2B7477

## (undated) DEVICE — GLOVE PROTEXIS W/NEU-THERA 8.5  2D73TE85

## (undated) DEVICE — TUBING CONMED AIRSEAL SMOKE EVAC INSUFFLATION ASM-EVAC

## (undated) DEVICE — COVER LT HANDLE 2/PK 5160-2FG

## (undated) DEVICE — IRRIGATION-NACL 1000ML

## (undated) DEVICE — LENS DELIVERY SYSTEM-SOFPORT LI61AO (EZ-28)

## (undated) DEVICE — CLIP ENDO HEMO-LOC PURPLE LG 544240

## (undated) DEVICE — PAD CHUX UNDERPAD 23X24" 7136

## (undated) DEVICE — SU VICRYL 3-0 SH 27" UND J416H

## (undated) DEVICE — SUTURE BOOTS 051003PBX

## (undated) DEVICE — DAVINCI XI DRAPE ARM 470015

## (undated) DEVICE — WIPES FOLEY CARE SURESTEP PROVON DFC100

## (undated) DEVICE — SU PROLENE 2-0 SH 30" 8833H

## (undated) DEVICE — PREP POVIDONE IODINE SOLUTION 10% 4OZ

## (undated) DEVICE — DRSG-SPONGE-STERILE 2 X 2

## (undated) DEVICE — INSTRUMENT WIPE-VISIWIPE

## (undated) DEVICE — ELECTRODE-PLASMALOOP-MEDIUM BIPOLAR 30 DEGREE

## (undated) DEVICE — PREP CHLORAPREP 26ML TINTED HI-LITE ORANGE 930815

## (undated) DEVICE — GLOVE 8.5 PROTEXIS PI CLSC PF BD CUF STRL LF 12IN 2D72PL85X

## (undated) DEVICE — FORCEP BIOPSY 2.3MM DISP COATED 000388

## (undated) DEVICE — PACK-EYE-CUSTOM

## (undated) DEVICE — PACK CYSTO SBA15CSFCA

## (undated) DEVICE — PACK-GYN CYSTO-CUSTOM

## (undated) DEVICE — SU VICRYL 0 UR-6 27" J603H

## (undated) DEVICE — LIGHT HANDLE COVER FOR SKYTRON LIGHTS

## (undated) DEVICE — STPL POWERED ECHELON VASC 35MM PVE35A

## (undated) DEVICE — NDL INSUFFLATION 13GA 120MM C2201

## (undated) DEVICE — DAVINCI XI ESU BIPOLAR 3MM ENDOWRIST FENESTRATED EXT 471205

## (undated) DEVICE — FILTER-KIDNEY

## (undated) DEVICE — DRAPE-LITHOTOMY GYN-UROLOGY W/POUCH

## (undated) DEVICE — CONNECTOR ERBEFLO 2 PORT 20325-215

## (undated) DEVICE — PACK LAPAROTOMY CUSTOM SBA32LPMBG

## (undated) DEVICE — DAVINCI XI GRASPER PROGRASP 8MM EXT 471093

## (undated) DEVICE — ENDOSCOPIC SEAL-GYN HYSTEROSCOPE

## (undated) DEVICE — DAVINCI XI SEAL UNIVERSAL 5-8MM 470361

## (undated) DEVICE — PAD CHUX UNDERPAD 30X36" P3036C

## (undated) DEVICE — IRRIGATION-WATER 3000ML BAG

## (undated) DEVICE — DAVINCI XI MONOPOLAR SCISSORS HOT SHEARS 8MM 470179

## (undated) DEVICE — PACK BASIN SET UP SUTCNBSBBA

## (undated) DEVICE — PROTECTOR ARM ONE-STEP TRENDELENBURG 40418

## (undated) DEVICE — DRAPE SPLIT SHEET 77X108 REINFORCED 29436

## (undated) DEVICE — DRAPE POUCH IRR 1016

## (undated) DEVICE — CATH URETERAL 5FRX70CM OPEN END FLEX TIP G14521

## (undated) DEVICE — LINEN TOWEL PACK X30 5481

## (undated) DEVICE — DRAPE C-ARM 60X42" 1013

## (undated) DEVICE — DAVINCI HOT SHEARS TIP COVER  400180

## (undated) DEVICE — BIN-UROLOGY / CYSTO

## (undated) DEVICE — SU MONOCRYL 4-0 PS-2 27" UND Y426H

## (undated) DEVICE — IRRIGATION-H2O 1000ML

## (undated) DEVICE — BLADE SWITCH SCISSORS TIP 5MM 89-5100B

## (undated) RX ORDER — LIDOCAINE HYDROCHLORIDE 20 MG/ML
INJECTION, SOLUTION EPIDURAL; INFILTRATION; INTRACAUDAL; PERINEURAL
Status: DISPENSED
Start: 2017-02-14

## (undated) RX ORDER — DEXAMETHASONE SODIUM PHOSPHATE 10 MG/ML
INJECTION, SOLUTION INTRAMUSCULAR; INTRAVENOUS
Status: DISPENSED
Start: 2017-02-14

## (undated) RX ORDER — FENTANYL CITRATE 50 UG/ML
INJECTION, SOLUTION INTRAMUSCULAR; INTRAVENOUS
Status: DISPENSED
Start: 2021-06-16

## (undated) RX ORDER — PROPOFOL 10 MG/ML
INJECTION, EMULSION INTRAVENOUS
Status: DISPENSED
Start: 2021-06-16

## (undated) RX ORDER — PROPOFOL 10 MG/ML
INJECTION, EMULSION INTRAVENOUS
Status: DISPENSED
Start: 2021-01-18

## (undated) RX ORDER — PROPOFOL 10 MG/ML
INJECTION, EMULSION INTRAVENOUS
Status: DISPENSED
Start: 2023-01-01

## (undated) RX ORDER — LIDOCAINE HYDROCHLORIDE 20 MG/ML
INJECTION, SOLUTION EPIDURAL; INFILTRATION; INTRACAUDAL; PERINEURAL
Status: DISPENSED
Start: 2021-06-16

## (undated) RX ORDER — GLYCOPYRROLATE 0.2 MG/ML
INJECTION, SOLUTION INTRAMUSCULAR; INTRAVENOUS
Status: DISPENSED
Start: 2023-01-01

## (undated) RX ORDER — CEFAZOLIN SODIUM 1 G/3ML
INJECTION, POWDER, FOR SOLUTION INTRAMUSCULAR; INTRAVENOUS
Status: DISPENSED
Start: 2017-02-14

## (undated) RX ORDER — PROPOFOL 10 MG/ML
INJECTION, EMULSION INTRAVENOUS
Status: DISPENSED
Start: 2018-11-13

## (undated) RX ORDER — FENTANYL CITRATE 50 UG/ML
INJECTION, SOLUTION INTRAMUSCULAR; INTRAVENOUS
Status: DISPENSED
Start: 2021-01-18

## (undated) RX ORDER — CEFTRIAXONE SODIUM 1 G/50ML
INJECTION, SOLUTION INTRAVENOUS
Status: DISPENSED
Start: 2018-11-13

## (undated) RX ORDER — CEFAZOLIN SODIUM IN 0.9 % NACL 3 G/100 ML
INTRAVENOUS SOLUTION, PIGGYBACK (ML) INTRAVENOUS
Status: DISPENSED
Start: 2021-06-16

## (undated) RX ORDER — EPHEDRINE SULFATE 50 MG/ML
INJECTION, SOLUTION INTRAMUSCULAR; INTRAVENOUS; SUBCUTANEOUS
Status: DISPENSED
Start: 2021-06-16

## (undated) RX ORDER — ONDANSETRON 2 MG/ML
INJECTION INTRAMUSCULAR; INTRAVENOUS
Status: DISPENSED
Start: 2017-02-14

## (undated) RX ORDER — CEFAZOLIN SODIUM 1 G/3ML
INJECTION, POWDER, FOR SOLUTION INTRAMUSCULAR; INTRAVENOUS
Status: DISPENSED
Start: 2021-06-16

## (undated) RX ORDER — ACETAMINOPHEN 325 MG/1
TABLET ORAL
Status: DISPENSED
Start: 2021-06-16

## (undated) RX ORDER — LIDOCAINE HYDROCHLORIDE 20 MG/ML
INJECTION, SOLUTION EPIDURAL; INFILTRATION; INTRACAUDAL; PERINEURAL
Status: DISPENSED
Start: 2018-11-13

## (undated) RX ORDER — KETAMINE HCL IN NACL, ISO-OSM 100MG/10ML
SYRINGE (ML) INJECTION
Status: DISPENSED
Start: 2021-01-18

## (undated) RX ORDER — ONDANSETRON 2 MG/ML
INJECTION INTRAMUSCULAR; INTRAVENOUS
Status: DISPENSED
Start: 2021-06-16

## (undated) RX ORDER — SODIUM CHLORIDE 9 MG/ML
INJECTION, SOLUTION INTRAVENOUS
Status: DISPENSED
Start: 2021-06-16

## (undated) RX ORDER — FENTANYL CITRATE 50 UG/ML
INJECTION, SOLUTION INTRAMUSCULAR; INTRAVENOUS
Status: DISPENSED
Start: 2017-02-14

## (undated) RX ORDER — PROPOFOL 10 MG/ML
INJECTION, EMULSION INTRAVENOUS
Status: DISPENSED
Start: 2017-02-14

## (undated) RX ORDER — LIDOCAINE HYDROCHLORIDE 20 MG/ML
INJECTION, SOLUTION EPIDURAL; INFILTRATION; INTRACAUDAL; PERINEURAL
Status: DISPENSED
Start: 2021-11-05

## (undated) RX ORDER — FENTANYL CITRATE-0.9 % NACL/PF 10 MCG/ML
PLASTIC BAG, INJECTION (ML) INTRAVENOUS
Status: DISPENSED
Start: 2022-01-01

## (undated) RX ORDER — HYDROMORPHONE HYDROCHLORIDE 1 MG/ML
INJECTION, SOLUTION INTRAMUSCULAR; INTRAVENOUS; SUBCUTANEOUS
Status: DISPENSED
Start: 2021-06-16

## (undated) RX ORDER — LIDOCAINE HYDROCHLORIDE 20 MG/ML
INJECTION, SOLUTION EPIDURAL; INFILTRATION; INTRACAUDAL; PERINEURAL
Status: DISPENSED
Start: 2021-01-18

## (undated) RX ORDER — FENTANYL CITRATE 50 UG/ML
INJECTION, SOLUTION INTRAMUSCULAR; INTRAVENOUS
Status: DISPENSED
Start: 2021-11-05

## (undated) RX ORDER — PROPOFOL 10 MG/ML
INJECTION, EMULSION INTRAVENOUS
Status: DISPENSED
Start: 2021-11-05

## (undated) RX ORDER — DEXAMETHASONE SODIUM PHOSPHATE 10 MG/ML
INJECTION, SOLUTION INTRAMUSCULAR; INTRAVENOUS
Status: DISPENSED
Start: 2021-01-18

## (undated) RX ORDER — ONDANSETRON 2 MG/ML
INJECTION INTRAMUSCULAR; INTRAVENOUS
Status: DISPENSED
Start: 2021-01-18